# Patient Record
Sex: FEMALE | Race: WHITE | NOT HISPANIC OR LATINO | Employment: OTHER | URBAN - METROPOLITAN AREA
[De-identification: names, ages, dates, MRNs, and addresses within clinical notes are randomized per-mention and may not be internally consistent; named-entity substitution may affect disease eponyms.]

---

## 2017-02-22 ENCOUNTER — CONSULT (OUTPATIENT)
Dept: CARDIOLOGY | Facility: CLINIC | Age: 67
End: 2017-02-22

## 2017-02-22 VITALS
DIASTOLIC BLOOD PRESSURE: 76 MMHG | HEART RATE: 70 BPM | BODY MASS INDEX: 39.49 KG/M2 | HEIGHT: 62 IN | SYSTOLIC BLOOD PRESSURE: 126 MMHG | WEIGHT: 214.6 LBS

## 2017-02-22 DIAGNOSIS — E78.2 MIXED HYPERLIPIDEMIA: ICD-10-CM

## 2017-02-22 DIAGNOSIS — I48.20 CHRONIC ATRIAL FIBRILLATION (HCC): Primary | ICD-10-CM

## 2017-02-22 DIAGNOSIS — I73.9 PERIPHERAL VASCULAR DISEASE (HCC): ICD-10-CM

## 2017-02-22 DIAGNOSIS — I25.10 CORONARY ARTERY DISEASE INVOLVING NATIVE CORONARY ARTERY OF NATIVE HEART WITHOUT ANGINA PECTORIS: ICD-10-CM

## 2017-02-22 DIAGNOSIS — I10 ESSENTIAL HYPERTENSION: ICD-10-CM

## 2017-02-22 PROCEDURE — 99204 OFFICE O/P NEW MOD 45 MIN: CPT | Performed by: INTERNAL MEDICINE

## 2017-02-22 PROCEDURE — 93280 PM DEVICE PROGR EVAL DUAL: CPT | Performed by: INTERNAL MEDICINE

## 2017-02-22 RX ORDER — OMEPRAZOLE 40 MG/1
40 CAPSULE, DELAYED RELEASE ORAL 2 TIMES DAILY
COMMUNITY
End: 2022-12-07

## 2017-02-22 RX ORDER — AMANTADINE HYDROCHLORIDE 100 MG/1
100 CAPSULE, GELATIN COATED ORAL 2 TIMES DAILY
COMMUNITY
End: 2022-12-22

## 2017-02-22 RX ORDER — LEVOTHYROXINE SODIUM 175 UG/1
175 TABLET ORAL DAILY
COMMUNITY
End: 2017-09-28 | Stop reason: DRUGHIGH

## 2017-02-22 RX ORDER — RANOLAZINE 500 MG/1
500 TABLET, EXTENDED RELEASE ORAL 2 TIMES DAILY
COMMUNITY
End: 2018-10-22 | Stop reason: SDUPTHER

## 2017-02-22 RX ORDER — CLOBETASOL PROPIONATE 0.5 MG/G
1 CREAM TOPICAL 2 TIMES DAILY PRN
COMMUNITY

## 2017-02-22 RX ORDER — OXYBUTYNIN CHLORIDE 10 MG/1
10 TABLET, EXTENDED RELEASE ORAL DAILY
COMMUNITY
End: 2018-01-25

## 2017-02-22 RX ORDER — WARFARIN SODIUM 5 MG/1
5 TABLET ORAL
COMMUNITY
End: 2017-09-12 | Stop reason: SDUPTHER

## 2017-02-22 RX ORDER — LORATADINE 10 MG/1
10 CAPSULE, LIQUID FILLED ORAL DAILY
COMMUNITY

## 2017-02-22 RX ORDER — POTASSIUM CHLORIDE 750 MG/1
10 TABLET, EXTENDED RELEASE ORAL 2 TIMES DAILY
COMMUNITY
End: 2017-02-22

## 2017-02-22 RX ORDER — CLONIDINE HYDROCHLORIDE 0.1 MG/1
0.1 TABLET ORAL 2 TIMES DAILY
COMMUNITY
End: 2017-06-06 | Stop reason: SDUPTHER

## 2017-02-22 RX ORDER — ASPIRIN 81 MG/1
81 TABLET ORAL DAILY
COMMUNITY

## 2017-02-22 RX ORDER — LOSARTAN POTASSIUM 50 MG/1
50 TABLET ORAL 2 TIMES DAILY
COMMUNITY
End: 2017-08-07 | Stop reason: SDUPTHER

## 2017-02-22 RX ORDER — FUROSEMIDE 40 MG/1
40 TABLET ORAL 2 TIMES DAILY
COMMUNITY
End: 2017-04-25 | Stop reason: SDUPTHER

## 2017-02-22 RX ORDER — PRAMIPEXOLE DIHYDROCHLORIDE 1 MG/1
1.5 TABLET ORAL NIGHTLY
COMMUNITY
End: 2017-09-05 | Stop reason: DRUGHIGH

## 2017-02-22 RX ORDER — SENNA AND DOCUSATE SODIUM 50; 8.6 MG/1; MG/1
1 TABLET, FILM COATED ORAL DAILY
COMMUNITY
End: 2021-06-15

## 2017-02-22 RX ORDER — CITALOPRAM 20 MG/1
20 TABLET ORAL
COMMUNITY
End: 2022-05-04

## 2017-02-22 RX ORDER — ASCORBIC ACID 500 MG
500 TABLET ORAL DAILY
COMMUNITY

## 2017-02-22 RX ORDER — NITROGLYCERIN 0.4 MG/1
0.4 TABLET SUBLINGUAL AS NEEDED
COMMUNITY
End: 2017-10-17

## 2017-02-22 RX ORDER — BETAMETHASONE DIPROPIONATE 0.5 MG/G
1 CREAM TOPICAL 2 TIMES DAILY PRN
COMMUNITY
End: 2018-03-28

## 2017-02-22 RX ORDER — MELATONIN
2000 DAILY
COMMUNITY
End: 2017-10-17 | Stop reason: DRUGHIGH

## 2017-02-22 RX ORDER — SPIRONOLACTONE 25 MG/1
25 TABLET ORAL DAILY
Qty: 90 TABLET | Refills: 3 | Status: SHIPPED | OUTPATIENT
Start: 2017-02-22 | End: 2018-01-17 | Stop reason: SDUPTHER

## 2017-02-28 ENCOUNTER — ANTICOAGULATION VISIT (OUTPATIENT)
Dept: CARDIOLOGY | Facility: CLINIC | Age: 67
End: 2017-02-28

## 2017-02-28 DIAGNOSIS — I48.91 ATRIAL FIBRILLATION, UNSPECIFIED TYPE (HCC): Primary | ICD-10-CM

## 2017-02-28 LAB — INR PPP: 2.8

## 2017-03-01 PROBLEM — E78.2 MIXED HYPERLIPIDEMIA: Status: ACTIVE | Noted: 2017-03-01

## 2017-03-01 PROBLEM — I25.10 CORONARY ARTERY DISEASE INVOLVING NATIVE CORONARY ARTERY OF NATIVE HEART WITHOUT ANGINA PECTORIS: Status: ACTIVE | Noted: 2017-03-01

## 2017-03-01 PROBLEM — I73.9 PERIPHERAL VASCULAR DISEASE (HCC): Status: ACTIVE | Noted: 2017-03-01

## 2017-03-01 PROBLEM — I48.20 CHRONIC ATRIAL FIBRILLATION: Status: ACTIVE | Noted: 2017-03-01

## 2017-03-01 PROBLEM — I10 ESSENTIAL HYPERTENSION: Status: ACTIVE | Noted: 2017-03-01

## 2017-03-13 ENCOUNTER — ANTICOAGULATION VISIT (OUTPATIENT)
Dept: CARDIOLOGY | Facility: CLINIC | Age: 67
End: 2017-03-13

## 2017-03-13 LAB — INR PPP: 3.7

## 2017-03-17 NOTE — PROGRESS NOTES
I have reviewed the anticoagulation track calender, labs, and dosage adjustments made by Tran Cardenas RN and I agree.    Electronically signed by DEJA King 03/17/17 8:03 AM

## 2017-03-24 ENCOUNTER — ANTICOAGULATION VISIT (OUTPATIENT)
Dept: CARDIOLOGY | Facility: CLINIC | Age: 67
End: 2017-03-24

## 2017-03-24 LAB — INR PPP: 2.7

## 2017-04-03 ENCOUNTER — ANTICOAGULATION VISIT (OUTPATIENT)
Dept: CARDIOLOGY | Facility: CLINIC | Age: 67
End: 2017-04-03

## 2017-04-03 LAB — INR PPP: 2.6

## 2017-04-24 ENCOUNTER — ANTICOAGULATION VISIT (OUTPATIENT)
Dept: CARDIOLOGY | Facility: CLINIC | Age: 67
End: 2017-04-24

## 2017-04-24 LAB — INR PPP: 3.7

## 2017-04-25 ENCOUNTER — APPOINTMENT (OUTPATIENT)
Dept: WOMENS IMAGING | Facility: HOSPITAL | Age: 67
End: 2017-04-25

## 2017-04-25 ENCOUNTER — TELEPHONE (OUTPATIENT)
Dept: CARDIOLOGY | Facility: CLINIC | Age: 67
End: 2017-04-25

## 2017-04-25 PROCEDURE — G0202 SCR MAMMO BI INCL CAD: HCPCS | Performed by: RADIOLOGY

## 2017-04-25 RX ORDER — FUROSEMIDE 40 MG/1
TABLET ORAL
Qty: 135 TABLET | Refills: 1 | Status: SHIPPED | OUTPATIENT
Start: 2017-04-25 | End: 2017-08-07 | Stop reason: SDUPTHER

## 2017-04-25 NOTE — TELEPHONE ENCOUNTER
PT called to report that she is experiencing BLE edema and SOA- she is starting to get a rash on her BLE that she is gets when she gets fluid build up in her BLE- we went over her meds and she has not been taking lasix- in addition to spironolactone as ordered- she dos stop potassium-   Instructed PT to start back Lasix at 40 mg daily for now with BMP in 1 week- she will call me back in 1 week or sooner with an update- she is not seeing any results we increase to BID as she was taking before-   Dr. Corona aware and agrees with this plan-

## 2017-04-26 NOTE — PROGRESS NOTES
I have reviewed the anticoagulation track calender, labs, and dosage adjustments made by Tran Cardenas RN and I agree.    Electronically signed by DEJA King 04/26/17 9:48 AM

## 2017-05-01 ENCOUNTER — ANTICOAGULATION VISIT (OUTPATIENT)
Dept: CARDIOLOGY | Facility: CLINIC | Age: 67
End: 2017-05-01

## 2017-05-01 LAB — INR PPP: 2.6

## 2017-05-04 ENCOUNTER — LAB (OUTPATIENT)
Dept: LAB | Facility: HOSPITAL | Age: 67
End: 2017-05-04

## 2017-05-04 DIAGNOSIS — I48.91 ATRIAL FIBRILLATION, UNSPECIFIED TYPE (HCC): ICD-10-CM

## 2017-05-04 DIAGNOSIS — Z79.899 HIGH RISK MEDICATION USE: Primary | ICD-10-CM

## 2017-05-04 DIAGNOSIS — Z79.899 HIGH RISK MEDICATION USE: ICD-10-CM

## 2017-05-04 LAB
ANION GAP SERPL CALCULATED.3IONS-SCNC: 6 MMOL/L (ref 3–11)
BUN BLD-MCNC: 21 MG/DL (ref 9–23)
BUN/CREAT SERPL: 30 (ref 7–25)
CALCIUM SPEC-SCNC: 9.6 MG/DL (ref 8.7–10.4)
CHLORIDE SERPL-SCNC: 99 MMOL/L (ref 99–109)
CO2 SERPL-SCNC: 32 MMOL/L (ref 20–31)
CREAT BLD-MCNC: 0.7 MG/DL (ref 0.6–1.3)
GFR SERPL CREATININE-BSD FRML MDRD: 83 ML/MIN/1.73
GLUCOSE BLD-MCNC: 78 MG/DL (ref 70–100)
POTASSIUM BLD-SCNC: 4.9 MMOL/L (ref 3.5–5.5)
SODIUM BLD-SCNC: 137 MMOL/L (ref 132–146)

## 2017-05-04 PROCEDURE — 80048 BASIC METABOLIC PNL TOTAL CA: CPT | Performed by: INTERNAL MEDICINE

## 2017-05-15 ENCOUNTER — ANTICOAGULATION VISIT (OUTPATIENT)
Dept: CARDIOLOGY | Facility: CLINIC | Age: 67
End: 2017-05-15

## 2017-05-15 LAB — INR PPP: 1.8

## 2017-05-22 ENCOUNTER — ANTICOAGULATION VISIT (OUTPATIENT)
Dept: CARDIOLOGY | Facility: CLINIC | Age: 67
End: 2017-05-22

## 2017-05-22 LAB — INR PPP: 1.8

## 2017-05-30 ENCOUNTER — TELEPHONE (OUTPATIENT)
Dept: CARDIOLOGY | Facility: CLINIC | Age: 67
End: 2017-05-30

## 2017-05-31 ENCOUNTER — ANTICOAGULATION VISIT (OUTPATIENT)
Dept: CARDIOLOGY | Facility: CLINIC | Age: 67
End: 2017-05-31

## 2017-05-31 LAB — INR PPP: 1.9

## 2017-06-06 ENCOUNTER — ANTICOAGULATION VISIT (OUTPATIENT)
Dept: CARDIOLOGY | Facility: CLINIC | Age: 67
End: 2017-06-06

## 2017-06-06 LAB — INR PPP: 1.8

## 2017-06-06 RX ORDER — CLONIDINE HYDROCHLORIDE 0.1 MG/1
0.1 TABLET ORAL EVERY 12 HOURS
Qty: 180 TABLET | Refills: 3 | Status: SHIPPED | OUTPATIENT
Start: 2017-06-06 | End: 2017-08-07 | Stop reason: SDUPTHER

## 2017-06-06 NOTE — PATIENT INSTRUCTIONS
To recheck 2 weeks with new dosage.    Patient says that she eats a lot of cabbage - mostly raw but some cooked.

## 2017-06-06 NOTE — PROGRESS NOTES
I have reviewed the anticoagulation track calender, labs, and dosage adjustments made by Tran Cardenas RN and I agree.    Electronically signed by DEJA King 06/06/17 4:34 PM

## 2017-06-19 ENCOUNTER — ANTICOAGULATION VISIT (OUTPATIENT)
Dept: CARDIOLOGY | Facility: CLINIC | Age: 67
End: 2017-06-19

## 2017-06-19 LAB — INR PPP: 1.9

## 2017-06-26 ENCOUNTER — ANTICOAGULATION VISIT (OUTPATIENT)
Dept: CARDIOLOGY | Facility: CLINIC | Age: 67
End: 2017-06-26

## 2017-06-26 LAB — INR PPP: 3.1

## 2017-06-28 NOTE — PROGRESS NOTES
I have reviewed the anticoagulation track calender, labs, and dosage adjustments made by Tran Cardenas RN and I agree.    Electronically signed by DEJA King 06/27/17 9:12 PM

## 2017-07-10 ENCOUNTER — ANTICOAGULATION VISIT (OUTPATIENT)
Dept: CARDIOLOGY | Facility: CLINIC | Age: 67
End: 2017-07-10

## 2017-07-10 LAB — INR PPP: 3.1

## 2017-07-11 NOTE — PROGRESS NOTES
I have reviewed the anticoagulation track calender, labs, and dosage adjustments made by Tran Cardenas RN and I agree.    Electronically signed by DEJA King 07/11/17 3:56 PM

## 2017-07-18 ENCOUNTER — ANTICOAGULATION VISIT (OUTPATIENT)
Dept: CARDIOLOGY | Facility: CLINIC | Age: 67
End: 2017-07-18

## 2017-07-18 LAB — INR PPP: 3.4

## 2017-07-25 ENCOUNTER — ANTICOAGULATION VISIT (OUTPATIENT)
Dept: CARDIOLOGY | Facility: CLINIC | Age: 67
End: 2017-07-25

## 2017-07-25 LAB — INR PPP: 2.2

## 2017-08-01 ENCOUNTER — ANTICOAGULATION VISIT (OUTPATIENT)
Dept: CARDIOLOGY | Facility: CLINIC | Age: 67
End: 2017-08-01

## 2017-08-01 LAB — INR PPP: 2.6

## 2017-08-07 RX ORDER — LOSARTAN POTASSIUM 50 MG/1
50 TABLET ORAL EVERY 12 HOURS
Qty: 180 TABLET | Refills: 3 | Status: SHIPPED | OUTPATIENT
Start: 2017-08-07 | End: 2018-10-04 | Stop reason: SDUPTHER

## 2017-08-07 RX ORDER — CLONIDINE HYDROCHLORIDE 0.1 MG/1
0.1 TABLET ORAL EVERY 12 HOURS
Qty: 180 TABLET | Refills: 3 | Status: SHIPPED | OUTPATIENT
Start: 2017-08-07 | End: 2018-10-04 | Stop reason: SDUPTHER

## 2017-08-07 RX ORDER — FUROSEMIDE 40 MG/1
TABLET ORAL
Qty: 135 TABLET | Refills: 1 | Status: SHIPPED | OUTPATIENT
Start: 2017-08-07 | End: 2018-01-25

## 2017-08-15 ENCOUNTER — ANTICOAGULATION VISIT (OUTPATIENT)
Dept: CARDIOLOGY | Facility: CLINIC | Age: 67
End: 2017-08-15

## 2017-08-15 LAB — INR PPP: 2.7

## 2017-09-05 ENCOUNTER — ANTICOAGULATION VISIT (OUTPATIENT)
Dept: PHARMACY | Facility: HOSPITAL | Age: 67
End: 2017-09-05

## 2017-09-05 DIAGNOSIS — I48.20 CHRONIC ATRIAL FIBRILLATION (HCC): ICD-10-CM

## 2017-09-05 LAB — INR PPP: 2.2

## 2017-09-05 RX ORDER — PRAMIPEXOLE DIHYDROCHLORIDE 1.5 MG/1
1.5 TABLET ORAL NIGHTLY
COMMUNITY

## 2017-09-05 NOTE — PROGRESS NOTES
Anticoagulation Clinic - Remote Progress Note  REMOTE LAB    Indication: afib  Referring Provider: Cj  Goal INR: 2-3  Current Drug Interactions: aspirin, citalopram, levothyroxine, omeprazole, spironolactone  CHADS-VASc: 7 (age, gender, HTN, PVD, h/o TIA, HTN)  Bleed Risk: h/o AVM with bleed requiring hospitalization  Other: previously on Xarelto, with subsequent bleed    Diet: [GLV INTAKE]  Alcohol:   Tobacco:       INR History:  Date 9/5           Total Weekly Dose 32.5 mg           INR 2.2           Notes                Phone Interview:  Tablet Strength: pt has 5mg tablets  Current Maintenance Dose: 5mg daily except 2.5mg on Tuesdays  Patient Findings      Positives Change in medications     Negatives Signs/symptoms of thrombosis, Signs/symptoms of bleeding, Laboratory test error suspected, Change in health, Change in alcohol use, Change in activity, Upcoming invasive procedure, Emergency department visit, Upcoming dental procedure, Missed doses, Extra doses, Change in diet/appetite, Hospital admission, Bruising, Other complaints     Comments Pramipexole dose recently increased from 1mg QHS to 1.5mg QHS. Levothyroxine dose increased from 175mcg to 200mcg daily about one month ago -- discussed DDI between warfarin and thyroid hormone, and encouraged pt to let us know of future medication changes.       Patient Contact Info: [phone]  Lab Contact Info: [facility], [phone]    Plan:  1. INR is therapeutic today. Instructed pt to continue current dose, 5mg daily except 2.5mg on Tuesdays.  2. Repeat INR in 4 weeks.  3. Verbal information provided over the phone to Ms. Tay. She RBV dosing instructions, expresses understanding, and has no further questions at this time.      Hamida Dailey [facility]  9/5/2017  4:47 PM

## 2017-09-06 ENCOUNTER — OFFICE VISIT (OUTPATIENT)
Dept: CARDIOLOGY | Facility: CLINIC | Age: 67
End: 2017-09-06

## 2017-09-06 VITALS
HEIGHT: 63 IN | SYSTOLIC BLOOD PRESSURE: 110 MMHG | BODY MASS INDEX: 42.88 KG/M2 | DIASTOLIC BLOOD PRESSURE: 62 MMHG | HEART RATE: 84 BPM | WEIGHT: 242 LBS

## 2017-09-06 DIAGNOSIS — E78.2 MIXED HYPERLIPIDEMIA: ICD-10-CM

## 2017-09-06 DIAGNOSIS — I25.10 CORONARY ARTERY DISEASE INVOLVING NATIVE CORONARY ARTERY OF NATIVE HEART WITHOUT ANGINA PECTORIS: Primary | ICD-10-CM

## 2017-09-06 DIAGNOSIS — Z79.899 HIGH RISK MEDICATION USE: ICD-10-CM

## 2017-09-06 DIAGNOSIS — I10 ESSENTIAL HYPERTENSION: ICD-10-CM

## 2017-09-06 PROCEDURE — 99213 OFFICE O/P EST LOW 20 MIN: CPT | Performed by: INTERNAL MEDICINE

## 2017-09-06 PROCEDURE — 93280 PM DEVICE PROGR EVAL DUAL: CPT | Performed by: INTERNAL MEDICINE

## 2017-09-06 RX ORDER — TRIAMCINOLONE ACETONIDE 55 UG/1
2 SPRAY, METERED NASAL DAILY PRN
COMMUNITY
End: 2018-05-31 | Stop reason: ALTCHOICE

## 2017-09-06 RX ORDER — BUMETANIDE 2 MG/1
TABLET ORAL
Qty: 180 TABLET | Refills: 1 | Status: SHIPPED | OUTPATIENT
Start: 2017-09-06 | End: 2017-10-05 | Stop reason: SDUPTHER

## 2017-09-12 DIAGNOSIS — I48.91 ATRIAL FIBRILLATION, UNSPECIFIED TYPE (HCC): Primary | ICD-10-CM

## 2017-09-12 RX ORDER — WARFARIN SODIUM 5 MG/1
TABLET ORAL
Qty: 90 TABLET | Refills: 1 | OUTPATIENT
Start: 2017-09-12 | End: 2018-03-02 | Stop reason: SDUPTHER

## 2017-09-19 ENCOUNTER — TELEPHONE (OUTPATIENT)
Dept: PHARMACY | Facility: HOSPITAL | Age: 67
End: 2017-09-19

## 2017-09-19 NOTE — TELEPHONE ENCOUNTER
Patient called to report she started keflex 500mg TID last night for an infection in her finger. Patient does not recall taking this medication in the past. Per Rahulmp **, will monitor INR closely to assess increase in INR. Advised patient to continue warfarin and recheck INR on Friday.           **Monitor for elevated INR and bleeding if a vitamin K antagonist is used in combination with cephalosporins. Cephalosporins that have an NMTT (1-MTT) side chain in their chemical structure may pose the greatest risk of interaction.

## 2017-09-22 ENCOUNTER — TELEPHONE (OUTPATIENT)
Dept: PHARMACY | Facility: HOSPITAL | Age: 67
End: 2017-09-22

## 2017-09-22 NOTE — TELEPHONE ENCOUNTER
Warfarin note re: antibiotics      She has been changed from cephalexin to doxycycline for wound infection. INR = 1.8 on 9/22 faxed from [facility] in [place]. Notified her that doxy may increase INR and recheck on Thursday of next week approximately day 6/7 for doxy. Please call her cell phone and not home phone number henceforth.

## 2017-09-28 ENCOUNTER — ANTICOAGULATION VISIT (OUTPATIENT)
Dept: PHARMACY | Facility: HOSPITAL | Age: 67
End: 2017-09-28

## 2017-09-28 DIAGNOSIS — Z79.899 HIGH RISK MEDICATION USE: ICD-10-CM

## 2017-09-28 DIAGNOSIS — I48.20 CHRONIC ATRIAL FIBRILLATION (HCC): ICD-10-CM

## 2017-09-28 LAB
INR PPP: 2.1 (ref 0.91–1.09)
PROTHROMBIN TIME: 25.3 SECONDS (ref 10–13.8)

## 2017-09-28 PROCEDURE — 85610 PROTHROMBIN TIME: CPT

## 2017-09-28 PROCEDURE — G0463 HOSPITAL OUTPT CLINIC VISIT: HCPCS

## 2017-09-28 PROCEDURE — 36416 COLLJ CAPILLARY BLOOD SPEC: CPT

## 2017-09-28 RX ORDER — LEVOTHYROXINE SODIUM 0.2 MG/1
200 TABLET ORAL DAILY
COMMUNITY
End: 2018-05-21

## 2017-09-28 NOTE — PROGRESS NOTES
Anticoagulation Clinic - Remote Progress Note  REMOTE LAB    Indication: afib  Referring Provider: Cj  Goal INR: 2-3  Initial Warfarin Start: December 2016  Current Drug Interactions: aspirin, citalopram, levothyroxine, omeprazole, spironolactone  CHADS-VASc: 7 (age, gender, HTN, PVD, h/o TIA, HTN)  Bleed Risk: h/o AVM with bleed requiring hospitalization  Other: previously on Xarelto, with subsequent bleed (AVM) -- Dr. Campa in [place]; internal hemorrhoids     Diet: raw cabbage (2-3 heads per week), occasional cooked GLV  Alcohol: none  Tobacco: none  OTC Pain Medication: APAP    1st clinic visit: 9/28/17    INR History:  Date 9/5 9/28          Total Weekly Dose 32.5 mg 32.5 mg          INR 2.2 2.1          Notes  clinic            Phone Interview:  Tablet Strength: pt has 5mg tablets  Current Maintenance Dose: 5mg daily except 2.5mg on Tuesdays  Patient Findings      Positives Signs/symptoms of bleeding, Change in medications     Negatives Signs/symptoms of thrombosis, Laboratory test error suspected, Change in health, Change in alcohol use, Change in activity, Upcoming invasive procedure, Emergency department visit, Upcoming dental procedure, Missed doses, Extra doses, Change in diet/appetite, Hospital admission, Bruising, Other complaints     Comments Pt has one dose of doxy remaining for finger infection (cellulitis). She is also changing form levemir to insulin degludec (Tresiba) -- has had an allergic reaction to levemir (hives around injection site). Had a nosebleed last week (happens when she bothers her nose -- advised her to talk with her PCP about this -- consider cauterization).      Patient Contact Info: [phone]  Lab Contact Info: [facility], [phone]    Joanna ZAYNAB Cross presented to the clinic today -- she was by herself for counseling. Discussed warfarin's indication, mechanism, and dosing. Also enforced the importance of taking warfarin as instructed and at the  same time every day, preferably in the evening so that we can make dose adjustments more easily following subsequent clinic visits. She expressed understanding of this fact and is already dosing in this manner. We also discussed what she should do about a missed dose; pts can take missed doses within about 12 hours of their usual scheduled dose, but she was instructed on the importance of not doubling up on doses unless told to do so by the warfarin clinic. Explained possible side effects of warfarin therapy, including increased risk of bleeding, s/sx of bleeding and s/sx of any additional clots/PE/CVA. Reviewed drug/food/tobacco/EtOH interactions and provided written information covering these topics in more detail, explaining that green, leafy vegetables interact most heavily with warfarin. Instructed the pt not to take or discontinue any medications without informing her physician/pharmacist and reminded her to inform us of any dietary changes, as well. She expressed understanding of the information provided and has no additional questions at this time.    Plan:  1. INR is therapeutic today despite ongoing infection / abx therapy. Instructed pt to continue current dose, 5mg daily except 2.5mg on Tuesdays.  2. Repeat INR in 2 weeks to ensure it remains WNL following abx completion.   3. Verbal and written information provided in the clinic Ms. Cross. She RBV dosing instructions, expresses understanding, and has no further questions at this time.    Hamida Dailey [facility]  9/28/2017  4:33 PM

## 2017-10-05 RX ORDER — BUMETANIDE 2 MG/1
TABLET ORAL
Qty: 180 TABLET | Refills: 1 | Status: SHIPPED | OUTPATIENT
Start: 2017-10-05 | End: 2018-01-25

## 2017-10-12 ENCOUNTER — ANTICOAGULATION VISIT (OUTPATIENT)
Dept: PHARMACY | Facility: HOSPITAL | Age: 67
End: 2017-10-12

## 2017-10-12 DIAGNOSIS — I48.20 CHRONIC ATRIAL FIBRILLATION (HCC): ICD-10-CM

## 2017-10-12 DIAGNOSIS — Z79.899 HIGH RISK MEDICATION USE: Primary | ICD-10-CM

## 2017-10-12 LAB
INR PPP: 2.6 (ref 0.91–1.09)
PROTHROMBIN TIME: 31.2 SECONDS (ref 10–13.8)

## 2017-10-12 PROCEDURE — 36416 COLLJ CAPILLARY BLOOD SPEC: CPT

## 2017-10-12 PROCEDURE — 85610 PROTHROMBIN TIME: CPT

## 2017-10-12 RX ORDER — METOLAZONE 2.5 MG/1
2.5 TABLET ORAL DAILY
Qty: 30 TABLET | Refills: 1 | Status: SHIPPED | OUTPATIENT
Start: 2017-10-12 | End: 2018-01-17 | Stop reason: SDUPTHER

## 2017-10-12 NOTE — PROGRESS NOTES
Anticoagulation Clinic - Remote Progress Note  REMOTE LAB    Indication: afib  Referring Provider: Cj  Goal INR: 2-3  Initial Warfarin Start: December 2016  Current Drug Interactions: aspirin, citalopram, levothyroxine, omeprazole, spironolactone  CHADS-VASc: 7 (age, gender, HTN, PVD, h/o TIA, HTN)  Bleed Risk: h/o AVM with bleed requiring hospitalization  Other: previously on Xarelto, with subsequent bleed (AVM) -- Dr. Campa in [place]; internal hemorrhoids     Diet: raw cabbage (2-3 heads per week), occasional cooked GLV  Alcohol: none  Tobacco: none  OTC Pain Medication: APAP    1st clinic visit: 9/28/17    INR History:  Date 9/5 9/28 10/12         Total Weekly Dose 32.5 mg 32.5 mg 32.5 mg         INR 2.2 2.1 2.6         Notes  clinic; [facility] clinic           Phone Interview:  Tablet Strength: pt has 5mg tablets  Current Maintenance Dose: 5mg daily except 2.5mg on Tuesdays  Patient Findings      Positives Change in medications, Other complaints     Negatives Signs/symptoms of thrombosis, Signs/symptoms of bleeding, Laboratory test error suspected, Change in health, Change in alcohol use, Change in activity, Upcoming invasive procedure, Emergency department visit, Upcoming dental procedure, Missed doses, Extra doses, Change in diet/appetite, Hospital admission, Bruising     Comments Finished abx two weeks ago, denies additional changes. She has a petechiae-looking rash on bilateral legs, around her ankles. It has been there fore about 2-3 weeks -- is not getting worse, but it isn't going away. She has experienced this type of rash previously, but it itched before and was weeping (not now).      Patient Contact Info: [phone]  Lab Contact Info: [facility], [phone]    Plan:  1. INR is therapeutic today. Instructed Ms. Cross to continue current dose, warfarin 5mg daily except 2.5mg on Tuesdays.  2. RTC in ~4 weeks, when pt is back in [place] with her daughter.  3. Verbal and written  information provided in the clinic. Ms. Cross RBV dosing instructions, expresses understanding, and has no further questions at this time.    Hamida Dailey RPH  10/12/2017  10:43 AM

## 2017-10-17 RX ORDER — NITROGLYCERIN 400 UG/1
1 SPRAY ORAL
COMMUNITY
End: 2018-04-30 | Stop reason: SDUPTHER

## 2017-10-23 ENCOUNTER — TRANSCRIBE ORDERS (OUTPATIENT)
Dept: PHARMACY | Facility: HOSPITAL | Age: 67
End: 2017-10-23

## 2017-10-23 DIAGNOSIS — I48.91 ATRIAL FIBRILLATION, UNSPECIFIED TYPE (HCC): Primary | ICD-10-CM

## 2017-11-01 DIAGNOSIS — Z79.899 HIGH RISK MEDICATION USE: ICD-10-CM

## 2017-11-06 ENCOUNTER — ANTICOAGULATION VISIT (OUTPATIENT)
Dept: PHARMACY | Facility: HOSPITAL | Age: 67
End: 2017-11-06

## 2017-11-06 DIAGNOSIS — I48.20 CHRONIC ATRIAL FIBRILLATION (HCC): ICD-10-CM

## 2017-11-06 LAB
INR PPP: 2.2 (ref 0.91–1.09)
PROTHROMBIN TIME: 26.9 SECONDS (ref 10–13.8)

## 2017-11-06 PROCEDURE — G0463 HOSPITAL OUTPT CLINIC VISIT: HCPCS

## 2017-11-06 PROCEDURE — 85610 PROTHROMBIN TIME: CPT

## 2017-11-06 PROCEDURE — 36416 COLLJ CAPILLARY BLOOD SPEC: CPT

## 2017-11-06 NOTE — PROGRESS NOTES
Anticoagulation Clinic - Remote Progress Note  REMOTE LAB    Indication: afib  Referring Provider: Cj  Goal INR: 2-3  Initial Warfarin Start: December 2016  Current Drug Interactions: aspirin, citalopram, levothyroxine, omeprazole, spironolactone  CHADS-VASc: 7 (age, gender, HTN, PVD, h/o TIA, HTN)  Bleed Risk: h/o AVM with bleed requiring hospitalization  Other: previously on Xarelto, with subsequent bleed (AVM) -- Dr. Campa in [place]; internal hemorrhoids     Diet: raw cabbage (2-3 heads per week), occasional cooked GLV  Alcohol: none  Tobacco: none  OTC Pain Medication: APAP    1st clinic visit: 9/28/17    INR History:  Date 9/5 9/28 10/12 11/6        Total Weekly Dose 32.5 mg 32.5 mg 32.5 mg 32.5 mg        INR 2.2 2.1 2.6 2.2        Notes  clinic; doxy clinic clinic          Phone Interview:  Tablet Strength: pt has 5mg tablets  Current Maintenance Dose: 5mg daily except 2.5mg on Tuesdays  Patient Findings      Negatives Signs/symptoms of thrombosis, Signs/symptoms of bleeding, Laboratory test error suspected, Change in health, Change in alcohol use, Change in activity, Upcoming invasive procedure, Emergency department visit, Upcoming dental procedure, Missed doses, Extra doses, Change in medications, Change in diet/appetite, Hospital admission, Bruising, Other complaints     Patient Contact Info: [phone]  Lab Contact Info: [facility], [phone]    Plan:  1. INR is therapeutic again today. Instructed Ms. Cross to continue warfarin 5mg daily except 2.5mg on Tuesdays.  2. RTC in 4 weeks.  3. Verbal and written information provided in the clinic. Ms. Cross RBV dosing instructions, expresses understanding, and has no further questions at this time.    Hamida Dailey RPH  11/6/2017  12:28 PM

## 2017-11-30 ENCOUNTER — ANTICOAGULATION VISIT (OUTPATIENT)
Dept: PHARMACY | Facility: HOSPITAL | Age: 67
End: 2017-11-30

## 2017-11-30 DIAGNOSIS — I48.20 CHRONIC ATRIAL FIBRILLATION (HCC): ICD-10-CM

## 2017-11-30 LAB
INR PPP: 3 (ref 0.91–1.09)
PROTHROMBIN TIME: 35.8 SECONDS (ref 10–13.8)

## 2017-11-30 PROCEDURE — 36416 COLLJ CAPILLARY BLOOD SPEC: CPT

## 2017-11-30 PROCEDURE — 85610 PROTHROMBIN TIME: CPT

## 2017-11-30 PROCEDURE — G0463 HOSPITAL OUTPT CLINIC VISIT: HCPCS

## 2017-11-30 NOTE — PROGRESS NOTES
Anticoagulation Clinic - Remote Progress Note  REMOTE LAB    Indication: afib  Referring Provider: Cj  Goal INR: 2-3  Initial Warfarin Start: December 2016  Current Drug Interactions: aspirin, citalopram, levothyroxine, omeprazole, spironolactone  CHADS-VASc: 7 (age, gender, HTN, PVD, h/o TIA, HTN)  Bleed Risk: h/o AVM with bleed requiring hospitalization  Other: previously on Xarelto, with subsequent bleed (AVM) -- Dr. Campa in [place]; internal hemorrhoids     Diet: raw cabbage (2-3 heads per week), occasional cooked GLV  Alcohol: none  Tobacco: none  OTC Pain Medication: APAP    1st clinic visit: 9/28/17    INR History:  Date 9/5 9/28 10/12 11/6 11/30       Total Weekly Dose 32.5 mg 32.5 mg 32.5 mg 32.5 mg 32.5 mg       INR 2.2 2.1 2.6 2.2 3.0       Notes  clinic; doxy clinic clinic clinic         Phone Interview:  Tablet Strength: pt has 5mg tablets  Current Maintenance Dose: 5mg daily except 2.5mg on Tuesdays    Patient Findings      Positives Bruising     Negatives Signs/symptoms of thrombosis, Signs/symptoms of bleeding, Laboratory test error suspected, Change in health, Change in alcohol use, Change in activity, Upcoming invasive procedure, Emergency department visit, Upcoming dental procedure, Missed doses, Extra doses, Change in medications, Change in diet/appetite, Hospital admission, Other complaints     Comments Patient reports some more bruising from her insulin shots, but no bleeding.     Patient Contact Info: [phone]  Lab Contact Info: [facility], [phone]    Plan:  1. INR is therapeutic today at 3.0. Instructed Ms. Cross to continue warfarin 5mg daily except 2.5mg on Tuesdays. Also instructed patient to eat a serving of cooked GLV to prevent INR from becoming supratherapeutic.  2. RTC in 4 weeks.  3. Verbal and written information provided in the clinic. Ms. Cross RBV dosing instructions, expresses understanding, and has no further questions at this  time.    Lana Hernandez, PharmD  11/30/2017  4:36 PM

## 2017-12-28 ENCOUNTER — ANTICOAGULATION VISIT (OUTPATIENT)
Dept: PHARMACY | Facility: HOSPITAL | Age: 67
End: 2017-12-28

## 2017-12-28 DIAGNOSIS — I48.20 CHRONIC ATRIAL FIBRILLATION (HCC): ICD-10-CM

## 2017-12-28 LAB
INR PPP: 2.6 (ref 0.91–1.09)
PROTHROMBIN TIME: 31.7 SECONDS (ref 10–13.8)

## 2017-12-28 PROCEDURE — 36416 COLLJ CAPILLARY BLOOD SPEC: CPT

## 2017-12-28 PROCEDURE — G0463 HOSPITAL OUTPT CLINIC VISIT: HCPCS

## 2017-12-28 PROCEDURE — 85610 PROTHROMBIN TIME: CPT

## 2017-12-28 NOTE — PROGRESS NOTES
Anticoagulation Clinic - Remote Progress Note  REMOTE LAB    Indication: afib  Referring Provider: Cj  Goal INR: 2-3  Initial Warfarin Start: December 2016  Current Drug Interactions: aspirin, citalopram, levothyroxine, omeprazole, spironolactone  CHADS-VASc: 7 (age, gender, HTN, PVD, h/o TIA, HTN)  Bleed Risk: h/o AVM with bleed requiring hospitalization  Other: previously on Xarelto, with subsequent bleed (AVM) -- Dr. Campa in [place]; internal hemorrhoids     Diet: raw cabbage (2-3 heads per week), occasional cooked GLV  Alcohol: none  Tobacco: none  OTC Pain Medication: APAP    INR History:  Date 9/5 9/28 10/12 11/6 11/30 12/28      Total Weekly Dose 32.5 mg 32.5 mg 32.5 mg 32.5 mg 32.5 mg 32.5 mg      INR 2.2 2.1 2.6 2.2 3.0 2.6      Notes  clinic; doxy clinic clinic clinic clinic        Phone Interview:  Tablet Strength: pt has 5mg tablets  Current Maintenance Dose: 5mg daily except 2.5mg on Tuesdays  Patient Findings      Positives Bruising     Negatives Signs/symptoms of thrombosis, Signs/symptoms of bleeding, Laboratory test error suspected, Change in health, Change in alcohol use, Change in activity, Upcoming invasive procedure, Emergency department visit, Upcoming dental procedure, Missed doses, Extra doses, Change in medications, Change in diet/appetite, Hospital admission, Other complaints     Comments Mrs. Cross still has some bruising on her abdomen from her insulin injections, but they are slowly improving.      Patient Contact Info: [phone]  Lab Contact Info: [facility], [phone]    Plan:  1. INR is therapeutic today, so instructed Mrs. Cross to continue warfarin 5mg daily except 2.5mg on Tuesdays.   2. RTC in 4 weeks.  3. Verbal and written information provided in the clinic. Ms. Cross RBV dosing instructions, expresses understanding with teach back, and she has no further questions at this time.    Ann Cowden Mayer, PharmD  12/28/2017  3:13 PM

## 2018-01-17 RX ORDER — SPIRONOLACTONE 25 MG/1
25 TABLET ORAL DAILY
Qty: 90 TABLET | Refills: 1 | Status: SHIPPED | OUTPATIENT
Start: 2018-01-17 | End: 2019-09-18 | Stop reason: SDUPTHER

## 2018-01-17 RX ORDER — METOLAZONE 2.5 MG/1
2.5 TABLET ORAL DAILY
Qty: 90 TABLET | Refills: 1 | Status: SHIPPED | OUTPATIENT
Start: 2018-01-17 | End: 2018-08-04 | Stop reason: SDUPTHER

## 2018-01-18 ENCOUNTER — CLINICAL SUPPORT NO REQUIREMENTS (OUTPATIENT)
Dept: CARDIOLOGY | Facility: CLINIC | Age: 68
End: 2018-01-18

## 2018-01-18 DIAGNOSIS — I48.20 CHRONIC ATRIAL FIBRILLATION (HCC): Primary | ICD-10-CM

## 2018-01-18 PROCEDURE — 93296 REM INTERROG EVL PM/IDS: CPT | Performed by: INTERNAL MEDICINE

## 2018-01-18 PROCEDURE — 93294 REM INTERROG EVL PM/LDLS PM: CPT | Performed by: INTERNAL MEDICINE

## 2018-01-25 ENCOUNTER — ANTICOAGULATION VISIT (OUTPATIENT)
Dept: PHARMACY | Facility: HOSPITAL | Age: 68
End: 2018-01-25

## 2018-01-25 DIAGNOSIS — I48.20 CHRONIC ATRIAL FIBRILLATION (HCC): ICD-10-CM

## 2018-01-25 LAB
INR PPP: 1.3 (ref 0.91–1.09)
PROTHROMBIN TIME: 15.7 SECONDS (ref 10–13.8)

## 2018-01-25 PROCEDURE — 36416 COLLJ CAPILLARY BLOOD SPEC: CPT

## 2018-01-25 PROCEDURE — 85610 PROTHROMBIN TIME: CPT

## 2018-01-25 RX ORDER — BUMETANIDE 2 MG/1
2 TABLET ORAL
COMMUNITY
End: 2018-12-27 | Stop reason: HOSPADM

## 2018-01-25 RX ORDER — POTASSIUM CHLORIDE 750 MG/1
10 CAPSULE, EXTENDED RELEASE ORAL 2 TIMES DAILY
COMMUNITY
End: 2020-10-07

## 2018-01-25 NOTE — PROGRESS NOTES
Anticoagulation Clinic - Remote Progress Note  REMOTE LAB    Indication: afib  Referring Provider: Cj  Goal INR: 2-3  Initial Warfarin Start: December 2016  Current Drug Interactions: aspirin, citalopram, levothyroxine, omeprazole, spironolactone  CHADS-VASc: 7 (age, gender, HTN, PVD, h/o TIA, HTN)  Bleed Risk: h/o AVM with bleed requiring hospitalization  Other: previously on Xarelto, with subsequent bleed (AVM) -- Dr. Campa in [place]; internal hemorrhoids     Diet: raw cabbage (2-3 heads per week), occasional cooked GLV  Alcohol: none  Tobacco: none  OTC Pain Medication: APAP    INR History:  Date 9/5 9/28 10/12 11/6 11/30 12/28 1/25     Total Weekly Dose 32.5 mg 32.5 mg 32.5 mg 32.5 mg 32.5 mg 32.5 mg 32.5mg 37.5    INR 2.2 2.1 2.6 2.2 3.0 2.6 1.3     Notes  clinic; dox clinic clinic clinic clinic clinic       Phone Interview:  Tablet Strength: pt has 5mg tablets  Current Maintenance Dose: 5mg daily except 2.5mg on Tuesdays    Patient Findings      Positives Change in medications     Negatives Signs/symptoms of thrombosis, Signs/symptoms of bleeding, Laboratory test error suspected, Change in health, Change in alcohol use, Change in activity, Upcoming invasive procedure, Emergency department visit, Upcoming dental procedure, Missed doses, Extra doses, Change in diet/appetite, Hospital admission, Bruising, Other complaints     Comments (+) metolazone, myrbetriq, cefdinir (no significant DDI)     Patient Contact Info: [phone]  Lab Contact Info: [facility], [phone]    Plan:  1. INR is therapeutic today, so instructed Mrs. Cross to boost dose tonight to 7.5mg, and take 5mg daily until INR check up next week  2. RTC in 1 week, 2/1  3. Verbal and written information provided in the clinic. Ms. Cross RBV dosing instructions, expresses understanding with teach back, and she has no further questions at this time.      Iris Mcdonnell, PharmD  01/25/18  3:53 PM

## 2018-02-01 ENCOUNTER — APPOINTMENT (OUTPATIENT)
Dept: PHARMACY | Facility: HOSPITAL | Age: 68
End: 2018-02-01

## 2018-02-01 ENCOUNTER — ANTICOAGULATION VISIT (OUTPATIENT)
Dept: PHARMACY | Facility: HOSPITAL | Age: 68
End: 2018-02-01

## 2018-02-01 DIAGNOSIS — I48.20 CHRONIC ATRIAL FIBRILLATION (HCC): ICD-10-CM

## 2018-02-01 LAB
INR PPP: 1.7 (ref 0.91–1.09)
PROTHROMBIN TIME: 19.8 SECONDS (ref 10–13.8)

## 2018-02-01 PROCEDURE — 85610 PROTHROMBIN TIME: CPT

## 2018-02-01 PROCEDURE — 36416 COLLJ CAPILLARY BLOOD SPEC: CPT

## 2018-02-01 PROCEDURE — G0463 HOSPITAL OUTPT CLINIC VISIT: HCPCS

## 2018-02-01 NOTE — PROGRESS NOTES
Anticoagulation Clinic - Remote Progress Note  REMOTE LAB    Indication: afib  Referring Provider: Cj  Goal INR: 2-3  Initial Warfarin Start: December 2016  Current Drug Interactions: aspirin, citalopram, levothyroxine, omeprazole, spironolactone  CHADS-VASc: 7 (age, gender, HTN, PVD, h/o TIA, HTN)  Bleed Risk: h/o AVM with bleed requiring hospitalization  Other: previously on Xarelto, with subsequent bleed (AVM) -- Dr. Campa in [place]; internal hemorrhoids     Diet: raw cabbage (2-3 heads per week), occasional cooked GLV  Alcohol: none  Tobacco: none  OTC Pain Medication: APAP    INR History:  Date 9/5 9/28 10/12 11/6 11/30 12/28 1/25 2/1    Total Weekly Dose 32.5 mg 32.5 mg 32.5 mg 32.5 mg 32.5 mg 32.5 mg 32.5mg 37.5 mg    INR 2.2 2.1 2.6 2.2 3.0 2.6 1.3 1.7    Notes  clinic; doxy clinic clinic clinic clinic clinic; cefdinir clinic; cefdinir      Phone Interview:  Tablet Strength: pt has 5mg tablets  Current Maintenance Dose: 5mg daily except 2.5mg on Tuesdays    Patient Findings      Positives Change in medications     Negatives Signs/symptoms of thrombosis, Signs/symptoms of bleeding, Laboratory test error suspected, Change in health, Change in alcohol use, Change in activity, Upcoming invasive procedure, Emergency department visit, Upcoming dental procedure, Missed doses, Extra doses, Change in diet/appetite, Hospital admission, Bruising, Other complaints     Comments Patient will finish course of antibiotic (Cefdinir), tomorrow 2/2. No other changes in medications.     Patient Contact Info: [phone]  Lab Contact Info: [facility], [phone]    Plan:  1. INR remains subtherapeutic today at 1.7, so instructed Ms. Cross to boost dose tonight to 7.5mg, and then continue current warfarin dose of take 5mg daily except 2.5mg on Tuesday until INR check up next week.  2. RTC in 1 week, 2/9.  3. Verbal and written information provided in the clinic. Ms. Cross RBV dosing instructions,  expresses understanding with teach back, and she has no further questions at this time.    Lana Hernandez, PharmD  2/1/2018  12:00 PM

## 2018-02-09 ENCOUNTER — ANTICOAGULATION VISIT (OUTPATIENT)
Dept: PHARMACY | Facility: HOSPITAL | Age: 68
End: 2018-02-09

## 2018-02-09 DIAGNOSIS — I48.20 CHRONIC ATRIAL FIBRILLATION (HCC): ICD-10-CM

## 2018-02-09 LAB
INR PPP: 1.7 (ref 0.91–1.09)
INR PPP: 1.7 (ref 0.9–1.1)
PROTHROMBIN TIME: 19.9 SECONDS (ref 10–13.8)

## 2018-02-09 PROCEDURE — G0463 HOSPITAL OUTPT CLINIC VISIT: HCPCS

## 2018-02-09 PROCEDURE — 36416 COLLJ CAPILLARY BLOOD SPEC: CPT

## 2018-02-09 PROCEDURE — 85610 PROTHROMBIN TIME: CPT

## 2018-02-16 ENCOUNTER — APPOINTMENT (OUTPATIENT)
Dept: PHARMACY | Facility: HOSPITAL | Age: 68
End: 2018-02-16

## 2018-02-16 ENCOUNTER — ANTICOAGULATION VISIT (OUTPATIENT)
Dept: PHARMACY | Facility: HOSPITAL | Age: 68
End: 2018-02-16

## 2018-02-16 DIAGNOSIS — I48.20 CHRONIC ATRIAL FIBRILLATION (HCC): ICD-10-CM

## 2018-02-16 LAB
INR PPP: 1.8 (ref 0.91–1.09)
PROTHROMBIN TIME: 21.1 SECONDS (ref 10–13.8)

## 2018-02-16 PROCEDURE — G0463 HOSPITAL OUTPT CLINIC VISIT: HCPCS

## 2018-02-16 PROCEDURE — 85610 PROTHROMBIN TIME: CPT

## 2018-02-16 PROCEDURE — 36416 COLLJ CAPILLARY BLOOD SPEC: CPT

## 2018-02-16 NOTE — PROGRESS NOTES
Anticoagulation Clinic - Remote Progress Note  REMOTE LAB    Indication: afib, s/p PPM  Referring Provider: Cj  Goal INR: 2-3  Initial Warfarin Start: December 2016  Current Drug Interactions: aspirin, citalopram, levothyroxine, omeprazole, spironolactone  CHADS-VASc: 7 (age, gender, HTN, PVD, h/o TIA, HTN)  Bleed Risk: h/o AVM with bleed requiring hospitalization  Other: previously on Xarelto, with subsequent bleed (AVM) -- Dr. Campa in [place]; internal hemorrhoids     Diet: raw cabbage (2-3 heads per week), occasional cooked GLV  Alcohol: none  Tobacco: none  OTC Pain Medication: APAP    INR History:  Date 9/5 9/28 10/12 11/6 11/30 12/28 1/25 2/1 2/9   Total Weekly Dose 32.5 mg 32.5 mg 32.5 mg 32.5 mg 32.5 mg 32.5 mg 32.5mg 37.5 mg 35mg   INR 2.2 2.1 2.6 2.2 3.0 2.6 1.3 1.7 1.7   Notes  clinic; doxy clinic clinic clinic clinic clinic; cefdinir clinic; cefdinir cefdinir completed     Date 2/16           Total Weekly Dose 42.5           INR 1.8           Notes                Phone Interview:  Tablet Strength: pt has 5mg tablets  Current Maintenance Dose: 5mg daily     Patient Findings      Positives Upcoming dental procedure     Negatives Signs/symptoms of thrombosis, Signs/symptoms of bleeding, Laboratory test error suspected, Change in health, Change in alcohol use, Change in activity, Upcoming invasive procedure, Emergency department visit, Missed doses, Extra doses, Change in medications, Hospital admission, Bruising, Other complaints     Comments Patient will have 2 crowns placed at the dentist on 2/26 and will take 4 clindamycin capsules prior to the procedure. DMD did not mention needing to hold warfarin prior to procedure; she has had previous crowns placed while on warfarin therapy and was not required to hold the medication.   She has been eating raw cabbage, but not cooked; 2 servings of green beans in the last week; fried okra x 1 serving (eats ~ once weekly). Denies having any kale,  "som greens, spinach or brussel sprouts.      Patient Contact Info: [phone]  Lab Contact Info: [facility], [phone]    Plan:  1. INR remains subtherapeutic today at 1.8 despite boost last week. Since patient has had multiple subtherapeutic INRs recently, instructed her to increase her warfarin to 7.5mg on Mon/Fri, 5mg every other day. Will also \"boost\" dose on 2/17 to 7.5mg.    2. Verified there were no changes in her medications or current drug interactions that may be negatively impacting her INR.   3. Discussed dietary considerations at length. I provided her with a list of Vitamin K-containing foods and reviewed the importance of having a consistent dietary intake of Vitamin K.  4. RTC in 1 week, 2/23.   5. Verbal and written information provided in the clinic. Ms. Cross RBV dosing instructions, expresses understanding with teach back, and she has no further questions at this time.    Maggie Ly, PharmD  Pharmacy Resident  2/16/2018  12:16 PM    "

## 2018-02-23 ENCOUNTER — ANTICOAGULATION VISIT (OUTPATIENT)
Dept: PHARMACY | Facility: HOSPITAL | Age: 68
End: 2018-02-23

## 2018-02-23 DIAGNOSIS — I48.20 CHRONIC ATRIAL FIBRILLATION (HCC): ICD-10-CM

## 2018-02-23 LAB
INR PPP: 2.3 (ref 0.91–1.09)
PROTHROMBIN TIME: 28.1 SECONDS (ref 10–13.8)

## 2018-02-23 PROCEDURE — 85610 PROTHROMBIN TIME: CPT

## 2018-02-23 PROCEDURE — 36416 COLLJ CAPILLARY BLOOD SPEC: CPT

## 2018-02-23 PROCEDURE — G0463 HOSPITAL OUTPT CLINIC VISIT: HCPCS

## 2018-03-02 ENCOUNTER — TELEPHONE (OUTPATIENT)
Dept: CARDIOLOGY | Facility: CLINIC | Age: 68
End: 2018-03-02

## 2018-03-02 ENCOUNTER — ANTICOAGULATION VISIT (OUTPATIENT)
Dept: PHARMACY | Facility: HOSPITAL | Age: 68
End: 2018-03-02

## 2018-03-02 DIAGNOSIS — I48.20 CHRONIC ATRIAL FIBRILLATION (HCC): ICD-10-CM

## 2018-03-02 DIAGNOSIS — I48.91 ATRIAL FIBRILLATION, UNSPECIFIED TYPE (HCC): ICD-10-CM

## 2018-03-02 LAB
INR PPP: 1.7 (ref 0.91–1.09)
PROTHROMBIN TIME: 20.5 SECONDS (ref 10–13.8)

## 2018-03-02 PROCEDURE — G0463 HOSPITAL OUTPT CLINIC VISIT: HCPCS | Performed by: PHARMACIST

## 2018-03-02 PROCEDURE — 36416 COLLJ CAPILLARY BLOOD SPEC: CPT

## 2018-03-02 PROCEDURE — 85610 PROTHROMBIN TIME: CPT

## 2018-03-02 RX ORDER — WARFARIN SODIUM 5 MG/1
TABLET ORAL
Qty: 135 TABLET | Refills: 0 | OUTPATIENT
Start: 2018-03-02 | End: 2018-07-02

## 2018-03-02 NOTE — PROGRESS NOTES
Anticoagulation Clinic - Remote Progress Note  REMOTE LAB    Indication: afib, s/p PPM  Referring Provider: Cj  Goal INR: 2-3  Initial Warfarin Start: December 2016  Current Drug Interactions: aspirin, citalopram, Synthroid, omeprazole daily, spironolactone, Glucosamine- Chondrotion  CHADS-VASc: 7 (age, gender, HTN, PVD, h/o TIA,DM )  Bleed Risk: h/o AVM with bleed requiring hospitalization  Other: previously on Xarelto, with subsequent bleed (AVM) -- Dr. Campa in [place]; internal hemorrhoids     Diet: raw cabbage (2-3 heads per week), occasional cooked GLV  Alcohol: none  Tobacco: none  OTC Pain Medication: APAP    INR History:  Date 9/5 9/28 10/12 11/6 11/30 12/28 1/25 2/1 2/9   Total Weekly Dose 32.5 mg 32.5 mg 32.5 mg 32.5 mg 32.5 mg 32.5 mg 32.5mg 37.5 mg 35mg   INR 2.2 2.1 2.6 2.2 3.0 2.6 1.3 1.7 1.7   Notes  clinic; doxy clinic clinic clinic clinic clinic; cefdinir clinic; cefdinir cefdinir completed     Date 2/16 2/23 3/2         Total Weekly Dose 42.5 42.5 42.5 mg         INR 1.8 2.3 1.7         Notes   (boost )             Phone Interview:  Tablet Strength: pt has 5mg tablets  Current Maintenance Dose: 5mg daily   Patient Findings      Positives Upcoming invasive procedure, Change in diet/appetite     Negatives Signs/symptoms of thrombosis, Signs/symptoms of bleeding, Laboratory test error suspected, Change in health, Change in alcohol use, Change in activity, Emergency department visit, Upcoming dental procedure, Missed doses, Extra doses, Change in medications, Hospital admission, Bruising, Other complaints     Comments Considering a urethral dilatation/cytoscopy procedure. She needs to schedule it, she thinks it may be on Tuesday, March 13th. She took 4 clindamycin capsules on Monday for a dental procedure. She has been keeping a food log, and has had some fried okra, green beans and two nights ago she ate mixed greens than may have included cooked turnip greens. We may need to update  her dosing if she plans on continuing with more vitamin k foods.       Patient Contact Info: [phone]  Lab Contact Info: [facility], [phone]    Plan:  1. INR is subtherapeutic today at 1.7. Since patient has had multiple subtherapeutic INRs recently, instructed her to take a BOOST dose today to 10 mg (usu 7.5 mg) 6% increase and then continue with new higher dose of 5 mg daily with 7.5 mg on MWF.   2. RTC in 1 week, 3/9 to ensure WNL. If she does have the cytoscopy procedure on 3/13 Tuesday then she would hold beginning on 3/8 if 5 days are required.Will ask if lovenox bridge is necessary with A. Antonella (CHADSVASC = 7 including TIA).  2a. Request refills for warfarin to [facility] for [facility] day supply  3. Verbal and written information provided in the clinic. Ms. Cross RBV dosing instructions, expresses understanding with teach back, and she has no further questions at this time.    Ismale Aguilera  3/2/2018  1:51 PM

## 2018-03-02 NOTE — TELEPHONE ENCOUNTER
Wanda called and left VM stating they need to do a cystoscopy with possible urethral dilation and would like ok to hold warfarin 5-7 days. Please advise.

## 2018-03-09 ENCOUNTER — ANTICOAGULATION VISIT (OUTPATIENT)
Dept: PHARMACY | Facility: HOSPITAL | Age: 68
End: 2018-03-09

## 2018-03-09 DIAGNOSIS — I48.20 CHRONIC ATRIAL FIBRILLATION (HCC): ICD-10-CM

## 2018-03-09 LAB
INR PPP: 3 (ref 0.91–1.09)
PROTHROMBIN TIME: 36.2 SECONDS (ref 10–13.8)

## 2018-03-09 PROCEDURE — 85610 PROTHROMBIN TIME: CPT

## 2018-03-09 PROCEDURE — G0463 HOSPITAL OUTPT CLINIC VISIT: HCPCS | Performed by: PHARMACIST

## 2018-03-09 PROCEDURE — 36416 COLLJ CAPILLARY BLOOD SPEC: CPT

## 2018-03-09 NOTE — PROGRESS NOTES
Anticoagulation Clinic - Progress Note    Indication: afib, s/p PPM  Referring Provider: Cj  Goal INR: 2-3  Initial Warfarin Start: December 2016  Current Drug Interactions: aspirin, citalopram, Synthroid, omeprazole daily, spironolactone, Glucosamine- Chondrotion  CHADS-VASc: 7 (age, gender, HTN, PVD, h/o TIA,DM )  Bleed Risk: h/o AVM with bleed requiring hospitalization  Other: previously on Xarelto, with subsequent bleed (AVM) -- Dr. Campa in [place]; internal hemorrhoids     Diet: raw cabbage (2-3 heads per week), occasional cooked GLV  Alcohol: none  Tobacco: none  OTC Pain Medication: APAP    INR History:  Date 9/5 9/28 10/12 11/6 11/30 12/28 1/25 2/1 2/9   Total Weekly Dose 32.5 mg 32.5 mg 32.5 mg 32.5 mg 32.5 mg 32.5 mg 32.5mg 37.5 mg 35mg   INR 2.2 2.1 2.6 2.2 3.0 2.6 1.3 1.7 1.7   Notes  clinic; doxy clinic clinic clinic clinic clinic; cefdinir clinic; cefdinir cefdinir completed     Date 2/16 2/23 3/2 3/9        Total Weekly Dose 42.5 42.5 42.5 mg 45mg 42.5       INR 1.8 2.3 1.7 3.0        Notes   (boost ) boost            Phone Interview:  Tablet Strength: pt has 5mg tablets  Current Maintenance Dose: 5mg daily   Patient Findings      Positives Change in diet/appetite     Negatives Signs/symptoms of thrombosis, Signs/symptoms of bleeding, Laboratory test error suspected, Change in health, Change in alcohol use, Change in activity, Upcoming invasive procedure, Emergency department visit, Upcoming dental procedure, Missed doses, Extra doses, Change in medications, Hospital admission, Bruising, Other complaints     Comments Mrs. Cross reports that she has a cytoscopy scheduled for Tuesday 3/13 in which she does NOT need to hold warfarin, per her urologist. She continues to record a food log. She had green beans and fried okra Monday and turnip greens on Wednesday. She reports that this is the same amount of GLV as last week and wishes to continue this amount of GLV.     Patient Contact  Info: [phone]  Lab Contact Info: [facility], [phone]    Plan:  1. INR is therapeutic today at top of goal range 3.0 after boost. Due to rise in INR over 1 week, Instructed patient to resume increased maintenance dose of 5 mg daily with 7.5 mg on MWF.   2. RTC in 1 week, 3/16 to ensure WNL.  3. Verbal and written information provided in the clinic. Ms. Cross RBV dosing instructions, expresses understanding with teach back, and she has no further questions at this time.    Rosey Queen, Pharmacy Intern  3/9/2018  1:59 PM    I, Ismael Aguilera, [facility], have reviewed the note in full and agree with the assessment and plan.  03/09/18  4:33 PM

## 2018-03-16 ENCOUNTER — ANTICOAGULATION VISIT (OUTPATIENT)
Dept: PHARMACY | Facility: HOSPITAL | Age: 68
End: 2018-03-16

## 2018-03-16 DIAGNOSIS — I48.20 CHRONIC ATRIAL FIBRILLATION (HCC): ICD-10-CM

## 2018-03-16 LAB
INR PPP: 1.7 (ref 0.91–1.09)
PROTHROMBIN TIME: 20.4 SECONDS (ref 10–13.8)

## 2018-03-16 PROCEDURE — 85610 PROTHROMBIN TIME: CPT

## 2018-03-16 PROCEDURE — 36416 COLLJ CAPILLARY BLOOD SPEC: CPT

## 2018-03-16 PROCEDURE — G0463 HOSPITAL OUTPT CLINIC VISIT: HCPCS

## 2018-03-16 NOTE — PROGRESS NOTES
Anticoagulation Clinic - Progress Note    Indication: afib, s/p PPM  Referring Provider: Cj  Goal INR: 2-3  Initial Warfarin Start: December 2016  Current Drug Interactions: aspirin, citalopram, Synthroid, omeprazole daily, spironolactone, Glucosamine- Chondrotion  CHADS-VASc: 7 (age, gender, HTN, PVD, h/o TIA,DM )  Bleed Risk: h/o AVM with bleed requiring hospitalization  Other: previously on Xarelto, with subsequent bleed (AVM) -- Dr. Campa in [place]; internal hemorrhoids     Diet: raw cabbage (2-3 heads per week), occasional cooked GLV  Alcohol: none  Tobacco: none  OTC Pain Medication: APAP    INR History:  Date 9/5 9/28 10/12 11/6 11/30 12/28 1/25 2/1 2/9   Total Weekly Dose 32.5 mg 32.5 mg 32.5 mg 32.5 mg 32.5 mg 32.5 mg 32.5mg 37.5 mg 35mg   INR 2.2 2.1 2.6 2.2 3.0 2.6 1.3 1.7 1.7   Notes  clinic; doxy clinic clinic clinic clinic clinic; cefdinir clinic; cefdinir cefdinir completed     Date 2/16 2/23 3/2 3/9 3/16       Total Weekly Dose 42.5 42.5 42.5 mg 45mg 42.5       INR 1.8 2.3 1.7 3.0 1.7       Notes   (boost ) boost            Phone Interview:  Tablet Strength: pt has 5mg tablets  Current Maintenance Dose:    Patient Contact Info: [phone]  Lab Contact Info: [facility], [phone]  Patient Findings   Positives Change in medications, Bruising   Negatives Signs/symptoms of thrombosis, Signs/symptoms of bleeding, Laboratory test error suspected, Change in health, Change in alcohol use, Change in activity, Upcoming invasive procedure, Emergency department visit, Upcoming dental procedure, Missed doses, Extra doses, Change in diet/appetite, Hospital admission, Other complaints   Comments Ms. Cross reports that during her cytoscopy on 3/13, she received a dose of IV clindamycin; there is a bruise on her hand from the IV site.  She also reports taking Flonase OTC for the past three nights. She reports that she continues to keep a food log and keeps her GLV consistent. She  reports that she fixed herself some mixed greens the other day after the [employer] closed (usually gets greens from the buffet). Discussed Vitamin K content of certain GLV.     Plan:  1. INR is subtherapeutic today at 1.7.  Instructed patient to Boost dose tonight to 10 mg (5.9%) and then resume maintenance dose of 5 mg daily with 7.5 mg on MWF.   2. RTC in ~1.5 week, 3/28 before appt with Cj. At that time consider further increase in maintenance dose to 45mg/weekly.  3. Verbal and written information provided in the clinic. Ms. Cross RBV dosing instructions, expresses understanding with teach back, and she has no further questions at this time.    Rosey Queen, Pharmacy Intern  3/16/2018  1:57 PM    IParul, [facility], have reviewed the note in full and agree with the assessment and plan.  03/16/18  2:42 PM

## 2018-03-28 ENCOUNTER — ANTICOAGULATION VISIT (OUTPATIENT)
Dept: PHARMACY | Facility: HOSPITAL | Age: 68
End: 2018-03-28

## 2018-03-28 ENCOUNTER — OFFICE VISIT (OUTPATIENT)
Dept: CARDIOLOGY | Facility: CLINIC | Age: 68
End: 2018-03-28

## 2018-03-28 VITALS
WEIGHT: 246.6 LBS | HEIGHT: 64 IN | DIASTOLIC BLOOD PRESSURE: 70 MMHG | SYSTOLIC BLOOD PRESSURE: 124 MMHG | HEART RATE: 75 BPM | BODY MASS INDEX: 42.1 KG/M2

## 2018-03-28 DIAGNOSIS — I48.20 CHRONIC ATRIAL FIBRILLATION (HCC): ICD-10-CM

## 2018-03-28 DIAGNOSIS — E78.5 HYPERLIPIDEMIA LDL GOAL <70: ICD-10-CM

## 2018-03-28 DIAGNOSIS — I25.10 CORONARY ARTERY DISEASE INVOLVING NATIVE CORONARY ARTERY OF NATIVE HEART WITHOUT ANGINA PECTORIS: Primary | ICD-10-CM

## 2018-03-28 DIAGNOSIS — I10 ESSENTIAL HYPERTENSION: ICD-10-CM

## 2018-03-28 LAB
INR PPP: 2.3 (ref 0.91–1.09)
PROTHROMBIN TIME: 27.2 SECONDS (ref 10–13.8)

## 2018-03-28 PROCEDURE — 99214 OFFICE O/P EST MOD 30 MIN: CPT | Performed by: PHYSICIAN ASSISTANT

## 2018-03-28 PROCEDURE — 85610 PROTHROMBIN TIME: CPT

## 2018-03-28 PROCEDURE — G0463 HOSPITAL OUTPT CLINIC VISIT: HCPCS

## 2018-03-28 PROCEDURE — 36416 COLLJ CAPILLARY BLOOD SPEC: CPT

## 2018-03-28 PROCEDURE — 93280 PM DEVICE PROGR EVAL DUAL: CPT | Performed by: PHYSICIAN ASSISTANT

## 2018-03-28 RX ORDER — OXYBUTYNIN CHLORIDE 5 MG/1
5 TABLET ORAL 3 TIMES DAILY
COMMUNITY
End: 2018-03-28 | Stop reason: ALTCHOICE

## 2018-03-28 RX ORDER — FERROUS SULFATE 325(65) MG
325 TABLET ORAL
COMMUNITY
End: 2018-03-28 | Stop reason: SDUPTHER

## 2018-03-28 NOTE — PROGRESS NOTES
Joanna Cross  1950  [phone]      03/28/2018    Reynaldo Fritz MD    Chief Complaint:  FU on CAD, AFib, HTN, HLP              Problem List:  1. Coronary artery disease  a. [facility] 2/15/2008, Dr Lutz.  Mild, non-obstructive CAD, normal EF  b. [facility] 1/9/2013, Dr Stevenson Sutton:  No LV gram done; mild, non-obstructive coronary artery disease.  c. [facility] 11/9/2015, [facility]:  EF 60%.  70% RCA lesion with Promus ZAINAB placement. 40-50% mid LAD, no FFR performed. Other small blockages noted.  No MR.  d. [facility] 11/15/2015, Dr Palacio @ [facility]:  Patent RCA stent, 40-50% LAD with FFR @ 0.92; mild inferior wall hypokinesis  e. [facility] 7/29/2016, [facility]:  Abnormal stress test.  Normal CORS with patent stent. LAD improved since last image EF 55-60%  2. Peripheral vascular disease/chronic venous stasis  a. Venous duplex 9/17/2010: Insufficiency to venous hypertension in the great saphenous system bilaterally.  b. Venous duplex 2013: Right leg laser ablation of Dr. Garcia.  c. Venous duplex 5/6/2016: No evidence of DVT, no superficial, no thrmobophlebitis, no valvular insufficiency.  d. AA with runoffs 8/31/2016: Single-vessel [place]: No significant arterial disease.  3. Palpitations  a. Echocardiogram 2/13/2014: Dr. Teran.  Normal biventricular function; trace to mild MR.  Atrial septal aneurysm or  b. Echocardiogram 12/22/15: Dr. Chavez.  Borderline LVH, mild LAE, mild RV enlargement.  Mild to moderate MR and TR with RVSP of 46 mmHg.  c. Conclusion/3/2016: Dr. Palacio.  RV enlargement.  EF 50-55%.  Mild AI S, mild MR and TR with RVSP 37 mmHg.  4. PAF/SSS  a. [employer] PPM 2016  b. CHADS2 Vasc  = 5. On chronic warfarin  5. TIA  a. Carotid duplex 2/13/2014 showed no significant flow-limiting stenosis.  Mild luminal disease present; both vertebrals are present.  6. Diabetes  7. Essential Hypertension  8. Hyperlipidemia  9. Hypothyroid  10. Hyponatremia  a. Secondary to SIADH  11. MILLI with  "CPAP  12. RLS  13. Parkinson's disease  14. GERD  15. Urinary Retention    A.  S/P cystoscopy and ureter dilation, March 2018.  Dr. Terrence Mckenzie  16. Surgeries:  a. Rotator cuff repair  b. Cholecystectomy  c. Bilateral knee replacement  d. Tubal ligation  e. Breast surgery  f. Sinus surgery    Allergies   Allergen Reactions   • Toprol Xl [Metoprolol Tartrate] Shortness Of Breath     Extreme fatigue   • Amlodipine Besylate Swelling     Lower extremity (ankles, feet) swelling   • Entacapone Other (See Comments)     \"extreme weakness in legs - caused several falls, which stopped after discontinuing this medication\"   • Levemir [Insulin Detemir] Hives     Hives / rash around injection site   • Bactrim [Sulfamethoxazole-Trimethoprim] Nausea Only     Headache    • Ciprofloxacin Diarrhea   • Cogentin [Benztropine] Other (See Comments)     \"uncontrollable body movements\"   • Compazine [Prochlorperazine Edisylate] Other (See Comments)     Dystonic reaction   • Duraprep [Antiseptic Products, Misc.] Itching and Rash   • Haldol [Haloperidol Lactate] Other (See Comments)     Dystonic reaction   • Hydralazine Other (See Comments)     Headache    • Hydrocodone-Acetaminophen Nausea And Vomiting and Dizziness     Headache    • Lisinopril Cough   • Penicillins Hives     Jitteriness    • Statins Myalgia     Leg pain   • Tarka [Trandolapril-Verapamil Hcl Er] Other (See Comments)     Constipation        Current Medications:      Current Outpatient Prescriptions:   •  amantadine (SYMMETREL) 100 MG capsule, Take 100 mg by mouth 2 (Two) Times a Day., Disp: , Rfl:   •  aspirin 81 MG EC tablet, Take 81 mg by mouth Daily., Disp: , Rfl:   •  B Complex-C (SUPER B COMPLEX PO), Take 1 tablet by mouth Daily., Disp: , Rfl:   •  bumetanide (BUMEX) 2 MG tablet, Take 2 mg by mouth 2 (Two) Times a Day., Disp: , Rfl:   •  Calcium Carbonate (CALTRATE 600 PO), Take 600 mg by mouth Daily., Disp: , Rfl:   •  carbidopa-levodopa (SINEMET)  MG per " tablet, Take 7 tablets by mouth Daily. 2 tablets at 0600, 1 tablet at 0900, 1200, 1500, 1800, 2100, Disp: , Rfl:   •  carbonyl iron (FEOSOL) 45 MG tablet tablet, Take 1 tablet by mouth 2 (Two) Times a Day., Disp: , Rfl:   •  Cholecalciferol 2000 units tablet, Take 2,000 Units by mouth 2 (Two) Times a Day., Disp: , Rfl:   •  citalopram (CeleXA) 20 MG tablet, Take 20 mg by mouth every night at bedtime., Disp: , Rfl:   •  clobetasol (TEMOVATE) 0.05 % cream, Apply 1 application topically As Needed (Lichens Sclerosis)., Disp: , Rfl:   •  CloNIDine (CATAPRES) 0.1 MG tablet, Take 1 tablet by mouth Every 12 (Twelve) Hours., Disp: 180 tablet, Rfl: 3  •  Glucosamine-Chondroit-Vit C-Mn (GLUCOSAMINE 1500 COMPLEX PO), Take 1 tablet by mouth 2 (Two) Times a Day., Disp: , Rfl:   •  insulin degludec (TRESIBA FLEXTOUCH) 100 UNIT/ML solution pen-injector injection, Inject 24 Units under the skin Every Night., Disp: , Rfl:   •  levothyroxine (SYNTHROID) 200 MCG tablet, Take 200 mcg by mouth Daily. BRAND NAME ONLY, Disp: , Rfl:   •  Loratadine 10 MG capsule, Take 10 mg by mouth Daily., Disp: , Rfl:   •  losartan (COZAAR) 50 MG tablet, Take 1 tablet by mouth Every 12 (Twelve) Hours., Disp: 180 tablet, Rfl: 3  •  metFORMIN (GLUCOPHAGE) 1000 MG tablet, Take 1,000 mg by mouth 2 (Two) Times a Day With Meals., Disp: , Rfl:   •  metOLazone (ZAROXOLYN) 2.5 MG tablet, Take 1 tablet by mouth Daily., Disp: 90 tablet, Rfl: 1  •  Mirabegron ER (MYRBETRIQ) 50 MG tablet sustained-release 24 hour 24 hr tablet, Take 50 mg by mouth Daily., Disp: , Rfl:   •  Multiple Vitamins-Minerals (CENTRUM ULTRA WOMENS PO), Take 1 tablet by mouth Daily., Disp: , Rfl:   •  nitroglycerin (NITROLINGUAL) 0.4 MG/SPRAY spray, Place 1 spray under the tongue Every 5 (Five) Minutes As Needed for Chest Pain., Disp: , Rfl:   •  omeprazole (priLOSEC) 40 MG capsule, Take 40 mg by mouth 2 (Two) Times a Day., Disp: , Rfl:   •  potassium chloride (MICRO-K) 10 MEQ CR capsule, Take 10  mEq by mouth Daily., Disp: , Rfl:   •  pramipexole (MIRAPEX) 1.5 MG tablet, Take 1.5 mg by mouth Every Night., Disp: , Rfl:   •  ranolazine (RANEXA) 500 MG 12 hr tablet, Take 500 mg by mouth 2 (Two) Times a Day., Disp: , Rfl:   •  sennosides-docusate sodium (SENOKOT-S) 8.6-50 MG tablet, Take 1 tablet by mouth Daily., Disp: , Rfl:   •  spironolactone (ALDACTONE) 25 MG tablet, Take 1 tablet by mouth Daily., Disp: 90 tablet, Rfl: 1  •  Triamcinolone Acetonide (NASACORT) 55 MCG/ACT nasal inhaler, 2 sprays into each nostril Daily As Needed., Disp: , Rfl:   •  vitamin C (ASCORBIC ACID) 500 MG tablet, Take 500 mg by mouth Daily., Disp: , Rfl:   •  warfarin (COUMADIN) 5 MG tablet, Take 1 to 1 and 1/2 tablet by mouth daily, or as directed by anticoagulation clinic, Disp: 135 tablet, Rfl: 0    HPI    Joanna Cross presents today for 6 month follow up of coronary artery disease, peripheral vascular disease, and atrial fibrillation/sick sinus syndrome. Since last visit, patient has been doing fairly well.  She has a lot of problems with her arthritis and Parkinson's which makes movement and ambulation difficult.  She does using a rolling walker for ambulatory assistance.  She still has difficulty with swelling.  She keeps several appointments each week and does not take her diuretic prior to travel.  When she does take her diuretic routinely, her swelling is controlled.  She recently underwent cystoscopy with ureter dilation.  She is following with Dr. Terrence Mckenzie for this and has done quite well since the procedure.  She is due for a follow-up with her hematologist in the next few weeks, anticipating blood work to be drawn.  Patient denies chest pain, palpitations, shortness of breath, PND, orthopnea, dizziness, and syncope.   She is asking if she can come off her Ranexa as she takes so many medications and this is one that she thought she could do without.  The following portions of the patient's history were reviewed  "and updated as appropriate: allergies, current medications and problem list.    Pertinent positives as listed in the HPI.  All other systems reviewed are negative.    Vitals:    03/28/18 1603   BP: 124/70   BP Location: Right arm   Patient Position: Sitting   Pulse: 75   Weight: 112 kg (246 lb 9.6 oz)   Height: 161.3 cm (63.5\")       Physical Exam:  General: Alert and oriented to person, place, and time.  Neck: Jugular venous pressure is within normal limits. Carotids have normal upstrokes without bruits.   Cardiovascular: Regular rate and rhythm without murmur gallop or rub.  Lungs: Clear without rales or wheezes. Equal expansion is noted.   Extremities: 1+ no edema.  Support stockings noted.  Skin: warm and dry.  Neurologic: nonfocal.  Essential tremor noted of the upper extremities.    Diagnostic Data:    Lab Results   Component Value Date    INR 2.3 (H) 03/28/2018    INR 1.7 (H) 03/16/2018    INR 3.0 (H) 03/09/2018    PROTIME 27.2 (H) 03/28/2018    PROTIME 20.4 (H) 03/16/2018    PROTIME 36.2 (H) 03/09/2018     Lab Results   Component Value Date    GLUCOSE 78 05/04/2017    CALCIUM 9.6 05/04/2017     05/04/2017    K 4.9 05/04/2017    CO2 32.0 (H) 05/04/2017    CL 99 05/04/2017    BUN 21 05/04/2017    CREATININE 0.70 05/04/2017    EGFRIFNONA 83 05/04/2017    BCR 30.0 (H) 05/04/2017    ANIONGAP 6.0 05/04/2017     Updated labs from October 2017: Glucose 68, , K3.6, chloride 95, CO2 30, BUN 26, creatinine 0.9    Procedures     DEVICE INTERROGATION:  3/28/2018, [employer] dual-chamber pacemaker, model L311.  Programmed DDD base rate 65: RA pacing 14 %, RV pacing less than 1 %. P wave is 0.2 mV with a threshold of 1.1 V at 0.5 msec and an impedance of 511 ohms. R wave is 19.8 mV with a threshold of 1.1 V at 0.5 msec and an impedance of 516 ohms. Battery voltage is 8 years remaining.          Assessment:      ICD-10-CM ICD-9-CM   1. Coronary artery disease involving native coronary artery of native " heart without angina pectoris I25.10 414.01   2. Essential hypertension I10 401.9   3. Chronic atrial fibrillation I48.2 427.31   4. Hyperlipidemia LDL goal <70 E78.5 272.4       Plan:    1. Overall, patient is doing well.  She is asymptomatic with no complaints of angina or exertional dyspnea.  Her swelling is marginal with fair control when she is able to take her diuretics routinely.  I did give her the okay to come off the Ranexa and if she has angina when doing so, she can restart it on her own.  2. Continue current medications.  3. F/up in 6 months with EKG and [address] interrogation or sooner if needed.    Scribed for Valerie Sterling PA-C by Tomas Bro. 3/28/2018  4:08 PM  Functioning independently

## 2018-03-28 NOTE — PROGRESS NOTES
Anticoagulation Clinic - Progress Note    Indication: afib, s/p PPM  Referring Provider: Cj  Goal INR: 2-3  Initial Warfarin Start: December 2016  Current Drug Interactions: aspirin, citalopram, Synthroid, omeprazole daily, spironolactone, Glucosamine- Chondrotion  CHADS-VASc: 7 (age, gender, HTN, PVD, h/o TIA,DM )  Bleed Risk: h/o AVM with bleed requiring hospitalization  Other: previously on Xarelto, with subsequent bleed (AVM) -- Dr. Campa in [place]; internal hemorrhoids     Diet: raw cabbage (2-3 heads per week), occasional cooked GLV  Alcohol: none  Tobacco: none  OTC Pain Medication: APAP    INR History:  Date 9/5 9/28 10/12 11/6 11/30 12/28 1/25 2/1 2/9   Total Weekly Dose 32.5 mg 32.5 mg 32.5 mg 32.5 mg 32.5 mg 32.5 mg 32.5mg 37.5 mg 35mg   INR 2.2 2.1 2.6 2.2 3.0 2.6 1.3 1.7 1.7   Notes  clinic; doxy clinic clinic clinic clinic clinic; cefdinir clinic; cefdinir cefdinir completed     Date 2/16 2/23 3/2 3/9 3/16 3/28      Total Weekly Dose 42.5 mg 42.5 mg 42.5 mg 45mg 42.5 mg 42.5 mg 45mg     INR 1.8 2.3 1.7 3.0 1.7 2.3      Notes   boost  boost            Phone Interview:  Tablet Strength: pt has 5mg tablets  Current Maintenance Dose:  Patient Contact Info: [phone]  Lab Contact Info: [facility], [phone]    Patient Findings:  Negatives Signs/symptoms of thrombosis, Signs/symptoms of bleeding, Laboratory test error suspected, Change in health, Change in alcohol use, Change in activity, Upcoming invasive procedure, Emergency department visit, Upcoming dental procedure, Missed doses, Extra doses, Change in medications, Change in diet/appetite, Hospital admission, Bruising, Other complaints   Comments Ms. Cross continues eating a higher amount of vitamin K per day (turnip greens, mixed greens, fried okra, green beans). Encouraged her to keep this consistent, targeting an average of 80mcg vitamin K per day. She inquires today about the purpose of Ranexa / possibility of  stopping this. Encouraged her to talk with Dr. Corona (usually for angina, but can be used off-label for afib).     Plan:  1. INR is therapeutic today, but given recent dosing / INR history with pt's higher CHADS-VASc, instructed Ms. Cross to increase her dose to warfarin 7.5 mg daily except 5 mg on SunTuesThurs for now.   2. RTC in 1.5 weeks to reassess.  3. Verbal and written information provided in the clinic. Ms. Cross RBV dosing instructions, expresses understanding with teach back, and she has no further questions at this time.    Ann Cowden Mayer, PharmD  3/28/2018  3:17 PM

## 2018-03-29 ENCOUNTER — OFFICE VISIT (OUTPATIENT)
Dept: NEUROSURGERY | Facility: CLINIC | Age: 68
End: 2018-03-29

## 2018-03-29 VITALS
BODY MASS INDEX: 42 KG/M2 | HEIGHT: 64 IN | WEIGHT: 246 LBS | TEMPERATURE: 98.2 F | SYSTOLIC BLOOD PRESSURE: 138 MMHG | DIASTOLIC BLOOD PRESSURE: 70 MMHG

## 2018-03-29 DIAGNOSIS — M51.36 DISC DEGENERATION, LUMBAR: ICD-10-CM

## 2018-03-29 DIAGNOSIS — M43.10 SPONDYLOLISTHESIS, ACQUIRED: ICD-10-CM

## 2018-03-29 DIAGNOSIS — I73.9 PERIPHERAL VASCULAR DISEASE (HCC): ICD-10-CM

## 2018-03-29 DIAGNOSIS — M47.26 OSTEOARTHRITIS OF SPINE WITH RADICULOPATHY, LUMBAR REGION: Primary | ICD-10-CM

## 2018-03-29 PROCEDURE — 99214 OFFICE O/P EST MOD 30 MIN: CPT | Performed by: PHYSICIAN ASSISTANT

## 2018-03-29 NOTE — PROGRESS NOTES
Patient: Joanna Cross  : 1950  Chart #: [id number]    Date of Service: 2018    CHIEF COMPLAINT: Back and right leg pain    History of Present Illness Ms. Cross is a delightful 67-year-old retired  who complains of pain in the low back radiating into the right buttock and down the side of the leg to the calf and shin.  Sometimes she has some numbness in the top of her foot. She fell in August of last year and her symptoms ensued shortly thereafter.  She does have a history of chronic low back pain.  Her history is also significant for peripheral vascular disease, coronary artery disease, diabetes, restless leg syndrome and Parkinson's disease.  She has a pacemaker and is on Coumadin.  Standing and walking exacerbate her pain.  It feels like a knife stabbing her in the right buttock.  Sitting down seems to help the pain in her leg.  She has tried chiropractic therapy and dry needling to no avail.  The pain is progressing and is limiting her from being able to do things outside her home.  Rarely she has some pain in the left buttock but nothing extending down the leg.  She has a history of some urinary retention status post cystoscopy and ureteral dilation.  Otherwise no sim bowel or bladder incontinence or perineal sensory alteration.    I reviewed the following portions of the patient's history and updated as appropriate: allergies, current medications, past family history, past medical history, past social history, past surgical history and problem list.      Review of Systems   Constitutional: Negative for activity change, appetite change, chills, diaphoresis, fatigue, fever and unexpected weight change.   HENT: Positive for drooling. Negative for congestion, dental problem, ear discharge, ear pain, facial swelling, hearing loss, mouth sores, nosebleeds, postnasal drip, rhinorrhea, sinus pressure, sneezing, sore throat, tinnitus, trouble swallowing and voice change.    Eyes:  "Positive for itching. Negative for photophobia, pain, discharge, redness and visual disturbance.   Respiratory: Positive for apnea. Negative for cough, choking, chest tightness, shortness of breath, wheezing and stridor.    Cardiovascular: Positive for leg swelling. Negative for chest pain and palpitations.   Gastrointestinal: Negative for abdominal distention, abdominal pain, anal bleeding, blood in stool, constipation, diarrhea, nausea, rectal pain and vomiting.   Endocrine: Negative for cold intolerance, heat intolerance, polydipsia, polyphagia and polyuria.   Genitourinary: Positive for frequency. Negative for decreased urine volume, difficulty urinating, dysuria, enuresis, flank pain, genital sores, hematuria and urgency.   Musculoskeletal: Positive for arthralgias, back pain and myalgias. Negative for gait problem, joint swelling, neck pain and neck stiffness.   Skin: Negative for color change, pallor, rash and wound.   Allergic/Immunologic: Negative for environmental allergies, food allergies and immunocompromised state.   Neurological: Negative for dizziness, tremors, seizures, syncope, facial asymmetry, speech difficulty, weakness, light-headedness, numbness and headaches.   Hematological: Negative for adenopathy. Does not bruise/bleed easily.   Psychiatric/Behavioral: Negative for agitation, behavioral problems, confusion, decreased concentration, dysphoric mood, hallucinations, self-injury, sleep disturbance and suicidal ideas. The patient is not nervous/anxious and is not hyperactive.        Objective   Vital Signs: Blood pressure 138/70, temperature 98.2 °F (36.8 °C), height 161.3 cm (63.5\"), weight 112 kg (246 lb).  Physical Exam   Constitutional: She appears well-developed and well-nourished. No distress.   HENT:   Head: Normocephalic and atraumatic.   Eyes: EOM are normal. Pupils are equal, round, and reactive to light.   Cardiovascular: Normal rate and regular rhythm.    Pulmonary/Chest: Effort " normal and breath sounds normal.   Skin: Skin is warm and dry.   Psychiatric: She has a normal mood and affect. Her behavior is normal. Thought content normal.   Nursing note and vitals reviewed.  Musculoskeletal:  Strength is intact in upper and lower extremities to direct testing   Station and gait are normal.  Straight leg raising is negative. No pain with internal/extrenal rotation of the hip  Patient wearing compression stockings with lower extremity edema.   Neurologic:  Muscle tone is normal throughout.  Coordination is intact.  Deep tendon reflexes: Difficult to elicit throughout  Sensation is intact to light touch throughout.  Patient is oriented to person, place, and time.  No crews sign or clonus    Radiographic Imaging: Plain films of the lumbar spine from August 2017 demonstrates diminished bone density throughout.  There is a anterolisthesis of L5 on S1.    Assessment/Plan    Medical Decision Making: Ms. Cross has symptoms of an L5 radiculopathy on the right which have failed to respond to time and conservative measures. She has multiple medical co-morbidities which make her a poor surgical candidate; however her symptoms have greatly limited her activities of daily living. This warrants further workup with an MRI of the lumbar spine. She does have have a pacemaker so we will have to check with Dr. Corona to see if she can have this done with pacemaker protocol. Otherwise we will need to do a lumbar CT myelogram in which case she will need to come off of her coumadin. We will get clearance for one of the above studies and set up as soon as possible. Patient will then follow up with Dr Gamino for further recommendation.         Joanna was seen today for back pain.    Diagnoses and all orders for this visit:    Osteoarthritis of spine with radiculopathy, lumbar region    Disc degeneration, lumbar    Spondylolisthesis, acquired    Peripheral vascular disease    BMI 40.0-44.9, adult                Mackenzie Tello PA-C  Patient Care Team:  Reynaldo Fritz MD as PCP - General  Reynaldo Fritz MD as PCP - Family Medicine  Faisal Ramirez MD as Obstetrician (Obstetrics and Gynecology)  Timothy Dan DC as Referring Physician (Chiropractic Medicine)

## 2018-04-02 ENCOUNTER — TELEPHONE (OUTPATIENT)
Dept: CARDIOLOGY | Facility: CLINIC | Age: 68
End: 2018-04-02

## 2018-04-02 DIAGNOSIS — M54.16 LUMBAR RADICULOPATHY: ICD-10-CM

## 2018-04-02 DIAGNOSIS — M43.10 ACQUIRED SPONDYLOLISTHESIS: ICD-10-CM

## 2018-04-02 DIAGNOSIS — M51.37 DEGENERATION OF LUMBAR OR LUMBOSACRAL INTERVERTEBRAL DISC: Primary | ICD-10-CM

## 2018-04-02 NOTE — TELEPHONE ENCOUNTER
PT needs to have MRI with Dr. Gamino- she has a compatible device but leads are not compatible- discussed with Dr. Corona- PT may proceed as she is not dependent-     Info was relayed to Sharla with Dr. Gamino- she knows that they are responsible for getting the consent signed-

## 2018-04-06 ENCOUNTER — ANTICOAGULATION VISIT (OUTPATIENT)
Dept: PHARMACY | Facility: HOSPITAL | Age: 68
End: 2018-04-06

## 2018-04-06 DIAGNOSIS — I48.20 CHRONIC ATRIAL FIBRILLATION (HCC): ICD-10-CM

## 2018-04-06 LAB
INR PPP: 2.9 (ref 0.91–1.09)
PROTHROMBIN TIME: 34.7 SECONDS (ref 10–13.8)

## 2018-04-06 PROCEDURE — 36416 COLLJ CAPILLARY BLOOD SPEC: CPT

## 2018-04-06 PROCEDURE — 85610 PROTHROMBIN TIME: CPT

## 2018-04-06 PROCEDURE — G0463 HOSPITAL OUTPT CLINIC VISIT: HCPCS

## 2018-04-06 NOTE — PROGRESS NOTES
Anticoagulation Clinic - Progress Note    Indication: afib, s/p PPM  Referring Provider: Cj  Goal INR: 2-3  Initial Warfarin Start: December 2016  Current Drug Interactions: aspirin, citalopram, Synthroid, omeprazole daily, spironolactone, Glucosamine- Chondrotion  CHADS-VASc: 7 (age, gender, HTN, PVD, h/o TIA,DM )  Bleed Risk: h/o AVM with bleed requiring hospitalization  Other: previously on Xarelto, with subsequent bleed (AVM) -- Dr. Campa in [place]; internal hemorrhoids     Diet: raw cabbage (2-3 heads per week), occasional cooked GLV  Alcohol: none  Tobacco: none  OTC Pain Medication: APAP    INR History:  Date 9/5 9/28 10/12 11/6 11/30 12/28 1/25 2/1 2/9   Total Weekly Dose 32.5 mg 32.5 mg 32.5 mg 32.5 mg 32.5 mg 32.5 mg 32.5mg 37.5 mg 35mg   INR 2.2 2.1 2.6 2.2 3.0 2.6 1.3 1.7 1.7   Notes  doxy     cefdinir cefdinir cefdinir complete     Date 2/16 2/23 3/2 3/9 3/16 3/28 4/6     Total Weekly Dose 42.5 mg 42.5 mg 42.5 mg 45mg 42.5 mg 42.5 mg 45mg 45mg    INR 1.8 2.3 1.7 3.0 1.7 2.3 2.9     Notes     boost          Phone Interview:  Tablet Strength: pt has 5mg tablets  Current Maintenance Dose:  Patient Contact Info: [phone]  Lab Contact Info: [facility], [phone]    Patient Findings   Positives Change in diet/appetite   Negatives Signs/symptoms of thrombosis, Signs/symptoms of bleeding, Laboratory test error suspected, Change in health, Change in alcohol use, Change in activity, Upcoming invasive procedure, Emergency department visit, Upcoming dental procedure, Missed doses, Extra doses, Change in medications, Hospital admission, Bruising, Other complaints   Comments Pt states having a little less raw cabbage recently due to causing some heartburn. Otherwise all GLV intake remains the same as well as medications.     Plan:  1. INR is therapeutic today, albeit at the high end of goal at 2.9. Instructed Ms. Cross to continue her dose of warfarin 7.5 mg daily except 5 mg on  Aleena for now. Also suggested she have another serving of GLV to substitute for the cabbage she has not been eating lately.   2. RTC in 2 weeks to reassess.  3. Verbal and written information provided in the clinic. Ms. Cross RBV dosing instructions, expresses understanding with teach back, and she has no further questions at this time.    Faisal Peoples, Pharmacy Student  4/6/2018  2:18 PM     [facility], [facility], have reviewed the note in full and agree with the assessment and plan.  04/06/18  5:01 PM

## 2018-04-10 DIAGNOSIS — Z79.899 HIGH RISK MEDICATION USE: Primary | ICD-10-CM

## 2018-04-18 ENCOUNTER — HOSPITAL ENCOUNTER (OUTPATIENT)
Dept: GENERAL RADIOLOGY | Facility: HOSPITAL | Age: 68
Discharge: HOME | End: 2018-04-18

## 2018-04-18 ENCOUNTER — DOCUMENTATION (OUTPATIENT)
Dept: CARDIOLOGY | Facility: CLINIC | Age: 68
End: 2018-04-18

## 2018-04-18 ENCOUNTER — ANTICOAGULATION VISIT (OUTPATIENT)
Dept: PHARMACY | Facility: HOSPITAL | Age: 68
End: 2018-04-18

## 2018-04-18 ENCOUNTER — HOSPITAL ENCOUNTER (OUTPATIENT)
Dept: MRI IMAGING | Facility: HOSPITAL | Age: 68
Discharge: HOME | End: 2018-04-18
Admitting: PHYSICIAN ASSISTANT

## 2018-04-18 DIAGNOSIS — M54.16 LUMBAR RADICULOPATHY: ICD-10-CM

## 2018-04-18 DIAGNOSIS — M43.10 ACQUIRED SPONDYLOLISTHESIS: ICD-10-CM

## 2018-04-18 DIAGNOSIS — M47.26 OSTEOARTHRITIS OF SPINE WITH RADICULOPATHY, LUMBAR REGION: ICD-10-CM

## 2018-04-18 DIAGNOSIS — M51.37 DEGENERATION OF LUMBAR OR LUMBOSACRAL INTERVERTEBRAL DISC: ICD-10-CM

## 2018-04-18 DIAGNOSIS — I48.20 CHRONIC ATRIAL FIBRILLATION (HCC): ICD-10-CM

## 2018-04-18 DIAGNOSIS — M43.10 SPONDYLOLISTHESIS, ACQUIRED: ICD-10-CM

## 2018-04-18 LAB
INR PPP: 2.5 (ref 0.91–1.09)
PROTHROMBIN TIME: 29.6 SECONDS (ref 10–13.8)

## 2018-04-18 PROCEDURE — 36416 COLLJ CAPILLARY BLOOD SPEC: CPT

## 2018-04-18 PROCEDURE — 72148 MRI LUMBAR SPINE W/O DYE: CPT

## 2018-04-18 PROCEDURE — G0463 HOSPITAL OUTPT CLINIC VISIT: HCPCS

## 2018-04-18 PROCEDURE — 85610 PROTHROMBIN TIME: CPT

## 2018-04-18 PROCEDURE — 72120 X-RAY BEND ONLY L-S SPINE: CPT

## 2018-04-18 NOTE — PROGRESS NOTES
Anticoagulation Clinic - Progress Note    Indication: afib, s/p PPM  Referring Provider: Cj  Goal INR: 2-3  Initial Warfarin Start: December 2016  Current Drug Interactions: aspirin, citalopram, Synthroid, omeprazole daily, spironolactone, Glucosamine- Chondrotion  CHADS-VASc: 7 (age, gender, HTN, PVD, h/o TIA,DM )  Bleed Risk: h/o AVM with bleed requiring hospitalization  Other: previously on Xarelto, with subsequent bleed (AVM) -- Dr. Campa in [place]; internal hemorrhoids     Diet: raw cabbage (2-3 heads per week), occasional cooked GLV  Alcohol: none  Tobacco: none  OTC Pain Medication: APAP    INR History:  Date 9/5 9/28 10/12 11/6 11/30 12/28 1/25 2/1 2/9   Total Weekly Dose 32.5 mg 32.5 mg 32.5 mg 32.5 mg 32.5 mg 32.5 mg 32.5mg 37.5 mg 35mg   INR 2.2 2.1 2.6 2.2 3.0 2.6 1.3 1.7 1.7   Notes  doxy     cefdinir cefdinir cefdinir complete     Date 2/16 2/23 3/2 3/9 3/16 3/28 4/6     Total Weekly Dose 42.5 mg 42.5 mg 42.5 mg 45mg 42.5 mg 42.5 mg 45mg 45mg    INR 1.8 2.3 1.7 3.0 1.7 2.3 2.9     Notes     boost          Phone Interview:  Tablet Strength: pt has 5mg tablets  Current Maintenance Dose:  Patient Contact Info: [phone]  Lab Contact Info: [facility], [phone]    Patient Findings     Negatives Signs/symptoms of thrombosis, Signs/symptoms of bleeding, Laboratory test error suspected, Change in health, Change in alcohol use, Change in activity, Upcoming invasive procedure, Emergency department visit, Upcoming dental procedure, Missed doses, Extra doses, Change in medications, Change in diet/appetite, Hospital admission, Bruising, Other complaints   Comments Ms Cross stated that she came here after MRI for neurological associates.  She has an appointment with Dr. Gamino on 5/7.  Discussed with patient to keep us informed of any planned procedures or medication changes.     She stated that she likes GLV and has had a salad and green beans and fried okra since last  appointment.  Encouraged consistency with intake of GLV.   No other changes noted.      Plan:  1. INR is therapeutic today at 2.5. Instructed Ms. Cross to continue her dose of warfarin 7.5 mg daily except 5 mg on SunTuesThurs for now.   2. RTC in approximately 2 weeks to reassess on 5/2.  She prefers Thursday appointments.  3. Verbal and written information provided in the clinic. Ms. Cross RBV dosing instructions, expresses understanding with teach back, and she has no further questions at this time.    Janine Ortiz, PharmD  04/18/18  3:18 PM

## 2018-04-20 ENCOUNTER — APPOINTMENT (OUTPATIENT)
Dept: PHARMACY | Facility: HOSPITAL | Age: 68
End: 2018-04-20

## 2018-04-25 ENCOUNTER — CLINICAL SUPPORT NO REQUIREMENTS (OUTPATIENT)
Dept: CARDIOLOGY | Facility: CLINIC | Age: 68
End: 2018-04-25

## 2018-04-25 DIAGNOSIS — I49.5 SICK SINUS SYNDROME (HCC): Primary | ICD-10-CM

## 2018-04-26 ENCOUNTER — APPOINTMENT (OUTPATIENT)
Dept: WOMENS IMAGING | Facility: HOSPITAL | Age: 68
End: 2018-04-26

## 2018-04-26 PROCEDURE — 77067 SCR MAMMO BI INCL CAD: CPT | Performed by: RADIOLOGY

## 2018-04-30 RX ORDER — NITROGLYCERIN 400 UG/1
1 SPRAY ORAL
Qty: 1 EACH | Refills: 6 | Status: SHIPPED | OUTPATIENT
Start: 2018-04-30 | End: 2020-06-01 | Stop reason: SDUPTHER

## 2018-05-02 ENCOUNTER — ANTICOAGULATION VISIT (OUTPATIENT)
Dept: PHARMACY | Facility: HOSPITAL | Age: 68
End: 2018-05-02

## 2018-05-02 DIAGNOSIS — I48.20 CHRONIC ATRIAL FIBRILLATION (HCC): ICD-10-CM

## 2018-05-02 LAB
INR PPP: 3.2 (ref 0.91–1.09)
PROTHROMBIN TIME: 38.7 SECONDS (ref 10–13.8)

## 2018-05-02 PROCEDURE — G0463 HOSPITAL OUTPT CLINIC VISIT: HCPCS

## 2018-05-02 PROCEDURE — 36416 COLLJ CAPILLARY BLOOD SPEC: CPT

## 2018-05-02 PROCEDURE — 85610 PROTHROMBIN TIME: CPT

## 2018-05-02 NOTE — PROGRESS NOTES
Anticoagulation Clinic - Progress Note    Indication: afib, s/p PPM  Referring Provider: Cj  Goal INR: 2-3  Initial Warfarin Start: December 2016  Current Drug Interactions: aspirin, citalopram, Synthroid, omeprazole daily, spironolactone, Glucosamine- Chondrotion  CHADS-VASc: 7 (age, gender, HTN, PVD, h/o TIA,DM )  Bleed Risk: h/o AVM with bleed requiring hospitalization  Other: previously on Xarelto, with subsequent bleed (AVM) -- Dr. Campa in [place]; internal hemorrhoids     Diet: raw cabbage (2-3 heads per week), occasional cooked GLV  Alcohol: none  Tobacco: none  OTC Pain Medication: APAP    INR History:  Date 9/5 9/28 10/12 11/6 11/30 12/28 1/25 2/1 2/9   Total Weekly Dose 32.5 mg 32.5 mg 32.5 mg 32.5 mg 32.5 mg 32.5 mg 32.5mg 37.5 mg 35mg   INR 2.2 2.1 2.6 2.2 3.0 2.6 1.3 1.7 1.7   Notes  doxy     cefdinir cefdinir cefdinir complete     Date 2/16 2/23 3/2 3/9 3/16 3/28 4/6 4/18 5/2   Total Weekly Dose 42.5 mg 42.5 mg 42.5 mg 45mg 42.5 mg 42.5 mg 45mg 45mg 45mg   INR 1.8 2.3 1.7 3.0 1.7 2.3 2.9 2.5 3.2   Notes     boost          Phone Interview:  Tablet Strength: pt has 5mg tablets  Current Maintenance Dose:  Patient Contact Info: [phone]  Lab Contact Info: [facility], [phone]    Patient Findings:  Positives Change in medications, Hospital admission   Negatives Signs/symptoms of thrombosis, Signs/symptoms of bleeding, Laboratory test error suspected, Change in health, Change in alcohol use, Change in activity, Upcoming invasive procedure, Emergency department visit, Upcoming dental procedure, Missed doses, Extra doses, Change in diet/appetite, Bruising, Other complaints   Comments Ms. Cross was at [facility] this past weekend for chest pain -- admitted Sunday AM, discharged Monday. She had an ECHO and a Cardiolite stress test performed -- did not have a heart cath. She had stopped Ranexa after her most recent appt with Dr. Corona, but after  this event, she has started it back. She otherwise reports that her potassium dose was increased this past week from 10 mEq once daily to BID (she was having leg cramps -- low K).      Plan:  1. INR is slightly supratherapeutic today, so instructed Ms. Cross to take warfarin 5mg tonight, then resume her recent maintenance dose: warfarin 7.5 mg daily except 5 mg on SunTuesThurs for now.   2. RTC in 2 weeks.  3. Verbal and written information provided in the clinic. Ms. Cross RBV dosing instructions, expresses understanding with teach back, and she has no further questions at this time.    Ann Cowden Mayer, PharmD  05/02/18  3:55 PM

## 2018-05-03 ENCOUNTER — APPOINTMENT (OUTPATIENT)
Dept: PHARMACY | Facility: HOSPITAL | Age: 68
End: 2018-05-03

## 2018-05-07 ENCOUNTER — OFFICE VISIT (OUTPATIENT)
Dept: NEUROSURGERY | Facility: CLINIC | Age: 68
End: 2018-05-07

## 2018-05-07 VITALS
TEMPERATURE: 98 F | SYSTOLIC BLOOD PRESSURE: 162 MMHG | DIASTOLIC BLOOD PRESSURE: 88 MMHG | HEART RATE: 81 BPM | WEIGHT: 249 LBS | OXYGEN SATURATION: 96 % | HEIGHT: 63 IN | BODY MASS INDEX: 44.12 KG/M2

## 2018-05-07 DIAGNOSIS — M51.37 DEGENERATION OF LUMBAR OR LUMBOSACRAL INTERVERTEBRAL DISC: Primary | ICD-10-CM

## 2018-05-07 DIAGNOSIS — R53.81 PHYSICAL DECONDITIONING: ICD-10-CM

## 2018-05-07 DIAGNOSIS — M47.26 OSTEOARTHRITIS OF SPINE WITH RADICULOPATHY, LUMBAR REGION: ICD-10-CM

## 2018-05-07 DIAGNOSIS — M43.10 ACQUIRED SPONDYLOLISTHESIS: ICD-10-CM

## 2018-05-07 DIAGNOSIS — I73.9 PERIPHERAL VASCULAR DISEASE (HCC): ICD-10-CM

## 2018-05-07 DIAGNOSIS — M54.16 LUMBAR RADICULOPATHY: ICD-10-CM

## 2018-05-07 PROCEDURE — 99214 OFFICE O/P EST MOD 30 MIN: CPT | Performed by: NEUROLOGICAL SURGERY

## 2018-05-07 NOTE — PROGRESS NOTES
Patient: Joanna Cross  :  1950  Chart #:  [id number]    Date of Service: 18    Chief Complaint:   Chief Complaint   Patient presents with   • Back Pain       Back Pain   This is a new (Mrs. Cross is new with me today.  She is a pleasant 67 year old female who presents today with low back and right lower extremity pain.) problem. Episode onset: She states that she has had low back pain for years now.  The problem occurs daily (She has normal sensation in her feet bilaterally.). The problem has been gradually worsening since onset. The pain is present in the lumbar spine and gluteal (She has pain when her lower extremities are raised, especially the right.). The quality of the pain is described as aching and shooting. The pain radiates to the right thigh (She complains of right buttock pain that radiates into R leg in L5 distribution.). The pain is at a severity of 5/10. The pain is mild (Her pain increases when she is ambulatoy,.). The symptoms are aggravated by bending, position and standing. Stiffness is present in the morning. (She has never had spine surgery;  She is on coumadin for A-fib;  ) Risk factors include lack of exercise, obesity and sedentary lifestyle (Patient has Parkinson's, TIA, Skin cancer, diabetes, CAD,  arthritis and a pace maker.). She has tried bed rest, heat and chiropractic manipulation (she cannot take NSAIDs;  she has used gabapentin in past.) for the symptoms. The treatment provided no relief.       Radiographic Images:   MRI of the lumbar spine dated 18 shows that she has disc desiccation of all the lumbar disc.  She has a grade 1 spondylolisthesis at L5-S1 region.   She has lateral recess stenosis at L3 on L4 on the right, and central and lateral recess stenosis at L4 on L5.  She has lateral recess stenosis on the right at L5-S1.    X-rays of the lumbar spine dated 18 does not show movement on flexion and extension.    Past Medical History:   Diagnosis Date    • Anemia    • Arthritis    • Chronic edema     bilateral   • Coronary artery disease involving native coronary artery of native heart without angina pectoris 3/1/2017   • Diabetes mellitus    • Essential hypertension 3/1/2017   • History of transfusion    • Mixed hyperlipidemia 3/1/2017   • Myocardial infarction    • Parkinson disease    • Peripheral vascular disease 3/1/2017   • Skin cancer    • TIA (transient ischemic attack)    • Varicose vein of leg     not spec of leg    • Venous hypertension    • Venous insufficiency      Current Outpatient Prescriptions   Medication Sig Dispense Refill   • amantadine (SYMMETREL) 100 MG capsule Take 100 mg by mouth 2 (Two) Times a Day.     • aspirin 81 MG EC tablet Take 81 mg by mouth Daily.     • B Complex-C (SUPER B COMPLEX PO) Take 1 tablet by mouth Daily.     • bumetanide (BUMEX) 2 MG tablet Take 2 mg by mouth 2 (Two) Times a Day.     • Calcium Carbonate (CALTRATE 600 PO) Take 600 mg by mouth Daily.     • carbidopa-levodopa (SINEMET)  MG per tablet Take 7 tablets by mouth Daily. 2 tablets at 0600, 1 tablet at 0900, 1200, 1500, 1800, 2100     • carbonyl iron (FEOSOL) 45 MG tablet tablet Take 1 tablet by mouth 2 (Two) Times a Day.     • Cholecalciferol 2000 units tablet Take 2,000 Units by mouth 2 (Two) Times a Day.     • citalopram (CeleXA) 20 MG tablet Take 20 mg by mouth every night at bedtime.     • clobetasol (TEMOVATE) 0.05 % cream Apply 1 application topically As Needed (Lichens Sclerosis).     • CloNIDine (CATAPRES) 0.1 MG tablet Take 1 tablet by mouth Every 12 (Twelve) Hours. 180 tablet 3   • Glucosamine-Chondroit-Vit C-Mn (GLUCOSAMINE 1500 COMPLEX PO) Take 1 tablet by mouth 2 (Two) Times a Day.     • insulin degludec (TRESIBA FLEXTOUCH) 100 UNIT/ML solution pen-injector injection Inject 24 Units under the skin Every Night.     • levothyroxine (SYNTHROID) 200 MCG tablet Take 200 mcg by mouth Daily. BRAND NAME ONLY     • Loratadine 10 MG capsule Take 10 mg by  "mouth Daily.     • losartan (COZAAR) 50 MG tablet Take 1 tablet by mouth Every 12 (Twelve) Hours. 180 tablet 3   • metFORMIN (GLUCOPHAGE) 1000 MG tablet Take 1,000 mg by mouth 2 (Two) Times a Day With Meals.     • metOLazone (ZAROXOLYN) 2.5 MG tablet Take 1 tablet by mouth Daily. 90 tablet 1   • Mirabegron ER (MYRBETRIQ) 50 MG tablet sustained-release 24 hour 24 hr tablet Take 50 mg by mouth Daily.     • Multiple Vitamins-Minerals (CENTRUM ULTRA WOMENS PO) Take 1 tablet by mouth Daily.     • nitroglycerin (NITROLINGUAL) 0.4 MG/SPRAY spray Place 1 spray under the tongue Every 5 (Five) Minutes As Needed for Chest Pain. 1 each 6   • omeprazole (priLOSEC) 40 MG capsule Take 40 mg by mouth 2 (Two) Times a Day.     • potassium chloride (MICRO-K) 10 MEQ CR capsule Take 10 mEq by mouth 2 (Two) Times a Day.     • pramipexole (MIRAPEX) 1.5 MG tablet Take 1.5 mg by mouth Every Night.     • ranolazine (RANEXA) 500 MG 12 hr tablet Take 500 mg by mouth 2 (Two) Times a Day.     • sennosides-docusate sodium (SENOKOT-S) 8.6-50 MG tablet Take 1 tablet by mouth Daily.     • spironolactone (ALDACTONE) 25 MG tablet Take 1 tablet by mouth Daily. 90 tablet 1   • Triamcinolone Acetonide (NASACORT) 55 MCG/ACT nasal inhaler 2 sprays into each nostril Daily As Needed.     • vitamin C (ASCORBIC ACID) 500 MG tablet Take 500 mg by mouth Daily.     • warfarin (COUMADIN) 5 MG tablet Take 1 to 1 and 1/2 tablet by mouth daily, or as directed by anticoagulation clinic 135 tablet 0     No current facility-administered medications for this visit.       Allergies   Allergen Reactions   • Toprol Xl [Metoprolol Tartrate] Shortness Of Breath     Extreme fatigue   • Amlodipine Besylate Swelling     Lower extremity (ankles, feet) swelling   • Entacapone Other (See Comments)     \"extreme weakness in legs - caused several falls, which stopped after discontinuing this medication\"   • Levemir [Insulin Detemir] Hives     Hives / rash around injection site   • " "Bactrim [Sulfamethoxazole-Trimethoprim] Nausea Only     Headache    • Ciprofloxacin Diarrhea   • Cogentin [Benztropine] Other (See Comments)     \"uncontrollable body movements\"   • Compazine [Prochlorperazine Edisylate] Other (See Comments)     Dystonic reaction   • Duraprep [Antiseptic Products, Misc.] Itching and Rash   • Haldol [Haloperidol Lactate] Other (See Comments)     Dystonic reaction   • Hydralazine Other (See Comments)     Headache    • Hydrocodone-Acetaminophen Nausea And Vomiting and Dizziness     Headache    • Lisinopril Cough   • Penicillins Hives     Jitteriness    • Statins Myalgia     Leg pain   • Tarka [Trandolapril-Verapamil Hcl Er] Other (See Comments)     Constipation      Social History     Social History   • Marital status:      Social History Main Topics   • Smoking status: Never Smoker   • Smokeless tobacco: Never Used   • Alcohol use 1.2 - 2.4 oz/week     1 - 2 Glasses of wine, 1 - 2 Shots of liquor per week      Comment: occas   • Drug use: No   • Sexual activity: Defer     Other Topics Concern   • Not on file     Family History   Problem Relation Age of Onset   • Kidney disease Mother      Past Surgical History:   Procedure Laterality Date   • CHOLECYSTECTOMY     • PACEMAKER IMPLANTATION     • REPLACEMENT TOTAL KNEE Bilateral    • SHOULDER SURGERY     • TUBAL ABDOMINAL LIGATION       Review of Systems   HENT: Positive for drooling and tinnitus.    Eyes: Positive for itching.   Respiratory: Positive for apnea and choking.    Genitourinary: Positive for frequency and urgency.   Musculoskeletal: Positive for arthralgias, back pain, myalgias and neck pain.   Neurological: Positive for tremors.   All other systems reviewed and are negative.    Vitals:    05/07/18 0754   BP: 162/88   BP Location: Right arm   Patient Position: Sitting   Pulse: 81   Temp: 98 °F (36.7 °C)   TempSrc: Temporal Artery    SpO2: 96%   Weight: 113 kg (249 lb)   Height: 160 cm (62.99\")     Physical " Exam  Neurologic Exam  Physical Exam   Constitutional: The patient is oriented to person, place, and time.  The patient appears well-developed and well-nourished and in no distress.   She is SOB walking in exam room and climbing onto table  Neat elderly obese  female  HENT:   Head: Normocephalic.   Right Ear: Hearing normal.   Left Ear: Hearing normal.   Mouth/Throat: Uvula is midline, oropharynx is clear and moist and mucous membranes are normal.   Eyes: Conjunctivae, EOM and lids are normal. Pupils are equal, round, and reactive to light.   Pupils 2 mm   Neck: Trachea normal and normal range of motion. No thyroid mass present.   Cardiovascular: Regular rhythm and normal heart sounds.   No murmur heard.  Pulses:  Carotid pulses are 2+ on the right side, and 2+ on the left side.  Radial pulses are 2+ on the right side, and 2+ on the left side.   Dorsalis pedis pulses are 2+ on the right side, and 2+ on the left side.   Pulmonary/Chest: Effort normal   Musculoskeletal:   Lumbar back: The patient exhibits pain with movement. The patient exhibits normal range of motion, no deformity and no spasm.   Mild stiffness; SLR increased low back and R buttock pain; Hip ROM normal        Neurologic Exam      Mental Status   Oriented to person, place, and time.   Attention: normal. Concentration: normal.   Speech: speech is normal   Level of consciousness: alert  Knowledge: good and consistent with education.   Normal comprehension.      Cranial Nerves   Cranial nerves II through XII intact.      Motor Exam   Muscle bulk: normal  Overall muscle tone: normal  No Pronator Drift    Strength   Strength 5/5 throughout.      Sensory Exam   Light touch normal.   Proprioception normal.      Gait, Coordination, and Reflexes      Gait: shuffling and short steps     Tremor   Resting tremor: present worse on right side  Intention tremor: absent  Action tremor: absent     Reflexes   Right biceps: 1+  Left biceps: 1+  Right triceps: 1+  Left  triceps: 1+  Right patellar: 1+ trace  Left patellar: 1+  trace  Right achilles: 0+  Left achilles: 0+  No Babinski signs  Right Kelsey: absent  Left Kelsey: absent  Right ankle clonus: absent  Left ankle clonus: absent  RA normal; R handed      Joanna was seen today for back pain.    Diagnoses and all orders for this visit:    Degeneration of lumbar or lumbosacral intervertebral disc  -     CT Lumbar Spine Without Contrast; Future    Acquired spondylolisthesis  -     CT Lumbar Spine Without Contrast; Future    Lumbar radiculopathy  -     CT Lumbar Spine Without Contrast; Future    Peripheral vascular disease  -     CT Lumbar Spine Without Contrast; Future    Osteoarthritis of spine with radiculopathy, lumbar region  -     CT Lumbar Spine Without Contrast; Future    BMI 40.0-44.9, adult  -     CT Lumbar Spine Without Contrast; Future    Physical deconditioning      Plan:  Order lumbar spine CT to assess bone anatomy;  Monitor leg symptoms for review at f/u after CT;  I will make further treatment recommendations at next visit;  I think she could benefit from surgical intervention for the stenosis but she is not a very good candidate for surgery.  I have discussed this with the patient.     I, Dr. Lencho Gamino, personally performed the services described in the documentation as scribed in my presence, and the documentation is both accurate and complete.    Lencho Gamino MD

## 2018-05-17 ENCOUNTER — TELEPHONE (OUTPATIENT)
Dept: PHARMACY | Facility: HOSPITAL | Age: 68
End: 2018-05-17

## 2018-05-17 ENCOUNTER — APPOINTMENT (OUTPATIENT)
Dept: PHARMACY | Facility: HOSPITAL | Age: 68
End: 2018-05-17

## 2018-05-17 NOTE — TELEPHONE ENCOUNTER
Pt called to report she was currently in [facility] ED. Was awoken last night by chest pains, went to ED, but she says they have told her she doesnt have any heart issues.    Pt says she is tired from being up all night and will go home and sleep. Rescheduled 5/17 appt to Mon 5/21.

## 2018-05-21 ENCOUNTER — ANTICOAGULATION VISIT (OUTPATIENT)
Dept: PHARMACY | Facility: HOSPITAL | Age: 68
End: 2018-05-21

## 2018-05-21 ENCOUNTER — APPOINTMENT (OUTPATIENT)
Dept: PHARMACY | Facility: HOSPITAL | Age: 68
End: 2018-05-21

## 2018-05-21 ENCOUNTER — HOSPITAL ENCOUNTER (OUTPATIENT)
Dept: CT IMAGING | Facility: HOSPITAL | Age: 68
Discharge: HOME | End: 2018-05-21
Attending: NEUROLOGICAL SURGERY | Admitting: NEUROLOGICAL SURGERY

## 2018-05-21 DIAGNOSIS — M54.16 LUMBAR RADICULOPATHY: ICD-10-CM

## 2018-05-21 DIAGNOSIS — I73.9 PERIPHERAL VASCULAR DISEASE (HCC): ICD-10-CM

## 2018-05-21 DIAGNOSIS — I48.20 CHRONIC ATRIAL FIBRILLATION (HCC): ICD-10-CM

## 2018-05-21 DIAGNOSIS — M47.26 OSTEOARTHRITIS OF SPINE WITH RADICULOPATHY, LUMBAR REGION: ICD-10-CM

## 2018-05-21 DIAGNOSIS — M43.10 ACQUIRED SPONDYLOLISTHESIS: ICD-10-CM

## 2018-05-21 DIAGNOSIS — M51.37 DEGENERATION OF LUMBAR OR LUMBOSACRAL INTERVERTEBRAL DISC: ICD-10-CM

## 2018-05-21 LAB
INR PPP: 4.8 (ref 0.91–1.09)
INR PPP: 4.8 (ref 0.91–1.09)
PROTHROMBIN TIME: 57.1 SECONDS (ref 10–13.8)
PROTHROMBIN TIME: 57.4 SECONDS (ref 10–13.8)

## 2018-05-21 PROCEDURE — 36416 COLLJ CAPILLARY BLOOD SPEC: CPT

## 2018-05-21 PROCEDURE — G0463 HOSPITAL OUTPT CLINIC VISIT: HCPCS

## 2018-05-21 PROCEDURE — 72131 CT LUMBAR SPINE W/O DYE: CPT

## 2018-05-21 PROCEDURE — 85610 PROTHROMBIN TIME: CPT

## 2018-05-21 RX ORDER — LEVOTHYROXINE SODIUM 200 MCG
200 TABLET ORAL DAILY
COMMUNITY
End: 2021-03-17 | Stop reason: SDUPTHER

## 2018-05-21 RX ORDER — ALBUTEROL SULFATE 90 UG/1
1 AEROSOL, METERED RESPIRATORY (INHALATION) EVERY 4 HOURS PRN
COMMUNITY
Start: 2018-05-09 | End: 2021-04-14 | Stop reason: SDUPTHER

## 2018-05-21 NOTE — PROGRESS NOTES
Anticoagulation Clinic - Progress Note    Indication: afib, s/p PPM  Referring Provider: Cj  Goal INR: 2-3  Initial Warfarin Start: December 2016  Current Drug Interactions: aspirin, citalopram, Synthroid, omeprazole, spironolactone  CHADS-VASc: 7 (age, gender, HTN, PVD, h/o TIA,DM )  Bleed Risk: h/o AVM with bleed requiring hospitalization  Other: previously on Xarelto, with subsequent bleed (AVM) -- Dr. Campa in [place]; internal hemorrhoids     Diet: raw cabbage (2-3 heads per week), occasional cooked GLV  Alcohol: none  Tobacco: none  OTC Pain Medication: APAP    INR History:  Date 9/5 9/28 10/12 11/6 11/30 12/28 1/25 2/1 2/9   Total Weekly Dose 32.5 mg 32.5 mg 32.5 mg 32.5 mg 32.5 mg 32.5 mg 32.5mg 37.5 mg 35mg   INR 2.2 2.1 2.6 2.2 3.0 2.6 1.3 1.7 1.7   Notes  doxy     cefdinir cefdinir cefdinir complete     Date 2/16 2/23 3/2 3/9 3/16 3/28 4/6 4/18 5/2 5/21   Total Weekly Dose 42.5 mg 42.5 mg 42.5 mg 45mg 42.5 mg 42.5 mg 45mg 45mg 45mg 45mg   INR 1.8 2.3 1.7 3.0 1.7 2.3 2.9 2.5 3.2 4.8, 4.8   Notes     boost           Phone Interview:  Tablet Strength: pt has 5mg tablets  Current Maintenance Dose:  Patient Contact Info: [phone]  Lab Contact Info: [facility], [phone]    Patient Findings:  Positives:   Change in medications, Bruising   Negatives:   Signs/symptoms of thrombosis, Signs/symptoms of bleeding, Laboratory test error suspected, Change in health, Change in alcohol use, Change in activity, Upcoming invasive procedure, Emergency department visit, Upcoming dental procedure, Missed doses, Extra doses, Change in diet/appetite, Hospital admission, Other complaints   Comments:   Mrs. Cross started using ipratropium nasal spray BID + albuterol inh PRN for allergies. She has also restarted CoQ10 and a new form of glucosamine / chondroitin + she is taking omeprazole BID again -- med list updated accordingly. She otherwise complains of more frequent bruising in the last  week.     Plan:  1. INR is supratherapeutic today. Instructed Mrs. Cross to HOLD her warfarin tonight and tomorrow, then resume warfarin 5mg daily thereafter.  2. RTC on Friday.  3. Verbal and written information provided in the clinic. Ms. Cross RBV dosing instructions, expresses understanding with teach back, and she has no further questions at this time.    Ann Cowden Mayer, PharmD  5/21/2018  1:30 PM

## 2018-05-25 ENCOUNTER — ANTICOAGULATION VISIT (OUTPATIENT)
Dept: PHARMACY | Facility: HOSPITAL | Age: 68
End: 2018-05-25

## 2018-05-25 ENCOUNTER — TELEPHONE (OUTPATIENT)
Dept: NEUROSURGERY | Facility: CLINIC | Age: 68
End: 2018-05-25

## 2018-05-25 DIAGNOSIS — I48.20 CHRONIC ATRIAL FIBRILLATION (HCC): ICD-10-CM

## 2018-05-25 LAB
INR PPP: 1.8 (ref 0.91–1.09)
PROTHROMBIN TIME: 22.1 SECONDS (ref 10–13.8)

## 2018-05-25 PROCEDURE — G0463 HOSPITAL OUTPT CLINIC VISIT: HCPCS

## 2018-05-25 PROCEDURE — 36416 COLLJ CAPILLARY BLOOD SPEC: CPT

## 2018-05-25 PROCEDURE — 85610 PROTHROMBIN TIME: CPT

## 2018-05-25 NOTE — PROGRESS NOTES
Anticoagulation Clinic - Progress Note    Indication: afib, s/p PPM  Referring Provider: Cj  Goal INR: 2-3  Initial Warfarin Start: December 2016  Current Drug Interactions: aspirin, citalopram, Synthroid, omeprazole, spironolactone  CHADS-VASc: 7 (age, gender, HTN, PVD, h/o TIA,DM )  Bleed Risk: h/o AVM with bleed requiring hospitalization  Other: previously on Xarelto, with subsequent bleed (AVM) -- Dr. Campa in [place]; internal hemorrhoids     Diet: raw cabbage (2-3 heads per week), occasional cooked GLV  Alcohol: none  Tobacco: none  OTC Pain Medication: APAP    INR History:  Date 9/5 9/28 10/12 11/6 11/30 12/28 1/25 2/1 2/9   Total Weekly Dose 32.5 mg 32.5 mg 32.5 mg 32.5 mg 32.5 mg 32.5 mg 32.5mg 37.5 mg 35mg   INR 2.2 2.1 2.6 2.2 3.0 2.6 1.3 1.7 1.7   Notes  doxy     cefdinir cefdinir cefdinir complete     Date 2/16 2/23 3/2 3/9 3/16 3/28 4/6 4/18 5/2 5/21   Total Weekly Dose 42.5 mg 42.5 mg 42.5 mg 45mg 42.5 mg 42.5 mg 45mg 45mg 45mg 45mg   INR 1.8 2.3 1.7 3.0 1.7 2.3 2.9 2.5 3.2 4.8, 4.8   Notes     boost           Date 5/25            Total Weekly Dose 30mg            INR 1.8            Notes 2 held doses              Phone Interview:  Tablet Strength: pt has 5mg tablets  Current Maintenance Dose:  Patient Contact Info: [phone]  Lab Contact Info: [facility], [phone]    Patient Findings:  Positives:   Change in health, Change in diet/appetite   Negatives:   Signs/symptoms of thrombosis, Signs/symptoms of bleeding, Laboratory test error suspected, Change in alcohol use, Change in activity, Upcoming invasive procedure, Emergency department visit, Upcoming dental procedure, Missed doses, Extra doses, Change in medications, Hospital admission, Bruising, Other complaints   Comments:   Mrs. Cross reports eating two salads + cabbage this past week. She also saw the allergist and was given an rx for a Zpak for a possible sinus infection -- she plans to wait and start this on  Sunday, and she will otherwise be starting allergy shots next week.     Plan:  1. INR is slightly subtherapeutic today following 2-day dose hold. For now, instructed  to take warfarin 7.5mg tonight, then 5mg daily thereafter.  2. Mrs. Cross will be starting a Zpak this weekend, so she will RTC when she is in [place] for another appt on Thursday 5/31, day 5 of abx.  3. Verbal and written information provided in the clinic. Ms. Cross RBV dosing instructions, expresses understanding with teach back, and she has no further questions at this time.    Ann Cowden Mayer, PharmD  5/25/2018  11:41 AM

## 2018-05-31 ENCOUNTER — ANTICOAGULATION VISIT (OUTPATIENT)
Dept: PHARMACY | Facility: HOSPITAL | Age: 68
End: 2018-05-31

## 2018-05-31 DIAGNOSIS — I48.20 CHRONIC ATRIAL FIBRILLATION (HCC): ICD-10-CM

## 2018-05-31 LAB
INR PPP: 2.8 (ref 0.91–1.09)
PROTHROMBIN TIME: 33.3 SECONDS (ref 10–13.8)

## 2018-05-31 PROCEDURE — 36416 COLLJ CAPILLARY BLOOD SPEC: CPT

## 2018-05-31 PROCEDURE — 85610 PROTHROMBIN TIME: CPT

## 2018-05-31 PROCEDURE — G0463 HOSPITAL OUTPT CLINIC VISIT: HCPCS | Performed by: PHARMACIST

## 2018-05-31 RX ORDER — FLUTICASONE PROPIONATE 50 MCG
2 SPRAY, SUSPENSION (ML) NASAL DAILY PRN
COMMUNITY
End: 2019-06-06

## 2018-05-31 NOTE — PROGRESS NOTES
Anticoagulation Clinic - Progress Note    Indication: afib, s/p PPM  Referring Provider: Cj  Goal INR: 2-3  Initial Warfarin Start: December 2016  Current Drug Interactions: aspirin, citalopram, Synthroid, omeprazole, spironolactone  CHADS-VASc: 7 (age, gender, HTN, PVD, h/o TIA,DM )  Bleed Risk: h/o AVM with bleed requiring hospitalization  Other: previously on Xarelto, with subsequent bleed (AVM) -- Dr. Campa in [place]; internal hemorrhoids     Diet: raw cabbage (2-3 heads per week), occasional cooked GLV  Alcohol: none  Tobacco: none  OTC Pain Medication: APAP    INR History:  Date 9/5 9/28 10/12 11/6 11/30 12/28 1/25 2/1 2/9   Total Weekly Dose 32.5 mg 32.5 mg 32.5 mg 32.5 mg 32.5 mg 32.5 mg 32.5mg 37.5 mg 35mg   INR 2.2 2.1 2.6 2.2 3.0 2.6 1.3 1.7 1.7   Notes  doxy     cefdinir cefdinir cefdinir complete     Date 2/16 2/23 3/2 3/9 3/16 3/28 4/6 4/18 5/2 5/21   Total Weekly Dose 42.5 mg 42.5 mg 42.5 mg 45mg 42.5 mg 42.5 mg 45mg 45mg 45mg 45mg   INR 1.8 2.3 1.7 3.0 1.7 2.3 2.9 2.5 3.2 4.8, 4.8   Notes     boost           Date 5/25 5/31           Total Weekly Dose 30mg 37.5 mg           INR 1.8 2.8           Notes 2 held doses              Phone Interview:  Tablet Strength: pt has 5mg tablets  Current Maintenance Dose:  Patient Contact Info: [phone]  Lab Contact Info: [facility], [phone]    Patient Findings:  Positives:   Change in medications   Negatives:   Signs/symptoms of thrombosis, Signs/symptoms of bleeding, Laboratory test error suspected, Change in health, Change in alcohol use, Change in activity, Upcoming invasive procedure, Emergency department visit, Upcoming dental procedure, Missed doses, Extra doses, Change in diet/appetite, Hospital admission, Bruising, Other complaints   Comments:   Has started allergy injections this week.   Her zpak will complete today.   She does want to finish the canned cabbage from last week     Plan:  1. INR is therapeutic today. For  now, instructed  to take warfarin 5 mg daily except 7.5 mg on Friday.  2. Mrs. Cross will be completing the Zpak today. She asks about changing to Eliquis if okay with Dr. Corona. We will check with her. She has been on xarelto before but is willing to try the eliquis bid.  3. Verbal and written information provided in the clinic. Ms. Cross RBV dosing instructions, expresses understanding with teach back, and she has no further questions at this time.    Ismael Aguilrea, [facility]  5/31/2018  4:37 PM

## 2018-06-04 ENCOUNTER — OFFICE VISIT (OUTPATIENT)
Dept: NEUROSURGERY | Facility: CLINIC | Age: 68
End: 2018-06-04

## 2018-06-04 VITALS
WEIGHT: 250.8 LBS | BODY MASS INDEX: 44.44 KG/M2 | TEMPERATURE: 98.1 F | OXYGEN SATURATION: 98 % | SYSTOLIC BLOOD PRESSURE: 140 MMHG | HEART RATE: 74 BPM | HEIGHT: 63 IN | DIASTOLIC BLOOD PRESSURE: 84 MMHG

## 2018-06-04 DIAGNOSIS — M47.26 OSTEOARTHRITIS OF SPINE WITH RADICULOPATHY, LUMBAR REGION: ICD-10-CM

## 2018-06-04 DIAGNOSIS — M51.37 DEGENERATION OF LUMBAR OR LUMBOSACRAL INTERVERTEBRAL DISC: Primary | ICD-10-CM

## 2018-06-04 DIAGNOSIS — M48.062 SPINAL STENOSIS, LUMBAR REGION, WITH NEUROGENIC CLAUDICATION: ICD-10-CM

## 2018-06-04 DIAGNOSIS — M54.16 LUMBAR RADICULOPATHY: ICD-10-CM

## 2018-06-04 DIAGNOSIS — M43.10 SPONDYLOLISTHESIS, ACQUIRED: ICD-10-CM

## 2018-06-04 DIAGNOSIS — I73.9 PERIPHERAL VASCULAR DISEASE (HCC): ICD-10-CM

## 2018-06-04 DIAGNOSIS — R53.81 PHYSICAL DECONDITIONING: ICD-10-CM

## 2018-06-04 PROCEDURE — 99214 OFFICE O/P EST MOD 30 MIN: CPT | Performed by: NEUROLOGICAL SURGERY

## 2018-06-04 NOTE — PROGRESS NOTES
Patient: Joanna Cross  :  1950  Chart #:  [id number]    Date of Service: 18    Chief Complaint:   Chief Complaint   Patient presents with   • Back Pain       HPI    Radiographic Images:   ***    Past Medical History:   Diagnosis Date   • Anemia    • Arthritis    • Chronic edema     bilateral   • Coronary artery disease involving native coronary artery of native heart without angina pectoris 3/1/2017   • Diabetes mellitus    • Essential hypertension 3/1/2017   • History of transfusion    • Mixed hyperlipidemia 3/1/2017   • Myocardial infarction    • Parkinson disease    • Peripheral vascular disease 3/1/2017   • Skin cancer    • TIA (transient ischemic attack)    • Varicose vein of leg     not spec of leg    • Venous hypertension    • Venous insufficiency      Current Outpatient Prescriptions   Medication Sig Dispense Refill   • albuterol (VENTOLIN HFA) 108 (90 Base) MCG/ACT inhaler Inhale 1 puff Every 4 (Four) Hours As Needed.     • amantadine (SYMMETREL) 100 MG capsule Take 100 mg by mouth 2 (Two) Times a Day.     • aspirin 81 MG EC tablet Take 81 mg by mouth Daily.     • B Complex-C (SUPER B COMPLEX PO) Take 1 tablet by mouth Daily.     • bumetanide (BUMEX) 2 MG tablet Take 2 mg by mouth 2 (Two) Times a Day.     • Calcium Carbonate (CALTRATE 600 PO) Take 600 mg by mouth Daily.     • carbidopa-levodopa (SINEMET)  MG per tablet Take 7 tablets by mouth Daily. 2 tablets at 0600, 1 tablet at 0900, 1200, 1500, 1800, 2100     • carbonyl iron (FEOSOL) 45 MG tablet tablet Take 1 tablet by mouth 2 (Two) Times a Day.     • Cholecalciferol 2000 units tablet Take 2,000 Units by mouth 2 (Two) Times a Day.     • citalopram (CeleXA) 20 MG tablet Take 20 mg by mouth every night at bedtime.     • clobetasol (TEMOVATE) 0.05 % cream Apply 1 application topically As Needed (Lichens Sclerosis).     • CloNIDine (CATAPRES) 0.1 MG tablet Take 1 tablet by mouth Every 12 (Twelve) Hours. 180 tablet 3   •  Collagen-Boron-Hyaluronic Acid 10-5-3.3 MG tablet Take 1 tablet by mouth Daily.     • fluticasone (FLONASE) 50 MCG/ACT nasal spray 2 sprays into each nostril Daily As Needed for Rhinitis.     • insulin degludec (TRESIBA FLEXTOUCH) 100 UNIT/ML solution pen-injector injection Inject 24 Units under the skin Every Night.     • IPRATROPIUM BROMIDE NA 1 spray into each nostril 2 (Two) Times a Day.     • Loratadine 10 MG capsule Take 10 mg by mouth Daily.     • losartan (COZAAR) 50 MG tablet Take 1 tablet by mouth Every 12 (Twelve) Hours. 180 tablet 3   • metFORMIN (GLUCOPHAGE) 1000 MG tablet Take 1,000 mg by mouth 2 (Two) Times a Day With Meals.     • metOLazone (ZAROXOLYN) 2.5 MG tablet Take 1 tablet by mouth Daily. 90 tablet 1   • Mirabegron ER (MYRBETRIQ) 50 MG tablet sustained-release 24 hour 24 hr tablet Take 50 mg by mouth Daily.     • Multiple Vitamins-Minerals (CENTRUM ULTRA WOMENS PO) Take 1 tablet by mouth Daily.     • nitroglycerin (NITROLINGUAL) 0.4 MG/SPRAY spray Place 1 spray under the tongue Every 5 (Five) Minutes As Needed for Chest Pain. 1 each 6   • omeprazole (priLOSEC) 40 MG capsule Take 40 mg by mouth 2 (Two) Times a Day.     • potassium chloride (MICRO-K) 10 MEQ CR capsule Take 10 mEq by mouth 2 (Two) Times a Day.     • pramipexole (MIRAPEX) 1.5 MG tablet Take 1.5 mg by mouth Every Night.     • ranolazine (RANEXA) 500 MG 12 hr tablet Take 500 mg by mouth 2 (Two) Times a Day.     • sennosides-docusate sodium (SENOKOT-S) 8.6-50 MG tablet Take 1 tablet by mouth Daily.     • spironolactone (ALDACTONE) 25 MG tablet Take 1 tablet by mouth Daily. 90 tablet 1   • SYNTHROID 200 MCG tablet Take 200 mcg by mouth Daily.     • Ubiquinol (QUNOL COQ10/UBIQUINOL/JOSE) 100 MG capsule Take 1 capsule by mouth Daily.     • vitamin C (ASCORBIC ACID) 500 MG tablet Take 500 mg by mouth Daily. Chewable tablet     • warfarin (COUMADIN) 5 MG tablet Take 1 to 1 and 1/2 tablet by mouth daily, or as directed by anticoagulation  "clinic 135 tablet 0     No current facility-administered medications for this visit.       Allergies   Allergen Reactions   • Toprol Xl [Metoprolol Tartrate] Shortness Of Breath     Extreme fatigue   • Amlodipine Besylate Swelling     Lower extremity (ankles, feet) swelling   • Entacapone Other (See Comments)     \"extreme weakness in legs - caused several falls, which stopped after discontinuing this medication\"   • Levemir [Insulin Detemir] Hives     Hives / rash around injection site   • Bactrim [Sulfamethoxazole-Trimethoprim] Nausea Only     Headache    • Ciprofloxacin Diarrhea   • Cogentin [Benztropine] Other (See Comments)     \"uncontrollable body movements\"   • Compazine [Prochlorperazine Edisylate] Other (See Comments)     Dystonic reaction   • Duraprep [Antiseptic Products, Misc.] Itching and Rash   • Haldol [Haloperidol Lactate] Other (See Comments)     Dystonic reaction   • Hydralazine Other (See Comments)     Headache    • Hydrocodone-Acetaminophen Nausea And Vomiting and Dizziness     Headache    • Lisinopril Cough   • Penicillins Hives     Jitteriness    • Statins Myalgia     Leg pain   • Tarka [Trandolapril-Verapamil Hcl Er] Other (See Comments)     Constipation      Social History     Social History   • Marital status:      Social History Main Topics   • Smoking status: Never Smoker   • Smokeless tobacco: Never Used   • Alcohol use 1.2 - 2.4 oz/week     1 - 2 Glasses of wine, 1 - 2 Shots of liquor per week      Comment: occas   • Drug use: No   • Sexual activity: Defer     Other Topics Concern   • Not on file     Family History   Problem Relation Age of Onset   • Kidney disease Mother      Past Surgical History:   Procedure Laterality Date   • CHOLECYSTECTOMY     • PACEMAKER IMPLANTATION     • REPLACEMENT TOTAL KNEE Bilateral    • SHOULDER SURGERY     • TUBAL ABDOMINAL LIGATION       Review of Systems   HENT: Positive for drooling and sinus pressure.    Eyes: Positive for itching. " "  Respiratory: Positive for apnea and shortness of breath.    Cardiovascular: Positive for leg swelling.   Genitourinary: Positive for urgency.   Musculoskeletal: Positive for arthralgias, back pain, joint swelling and myalgias.   Allergic/Immunologic: Positive for environmental allergies.   All other systems reviewed and are negative.    Vitals:    06/04/18 0815   BP: 140/84   BP Location: Right arm   Patient Position: Sitting   Pulse: 74   Temp: 98.1 °F (36.7 °C)   TempSrc: Temporal Artery    SpO2: 98%   Weight: 114 kg (250 lb 12.8 oz)   Height: 160 cm (62.99\")     Physical Exam  Neurologic Exam    There are no diagnoses linked to this encounter.    Plan: ***    I, Dr. Lencho Gamino, personally performed the services described in the documentation as scribed in my presence, and the documentation is both accurate and complete.    Mackenzie Villa MA  "

## 2018-06-04 NOTE — PROGRESS NOTES
Patient: Joanna Cross  :  1950  Chart #:  [id number]    Date of Service: 18    Chief Complaint:   Chief Complaint   Patient presents with   • Back Pain       Back Pain   This is a chronic (Mrs. Cross returns today for a follow-up on her back and RLE pain.) problem. The current episode started more than 1 month ago. The problem occurs constantly. The problem is unchanged. The pain is present in the lumbar spine and gluteal (She has never had spine surgery.). The quality of the pain is described as aching, shooting and burning (At times she has some pain in her LLE, but not much.). The pain radiates to the right thigh and right foot (She has pain that runs down her RLE laterally to the foot.  She also complains of pain in her buttock, especially on the right side.). The pain is at a severity of 7/10. The pain is moderate (Her pain is moderate to severe.). The pain is worse during the day. The symptoms are aggravated by bending, position and standing (She has issues with her gait.). Stiffness is present all day (Her pain increases when she ambulates.). Risk factors include history of cancer, lack of exercise, obesity, menopause, poor posture and sedentary lifestyle (She has CAD, hypertension, PVD and chronic atrial fibrillation.). She has tried bed rest and heat (She will check with her cardiologist Dr. Corona  to get clearnece for surgery.) for the symptoms. The treatment provided mild relief.       Radiographic Images:   CT of the lumbar spine dated 18 shows she has a grade 1 degenerative spondylolisthesis at L5 on S1.  She has a vacuum disc at L5-S1.   There is advanced degenerative facet disease at L4-L5 and L5-S1.  There is lateral recess stenosis at L5-S1 on the right.  She has cental and lateral recess stenosis at L4-L5.      MRI of the lumbar spine dated 18 shows that she has disc desiccation of all the lumbar disc.  She has a grade 1 spondylolisthesis at L5-S1 region.   She  has lateral recess stenosis at L3 on L4 on the right, and central and lateral recess stenosis at L4 on L5.  She has lateral recess stenosis on the right at L5-S1.     X-rays of the lumbar spine dated 04-18-18 does not show movement on flexion and extension.    Past Medical History:   Diagnosis Date   • Anemia    • Arthritis    • Chronic edema     bilateral   • Coronary artery disease involving native coronary artery of native heart without angina pectoris 3/1/2017   • Diabetes mellitus    • Essential hypertension 3/1/2017   • History of transfusion    • Mixed hyperlipidemia 3/1/2017   • Myocardial infarction    • Parkinson disease    • Peripheral vascular disease 3/1/2017   • Skin cancer    • TIA (transient ischemic attack)    • Varicose vein of leg     not spec of leg    • Venous hypertension    • Venous insufficiency      Current Outpatient Prescriptions   Medication Sig Dispense Refill   • albuterol (VENTOLIN HFA) 108 (90 Base) MCG/ACT inhaler Inhale 1 puff Every 4 (Four) Hours As Needed.     • amantadine (SYMMETREL) 100 MG capsule Take 100 mg by mouth 2 (Two) Times a Day.     • aspirin 81 MG EC tablet Take 81 mg by mouth Daily.     • B Complex-C (SUPER B COMPLEX PO) Take 1 tablet by mouth Daily.     • bumetanide (BUMEX) 2 MG tablet Take 2 mg by mouth 2 (Two) Times a Day.     • Calcium Carbonate (CALTRATE 600 PO) Take 600 mg by mouth Daily.     • carbidopa-levodopa (SINEMET)  MG per tablet Take 7 tablets by mouth Daily. 2 tablets at 0600, 1 tablet at 0900, 1200, 1500, 1800, 2100     • carbonyl iron (FEOSOL) 45 MG tablet tablet Take 1 tablet by mouth 2 (Two) Times a Day.     • Cholecalciferol 2000 units tablet Take 2,000 Units by mouth 2 (Two) Times a Day.     • citalopram (CeleXA) 20 MG tablet Take 20 mg by mouth every night at bedtime.     • clobetasol (TEMOVATE) 0.05 % cream Apply 1 application topically As Needed (Lichens Sclerosis).     • CloNIDine (CATAPRES) 0.1 MG tablet Take 1 tablet by mouth Every  12 (Twelve) Hours. 180 tablet 3   • Collagen-Boron-Hyaluronic Acid 10-5-3.3 MG tablet Take 1 tablet by mouth Daily.     • fluticasone (FLONASE) 50 MCG/ACT nasal spray 2 sprays into each nostril Daily As Needed for Rhinitis.     • insulin degludec (TRESIBA FLEXTOUCH) 100 UNIT/ML solution pen-injector injection Inject 24 Units under the skin Every Night.     • IPRATROPIUM BROMIDE NA 1 spray into each nostril 2 (Two) Times a Day.     • Loratadine 10 MG capsule Take 10 mg by mouth Daily.     • losartan (COZAAR) 50 MG tablet Take 1 tablet by mouth Every 12 (Twelve) Hours. 180 tablet 3   • metFORMIN (GLUCOPHAGE) 1000 MG tablet Take 1,000 mg by mouth 2 (Two) Times a Day With Meals.     • metOLazone (ZAROXOLYN) 2.5 MG tablet Take 1 tablet by mouth Daily. 90 tablet 1   • Mirabegron ER (MYRBETRIQ) 50 MG tablet sustained-release 24 hour 24 hr tablet Take 50 mg by mouth Daily.     • Multiple Vitamins-Minerals (CENTRUM ULTRA WOMENS PO) Take 1 tablet by mouth Daily.     • nitroglycerin (NITROLINGUAL) 0.4 MG/SPRAY spray Place 1 spray under the tongue Every 5 (Five) Minutes As Needed for Chest Pain. 1 each 6   • omeprazole (priLOSEC) 40 MG capsule Take 40 mg by mouth 2 (Two) Times a Day.     • potassium chloride (MICRO-K) 10 MEQ CR capsule Take 10 mEq by mouth 2 (Two) Times a Day.     • pramipexole (MIRAPEX) 1.5 MG tablet Take 1.5 mg by mouth Every Night.     • ranolazine (RANEXA) 500 MG 12 hr tablet Take 500 mg by mouth 2 (Two) Times a Day.     • sennosides-docusate sodium (SENOKOT-S) 8.6-50 MG tablet Take 1 tablet by mouth Daily.     • spironolactone (ALDACTONE) 25 MG tablet Take 1 tablet by mouth Daily. 90 tablet 1   • SYNTHROID 200 MCG tablet Take 200 mcg by mouth Daily.     • Ubiquinol (QUNOL COQ10/UBIQUINOL/JOSE) 100 MG capsule Take 1 capsule by mouth Daily.     • vitamin C (ASCORBIC ACID) 500 MG tablet Take 500 mg by mouth Daily. Chewable tablet     • warfarin (COUMADIN) 5 MG tablet Take 1 to 1 and 1/2 tablet by mouth daily,  "or as directed by anticoagulation clinic 135 tablet 0     No current facility-administered medications for this visit.       Allergies   Allergen Reactions   • Toprol Xl [Metoprolol Tartrate] Shortness Of Breath     Extreme fatigue   • Amlodipine Besylate Swelling     Lower extremity (ankles, feet) swelling   • Entacapone Other (See Comments)     \"extreme weakness in legs - caused several falls, which stopped after discontinuing this medication\"   • Levemir [Insulin Detemir] Hives     Hives / rash around injection site   • Bactrim [Sulfamethoxazole-Trimethoprim] Nausea Only     Headache    • Ciprofloxacin Diarrhea   • Cogentin [Benztropine] Other (See Comments)     \"uncontrollable body movements\"   • Compazine [Prochlorperazine Edisylate] Other (See Comments)     Dystonic reaction   • Duraprep [Antiseptic Products, Misc.] Itching and Rash   • Haldol [Haloperidol Lactate] Other (See Comments)     Dystonic reaction   • Hydralazine Other (See Comments)     Headache    • Hydrocodone-Acetaminophen Nausea And Vomiting and Dizziness     Headache    • Lisinopril Cough   • Penicillins Hives     Jitteriness    • Statins Myalgia     Leg pain   • Tarka [Trandolapril-Verapamil Hcl Er] Other (See Comments)     Constipation      Social History     Social History   • Marital status:      Social History Main Topics   • Smoking status: Never Smoker   • Smokeless tobacco: Never Used   • Alcohol use 1.2 - 2.4 oz/week     1 - 2 Glasses of wine, 1 - 2 Shots of liquor per week      Comment: occas   • Drug use: No   • Sexual activity: Defer     Other Topics Concern   • Not on file     Family History   Problem Relation Age of Onset   • Kidney disease Mother      Past Surgical History:   Procedure Laterality Date   • CHOLECYSTECTOMY     • PACEMAKER IMPLANTATION     • REPLACEMENT TOTAL KNEE Bilateral    • SHOULDER SURGERY     • TUBAL ABDOMINAL LIGATION       Review of Systems   HENT: Positive for drooling and sinus pressure.    Eyes: " "Positive for itching.   Respiratory: Positive for apnea.    Cardiovascular: Positive for leg swelling.   Genitourinary: Positive for urgency.   Musculoskeletal: Positive for arthralgias, back pain, joint swelling and myalgias.   Allergic/Immunologic: Positive for environmental allergies.   All other systems reviewed and are negative.    Vitals:    06/04/18 0815   BP: 140/84   BP Location: Right arm   Patient Position: Sitting   Pulse: 74   Temp: 98.1 °F (36.7 °C)   TempSrc: Temporal Artery    SpO2: 98%   Weight: 114 kg (250 lb 12.8 oz)   Height: 160 cm (62.99\")     Physical Exam  Neurologic Exam  Constitutional: The patient is oriented to person, place, and time.  The patient appears well-developed and well-nourished and in no distress.   She is SOB walking in exam room and climbing onto table  Neat elderly obese  female  Neck: Trachea normal and normal range of motion. No thyroid mass present.   Cardiovascular: Regular rhythm   Pulses:  Carotid pulses are 2+ on the right side, and 2+ on the left side.  Radial pulses are 2+ on the right side, and 2+ on the left side.   Dorsalis pedis pulses are 2+ on the right side, and 2+ on the left side.   Pulmonary/Chest: Effort normal   Musculoskeletal:   Lumbar back: The patient exhibits pain with movement. The patient exhibits normal range of motion, no deformity and no spasm.   Mild stiffness; SLR increased low back and R buttock pain; Hip ROM normal        Neurologic Exam      Mental Status   Oriented to person, place, and time.   Attention: normal. Concentration: normal.   Speech: speech is normal   Level of consciousness: alert  Knowledge: good and consistent with education.   Normal comprehension.      Cranial Nerves   Cranial nerves II through XII intact.      Motor Exam   Muscle bulk: normal  Overall muscle tone: normal  No Pronator Drift     Strength   Strength 5/5 throughout.      Sensory Exam   Light touch normal.   Proprioception normal.      Gait, Coordination, " and Reflexes      Gait: shuffling and short steps     Tremor   Resting tremor: present worse on right side  Intention tremor: absent  Action tremor: absent     Reflexes   Right biceps: 1+  Left biceps: 1+  Right triceps: 1+  Left triceps: 1+  Right patellar: 1+ trace  Left patellar: 1+  trace  Right achilles: 0+  Left achilles: 0+  No Babinski signs  Right Kelsey: absent  Left Kelsey: absent  Right ankle clonus: absent  Left ankle clonus: absent  RA normal; R handed      Joanna was seen today for back pain.    Diagnoses and all orders for this visit:    Degeneration of lumbar or lumbosacral intervertebral disc    Spondylolisthesis, acquired    Osteoarthritis of spine with radiculopathy, lumbar region    BMI 40.0-44.9, adult    Lumbar radiculopathy    Physical deconditioning    Peripheral vascular disease    Spinal stenosis, lumbar region, with neurogenic claudication      Plan:  I recommend L4L5 decompressive laminectomy and R L5S1 foraminotomy to decompress the spinal nerve roots.  I described the operative procedure in detail as well as the indications, alternatives, and expected postoperative course and results. I described the potential risks and complications of surgery in detail. All questions were answered. The patient has indicated understanding of the planned procedure, accepted the risks, and wishes to proceed with surgery. No guarantee of results was given to the patient. The operative permit will be completed prior to surgery.    She will need to see Dr. Corona for cardiac clearance and recommendations for her coumadin prior to surgery.      The patient's imaging CD's are on the chart.    I, Dr. Lencho Gamino, personally performed the services described in the documentation as scribed in my presence, and the documentation is both accurate and complete.    Lencho Gamino MD

## 2018-06-11 ENCOUNTER — TELEPHONE (OUTPATIENT)
Dept: CARDIOLOGY | Facility: CLINIC | Age: 68
End: 2018-06-11

## 2018-06-11 NOTE — TELEPHONE ENCOUNTER
PT called requesting to switch from warfarin to Eliquis- her INR's have kind of been all over the place and she really likes to eat green leafy vegetables- but she isn't consistent with the amount- she has been on xarelto previously- but I am not sure why it was stopped and she didn't answer when I called her back- Creatinine in April was 0.8

## 2018-06-12 NOTE — TELEPHONE ENCOUNTER
"Dr. Corona is \"ok\" with switching to Eliquis 5 mg BID once INR is less than 2- she will hold warfarin today and tomorrow and check [facility]- I will leave samples over there- she will call me if she needs anything further-   "

## 2018-06-14 ENCOUNTER — ANTICOAGULATION VISIT (OUTPATIENT)
Dept: PHARMACY | Facility: HOSPITAL | Age: 68
End: 2018-06-14

## 2018-06-14 ENCOUNTER — APPOINTMENT (OUTPATIENT)
Dept: PHARMACY | Facility: HOSPITAL | Age: 68
End: 2018-06-14

## 2018-06-14 ENCOUNTER — TELEPHONE (OUTPATIENT)
Dept: PHARMACY | Facility: HOSPITAL | Age: 68
End: 2018-06-14

## 2018-06-14 DIAGNOSIS — I48.20 CHRONIC ATRIAL FIBRILLATION (HCC): ICD-10-CM

## 2018-06-14 LAB
INR PPP: 1.8 (ref 0.91–1.09)
PROTHROMBIN TIME: 21.2 SECONDS (ref 10–13.8)

## 2018-06-14 PROCEDURE — 85610 PROTHROMBIN TIME: CPT

## 2018-06-14 PROCEDURE — G0463 HOSPITAL OUTPT CLINIC VISIT: HCPCS

## 2018-06-14 PROCEDURE — 36416 COLLJ CAPILLARY BLOOD SPEC: CPT

## 2018-06-14 NOTE — TELEPHONE ENCOUNTER
Dr. Corona,    Ms. Cross reported to [facility] today with INR of 1.8- will therefore, begin use of Eliquis 5mg BID tonight. Spoke with Nito at Dr. Reynaldo Fritz office who typically draws lab work every 3 months. They have noted that Ms. Cross is on Eliquis and will note if changes to Scr occur.     Discussed Eliquis use with Ms. Cross and she has no further questions. At this time, we will discharge Ms. Cross from our services.     We have enjoyed participating in Ms. Cross's care with you. Please feel free to reach out to [facility] if the patient requires re-enrollment in our services.    Thank you,    Jasmine

## 2018-06-14 NOTE — PROGRESS NOTES
Anticoagulation Clinic - Progress Note    Indication: afib, s/p PPM  Referring Provider: Cj  Goal INR: 2-3  Initial Warfarin Start: December 2016  Current Drug Interactions: aspirin, citalopram, Synthroid, omeprazole, spironolactone  CHADS-VASc: 7 (age, gender, HTN, PVD, h/o TIA,DM )  Bleed Risk: h/o AVM with bleed requiring hospitalization  Other: previously on Xarelto, with subsequent bleed (AVM) -- Dr. Campa in [place]; internal hemorrhoids     Diet: raw cabbage (2-3 heads per week), occasional cooked GLV  Alcohol: none  Tobacco: none  OTC Pain Medication: APAP    INR History:  Date 9/5 9/28 10/12 11/6 11/30 12/28 1/25 2/1 2/9   Total Weekly Dose 32.5 mg 32.5 mg 32.5 mg 32.5 mg 32.5 mg 32.5 mg 32.5mg 37.5 mg 35mg   INR 2.2 2.1 2.6 2.2 3.0 2.6 1.3 1.7 1.7   Notes  doxy     cefdinir cefdinir cefdinir complete     Date 2/16 2/23 3/2 3/9 3/16 3/28 4/6 4/18 5/2 5/21   Total Weekly Dose 42.5 mg 42.5 mg 42.5 mg 45mg 42.5 mg 42.5 mg 45mg 45mg 45mg 45mg   INR 1.8 2.3 1.7 3.0 1.7 2.3 2.9 2.5 3.2 4.8, 4.8   Notes     boost           Date 5/25 5/31           Total Weekly Dose 30mg 37.5 mg           INR 1.8 2.8           Notes 2 held doses              Phone Interview:  Tablet Strength: pt has 5mg tablets  Current Maintenance Dose:  Patient Contact Info: [phone]  Lab Contact Info: [facility], [phone]    Patient Findings   Negatives:   Signs/symptoms of thrombosis, Signs/symptoms of bleeding, Laboratory test error suspected, Change in health, Change in alcohol use, Change in activity, Upcoming invasive procedure, Emergency department visit, Upcoming dental procedure, Missed doses, Extra doses, Change in medications, Change in diet/appetite, Hospital admission, Bruising, Other complaints   Comments:   Patient ok to switch to Eliquis per Dr. Corona. She was given a week sample supply of Eliquis 5mg BID. She reports that they have called in a 90 day supply to the pharmacy and it will  cost Ms. Cross $20. Discussed Eliquis with Mrs. Cross and answered questions. Discussed need for regulate Scr Function test. She reports that Dr. Mckenzie is his her urologist at [facility] in [place], [place] [phone]. Spoke with Katie at Dr. Mckenzie's office and discussed that patient will be beginning Eliquis. She reported that they do not have a recent Scr on her, they will leave a note in patient profile and may monitor Scr. Ms. Cross reports that she had lab work at Dr. Fritz office. Called Dr. Fritz office [phone] and spoke with Nito. Nito discussed that Dr. Fritz will monitor Scr with lab draws for need to decrease dose or resume warfarin. Most recent was SCr: 0.78 06/5/2018.      Plan:  1. Patient is Subtherapeutic today prior to switch to Eliquis. Patient was provided samples in the clinic that was provided by cardiology. Given approval by Dr. Corona will transition to patient to Eliquis 5 mg BID.  2. Discussed need for renal monitoring. Confirmed with Nito at Dr. Fritz office that they will monitor Scr/ renal function.   3. Verbal and written information provided in the clinic. Ms. Cross RBV dosing instructions, expresses understanding with teach back, and she has no further questions at this time.    Jasmine Turner, PharmD  6/14/2018  1:04 PM

## 2018-06-15 ENCOUNTER — PREP FOR SURGERY (OUTPATIENT)
Dept: OTHER | Facility: HOSPITAL | Age: 68
End: 2018-06-15

## 2018-06-15 DIAGNOSIS — M48.062 SPINAL STENOSIS, LUMBAR REGION, WITH NEUROGENIC CLAUDICATION: Primary | ICD-10-CM

## 2018-06-15 RX ORDER — CEFAZOLIN SODIUM 2 G/100ML
2 INJECTION, SOLUTION INTRAVENOUS ONCE
Status: CANCELLED | OUTPATIENT
Start: 2018-06-15 | End: 2018-06-15

## 2018-06-19 ENCOUNTER — TELEPHONE (OUTPATIENT)
Dept: NEUROSURGERY | Facility: CLINIC | Age: 68
End: 2018-06-19

## 2018-06-19 NOTE — TELEPHONE ENCOUNTER
Tell her to bring her parkinson's meds and take her morning dose. She can take her meds prior to surgery with a sip of water but bring them and give to the nurse. Her insulin depends on how much she takes at hs. What is her dose?

## 2018-06-19 NOTE — TELEPHONE ENCOUNTER
Provider: Stevenson   Caller: self  Time of call: 12/1/48    Phone #: [phone]   Surgery: LUMBAR LAMINECTOMY L4-5, HEMILAMIINECTOMY RIGHT L5-S1, FORAMINOTOMY L5-S1    Surgery Date: 7/6/18   Last visit: 6/4/18    Next visit: surgery    JANINE:         Reason for call:       She has several questions about taking medications the day of surgery.  She is a parkinson's patient and has to take medication 6 times a day, she starts at 6 am, 9 am, 12 pm, 3 pm, 6 pm, and 9 pm. She is scheduled for surgery at 1:30 pm and wants to know what to do about taking her medication.  She is also a diabetic and needs to know if she can take her insulin the night before and since she can't eat, can she chew glucose tablets if her sugar drop?

## 2018-06-20 NOTE — TELEPHONE ENCOUNTER
Patient can take long-acting insulin at night.  This is to manage her sugar levels for the Puja effect.  This is the spike in glucose from cortisol being released when waking up.  Patient should not eat glucose tablets prior to surgery.  Patient will get intraoperative antibiotics.

## 2018-06-20 NOTE — TELEPHONE ENCOUNTER
I called patient back and gave her the information regard her medications.   Patient verbalized understanding and will call back if she has any other questions.

## 2018-07-02 ENCOUNTER — APPOINTMENT (OUTPATIENT)
Dept: PREADMISSION TESTING | Facility: HOSPITAL | Age: 68
End: 2018-07-02

## 2018-07-02 VITALS — HEIGHT: 62 IN | WEIGHT: 246.91 LBS | BODY MASS INDEX: 45.44 KG/M2

## 2018-07-02 DIAGNOSIS — M48.062 SPINAL STENOSIS, LUMBAR REGION, WITH NEUROGENIC CLAUDICATION: ICD-10-CM

## 2018-07-02 LAB
ANION GAP SERPL CALCULATED.3IONS-SCNC: 12 MMOL/L (ref 3–11)
BUN BLD-MCNC: 22 MG/DL (ref 9–23)
BUN/CREAT SERPL: 23.7 (ref 7–25)
CALCIUM SPEC-SCNC: 9.6 MG/DL (ref 8.7–10.4)
CHLORIDE SERPL-SCNC: 96 MMOL/L (ref 99–109)
CO2 SERPL-SCNC: 28 MMOL/L (ref 20–31)
CREAT BLD-MCNC: 0.93 MG/DL (ref 0.6–1.3)
DEPRECATED RDW RBC AUTO: 47.5 FL (ref 37–54)
ERYTHROCYTE [DISTWIDTH] IN BLOOD BY AUTOMATED COUNT: 14.6 % (ref 11.3–14.5)
GFR SERPL CREATININE-BSD FRML MDRD: 60 ML/MIN/1.73
GLUCOSE BLD-MCNC: 98 MG/DL (ref 70–100)
HCT VFR BLD AUTO: 36.1 % (ref 34.5–44)
HGB BLD-MCNC: 11.8 G/DL (ref 11.5–15.5)
MCH RBC QN AUTO: 29.1 PG (ref 27–31)
MCHC RBC AUTO-ENTMCNC: 32.7 G/DL (ref 32–36)
MCV RBC AUTO: 88.9 FL (ref 80–99)
PLATELET # BLD AUTO: 233 10*3/MM3 (ref 150–450)
PMV BLD AUTO: 9.3 FL (ref 6–12)
POTASSIUM BLD-SCNC: 3.6 MMOL/L (ref 3.5–5.5)
RBC # BLD AUTO: 4.06 10*6/MM3 (ref 3.89–5.14)
SODIUM BLD-SCNC: 136 MMOL/L (ref 132–146)
WBC NRBC COR # BLD: 6.61 10*3/MM3 (ref 3.5–10.8)

## 2018-07-02 PROCEDURE — 36415 COLL VENOUS BLD VENIPUNCTURE: CPT

## 2018-07-02 PROCEDURE — 80048 BASIC METABOLIC PNL TOTAL CA: CPT | Performed by: NEUROLOGICAL SURGERY

## 2018-07-02 PROCEDURE — 85027 COMPLETE CBC AUTOMATED: CPT | Performed by: NEUROLOGICAL SURGERY

## 2018-07-02 PROCEDURE — 87081 CULTURE SCREEN ONLY: CPT | Performed by: ANESTHESIOLOGY

## 2018-07-02 RX ORDER — TRAMADOL HYDROCHLORIDE 50 MG/1
50 TABLET ORAL EVERY 8 HOURS PRN
COMMUNITY
End: 2021-12-13

## 2018-07-02 NOTE — DISCHARGE INSTRUCTIONS

## 2018-07-02 NOTE — PAT
Bactroban and Chlorhexidine Prescription given during PAT visit, as well as written and verbal instructions given to patient during PAT visit.    Patient to apply Chlorhexadine wipes  to surgical area (as instructed) the night before procedure and the AM of procedure. Wipes provided.    Cardiac clearance on chart per jhonny with ok to stop eliquis

## 2018-07-04 LAB — MRSA SPEC QL CULT: NORMAL

## 2018-07-04 PROCEDURE — 99024 POSTOP FOLLOW-UP VISIT: CPT | Performed by: NEUROLOGICAL SURGERY

## 2018-07-04 NOTE — H&P
Patient: Joanna Cross  : 1950  Chart #: [id number]    Date of Service: 18    Chief Complaint: Low back and right leg pain    HPI: This 67 y o female has low back and RLE pain. The current episode started more than 1 month ago. The problem occurs constantly. The problem is unchanged. The pain is present in the lumbar spine and gluteal (She has never had spine surgery.). The quality of the pain is described as aching, shooting and burning (At times she has some pain in her LLE, but not much.). The pain radiates to the right thigh and right foot (She has pain that runs down her RLE laterally to the foot.  She also complains of pain in her buttock, especially on the right side.). The pain is at a severity of 7/10. The pain is moderate (Her pain is moderate to severe.). The pain is worse during the day. The symptoms are aggravated by bending, position and standing (She has issues with her gait.). Stiffness is present all day (Her pain increases when she ambulates.). Risk factors include history of cancer, lack of exercise, obesity, menopause, poor posture and sedentary lifestyle (She has CAD, hypertension, PVD and chronic atrial fibrillation.). She has tried bed rest and heat for the symptoms. The treatment provided minimal relief.      Radiographic Images:  CT of the lumbar spine dated 18 shows she has a grade 1 degenerative spondylolisthesis at L5 on S1.  She has a vacuum disc at L5-S1.   There is advanced degenerative facet disease at L4-L5 and L5-S1.  There is lateral recess stenosis at L5-S1 on the right.  She has cental and lateral recess stenosis at L4-L5.       MRI of the lumbar spine dated 18 shows that she has disc desiccation of all the lumbar disc.  She has a grade 1 spondylolisthesis at L5-S1 region.   She has lateral recess stenosis at L3 on L4 on the right, and central and lateral recess stenosis at L4 on L5.  She has lateral recess stenosis on the right at L5-S1.     X-rays of  the lumbar spine dated 04-18-18 does not show movement on flexion and extension.     Past Medical History:   Diagnosis Date   • Anemia    • Arthritis    • Atrial fibrillation (CMS/[facility])    • Chronic edema     bilateral   • Coronary artery disease involving native coronary artery of native heart without angina pectoris 3/1/2017   • Depression    • Diabetes mellitus (CMS/[facility])     type 2, checks fsbg 1-2x/day and as needed; last a1c 5-2018 6.5   • Essential hypertension 3/1/2017   • GERD (gastroesophageal reflux disease)    • GIB (gastrointestinal bleeding) 2016    d/t xarelto    • Hiatal hernia    • History of transfusion 2016    d/t GIB, no reaction per pt report    • Mixed hyperlipidemia 3/1/2017   • Myocardial infarction    • MILLI on CPAP    • Parkinson disease (CMS/[facility])    • Peripheral vascular disease (CMS/[facility]) 3/1/2017   • Skin cancer    • SSS (sick sinus syndrome) (CMS/[facility])     ppm    • TIA (transient ischemic attack)     no residual    • Varicose vein of leg     not spec of leg    • Venous hypertension    • Venous insufficiency    • Wears glasses      No current facility-administered medications for this encounter.      Current Outpatient Prescriptions   Medication Sig Dispense Refill   • albuterol (VENTOLIN HFA) 108 (90 Base) MCG/ACT inhaler Inhale 1 puff Every 4 (Four) Hours As Needed.     • amantadine (SYMMETREL) 100 MG capsule Take 100 mg by mouth 2 (Two) Times a Day.     • apixaban (ELIQUIS) 5 MG tablet tablet Take 1 tablet by mouth Every 12 (Twelve) Hours. 180 tablet 3   • aspirin 81 MG EC tablet Take 81 mg by mouth Daily.     • B Complex-C (SUPER B COMPLEX PO) Take 1 tablet by mouth Daily.     • bumetanide (BUMEX) 2 MG tablet Take 2 mg by mouth 2 (Two) Times a Day.     • Calcium Carbonate (CALTRATE 600 PO) Take 600 mg by mouth Daily.     • carbidopa-levodopa (SINEMET)  MG per tablet Take 2 tablets by mouth Every Morning. 2 tablets at 0600, 1 tablet at 0900, 1200, 1500, 1800, 2100     •  carbidopa-levodopa (SINEMET)  MG per tablet Take 1 tablet by mouth Take As Directed. 2 tablets at 0600, 1 tablet at 0900, 1200, 1500, 1800, 2100     • carbonyl iron (FEOSOL) 45 MG tablet tablet Take 1 tablet by mouth 2 (Two) Times a Day.     • Cholecalciferol 2000 units tablet Take 2,000 Units by mouth 2 (Two) Times a Day.     • citalopram (CeleXA) 20 MG tablet Take 20 mg by mouth every night at bedtime.     • clobetasol (TEMOVATE) 0.05 % cream Apply 1 application topically 2 (Two) Times a Day As Needed (Lichens Sclerosis).     • CloNIDine (CATAPRES) 0.1 MG tablet Take 1 tablet by mouth Every 12 (Twelve) Hours. 180 tablet 3   • Collagen-Boron-Hyaluronic Acid 10-5-3.3 MG tablet Take 1 tablet by mouth Daily.     • fluticasone (FLONASE) 50 MCG/ACT nasal spray 2 sprays into each nostril Daily As Needed for Rhinitis.     • insulin degludec (TRESIBA FLEXTOUCH) 100 UNIT/ML solution pen-injector injection Inject 28 Units under the skin Every Night.     • IPRATROPIUM BROMIDE NA 1 spray into each nostril 2 (Two) Times a Day.     • Loratadine 10 MG capsule Take 10 mg by mouth Daily.     • losartan (COZAAR) 50 MG tablet Take 1 tablet by mouth Every 12 (Twelve) Hours. 180 tablet 3   • metFORMIN (GLUCOPHAGE) 1000 MG tablet Take 1,000 mg by mouth 2 (Two) Times a Day With Meals.     • metOLazone (ZAROXOLYN) 2.5 MG tablet Take 1 tablet by mouth Daily. 90 tablet 1   • Mirabegron ER (MYRBETRIQ) 50 MG tablet sustained-release 24 hour 24 hr tablet Take 50 mg by mouth Daily.     • Multiple Vitamins-Minerals (CENTRUM ULTRA WOMENS PO) Take 1 tablet by mouth Daily.     • mupirocin (BACTROBAN) 2 % ointment Apply 1 application topically 2 (Two) Times a Day. D/t nasal scab,prescribed per ent md     • nitroglycerin (NITROLINGUAL) 0.4 MG/SPRAY spray Place 1 spray under the tongue Every 5 (Five) Minutes As Needed for Chest Pain. 1 each 6   • omeprazole (priLOSEC) 40 MG capsule Take 40 mg by mouth 2 (Two) Times a Day.     • potassium  "chloride (MICRO-K) 10 MEQ CR capsule Take 10 mEq by mouth 2 (Two) Times a Day.     • pramipexole (MIRAPEX) 1.5 MG tablet Take 1.5 mg by mouth Every Night.     • ranolazine (RANEXA) 500 MG 12 hr tablet Take 500 mg by mouth 2 (Two) Times a Day.     • sennosides-docusate sodium (SENOKOT-S) 8.6-50 MG tablet Take 1 tablet by mouth Daily.     • spironolactone (ALDACTONE) 25 MG tablet Take 1 tablet by mouth Daily. 90 tablet 1   • SYNTHROID 200 MCG tablet Take 200 mcg by mouth Daily.     • traMADol (ULTRAM) 50 MG tablet Take 50 mg by mouth Every 8 (Eight) Hours As Needed for Moderate Pain .     • Ubiquinol (QUNOL COQ10/UBIQUINOL/JOSE) 100 MG capsule Take 1 capsule by mouth Daily.     • vitamin C (ASCORBIC ACID) 500 MG tablet Take 500 mg by mouth Daily. Chewable tablet       Allergies   Allergen Reactions   • Toprol Xl [Metoprolol Tartrate] Shortness Of Breath     Extreme fatigue   • Amlodipine Besylate Swelling     Lower extremity (ankles, feet) swelling   • Entacapone Other (See Comments)     \"extreme weakness in legs - caused several falls, which stopped after discontinuing this medication\"   • Levemir [Insulin Detemir] Hives     Hives / rash around injection site   • Xarelto [Rivaroxaban] GI Bleeding   • Bactrim [Sulfamethoxazole-Trimethoprim] Nausea Only     Headache    • Ciprofloxacin Diarrhea   • Cogentin [Benztropine] Other (See Comments)     \"uncontrollable body movements\"   • Compazine [Prochlorperazine Edisylate] Other (See Comments)     Dystonic reaction   • Duraprep [Antiseptic Products, Misc.] Itching and Rash     RASH AND ITCHING   • Haldol [Haloperidol Lactate] Other (See Comments)     Dystonic reaction   • Hydralazine Other (See Comments)     Headache    • Hydrocodone-Acetaminophen Nausea And Vomiting and Dizziness     Headache    • Lisinopril Cough   • Penicillins Hives     Jitteriness    • Statins Myalgia     Leg pain   • Tarka [Trandolapril-Verapamil Hcl Er] Other (See Comments)     Constipation  " "    Social History     Social History   • Marital status:      Social History Main Topics   • Smoking status: Never Smoker   • Smokeless tobacco: Never Used   • Alcohol use Yes      Comment: occasional, nothing weekly    • Drug use: No   • Sexual activity: Defer     Other Topics Concern   • Not on file     Family History   Problem Relation Age of Onset   • Kidney disease Mother      Past Surgical History:   Procedure Laterality Date   • BICEPS TENDON REPAIR Right     shoulder   • BREAST BIOPSY Left 2004   • BUNIONECTOMY Right    • CHOLECYSTECTOMY     • COLONOSCOPY  2016   • CYST REMOVAL      left ear, upper left back 2001   • DIAGNOSTIC LAPAROSCOPY  1981   • ENDOSCOPIC FUNCTIONAL SINUS SURGERY (FESS)  2011   • ENDOSCOPY  2016   • HAMMER TOE REPAIR Left    • LUNG BIOPSY Left 2016   • PACEMAKER IMPLANTATION  11/2016    sss   • REPLACEMENT TOTAL KNEE Bilateral     left knee 2011, right 2012 per dr acuna    • SHOULDER ARTHROSCOPY Bilateral     2003-left, 2004- right    • TUBAL ABDOMINAL LIGATION  1980   • WISDOM TOOTH EXTRACTION       Review of Systems  HENT: Positive for drooling and sinus pressure.    Eyes: Positive for itching.   Respiratory: Positive for apnea.    Cardiovascular: Positive for leg swelling.   Genitourinary: Positive for urgency.   Musculoskeletal: Positive for arthralgias, back pain, joint swelling and myalgias.   Allergic/Immunologic: Positive for environmental allergies.   All other systems reviewed and are negative.     Physical Exam  Neurologic Exam    Vital Signs  BP: 140/84   BP Location: Right arm   Patient Position: Sitting   Pulse: 74   Temp: 98.1 °F (36.7 °C)   TempSrc: Temporal Artery    SpO2: 98%   Weight: 114 kg (250 lb 12.8 oz)   Height: 160 cm (62.99\")         Constitutional: The patient is oriented to person, place, and time.  The patient appears well-developed and well-nourished and in no distress.   She is SOB walking in exam room and climbing onto table  Neat elderly obese  " female  HENT:   Head: Normocephalic.   Right Ear: Hearing normal.   Left Ear: Hearing normal.   Mouth/Throat: Uvula is midline, oropharynx is clear and moist and mucous membranes are normal.   Eyes: Conjunctivae, EOM and lids are normal. Pupils are equal, round, and reactive to light.   Pupils 2 mm   Neck: Trachea normal and normal range of motion. No thyroid mass present.   Cardiovascular: Regular rhythm and normal heart sounds.   No murmur heard.  Pulses:  Carotid pulses are 2+ on the right side, and 2+ on the left side.  Radial pulses are 2+ on the right side, and 2+ on the left side.   Dorsalis pedis pulses are 2+ on the right side, and 2+ on the left side.   Pulmonary/Chest: Effort normal;  Lungs clear to auscultation in all fields  Abdomen is obese, non-tender and bowel sounds are present  Musculoskeletal:   Lumbar back: The patient exhibits pain with movement. The patient exhibits normal range of motion, no deformity and no spasm.   Mild stiffness; SLR increased low back and R buttock pain; Hip ROM normal        Neurologic Exam      Mental Status   Oriented to person, place, and time.   Attention: normal. Concentration: normal.   Speech: speech is normal   Level of consciousness: alert  Knowledge: good and consistent with education.   Normal comprehension.      Cranial Nerves   Cranial nerves II through XII intact.      Motor Exam   Muscle bulk: normal  Overall muscle tone: normal  No Pronator Drift     Strength   Strength 5/5 throughout.      Sensory Exam   Light touch normal.   Proprioception normal.      Gait, Coordination, and Reflexes      Gait: shuffling and short steps     Tremor   Resting tremor: present worse on right side  Intention tremor: absent  Action tremor: absent     Reflexes   Right biceps: 1+  Left biceps: 1+  Right triceps: 1+  Left triceps: 1+  Right patellar: 1+ trace  Left patellar: 1+  trace  Right achilles: 0+  Left achilles: 0+  No Babinski signs  Right Kelsey: absent  Left Kelsey:  absent  Right ankle clonus: absent  Left ankle clonus: absent  RA normal; R handed      ASSESSMENT     Spondylolisthesis, acquired     Osteoarthritis of spine with radiculopathy, lumbar region     Obesity with BMI 40.0-44.9, adult     Lumbar radiculopathy     Physical deconditioning     Peripheral vascular disease     Spinal stenosis, lumbar region, with neurogenic claudication       Plan:  The patient is admitted for L4L5 decompressive laminectomy and R L5S1 foraminotomy to decompress the spinal nerve roots.  I described the operative procedure in detail as well as the indications, alternatives, and expected postoperative course and results. I described the potential risks and complications of surgery in detail. All questions were answered. The patient has indicated understanding of the planned procedure, accepted the risks, and wishes to proceed with surgery. No guarantee of results was given to the patient.      Lencho Gamino MD

## 2018-07-06 ENCOUNTER — ANESTHESIA EVENT (OUTPATIENT)
Dept: PERIOP | Facility: HOSPITAL | Age: 68
End: 2018-07-06

## 2018-07-06 ENCOUNTER — APPOINTMENT (OUTPATIENT)
Dept: GENERAL RADIOLOGY | Facility: HOSPITAL | Age: 68
End: 2018-07-06

## 2018-07-06 ENCOUNTER — ANESTHESIA (OUTPATIENT)
Dept: PERIOP | Facility: HOSPITAL | Age: 68
End: 2018-07-06

## 2018-07-06 ENCOUNTER — HOSPITAL ENCOUNTER (OUTPATIENT)
Facility: HOSPITAL | Age: 68
Discharge: NURSING, REHAB OR LONG-TERM CARE | End: 2018-07-12
Attending: NEUROLOGICAL SURGERY | Admitting: NEUROLOGICAL SURGERY

## 2018-07-06 ENCOUNTER — CLINICAL SUPPORT NO REQUIREMENTS (OUTPATIENT)
Dept: CARDIOLOGY | Facility: CLINIC | Age: 68
End: 2018-07-06

## 2018-07-06 DIAGNOSIS — I49.5 SICK SINUS SYNDROME (HCC): ICD-10-CM

## 2018-07-06 DIAGNOSIS — Z74.09 IMPAIRED MOBILITY AND ADLS: ICD-10-CM

## 2018-07-06 DIAGNOSIS — M48.062 SPINAL STENOSIS, LUMBAR REGION, WITH NEUROGENIC CLAUDICATION: ICD-10-CM

## 2018-07-06 DIAGNOSIS — Z74.09 IMPAIRED FUNCTIONAL MOBILITY, BALANCE, GAIT, AND ENDURANCE: Primary | ICD-10-CM

## 2018-07-06 DIAGNOSIS — Z78.9 IMPAIRED MOBILITY AND ADLS: ICD-10-CM

## 2018-07-06 LAB
GLUCOSE BLDC GLUCOMTR-MCNC: 120 MG/DL (ref 70–130)
GLUCOSE BLDC GLUCOMTR-MCNC: 132 MG/DL (ref 70–130)
GLUCOSE BLDC GLUCOMTR-MCNC: 187 MG/DL (ref 70–130)
POTASSIUM BLDA-SCNC: 3.57 MMOL/L (ref 3.5–5.3)

## 2018-07-06 PROCEDURE — A9270 NON-COVERED ITEM OR SERVICE: HCPCS | Performed by: NEUROLOGICAL SURGERY

## 2018-07-06 PROCEDURE — 63710000001 INSULIN LISPRO (HUMAN) PER 5 UNITS: Performed by: NEUROLOGICAL SURGERY

## 2018-07-06 PROCEDURE — 25010000002 NEOSTIGMINE PER 0.5 MG: Performed by: ANESTHESIOLOGY

## 2018-07-06 PROCEDURE — 63710000001 LOSARTAN 50 MG TABLET: Performed by: NEUROLOGICAL SURGERY

## 2018-07-06 PROCEDURE — 25010000002 DEXAMETHASONE PER 1 MG: Performed by: NURSE ANESTHETIST, CERTIFIED REGISTERED

## 2018-07-06 PROCEDURE — 63710000001 CLONIDINE 0.1 MG TABLET: Performed by: NEUROLOGICAL SURGERY

## 2018-07-06 PROCEDURE — 72020 X-RAY EXAM OF SPINE 1 VIEW: CPT

## 2018-07-06 PROCEDURE — 63710000001 POTASSIUM CHLORIDE 10 MEQ CAPSULE CONTROLLED-RELEASE: Performed by: NEUROLOGICAL SURGERY

## 2018-07-06 PROCEDURE — 63710000001 FAMOTIDINE 20 MG TABLET: Performed by: ANESTHESIOLOGY

## 2018-07-06 PROCEDURE — 63710000001 PRAMIPEXOLE 1 MG TABLET: Performed by: NEUROLOGICAL SURGERY

## 2018-07-06 PROCEDURE — 25010000002 FENTANYL CITRATE (PF) 100 MCG/2ML SOLUTION: Performed by: NURSE ANESTHETIST, CERTIFIED REGISTERED

## 2018-07-06 PROCEDURE — 63710000001 PRAMIPEXOLE 0.25 MG TABLET: Performed by: NEUROLOGICAL SURGERY

## 2018-07-06 PROCEDURE — 51701 INSERT BLADDER CATHETER: CPT

## 2018-07-06 PROCEDURE — 25010000003 CEFAZOLIN IN DEXTROSE 2-4 GM/100ML-% SOLUTION: Performed by: NEUROLOGICAL SURGERY

## 2018-07-06 PROCEDURE — 63710000001 GABAPENTIN 300 MG CAPSULE: Performed by: NEUROLOGICAL SURGERY

## 2018-07-06 PROCEDURE — G0378 HOSPITAL OBSERVATION PER HR: HCPCS

## 2018-07-06 PROCEDURE — 63710000001 MUPIROCIN 2 % OINTMENT: Performed by: NEUROLOGICAL SURGERY

## 2018-07-06 PROCEDURE — 25010000002 ONDANSETRON PER 1 MG: Performed by: NURSE ANESTHETIST, CERTIFIED REGISTERED

## 2018-07-06 PROCEDURE — 63048 LAM FACETEC &FORAMOT EA ADDL: CPT | Performed by: NEUROLOGICAL SURGERY

## 2018-07-06 PROCEDURE — 63710000001: Performed by: NEUROLOGICAL SURGERY

## 2018-07-06 PROCEDURE — 94660 CPAP INITIATION&MGMT: CPT

## 2018-07-06 PROCEDURE — 82962 GLUCOSE BLOOD TEST: CPT

## 2018-07-06 PROCEDURE — 63710000001 CETIRIZINE 10 MG TABLET: Performed by: NEUROLOGICAL SURGERY

## 2018-07-06 PROCEDURE — 93294 REM INTERROG EVL PM/LDLS PM: CPT | Performed by: INTERNAL MEDICINE

## 2018-07-06 PROCEDURE — 63047 LAM FACETEC & FORAMOT LUMBAR: CPT | Performed by: NEUROLOGICAL SURGERY

## 2018-07-06 PROCEDURE — 94799 UNLISTED PULMONARY SVC/PX: CPT

## 2018-07-06 PROCEDURE — 63710000001 RANOLAZINE 500 MG TABLET SUSTAINED-RELEASE 12 HOUR: Performed by: NEUROLOGICAL SURGERY

## 2018-07-06 PROCEDURE — 63710000001 CITALOPRAM 20 MG TABLET: Performed by: NEUROLOGICAL SURGERY

## 2018-07-06 PROCEDURE — 63710000001 OXYCODONE-ACETAMINOPHEN 10-325 MG TABLET: Performed by: NEUROLOGICAL SURGERY

## 2018-07-06 PROCEDURE — A9270 NON-COVERED ITEM OR SERVICE: HCPCS | Performed by: ANESTHESIOLOGY

## 2018-07-06 PROCEDURE — 25010000002 PROPOFOL 10 MG/ML EMULSION: Performed by: NURSE ANESTHETIST, CERTIFIED REGISTERED

## 2018-07-06 PROCEDURE — 93296 REM INTERROG EVL PM/IDS: CPT | Performed by: INTERNAL MEDICINE

## 2018-07-06 PROCEDURE — 63710000001 AMANTADINE 100 MG CAPSULE: Performed by: NEUROLOGICAL SURGERY

## 2018-07-06 PROCEDURE — 63710000001 CARBIDOPA-LEVODOPA 25-100 MG TABLET: Performed by: NEUROLOGICAL SURGERY

## 2018-07-06 PROCEDURE — 84132 ASSAY OF SERUM POTASSIUM: CPT | Performed by: ANESTHESIOLOGY

## 2018-07-06 PROCEDURE — 63710000001 OXYBUTYNIN XL 5 MG TABLET SUSTAINED-RELEASE 24 HOUR: Performed by: NEUROLOGICAL SURGERY

## 2018-07-06 PROCEDURE — 63710000001 SENNOSIDES-DOCUSATE SODIUM 8.6-50 MG TABLET: Performed by: NEUROLOGICAL SURGERY

## 2018-07-06 RX ORDER — OXYCODONE AND ACETAMINOPHEN 10; 325 MG/1; MG/1
1 TABLET ORAL EVERY 4 HOURS PRN
Status: DISCONTINUED | OUTPATIENT
Start: 2018-07-06 | End: 2018-07-12 | Stop reason: HOSPADM

## 2018-07-06 RX ORDER — LIDOCAINE HYDROCHLORIDE 10 MG/ML
0.5 INJECTION, SOLUTION EPIDURAL; INFILTRATION; INTRACAUDAL; PERINEURAL ONCE AS NEEDED
Status: COMPLETED | OUTPATIENT
Start: 2018-07-06 | End: 2018-07-06

## 2018-07-06 RX ORDER — ALBUTEROL SULFATE 2.5 MG/3ML
2.5 SOLUTION RESPIRATORY (INHALATION) EVERY 4 HOURS PRN
Status: DISCONTINUED | OUTPATIENT
Start: 2018-07-06 | End: 2018-07-12 | Stop reason: HOSPADM

## 2018-07-06 RX ORDER — SPIRONOLACTONE 25 MG/1
25 TABLET ORAL DAILY
Status: DISCONTINUED | OUTPATIENT
Start: 2018-07-06 | End: 2018-07-12 | Stop reason: HOSPADM

## 2018-07-06 RX ORDER — LEVOTHYROXINE SODIUM 0.2 MG/1
200 TABLET ORAL
Status: DISCONTINUED | OUTPATIENT
Start: 2018-07-07 | End: 2018-07-12 | Stop reason: HOSPADM

## 2018-07-06 RX ORDER — PROPOFOL 10 MG/ML
VIAL (ML) INTRAVENOUS AS NEEDED
Status: DISCONTINUED | OUTPATIENT
Start: 2018-07-06 | End: 2018-07-06 | Stop reason: SURG

## 2018-07-06 RX ORDER — MORPHINE SULFATE 1 MG/ML
2 INJECTION, SOLUTION EPIDURAL; INTRATHECAL; INTRAVENOUS EVERY 4 HOURS PRN
Status: DISCONTINUED | OUTPATIENT
Start: 2018-07-06 | End: 2018-07-12 | Stop reason: HOSPADM

## 2018-07-06 RX ORDER — ONDANSETRON 2 MG/ML
4 INJECTION INTRAMUSCULAR; INTRAVENOUS EVERY 6 HOURS PRN
Status: DISCONTINUED | OUTPATIENT
Start: 2018-07-06 | End: 2018-07-12 | Stop reason: HOSPADM

## 2018-07-06 RX ORDER — MAGNESIUM HYDROXIDE 1200 MG/15ML
LIQUID ORAL AS NEEDED
Status: DISCONTINUED | OUTPATIENT
Start: 2018-07-06 | End: 2018-07-06 | Stop reason: HOSPADM

## 2018-07-06 RX ORDER — POTASSIUM CHLORIDE 750 MG/1
10 CAPSULE, EXTENDED RELEASE ORAL 2 TIMES DAILY
Status: DISCONTINUED | OUTPATIENT
Start: 2018-07-06 | End: 2018-07-12 | Stop reason: HOSPADM

## 2018-07-06 RX ORDER — SODIUM CHLORIDE 0.9 % (FLUSH) 0.9 %
1-10 SYRINGE (ML) INJECTION AS NEEDED
Status: DISCONTINUED | OUTPATIENT
Start: 2018-07-06 | End: 2018-07-12 | Stop reason: HOSPADM

## 2018-07-06 RX ORDER — GLYCOPYRROLATE 0.2 MG/ML
INJECTION INTRAMUSCULAR; INTRAVENOUS AS NEEDED
Status: DISCONTINUED | OUTPATIENT
Start: 2018-07-06 | End: 2018-07-06 | Stop reason: SURG

## 2018-07-06 RX ORDER — NALOXONE HCL 0.4 MG/ML
0.4 VIAL (ML) INJECTION
Status: DISCONTINUED | OUTPATIENT
Start: 2018-07-06 | End: 2018-07-12 | Stop reason: HOSPADM

## 2018-07-06 RX ORDER — FLUTICASONE PROPIONATE 50 MCG
2 SPRAY, SUSPENSION (ML) NASAL DAILY PRN
Status: DISCONTINUED | OUTPATIENT
Start: 2018-07-06 | End: 2018-07-12 | Stop reason: HOSPADM

## 2018-07-06 RX ORDER — CEFAZOLIN SODIUM 2 G/100ML
2 INJECTION, SOLUTION INTRAVENOUS ONCE
Status: COMPLETED | OUTPATIENT
Start: 2018-07-06 | End: 2018-07-06

## 2018-07-06 RX ORDER — CEFAZOLIN SODIUM 2 G/100ML
2 INJECTION, SOLUTION INTRAVENOUS EVERY 8 HOURS
Status: COMPLETED | OUTPATIENT
Start: 2018-07-07 | End: 2018-07-07

## 2018-07-06 RX ORDER — DIPHENHYDRAMINE HCL 25 MG
25 CAPSULE ORAL NIGHTLY PRN
Status: DISCONTINUED | OUTPATIENT
Start: 2018-07-06 | End: 2018-07-12 | Stop reason: HOSPADM

## 2018-07-06 RX ORDER — DEXAMETHASONE SODIUM PHOSPHATE 4 MG/ML
INJECTION, SOLUTION INTRA-ARTICULAR; INTRALESIONAL; INTRAMUSCULAR; INTRAVENOUS; SOFT TISSUE AS NEEDED
Status: DISCONTINUED | OUTPATIENT
Start: 2018-07-06 | End: 2018-07-06 | Stop reason: SURG

## 2018-07-06 RX ORDER — FAMOTIDINE 20 MG/1
20 TABLET, FILM COATED ORAL
Status: DISCONTINUED | OUTPATIENT
Start: 2018-07-06 | End: 2018-07-06 | Stop reason: HOSPADM

## 2018-07-06 RX ORDER — FAMOTIDINE 20 MG/1
20 TABLET, FILM COATED ORAL
Status: CANCELLED | OUTPATIENT
Start: 2018-07-06

## 2018-07-06 RX ORDER — CITALOPRAM 20 MG/1
20 TABLET ORAL NIGHTLY
Status: DISCONTINUED | OUTPATIENT
Start: 2018-07-06 | End: 2018-07-12 | Stop reason: HOSPADM

## 2018-07-06 RX ORDER — ROCURONIUM BROMIDE 10 MG/ML
INJECTION, SOLUTION INTRAVENOUS AS NEEDED
Status: DISCONTINUED | OUTPATIENT
Start: 2018-07-06 | End: 2018-07-06 | Stop reason: SURG

## 2018-07-06 RX ORDER — LIDOCAINE HYDROCHLORIDE 10 MG/ML
0.5 INJECTION, SOLUTION EPIDURAL; INFILTRATION; INTRACAUDAL; PERINEURAL ONCE AS NEEDED
Status: CANCELLED | OUTPATIENT
Start: 2018-07-06

## 2018-07-06 RX ORDER — GABAPENTIN 300 MG/1
300 CAPSULE ORAL EVERY 8 HOURS SCHEDULED
Status: DISCONTINUED | OUTPATIENT
Start: 2018-07-06 | End: 2018-07-09

## 2018-07-06 RX ORDER — AMANTADINE HYDROCHLORIDE 100 MG/1
100 CAPSULE, GELATIN COATED ORAL 2 TIMES DAILY
Status: DISCONTINUED | OUTPATIENT
Start: 2018-07-06 | End: 2018-07-12 | Stop reason: HOSPADM

## 2018-07-06 RX ORDER — SODIUM CHLORIDE, SODIUM LACTATE, POTASSIUM CHLORIDE, CALCIUM CHLORIDE 600; 310; 30; 20 MG/100ML; MG/100ML; MG/100ML; MG/100ML
9 INJECTION, SOLUTION INTRAVENOUS CONTINUOUS PRN
Status: CANCELLED | OUTPATIENT
Start: 2018-07-06

## 2018-07-06 RX ORDER — BISACODYL 5 MG/1
5 TABLET, DELAYED RELEASE ORAL DAILY PRN
Status: DISCONTINUED | OUTPATIENT
Start: 2018-07-06 | End: 2018-07-12 | Stop reason: HOSPADM

## 2018-07-06 RX ORDER — SENNA AND DOCUSATE SODIUM 50; 8.6 MG/1; MG/1
1 TABLET, FILM COATED ORAL NIGHTLY PRN
Status: DISCONTINUED | OUTPATIENT
Start: 2018-07-06 | End: 2018-07-12 | Stop reason: HOSPADM

## 2018-07-06 RX ORDER — ACETAMINOPHEN 325 MG/1
650 TABLET ORAL EVERY 8 HOURS PRN
Status: DISCONTINUED | OUTPATIENT
Start: 2018-07-06 | End: 2018-07-12 | Stop reason: HOSPADM

## 2018-07-06 RX ORDER — LOSARTAN POTASSIUM 50 MG/1
50 TABLET ORAL EVERY 12 HOURS
Status: DISCONTINUED | OUTPATIENT
Start: 2018-07-06 | End: 2018-07-12 | Stop reason: HOSPADM

## 2018-07-06 RX ORDER — SENNA AND DOCUSATE SODIUM 50; 8.6 MG/1; MG/1
1 TABLET, FILM COATED ORAL DAILY
Status: DISCONTINUED | OUTPATIENT
Start: 2018-07-06 | End: 2018-07-09

## 2018-07-06 RX ORDER — FENTANYL CITRATE 50 UG/ML
50 INJECTION, SOLUTION INTRAMUSCULAR; INTRAVENOUS
Status: DISCONTINUED | OUTPATIENT
Start: 2018-07-06 | End: 2018-07-06 | Stop reason: HOSPADM

## 2018-07-06 RX ORDER — NITROGLYCERIN 400 UG/1
1 SPRAY ORAL
Status: DISCONTINUED | OUTPATIENT
Start: 2018-07-06 | End: 2018-07-06 | Stop reason: CLARIF

## 2018-07-06 RX ORDER — SODIUM CHLORIDE, SODIUM LACTATE, POTASSIUM CHLORIDE, CALCIUM CHLORIDE 600; 310; 30; 20 MG/100ML; MG/100ML; MG/100ML; MG/100ML
9 INJECTION, SOLUTION INTRAVENOUS CONTINUOUS PRN
Status: DISCONTINUED | OUTPATIENT
Start: 2018-07-06 | End: 2018-07-06 | Stop reason: HOSPADM

## 2018-07-06 RX ORDER — SODIUM CHLORIDE 0.9 % (FLUSH) 0.9 %
1-10 SYRINGE (ML) INJECTION AS NEEDED
Status: DISCONTINUED | OUTPATIENT
Start: 2018-07-06 | End: 2018-07-06 | Stop reason: HOSPADM

## 2018-07-06 RX ORDER — PANTOPRAZOLE SODIUM 40 MG/1
40 TABLET, DELAYED RELEASE ORAL EVERY MORNING
Status: DISCONTINUED | OUTPATIENT
Start: 2018-07-07 | End: 2018-07-12 | Stop reason: HOSPADM

## 2018-07-06 RX ORDER — METHOCARBAMOL 750 MG/1
750 TABLET, FILM COATED ORAL EVERY 6 HOURS PRN
Status: DISCONTINUED | OUTPATIENT
Start: 2018-07-06 | End: 2018-07-12 | Stop reason: HOSPADM

## 2018-07-06 RX ORDER — SODIUM CHLORIDE 0.9 % (FLUSH) 0.9 %
1-10 SYRINGE (ML) INJECTION AS NEEDED
Status: CANCELLED | OUTPATIENT
Start: 2018-07-06

## 2018-07-06 RX ORDER — MULTIPLE VITAMINS W/ MINERALS TAB 9MG-400MCG
1 TAB ORAL DAILY
Status: DISCONTINUED | OUTPATIENT
Start: 2018-07-07 | End: 2018-07-12 | Stop reason: HOSPADM

## 2018-07-06 RX ORDER — ASCORBIC ACID 500 MG
500 TABLET ORAL DAILY
Status: DISCONTINUED | OUTPATIENT
Start: 2018-07-07 | End: 2018-07-12 | Stop reason: HOSPADM

## 2018-07-06 RX ORDER — ONDANSETRON 2 MG/ML
4 INJECTION INTRAMUSCULAR; INTRAVENOUS ONCE AS NEEDED
Status: DISCONTINUED | OUTPATIENT
Start: 2018-07-06 | End: 2018-07-06 | Stop reason: HOSPADM

## 2018-07-06 RX ORDER — OXYBUTYNIN CHLORIDE 5 MG/1
10 TABLET, EXTENDED RELEASE ORAL DAILY
Status: DISCONTINUED | OUTPATIENT
Start: 2018-07-06 | End: 2018-07-09

## 2018-07-06 RX ORDER — FERROUS SULFATE 325(65) MG
325 TABLET ORAL 2 TIMES DAILY WITH MEALS
Status: DISCONTINUED | OUTPATIENT
Start: 2018-07-07 | End: 2018-07-12 | Stop reason: HOSPADM

## 2018-07-06 RX ORDER — NICOTINE POLACRILEX 4 MG
15 LOZENGE BUCCAL
Status: DISCONTINUED | OUTPATIENT
Start: 2018-07-06 | End: 2018-07-12 | Stop reason: HOSPADM

## 2018-07-06 RX ORDER — PRAMIPEXOLE DIHYDROCHLORIDE 1 MG/1
1.5 TABLET ORAL NIGHTLY
Status: DISCONTINUED | OUTPATIENT
Start: 2018-07-06 | End: 2018-07-12 | Stop reason: HOSPADM

## 2018-07-06 RX ORDER — FENTANYL CITRATE 50 UG/ML
INJECTION, SOLUTION INTRAMUSCULAR; INTRAVENOUS AS NEEDED
Status: DISCONTINUED | OUTPATIENT
Start: 2018-07-06 | End: 2018-07-06 | Stop reason: SURG

## 2018-07-06 RX ORDER — CETIRIZINE HYDROCHLORIDE 10 MG/1
10 TABLET ORAL DAILY
Status: DISCONTINUED | OUTPATIENT
Start: 2018-07-06 | End: 2018-07-12 | Stop reason: HOSPADM

## 2018-07-06 RX ORDER — ISOPROPYL ALCOHOL 70 ML/100ML
LIQUID TOPICAL AS NEEDED
Status: DISCONTINUED | OUTPATIENT
Start: 2018-07-06 | End: 2018-07-06 | Stop reason: HOSPADM

## 2018-07-06 RX ORDER — IPRATROPIUM BROMIDE 21 UG/1
1 SPRAY, METERED NASAL 2 TIMES DAILY
Status: DISCONTINUED | OUTPATIENT
Start: 2018-07-06 | End: 2018-07-12 | Stop reason: HOSPADM

## 2018-07-06 RX ORDER — LIDOCAINE HYDROCHLORIDE 10 MG/ML
INJECTION, SOLUTION INFILTRATION; PERINEURAL AS NEEDED
Status: DISCONTINUED | OUTPATIENT
Start: 2018-07-06 | End: 2018-07-06 | Stop reason: SURG

## 2018-07-06 RX ORDER — BISACODYL 10 MG
10 SUPPOSITORY, RECTAL RECTAL DAILY PRN
Status: DISCONTINUED | OUTPATIENT
Start: 2018-07-06 | End: 2018-07-12 | Stop reason: HOSPADM

## 2018-07-06 RX ORDER — METOLAZONE 2.5 MG/1
2.5 TABLET ORAL DAILY
Status: DISCONTINUED | OUTPATIENT
Start: 2018-07-07 | End: 2018-07-09

## 2018-07-06 RX ORDER — RANOLAZINE 500 MG/1
500 TABLET, EXTENDED RELEASE ORAL EVERY 12 HOURS SCHEDULED
Status: DISCONTINUED | OUTPATIENT
Start: 2018-07-06 | End: 2018-07-12 | Stop reason: HOSPADM

## 2018-07-06 RX ORDER — BUMETANIDE 1 MG/1
2 TABLET ORAL 2 TIMES DAILY
Status: DISCONTINUED | OUTPATIENT
Start: 2018-07-06 | End: 2018-07-12 | Stop reason: HOSPADM

## 2018-07-06 RX ORDER — SODIUM CHLORIDE, SODIUM LACTATE, POTASSIUM CHLORIDE, CALCIUM CHLORIDE 600; 310; 30; 20 MG/100ML; MG/100ML; MG/100ML; MG/100ML
INJECTION, SOLUTION INTRAVENOUS CONTINUOUS PRN
Status: DISCONTINUED | OUTPATIENT
Start: 2018-07-06 | End: 2018-07-06 | Stop reason: SURG

## 2018-07-06 RX ORDER — NITROGLYCERIN 0.4 MG/1
0.4 TABLET SUBLINGUAL
Status: DISCONTINUED | OUTPATIENT
Start: 2018-07-06 | End: 2018-07-07 | Stop reason: SDUPTHER

## 2018-07-06 RX ORDER — MORPHINE SULFATE 1 MG/ML
1 INJECTION, SOLUTION EPIDURAL; INTRATHECAL; INTRAVENOUS EVERY 4 HOURS PRN
Status: DISCONTINUED | OUTPATIENT
Start: 2018-07-06 | End: 2018-07-12 | Stop reason: HOSPADM

## 2018-07-06 RX ORDER — SODIUM CHLORIDE 9 MG/ML
INJECTION, SOLUTION INTRAVENOUS AS NEEDED
Status: DISCONTINUED | OUTPATIENT
Start: 2018-07-06 | End: 2018-07-06 | Stop reason: HOSPADM

## 2018-07-06 RX ORDER — DOCUSATE SODIUM 100 MG/1
100 CAPSULE, LIQUID FILLED ORAL 2 TIMES DAILY PRN
Status: DISCONTINUED | OUTPATIENT
Start: 2018-07-06 | End: 2018-07-12 | Stop reason: HOSPADM

## 2018-07-06 RX ORDER — LIDOCAINE HYDROCHLORIDE 10 MG/ML
0.5 INJECTION, SOLUTION EPIDURAL; INFILTRATION; INTRACAUDAL; PERINEURAL ONCE AS NEEDED
Status: DISCONTINUED | OUTPATIENT
Start: 2018-07-06 | End: 2018-07-06 | Stop reason: HOSPADM

## 2018-07-06 RX ORDER — SODIUM CHLORIDE, SODIUM LACTATE, POTASSIUM CHLORIDE, CALCIUM CHLORIDE 600; 310; 30; 20 MG/100ML; MG/100ML; MG/100ML; MG/100ML
50 INJECTION, SOLUTION INTRAVENOUS CONTINUOUS
Status: DISCONTINUED | OUTPATIENT
Start: 2018-07-06 | End: 2018-07-08

## 2018-07-06 RX ORDER — CLONIDINE HYDROCHLORIDE 0.1 MG/1
0.1 TABLET ORAL EVERY 12 HOURS
Status: DISCONTINUED | OUTPATIENT
Start: 2018-07-06 | End: 2018-07-12 | Stop reason: HOSPADM

## 2018-07-06 RX ORDER — ONDANSETRON 2 MG/ML
INJECTION INTRAMUSCULAR; INTRAVENOUS AS NEEDED
Status: DISCONTINUED | OUTPATIENT
Start: 2018-07-06 | End: 2018-07-06 | Stop reason: SURG

## 2018-07-06 RX ORDER — DEXTROSE MONOHYDRATE 25 G/50ML
25 INJECTION, SOLUTION INTRAVENOUS
Status: DISCONTINUED | OUTPATIENT
Start: 2018-07-06 | End: 2018-07-12 | Stop reason: HOSPADM

## 2018-07-06 RX ORDER — PROPOFOL 10 MG/ML
VIAL (ML) INTRAVENOUS CONTINUOUS PRN
Status: DISCONTINUED | OUTPATIENT
Start: 2018-07-06 | End: 2018-07-06 | Stop reason: SURG

## 2018-07-06 RX ADMIN — DEXAMETHASONE SODIUM PHOSPHATE 8 MG: 4 INJECTION, SOLUTION INTRAMUSCULAR; INTRAVENOUS at 15:43

## 2018-07-06 RX ADMIN — MUPIROCIN: 20 OINTMENT TOPICAL at 14:15

## 2018-07-06 RX ADMIN — IPRATROPIUM BROMIDE 1 SPRAY: 21 SPRAY NASAL at 22:08

## 2018-07-06 RX ADMIN — INSULIN LISPRO 2 UNITS: 100 INJECTION, SOLUTION INTRAVENOUS; SUBCUTANEOUS at 22:08

## 2018-07-06 RX ADMIN — ROCURONIUM BROMIDE 50 MG: 10 SOLUTION INTRAVENOUS at 15:35

## 2018-07-06 RX ADMIN — CEFAZOLIN SODIUM 2 G: 2 INJECTION, SOLUTION INTRAVENOUS at 15:33

## 2018-07-06 RX ADMIN — SODIUM CHLORIDE, POTASSIUM CHLORIDE, SODIUM LACTATE AND CALCIUM CHLORIDE: 600; 310; 30; 20 INJECTION, SOLUTION INTRAVENOUS at 15:31

## 2018-07-06 RX ADMIN — FENTANYL CITRATE 50 MCG: 50 INJECTION, SOLUTION INTRAMUSCULAR; INTRAVENOUS at 18:26

## 2018-07-06 RX ADMIN — CITALOPRAM HYDROBROMIDE 20 MG: 20 TABLET ORAL at 22:07

## 2018-07-06 RX ADMIN — OXYCODONE HYDROCHLORIDE AND ACETAMINOPHEN 1 TABLET: 10; 325 TABLET ORAL at 19:34

## 2018-07-06 RX ADMIN — GLYCOPYRROLATE 0.4 MG: 0.2 INJECTION INTRAMUSCULAR; INTRAVENOUS at 17:30

## 2018-07-06 RX ADMIN — POTASSIUM CHLORIDE 10 MEQ: 750 CAPSULE, EXTENDED RELEASE ORAL at 20:30

## 2018-07-06 RX ADMIN — FENTANYL CITRATE 50 MCG: 50 INJECTION, SOLUTION INTRAMUSCULAR; INTRAVENOUS at 16:03

## 2018-07-06 RX ADMIN — PROPOFOL 150 MG: 10 INJECTION, EMULSION INTRAVENOUS at 15:35

## 2018-07-06 RX ADMIN — CETIRIZINE HYDROCHLORIDE 10 MG: 10 TABLET, FILM COATED ORAL at 22:06

## 2018-07-06 RX ADMIN — CEFAZOLIN SODIUM 2 G: 2 INJECTION, SOLUTION INTRAVENOUS at 23:31

## 2018-07-06 RX ADMIN — ONDANSETRON 4 MG: 2 INJECTION INTRAMUSCULAR; INTRAVENOUS at 17:26

## 2018-07-06 RX ADMIN — FENTANYL CITRATE 50 MCG: 50 INJECTION, SOLUTION INTRAMUSCULAR; INTRAVENOUS at 18:10

## 2018-07-06 RX ADMIN — SODIUM CHLORIDE, POTASSIUM CHLORIDE, SODIUM LACTATE AND CALCIUM CHLORIDE 9 ML/HR: 600; 310; 30; 20 INJECTION, SOLUTION INTRAVENOUS at 14:07

## 2018-07-06 RX ADMIN — Medication 2.5 MG: at 17:30

## 2018-07-06 RX ADMIN — FENTANYL CITRATE 50 MCG: 50 INJECTION, SOLUTION INTRAMUSCULAR; INTRAVENOUS at 15:35

## 2018-07-06 RX ADMIN — PRAMIPEXOLE DIHYDROCHLORIDE 1.5 MG: 0.25 TABLET ORAL at 22:07

## 2018-07-06 RX ADMIN — OXYBUTYNIN CHLORIDE 10 MG: 5 TABLET, EXTENDED RELEASE ORAL at 22:26

## 2018-07-06 RX ADMIN — AMANTADINE HYDROCHLORIDE 100 MG: 100 CAPSULE ORAL at 20:31

## 2018-07-06 RX ADMIN — LOSARTAN POTASSIUM 50 MG: 50 TABLET ORAL at 20:31

## 2018-07-06 RX ADMIN — PROPOFOL 25 MCG/KG/MIN: 10 INJECTION, EMULSION INTRAVENOUS at 15:41

## 2018-07-06 RX ADMIN — RANOLAZINE 500 MG: 500 TABLET, FILM COATED, EXTENDED RELEASE ORAL at 20:31

## 2018-07-06 RX ADMIN — LIDOCAINE HYDROCHLORIDE 0.2 ML: 10 INJECTION, SOLUTION EPIDURAL; INFILTRATION; INTRACAUDAL; PERINEURAL at 14:15

## 2018-07-06 RX ADMIN — FAMOTIDINE 20 MG: 20 TABLET ORAL at 14:15

## 2018-07-06 RX ADMIN — GABAPENTIN 300 MG: 300 CAPSULE ORAL at 22:06

## 2018-07-06 RX ADMIN — SENNOSIDES AND DOCUSATE SODIUM 1 TABLET: 8.6; 5 TABLET ORAL at 22:26

## 2018-07-06 RX ADMIN — LIDOCAINE HYDROCHLORIDE 50 MG: 10 INJECTION, SOLUTION INFILTRATION; PERINEURAL at 15:35

## 2018-07-06 RX ADMIN — SODIUM CHLORIDE, POTASSIUM CHLORIDE, SODIUM LACTATE AND CALCIUM CHLORIDE: 600; 310; 30; 20 INJECTION, SOLUTION INTRAVENOUS at 17:15

## 2018-07-06 RX ADMIN — CLONIDINE HYDROCHLORIDE 0.1 MG: 0.1 TABLET ORAL at 20:31

## 2018-07-06 RX ADMIN — CARBIDOPA AND LEVODOPA 1 TABLET: 25; 100 TABLET ORAL at 22:08

## 2018-07-06 NOTE — ANESTHESIA POSTPROCEDURE EVALUATION
Patient: Joanna Cross    Procedure Summary     Date:  07/06/18 Room / [facility]:   [facility]    Anesthesia Start:  1531 Anesthesia Stop:      Procedure:  LUMBAR LAMINECTOMY L4-5, HEMILAMIINECTOMY RIGHT L5-S1, FORAMINOTOMY L5-S1 (N/A Spine Lumbar) Diagnosis:      Surgeon:  Lencho Gamino MD Provider:  Oziel Hankins MD    Anesthesia Type:  general ASA Status:  3          Anesthesia Type: general  Last vitals  BP   (!) 181/101 (07/06/18 1405)    1754    145/72   Temp   97.8 °F (36.6 °C) (07/06/18 1405)  97.3   1754    Pulse   68 (07/06/18 1405)   1754   75   Resp   18 (07/06/18 1405)  16   1754    SpO2   99 % (07/06/18 1405)    90% 1754      Post Anesthesia Care and Evaluation    Patient location during evaluation: PACU  Patient participation: complete - patient participated  Level of consciousness: awake and alert  Pain score: 0  Pain management: adequate  Airway patency: patent  Anesthetic complications: No anesthetic complications  PONV Status: none  Cardiovascular status: hemodynamically stable and acceptable  Respiratory status: nonlabored ventilation, acceptable and nasal cannula  Hydration status: acceptable

## 2018-07-06 NOTE — OP NOTE
PREOPERATIVE DIAGNOSES:   1.  L4-L5 and L5-S1 spinal stenosis.   2.  Radicular leg pain and neurogenic claudication.   3.  Lumbar spondylosis.  4.  Chronic mechanical back pain.     POSTOPERATIVE DIAGNOSES:  1.  L4-L5 and L5-S1 spinal stenosis.   2.  Radicular leg pain and neurogenic claudication.   3.  Lumbar spondylosis.  4.  Chronic mechanical back pain.     PROCEDURES:  1.  L4-L5 decompressive laminectomy and bilateral medial facetectomies.   2.  Right L5 and S1 partial hemilaminectomies, L5-S1 medial facetectomy and foraminotomy.     SURGEON: Lencho Gamino MD    ASSISTANT: Xiomara Wright PA-C     INDICATION FOR PROCEDURE: This 67-year-old woman presents with intractable back and leg pain and neurogenic claudication. An MRI scan of the lumbar spine shows a fixed spondylolisthesis at L5 on S1 with spinal stenosis at L4-L5 and L5-S1. She presents for operative decompression of the spinal canal and nerve roots in an attempt to relieve leg pain.     DESCRIPTION OF PROCEDURE: The patient was brought into the operating suite and in the supine position, general endotracheal anesthesia was induced. Pneumatic compression devices were applied to the legs. The patient was carefully rolled into the prone position and placed on the [facility] saddle frame. The skin of the back was prepared with Chlorhexidine, alcohol and a ChloraPrep skin preparation kit. The field was draped in a sterile fashion. The midline was marked.  A spinal needle was placed and an x-ray obtained. This showed the spinal needle over the L5-S1 disk space. Then the skin was incised with a #10 blade. Hemostasis was obtained with a Bovie. Dissection was continued through the subcutaneous fat down to the lumbodorsal fascia. The fascia was incised in the midline and the paraspinal muscles were dissected free of the spinous processes and lamina from L3 to the sacrum. Then a sharp towel clamp was applied to the L4 spinous process and a spinal needle placed between  the L4 and L5 spinous processes. A 2nd radiograph was obtained. This lateral x-ray showed the towel clamp on the L4 spinous process and the spinal needle between the L4 and L5 spinous processes.     Next the lower portion of the L4 spinous process and portion of the L5 spinous process were removed with the Leksell rongeur. Then the Midas Mani drill with 3 mm tavia was used to perform partial laminectomies at L4 and L5. The bone was thinned until it could be elevated with sharp curettes. There was marked hypertrophy of the ligamentum flavum as well as the medial facets on both sides. The thickened ligaments were elevated with the sharp curettes and removed with Kerrison rongeurs. The plane between dura and ligaments was dissected with the amiando dental instrument and the laminectomy was widened to the level of the L5 pedicles on both sides using the Kerrison rongeurs. The L5 nerve roots were decompressed on both sides. Epidural venous bleeding was controlled with application of Floseal and Gelfoam.     Next the Midas Mani drill with 3 mm tavia was used to perform partial hemilaminectomies at L5 and S1 on the right. Again, the bone was thinned with the drill until it can be elevated with a sharp curette.  Again there was marked hypertrophy of the ligamentum flavum and medial facet. The ligament was elevated with sharp curettes and then removed with Kerrison rongeurs. The facet joint was undercut to the level of the S1 pedicle using Kerrison rongeur. Epidural venous bleeding was controlled with bipolar electrocautery and then placement of Floseal and Gelfoam. Then the wound was irrigated copiously with antibiotic solution. The retractors were removed. There was no epidural venous bleeding at this point. The paraspinal muscle was approximated with interrupted 0 Vicryl stitches. The lumbodorsal fascia was approximated with interrupted 0 Vicryl stitches. The subcutaneous tissues were approximated with 2 layers of interrupted  2-0 Vicryl stitches and 3-0 Vicryl subcuticular stitches were used to approximate the skin edges. Mastisol and Steri-Strips were applied to the incision. A Telfa, dressing sponge, tape dressing was applied to the back. The patient was then carefully rolled into the supine position again. She was allowed to awaken from anesthesia. She  Was transferred in stable condition to the recovery room. She tolerated the surgery well. Estimated blood loss was 60 mL. No blood products were transfused intraoperatively.

## 2018-07-06 NOTE — ANESTHESIA PROCEDURE NOTES
Airway  Urgency: elective    Airway not difficult    General Information and Staff    Patient location during procedure: OR  CRNA: JEANETTE ZULETA    Indications and Patient Condition  Indications for airway management: airway protection    Preoxygenated: yes  MILS not maintained throughout  Mask difficulty assessment: 1 - vent by mask    Final Airway Details  Final airway type: endotracheal airway      Successful airway: ETT  Cuffed: yes   Successful intubation technique: direct laryngoscopy  Facilitating devices/methods: intubating stylet  Endotracheal tube insertion site: oral  Blade: Maldonado  Blade size: #2  ETT size: 7.0 mm  Cormack-Lehane Classification: grade I - full view of glottis  Placement verified by: chest auscultation and capnometry   Measured from: lips  ETT to lips (cm): 20  Number of attempts at approach: 1    Additional Comments  Negative epigastric sounds, Breath sound equal bilaterally with symmetric chest rise and fall.  Teeth intact, atraumatic

## 2018-07-06 NOTE — INTERVAL H&P NOTE
H&P reviewed. The patient was examined and there are no changes to the H&P.     CV:  Follows with Dr. Corona.  Last appt 3/2018 (see epic).  Last remote int 7/6/18 with <1% mode switch

## 2018-07-06 NOTE — BRIEF OP NOTE
LUMBAR LAMINECTOMY DISCECTOMY DECOMPRESSION POSTERIOR 1-2 LEVELS  Progress Note    Joanna Cross  7/6/2018    Pre-op Diagnosis:   L4L5 AND L5S1 SPINAL STENOSIS       Post-Op Diagnosis Codes:   HALEIGH    Procedure/CPT® Codes:      Procedure(s):  LUMBAR LAMINECTOMY L4-5, HEMILAMIINECTOMY RIGHT L5-S1, FORAMINOTOMY L5-S1    Surgeon(s):  Lencho Gamino MD    Anesthesia: General    Staff:   Circulator: Karina Campoverde RN; Amna Chou RN  Scrub Person: Amanda Mayfield  Assistant: Xiomara Wright PA-C    Estimated Blood Loss: 60 mL    Urine Voided:   Specimens:                      Drains:      Findings: TIGHT STENOSIS L4L5 AND L5S1    Complications: NONE      Lencho Gamino MD     Date: 7/6/2018  Time: 5:39 PM

## 2018-07-06 NOTE — ANESTHESIA PREPROCEDURE EVALUATION
Anesthesia Evaluation     Patient summary reviewed and Nursing notes reviewed   NPO Solid Status: > 8 hours  NPO Liquid Status: > 8 hours           Airway   Mallampati: II  TM distance: >3 FB  Neck ROM: full  Possible difficult intubation  Dental      Pulmonary    (+) asthma (Mild PRN MDI ), sleep apnea on CPAP,   (-) COPD, shortness of breath, recent URI, not a smoker, lung cancer  Cardiovascular     ECG reviewed    (+) hypertension 2 medications or greater, past MI  >12 months, CAD, dysrhythmias Bradycardia, Atrial Fib, PVD, hyperlipidemia,   (-) cardiac stents, CABG    ROS comment: EKG NSR   Cleared by Cj for Sx Hold effient     Neuro/Psych  (+) TIA, psychiatric history, dementia,     (-) seizures, CVA  GI/Hepatic/Renal/Endo    (+)  hiatal hernia, GERD well controlled, GI bleeding, diabetes mellitus type 2, hypothyroidism (RX),     Musculoskeletal     Abdominal    Substance History      OB/GYN          Other   (+) arthritis   history of cancer    ROS/Med Hx Other: eliquis stopped  Pacemaker checkd battery   8 year life                 Anesthesia Plan    ASA 3     general   (Propofol Infusion as part of Anti PONV tech )  intravenous induction   Anesthetic plan and risks discussed with patient.    Plan discussed with CRNA.

## 2018-07-07 ENCOUNTER — APPOINTMENT (OUTPATIENT)
Dept: GENERAL RADIOLOGY | Facility: HOSPITAL | Age: 68
End: 2018-07-07

## 2018-07-07 LAB
ANION GAP SERPL CALCULATED.3IONS-SCNC: 7 MMOL/L (ref 3–11)
BILIRUB UR QL STRIP: NEGATIVE
BUN BLD-MCNC: 15 MG/DL (ref 9–23)
BUN/CREAT SERPL: 21.7 (ref 7–25)
CALCIUM SPEC-SCNC: 9 MG/DL (ref 8.7–10.4)
CHLORIDE SERPL-SCNC: 97 MMOL/L (ref 99–109)
CLARITY UR: CLEAR
CO2 SERPL-SCNC: 31 MMOL/L (ref 20–31)
COLOR UR: YELLOW
CREAT BLD-MCNC: 0.69 MG/DL (ref 0.6–1.3)
DEPRECATED RDW RBC AUTO: 46.9 FL (ref 37–54)
ERYTHROCYTE [DISTWIDTH] IN BLOOD BY AUTOMATED COUNT: 14.5 % (ref 11.3–14.5)
GFR SERPL CREATININE-BSD FRML MDRD: 84 ML/MIN/1.73
GLUCOSE BLD-MCNC: 150 MG/DL (ref 70–100)
GLUCOSE BLDC GLUCOMTR-MCNC: 145 MG/DL (ref 70–130)
GLUCOSE BLDC GLUCOMTR-MCNC: 145 MG/DL (ref 70–130)
GLUCOSE BLDC GLUCOMTR-MCNC: 149 MG/DL (ref 70–130)
GLUCOSE BLDC GLUCOMTR-MCNC: 202 MG/DL (ref 70–130)
GLUCOSE BLDC GLUCOMTR-MCNC: 216 MG/DL (ref 70–130)
GLUCOSE UR STRIP-MCNC: NEGATIVE MG/DL
HCT VFR BLD AUTO: 33 % (ref 34.5–44)
HGB BLD-MCNC: 10.8 G/DL (ref 11.5–15.5)
HGB UR QL STRIP.AUTO: NEGATIVE
KETONES UR QL STRIP: NEGATIVE
LEUKOCYTE ESTERASE UR QL STRIP.AUTO: NEGATIVE
MCH RBC QN AUTO: 28.8 PG (ref 27–31)
MCHC RBC AUTO-ENTMCNC: 32.7 G/DL (ref 32–36)
MCV RBC AUTO: 88 FL (ref 80–99)
NITRITE UR QL STRIP: NEGATIVE
PH UR STRIP.AUTO: 7 [PH] (ref 5–8)
PLATELET # BLD AUTO: 202 10*3/MM3 (ref 150–450)
PMV BLD AUTO: 9.1 FL (ref 6–12)
POTASSIUM BLD-SCNC: 3.6 MMOL/L (ref 3.5–5.5)
PROT UR QL STRIP: NEGATIVE
RBC # BLD AUTO: 3.75 10*6/MM3 (ref 3.89–5.14)
SODIUM BLD-SCNC: 135 MMOL/L (ref 132–146)
SP GR UR STRIP: 1.01 (ref 1–1.03)
TROPONIN I SERPL-MCNC: 0.01 NG/ML
UROBILINOGEN UR QL STRIP: NORMAL
WBC NRBC COR # BLD: 11.85 10*3/MM3 (ref 3.5–10.8)

## 2018-07-07 PROCEDURE — A9270 NON-COVERED ITEM OR SERVICE: HCPCS | Performed by: NEUROLOGICAL SURGERY

## 2018-07-07 PROCEDURE — 63710000001 BUMETANIDE 1 MG TABLET: Performed by: NEUROLOGICAL SURGERY

## 2018-07-07 PROCEDURE — 99024 POSTOP FOLLOW-UP VISIT: CPT | Performed by: NEUROLOGICAL SURGERY

## 2018-07-07 PROCEDURE — 63710000001 METFORMIN 1000 MG TABLET: Performed by: NEUROLOGICAL SURGERY

## 2018-07-07 PROCEDURE — 71045 X-RAY EXAM CHEST 1 VIEW: CPT

## 2018-07-07 PROCEDURE — 63710000001 OXYCODONE-ACETAMINOPHEN 10-325 MG TABLET: Performed by: NEUROLOGICAL SURGERY

## 2018-07-07 PROCEDURE — 93005 ELECTROCARDIOGRAM TRACING: CPT | Performed by: NEUROLOGICAL SURGERY

## 2018-07-07 PROCEDURE — 63710000001 CLONIDINE 0.1 MG TABLET: Performed by: NEUROLOGICAL SURGERY

## 2018-07-07 PROCEDURE — 63710000001 AMANTADINE 100 MG CAPSULE: Performed by: NEUROLOGICAL SURGERY

## 2018-07-07 PROCEDURE — 80048 BASIC METABOLIC PNL TOTAL CA: CPT | Performed by: NEUROLOGICAL SURGERY

## 2018-07-07 PROCEDURE — 63710000001 CARBIDOPA-LEVODOPA 25-100 MG TABLET: Performed by: NEUROLOGICAL SURGERY

## 2018-07-07 PROCEDURE — 63710000001 RANOLAZINE 500 MG TABLET SUSTAINED-RELEASE 12 HOUR: Performed by: NEUROLOGICAL SURGERY

## 2018-07-07 PROCEDURE — 84484 ASSAY OF TROPONIN QUANT: CPT | Performed by: NEUROLOGICAL SURGERY

## 2018-07-07 PROCEDURE — 63710000001 PANTOPRAZOLE 40 MG TABLET DELAYED-RELEASE: Performed by: NEUROLOGICAL SURGERY

## 2018-07-07 PROCEDURE — 84484 ASSAY OF TROPONIN QUANT: CPT | Performed by: INTERNAL MEDICINE

## 2018-07-07 PROCEDURE — 63710000001 METOLAZONE 2.5 MG TABLET: Performed by: NEUROLOGICAL SURGERY

## 2018-07-07 PROCEDURE — 51701 INSERT BLADDER CATHETER: CPT

## 2018-07-07 PROCEDURE — 63710000001 LOSARTAN 50 MG TABLET: Performed by: NEUROLOGICAL SURGERY

## 2018-07-07 PROCEDURE — 99219 PR INITIAL OBSERVATION CARE/DAY 50 MINUTES: CPT | Performed by: INTERNAL MEDICINE

## 2018-07-07 PROCEDURE — 63710000001 CITALOPRAM 20 MG TABLET: Performed by: NEUROLOGICAL SURGERY

## 2018-07-07 PROCEDURE — 63710000001 CETIRIZINE 10 MG TABLET: Performed by: NEUROLOGICAL SURGERY

## 2018-07-07 PROCEDURE — 63710000001 BETHANECHOL 25 MG TABLET: Performed by: NEUROLOGICAL SURGERY

## 2018-07-07 PROCEDURE — 63710000001 VITAMIN C 500 MG TABLET: Performed by: NEUROLOGICAL SURGERY

## 2018-07-07 PROCEDURE — 93010 ELECTROCARDIOGRAM REPORT: CPT | Performed by: INTERNAL MEDICINE

## 2018-07-07 PROCEDURE — 81003 URINALYSIS AUTO W/O SCOPE: CPT | Performed by: INTERNAL MEDICINE

## 2018-07-07 PROCEDURE — 85027 COMPLETE CBC AUTOMATED: CPT | Performed by: NEUROLOGICAL SURGERY

## 2018-07-07 PROCEDURE — 63710000001 FERROUS SULFATE 325 (65 FE) MG TABLET: Performed by: NEUROLOGICAL SURGERY

## 2018-07-07 PROCEDURE — 63710000001 GABAPENTIN 300 MG CAPSULE: Performed by: NEUROLOGICAL SURGERY

## 2018-07-07 PROCEDURE — A9270 NON-COVERED ITEM OR SERVICE: HCPCS | Performed by: INTERNAL MEDICINE

## 2018-07-07 PROCEDURE — 63710000001 CHOLECALCIFEROL 1000 UNITS TABLET: Performed by: INTERNAL MEDICINE

## 2018-07-07 PROCEDURE — 63710000001 POTASSIUM CHLORIDE 10 MEQ CAPSULE CONTROLLED-RELEASE: Performed by: NEUROLOGICAL SURGERY

## 2018-07-07 PROCEDURE — 63710000001 MULTIVITAMIN WITH MINERALS TABLET: Performed by: NEUROLOGICAL SURGERY

## 2018-07-07 PROCEDURE — 25010000002 MORPHINE PER 10 MG: Performed by: NEUROLOGICAL SURGERY

## 2018-07-07 PROCEDURE — 25010000003 CEFAZOLIN IN DEXTROSE 2-4 GM/100ML-% SOLUTION: Performed by: NEUROLOGICAL SURGERY

## 2018-07-07 PROCEDURE — 94660 CPAP INITIATION&MGMT: CPT

## 2018-07-07 PROCEDURE — 63710000001 PRAMIPEXOLE 1 MG TABLET: Performed by: NEUROLOGICAL SURGERY

## 2018-07-07 PROCEDURE — 94799 UNLISTED PULMONARY SVC/PX: CPT

## 2018-07-07 PROCEDURE — G0378 HOSPITAL OBSERVATION PER HR: HCPCS

## 2018-07-07 PROCEDURE — 82962 GLUCOSE BLOOD TEST: CPT

## 2018-07-07 PROCEDURE — 63710000001 LEVOTHYROXINE 100 MCG TABLET: Performed by: NEUROLOGICAL SURGERY

## 2018-07-07 PROCEDURE — 63710000001 NITROGLYCERIN 0.4 MG SUBLINGUAL TABLET 25 EACH BOTTLE: Performed by: NEUROLOGICAL SURGERY

## 2018-07-07 RX ORDER — MELATONIN
2000 2 TIMES DAILY
Status: DISCONTINUED | OUTPATIENT
Start: 2018-07-07 | End: 2018-07-12 | Stop reason: HOSPADM

## 2018-07-07 RX ORDER — NITROGLYCERIN 0.4 MG/1
TABLET SUBLINGUAL
Status: DISPENSED
Start: 2018-07-07 | End: 2018-07-07

## 2018-07-07 RX ORDER — NITROGLYCERIN 0.4 MG/1
0.4 TABLET SUBLINGUAL
Status: DISCONTINUED | OUTPATIENT
Start: 2018-07-07 | End: 2018-07-12 | Stop reason: HOSPADM

## 2018-07-07 RX ORDER — BETHANECHOL CHLORIDE 25 MG/1
25 TABLET ORAL 3 TIMES DAILY
Status: DISCONTINUED | OUTPATIENT
Start: 2018-07-07 | End: 2018-07-12 | Stop reason: HOSPADM

## 2018-07-07 RX ADMIN — OXYCODONE HYDROCHLORIDE AND ACETAMINOPHEN 1 TABLET: 10; 325 TABLET ORAL at 06:17

## 2018-07-07 RX ADMIN — CARBIDOPA AND LEVODOPA 2 TABLET: 25; 100 TABLET ORAL at 06:00

## 2018-07-07 RX ADMIN — Medication 325 MG: at 17:40

## 2018-07-07 RX ADMIN — POTASSIUM CHLORIDE 10 MEQ: 750 CAPSULE, EXTENDED RELEASE ORAL at 08:35

## 2018-07-07 RX ADMIN — Medication 1 MG: at 14:02

## 2018-07-07 RX ADMIN — OXYCODONE HYDROCHLORIDE AND ACETAMINOPHEN 500 MG: 500 TABLET ORAL at 08:34

## 2018-07-07 RX ADMIN — LOSARTAN POTASSIUM 50 MG: 50 TABLET ORAL at 20:25

## 2018-07-07 RX ADMIN — CLONIDINE HYDROCHLORIDE 0.1 MG: 0.1 TABLET ORAL at 20:25

## 2018-07-07 RX ADMIN — METFORMIN HYDROCHLORIDE 1000 MG: 1000 TABLET, FILM COATED ORAL at 08:34

## 2018-07-07 RX ADMIN — INSULIN LISPRO 3 UNITS: 100 INJECTION, SOLUTION INTRAVENOUS; SUBCUTANEOUS at 20:23

## 2018-07-07 RX ADMIN — CEFAZOLIN SODIUM 2 G: 2 INJECTION, SOLUTION INTRAVENOUS at 09:40

## 2018-07-07 RX ADMIN — CARBIDOPA AND LEVODOPA 1 TABLET: 25; 100 TABLET ORAL at 20:26

## 2018-07-07 RX ADMIN — IPRATROPIUM BROMIDE 1 SPRAY: 21 SPRAY NASAL at 20:23

## 2018-07-07 RX ADMIN — OXYCODONE HYDROCHLORIDE AND ACETAMINOPHEN 1 TABLET: 10; 325 TABLET ORAL at 11:03

## 2018-07-07 RX ADMIN — BUMETANIDE 2 MG: 1 TABLET ORAL at 20:25

## 2018-07-07 RX ADMIN — LEVOTHYROXINE SODIUM 200 MCG: 200 TABLET ORAL at 06:00

## 2018-07-07 RX ADMIN — OXYCODONE HYDROCHLORIDE AND ACETAMINOPHEN 1 TABLET: 10; 325 TABLET ORAL at 00:21

## 2018-07-07 RX ADMIN — BUMETANIDE 2 MG: 1 TABLET ORAL at 08:36

## 2018-07-07 RX ADMIN — INSULIN LISPRO 3 UNITS: 100 INJECTION, SOLUTION INTRAVENOUS; SUBCUTANEOUS at 12:17

## 2018-07-07 RX ADMIN — LOSARTAN POTASSIUM 50 MG: 50 TABLET ORAL at 08:35

## 2018-07-07 RX ADMIN — CLONIDINE HYDROCHLORIDE 0.1 MG: 0.1 TABLET ORAL at 08:35

## 2018-07-07 RX ADMIN — RANOLAZINE 500 MG: 500 TABLET, FILM COATED, EXTENDED RELEASE ORAL at 08:35

## 2018-07-07 RX ADMIN — AMANTADINE HYDROCHLORIDE 100 MG: 100 CAPSULE ORAL at 09:40

## 2018-07-07 RX ADMIN — MULTIPLE VITAMINS W/ MINERALS TAB 1 TABLET: TAB at 08:35

## 2018-07-07 RX ADMIN — METOLAZONE 2.5 MG: 2.5 TABLET ORAL at 08:35

## 2018-07-07 RX ADMIN — SODIUM CHLORIDE, POTASSIUM CHLORIDE, SODIUM LACTATE AND CALCIUM CHLORIDE 50 ML/HR: 600; 310; 30; 20 INJECTION, SOLUTION INTRAVENOUS at 14:01

## 2018-07-07 RX ADMIN — CETIRIZINE HYDROCHLORIDE 10 MG: 10 TABLET, FILM COATED ORAL at 08:36

## 2018-07-07 RX ADMIN — BETHANECHOL CHLORIDE 25 MG: 25 TABLET ORAL at 09:39

## 2018-07-07 RX ADMIN — POTASSIUM CHLORIDE 10 MEQ: 750 CAPSULE, EXTENDED RELEASE ORAL at 20:24

## 2018-07-07 RX ADMIN — Medication 1 MG: at 17:44

## 2018-07-07 RX ADMIN — IPRATROPIUM BROMIDE 1 SPRAY: 21 SPRAY NASAL at 09:40

## 2018-07-07 RX ADMIN — RANOLAZINE 500 MG: 500 TABLET, FILM COATED, EXTENDED RELEASE ORAL at 20:25

## 2018-07-07 RX ADMIN — VITAMIN D, TAB 1000IU (100/BT) 1000 UNITS: 25 TAB at 11:03

## 2018-07-07 RX ADMIN — NITROGLYCERIN 0.4 MG: 0.4 TABLET SUBLINGUAL at 06:57

## 2018-07-07 RX ADMIN — CITALOPRAM HYDROBROMIDE 20 MG: 20 TABLET ORAL at 20:26

## 2018-07-07 RX ADMIN — BETHANECHOL CHLORIDE 25 MG: 25 TABLET ORAL at 20:26

## 2018-07-07 RX ADMIN — CARBIDOPA AND LEVODOPA 1 TABLET: 25; 100 TABLET ORAL at 14:02

## 2018-07-07 RX ADMIN — GABAPENTIN 300 MG: 300 CAPSULE ORAL at 14:01

## 2018-07-07 RX ADMIN — VITAMIN D, TAB 1000IU (100/BT) 2000 UNITS: 25 TAB at 20:24

## 2018-07-07 RX ADMIN — CARBIDOPA AND LEVODOPA 1 TABLET: 25; 100 TABLET ORAL at 08:34

## 2018-07-07 RX ADMIN — AMANTADINE HYDROCHLORIDE 100 MG: 100 CAPSULE ORAL at 20:26

## 2018-07-07 RX ADMIN — CARBIDOPA AND LEVODOPA 1 TABLET: 25; 100 TABLET ORAL at 17:40

## 2018-07-07 RX ADMIN — PRAMIPEXOLE DIHYDROCHLORIDE 1.5 MG: 0.25 TABLET ORAL at 20:24

## 2018-07-07 RX ADMIN — Medication 1 MG: at 07:15

## 2018-07-07 RX ADMIN — Medication 325 MG: at 08:35

## 2018-07-07 RX ADMIN — METFORMIN HYDROCHLORIDE 1000 MG: 1000 TABLET, FILM COATED ORAL at 17:40

## 2018-07-07 RX ADMIN — GABAPENTIN 300 MG: 300 CAPSULE ORAL at 06:00

## 2018-07-07 RX ADMIN — PANTOPRAZOLE SODIUM 40 MG: 40 TABLET, DELAYED RELEASE ORAL at 06:00

## 2018-07-07 RX ADMIN — BETHANECHOL CHLORIDE 25 MG: 25 TABLET ORAL at 15:32

## 2018-07-07 RX ADMIN — CARBIDOPA AND LEVODOPA 1 TABLET: 25; 100 TABLET ORAL at 11:03

## 2018-07-07 NOTE — PROGRESS NOTES
LOS: 0 days   Patient Care Team:  Reynaldo Fritz MD as PCP - General  Reynaldo Fritz MD as PCP - Family Medicine  Faisal Ramirez MD as Obstetrician (Obstetrics and Gynecology)  Timothy Dan DC as Referring Physician (Chiropractic Medicine)    Chief Complaint:  POD #1        Interval History: back painful;  C/o chest pain this am;  Moved to telemetry bed;  Given morphine and chest pain gone;  No leg pain;  Has been up walking;  Having trouble voiding;  She has had trouble voiding in past and trouble with urgency - she sees a urologist.      Review of Systems:  done      Vital Signs  Temp:  [97.2 °F (36.2 °C)-97.8 °F (36.6 °C)] 97.2 °F (36.2 °C)  Heart Rate:  [65-83] 83  Resp:  [16-18] 18  BP: (130-181)/() 136/59  FiO2 (%):  [30 %] 30 %    Intake/Output Summary (Last 24 hours) at 07/07/18 0806  Last data filed at 07/07/18 0600   Gross per 24 hour   Intake             1000 ml   Output             1710 ml   Net             -710 ml     Physical Exam: alert and conversant.  Motor Exam   Muscle bulk: normal  Overall muscle tone: normal  No Pronator Drift     Strength   Strength 5/5 throughout.      Sensory Exam   Light touch normal.   Proprioception normal.          Results Review:     Lab Results   Component Value Date    WBC 11.85 (H) 07/07/2018    HGB 10.8 (L) 07/07/2018    HCT 33.0 (L) 07/07/2018    MCV 88.0 07/07/2018     07/07/2018     Lab Results   Component Value Date    GLUCOSE 150 (H) 07/07/2018    CALCIUM 9.0 07/07/2018     07/07/2018    K 3.6 07/07/2018    CO2 31.0 07/07/2018    CL 97 (L) 07/07/2018    BUN 15 07/07/2018    CREATININE 0.69 07/07/2018    EGFRIFNONA 84 07/07/2018    BCR 21.7 07/07/2018    ANIONGAP 7.0 07/07/2018           Assessment/Plan     Principal Problem:    Spinal stenosis, lumbar region, with neurogenic claudication  Active Problems:    Coronary artery disease involving native coronary artery of native heart without angina pectoris    Peripheral vascular  disease (CMS/[facility])    Chronic atrial fibrillation (CMS/[facility])      Plan:  Will resume ASA 7/09/18 and eliquis when she goes home.  Ambulate with assist;  oob to chair;  Start urecholine;  Appreciate hospital medicine consult.        Plan for disposition      Lencho Gamino MD  07/07/18  8:06 AM

## 2018-07-07 NOTE — CONSULTS
[facility] Medicine Services  CONSULT NOTE      Patient Name: Joanna Cross  : 1950  MRN: [id number]    Primary Care Physician: Reynaldo Fritz MD  Referring Provider: Lencho Gamino MD    Subjective   Subjective     Reason for Consultation: Chest pain    HPI:  Joanna Cross is a 67 y.o. female with history of CAD (one stent placed in ), A-fib, sick sinus syndrome s/p pacemaker placement who is currently admitted to neurosurgery for lumbar laminectomy due to spinal stenosis with surgery performed yesterday.  Early this morning she developed dull, substernal/epigastric chest pain radiating across left breast.  Pain occurred at rest and does not feel similar to her previous angina.  Pain hurts worse with palpation of left lower chest wall.  She has some mild dyspnea, no associated nausea or sweating.  She did have a normal stress test in [place] within the last few months per her report.  She had some relief with sublingual nitroglycerin.      Review of Systems  Gen- No fevers, chills  CV- As above  Resp- No cough, mild dyspnea  GI- No N/V/D, abd pain    Otherwise 10-system ROS reviewed and is negative except as mentioned in the HPI.      Past Medical History:   Diagnosis Date   • Anemia    • Arthritis    • Atrial fibrillation (CMS/HCC)    • Chronic edema     bilateral   • Coronary artery disease involving native coronary artery of native heart without angina pectoris 3/1/2017   • Depression    • Diabetes mellitus (CMS/[facility])     type 2, checks fsbg 1-2x/day and as needed; last a1c  6.5   • Essential hypertension 3/1/2017   • GERD (gastroesophageal reflux disease)    • GIB (gastrointestinal bleeding)     d/t xarelto    • Hiatal hernia    • History of transfusion 2016    d/t GIB, no reaction per pt report    • Mixed hyperlipidemia 3/1/2017   • Myocardial infarction    • MILLI on CPAP    • Parkinson disease (CMS/HCC)    • Peripheral vascular disease (CMS/HCC) 3/1/2017    • Skin cancer    • SSS (sick sinus syndrome) (CMS/HCC)     ppm    • TIA (transient ischemic attack)     no residual    • Varicose vein of leg     not spec of leg    • Venous hypertension    • Venous insufficiency    • Wears glasses        Past Surgical History:   Procedure Laterality Date   • BICEPS TENDON REPAIR Right     shoulder   • BREAST BIOPSY Left 2004   • BUNIONECTOMY Right    • CARDIAC CATHETERIZATION     • CHOLECYSTECTOMY     • COLONOSCOPY  2016   • CYST REMOVAL      left ear, upper left back 2001   • DIAGNOSTIC LAPAROSCOPY  1981   • ENDOSCOPIC FUNCTIONAL SINUS SURGERY (FESS)  2011   • ENDOSCOPY  2016   • HAMMER TOE REPAIR Left    • LUNG BIOPSY Left 2016   • PACEMAKER IMPLANTATION  11/2016    sss   • REPLACEMENT TOTAL KNEE Bilateral     left knee 2011, right 2012 per dr acuna    • SHOULDER ARTHROSCOPY Bilateral     2003-left, 2004- right    • SKIN BIOPSY      skin cancer back 2016   • TUBAL ABDOMINAL LIGATION  1980   • WISDOM TOOTH EXTRACTION         Family History: family history includes Kidney disease in her mother.     Social History:  reports that she has never smoked. She has never used smokeless tobacco. She reports that she drinks alcohol. She reports that she does not use drugs.    Medications:  Prescriptions Prior to Admission   Medication Sig Dispense Refill Last Dose   • amantadine (SYMMETREL) 100 MG capsule Take 100 mg by mouth 2 (Two) Times a Day.   7/6/2018 at 0830   • aspirin 81 MG EC tablet Take 81 mg by mouth Daily.   7/5/2018 at 2130   • B Complex-C (SUPER B COMPLEX PO) Take 1 tablet by mouth Daily.   7/5/2018 at 1200   • carbidopa-levodopa (SINEMET)  MG per tablet Take 2 tablets by mouth Every Morning. 2 tablets at 0600, 1 tablet at 0900, 1200, 1500, 1800, 2100   7/6/2018 at 0830   • carbonyl iron (FEOSOL) 45 MG tablet tablet Take 1 tablet by mouth 2 (Two) Times a Day.   7/5/2018 at 1800   • Cholecalciferol 2000 units tablet Take 2,000 Units by mouth 2 (Two) Times a Day.    7/5/2018 at 1830   • citalopram (CeleXA) 20 MG tablet Take 20 mg by mouth every night at bedtime.   7/5/2018 at 2130   • CloNIDine (CATAPRES) 0.1 MG tablet Take 1 tablet by mouth Every 12 (Twelve) Hours. 180 tablet 3 7/6/2018 at 0830   • Collagen-Boron-Hyaluronic Acid 10-5-3.3 MG tablet Take 1 tablet by mouth Daily.   7/5/2018 at 1800   • Loratadine 10 MG capsule Take 10 mg by mouth Daily.   7/5/2018 at 2130   • losartan (COZAAR) 50 MG tablet Take 1 tablet by mouth Every 12 (Twelve) Hours. 180 tablet 3 7/6/2018 at 0830   • metFORMIN (GLUCOPHAGE) 1000 MG tablet Take 1,000 mg by mouth 2 (Two) Times a Day With Meals.   7/5/2018 at 1800   • metOLazone (ZAROXOLYN) 2.5 MG tablet Take 1 tablet by mouth Daily. 90 tablet 1 7/5/2018 at 0830   • Mirabegron ER (MYRBETRIQ) 50 MG tablet sustained-release 24 hour 24 hr tablet Take 50 mg by mouth Daily.   7/5/2018 at 1200   • Multiple Vitamins-Minerals (CENTRUM ULTRA WOMENS PO) Take 1 tablet by mouth Daily.   7/5/2018 at 1200   • mupirocin (BACTROBAN) 2 % ointment Apply 1 application topically 2 (Two) Times a Day. D/t nasal scab,prescribed per ent md   7/6/2018 at 1415   • omeprazole (priLOSEC) 40 MG capsule Take 40 mg by mouth 2 (Two) Times a Day.   7/5/2018 at 1800   • potassium chloride (MICRO-K) 10 MEQ CR capsule Take 10 mEq by mouth 2 (Two) Times a Day.   7/5/2018 at 1800   • pramipexole (MIRAPEX) 1.5 MG tablet Take 1.5 mg by mouth Every Night.   7/5/2018 at 2130   • ranolazine (RANEXA) 500 MG 12 hr tablet Take 500 mg by mouth 2 (Two) Times a Day.   7/5/2018 at 1800   • sennosides-docusate sodium (SENOKOT-S) 8.6-50 MG tablet Take 1 tablet by mouth Daily.   7/5/2018 at 2130   • spironolactone (ALDACTONE) 25 MG tablet Take 1 tablet by mouth Daily. 90 tablet 1 7/5/2018 at 0830   • SYNTHROID 200 MCG tablet Take 200 mcg by mouth Daily.   7/6/2018 at 0830   • Ubiquinol (QUNOL COQ10/UBIQUINOL/JOSE) 100 MG capsule Take 1 capsule by mouth Daily.   7/5/2018 at 1800   • vitamin C  "(ASCORBIC ACID) 500 MG tablet Take 500 mg by mouth Daily. Chewable tablet   7/5/2018 at 1200   • albuterol (VENTOLIN HFA) 108 (90 Base) MCG/ACT inhaler Inhale 1 puff Every 4 (Four) Hours As Needed.   7/3/2018   • apixaban (ELIQUIS) 5 MG tablet tablet Take 1 tablet by mouth Every 12 (Twelve) Hours. 180 tablet 3 7/1/2018   • bumetanide (BUMEX) 2 MG tablet Take 2 mg by mouth 2 (Two) Times a Day.   Taking   • Calcium Carbonate (CALTRATE 600 PO) Take 600 mg by mouth Daily.   Taking   • clobetasol (TEMOVATE) 0.05 % cream Apply 1 application topically 2 (Two) Times a Day As Needed (Lichens Sclerosis).   7/4/2018   • fluticasone (FLONASE) 50 MCG/ACT nasal spray 2 sprays into each nostril Daily As Needed for Rhinitis.   7/3/2018   • insulin degludec (TRESIBA FLEXTOUCH) 100 UNIT/ML solution pen-injector injection Inject 28 Units under the skin Every Night.   7/4/2018   • IPRATROPIUM BROMIDE NA 1 spray into each nostril 2 (Two) Times a Day.   7/3/2018   • nitroglycerin (NITROLINGUAL) 0.4 MG/SPRAY spray Place 1 spray under the tongue Every 5 (Five) Minutes As Needed for Chest Pain. 1 each 6 More than a month at Unknown time   • traMADol (ULTRAM) 50 MG tablet Take 50 mg by mouth Every 8 (Eight) Hours As Needed for Moderate Pain .          Allergies   Allergen Reactions   • Toprol Xl [Metoprolol Tartrate] Shortness Of Breath     Extreme fatigue   • Amlodipine Besylate Swelling     Lower extremity (ankles, feet) swelling   • Entacapone Other (See Comments)     \"extreme weakness in legs - caused several falls, which stopped after discontinuing this medication\"   • Levemir [Insulin Detemir] Hives     Hives / rash around injection site   • Xarelto [Rivaroxaban] GI Bleeding   • Bactrim [Sulfamethoxazole-Trimethoprim] Nausea Only     Headache    • Ciprofloxacin Diarrhea   • Cogentin [Benztropine] Other (See Comments)     \"uncontrollable body movements\"   • Compazine [Prochlorperazine Edisylate] Other (See Comments)     Dystonic " reaction   • Duraprep [Antiseptic Products, Misc.] Itching and Rash     RASH AND ITCHING   • Haldol [Haloperidol Lactate] Other (See Comments)     Dystonic reaction   • Hydralazine Other (See Comments)     Headache    • Hydrocodone-Acetaminophen Nausea And Vomiting and Dizziness     Headache    • Lisinopril Cough   • Penicillins Hives     Jitteriness    • Statins Myalgia     Leg pain   • Tarka [Trandolapril-Verapamil Hcl Er] Other (See Comments)     Constipation        Objective   Objective     Vital Signs:   Temp:  [97.2 °F (36.2 °C)-97.8 °F (36.6 °C)] 97.2 °F (36.2 °C)  Heart Rate:  [65-83] 83  Resp:  [16-18] 18  BP: (130-181)/() 136/59  FiO2 (%):  [30 %] 30 %     Physical Exam  Constitutional: No acute distress, awake, alert, sitting up in bed  Eyes: PERRLA, sclerae anicteric, no conjunctival injection  HENT: NCAT, mucous membranes moist  Neck: Supple, trachea midline  Respiratory: Clear to auscultation bilaterally, nonlabored respirations on nasal cannula  Cardiovascular: RRR, no murmurs, rubs, or gallops  Gastrointestinal: Positive bowel sounds, soft, nontender, nondistended  Musculoskeletal: No bilateral ankle edema, pain elicited with palpation of left lower chest wall  Psychiatric: Appropriate affect, cooperative  Neurologic: Cranial Nerves grossly intact to confrontation, speech clear  Skin: No rashes    Results Reviewed:  I have personally reviewed current lab, radiology, and data and agree.      Results from last 7 days  Lab Units 07/07/18  0703   WBC 10*3/mm3 11.85*   HEMOGLOBIN g/dL 10.8*   HEMATOCRIT % 33.0*   PLATELETS 10*3/mm3 202       Results from last 7 days  Lab Units 07/02/18  1557   SODIUM mmol/L 136   POTASSIUM mmol/L 3.6   CHLORIDE mmol/L 96*   CO2 mmol/L 28.0   BUN mg/dL 22   CREATININE mg/dL 0.93   GLUCOSE mg/dL 98   CALCIUM mg/dL 9.6     Estimated Creatinine Clearance: 67.5 mL/min (by C-G formula based on SCr of 0.93 mg/dL).  Brief Urine Lab Results     None        No results found  for: BNP    Microbiology Results Abnormal     None          Imaging Results (last 24 hours)     Procedure Component Value Units Date/Time    XR Chest 1 View [[id number]] Updated:  07/07/18 0713    XR Spine Lumbar Lateral [[id number]] Collected:  07/06/18 2043     Updated:  07/06/18 2044    Narrative:          EXAMINATION: XR SPINE LUMBAR LATERAL - 7/6/2018     INDICATION: M48.062-Spinal stenosis, lumbar region with neurogenic  claudication.      COMPARISON: None.     FINDINGS: Portable lateral lumbar spine reveals a radiopaque  marker seen posteriorly to the L5/S1 disc space. Anterolisthesis  identified of L5 on S1. Degenerative changes seen within the lower  lumbar spine.           Impression:       Radiopaque marker seen posteriorly to the L5/S1 level.  Anterolisthesis of L5 on S1.     DICTATED:   7/6/2018  EDITED/ls :   7/6/2018        XR Spine Lumbar 1 View [[id number]] Collected:  07/06/18 1627     Updated:  07/06/18 1712    Narrative:          EXAMINATION: XR SPINE LUMBAR 1 VW - 7/6/2018     INDICATION: M48.062-Spinal stenosis, lumbar region with neurogenic  claudication.      COMPARISON: Radiograph earlier same day.     FINDINGS: Crosstable lateral view demonstrates needle tip and surgical  measurements at the posterior L4-L5 level.       Impression:       Surgical measurements and medial tip located at the  posterior L4-L5 level.     DICTATED:   7/6/2018  EDITED/ls :   7/6/2018                 Assessment/Plan   Assessment / Plan     Hospital Problem List     * (Principal)Spinal stenosis, lumbar region, with neurogenic claudication    Coronary artery disease involving native coronary artery of native heart without angina pectoris    Peripheral vascular disease (CMS/HCC)    Chronic atrial fibrillation (CMS/HCC)            Plan:    Atypical chest pain  Coronary artery disease  -EKG reviewed; NSR with no acute ischemic changes  -Will trend troponin x 3  -CXR appears unremarkable  -Hold on any anticoagulation at  this point given atypical nature of chest pain and recent spinal surgery  -Would resume ASA when safe per neurosurgery  -Consider restarting Ranexa for angina if she has recurrent episodes of chest pain with negative troponin as she was previously on this but it was stopped in March due to her request  -Obtain report from recent stress test at [place]    Chronic A-fib  -Eliquis on hold due to surgery  -Resume when safe per Neurosurgery team    GERD  -Continue PPI    Spinal stenosis s/p lumbar laminectomy  -Management per primary service    Thank you for allowing [facility] Medicine Service to provide consultative care for your patient, we will continue to follow while clinically appropriate.    Electronically signed by Dana Lam MD, 07/07/18, 7:26 AM.

## 2018-07-07 NOTE — PLAN OF CARE
Problem: Patient Care Overview  Goal: Plan of Care Review  Outcome: Ongoing (interventions implemented as appropriate)  Patient arrived to floor around 8am this morning. Patient in NSR on the monitor. Patient complains of pain throughout the day, PRN pain medication given.

## 2018-07-08 LAB
GLUCOSE BLDC GLUCOMTR-MCNC: 135 MG/DL (ref 70–130)
GLUCOSE BLDC GLUCOMTR-MCNC: 151 MG/DL (ref 70–130)
GLUCOSE BLDC GLUCOMTR-MCNC: 166 MG/DL (ref 70–130)
GLUCOSE BLDC GLUCOMTR-MCNC: 177 MG/DL (ref 70–130)

## 2018-07-08 PROCEDURE — 63710000001 METOLAZONE 2.5 MG TABLET: Performed by: NEUROLOGICAL SURGERY

## 2018-07-08 PROCEDURE — 63710000001 VITAMIN C 500 MG TABLET: Performed by: NEUROLOGICAL SURGERY

## 2018-07-08 PROCEDURE — 63710000001 SPIRONOLACTONE 25 MG TABLET: Performed by: NEUROLOGICAL SURGERY

## 2018-07-08 PROCEDURE — 63710000001 CLONIDINE 0.1 MG TABLET: Performed by: NEUROLOGICAL SURGERY

## 2018-07-08 PROCEDURE — A9270 NON-COVERED ITEM OR SERVICE: HCPCS | Performed by: NEUROLOGICAL SURGERY

## 2018-07-08 PROCEDURE — 94660 CPAP INITIATION&MGMT: CPT

## 2018-07-08 PROCEDURE — 63710000001 RANOLAZINE 500 MG TABLET SUSTAINED-RELEASE 12 HOUR: Performed by: NEUROLOGICAL SURGERY

## 2018-07-08 PROCEDURE — 99225 PR SBSQ OBSERVATION CARE/DAY 25 MINUTES: CPT | Performed by: INTERNAL MEDICINE

## 2018-07-08 PROCEDURE — 63710000001 ACETAMINOPHEN 325 MG TABLET: Performed by: NEUROLOGICAL SURGERY

## 2018-07-08 PROCEDURE — 63710000001 BUMETANIDE 1 MG TABLET: Performed by: NEUROLOGICAL SURGERY

## 2018-07-08 PROCEDURE — 63710000001 METFORMIN 1000 MG TABLET: Performed by: NEUROLOGICAL SURGERY

## 2018-07-08 PROCEDURE — 63710000001 BETHANECHOL 25 MG TABLET: Performed by: NEUROLOGICAL SURGERY

## 2018-07-08 PROCEDURE — G0378 HOSPITAL OBSERVATION PER HR: HCPCS

## 2018-07-08 PROCEDURE — 63710000001 OXYBUTYNIN XL 5 MG TABLET SUSTAINED-RELEASE 24 HOUR: Performed by: NEUROLOGICAL SURGERY

## 2018-07-08 PROCEDURE — 94799 UNLISTED PULMONARY SVC/PX: CPT

## 2018-07-08 PROCEDURE — 63710000001 PHENAZOPYRIDINE 100 MG TABLET: Performed by: INTERNAL MEDICINE

## 2018-07-08 PROCEDURE — 63710000001 LEVOTHYROXINE 200 MCG TABLET: Performed by: NEUROLOGICAL SURGERY

## 2018-07-08 PROCEDURE — 63710000001 CITALOPRAM 20 MG TABLET: Performed by: NEUROLOGICAL SURGERY

## 2018-07-08 PROCEDURE — A9270 NON-COVERED ITEM OR SERVICE: HCPCS | Performed by: INTERNAL MEDICINE

## 2018-07-08 PROCEDURE — 82962 GLUCOSE BLOOD TEST: CPT

## 2018-07-08 PROCEDURE — 63710000001 CARBIDOPA-LEVODOPA 25-100 MG TABLET: Performed by: NEUROLOGICAL SURGERY

## 2018-07-08 PROCEDURE — 63710000001 CHOLECALCIFEROL 1000 UNITS TABLET: Performed by: INTERNAL MEDICINE

## 2018-07-08 PROCEDURE — 63710000001 POTASSIUM CHLORIDE 10 MEQ CAPSULE CONTROLLED-RELEASE: Performed by: NEUROLOGICAL SURGERY

## 2018-07-08 PROCEDURE — 63710000001 OXYCODONE-ACETAMINOPHEN 10-325 MG TABLET: Performed by: NEUROLOGICAL SURGERY

## 2018-07-08 PROCEDURE — 99024 POSTOP FOLLOW-UP VISIT: CPT | Performed by: NEUROLOGICAL SURGERY

## 2018-07-08 PROCEDURE — 63710000001 PANTOPRAZOLE 40 MG TABLET DELAYED-RELEASE: Performed by: NEUROLOGICAL SURGERY

## 2018-07-08 PROCEDURE — 63710000001 AMANTADINE 100 MG CAPSULE: Performed by: NEUROLOGICAL SURGERY

## 2018-07-08 PROCEDURE — 63710000001 SENNOSIDES-DOCUSATE SODIUM 8.6-50 MG TABLET: Performed by: NEUROLOGICAL SURGERY

## 2018-07-08 PROCEDURE — 63710000001 FERROUS SULFATE 325 (65 FE) MG TABLET: Performed by: NEUROLOGICAL SURGERY

## 2018-07-08 PROCEDURE — 63710000001 MULTIVITAMIN WITH MINERALS TABLET: Performed by: NEUROLOGICAL SURGERY

## 2018-07-08 PROCEDURE — 63710000001 PRAMIPEXOLE 1 MG TABLET: Performed by: NEUROLOGICAL SURGERY

## 2018-07-08 PROCEDURE — 63710000001 LOSARTAN 50 MG TABLET: Performed by: NEUROLOGICAL SURGERY

## 2018-07-08 RX ORDER — PHENAZOPYRIDINE HYDROCHLORIDE 100 MG/1
100 TABLET, FILM COATED ORAL 3 TIMES DAILY PRN
Status: DISCONTINUED | OUTPATIENT
Start: 2018-07-08 | End: 2018-07-12 | Stop reason: HOSPADM

## 2018-07-08 RX ADMIN — LOSARTAN POTASSIUM 50 MG: 50 TABLET ORAL at 21:44

## 2018-07-08 RX ADMIN — CARBIDOPA AND LEVODOPA 2 TABLET: 25; 100 TABLET ORAL at 06:27

## 2018-07-08 RX ADMIN — PANTOPRAZOLE SODIUM 40 MG: 40 TABLET, DELAYED RELEASE ORAL at 06:27

## 2018-07-08 RX ADMIN — BETHANECHOL CHLORIDE 25 MG: 25 TABLET ORAL at 21:58

## 2018-07-08 RX ADMIN — AMANTADINE HYDROCHLORIDE 100 MG: 100 CAPSULE ORAL at 21:58

## 2018-07-08 RX ADMIN — CARBIDOPA AND LEVODOPA 1 TABLET: 25; 100 TABLET ORAL at 17:40

## 2018-07-08 RX ADMIN — OXYCODONE HYDROCHLORIDE AND ACETAMINOPHEN 500 MG: 500 TABLET ORAL at 08:28

## 2018-07-08 RX ADMIN — PRAMIPEXOLE DIHYDROCHLORIDE 1.5 MG: 0.25 TABLET ORAL at 21:42

## 2018-07-08 RX ADMIN — METFORMIN HYDROCHLORIDE 1000 MG: 1000 TABLET, FILM COATED ORAL at 08:29

## 2018-07-08 RX ADMIN — LEVOTHYROXINE SODIUM 200 MCG: 200 TABLET ORAL at 06:27

## 2018-07-08 RX ADMIN — CARBIDOPA AND LEVODOPA 1 TABLET: 25; 100 TABLET ORAL at 11:36

## 2018-07-08 RX ADMIN — INSULIN LISPRO 2 UNITS: 100 INJECTION, SOLUTION INTRAVENOUS; SUBCUTANEOUS at 11:35

## 2018-07-08 RX ADMIN — OXYCODONE HYDROCHLORIDE AND ACETAMINOPHEN 1 TABLET: 10; 325 TABLET ORAL at 19:22

## 2018-07-08 RX ADMIN — PHENAZOPYRIDINE HYDROCHLORIDE 100 MG: 100 TABLET ORAL at 14:41

## 2018-07-08 RX ADMIN — Medication 325 MG: at 17:40

## 2018-07-08 RX ADMIN — METFORMIN HYDROCHLORIDE 1000 MG: 1000 TABLET, FILM COATED ORAL at 17:40

## 2018-07-08 RX ADMIN — BETHANECHOL CHLORIDE 25 MG: 25 TABLET ORAL at 08:31

## 2018-07-08 RX ADMIN — SODIUM CHLORIDE, POTASSIUM CHLORIDE, SODIUM LACTATE AND CALCIUM CHLORIDE 50 ML/HR: 600; 310; 30; 20 INJECTION, SOLUTION INTRAVENOUS at 11:36

## 2018-07-08 RX ADMIN — OXYBUTYNIN CHLORIDE 10 MG: 5 TABLET, EXTENDED RELEASE ORAL at 08:28

## 2018-07-08 RX ADMIN — BUMETANIDE 2 MG: 1 TABLET ORAL at 08:28

## 2018-07-08 RX ADMIN — VITAMIN D, TAB 1000IU (100/BT) 2000 UNITS: 25 TAB at 21:45

## 2018-07-08 RX ADMIN — SPIRONOLACTONE 25 MG: 25 TABLET, FILM COATED ORAL at 08:30

## 2018-07-08 RX ADMIN — CITALOPRAM HYDROBROMIDE 20 MG: 20 TABLET ORAL at 21:45

## 2018-07-08 RX ADMIN — MULTIPLE VITAMINS W/ MINERALS TAB 1 TABLET: TAB at 08:28

## 2018-07-08 RX ADMIN — OXYCODONE HYDROCHLORIDE AND ACETAMINOPHEN 1 TABLET: 10; 325 TABLET ORAL at 08:39

## 2018-07-08 RX ADMIN — BETHANECHOL CHLORIDE 25 MG: 25 TABLET ORAL at 16:01

## 2018-07-08 RX ADMIN — VITAMIN D, TAB 1000IU (100/BT) 1000 UNITS: 25 TAB at 08:29

## 2018-07-08 RX ADMIN — RANOLAZINE 500 MG: 500 TABLET, FILM COATED, EXTENDED RELEASE ORAL at 08:32

## 2018-07-08 RX ADMIN — ACETAMINOPHEN 650 MG: 325 TABLET, FILM COATED ORAL at 08:39

## 2018-07-08 RX ADMIN — POTASSIUM CHLORIDE 10 MEQ: 750 CAPSULE, EXTENDED RELEASE ORAL at 21:43

## 2018-07-08 RX ADMIN — LOSARTAN POTASSIUM 50 MG: 50 TABLET ORAL at 08:30

## 2018-07-08 RX ADMIN — POTASSIUM CHLORIDE 10 MEQ: 750 CAPSULE, EXTENDED RELEASE ORAL at 08:28

## 2018-07-08 RX ADMIN — INSULIN LISPRO 2 UNITS: 100 INJECTION, SOLUTION INTRAVENOUS; SUBCUTANEOUS at 08:32

## 2018-07-08 RX ADMIN — IPRATROPIUM BROMIDE 1 SPRAY: 21 SPRAY NASAL at 21:58

## 2018-07-08 RX ADMIN — CLONIDINE HYDROCHLORIDE 0.1 MG: 0.1 TABLET ORAL at 21:45

## 2018-07-08 RX ADMIN — CLONIDINE HYDROCHLORIDE 0.1 MG: 0.1 TABLET ORAL at 08:29

## 2018-07-08 RX ADMIN — METOLAZONE 2.5 MG: 2.5 TABLET ORAL at 08:29

## 2018-07-08 RX ADMIN — CARBIDOPA AND LEVODOPA 1 TABLET: 25; 100 TABLET ORAL at 08:31

## 2018-07-08 RX ADMIN — CARBIDOPA AND LEVODOPA 1 TABLET: 25; 100 TABLET ORAL at 14:41

## 2018-07-08 RX ADMIN — AMANTADINE HYDROCHLORIDE 100 MG: 100 CAPSULE ORAL at 08:31

## 2018-07-08 RX ADMIN — Medication 325 MG: at 08:29

## 2018-07-08 RX ADMIN — Medication 10 ML: at 21:45

## 2018-07-08 RX ADMIN — CARBIDOPA AND LEVODOPA 1 TABLET: 25; 100 TABLET ORAL at 21:58

## 2018-07-08 RX ADMIN — Medication 1 TABLET: at 21:43

## 2018-07-08 RX ADMIN — BUMETANIDE 2 MG: 1 TABLET ORAL at 21:45

## 2018-07-08 RX ADMIN — RANOLAZINE 500 MG: 500 TABLET, FILM COATED, EXTENDED RELEASE ORAL at 21:58

## 2018-07-08 NOTE — PROGRESS NOTES
[facility] Medicine Services  PROGRESS NOTE    Patient Name: Joanna Cross  : 1950  MRN: [id number]    Date of Admission: 2018  Length of Stay: 0  Primary Care Physician: Reynaldo Fritz MD    Subjective   Subjective     CC: F/U chest pain    HPI: No further chest pain.  Is having some burning with urination.      Review of Systems  Gen- No fevers, chills  CV- No chest pain, palpitations  Resp- No cough, dyspnea  GI- No N/V/D, abd pain    Otherwise ROS is negative except as mentioned in the HPI.    Objective   Objective     Vital Signs:   Temp:  [97.3 °F (36.3 °C)-100.4 °F (38 °C)] 97.3 °F (36.3 °C)  Heart Rate:  [74-85] 74  Resp:  [18] 18  BP: (119-151)/(53-72) 119/53  FiO2 (%):  [30 %] 30 %        Physical Exam:  Constitutional: No acute distress, awake, alert  HENT: NCAT, mucous membranes moist  Respiratory: Clear to auscultation bilaterally, respiratory effort normal   Cardiovascular: RRR, no murmurs, rubs, or gallops  Gastrointestinal: Positive bowel sounds, soft, nontender, nondistended  Musculoskeletal: No bilateral ankle edema  Psychiatric: Appropriate affect, cooperative  Neurologic: Cranial Nerves grossly intact to confrontation, speech clear  Skin: No rashes    Results Reviewed:  I have personally reviewed current lab, radiology, and data and agree.      Results from last 7 days  Lab Units 18  0703 18  1557   WBC 10*3/mm3 11.85* 6.61   HEMOGLOBIN g/dL 10.8* 11.8   HEMATOCRIT % 33.0* 36.1   PLATELETS 10*3/mm3 202 233       Results from last 7 days  Lab Units 18  1345 18  0953 18  0703 18  1557   SODIUM mmol/L  --   --  135 136   POTASSIUM mmol/L  --   --  3.6 3.6   CHLORIDE mmol/L  --   --  97* 96*   CO2 mmol/L  --   --  31.0 28.0   BUN mg/dL  --   --  15 22   CREATININE mg/dL  --   --  0.69 0.93   GLUCOSE mg/dL  --   --  150* 98   CALCIUM mg/dL  --   --  9.0 9.6   TROPONIN I ng/mL 0.006 0.006 0.006  --      Estimated Creatinine  Clearance: 78.5 mL/min (by C-G formula based on SCr of 0.69 mg/dL).  No results found for: BNP    Microbiology Results Abnormal     None          Imaging Results (last 24 hours)     ** No results found for the last 24 hours. **             I have reviewed the medications.    Assessment/Plan   Assessment / Plan     Hospital Problem List     * (Principal)Spinal stenosis, lumbar region, with neurogenic claudication    Coronary artery disease involving native coronary artery of native heart without angina pectoris    Peripheral vascular disease (CMS/HCC)    Chronic atrial fibrillation (CMS/HCC)            Assessment & Plan:    Atypical chest pain  Coronary artery disease  -Chest pain now resolved  -Stress test report from 4/2018 reviewed and was normal  -EKG reviewed; NSR with no acute ischemic changes.  Troponin negative x 3, CXR unremarkable.  -Per neurosurgery, plan to resume ASA 7/9  -Consider restarting Ranexa for angina if she has recurrent episodes of chest pain with negative troponin as she was previously on this but it was stopped in March due to her request     Chronic A-fib  -Eliquis on hold due to surgery  -Resume at discharge per Neurosurgery team     GERD  -Continue PPI     Spinal stenosis s/p lumbar laminectomy  -Management per primary service    Dysuria  -UA not suggestive of infection  -Pyridium PRN    CODE STATUS:   Code Status and Medical Interventions:   Ordered at: 07/06/18 1909     Code Status:    CPR     Medical Interventions (Level of Support Prior to Arrest):    Full         Electronically signed by Dana Lam MD, 07/08/18, 12:19 PM.

## 2018-07-08 NOTE — PROGRESS NOTES
HOD# : 0    No events last night    Principal Problem:    Spinal stenosis, lumbar region, with neurogenic claudication  Active Problems:    Coronary artery disease involving native coronary artery of native heart without angina pectoris    Peripheral vascular disease (CMS/HCC)    Chronic atrial fibrillation (CMS/HCC)      Temp:  [97.5 °F (36.4 °C)-100.4 °F (38 °C)] 100.4 °F (38 °C)  Heart Rate:  [75-85] 75  Resp:  [18] 18  BP: (126-170)/(62-85) 151/70  FiO2 (%):  [30 %] 30 %  I/O last 3 completed shifts:  In: 1318 [P.O.:1318]  Out: 3300 [Urine:3300]  No intake/output data recorded.  Vital signs were reviewed and documented in the chart      EXAM   Patient appeared in good neurologic function with good comprehension   CN grossly intact  Moves all extremities to command        PLAN     Home soon

## 2018-07-09 LAB
GLUCOSE BLDC GLUCOMTR-MCNC: 147 MG/DL (ref 70–130)
GLUCOSE BLDC GLUCOMTR-MCNC: 157 MG/DL (ref 70–130)
GLUCOSE BLDC GLUCOMTR-MCNC: 158 MG/DL (ref 70–130)
GLUCOSE BLDC GLUCOMTR-MCNC: 192 MG/DL (ref 70–130)
GLUCOSE BLDC GLUCOMTR-MCNC: 218 MG/DL (ref 70–130)

## 2018-07-09 PROCEDURE — A9270 NON-COVERED ITEM OR SERVICE: HCPCS | Performed by: NEUROLOGICAL SURGERY

## 2018-07-09 PROCEDURE — 63710000001 SPIRONOLACTONE 25 MG TABLET: Performed by: NEUROLOGICAL SURGERY

## 2018-07-09 PROCEDURE — 63710000001 CHOLECALCIFEROL 1000 UNITS TABLET: Performed by: INTERNAL MEDICINE

## 2018-07-09 PROCEDURE — 63710000001 BUMETANIDE 1 MG TABLET: Performed by: NEUROLOGICAL SURGERY

## 2018-07-09 PROCEDURE — 94660 CPAP INITIATION&MGMT: CPT

## 2018-07-09 PROCEDURE — A9270 NON-COVERED ITEM OR SERVICE: HCPCS | Performed by: PHYSICIAN ASSISTANT

## 2018-07-09 PROCEDURE — 63710000001 RANOLAZINE 500 MG TABLET SUSTAINED-RELEASE 12 HOUR: Performed by: NEUROLOGICAL SURGERY

## 2018-07-09 PROCEDURE — 63710000001 METFORMIN 1000 MG TABLET: Performed by: NEUROLOGICAL SURGERY

## 2018-07-09 PROCEDURE — 63710000001 SENNOSIDES-DOCUSATE SODIUM 8.6-50 MG TABLET: Performed by: PHYSICIAN ASSISTANT

## 2018-07-09 PROCEDURE — G8979 MOBILITY GOAL STATUS: HCPCS

## 2018-07-09 PROCEDURE — 63710000001 CETIRIZINE 10 MG TABLET: Performed by: NEUROLOGICAL SURGERY

## 2018-07-09 PROCEDURE — 63710000001 POLYETHYLENE GLYCOL PACK: Performed by: PHYSICIAN ASSISTANT

## 2018-07-09 PROCEDURE — 99024 POSTOP FOLLOW-UP VISIT: CPT | Performed by: NEUROLOGICAL SURGERY

## 2018-07-09 PROCEDURE — 63710000001 CITALOPRAM 20 MG TABLET: Performed by: NEUROLOGICAL SURGERY

## 2018-07-09 PROCEDURE — G0378 HOSPITAL OBSERVATION PER HR: HCPCS

## 2018-07-09 PROCEDURE — 82962 GLUCOSE BLOOD TEST: CPT

## 2018-07-09 PROCEDURE — 63710000001 BETHANECHOL 25 MG TABLET: Performed by: NEUROLOGICAL SURGERY

## 2018-07-09 PROCEDURE — 63710000001 LOSARTAN 50 MG TABLET: Performed by: NEUROLOGICAL SURGERY

## 2018-07-09 PROCEDURE — 63710000001 CARBIDOPA-LEVODOPA 25-100 MG TABLET: Performed by: NEUROLOGICAL SURGERY

## 2018-07-09 PROCEDURE — 63710000001 MULTIVITAMIN WITH MINERALS TABLET: Performed by: NEUROLOGICAL SURGERY

## 2018-07-09 PROCEDURE — 25010000002 ONDANSETRON PER 1 MG: Performed by: NEUROLOGICAL SURGERY

## 2018-07-09 PROCEDURE — 63710000001 FERROUS SULFATE 325 (65 FE) MG TABLET: Performed by: NEUROLOGICAL SURGERY

## 2018-07-09 PROCEDURE — 99225 PR SBSQ OBSERVATION CARE/DAY 25 MINUTES: CPT | Performed by: PHYSICIAN ASSISTANT

## 2018-07-09 PROCEDURE — 97162 PT EVAL MOD COMPLEX 30 MIN: CPT

## 2018-07-09 PROCEDURE — 63710000001 SENNOSIDES-DOCUSATE SODIUM 8.6-50 MG TABLET: Performed by: NEUROLOGICAL SURGERY

## 2018-07-09 PROCEDURE — 63710000001 ASPIRIN 81 MG CHEWABLE TABLET: Performed by: NEUROLOGICAL SURGERY

## 2018-07-09 PROCEDURE — 63710000001 APIXABAN 5 MG TABLET: Performed by: NEUROLOGICAL SURGERY

## 2018-07-09 PROCEDURE — G8978 MOBILITY CURRENT STATUS: HCPCS

## 2018-07-09 PROCEDURE — 63710000001 CLONIDINE 0.1 MG TABLET: Performed by: NEUROLOGICAL SURGERY

## 2018-07-09 PROCEDURE — 63710000001 POTASSIUM CHLORIDE 10 MEQ CAPSULE CONTROLLED-RELEASE: Performed by: NEUROLOGICAL SURGERY

## 2018-07-09 PROCEDURE — 63710000001 PANTOPRAZOLE 40 MG TABLET DELAYED-RELEASE: Performed by: NEUROLOGICAL SURGERY

## 2018-07-09 PROCEDURE — 63710000001 OXYBUTYNIN XL 5 MG TABLET SUSTAINED-RELEASE 24 HOUR: Performed by: NEUROLOGICAL SURGERY

## 2018-07-09 PROCEDURE — 63710000001 PRAMIPEXOLE 1 MG TABLET: Performed by: NEUROLOGICAL SURGERY

## 2018-07-09 PROCEDURE — 63710000001 METOLAZONE 2.5 MG TABLET: Performed by: NEUROLOGICAL SURGERY

## 2018-07-09 PROCEDURE — 63710000001 LEVOTHYROXINE 200 MCG TABLET: Performed by: NEUROLOGICAL SURGERY

## 2018-07-09 PROCEDURE — 63710000001 AMANTADINE 100 MG CAPSULE: Performed by: NEUROLOGICAL SURGERY

## 2018-07-09 PROCEDURE — 63710000001 VITAMIN C 500 MG TABLET: Performed by: NEUROLOGICAL SURGERY

## 2018-07-09 PROCEDURE — 63710000001 OXYCODONE-ACETAMINOPHEN 10-325 MG TABLET: Performed by: NEUROLOGICAL SURGERY

## 2018-07-09 PROCEDURE — A9270 NON-COVERED ITEM OR SERVICE: HCPCS | Performed by: INTERNAL MEDICINE

## 2018-07-09 PROCEDURE — 94799 UNLISTED PULMONARY SVC/PX: CPT

## 2018-07-09 PROCEDURE — 97116 GAIT TRAINING THERAPY: CPT

## 2018-07-09 RX ORDER — ASPIRIN 81 MG/1
81 TABLET, CHEWABLE ORAL DAILY
Status: DISCONTINUED | OUTPATIENT
Start: 2018-07-09 | End: 2018-07-12 | Stop reason: HOSPADM

## 2018-07-09 RX ORDER — SENNA AND DOCUSATE SODIUM 50; 8.6 MG/1; MG/1
2 TABLET, FILM COATED ORAL NIGHTLY
Status: DISCONTINUED | OUTPATIENT
Start: 2018-07-09 | End: 2018-07-12 | Stop reason: HOSPADM

## 2018-07-09 RX ORDER — METOLAZONE 2.5 MG/1
2.5 TABLET ORAL DAILY PRN
Status: DISCONTINUED | OUTPATIENT
Start: 2018-07-09 | End: 2018-07-09

## 2018-07-09 RX ORDER — METOLAZONE 2.5 MG/1
2.5 TABLET ORAL DAILY
Status: DISCONTINUED | OUTPATIENT
Start: 2018-07-10 | End: 2018-07-12 | Stop reason: HOSPADM

## 2018-07-09 RX ADMIN — BETHANECHOL CHLORIDE 25 MG: 25 TABLET ORAL at 22:08

## 2018-07-09 RX ADMIN — CARBIDOPA AND LEVODOPA 1 TABLET: 25; 100 TABLET ORAL at 11:25

## 2018-07-09 RX ADMIN — BUMETANIDE 2 MG: 1 TABLET ORAL at 09:38

## 2018-07-09 RX ADMIN — LEVOTHYROXINE SODIUM 200 MCG: 200 TABLET ORAL at 06:10

## 2018-07-09 RX ADMIN — APIXABAN 5 MG: 5 TABLET, FILM COATED ORAL at 09:36

## 2018-07-09 RX ADMIN — Medication 325 MG: at 18:01

## 2018-07-09 RX ADMIN — VITAMIN D, TAB 1000IU (100/BT) 2000 UNITS: 25 TAB at 22:09

## 2018-07-09 RX ADMIN — CLONIDINE HYDROCHLORIDE 0.1 MG: 0.1 TABLET ORAL at 09:37

## 2018-07-09 RX ADMIN — BETHANECHOL CHLORIDE 25 MG: 25 TABLET ORAL at 16:06

## 2018-07-09 RX ADMIN — OXYCODONE HYDROCHLORIDE AND ACETAMINOPHEN 1 TABLET: 10; 325 TABLET ORAL at 22:10

## 2018-07-09 RX ADMIN — POLYETHYLENE GLYCOL (3350) 17 G: 17 POWDER, FOR SOLUTION ORAL at 14:34

## 2018-07-09 RX ADMIN — INSULIN LISPRO 2 UNITS: 100 INJECTION, SOLUTION INTRAVENOUS; SUBCUTANEOUS at 18:01

## 2018-07-09 RX ADMIN — CARBIDOPA AND LEVODOPA 1 TABLET: 25; 100 TABLET ORAL at 10:26

## 2018-07-09 RX ADMIN — CARBIDOPA AND LEVODOPA 1 TABLET: 25; 100 TABLET ORAL at 22:07

## 2018-07-09 RX ADMIN — IPRATROPIUM BROMIDE 1 SPRAY: 21 SPRAY NASAL at 09:33

## 2018-07-09 RX ADMIN — BUMETANIDE 2 MG: 1 TABLET ORAL at 22:04

## 2018-07-09 RX ADMIN — INSULIN LISPRO 3 UNITS: 100 INJECTION, SOLUTION INTRAVENOUS; SUBCUTANEOUS at 09:38

## 2018-07-09 RX ADMIN — Medication 325 MG: at 09:36

## 2018-07-09 RX ADMIN — CETIRIZINE HYDROCHLORIDE 10 MG: 10 TABLET, FILM COATED ORAL at 09:36

## 2018-07-09 RX ADMIN — CITALOPRAM HYDROBROMIDE 20 MG: 20 TABLET ORAL at 22:07

## 2018-07-09 RX ADMIN — OXYCODONE HYDROCHLORIDE AND ACETAMINOPHEN 1 TABLET: 10; 325 TABLET ORAL at 03:36

## 2018-07-09 RX ADMIN — OXYCODONE HYDROCHLORIDE AND ACETAMINOPHEN 500 MG: 500 TABLET ORAL at 09:36

## 2018-07-09 RX ADMIN — LOSARTAN POTASSIUM 50 MG: 50 TABLET ORAL at 09:33

## 2018-07-09 RX ADMIN — SPIRONOLACTONE 25 MG: 25 TABLET, FILM COATED ORAL at 09:37

## 2018-07-09 RX ADMIN — RANOLAZINE 500 MG: 500 TABLET, FILM COATED, EXTENDED RELEASE ORAL at 22:03

## 2018-07-09 RX ADMIN — IPRATROPIUM BROMIDE 1 SPRAY: 21 SPRAY NASAL at 22:02

## 2018-07-09 RX ADMIN — CARBIDOPA AND LEVODOPA 1 TABLET: 25; 100 TABLET ORAL at 16:06

## 2018-07-09 RX ADMIN — SENNOSIDES AND DOCUSATE SODIUM 1 TABLET: 8.6; 5 TABLET ORAL at 09:36

## 2018-07-09 RX ADMIN — LOSARTAN POTASSIUM 50 MG: 50 TABLET ORAL at 22:09

## 2018-07-09 RX ADMIN — OXYCODONE HYDROCHLORIDE AND ACETAMINOPHEN 1 TABLET: 10; 325 TABLET ORAL at 11:25

## 2018-07-09 RX ADMIN — MULTIPLE VITAMINS W/ MINERALS TAB 1 TABLET: TAB at 09:37

## 2018-07-09 RX ADMIN — PRAMIPEXOLE DIHYDROCHLORIDE 1.5 MG: 0.25 TABLET ORAL at 22:03

## 2018-07-09 RX ADMIN — METOLAZONE 2.5 MG: 2.5 TABLET ORAL at 09:35

## 2018-07-09 RX ADMIN — RANOLAZINE 500 MG: 500 TABLET, FILM COATED, EXTENDED RELEASE ORAL at 09:33

## 2018-07-09 RX ADMIN — VITAMIN D, TAB 1000IU (100/BT) 2000 UNITS: 25 TAB at 09:37

## 2018-07-09 RX ADMIN — ONDANSETRON 4 MG: 2 INJECTION, SOLUTION INTRAMUSCULAR; INTRAVENOUS at 23:21

## 2018-07-09 RX ADMIN — INSULIN LISPRO 2 UNITS: 100 INJECTION, SOLUTION INTRAVENOUS; SUBCUTANEOUS at 22:10

## 2018-07-09 RX ADMIN — POTASSIUM CHLORIDE 10 MEQ: 750 CAPSULE, EXTENDED RELEASE ORAL at 09:36

## 2018-07-09 RX ADMIN — OXYBUTYNIN CHLORIDE 10 MG: 5 TABLET, EXTENDED RELEASE ORAL at 10:21

## 2018-07-09 RX ADMIN — ASPIRIN 81 MG 81 MG: 81 TABLET ORAL at 09:36

## 2018-07-09 RX ADMIN — INSULIN LISPRO 2 UNITS: 100 INJECTION, SOLUTION INTRAVENOUS; SUBCUTANEOUS at 11:25

## 2018-07-09 RX ADMIN — PANTOPRAZOLE SODIUM 40 MG: 40 TABLET, DELAYED RELEASE ORAL at 06:15

## 2018-07-09 RX ADMIN — AMANTADINE HYDROCHLORIDE 100 MG: 100 CAPSULE ORAL at 09:33

## 2018-07-09 RX ADMIN — CARBIDOPA AND LEVODOPA 1 TABLET: 25; 100 TABLET ORAL at 18:00

## 2018-07-09 RX ADMIN — Medication 2 TABLET: at 22:06

## 2018-07-09 RX ADMIN — METFORMIN HYDROCHLORIDE 1000 MG: 1000 TABLET, FILM COATED ORAL at 18:01

## 2018-07-09 RX ADMIN — POTASSIUM CHLORIDE 10 MEQ: 750 CAPSULE, EXTENDED RELEASE ORAL at 22:05

## 2018-07-09 RX ADMIN — AMANTADINE HYDROCHLORIDE 100 MG: 100 CAPSULE ORAL at 22:06

## 2018-07-09 RX ADMIN — POLYETHYLENE GLYCOL (3350) 17 G: 17 POWDER, FOR SOLUTION ORAL at 22:02

## 2018-07-09 RX ADMIN — BETHANECHOL CHLORIDE 25 MG: 25 TABLET ORAL at 09:37

## 2018-07-09 RX ADMIN — APIXABAN 5 MG: 5 TABLET, FILM COATED ORAL at 22:08

## 2018-07-09 RX ADMIN — CLONIDINE HYDROCHLORIDE 0.1 MG: 0.1 TABLET ORAL at 22:07

## 2018-07-09 RX ADMIN — METFORMIN HYDROCHLORIDE 1000 MG: 1000 TABLET, FILM COATED ORAL at 09:34

## 2018-07-09 RX ADMIN — CARBIDOPA AND LEVODOPA 2 TABLET: 25; 100 TABLET ORAL at 06:10

## 2018-07-09 NOTE — THERAPY EVALUATION
Acute Care - Physical Therapy Initial Evaluation  [facility]     Patient Name: Joanna Cross  : 1950  MRN: [id number]  Today's Date: 2018   Onset of Illness/Injury or Date of Surgery: 18  Date of Referral to PT: 18  Referring Physician: MD Stevenson      Admit Date: 2018    Visit Dx:     ICD-10-CM ICD-9-CM   1. Impaired functional mobility, balance, gait, and endurance Z74.09 V49.89   2. Spinal stenosis, lumbar region, with neurogenic claudication M48.062 724.03     Patient Active Problem List   Diagnosis   • Coronary artery disease involving native coronary artery of native heart without angina pectoris   • Essential hypertension   • Peripheral vascular disease (CMS/[facility])   • Hyperlipidemia LDL goal <70   • Chronic atrial fibrillation (CMS/[facility])   • Spinal stenosis, lumbar region, with neurogenic claudication     Past Medical History:   Diagnosis Date   • Anemia    • Arthritis    • Atrial fibrillation (CMS/[facility])    • Chronic edema     bilateral   • Coronary artery disease involving native coronary artery of native heart without angina pectoris 3/1/2017   • Depression    • Diabetes mellitus (CMS/[facility])     type 2, checks fsbg 1-2x/day and as needed; last a1c  6.5   • Essential hypertension 3/1/2017   • GERD (gastroesophageal reflux disease)    • GIB (gastrointestinal bleeding) 2016    d/t xarelto    • Hiatal hernia    • History of transfusion 2016    d/t GIB, no reaction per pt report    • Mixed hyperlipidemia 3/1/2017   • Myocardial infarction    • MILLI on CPAP    • Parkinson disease (CMS/[facility])    • Peripheral vascular disease (CMS/[facility]) 3/1/2017   • Skin cancer    • SSS (sick sinus syndrome) (CMS/[facility])     ppm    • TIA (transient ischemic attack)     no residual    • Varicose vein of leg     not spec of leg    • Venous hypertension    • Venous insufficiency    • Wears glasses      Past Surgical History:   Procedure Laterality Date   • BICEPS TENDON REPAIR Right     shoulder   • BREAST  BIOPSY Left 2004   • BUNIONECTOMY Right    • CARDIAC CATHETERIZATION     • CHOLECYSTECTOMY     • COLONOSCOPY  2016   • CYST REMOVAL      left ear, upper left back 2001   • DIAGNOSTIC LAPAROSCOPY  1981   • ENDOSCOPIC FUNCTIONAL SINUS SURGERY (FESS)  2011   • ENDOSCOPY  2016   • HAMMER TOE REPAIR Left    • LUMBAR LAMINECTOMY DISCECTOMY DECOMPRESSION N/A 7/6/2018    Procedure: LUMBAR LAMINECTOMY L4-5, HEMILAMIINECTOMY RIGHT L5-S1, FORAMINOTOMY L5-S1;  Surgeon: Lencho Gamino MD;  Location: [facility];  Service: Neurosurgery   • LUNG BIOPSY Left 2016   • PACEMAKER IMPLANTATION  11/2016    sss   • REPLACEMENT TOTAL KNEE Bilateral     left knee 2011, right 2012 per dr acuna    • SHOULDER ARTHROSCOPY Bilateral     2003-left, 2004- right    • SKIN BIOPSY      skin cancer back 2016   • TUBAL ABDOMINAL LIGATION  1980   • WISDOM TOOTH EXTRACTION          PT ASSESSMENT (last 12 hours)      Physical Therapy Evaluation     Row Name 07/09/18 1125          PT Evaluation Time/Intention    Subjective Information complains of;pain  -     Document Type evaluation  -     Mode of Treatment physical therapy  -     Patient Effort good  -     Symptoms Noted During/After Treatment shortness of breath;fatigue  -     Comment RN notified  -     Row Name 07/09/18 1125          General Information    Patient Profile Reviewed? yes  -MJ     Onset of Illness/Injury or Date of Surgery 07/06/18  -     Referring Physician MD Stevenson  -     Patient Observations alert;cooperative;agree to therapy  -     General Observations of Patient Pt supine in bed, 2L O2, tele, purewick catheter in place. Dtr present  -MJ     Prior Level of Function min assist:;all household mobility;community mobility;gait;transfer;bed mobility;cooking;cleaning;driving;shopping   Used rollator or cane for mobility, pain inc. w/ activity  -     Equipment Currently Used at Home rollator;cane, quad;shower chair;grab bar;raised toilet  -     Pertinent History of  Current Functional Problem Pt presents for surgical management of back pain and dysfunction with symptoms into R LE that failed to improve with conservative management.  -MJ     Existing Precautions/Restrictions fall;spinal;oxygen therapy device and L/min;other (see comments)   Parkinson's  -MJ     Risks Reviewed patient and family:;LOB;increased discomfort  -MJ     Benefits Reviewed patient and family:;improve function;increase independence;increase strength;increase balance;decrease pain  -MJ     Barriers to Rehab previous functional deficit  -MJ     Row Name 07/09/18 1125          Relationship/Environment    Lives With alone  -MJ     Family Caregiver if Needed child(lisa), adult  -MJ     Concerns About Impact on Relationships Dtr only available PRN, works full-time  -MJ     Row Name 07/09/18 1125          Resource/Environmental Concerns    Current Living Arrangements home/apartment/condo  -MJ     Row Name 07/09/18 1125          Cognitive Assessment/Interventions    Additional Documentation Cognitive Assessment/Intervention (Group)  -MJ     Row Name 07/09/18 1125          Cognitive Assessment/Intervention- PT/OT    Affect/Mental Status (Cognitive) WFL  -MJ     Orientation Status (Cognition) oriented x 4  -MJ     Follows Commands (Cognition) WFL  -MJ     Row Name 07/09/18 1125          Safety Issues, Functional Mobility    Impairments Affecting Function (Mobility) pain;strength;endurance/activity tolerance  -MJ     Row Name 07/09/18 1125          Bed Mobility Assessment/Treatment    Bed Mobility Assessment/Treatment supine-sit;sit-supine;rolling left;rolling right  -MJ     Rolling Left [place] (Bed Mobility) moderate assist (50% patient effort)  -MJ     Rolling Right [place] (Bed Mobility) minimum assist (75% patient effort)  -MJ     Supine-Sit [place] (Bed Mobility) moderate assist (50% patient effort);2 person assist;verbal cues  -MJ     Sit-Supine [place] (Bed Mobility) not tested   Pt [facility]   -MJ     Bed Mobility, Safety Issues decreased use of legs for bridging/pushing  -     Assistive Device (Bed Mobility) bed rails;head of bed elevated  -MJ     Comment (Bed Mobility) Pt rolled to have brief donned prior to OOB activity. Pt performed log roll out of bed, verbal cues for sequencing. Pt required assist with legs and trunk  -     Row Name 07/09/18 1125          Transfer Assessment/Treatment    Transfer Assessment/Treatment sit-stand transfer;stand-sit transfer  -     Comment (Transfers) Verbal cues to push up from bed with UEs to stand and to reach back for chair to lower into sitting  -     Sit-Stand [place] (Transfers) minimum assist (75% patient effort);2 person assist;verbal cues  -     Stand-Sit [place] (Transfers) contact guard;2 person assist;verbal cues  -     Row Name 07/09/18 1125          Sit-Stand Transfer    Assistive Device (Sit-Stand Transfers) walker, front-wheeled  -MJ     Row Name 07/09/18 1125          Stand-Sit Transfer    Assistive Device (Stand-Sit Transfers) walker, front-wheeled  -MJ     Row Name 07/09/18 1125          Gait/Stairs Assessment/Training    86356 - Gait Training Minutes  10  -MJ     [place] Level (Gait) minimum assist (75% patient effort);1 person to manage equipment;verbal cues  -     Assistive Device (Gait) walker, front-wheeled  -     Distance in Feet (Gait) 40  -MJ     Pattern (Gait) step-through  -MJ     Deviations/Abnormal Patterns (Gait) bilateral deviations;antalgic;manjit decreased;stride length decreased  -     Bilateral Gait Deviations forward flexed posture;heel strike decreased;weight shift ability decreased  -MJ     Comment (Gait/Stairs) Pt demo step through gait pattern at slow pace. Verbal cues for upright posture and to stay in middle of RW, mild improvement. SOA noted with increased distance, cues for PLB. Gait limited by fatigue  -     Row Name 07/09/18 1125          General ROM    GENERAL ROM COMMENTS No ROM  deficits B LE  -MJ     Row Name 07/09/18 1125          General Assessment (Manual Muscle Testing)    General Manual Muscle Testing (MMT) Assessment lower extremity strength deficits identified  -     Row Name 07/09/18 1125          Lower Extremity (Manual Muscle Testing)    Comment, MMT: Lower Extremity B LE grossly 4/5  -MJ     [facility] Name 07/09/18 1125          Motor Assessment/Intervention    Additional Documentation Therapeutic Exercise (Group);Therapeutic Exercise Interventions (Group)  -     Row Name 07/09/18 1125          Therapeutic Exercise    14829 - PT Therapeutic Exercise Minutes 2  -MJ     Row Name 07/09/18 1125          Therapeutic Exercise    Lower Extremity (Therapeutic Exercise) gluteal sets;quad sets, bilateral  -MJ     Lower Extremity Range of Motion (Therapeutic Exercise) hip internal/external rotation, bilateral;ankle dorsiflexion/plantar flexion, bilateral  -MJ     Core Strength (Therapeutic Exercise) abdominal bracing  -MJ     Position (Therapeutic Exercise) seated  -MJ     Sets/Reps (Therapeutic Exercise) 10x each  -MJ     Comment (Therapeutic Exercise) Back protocol: cues for technique  -[facility] Name 07/09/18 1125          Sensory Assessment/Intervention    Sensory General Assessment no sensation deficits identified   denies numbness/tingling; can actively DF both ankles  -[facility] Name 07/09/18 1125          Pain Assessment    Additional Documentation Pain Scale: Numbers Pre/Post-Treatment (Group)  -[facility] Name 07/09/18 1125          Pain Scale: Numbers Pre/Post-Treatment    Pain Scale: Numbers, Pretreatment 5/10  -MJ     Pain Scale: Numbers, Post-Treatment 3/10  -MJ     Pain Location - Orientation lower  -MJ     Pain Location back  -MJ     Pain Intervention(s) Repositioned;Ambulation/increased activity   pre-medicated  -     Row Name             Wound 07/06/18 1627 Other (See comments) back incision    Wound - Properties Group Date first assessed: 07/06/18  -SV Time first  assessed: 1627  -SV Side: Other (See comments)  -SV Location: back  -SV Type: incision  -SV    Row Name 07/09/18 1125          Plan of Care Review    Plan of Care Reviewed With patient;daughter  -DAFNE     Row Name 07/09/18 1125          Physical Therapy Clinical Impression    Date of Referral to PT 07/09/18  -     PT Diagnosis (PT Clinical Impression) s/p L4-5 laminectomy with decompression, L5-S1 partial laminectomy  -     Patient/Family Goals Statement (PT Clinical Impression) decrease pain  -     Criteria for Skilled Interventions Met (PT Clinical Impression) yes;treatment indicated  -     Care Plan Review (PT) evaluation/treatment results reviewed;care plan/treatment goals reviewed;risks/benefits reviewed;patient/other agree to care plan  -     Care Plan Review, Other Participant (PT Clinical Impression) daughter  -DAFNE     Row Name 07/09/18 1125          Vital Signs    Pre SpO2 (%) 95  -MJ     O2 Delivery Pre Treatment supplemental O2   2L  -MJ     Post SpO2 (%) 92  -MJ     O2 Delivery Post Treatment room air  -MJ     Pre Patient Position Supine  -MJ     Post Patient Position Sitting  -MJ     Row Name 07/09/18 1125          Physical Therapy Goals    Bed Mobility Goal Selection (PT) bed mobility, PT goal 1  -MJ     Transfer Goal Selection (PT) transfer, PT goal 1  -MJ     Gait Training Goal Selection (PT) gait training, PT goal 1  -     Row Name 07/09/18 1125          Bed Mobility Goal 1 (PT)    Activity/Assistive Device (Bed Mobility Goal 1, PT) sit to supine/supine to sit   via log roll  -MJ     Holder Level/Cues Needed (Bed Mobility Goal 1, PT) minimum assist (75% or more patient effort)  -     Time Frame (Bed Mobility Goal 1, PT) 5 days;long term goal (LTG)  -MJ     Progress/Outcomes (Bed Mobility Goal 1, PT) goal ongoing  -     Row Name 07/09/18 1125          Transfer Goal 1 (PT)    Activity/Assistive Device (Transfer Goal 1, PT) sit-to-stand/stand-to-sit;walker, rolling  -MJ      [place] Level/Cues Needed (Transfer Goal 1, PT) supervision required  -MJ     Time Frame (Transfer Goal 1, PT) 5 days;long term goal (LTG)  -MJ     Progress/Outcome (Transfer Goal 1, PT) goal ongoing  -MJ     Row Name 07/09/18 1125          Gait Training Goal 1 (PT)    Activity/Assistive Device (Gait Training Goal 1, PT) gait (walking locomotion);walker, rolling  -MJ     [place] Level (Gait Training Goal 1, PT) supervision required  -MJ     Distance (Gait Goal 1, PT) 150 feet  -MJ     Time Frame (Gait Training Goal 1, PT) 5 days;long term goal (LTG)  -MJ     Progress/Outcome (Gait Training Goal 1, PT) goal ongoing  -MJ     Row Name 07/09/18 1125          Positioning and Restraints    Pre-Treatment Position in bed  -MJ     Post Treatment Position chair  -MJ     In Chair notified nsg;reclined;call light within reach;encouraged to call for assist;with family/caregiver;exit alarm on  -MJ     Row Name 07/09/18 1125          Living Environment    Home Accessibility wheelchair accessible   ramp, walk-in shower  -MJ       User Key  (r) = Recorded By, (t) = Taken By, (c) = Cosigned By    Initials Name Provider Type    MJ Nito Justice, PT Physical Therapist    SV Amna Chou, RN Registered Nurse          Physical Therapy Education     Title: PT OT SLP Therapies (Done)     Topic: Physical Therapy (Done)     Point: Mobility training (Done)    Learning Progress Summary     Learner Status Readiness Method Response Comment Documented by    Patient Done Acceptance MARVIN JAUREGUI,NR Reviewed back precautions, HEP, gait mechanics, benefits of mobility. Issued HEP handout  07/09/18 1324    Family Done Acceptance MARVIN JAUREGUI,NR Reviewed back precautions, HEP, gait mechanics, benefits of mobility. Issued HEP handout  07/09/18 1324          Point: Home exercise program (Done)    Learning Progress Summary     Learner Status Readiness Method Response Comment Documented by    Patient Done Acceptance MARVIN JAUREGUI,NR Reviewed back  precautions, HEP, gait mechanics, benefits of mobility. Issued HEP handout  07/09/18 1324    Family Done Acceptance E,D VU,NR Reviewed back precautions, HEP, gait mechanics, benefits of mobility. Issued HEP handout  07/09/18 1324          Point: Body mechanics (Done)    Learning Progress Summary     Learner Status Readiness Method Response Comment Documented by    Patient Done Acceptance E,D VU,NR Reviewed back precautions, HEP, gait mechanics, benefits of mobility. Issued HEP handout  07/09/18 1324    Family Done Acceptance E,D VU,NR Reviewed back precautions, HEP, gait mechanics, benefits of mobility. Issued HEP handout  07/09/18 1324          Point: Precautions (Done)    Learning Progress Summary     Learner Status Readiness Method Response Comment Documented by    Patient Done Acceptance E,D VU,NR Reviewed back precautions, HEP, gait mechanics, benefits of mobility. Issued HEP handout  07/09/18 1324    Family Done Acceptance E,D VU,NR Reviewed back precautions, HEP, gait mechanics, benefits of mobility. Issued HEP handout  07/09/18 1324                      User Key     Initials Effective Dates Name Provider Type Discipline     04/03/18 -  Nito Justice, EDYTA Physical Therapist PT                PT Recommendation and Plan  Anticipated Discharge Disposition (PT): inpatient rehabilitation facility  Planned Therapy Interventions (PT Eval): balance training, bed mobility training, gait training, home exercise program, patient/family education, strengthening, transfer training  Therapy Frequency (PT Clinical Impression): daily  Outcome Summary/Treatment Plan (PT)  Anticipated Discharge Disposition (PT): inpatient rehabilitation facility  Plan of Care Reviewed With: patient  Outcome Summary: Pt ambulated 40 feet with Yulia and RW, limited by fatigue. PT will follow to increase strength and progress functional mobility with back precautions. Recommend rehab at discharge          Outcome Measures     Row Name  07/09/18 1125             How much help from another person do you currently need...    Turning from your back to your side while in flat bed without using bedrails? 2  -MJ      Moving from lying on back to sitting on the side of a flat bed without bedrails? 2  -MJ      Moving to and from a bed to a chair (including a wheelchair)? 3  -MJ      Standing up from a chair using your arms (e.g., wheelchair, bedside chair)? 2  -MJ      Climbing 3-5 steps with a railing? 1  -MJ      To walk in hospital room? 3  -MJ      AM-PAC 6 Clicks Score 13  -MJ         Functional Assessment    Outcome Measure Options AM-PAC 6 Clicks Basic Mobility (PT)  -        User Key  (r) = Recorded By, (t) = Taken By, (c) = Cosigned By    Initials Name Provider Type    DAFNE Justice PT Physical Therapist           Time Calculation:         PT Charges     Row Name 07/09/18 1125             Time Calculation    Start Time 1125  -MJ      PT Received On 07/09/18  -      PT Goal Re-Cert Due Date 07/19/18  -         Time Calculation- PT    Total Timed Code Minutes- PT 12 minute(s)  -         Timed Charges    51546 - PT Therapeutic Exercise Minutes 2  -MJ      96532 - Gait Training Minutes  10  -MJ        User Key  (r) = Recorded By, (t) = Taken By, (c) = Cosigned By    Initials Name Provider Type    DAFNE Justice PT Physical Therapist        Therapy Suggested Charges     Code   Minutes Charges    53261 (CPT®) Hc Pt Neuromusc Re Education Ea 15 Min      92596 (CPT®) Hc Pt Ther Proc Ea 15 Min 2     35134 (CPT®) Hc Gait Training Ea 15 Min 10 1    79026 (CPT®) Hc Pt Therapeutic Act Ea 15 Min      76405 (CPT®) Hc Pt Manual Therapy Ea 15 Min      02160 (CPT®) Hc Pt Iontophoresis Ea 15 Min      12941 (CPT®) Hc Pt Elec Stim Ea-Per 15 Min      86220 (CPT®) Hc Pt Ultrasound Ea 15 Min      36652 (CPT®) Hc Pt Self Care/Mgmt/Train Ea 15 Min      Total  12 1        Therapy Charges for Today     Code Description Service Date Service Provider Modifiers Qty     67874180692 HC PT MOBILITY CURRENT 7/9/2018 Nito Justice, PT GP, CK 1    14464272312 HC PT MOBILITY PROJECTED 7/9/2018 Nito Justice, PT GP, CJ 1    54876583216 HC PT EVAL MOD COMPLEXITY 3 7/9/2018 Nito Justice, PT GP 1    87424858645 HC GAIT TRAINING EA 15 MIN 7/9/2018 Nito Justice, PT GP 1    60701839733 HC PT THER SUPP EA 15 MIN 7/9/2018 Nito Justice, PT GP 2          PT G-Codes  PT Professional Judgement Used?: Yes  Outcome Measure Options: AM-PAC 6 Clicks Basic Mobility (PT)  Score: 13  Functional Limitation: Mobility: Walking and moving around  Mobility: Walking and Moving Around Current Status (): At least 40 percent but less than 60 percent impaired, limited or restricted  Mobility: Walking and Moving Around Goal Status (): At least 20 percent but less than 40 percent impaired, limited or restricted      Nito Justice, PT  7/9/2018

## 2018-07-09 NOTE — PROGRESS NOTES
Discharge Planning Assessment  [facility]     Patient Name: Joanna Cross  MRN: [id number]  Today's Date: 7/9/2018    Admit Date: 7/6/2018          Discharge Needs Assessment     Row Name 07/09/18 1048       Living Environment    Lives With alone    Current Living Arrangements home/apartment/condo    Primary Care Provided by self    Provides Primary Care For no one    Family Caregiver if Needed child(lisa), adult    Family Caregiver Names Silvana Cross, daughter, [phone]    Quality of Family Relationships helpful;involved;supportive    Able to Return to Prior Arrangements yes       Resource/Environmental Concerns    Resource/Environmental Concerns none       Transition Planning    Patient/Family Anticipates Transition to inpatient rehabilitation facility    Patient/Family Anticipated Services at Transition rehabilitation services;skilled nursing    Transportation Anticipated family or friend will provide       Discharge Needs Assessment    Readmission Within the Last 30 Days no previous admission in last 30 days    Concerns to be Addressed discharge planning    Equipment Currently Used at Home bipap/cpap;grab bar;oxygen;shower chair;rollator;cane, quad;cane, straight;glucometer;ramp   Patient wears 2L O2 with BiPAP qhs; supplied by [facility]    Current Discharge Risk lives alone            Discharge Plan     Row Name 07/09/18 1051       Plan    Plan [facility] vs other    Patient/Family in Agreement with Plan yes    Plan Comments Met with patient and family in the room to initiate discharge planning. Patient lives alone in a home in [place]. She is normally independent with ADLs and uses a rollator or cane with mobility. Patient wears 2L home oxygen with a BiPAP qhs. She would like to go to [facility] for some rehab at discharge. Referral called to [facility]. PT and OT evaluations are pending. Family will provide patient's ride at discharge. CM will continue to follow.          Destination     No service coordination in this encounter.      Durable Medical Equipment     No service coordination in this encounter.      Dialysis/Infusion     No service coordination in this encounter.      Home Medical Care     No service coordination in this encounter.      Social Care     No service coordination in this encounter.        Expected Discharge Date and Time     Expected Discharge Date Expected Discharge Time    Jul 11, 2018               Demographic Summary     Row Name 07/09/18 1047       General Information    Admission Type observation    Referral Source admission list    Reason for Consult discharge planning    General Information Comments PCP is Reynaldo Fritz       Contact Information    Permission Granted to Share Info With ;family/designee   daughter, Silvana Cross            Functional Status     Row Name 07/09/18 1048       Functional Status    Usual Activity Tolerance moderate    Current Activity Tolerance moderate       Functional Status, IADL    Medications independent    Meal Preparation independent    Housekeeping independent    Laundry independent    Shopping independent       Employment/    Employment/ Comments Confirmed patient has medical and rx coverage through [employer] with no issues affording medications            Psychosocial    No documentation.           Abuse/Neglect    No documentation.           Legal    No documentation.           Substance Abuse    No documentation.           Patient Forms    No documentation.         [place]

## 2018-07-09 NOTE — PROGRESS NOTES
[facility] Medicine Services  PROGRESS NOTE    Patient Name: Joanna Cross  : 1950  MRN: [id number]    Date of Admission: 2018  Length of Stay: 0  Primary Care Physician: Reynaldo Fritz MD    Subjective     CC: f/u chest pain, constipation and dysuria     HPI:   Upright in chair, daughter at bedside. She denies fevers/chills, chest pain, dyspnea, N/V or diarrhea. Complains of dysuria and constipation.      Review of Systems  Gen- No fevers, chills  CV- No chest pain, palpitations  Resp- No cough, dyspnea  GI- No N/V/D, abd pain    Otherwise ROS is negative except as mentioned in the HPI.    Objective     Vital Signs:   Temp:  [97.4 °F (36.3 °C)-98.6 °F (37 °C)] 97.4 °F (36.3 °C)  Heart Rate:  [71-85] 85  Resp:  [18-20] 18  BP: (101-141)/(57-75) 102/73  FiO2 (%):  [30 %] 30 %        Physical Exam:  Constitutional: No acute distress, awake, alert  HENT: NCAT, mucous membranes moist  Respiratory: Clear to auscultation bilaterally, respiratory effort normal   Cardiovascular: RRR, no murmurs, rubs, or gallops  Gastrointestinal: Positive bowel sounds, soft, nontender, nondistended  Musculoskeletal: No bilateral ankle edema  Psychiatric: Appropriate affect, cooperative  Neurologic: Cranial Nerves grossly intact to confrontation, speech clear  Skin: No rashes    Results Reviewed:  I have personally reviewed current lab, radiology, and data and agree.      Results from last 7 days  Lab Units 18  0703 18  1557   WBC 10*3/mm3 11.85* 6.61   HEMOGLOBIN g/dL 10.8* 11.8   HEMATOCRIT % 33.0* 36.1   PLATELETS 10*3/mm3 202 233       Results from last 7 days  Lab Units 18  1345 18  0953 18  0703 18  1557   SODIUM mmol/L  --   --  135 136   POTASSIUM mmol/L  --   --  3.6 3.6   CHLORIDE mmol/L  --   --  97* 96*   CO2 mmol/L  --   --  31.0 28.0   BUN mg/dL  --   --  15 22   CREATININE mg/dL  --   --  0.69 0.93   GLUCOSE mg/dL  --   --  150* 98   CALCIUM mg/dL   --   --  9.0 9.6   TROPONIN I ng/mL 0.006 0.006 0.006  --      Estimated Creatinine Clearance: 78.5 mL/min (by C-G formula based on SCr of 0.69 mg/dL).  No results found for: BNP    Microbiology Results Abnormal     None        I have reviewed the medications.    Assessment / Plan     Hospital Problem List     * (Principal)Spinal stenosis, lumbar region, with neurogenic claudication    Coronary artery disease involving native coronary artery of native heart without angina pectoris    Peripheral vascular disease (CMS/HCC)    Chronic atrial fibrillation (CMS/HCC)        Assessment & Plan:    Atypical chest pain  Coronary artery disease  - Chest pain has resolved.   - Stress test report from 4/2018 reviewed and was normal  - EKG reviewed; NSR with no acute ischemic changes.  Troponin negative x 3, CXR unremarkable.  - ASA 81mg renewed by NS on 7/9   - Consider restarting Ranexa for angina if she has recurrent episodes of chest pain with negative troponin (she was previously on this but it was stopped in March due to her request)      Chronic A-fib  - Eliquis resumed 7/9 by NS      GERD  -Continue PPI     Spinal stenosis s/p lumbar laminectomy  - Management per primary service  - Encourage incentive spirometry     Dysuria  - UA not consistent with UTI  - History of cystoscopy and ureteral dilation by Dr. Terrence Mckenzie in March 2018 for urinary retention   - Urology consulted by NS today     Constipation  - Increase bowel regimen     Insulin-dependent DMII  - Reports allergy to Levemir  - Continue Metformin 1000mg BID AC and Lispro TIC AC   - Could use patient's home [place] if she desires for long-acting coverage     Parkinson's disease, continue home regimen     CODE STATUS:   Code Status and Medical Interventions:   Ordered at: 07/06/18 1909     Code Status:    CPR     Medical Interventions (Level of Support Prior to Arrest):    Full     Electronically signed by Alejandra Arrieta PA-C, 07/09/18, 12:53 PM.

## 2018-07-09 NOTE — PROGRESS NOTES
LOS: 0 days   Patient Care Team:  Reynaldo Fritz MD as PCP - General  Reynaldo Fritz MD as PCP - Family Medicine  Faisal Ramirez MD as Obstetrician (Obstetrics and Gynecology)  Timothy Dan DC as Referring Physician (Chiropractic Medicine)    Chief Complaint:  POD #3        Interval History: back painful across lumbosacral region;  C/o burning pain with urination;  No recent chest pain;  Using BiPAP;  Has not been up walking but has been up to chair.        Review of Systems:  done      Vital Signs  Temp:  [97.3 °F (36.3 °C)-100.4 °F (38 °C)] 97.7 °F (36.5 °C)  Heart Rate:  [71-84] 71  Resp:  [18-20] 18  BP: (101-151)/(53-75) 127/64  FiO2 (%):  [30 %] 30 %    Intake/Output Summary (Last 24 hours) at 07/09/18 0703  Last data filed at 07/09/18 0500   Gross per 24 hour   Intake              720 ml   Output             1750 ml   Net            -1030 ml     Physical Exam:  alert and conversant.  Motor Exam   Muscle bulk: normal  Overall muscle tone: normal  No Pronator Drift     Strength   Strength 5/5 throughout.      Sensory Exam   Light touch normal.   Proprioception normal.                  Results Review:     Lab Results   Component Value Date    WBC 11.85 (H) 07/07/2018    HGB 10.8 (L) 07/07/2018    HCT 33.0 (L) 07/07/2018    MCV 88.0 07/07/2018     07/07/2018     Lab Results   Component Value Date    GLUCOSE 150 (H) 07/07/2018    CALCIUM 9.0 07/07/2018     07/07/2018    K 3.6 07/07/2018    CO2 31.0 07/07/2018    CL 97 (L) 07/07/2018    BUN 15 07/07/2018    CREATININE 0.69 07/07/2018    EGFRIFNONA 84 07/07/2018    BCR 21.7 07/07/2018    ANIONGAP 7.0 07/07/2018           Assessment/Plan     Principal Problem:    Spinal stenosis, lumbar region, with neurogenic claudication  Active Problems:    Coronary artery disease involving native coronary artery of native heart without angina pectoris    Peripheral vascular disease (CMS/HCC)    Chronic atrial fibrillation (CMS/HCC)      Plan:  Consult  PT, OT, and urology (Dr. Garnica);   to see regarding discharge planning and referral to [facility];  Stop gabapentin;  Resume ASA and eliquis.  Increase activity.      Plan for disposition      Lencho Gamino MD  07/09/18  7:03 AM

## 2018-07-09 NOTE — PLAN OF CARE
Problem: Patient Care Overview  Goal: Plan of Care Review  Outcome: Ongoing (interventions implemented as appropriate)    Goal: Individualization and Mutuality  Outcome: Ongoing (interventions implemented as appropriate)    Goal: Discharge Needs Assessment  Outcome: Ongoing (interventions implemented as appropriate)      Problem: Fall Risk (Adult)  Goal: Identify Related Risk Factors and Signs and Symptoms  Outcome: Ongoing (interventions implemented as appropriate)    Goal: Absence of Fall  Outcome: Ongoing (interventions implemented as appropriate)      Problem: Pain, Acute (Adult)  Goal: Identify Related Risk Factors and Signs and Symptoms  Outcome: Ongoing (interventions implemented as appropriate)    Goal: Acceptable Pain Control/Comfort Level  Outcome: Ongoing (interventions implemented as appropriate)

## 2018-07-09 NOTE — PLAN OF CARE
Problem: Patient Care Overview  Goal: Plan of Care Review  Outcome: Ongoing (interventions implemented as appropriate)   07/09/18 1325   Coping/Psychosocial   Plan of Care Reviewed With patient   OTHER   Outcome Summary Pt ambulated 40 feet with Yulia and RW, limited by fatigue. PT will follow to increase strength and progress functional mobility with back precautions. Recommend rehab at discharge       Problem: Laminectomy/Foraminotomy/Discectomy (Adult)  Goal: Signs and Symptoms of Listed Potential Problems Will be Absent, Minimized or Managed (Laminectomy/Foraminotomy/Discectomy)  Outcome: Ongoing (interventions implemented as appropriate)   07/09/18 1325   Goal/Outcome Evaluation   Problems Assessed (Laminectomy/Laminotomy/Discectomy) functional decline/self-care deficit;pain   Problems Present (Laminectomy/otomy) functional decline/self-care deficit;pain

## 2018-07-10 LAB
GLUCOSE BLDC GLUCOMTR-MCNC: 146 MG/DL (ref 70–130)
GLUCOSE BLDC GLUCOMTR-MCNC: 148 MG/DL (ref 70–130)
GLUCOSE BLDC GLUCOMTR-MCNC: 186 MG/DL (ref 70–130)
GLUCOSE BLDC GLUCOMTR-MCNC: 203 MG/DL (ref 70–130)

## 2018-07-10 PROCEDURE — 97116 GAIT TRAINING THERAPY: CPT

## 2018-07-10 PROCEDURE — A9270 NON-COVERED ITEM OR SERVICE: HCPCS | Performed by: INTERNAL MEDICINE

## 2018-07-10 PROCEDURE — A9270 NON-COVERED ITEM OR SERVICE: HCPCS | Performed by: NEUROLOGICAL SURGERY

## 2018-07-10 PROCEDURE — 63710000001 LEVOTHYROXINE 200 MCG TABLET: Performed by: NEUROLOGICAL SURGERY

## 2018-07-10 PROCEDURE — 63710000001 RANOLAZINE 500 MG TABLET SUSTAINED-RELEASE 12 HOUR: Performed by: NEUROLOGICAL SURGERY

## 2018-07-10 PROCEDURE — G8987 SELF CARE CURRENT STATUS: HCPCS

## 2018-07-10 PROCEDURE — 63710000001 LOSARTAN 50 MG TABLET: Performed by: NEUROLOGICAL SURGERY

## 2018-07-10 PROCEDURE — 63710000001 OXYCODONE-ACETAMINOPHEN 10-325 MG TABLET: Performed by: NEUROLOGICAL SURGERY

## 2018-07-10 PROCEDURE — A9270 NON-COVERED ITEM OR SERVICE: HCPCS | Performed by: PHYSICIAN ASSISTANT

## 2018-07-10 PROCEDURE — G0378 HOSPITAL OBSERVATION PER HR: HCPCS

## 2018-07-10 PROCEDURE — 63710000001 SENNOSIDES-DOCUSATE SODIUM 8.6-50 MG TABLET: Performed by: PHYSICIAN ASSISTANT

## 2018-07-10 PROCEDURE — 63710000001 VITAMIN C 500 MG TABLET: Performed by: NEUROLOGICAL SURGERY

## 2018-07-10 PROCEDURE — 63710000001 CETIRIZINE 10 MG TABLET: Performed by: NEUROLOGICAL SURGERY

## 2018-07-10 PROCEDURE — 63710000001 CARBIDOPA-LEVODOPA 25-100 MG TABLET: Performed by: NEUROLOGICAL SURGERY

## 2018-07-10 PROCEDURE — 63710000001 METFORMIN 1000 MG TABLET: Performed by: NEUROLOGICAL SURGERY

## 2018-07-10 PROCEDURE — 63710000001 APIXABAN 5 MG TABLET: Performed by: NEUROLOGICAL SURGERY

## 2018-07-10 PROCEDURE — 63710000001 PANTOPRAZOLE 40 MG TABLET DELAYED-RELEASE: Performed by: NEUROLOGICAL SURGERY

## 2018-07-10 PROCEDURE — 63710000001 CITALOPRAM 20 MG TABLET: Performed by: NEUROLOGICAL SURGERY

## 2018-07-10 PROCEDURE — 63710000001 ASPIRIN 81 MG CHEWABLE TABLET: Performed by: NEUROLOGICAL SURGERY

## 2018-07-10 PROCEDURE — 99226 PR SBSQ OBSERVATION CARE/DAY 35 MINUTES: CPT | Performed by: FAMILY MEDICINE

## 2018-07-10 PROCEDURE — 63710000001 AMANTADINE 100 MG CAPSULE: Performed by: NEUROLOGICAL SURGERY

## 2018-07-10 PROCEDURE — 63710000001 CLONIDINE 0.1 MG TABLET: Performed by: NEUROLOGICAL SURGERY

## 2018-07-10 PROCEDURE — 94660 CPAP INITIATION&MGMT: CPT

## 2018-07-10 PROCEDURE — 63710000001 POLYETHYLENE GLYCOL PACK: Performed by: PHYSICIAN ASSISTANT

## 2018-07-10 PROCEDURE — G8988 SELF CARE GOAL STATUS: HCPCS

## 2018-07-10 PROCEDURE — 94799 UNLISTED PULMONARY SVC/PX: CPT

## 2018-07-10 PROCEDURE — 63710000001 BETHANECHOL 25 MG TABLET: Performed by: NEUROLOGICAL SURGERY

## 2018-07-10 PROCEDURE — 63710000001 CHOLECALCIFEROL 1000 UNITS TABLET: Performed by: INTERNAL MEDICINE

## 2018-07-10 PROCEDURE — 97110 THERAPEUTIC EXERCISES: CPT

## 2018-07-10 PROCEDURE — 63710000001 PRAMIPEXOLE 1 MG TABLET: Performed by: NEUROLOGICAL SURGERY

## 2018-07-10 PROCEDURE — 63710000001 ACETAMINOPHEN 325 MG TABLET: Performed by: NEUROLOGICAL SURGERY

## 2018-07-10 PROCEDURE — 63710000001 FERROUS SULFATE 325 (65 FE) MG TABLET: Performed by: NEUROLOGICAL SURGERY

## 2018-07-10 PROCEDURE — 82962 GLUCOSE BLOOD TEST: CPT

## 2018-07-10 PROCEDURE — 63710000001 SPIRONOLACTONE 25 MG TABLET: Performed by: NEUROLOGICAL SURGERY

## 2018-07-10 PROCEDURE — 99024 POSTOP FOLLOW-UP VISIT: CPT | Performed by: NEUROLOGICAL SURGERY

## 2018-07-10 PROCEDURE — 63710000001 METOLAZONE 2.5 MG TABLET: Performed by: PHYSICIAN ASSISTANT

## 2018-07-10 PROCEDURE — 63710000001 BUMETANIDE 1 MG TABLET: Performed by: NEUROLOGICAL SURGERY

## 2018-07-10 PROCEDURE — 51701 INSERT BLADDER CATHETER: CPT

## 2018-07-10 PROCEDURE — 97166 OT EVAL MOD COMPLEX 45 MIN: CPT

## 2018-07-10 PROCEDURE — 63710000001 MULTIVITAMIN WITH MINERALS TABLET: Performed by: NEUROLOGICAL SURGERY

## 2018-07-10 PROCEDURE — 63710000001 POTASSIUM CHLORIDE 10 MEQ CAPSULE CONTROLLED-RELEASE: Performed by: NEUROLOGICAL SURGERY

## 2018-07-10 PROCEDURE — 97535 SELF CARE MNGMENT TRAINING: CPT

## 2018-07-10 RX ADMIN — SPIRONOLACTONE 25 MG: 25 TABLET, FILM COATED ORAL at 09:44

## 2018-07-10 RX ADMIN — METFORMIN HYDROCHLORIDE 1000 MG: 1000 TABLET, FILM COATED ORAL at 09:45

## 2018-07-10 RX ADMIN — POLYETHYLENE GLYCOL (3350) 17 G: 17 POWDER, FOR SOLUTION ORAL at 21:33

## 2018-07-10 RX ADMIN — ASPIRIN 81 MG 81 MG: 81 TABLET ORAL at 09:45

## 2018-07-10 RX ADMIN — AMANTADINE HYDROCHLORIDE 100 MG: 100 CAPSULE ORAL at 21:35

## 2018-07-10 RX ADMIN — PRAMIPEXOLE DIHYDROCHLORIDE 1.5 MG: 0.25 TABLET ORAL at 21:34

## 2018-07-10 RX ADMIN — BETHANECHOL CHLORIDE 25 MG: 25 TABLET ORAL at 15:27

## 2018-07-10 RX ADMIN — ACETAMINOPHEN 650 MG: 325 TABLET, FILM COATED ORAL at 09:55

## 2018-07-10 RX ADMIN — INSULIN LISPRO 4 UNITS: 100 INJECTION, SOLUTION INTRAVENOUS; SUBCUTANEOUS at 12:12

## 2018-07-10 RX ADMIN — APIXABAN 5 MG: 5 TABLET, FILM COATED ORAL at 09:45

## 2018-07-10 RX ADMIN — POLYETHYLENE GLYCOL (3350) 17 G: 17 POWDER, FOR SOLUTION ORAL at 09:44

## 2018-07-10 RX ADMIN — IPRATROPIUM BROMIDE 1 SPRAY: 21 SPRAY NASAL at 09:43

## 2018-07-10 RX ADMIN — AMANTADINE HYDROCHLORIDE 100 MG: 100 CAPSULE ORAL at 09:45

## 2018-07-10 RX ADMIN — MULTIPLE VITAMINS W/ MINERALS TAB 1 TABLET: TAB at 09:44

## 2018-07-10 RX ADMIN — OXYCODONE HYDROCHLORIDE AND ACETAMINOPHEN 1 TABLET: 10; 325 TABLET ORAL at 21:53

## 2018-07-10 RX ADMIN — APIXABAN 5 MG: 5 TABLET, FILM COATED ORAL at 21:36

## 2018-07-10 RX ADMIN — CARBIDOPA AND LEVODOPA 1 TABLET: 25; 100 TABLET ORAL at 12:12

## 2018-07-10 RX ADMIN — VITAMIN D, TAB 1000IU (100/BT) 2000 UNITS: 25 TAB at 09:45

## 2018-07-10 RX ADMIN — VITAMIN D, TAB 1000IU (100/BT) 2000 UNITS: 25 TAB at 21:35

## 2018-07-10 RX ADMIN — CARBIDOPA AND LEVODOPA 1 TABLET: 25; 100 TABLET ORAL at 17:42

## 2018-07-10 RX ADMIN — RANOLAZINE 500 MG: 500 TABLET, FILM COATED, EXTENDED RELEASE ORAL at 21:35

## 2018-07-10 RX ADMIN — BETHANECHOL CHLORIDE 25 MG: 25 TABLET ORAL at 09:44

## 2018-07-10 RX ADMIN — POTASSIUM CHLORIDE 10 MEQ: 750 CAPSULE, EXTENDED RELEASE ORAL at 09:44

## 2018-07-10 RX ADMIN — CLONIDINE HYDROCHLORIDE 0.1 MG: 0.1 TABLET ORAL at 21:35

## 2018-07-10 RX ADMIN — LEVOTHYROXINE SODIUM 200 MCG: 200 TABLET ORAL at 05:20

## 2018-07-10 RX ADMIN — METFORMIN HYDROCHLORIDE 1000 MG: 1000 TABLET, FILM COATED ORAL at 17:42

## 2018-07-10 RX ADMIN — Medication 325 MG: at 17:41

## 2018-07-10 RX ADMIN — INSULIN LISPRO 2 UNITS: 100 INJECTION, SOLUTION INTRAVENOUS; SUBCUTANEOUS at 21:33

## 2018-07-10 RX ADMIN — CARBIDOPA AND LEVODOPA 1 TABLET: 25; 100 TABLET ORAL at 15:27

## 2018-07-10 RX ADMIN — IPRATROPIUM BROMIDE 1 SPRAY: 21 SPRAY NASAL at 21:00

## 2018-07-10 RX ADMIN — RANOLAZINE 500 MG: 500 TABLET, FILM COATED, EXTENDED RELEASE ORAL at 09:45

## 2018-07-10 RX ADMIN — CITALOPRAM HYDROBROMIDE 20 MG: 20 TABLET ORAL at 21:34

## 2018-07-10 RX ADMIN — CARBIDOPA AND LEVODOPA 2 TABLET: 25; 100 TABLET ORAL at 05:20

## 2018-07-10 RX ADMIN — CARBIDOPA AND LEVODOPA 1 TABLET: 25; 100 TABLET ORAL at 09:46

## 2018-07-10 RX ADMIN — BUMETANIDE 2 MG: 1 TABLET ORAL at 09:44

## 2018-07-10 RX ADMIN — METOLAZONE 2.5 MG: 2.5 TABLET ORAL at 09:45

## 2018-07-10 RX ADMIN — Medication 325 MG: at 09:45

## 2018-07-10 RX ADMIN — LOSARTAN POTASSIUM 50 MG: 50 TABLET ORAL at 09:45

## 2018-07-10 RX ADMIN — Medication 2 TABLET: at 21:35

## 2018-07-10 RX ADMIN — CLONIDINE HYDROCHLORIDE 0.1 MG: 0.1 TABLET ORAL at 09:45

## 2018-07-10 RX ADMIN — PANTOPRAZOLE SODIUM 40 MG: 40 TABLET, DELAYED RELEASE ORAL at 05:20

## 2018-07-10 RX ADMIN — PANTOPRAZOLE SODIUM 40 MG: 40 TABLET, DELAYED RELEASE ORAL at 09:44

## 2018-07-10 RX ADMIN — BUMETANIDE 2 MG: 1 TABLET ORAL at 21:35

## 2018-07-10 RX ADMIN — POTASSIUM CHLORIDE 10 MEQ: 750 CAPSULE, EXTENDED RELEASE ORAL at 21:35

## 2018-07-10 RX ADMIN — LOSARTAN POTASSIUM 50 MG: 50 TABLET ORAL at 21:35

## 2018-07-10 RX ADMIN — CETIRIZINE HYDROCHLORIDE 10 MG: 10 TABLET, FILM COATED ORAL at 09:44

## 2018-07-10 RX ADMIN — CARBIDOPA AND LEVODOPA 1 TABLET: 25; 100 TABLET ORAL at 21:34

## 2018-07-10 RX ADMIN — OXYCODONE HYDROCHLORIDE AND ACETAMINOPHEN 500 MG: 500 TABLET ORAL at 09:45

## 2018-07-10 RX ADMIN — BETHANECHOL CHLORIDE 25 MG: 25 TABLET ORAL at 21:35

## 2018-07-10 NOTE — THERAPY EVALUATION
Acute Care - Occupational Therapy Initial Evaluation  [facility]     Patient Name: Joanna Cross  : 1950  MRN: [id number]  Today's Date: 7/10/2018  Onset of Illness/Injury or Date of Surgery: 18  Date of Referral to OT: 18  Referring Physician: MD Stevenson    Admit Date: 2018       ICD-10-CM ICD-9-CM   1. Impaired functional mobility, balance, gait, and endurance Z74.09 V49.89   2. Spinal stenosis, lumbar region, with neurogenic claudication M48.062 724.03   3. Impaired mobility and ADLs Z74.09 799.89     Patient Active Problem List   Diagnosis   • Coronary artery disease involving native coronary artery of native heart without angina pectoris   • Essential hypertension   • Peripheral vascular disease (CMS/[facility])   • Hyperlipidemia LDL goal <70   • Chronic atrial fibrillation (CMS/[facility])   • Spinal stenosis, lumbar region, with neurogenic claudication     Past Medical History:   Diagnosis Date   • Anemia    • Arthritis    • Atrial fibrillation (CMS/[facility])    • Chronic edema     bilateral   • Coronary artery disease involving native coronary artery of native heart without angina pectoris 3/1/2017   • Depression    • Diabetes mellitus (CMS/[facility])     type 2, checks fsbg 1-2x/day and as needed; last a1c  6.5   • Essential hypertension 3/1/2017   • GERD (gastroesophageal reflux disease)    • GIB (gastrointestinal bleeding)     d/t xarelto    • Hiatal hernia    • History of transfusion     d/t GIB, no reaction per pt report    • Mixed hyperlipidemia 3/1/2017   • Myocardial infarction    • MILLI on CPAP    • Parkinson disease (CMS/[facility])    • Peripheral vascular disease (CMS/[facility]) 3/1/2017   • Skin cancer    • SSS (sick sinus syndrome) (CMS/[facility])     ppm    • TIA (transient ischemic attack)     no residual    • Varicose vein of leg     not spec of leg    • Venous hypertension    • Venous insufficiency    • Wears glasses      Past Surgical History:   Procedure Laterality Date   • BICEPS TENDON  REPAIR Right     shoulder   • BREAST BIOPSY Left 2004   • BUNIONECTOMY Right    • CARDIAC CATHETERIZATION     • CHOLECYSTECTOMY     • COLONOSCOPY  2016   • CYST REMOVAL      left ear, upper left back 2001   • DIAGNOSTIC LAPAROSCOPY  1981   • ENDOSCOPIC FUNCTIONAL SINUS SURGERY (FESS)  2011   • ENDOSCOPY  2016   • HAMMER TOE REPAIR Left    • LUMBAR LAMINECTOMY DISCECTOMY DECOMPRESSION N/A 7/6/2018    Procedure: LUMBAR LAMINECTOMY L4-5, HEMILAMIINECTOMY RIGHT L5-S1, FORAMINOTOMY L5-S1;  Surgeon: Lencho Gamino MD;  Location: [facility];  Service: Neurosurgery   • LUNG BIOPSY Left 2016   • PACEMAKER IMPLANTATION  11/2016    sss   • REPLACEMENT TOTAL KNEE Bilateral     left knee 2011, right 2012 per dr acuna    • SHOULDER ARTHROSCOPY Bilateral     2003-left, 2004- right    • SKIN BIOPSY      skin cancer back 2016   • TUBAL ABDOMINAL LIGATION  1980   • WISDOM TOOTH EXTRACTION            OT ASSESSMENT FLOWSHEET (last 72 hours)      Occupational Therapy Evaluation     Row Name 07/10/18 1438                   OT Evaluation Time/Intention    Subjective Information complains of;pain  -KF        Document Type evaluation  -KF        Mode of Treatment occupational therapy  -KF        Patient Effort excellent  -KF        Symptoms Noted During/After Treatment shortness of breath  -KF        Comment Rn aware  -KF           General Information    Patient Profile Reviewed? yes  -KF        Onset of Illness/Injury or Date of Surgery 07/06/18  -KF        Referring Physician MD Steevnson  -KF        Patient Observations alert;cooperative;agree to therapy  -KF        Patient/Family Observations daughter present throughout and very helpful   -KF        General Observations of Patient Pt supine in bed, purewick, 2L O2, tele, IV heplocked  -KF        Prior Level of Function min assist:;all household mobility;community mobility;gait;transfer;ADL's;home management   rollator and cane funct mob, pain incr. w/  activity  -KF        Equipment  Currently Used at Home nebulizer;oxygen;grab bar;cane, quad;raised toilet;ramp;rollator;bath bench   reacher, walk in shower  -KF        Pertinent History of Current Functional Problem Pt presents for surgical management of back pain and limited ADL completion due to pain failing with conservative management hx of Parkinson's  -KF        Existing Precautions/Restrictions fall;spinal;oxygen therapy device and L/min  -KF        Equipment Issued to Patient dressing equipment;bathing equipment   sock aid, long handled sponge  -KF        Risks Reviewed patient and family:;LOB;nausea/vomiting;dizziness;increased discomfort;change in vital signs  -KF        Benefits Reviewed patient and family:;improve function;increase independence;increase strength;increase balance;decrease pain;increase knowledge  -KF        Barriers to Rehab previous functional deficit  -KF           Relationship/Environment    Primary Source of Support/Comfort child(lisa)  -KF        Lives With alone  -KF        Family Caregiver if Needed child(lisa), adult  -KF        Concerns About Impact on Relationships Dtr only available as needed works full-time  -KF           Resource/Environmental Concerns    Current Living Arrangements home/apartment/condo  -KF           Cognitive Assessment/Interventions    Additional Documentation Cognitive Assessment/Intervention (Group)  -KF           Cognitive Assessment/Intervention- PT/OT    Affect/Mental Status (Cognitive) WFL  -KF        Orientation Status (Cognition) oriented x 4  -KF        Follows Commands (Cognition) WNL  -KF        Cognitive Function (Cognitive) WFL;safety deficit  -KF        Safety Deficit (Cognitive) mild deficit;insight into deficits/self awareness;safety precautions awareness;safety precautions follow-through/compliance  -KF        Cognitive Interventions (Cognitive) occupation/activity based interventions;process/task specific training  -KF        Personal Safety Interventions fall prevention  program maintained;gait belt;muscle strengthening facilitated;nonskid shoes/slippers when out of bed  -KF           Safety Issues, Functional Mobility    Safety Issues Affecting Function (Mobility) safety precaution awareness;safety precautions follow-through/compliance  -KF        Impairments Affecting Function (Mobility) pain;strength;endurance/activity tolerance  -KF           Bed Mobility Assessment/Treatment    Bed Mobility Assessment/Treatment supine-sit;sit-supine  -KF        Rolling Left [place] (Bed Mobility) contact guard;verbal cues  -KF        Supine-Sit [place] (Bed Mobility) contact guard;verbal cues  -KF        Sit-Supine [place] (Bed Mobility) moderate assist (50% patient effort);verbal cues  -KF        Bed Mobility, Safety Issues decreased use of legs for bridging/pushing  -KF        Assistive Device (Bed Mobility) bed rails;head of bed elevated  -KF        Comment (Bed Mobility) bed at home is able to elevated HOB, uses bed rail at home as well at baseline  -KF           Functional Mobility    Functional Mobility- Ind. Level contact guard assist;verbal cues required  -KF        Functional Mobility-Distance (Feet) 4  -KF        Functional Mobility- Safety Issues step length decreased;weight-shifting ability decreased  -KF        Functional Mobility- Comment bed<>bsc  -KF           Transfer Assessment/Treatment    Transfer Assessment/Treatment sit-stand transfer;stand-sit transfer;toilet transfer  -KF        Comment (Transfers) VC's for seq and HP to avoid twisting and bending   -KF           Sit-Stand Transfer    Sit-Stand Davis (Transfers) contact guard;verbal cues  -KF        Assistive Device (Sit-Stand Transfers) --   BUE support  -KF           Stand-Sit Transfer    Stand-Sit Davis (Transfers) contact guard;verbal cues  -KF        Assistive Device (Stand-Sit Transfers) --   BUE support  -KF           Toilet Transfer    Type (Toilet Transfer) stand-sit;sit-stand  -KF         [place] Level (Toilet Transfer) contact guard;verbal cues  -KF        Assistive Device (Toilet Transfer) commode, bedside without drop arms  -KF           ADL Assessment/Intervention    59481 - OT Self Care/Mgmt Minutes 15  -KF        BADL Assessment/Intervention bathing;lower body dressing;toileting  -KF           Bathing Assessment/Intervention    Bathing [place] Level bathing skills;not tested  -KF        Assistive Devices (Bathing) long-handled sponge  -KF        Bathing Position unsupported sitting;supported standing  -KF        Comment (Bathing) education on bathing simulated tasks with AE  -KF           Lower Body Dressing Assessment/Training    Lower Body Dressing [place] Level don;doff;socks;supervision;verbal cues  -KF        Assistive Devices (Lower Body Dressing) reacher;sock-aid  -KF        Lower Body Dressing Position unsupported sitting  -KF        Comment (Lower Body Dressing) AE demonstration and education provided Pt supervision progressed to conditional I during session  -KF           Toileting Assessment/Training    [place] Level (Toileting) perform perineal hygiene;adjust/manage clothing;minimum assist (75% patient effort);verbal cues  -KF        Assistive Devices (Toileting) commode, bedside without drop arms  -KF        Toileting Position supported standing;unsupported sitting  -KF        Comment (Toileting) Pt min A for toilet hygiene in standing, VC's for body mechanics to avoid twisting   -KF           BADL Safety/Performance    Impairments, BADL Safety/Performance balance;endurance/activity tolerance;strength;trunk/postural control;pain;range of motion  -KF        Skilled BADL Treatment/Intervention adaptive equipment training;BADL process/adaptation training;cognitive/safety deficit modifications;compensatory training  -KF        Progress in BADL Status use of adaptive equipment  -KF           General ROM    GENERAL ROM COMMENTS BUE WFL  -KF           MMT (Manual  Muscle Testing)    Additional Documentation General Assessment (Manual Muscle Testing) (Group)  -KF           General Assessment (Manual Muscle Testing)    Comment, General Manual Muscle Testing (MMT) Assessment BUE WFL for ADLs  -KF           Sensory Assessment/Intervention    Sensory General Assessment no sensation deficits identified  -KF           Positioning and Restraints    Pre-Treatment Position in bed  -KF        Post Treatment Position bed  -KF        In Bed notified nsg;supine;fowlers;call light within reach;encouraged to call for assist;with family/caregiver;side rails up x3;legs elevated   RN waved exit alarm  -KF           Pain Assessment    Additional Documentation Pain Scale: Numbers Pre/Post-Treatment (Group)  -KF           Pain Scale: Numbers Pre/Post-Treatment    Pain Scale: Numbers, Pretreatment 5/10  -KF        Pain Scale: Numbers, Post-Treatment 5/10  -KF        Pain Location - Orientation lower  -KF        Pain Location back  -KF        Pain Intervention(s) Repositioned;Ambulation/increased activity  -KF           Wound 07/06/18 1627 Other (See comments) back incision    Wound - Properties Group Date first assessed: 07/06/18  -SV Time first assessed: 1627  -SV Side: Other (See comments)  -SV Location: back  -SV Type: incision  -SV       Plan of Care Review    Plan of Care Reviewed With patient;daughter  -KF           Clinical Impression (OT)    Date of Referral to OT 07/09/18  -KF        OT Diagnosis ADL decline  -KF        Criteria for Skilled Therapeutic Interventions Met (OT Eval) yes;treatment indicated  -KF        Rehab Potential (OT Eval) good, to achieve stated therapy goals  -KF        Therapy Frequency (OT Eval) daily  -KF        Care Plan Review (OT) evaluation/treatment results reviewed;care plan/treatment goals reviewed;risks/benefits reviewed;current/potential barriers reviewed;patient/other agree to care plan  -KF        Care Plan Review, Other Participant (OT Eval) daughter  -KF         Anticipated Discharge Disposition (OT) inpatient rehabilitation facility  -KF           Vital Signs    Pre Systolic BP Rehab --   RN cleared vitals stable, no dizziness upon standing or sit  -KF        Pre SpO2 (%) 95  -KF        O2 Delivery Pre Treatment supplemental O2  -KF        Post SpO2 (%) 96  -KF        O2 Delivery Post Treatment supplemental O2  -KF        Pre Patient Position Supine  -KF        Intra Patient Position Standing  -KF        Post Patient Position Supine  -KF           Planned OT Interventions    Planned Therapy Interventions (OT Eval) activity tolerance training;adaptive equipment training;BADL retraining;cognitive/visual perception retraining;functional balance retraining;neuromuscular control/coordination retraining;occupation/activity based interventions;strengthening exercise;transfer/mobility retraining;patient/caregiver education/training;IADL retraining  -KF           OT Goals    Bed Mobility Goal Selection (OT) bed mobility, OT goal 1  -KF        Transfer Goal Selection (OT) transfer, OT goal 1  -KF        Dressing Goal Selection (OT) dressing, OT goal 1  -KF        Toileting Goal Selection (OT) toileting, OT goal 1  -KF           Bed Mobility Goal 1 (OT)    Activity/Assistive Device (Bed Mobility Goal 1, OT) sit to supine/supine to sit  -KF        [place] Level/Cues Needed (Bed Mobility Goal 1, OT) conditional independence  -KF        Time Frame (Bed Mobility Goal 1, OT) long term goal (LTG);by discharge  -KF        Progress/Outcomes (Bed Mobility Goal 1, OT) goal ongoing  -KF           Transfer Goal 1 (OT)    Activity/Assistive Device (Transfer Goal 1, OT) commode, bedside without drop arms;walker, rolling;bed-to-chair/chair-to-bed  -KF        [place] Level/Cues Needed (Transfer Goal 1, OT) contact guard assist;verbal cues required  -KF        Time Frame (Transfer Goal 1, OT) long term goal (LTG);by discharge  -KF        Progress/Outcome (Transfer Goal 1, OT) goal  ongoing  -KF           Dressing Goal 1 (OT)    Activity/Assistive Device (Dressing Goal 1, OT) lower body dressing;sock-aid;reacher   AE PRN  -KF        [place]/Cues Needed (Dressing Goal 1, OT) conditional independence  -KF        Time Frame (Dressing Goal 1, OT) long term goal (LTG);by discharge  -KF        Progress/Outcome (Dressing Goal 1, OT) goal ongoing  -KF           Toileting Goal 1 (OT)    Activity/Device (Toileting Goal 1, OT) perform perineal hygiene;adjust/manage clothing;commode, bedside without drop arms  -KF        [place] Level/Cues Needed (Toileting Goal 1, OT) contact guard assist;verbal cues required  -KF        Time Frame (Toileting Goal 1, OT) long term goal (LTG);by discharge  -KF        Progress/Outcome (Toileting Goal 1, OT) goal ongoing  -KF           Living Environment    Home Accessibility wheelchair accessible;other (see comments)   ramp, walk in shower  -KF          User Key  (r) = Recorded By, (t) = Taken By, (c) = Cosigned By    Initials Name Effective Dates    SV Amna Chou RN 04/19/17 -     KF Lindsey Marshall OT 04/03/18 -            Occupational Therapy Education     Title: PT OT SLP Therapies (Done)     Topic: Occupational Therapy (Done)     Point: ADL training (Done)     Description: Instruct learner(s) on proper safety adaptation and remediation techniques during self care or transfers.   Instruct in proper use of assistive devices.   Learning Progress Summary     Learner Status Readiness Method Response Comment Documented by    Patient Done Acceptance E,TB,D,H LUCIE Pt educated on OT, safe transfers, AE training for bathing and LBD KF 07/10/18 1600          Point: Precautions (Done)     Description: Instruct learner(s) on prescribed precautions during self-care and functional transfers.   Learning Progress Summary     Learner Status Readiness Method Response Comment Documented by    Patient Done Acceptance E,TB,D,H VU Pt educated on OT, safe transfers, AE  training for bathing and LBD  07/10/18 1600          Point: Body mechanics (Done)     Description: Instruct learner(s) on proper positioning and spine alignment during self-care, functional mobility activities and/or exercises.   Learning Progress Summary     Learner Status Readiness Method Response Comment Documented by    Patient Done Acceptance E,TB,D,H VU Pt educated on OT, safe transfers, AE training for bathing and LBD  07/10/18 1600                      User Key     Initials Effective Dates Name Provider Type Discipline     04/03/18 -  Lindsey Marshall, OT Occupational Therapist OT                  OT Recommendation and Plan  Outcome Summary/Treatment Plan (OT)  Anticipated Discharge Disposition (OT): inpatient rehabilitation facility  Planned Therapy Interventions (OT Eval): activity tolerance training, adaptive equipment training, BADL retraining, cognitive/visual perception retraining, functional balance retraining, neuromuscular control/coordination retraining, occupation/activity based interventions, strengthening exercise, transfer/mobility retraining, patient/caregiver education/training, IADL retraining  Therapy Frequency (OT Eval): daily  Plan of Care Review  Plan of Care Reviewed With: patient  Plan of Care Reviewed With: patient  Outcome Summary: OT eval completed Pt limited by pain, AROM, coordination, and endurance for ADL completion. Pt CGA for stand step pivot transfer to BSC, min A for toileting tasks, mod A for bed mob returning to supine CGA for supine to sitting with VC's for spinal precautions. Recom IPOT d/c IRF          Outcome Measures     Row Name 07/10/18 1438 07/09/18 1125          How much help from another person do you currently need...    Turning from your back to your side while in flat bed without using bedrails?  -- 2  -MJ     Moving from lying on back to sitting on the side of a flat bed without bedrails?  -- 2  -MJ     Moving to and from a bed to a chair (including a  wheelchair)?  -- 3  -MJ     Standing up from a chair using your arms (e.g., wheelchair, bedside chair)?  -- 2  -MJ     Climbing 3-5 steps with a railing?  -- 1  -MJ     To walk in hospital room?  -- 3  -MJ     AM-PAC 6 Clicks Score  -- 13  -MJ        How much help from another is currently needed...    Putting on and taking off regular lower body clothing? 3  -KF  --     Bathing (including washing, rinsing, and drying) 3  -KF  --     Toileting (which includes using toilet bed pan or urinal) 3  -KF  --     Putting on and taking off regular upper body clothing 3  -KF  --     Taking care of personal grooming (such as brushing teeth) 4  -KF  --     Eating meals 4  -KF  --     Score 20  -KF  --        Functional Assessment    Outcome Measure Options AM-PAC 6 Clicks Daily Activity (OT)  -KF AM-PAC 6 Clicks Basic Mobility (PT)  -       User Key  (r) = Recorded By, (t) = Taken By, (c) = Cosigned By    Initials Name Provider Type    DAFNE Justice, PT Physical Therapist    KF Lindsey Marshall OT Occupational Therapist          Time Calculation:   OT Start Time: 1438  Therapy Suggested Charges     Code   Minutes Charges    01858 (CPT®) Hc Ot Neuromusc Re Education Ea 15 Min      96373 (CPT®) Hc Ot Ther Proc Ea 15 Min      23405 (CPT®) Hc Ot Therapeutic Act Ea 15 Min      61858 (CPT®) Hc Ot Manual Therapy Ea 15 Min      97878 (CPT®) Hc Ot Iontophoresis Ea 15 Min      88162 (CPT®) Hc Ot Elec Stim Ea-Per 15 Min      77116 (CPT®) Hc Ot Ultrasound Ea 15 Min      43943 (CPT®) Hc Ot Self Care/Mgmt/Train Ea 15 Min 15 1    Total  15 1        Therapy Charges for Today     Code Description Service Date Service Provider Modifiers Qty    18094347495 HC OT SELF CARE/MGMT/TRAIN EA 15 MIN 7/10/2018 Lindsey Marshall OT GO 1    [id number]  OT EVAL MOD COMPLEXITY 3 7/10/2018 Lindsey Marshall OT GO 1    [id number]  OT SELFCARE CURRENT 7/10/2018 Lindsey Marshall OT GO, CJ 1    02801921163 HC OT SELFCARE PROJECTED 7/10/2018  Lindsey Marshall, OT GO, CI 1          OT G-codes  OT Professional Judgement Used?: Yes  OT Functional Scales Options: AM-PAC 6 Clicks Daily Activity (OT)  Functional Assessment Tool Used: 6 clicks   Score: 20  Functional Limitation: Self care  Self Care Current Status (): At least 20 percent but less than 40 percent impaired, limited or restricted  Self Care Goal Status (): At least 1 percent but less than 20 percent impaired, limited or restricted    Lindsey Marshall OT  7/10/2018

## 2018-07-10 NOTE — PROGRESS NOTES
Urology Progress Note     LOS: 0 days   Patient Care Team:  Reynaldo Fritz MD as PCP - General  Reynaldo Fritz MD as PCP - Family Medicine  Faisal Ramirez MD as Obstetrician (Obstetrics and Gynecology)  Timothy Dan DC as Referring Physician (Chiropractic Medicine)    Chief Complaint:  No chief complaint on file.      Subjective     Interval History:     She continues to have dysuria, no hematuria, +urinary frequency.       Objective     Vital Signs  Temp:  [97.4 °F (36.3 °C)-98.2 °F (36.8 °C)] 97.8 °F (36.6 °C)  Heart Rate:  [71-85] 71  Resp:  [18] 18  BP: (102-130)/(57-79) 118/79  I/O last 3 completed shifts:  In: 1320 [P.O.:1320]  Out: 3100 [Urine:3100]  No intake/output data recorded.    Physical Exam:   General Appearance: alert, appears stated age and cooperative  Abdomen: soft non-tender     Results Review:     I reviewed the patient's new clinical results.  Lab Results (last 24 hours)     Procedure Component Value Units Date/Time    POC Glucose Once [[id number]]  (Abnormal) Collected:  07/10/18 0735    Specimen:  Blood Updated:  07/10/18 0736     Glucose 146 (H) mg/dL     Narrative:       Meter: [id number] : [id number] Tarun Fernandes    POC Glucose Once [[id number]]  (Abnormal) Collected:  07/09/18 2316    Specimen:  Blood Updated:  07/09/18 2317     Glucose 147 (H) mg/dL     Narrative:       Meter: [id number] : [id number] Zizerones    POC Glucose Once [[id number]]  (Abnormal) Collected:  07/09/18 2014    Specimen:  Blood Updated:  07/09/18 2016     Glucose 192 (H) mg/dL     Narrative:       Meter: [id number] : [id number] Zizerones    POC Glucose Once [[id number]]  (Abnormal) Collected:  07/09/18 1616    Specimen:  Blood Updated:  07/09/18 1617     Glucose 158 (H) mg/dL     Narrative:       Meter: [id number] : [id number] Diana Leei    POC Glucose Once [[id number]]  (Abnormal) Collected:  07/09/18 1125    Specimen:  Blood Updated:  07/09/18 1128     Glucose 157 (H) mg/dL      Narrative:       Meter: [id number] : [id number] Diana Dawkins          Medication Review:   Current Facility-Administered Medications   Medication Dose Route Frequency Provider Last Rate Last Dose   • acetaminophen (TYLENOL) tablet 650 mg  650 mg Oral Q8H PRN Lencho Gamino MD   650 mg at 07/08/18 0839   • albuterol (PROVENTIL) nebulizer solution 0.083% 2.5 mg/3mL  2.5 mg Nebulization Q4H PRN Lencho Gamino MD       • amantadine (SYMMETREL) capsule 100 mg  100 mg Oral BID Lencho Gamino MD   100 mg at 07/09/18 2206   • apixaban (ELIQUIS) tablet 5 mg  5 mg Oral Q12H Lencho Gamino MD   5 mg at 07/09/18 2208   • aspirin chewable tablet 81 mg  81 mg Oral Daily Lencho Gamino MD   81 mg at 07/09/18 0936   • bethanechol (URECHOLINE) tablet 25 mg  25 mg Oral TID Lencho Gamino MD   25 mg at 07/09/18 2208   • bisacodyl (DULCOLAX) EC tablet 5 mg  5 mg Oral Daily PRN Lencho Gamino MD       • bisacodyl (DULCOLAX) suppository 10 mg  10 mg Rectal Daily PRN Lencho Gamino MD       • bumetanide (BUMEX) tablet 2 mg  2 mg Oral BID Lencho Gamino MD   2 mg at 07/09/18 2204   • carbidopa-levodopa (SINEMET)  MG per tablet 1 tablet  1 tablet Oral 5x Daily Guanako Butler MD   1 tablet at 07/09/18 2207   • carbidopa-levodopa (SINEMET)  MG per tablet 2 tablet  2 tablet Oral QAM Guanako Butler MD   2 tablet at 07/10/18 0520   • cetirizine (zyrTEC) tablet 10 mg  10 mg Oral Daily Lencho Gamino MD   10 mg at 07/09/18 0936   • cholecalciferol (VITAMIN D3) tablet 2,000 Units  2,000 Units Oral BID Dana Lam MD   2,000 Units at 07/09/18 2209   • citalopram (CeleXA) tablet 20 mg  20 mg Oral Nightly Lencho Gamino MD   20 mg at 07/09/18 2207   • CloNIDine (CATAPRES) tablet 0.1 mg  0.1 mg Oral Q12H Lencho Gamino MD   0.1 mg at 07/09/18 2207   • dextrose (D50W) solution 25 g  25 g Intravenous Q15 Min PRN Lencho Gamino MD       • dextrose (GLUTOSE) oral gel 15 g  15 g Oral Q15 Min PRN Lencho Gamino MD       •  diphenhydrAMINE (BENADRYL) capsule 25 mg  25 mg Oral Nightly PRN Lencho Gamino MD       • docusate sodium (COLACE) capsule 100 mg  100 mg Oral BID PRN Lencho Gamino MD       • ferrous sulfate tablet 325 mg  325 mg Oral BID With Meals Lencho Gamino MD   325 mg at 07/09/18 1801   • fluticasone (FLONASE) 50 MCG/ACT nasal spray 2 spray  2 spray Nasal Daily PRN Lencho Gamino MD       • glucagon (human recombinant) (GLUCAGEN DIAGNOSTIC) injection 1 mg  1 mg Subcutaneous PRN Lencho Gamino MD       • insulin lispro (humaLOG) injection 0-7 Units  0-7 Units Subcutaneous 4x Daily With Meals & Nightly Lencho Gamino MD   2 Units at 07/09/18 2210   • ipratropium (ATROVENT) nasal spray 0.03%  1 spray Each Nare BID Lencho Gamino MD   1 spray at 07/09/18 2202   • levothyroxine (SYNTHROID, LEVOTHROID) tablet 200 mcg  200 mcg Oral Q AM Lencho Gamino MD   200 mcg at 07/10/18 0520   • losartan (COZAAR) tablet 50 mg  50 mg Oral Q12H Lencho Gamino MD   50 mg at 07/09/18 2209   • magnesium hydroxide (MILK OF MAGNESIA) suspension 2400 mg/10mL 10 mL  10 mL Oral Daily PRN Lencho Gamino MD       • metFORMIN (GLUCOPHAGE) tablet 1,000 mg  1,000 mg Oral BID With Meals Lencho Gamino MD   1,000 mg at 07/09/18 1801   • methocarbamol (ROBAXIN) tablet 750 mg  750 mg Oral Q6H PRN Lencho Gamino MD       • metOLazone (ZAROXOLYN) tablet 2.5 mg  2.5 mg Oral Daily Alejandra Arrieta PA-C       • Morphine PF injection 1 mg  1 mg Intravenous Q4H PRN Lencho Gamino MD   1 mg at 07/07/18 1744    And   • naloxone (NARCAN) injection 0.4 mg  0.4 mg Intravenous Q5 Min PRN Lencho Gamino MD       • Morphine PF injection 2 mg  2 mg Intravenous Q4H PRN Lencho Gamino MD        And   • naloxone (NARCAN) injection 0.4 mg  0.4 mg Intravenous Q5 Min PRN Lencho Gamino MD       • multivitamin with minerals 1 tablet  1 tablet Oral Daily Lencho Gamino MD   1 tablet at 07/09/18 0937   • nitroglycerin (NITROSTAT) SL tablet 0.4 mg  0.4 mg Sublingual Q5 Min PRN  Lencho Gamino MD   0.4 mg at 07/07/18 0657   • ondansetron (ZOFRAN) injection 4 mg  4 mg Intravenous Q6H PRN Lencho Gamino MD   4 mg at 07/09/18 2321   • oxyCODONE-acetaminophen (PERCOCET)  MG per tablet 1 tablet  1 tablet Oral Q4H PRN Lencho Gamino MD   1 tablet at 07/09/18 2210   • pantoprazole (PROTONIX) EC tablet 40 mg  40 mg Oral QAM Lencho Gamino MD   40 mg at 07/10/18 0520   • phenazopyridine (PYRIDIUM) tablet 100 mg  100 mg Oral TID PRN Dana Lam MD   100 mg at 07/08/18 1441   • polyethylene glycol 3350 powder (packet)  17 g Oral BID Alejandra Arrieta PA-C   17 g at 07/09/18 2202   • potassium chloride (MICRO-K) CR capsule 10 mEq  10 mEq Oral BID Lencho Gamino MD   10 mEq at 07/09/18 2205   • pramipexole (MIRAPEX) tablet 1.5 mg  1.5 mg Oral Nightly Lencho Gamino MD   1.5 mg at 07/09/18 2203   • ranolazine (RANEXA) 12 hr tablet 500 mg  500 mg Oral Q12H Lencho Gamino MD   500 mg at 07/09/18 2203   • sennosides-docusate sodium (SENOKOT-S) 8.6-50 MG tablet 1 tablet  1 tablet Oral Nightly PRN Lencho Gamino MD   1 tablet at 07/08/18 2143   • sennosides-docusate sodium (SENOKOT-S) 8.6-50 MG tablet 2 tablet  2 tablet Oral Nightly Alejandra Arrieta PA-C   2 tablet at 07/09/18 2206   • sodium chloride 0.9 % flush 1-10 mL  1-10 mL Intravenous PRN Lencho Gamino MD   10 mL at 07/08/18 2145   • spironolactone (ALDACTONE) tablet 25 mg  25 mg Oral Daily Lencho Gamino MD   25 mg at 07/09/18 0937   • vitamin C (ASCORBIC ACID) tablet 500 mg  500 mg Oral Daily Lencho Gamino MD   500 mg at 07/09/18 0936         Assessment/Plan     Principal Problem:    Spinal stenosis, lumbar region, with neurogenic claudication  Active Problems:    Coronary artery disease involving native coronary artery of native heart without angina pectoris    Peripheral vascular disease (CMS/HCC)    Chronic atrial fibrillation (CMS/[facility])    Monitor postvoid residuals  Straight catheterization as needed  Continue  bethanechol  Oxybutynin held due to elevated postvoid residual      Franc Mercado MD  07/10/18  8:16 AM

## 2018-07-10 NOTE — PROGRESS NOTES
LOS: 0 days   Patient Care Team:  Reynaldo Fritz MD as PCP - General  Reynaldo Fritz MD as PCP - Family Medicine  Faisal Ramirez MD as Obstetrician (Obstetrics and Gynecology)  Timothy Dan DC as Referring Physician (Chiropractic Medicine)    Chief Complaint:  POD #4        Interval History: back painful;  No leg pain;  Walked on [address] once yesterday;  Seen by urology;  Taking po ok;  No chest pain      Review of Systems:  done      Vital Signs  Temp:  [97.4 °F (36.3 °C)-98.2 °F (36.8 °C)] 97.4 °F (36.3 °C)  Heart Rate:  [72-85] 72  Resp:  [18] 18  BP: (102-130)/(57-78) 114/57    Intake/Output Summary (Last 24 hours) at 07/10/18 0713  Last data filed at 07/10/18 0621   Gross per 24 hour   Intake             1320 ml   Output             2500 ml   Net            -1180 ml     Physical Exam:  alert and conversant.  Motor Exam   Muscle bulk: normal  Overall muscle tone: normal  No Pronator Drift     Strength   Strength 5/5 throughout.      Sensory Exam   Light touch normal.   Proprioception normal.                             Results Review:     Lab Results   Component Value Date    WBC 11.85 (H) 07/07/2018    HGB 10.8 (L) 07/07/2018    HCT 33.0 (L) 07/07/2018    MCV 88.0 07/07/2018     07/07/2018     Lab Results   Component Value Date    GLUCOSE 150 (H) 07/07/2018    CALCIUM 9.0 07/07/2018     07/07/2018    K 3.6 07/07/2018    CO2 31.0 07/07/2018    CL 97 (L) 07/07/2018    BUN 15 07/07/2018    CREATININE 0.69 07/07/2018    EGFRIFNONA 84 07/07/2018    BCR 21.7 07/07/2018    ANIONGAP 7.0 07/07/2018           Assessment/Plan     Principal Problem:    Spinal stenosis, lumbar region, with neurogenic claudication  Active Problems:    Coronary artery disease involving native coronary artery of native heart without angina pectoris    Peripheral vascular disease (CMS/HCC)    Chronic atrial fibrillation (CMS/HCC)      Plan:  Increase activities;  oob to chair;  Await rehab.      Plan for disposition       Lencho Gamino MD  07/10/18  7:13 AM

## 2018-07-10 NOTE — CONSULTS
Urology Consult Note    Referring Provider: Stevenson CEE  Reason for Consultation: Dysuria and difficult voiding    Patient Care Team:  Reynaldo Fritz MD as PCP - General  Reynaldo Fritz MD as PCP - Family Medicine  Faisal Ramirez MD as Obstetrician (Obstetrics and Gynecology)  Timothy Dan DC as Referring Physician (Chiropractic Medicine)    Chief complaint No chief complaint on file.        Subjective .     History of present illness:    Joanna Cross is a 67 y.o. female who was admitted for Spinal stenosis, lumbar region, with neurogenic claudication [M48.062]. Patient was referred by Dr. Gamino for evaluation of dysuria and difficulty voiding. Patient has History of irritative voiding symptoms including urinary urgency and frequency with incomplete bladder emptying.  She is a patient of Dr. Mckenzie in [place].  She is status post cystoscopy with urethral dilation from March 2018.  She is currently admitted to the hospital for lumbar laminectomy and spinal stenosis.  She reports dysuria with frequency less than 1 hour.  She denies hematuria.  She has frequency secondary to diuretic.  She reports constipation.  No chest pain or shortness of breath.  No fevers or chills.  Bladder scan postvoid residuals a few days ago were in 600 mL range.    Review of Systems  Pertinent items are noted in HPI, all other systems reviewed and negative    History  Past Medical History:   Diagnosis Date   • Anemia    • Arthritis    • Atrial fibrillation (CMS/HCC)    • Chronic edema     bilateral   • Coronary artery disease involving native coronary artery of native heart without angina pectoris 3/1/2017   • Depression    • Diabetes mellitus (CMS/HCC)     type 2, checks fsbg 1-2x/day and as needed; last a1c 5-2018 6.5   • Essential hypertension 3/1/2017   • GERD (gastroesophageal reflux disease)    • GIB (gastrointestinal bleeding) 2016    d/t xarelto    • Hiatal hernia    • History of transfusion 2016    d/t GIB, no  reaction per pt report    • Mixed hyperlipidemia 3/1/2017   • Myocardial infarction    • MILLI on CPAP    • Parkinson disease (CMS/HCC)    • Peripheral vascular disease (CMS/HCC) 3/1/2017   • Skin cancer    • SSS (sick sinus syndrome) (CMS/HCC)     ppm    • TIA (transient ischemic attack)     no residual    • Varicose vein of leg     not spec of leg    • Venous hypertension    • Venous insufficiency    • Wears glasses    , Past Surgical History:   Procedure Laterality Date   • BICEPS TENDON REPAIR Right     shoulder   • BREAST BIOPSY Left 2004   • BUNIONECTOMY Right    • CARDIAC CATHETERIZATION     • CHOLECYSTECTOMY     • COLONOSCOPY  2016   • CYST REMOVAL      left ear, upper left back 2001   • DIAGNOSTIC LAPAROSCOPY  1981   • ENDOSCOPIC FUNCTIONAL SINUS SURGERY (FESS)  2011   • ENDOSCOPY  2016   • HAMMER TOE REPAIR Left    • LUMBAR LAMINECTOMY DISCECTOMY DECOMPRESSION N/A 7/6/2018    Procedure: LUMBAR LAMINECTOMY L4-5, HEMILAMIINECTOMY RIGHT L5-S1, FORAMINOTOMY L5-S1;  Surgeon: Lencho Gamino MD;  Location: [facility];  Service: Neurosurgery   • LUNG BIOPSY Left 2016   • PACEMAKER IMPLANTATION  11/2016    sss   • REPLACEMENT TOTAL KNEE Bilateral     left knee 2011, right 2012 per dr acuna    • SHOULDER ARTHROSCOPY Bilateral     2003-left, 2004- right    • SKIN BIOPSY      skin cancer back 2016   • TUBAL ABDOMINAL LIGATION  1980   • WISDOM TOOTH EXTRACTION     , Family History   Problem Relation Age of Onset   • Kidney disease Mother    , Social History   Substance Use Topics   • Smoking status: Never Smoker   • Smokeless tobacco: Never Used   • Alcohol use Yes      Comment: occasional, nothing weekly    , Prescriptions Prior to Admission   Medication Sig Dispense Refill Last Dose   • amantadine (SYMMETREL) 100 MG capsule Take 100 mg by mouth 2 (Two) Times a Day.   7/6/2018 at 0830   • aspirin 81 MG EC tablet Take 81 mg by mouth Daily.   7/5/2018 at 2130   • B Complex-C (SUPER B COMPLEX PO) Take 1 tablet by mouth  Daily.   7/5/2018 at 1200   • carbidopa-levodopa (SINEMET)  MG per tablet Take 2 tablets by mouth Every Morning. 2 tablets at 0600, 1 tablet at 0900, 1200, 1500, 1800, 2100   7/6/2018 at 0830   • carbonyl iron (FEOSOL) 45 MG tablet tablet Take 1 tablet by mouth 2 (Two) Times a Day.   7/5/2018 at 1800   • Cholecalciferol 2000 units tablet Take 2,000 Units by mouth 2 (Two) Times a Day.   7/5/2018 at 1830   • citalopram (CeleXA) 20 MG tablet Take 20 mg by mouth every night at bedtime.   7/5/2018 at 2130   • CloNIDine (CATAPRES) 0.1 MG tablet Take 1 tablet by mouth Every 12 (Twelve) Hours. 180 tablet 3 7/6/2018 at 0830   • Collagen-Boron-Hyaluronic Acid 10-5-3.3 MG tablet Take 1 tablet by mouth Daily.   7/5/2018 at 1800   • Loratadine 10 MG capsule Take 10 mg by mouth Daily.   7/5/2018 at 2130   • losartan (COZAAR) 50 MG tablet Take 1 tablet by mouth Every 12 (Twelve) Hours. 180 tablet 3 7/6/2018 at 0830   • metFORMIN (GLUCOPHAGE) 1000 MG tablet Take 1,000 mg by mouth 2 (Two) Times a Day With Meals.   7/5/2018 at 1800   • metOLazone (ZAROXOLYN) 2.5 MG tablet Take 1 tablet by mouth Daily. 90 tablet 1 7/5/2018 at 0830   • Mirabegron ER (MYRBETRIQ) 50 MG tablet sustained-release 24 hour 24 hr tablet Take 50 mg by mouth Daily.   7/5/2018 at 1200   • Multiple Vitamins-Minerals (CENTRUM ULTRA WOMENS PO) Take 1 tablet by mouth Daily.   7/5/2018 at 1200   • mupirocin (BACTROBAN) 2 % ointment Apply 1 application topically 2 (Two) Times a Day. D/t nasal scab,prescribed per ent md   7/6/2018 at 1415   • omeprazole (priLOSEC) 40 MG capsule Take 40 mg by mouth 2 (Two) Times a Day.   7/5/2018 at 1800   • potassium chloride (MICRO-K) 10 MEQ CR capsule Take 10 mEq by mouth 2 (Two) Times a Day.   7/5/2018 at 1800   • pramipexole (MIRAPEX) 1.5 MG tablet Take 1.5 mg by mouth Every Night.   7/5/2018 at 2130   • ranolazine (RANEXA) 500 MG 12 hr tablet Take 500 mg by mouth 2 (Two) Times a Day.   7/5/2018 at 1800   • sennosides-docusate  sodium (SENOKOT-S) 8.6-50 MG tablet Take 1 tablet by mouth Daily.   7/5/2018 at 2130   • spironolactone (ALDACTONE) 25 MG tablet Take 1 tablet by mouth Daily. 90 tablet 1 7/5/2018 at 0830   • SYNTHROID 200 MCG tablet Take 200 mcg by mouth Daily.   7/6/2018 at 0830   • Ubiquinol (QUNOL COQ10/UBIQUINOL/JOSE) 100 MG capsule Take 1 capsule by mouth Daily.   7/5/2018 at 1800   • vitamin C (ASCORBIC ACID) 500 MG tablet Take 500 mg by mouth Daily. Chewable tablet   7/5/2018 at 1200   • albuterol (VENTOLIN HFA) 108 (90 Base) MCG/ACT inhaler Inhale 1 puff Every 4 (Four) Hours As Needed.   7/3/2018   • apixaban (ELIQUIS) 5 MG tablet tablet Take 1 tablet by mouth Every 12 (Twelve) Hours. 180 tablet 3 7/1/2018   • bumetanide (BUMEX) 2 MG tablet Take 2 mg by mouth 2 (Two) Times a Day.   Taking   • Calcium Carbonate (CALTRATE 600 PO) Take 600 mg by mouth Daily.   Taking   • clobetasol (TEMOVATE) 0.05 % cream Apply 1 application topically 2 (Two) Times a Day As Needed (Lichens Sclerosis).   7/4/2018   • fluticasone (FLONASE) 50 MCG/ACT nasal spray 2 sprays into each nostril Daily As Needed for Rhinitis.   7/3/2018   • insulin degludec (TRESIBA FLEXTOUCH) 100 UNIT/ML solution pen-injector injection Inject 28 Units under the skin Every Night.   7/4/2018   • IPRATROPIUM BROMIDE NA 1 spray into each nostril 2 (Two) Times a Day.   7/3/2018   • nitroglycerin (NITROLINGUAL) 0.4 MG/SPRAY spray Place 1 spray under the tongue Every 5 (Five) Minutes As Needed for Chest Pain. 1 each 6 More than a month at Unknown time   • traMADol (ULTRAM) 50 MG tablet Take 50 mg by mouth Every 8 (Eight) Hours As Needed for Moderate Pain .      , Scheduled Meds:    amantadine 100 mg Oral BID   apixaban 5 mg Oral Q12H   aspirin 81 mg Oral Daily   bethanechol 25 mg Oral TID   bumetanide 2 mg Oral BID   carbidopa-levodopa 1 tablet Oral 5x Daily   carbidopa-levodopa 2 tablet Oral QAM   cetirizine 10 mg Oral Daily   cholecalciferol 2,000 Units Oral BID    citalopram 20 mg Oral Nightly   CloNIDine 0.1 mg Oral Q12H   ferrous sulfate 325 mg Oral BID With Meals   insulin lispro 0-7 Units Subcutaneous 4x Daily With Meals & Nightly   ipratropium 1 spray Each Nare BID   levothyroxine 200 mcg Oral Q AM   losartan 50 mg Oral Q12H   metFORMIN 1,000 mg Oral BID With Meals   [START ON 7/10/2018] metOLazone 2.5 mg Oral Daily   multivitamin with minerals 1 tablet Oral Daily   oxybutynin XL 10 mg Oral Daily   pantoprazole 40 mg Oral QAM   polyethylene glycol 17 g Oral BID   potassium chloride 10 mEq Oral BID   pramipexole 1.5 mg Oral Nightly   ranolazine 500 mg Oral Q12H   sennosides-docusate sodium 2 tablet Oral Nightly   spironolactone 25 mg Oral Daily   vitamin C 500 mg Oral Daily   , Continuous Infusions:   , PRN Meds:  •  acetaminophen  •  albuterol  •  bisacodyl  •  bisacodyl  •  dextrose  •  dextrose  •  diphenhydrAMINE  •  docusate sodium  •  fluticasone  •  glucagon (human recombinant)  •  magnesium hydroxide  •  methocarbamol  •  Morphine **AND** naloxone  •  Morphine **AND** naloxone  •  nitroglycerin  •  ondansetron  •  oxyCODONE-acetaminophen  •  phenazopyridine  •  sennosides-docusate sodium  •  sodium chloride and Allergies:  Toprol xl [metoprolol tartrate]; Amlodipine besylate; Entacapone; Levemir [insulin detemir]; Xarelto [rivaroxaban]; Bactrim [sulfamethoxazole-trimethoprim]; Ciprofloxacin; Cogentin [benztropine]; Compazine [prochlorperazine edisylate]; Duraprep [antiseptic products, misc.]; Haldol [haloperidol lactate]; Hydralazine; Hydrocodone-acetaminophen; Lisinopril; Penicillins; Statins; and Tarka [trandolapril-verapamil hcl er]    Objective     Vital Signs   Temp:  [97.4 °F (36.3 °C)-98.4 °F (36.9 °C)] 98.2 °F (36.8 °C)  Heart Rate:  [71-85] 78  Resp:  [18-20] 18  BP: (102-127)/(61-78) 122/64  FiO2 (%):  [30 %] 30 %    Physical Exam:   General Appearance: alert, appears stated age and cooperative  Head: normocephalic, without obvious abnormality and  atraumatic  Neck: suppple and trachea midline  Lungs: respirations regular, respirations even and respirations unlabored  Heart: regular rhythm & normal rate  Abdomen: soft non-tender and no guarding  Extremities: moves extremities well and no edema  Neurologic: Mental Status orientated to person, place, time and situation  Psych: normal        Results Review:   I reviewed the patient's new clinical results.  Lab Results (last 24 hours)     Procedure Component Value Units Date/Time    POC Glucose Once [[id number]]  (Abnormal) Collected:  07/09/18 2014    Specimen:  Blood Updated:  07/09/18 2016     Glucose 192 (H) mg/dL     Narrative:       Meter: [id number] : [id number] Torito Shelley    POC Glucose Once [[id number]]  (Abnormal) Collected:  07/09/18 1616    Specimen:  Blood Updated:  07/09/18 1617     Glucose 158 (H) mg/dL     Narrative:       Meter: [id number] : [id number] [place] Mariza    POC Glucose Once [[id number]]  (Abnormal) Collected:  07/09/18 1125    Specimen:  Blood Updated:  07/09/18 1128     Glucose 157 (H) mg/dL     Narrative:       Meter: [id number] : [id number] [place] Mariza    POC Glucose Once [[id number]]  (Abnormal) Collected:  07/09/18 0725    Specimen:  Blood Updated:  07/09/18 0728     Glucose 218 (H) mg/dL     Narrative:       Meter: [id number] : [id number] [place] Mariza         Imaging Results (last 24 hours)     Procedure Component Value Units Date/Time    XR Spine Lumbar 1 View [[id number]] Collected:  07/06/18 1627     Updated:  07/09/18 0840    Narrative:          EXAMINATION: XR SPINE LUMBAR 1 VW - 7/6/2018     INDICATION: M48.062-Spinal stenosis, lumbar region with neurogenic  claudication.      COMPARISON: Radiograph earlier same day.     FINDINGS: Crosstable lateral view demonstrates needle tip and surgical  measurements at the posterior L4-L5 level.       Impression:       Surgical measurements and medial tip located at the  posterior L4-L5 level.     DICTATED:    7/6/2018  EDITED/ls :   7/6/2018      This report was finalized on 7/9/2018 8:38 AM by Dr. Vijay Osullivan.             Labs  Urine Microscopy:       Invalid input(s): LABCAST, Urinalysis:   Results from last 7 days  Lab Units 07/07/18  1352   PH, URINE  7.0   PROTEIN UA  Negative   GLUCOSE UA  Negative   KETONES UA  Negative   , Urine Culture:   , BMP:   Results from last 7 days  Lab Units 07/07/18  0703   SODIUM mmol/L 135   POTASSIUM mmol/L 3.6   CALCIUM mg/dL 9.0   CHLORIDE mmol/L 97*   CO2 mmol/L 31.0   BUN mg/dL 15   CREATININE mg/dL 0.69    and CBC:   Results from last 7 days  Lab Units 07/07/18  0703   WBC 10*3/mm3 11.85*   RBC 10*6/mm3 3.75*   HEMOGLOBIN g/dL 10.8*   HEMATOCRIT % 33.0*   PLATELETS 10*3/mm3 202       Imaging    Assessment   67 y.o. female with incomplete bladder emptying and dysuria    Urinalysis from 7/7/18 without evidence of infection.     Plan   Monitor postvoid residuals  Straight catheterization as needed  Continue bethanechol  Oxybutynin held due to elevated postvoid residual      I discussed the patients findings and my recommendations with patient    rFanc Mercado MD  07/09/18  10:28 PM

## 2018-07-10 NOTE — PLAN OF CARE
Problem: Patient Care Overview  Goal: Plan of Care Review  Outcome: Ongoing (interventions implemented as appropriate)   07/10/18 0396   Coping/Psychosocial   Plan of Care Reviewed With patient   Plan of Care Review   Progress improving   OTHER   Outcome Summary Strength improving with ambulation. Now walking 80 feet with waker

## 2018-07-10 NOTE — PLAN OF CARE
Problem: Patient Care Overview  Goal: Plan of Care Review  Outcome: Ongoing (interventions implemented as appropriate)   07/10/18 1600   Coping/Psychosocial   Plan of Care Reviewed With patient   Plan of Care Review   Progress improving   OTHER   Outcome Summary OT eval completed Pt limited by pain, AROM, coordination, and endurance for ADL completion. Pt CGA for stand step pivot transfer to BSC, min A for toileting tasks, mod A for bed mob returning to supine CGA for supine to sitting with VC's for spinal precautions. Recom IPOT d/c IRF

## 2018-07-10 NOTE — PROGRESS NOTES
Continued Stay Note   [facility]     Patient Name: Joanna Cross  MRN: [id number]  Today's Date: 7/10/2018    Admit Date: 7/6/2018          Discharge Plan     Row Name 07/10/18 1132       Plan    Plan [facility]    Patient/Family in Agreement with Plan yes    Plan Comments Per Marla with [facility], they can offer patient a bed on the skilled rehab unit on Thursday, 7/12/18, if patient is medically ready. Met with patient in the room and she would like to accept the bed. Patient's daugther will provide her ride. CM will continue to follow.               Discharge Codes    No documentation.       Expected Discharge Date and Time     Expected Discharge Date Expected Discharge Time    Jul 11, 2018             Ladi Espana

## 2018-07-10 NOTE — PROGRESS NOTES
[facility] Medicine Services  PROGRESS NOTE    Patient Name: Joanna Cross  : 1950  MRN: [id number]    Date of Admission: 2018  Length of Stay: 0  Primary Care Physician: Reynaldo Fritz MD    Subjective     CC: f/u chest pain, constipation and dysuria     HPI:   Daughter at bedside, up on bedside commode. C/o lightheaded at times.  Working with pt. Unable to completely empty bladder, she is urinating though.    Review of Systems  Gen- No fevers, chills  CV- No chest pain, palpitations  Resp- No cough, dyspnea  GI- No N/V/D, abd pain    Otherwise ROS is negative except as mentioned in the HPI.    Objective     Vital Signs:   Temp:  [97.4 °F (36.3 °C)-98.2 °F (36.8 °C)] 97.8 °F (36.6 °C)  Heart Rate:  [71-78] 71  Resp:  [18] 18  BP: (114-130)/(57-79) 118/79        Physical Exam:  Constitutional: No acute distress, awake, alert  HENT: NCAT, mucous membranes moist  Respiratory: Clear to auscultation bilaterally, respiratory effort normal   Cardiovascular: RRR, no murmurs, rubs, or gallops  Gastrointestinal: Positive bowel sounds, soft, nontender, nondistended  Musculoskeletal: No bilateral ankle edema  Psychiatric: Appropriate affect, cooperative  Neurologic: Cranial Nerves grossly intact to confrontation, speech clear  Skin: No rashes    Results Reviewed:  I have personally reviewed current lab, radiology, and data and agree.      Results from last 7 days  Lab Units 18  0703   WBC 10*3/mm3 11.85*   HEMOGLOBIN g/dL 10.8*   HEMATOCRIT % 33.0*   PLATELETS 10*3/mm3 202       Results from last 7 days  Lab Units 18  1345 18  0953 18  0703   SODIUM mmol/L  --   --  135   POTASSIUM mmol/L  --   --  3.6   CHLORIDE mmol/L  --   --  97*   CO2 mmol/L  --   --  31.0   BUN mg/dL  --   --  15   CREATININE mg/dL  --   --  0.69   GLUCOSE mg/dL  --   --  150*   CALCIUM mg/dL  --   --  9.0   TROPONIN I ng/mL 0.006 0.006 0.006     Estimated Creatinine Clearance: 78.5 mL/min  (by C-G formula based on SCr of 0.69 mg/dL).  No results found for: BNP    Microbiology Results Abnormal     None        I have reviewed the medications.    Assessment / Plan     Hospital Problem List     * (Principal)Spinal stenosis, lumbar region, with neurogenic claudication    Coronary artery disease involving native coronary artery of native heart without angina pectoris    Peripheral vascular disease (CMS/HCC)    Chronic atrial fibrillation (CMS/[facility])        Assessment & Plan:    Atypical chest pain  Coronary artery disease  - Chest pain has resolved.   - Stress test report from 4/2018 reviewed and was normal  - EKG reviewed; NSR with no acute ischemic changes.  Troponin negative x 3, CXR unremarkable.  - ASA 81mg renewed by NS on 7/9   - Ranexa for angina    Chronic A-fib  - Eliquis resumed 7/9 by NS      GERD  -Continue PPI     Spinal stenosis s/p lumbar laminectomy  - Management per primary service  - Encourage incentive spirometry and activity/ambulation     Dysuria  - UA not consistent with UTI  - History of cystoscopy and ureteral dilation by Dr. Terrence Mckenzie in March 2018 for urinary retention   - Urology following, I/o cath prn , bethanechol, bladder scan     Constipation  - Increase bowel regimen      Insulin-dependent DMII  - Reports allergy to Levemir  - Continue Metformin 1000mg BID AC and Lispro TIC AC   - Could use patient's home Tresebia if she desires for long-acting coverage     Parkinson's disease, continue home regimen     CODE STATUS:   Code Status and Medical Interventions:   Ordered at: 07/06/18 1909     Code Status:    CPR     Medical Interventions (Level of Support Prior to Arrest):    Full     Disposition:  Plan to rehab at [facility] Thursday 7/12    Electronically signed by Liv Thibodeaux DO, 07/10/18, 4:30 PM.

## 2018-07-10 NOTE — PLAN OF CARE
Problem: Patient Care Overview  Goal: Individualization and Mutuality  Outcome: Ongoing (interventions implemented as appropriate)    Goal: Discharge Needs Assessment  Outcome: Ongoing (interventions implemented as appropriate)    Goal: Interprofessional Rounds/Family Conf  Outcome: Ongoing (interventions implemented as appropriate)      Problem: Fall Risk (Adult)  Goal: Identify Related Risk Factors and Signs and Symptoms  Outcome: Ongoing (interventions implemented as appropriate)    Goal: Absence of Fall  Outcome: Ongoing (interventions implemented as appropriate)      Problem: Laminectomy/Foraminotomy/Discectomy (Adult)  Goal: Anesthesia/Sedation Recovery  Outcome: Ongoing (interventions implemented as appropriate)      Problem: Pain, Acute (Adult)  Goal: Identify Related Risk Factors and Signs and Symptoms  Outcome: Ongoing (interventions implemented as appropriate)    Goal: Acceptable Pain Control/Comfort Level  Outcome: Ongoing (interventions implemented as appropriate)

## 2018-07-10 NOTE — THERAPY TREATMENT NOTE
Acute Care - Physical Therapy Treatment Note  [facility]     Patient Name: Joanna Cross  : 1950  MRN: [id number]  Today's Date: 7/10/2018  Onset of Illness/Injury or Date of Surgery: 18  Date of Referral to PT: 18  Referring Physician: MD Stevenson    Admit Date: 2018    Visit Dx:    ICD-10-CM ICD-9-CM   1. Impaired functional mobility, balance, gait, and endurance Z74.09 V49.89   2. Spinal stenosis, lumbar region, with neurogenic claudication M48.062 724.03   3. Impaired mobility and ADLs Z74.09 799.89     Patient Active Problem List   Diagnosis   • Coronary artery disease involving native coronary artery of native heart without angina pectoris   • Essential hypertension   • Peripheral vascular disease (CMS/HCC)   • Hyperlipidemia LDL goal <70   • Chronic atrial fibrillation (CMS/HCC)   • Spinal stenosis, lumbar region, with neurogenic claudication       Therapy Treatment          Rehabilitation Treatment Summary     Row Name 07/10/18 1400             Treatment Time/Intention    Discipline physical therapist  -SC      Document Type therapy note (daily note)  -SC      Subjective Information complains of;weakness  -SC      Mode of Treatment physical therapy  -SC      Care Plan Review risks/benefits reviewed  -SC      Care Plan Review, Other Participant(s) daughter  -SC      Therapy Frequency (OT Eval) daily  -SC      Patient Effort good  -SC      Existing Precautions/Restrictions fall;spinal;oxygen therapy device and L/min;other (see comments)  -SC      Recorded by [SC] Sharon Purcell, PT 07/10/18 1612      Row Name 07/10/18 1400             Cognitive Assessment/Intervention- PT/OT    Orientation Status (Cognition) oriented x 4  -SC      Follows Commands (Cognition) WFL  -SC      Personal Safety Interventions gait belt;fall prevention program maintained  -SC      Recorded by [SC] Sharon Purcell, PT 07/10/18 1612      Row Name 07/10/18 1400             Safety Issues, Functional Mobility     Impairments Affecting Function (Mobility) strength;balance  -SC      Recorded by [SC] Sharon OWEN Binh, PT 07/10/18 1612      Row Name 07/10/18 1400             Bed Mobility Assessment/Treatment    Supine-Sit [place] (Bed Mobility) verbal cues;minimum assist (75% patient effort)  -SC      Sit-Supine [place] (Bed Mobility) moderate assist (50% patient effort);verbal cues  -SC      Assistive Device (Bed Mobility) bed rails  -SC      Comment (Bed Mobility) cues for using railing to assist with log roll  -SC      Recorded by [SC] Sharon OWEN Binh, PT 07/10/18 1612      Row Name 07/10/18 1400             Transfer Assessment/Treatment    Comment (Transfers) cues for safety and hand placement  -SC      Recorded by [SC] Karenon LEXIE Binh, PT 07/10/18 1612      Row Name 07/10/18 1400             Sit-Stand Transfer    Sit-Stand [place] (Transfers) verbal cues;contact guard  -SC      Assistive Device (Sit-Stand Transfers) walker, front-wheeled  -SC      Recorded by [SC] Sharon LEXIE Binh, PT 07/10/18 1612      Row Name 07/10/18 1400             Stand-Sit Transfer    Stand-Sit [place] (Transfers) verbal cues;contact guard  -SC      Assistive Device (Stand-Sit Transfers) walker, front-wheeled  -SC      Recorded by [SC] Sharon OWEN Binh, PT 07/10/18 1612      Row Name 07/10/18 1400             Gait/Stairs Assessment/Training    42586 - Gait Training Minutes  20  -SC      Gait/Stairs Assessment/Training gait/ambulation independence  -SC2      [place] Level (Gait) verbal cues;contact guard  -SC2      Assistive Device (Gait) walker, front-wheeled  -SC2      Distance in Feet (Gait) 80  -SC2      Pattern (Gait) swing-through  -SC2      Deviations/Abnormal Patterns (Gait) manjit decreased  -SC2      Comment (Gait/Stairs) cues to stay close to walker. Demonstrated good upriight posture with gait  -SC2      Recorded by [SC] Sharon LEXIE Binh, PT 07/10/18 1616  [SC2] Karensara Purcell, PT 07/10/18 1612      Row Name 07/10/18 1400              Motor Skills Assessment/Interventions    Additional Documentation Therapeutic Exercise (Group);Therapeutic Exercise Interventions (Group)  -SC      Recorded by [SC] Sharon OWEN Binh, PT 07/10/18 1616      Row Name 07/10/18 1400             Therapeutic Exercise    00477 - PT Therapeutic Exercise Minutes 7  -SC      Recorded by [SC] Shearon A Binh, PT 07/10/18 1616      Row Name 07/10/18 1400             Therapeutic Exercise    Lower Extremity (Therapeutic Exercise) LAQ (long arc quad), bilateral;heel slides, bilateral;quad sets, bilateral   chicken wings and rowing  -SC      Position (Therapeutic Exercise) supine;seated  -SC      Sets/Reps (Therapeutic Exercise) 10  -SC      Recorded by [SC] Sharon A Binh, PT 07/10/18 1612      Row Name 07/10/18 1400             Positioning and Restraints    Pre-Treatment Position in bed  -SC      Post Treatment Position bed  -SC      In Bed supine;encouraged to call for assist;with family/caregiver  -SC      Recorded by [SC] Sharon OWEN Binh, PT 07/10/18 1612      Row Name 07/10/18 1400             Pain Assessment    Additional Documentation Pain Scale: FACES Pre/Post-Treatment (Group)  -SC      Recorded by [SC] Sharon OWEN Binh, PT 07/10/18 1612      Row Name 07/10/18 1400             Pain Scale: Numbers Pre/Post-Treatment    Pain Location - Orientation lower  -SC      Pain Location back  -SC      Recorded by [SC] Sharon OWEN Binh, PT 07/10/18 1612      Row Name 07/10/18 1400             Pain Scale: FACES Pre/Post-Treatment    Pain: FACES Scale, Pretreatment 4-->hurts little more  -SC      Pain: FACES Scale, Post-Treatment 6-->hurts even more  -SC      Recorded by [SC] Sharon OWEN Binh, PT 07/10/18 1612      Row Name                Wound 07/06/18 1627 Other (See comments) back incision    Wound - Properties Group Date first assessed: 07/06/18 [SV] Time first assessed: 1627 [SV] Side: Other (See comments) [SV] Location: back [SV] Type: incision [SV] Recorded by:  [SV] Amna WORTHY  MAURICE Chou 07/06/18 1627    Row Name 07/10/18 1400             Coping    Observed Emotional State accepting;calm;cooperative  -SC      Verbalized Emotional State acceptance  -SC      Recorded by [SC] Sharon Purcell, PT 07/10/18 1612      Row Name 07/10/18 1400             Plan of Care Review    Plan of Care Reviewed With patient;daughter  -SC      Recorded by [SC] Sharon Purcell, PT 07/10/18 1612      Row Name 07/10/18 1400             Outcome Summary/Treatment Plan (PT)    Daily Summary of Progress (PT) progress toward functional goals is good  -SC      Recorded by [SC] Sharon Purcell, PT 07/10/18 1612        User Key  (r) = Recorded By, (t) = Taken By, (c) = Cosigned By    Initials Name Effective Dates Discipline    SC Sharon Purcell, PT 06/19/15 -  PT    SV Amna Chou RN 04/19/17 -  Nurse          Wound 07/06/18 1627 Other (See comments) back incision (Active)   Dressing Appearance dry;intact 7/10/2018  4:00 PM   Closure RODRIGO 7/10/2018  4:00 PM   Base dressing in place, unable to visualize 7/10/2018  4:00 PM   Dressing Care, Wound gauze 7/10/2018 12:00 PM             Physical Therapy Education     Title: PT OT SLP Therapies (Active)     Topic: Physical Therapy (Active)     Point: Mobility training (Active)    Learning Progress Summary     Learner Status Readiness Method Response Comment Documented by    Patient Active Eager E NR reviewed back precautions SC 07/10/18 1612     Done Acceptance E,D LUCIENR Reviewed back precautions, HEP, gait mechanics, benefits of mobility. Issued HEP handout  07/09/18 1324    Family Done Acceptance E,D LUCIENR Reviewed back precautions, HEP, gait mechanics, benefits of mobility. Issued HEP handout  07/09/18 1324          Point: Home exercise program (Active)    Learning Progress Summary     Learner Status Readiness Method Response Comment Documented by    Patient Active Eager E NR reviewed back precautions SC 07/10/18 1612     Done Acceptance ED EMMA FAULKNER Reviewed back  precautions, HEP, gait mechanics, benefits of mobility. Issued HEP handout  07/09/18 1324    Family Done Acceptance E,D VU,NR Reviewed back precautions, HEP, gait mechanics, benefits of mobility. Issued HEP handout  07/09/18 1324          Point: Body mechanics (Active)    Learning Progress Summary     Learner Status Readiness Method Response Comment Documented by    Patient Active Eager E NR reviewed back precautions SC 07/10/18 1612     Done Acceptance E,D VU,NR Reviewed back precautions, HEP, gait mechanics, benefits of mobility. Issued HEP handout  07/09/18 1324    Family Done Acceptance E,D VU,NR Reviewed back precautions, HEP, gait mechanics, benefits of mobility. Issued HEP handout  07/09/18 1324          Point: Precautions (Active)    Learning Progress Summary     Learner Status Readiness Method Response Comment Documented by    Patient Active Eager E NR reviewed back precautions SC 07/10/18 1612     Done Acceptance E,D VU,NR Reviewed back precautions, HEP, gait mechanics, benefits of mobility. Issued HEP handout  07/09/18 1324    Family Done Acceptance E,D VU,NR Reviewed back precautions, HEP, gait mechanics, benefits of mobility. Issued HEP handout  07/09/18 1324                      User Key     Initials Effective Dates Name Provider Type Discipline    SC 06/19/15 -  Sharon Purcell, PT Physical Therapist PT     04/03/18 -  Nito Justice, EDYTA Physical Therapist PT                    PT Recommendation and Plan     Outcome Summary/Treatment Plan (PT)  Daily Summary of Progress (PT): progress toward functional goals is good  Plan of Care Reviewed With: patient  Progress: improving  Outcome Summary: Strength improving with ambulation. Now walking 80 feet with waker          Outcome Measures     Row Name 07/10/18 1438 07/10/18 1400 07/09/18 1125       How much help from another person do you currently need...    Turning from your back to your side while in flat bed without using bedrails?  -- 2  -SC 2   -MJ    Moving from lying on back to sitting on the side of a flat bed without bedrails?  -- 2  -SC 2  -MJ    Moving to and from a bed to a chair (including a wheelchair)?  -- 3  -SC 3  -MJ    Standing up from a chair using your arms (e.g., wheelchair, bedside chair)?  -- 3  -SC 2  -MJ    Climbing 3-5 steps with a railing?  -- 1  -SC 1  -MJ    To walk in hospital room?  -- 3  -SC 3  -MJ    AM-PAC 6 Clicks Score  -- 14  -SC 13  -MJ       How much help from another is currently needed...    Putting on and taking off regular lower body clothing? 3  -KF  --  --    Bathing (including washing, rinsing, and drying) 3  -KF  --  --    Toileting (which includes using toilet bed pan or urinal) 3  -KF  --  --    Putting on and taking off regular upper body clothing 3  -KF  --  --    Taking care of personal grooming (such as brushing teeth) 4  -KF  --  --    Eating meals 4  -KF  --  --    Score 20  -KF  --  --       Functional Assessment    Outcome Measure Options AM-PAC 6 Clicks Daily Activity (OT)  -KF AM-PAC 6 Clicks Basic Mobility (PT)  -SC AM-[facility] 6 Clicks Basic Mobility (PT)  -      User Key  (r) = Recorded By, (t) = Taken By, (c) = Cosigned By    Initials Name Provider Type    SC Sharon Purcell, PT Physical Therapist    DAFNE Justice, PT Physical Therapist    KF Lindsey Marshall, OT Occupational Therapist           Time Calculation:         PT Charges     Row Name 07/10/18 1616 07/10/18 1614 07/10/18 1400       Time Calculation    Start Time 1400  -SC 1400  -SC  --    PT Received On 07/10/18  -SC  --  --    PT Goal Re-Cert Due Date 07/19/18  -SC  --  --       Time Calculation- PT    Total Timed Code Minutes- PT 27 minute(s)  -SC  --  --       Timed Charges    22976 - PT Therapeutic Exercise Minutes  --  -- 7  -SC    71345 - Gait Training Minutes   --  -- 20  -SC      User Key  (r) = Recorded By, (t) = Taken By, (c) = Cosigned By    Initials Name Provider Type    RA Purcell, PT Physical Therapist        Therapy  Suggested Charges     Code   Minutes Charges    35018 (CPT®) Hc Pt Neuromusc Re Education Ea 15 Min      88857 (CPT®) Hc Pt Ther Proc Ea 15 Min 7 1    47849 (CPT®) Hc Gait Training Ea 15 Min 20 1    42162 (CPT®) Hc Pt Therapeutic Act Ea 15 Min      74160 (CPT®) Hc Pt Manual Therapy Ea 15 Min      60673 (CPT®) Hc Pt Iontophoresis Ea 15 Min      04813 (CPT®) Hc Pt Elec Stim Ea-Per 15 Min      27927 (CPT®) Hc Pt Ultrasound Ea 15 Min      27211 (CPT®) Hc Pt Self Care/Mgmt/Train Ea 15 Min      Total  27 2        Therapy Charges for Today     Code Description Service Date Service Provider Modifiers Qty    83527894969 HC PT THER PROC EA 15 MIN 7/10/2018 Shearon A Binh, PT GP 2    85721554178 HC GAIT TRAINING EA 15 MIN 7/10/2018 Shearon A Binh, PT GP 1    18397215664 HC PT THER SUPP EA 15 MIN 7/10/2018 Shearon A Binh, PT GP 1          PT G-Codes  PT Professional Judgement Used?: Yes  Outcome Measure Options: AM-PAC 6 Clicks Daily Activity (OT)  Score: 13  Functional Limitation: Mobility: Walking and moving around  Mobility: Walking and Moving Around Current Status (): At least 40 percent but less than 60 percent impaired, limited or restricted  Mobility: Walking and Moving Around Goal Status (): At least 20 percent but less than 40 percent impaired, limited or restricted    Karenon A. Binh, PT  7/10/2018

## 2018-07-10 NOTE — PLAN OF CARE
Problem: Patient Care Overview  Goal: Plan of Care Review  Outcome: Ongoing (interventions implemented as appropriate)   07/10/18 1431   Coping/Psychosocial   Plan of Care Reviewed With patient;daughter   Plan of Care Review   Progress no change   OTHER   Outcome Summary Patient has had 2 small formed BM's this shift. Pt. reports this is normal for her at times and she feels like she will be able to use the bathroom more as the Miralax takes effect.      Goal: Individualization and Mutuality  Outcome: Ongoing (interventions implemented as appropriate)    Goal: Discharge Needs Assessment  Outcome: Ongoing (interventions implemented as appropriate)    Goal: Interprofessional Rounds/Family Conf  Outcome: Ongoing (interventions implemented as appropriate)      Problem: Fall Risk (Adult)  Goal: Identify Related Risk Factors and Signs and Symptoms  Outcome: Ongoing (interventions implemented as appropriate)    Goal: Absence of Fall  Outcome: Ongoing (interventions implemented as appropriate)      Problem: Pain, Acute (Adult)  Goal: Identify Related Risk Factors and Signs and Symptoms  Outcome: Ongoing (interventions implemented as appropriate)    Goal: Acceptable Pain Control/Comfort Level  Outcome: Ongoing (interventions implemented as appropriate)

## 2018-07-11 ENCOUNTER — DOCUMENTATION (OUTPATIENT)
Dept: CARDIOLOGY | Facility: CLINIC | Age: 68
End: 2018-07-11

## 2018-07-11 LAB
GLUCOSE BLDC GLUCOMTR-MCNC: 157 MG/DL (ref 70–130)
GLUCOSE BLDC GLUCOMTR-MCNC: 168 MG/DL (ref 70–130)
GLUCOSE BLDC GLUCOMTR-MCNC: 172 MG/DL (ref 70–130)
GLUCOSE BLDC GLUCOMTR-MCNC: 198 MG/DL (ref 70–130)

## 2018-07-11 PROCEDURE — A9270 NON-COVERED ITEM OR SERVICE: HCPCS | Performed by: NEUROLOGICAL SURGERY

## 2018-07-11 PROCEDURE — G0378 HOSPITAL OBSERVATION PER HR: HCPCS

## 2018-07-11 PROCEDURE — 99024 POSTOP FOLLOW-UP VISIT: CPT | Performed by: NEUROLOGICAL SURGERY

## 2018-07-11 PROCEDURE — 63710000001 PANTOPRAZOLE 40 MG TABLET DELAYED-RELEASE: Performed by: NEUROLOGICAL SURGERY

## 2018-07-11 PROCEDURE — 63710000001 CHOLECALCIFEROL 1000 UNITS TABLET: Performed by: INTERNAL MEDICINE

## 2018-07-11 PROCEDURE — 63710000001 POLYETHYLENE GLYCOL PACK: Performed by: PHYSICIAN ASSISTANT

## 2018-07-11 PROCEDURE — 99225 PR SBSQ OBSERVATION CARE/DAY 25 MINUTES: CPT | Performed by: NURSE PRACTITIONER

## 2018-07-11 PROCEDURE — 63710000001 SENNOSIDES-DOCUSATE SODIUM 8.6-50 MG TABLET: Performed by: PHYSICIAN ASSISTANT

## 2018-07-11 PROCEDURE — 51701 INSERT BLADDER CATHETER: CPT

## 2018-07-11 PROCEDURE — 63710000001 LEVOTHYROXINE 200 MCG TABLET: Performed by: NEUROLOGICAL SURGERY

## 2018-07-11 PROCEDURE — 63710000001 CLONIDINE 0.1 MG TABLET: Performed by: NEUROLOGICAL SURGERY

## 2018-07-11 PROCEDURE — 97116 GAIT TRAINING THERAPY: CPT

## 2018-07-11 PROCEDURE — A9270 NON-COVERED ITEM OR SERVICE: HCPCS | Performed by: PHYSICIAN ASSISTANT

## 2018-07-11 PROCEDURE — 97110 THERAPEUTIC EXERCISES: CPT

## 2018-07-11 PROCEDURE — 63710000001 PRAMIPEXOLE 1 MG TABLET: Performed by: NEUROLOGICAL SURGERY

## 2018-07-11 PROCEDURE — 63710000001 METOLAZONE 2.5 MG TABLET: Performed by: PHYSICIAN ASSISTANT

## 2018-07-11 PROCEDURE — 63710000001 SPIRONOLACTONE 25 MG TABLET: Performed by: NEUROLOGICAL SURGERY

## 2018-07-11 PROCEDURE — 63710000001 BETHANECHOL 25 MG TABLET: Performed by: NEUROLOGICAL SURGERY

## 2018-07-11 PROCEDURE — P9612 CATHETERIZE FOR URINE SPEC: HCPCS

## 2018-07-11 PROCEDURE — 63710000001 AMANTADINE 100 MG CAPSULE: Performed by: NEUROLOGICAL SURGERY

## 2018-07-11 PROCEDURE — 63710000001 CARBIDOPA-LEVODOPA 25-100 MG TABLET: Performed by: NEUROLOGICAL SURGERY

## 2018-07-11 PROCEDURE — 63710000001 BUMETANIDE 1 MG TABLET: Performed by: NEUROLOGICAL SURGERY

## 2018-07-11 PROCEDURE — 63710000001 POTASSIUM CHLORIDE 10 MEQ CAPSULE CONTROLLED-RELEASE: Performed by: NEUROLOGICAL SURGERY

## 2018-07-11 PROCEDURE — 63710000001 ACETAMINOPHEN 325 MG TABLET: Performed by: NEUROLOGICAL SURGERY

## 2018-07-11 PROCEDURE — 63710000001 CETIRIZINE 10 MG TABLET: Performed by: NEUROLOGICAL SURGERY

## 2018-07-11 PROCEDURE — 94660 CPAP INITIATION&MGMT: CPT

## 2018-07-11 PROCEDURE — 63710000001 ASPIRIN 81 MG CHEWABLE TABLET: Performed by: NEUROLOGICAL SURGERY

## 2018-07-11 PROCEDURE — 63710000001 FERROUS SULFATE 325 (65 FE) MG TABLET: Performed by: NEUROLOGICAL SURGERY

## 2018-07-11 PROCEDURE — 63710000001 APIXABAN 5 MG TABLET: Performed by: NEUROLOGICAL SURGERY

## 2018-07-11 PROCEDURE — A9270 NON-COVERED ITEM OR SERVICE: HCPCS | Performed by: INTERNAL MEDICINE

## 2018-07-11 PROCEDURE — 97530 THERAPEUTIC ACTIVITIES: CPT

## 2018-07-11 PROCEDURE — 63710000001 MULTIVITAMIN WITH MINERALS TABLET: Performed by: NEUROLOGICAL SURGERY

## 2018-07-11 PROCEDURE — 63710000001 CITALOPRAM 20 MG TABLET: Performed by: NEUROLOGICAL SURGERY

## 2018-07-11 PROCEDURE — 63710000001 LOSARTAN 50 MG TABLET: Performed by: NEUROLOGICAL SURGERY

## 2018-07-11 PROCEDURE — 94799 UNLISTED PULMONARY SVC/PX: CPT

## 2018-07-11 PROCEDURE — 97535 SELF CARE MNGMENT TRAINING: CPT

## 2018-07-11 PROCEDURE — 63710000001 RANOLAZINE 500 MG TABLET SUSTAINED-RELEASE 12 HOUR: Performed by: NEUROLOGICAL SURGERY

## 2018-07-11 PROCEDURE — 82962 GLUCOSE BLOOD TEST: CPT

## 2018-07-11 PROCEDURE — 63710000001 VITAMIN C 500 MG TABLET: Performed by: NEUROLOGICAL SURGERY

## 2018-07-11 PROCEDURE — 63710000001 BISACODYL 5 MG TABLET DELAYED-RELEASE: Performed by: NEUROLOGICAL SURGERY

## 2018-07-11 PROCEDURE — 63710000001 METFORMIN 1000 MG TABLET: Performed by: NEUROLOGICAL SURGERY

## 2018-07-11 RX ADMIN — CARBIDOPA AND LEVODOPA 1 TABLET: 25; 100 TABLET ORAL at 22:36

## 2018-07-11 RX ADMIN — INSULIN LISPRO 2 UNITS: 100 INJECTION, SOLUTION INTRAVENOUS; SUBCUTANEOUS at 08:39

## 2018-07-11 RX ADMIN — CARBIDOPA AND LEVODOPA 1 TABLET: 25; 100 TABLET ORAL at 14:48

## 2018-07-11 RX ADMIN — POTASSIUM CHLORIDE 10 MEQ: 750 CAPSULE, EXTENDED RELEASE ORAL at 08:33

## 2018-07-11 RX ADMIN — LOSARTAN POTASSIUM 50 MG: 50 TABLET ORAL at 08:32

## 2018-07-11 RX ADMIN — ACETAMINOPHEN 650 MG: 325 TABLET, FILM COATED ORAL at 12:48

## 2018-07-11 RX ADMIN — OXYCODONE HYDROCHLORIDE AND ACETAMINOPHEN 500 MG: 500 TABLET ORAL at 08:34

## 2018-07-11 RX ADMIN — IPRATROPIUM BROMIDE 1 SPRAY: 21 SPRAY NASAL at 21:00

## 2018-07-11 RX ADMIN — ASPIRIN 81 MG 81 MG: 81 TABLET ORAL at 08:33

## 2018-07-11 RX ADMIN — CLONIDINE HYDROCHLORIDE 0.1 MG: 0.1 TABLET ORAL at 08:33

## 2018-07-11 RX ADMIN — PRAMIPEXOLE DIHYDROCHLORIDE 1.5 MG: 0.25 TABLET ORAL at 22:40

## 2018-07-11 RX ADMIN — IPRATROPIUM BROMIDE 1 SPRAY: 21 SPRAY NASAL at 08:37

## 2018-07-11 RX ADMIN — APIXABAN 5 MG: 5 TABLET, FILM COATED ORAL at 22:36

## 2018-07-11 RX ADMIN — CARBIDOPA AND LEVODOPA 1 TABLET: 25; 100 TABLET ORAL at 08:38

## 2018-07-11 RX ADMIN — INSULIN LISPRO 2 UNITS: 100 INJECTION, SOLUTION INTRAVENOUS; SUBCUTANEOUS at 17:21

## 2018-07-11 RX ADMIN — CARBIDOPA AND LEVODOPA 1 TABLET: 25; 100 TABLET ORAL at 17:21

## 2018-07-11 RX ADMIN — INSULIN LISPRO 2 UNITS: 100 INJECTION, SOLUTION INTRAVENOUS; SUBCUTANEOUS at 12:48

## 2018-07-11 RX ADMIN — METFORMIN HYDROCHLORIDE 1000 MG: 1000 TABLET, FILM COATED ORAL at 08:33

## 2018-07-11 RX ADMIN — LOSARTAN POTASSIUM 50 MG: 50 TABLET ORAL at 22:38

## 2018-07-11 RX ADMIN — VITAMIN D, TAB 1000IU (100/BT) 2000 UNITS: 25 TAB at 22:37

## 2018-07-11 RX ADMIN — BETHANECHOL CHLORIDE 25 MG: 25 TABLET ORAL at 22:37

## 2018-07-11 RX ADMIN — CARBIDOPA AND LEVODOPA 1 TABLET: 25; 100 TABLET ORAL at 12:48

## 2018-07-11 RX ADMIN — METOLAZONE 2.5 MG: 2.5 TABLET ORAL at 08:33

## 2018-07-11 RX ADMIN — BISACODYL 5 MG: 5 TABLET, COATED ORAL at 12:54

## 2018-07-11 RX ADMIN — MULTIPLE VITAMINS W/ MINERALS TAB 1 TABLET: TAB at 08:32

## 2018-07-11 RX ADMIN — RANOLAZINE 500 MG: 500 TABLET, FILM COATED, EXTENDED RELEASE ORAL at 22:38

## 2018-07-11 RX ADMIN — CARBIDOPA AND LEVODOPA 2 TABLET: 25; 100 TABLET ORAL at 06:09

## 2018-07-11 RX ADMIN — AMANTADINE HYDROCHLORIDE 100 MG: 100 CAPSULE ORAL at 08:38

## 2018-07-11 RX ADMIN — RANOLAZINE 500 MG: 500 TABLET, FILM COATED, EXTENDED RELEASE ORAL at 08:37

## 2018-07-11 RX ADMIN — Medication 325 MG: at 17:21

## 2018-07-11 RX ADMIN — BUMETANIDE 2 MG: 1 TABLET ORAL at 08:32

## 2018-07-11 RX ADMIN — PANTOPRAZOLE SODIUM 40 MG: 40 TABLET, DELAYED RELEASE ORAL at 06:09

## 2018-07-11 RX ADMIN — SPIRONOLACTONE 25 MG: 25 TABLET, FILM COATED ORAL at 08:32

## 2018-07-11 RX ADMIN — Medication 2 TABLET: at 22:40

## 2018-07-11 RX ADMIN — Medication 325 MG: at 08:32

## 2018-07-11 RX ADMIN — CLONIDINE HYDROCHLORIDE 0.1 MG: 0.1 TABLET ORAL at 22:39

## 2018-07-11 RX ADMIN — LEVOTHYROXINE SODIUM 200 MCG: 200 TABLET ORAL at 06:09

## 2018-07-11 RX ADMIN — INSULIN LISPRO 2 UNITS: 100 INJECTION, SOLUTION INTRAVENOUS; SUBCUTANEOUS at 22:41

## 2018-07-11 RX ADMIN — CITALOPRAM HYDROBROMIDE 20 MG: 20 TABLET ORAL at 22:39

## 2018-07-11 RX ADMIN — POTASSIUM CHLORIDE 10 MEQ: 750 CAPSULE, EXTENDED RELEASE ORAL at 22:39

## 2018-07-11 RX ADMIN — BETHANECHOL CHLORIDE 25 MG: 25 TABLET ORAL at 16:23

## 2018-07-11 RX ADMIN — APIXABAN 5 MG: 5 TABLET, FILM COATED ORAL at 08:34

## 2018-07-11 RX ADMIN — POLYETHYLENE GLYCOL (3350) 17 G: 17 POWDER, FOR SOLUTION ORAL at 08:33

## 2018-07-11 RX ADMIN — VITAMIN D, TAB 1000IU (100/BT) 2000 UNITS: 25 TAB at 08:33

## 2018-07-11 RX ADMIN — METFORMIN HYDROCHLORIDE 1000 MG: 1000 TABLET, FILM COATED ORAL at 17:21

## 2018-07-11 RX ADMIN — CETIRIZINE HYDROCHLORIDE 10 MG: 10 TABLET, FILM COATED ORAL at 22:37

## 2018-07-11 RX ADMIN — BUMETANIDE 2 MG: 1 TABLET ORAL at 22:39

## 2018-07-11 RX ADMIN — BETHANECHOL CHLORIDE 25 MG: 25 TABLET ORAL at 08:39

## 2018-07-11 RX ADMIN — AMANTADINE HYDROCHLORIDE 100 MG: 100 CAPSULE ORAL at 22:36

## 2018-07-11 NOTE — THERAPY TREATMENT NOTE
Acute Care - Physical Therapy Treatment Note  [facility]     Patient Name: Joanna Cross  : 1950  MRN: [id number]  Today's Date: 2018  Onset of Illness/Injury or Date of Surgery: 18  Date of Referral to PT: 18  Referring Physician: MD Stevenson    Admit Date: 2018    Visit Dx:    ICD-10-CM ICD-9-CM   1. Impaired functional mobility, balance, gait, and endurance Z74.09 V49.89   2. Spinal stenosis, lumbar region, with neurogenic claudication M48.062 724.03   3. Impaired mobility and ADLs Z74.09 799.89     Patient Active Problem List   Diagnosis   • Coronary artery disease involving native coronary artery of native heart without angina pectoris   • Essential hypertension   • Peripheral vascular disease (CMS/HCC)   • Hyperlipidemia LDL goal <70   • Chronic atrial fibrillation (CMS/HCC)   • Spinal stenosis, lumbar region, with neurogenic claudication       Therapy Treatment          Rehabilitation Treatment Summary     Row Name 18 1012             Treatment Time/Intention    Discipline physical therapy assistant  -AS      Document Type therapy note (daily note)  -AS      Subjective Information complains of;pain;weakness  -AS      Mode of Treatment physical therapy  -AS      Patient/Family Observations daughter present at end of session  -AS      Patient Effort good  -AS      Recorded by [AS] Daphney Ang, YURI 18 1105      Row Name 18 1012             Vital Signs    Pre SpO2 (%) 95  -AS      O2 Delivery Pre Treatment supplemental O2  -AS      Post SpO2 (%) 95  -AS      O2 Delivery Post Treatment supplemental O2  -AS      Pre Patient Position Supine  -AS      Post Patient Position Sitting  -AS      Recorded by [AS] Daphney Ang, YURI 18 1105      Row Name 18 1012             Bed Mobility Assessment/Treatment    Supine-Sit [place] (Bed Mobility) verbal cues;contact guard  -AS      Sit-Supine [place] (Bed Mobility) not tested  -AS      Comment  (Bed Mobility) verbal cues for technique  -AS      Recorded by [AS] Daphney Ang, PTA 07/11/18 1105      Row Name 07/11/18 1012             Sit-Stand Transfer    Sit-Stand [place] (Transfers) verbal cues;contact guard  -AS      Assistive Device (Sit-Stand Transfers) walker, 4-wheeled  -AS      Recorded by [AS] Daphney Ang, PTA 07/11/18 1105      Row Name 07/11/18 1012             Stand-Sit Transfer    Stand-Sit [place] (Transfers) verbal cues;contact guard  -AS      Assistive Device (Stand-Sit Transfers) walker, 4-wheeled  -AS      Recorded by [AS] Daphney Ang, PTA 07/11/18 1105      Row Name 07/11/18 1012             Toilet Transfer    Type (Toilet Transfer) stand pivot/stand step  -AS      [place] Level (Toilet Transfer) verbal cues;contact guard  -AS      Assistive Device (Toilet Transfer) commode, bedside without drop arms  -AS      Recorded by [AS] Daphney Ang, PTA 07/11/18 1105      Row Name 07/11/18 1012             Gait/Stairs Assessment/Training    26890 - Gait Training Minutes  15  -AS      Gait/Stairs Assessment/Training gait/ambulation assistive device  -AS      [place] Level (Gait) verbal cues;contact guard;1 person to manage equipment  -AS      Assistive Device (Gait) walker, 4-wheeled  -AS      Distance in Feet (Gait) 100   seated rest break at 50 feet  -AS      Pattern (Gait) step-through  -AS      Deviations/Abnormal Patterns (Gait) base of support, narrow;manjit decreased;gait speed decreased  -AS      Bilateral Gait Deviations forward flexed posture  -AS      Comment (Gait/Stairs) verbal cues to stay inside walker and to improve posture  -AS      Recorded by [AS] Daphney Ang, PTA 07/11/18 1105      Row Name 07/11/18 1012             Motor Skills Assessment/Interventions    Additional Documentation Therapeutic Exercise (Group)  -AS      Recorded by [AS] Daphney Ang, PTA 07/11/18 1105      Row Name 07/11/18 1012             Therapeutic  Exercise    83307 - PT Therapeutic Exercise Minutes 8  -AS      Recorded by [AS] Daphney Ang, PTA 07/11/18 1105      Row Name 07/11/18 1012             Therapeutic Exercise    Lower Extremity Range of Motion (Therapeutic Exercise) hip flexion/extension, bilateral;knee flexion/extension, bilateral;ankle dorsiflexion/plantar flexion, bilateral  -AS      Exercise Type (Therapeutic Exercise) AROM (active range of motion)  -AS      Position (Therapeutic Exercise) seated  -AS      Sets/Reps (Therapeutic Exercise) 1/10  -AS      Recorded by [AS] Daphney Ang, YURI 07/11/18 1105      Row Name 07/11/18 1012             Positioning and Restraints    Pre-Treatment Position in bed  -AS      Post Treatment Position chair  -AS      In Chair reclined;call light within reach;encouraged to call for assist;exit alarm on;with family/caregiver  -AS      Recorded by [AS] Daphney Ang, YURI 07/11/18 1105      Row Name 07/11/18 1012             Pain Scale: Numbers Pre/Post-Treatment    Pain Scale: Numbers, Pretreatment 4/10  -AS      Pain Scale: Numbers, Post-Treatment 4/10  -AS      Pain Location - Orientation lower  -AS      Pain Location back  -AS      Pain Intervention(s) Repositioned;Ambulation/increased activity  -AS      Recorded by [AS] Daphney Ang, YURI 07/11/18 1105      Row Name                Wound 07/06/18 1627 Other (See comments) back incision    Wound - Properties Group Date first assessed: 07/06/18 [SV] Time first assessed: 1627 [SV] Side: Other (See comments) [SV] Location: back [SV] Type: incision [SV] Recorded by:  [SV] Amna Chou RN 07/06/18 1627      User Key  (r) = Recorded By, (t) = Taken By, (c) = Cosigned By    Initials Name Effective Dates Discipline    AS Daphney Ang PTA 06/22/15 -  PT    SV Amna Chou RN 04/19/17 -  Nurse          Wound 07/06/18 1627 Other (See comments) back incision (Active)   Dressing Appearance dry;intact 7/11/2018  6:00 AM   Closure RODRIGO  7/10/2018  8:00 PM   Base dressing in place, unable to visualize 7/10/2018  8:00 PM   Dressing Care, Wound gauze 7/10/2018  8:00 PM             Physical Therapy Education     Title: PT OT SLP Therapies (Active)     Topic: Physical Therapy (Active)     Point: Mobility training (Active)    Learning Progress Summary     Learner Status Readiness Method Response Comment Documented by    Patient Active Acceptance E NR  AS 07/11/18 1106     Active Eager E NR reviewed back precautions SC 07/10/18 1612     Done Acceptance E,D VU,NR Reviewed back precautions, HEP, gait mechanics, benefits of mobility. Issued HEP handout  07/09/18 1324    Family Done Acceptance E,D VU,NR Reviewed back precautions, HEP, gait mechanics, benefits of mobility. Issued HEP handout  07/09/18 1324          Point: Home exercise program (Active)    Learning Progress Summary     Learner Status Readiness Method Response Comment Documented by    Patient Active Acceptance E NR  AS 07/11/18 1106     Active Eager E NR reviewed back precautions SC 07/10/18 1612     Done Acceptance E,D VU,NR Reviewed back precautions, HEP, gait mechanics, benefits of mobility. Issued HEP handout  07/09/18 1324    Family Done Acceptance E,D VU,NR Reviewed back precautions, HEP, gait mechanics, benefits of mobility. Issued HEP handout  07/09/18 1324          Point: Body mechanics (Active)    Learning Progress Summary     Learner Status Readiness Method Response Comment Documented by    Patient Active Acceptance E NR  AS 07/11/18 1106     Active Eager E NR reviewed back precautions SC 07/10/18 1612     Done Acceptance E,D VU,NR Reviewed back precautions, HEP, gait mechanics, benefits of mobility. Issued HEP handout  07/09/18 1324    Family Done Acceptance E,D VU,NR Reviewed back precautions, HEP, gait mechanics, benefits of mobility. Issued HEP handout  07/09/18 1324          Point: Precautions (Active)    Learning Progress Summary     Learner Status Readiness Method  Response Comment Documented by    Patient Active Acceptance E NR  AS 07/11/18 1106     Active Eager E NR reviewed back precautions SC 07/10/18 1612     Done Acceptance EMARVIN NR Reviewed back precautions, HEP, gait mechanics, benefits of mobility. Issued HEP handout  07/09/18 1324    Family Done Acceptance MARVIN JAUREGUI NR Reviewed back precautions, HEP, gait mechanics, benefits of mobility. Issued HEP handout  07/09/18 1324                      User Key     Initials Effective Dates Name Provider Type Discipline    SC 06/19/15 -  Sharon Purcell, PT Physical Therapist PT    AS 06/22/15 -  Daphney Ang, PTA Physical Therapy Assistant PT     04/03/18 -  Nito Justice, PT Physical Therapist PT                    PT Recommendation and Plan     Plan of Care Reviewed With: patient  Progress: improving  Outcome Summary: patient up ambulating in hallway 100 feet with rollator and O2 per NC. Required 1 seated reat break due to weakness.           Outcome Measures     Row Name 07/11/18 1012 07/10/18 1438 07/10/18 1400       How much help from another person do you currently need...    Turning from your back to your side while in flat bed without using bedrails? 3  -AS  -- 2  -SC    Moving from lying on back to sitting on the side of a flat bed without bedrails? 3  -AS  -- 2  -SC    Moving to and from a bed to a chair (including a wheelchair)? 3  -AS  -- 3  -SC    Standing up from a chair using your arms (e.g., wheelchair, bedside chair)? 3  -AS  -- 3  -SC    Climbing 3-5 steps with a railing? 2  -AS  -- 1  -SC    To walk in hospital room? 3  -AS  -- 3  -SC    AM-PAC 6 Clicks Score 17  -AS  -- 14  -SC       How much help from another is currently needed...    Putting on and taking off regular lower body clothing?  -- 3  -KF  --    Bathing (including washing, rinsing, and drying)  -- 3  -KF  --    Toileting (which includes using toilet bed pan or urinal)  -- 3  -KF  --    Putting on and taking off regular upper body clothing   -- 3  -KF  --    Taking care of personal grooming (such as brushing teeth)  -- 4  -KF  --    Eating meals  -- 4  -KF  --    Score  -- 20  -KF  --       Functional Assessment    Outcome Measure Options AM-PAC 6 Clicks Basic Mobility (PT)  -AS AM-PAC 6 Clicks Daily Activity (OT)  -KF AM-PAC 6 Clicks Basic Mobility (PT)  -SC    Row Name 07/09/18 1125             How much help from another person do you currently need...    Turning from your back to your side while in flat bed without using bedrails? 2  -MJ      Moving from lying on back to sitting on the side of a flat bed without bedrails? 2  -MJ      Moving to and from a bed to a chair (including a wheelchair)? 3  -MJ      Standing up from a chair using your arms (e.g., wheelchair, bedside chair)? 2  -MJ      Climbing 3-5 steps with a railing? 1  -MJ      To walk in hospital room? 3  -MJ      AM-PAC 6 Clicks Score 13  -MJ         Functional Assessment    Outcome Measure Options AM-PAC 6 Clicks Basic Mobility (PT)  -MJ        User Key  (r) = Recorded By, (t) = Taken By, (c) = Cosigned By    Initials Name Provider Type    SC Sharon Purcell, PT Physical Therapist    AS Daphney Ang, YURI Physical Therapy Assistant    DAFNE Justice, PT Physical Therapist    KF Lindsey Marshall, OT Occupational Therapist           Time Calculation:         PT Charges     Row Name 07/11/18 1012             Time Calculation    Start Time 1012  -AS      PT Received On 07/11/18  -AS      PT Goal Re-Cert Due Date 07/19/18  -AS         Timed Charges    11556 - PT Therapeutic Exercise Minutes 8  -AS      06986 - Gait Training Minutes  15  -AS        User Key  (r) = Recorded By, (t) = Taken By, (c) = Cosigned By    Initials Name Provider Type    AS Daphney Ang, YURI Physical Therapy Assistant        Therapy Suggested Charges     Code   Minutes Charges    50035 (CPT®) Hc Pt Neuromusc Re Education Ea 15 Min      89676 (CPT®) Hc Pt Ther Proc Ea 15 Min 8 1    03936 (CPT®) Hc Gait  Training Ea 15 Min 15 1    87052 (CPT®) Hc Pt Therapeutic Act Ea 15 Min      96368 (CPT®) Hc Pt Manual Therapy Ea 15 Min      34988 (CPT®) Hc Pt Iontophoresis Ea 15 Min      42783 (CPT®) Hc Pt Elec Stim Ea-Per 15 Min      84623 (CPT®) Hc Pt Ultrasound Ea 15 Min      47808 (CPT®) Hc Pt Self Care/Mgmt/Train Ea 15 Min      Total  23 2        Therapy Charges for Today     Code Description Service Date Service Provider Modifiers Qty    01341908837 HC GAIT TRAINING EA 15 MIN 7/11/2018 Daphney Ang, YURI GP 1    86994105515 HC PT THER PROC EA 15 MIN 7/11/2018 Daphney Ang PTA GP 1    83079794345 HC PT THER SUPP EA 15 MIN 7/11/2018 Daphney Ang PTA GP 2          PT G-Codes  PT Professional Judgement Used?: Yes  Outcome Measure Options: AM-PAC 6 Clicks Basic Mobility (PT)  Score: 13  Functional Limitation: Mobility: Walking and moving around  Mobility: Walking and Moving Around Current Status (): At least 40 percent but less than 60 percent impaired, limited or restricted  Mobility: Walking and Moving Around Goal Status (): At least 20 percent but less than 40 percent impaired, limited or restricted    Daphney Ang PTA  7/11/2018

## 2018-07-11 NOTE — THERAPY TREATMENT NOTE
Acute Care - Occupational Therapy Treatment Note  [facility]     Patient Name: Joanna Cross  : 1950  MRN: [id number]  Today's Date: 2018  Onset of Illness/Injury or Date of Surgery: 18  Date of Referral to OT: 18  Referring Physician: MD Stevenson    Admit Date: 2018       ICD-10-CM ICD-9-CM   1. Impaired functional mobility, balance, gait, and endurance Z74.09 V49.89   2. Spinal stenosis, lumbar region, with neurogenic claudication M48.062 724.03   3. Impaired mobility and ADLs Z74.09 799.89     Patient Active Problem List   Diagnosis   • Coronary artery disease involving native coronary artery of native heart without angina pectoris   • Essential hypertension   • Peripheral vascular disease (CMS/[facility])   • Hyperlipidemia LDL goal <70   • Chronic atrial fibrillation (CMS/[facility])   • Spinal stenosis, lumbar region, with neurogenic claudication     Past Medical History:   Diagnosis Date   • Anemia    • Arthritis    • Atrial fibrillation (CMS/[facility])    • Chronic edema     bilateral   • Coronary artery disease involving native coronary artery of native heart without angina pectoris 3/1/2017   • Depression    • Diabetes mellitus (CMS/[facility])     type 2, checks fsbg 1-2x/day and as needed; last a1c  6.5   • Essential hypertension 3/1/2017   • GERD (gastroesophageal reflux disease)    • GIB (gastrointestinal bleeding)     d/t xarelto    • Hiatal hernia    • History of transfusion     d/t GIB, no reaction per pt report    • Mixed hyperlipidemia 3/1/2017   • Myocardial infarction    • MILLI on CPAP    • Parkinson disease (CMS/[facility])    • Peripheral vascular disease (CMS/[facility]) 3/1/2017   • Skin cancer    • SSS (sick sinus syndrome) (CMS/[facility])     ppm    • TIA (transient ischemic attack)     no residual    • Varicose vein of leg     not spec of leg    • Venous hypertension    • Venous insufficiency    • Wears glasses      Past Surgical History:   Procedure Laterality Date   • BICEPS TENDON REPAIR  Right     shoulder   • BREAST BIOPSY Left 2004   • BUNIONECTOMY Right    • CARDIAC CATHETERIZATION     • CHOLECYSTECTOMY     • COLONOSCOPY  2016   • CYST REMOVAL      left ear, upper left back 2001   • DIAGNOSTIC LAPAROSCOPY  1981   • ENDOSCOPIC FUNCTIONAL SINUS SURGERY (FESS)  2011   • ENDOSCOPY  2016   • HAMMER TOE REPAIR Left    • LUMBAR LAMINECTOMY DISCECTOMY DECOMPRESSION N/A 7/6/2018    Procedure: LUMBAR LAMINECTOMY L4-5, HEMILAMIINECTOMY RIGHT L5-S1, FORAMINOTOMY L5-S1;  Surgeon: Lencho Gamino MD;  Location: [facility];  Service: Neurosurgery   • LUNG BIOPSY Left 2016   • PACEMAKER IMPLANTATION  11/2016    sss   • REPLACEMENT TOTAL KNEE Bilateral     left knee 2011, right 2012 per dr acuna    • SHOULDER ARTHROSCOPY Bilateral     2003-left, 2004- right    • SKIN BIOPSY      skin cancer back 2016   • TUBAL ABDOMINAL LIGATION  1980   • WISDOM TOOTH EXTRACTION         Therapy Treatment          Rehabilitation Treatment Summary     Row Name 07/11/18 1452 07/11/18 1012          Treatment Time/Intention    Discipline occupational therapist  -KF physical therapy assistant  -AS     Document Type therapy note (daily note)  -KF therapy note (daily note)  -AS     Subjective Information complains of;pain;fatigue  -KF complains of;pain;weakness  -AS     Mode of Treatment occupational therapy  -KF physical therapy  -AS     Patient/Family Observations daughter present throughout and involved  -KF daughter present at end of session  -AS     Care Plan Review risks/benefits reviewed;current/potential barriers reviewed;patient/other agree to care plan  -KF  --     Care Plan Review, Other Participant(s) daughter  -KF  --     Patient Effort excellent  -KF good  -AS     Comment RN aware  -KF  --     Existing Precautions/Restrictions fall;spinal;oxygen therapy device and L/min  -KF  --     Treatment Considerations/Comments wears O2 at baseline for PM  -KF  --     Recorded by [KF] Lindsey Marshall, OT 07/11/18 6857 [AS] Daphney Bee  Sav, PTA 07/11/18 1105     Row Name 07/11/18 1452 07/11/18 1012          Vital Signs    Pre Systolic BP Rehab 125  -KF  --     Pre Treatment Diastolic BP 72   Rn cleared vitals stable  -KF  --     Pretreatment Heart Rate (beats/min) 85  -KF  --     Posttreatment Heart Rate (beats/min) 75  -KF  --     Pre SpO2 (%) 99  -KF 95  -AS     O2 Delivery Pre Treatment room air  -KF supplemental O2  -AS     Intra SpO2 (%) 95  -KF  --     O2 Delivery Intra Treatment supplemental O2  -KF  --     Post SpO2 (%) 98  -KF 95  -AS     O2 Delivery Post Treatment room air  -KF supplemental O2  -AS     Pre Patient Position Supine  -KF Supine  -AS     Intra Patient Position Standing  -KF  --     Post Patient Position Sitting  -KF Sitting  -AS     Recorded by [KF] Lindsey Marshall, OT 07/11/18 1540 [AS] Daphney Ang, PTA 07/11/18 1105     Row Name 07/11/18 1452             Cognitive Assessment/Intervention    Additional Documentation Cognitive Assessment/Intervention (Group)  -KF      Recorded by [KF] Lindsey Marshall, OT 07/11/18 1540      Row Name 07/11/18 1452             Cognitive Assessment/Intervention- PT/OT    Affect/Mental Status (Cognitive) WNL  -KF      Orientation Status (Cognition) oriented x 4  -KF      Follows Commands (Cognition) WNL;follows two step commands;over 90% accuracy  -KF      Cognitive Function (Cognitive) WFL;safety deficit  -KF      Safety Deficit (Cognitive) mild deficit;insight into deficits/self awareness;safety precautions awareness  -KF      Cognitive Interventions (Cognitive) occupation/activity based interventions;process/task specific training  -KF      Personal Safety Interventions fall prevention program maintained;gait belt;nonskid shoes/slippers when out of bed;muscle strengthening facilitated  -KF      Recorded by [KF] Lindsey Marshall, OT 07/11/18 1540      Row Name 07/11/18 1450             Safety Issues, Functional Mobility    Safety Issues Affecting Function (Mobility) safety  precaution awareness;insight into deficits/self awareness  -KF      Impairments Affecting Function (Mobility) pain;postural/trunk control;strength;endurance/activity tolerance  -KF      Recorded by [KF] Lindsey Marshall, OT 07/11/18 1540      Row Name 07/11/18 1452 07/11/18 1012          Bed Mobility Assessment/Treatment    Bed Mobility Assessment/Treatment supine-sit;scooting/bridging;rolling right  -KF  --     Rolling Right [place] (Bed Mobility) contact guard;verbal cues  -KF  --     Scooting/Bridging [place] (Bed Mobility) contact guard;verbal cues  -KF  --     Supine-Sit [place] (Bed Mobility) contact guard;verbal cues  -KF verbal cues;contact guard  -AS     Sit-Supine [place] (Bed Mobility)  -- not tested  -AS     Bed Mobility, Safety Issues decreased use of legs for bridging/pushing;decreased use of arms for pushing/pulling  -KF  --     Assistive Device (Bed Mobility) head of bed elevated;bed rails  -KF  --     Comment (Bed Mobility) min VC's for log rolling technique to lower LE's off as torso rises   -KF verbal cues for technique  -AS     Recorded by [KF] Lindsey Marshall, OT 07/11/18 1540 [AS] Daphney Cristal Ang, [facility] 07/11/18 1105     Row Name 07/11/18 1452             Functional Mobility    Functional Mobility- Ind. Level contact guard assist;verbal cues required  -KF      Functional Mobility- Device rollator  -KF      Functional Mobility-Distance (Feet) 100  -KF      Functional Mobility- Safety Issues step length decreased;supplemental O2  -KF      Recorded by [KF] Lindsey Marshall, OT 07/11/18 1540      Row Name 07/11/18 1452             Transfer Assessment/Treatment    Transfer Assessment/Treatment sit-stand transfer;stand-sit transfer;toilet transfer  -KF      Comment (Transfers) no VC's for HP or management of rollator required, good safety awareness, VC's for step length   -KF      Recorded by [KF] Lindsey Marshall, OT 07/11/18 1540      Row Name 07/11/18 1452 07/11/18 1012           Sit-Stand Transfer    Sit-Stand [place] (Transfers) contact guard;verbal cues  -KF verbal cues;contact guard  -AS     Assistive Device (Sit-Stand Transfers) walker, 4-wheeled  -KF walker, 4-wheeled  -AS     Recorded by [KF] Lindsey Marshall, OT 07/11/18 1540 [AS] Daphney Ang, PTA 07/11/18 1105     Row Name 07/11/18 1452 07/11/18 1012          Stand-Sit Transfer    Stand-Sit [place] (Transfers) contact guard;verbal cues  -KF verbal cues;contact guard  -AS     Assistive Device (Stand-Sit Transfers) walker, 4-wheeled  -KF walker, 4-wheeled  -AS     Recorded by [KF] Lindsey Marshall, OT 07/11/18 1540 [AS] Daphney Ang, PTA 07/11/18 1105     Row Name 07/11/18 1452 07/11/18 1012          Toilet Transfer    Type (Toilet Transfer) stand pivot/stand step  -KF stand pivot/stand step  -AS     [place] Level (Toilet Transfer) contact guard;verbal cues  -KF verbal cues;contact guard  -AS     Assistive Device (Toilet Transfer) commode, bedside without drop arms  -KF commode, bedside without drop arms  -AS     Recorded by [KF] Lindsey Marshall, OT 07/11/18 1540 [AS] Daphney Ang, PTA 07/11/18 1105     Row Name 07/11/18 1012             Gait/Stairs Assessment/Training    80403 - Gait Training Minutes  15  -AS      Gait/Stairs Assessment/Training gait/ambulation assistive device  -AS      [place] Level (Gait) verbal cues;contact guard;1 person to manage equipment  -AS      Assistive Device (Gait) walker, 4-wheeled  -AS      Distance in Feet (Gait) 100   seated rest break at 50 feet  -AS      Pattern (Gait) step-through  -AS      Deviations/Abnormal Patterns (Gait) base of support, narrow;manjit decreased;gait speed decreased  -AS      Bilateral Gait Deviations forward flexed posture  -AS      Comment (Gait/Stairs) verbal cues to stay inside walker and to improve posture  -AS      Recorded by [AS] Daphney Ang, PTA 07/11/18 1105      Row Name 07/11/18 1452             ADL  Assessment/Intervention    48093 - OT Self Care/Mgmt Minutes 18  -KF      BADL Assessment/Intervention toileting;upper body dressing;clothing fastener management  -KF      Recorded by [KF] Lindsey Marshall, OT 07/11/18 1540      Row Name 07/11/18 1452             Upper Body Dressing Assessment/Training    Upper Body Dressing [place] Level don;front opening garment;set up  -KF      Upper Body Dressing Position edge of bed sitting  -KF      Comment (Upper Body Dressing) set up for sleeve fastener management  -KF      Recorded by [KF] Lindsey Marshall, OT 07/11/18 1540      Row Name 07/11/18 1452             Clothes Fastener Management    [place] Level (Clothes Fastener Management) dependent (less than 25% patient effort)  -KF      Position (Clothes Fastener Management) edge of bed sitting  -KF      Comment (Clothes Fastener Management) snaps on gown and tie in back  -KF      Recorded by [KF] Lindsey Marshall, OT 07/11/18 1540      Row Name 07/11/18 1452             Toileting Assessment/Training    [place] Level (Toileting) perform perineal hygiene;adjust/manage clothing;maximum assist (25% patient effort)  -KF      Assistive Devices (Toileting) commode, bedside without drop arms  -KF      Toileting Position supported standing;supported sitting  -KF      Comment (Toileting) Pt max A for toilet hygiene in standing today d/t balance today  -KF      Recorded by [KF] Lindsey Marshall, OT 07/11/18 1540      Row Name 07/11/18 1452             BADL Safety/Performance    Impairments, BADL Safety/Performance balance;endurance/activity tolerance;pain;range of motion;strength  -KF      Skilled BADL Treatment/Intervention adaptive equipment training;BADL process/adaptation training;cognitive/safety deficit modifications  -KF      Recorded by [KF] Lindsey Marshall, OT 07/11/18 1540      Row Name 07/11/18 1452 07/11/18 1012          Motor Skills Assessment/Interventions    Additional Documentation Balance (Group)   -KF Therapeutic Exercise (Group)  -AS     Recorded by [KF] Lindsey Marshall, OT 07/11/18 1540 [AS] Daphney Ang, PTA 07/11/18 1105     Row Name 07/11/18 1012             Therapeutic Exercise    03735 - PT Therapeutic Exercise Minutes 8  -AS      Recorded by [AS] Daphney Ang, PTA 07/11/18 1105      Row Name 07/11/18 1012             Therapeutic Exercise    Lower Extremity Range of Motion (Therapeutic Exercise) hip flexion/extension, bilateral;knee flexion/extension, bilateral;ankle dorsiflexion/plantar flexion, bilateral  -AS      Exercise Type (Therapeutic Exercise) AROM (active range of motion)  -AS      Position (Therapeutic Exercise) seated  -AS      Sets/Reps (Therapeutic Exercise) 1/10  -AS      Recorded by [AS] Daphney Ang, PTA 07/11/18 1105      Row Name 07/11/18 1452             Balance    Balance static sitting balance;static standing balance;dynamic sitting balance;dynamic standing balance  -KF      Recorded by [KF] Lindsey Marshall, OT 07/11/18 1540      Row Name 07/11/18 1452             Static Sitting Balance    Level of [place] (Unsupported Sitting, Static Balance) independent  -KF      Sitting Position (Unsupported Sitting, Static Balance) sitting on edge of bed  -KF      Time Able to Maintain Position (Unsupported Sitting, Static Balance) more than 5 minutes  -KF      Recorded by [KF] Lindsey Marshall, OT 07/11/18 1540      Row Name 07/11/18 1452             Dynamic Sitting Balance    Level of [place], Reaches Outside Midline (Sitting, Dynamic Balance) supervision  -KF      Sitting Position, Reaches Outside Midline (Sitting, Dynamic Balance) sitting on edge of bed;sitting in chair  -KF      Comment, Reaches Outside Midline (Sitting, Dynamic Balance) no LOB during transfer and weight shifting to standing  -KF      Recorded by [KF] Lindsey Marshall, OT 07/11/18 1540      Row Name 07/11/18 1452             Static Standing Balance    Level of [place] (Supported  Standing, Static Balance) standby assist  -KF      Time Able to Maintain Position (Supported Standing, Static Balance) more than 5 minutes  -KF      Assistive Device Utilized (Supported Standing, Static Balance) rolling platform walker ( walker)  -KF      Recorded by [KF] Lindsey Marshall, OT 07/11/18 1540      Row Name 07/11/18 1452             Dynamic Standing Balance    Level of [place], Reaches Outside Midline (Standing, Dynamic Balance) contact guard assist  -KF      Time Able to Maintain Position, Reaches Outside Midline (Standing, Dynamic Balance) 45 to 60 seconds  -KF      Comment, Reaches Outside Midline (Standing, Dynamic Balance) at rollator, unsteady in standing, limited dynamic standing balance cont  -KF      Recorded by [KF] Lindsey Marshall, OT 07/11/18 1540      Row Name 07/11/18 1452 07/11/18 1012          Positioning and Restraints    Pre-Treatment Position in bed  -KF in bed  -AS     Post Treatment Position chair  -KF chair  -AS     In Chair notified nsg;reclined;sitting;call light within reach;encouraged to call for assist;exit alarm on;with family/caregiver;legs elevated  -KF reclined;call light within reach;encouraged to call for assist;exit alarm on;with family/caregiver  -AS     Recorded by [KF] Lindsey Marshall, OT 07/11/18 1540 [AS] Daphney Ang, [facility] 07/11/18 1105     Row Name 07/11/18 1452             Pain Assessment    Additional Documentation Pain Scale: FACES Pre/Post-Treatment (Group)  -KF      Recorded by [KF] Lindsey Marshall, OT 07/11/18 1540      Row Name 07/11/18 1452 07/11/18 1012          Pain Scale: Numbers Pre/Post-Treatment    Pain Scale: Numbers, Pretreatment  -- 4/10  -AS     Pain Scale: Numbers, Post-Treatment  -- 4/10  -AS     Pain Location - Orientation incisional  -KF lower  -AS     Pain Location back  -KF back  -AS     Pain Intervention(s) Repositioned;Ambulation/increased activity  -KF Repositioned;Ambulation/increased activity  -AS     Recorded by  [KF] Lindsey Marshall, OT 07/11/18 1540 [AS] Daphney Ang, PTA 07/11/18 1105     Row Name 07/11/18 1452             Pain Scale: FACES Pre/Post-Treatment    Pain: FACES Scale, Pretreatment 2-->hurts little bit  -KF      Pain: FACES Scale, Post-Treatment 2-->hurts little bit  -KF      Recorded by [KF] Lindsey Marshall, OT 07/11/18 1540      Row Name                Wound 07/06/18 1627 Other (See comments) back incision    Wound - Properties Group Date first assessed: 07/06/18 [SV] Time first assessed: 1627 [SV] Side: Other (See comments) [SV] Location: back [SV] Type: incision [SV] Recorded by:  [SV] Amna Chou RN 07/06/18 1627    Row Name 07/11/18 1452             Plan of Care Review    Plan of Care Reviewed With patient;daughter  -KF      Recorded by [KF] Lindsey Marshall, OT 07/11/18 1540        User Key  (r) = Recorded By, (t) = Taken By, (c) = Cosigned By    Initials Name Effective Dates Discipline    AS Daphney Ang, PTA 06/22/15 -  PT    SV Amna Chou RN 04/19/17 -  Nurse    KF Lindsey Marshall, OT 04/03/18 -  OT        Wound 07/06/18 1627 Other (See comments) back incision (Active)   Dressing Appearance intact;dried drainage 7/11/2018  8:00 AM   Closure RODRIGO 7/10/2018  8:00 PM   Base dressing in place, unable to visualize 7/11/2018  8:00 AM   Dressing Care, Wound gauze 7/10/2018  8:00 PM         Occupational Therapy Education     Title: PT OT SLP Therapies (Active)     Topic: Occupational Therapy (Done)     Point: ADL training (Done)     Description: Instruct learner(s) on proper safety adaptation and remediation techniques during self care or transfers.   Instruct in proper use of assistive devices.   Learning Progress Summary     Learner Status Readiness Method Response Comment Documented by    Patient Done Acceptance E VU Pt educated on safety awareness for functional transfers, dynamic standing balance challenge purposes, purpose of OT services, and sequencing for improved  step length during functional mobility  07/11/18 1541     Done Acceptance E,TB,D,H VU Pt educated on OT, safe transfers, AE training for bathing and LBD  07/10/18 1600          Point: Precautions (Done)     Description: Instruct learner(s) on prescribed precautions during self-care and functional transfers.   Learning Progress Summary     Learner Status Readiness Method Response Comment Documented by    Patient Done Acceptance E VU Pt educated on safety awareness for functional transfers, dynamic standing balance challenge purposes, purpose of OT services, and sequencing for improved step length during functional mobility  07/11/18 1541     Done Acceptance E,TB,D,H VU Pt educated on OT, safe transfers, AE training for bathing and LBD  07/10/18 1600          Point: Body mechanics (Done)     Description: Instruct learner(s) on proper positioning and spine alignment during self-care, functional mobility activities and/or exercises.   Learning Progress Summary     Learner Status Readiness Method Response Comment Documented by    Patient Done Acceptance E VU Pt educated on safety awareness for functional transfers, dynamic standing balance challenge purposes, purpose of OT services, and sequencing for improved step length during functional mobility  07/11/18 1541     Done Acceptance E,TB,D,H VU Pt educated on OT, safe transfers, AE training for bathing and LBD  07/10/18 1600                      User Key     Initials Effective Dates Name Provider Type Discipline     04/03/18 -  Lindsey Marshall, OT Occupational Therapist OT                OT Recommendation and Plan  Outcome Summary/Treatment Plan (OT)  Anticipated Discharge Disposition (OT): inpatient rehabilitation facility  Planned Therapy Interventions (OT Eval): activity tolerance training, adaptive equipment training, BADL retraining, cognitive/visual perception retraining, functional balance retraining, neuromuscular control/coordination retraining,  occupation/activity based interventions, strengthening exercise, transfer/mobility retraining, patient/caregiver education/training, IADL retraining  Therapy Frequency (OT Eval): daily  Plan of Care Review  Plan of Care Reviewed With: patient  Plan of Care Reviewed With: patient  Outcome Summary: Pt CGA for bed mobility supine to sitting EOB with HOB elevated and bed rail, Pt CGA for functional mobility at rollator and functional transfers to [facility]. Pt max A for toileting hygiene today due to balance. Cont IPOT per POC        Outcome Measures     Row Name 07/11/18 1452 07/11/18 1012 07/10/18 1438       How much help from another person do you currently need...    Turning from your back to your side while in flat bed without using bedrails?  -- 3  -AS  --    Moving from lying on back to sitting on the side of a flat bed without bedrails?  -- 3  -AS  --    Moving to and from a bed to a chair (including a wheelchair)?  -- 3  -AS  --    Standing up from a chair using your arms (e.g., wheelchair, bedside chair)?  -- 3  -AS  --    Climbing 3-5 steps with a railing?  -- 2  -AS  --    To walk in hospital room?  -- 3  -AS  --    AM-PAC 6 Clicks Score  -- 17  -AS  --       How much help from another is currently needed...    Putting on and taking off regular lower body clothing? 3  -KF  -- 3  -KF    Bathing (including washing, rinsing, and drying) 3  -KF  -- 3  -KF    Toileting (which includes using toilet bed pan or urinal) 2  -KF  -- 3  -KF    Putting on and taking off regular upper body clothing 3  -KF  -- 3  -KF    Taking care of personal grooming (such as brushing teeth) 4  -KF  -- 4  -KF    Eating meals 4  -KF  -- 4  -KF    Score 19  -KF  -- 20  -KF       Functional Assessment    Outcome Measure Options AM-PAC 6 Clicks Daily Activity (OT)  -KF AM-PAC 6 Clicks Basic Mobility (PT)  -AS AM-PAC 6 Clicks Daily Activity (OT)  -KF    Row Name 07/10/18 1400 07/09/18 1125          How much help from another person do you currently  need...    Turning from your back to your side while in flat bed without using bedrails? 2  -SC 2  -MJ     Moving from lying on back to sitting on the side of a flat bed without bedrails? 2  -SC 2  -MJ     Moving to and from a bed to a chair (including a wheelchair)? 3  -SC 3  -MJ     Standing up from a chair using your arms (e.g., wheelchair, bedside chair)? 3  -SC 2  -MJ     Climbing 3-5 steps with a railing? 1  -SC 1  -MJ     To walk in hospital room? 3  -SC 3  -MJ     AM-PAC 6 Clicks Score 14  -SC 13  -MJ        Functional Assessment    Outcome Measure Options AM-PAC 6 Clicks Basic Mobility (PT)  -SC AM-PAC 6 Clicks Basic Mobility (PT)  -MJ       User Key  (r) = Recorded By, (t) = Taken By, (c) = Cosigned By    Initials Name Provider Type    SC Sharon Purcell, PT Physical Therapist    AS Daphney Ang, PTA Physical Therapy Assistant    DAFNE Justice, PT Physical Therapist    ROBERT Marshall, OT Occupational Therapist           Time Calculation:         Time Calculation- OT     Row Name 07/11/18 1452 07/11/18 1012          Time Calculation- OT    OT Start Time 1452 -KF  --     Total Timed Code Minutes- OT 30 minute(s)  -KF  --     OT Received On 07/11/18  -KF  --     OT Goal Re-Cert Due Date 07/20/18  -KF  --        Timed Charges    45510 - Gait Training Minutes   -- 15  -AS     47669 - OT Therapeutic Activity Minutes 17 -KF  --     65882 - OT Self Care/Mgmt Minutes 18 -KF  --       User Key  (r) = Recorded By, (t) = Taken By, (c) = Cosigned By    Initials Name Provider Type    AS Daphney Ang, YURI Physical Therapy Assistant    ROBERT Marshall, OT Occupational Therapist           Therapy Suggested Charges     Code   Minutes Charges    39451 (CPT®) Hc Ot Neuromusc Re Education Ea 15 Min      04206 (CPT®) Hc Ot Ther Proc Ea 15 Min      47097 (CPT®) Hc Ot Therapeutic Act Ea 15 Min 17 1    60168 (CPT®) Hc Ot Manual Therapy Ea 15 Min      96356 (CPT®) Hc Ot Iontophoresis Ea 15 Min      44984  (CPT®) Hc Ot Elec Stim Ea-Per 15 Min      46289 (CPT®) Hc Ot Ultrasound Ea 15 Min      41987 (CPT®) Hc Ot Self Care/Mgmt/Train Ea 15 Min 18 1    Total  35 2        Therapy Charges for Today     Code Description Service Date Service Provider Modifiers Qty    36542785029 HC OT SELF CARE/MGMT/TRAIN EA 15 MIN 7/10/2018 Lindsey Marshall OT GO 1    07535287994 HC OT EVAL MOD COMPLEXITY 3 7/10/2018 Lindsey Marshall OT GO 1    96695257564 HC OT SELFCARE CURRENT 7/10/2018 Lindsey Marshall OT GO, CJ 1    92188102823 HC OT SELFCARE PROJECTED 7/10/2018 Lindsey Marshall OT GO, CI 1    [id number] HC OT THERAPEUTIC ACT EA 15 MIN 7/11/2018 Lindsey Marshall OT GO 1    [id number] HC OT SELF CARE/MGMT/TRAIN EA 15 MIN 7/11/2018 Lindsey Marshall OT GO 1          OT G-codes  OT Professional Judgement Used?: Yes  OT Functional Scales Options: AM-PAC 6 Clicks Daily Activity (OT)  Functional Assessment Tool Used: 6 clicks   Score: 20  Functional Limitation: Self care  Self Care Current Status (): At least 20 percent but less than 40 percent impaired, limited or restricted  Self Care Goal Status (): At least 1 percent but less than 20 percent impaired, limited or restricted    Lindsey Marshall OT  7/11/2018

## 2018-07-11 NOTE — PROGRESS NOTES
"    [facility] Medicine Services  PROGRESS NOTE    Patient Name: Joanna Cross  : 1950  MRN: [id number]    Date of Admission: 2018  Length of Stay: 0  Primary Care Physician: Reynaldo Fritz MD    Subjective     CC: f/u chest pain, constipation and dysuria     HPI:   On side of bed getting ready to walk with PT, NAD. Family in room. Having minimal small \"balls\" of BM's, now with nausea and minimal intake, passing flatus well. Still not able to void on her own.     Review of Systems  Gen- No fevers, chills  CV- No chest pain, palpitations  Resp- No cough, dyspnea  GI- No N/V/D, abd pain    Otherwise ROS is negative except as mentioned in the HPI.    Objective     Vital Signs:   Temp:  [96.6 °F (35.9 °C)-98.8 °F (37.1 °C)] 98.8 °F (37.1 °C)  Heart Rate:  [60-85] 60  Resp:  [14-18] 16  BP: (111-138)/(67-94) 125/72  FiO2 (%):  [30 %] 30 %        Physical Exam:  Constitutional: No acute distress, awake, alert  HENT: NCAT, mucous membranes moist  Respiratory: Clear to auscultation bilaterally, respiratory effort normal   Cardiovascular: RRR, no murmurs, rubs, or gallops  Gastrointestinal: Positive bowel sounds, soft, nontender, nondistended  Musculoskeletal: No bilateral ankle edema  Psychiatric: Appropriate affect, cooperative  Neurologic: Cranial Nerves grossly intact to confrontation, speech clear  Skin: No rashes    Results Reviewed:  I have personally reviewed current lab, radiology, and data and agree.      Results from last 7 days  Lab Units 18  0703   WBC 10*3/mm3 11.85*   HEMOGLOBIN g/dL 10.8*   HEMATOCRIT % 33.0*   PLATELETS 10*3/mm3 202       Results from last 7 days  Lab Units 18  1345 18  0953 18  0703   SODIUM mmol/L  --   --  135   POTASSIUM mmol/L  --   --  3.6   CHLORIDE mmol/L  --   --  97*   CO2 mmol/L  --   --  31.0   BUN mg/dL  --   --  15   CREATININE mg/dL  --   --  0.69   GLUCOSE mg/dL  --   --  150*   CALCIUM mg/dL  --   --  9.0 "   TROPONIN I ng/mL 0.006 0.006 0.006     Estimated Creatinine Clearance: 78.5 mL/min (by C-G formula based on SCr of 0.69 mg/dL).  No results found for: BNP    Microbiology Results Abnormal     None        I have reviewed the medications.    Assessment / Plan     Hospital Problem List     * (Principal)Spinal stenosis, lumbar region, with neurogenic claudication    Coronary artery disease involving native coronary artery of native heart without angina pectoris    Peripheral vascular disease (CMS/HCC)    Chronic atrial fibrillation (CMS/[facility])        Assessment & Plan:    Atypical chest pain  Coronary artery disease  - Chest pain has resolved.   - Stress test report from 4/2018 reviewed and was normal  - EKG reviewed; NSR with no acute ischemic changes.  Troponin negative x 3, CXR unremarkable.  - ASA 81mg renewed by NS on 7/9   - Ranexa for angina    Chronic A-fib  - Eliquis resumed 7/9 by NS      GERD  -Continue PPI     Spinal stenosis s/p lumbar laminectomy  - Management per primary service  - Encourage incentive spirometry and activity/ambulation     Dysuria  - UA not consistent with UTI  - History of cystoscopy and ureteral dilation by Dr. Terrence Mckenzie in March 2018 for urinary retention   - Urology following, I/o cath prn , bethanechol, bladder scan     Constipation  - Increase bowel regimen    --will receive milk and molasses enema today to help with BM in prep for transfer tomorrow to rehab and hopefully will improve urinary retention as well.     Insulin-dependent DMII  - Reports allergy to Levemir  - Continue Metformin 1000mg BID AC and Lispro TIC AC   - Could use patient's home Tre[place] if she desires for long-acting coverage     Parkinson's disease, continue home regimen     CODE STATUS:   Code Status and Medical Interventions:   Ordered at: 07/06/18 7310     Code Status:    CPR     Medical Interventions (Level of Support Prior to Arrest):    Full     Disposition:   Plan to rehab at [facility] Thursday  7/12    Electronically signed by DEJA Carreon, 07/11/18, 3:40 PM.

## 2018-07-11 NOTE — PLAN OF CARE
Problem: Patient Care Overview  Goal: Plan of Care Review  Outcome: Ongoing (interventions implemented as appropriate)   07/11/18 6132   OTHER   Outcome Summary Ambulated out into fernandez and back with increased pain and fatigue, VSS, sleeping with BiPAP on, required I&O cath x1, dressing intact, medicated for pain x1.

## 2018-07-11 NOTE — PLAN OF CARE
Problem: Patient Care Overview  Goal: Plan of Care Review  Outcome: Ongoing (interventions implemented as appropriate)   07/11/18 1106   Coping/Psychosocial   Plan of Care Reviewed With patient   Plan of Care Review   Progress improving   OTHER   Outcome Summary patient up ambulating in hallway 100 feet with rollator and O2 per NC. Required 1 seated reat break due to weakness.

## 2018-07-11 NOTE — PROGRESS NOTES
LOS: 0 days   Patient Care Team:  Reynaldo Fritz MD as PCP - General  Reynaldo Fritz MD as PCP - Family Medicine  Faisal Ramirez MD as Obstetrician (Obstetrics and Gynecology)  Timothy Dan DC as Referring Physician (Chiropractic Medicine)    Chief Complaint:  POD #5        Interval History: back pain better;  No leg pain;  Walked short distance several times yesterday;  Still having to do intermittent cath;  Has bed at [facility] tomorrow.      Review of Systems:  done      Vital Signs  Temp:  [96.6 °F (35.9 °C)-98.5 °F (36.9 °C)] 96.6 °F (35.9 °C)  Heart Rate:  [71-85] 72  Resp:  [14-18] 14  BP: (118-138)/(67-94) 129/94  FiO2 (%):  [30 %] 30 %    Intake/Output Summary (Last 24 hours) at 07/11/18 0708  Last data filed at 07/11/18 0600   Gross per 24 hour   Intake             1080 ml   Output             3325 ml   Net            -2245 ml     Physical Exam:  alert and conversant.  Back dressing dry and intact    Motor Exam   Muscle bulk: normal  Overall muscle tone: normal  No Pronator Drift     Strength   Strength 5/5 throughout.      Sensory Exam   Light touch normal.   Proprioception normal.                                         Results Review:     Lab Results   Component Value Date    WBC 11.85 (H) 07/07/2018    HGB 10.8 (L) 07/07/2018    HCT 33.0 (L) 07/07/2018    MCV 88.0 07/07/2018     07/07/2018     Lab Results   Component Value Date    GLUCOSE 150 (H) 07/07/2018    CALCIUM 9.0 07/07/2018     07/07/2018    K 3.6 07/07/2018    CO2 31.0 07/07/2018    CL 97 (L) 07/07/2018    BUN 15 07/07/2018    CREATININE 0.69 07/07/2018    EGFRIFNONA 84 07/07/2018    BCR 21.7 07/07/2018    ANIONGAP 7.0 07/07/2018           Assessment/Plan     Principal Problem:    Spinal stenosis, lumbar region, with neurogenic claudication  Active Problems:    Coronary artery disease involving native coronary artery of native heart without angina pectoris    Peripheral vascular disease (CMS/HCC)    Chronic atrial  fibrillation (CMS/HCC)      Plan:  Continue PT/OT;  oob to chair;  Ambulate;  Monitor bladder function;  To rehab tomorrow.      Plan for disposition      Lencho Gamino MD  07/11/18  7:08 AM

## 2018-07-11 NOTE — PLAN OF CARE
Problem: Patient Care Overview  Goal: Plan of Care Review  Outcome: Ongoing (interventions implemented as appropriate)   07/11/18 1542   Coping/Psychosocial   Plan of Care Reviewed With patient   Plan of Care Review   Progress improving   OTHER   Outcome Summary Pt CGA for bed mobility supine to sitting EOB with HOB elevated and bed rail, Pt CGA for functional mobility at rollator and functional transfers to [facility]. Pt max A for toileting hygiene today due to balance. Cont IPOT per POC       Problem: Laminectomy/Foraminotomy/Discectomy (Adult)  Goal: Signs and Symptoms of Listed Potential Problems Will be Absent, Minimized or Managed (Laminectomy/Foraminotomy/Discectomy)  Outcome: Ongoing (interventions implemented as appropriate)   07/11/18 1542   Goal/Outcome Evaluation   Problems Assessed (Laminectomy/Laminotomy/Discectomy) functional decline/self-care deficit   Problems Present (Laminectomy/otomy) functional decline/self-care deficit

## 2018-07-11 NOTE — PROGRESS NOTES
Joanna Cross had a remote interrogation showing an elevated impedance of 1900 ohms in the RV lead.  She has just had back surgery and is currently in the hospital.  On July 5 the patient's device safety switched.    Her vice was interrogated today by Nito with [employer].  Her impedance on the RV lead is back to normal at 573 ohms.  The previous impedance was 517 ohms.  In the interim as the patient was functioning and a monopolar fashion noise was detected by the device and felt to be a ventricular origin.    Today on her interrogation in the hospital there was noise on her right atrial lead.  This appears to be intermittent and is likely related to the fact that her P waves are very small and her sensitivity is fairly high.  The most recent event occurred while she was getting out of bed and apparently hanging onto the railing.    No changes will be made in her device today.  She will follow-up when she leaves the hospital following back surgery.  A full interrogation will be made in the office at that time.    Albertina Corona M.D. [facility]

## 2018-07-11 NOTE — PROGRESS NOTES
Continued Stay Note  [facility]     Patient Name: Joanna Cross  MRN: [id number]  Today's Date: 7/11/2018    Admit Date: 7/6/2018          Discharge Plan     Row Name 07/11/18 1658       Plan    Plan [facility]    Patient/Family in Agreement with Plan yes    Plan Comments Per Nan with [facility], patient has a confirmed bed on skilled rehab on Thursday, 7/12/18.  RN, please check a room air sat to see if patient will need a loaner oxygen tank for transport to [facility]. Her daughter will provide her ride. CM will fax the DC summary when available. Please call report to [phone], and please send a copy of the DC summary/AVS in the discharge packet. Thank you.               Discharge Codes    No documentation.       Expected Discharge Date and Time     Expected Discharge Date Expected Discharge Time    Jul 12, 2018             Ladi Espana

## 2018-07-12 VITALS
RESPIRATION RATE: 18 BRPM | TEMPERATURE: 97.6 F | OXYGEN SATURATION: 94 % | DIASTOLIC BLOOD PRESSURE: 71 MMHG | WEIGHT: 246.91 LBS | HEART RATE: 96 BPM | HEIGHT: 62 IN | BODY MASS INDEX: 45.44 KG/M2 | SYSTOLIC BLOOD PRESSURE: 120 MMHG

## 2018-07-12 LAB
GLUCOSE BLDC GLUCOMTR-MCNC: 157 MG/DL (ref 70–130)
GLUCOSE BLDC GLUCOMTR-MCNC: 175 MG/DL (ref 70–130)

## 2018-07-12 PROCEDURE — 63710000001 VITAMIN C 500 MG TABLET: Performed by: NEUROLOGICAL SURGERY

## 2018-07-12 PROCEDURE — A9270 NON-COVERED ITEM OR SERVICE: HCPCS | Performed by: NEUROLOGICAL SURGERY

## 2018-07-12 PROCEDURE — 63710000001 BUMETANIDE 1 MG TABLET: Performed by: NEUROLOGICAL SURGERY

## 2018-07-12 PROCEDURE — 63710000001 LOSARTAN 50 MG TABLET: Performed by: NEUROLOGICAL SURGERY

## 2018-07-12 PROCEDURE — A9270 NON-COVERED ITEM OR SERVICE: HCPCS | Performed by: PHYSICIAN ASSISTANT

## 2018-07-12 PROCEDURE — 63710000001 PANTOPRAZOLE 40 MG TABLET DELAYED-RELEASE: Performed by: NEUROLOGICAL SURGERY

## 2018-07-12 PROCEDURE — G0378 HOSPITAL OBSERVATION PER HR: HCPCS

## 2018-07-12 PROCEDURE — 63710000001 METOLAZONE 2.5 MG TABLET: Performed by: PHYSICIAN ASSISTANT

## 2018-07-12 PROCEDURE — 63710000001 METFORMIN 1000 MG TABLET: Performed by: NEUROLOGICAL SURGERY

## 2018-07-12 PROCEDURE — A9270 NON-COVERED ITEM OR SERVICE: HCPCS | Performed by: NURSE PRACTITIONER

## 2018-07-12 PROCEDURE — 63710000001 LEVOTHYROXINE 200 MCG TABLET: Performed by: NEUROLOGICAL SURGERY

## 2018-07-12 PROCEDURE — 63710000001 CLONIDINE 0.1 MG TABLET: Performed by: NEUROLOGICAL SURGERY

## 2018-07-12 PROCEDURE — A9270 NON-COVERED ITEM OR SERVICE: HCPCS | Performed by: INTERNAL MEDICINE

## 2018-07-12 PROCEDURE — 63710000001 FERROUS SULFATE 325 (65 FE) MG TABLET: Performed by: NEUROLOGICAL SURGERY

## 2018-07-12 PROCEDURE — 94660 CPAP INITIATION&MGMT: CPT

## 2018-07-12 PROCEDURE — 63710000001 POLYETHYLENE GLYCOL PACK: Performed by: NURSE PRACTITIONER

## 2018-07-12 PROCEDURE — 94799 UNLISTED PULMONARY SVC/PX: CPT

## 2018-07-12 PROCEDURE — P9612 CATHETERIZE FOR URINE SPEC: HCPCS

## 2018-07-12 PROCEDURE — 63710000001 RANOLAZINE 500 MG TABLET SUSTAINED-RELEASE 12 HOUR: Performed by: NEUROLOGICAL SURGERY

## 2018-07-12 PROCEDURE — 97530 THERAPEUTIC ACTIVITIES: CPT

## 2018-07-12 PROCEDURE — 63710000001 APIXABAN 5 MG TABLET: Performed by: NEUROLOGICAL SURGERY

## 2018-07-12 PROCEDURE — 97116 GAIT TRAINING THERAPY: CPT

## 2018-07-12 PROCEDURE — 63710000001 CARBIDOPA-LEVODOPA 25-100 MG TABLET: Performed by: NEUROLOGICAL SURGERY

## 2018-07-12 PROCEDURE — 82962 GLUCOSE BLOOD TEST: CPT

## 2018-07-12 PROCEDURE — 63710000001 POTASSIUM CHLORIDE 10 MEQ CAPSULE CONTROLLED-RELEASE: Performed by: NEUROLOGICAL SURGERY

## 2018-07-12 PROCEDURE — 63710000001 ASPIRIN 81 MG CHEWABLE TABLET: Performed by: NEUROLOGICAL SURGERY

## 2018-07-12 PROCEDURE — 63710000001 BETHANECHOL 25 MG TABLET: Performed by: NEUROLOGICAL SURGERY

## 2018-07-12 PROCEDURE — 63710000001 AMANTADINE 100 MG CAPSULE: Performed by: NEUROLOGICAL SURGERY

## 2018-07-12 PROCEDURE — 63710000001 SPIRONOLACTONE 25 MG TABLET: Performed by: NEUROLOGICAL SURGERY

## 2018-07-12 PROCEDURE — 63710000001 MULTIVITAMIN WITH MINERALS TABLET: Performed by: NEUROLOGICAL SURGERY

## 2018-07-12 PROCEDURE — 63710000001 CHOLECALCIFEROL 1000 UNITS TABLET: Performed by: INTERNAL MEDICINE

## 2018-07-12 PROCEDURE — 99024 POSTOP FOLLOW-UP VISIT: CPT | Performed by: PHYSICIAN ASSISTANT

## 2018-07-12 RX ADMIN — IPRATROPIUM BROMIDE 1 SPRAY: 21 SPRAY NASAL at 12:36

## 2018-07-12 RX ADMIN — METOLAZONE 2.5 MG: 2.5 TABLET ORAL at 08:44

## 2018-07-12 RX ADMIN — LEVOTHYROXINE SODIUM 200 MCG: 200 TABLET ORAL at 05:25

## 2018-07-12 RX ADMIN — RANOLAZINE 500 MG: 500 TABLET, FILM COATED, EXTENDED RELEASE ORAL at 08:42

## 2018-07-12 RX ADMIN — OXYCODONE HYDROCHLORIDE AND ACETAMINOPHEN 500 MG: 500 TABLET ORAL at 08:44

## 2018-07-12 RX ADMIN — AMANTADINE HYDROCHLORIDE 100 MG: 100 CAPSULE ORAL at 08:42

## 2018-07-12 RX ADMIN — Medication 325 MG: at 08:43

## 2018-07-12 RX ADMIN — INSULIN LISPRO 2 UNITS: 100 INJECTION, SOLUTION INTRAVENOUS; SUBCUTANEOUS at 08:42

## 2018-07-12 RX ADMIN — POLYETHYLENE GLYCOL 3350 17 G: 17 POWDER, FOR SOLUTION ORAL at 08:44

## 2018-07-12 RX ADMIN — LOSARTAN POTASSIUM 50 MG: 50 TABLET ORAL at 08:43

## 2018-07-12 RX ADMIN — CLONIDINE HYDROCHLORIDE 0.1 MG: 0.1 TABLET ORAL at 08:44

## 2018-07-12 RX ADMIN — BETHANECHOL CHLORIDE 25 MG: 25 TABLET ORAL at 08:43

## 2018-07-12 RX ADMIN — MULTIPLE VITAMINS W/ MINERALS TAB 1 TABLET: TAB at 08:54

## 2018-07-12 RX ADMIN — METFORMIN HYDROCHLORIDE 1000 MG: 1000 TABLET, FILM COATED ORAL at 08:44

## 2018-07-12 RX ADMIN — APIXABAN 5 MG: 5 TABLET, FILM COATED ORAL at 08:44

## 2018-07-12 RX ADMIN — CARBIDOPA AND LEVODOPA 2 TABLET: 25; 100 TABLET ORAL at 05:25

## 2018-07-12 RX ADMIN — BUMETANIDE 2 MG: 1 TABLET ORAL at 08:43

## 2018-07-12 RX ADMIN — CARBIDOPA AND LEVODOPA 1 TABLET: 25; 100 TABLET ORAL at 12:36

## 2018-07-12 RX ADMIN — CARBIDOPA AND LEVODOPA 1 TABLET: 25; 100 TABLET ORAL at 08:53

## 2018-07-12 RX ADMIN — PANTOPRAZOLE SODIUM 40 MG: 40 TABLET, DELAYED RELEASE ORAL at 05:25

## 2018-07-12 RX ADMIN — VITAMIN D, TAB 1000IU (100/BT) 2000 UNITS: 25 TAB at 08:44

## 2018-07-12 RX ADMIN — ASPIRIN 81 MG 81 MG: 81 TABLET ORAL at 08:43

## 2018-07-12 RX ADMIN — INSULIN LISPRO 2 UNITS: 100 INJECTION, SOLUTION INTRAVENOUS; SUBCUTANEOUS at 12:36

## 2018-07-12 RX ADMIN — SPIRONOLACTONE 25 MG: 25 TABLET, FILM COATED ORAL at 08:43

## 2018-07-12 RX ADMIN — POTASSIUM CHLORIDE 10 MEQ: 750 CAPSULE, EXTENDED RELEASE ORAL at 08:43

## 2018-07-12 NOTE — DISCHARGE SUMMARY
[facility]    Date of Admission: 7/6/2018  Date of Discharge:  7/12/2018    Discharge Diagnosis:   1.  Spinal stenosis with neurogenic claudication  2.  Physical deconditioning  3.  Diabetes mellitus  4.  Chronic atrial fibrillation, pacemaker, recent interrogation during this hospitalization, Dr. Corona.  Has follow-up appointment scheduled with cardiology as an outpatient.  5.  Obesity  6.  Chronic edema  7.  Coronary artery disease, without angina  8.  Essential hypertension  9.  Incomplete bladder emptying, dysuria, current treatment with Urecholine 25 mg 3 times a day, improving, has follow-up appointment with urology as an outpatient.  10.  Arthrodesis  11.  Mixed hyperlipidemia  12.  Parkinson's disease  13.  Obstructive sleep apnea on CPAP  14.  Peripheral vascular disease  15.  Chronic anemia    Procedures Performed  Procedure(s):  LUMBAR LAMINECTOMY L4-5, HEMILAMIINECTOMY RIGHT L5-S1, FORAMINOTOMY L5-S1       Presenting Problem/History of Present Illness  Spinal stenosis, lumbar region, with neurogenic claudication [M48.062]     Hospital Course  Patient is a 67 y.o. female presented with symptoms of neurogenic claudication.  The patient underwent an uncomplicated lumbar decompression for spinal stenosis On 7/6/2018.  The patient tolerated the surgery without any difficulties.  Postop labs have been stable.  Patient did have difficulties completely emptying her bladder and she has been started on Urecholine 25 mg 3 times a day with improvement in her symptoms.  She was seen in consultation by urology who recommended continuation of the current medications.  The patient developed some atypical chest pain and was worked up with negative EKG, negative troponins and evaluation by cardiology with pacemaker interrogation.  The patient is participate with physical therapy and occupational therapy but due to significant deconditioning and obesity she  has been evaluated for inpatient rehabilitation and has been accepted to [facility].  The patient has had relief of her radicular leg pain.  Her wound has remained dry and intact throughout her hospitalization.  The patient is voiding independently, has had a bowel movement, glucoses have been stable and treated with a multimodality regimen.  The patient will be started back on her usual home medications at the time of discharge.  The patient has utilized Percocet when necessary for postop pain and uses tramadol at home.  These medicines can be adjusted as needed.  An Aquasol dressing will be applied prior to discharge and this can be left in place for 7 days and then removed along with the Steri-Strips and the incision can be washed on cleaned on a daily basis.  Arrangements were made for a follow-up appointment with Dr. Gamino in the neurosurgery office in 3 weeks.  The patient has an outpatient appointment with cardiology and urology scheduled.    Condition on Discharge:  satisfactory   Discharge Disposition  Rehab Facility or Unit (DC - External)    Discharge Medications     Discharge Medications      Continue These Medications      Instructions Start Date   amantadine 100 MG capsule  Commonly known as:  SYMMETREL   100 mg, Oral, 2 Times Daily      apixaban 5 MG tablet tablet  Commonly known as:  ELIQUIS   5 mg, Oral, Every 12 Hours Scheduled      aspirin 81 MG EC tablet   81 mg, Oral, Daily      bumetanide 2 MG tablet  Commonly known as:  BUMEX   2 mg, Oral, 2 Times Daily - RT      CALTRATE 600 PO   600 mg, Oral, Daily      carbidopa-levodopa  MG per tablet  Commonly known as:  SINEMET   2 tablets, Oral, Every Morning, 2 tablets at 0600, 1 tablet at 0900, 1200, 1500, 1800, 2100      carbonyl iron 45 MG tablet tablet  Commonly known as:  FEOSOL   1 tablet, Oral, 2 Times Daily      CENTRUM ULTRA WOMENS PO   1 tablet, Oral, Daily      Cholecalciferol 2000 units tablet   2,000 Units, Oral, 2 Times  Daily      citalopram 20 MG tablet  Commonly known as:  CeleXA   20 mg, Oral, Every Night at Bedtime      clobetasol 0.05 % cream  Commonly known as:  TEMOVATE   1 application, Topical, 2 Times Daily PRN      CloNIDine 0.1 MG tablet  Commonly known as:  CATAPRES   0.1 mg, Oral, Every 12 Hours      Collagen-Boron-Hyaluronic Acid 10-5-3.3 MG tablet   1 tablet, Oral, Daily      fluticasone 50 MCG/ACT nasal spray  Commonly known as:  FLONASE   2 sprays, Nasal, Daily PRN      IPRATROPIUM BROMIDE NA   1 spray, Nasal, 2 Times Daily      Loratadine 10 MG capsule   10 mg, Oral, Daily      losartan 50 MG tablet  Commonly known as:  COZAAR   50 mg, Oral, Every 12 Hours      metFORMIN 1000 MG tablet  Commonly known as:  GLUCOPHAGE   1,000 mg, Oral, 2 Times Daily With Meals      metOLazone 2.5 MG tablet  Commonly known as:  ZAROXOLYN   2.5 mg, Oral, Daily      mupirocin 2 % ointment  Commonly known as:  BACTROBAN   1 application, Topical, 2 Times Daily, D/t nasal scab,prescribed per ent md       MYRBETRIQ 50 MG tablet sustained-release 24 hour 24 hr tablet  Generic drug:  Mirabegron ER   50 mg, Oral, Daily      nitroglycerin 0.4 MG/SPRAY spray  Commonly known as:  NITROLINGUAL   1 spray, Sublingual, Every 5 Minutes PRN      omeprazole 40 MG capsule  Commonly known as:  priLOSEC   40 mg, Oral, 2 Times Daily      potassium chloride 10 MEQ CR capsule  Commonly known as:  MICRO-K   10 mEq, Oral, 2 Times Daily      pramipexole 1.5 MG tablet  Commonly known as:  MIRAPEX   1.5 mg, Oral, Nightly      QUNOL COQ10/UBIQUINOL/JOSE 100 MG capsule  Generic drug:  Ubiquinol   1 capsule, Oral, Daily      ranolazine 500 MG 12 hr tablet  Commonly known as:  RANEXA   500 mg, Oral, 2 Times Daily      sennosides-docusate sodium 8.6-50 MG tablet  Commonly known as:  SENOKOT-S   1 tablet, Oral, Daily      spironolactone 25 MG tablet  Commonly known as:  ALDACTONE   25 mg, Oral, Daily      SUPER B COMPLEX PO   1 tablet, Oral, Daily      SYNTHROID 200  MCG tablet  Generic drug:  levothyroxine   200 mcg, Oral, Daily      traMADol 50 MG tablet  Commonly known as:  ULTRAM   50 mg, Oral, Every 8 Hours PRN      TRESIBA FLEXTOUCH 100 UNIT/ML solution pen-injector injection  Generic drug:  insulin degludec   28 Units, Subcutaneous, Nightly      VENTOLIN  (90 Base) MCG/ACT inhaler  Generic drug:  albuterol   1 puff, Inhalation, Every 4 Hours PRN      vitamin C 500 MG tablet  Commonly known as:  ASCORBIC ACID   500 mg, Oral, Daily, Chewable tablet             Follow-up Appointments  Future Appointments  Date Time Provider Department Center   10/24/2018 10:00 AM Albertina Corona MD [facility] [place] None     Additional Instructions for the Follow-ups that You Need to Schedule     Discharge Follow-up with Specialty: Dr. santoro; 3 Weeks    As directed      Specialty:  Dr. santoro    Follow Up:  3 Weeks         Discharge Follow-up with Specified Provider: jhonny    As directed      To:  jhonny    Follow Up Details:  already scheduled         Discharge Follow-up with Specified Provider: urology    As directed      To:  urology    Follow Up Details:  already scheduled               Referring Provider  Lencho Santoro MD    PCP   MD Alta Barroso PA-C  07/12/18  9:44 AM

## 2018-07-12 NOTE — THERAPY TREATMENT NOTE
Acute Care - Physical Therapy Treatment Note  [facility]     Patient Name: Joanna Cross  : 1950  MRN: [id number]  Today's Date: 2018  Onset of Illness/Injury or Date of Surgery: 18  Date of Referral to PT: 18  Referring Physician: MD Stevenson    Admit Date: 2018    Visit Dx:    ICD-10-CM ICD-9-CM   1. Impaired functional mobility, balance, gait, and endurance Z74.09 V49.89   2. Spinal stenosis, lumbar region, with neurogenic claudication M48.062 724.03   3. Impaired mobility and ADLs Z74.09 799.89     Patient Active Problem List   Diagnosis   • Coronary artery disease involving native coronary artery of native heart without angina pectoris   • Essential hypertension   • Peripheral vascular disease (CMS/HCC)   • Hyperlipidemia LDL goal <70   • Chronic atrial fibrillation (CMS/HCC)   • Spinal stenosis, lumbar region, with neurogenic claudication       Therapy Treatment          Rehabilitation Treatment Summary     Row Name 18 1000             Treatment Time/Intention    Discipline physical therapist  -AA      Document Type therapy note (daily note)  -AA      Subjective Information complains of;fatigue;dizziness  -AA      Mode of Treatment individual therapy;physical therapy  -AA      Patient/Family Observations daughter present and helpful  -AA      Care Plan Review evaluation/treatment results reviewed;risks/benefits reviewed  -AA      Care Plan Review, Other Participant(s) daughter  -AA      Patient Effort good  -AA      Existing Precautions/Restrictions fall;spinal  -AA      Treatment Considerations/Comments wears O2 at baseline for PM  -AA      Patient Response to Treatment pt responded well with mild dizziness during ambulation that subsided with rest  -AA      Recorded by [AA] Zunilda Hensley, PT 18 1119      Row Name 18 1000             Vital Signs    Pre Systolic BP Rehab 111  -AA      Pre Treatment Diastolic BP 63  -AA      Pre SpO2 (%) 94  -AA      O2 Delivery  Pre Treatment supplemental O2  -AA      Intra SpO2 (%) 93  -AA      O2 Delivery Intra Treatment room air  -AA      Post SpO2 (%) 98  -AA      O2 Delivery Post Treatment room air  -AA      Pre Patient Position Supine  -AA      Intra Patient Position Standing  -AA      Post Patient Position Supine  -AA      Recorded by [AA] Zunilda Hensley, PT 07/12/18 1119      Row Name 07/12/18 1000             Cognitive Assessment/Intervention    Additional Documentation Cognitive Assessment/Intervention (Group)  -AA      Recorded by [AA] April SEBASTIÁN Hensley, PT 07/12/18 1119      Row Name 07/12/18 1000             Cognitive Assessment/Intervention- PT/OT    Affect/Mental Status (Cognitive) WNL  -AA      Orientation Status (Cognition) oriented x 4  -AA      Follows Commands (Cognition) WNL  -AA      Cognitive Function (Cognitive) WNL;safety deficit  -AA      Safety Deficit (Cognitive) mild deficit;insight into deficits/self awareness;safety precautions awareness;safety precautions follow-through/compliance  -AA      Personal Safety Interventions fall prevention program maintained;gait belt;nonskid shoes/slippers when out of bed;muscle strengthening facilitated  -AA      Recorded by [AA] Zunilda Hensley, PT 07/12/18 1119      Row Name 07/12/18 1000             Bed Mobility Assessment/Treatment    Bed Mobility Assessment/Treatment rolling left;supine-sit;sit-supine  -AA      Rolling Left [place] (Bed Mobility) minimum assist (75% patient effort)  -AA      Scooting/Bridging [place] (Bed Mobility) contact guard;nonverbal cues (demo/gesture)  -AA      Supine-Sit [place] (Bed Mobility) contact guard;nonverbal cues (demo/gesture)  -AA      Sit-Supine [place] (Bed Mobility) minimum assist (75% patient effort);verbal cues  -AA      Bed Mobility, Safety Issues decreased use of legs for bridging/pushing;decreased use of arms for pushing/pulling  -AA      Assistive Device (Bed Mobility) bed rails;head of bed elevated  -AA       Comment (Bed Mobility) VC for log rolling and sequencing  -AA      Recorded by [AA] April SEBASTIÁN Shellie, PT 07/12/18 1119      Row Name 07/12/18 1000             Transfer Assessment/Treatment    Transfer Assessment/Treatment sit-stand transfer;stand-sit transfer;toilet transfer  -AA      Recorded by [AA] April SEBASTIÁN Shellie, PT 07/12/18 1119      Row Name 07/12/18 1000             Sit-Stand Transfer    Sit-Stand [place] (Transfers) contact guard;verbal cues  -AA      Assistive Device (Sit-Stand Transfers) walker, 4-wheeled  -AA      Recorded by [AA] April SEBASTIÁN Shellie, PT 07/12/18 1119      Row Name 07/12/18 1000             Stand-Sit Transfer    Stand-Sit [place] (Transfers) contact guard;verbal cues  -AA      Assistive Device (Stand-Sit Transfers) walker, 4-wheeled  -AA      Recorded by [AA] April SEBASTIÁN Shellie, PT 07/12/18 1119      Row Name 07/12/18 1000             Toilet Transfer    Type (Toilet Transfer) sit-stand;stand-sit  -AA      [place] Level (Toilet Transfer) contact guard;verbal cues  -AA      Assistive Device (Toilet Transfer) commode, bedside without drop arms  -AA      Recorded by [AA] April SEBASTIÁN Shellie, PT 07/12/18 1119      Row Name 07/12/18 1000             Gait/Stairs Assessment/Training    52701 - Gait Training Minutes  12  -AA      Gait/Stairs Assessment/Training gait/ambulation independence  -AA      [place] Level (Gait) contact guard;verbal cues  -AA      Assistive Device (Gait) walker, 4-wheeled  -AA      Distance in Feet (Gait) 50x2  -AA      Pattern (Gait) swing-through  -AA      Deviations/Abnormal Patterns (Gait) gait speed decreased;stride length decreased  -AA      Bilateral Gait Deviations forward flexed posture;heel strike decreased;weight shift ability decreased  -AA      Recorded by [AA] Zunilda Hensley, PT 07/12/18 1119      Row Name 07/12/18 1000             Balance    Balance static sitting balance;static standing balance;dynamic sitting balance  -AA      Recorded by [AA] Znuilda Hensley,  PT 07/12/18 1119      Row Name 07/12/18 1000             Static Sitting Balance    Level of [place] (Unsupported Sitting, Static Balance) independent  -AA      Sitting Position (Unsupported Sitting, Static Balance) sitting on edge of bed  -AA      Time Able to Maintain Position (Unsupported Sitting, Static Balance) more than 5 minutes  -AA      Recorded by [AA] April SEBASTIÁN Shellie, PT 07/12/18 1119      Row Name 07/12/18 1000             Dynamic Sitting Balance    Level of [place], Reaches Outside Midline (Sitting, Dynamic Balance) supervision  -AA      Sitting Position, Reaches Outside Midline (Sitting, Dynamic Balance) sitting on edge of bed  -AA      Recorded by [AA] Zunilda Hensley, PT 07/12/18 1119      Row Name 07/12/18 1000             Static Standing Balance    Level of [place] (Supported Standing, Static Balance) standby assist  -AA      Time Able to Maintain Position (Supported Standing, Static Balance) more than 5 minutes  -AA      Assistive Device Utilized (Supported Standing, Static Balance) rolling walker  -AA      Comment (Supported Standing, Static Balance) standing at rollator EOB no LOB  -AA      Recorded by [AA] Zunilda Hensley, PT 07/12/18 1119      Row Name 07/12/18 1000             Positioning and Restraints    Pre-Treatment Position in bed  -AA      Post Treatment Position bed  -AA      In Bed notified nsg;supine;call light within reach;encouraged to call for assist;with family/caregiver;side rails up x2  -AA      Recorded by [AA] Zunilda Hensley, PT 07/12/18 1119      Row Name 07/12/18 1000             Pain Assessment    Additional Documentation Pain Scale: Numbers Pre/Post-Treatment (Group)  -AA      Recorded by [AA] Zunilda Hensley, PT 07/12/18 1119      Row Name 07/12/18 1000             Pain Scale: Numbers Pre/Post-Treatment    Pain Scale: Numbers, Pretreatment 2/10  -AA      Pain Scale: Numbers, Post-Treatment 2/10  -AA      Pain Location - Side Bilateral  -AA      Pain Location -  Orientation generalized  -AA      Pain Location back  -AA      Recorded by [AA] April SEBASTIÁN Shellie, PT 07/12/18 1119      Row Name                Wound 07/06/18 1627 Other (See comments) back incision    Wound - Properties Group Date first assessed: 07/06/18 [SV] Time first assessed: 1627 [SV] Side: Other (See comments) [SV] Location: back [SV] Type: incision [SV] Recorded by:  [SV] Amna Chou RN 07/06/18 1627    Row Name 07/12/18 1000             Coping    Observed Emotional State accepting;calm;cooperative  -AA      Verbalized Emotional State acceptance  -AA      Recorded by [AA] April SEBASTIÁN Shellie, PT 07/12/18 1119      Row Name 07/12/18 1000             Plan of Care Review    Plan of Care Reviewed With patient;daughter  -AA      Recorded by [AA] April SEBASTIÁN Shellie, PT 07/12/18 1119      Row Name 07/12/18 1000             Outcome Summary/Treatment Plan (PT)    Daily Summary of Progress (PT) progress toward functional goals is good  -AA      Plan for Continued Treatment (PT) Continue per POC  -AA      Anticipated Discharge Disposition (PT) inpatient rehabilitation facility  -AA      Recorded by [AA] April SEBASTIÁN Shellie, PT 07/12/18 1119        User Key  (r) = Recorded By, (t) = Taken By, (c) = Cosigned By    Initials Name Effective Dates Discipline    SV Amna Chou RN 04/19/17 -  Nurse    AA April SEBASTIÁN Shellie, PT 04/02/18 -  PT          Wound 07/06/18 1627 Other (See comments) back incision (Active)   Dressing Appearance intact;dried drainage 7/11/2018  8:00 PM   Closure RODRIGO 7/12/2018  6:00 AM   Base dressing in place, unable to visualize 7/12/2018  6:00 AM   Dressing Care, Wound dressing applied 7/12/2018  8:15 AM             Physical Therapy Education     Title: PT OT SLP Therapies (Active)     Topic: Physical Therapy (Active)     Point: Mobility training (Active)    Learning Progress Summary     Learner Status Readiness Method Response Comment Documented by    Patient Active Tessy CARTER  AA 07/12/18 1121     Active  Acceptance E NR  AS 07/11/18 1106     Active Eager E NR reviewed back precautions SC 07/10/18 1612     Done Acceptance E,D VU,NR Reviewed back precautions, HEP, gait mechanics, benefits of mobility. Issued HEP handout  07/09/18 1324    Family Active Eager E NR   07/12/18 1121     Done Acceptance E,D VU,NR Reviewed back precautions, HEP, gait mechanics, benefits of mobility. Issued HEP handout  07/09/18 1324          Point: Home exercise program (Active)    Learning Progress Summary     Learner Status Readiness Method Response Comment Documented by    Patient Active Eager E NR   07/12/18 1121     Active Acceptance E NR  AS 07/11/18 1106     Active Eager E NR reviewed back precautions SC 07/10/18 1612     Done Acceptance E,D VU,NR Reviewed back precautions, HEP, gait mechanics, benefits of mobility. Issued HEP handout  07/09/18 1324    Family Active Eager E NR   07/12/18 1121     Done Acceptance E,D VU,NR Reviewed back precautions, HEP, gait mechanics, benefits of mobility. Issued HEP handout  07/09/18 1324          Point: Body mechanics (Active)    Learning Progress Summary     Learner Status Readiness Method Response Comment Documented by    Patient Active Eager E NR   07/12/18 1121     Active Acceptance E NR  AS 07/11/18 1106     Active Eager E NR reviewed back precautions SC 07/10/18 1612     Done Acceptance E,D VU,NR Reviewed back precautions, HEP, gait mechanics, benefits of mobility. Issued HEP handout  07/09/18 1324    Family Active Eager E NR   07/12/18 1121     Done Acceptance E,D VU,NR Reviewed back precautions, HEP, gait mechanics, benefits of mobility. Issued HEP handout  07/09/18 1324          Point: Precautions (Active)    Learning Progress Summary     Learner Status Readiness Method Response Comment Documented by    Patient Active Eager E NR   07/12/18 1121     Active Acceptance E NR  AS 07/11/18 1106     Active Eager E NR reviewed back precautions SC 07/10/18 1612     Done  Acceptance E,D LUCIE,EMMA Reviewed back precautions, HEP, gait mechanics, benefits of mobility. Issued HEP handout  07/09/18 1324    Family Active Eager E NR   07/12/18 1121     Done Acceptance E,D LUCIE,EMMA Reviewed back precautions, HEP, gait mechanics, benefits of mobility. Issued HEP handout  07/09/18 1324                      User Key     Initials Effective Dates Name Provider Type Discipline    SC 06/19/15 -  Sharon Purcell, PT Physical Therapist PT    AS 06/22/15 -  Daphney Ang, PTA Physical Therapy Assistant PT     04/03/18 -  Nito Justice, PT Physical Therapist PT     04/02/18 -  Zunilda Hensley, PT Physical Therapist PT                    PT Recommendation and Plan  Anticipated Discharge Disposition (PT): inpatient rehabilitation facility  Outcome Summary/Treatment Plan (PT)  Daily Summary of Progress (PT): progress toward functional goals is good  Plan for Continued Treatment (PT): Continue per POC  Anticipated Discharge Disposition (PT): inpatient rehabilitation facility  Plan of Care Reviewed With: patient  Progress: improving  Outcome Summary: Pt anticipated DC to [facility] today.  Pt and daughter educated on proper car transfer with spinal precautions.  Pt educated on spinal precuations with pt able to recall with min cues.  Pt require CGA to min A for bed mobility and SBA to CGA for transfers and ambulation with rollator.  Nursing informed of pt oxygen on RA and instructed to leave oxygen off due to pt at 98% on RA.            Outcome Measures     Row Name 07/12/18 1000 07/11/18 1452 07/11/18 1012       How much help from another person do you currently need...    Turning from your back to your side while in flat bed without using bedrails? 3  -AA  -- 3  -AS    Moving from lying on back to sitting on the side of a flat bed without bedrails? 3  -AA  -- 3  -AS    Moving to and from a bed to a chair (including a wheelchair)? 3  -AA  -- 3  -AS    Standing up from a chair using your arms (e.g.,  wheelchair, bedside chair)? 3  -AA  -- 3  -AS    Climbing 3-5 steps with a railing? 2  -AA  -- 2  -AS    To walk in hospital room? 3  -AA  -- 3  -AS    -PAC 6 Clicks Score 17  -AA  -- 17  -AS       How much help from another is currently needed...    Putting on and taking off regular lower body clothing?  -- 3  -KF  --    Bathing (including washing, rinsing, and drying)  -- 3  -KF  --    Toileting (which includes using toilet bed pan or urinal)  -- 2  -KF  --    Putting on and taking off regular upper body clothing  -- 3  -KF  --    Taking care of personal grooming (such as brushing teeth)  -- 4  -KF  --    Eating meals  -- 4  -KF  --    Score  -- 19  -KF  --       Functional Assessment    Outcome Measure Options -[facility] 6 Clicks Basic Mobility (PT)  -AA -[facility] 6 Clicks Daily Activity (OT)  -KF -[facility] 6 Clicks Basic Mobility (PT)  -AS    Row Name 07/10/18 1438 07/10/18 1400          How much help from another person do you currently need...    Turning from your back to your side while in flat bed without using bedrails?  -- 2  -SC     Moving from lying on back to sitting on the side of a flat bed without bedrails?  -- 2  -SC     Moving to and from a bed to a chair (including a wheelchair)?  -- 3  -SC     Standing up from a chair using your arms (e.g., wheelchair, bedside chair)?  -- 3  -SC     Climbing 3-5 steps with a railing?  -- 1  -SC     To walk in hospital room?  -- 3  -SC     [facility] 6 Clicks Score  -- 14  -SC        How much help from another is currently needed...    Putting on and taking off regular lower body clothing? 3  -KF  --     Bathing (including washing, rinsing, and drying) 3  -KF  --     Toileting (which includes using toilet bed pan or urinal) 3  -KF  --     Putting on and taking off regular upper body clothing 3  -KF  --     Taking care of personal grooming (such as brushing teeth) 4  -KF  --     Eating meals 4  -KF  --     Score 20  -KF  --        Functional Assessment    Outcome Measure  Options AM-PAC 6 Clicks Daily Activity (OT)  -KF AM-PAC 6 Clicks Basic Mobility (PT)  -SC       User Key  (r) = Recorded By, (t) = Taken By, (c) = Cosigned By    Initials Name Provider Type    SC Sharon Purcell, PT Physical Therapist    AS Daphney Ang, PTA Physical Therapy Assistant    KF Lindsey Marshall, OT Occupational Therapist     April SEBASTIÁN Hensley PT Physical Therapist           Time Calculation:         PT Charges     Row Name 07/12/18 1000             Time Calculation    Start Time 1000  -AA      PT Received On 07/12/18  -AA      PT Goal Re-Cert Due Date 07/19/18  -AA         Time Calculation- PT    Total Timed Code Minutes- PT 38 minute(s)  -AA         Timed Charges    28751 - Gait Training Minutes  12  -AA      75759 - PT Therapeutic Activity Minutes 26  -AA        User Key  (r) = Recorded By, (t) = Taken By, (c) = Cosigned By    Initials Name Provider Type     April SEBASTIÁN Hensley PT Physical Therapist        Therapy Suggested Charges     Code   Minutes Charges    35863 (CPT®) Hc Pt Neuromusc Re Education Ea 15 Min      83529 (CPT®) Hc Pt Ther Proc Ea 15 Min      36308 (CPT®) Hc Gait Training Ea 15 Min 12 1    86800 (CPT®) Hc Pt Therapeutic Act Ea 15 Min 26 2    95035 (CPT®) Hc Pt Manual Therapy Ea 15 Min      83490 (CPT®) Hc Pt Iontophoresis Ea 15 Min      42123 (CPT®) Hc Pt Elec Stim Ea-Per 15 Min      91727 (CPT®) Hc Pt Ultrasound Ea 15 Min      52849 (CPT®) Hc Pt Self Care/Mgmt/Train Ea 15 Min      Total  38 3        Therapy Charges for Today     Code Description Service Date Service Provider Modifiers Qty    91760664377 HC GAIT TRAINING EA 15 MIN 7/12/2018 April SEBASTIÁN Hensley, PT GP 1    73205558922 HC PT THERAPEUTIC ACT EA 15 MIN 7/12/2018 April SEBASTIÁN Hensley, PT GP 2          PT G-Codes  PT Professional Judgement Used?: Yes  Outcome Measure Options: AM-PAC 6 Clicks Basic Mobility (PT)  Score: 13  Functional Limitation: Mobility: Walking and moving around  Mobility: Walking and Moving Around Current Status  (): At least 40 percent but less than 60 percent impaired, limited or restricted  Mobility: Walking and Moving Around Goal Status (): At least 20 percent but less than 40 percent impaired, limited or restricted    April SEBASTIÁN Hensley, PT  7/12/2018

## 2018-07-12 NOTE — PROGRESS NOTES
Urology Progress Note     LOS: 0 days   Patient Care Team:  Reynaldo Fritz MD as PCP - General  Reynaldo Fritz MD as PCP - Family Medicine  Faisal Ramirez MD as Obstetrician (Obstetrics and Gynecology)  Timothy Dan DC as Referring Physician (Chiropractic Medicine)    Chief Complaint:  No chief complaint on file.      Subjective     Interval History:     Voiding some but still requiring intermittent catheterization.  Residuals 300-500 mL.  Dysuria improving.  No hematuria.      Objective     Vital Signs  Temp:  [96.6 °F (35.9 °C)-98.8 °F (37.1 °C)] 97.8 °F (36.6 °C)  Heart Rate:  [60-78] 78  Resp:  [14-16] 16  BP: (104-129)/(62-94) 119/63  FiO2 (%):  [30 %] 30 %  I/O last 3 completed shifts:  In: 1080 [P.O.:1080]  Out: 3825 [Urine:3625; Stool:200]  No intake/output data recorded.    Physical Exam:   General Appearance: alert, appears stated age and cooperative     Results Review:     I reviewed the patient's new clinical results.  Lab Results (last 24 hours)     Procedure Component Value Units Date/Time    POC Glucose Once [[id number]]  (Abnormal) Collected:  07/11/18 2122    Specimen:  Blood Updated:  07/11/18 2123     Glucose 172 (H) mg/dL     Narrative:       Meter: [id number] : [id number] Sujit Liv    POC Glucose Once [[id number]]  (Abnormal) Collected:  07/11/18 1550    Specimen:  Blood Updated:  07/11/18 1602     Glucose 168 (H) mg/dL     Narrative:       Meter: [id number] : [id number] Nilo Rhodesi    POC Glucose Once [[id number]]  (Abnormal) Collected:  07/11/18 1138    Specimen:  Blood Updated:  07/11/18 1143     Glucose 198 (H) mg/dL     Narrative:       Meter: [id number] : [id number] Nilo Menon    POC Glucose Once [[id number]]  (Abnormal) Collected:  07/11/18 0737    Specimen:  Blood Updated:  07/11/18 0745     Glucose 157 (H) mg/dL     Narrative:       Meter: [id number] : [id number] Nilo Menon          Medication Review:   Current Facility-Administered Medications    Medication Dose Route Frequency Provider Last Rate Last Dose   • acetaminophen (TYLENOL) tablet 650 mg  650 mg Oral Q8H PRN Lencho Gamino MD   650 mg at 07/11/18 1248   • albuterol (PROVENTIL) nebulizer solution 0.083% 2.5 mg/3mL  2.5 mg Nebulization Q4H PRN Lencho Gamino MD       • amantadine (SYMMETREL) capsule 100 mg  100 mg Oral BID Lencho Gamino MD   100 mg at 07/11/18 2236   • apixaban (ELIQUIS) tablet 5 mg  5 mg Oral Q12H Lencho Gamino MD   5 mg at 07/11/18 2236   • aspirin chewable tablet 81 mg  81 mg Oral Daily Lencho Gamino MD   81 mg at 07/11/18 0833   • bethanechol (URECHOLINE) tablet 25 mg  25 mg Oral TID Lencho Gamino MD   25 mg at 07/11/18 2237   • bisacodyl (DULCOLAX) EC tablet 5 mg  5 mg Oral Daily PRN Lencho Gamino MD   5 mg at 07/11/18 1254   • bisacodyl (DULCOLAX) suppository 10 mg  10 mg Rectal Daily PRN Lencho Gamino MD       • bumetanide (BUMEX) tablet 2 mg  2 mg Oral BID Lencho Gamino MD   2 mg at 07/11/18 2239   • carbidopa-levodopa (SINEMET)  MG per tablet 1 tablet  1 tablet Oral 5x Daily Guanako Butler MD   1 tablet at 07/11/18 2236   • carbidopa-levodopa (SINEMET)  MG per tablet 2 tablet  2 tablet Oral QAM Guanako Butler MD   2 tablet at 07/11/18 0609   • cetirizine (zyrTEC) tablet 10 mg  10 mg Oral Daily Lencho Gamino MD   10 mg at 07/11/18 2237   • cholecalciferol (VITAMIN D3) tablet 2,000 Units  2,000 Units Oral BID Dana Lam MD   2,000 Units at 07/11/18 2237   • citalopram (CeleXA) tablet 20 mg  20 mg Oral Nightly Lencho Gamino MD   20 mg at 07/11/18 2239   • CloNIDine (CATAPRES) tablet 0.1 mg  0.1 mg Oral Q12H Lencho Gamino MD   0.1 mg at 07/11/18 7040   • dextrose (D50W) solution 25 g  25 g Intravenous Q15 Min PRN Lencho Gamino MD       • dextrose (GLUTOSE) oral gel 15 g  15 g Oral Q15 Min PRN Lencho Gamino MD       • diphenhydrAMINE (BENADRYL) capsule 25 mg  25 mg Oral Nightly PRN Lencho Gamino MD       • docusate sodium (COLACE) capsule  100 mg  100 mg Oral BID PRN Lencho Gamino MD       • ferrous sulfate tablet 325 mg  325 mg Oral BID With Meals Lencho Gamino MD   325 mg at 07/11/18 1721   • fluticasone (FLONASE) 50 MCG/ACT nasal spray 2 spray  2 spray Nasal Daily PRN Lencho Gamino MD       • glucagon (human recombinant) (GLUCAGEN DIAGNOSTIC) injection 1 mg  1 mg Subcutaneous PRN Lencho Gamino MD       • insulin lispro (humaLOG) injection 0-7 Units  0-7 Units Subcutaneous 4x Daily With Meals & Nightly Lencho Gamino MD   2 Units at 07/11/18 2241   • ipratropium (ATROVENT) nasal spray 0.03%  1 spray Each Nare BID Lencho Gamino MD   1 spray at 07/11/18 0837   • levothyroxine (SYNTHROID, LEVOTHROID) tablet 200 mcg  200 mcg Oral Q AM Lencho Gamino MD   200 mcg at 07/11/18 0609   • losartan (COZAAR) tablet 50 mg  50 mg Oral Q12H Lencho Gamino MD   50 mg at 07/11/18 2238   • magnesium hydroxide (MILK OF MAGNESIA) suspension 2400 mg/10mL 10 mL  10 mL Oral Daily PRN Lencho Gamino MD       • metFORMIN (GLUCOPHAGE) tablet 1,000 mg  1,000 mg Oral BID With Meals Lencho Gamino MD   1,000 mg at 07/11/18 1721   • methocarbamol (ROBAXIN) tablet 750 mg  750 mg Oral Q6H PRN Lencho Gamino MD       • metOLazone (ZAROXOLYN) tablet 2.5 mg  2.5 mg Oral Daily Alejandra Arrieta PA-C   2.5 mg at 07/11/18 0833   • milk and molasses enema  1 enema Rectal Once DEJA Carreon       • Morphine PF injection 1 mg  1 mg Intravenous Q4H PRN Lencho Gamino MD   1 mg at 07/07/18 1744    And   • naloxone (NARCAN) injection 0.4 mg  0.4 mg Intravenous Q5 Min PRN Lencho Gamino MD       • Morphine PF injection 2 mg  2 mg Intravenous Q4H PRN Lencho Gamino MD        And   • naloxone (NARCAN) injection 0.4 mg  0.4 mg Intravenous Q5 Min PRN Lencho Gamino MD       • multivitamin with minerals 1 tablet  1 tablet Oral Daily Lencho Gamino MD   1 tablet at 07/11/18 0832   • nitroglycerin (NITROSTAT) SL tablet 0.4 mg  0.4 mg Sublingual Q5 Min PRN Lencho Gamino MD   0.4 mg  at 07/07/18 0657   • ondansetron (ZOFRAN) injection 4 mg  4 mg Intravenous Q6H PRN Lencho Gamino MD   4 mg at 07/09/18 2321   • oxyCODONE-acetaminophen (PERCOCET)  MG per tablet 1 tablet  1 tablet Oral Q4H PRN Lencho Gamino MD   1 tablet at 07/10/18 2153   • pantoprazole (PROTONIX) EC tablet 40 mg  40 mg Oral QAM Lencho Gamino MD   40 mg at 07/11/18 0609   • phenazopyridine (PYRIDIUM) tablet 100 mg  100 mg Oral TID PRN Dana Lam MD   100 mg at 07/08/18 1441   • [START ON 7/12/2018] polyethylene glycol 3350 powder (packet)  17 g Oral Daily DEJA Carreon       • potassium chloride (MICRO-K) CR capsule 10 mEq  10 mEq Oral BID Lencho Gamino MD   10 mEq at 07/11/18 2239   • pramipexole (MIRAPEX) tablet 1.5 mg  1.5 mg Oral Nightly Lencho Gamino MD   1.5 mg at 07/11/18 2240   • ranolazine (RANEXA) 12 hr tablet 500 mg  500 mg Oral Q12H Lencho Gamino MD   500 mg at 07/11/18 2238   • sennosides-docusate sodium (SENOKOT-S) 8.6-50 MG tablet 1 tablet  1 tablet Oral Nightly PRN Lencho Gamino MD   1 tablet at 07/08/18 2143   • sennosides-docusate sodium (SENOKOT-S) 8.6-50 MG tablet 2 tablet  2 tablet Oral Nightly Alejandra Arrieta PA-C   2 tablet at 07/11/18 2240   • sodium chloride 0.9 % flush 1-10 mL  1-10 mL Intravenous PRN Lencho Gamino MD   10 mL at 07/08/18 2145   • spironolactone (ALDACTONE) tablet 25 mg  25 mg Oral Daily Lencho Gamino MD   25 mg at 07/11/18 0832   • vitamin C (ASCORBIC ACID) tablet 500 mg  500 mg Oral Daily Lencho Gamino MD   500 mg at 07/11/18 0834         Assessment/Plan     Principal Problem:    Spinal stenosis, lumbar region, with neurogenic claudication  Active Problems:    Coronary artery disease involving native coronary artery of native heart without angina pectoris    Peripheral vascular disease (CMS/HCC)    Chronic atrial fibrillation (CMS/HCC)    Continue Urecholine  Hold anticholinergics  Continue bladder scan residuals and intermittent catheterizations as  needed  Follow-up with Dr. Mckenzie as outpatient      Franc Mercado MD  07/11/18  10:57 PM

## 2018-07-12 NOTE — PLAN OF CARE
Problem: Patient Care Overview  Goal: Individualization and Mutuality  Outcome: Outcome(s) achieved Date Met: 07/12/18    Goal: Discharge Needs Assessment  Outcome: Ongoing (interventions implemented as appropriate)    Goal: Interprofessional Rounds/Family Conf  Outcome: Ongoing (interventions implemented as appropriate)      Problem: Fall Risk (Adult)  Goal: Identify Related Risk Factors and Signs and Symptoms  Outcome: Ongoing (interventions implemented as appropriate)    Goal: Absence of Fall  Outcome: Ongoing (interventions implemented as appropriate)      Problem: Laminectomy/Foraminotomy/Discectomy (Adult)  Goal: Anesthesia/Sedation Recovery  Outcome: Ongoing (interventions implemented as appropriate)      Problem: Pain, Acute (Adult)  Goal: Identify Related Risk Factors and Signs and Symptoms  Outcome: Ongoing (interventions implemented as appropriate)    Goal: Acceptable Pain Control/Comfort Level  Outcome: Ongoing (interventions implemented as appropriate)      Problem: Skin Injury Risk (Adult)  Goal: Identify Related Risk Factors and Signs and Symptoms  Outcome: Ongoing (interventions implemented as appropriate)    Goal: Skin Health and Integrity  Outcome: Ongoing (interventions implemented as appropriate)

## 2018-07-12 NOTE — DISCHARGE INSTR - ACTIVITY
Discharge Activity Restrictions    1) No driving for 3 weeks or until discussed with the clinician in the office  2) May shower with wound covered. remove dressing in 7 days and may wash wound  3) Do not lift / push / pull more then 10 lbs.for 3 weeks  4) Call the office with fever or chills, wound swelling or drainage, increased unexpected pain

## 2018-07-12 NOTE — PLAN OF CARE
Problem: Patient Care Overview  Goal: Plan of Care Review  Outcome: Unable to achieve outcome(s) by discharge Date Met: 07/12/18 07/12/18 1001   Coping/Psychosocial   Plan of Care Reviewed With patient   Plan of Care Review   Progress improving   OTHER   Outcome Summary Pt anticipated DC to [facility] today. Pt and daughter educated on proper car transfer with spinal precautions. Pt educated on spinal precuations with pt able to recall with min cues. Pt require CGA to min A for bed mobility and SBA to CGA for transfers and ambulation with rollator. Nursing informed of pt oxygen on RA and instructed to leave oxygen off due to pt at 98% on RA.

## 2018-07-12 NOTE — PROGRESS NOTES
Case Management Discharge Note    Final Note: Patient ready for discharge to [facility]. Per RN, patient satted 98% on RA with PT and will not require a loaner tank for transport to [facility]. Daughter will provide her ride. JEANIE has faxed the DC summary to [phone]. RN, please call report to [phone], and please send a copy of the DC summary/AVS in the discharge packet. Thank you. No other discharge needs identified/voiced.    Destination - Selection Complete     Service Request Status Selected Specialties Address Phone Number Fax Number    [facility] Selected Skilled Nursing Facility [address] [address] [phone]      Durable Medical Equipment     No service has been selected for the patient.      Dialysis/Infusion     No service has been selected for the patient.      Home Medical Care     No service has been selected for the patient.      Social Care     No service has been selected for the patient.             Final Discharge Disposition Code: 03 - skilled nursing facility (SNF)

## 2018-07-12 NOTE — DISCHARGE PLACEMENT REQUEST
"Joanna Trejo (67 y.o. Female)     From [place],   [phone]      Date of Birth Social Security Number Address Home Phone MRN    1950  [facility] [place] [address] [phone] [id number]    [employer] Marital Status          Adventism        Admission Date Admission Type Admitting Provider Attending Provider Department, Room/Bed    7/6/18 Elective Lencho Gamino MD Scott, Brett A, MD [facility] 3E, S335/1    Discharge Date Discharge Disposition Discharge Destination         Rehab Facility or Unit (DC - External)              Attending Provider:  Lencho Gamino MD    Allergies:  Toprol Xl [Metoprolol Tartrate], Amlodipine Besylate, Entacapone, Levemir [Insulin Detemir], Xarelto [Rivaroxaban], Bactrim [Sulfamethoxazole-trimethoprim], Ciprofloxacin, Cogentin [Benztropine], Compazine [Prochlorperazine Edisylate], Duraprep [Antiseptic Products, Misc.], Haldol [Haloperidol Lactate], Hydralazine, Hydrocodone-acetaminophen, Lisinopril, Penicillins, Statins, Tarka [Trandolapril-verapamil Hcl Er]    Isolation:  None   Infection:  None   Code Status:  CPR    Ht:  157.5 cm (62.01\")   Wt:  112 kg (246 lb 14.6 oz)    Admission Cmt:  None   Principal Problem:  Spinal stenosis, lumbar region, with neurogenic claudication [M48.062]                 Active Insurance as of 7/6/2018     Primary Coverage     Payor Plan Insurance Group Employer/Plan Group    [facility] MEDICARE REPLACEMENT [facility] [id number]     Payor Plan Address Payor Plan Phone Number Effective From Effective To    [address] [address]  1/1/2017     [facility] [id number]       Subscriber Name Subscriber Birth Date Member ID       JOANNA TREJO 1950 [id number]                 Emergency Contacts      (Rel.) Home Phone Work Phone Mobile Phone    Silvana Trejo (Daughter) -- -- [phone]                 Discharge Summary      Alta Marc PA-C at 7/12/2018  9:44 AM      "         [facility]    Date of Admission: 7/6/2018  Date of Discharge:  7/12/2018    Discharge Diagnosis:   1.  Spinal stenosis with neurogenic claudication  2.  Physical deconditioning  3.  Diabetes mellitus  4.  Chronic atrial fibrillation, pacemaker, recent interrogation during this hospitalization, Dr. Corona.  Has follow-up appointment scheduled with cardiology as an outpatient.  5.  Obesity  6.  Chronic edema  7.  Coronary artery disease, without angina  8.  Essential hypertension  9.  Incomplete bladder emptying, dysuria, current treatment with Urecholine 25 mg 3 times a day, improving, has follow-up appointment with urology as an outpatient.  10.  Arthrodesis  11.  Mixed hyperlipidemia  12.  Parkinson's disease  13.  Obstructive sleep apnea on CPAP  14.  Peripheral vascular disease  15.  Chronic anemia    Procedures Performed  Procedure(s):  LUMBAR LAMINECTOMY L4-5, HEMILAMIINECTOMY RIGHT L5-S1, FORAMINOTOMY L5-S1       Presenting Problem/History of Present Illness  Spinal stenosis, lumbar region, with neurogenic claudication [M48.062]     Hospital Course  Patient is a 67 y.o. female presented with symptoms of neurogenic claudication.  The patient underwent an uncomplicated lumbar decompression for spinal stenosis On 7/6/2018.  The patient tolerated the surgery without any difficulties.  Postop labs have been stable.  Patient did have difficulties completely emptying her bladder and she has been started on Urecholine 25 mg 3 times a day with improvement in her symptoms.  She was seen in consultation by urology who recommended continuation of the current medications.  The patient developed some atypical chest pain and was worked up with negative EKG, negative troponins and evaluation by cardiology with pacemaker interrogation.  The patient is participate with physical therapy and occupational therapy but due to significant deconditioning and obesity  she has been evaluated for inpatient rehabilitation and has been accepted to [facility].  The patient has had relief of her radicular leg pain.  Her wound has remained dry and intact throughout her hospitalization.  The patient is voiding independently, has had a bowel movement, glucoses have been stable and treated with a multimodality regimen.  The patient will be started back on her usual home medications at the time of discharge.  The patient has utilized Percocet when necessary for postop pain and uses tramadol at home.  These medicines can be adjusted as needed.  An Aquasol dressing will be applied prior to discharge and this can be left in place for 7 days and then removed along with the Steri-Strips and the incision can be washed on cleaned on a daily basis.  Arrangements were made for a follow-up appointment with Dr. Gamino in the neurosurgery office in 3 weeks.  The patient has an outpatient appointment with cardiology and urology scheduled.    Condition on Discharge:  satisfactory   Discharge Disposition  Rehab Facility or Unit (DC - External)    Discharge Medications     Discharge Medications      Continue These Medications      Instructions Start Date   amantadine 100 MG capsule  Commonly known as:  SYMMETREL   100 mg, Oral, 2 Times Daily      apixaban 5 MG tablet tablet  Commonly known as:  ELIQUIS   5 mg, Oral, Every 12 Hours Scheduled      aspirin 81 MG EC tablet   81 mg, Oral, Daily      bumetanide 2 MG tablet  Commonly known as:  BUMEX   2 mg, Oral, 2 Times Daily - RT      CALTRATE 600 PO   600 mg, Oral, Daily      carbidopa-levodopa  MG per tablet  Commonly known as:  SINEMET   2 tablets, Oral, Every Morning, 2 tablets at 0600, 1 tablet at 0900, 1200, 1500, 1800, 2100      carbonyl iron 45 MG tablet tablet  Commonly known as:  FEOSOL   1 tablet, Oral, 2 Times Daily      CENTRUM ULTRA WOMENS PO   1 tablet, Oral, Daily      Cholecalciferol 2000 units tablet   2,000 Units, Oral, 2  Times Daily      citalopram 20 MG tablet  Commonly known as:  CeleXA   20 mg, Oral, Every Night at Bedtime      clobetasol 0.05 % cream  Commonly known as:  TEMOVATE   1 application, Topical, 2 Times Daily PRN      CloNIDine 0.1 MG tablet  Commonly known as:  CATAPRES   0.1 mg, Oral, Every 12 Hours      Collagen-Boron-Hyaluronic Acid 10-5-3.3 MG tablet   1 tablet, Oral, Daily      fluticasone 50 MCG/ACT nasal spray  Commonly known as:  FLONASE   2 sprays, Nasal, Daily PRN      IPRATROPIUM BROMIDE NA   1 spray, Nasal, 2 Times Daily      Loratadine 10 MG capsule   10 mg, Oral, Daily      losartan 50 MG tablet  Commonly known as:  COZAAR   50 mg, Oral, Every 12 Hours      metFORMIN 1000 MG tablet  Commonly known as:  GLUCOPHAGE   1,000 mg, Oral, 2 Times Daily With Meals      metOLazone 2.5 MG tablet  Commonly known as:  ZAROXOLYN   2.5 mg, Oral, Daily      mupirocin 2 % ointment  Commonly known as:  BACTROBAN   1 application, Topical, 2 Times Daily, D/t nasal scab,prescribed per ent md       MYRBETRIQ 50 MG tablet sustained-release 24 hour 24 hr tablet  Generic drug:  Mirabegron ER   50 mg, Oral, Daily      nitroglycerin 0.4 MG/SPRAY spray  Commonly known as:  NITROLINGUAL   1 spray, Sublingual, Every 5 Minutes PRN      omeprazole 40 MG capsule  Commonly known as:  priLOSEC   40 mg, Oral, 2 Times Daily      potassium chloride 10 MEQ CR capsule  Commonly known as:  MICRO-K   10 mEq, Oral, 2 Times Daily      pramipexole 1.5 MG tablet  Commonly known as:  MIRAPEX   1.5 mg, Oral, Nightly      QUNOL COQ10/UBIQUINOL/JOSE 100 MG capsule  Generic drug:  Ubiquinol   1 capsule, Oral, Daily      ranolazine 500 MG 12 hr tablet  Commonly known as:  RANEXA   500 mg, Oral, 2 Times Daily      sennosides-docusate sodium 8.6-50 MG tablet  Commonly known as:  SENOKOT-S   1 tablet, Oral, Daily      spironolactone 25 MG tablet  Commonly known as:  ALDACTONE   25 mg, Oral, Daily      SUPER B COMPLEX PO   1 tablet, Oral, Daily      SYNTHROID  200 MCG tablet  Generic drug:  levothyroxine   200 mcg, Oral, Daily      traMADol 50 MG tablet  Commonly known as:  ULTRAM   50 mg, Oral, Every 8 Hours PRN      TRESIBA FLEXTOUCH 100 UNIT/ML solution pen-injector injection  Generic drug:  insulin degludec   28 Units, Subcutaneous, Nightly      VENTOLIN  (90 Base) MCG/ACT inhaler  Generic drug:  albuterol   1 puff, Inhalation, Every 4 Hours PRN      vitamin C 500 MG tablet  Commonly known as:  ASCORBIC ACID   500 mg, Oral, Daily, Chewable tablet             Follow-up Appointments  Future Appointments  Date Time Provider Department Center   10/24/2018 10:00 AM Albertina Corona MD [facility] None     Additional Instructions for the Follow-ups that You Need to Schedule     Discharge Follow-up with Specialty: Dr. solitario; 3 Weeks    As directed      Specialty:  Dr. solitario    Follow Up:  3 Weeks         Discharge Follow-up with Specified Provider: jhonny    As directed      To:  jhonny    Follow Up Details:  already scheduled         Discharge Follow-up with Specified Provider: urology    As directed      To:  urology    Follow Up Details:  already scheduled               Referring Provider  So Solitario MD    PCP   MD Alta Barroso PA-C  07/12/18  9:44 AM              Electronically signed by Alta Marc PA-C at 7/12/2018  9:59 AM       Discharge Order     Start     Ordered    07/12/18 0931  Discharge patient  Once     Expected Discharge Date:  07/12/18    Expected Discharge Time:  Midday    Discharge Disposition:  Rehab Facility or Unit (DC - External)    Physician of Record for Attribution - Please select from Treatment Team:  SO SOLITARIO [[id number]]    Review needed by CMO to determine Physician of Record:  No    Please choose which facility the patient is currently admitted if they are being discharged to another facility or unit.:   [facility]    Mode:  Family       Question Answer Comment   Physician of  Record for Attribution - Please select from Treatment Team SO SOLITARIO A    Review needed by CMO to determine Physician of Record No    Please choose which facility the patient is currently admitted if they are being discharged to another facility or unit. MASTER [facility]    Mode: Family        07/12/18 0957

## 2018-07-15 NOTE — THERAPY DISCHARGE NOTE
Acute Care - Physical Therapy Discharge Summary  [facility]       Patient Name: Joanna Cross  : 1950  MRN: [id number]    Today's Date: 7/15/2018  Onset of Illness/Injury or Date of Surgery: 18    Date of Referral to PT: 18  Referring Physician: MD Stevenson      Admit Date: 2018      PT Recommendation and Plan    Visit Dx:    ICD-10-CM ICD-9-CM   1. Impaired functional mobility, balance, gait, and endurance Z74.09 V49.89   2. Spinal stenosis, lumbar region, with neurogenic claudication M48.062 724.03   3. Impaired mobility and ADLs Z74.09 799.89             Therapy Suggested Charges     Code   Minutes Charges    75256 (CPT®) Hc Pt Neuromusc Re Education Ea 15 Min      04942 (CPT®) Hc Pt Ther Proc Ea 15 Min      96103 (CPT®) Hc Gait Training Ea 15 Min 12 1    39060 (CPT®) Hc Pt Therapeutic Act Ea 15 Min 26 2    67474 (CPT®) Hc Pt Manual Therapy Ea 15 Min      85513 (CPT®) Hc Pt Iontophoresis Ea 15 Min      36309 (CPT®) Hc Pt Elec Stim Ea-Per 15 Min      78882 (CPT®) Hc Pt Ultrasound Ea 15 Min      67554 (CPT®) Hc Pt Self Care/Mgmt/Train Ea 15 Min      Total  38 3                PT Rehab Goals     Row Name 07/15/18 1048             Bed Mobility Goal 1 (PT)    Activity/Assistive Device (Bed Mobility Goal 1, PT) sit to supine/supine to sit   via log roll  -      [place] Level/Cues Needed (Bed Mobility Goal 1, PT) minimum assist (75% or more patient effort)  -MC      Time Frame (Bed Mobility Goal 1, PT) 5 days;long term goal (LTG)  -MC      Progress/Outcomes (Bed Mobility Goal 1, PT) goal ongoing  -         Transfer Goal 1 (PT)    Activity/Assistive Device (Transfer Goal 1, PT) sit-to-stand/stand-to-sit;walker, rolling  -      [place] Level/Cues Needed (Transfer Goal 1, PT) supervision required  -MC      Time Frame (Transfer Goal 1, PT) 5 days;long term goal (LTG)  -MC      Progress/Outcome (Transfer Goal 1, PT) goal ongoing  -         Gait Training Goal 1 (PT)     Activity/Assistive Device (Gait Training Goal 1, PT) gait (walking locomotion);walker, rolling  -MC      [place] Level (Gait Training Goal 1, PT) supervision required  -MC      Distance (Gait Goal 1, PT) 150 feet  -MC      Time Frame (Gait Training Goal 1, PT) 5 days;long term goal (LTG)  -MC      Progress/Outcome (Gait Training Goal 1, PT) goal ongoing  -MC        User Key  (r) = Recorded By, (t) = Taken By, (c) = Cosigned By    Initials Name Provider Type Discipline    ARMANDO Hutton, PT Physical Therapist PT              PT Discharge Summary  Anticipated Discharge Disposition (PT): inpatient rehabilitation facility  Reason for Discharge: Discharge from facility  Outcomes Achieved: Refer to plan of care for updates on goals achieved  Discharge Destination: Inpatient rehabilitation facility      Ladi Hutton, EDYTA   7/15/2018

## 2018-07-26 ENCOUNTER — CLINICAL SUPPORT NO REQUIREMENTS (OUTPATIENT)
Dept: CARDIOLOGY | Facility: CLINIC | Age: 68
End: 2018-07-26

## 2018-07-26 DIAGNOSIS — I49.5 SICK SINUS SYNDROME (HCC): Primary | ICD-10-CM

## 2018-07-30 ENCOUNTER — OFFICE VISIT (OUTPATIENT)
Dept: NEUROSURGERY | Facility: CLINIC | Age: 68
End: 2018-07-30

## 2018-07-30 VITALS
SYSTOLIC BLOOD PRESSURE: 142 MMHG | DIASTOLIC BLOOD PRESSURE: 80 MMHG | TEMPERATURE: 97.9 F | WEIGHT: 240 LBS | HEIGHT: 62 IN | BODY MASS INDEX: 44.16 KG/M2

## 2018-07-30 DIAGNOSIS — M48.062 SPINAL STENOSIS, LUMBAR REGION, WITH NEUROGENIC CLAUDICATION: ICD-10-CM

## 2018-07-30 DIAGNOSIS — R53.81 PHYSICAL DECONDITIONING: ICD-10-CM

## 2018-07-30 DIAGNOSIS — M47.26 OSTEOARTHRITIS OF SPINE WITH RADICULOPATHY, LUMBAR REGION: ICD-10-CM

## 2018-07-30 DIAGNOSIS — I73.9 PERIPHERAL VASCULAR DISEASE (HCC): ICD-10-CM

## 2018-07-30 DIAGNOSIS — M54.16 LUMBAR RADICULOPATHY: ICD-10-CM

## 2018-07-30 DIAGNOSIS — M51.37 DEGENERATION OF LUMBAR OR LUMBOSACRAL INTERVERTEBRAL DISC: Primary | ICD-10-CM

## 2018-07-30 DIAGNOSIS — M43.10 SPONDYLOLISTHESIS, ACQUIRED: ICD-10-CM

## 2018-07-30 PROCEDURE — 99024 POSTOP FOLLOW-UP VISIT: CPT | Performed by: PHYSICIAN ASSISTANT

## 2018-07-30 NOTE — PROGRESS NOTES
Patient: Joanna Cross  : 1950  Chart #: [id number]    Date of Service: 2018    CHIEF COMPLAINT: Postoperative follow-up    History of Present Illness Ms. Cross is a 67-year-old woman who presented to our clinic with intractable back and leg pain with symptoms of neurogenic location.  An MRI scan of lumbar spine demonstrated a fixed spondylolisthesis at L5 on S1 with spinal stenosis at L4 5 and L5-S1.  As such on 2018 she underwent an uncomplicated L4-L5 decompressive laminectomy and bilateral medial facetectomies, and right L5 and S1 partial hemilaminectomies with medial facetectomy and foraminotomy.    Today patient is 3 weeks postop.  Her leg pain is completely absent.  She has pain in the low back that occasionally radiates into the right buttock.  Otherwise she has no complaints and is very pleased with outcome of the surgery.  She says when she wakes up in the morning sometimes she notices a small spot of drainage on her clothing.  No drainage throughout the day.      Review of Systems   Constitutional: Negative for activity change, appetite change, chills, diaphoresis, fatigue, fever and unexpected weight change.   HENT: Negative for congestion, dental problem, drooling, ear discharge, ear pain, facial swelling, hearing loss, mouth sores, nosebleeds, postnasal drip, rhinorrhea, sinus pressure, sneezing, sore throat, tinnitus, trouble swallowing and voice change.    Eyes: Positive for itching. Negative for photophobia, pain, discharge, redness and visual disturbance.   Respiratory: Negative for apnea, cough, choking, chest tightness, shortness of breath, wheezing and stridor.    Cardiovascular: Negative for chest pain, palpitations and leg swelling.   Gastrointestinal: Negative for abdominal distention, abdominal pain, anal bleeding, blood in stool, constipation, diarrhea, nausea, rectal pain and vomiting.   Endocrine: Negative for cold intolerance, heat intolerance, polydipsia, polyphagia  "and polyuria.   Genitourinary: Negative for decreased urine volume, difficulty urinating, dysuria, enuresis, flank pain, frequency, genital sores, hematuria and urgency.   Musculoskeletal: Positive for back pain. Negative for arthralgias, gait problem, joint swelling, myalgias, neck pain and neck stiffness.   Skin: Negative for color change, pallor, rash and wound.   Allergic/Immunologic: Negative for environmental allergies, food allergies and immunocompromised state.   Neurological: Negative for dizziness, tremors, seizures, syncope, facial asymmetry, speech difficulty, weakness, light-headedness, numbness and headaches.   Hematological: Negative for adenopathy. Does not bruise/bleed easily.   Psychiatric/Behavioral: Negative for agitation, behavioral problems, confusion, decreased concentration, dysphoric mood, hallucinations, self-injury, sleep disturbance and suicidal ideas. The patient is not nervous/anxious and is not hyperactive.        Objective   Vital Signs: Blood pressure 142/80, temperature 97.9 °F (36.6 °C), height 157.5 cm (62.01\"), weight 109 kg (240 lb), not currently breastfeeding.  Physical Exam   Constitutional: She appears well-developed and well-nourished. No distress.   HENT:   Head: Normocephalic and atraumatic.   Skin:   Patient has some superficial dehiscence at the superior pole of the incision that has granulated in and is now covered with scabbing.  The area is mildly erythematous.  No evidence of drainage.  No drainage could be expressed today.  Area is nontender. Scabbing was debrided and incision is intact without concern for infection.     Patient ambulates with assistance of a rolling walker.   Psychiatric: She has a normal mood and affect. Her behavior is normal. Thought content normal.   Nursing note and vitals reviewed.    Assessment/Plan   Medical Decision Making: Ms. Cross is 3 weeks status post an uncomplicated lumbar decompression L4-5 and L5-S1 and is doing excellent.  She " has complete resolution of her leg pain, but does continue with some mild low back pain.  She has some superficial dehiscence of her incision that has granulated in and is scabbed over. No drainage. I gave her wound care instructions and would like to check her incision again in a week or two.  She plans on starting formal physical therapy later this week. She was advised to call our office prior to her follow up if she has any incisional issues.             Joanna was seen today for back pain and follow-up.    Diagnoses and all orders for this visit:    Degeneration of lumbar or lumbosacral intervertebral disc    Spondylolisthesis, acquired    Osteoarthritis of spine with radiculopathy, lumbar region    BMI 40.0-44.9, adult (CMS/HCC)    Lumbar radiculopathy    Physical deconditioning    Peripheral vascular disease (CMS/HCC)    Spinal stenosis, lumbar region, with neurogenic claudication               Mackenzie Tello PA-C  Patient Care Team:  Reynaldo Fritz MD as PCP - General  Reynaldo Fritz MD as PCP - Family Medicine  Faisal Ramirez MD as Obstetrician (Obstetrics and Gynecology)  Timothy Dan DC as Referring Physician (Chiropractic Medicine)

## 2018-08-03 ENCOUNTER — TELEPHONE (OUTPATIENT)
Dept: NEUROSURGERY | Facility: CLINIC | Age: 68
End: 2018-08-03

## 2018-08-03 NOTE — TELEPHONE ENCOUNTER
Home health nurse stated that there was no signs of infection or drainage, just wanted to verify wound care.

## 2018-08-03 NOTE — TELEPHONE ENCOUNTER
I tried to call and left message with  nurse number     If they can send us a picture that would be great. If not, When Ivory saw them patient had some superficial dehiscence, but no drainage or signs of infection.     If this is still the case (no drainage, discharge, purulence, superficial scabbing only, etc) we should be keeping incision dry and clean. She can take showers but just allow the water to run down her back and not put direct water on the incision. No need for anything else at this time.     Again, if the HH nurse is worried she can send us a picture. I know that Ivory wanted to see her back within the next two weeks.     Thanks,   JAILYN gauthier

## 2018-08-03 NOTE — TELEPHONE ENCOUNTER
Provider:  Stevenson  Caller: Janice  Phone #:  [phone]  Surgery:  LUMBAR LAMINECTOMY L4-5, HEMILAMIINECTOMY RIGHT L5-S1, FORAMINOTOMY L5-S1  Surgery Date:  7/6/18  Last visit:   7/30/18  Next visit: 8/13/18    JANINE:         Reason for call:           Janice from  left message stating that pt still has some separation and scabbing at top of incision site, requesting on wound care instruction. Please adv

## 2018-08-06 RX ORDER — METOLAZONE 2.5 MG/1
TABLET ORAL
Qty: 90 TABLET | Refills: 1 | Status: SHIPPED | OUTPATIENT
Start: 2018-08-06 | End: 2018-12-27 | Stop reason: HOSPADM

## 2018-08-08 NOTE — TELEPHONE ENCOUNTER
"Spoke w/ Janice again today.     - she stated the incision is the same as when seen in office - slight drainage, they are doing \" dry dressings\" and keeping an eye on the incision. - no fever, not tender to touch, no increased redness.   No other SS of infection     Pt will be following up with PEYMAN Tello on Monday for a wound check.   "

## 2018-08-09 ENCOUNTER — APPOINTMENT (OUTPATIENT)
Dept: LAB | Facility: HOSPITAL | Age: 68
End: 2018-08-09

## 2018-08-09 ENCOUNTER — OFFICE VISIT (OUTPATIENT)
Dept: NEUROSURGERY | Facility: CLINIC | Age: 68
End: 2018-08-09

## 2018-08-09 VITALS
WEIGHT: 239 LBS | DIASTOLIC BLOOD PRESSURE: 90 MMHG | SYSTOLIC BLOOD PRESSURE: 150 MMHG | HEIGHT: 62 IN | TEMPERATURE: 98 F | BODY MASS INDEX: 43.98 KG/M2

## 2018-08-09 DIAGNOSIS — M54.16 LUMBAR RADICULOPATHY: ICD-10-CM

## 2018-08-09 DIAGNOSIS — M47.26 OSTEOARTHRITIS OF SPINE WITH RADICULOPATHY, LUMBAR REGION: ICD-10-CM

## 2018-08-09 DIAGNOSIS — M51.37 DEGENERATION OF LUMBAR OR LUMBOSACRAL INTERVERTEBRAL DISC: Primary | ICD-10-CM

## 2018-08-09 DIAGNOSIS — M43.10 SPONDYLOLISTHESIS, ACQUIRED: ICD-10-CM

## 2018-08-09 DIAGNOSIS — R53.81 PHYSICAL DECONDITIONING: ICD-10-CM

## 2018-08-09 PROCEDURE — 87186 SC STD MICRODIL/AGAR DIL: CPT | Performed by: PHYSICIAN ASSISTANT

## 2018-08-09 PROCEDURE — 87147 CULTURE TYPE IMMUNOLOGIC: CPT | Performed by: PHYSICIAN ASSISTANT

## 2018-08-09 PROCEDURE — 87070 CULTURE OTHR SPECIMN AEROBIC: CPT | Performed by: PHYSICIAN ASSISTANT

## 2018-08-09 PROCEDURE — 99024 POSTOP FOLLOW-UP VISIT: CPT | Performed by: PHYSICIAN ASSISTANT

## 2018-08-09 PROCEDURE — 87205 SMEAR GRAM STAIN: CPT | Performed by: PHYSICIAN ASSISTANT

## 2018-08-09 PROCEDURE — 87077 CULTURE AEROBIC IDENTIFY: CPT | Performed by: PHYSICIAN ASSISTANT

## 2018-08-09 RX ORDER — CEPHALEXIN 500 MG/1
500 CAPSULE ORAL 4 TIMES DAILY
Qty: 40 CAPSULE | Refills: 1 | Status: SHIPPED | OUTPATIENT
Start: 2018-08-09 | End: 2018-11-14

## 2018-08-09 NOTE — PROGRESS NOTES
Patient: Joanna Cross  : 1950  Chart #: [id number]    Date of Service: 2018    CHIEF COMPLAINT: Wound check    History of Present Illness Ms. Cross is one month status post an uncomplicated L4-L5 decompressive laminectomy and bilateral medial facetectomies, and right L5 and S1 partial hemilaminectomies with medial facetectomy and foraminotomies.  Her postoperative course has been complicated by a superficial wound dehiscence.  She now complains of increased drainage.  No fevers/chills or other signs of infection.  Otherwise she has been doing very well with regard to her back and leg.  She denies pain, numbness, or weakness.      Review of Systems   Constitutional: Negative for activity change, appetite change, chills, diaphoresis, fatigue, fever and unexpected weight change.   HENT: Negative for congestion, dental problem, drooling, ear discharge, ear pain, facial swelling, hearing loss, mouth sores, nosebleeds, postnasal drip, rhinorrhea, sinus pressure, sneezing, sore throat, tinnitus, trouble swallowing and voice change.    Eyes: Negative for photophobia, pain, discharge, redness, itching and visual disturbance.   Respiratory: Negative for apnea, cough, choking, chest tightness, shortness of breath, wheezing and stridor.    Cardiovascular: Negative for chest pain, palpitations and leg swelling.   Gastrointestinal: Negative for abdominal distention, abdominal pain, anal bleeding, blood in stool, constipation, diarrhea, nausea, rectal pain and vomiting.   Musculoskeletal: Negative for arthralgias, back pain, gait problem, joint swelling, myalgias, neck pain and neck stiffness.   Skin: Negative for color change, pallor, rash and wound.   Allergic/Immunologic: Negative for environmental allergies, food allergies and immunocompromised state.   Neurological: Negative for dizziness, tremors, seizures, syncope, facial asymmetry, speech difficulty, weakness, light-headedness, numbness and headaches.  "  Hematological: Negative for adenopathy. Does not bruise/bleed easily.   Psychiatric/Behavioral: Negative for agitation, behavioral problems, confusion, decreased concentration, dysphoric mood, self-injury, sleep disturbance and suicidal ideas. The patient is not nervous/anxious and is not hyperactive.        Objective   Vital Signs: Blood pressure 150/90, temperature 98 °F (36.7 °C), height 157.5 cm (62\"), weight 108 kg (239 lb), not currently breastfeeding.  Physical Exam   Skin:   Superior margin of incision is dehisced and appears to be tunneling down to the fascia.  Wound edges are indurated.   Nursing note and vitals reviewed.    Assessment/Plan    Diagnosis: wound drainage and possible infection 1 month status post her decompression  Medical Decision Making: Dr. Gamino has also examined patient's incision.  Our plan is to treat her with Keflex 500 mg 1 tab 4 times a day ×10 days.  She will continue to clean the incision daily and keep covered.  Follow up next Wednesday for reevaluation.  If she develops fevers chills or worsening symptoms she was directed to call our office for an earlier follow up or go to the emergency room.            Joanna was seen today for wound check and post-op.    Diagnoses and all orders for this visit:    Degeneration of lumbar or lumbosacral intervertebral disc  -     Wound Culture - Wound, Back    Spondylolisthesis, acquired  -     Wound Culture - Wound, Back    Osteoarthritis of spine with radiculopathy, lumbar region  -     Wound Culture - Wound, Back    BMI 40.0-44.9, adult (CMS/HCC)  -     Wound Culture - Wound, Back    Lumbar radiculopathy  -     Wound Culture - Wound, Back    Physical deconditioning  -     Wound Culture - Wound, Back    Other orders  -     cephalexin (KEFLEX) 500 MG capsule; Take 1 capsule by mouth 4 (Four) Times a Day.               Mackenzie Tello PA-C  Patient Care Team:  Reynaldo Fritz MD as PCP - General  Reynaldo Fritz MD as PCP - Family " Medicine  Faiasl Ramirez MD as Obstetrician (Obstetrics and Gynecology)  Timothy Dan DC as Referring Physician (Chiropractic Medicine)

## 2018-08-09 NOTE — TELEPHONE ENCOUNTER
Physical therapy called today and states that he is at patient's home and she has a yellow d/c and an opening at the top of her wound. (He said home health had been trying to reach us, however we were not responding.) I read through the phone notes, and there is quite a bit of discrepancy there.  Yesterday when our MA spoke with home health she stated that the incision looked the same as the day she was in the office.  PT also said she was told to take the bandage off, but was never told to replace it.  Her notes clearly state that they have been keeping a dressing on the wound, so I sincerely could not get an answer as to how much d/c there has been.   He feels that she needs to be seen today.

## 2018-08-13 LAB
BACTERIA SPEC AEROBE CULT: ABNORMAL
BACTERIA SPEC AEROBE CULT: ABNORMAL
GRAM STN SPEC: ABNORMAL

## 2018-08-14 ENCOUNTER — TELEPHONE (OUTPATIENT)
Dept: CARDIOLOGY | Facility: CLINIC | Age: 68
End: 2018-08-14

## 2018-08-14 NOTE — TELEPHONE ENCOUNTER
PT went to [facility] yesterday- she states that last night she started experiencing chest pressure and head ache- she states that her BP at home was 180/114 with her machine- she called the EMS and they got 200/100- she went on to [facility] but they did not do anything-     Today her BP is 148/84 HR 74   For the paste few weeks, BP has been running 116-130/60-70's- HR 70's-     I did not have her to increase anything, since this was a one time episode- but maybe give her a PRN clonidine for SBP>160-170??  She does have a staph infection in her back incision after her recent surgery and is on Abx [place]- she has a f/u appt with them tomorrow

## 2018-08-15 ENCOUNTER — OFFICE VISIT (OUTPATIENT)
Dept: NEUROSURGERY | Facility: CLINIC | Age: 68
End: 2018-08-15

## 2018-08-15 ENCOUNTER — DOCUMENTATION (OUTPATIENT)
Dept: NEUROSURGERY | Facility: CLINIC | Age: 68
End: 2018-08-15

## 2018-08-15 VITALS
WEIGHT: 242 LBS | SYSTOLIC BLOOD PRESSURE: 140 MMHG | TEMPERATURE: 97.5 F | BODY MASS INDEX: 44.53 KG/M2 | DIASTOLIC BLOOD PRESSURE: 90 MMHG | HEIGHT: 62 IN | RESPIRATION RATE: 20 BRPM

## 2018-08-15 DIAGNOSIS — M51.37 DEGENERATION OF LUMBAR OR LUMBOSACRAL INTERVERTEBRAL DISC: ICD-10-CM

## 2018-08-15 DIAGNOSIS — M47.26 OSTEOARTHRITIS OF SPINE WITH RADICULOPATHY, LUMBAR REGION: ICD-10-CM

## 2018-08-15 DIAGNOSIS — M43.10 SPONDYLOLISTHESIS, ACQUIRED: ICD-10-CM

## 2018-08-15 DIAGNOSIS — Z51.89 VISIT FOR WOUND CHECK: Primary | ICD-10-CM

## 2018-08-15 PROBLEM — E11.9 DIABETES MELLITUS: Status: ACTIVE | Noted: 2018-08-15

## 2018-08-15 PROCEDURE — 99024 POSTOP FOLLOW-UP VISIT: CPT | Performed by: PHYSICIAN ASSISTANT

## 2018-08-15 RX ORDER — FLURBIPROFEN SODIUM 0.3 MG/ML
SOLUTION/ DROPS OPHTHALMIC
Status: ON HOLD | COMMUNITY
Start: 2018-08-10 | End: 2019-02-01

## 2018-08-15 NOTE — PROGRESS NOTES
Patient: Joanna Cross  : 1950  Chart #: [id number]    Date of Service: 08/15/2018    CHIEF COMPLAINT: Wound check     History of Present Illness Ms. Cross is 6 weeks status post an uncomplicated lumbar decompression L4-S1.  Her postoperative course has been complicated by poor wound healing and dehiscence.  We initiated Keflex 500 mg 1 tab by mouth 4 times a day 1 week ago.  She lives alone and has not been able to clean or change bandages on her own. Home health has visited her twice since I saw her a week ago.  She does not report excessive drainage.  No fevers/chills or other signs of infection.  Otherwise she has been doing very well with regard to her back and leg.        Review of Systems   Constitutional: Negative for activity change, appetite change, chills, diaphoresis, fatigue, fever and unexpected weight change.   HENT: Negative for congestion, dental problem, drooling, ear discharge, ear pain, facial swelling, hearing loss, mouth sores, nosebleeds, postnasal drip, rhinorrhea, sinus pressure, sneezing, sore throat, tinnitus, trouble swallowing and voice change.    Eyes: Negative for photophobia, pain, discharge, redness, itching and visual disturbance.   Respiratory: Negative for apnea, cough, choking, chest tightness, shortness of breath, wheezing and stridor.    Cardiovascular: Negative for chest pain, palpitations and leg swelling.   Gastrointestinal: Negative for abdominal distention, abdominal pain, anal bleeding, blood in stool, constipation, diarrhea, nausea, rectal pain and vomiting.   Endocrine: Negative for cold intolerance, heat intolerance, polydipsia, polyphagia and polyuria.   Genitourinary: Negative for decreased urine volume, difficulty urinating, dysuria, enuresis, flank pain, frequency, genital sores, hematuria and urgency.   Musculoskeletal: Negative for arthralgias, back pain, gait problem, joint swelling, myalgias, neck pain and neck stiffness.   Skin: Negative for color  "change, pallor, rash and wound.   Allergic/Immunologic: Negative for environmental allergies, food allergies and immunocompromised state.   Neurological: Negative for dizziness, tremors, seizures, syncope, facial asymmetry, speech difficulty, weakness, light-headedness, numbness and headaches.   Hematological: Negative for adenopathy. Does not bruise/bleed easily.   Psychiatric/Behavioral: Negative for agitation, behavioral problems, confusion, decreased concentration, dysphoric mood, hallucinations, self-injury, sleep disturbance and suicidal ideas. The patient is not nervous/anxious and is not hyperactive.    All other systems reviewed and are negative.      Objective   Vital Signs: Blood pressure 140/90, temperature 97.5 °F (36.4 °C), temperature source Temporal Artery , resp. rate 20, height 157.5 cm (62\"), weight 110 kg (242 lb), not currently breastfeeding.  Physical Exam   Constitutional: She appears well-developed and well-nourished. No distress.   HENT:   Head: Normocephalic and atraumatic.   Psychiatric: She has a normal mood and affect. Her behavior is normal. Thought content normal.   Nursing note and vitals reviewed.  Skin:   Superior margin of incision is dehisced and appears to be tunneling down to the fascia.  Wound edges are indurated. There is little to no change from last visit.     Assessment/Plan    Diagnosis:  Poor wound healing and incisional dehiscence, 6 weeks status post lumbar decompression  Medical Decision Making: I sent an order to patient's home health agency to visit her daily for a wound cleaning and dressing changes.  She will continue Keflex 500 mg 1 tab 4 times a day.  Follow-up on Monday with Dr. Gamino.  If incision continues to show signs of poor wound healing we may take her back to surgery to clean out and reclose.        Diagnoses and all orders for this visit:    Visit for wound check    Degeneration of lumbar or lumbosacral intervertebral disc    Spondylolisthesis, " acquired    Osteoarthritis of spine with radiculopathy, lumbar region    BMI 40.0-44.9, adult (CMS/[facility])               Mackenzie Tello PA-C  Patient Care Team:  Reynaldo Fritz MD as PCP - General  Reynaldo Fritz MD as PCP - Family Medicine  GileFaisal MD as Obstetrician (Obstetrics and Gynecology)  Timothy Dan DC as Referring Physician (Chiropractic Medicine)

## 2018-08-15 NOTE — PROGRESS NOTES
Pt is using [facility]     I faxed [facility] wound care order to fax # [phone]    -If Home health call's. HH is to continue wound care daily up until pt's next appt at our office.

## 2018-08-17 NOTE — TELEPHONE ENCOUNTER
[facility] reviewed- agrees with PT having prn clonidine 0.1 mg for SBP> 160- I left a msg on pt's VM with this info and encouraged to call me back if she needs anything further or if her SBP is consistently running greater than 160-

## 2018-08-20 ENCOUNTER — OFFICE VISIT (OUTPATIENT)
Dept: NEUROSURGERY | Facility: CLINIC | Age: 68
End: 2018-08-20

## 2018-08-20 VITALS
DIASTOLIC BLOOD PRESSURE: 80 MMHG | SYSTOLIC BLOOD PRESSURE: 162 MMHG | TEMPERATURE: 98.1 F | WEIGHT: 244 LBS | OXYGEN SATURATION: 99 % | HEART RATE: 77 BPM | HEIGHT: 62 IN | BODY MASS INDEX: 44.9 KG/M2

## 2018-08-20 DIAGNOSIS — M47.817 LUMBOSACRAL SPONDYLOSIS WITHOUT MYELOPATHY: ICD-10-CM

## 2018-08-20 DIAGNOSIS — I73.9 PERIPHERAL VASCULAR DISEASE (HCC): ICD-10-CM

## 2018-08-20 DIAGNOSIS — M54.50 CHRONIC RIGHT-SIDED LOW BACK PAIN WITHOUT SCIATICA: ICD-10-CM

## 2018-08-20 DIAGNOSIS — R53.81 PHYSICAL DECONDITIONING: ICD-10-CM

## 2018-08-20 DIAGNOSIS — Z51.89 VISIT FOR WOUND CHECK: Primary | ICD-10-CM

## 2018-08-20 DIAGNOSIS — G89.29 CHRONIC RIGHT-SIDED LOW BACK PAIN WITHOUT SCIATICA: ICD-10-CM

## 2018-08-20 PROCEDURE — 99024 POSTOP FOLLOW-UP VISIT: CPT | Performed by: NEUROLOGICAL SURGERY

## 2018-08-20 NOTE — PROGRESS NOTES
Patient: Joanna Cross  :  1950  Chart #:  [id number]    Date of Service: 18    Chief Complaint:   Chief Complaint   Patient presents with   • Post-op       Back Pain   Chronicity: Mrs. Cross returns today for a wound check. Episode onset: On 18 she had a LUMBAR LAMINECTOMY L4-5, HEMILAMIINECTOMY RIGHT L5-S1, FORAMINOTOMY L5-S1. The problem occurs rarely. The problem has been gradually improving since onset. The pain is present in the lumbar spine. The quality of the pain is described as aching. Radiates to: Her incision is almost closed at this point. The pain is at a severity of 2/10. The pain is mild. The pain is worse during the day. The symptoms are aggravated by position. Stiffness is present in the morning. Risk factors include lack of exercise, menopause, obesity and sedentary lifestyle. She has tried bed rest, analgesics and walking for the symptoms. The treatment provided significant relief.       Radiographic Images:   none    Past Medical History:   Diagnosis Date   • Anemia    • Arthritis    • Atrial fibrillation (CMS/HCC)    • Chronic edema     bilateral   • Coronary artery disease involving native coronary artery of native heart without angina pectoris 3/1/2017   • Depression    • Diabetes mellitus (CMS/HCC)     type 2, checks fsbg 1-2x/day and as needed; last a1c  6.5   • Essential hypertension 3/1/2017   • GERD (gastroesophageal reflux disease)    • GIB (gastrointestinal bleeding) 2016    d/t xarelto    • Hiatal hernia    • History of transfusion 2016    d/t GIB, no reaction per pt report    • Mixed hyperlipidemia 3/1/2017   • Myocardial infarction    • MILLI on CPAP    • Parkinson disease (CMS/HCC)    • Peripheral vascular disease (CMS/HCC) 3/1/2017   • Skin cancer    • SSS (sick sinus syndrome) (CMS/HCC)     ppm    • TIA (transient ischemic attack)     no residual    • Varicose vein of leg     not spec of leg    • Venous hypertension    • Venous insufficiency    • Wears  glasses      Current Outpatient Prescriptions   Medication Sig Dispense Refill   • albuterol (VENTOLIN HFA) 108 (90 Base) MCG/ACT inhaler Inhale 1 puff Every 4 (Four) Hours As Needed.     • amantadine (SYMMETREL) 100 MG capsule Take 100 mg by mouth 2 (Two) Times a Day.     • apixaban (ELIQUIS) 5 MG tablet tablet Take 1 tablet by mouth Every 12 (Twelve) Hours. 180 tablet 3   • aspirin 81 MG EC tablet Take 81 mg by mouth Daily.     • B Complex-C (SUPER B COMPLEX PO) Take 1 tablet by mouth Daily.     • B-D UF III MINI PEN NEEDLES 31G X 5 MM misc      • bumetanide (BUMEX) 2 MG tablet Take 2 mg by mouth 2 (Two) Times a Day.     • Calcium Carbonate (CALTRATE 600 PO) Take 600 mg by mouth Daily.     • carbidopa-levodopa (SINEMET)  MG per tablet Take 2 tablets by mouth Every Morning. 2 tablets at 0600, 1 tablet at 0900, 1200, 1500, 1800, 2100     • carbonyl iron (FEOSOL) 45 MG tablet tablet Take 1 tablet by mouth 2 (Two) Times a Day.     • cephalexin (KEFLEX) 500 MG capsule Take 1 capsule by mouth 4 (Four) Times a Day. 40 capsule 1   • Cholecalciferol 2000 units tablet Take 2,000 Units by mouth 2 (Two) Times a Day.     • citalopram (CeleXA) 20 MG tablet Take 20 mg by mouth every night at bedtime.     • clobetasol (TEMOVATE) 0.05 % cream Apply 1 application topically 2 (Two) Times a Day As Needed (Lichens Sclerosis).     • CloNIDine (CATAPRES) 0.1 MG tablet Take 1 tablet by mouth Every 12 (Twelve) Hours. 180 tablet 3   • Collagen-Boron-Hyaluronic Acid 10-5-3.3 MG tablet Take 1 tablet by mouth Daily.     • fluticasone (FLONASE) 50 MCG/ACT nasal spray 2 sprays into each nostril Daily As Needed for Rhinitis.     • insulin degludec (TRESIBA FLEXTOUCH) 100 UNIT/ML solution pen-injector injection Inject 28 Units under the skin Every Night.     • IPRATROPIUM BROMIDE NA 1 spray into each nostril 2 (Two) Times a Day.     • Loratadine 10 MG capsule Take 10 mg by mouth Daily.     • losartan (COZAAR) 50 MG tablet Take 1 tablet by  "mouth Every 12 (Twelve) Hours. 180 tablet 3   • metFORMIN (GLUCOPHAGE) 1000 MG tablet Take 1,000 mg by mouth 2 (Two) Times a Day With Meals.     • metOLazone (ZAROXOLYN) 2.5 MG tablet TAKE 1 TABLET DAILY 90 tablet 1   • Mirabegron ER (MYRBETRIQ) 50 MG tablet sustained-release 24 hour 24 hr tablet Take 50 mg by mouth Daily.     • Multiple Vitamins-Minerals (CENTRUM ULTRA WOMENS PO) Take 1 tablet by mouth Daily.     • mupirocin (BACTROBAN) 2 % ointment Apply 1 application topically 2 (Two) Times a Day. D/t nasal scab,prescribed per ent md     • nitroglycerin (NITROLINGUAL) 0.4 MG/SPRAY spray Place 1 spray under the tongue Every 5 (Five) Minutes As Needed for Chest Pain. 1 each 6   • omeprazole (priLOSEC) 40 MG capsule Take 40 mg by mouth 2 (Two) Times a Day.     • potassium chloride (MICRO-K) 10 MEQ CR capsule Take 10 mEq by mouth 2 (Two) Times a Day.     • pramipexole (MIRAPEX) 1.5 MG tablet Take 1.5 mg by mouth Every Night.     • ranolazine (RANEXA) 500 MG 12 hr tablet Take 500 mg by mouth 2 (Two) Times a Day.     • sennosides-docusate sodium (SENOKOT-S) 8.6-50 MG tablet Take 1 tablet by mouth Daily.     • spironolactone (ALDACTONE) 25 MG tablet Take 1 tablet by mouth Daily. 90 tablet 1   • SYNTHROID 200 MCG tablet Take 200 mcg by mouth Daily.     • traMADol (ULTRAM) 50 MG tablet Take 50 mg by mouth Every 8 (Eight) Hours As Needed for Moderate Pain .     • Ubiquinol (QUNOL COQ10/UBIQUINOL/JOSE) 100 MG capsule Take 1 capsule by mouth Daily.     • vitamin C (ASCORBIC ACID) 500 MG tablet Take 500 mg by mouth Daily. Chewable tablet       No current facility-administered medications for this visit.       Allergies   Allergen Reactions   • Toprol Xl [Metoprolol Tartrate] Shortness Of Breath     Extreme fatigue   • Amlodipine Besylate Swelling     Lower extremity (ankles, feet) swelling   • Entacapone Other (See Comments)     \"extreme weakness in legs - caused several falls, which stopped after discontinuing this " "medication\"   • Levemir [Insulin Detemir] Hives     Hives / rash around injection site   • Xarelto [Rivaroxaban] GI Bleeding   • Bactrim [Sulfamethoxazole-Trimethoprim] Nausea Only     Headache    • Ciprofloxacin Diarrhea   • Cogentin [Benztropine] Other (See Comments)     \"uncontrollable body movements\"   • Compazine [Prochlorperazine Edisylate] Other (See Comments)     Dystonic reaction   • Duraprep [Antiseptic Products, Misc.] Itching and Rash     RASH AND ITCHING   • Haldol [Haloperidol Lactate] Other (See Comments)     Dystonic reaction   • Hydralazine Other (See Comments)     Headache    • Hydrocodone-Acetaminophen Nausea And Vomiting and Dizziness     Headache    • Lisinopril Cough   • Penicillins Hives     Jitteriness    • Statins Myalgia     Leg pain   • Tarka [Trandolapril-Verapamil Hcl Er] Other (See Comments)     Constipation      Social History     Social History   • Marital status:      Social History Main Topics   • Smoking status: Never Smoker   • Smokeless tobacco: Never Used   • Alcohol use Yes      Comment: occasional, nothing weekly    • Drug use: No   • Sexual activity: Defer     Other Topics Concern   • Not on file     Family History   Problem Relation Age of Onset   • Kidney disease Mother      Past Surgical History:   Procedure Laterality Date   • BICEPS TENDON REPAIR Right     shoulder   • BREAST BIOPSY Left 2004   • BUNIONECTOMY Right    • CARDIAC CATHETERIZATION     • CHOLECYSTECTOMY     • COLONOSCOPY  2016   • CYST REMOVAL      left ear, upper left back 2001   • DIAGNOSTIC LAPAROSCOPY  1981   • ENDOSCOPIC FUNCTIONAL SINUS SURGERY (FESS)  2011   • ENDOSCOPY  2016   • HAMMER TOE REPAIR Left    • LUMBAR LAMINECTOMY DISCECTOMY DECOMPRESSION N/A 7/6/2018    Procedure: LUMBAR LAMINECTOMY L4-5, HEMILAMIINECTOMY RIGHT L5-S1, FORAMINOTOMY L5-S1;  Surgeon: Lencho Gamino MD;  Location: [facility];  Service: Neurosurgery   • LUNG BIOPSY Left 2016   • PACEMAKER IMPLANTATION  11/2016    sss   • " "REPLACEMENT TOTAL KNEE Bilateral     left knee 2011, right 2012 per dr acuna    • SHOULDER ARTHROSCOPY Bilateral     2003-left, 2004- right    • SKIN BIOPSY      skin cancer back 2016   • TUBAL ABDOMINAL LIGATION  1980   • WISDOM TOOTH EXTRACTION       Review of Systems   Musculoskeletal: Positive for back pain.   All other systems reviewed and are negative.    Vitals:    08/20/18 1201   BP: 162/80   BP Location: Right arm   Patient Position: Sitting   Pulse: 77   Temp: 98.1 °F (36.7 °C)   TempSrc: Temporal Artery    SpO2: 99%   Weight: 111 kg (244 lb)   Height: 157.5 cm (62.01\")     Physical Exam  Neurologic Exam  There is a 1 cm opening at the top of the lumbar incision that is about 1 cm deep.  Scant drainage on dressing that was place yesterday.  No erythema or induration.  No sign of infection.      Joanna was seen today for post-op.    Diagnoses and all orders for this visit:    Visit for wound check    BMI 40.0-44.9, adult (CMS/HCC)    Physical deconditioning    Peripheral vascular disease (CMS/HCC)    Lumbosacral spondylosis without myelopathy    Chronic right-sided low back pain without sciatica      Plan:  Complete course of Keflex;  Continue daily dressing changes;  Return 8/24/18 for wound inspection;  I believe this will heal without need for wound debridement and primary closure.  I will make further recommendations at next visit.      I, Dr. Lencho Gamino, personally performed the services described in the documentation as scribed in my presence, and the documentation is both accurate and complete.    Lencho Gamino MD  "

## 2018-08-24 ENCOUNTER — OFFICE VISIT (OUTPATIENT)
Dept: NEUROSURGERY | Facility: CLINIC | Age: 68
End: 2018-08-24

## 2018-08-24 VITALS
HEART RATE: 84 BPM | HEIGHT: 62 IN | WEIGHT: 244 LBS | OXYGEN SATURATION: 96 % | TEMPERATURE: 97 F | SYSTOLIC BLOOD PRESSURE: 136 MMHG | DIASTOLIC BLOOD PRESSURE: 70 MMHG | BODY MASS INDEX: 44.9 KG/M2

## 2018-08-24 DIAGNOSIS — I73.9 PERIPHERAL VASCULAR DISEASE (HCC): ICD-10-CM

## 2018-08-24 DIAGNOSIS — M47.817 LUMBOSACRAL SPONDYLOSIS WITHOUT MYELOPATHY: ICD-10-CM

## 2018-08-24 DIAGNOSIS — M51.37 DEGENERATION OF LUMBAR OR LUMBOSACRAL INTERVERTEBRAL DISC: ICD-10-CM

## 2018-08-24 DIAGNOSIS — M43.10 SPONDYLOLISTHESIS, ACQUIRED: ICD-10-CM

## 2018-08-24 DIAGNOSIS — Z51.89 VISIT FOR WOUND CHECK: Primary | ICD-10-CM

## 2018-08-24 DIAGNOSIS — M47.26 OSTEOARTHRITIS OF SPINE WITH RADICULOPATHY, LUMBAR REGION: ICD-10-CM

## 2018-08-24 DIAGNOSIS — R53.81 PHYSICAL DECONDITIONING: ICD-10-CM

## 2018-08-24 PROCEDURE — 99024 POSTOP FOLLOW-UP VISIT: CPT | Performed by: NEUROLOGICAL SURGERY

## 2018-08-24 NOTE — PROGRESS NOTES
Patient: Joanna Cross  :  1950  Chart #:  [id number]    Date of Service: 18    Chief Complaint:   Chief Complaint   Patient presents with   • Wound Check       Wound Check   Treated in ED: Mrs. Cross returns today for a wound check. Episode onset: On 18 she had a LUMBAR LAMINECTOMY L4-5, HEMILAMIINECTOMY RIGHT L5-S1, FORAMINOTOMY L5-S1.  Prior ED Treatment: We have applied a new dressing today.  The wound looks good today. Maximum temperature: Her wound is only a 1/2 inch thick. There has been clear (She complains of some buttock pain, otherwise she is doing well.) discharge from the wound. The redness has improved. The swelling has improved. There is no pain present.       Radiographic Images:   none    Past Medical History:   Diagnosis Date   • Anemia    • Arthritis    • Atrial fibrillation (CMS/HCC)    • Chronic edema     bilateral   • Coronary artery disease involving native coronary artery of native heart without angina pectoris 3/1/2017   • Depression    • Diabetes mellitus (CMS/[facility])     type 2, checks fsbg 1-2x/day and as needed; last a1c  6.5   • Essential hypertension 3/1/2017   • GERD (gastroesophageal reflux disease)    • GIB (gastrointestinal bleeding) 2016    d/t xarelto    • Hiatal hernia    • History of transfusion 2016    d/t GIB, no reaction per pt report    • Mixed hyperlipidemia 3/1/2017   • Myocardial infarction    • MILLI on CPAP    • Parkinson disease (CMS/HCC)    • Peripheral vascular disease (CMS/HCC) 3/1/2017   • Skin cancer    • SSS (sick sinus syndrome) (CMS/[facility])     ppm    • TIA (transient ischemic attack)     no residual    • Varicose vein of leg     not spec of leg    • Venous hypertension    • Venous insufficiency    • Wears glasses      Current Outpatient Prescriptions   Medication Sig Dispense Refill   • albuterol (VENTOLIN HFA) 108 (90 Base) MCG/ACT inhaler Inhale 1 puff Every 4 (Four) Hours As Needed.     • amantadine (SYMMETREL) 100 MG capsule Take 100  mg by mouth 2 (Two) Times a Day.     • apixaban (ELIQUIS) 5 MG tablet tablet Take 1 tablet by mouth Every 12 (Twelve) Hours. 180 tablet 3   • aspirin 81 MG EC tablet Take 81 mg by mouth Daily.     • B Complex-C (SUPER B COMPLEX PO) Take 1 tablet by mouth Daily.     • B-D UF III MINI PEN NEEDLES 31G X 5 MM misc      • bumetanide (BUMEX) 2 MG tablet Take 2 mg by mouth 2 (Two) Times a Day.     • Calcium Carbonate (CALTRATE 600 PO) Take 600 mg by mouth Daily.     • carbidopa-levodopa (SINEMET)  MG per tablet Take 2 tablets by mouth Every Morning. 2 tablets at 0600, 1 tablet at 0900, 1200, 1500, 1800, 2100     • carbonyl iron (FEOSOL) 45 MG tablet tablet Take 1 tablet by mouth 2 (Two) Times a Day.     • cephalexin (KEFLEX) 500 MG capsule Take 1 capsule by mouth 4 (Four) Times a Day. 40 capsule 1   • Cholecalciferol 2000 units tablet Take 2,000 Units by mouth 2 (Two) Times a Day.     • citalopram (CeleXA) 20 MG tablet Take 20 mg by mouth every night at bedtime.     • clobetasol (TEMOVATE) 0.05 % cream Apply 1 application topically 2 (Two) Times a Day As Needed (Lichens Sclerosis).     • CloNIDine (CATAPRES) 0.1 MG tablet Take 1 tablet by mouth Every 12 (Twelve) Hours. 180 tablet 3   • Collagen-Boron-Hyaluronic Acid 10-5-3.3 MG tablet Take 1 tablet by mouth Daily.     • fluticasone (FLONASE) 50 MCG/ACT nasal spray 2 sprays into each nostril Daily As Needed for Rhinitis.     • insulin degludec (TRESIBA FLEXTOUCH) 100 UNIT/ML solution pen-injector injection Inject 28 Units under the skin Every Night.     • IPRATROPIUM BROMIDE NA 1 spray into each nostril 2 (Two) Times a Day.     • Loratadine 10 MG capsule Take 10 mg by mouth Daily.     • losartan (COZAAR) 50 MG tablet Take 1 tablet by mouth Every 12 (Twelve) Hours. 180 tablet 3   • metFORMIN (GLUCOPHAGE) 1000 MG tablet Take 1,000 mg by mouth 2 (Two) Times a Day With Meals.     • metOLazone (ZAROXOLYN) 2.5 MG tablet TAKE 1 TABLET DAILY 90 tablet 1   • Mirabegron ER  "(MYRBETRIQ) 50 MG tablet sustained-release 24 hour 24 hr tablet Take 50 mg by mouth Daily.     • Multiple Vitamins-Minerals (CENTRUM ULTRA WOMENS PO) Take 1 tablet by mouth Daily.     • mupirocin (BACTROBAN) 2 % ointment Apply 1 application topically 2 (Two) Times a Day. D/t nasal scab,prescribed per ent md     • nitroglycerin (NITROLINGUAL) 0.4 MG/SPRAY spray Place 1 spray under the tongue Every 5 (Five) Minutes As Needed for Chest Pain. 1 each 6   • omeprazole (priLOSEC) 40 MG capsule Take 40 mg by mouth 2 (Two) Times a Day.     • potassium chloride (MICRO-K) 10 MEQ CR capsule Take 10 mEq by mouth 2 (Two) Times a Day.     • pramipexole (MIRAPEX) 1.5 MG tablet Take 1.5 mg by mouth Every Night.     • ranolazine (RANEXA) 500 MG 12 hr tablet Take 500 mg by mouth 2 (Two) Times a Day.     • sennosides-docusate sodium (SENOKOT-S) 8.6-50 MG tablet Take 1 tablet by mouth Daily.     • spironolactone (ALDACTONE) 25 MG tablet Take 1 tablet by mouth Daily. 90 tablet 1   • SYNTHROID 200 MCG tablet Take 200 mcg by mouth Daily.     • traMADol (ULTRAM) 50 MG tablet Take 50 mg by mouth Every 8 (Eight) Hours As Needed for Moderate Pain .     • Ubiquinol (QUNOL COQ10/UBIQUINOL/JOSE) 100 MG capsule Take 1 capsule by mouth Daily.     • vitamin C (ASCORBIC ACID) 500 MG tablet Take 500 mg by mouth Daily. Chewable tablet       No current facility-administered medications for this visit.       Allergies   Allergen Reactions   • Toprol Xl [Metoprolol Tartrate] Shortness Of Breath     Extreme fatigue   • Amlodipine Besylate Swelling     Lower extremity (ankles, feet) swelling   • Entacapone Other (See Comments)     \"extreme weakness in legs - caused several falls, which stopped after discontinuing this medication\"   • Levemir [Insulin Detemir] Hives     Hives / rash around injection site   • Xarelto [Rivaroxaban] GI Bleeding   • Bactrim [Sulfamethoxazole-Trimethoprim] Nausea Only     Headache    • Ciprofloxacin Diarrhea   • Cogentin " "[Benztropine] Other (See Comments)     \"uncontrollable body movements\"   • Compazine [Prochlorperazine Edisylate] Other (See Comments)     Dystonic reaction   • Duraprep [Antiseptic Products, Misc.] Itching and Rash     RASH AND ITCHING   • Haldol [Haloperidol Lactate] Other (See Comments)     Dystonic reaction   • Hydralazine Other (See Comments)     Headache    • Hydrocodone-Acetaminophen Nausea And Vomiting and Dizziness     Headache    • Lisinopril Cough   • Penicillins Hives     Jitteriness    • Statins Myalgia     Leg pain   • Tarka [Trandolapril-Verapamil Hcl Er] Other (See Comments)     Constipation      Social History     Social History   • Marital status:      Social History Main Topics   • Smoking status: Never Smoker   • Smokeless tobacco: Never Used   • Alcohol use Yes      Comment: occasional, nothing weekly    • Drug use: No   • Sexual activity: Defer     Other Topics Concern   • Not on file     Family History   Problem Relation Age of Onset   • Kidney disease Mother      Past Surgical History:   Procedure Laterality Date   • BICEPS TENDON REPAIR Right     shoulder   • BREAST BIOPSY Left 2004   • BUNIONECTOMY Right    • CARDIAC CATHETERIZATION     • CHOLECYSTECTOMY     • COLONOSCOPY  2016   • CYST REMOVAL      left ear, upper left back 2001   • DIAGNOSTIC LAPAROSCOPY  1981   • ENDOSCOPIC FUNCTIONAL SINUS SURGERY (FESS)  2011   • ENDOSCOPY  2016   • HAMMER TOE REPAIR Left    • LUMBAR LAMINECTOMY DISCECTOMY DECOMPRESSION N/A 7/6/2018    Procedure: LUMBAR LAMINECTOMY L4-5, HEMILAMIINECTOMY RIGHT L5-S1, FORAMINOTOMY L5-S1;  Surgeon: Lencho Gamino MD;  Location: [facility];  Service: Neurosurgery   • LUNG BIOPSY Left 2016   • PACEMAKER IMPLANTATION  11/2016    sss   • REPLACEMENT TOTAL KNEE Bilateral     left knee 2011, right 2012 per dr acuna    • SHOULDER ARTHROSCOPY Bilateral     2003-left, 2004- right    • SKIN BIOPSY      skin cancer back 2016   • TUBAL ABDOMINAL LIGATION  1980   • WISDOM TOOTH " "EXTRACTION       Review of Systems   Musculoskeletal: Positive for back pain.   All other systems reviewed and are negative.    Vitals:    08/24/18 1202   BP: 136/70   BP Location: Right arm   Patient Position: Sitting   Pulse: 84   Temp: 97 °F (36.1 °C)   TempSrc: Temporal Artery    SpO2: 96%   Weight: 111 kg (244 lb)   Height: 157.5 cm (62.01\")     Physical Exam  Neurologic Exam  Lumbar wound eamined;  Small opening about 1.5 cm deep with not tunneling;      Joanna was seen today for wound check.    Diagnoses and all orders for this visit:    Visit for wound check    Lumbosacral spondylosis without myelopathy    Osteoarthritis of spine with radiculopathy, lumbar region    BMI 40.0-44.9, adult (CMS/HCC)    Degeneration of lumbar or lumbosacral intervertebral disc    Spondylolisthesis, acquired    Peripheral vascular disease (CMS/HCC)    Physical deconditioning      Plan:  Continue daily dressing changes;  Daily exercise;  Return for re-evaluation 8/29/18.      I, Dr. Lencho Gamino, personally performed the services described in the documentation as scribed in my presence, and the documentation is both accurate and complete.    Lencho Gamino MD  "

## 2018-08-29 ENCOUNTER — OFFICE VISIT (OUTPATIENT)
Dept: NEUROSURGERY | Facility: CLINIC | Age: 68
End: 2018-08-29

## 2018-08-29 ENCOUNTER — TELEPHONE (OUTPATIENT)
Dept: NEUROSURGERY | Facility: CLINIC | Age: 68
End: 2018-08-29

## 2018-08-29 VITALS
DIASTOLIC BLOOD PRESSURE: 78 MMHG | SYSTOLIC BLOOD PRESSURE: 130 MMHG | WEIGHT: 243 LBS | BODY MASS INDEX: 44.72 KG/M2 | HEIGHT: 62 IN

## 2018-08-29 DIAGNOSIS — Z51.89 VISIT FOR WOUND CHECK: Primary | ICD-10-CM

## 2018-08-29 DIAGNOSIS — R53.81 PHYSICAL DECONDITIONING: ICD-10-CM

## 2018-08-29 DIAGNOSIS — G89.29 CHRONIC RIGHT-SIDED LOW BACK PAIN WITHOUT SCIATICA: ICD-10-CM

## 2018-08-29 DIAGNOSIS — I73.9 PERIPHERAL VASCULAR DISEASE (HCC): ICD-10-CM

## 2018-08-29 DIAGNOSIS — M47.817 LUMBOSACRAL SPONDYLOSIS WITHOUT MYELOPATHY: ICD-10-CM

## 2018-08-29 DIAGNOSIS — M54.50 CHRONIC RIGHT-SIDED LOW BACK PAIN WITHOUT SCIATICA: ICD-10-CM

## 2018-08-29 DIAGNOSIS — M43.10 SPONDYLOLISTHESIS, ACQUIRED: ICD-10-CM

## 2018-08-29 PROCEDURE — 99024 POSTOP FOLLOW-UP VISIT: CPT | Performed by: NEUROLOGICAL SURGERY

## 2018-08-29 NOTE — PROGRESS NOTES
Patient: Joanna Cross  :  1950  Chart #:  [id number]    Date of Service: 18    Chief Complaint:   Chief Complaint   Patient presents with   • Post-op       Back Pain   Chronicity:  Mrs. Cross returns today for a wound check. Episode onset: On 18 she had a LUMBAR LAMINECTOMY L4-5, HEMILAMIINECTOMY RIGHT L5-S1, FORAMINOTOMY L5-S1.  Episode onset: Prior ED Treatment: We have applied a new dressing today.  The wound looks good today.  Episode frequency: Patient is moving well today. The problem has been gradually improving since onset. The pain is present in the lumbar spine. The pain does not radiate. The pain is at a severity of 0/10. The patient is experiencing no pain. Exacerbated by: The drainage on her bandage today is clear. Risk factors include lack of exercise, menopause, obesity and sedentary lifestyle (Patient has had trouble healing due to her diabetes.  ). She has tried bed rest for the symptoms. The treatment provided moderate relief.       Radiographic Images:   none    Past Medical History:   Diagnosis Date   • Anemia    • Arthritis    • Atrial fibrillation (CMS/HCC)    • Chronic edema     bilateral   • Coronary artery disease involving native coronary artery of native heart without angina pectoris 3/1/2017   • Depression    • Diabetes mellitus (CMS/HCC)     type 2, checks fsbg 1-2x/day and as needed; last a1c  6.5   • Essential hypertension 3/1/2017   • GERD (gastroesophageal reflux disease)    • GIB (gastrointestinal bleeding) 2016    d/t xarelto    • Hiatal hernia    • History of transfusion 2016    d/t GIB, no reaction per pt report    • Mixed hyperlipidemia 3/1/2017   • Myocardial infarction    • MILLI on CPAP    • Parkinson disease (CMS/HCC)    • Peripheral vascular disease (CMS/HCC) 3/1/2017   • Skin cancer    • SSS (sick sinus syndrome) (CMS/HCC)     ppm    • TIA (transient ischemic attack)     no residual    • Varicose vein of leg     not spec of leg    • Venous  hypertension    • Venous insufficiency    • Wears glasses      Current Outpatient Prescriptions   Medication Sig Dispense Refill   • albuterol (VENTOLIN HFA) 108 (90 Base) MCG/ACT inhaler Inhale 1 puff Every 4 (Four) Hours As Needed.     • amantadine (SYMMETREL) 100 MG capsule Take 100 mg by mouth 2 (Two) Times a Day.     • apixaban (ELIQUIS) 5 MG tablet tablet Take 1 tablet by mouth Every 12 (Twelve) Hours. 180 tablet 3   • aspirin 81 MG EC tablet Take 81 mg by mouth Daily.     • B Complex-C (SUPER B COMPLEX PO) Take 1 tablet by mouth Daily.     • B-D UF III MINI PEN NEEDLES 31G X 5 MM misc      • bumetanide (BUMEX) 2 MG tablet Take 2 mg by mouth 2 (Two) Times a Day.     • Calcium Carbonate (CALTRATE 600 PO) Take 600 mg by mouth Daily.     • carbidopa-levodopa (SINEMET)  MG per tablet Take 2 tablets by mouth Every Morning. 2 tablets at 0600, 1 tablet at 0900, 1200, 1500, 1800, 2100     • carbonyl iron (FEOSOL) 45 MG tablet tablet Take 1 tablet by mouth 2 (Two) Times a Day.     • cephalexin (KEFLEX) 500 MG capsule Take 1 capsule by mouth 4 (Four) Times a Day. 40 capsule 1   • Cholecalciferol 2000 units tablet Take 2,000 Units by mouth 2 (Two) Times a Day.     • citalopram (CeleXA) 20 MG tablet Take 20 mg by mouth every night at bedtime.     • clobetasol (TEMOVATE) 0.05 % cream Apply 1 application topically 2 (Two) Times a Day As Needed (Lichens Sclerosis).     • CloNIDine (CATAPRES) 0.1 MG tablet Take 1 tablet by mouth Every 12 (Twelve) Hours. 180 tablet 3   • Collagen-Boron-Hyaluronic Acid 10-5-3.3 MG tablet Take 1 tablet by mouth Daily.     • fluticasone (FLONASE) 50 MCG/ACT nasal spray 2 sprays into each nostril Daily As Needed for Rhinitis.     • insulin degludec (TRESIBA FLEXTOUCH) 100 UNIT/ML solution pen-injector injection Inject 28 Units under the skin Every Night.     • IPRATROPIUM BROMIDE NA 1 spray into each nostril 2 (Two) Times a Day.     • Loratadine 10 MG capsule Take 10 mg by mouth Daily.     •  "losartan (COZAAR) 50 MG tablet Take 1 tablet by mouth Every 12 (Twelve) Hours. 180 tablet 3   • metFORMIN (GLUCOPHAGE) 1000 MG tablet Take 1,000 mg by mouth 2 (Two) Times a Day With Meals.     • metOLazone (ZAROXOLYN) 2.5 MG tablet TAKE 1 TABLET DAILY 90 tablet 1   • Mirabegron ER (MYRBETRIQ) 50 MG tablet sustained-release 24 hour 24 hr tablet Take 50 mg by mouth Daily.     • Multiple Vitamins-Minerals (CENTRUM ULTRA WOMENS PO) Take 1 tablet by mouth Daily.     • mupirocin (BACTROBAN) 2 % ointment Apply 1 application topically 2 (Two) Times a Day. D/t nasal scab,prescribed per ent md     • nitroglycerin (NITROLINGUAL) 0.4 MG/SPRAY spray Place 1 spray under the tongue Every 5 (Five) Minutes As Needed for Chest Pain. 1 each 6   • omeprazole (priLOSEC) 40 MG capsule Take 40 mg by mouth 2 (Two) Times a Day.     • potassium chloride (MICRO-K) 10 MEQ CR capsule Take 10 mEq by mouth 2 (Two) Times a Day.     • pramipexole (MIRAPEX) 1.5 MG tablet Take 1.5 mg by mouth Every Night.     • ranolazine (RANEXA) 500 MG 12 hr tablet Take 500 mg by mouth 2 (Two) Times a Day.     • sennosides-docusate sodium (SENOKOT-S) 8.6-50 MG tablet Take 1 tablet by mouth Daily.     • spironolactone (ALDACTONE) 25 MG tablet Take 1 tablet by mouth Daily. 90 tablet 1   • SYNTHROID 200 MCG tablet Take 200 mcg by mouth Daily.     • traMADol (ULTRAM) 50 MG tablet Take 50 mg by mouth Every 8 (Eight) Hours As Needed for Moderate Pain .     • Ubiquinol (QUNOL COQ10/UBIQUINOL/JOSE) 100 MG capsule Take 1 capsule by mouth Daily.     • vitamin C (ASCORBIC ACID) 500 MG tablet Take 500 mg by mouth Daily. Chewable tablet       No current facility-administered medications for this visit.       Allergies   Allergen Reactions   • Toprol Xl [Metoprolol Tartrate] Shortness Of Breath     Extreme fatigue   • Amlodipine Besylate Swelling     Lower extremity (ankles, feet) swelling   • Entacapone Other (See Comments)     \"extreme weakness in legs - caused several falls, " "which stopped after discontinuing this medication\"   • Levemir [Insulin Detemir] Hives     Hives / rash around injection site   • Xarelto [Rivaroxaban] GI Bleeding   • Bactrim [Sulfamethoxazole-Trimethoprim] Nausea Only     Headache    • Ciprofloxacin Diarrhea   • Cogentin [Benztropine] Other (See Comments)     \"uncontrollable body movements\"   • Compazine [Prochlorperazine Edisylate] Other (See Comments)     Dystonic reaction   • Duraprep [Antiseptic Products, Misc.] Itching and Rash     RASH AND ITCHING   • Haldol [Haloperidol Lactate] Other (See Comments)     Dystonic reaction   • Hydralazine Other (See Comments)     Headache    • Hydrocodone-Acetaminophen Nausea And Vomiting and Dizziness     Headache    • Lisinopril Cough   • Penicillins Hives     Jitteriness    • Statins Myalgia     Leg pain   • Tarka [Trandolapril-Verapamil Hcl Er] Other (See Comments)     Constipation      Social History     Social History   • Marital status:      Social History Main Topics   • Smoking status: Never Smoker   • Smokeless tobacco: Never Used   • Alcohol use Yes      Comment: occasional, nothing weekly    • Drug use: No   • Sexual activity: Defer     Other Topics Concern   • Not on file     Family History   Problem Relation Age of Onset   • Kidney disease Mother      Past Surgical History:   Procedure Laterality Date   • BICEPS TENDON REPAIR Right     shoulder   • BREAST BIOPSY Left 2004   • BUNIONECTOMY Right    • CARDIAC CATHETERIZATION     • CHOLECYSTECTOMY     • COLONOSCOPY  2016   • CYST REMOVAL      left ear, upper left back 2001   • DIAGNOSTIC LAPAROSCOPY  1981   • ENDOSCOPIC FUNCTIONAL SINUS SURGERY (FESS)  2011   • ENDOSCOPY  2016   • HAMMER TOE REPAIR Left    • LUMBAR LAMINECTOMY DISCECTOMY DECOMPRESSION N/A 7/6/2018    Procedure: LUMBAR LAMINECTOMY L4-5, HEMILAMIINECTOMY RIGHT L5-S1, FORAMINOTOMY L5-S1;  Surgeon: Lencho Gamino MD;  Location: [facility];  Service: Neurosurgery   • LUNG BIOPSY Left 2016   • " "PACEMAKER IMPLANTATION  11/2016    sss   • REPLACEMENT TOTAL KNEE Bilateral     left knee 2011, right 2012 per dr acuna    • SHOULDER ARTHROSCOPY Bilateral     2003-left, 2004- right    • SKIN BIOPSY      skin cancer back 2016   • TUBAL ABDOMINAL LIGATION  1980   • WISDOM TOOTH EXTRACTION       Review of Systems   Musculoskeletal: Positive for back pain.   All other systems reviewed and are negative.    Vitals:    08/29/18 1157   BP: 130/78   Weight: 110 kg (243 lb)   Height: 157.5 cm (62\")     Physical Exam  Neurologic Exam  Lumbar wound eamined;  lumbar incision is healed except for small opening at the top that is about 0.5 cm diameter and about 1.5 cm deep with no tunneling;  no surrounding erythema or induration;  No purulent drainage.     Joanna was seen today for post-op.    Diagnoses and all orders for this visit:    Visit for wound check    Chronic right-sided low back pain without sciatica    Lumbosacral spondylosis without myelopathy    Spondylolisthesis, acquired    Peripheral vascular disease (CMS/HCC)    BMI 40.0-44.9, adult (CMS/HCC)    Physical deconditioning      Plan:  Continue local wound care;  Return for wound evaluaion 9/05/18.  Continue daily exercise;  Use walker to prevent falls;      I, Dr. Lencho Gamino, personally performed the services described in the documentation as scribed in my presence, and the documentation is both accurate and complete.    Lencho Gamino MD  "

## 2018-09-05 ENCOUNTER — OFFICE VISIT (OUTPATIENT)
Dept: NEUROSURGERY | Facility: CLINIC | Age: 68
End: 2018-09-05

## 2018-09-05 VITALS
DIASTOLIC BLOOD PRESSURE: 72 MMHG | WEIGHT: 246 LBS | TEMPERATURE: 98 F | SYSTOLIC BLOOD PRESSURE: 140 MMHG | HEIGHT: 62 IN | BODY MASS INDEX: 45.27 KG/M2

## 2018-09-05 DIAGNOSIS — Z51.89 VISIT FOR WOUND CHECK: Primary | ICD-10-CM

## 2018-09-05 DIAGNOSIS — I73.9 PERIPHERAL VASCULAR DISEASE (HCC): ICD-10-CM

## 2018-09-05 DIAGNOSIS — M54.50 CHRONIC RIGHT-SIDED LOW BACK PAIN WITHOUT SCIATICA: ICD-10-CM

## 2018-09-05 DIAGNOSIS — G89.29 CHRONIC RIGHT-SIDED LOW BACK PAIN WITHOUT SCIATICA: ICD-10-CM

## 2018-09-05 DIAGNOSIS — M47.817 LUMBOSACRAL SPONDYLOSIS WITHOUT MYELOPATHY: ICD-10-CM

## 2018-09-05 DIAGNOSIS — M47.26 OSTEOARTHRITIS OF SPINE WITH RADICULOPATHY, LUMBAR REGION: ICD-10-CM

## 2018-09-05 DIAGNOSIS — R53.81 PHYSICAL DECONDITIONING: ICD-10-CM

## 2018-09-05 DIAGNOSIS — M43.10 SPONDYLOLISTHESIS, ACQUIRED: ICD-10-CM

## 2018-09-05 PROCEDURE — 99024 POSTOP FOLLOW-UP VISIT: CPT | Performed by: PHYSICIAN ASSISTANT

## 2018-09-05 NOTE — PROGRESS NOTES
Patient: Joanna Cross  : 1950  Chart #: [id number]    Date of Service: 2018    CHIEF COMPLAINT: Wound check     History of Present Illness Patient underwent an uncomplicated lumbar decompression L4-S1 on 2018. Her postoperative course has been complicated by poor wound healing.  We have been following her weekly for incision checks and encouraging strict wound care at home.  Dr. Gamino saw her last week and reported continued improvement in the incision.  Patient is at risk for poor wound healing due to her sedentary lifestyle, obesity, and diabetes.  She reports no symptoms of infection.       Review of Systems   Constitutional: Negative for activity change, appetite change, chills, diaphoresis, fatigue, fever and unexpected weight change.   HENT: Negative for congestion, dental problem, drooling, ear discharge, ear pain, facial swelling, hearing loss, mouth sores, nosebleeds, postnasal drip, rhinorrhea, sinus pressure, sneezing, sore throat, tinnitus, trouble swallowing and voice change.    Eyes: Negative for photophobia, pain, discharge, redness, itching and visual disturbance.   Respiratory: Negative for apnea, cough, choking, chest tightness, shortness of breath, wheezing and stridor.    Cardiovascular: Negative for chest pain, palpitations and leg swelling.   Gastrointestinal: Negative for abdominal distention, abdominal pain, anal bleeding, blood in stool, constipation, diarrhea, nausea, rectal pain and vomiting.   Endocrine: Negative for cold intolerance, heat intolerance, polydipsia, polyphagia and polyuria.   Genitourinary: Negative for decreased urine volume, difficulty urinating, dysuria, enuresis, flank pain, frequency, genital sores, hematuria and urgency.   Musculoskeletal: Positive for back pain. Negative for arthralgias, gait problem, joint swelling, myalgias, neck pain and neck stiffness.   Skin: Positive for wound. Negative for color change, pallor and rash.  "  Allergic/Immunologic: Negative for environmental allergies, food allergies and immunocompromised state.   Neurological: Negative for dizziness, tremors, seizures, syncope, facial asymmetry, speech difficulty, weakness, light-headedness, numbness and headaches.   Hematological: Negative for adenopathy. Does not bruise/bleed easily.   Psychiatric/Behavioral: Negative for agitation, behavioral problems, confusion, decreased concentration, dysphoric mood, hallucinations, self-injury, sleep disturbance and suicidal ideas. The patient is not nervous/anxious and is not hyperactive.    All other systems reviewed and are negative.      Objective   Vital Signs: Blood pressure 140/72, temperature 98 °F (36.7 °C), temperature source Temporal Artery , height 157.5 cm (62.01\"), weight 112 kg (246 lb), not currently breastfeeding.  Physical Exam   Musculoskeletal:   Ambulates with rolling walker. Strength is intact.    Skin:   Pinpoint opening at the top of incision.  Measures approximately 1 cm in depth. No tunneling. The surrounding tissue is mildly erythematous and indurated.  There is evidence of serosanguinous drainage on bandage.    Nursing note and vitals reviewed.        Assessment/Plan   Medical Decision Making: Continue wound care at home and encourage daily exercise. She is starting a kick boxing class tomorrow.  She will follow up in one week for a wound check.         Joanna was seen today for post-op.    Diagnoses and all orders for this visit:    Visit for wound check    Chronic right-sided low back pain without sciatica    Lumbosacral spondylosis without myelopathy    Spondylolisthesis, acquired    Peripheral vascular disease (CMS/HCC)    BMI 40.0-44.9, adult (CMS/HCC)    Physical deconditioning    Osteoarthritis of spine with radiculopathy, lumbar region               Mackenzie eTllo PA-C  Patient Care Team:  Reynaldo Fritz MD as PCP - General  Reynaldo Fritz MD as PCP - Family [facility]  Faisal Ramirez MD " as Obstetrician (Obstetrics and Gynecology)  Timothy Dan DC as Referring Physician (Chiropractic Medicine)

## 2018-09-13 ENCOUNTER — OFFICE VISIT (OUTPATIENT)
Dept: NEUROSURGERY | Facility: CLINIC | Age: 68
End: 2018-09-13

## 2018-09-13 VITALS
BODY MASS INDEX: 45.27 KG/M2 | TEMPERATURE: 97.6 F | SYSTOLIC BLOOD PRESSURE: 126 MMHG | DIASTOLIC BLOOD PRESSURE: 78 MMHG | WEIGHT: 246 LBS | HEIGHT: 62 IN

## 2018-09-13 DIAGNOSIS — M48.062 SPINAL STENOSIS, LUMBAR REGION, WITH NEUROGENIC CLAUDICATION: Primary | ICD-10-CM

## 2018-09-13 DIAGNOSIS — T14.8XXD DELAYED WOUND HEALING: Primary | ICD-10-CM

## 2018-09-13 DIAGNOSIS — G89.29 CHRONIC RIGHT-SIDED LOW BACK PAIN WITHOUT SCIATICA: ICD-10-CM

## 2018-09-13 DIAGNOSIS — T81.89XD DELAYED SURGICAL WOUND HEALING, SUBSEQUENT ENCOUNTER: ICD-10-CM

## 2018-09-13 DIAGNOSIS — M54.50 CHRONIC RIGHT-SIDED LOW BACK PAIN WITHOUT SCIATICA: ICD-10-CM

## 2018-09-13 DIAGNOSIS — M43.10 SPONDYLOLISTHESIS, ACQUIRED: ICD-10-CM

## 2018-09-13 DIAGNOSIS — M47.817 LUMBOSACRAL SPONDYLOSIS WITHOUT MYELOPATHY: ICD-10-CM

## 2018-09-13 DIAGNOSIS — I73.9 PERIPHERAL VASCULAR DISEASE (HCC): ICD-10-CM

## 2018-09-13 PROBLEM — T81.89XA DELAYED SURGICAL WOUND HEALING: Status: ACTIVE | Noted: 2018-09-13

## 2018-09-13 PROCEDURE — 99024 POSTOP FOLLOW-UP VISIT: CPT | Performed by: PHYSICIAN ASSISTANT

## 2018-09-13 RX ORDER — SODIUM CHLORIDE, SODIUM LACTATE, POTASSIUM CHLORIDE, CALCIUM CHLORIDE 600; 310; 30; 20 MG/100ML; MG/100ML; MG/100ML; MG/100ML
100 INJECTION, SOLUTION INTRAVENOUS CONTINUOUS
Status: CANCELLED | OUTPATIENT
Start: 2018-09-13

## 2018-09-13 NOTE — PROGRESS NOTES
Patient: Joanna Cross  : 1950  Chart #: [id number]    Date of Service: 2018    CHIEF COMPLAINT: Wound check    History of Present Illness On 2018 patient underwent decompressive laminectomy and bilateral medial facetectomy at L4-5 and as well as right L5 and S1 partial hemilaminectomies.  Postoperatively she has done very well with regard to her preoperative symptoms; however she has had issues with delayed wound healing.  She presented a few weeks after surgery with wound dehiscence and drainage and was treated with Keflex for 10 days.  Her incision continues to demonstrate poor wound healing.     Past Medical History:   Diagnosis Date   • Anemia    • Arthritis    • Atrial fibrillation (CMS/HCC)    • Chronic edema     bilateral   • Coronary artery disease involving native coronary artery of native heart without angina pectoris 3/1/2017   • Depression    • Diabetes mellitus (CMS/HCC)     type 2, checks fsbg 1-2x/day and as needed; last a1c  6.5   • Essential hypertension 3/1/2017   • GERD (gastroesophageal reflux disease)    • GIB (gastrointestinal bleeding) 2016    d/t xarelto    • Hiatal hernia    • History of transfusion 2016    d/t GIB, no reaction per pt report    • Mixed hyperlipidemia 3/1/2017   • Myocardial infarction    • MILLI on CPAP    • Parkinson disease (CMS/HCC)    • Peripheral vascular disease (CMS/HCC) 3/1/2017   • Skin cancer    • SSS (sick sinus syndrome) (CMS/HCC)     ppm    • TIA (transient ischemic attack)     no residual    • Varicose vein of leg     not spec of leg    • Venous hypertension    • Venous insufficiency    • Wears glasses        Past Surgical History:   Procedure Laterality Date   • BICEPS TENDON REPAIR Right     shoulder   • BREAST BIOPSY Left    • BUNIONECTOMY Right    • CARDIAC CATHETERIZATION     • CHOLECYSTECTOMY     • COLONOSCOPY  2016   • CYST REMOVAL      left ear, upper left back    • DIAGNOSTIC LAPAROSCOPY     • ENDOSCOPIC FUNCTIONAL  SINUS SURGERY (FESS)  2011   • ENDOSCOPY  2016   • HAMMER TOE REPAIR Left    • LUMBAR LAMINECTOMY DISCECTOMY DECOMPRESSION N/A 7/6/2018    Procedure: LUMBAR LAMINECTOMY L4-5, HEMILAMIINECTOMY RIGHT L5-S1, FORAMINOTOMY L5-S1;  Surgeon: Lencho Gamino MD;  Location: [facility];  Service: Neurosurgery   • LUNG BIOPSY Left 2016   • PACEMAKER IMPLANTATION  11/2016    sss   • REPLACEMENT TOTAL KNEE Bilateral     left knee 2011, right 2012 per dr acuna    • SHOULDER ARTHROSCOPY Bilateral     2003-left, 2004- right    • SKIN BIOPSY      skin cancer back 2016   • TUBAL ABDOMINAL LIGATION  1980   • WISDOM TOOTH EXTRACTION         Social History     Social History   • Marital status:      Spouse name: N/A   • Number of children: N/A   • Years of education: N/A     Occupational History   • Not on file.     Social History Main Topics   • Smoking status: Never Smoker   • Smokeless tobacco: Never Used   • Alcohol use Yes      Comment: occasional, nothing weekly    • Drug use: No   • Sexual activity: Defer     Other Topics Concern   • Not on file     Social History Narrative   • No narrative on file         Current Outpatient Prescriptions:   •  albuterol (VENTOLIN HFA) 108 (90 Base) MCG/ACT inhaler, Inhale 1 puff Every 4 (Four) Hours As Needed., Disp: , Rfl:   •  amantadine (SYMMETREL) 100 MG capsule, Take 100 mg by mouth 2 (Two) Times a Day., Disp: , Rfl:   •  apixaban (ELIQUIS) 5 MG tablet tablet, Take 1 tablet by mouth Every 12 (Twelve) Hours., Disp: 180 tablet, Rfl: 3  •  aspirin 81 MG EC tablet, Take 81 mg by mouth Daily., Disp: , Rfl:   •  B Complex-C (SUPER B COMPLEX PO), Take 1 tablet by mouth Daily., Disp: , Rfl:   •  B-D UF III MINI PEN NEEDLES 31G X 5 MM misc, , Disp: , Rfl:   •  bumetanide (BUMEX) 2 MG tablet, Take 2 mg by mouth 2 (Two) Times a Day., Disp: , Rfl:   •  Calcium Carbonate (CALTRATE 600 PO), Take 600 mg by mouth Daily., Disp: , Rfl:   •  carbidopa-levodopa (SINEMET)  MG per tablet, Take 2  tablets by mouth Every Morning. 2 tablets at 0600, 1 tablet at 0900, 1200, 1500, 1800, 2100, Disp: , Rfl:   •  carbonyl iron (FEOSOL) 45 MG tablet tablet, Take 1 tablet by mouth 2 (Two) Times a Day., Disp: , Rfl:   •  cephalexin (KEFLEX) 500 MG capsule, Take 1 capsule by mouth 4 (Four) Times a Day., Disp: 40 capsule, Rfl: 1  •  Cholecalciferol 2000 units tablet, Take 2,000 Units by mouth 2 (Two) Times a Day., Disp: , Rfl:   •  citalopram (CeleXA) 20 MG tablet, Take 20 mg by mouth every night at bedtime., Disp: , Rfl:   •  clobetasol (TEMOVATE) 0.05 % cream, Apply 1 application topically 2 (Two) Times a Day As Needed (Lichens Sclerosis)., Disp: , Rfl:   •  CloNIDine (CATAPRES) 0.1 MG tablet, Take 1 tablet by mouth Every 12 (Twelve) Hours., Disp: 180 tablet, Rfl: 3  •  Collagen-Boron-Hyaluronic Acid 10-5-3.3 MG tablet, Take 1 tablet by mouth Daily., Disp: , Rfl:   •  fluticasone (FLONASE) 50 MCG/ACT nasal spray, 2 sprays into each nostril Daily As Needed for Rhinitis., Disp: , Rfl:   •  insulin degludec (TRESIBA FLEXTOUCH) 100 UNIT/ML solution pen-injector injection, Inject 28 Units under the skin Every Night., Disp: , Rfl:   •  IPRATROPIUM BROMIDE NA, 1 spray into each nostril 2 (Two) Times a Day., Disp: , Rfl:   •  Loratadine 10 MG capsule, Take 10 mg by mouth Daily., Disp: , Rfl:   •  losartan (COZAAR) 50 MG tablet, Take 1 tablet by mouth Every 12 (Twelve) Hours., Disp: 180 tablet, Rfl: 3  •  metFORMIN (GLUCOPHAGE) 1000 MG tablet, Take 1,000 mg by mouth 2 (Two) Times a Day With Meals., Disp: , Rfl:   •  metOLazone (ZAROXOLYN) 2.5 MG tablet, TAKE 1 TABLET DAILY, Disp: 90 tablet, Rfl: 1  •  Mirabegron ER (MYRBETRIQ) 50 MG tablet sustained-release 24 hour 24 hr tablet, Take 50 mg by mouth Daily., Disp: , Rfl:   •  Multiple Vitamins-Minerals (CENTRUM ULTRA WOMENS PO), Take 1 tablet by mouth Daily., Disp: , Rfl:   •  mupirocin (BACTROBAN) 2 % ointment, Apply 1 application topically 2 (Two) Times a Day. D/t nasal  "scab,prescribed per ent md, Disp: , Rfl:   •  nitroglycerin (NITROLINGUAL) 0.4 MG/SPRAY spray, Place 1 spray under the tongue Every 5 (Five) Minutes As Needed for Chest Pain., Disp: 1 each, Rfl: 6  •  omeprazole (priLOSEC) 40 MG capsule, Take 40 mg by mouth 2 (Two) Times a Day., Disp: , Rfl:   •  potassium chloride (MICRO-K) 10 MEQ CR capsule, Take 10 mEq by mouth 2 (Two) Times a Day., Disp: , Rfl:   •  pramipexole (MIRAPEX) 1.5 MG tablet, Take 1.5 mg by mouth Every Night., Disp: , Rfl:   •  ranolazine (RANEXA) 500 MG 12 hr tablet, Take 500 mg by mouth 2 (Two) Times a Day., Disp: , Rfl:   •  sennosides-docusate sodium (SENOKOT-S) 8.6-50 MG tablet, Take 1 tablet by mouth Daily., Disp: , Rfl:   •  spironolactone (ALDACTONE) 25 MG tablet, Take 1 tablet by mouth Daily., Disp: 90 tablet, Rfl: 1  •  SYNTHROID 200 MCG tablet, Take 200 mcg by mouth Daily., Disp: , Rfl:   •  traMADol (ULTRAM) 50 MG tablet, Take 50 mg by mouth Every 8 (Eight) Hours As Needed for Moderate Pain ., Disp: , Rfl:   •  Ubiquinol (QUNOL COQ10/UBIQUINOL/JOSE) 100 MG capsule, Take 1 capsule by mouth Daily., Disp: , Rfl:   •  vitamin C (ASCORBIC ACID) 500 MG tablet, Take 500 mg by mouth Daily. Chewable tablet, Disp: , Rfl:       Review of Systems   All other systems reviewed and are negative.      Objective   Vital Signs: Blood pressure 126/78, temperature 97.6 °F (36.4 °C), temperature source Temporal Artery , height 157.5 cm (62.01\"), weight 112 kg (246 lb), not currently breastfeeding.  Physical Exam  Musculoskeletal:   Ambulates with rolling walker. Strength is intact.    Skin:   Pinpoint opening at the top of incision.  There is a cavity approximately 1.5 cm in depth in all directions. The surrounding tissue is mildly erythematous and indurated. There is evidence of serosanguinous drainage on bandage.    Nursing note and vitals reviewed.      Assessment/Plan   Medical Decision Making: Patient is 10 weeks status post lumbar decompression L4-S1.  " Her post-operative period has been complicated by wound dehiscence and infection. She has been treated with antibiotics and we have monitored her closely over the past several weeks; however she has persistent delayed healing.  Dr. García has recommended surgery for debridement and primary reclosure.  Additionally she may require extended hospitalization for antibiotic therapy.         Joanna was seen today for wound check.    Diagnoses and all orders for this visit:    Delayed wound healing    Chronic right-sided low back pain without sciatica    Lumbosacral spondylosis without myelopathy    Spondylolisthesis, acquired    Peripheral vascular disease (CMS/[facility])    BMI 40.0-44.9, adult (CMS/[facility])               Mackenzie Tello PA-C  Patient Care Team:  Reynaldo Fritz MD as PCP - General  Reynaldo Fritz MD as PCP - Family Medicine  Faisal Ramirez MD as Obstetrician (Obstetrics and Gynecology)  Timothy Dan DC as Referring Physician (Chiropractic Medicine)

## 2018-09-14 ENCOUNTER — TELEPHONE (OUTPATIENT)
Dept: NEUROSURGERY | Facility: CLINIC | Age: 68
End: 2018-09-14

## 2018-09-14 NOTE — TELEPHONE ENCOUNTER
Provider:  Ricky  Caller: Patient  Time of call:   11:54  Phone #:  [phone]  Surgery:  Lumbar lami L4-L5 Hemilami right L5-S1  Surgery Date:  07/06/18  Last visit:Yesterday     Next visit: 09-19-18 pre-admit incision and drainage    JANINE:         Reason for call:     Patient states that both of her children have auto-immune issues.  One has Chron's disease, and the other has issues with her liver.  She want's to know if she's contagious?  Is it ok to go to [facility]?  Her daughter has cleaned and changed her bandages before and she want's to make sure that she is safe.  She also wants to know if another culture was done yesterday?

## 2018-09-14 NOTE — TELEPHONE ENCOUNTER
She is not contagious. No culture was obtained yesterday.  She will be seen by infectious disease once in the hospital for further recommendation with regard to treatment of infection.

## 2018-09-18 ENCOUNTER — ANESTHESIA EVENT (OUTPATIENT)
Dept: PERIOP | Facility: HOSPITAL | Age: 68
End: 2018-09-18

## 2018-09-18 RX ORDER — FAMOTIDINE 10 MG/ML
20 INJECTION, SOLUTION INTRAVENOUS ONCE
Status: CANCELLED | OUTPATIENT
Start: 2018-09-18 | End: 2018-09-18

## 2018-09-19 ENCOUNTER — ANESTHESIA (OUTPATIENT)
Dept: PERIOP | Facility: HOSPITAL | Age: 68
End: 2018-09-19

## 2018-09-19 ENCOUNTER — HOSPITAL ENCOUNTER (OUTPATIENT)
Facility: HOSPITAL | Age: 68
Discharge: HOME | End: 2018-09-21
Attending: NEUROLOGICAL SURGERY | Admitting: NEUROLOGICAL SURGERY

## 2018-09-19 DIAGNOSIS — T81.89XD DELAYED SURGICAL WOUND HEALING, SUBSEQUENT ENCOUNTER: ICD-10-CM

## 2018-09-19 DIAGNOSIS — M48.062 SPINAL STENOSIS, LUMBAR REGION, WITH NEUROGENIC CLAUDICATION: ICD-10-CM

## 2018-09-19 LAB
ANION GAP SERPL CALCULATED.3IONS-SCNC: 8 MMOL/L (ref 3–11)
BUN BLD-MCNC: 28 MG/DL (ref 9–23)
BUN/CREAT SERPL: 38.4 (ref 7–25)
CALCIUM SPEC-SCNC: 9.3 MG/DL (ref 8.7–10.4)
CHLORIDE SERPL-SCNC: 100 MMOL/L (ref 99–109)
CO2 SERPL-SCNC: 26 MMOL/L (ref 20–31)
CREAT BLD-MCNC: 0.73 MG/DL (ref 0.6–1.3)
DEPRECATED RDW RBC AUTO: 48.6 FL (ref 37–54)
ERYTHROCYTE [DISTWIDTH] IN BLOOD BY AUTOMATED COUNT: 15 % (ref 11.3–14.5)
ERYTHROCYTE [SEDIMENTATION RATE] IN BLOOD: 25 MM/HR (ref 0–30)
GFR SERPL CREATININE-BSD FRML MDRD: 79 ML/MIN/1.73
GLUCOSE BLD-MCNC: 106 MG/DL (ref 70–100)
GLUCOSE BLDC GLUCOMTR-MCNC: 112 MG/DL (ref 70–130)
GLUCOSE BLDC GLUCOMTR-MCNC: 123 MG/DL (ref 70–130)
GLUCOSE BLDC GLUCOMTR-MCNC: 131 MG/DL (ref 70–130)
GLUCOSE BLDC GLUCOMTR-MCNC: 216 MG/DL (ref 70–130)
HCT VFR BLD AUTO: 34.4 % (ref 34.5–44)
HGB BLD-MCNC: 11.1 G/DL (ref 11.5–15.5)
MCH RBC QN AUTO: 28.8 PG (ref 27–31)
MCHC RBC AUTO-ENTMCNC: 32.3 G/DL (ref 32–36)
MCV RBC AUTO: 89.1 FL (ref 80–99)
PLATELET # BLD AUTO: 211 10*3/MM3 (ref 150–450)
PMV BLD AUTO: 9.6 FL (ref 6–12)
POTASSIUM BLD-SCNC: 4.1 MMOL/L (ref 3.5–5.5)
RBC # BLD AUTO: 3.86 10*6/MM3 (ref 3.89–5.14)
SODIUM BLD-SCNC: 134 MMOL/L (ref 132–146)
WBC NRBC COR # BLD: 7.03 10*3/MM3 (ref 3.5–10.8)

## 2018-09-19 PROCEDURE — 11043 DBRDMT MUSC&/FSCA 1ST 20/<: CPT | Performed by: NEUROLOGICAL SURGERY

## 2018-09-19 PROCEDURE — A9270 NON-COVERED ITEM OR SERVICE: HCPCS | Performed by: NEUROLOGICAL SURGERY

## 2018-09-19 PROCEDURE — 13160 SEC CLSR SURG WND/DEHSN XTN: CPT | Performed by: NEUROLOGICAL SURGERY

## 2018-09-19 PROCEDURE — 63710000001 LOSARTAN 25 MG TABLET: Performed by: NEUROLOGICAL SURGERY

## 2018-09-19 PROCEDURE — 87070 CULTURE OTHR SPECIMN AEROBIC: CPT | Performed by: NEUROLOGICAL SURGERY

## 2018-09-19 PROCEDURE — 63710000001 RANOLAZINE 500 MG TABLET SUSTAINED-RELEASE 12 HOUR: Performed by: NEUROLOGICAL SURGERY

## 2018-09-19 PROCEDURE — 63710000001 POTASSIUM CHLORIDE 10 MEQ CAPSULE CONTROLLED-RELEASE: Performed by: NEUROLOGICAL SURGERY

## 2018-09-19 PROCEDURE — 80048 BASIC METABOLIC PNL TOTAL CA: CPT | Performed by: PHYSICIAN ASSISTANT

## 2018-09-19 PROCEDURE — 85652 RBC SED RATE AUTOMATED: CPT | Performed by: PHYSICIAN ASSISTANT

## 2018-09-19 PROCEDURE — A9270 NON-COVERED ITEM OR SERVICE: HCPCS | Performed by: ANESTHESIOLOGY

## 2018-09-19 PROCEDURE — 25010000002 FENTANYL CITRATE (PF) 100 MCG/2ML SOLUTION: Performed by: NURSE ANESTHETIST, CERTIFIED REGISTERED

## 2018-09-19 PROCEDURE — 25010000002 CEFTRIAXONE PER 250 MG: Performed by: NEUROLOGICAL SURGERY

## 2018-09-19 PROCEDURE — 87147 CULTURE TYPE IMMUNOLOGIC: CPT | Performed by: NEUROLOGICAL SURGERY

## 2018-09-19 PROCEDURE — 63710000001 FAMOTIDINE 20 MG TABLET: Performed by: ANESTHESIOLOGY

## 2018-09-19 PROCEDURE — 25010000002 ONDANSETRON PER 1 MG: Performed by: NURSE ANESTHETIST, CERTIFIED REGISTERED

## 2018-09-19 PROCEDURE — 25010000002 DEXAMETHASONE PER 1 MG: Performed by: NURSE ANESTHETIST, CERTIFIED REGISTERED

## 2018-09-19 PROCEDURE — 25010000002 PROPOFOL 10 MG/ML EMULSION: Performed by: NURSE ANESTHETIST, CERTIFIED REGISTERED

## 2018-09-19 PROCEDURE — 87075 CULTR BACTERIA EXCEPT BLOOD: CPT | Performed by: NEUROLOGICAL SURGERY

## 2018-09-19 PROCEDURE — G0378 HOSPITAL OBSERVATION PER HR: HCPCS

## 2018-09-19 PROCEDURE — 63710000001 TRAMADOL 50 MG TABLET: Performed by: NEUROLOGICAL SURGERY

## 2018-09-19 PROCEDURE — 85027 COMPLETE CBC AUTOMATED: CPT | Performed by: PHYSICIAN ASSISTANT

## 2018-09-19 PROCEDURE — 63710000001 CLONIDINE 0.1 MG TABLET: Performed by: NEUROLOGICAL SURGERY

## 2018-09-19 PROCEDURE — 25010000002 NEOSTIGMINE PER 0.5 MG: Performed by: NURSE ANESTHETIST, CERTIFIED REGISTERED

## 2018-09-19 PROCEDURE — 82962 GLUCOSE BLOOD TEST: CPT

## 2018-09-19 PROCEDURE — 63710000001 MULTIVITAMIN WITH MINERALS TABLET: Performed by: NEUROLOGICAL SURGERY

## 2018-09-19 PROCEDURE — 87186 SC STD MICRODIL/AGAR DIL: CPT | Performed by: NEUROLOGICAL SURGERY

## 2018-09-19 PROCEDURE — 25010000002 VANCOMYCIN: Performed by: PHYSICIAN ASSISTANT

## 2018-09-19 PROCEDURE — 63710000001 CALCIUM CARB-CHOLECALCIFEROL 600-800 MG-UNIT TABLET: Performed by: NEUROLOGICAL SURGERY

## 2018-09-19 PROCEDURE — 63710000001 PRAMIPEXOLE 0.25 MG TABLET: Performed by: NEUROLOGICAL SURGERY

## 2018-09-19 PROCEDURE — 63710000001: Performed by: NEUROLOGICAL SURGERY

## 2018-09-19 PROCEDURE — 63710000001 METOLAZONE 2.5 MG TABLET: Performed by: NEUROLOGICAL SURGERY

## 2018-09-19 PROCEDURE — 63710000001 AMANTADINE 100 MG CAPSULE: Performed by: NEUROLOGICAL SURGERY

## 2018-09-19 PROCEDURE — 63710000001 ASPIRIN 81 MG TABLET DELAYED-RELEASE: Performed by: NEUROLOGICAL SURGERY

## 2018-09-19 PROCEDURE — 63710000001 CETIRIZINE 10 MG TABLET: Performed by: NEUROLOGICAL SURGERY

## 2018-09-19 PROCEDURE — 63710000001 CITALOPRAM 20 MG TABLET: Performed by: NEUROLOGICAL SURGERY

## 2018-09-19 PROCEDURE — 87077 CULTURE AEROBIC IDENTIFY: CPT | Performed by: NEUROLOGICAL SURGERY

## 2018-09-19 PROCEDURE — 63710000001 SENNOSIDES-DOCUSATE SODIUM 8.6-50 MG TABLET: Performed by: NEUROLOGICAL SURGERY

## 2018-09-19 PROCEDURE — 63710000001 METFORMIN 1000 MG TABLET: Performed by: NEUROLOGICAL SURGERY

## 2018-09-19 PROCEDURE — 87205 SMEAR GRAM STAIN: CPT | Performed by: NEUROLOGICAL SURGERY

## 2018-09-19 DEVICE — WAX BONE HEMO AESCULAP 2.5GM: Type: IMPLANTABLE DEVICE | Status: FUNCTIONAL

## 2018-09-19 RX ORDER — POTASSIUM CHLORIDE 750 MG/1
10 CAPSULE, EXTENDED RELEASE ORAL 2 TIMES DAILY
Status: DISCONTINUED | OUTPATIENT
Start: 2018-09-19 | End: 2018-09-21 | Stop reason: HOSPADM

## 2018-09-19 RX ORDER — SODIUM CHLORIDE, SODIUM LACTATE, POTASSIUM CHLORIDE, CALCIUM CHLORIDE 600; 310; 30; 20 MG/100ML; MG/100ML; MG/100ML; MG/100ML
9 INJECTION, SOLUTION INTRAVENOUS CONTINUOUS
Status: DISCONTINUED | OUTPATIENT
Start: 2018-09-19 | End: 2018-09-21 | Stop reason: HOSPADM

## 2018-09-19 RX ORDER — SODIUM CHLORIDE, SODIUM LACTATE, POTASSIUM CHLORIDE, CALCIUM CHLORIDE 600; 310; 30; 20 MG/100ML; MG/100ML; MG/100ML; MG/100ML
100 INJECTION, SOLUTION INTRAVENOUS CONTINUOUS
Status: DISCONTINUED | OUTPATIENT
Start: 2018-09-19 | End: 2018-09-21 | Stop reason: HOSPADM

## 2018-09-19 RX ORDER — ACETAMINOPHEN 325 MG/1
650 TABLET ORAL EVERY 4 HOURS PRN
Status: DISCONTINUED | OUTPATIENT
Start: 2018-09-19 | End: 2018-09-21 | Stop reason: HOSPADM

## 2018-09-19 RX ORDER — GLYCOPYRROLATE 0.2 MG/ML
INJECTION INTRAMUSCULAR; INTRAVENOUS AS NEEDED
Status: DISCONTINUED | OUTPATIENT
Start: 2018-09-19 | End: 2018-09-19 | Stop reason: SURG

## 2018-09-19 RX ORDER — CLONIDINE HYDROCHLORIDE 0.1 MG/1
0.1 TABLET ORAL EVERY 12 HOURS
Status: DISCONTINUED | OUTPATIENT
Start: 2018-09-19 | End: 2018-09-21 | Stop reason: HOSPADM

## 2018-09-19 RX ORDER — ATRACURIUM BESYLATE 10 MG/ML
INJECTION, SOLUTION INTRAVENOUS AS NEEDED
Status: DISCONTINUED | OUTPATIENT
Start: 2018-09-19 | End: 2018-09-19 | Stop reason: SURG

## 2018-09-19 RX ORDER — ONDANSETRON 2 MG/ML
4 INJECTION INTRAMUSCULAR; INTRAVENOUS ONCE AS NEEDED
Status: DISCONTINUED | OUTPATIENT
Start: 2018-09-19 | End: 2018-09-19 | Stop reason: HOSPADM

## 2018-09-19 RX ORDER — FAMOTIDINE 20 MG/1
20 TABLET, FILM COATED ORAL ONCE
Status: COMPLETED | OUTPATIENT
Start: 2018-09-19 | End: 2018-09-19

## 2018-09-19 RX ORDER — RANOLAZINE 500 MG/1
500 TABLET, EXTENDED RELEASE ORAL EVERY 12 HOURS SCHEDULED
Status: DISCONTINUED | OUTPATIENT
Start: 2018-09-19 | End: 2018-09-21 | Stop reason: HOSPADM

## 2018-09-19 RX ORDER — HYDROCODONE BITARTRATE AND ACETAMINOPHEN 7.5; 325 MG/1; MG/1
1 TABLET ORAL EVERY 4 HOURS PRN
Status: DISCONTINUED | OUTPATIENT
Start: 2018-09-19 | End: 2018-09-21 | Stop reason: HOSPADM

## 2018-09-19 RX ORDER — ACETAMINOPHEN 160 MG
TABLET,DISINTEGRATING ORAL AS NEEDED
Status: DISCONTINUED | OUTPATIENT
Start: 2018-09-19 | End: 2018-09-19 | Stop reason: HOSPADM

## 2018-09-19 RX ORDER — SODIUM CHLORIDE 0.9 % (FLUSH) 0.9 %
1-10 SYRINGE (ML) INJECTION AS NEEDED
Status: DISCONTINUED | OUTPATIENT
Start: 2018-09-19 | End: 2018-09-21 | Stop reason: HOSPADM

## 2018-09-19 RX ORDER — OXYBUTYNIN CHLORIDE 5 MG/1
10 TABLET, EXTENDED RELEASE ORAL DAILY
Status: DISCONTINUED | OUTPATIENT
Start: 2018-09-19 | End: 2018-09-21 | Stop reason: HOSPADM

## 2018-09-19 RX ORDER — CITALOPRAM 20 MG/1
20 TABLET ORAL DAILY
Status: DISCONTINUED | OUTPATIENT
Start: 2018-09-19 | End: 2018-09-21 | Stop reason: HOSPADM

## 2018-09-19 RX ORDER — SODIUM CHLORIDE 0.9 % (FLUSH) 0.9 %
1-10 SYRINGE (ML) INJECTION AS NEEDED
Status: DISCONTINUED | OUTPATIENT
Start: 2018-09-19 | End: 2018-09-19 | Stop reason: HOSPADM

## 2018-09-19 RX ORDER — LOSARTAN POTASSIUM 25 MG/1
50 TABLET ORAL EVERY 12 HOURS
Status: DISCONTINUED | OUTPATIENT
Start: 2018-09-19 | End: 2018-09-21 | Stop reason: HOSPADM

## 2018-09-19 RX ORDER — FERROUS SULFATE 325(65) MG
325 TABLET ORAL
Status: DISCONTINUED | OUTPATIENT
Start: 2018-09-20 | End: 2018-09-21 | Stop reason: HOSPADM

## 2018-09-19 RX ORDER — CLOBETASOL PROPIONATE 0.5 MG/G
1 CREAM TOPICAL 2 TIMES DAILY PRN
Status: DISCONTINUED | OUTPATIENT
Start: 2018-09-19 | End: 2018-09-21 | Stop reason: HOSPADM

## 2018-09-19 RX ORDER — PROPOFOL 10 MG/ML
VIAL (ML) INTRAVENOUS AS NEEDED
Status: DISCONTINUED | OUTPATIENT
Start: 2018-09-19 | End: 2018-09-19 | Stop reason: SURG

## 2018-09-19 RX ORDER — CEFTRIAXONE SODIUM 1 G/50ML
1 INJECTION, SOLUTION INTRAVENOUS EVERY 24 HOURS
Status: DISCONTINUED | OUTPATIENT
Start: 2018-09-19 | End: 2018-09-20

## 2018-09-19 RX ORDER — BUMETANIDE 1 MG/1
2 TABLET ORAL
Status: DISCONTINUED | OUTPATIENT
Start: 2018-09-19 | End: 2018-09-21 | Stop reason: HOSPADM

## 2018-09-19 RX ORDER — ASCORBIC ACID 500 MG
500 TABLET ORAL DAILY
Status: DISCONTINUED | OUTPATIENT
Start: 2018-09-19 | End: 2018-09-21 | Stop reason: HOSPADM

## 2018-09-19 RX ORDER — IPRATROPIUM BROMIDE 21 UG/1
1 SPRAY, METERED NASAL 2 TIMES DAILY
Status: DISCONTINUED | OUTPATIENT
Start: 2018-09-19 | End: 2018-09-21 | Stop reason: HOSPADM

## 2018-09-19 RX ORDER — FENTANYL CITRATE 50 UG/ML
INJECTION, SOLUTION INTRAMUSCULAR; INTRAVENOUS AS NEEDED
Status: DISCONTINUED | OUTPATIENT
Start: 2018-09-19 | End: 2018-09-19 | Stop reason: SURG

## 2018-09-19 RX ORDER — ONDANSETRON 2 MG/ML
INJECTION INTRAMUSCULAR; INTRAVENOUS AS NEEDED
Status: DISCONTINUED | OUTPATIENT
Start: 2018-09-19 | End: 2018-09-19 | Stop reason: SURG

## 2018-09-19 RX ORDER — LIDOCAINE HYDROCHLORIDE 10 MG/ML
0.5 INJECTION, SOLUTION EPIDURAL; INFILTRATION; INTRACAUDAL; PERINEURAL ONCE AS NEEDED
Status: COMPLETED | OUTPATIENT
Start: 2018-09-19 | End: 2018-09-19

## 2018-09-19 RX ORDER — ALBUTEROL SULFATE 2.5 MG/3ML
2.5 SOLUTION RESPIRATORY (INHALATION) EVERY 4 HOURS PRN
Status: DISCONTINUED | OUTPATIENT
Start: 2018-09-19 | End: 2018-09-21 | Stop reason: HOSPADM

## 2018-09-19 RX ORDER — ASPIRIN 81 MG/1
81 TABLET ORAL DAILY
Status: DISCONTINUED | OUTPATIENT
Start: 2018-09-19 | End: 2018-09-21 | Stop reason: HOSPADM

## 2018-09-19 RX ORDER — LEVOTHYROXINE SODIUM 0.1 MG/1
200 TABLET ORAL DAILY
Status: DISCONTINUED | OUTPATIENT
Start: 2018-09-19 | End: 2018-09-21 | Stop reason: HOSPADM

## 2018-09-19 RX ORDER — AMANTADINE HYDROCHLORIDE 100 MG/1
100 CAPSULE, GELATIN COATED ORAL 2 TIMES DAILY
Status: DISCONTINUED | OUTPATIENT
Start: 2018-09-19 | End: 2018-09-21 | Stop reason: HOSPADM

## 2018-09-19 RX ORDER — MULTIPLE VITAMINS W/ MINERALS TAB 9MG-400MCG
1 TAB ORAL DAILY
Status: DISCONTINUED | OUTPATIENT
Start: 2018-09-19 | End: 2018-09-21 | Stop reason: HOSPADM

## 2018-09-19 RX ORDER — CETIRIZINE HYDROCHLORIDE 10 MG/1
10 TABLET ORAL DAILY
Status: DISCONTINUED | OUTPATIENT
Start: 2018-09-19 | End: 2018-09-21 | Stop reason: HOSPADM

## 2018-09-19 RX ORDER — FLUTICASONE PROPIONATE 50 MCG
2 SPRAY, SUSPENSION (ML) NASAL DAILY PRN
Status: DISCONTINUED | OUTPATIENT
Start: 2018-09-19 | End: 2018-09-21 | Stop reason: HOSPADM

## 2018-09-19 RX ORDER — IPRATROPIUM BROMIDE AND ALBUTEROL SULFATE 2.5; .5 MG/3ML; MG/3ML
3 SOLUTION RESPIRATORY (INHALATION) EVERY 4 HOURS PRN
Status: DISCONTINUED | OUTPATIENT
Start: 2018-09-19 | End: 2018-09-19

## 2018-09-19 RX ORDER — SENNA AND DOCUSATE SODIUM 50; 8.6 MG/1; MG/1
1 TABLET, FILM COATED ORAL DAILY
Status: DISCONTINUED | OUTPATIENT
Start: 2018-09-19 | End: 2018-09-21 | Stop reason: HOSPADM

## 2018-09-19 RX ORDER — DEXAMETHASONE SODIUM PHOSPHATE 4 MG/ML
INJECTION, SOLUTION INTRA-ARTICULAR; INTRALESIONAL; INTRAMUSCULAR; INTRAVENOUS; SOFT TISSUE AS NEEDED
Status: DISCONTINUED | OUTPATIENT
Start: 2018-09-19 | End: 2018-09-19 | Stop reason: SURG

## 2018-09-19 RX ORDER — TRAMADOL HYDROCHLORIDE 50 MG/1
50 TABLET ORAL EVERY 8 HOURS PRN
Status: DISCONTINUED | OUTPATIENT
Start: 2018-09-19 | End: 2018-09-21 | Stop reason: HOSPADM

## 2018-09-19 RX ORDER — EPHEDRINE SULFATE 50 MG/ML
5 INJECTION, SOLUTION INTRAVENOUS ONCE AS NEEDED
Status: DISCONTINUED | OUTPATIENT
Start: 2018-09-19 | End: 2018-09-19 | Stop reason: HOSPADM

## 2018-09-19 RX ORDER — PANTOPRAZOLE SODIUM 40 MG/1
40 TABLET, DELAYED RELEASE ORAL EVERY MORNING
Status: DISCONTINUED | OUTPATIENT
Start: 2018-09-20 | End: 2018-09-21 | Stop reason: HOSPADM

## 2018-09-19 RX ORDER — NITROGLYCERIN 400 UG/1
1 SPRAY ORAL
Status: DISCONTINUED | OUTPATIENT
Start: 2018-09-19 | End: 2018-09-21 | Stop reason: HOSPADM

## 2018-09-19 RX ORDER — FENTANYL CITRATE 50 UG/ML
50 INJECTION, SOLUTION INTRAMUSCULAR; INTRAVENOUS
Status: DISCONTINUED | OUTPATIENT
Start: 2018-09-19 | End: 2018-09-19 | Stop reason: HOSPADM

## 2018-09-19 RX ORDER — METOLAZONE 2.5 MG/1
2.5 TABLET ORAL DAILY
Status: DISCONTINUED | OUTPATIENT
Start: 2018-09-19 | End: 2018-09-21 | Stop reason: HOSPADM

## 2018-09-19 RX ORDER — ESMOLOL HYDROCHLORIDE 10 MG/ML
INJECTION INTRAVENOUS AS NEEDED
Status: DISCONTINUED | OUTPATIENT
Start: 2018-09-19 | End: 2018-09-19 | Stop reason: SURG

## 2018-09-19 RX ORDER — MAGNESIUM HYDROXIDE 1200 MG/15ML
LIQUID ORAL AS NEEDED
Status: DISCONTINUED | OUTPATIENT
Start: 2018-09-19 | End: 2018-09-19 | Stop reason: HOSPADM

## 2018-09-19 RX ORDER — SPIRONOLACTONE 25 MG/1
25 TABLET ORAL DAILY
Status: DISCONTINUED | OUTPATIENT
Start: 2018-09-19 | End: 2018-09-21 | Stop reason: HOSPADM

## 2018-09-19 RX ORDER — ALBUTEROL SULFATE 90 UG/1
1 AEROSOL, METERED RESPIRATORY (INHALATION) EVERY 4 HOURS PRN
Status: DISCONTINUED | OUTPATIENT
Start: 2018-09-19 | End: 2018-09-19

## 2018-09-19 RX ORDER — LIDOCAINE HYDROCHLORIDE 10 MG/ML
INJECTION, SOLUTION INFILTRATION; PERINEURAL AS NEEDED
Status: DISCONTINUED | OUTPATIENT
Start: 2018-09-19 | End: 2018-09-19 | Stop reason: SURG

## 2018-09-19 RX ADMIN — VANCOMYCIN HYDROCHLORIDE 1750 MG: 1 INJECTION, POWDER, LYOPHILIZED, FOR SOLUTION INTRAVENOUS at 10:10

## 2018-09-19 RX ADMIN — AMANTADINE HYDROCHLORIDE 100 MG: 100 CAPSULE ORAL at 21:00

## 2018-09-19 RX ADMIN — CEFTRIAXONE SODIUM 1 G: 1 INJECTION, SOLUTION INTRAVENOUS at 16:25

## 2018-09-19 RX ADMIN — Medication 4 MG: at 10:52

## 2018-09-19 RX ADMIN — ATRACURIUM BESYLATE 30 MG: 10 INJECTION, SOLUTION INTRAVENOUS at 10:22

## 2018-09-19 RX ADMIN — MULTIPLE VITAMINS W/ MINERALS TAB 1 TABLET: TAB ORAL at 16:16

## 2018-09-19 RX ADMIN — TRAMADOL HYDROCHLORIDE 50 MG: 50 TABLET, FILM COATED ORAL at 23:56

## 2018-09-19 RX ADMIN — ONDANSETRON 4 MG: 2 INJECTION INTRAMUSCULAR; INTRAVENOUS at 10:52

## 2018-09-19 RX ADMIN — SODIUM CHLORIDE, POTASSIUM CHLORIDE, SODIUM LACTATE AND CALCIUM CHLORIDE 100 ML/HR: 600; 310; 30; 20 INJECTION, SOLUTION INTRAVENOUS at 16:31

## 2018-09-19 RX ADMIN — LOSARTAN POTASSIUM 50 MG: 25 TABLET, FILM COATED ORAL at 16:16

## 2018-09-19 RX ADMIN — ASPIRIN 81 MG: 81 TABLET, COATED ORAL at 16:16

## 2018-09-19 RX ADMIN — SODIUM CHLORIDE, POTASSIUM CHLORIDE, SODIUM LACTATE AND CALCIUM CHLORIDE 100 ML/HR: 600; 310; 30; 20 INJECTION, SOLUTION INTRAVENOUS at 12:35

## 2018-09-19 RX ADMIN — CITALOPRAM HYDROBROMIDE 20 MG: 20 TABLET ORAL at 16:16

## 2018-09-19 RX ADMIN — CETIRIZINE HYDROCHLORIDE 10 MG: 10 TABLET, FILM COATED ORAL at 16:16

## 2018-09-19 RX ADMIN — ESMOLOL HYDROCHLORIDE 40 MG: 10 INJECTION INTRAVENOUS at 10:22

## 2018-09-19 RX ADMIN — POTASSIUM CHLORIDE 10 MEQ: 750 CAPSULE, EXTENDED RELEASE ORAL at 20:59

## 2018-09-19 RX ADMIN — FENTANYL CITRATE 50 MCG: 50 INJECTION INTRAMUSCULAR; INTRAVENOUS at 11:30

## 2018-09-19 RX ADMIN — FAMOTIDINE 20 MG: 20 TABLET ORAL at 08:35

## 2018-09-19 RX ADMIN — LIDOCAINE HYDROCHLORIDE 0.5 ML: 10 INJECTION, SOLUTION EPIDURAL; INFILTRATION; INTRACAUDAL; PERINEURAL at 08:35

## 2018-09-19 RX ADMIN — METOLAZONE 2.5 MG: 2.5 TABLET ORAL at 16:16

## 2018-09-19 RX ADMIN — CLONIDINE HYDROCHLORIDE 0.1 MG: 0.1 TABLET ORAL at 16:16

## 2018-09-19 RX ADMIN — PROPOFOL 150 MG: 10 INJECTION, EMULSION INTRAVENOUS at 10:22

## 2018-09-19 RX ADMIN — RANOLAZINE 500 MG: 500 TABLET, FILM COATED, EXTENDED RELEASE ORAL at 21:00

## 2018-09-19 RX ADMIN — FENTANYL CITRATE 50 MCG: 50 INJECTION INTRAMUSCULAR; INTRAVENOUS at 11:40

## 2018-09-19 RX ADMIN — Medication 1 TABLET: at 16:16

## 2018-09-19 RX ADMIN — DEXAMETHASONE SODIUM PHOSPHATE 8 MG: 4 INJECTION, SOLUTION INTRAMUSCULAR; INTRAVENOUS at 10:22

## 2018-09-19 RX ADMIN — LIDOCAINE HYDROCHLORIDE 50 MG: 10 INJECTION, SOLUTION INFILTRATION; PERINEURAL at 10:22

## 2018-09-19 RX ADMIN — GLYCOPYRROLATE 0.4 MG: 0.2 INJECTION, SOLUTION INTRAMUSCULAR; INTRAVENOUS at 10:52

## 2018-09-19 RX ADMIN — FENTANYL CITRATE 100 MCG: 50 INJECTION, SOLUTION INTRAMUSCULAR; INTRAVENOUS at 10:22

## 2018-09-19 RX ADMIN — SODIUM CHLORIDE, POTASSIUM CHLORIDE, SODIUM LACTATE AND CALCIUM CHLORIDE 100 ML/HR: 600; 310; 30; 20 INJECTION, SOLUTION INTRAVENOUS at 08:36

## 2018-09-19 RX ADMIN — PRAMIPEXOLE DIHYDROCHLORIDE 1.5 MG: 0.25 TABLET ORAL at 20:59

## 2018-09-19 RX ADMIN — METFORMIN HYDROCHLORIDE 1000 MG: 1000 TABLET, FILM COATED ORAL at 16:16

## 2018-09-19 NOTE — ANESTHESIA POSTPROCEDURE EVALUATION
Patient: Joanna Cross    Procedure Summary     Date:  09/19/18 Room / Location:   [facility]  [facility]    Anesthesia Start:  1018 Anesthesia Stop:  1120    Procedure:  INCISION AND DRAINAGE BACK, with wound exploration (N/A Spine Lumbar) Diagnosis:       Spinal stenosis, lumbar region, with neurogenic claudication      Delayed surgical wound healing, subsequent encounter      (Spinal stenosis, lumbar region, with neurogenic claudication [M48.062])      (Delayed surgical wound healing, subsequent encounter [T81.89XD])    Surgeon:  Ritchie García MD Provider:  Tyrel Chase MD    Anesthesia Type:  general ASA Status:  3          Anesthesia Type: general  Last vitals  BP   148/78 (09/19/18 0820)   Temp   97 °F (36.1 °C) (09/19/18 0820)   Pulse   77 (09/19/18 0820)   Resp   18 (09/19/18 0820)     SpO2   97 % (09/19/18 0820)     Post Anesthesia Care and Evaluation    Patient location during evaluation: PACU  Patient participation: complete - patient participated  Level of consciousness: awake and alert  Pain score: 0  Pain management: adequate  Airway patency: patent  Anesthetic complications: No anesthetic complications  PONV Status: none  Cardiovascular status: hemodynamically stable and acceptable  Respiratory status: nonlabored ventilation, acceptable and nasal cannula  Hydration status: acceptable

## 2018-09-19 NOTE — ANESTHESIA PREPROCEDURE EVALUATION
Anesthesia Evaluation     Patient summary reviewed and Nursing notes reviewed                Airway   Mallampati: II  Dental      Pulmonary    (+) sleep apnea,   Cardiovascular     (+) hypertension, past MI ,       Neuro/Psych- negative ROS  GI/Hepatic/Renal/Endo - negative ROS     Musculoskeletal (-) negative ROS    Abdominal    Substance History - negative use     OB/GYN negative ob/gyn ROS         Other                        Anesthesia Plan    ASA 3     general     intravenous induction   Anesthetic plan, all risks, benefits, and alternatives have been provided, discussed and informed consent has been obtained with: patient.    Plan discussed with CRNA.

## 2018-09-20 LAB
GLUCOSE BLDC GLUCOMTR-MCNC: 138 MG/DL (ref 70–130)
GLUCOSE BLDC GLUCOMTR-MCNC: 139 MG/DL (ref 70–130)
GLUCOSE BLDC GLUCOMTR-MCNC: 144 MG/DL (ref 70–130)
GLUCOSE BLDC GLUCOMTR-MCNC: 162 MG/DL (ref 70–130)

## 2018-09-20 PROCEDURE — 63710000001 MULTIVITAMIN WITH MINERALS TABLET: Performed by: NEUROLOGICAL SURGERY

## 2018-09-20 PROCEDURE — A9270 NON-COVERED ITEM OR SERVICE: HCPCS | Performed by: NEUROLOGICAL SURGERY

## 2018-09-20 PROCEDURE — 63710000001 FERROUS SULFATE 325 (65 FE) MG TABLET: Performed by: NEUROLOGICAL SURGERY

## 2018-09-20 PROCEDURE — 63710000001 RANOLAZINE 500 MG TABLET SUSTAINED-RELEASE 12 HOUR: Performed by: NEUROLOGICAL SURGERY

## 2018-09-20 PROCEDURE — 82962 GLUCOSE BLOOD TEST: CPT

## 2018-09-20 PROCEDURE — 63710000001 AMANTADINE 100 MG CAPSULE: Performed by: NEUROLOGICAL SURGERY

## 2018-09-20 PROCEDURE — 63710000001 METFORMIN 1000 MG TABLET: Performed by: NEUROLOGICAL SURGERY

## 2018-09-20 PROCEDURE — 63710000001 CALCIUM CARB-CHOLECALCIFEROL 600-800 MG-UNIT TABLET: Performed by: NEUROLOGICAL SURGERY

## 2018-09-20 PROCEDURE — 63710000001 TRAMADOL 50 MG TABLET: Performed by: NEUROLOGICAL SURGERY

## 2018-09-20 PROCEDURE — 99024 POSTOP FOLLOW-UP VISIT: CPT | Performed by: PHYSICIAN ASSISTANT

## 2018-09-20 PROCEDURE — 63710000001 PRAMIPEXOLE 0.25 MG TABLET: Performed by: NEUROLOGICAL SURGERY

## 2018-09-20 PROCEDURE — 63710000001 CITALOPRAM 20 MG TABLET: Performed by: NEUROLOGICAL SURGERY

## 2018-09-20 PROCEDURE — 63710000001 VITAMIN C 500 MG TABLET: Performed by: NEUROLOGICAL SURGERY

## 2018-09-20 PROCEDURE — G0378 HOSPITAL OBSERVATION PER HR: HCPCS

## 2018-09-20 PROCEDURE — 63710000001 CARBIDOPA-LEVODOPA 25-100 MG TABLET: Performed by: NEUROLOGICAL SURGERY

## 2018-09-20 PROCEDURE — 63710000001 PANTOPRAZOLE 40 MG TABLET DELAYED-RELEASE: Performed by: NEUROLOGICAL SURGERY

## 2018-09-20 PROCEDURE — 63710000001 HYDROCODONE-ACETAMINOPHEN 7.5-325 MG TABLET: Performed by: NEUROLOGICAL SURGERY

## 2018-09-20 PROCEDURE — 63710000001 LOSARTAN 25 MG TABLET: Performed by: NEUROLOGICAL SURGERY

## 2018-09-20 PROCEDURE — 63710000001 POTASSIUM CHLORIDE 10 MEQ CAPSULE CONTROLLED-RELEASE: Performed by: NEUROLOGICAL SURGERY

## 2018-09-20 PROCEDURE — 63710000001 LEVOTHYROXINE 100 MCG TABLET: Performed by: NEUROLOGICAL SURGERY

## 2018-09-20 PROCEDURE — 63710000001 CETIRIZINE 10 MG TABLET: Performed by: NEUROLOGICAL SURGERY

## 2018-09-20 PROCEDURE — 63710000001 METOLAZONE 2.5 MG TABLET: Performed by: NEUROLOGICAL SURGERY

## 2018-09-20 PROCEDURE — 63710000001 PRAMIPEXOLE 1 MG TABLET: Performed by: NEUROLOGICAL SURGERY

## 2018-09-20 PROCEDURE — 63710000001 SENNOSIDES-DOCUSATE SODIUM 8.6-50 MG TABLET: Performed by: NEUROLOGICAL SURGERY

## 2018-09-20 PROCEDURE — 63710000001 ASPIRIN 81 MG TABLET DELAYED-RELEASE: Performed by: NEUROLOGICAL SURGERY

## 2018-09-20 PROCEDURE — 63710000001 CLONIDINE 0.1 MG TABLET: Performed by: NEUROLOGICAL SURGERY

## 2018-09-20 RX ADMIN — RANOLAZINE 500 MG: 500 TABLET, FILM COATED, EXTENDED RELEASE ORAL at 13:37

## 2018-09-20 RX ADMIN — PANTOPRAZOLE SODIUM 40 MG: 40 TABLET, DELAYED RELEASE ORAL at 05:48

## 2018-09-20 RX ADMIN — LEVOTHYROXINE SODIUM 200 MCG: 100 TABLET ORAL at 09:06

## 2018-09-20 RX ADMIN — AMANTADINE HYDROCHLORIDE 100 MG: 100 CAPSULE ORAL at 09:04

## 2018-09-20 RX ADMIN — Medication 1 TABLET: at 09:04

## 2018-09-20 RX ADMIN — CARBIDOPA AND LEVODOPA 2 TABLET: 25; 100 TABLET ORAL at 05:48

## 2018-09-20 RX ADMIN — CEFTAROLINE FOSAMIL 600 MG: 600 POWDER, FOR SOLUTION INTRAVENOUS at 13:37

## 2018-09-20 RX ADMIN — HYDROCODONE BITARTRATE AND ACETAMINOPHEN 1 TABLET: 7.5; 325 TABLET ORAL at 03:34

## 2018-09-20 RX ADMIN — RANOLAZINE 500 MG: 500 TABLET, FILM COATED, EXTENDED RELEASE ORAL at 21:58

## 2018-09-20 RX ADMIN — POTASSIUM CHLORIDE 10 MEQ: 750 CAPSULE, EXTENDED RELEASE ORAL at 21:59

## 2018-09-20 RX ADMIN — AMANTADINE HYDROCHLORIDE 100 MG: 100 CAPSULE ORAL at 21:59

## 2018-09-20 RX ADMIN — MULTIPLE VITAMINS W/ MINERALS TAB 1 TABLET: TAB ORAL at 09:04

## 2018-09-20 RX ADMIN — CEFTAROLINE FOSAMIL 600 MG: 600 POWDER, FOR SOLUTION INTRAVENOUS at 22:28

## 2018-09-20 RX ADMIN — FERROUS SULFATE TAB 325 MG (65 MG ELEMENTAL FE) 325 MG: 325 (65 FE) TAB at 09:03

## 2018-09-20 RX ADMIN — IPRATROPIUM BROMIDE 1 SPRAY: 21 SPRAY NASAL at 09:02

## 2018-09-20 RX ADMIN — CARBIDOPA AND LEVODOPA 1 TABLET: 25; 100 TABLET ORAL at 12:28

## 2018-09-20 RX ADMIN — ASPIRIN 81 MG: 81 TABLET, COATED ORAL at 09:04

## 2018-09-20 RX ADMIN — CARBIDOPA AND LEVODOPA 1 TABLET: 25; 100 TABLET ORAL at 09:05

## 2018-09-20 RX ADMIN — PRAMIPEXOLE DIHYDROCHLORIDE 1.5 MG: 0.25 TABLET ORAL at 21:58

## 2018-09-20 RX ADMIN — CITALOPRAM HYDROBROMIDE 20 MG: 20 TABLET ORAL at 09:05

## 2018-09-20 RX ADMIN — CLONIDINE HYDROCHLORIDE 0.1 MG: 0.1 TABLET ORAL at 15:57

## 2018-09-20 RX ADMIN — CLONIDINE HYDROCHLORIDE 0.1 MG: 0.1 TABLET ORAL at 03:33

## 2018-09-20 RX ADMIN — METOLAZONE 2.5 MG: 2.5 TABLET ORAL at 09:04

## 2018-09-20 RX ADMIN — POTASSIUM CHLORIDE 10 MEQ: 750 CAPSULE, EXTENDED RELEASE ORAL at 09:04

## 2018-09-20 RX ADMIN — Medication 1 TABLET: at 09:07

## 2018-09-20 RX ADMIN — OXYCODONE HYDROCHLORIDE AND ACETAMINOPHEN 500 MG: 500 TABLET ORAL at 09:04

## 2018-09-20 RX ADMIN — METFORMIN HYDROCHLORIDE 1000 MG: 1000 TABLET, FILM COATED ORAL at 09:04

## 2018-09-20 RX ADMIN — IPRATROPIUM BROMIDE 1 SPRAY: 21 SPRAY NASAL at 21:59

## 2018-09-20 RX ADMIN — LOSARTAN POTASSIUM 50 MG: 25 TABLET, FILM COATED ORAL at 15:58

## 2018-09-20 RX ADMIN — CARBIDOPA AND LEVODOPA 1 TABLET: 25; 100 TABLET ORAL at 15:58

## 2018-09-20 RX ADMIN — LOSARTAN POTASSIUM 50 MG: 25 TABLET, FILM COATED ORAL at 03:34

## 2018-09-20 RX ADMIN — CARBIDOPA AND LEVODOPA 1 TABLET: 25; 100 TABLET ORAL at 21:59

## 2018-09-20 RX ADMIN — TRAMADOL HYDROCHLORIDE 50 MG: 50 TABLET, FILM COATED ORAL at 23:45

## 2018-09-20 RX ADMIN — CETIRIZINE HYDROCHLORIDE 10 MG: 10 TABLET, FILM COATED ORAL at 09:04

## 2018-09-21 VITALS
BODY MASS INDEX: 45.27 KG/M2 | RESPIRATION RATE: 18 BRPM | WEIGHT: 246 LBS | DIASTOLIC BLOOD PRESSURE: 59 MMHG | HEART RATE: 66 BPM | HEIGHT: 62 IN | OXYGEN SATURATION: 94 % | TEMPERATURE: 97 F | SYSTOLIC BLOOD PRESSURE: 124 MMHG

## 2018-09-21 LAB
ALBUMIN SERPL-MCNC: 4.04 G/DL (ref 3.2–4.8)
ALBUMIN/GLOB SERPL: 1.7 G/DL (ref 1.5–2.5)
ALP SERPL-CCNC: 87 U/L (ref 25–100)
ALT SERPL W P-5'-P-CCNC: 2 U/L (ref 7–40)
ANION GAP SERPL CALCULATED.3IONS-SCNC: 7 MMOL/L (ref 3–11)
AST SERPL-CCNC: 16 U/L (ref 0–33)
BACTERIA SPEC AEROBE CULT: ABNORMAL
BASOPHILS # BLD AUTO: 0.04 10*3/MM3 (ref 0–0.2)
BASOPHILS NFR BLD AUTO: 0.6 % (ref 0–1)
BILIRUB SERPL-MCNC: 0.4 MG/DL (ref 0.3–1.2)
BUN BLD-MCNC: 19 MG/DL (ref 9–23)
BUN/CREAT SERPL: 22.4 (ref 7–25)
CALCIUM SPEC-SCNC: 9.1 MG/DL (ref 8.7–10.4)
CHLORIDE SERPL-SCNC: 100 MMOL/L (ref 99–109)
CK SERPL-CCNC: 50 U/L (ref 26–174)
CO2 SERPL-SCNC: 27 MMOL/L (ref 20–31)
CREAT BLD-MCNC: 0.85 MG/DL (ref 0.6–1.3)
DEPRECATED RDW RBC AUTO: 49 FL (ref 37–54)
EOSINOPHIL # BLD AUTO: 0.21 10*3/MM3 (ref 0–0.3)
EOSINOPHIL NFR BLD AUTO: 3.2 % (ref 0–3)
ERYTHROCYTE [DISTWIDTH] IN BLOOD BY AUTOMATED COUNT: 14.7 % (ref 11.3–14.5)
GFR SERPL CREATININE-BSD FRML MDRD: 66 ML/MIN/1.73
GLOBULIN UR ELPH-MCNC: 2.4 GM/DL
GLUCOSE BLD-MCNC: 101 MG/DL (ref 70–100)
GRAM STN SPEC: ABNORMAL
HCT VFR BLD AUTO: 36.2 % (ref 34.5–44)
HGB BLD-MCNC: 11.6 G/DL (ref 11.5–15.5)
IMM GRANULOCYTES # BLD: 0.03 10*3/MM3 (ref 0–0.03)
IMM GRANULOCYTES NFR BLD: 0.5 % (ref 0–0.6)
LYMPHOCYTES # BLD AUTO: 1.42 10*3/MM3 (ref 0.6–4.8)
LYMPHOCYTES NFR BLD AUTO: 21.7 % (ref 24–44)
MCH RBC QN AUTO: 29.1 PG (ref 27–31)
MCHC RBC AUTO-ENTMCNC: 32 G/DL (ref 32–36)
MCV RBC AUTO: 91 FL (ref 80–99)
MONOCYTES # BLD AUTO: 0.61 10*3/MM3 (ref 0–1)
MONOCYTES NFR BLD AUTO: 9.3 % (ref 0–12)
NEUTROPHILS # BLD AUTO: 4.22 10*3/MM3 (ref 1.5–8.3)
NEUTROPHILS NFR BLD AUTO: 64.7 % (ref 41–71)
PLATELET # BLD AUTO: 196 10*3/MM3 (ref 150–450)
PMV BLD AUTO: 9.3 FL (ref 6–12)
POTASSIUM BLD-SCNC: 3.8 MMOL/L (ref 3.5–5.5)
PROT SERPL-MCNC: 6.4 G/DL (ref 5.7–8.2)
RBC # BLD AUTO: 3.98 10*6/MM3 (ref 3.89–5.14)
SODIUM BLD-SCNC: 134 MMOL/L (ref 132–146)
WBC NRBC COR # BLD: 6.53 10*3/MM3 (ref 3.5–10.8)

## 2018-09-21 PROCEDURE — A9270 NON-COVERED ITEM OR SERVICE: HCPCS | Performed by: NEUROLOGICAL SURGERY

## 2018-09-21 PROCEDURE — 63710000001 VITAMIN C 500 MG TABLET: Performed by: NEUROLOGICAL SURGERY

## 2018-09-21 PROCEDURE — 63710000001 LOSARTAN 25 MG TABLET: Performed by: NEUROLOGICAL SURGERY

## 2018-09-21 PROCEDURE — G0378 HOSPITAL OBSERVATION PER HR: HCPCS

## 2018-09-21 PROCEDURE — 63710000001 RANOLAZINE 500 MG TABLET SUSTAINED-RELEASE 12 HOUR: Performed by: NEUROLOGICAL SURGERY

## 2018-09-21 PROCEDURE — 63710000001 LEVOTHYROXINE 100 MCG TABLET: Performed by: NEUROLOGICAL SURGERY

## 2018-09-21 PROCEDURE — 63710000001 METFORMIN 1000 MG TABLET: Performed by: NEUROLOGICAL SURGERY

## 2018-09-21 PROCEDURE — 80053 COMPREHEN METABOLIC PANEL: CPT | Performed by: INTERNAL MEDICINE

## 2018-09-21 PROCEDURE — 63710000001 CLONIDINE 0.1 MG TABLET: Performed by: NEUROLOGICAL SURGERY

## 2018-09-21 PROCEDURE — 82550 ASSAY OF CK (CPK): CPT | Performed by: INTERNAL MEDICINE

## 2018-09-21 PROCEDURE — 63710000001 FERROUS SULFATE 325 (65 FE) MG TABLET: Performed by: NEUROLOGICAL SURGERY

## 2018-09-21 PROCEDURE — 63710000001 AMANTADINE 100 MG CAPSULE: Performed by: NEUROLOGICAL SURGERY

## 2018-09-21 PROCEDURE — 63710000001 POTASSIUM CHLORIDE 10 MEQ CAPSULE CONTROLLED-RELEASE: Performed by: NEUROLOGICAL SURGERY

## 2018-09-21 PROCEDURE — 99024 POSTOP FOLLOW-UP VISIT: CPT | Performed by: NEUROLOGICAL SURGERY

## 2018-09-21 PROCEDURE — 63710000001 MULTIVITAMIN WITH MINERALS TABLET: Performed by: NEUROLOGICAL SURGERY

## 2018-09-21 PROCEDURE — 63710000001 CARBIDOPA-LEVODOPA 25-100 MG TABLET: Performed by: NEUROLOGICAL SURGERY

## 2018-09-21 PROCEDURE — 63710000001 CALCIUM CARB-CHOLECALCIFEROL 600-800 MG-UNIT TABLET: Performed by: NEUROLOGICAL SURGERY

## 2018-09-21 PROCEDURE — 85025 COMPLETE CBC W/AUTO DIFF WBC: CPT | Performed by: INTERNAL MEDICINE

## 2018-09-21 PROCEDURE — 25010000002 DAPTOMYCIN PER 1 MG: Performed by: INTERNAL MEDICINE

## 2018-09-21 PROCEDURE — 63710000001 PANTOPRAZOLE 40 MG TABLET DELAYED-RELEASE: Performed by: NEUROLOGICAL SURGERY

## 2018-09-21 RX ADMIN — DAPTOMYCIN 650 MG: 500 INJECTION, POWDER, LYOPHILIZED, FOR SOLUTION INTRAVENOUS at 11:35

## 2018-09-21 RX ADMIN — AMANTADINE HYDROCHLORIDE 100 MG: 100 CAPSULE ORAL at 08:57

## 2018-09-21 RX ADMIN — MULTIPLE VITAMINS W/ MINERALS TAB 1 TABLET: TAB ORAL at 08:50

## 2018-09-21 RX ADMIN — CARBIDOPA AND LEVODOPA 2 TABLET: 25; 100 TABLET ORAL at 05:26

## 2018-09-21 RX ADMIN — CEFTAROLINE FOSAMIL 600 MG: 600 POWDER, FOR SOLUTION INTRAVENOUS at 09:11

## 2018-09-21 RX ADMIN — CARBIDOPA AND LEVODOPA 1 TABLET: 25; 100 TABLET ORAL at 11:45

## 2018-09-21 RX ADMIN — CARBIDOPA AND LEVODOPA 1 TABLET: 25; 100 TABLET ORAL at 08:59

## 2018-09-21 RX ADMIN — METFORMIN HYDROCHLORIDE 1000 MG: 1000 TABLET, FILM COATED ORAL at 08:59

## 2018-09-21 RX ADMIN — Medication 1 TABLET: at 08:56

## 2018-09-21 RX ADMIN — LEVOTHYROXINE SODIUM 200 MCG: 100 TABLET ORAL at 08:54

## 2018-09-21 RX ADMIN — PANTOPRAZOLE SODIUM 40 MG: 40 TABLET, DELAYED RELEASE ORAL at 09:00

## 2018-09-21 RX ADMIN — LOSARTAN POTASSIUM 50 MG: 25 TABLET, FILM COATED ORAL at 03:54

## 2018-09-21 RX ADMIN — IPRATROPIUM BROMIDE 1 SPRAY: 21 SPRAY NASAL at 09:11

## 2018-09-21 RX ADMIN — CLONIDINE HYDROCHLORIDE 0.1 MG: 0.1 TABLET ORAL at 03:54

## 2018-09-21 RX ADMIN — OXYCODONE HYDROCHLORIDE AND ACETAMINOPHEN 500 MG: 500 TABLET ORAL at 09:00

## 2018-09-21 RX ADMIN — FERROUS SULFATE TAB 325 MG (65 MG ELEMENTAL FE) 325 MG: 325 (65 FE) TAB at 09:00

## 2018-09-21 RX ADMIN — POTASSIUM CHLORIDE 10 MEQ: 750 CAPSULE, EXTENDED RELEASE ORAL at 08:56

## 2018-09-21 RX ADMIN — PANTOPRAZOLE SODIUM 40 MG: 40 TABLET, DELAYED RELEASE ORAL at 05:26

## 2018-09-21 RX ADMIN — RANOLAZINE 500 MG: 500 TABLET, FILM COATED, EXTENDED RELEASE ORAL at 08:55

## 2018-09-24 ENCOUNTER — OFFICE VISIT (OUTPATIENT)
Dept: NEUROSURGERY | Facility: CLINIC | Age: 68
End: 2018-09-24

## 2018-09-24 ENCOUNTER — TRANSCRIBE ORDERS (OUTPATIENT)
Dept: LAB | Facility: HOSPITAL | Age: 68
End: 2018-09-24

## 2018-09-24 ENCOUNTER — LAB (OUTPATIENT)
Dept: LAB | Facility: HOSPITAL | Age: 68
End: 2018-09-24

## 2018-09-24 ENCOUNTER — TELEPHONE (OUTPATIENT)
Dept: NEUROSURGERY | Facility: CLINIC | Age: 68
End: 2018-09-24

## 2018-09-24 VITALS
SYSTOLIC BLOOD PRESSURE: 144 MMHG | BODY MASS INDEX: 45.27 KG/M2 | HEIGHT: 62 IN | TEMPERATURE: 97.7 F | DIASTOLIC BLOOD PRESSURE: 72 MMHG | WEIGHT: 246 LBS

## 2018-09-24 DIAGNOSIS — L73.9 STAPHYLOCOCCUS AUREUS SUPERFICIAL FOLLICULITIS: ICD-10-CM

## 2018-09-24 DIAGNOSIS — L03.319 CELLULITIS OF FLANK: Primary | ICD-10-CM

## 2018-09-24 DIAGNOSIS — L03.319 CELLULITIS OF FLANK: ICD-10-CM

## 2018-09-24 DIAGNOSIS — T81.89XS DELAYED SURGICAL WOUND HEALING, SEQUELA: Primary | ICD-10-CM

## 2018-09-24 DIAGNOSIS — M48.062 SPINAL STENOSIS, LUMBAR REGION, WITH NEUROGENIC CLAUDICATION: ICD-10-CM

## 2018-09-24 DIAGNOSIS — B95.61 STAPHYLOCOCCUS AUREUS SUPERFICIAL FOLLICULITIS: ICD-10-CM

## 2018-09-24 LAB
ALBUMIN SERPL-MCNC: 4.39 G/DL (ref 3.2–4.8)
ALBUMIN/GLOB SERPL: 2.1 G/DL (ref 1.5–2.5)
ALP SERPL-CCNC: 94 U/L (ref 25–100)
ALT SERPL W P-5'-P-CCNC: 7 U/L (ref 7–40)
ANION GAP SERPL CALCULATED.3IONS-SCNC: 7 MMOL/L (ref 3–11)
AST SERPL-CCNC: 18 U/L (ref 0–33)
BASOPHILS # BLD AUTO: 0.04 10*3/MM3 (ref 0–0.2)
BASOPHILS NFR BLD AUTO: 0.5 % (ref 0–1)
BILIRUB SERPL-MCNC: 0.3 MG/DL (ref 0.3–1.2)
BUN BLD-MCNC: 24 MG/DL (ref 9–23)
BUN/CREAT SERPL: 32 (ref 7–25)
CALCIUM SPEC-SCNC: 9.5 MG/DL (ref 8.7–10.4)
CHLORIDE SERPL-SCNC: 99 MMOL/L (ref 99–109)
CK SERPL-CCNC: 40 U/L (ref 26–174)
CO2 SERPL-SCNC: 28 MMOL/L (ref 20–31)
CREAT BLD-MCNC: 0.75 MG/DL (ref 0.6–1.3)
CRP SERPL-MCNC: 1.46 MG/DL (ref 0–1)
DEPRECATED RDW RBC AUTO: 47.8 FL (ref 37–54)
EOSINOPHIL # BLD AUTO: 0.26 10*3/MM3 (ref 0–0.3)
EOSINOPHIL NFR BLD AUTO: 3.2 % (ref 0–3)
ERYTHROCYTE [DISTWIDTH] IN BLOOD BY AUTOMATED COUNT: 14.3 % (ref 11.3–14.5)
ERYTHROCYTE [SEDIMENTATION RATE] IN BLOOD: 26 MM/HR (ref 0–30)
GFR SERPL CREATININE-BSD FRML MDRD: 77 ML/MIN/1.73
GLOBULIN UR ELPH-MCNC: 2.1 GM/DL
GLUCOSE BLD-MCNC: 102 MG/DL (ref 70–100)
HCT VFR BLD AUTO: 36.8 % (ref 34.5–44)
HGB BLD-MCNC: 11.8 G/DL (ref 11.5–15.5)
IMM GRANULOCYTES # BLD: 0.04 10*3/MM3 (ref 0–0.03)
IMM GRANULOCYTES NFR BLD: 0.5 % (ref 0–0.6)
LYMPHOCYTES # BLD AUTO: 1.11 10*3/MM3 (ref 0.6–4.8)
LYMPHOCYTES NFR BLD AUTO: 13.7 % (ref 24–44)
MCH RBC QN AUTO: 29.1 PG (ref 27–31)
MCHC RBC AUTO-ENTMCNC: 32.1 G/DL (ref 32–36)
MCV RBC AUTO: 90.6 FL (ref 80–99)
MONOCYTES # BLD AUTO: 0.65 10*3/MM3 (ref 0–1)
MONOCYTES NFR BLD AUTO: 8 % (ref 0–12)
NEUTROPHILS # BLD AUTO: 6 10*3/MM3 (ref 1.5–8.3)
NEUTROPHILS NFR BLD AUTO: 74.1 % (ref 41–71)
PLATELET # BLD AUTO: 230 10*3/MM3 (ref 150–450)
PMV BLD AUTO: 9.4 FL (ref 6–12)
POTASSIUM BLD-SCNC: 4 MMOL/L (ref 3.5–5.5)
PROT SERPL-MCNC: 6.5 G/DL (ref 5.7–8.2)
RBC # BLD AUTO: 4.06 10*6/MM3 (ref 3.89–5.14)
SODIUM BLD-SCNC: 134 MMOL/L (ref 132–146)
WBC NRBC COR # BLD: 8.1 10*3/MM3 (ref 3.5–10.8)

## 2018-09-24 PROCEDURE — 86140 C-REACTIVE PROTEIN: CPT

## 2018-09-24 PROCEDURE — 80053 COMPREHEN METABOLIC PANEL: CPT

## 2018-09-24 PROCEDURE — 82550 ASSAY OF CK (CPK): CPT

## 2018-09-24 PROCEDURE — 85652 RBC SED RATE AUTOMATED: CPT

## 2018-09-24 PROCEDURE — 36415 COLL VENOUS BLD VENIPUNCTURE: CPT

## 2018-09-24 PROCEDURE — 99024 POSTOP FOLLOW-UP VISIT: CPT | Performed by: PHYSICIAN ASSISTANT

## 2018-09-24 PROCEDURE — 85025 COMPLETE CBC W/AUTO DIFF WBC: CPT

## 2018-09-24 NOTE — PROGRESS NOTES
"Joanna Cross  1950 09/24/2018  [id number]    CC: incisional pain    HPI:  Patient is s/p wound debridement and closure by Dr. García on 9/19/2018. She is here for wound check.    Past Medical History:   Diagnosis Date   • Anemia    • Arthritis    • Atrial fibrillation (CMS/[facility])    • Chronic edema     bilateral   • Coronary artery disease involving native coronary artery of native heart without angina pectoris 3/1/2017   • Depression    • Diabetes mellitus (CMS/[facility])     type 2, checks fsbg 1-2x/day and as needed; last a1c 5-2018 6.5   • Essential hypertension 3/1/2017   • GERD (gastroesophageal reflux disease)    • GIB (gastrointestinal bleeding) 2016    d/t xarelto    • Hiatal hernia    • History of transfusion 2016    d/t GIB, no reaction per pt report    • Mixed hyperlipidemia 3/1/2017   • Myocardial infarction    • MILLI on CPAP    • Parkinson disease (CMS/[facility])    • Peripheral vascular disease (CMS/[facility]) 3/1/2017   • Skin cancer    • SSS (sick sinus syndrome) (CMS/[facility])     ppm    • TIA (transient ischemic attack)     no residual    • Varicose vein of leg     not spec of leg    • Venous hypertension    • Venous insufficiency    • Wears glasses        Allergies   Allergen Reactions   • Toprol Xl [Metoprolol Tartrate] Shortness Of Breath     Extreme fatigue   • Amlodipine Besylate Swelling     Lower extremity (ankles, feet) swelling   • Entacapone Other (See Comments)     \"extreme weakness in legs - caused several falls, which stopped after discontinuing this medication\"   • Levemir [Insulin Detemir] Hives     Hives / rash around injection site   • Xarelto [Rivaroxaban] GI Bleeding   • Bactrim [Sulfamethoxazole-Trimethoprim] Nausea Only     Headache    • Ciprofloxacin Diarrhea   • Cogentin [Benztropine] Other (See Comments)     \"uncontrollable body movements\"   • Compazine [Prochlorperazine Edisylate] Other (See Comments)     Dystonic reaction   • Duraprep [Antiseptic Products, Misc.] Itching and Rash     RASH " AND ITCHING   • Haldol [Haloperidol Lactate] Other (See Comments)     Dystonic reaction   • Hydralazine Other (See Comments)     Headache    • Hydrocodone-Acetaminophen Nausea And Vomiting and Dizziness     Headache    • Lisinopril Cough   • Penicillins Hives     Jitteriness    • Statins Myalgia     Leg pain   • Tarka [Trandolapril-Verapamil Hcl Er] Other (See Comments)     Constipation          Current Outpatient Prescriptions:   •  albuterol (VENTOLIN HFA) 108 (90 Base) MCG/ACT inhaler, Inhale 1 puff Every 4 (Four) Hours As Needed., Disp: , Rfl:   •  amantadine (SYMMETREL) 100 MG capsule, Take 100 mg by mouth 2 (Two) Times a Day., Disp: , Rfl:   •  apixaban (ELIQUIS) 5 MG tablet tablet, Take 1 tablet by mouth Every 12 (Twelve) Hours., Disp: 180 tablet, Rfl: 3  •  aspirin 81 MG EC tablet, Take 81 mg by mouth Daily., Disp: , Rfl:   •  B Complex-C (SUPER B COMPLEX PO), Take 1 tablet by mouth Daily., Disp: , Rfl:   •  B-D UF III MINI PEN NEEDLES 31G X 5 MM misc, , Disp: , Rfl:   •  bumetanide (BUMEX) 2 MG tablet, Take 2 mg by mouth 2 (Two) Times a Day., Disp: , Rfl:   •  Calcium Carbonate (CALTRATE 600 PO), Take 600 mg by mouth Daily., Disp: , Rfl:   •  carbidopa-levodopa (SINEMET)  MG per tablet, Take 2 tablets by mouth Every Morning. 2 tablets at 0600, 1 tablet at 0900, 1200, 1500, 1800, 2100, Disp: , Rfl:   •  carbonyl iron (FEOSOL) 45 MG tablet tablet, Take 1 tablet by mouth 2 (Two) Times a Day., Disp: , Rfl:   •  cephalexin (KEFLEX) 500 MG capsule, Take 1 capsule by mouth 4 (Four) Times a Day., Disp: 40 capsule, Rfl: 1  •  Cholecalciferol 2000 units tablet, Take 2,000 Units by mouth 2 (Two) Times a Day., Disp: , Rfl:   •  citalopram (CeleXA) 20 MG tablet, Take 20 mg by mouth every night at bedtime., Disp: , Rfl:   •  clobetasol (TEMOVATE) 0.05 % cream, Apply 1 application topically 2 (Two) Times a Day As Needed (Lichens Sclerosis)., Disp: , Rfl:   •  CloNIDine (CATAPRES) 0.1 MG tablet, Take 1 tablet by  mouth Every 12 (Twelve) Hours., Disp: 180 tablet, Rfl: 3  •  Collagen-Boron-Hyaluronic Acid 10-5-3.3 MG tablet, Take 1 tablet by mouth Daily., Disp: , Rfl:   •  fluticasone (FLONASE) 50 MCG/ACT nasal spray, 2 sprays into each nostril Daily As Needed for Rhinitis., Disp: , Rfl:   •  insulin degludec (TRESIBA FLEXTOUCH) 100 UNIT/ML solution pen-injector injection, Inject 28 Units under the skin Every Night., Disp: , Rfl:   •  IPRATROPIUM BROMIDE NA, 1 spray into each nostril 2 (Two) Times a Day., Disp: , Rfl:   •  Loratadine 10 MG capsule, Take 10 mg by mouth Daily., Disp: , Rfl:   •  losartan (COZAAR) 50 MG tablet, Take 1 tablet by mouth Every 12 (Twelve) Hours., Disp: 180 tablet, Rfl: 3  •  metFORMIN (GLUCOPHAGE) 1000 MG tablet, Take 1,000 mg by mouth 2 (Two) Times a Day With Meals., Disp: , Rfl:   •  metOLazone (ZAROXOLYN) 2.5 MG tablet, TAKE 1 TABLET DAILY, Disp: 90 tablet, Rfl: 1  •  Mirabegron ER (MYRBETRIQ) 50 MG tablet sustained-release 24 hour 24 hr tablet, Take 50 mg by mouth Daily., Disp: , Rfl:   •  Multiple Vitamins-Minerals (CENTRUM ULTRA WOMENS PO), Take 1 tablet by mouth Daily., Disp: , Rfl:   •  nitroglycerin (NITROLINGUAL) 0.4 MG/SPRAY spray, Place 1 spray under the tongue Every 5 (Five) Minutes As Needed for Chest Pain., Disp: 1 each, Rfl: 6  •  omeprazole (priLOSEC) 40 MG capsule, Take 40 mg by mouth 2 (Two) Times a Day., Disp: , Rfl:   •  potassium chloride (MICRO-K) 10 MEQ CR capsule, Take 10 mEq by mouth 2 (Two) Times a Day., Disp: , Rfl:   •  pramipexole (MIRAPEX) 1.5 MG tablet, Take 1.5 mg by mouth Every Night., Disp: , Rfl:   •  ranolazine (RANEXA) 500 MG 12 hr tablet, Take 500 mg by mouth 2 (Two) Times a Day., Disp: , Rfl:   •  sennosides-docusate sodium (SENOKOT-S) 8.6-50 MG tablet, Take 1 tablet by mouth Daily., Disp: , Rfl:   •  spironolactone (ALDACTONE) 25 MG tablet, Take 1 tablet by mouth Daily., Disp: 90 tablet, Rfl: 1  •  SYNTHROID 200 MCG tablet, Take 200 mcg by mouth Daily., Disp:  , Rfl:   •  traMADol (ULTRAM) 50 MG tablet, Take 50 mg by mouth Every 8 (Eight) Hours As Needed for Moderate Pain ., Disp: , Rfl:   •  Ubiquinol (QUNOL COQ10/UBIQUINOL/JOSE) 100 MG capsule, Take 1 capsule by mouth Daily., Disp: , Rfl:   •  vitamin C (ASCORBIC ACID) 500 MG tablet, Take 500 mg by mouth Daily. Chewable tablet, Disp: , Rfl:     Social History     Social History   • Marital status:      Social History Main Topics   • Smoking status: Never Smoker   • Smokeless tobacco: Never Used   • Alcohol use Yes      Comment: occasional, nothing weekly    • Drug use: No   • Sexual activity: Defer     Other Topics Concern   • Not on file       Family History   Problem Relation Age of Onset   • Kidney disease Mother        Review of Systems   Constitutional: Negative for activity change, appetite change, chills, diaphoresis, fatigue, fever and unexpected weight change.   HENT: Negative for congestion, dental problem, drooling, ear discharge, ear pain, facial swelling, hearing loss, mouth sores, nosebleeds, postnasal drip, rhinorrhea, sinus pressure, sneezing, sore throat, tinnitus, trouble swallowing and voice change.    Eyes: Negative for photophobia, pain, discharge, redness, itching and visual disturbance.   Respiratory: Negative for apnea, cough, choking, chest tightness, shortness of breath, wheezing and stridor.    Cardiovascular: Negative for chest pain, palpitations and leg swelling.   Gastrointestinal: Negative for abdominal distention, abdominal pain, anal bleeding, blood in stool, constipation, diarrhea, nausea, rectal pain and vomiting.   Endocrine: Negative for cold intolerance, heat intolerance, polydipsia, polyphagia and polyuria.   Genitourinary: Negative for decreased urine volume, difficulty urinating, dysuria, enuresis, flank pain, frequency, genital sores, hematuria and urgency.   Musculoskeletal: Negative for arthralgias, back pain, gait problem, joint swelling, myalgias, neck pain and neck  "stiffness.   Skin: Positive for wound. Negative for color change, pallor and rash.   Allergic/Immunologic: Negative for environmental allergies, food allergies and immunocompromised state.   Neurological: Negative for dizziness, tremors, seizures, syncope, facial asymmetry, speech difficulty, weakness, light-headedness, numbness and headaches.   Hematological: Negative for adenopathy. Does not bruise/bleed easily.   Psychiatric/Behavioral: Negative for agitation, behavioral problems, confusion, decreased concentration, dysphoric mood, hallucinations, self-injury, sleep disturbance and suicidal ideas. The patient is not nervous/anxious and is not hyperactive.    All other systems reviewed and are negative.      PE:  Physical Exam  /72   Temp 97.7 °F (36.5 °C)   Ht 157.5 cm (62\")   Wt 112 kg (246 lb)   LMP  (LMP Unknown)   BMI 44.99 kg/m²     Neurologic Exam  Gait normal, using a rolling walker. No leg weakness.      MDM  The patient presented with a Aqua-Seal dressing in place, the incision was moist, there was some eschar noted and this was debrided and the wound was cleaned, triple antibiotic ointment was placed over the incision and a clean covered arm dressing.  I have asked the patient to come back to the office on Wednesday for us to check the wound again cleaned it and change the dressing.  She states that she will be going to infectious disease daily this week.  I've asked her to give us a call in office tomorrow when she knows what time she is going to be coming back and we will work her into our schedules.    Alta Marc PA-C        "

## 2018-09-24 NOTE — TELEPHONE ENCOUNTER
I spoke with Zully BRIGGS and she would like to see Joanna today.  Patient was told to come in around 2:00.

## 2018-09-24 NOTE — TELEPHONE ENCOUNTER
Provider:  Ricky  Caller: patient  Time of call:  8:58   Phone #:  [phone]  Surgery: I & D  Surgery Date:  09-19-18  Last visit:   same  Next visit: 10/04/18  JANINE:         Reason for call:     Patient called and asked that I meet her at another [facility] physician's office today to change her bandage for her.  I don't mind to change it, however she would need to come here.  Is that ok?

## 2018-09-25 ENCOUNTER — OFFICE VISIT (OUTPATIENT)
Dept: NEUROSURGERY | Facility: CLINIC | Age: 68
End: 2018-09-25

## 2018-09-25 ENCOUNTER — TRANSCRIBE ORDERS (OUTPATIENT)
Dept: ADMINISTRATIVE | Facility: HOSPITAL | Age: 68
End: 2018-09-25

## 2018-09-25 VITALS
HEIGHT: 62 IN | SYSTOLIC BLOOD PRESSURE: 140 MMHG | WEIGHT: 246 LBS | BODY MASS INDEX: 45.27 KG/M2 | TEMPERATURE: 97.2 F | DIASTOLIC BLOOD PRESSURE: 90 MMHG

## 2018-09-25 DIAGNOSIS — M48.062 SPINAL STENOSIS, LUMBAR REGION, WITH NEUROGENIC CLAUDICATION: ICD-10-CM

## 2018-09-25 DIAGNOSIS — L03.312 CELLULITIS OF BACK: Primary | ICD-10-CM

## 2018-09-25 DIAGNOSIS — T81.89XS DELAYED SURGICAL WOUND HEALING, SEQUELA: Primary | ICD-10-CM

## 2018-09-25 PROCEDURE — 99024 POSTOP FOLLOW-UP VISIT: CPT | Performed by: PHYSICIAN ASSISTANT

## 2018-09-25 NOTE — PROGRESS NOTES
"Joanna Cross  1950 09/25/2018  [id number]    Chief Complaint   Patient presents with   • Wound Check   • Post-op       HPI:  Wound check.    Past Medical History:   Diagnosis Date   • Anemia    • Arthritis    • Atrial fibrillation (CMS/[facility])    • Chronic edema     bilateral   • Coronary artery disease involving native coronary artery of native heart without angina pectoris 3/1/2017   • Depression    • Diabetes mellitus (CMS/[facility])     type 2, checks fsbg 1-2x/day and as needed; last a1c 5-2018 6.5   • Essential hypertension 3/1/2017   • GERD (gastroesophageal reflux disease)    • GIB (gastrointestinal bleeding) 2016    d/t xarelto    • Hiatal hernia    • History of transfusion 2016    d/t GIB, no reaction per pt report    • Mixed hyperlipidemia 3/1/2017   • Myocardial infarction    • MILLI on CPAP    • Parkinson disease (CMS/[facility])    • Peripheral vascular disease (CMS/[facility]) 3/1/2017   • Skin cancer    • SSS (sick sinus syndrome) (CMS/[facility])     ppm    • TIA (transient ischemic attack)     no residual    • Varicose vein of leg     not spec of leg    • Venous hypertension    • Venous insufficiency    • Wears glasses        Allergies   Allergen Reactions   • Toprol Xl [Metoprolol Tartrate] Shortness Of Breath     Extreme fatigue   • Amlodipine Besylate Swelling     Lower extremity (ankles, feet) swelling   • Entacapone Other (See Comments)     \"extreme weakness in legs - caused several falls, which stopped after discontinuing this medication\"   • Levemir [Insulin Detemir] Hives     Hives / rash around injection site   • Xarelto [Rivaroxaban] GI Bleeding   • Bactrim [Sulfamethoxazole-Trimethoprim] Nausea Only     Headache    • Ciprofloxacin Diarrhea   • Cogentin [Benztropine] Other (See Comments)     \"uncontrollable body movements\"   • Compazine [Prochlorperazine Edisylate] Other (See Comments)     Dystonic reaction   • Duraprep [Antiseptic Products, Misc.] Itching and Rash     RASH AND ITCHING   • Haldol [Haloperidol " Lactate] Other (See Comments)     Dystonic reaction   • Hydralazine Other (See Comments)     Headache    • Hydrocodone-Acetaminophen Nausea And Vomiting and Dizziness     Headache    • Lisinopril Cough   • Penicillins Hives     Jitteriness    • Statins Myalgia     Leg pain   • Tarka [Trandolapril-Verapamil Hcl Er] Other (See Comments)     Constipation          Current Outpatient Prescriptions:   •  albuterol (VENTOLIN HFA) 108 (90 Base) MCG/ACT inhaler, Inhale 1 puff Every 4 (Four) Hours As Needed., Disp: , Rfl:   •  amantadine (SYMMETREL) 100 MG capsule, Take 100 mg by mouth 2 (Two) Times a Day., Disp: , Rfl:   •  apixaban (ELIQUIS) 5 MG tablet tablet, Take 1 tablet by mouth Every 12 (Twelve) Hours., Disp: 180 tablet, Rfl: 3  •  aspirin 81 MG EC tablet, Take 81 mg by mouth Daily., Disp: , Rfl:   •  B Complex-C (SUPER B COMPLEX PO), Take 1 tablet by mouth Daily., Disp: , Rfl:   •  B-D UF III MINI PEN NEEDLES 31G X 5 MM misc, , Disp: , Rfl:   •  bumetanide (BUMEX) 2 MG tablet, Take 2 mg by mouth 2 (Two) Times a Day., Disp: , Rfl:   •  Calcium Carbonate (CALTRATE 600 PO), Take 600 mg by mouth Daily., Disp: , Rfl:   •  carbidopa-levodopa (SINEMET)  MG per tablet, Take 2 tablets by mouth Every Morning. 2 tablets at 0600, 1 tablet at 0900, 1200, 1500, 1800, 2100, Disp: , Rfl:   •  carbonyl iron (FEOSOL) 45 MG tablet tablet, Take 1 tablet by mouth 2 (Two) Times a Day., Disp: , Rfl:   •  cephalexin (KEFLEX) 500 MG capsule, Take 1 capsule by mouth 4 (Four) Times a Day., Disp: 40 capsule, Rfl: 1  •  Cholecalciferol 2000 units tablet, Take 2,000 Units by mouth 2 (Two) Times a Day., Disp: , Rfl:   •  citalopram (CeleXA) 20 MG tablet, Take 20 mg by mouth every night at bedtime., Disp: , Rfl:   •  clobetasol (TEMOVATE) 0.05 % cream, Apply 1 application topically 2 (Two) Times a Day As Needed (Lichens Sclerosis)., Disp: , Rfl:   •  CloNIDine (CATAPRES) 0.1 MG tablet, Take 1 tablet by mouth Every 12 (Twelve) Hours., Disp:  180 tablet, Rfl: 3  •  Collagen-Boron-Hyaluronic Acid 10-5-3.3 MG tablet, Take 1 tablet by mouth Daily., Disp: , Rfl:   •  fluticasone (FLONASE) 50 MCG/ACT nasal spray, 2 sprays into each nostril Daily As Needed for Rhinitis., Disp: , Rfl:   •  insulin degludec (TRESIBA FLEXTOUCH) 100 UNIT/ML solution pen-injector injection, Inject 28 Units under the skin Every Night., Disp: , Rfl:   •  IPRATROPIUM BROMIDE NA, 1 spray into each nostril 2 (Two) Times a Day., Disp: , Rfl:   •  Loratadine 10 MG capsule, Take 10 mg by mouth Daily., Disp: , Rfl:   •  losartan (COZAAR) 50 MG tablet, Take 1 tablet by mouth Every 12 (Twelve) Hours., Disp: 180 tablet, Rfl: 3  •  metFORMIN (GLUCOPHAGE) 1000 MG tablet, Take 1,000 mg by mouth 2 (Two) Times a Day With Meals., Disp: , Rfl:   •  metOLazone (ZAROXOLYN) 2.5 MG tablet, TAKE 1 TABLET DAILY, Disp: 90 tablet, Rfl: 1  •  Mirabegron ER (MYRBETRIQ) 50 MG tablet sustained-release 24 hour 24 hr tablet, Take 50 mg by mouth Daily., Disp: , Rfl:   •  Multiple Vitamins-Minerals (CENTRUM ULTRA WOMENS PO), Take 1 tablet by mouth Daily., Disp: , Rfl:   •  nitroglycerin (NITROLINGUAL) 0.4 MG/SPRAY spray, Place 1 spray under the tongue Every 5 (Five) Minutes As Needed for Chest Pain., Disp: 1 each, Rfl: 6  •  omeprazole (priLOSEC) 40 MG capsule, Take 40 mg by mouth 2 (Two) Times a Day., Disp: , Rfl:   •  potassium chloride (MICRO-K) 10 MEQ CR capsule, Take 10 mEq by mouth 2 (Two) Times a Day., Disp: , Rfl:   •  pramipexole (MIRAPEX) 1.5 MG tablet, Take 1.5 mg by mouth Every Night., Disp: , Rfl:   •  ranolazine (RANEXA) 500 MG 12 hr tablet, Take 500 mg by mouth 2 (Two) Times a Day., Disp: , Rfl:   •  sennosides-docusate sodium (SENOKOT-S) 8.6-50 MG tablet, Take 1 tablet by mouth Daily., Disp: , Rfl:   •  spironolactone (ALDACTONE) 25 MG tablet, Take 1 tablet by mouth Daily., Disp: 90 tablet, Rfl: 1  •  SYNTHROID 200 MCG tablet, Take 200 mcg by mouth Daily., Disp: , Rfl:   •  traMADol (ULTRAM) 50 MG  tablet, Take 50 mg by mouth Every 8 (Eight) Hours As Needed for Moderate Pain ., Disp: , Rfl:   •  Ubiquinol (QUNOL COQ10/UBIQUINOL/JOSE) 100 MG capsule, Take 1 capsule by mouth Daily., Disp: , Rfl:   •  vitamin C (ASCORBIC ACID) 500 MG tablet, Take 500 mg by mouth Daily. Chewable tablet, Disp: , Rfl:     Social History     Social History   • Marital status:      Social History Main Topics   • Smoking status: Never Smoker   • Smokeless tobacco: Never Used   • Alcohol use Yes      Comment: occasional, nothing weekly    • Drug use: No   • Sexual activity: Defer     Other Topics Concern   • Not on file       Family History   Problem Relation Age of Onset   • Kidney disease Mother        Review of Systems   Constitutional: Negative for activity change, appetite change, chills, diaphoresis, fatigue, fever and unexpected weight change.   HENT: Negative for congestion, dental problem, drooling, ear discharge, ear pain, facial swelling, hearing loss, mouth sores, nosebleeds, postnasal drip, rhinorrhea, sinus pressure, sneezing, sore throat, tinnitus, trouble swallowing and voice change.    Eyes: Negative for photophobia, pain, discharge, redness, itching and visual disturbance.   Respiratory: Negative for apnea, cough, choking, chest tightness, shortness of breath, wheezing and stridor.    Cardiovascular: Negative for chest pain, palpitations and leg swelling.   Gastrointestinal: Negative for abdominal distention, abdominal pain, anal bleeding, blood in stool, constipation, diarrhea, nausea, rectal pain and vomiting.   Endocrine: Negative for cold intolerance, heat intolerance, polydipsia, polyphagia and polyuria.   Genitourinary: Negative for decreased urine volume, difficulty urinating, dysuria, enuresis, flank pain, frequency, genital sores, hematuria and urgency.   Musculoskeletal: Negative for arthralgias, back pain, gait problem, joint swelling, myalgias, neck pain and neck stiffness.   Skin: Positive for  "wound. Negative for color change, pallor and rash.   Allergic/Immunologic: Negative for environmental allergies, food allergies and immunocompromised state.   Neurological: Negative for dizziness, tremors, seizures, syncope, facial asymmetry, speech difficulty, weakness, light-headedness, numbness and headaches.   Hematological: Negative for adenopathy. Does not bruise/bleed easily.   Psychiatric/Behavioral: Negative for agitation, behavioral problems, confusion, decreased concentration, dysphoric mood, hallucinations, self-injury, sleep disturbance and suicidal ideas. The patient is not nervous/anxious and is not hyperactive.    All other systems reviewed and are negative.      PE:  Physical Exam  /90 (BP Location: Right arm, Patient Position: Sitting, Cuff Size: Adult)   Temp 97.2 °F (36.2 °C) (Temporal Artery )   Ht 157.5 cm (62.01\")   Wt 112 kg (246 lb)   LMP  (LMP Unknown)   BMI 44.98 kg/m²     Neurologic Exam  Ambulating with walker, no weakness in the legs.    MDM  Patient is doing well with her wound checks.  No significant debridement done today.  Her wound was cleaned, triple antibiotic ointment applied and a clean sterile dressing.  The patient will come by the office tomorrow after she sees infectious disease for wound check, cleaning and dressing.  I did provide a home health nursing referral to see the patient for wound cleaning, debridement if needed and dressings.    Alta Marc PA-C        "

## 2018-09-26 ENCOUNTER — OFFICE VISIT (OUTPATIENT)
Dept: NEUROSURGERY | Facility: CLINIC | Age: 68
End: 2018-09-26

## 2018-09-26 VITALS — WEIGHT: 246 LBS | BODY MASS INDEX: 45.27 KG/M2 | HEIGHT: 62 IN | TEMPERATURE: 98 F

## 2018-09-26 DIAGNOSIS — Z51.89 VISIT FOR WOUND CHECK: Primary | ICD-10-CM

## 2018-09-26 LAB — BACTERIA SPEC ANAEROBE CULT: NORMAL

## 2018-09-26 PROCEDURE — 99024 POSTOP FOLLOW-UP VISIT: CPT | Performed by: PHYSICIAN ASSISTANT

## 2018-09-26 NOTE — PROGRESS NOTES
Patient: Joanna Cross  : 1950  Chart #: [id number]    Date of Service: 2018    CHIEF COMPLAINT: Wound check     History of Present Illness Patient is s/p wound debridement and closure by Dr. García on 2018. She is here for wound check and dressing change. No changes since yesterdays follow up.       Review of Systems   Constitutional: Negative for activity change, appetite change, chills, diaphoresis, fatigue, fever and unexpected weight change.   HENT: Negative for congestion, dental problem, drooling, ear discharge, ear pain, facial swelling, hearing loss, mouth sores, nosebleeds, postnasal drip, rhinorrhea, sinus pressure, sneezing, sore throat, tinnitus, trouble swallowing and voice change.    Eyes: Negative for photophobia, pain, discharge, redness, itching and visual disturbance.   Respiratory: Negative for apnea, cough, choking, chest tightness, shortness of breath, wheezing and stridor.    Cardiovascular: Negative for chest pain, palpitations and leg swelling.   Gastrointestinal: Negative for abdominal distention, abdominal pain, anal bleeding, blood in stool, constipation, diarrhea, nausea, rectal pain and vomiting.   Endocrine: Negative for cold intolerance, heat intolerance, polydipsia, polyphagia and polyuria.   Genitourinary: Negative for decreased urine volume, difficulty urinating, dysuria, enuresis, flank pain, frequency, genital sores, hematuria and urgency.   Musculoskeletal: Negative for arthralgias, back pain, gait problem, joint swelling, myalgias, neck pain and neck stiffness.   Skin: Positive for wound (wound check ). Negative for color change, pallor and rash.   Allergic/Immunologic: Negative for environmental allergies, food allergies and immunocompromised state.   Neurological: Negative for dizziness, tremors, seizures, syncope, facial asymmetry, speech difficulty, weakness, light-headedness, numbness and headaches.   Hematological: Negative for adenopathy. Does not  "bruise/bleed easily.   Psychiatric/Behavioral: Negative for agitation, behavioral problems, confusion, decreased concentration, dysphoric mood, hallucinations, self-injury, sleep disturbance and suicidal ideas. The patient is not nervous/anxious and is not hyperactive.    All other systems reviewed and are negative.      Objective   Vital Signs: Temperature 98 °F (36.7 °C), temperature source Temporal Artery , height 157.5 cm (62.01\"), weight 112 kg (246 lb), not currently breastfeeding.  Physical Exam  Skin: nylon sutures are in place. No active dehiscence. Incision was cleaned and redressed.     Assessment/Plan   Medical Decision Making: Ms. Cross is here for a wound check and dressing change. There is little to no change from yesterdays exam.  Her incision was cleaned and redressed. She has been set up with home health but does not know when they are coming to her house yet. She is on campus daily right now seeing infectious disease. I can see her again for wound check and dressing change tomorrow if home health has not come out yet. Otherwise she will follow up next week.          Joanna was seen today for post-op.    Diagnoses and all orders for this visit:    Visit for wound check               Mackenzie Tello PA-C  Patient Care Team:  Reynaldo Fritz MD as PCP - General  Reynaldo Fritz MD as PCP - Family Medicine  Faisal Ramirez MD as Obstetrician (Obstetrics and Gynecology)  Timothy Dan DC as Referring Physician (Chiropractic Medicine)              "

## 2018-09-27 ENCOUNTER — HOSPITAL ENCOUNTER (OUTPATIENT)
Dept: GENERAL RADIOLOGY | Facility: HOSPITAL | Age: 68
Discharge: HOME | End: 2018-09-27
Attending: INTERNAL MEDICINE

## 2018-09-27 ENCOUNTER — HOSPITAL ENCOUNTER (OUTPATIENT)
Dept: INFUSION THERAPY | Facility: HOSPITAL | Age: 68
Discharge: HOME | End: 2018-09-27
Attending: INTERNAL MEDICINE | Admitting: INTERNAL MEDICINE

## 2018-09-27 VITALS
RESPIRATION RATE: 20 BRPM | SYSTOLIC BLOOD PRESSURE: 152 MMHG | BODY MASS INDEX: 45.27 KG/M2 | HEART RATE: 73 BPM | WEIGHT: 246 LBS | DIASTOLIC BLOOD PRESSURE: 67 MMHG | TEMPERATURE: 97.8 F | HEIGHT: 62 IN | OXYGEN SATURATION: 100 %

## 2018-09-27 DIAGNOSIS — L03.312 CELLULITIS OF BACK: ICD-10-CM

## 2018-09-27 PROCEDURE — C1894 INTRO/SHEATH, NON-LASER: HCPCS

## 2018-09-27 PROCEDURE — C1751 CATH, INF, PER/CENT/MIDLINE: HCPCS

## 2018-09-27 PROCEDURE — 71045 X-RAY EXAM CHEST 1 VIEW: CPT

## 2018-09-27 RX ORDER — SODIUM CHLORIDE 0.9 % (FLUSH) 0.9 %
1-10 SYRINGE (ML) INJECTION AS NEEDED
Status: DISCONTINUED | OUTPATIENT
Start: 2018-09-27 | End: 2018-09-29 | Stop reason: HOSPADM

## 2018-09-27 RX ORDER — SODIUM CHLORIDE 0.9 % (FLUSH) 0.9 %
1-10 SYRINGE (ML) INJECTION EVERY 12 HOURS SCHEDULED
Status: DISCONTINUED | OUTPATIENT
Start: 2018-09-27 | End: 2018-09-29 | Stop reason: HOSPADM

## 2018-10-01 ENCOUNTER — LAB (OUTPATIENT)
Dept: LAB | Facility: HOSPITAL | Age: 68
End: 2018-10-01

## 2018-10-01 ENCOUNTER — TRANSCRIBE ORDERS (OUTPATIENT)
Dept: LAB | Facility: HOSPITAL | Age: 68
End: 2018-10-01

## 2018-10-01 DIAGNOSIS — L03.319 CELLULITIS OF FLANK: ICD-10-CM

## 2018-10-01 DIAGNOSIS — L03.319 CELLULITIS OF FLANK: Primary | ICD-10-CM

## 2018-10-01 LAB
ALBUMIN SERPL-MCNC: 4.2 G/DL (ref 3.2–4.8)
ALBUMIN/GLOB SERPL: 2 G/DL (ref 1.5–2.5)
ALP SERPL-CCNC: 93 U/L (ref 25–100)
ALT SERPL W P-5'-P-CCNC: 7 U/L (ref 7–40)
ANION GAP SERPL CALCULATED.3IONS-SCNC: 7 MMOL/L (ref 3–11)
AST SERPL-CCNC: 18 U/L (ref 0–33)
BASOPHILS # BLD AUTO: 0.03 10*3/MM3 (ref 0–0.2)
BASOPHILS NFR BLD AUTO: 0.4 % (ref 0–1)
BILIRUB SERPL-MCNC: 0.4 MG/DL (ref 0.3–1.2)
BUN BLD-MCNC: 26 MG/DL (ref 9–23)
BUN/CREAT SERPL: 30.6 (ref 7–25)
CALCIUM SPEC-SCNC: 9.1 MG/DL (ref 8.7–10.4)
CHLORIDE SERPL-SCNC: 97 MMOL/L (ref 99–109)
CK SERPL-CCNC: 61 U/L (ref 26–174)
CO2 SERPL-SCNC: 29 MMOL/L (ref 20–31)
CREAT BLD-MCNC: 0.85 MG/DL (ref 0.6–1.3)
CRP SERPL-MCNC: 0.89 MG/DL (ref 0–1)
DEPRECATED RDW RBC AUTO: 46.3 FL (ref 37–54)
EOSINOPHIL # BLD AUTO: 0.23 10*3/MM3 (ref 0–0.3)
EOSINOPHIL NFR BLD AUTO: 3 % (ref 0–3)
ERYTHROCYTE [DISTWIDTH] IN BLOOD BY AUTOMATED COUNT: 14.4 % (ref 11.3–14.5)
ERYTHROCYTE [SEDIMENTATION RATE] IN BLOOD: 22 MM/HR (ref 0–30)
GFR SERPL CREATININE-BSD FRML MDRD: 66 ML/MIN/1.73
GLOBULIN UR ELPH-MCNC: 2.1 GM/DL
GLUCOSE BLD-MCNC: 142 MG/DL (ref 70–100)
HCT VFR BLD AUTO: 35.2 % (ref 34.5–44)
HGB BLD-MCNC: 11.4 G/DL (ref 11.5–15.5)
IMM GRANULOCYTES # BLD: 0.03 10*3/MM3 (ref 0–0.03)
IMM GRANULOCYTES NFR BLD: 0.4 % (ref 0–0.6)
LYMPHOCYTES # BLD AUTO: 1.28 10*3/MM3 (ref 0.6–4.8)
LYMPHOCYTES NFR BLD AUTO: 16.8 % (ref 24–44)
MCH RBC QN AUTO: 28.6 PG (ref 27–31)
MCHC RBC AUTO-ENTMCNC: 32.4 G/DL (ref 32–36)
MCV RBC AUTO: 88.4 FL (ref 80–99)
MONOCYTES # BLD AUTO: 0.49 10*3/MM3 (ref 0–1)
MONOCYTES NFR BLD AUTO: 6.4 % (ref 0–12)
NEUTROPHILS # BLD AUTO: 5.59 10*3/MM3 (ref 1.5–8.3)
NEUTROPHILS NFR BLD AUTO: 73.4 % (ref 41–71)
PLATELET # BLD AUTO: 226 10*3/MM3 (ref 150–450)
PMV BLD AUTO: 9.6 FL (ref 6–12)
POTASSIUM BLD-SCNC: 3.9 MMOL/L (ref 3.5–5.5)
PROT SERPL-MCNC: 6.3 G/DL (ref 5.7–8.2)
RBC # BLD AUTO: 3.98 10*6/MM3 (ref 3.89–5.14)
SODIUM BLD-SCNC: 133 MMOL/L (ref 132–146)
WBC NRBC COR # BLD: 7.62 10*3/MM3 (ref 3.5–10.8)

## 2018-10-01 PROCEDURE — 80053 COMPREHEN METABOLIC PANEL: CPT

## 2018-10-01 PROCEDURE — 85025 COMPLETE CBC W/AUTO DIFF WBC: CPT

## 2018-10-01 PROCEDURE — 86140 C-REACTIVE PROTEIN: CPT | Performed by: INTERNAL MEDICINE

## 2018-10-01 PROCEDURE — 82550 ASSAY OF CK (CPK): CPT

## 2018-10-01 PROCEDURE — 36415 COLL VENOUS BLD VENIPUNCTURE: CPT | Performed by: INTERNAL MEDICINE

## 2018-10-04 ENCOUNTER — OFFICE VISIT (OUTPATIENT)
Dept: NEUROSURGERY | Facility: CLINIC | Age: 68
End: 2018-10-04

## 2018-10-04 VITALS — WEIGHT: 240 LBS | TEMPERATURE: 98 F | HEIGHT: 62 IN | BODY MASS INDEX: 44.16 KG/M2

## 2018-10-04 DIAGNOSIS — Z98.890 S/P LUMBAR LAMINECTOMY: ICD-10-CM

## 2018-10-04 DIAGNOSIS — T81.89XS DELAYED SURGICAL WOUND HEALING, SEQUELA: ICD-10-CM

## 2018-10-04 DIAGNOSIS — Z51.89 VISIT FOR WOUND CHECK: Primary | ICD-10-CM

## 2018-10-04 PROCEDURE — 99024 POSTOP FOLLOW-UP VISIT: CPT | Performed by: NEUROLOGICAL SURGERY

## 2018-10-04 NOTE — PROGRESS NOTES
Subjective   Joanna Cross is a 67 y.o. female who presents for follow up of  lumbar wound debridement and closure on 9/9/18.     The patient had a lumbar laminectomy with postoperative subcutaneous wound dehiscence that was closed surgically after debridement 3 weeks ago on 9/9/18.  Wound culture grew MRSA.  She has been treated with IV daptomycin since discharge.  Her CRP and ESR are both normal.    The following portions of the patient's history were reviewed, updated as appropriate and approved: allergies, current medications, past family history, past medical history, past social history, past surgical history, review of systems and problem list.     Review of Systems   Constitutional: Negative for activity change, appetite change, chills, diaphoresis, fatigue, fever and unexpected weight change.   HENT: Negative for congestion, dental problem, drooling, ear discharge, ear pain, facial swelling, hearing loss, mouth sores, nosebleeds, postnasal drip, rhinorrhea, sinus pressure, sneezing, sore throat, tinnitus, trouble swallowing and voice change.    Eyes: Negative for photophobia, pain, discharge, redness, itching and visual disturbance.   Respiratory: Negative for apnea, cough, choking, chest tightness, shortness of breath, wheezing and stridor.    Cardiovascular: Negative for chest pain, palpitations and leg swelling.   Gastrointestinal: Negative for abdominal distention, abdominal pain, anal bleeding, blood in stool, constipation, diarrhea, nausea, rectal pain and vomiting.   Endocrine: Negative for cold intolerance, heat intolerance, polydipsia, polyphagia and polyuria.   Genitourinary: Negative for decreased urine volume, difficulty urinating, dysuria, enuresis, flank pain, frequency, genital sores, hematuria and urgency.   Musculoskeletal: Positive for back pain. Negative for arthralgias, gait problem, joint swelling, myalgias, neck pain and neck stiffness.   Skin: Positive for wound. Negative for color  change, pallor and rash.   Allergic/Immunologic: Negative for environmental allergies, food allergies and immunocompromised state.   Neurological: Negative for dizziness, tremors, seizures, syncope, facial asymmetry, speech difficulty, weakness, light-headedness, numbness and headaches.   Hematological: Negative for adenopathy. Does not bruise/bleed easily.   Psychiatric/Behavioral: Negative for agitation, behavioral problems, confusion, decreased concentration, dysphoric mood, hallucinations, self-injury, sleep disturbance and suicidal ideas. The patient is not nervous/anxious and is not hyperactive.    All other systems reviewed and are negative.      Objective    NEUROLOGICAL EXAMINATION:    The lumbar incision is closed with the exception of one very small point in the mid to upper incision where there is a whitish area with some minimal discharge.  Nylon sutures are in place and are beginning to get buried by skin overgrowth.    Assessment   3 weeks postop lumbar wound debridement and reclosure.  MRSA culture from the wound.  Currently on IV antibiotic therapy.       Plan   Plan to leave the sutures in 1 more week and see her in the office for removal.  She will be seeing infectious disease today for the decision whether to continue her antibiotic therapy.       Ritchie García MD

## 2018-10-05 RX ORDER — CLONIDINE HYDROCHLORIDE 0.1 MG/1
TABLET ORAL
Qty: 180 TABLET | Refills: 3 | Status: SHIPPED | OUTPATIENT
Start: 2018-10-05 | End: 2019-10-25 | Stop reason: SDUPTHER

## 2018-10-05 RX ORDER — LOSARTAN POTASSIUM 50 MG/1
TABLET ORAL
Qty: 180 TABLET | Refills: 3 | Status: SHIPPED | OUTPATIENT
Start: 2018-10-05 | End: 2019-11-22 | Stop reason: SDUPTHER

## 2018-10-08 ENCOUNTER — TRANSCRIBE ORDERS (OUTPATIENT)
Dept: LAB | Facility: HOSPITAL | Age: 68
End: 2018-10-08

## 2018-10-08 ENCOUNTER — APPOINTMENT (OUTPATIENT)
Dept: LAB | Facility: HOSPITAL | Age: 68
End: 2018-10-08

## 2018-10-08 DIAGNOSIS — B95.61 STAPHYLOCOCCUS AUREUS SUPERFICIAL FOLLICULITIS: Primary | ICD-10-CM

## 2018-10-08 DIAGNOSIS — L03.319 CELLULITIS OF FLANK: ICD-10-CM

## 2018-10-08 DIAGNOSIS — L73.9 STAPHYLOCOCCUS AUREUS SUPERFICIAL FOLLICULITIS: Primary | ICD-10-CM

## 2018-10-08 LAB
ALBUMIN SERPL-MCNC: 4.18 G/DL (ref 3.2–4.8)
ALBUMIN/GLOB SERPL: 2.1 G/DL (ref 1.5–2.5)
ALP SERPL-CCNC: 92 U/L (ref 25–100)
ALT SERPL W P-5'-P-CCNC: 9 U/L (ref 7–40)
ANION GAP SERPL CALCULATED.3IONS-SCNC: 5 MMOL/L (ref 3–11)
AST SERPL-CCNC: 21 U/L (ref 0–33)
BASOPHILS # BLD AUTO: 0.04 10*3/MM3 (ref 0–0.2)
BASOPHILS NFR BLD AUTO: 0.6 % (ref 0–1)
BILIRUB SERPL-MCNC: 0.3 MG/DL (ref 0.3–1.2)
BUN BLD-MCNC: 21 MG/DL (ref 9–23)
BUN/CREAT SERPL: 27.3 (ref 7–25)
CALCIUM SPEC-SCNC: 8.9 MG/DL (ref 8.7–10.4)
CHLORIDE SERPL-SCNC: 93 MMOL/L (ref 99–109)
CK SERPL-CCNC: 103 U/L (ref 26–174)
CO2 SERPL-SCNC: 32 MMOL/L (ref 20–31)
CREAT BLD-MCNC: 0.77 MG/DL (ref 0.6–1.3)
CRP SERPL-MCNC: 0.86 MG/DL (ref 0–1)
DEPRECATED RDW RBC AUTO: 46.1 FL (ref 37–54)
EOSINOPHIL # BLD AUTO: 0.27 10*3/MM3 (ref 0–0.3)
EOSINOPHIL NFR BLD AUTO: 3.8 % (ref 0–3)
ERYTHROCYTE [DISTWIDTH] IN BLOOD BY AUTOMATED COUNT: 14.1 % (ref 11.3–14.5)
ERYTHROCYTE [SEDIMENTATION RATE] IN BLOOD: 24 MM/HR (ref 0–30)
GFR SERPL CREATININE-BSD FRML MDRD: 74 ML/MIN/1.73
GLOBULIN UR ELPH-MCNC: 2 GM/DL
GLUCOSE BLD-MCNC: 178 MG/DL (ref 70–100)
HCT VFR BLD AUTO: 35.1 % (ref 34.5–44)
HGB BLD-MCNC: 11.5 G/DL (ref 11.5–15.5)
IMM GRANULOCYTES # BLD: 0.02 10*3/MM3 (ref 0–0.03)
IMM GRANULOCYTES NFR BLD: 0.3 % (ref 0–0.6)
LYMPHOCYTES # BLD AUTO: 1.12 10*3/MM3 (ref 0.6–4.8)
LYMPHOCYTES NFR BLD AUTO: 15.6 % (ref 24–44)
MCH RBC QN AUTO: 29.1 PG (ref 27–31)
MCHC RBC AUTO-ENTMCNC: 32.8 G/DL (ref 32–36)
MCV RBC AUTO: 88.9 FL (ref 80–99)
MONOCYTES # BLD AUTO: 0.53 10*3/MM3 (ref 0–1)
MONOCYTES NFR BLD AUTO: 7.4 % (ref 0–12)
NEUTROPHILS # BLD AUTO: 5.19 10*3/MM3 (ref 1.5–8.3)
NEUTROPHILS NFR BLD AUTO: 72.3 % (ref 41–71)
PLATELET # BLD AUTO: 199 10*3/MM3 (ref 150–450)
PMV BLD AUTO: 9.8 FL (ref 6–12)
POTASSIUM BLD-SCNC: 3.7 MMOL/L (ref 3.5–5.5)
PROT SERPL-MCNC: 6.2 G/DL (ref 5.7–8.2)
RBC # BLD AUTO: 3.95 10*6/MM3 (ref 3.89–5.14)
SODIUM BLD-SCNC: 130 MMOL/L (ref 132–146)
WBC NRBC COR # BLD: 7.17 10*3/MM3 (ref 3.5–10.8)

## 2018-10-08 PROCEDURE — 36415 COLL VENOUS BLD VENIPUNCTURE: CPT | Performed by: INTERNAL MEDICINE

## 2018-10-08 PROCEDURE — 82550 ASSAY OF CK (CPK): CPT | Performed by: INTERNAL MEDICINE

## 2018-10-08 PROCEDURE — 85652 RBC SED RATE AUTOMATED: CPT | Performed by: INTERNAL MEDICINE

## 2018-10-08 PROCEDURE — 86140 C-REACTIVE PROTEIN: CPT | Performed by: INTERNAL MEDICINE

## 2018-10-08 PROCEDURE — 80053 COMPREHEN METABOLIC PANEL: CPT | Performed by: INTERNAL MEDICINE

## 2018-10-08 PROCEDURE — 85025 COMPLETE CBC W/AUTO DIFF WBC: CPT | Performed by: INTERNAL MEDICINE

## 2018-10-11 ENCOUNTER — OFFICE VISIT (OUTPATIENT)
Dept: NEUROSURGERY | Facility: CLINIC | Age: 68
End: 2018-10-11

## 2018-10-11 ENCOUNTER — OFFICE VISIT (OUTPATIENT)
Dept: PULMONOLOGY | Facility: CLINIC | Age: 68
End: 2018-10-11

## 2018-10-11 VITALS
DIASTOLIC BLOOD PRESSURE: 84 MMHG | WEIGHT: 247 LBS | SYSTOLIC BLOOD PRESSURE: 146 MMHG | HEIGHT: 62 IN | HEART RATE: 78 BPM | OXYGEN SATURATION: 95 % | BODY MASS INDEX: 45.45 KG/M2 | TEMPERATURE: 98.6 F

## 2018-10-11 VITALS — HEIGHT: 62 IN | TEMPERATURE: 98 F | WEIGHT: 247 LBS | BODY MASS INDEX: 45.45 KG/M2

## 2018-10-11 DIAGNOSIS — Z87.09 HISTORY OF RESPIRATORY FAILURE: ICD-10-CM

## 2018-10-11 DIAGNOSIS — I48.20 CHRONIC ATRIAL FIBRILLATION (HCC): ICD-10-CM

## 2018-10-11 DIAGNOSIS — G47.33 OSA TREATED WITH BIPAP: ICD-10-CM

## 2018-10-11 DIAGNOSIS — R06.02 SOB (SHORTNESS OF BREATH): Primary | ICD-10-CM

## 2018-10-11 DIAGNOSIS — K21.9 GASTROESOPHAGEAL REFLUX DISEASE, ESOPHAGITIS PRESENCE NOT SPECIFIED: ICD-10-CM

## 2018-10-11 DIAGNOSIS — M48.062 SPINAL STENOSIS, LUMBAR REGION, WITH NEUROGENIC CLAUDICATION: Primary | ICD-10-CM

## 2018-10-11 PROCEDURE — 94375 RESPIRATORY FLOW VOLUME LOOP: CPT | Performed by: INTERNAL MEDICINE

## 2018-10-11 PROCEDURE — 94726 PLETHYSMOGRAPHY LUNG VOLUMES: CPT | Performed by: INTERNAL MEDICINE

## 2018-10-11 PROCEDURE — 99205 OFFICE O/P NEW HI 60 MIN: CPT | Performed by: INTERNAL MEDICINE

## 2018-10-11 PROCEDURE — 94729 DIFFUSING CAPACITY: CPT | Performed by: INTERNAL MEDICINE

## 2018-10-11 PROCEDURE — 99024 POSTOP FOLLOW-UP VISIT: CPT | Performed by: NEUROLOGICAL SURGERY

## 2018-10-11 RX ORDER — DOXYCYCLINE 100 MG/1
CAPSULE ORAL
COMMUNITY
Start: 2018-10-08 | End: 2018-11-14

## 2018-10-11 NOTE — PROGRESS NOTES
"Initial Pulmonary Consult Note    Subjective   Reason for consultation: Dyspnea    Joanna Cross is a 67 y.o. female is being seen for consultation today at the request of Reynaldo Fritz MD    History of Present Illness   67 y.o. female lifetime non-smoker who previously saw Dr. Alegria in 2006 for dyspnea.      Patient reports she is here because she needs to have a pulmonogist.  She reports she was admitted to [facility] in October 2017 for pneumonia.  She apparently had \"bleeding in her lungs\" and interstitial lung disease.  She apparently had a surgical lung biopsy which was sent to the [facility].  I reviewed the pathologist report and the findings were basically non-specific with some early organizing pneumonia, some hemmorrhage but the pathologist could not specifically identify and etiology.   She was sent to [facility] where she was on a ventilator for a few day.  She also reports some issues with bradycardia.  A pacemaker was placed.  She eventually reports she was told her heart was actually the issue with pulmonary congestion.  She reports she was not discharged on steroids or any other immunosuppressant medication.      Currently, the patient report she had exertional dyspnea.  She has a history of Parkinson's and reports her legs get weak but she also has exertional dyspnea and reports significant sweating with exertion.  She denies wheezing but reports her children have told her she wheezes.  She has sleep apnea and is on BiPAP at night.  Her last sleep study was in June 2018.  She reports her current settings are 18/14 with 2L oxygen.      She reports a recent MRSA infection in her spine after spinal surgery.  She is currently on doxycycline and is followed by Dr. Jauregui.      Patient has had Prevnar 13, recent pneumonia vaccine and a flu shot.      The following portions of the patient's history were reviewed and updated as appropriate: allergies, current medications, past family " history, past medical history, past social history, past surgical history and problem list.    Active Ambulatory Problems     Diagnosis Date Noted   • Coronary artery disease involving native coronary artery of native heart without angina pectoris 03/01/2017   • Essential hypertension 03/01/2017   • Peripheral vascular disease (CMS/Formerly [facility]) 03/01/2017   • Hyperlipidemia LDL goal <70 03/01/2017   • Chronic atrial fibrillation (CMS/Formerly [facility]) 03/01/2017   • Spinal stenosis, lumbar region, with neurogenic claudication 07/06/2018   • Diabetes mellitus (CMS/Formerly [facility]) 08/15/2018   • Delayed surgical wound healing 09/13/2018   • Biceps tendinitis 02/29/2016   • Overactive bladder 12/02/2015   • GERD (gastroesophageal reflux disease) 10/26/1994   • Hypothyroidism 10/05/1987   • Lichen sclerosus 04/23/1982   • Parkinson's disease (CMS/Formerly [facility]) 12/02/2015   • MILLI treated with BiPAP - Patient reports compliance.  10/11/2018   • History of respiratory failure - prior respiratory failure requiring mechanical ventilation, open lung biopsy non-specific.  10/11/2018   • SOB (shortness of breath) 10/11/2018     Resolved Ambulatory Problems     Diagnosis Date Noted   • No Resolved Ambulatory Problems     Past Medical History:   Diagnosis Date   • Anemia    • Arthritis    • Atrial fibrillation (CMS/[facility])    • Chronic edema    • Coronary artery disease involving native coronary artery of native heart without angina pectoris 3/1/2017   • Depression    • Diabetes mellitus (CMS/HCC)    • Essential hypertension 3/1/2017   • GERD (gastroesophageal reflux disease)    • GIB (gastrointestinal bleeding) 2016   • Hiatal hernia    • History of transfusion 2016   • Mixed hyperlipidemia 3/1/2017   • Myocardial infarction (CMS/HCC)    • MILLI on CPAP    • Parkinson disease (CMS/[facility])    • Peripheral vascular disease (CMS/[facility]) 3/1/2017   • Skin cancer    • SSS (sick sinus syndrome) (CMS/Formerly [facility])    • TIA (transient ischemic attack)    • Varicose vein of leg    • Venous  hypertension    • Venous insufficiency    • Wears glasses        Past Surgical History:   Procedure Laterality Date   • BICEPS TENDON REPAIR Right     shoulder   • BREAST BIOPSY Left 2004   • BUNIONECTOMY Right    • CARDIAC CATHETERIZATION     • CHOLECYSTECTOMY     • COLONOSCOPY  2016   • CORONARY STENT PLACEMENT     • CYST REMOVAL      left ear, upper left back 2001   • DIAGNOSTIC LAPAROSCOPY  1981   • ENDOSCOPIC FUNCTIONAL SINUS SURGERY (FESS)  2011   • ENDOSCOPY  2016   • HAMMER TOE REPAIR Left    • LUMBAR LAMINECTOMY DISCECTOMY DECOMPRESSION N/A 7/6/2018    Procedure: LUMBAR LAMINECTOMY L4-5, HEMILAMIINECTOMY RIGHT L5-S1, FORAMINOTOMY L5-S1;  Surgeon: Lencho Gamino MD;  Location:  [facility];  Service: Neurosurgery   • LUMBAR LAMINECTOMY DISCECTOMY DECOMPRESSION N/A 9/19/2018    Procedure: INCISION AND DRAINAGE BACK WITH WOUND EXPLORATION;  Surgeon: Ritchie García MD;  Location:  [facility];  Service: Neurosurgery   • LUNG BIOPSY Left 2016   • PACEMAKER IMPLANTATION  11/2016    sss   • REPLACEMENT TOTAL KNEE Bilateral     left knee 2011, right 2012 per dr acuna    • SHOULDER ARTHROSCOPY Bilateral     2003-left, 2004- right    • SKIN BIOPSY      skin cancer back 2016   • TUBAL ABDOMINAL LIGATION  1980   • WISDOM TOOTH EXTRACTION         Family History   Problem Relation Age of Onset   • Kidney disease Mother        Social History     Social History   • Marital status:      Spouse name: N/A   • Number of children: N/A   • Years of education: N/A     Occupational History   • Not on file.     Social History Main Topics   • Smoking status: Never Smoker   • Smokeless tobacco: Never Used   • Alcohol use Yes      Comment: occasional, nothing weekly    • Drug use: No   • Sexual activity: Defer     Other Topics Concern   • Not on file     Social History Narrative   • No narrative on file       Allergies   Allergen Reactions   • Toprol Xl [Metoprolol Tartrate] Shortness Of Breath     Extreme fatigue   • Amlodipine  "Besylate Swelling     Lower extremity (ankles, feet) swelling   • Entacapone Other (See Comments)     \"extreme weakness in legs - caused several falls, which stopped after discontinuing this medication\"   • Levemir [Insulin Detemir] Hives     Hives / rash around injection site   • Xarelto [Rivaroxaban] GI Bleeding   • Bactrim [Sulfamethoxazole-Trimethoprim] Nausea Only     Headache    • Ciprofloxacin Diarrhea   • Cogentin [Benztropine] Other (See Comments)     \"uncontrollable body movements\"   • Compazine [Prochlorperazine Edisylate] Other (See Comments)     Dystonic reaction   • Duraprep [Antiseptic Products, Misc.] Itching and Rash     RASH AND ITCHING   • Haldol [Haloperidol Lactate] Other (See Comments)     Dystonic reaction   • Hydralazine Other (See Comments)     Headache    • Hydrocodone-Acetaminophen Nausea And Vomiting and Dizziness     Headache    • Lisinopril Cough   • Penicillins Hives     Jitteriness    • Statins Myalgia     Leg pain   • Tarka [Trandolapril-Verapamil Hcl Er] Other (See Comments)     Constipation        Current Outpatient Prescriptions   Medication Sig Dispense Refill   • albuterol (VENTOLIN HFA) 108 (90 Base) MCG/ACT inhaler Inhale 1 puff Every 4 (Four) Hours As Needed.     • amantadine (SYMMETREL) 100 MG capsule Take 100 mg by mouth 2 (Two) Times a Day.     • apixaban (ELIQUIS) 5 MG tablet tablet Take 1 tablet by mouth Every 12 (Twelve) Hours. 180 tablet 3   • aspirin 81 MG EC tablet Take 81 mg by mouth Daily.     • B Complex-C (SUPER B COMPLEX PO) Take 1 tablet by mouth Daily.     • B-D UF III MINI PEN NEEDLES 31G X 5 MM misc      • bumetanide (BUMEX) 2 MG tablet Take 2 mg by mouth 2 (Two) Times a Day.     • Calcium Carbonate (CALTRATE 600 PO) Take 600 mg by mouth Daily.     • carbidopa-levodopa (SINEMET)  MG per tablet Take 2 tablets by mouth Every Morning. 2 tablets at 0600, 1 tablet at 0900, 1200, 1500, 1800, 2100     • carbonyl iron (FEOSOL) 45 MG tablet tablet Take 1 tablet " by mouth 2 (Two) Times a Day.     • cephalexin (KEFLEX) 500 MG capsule Take 1 capsule by mouth 4 (Four) Times a Day. 40 capsule 1   • Cholecalciferol 2000 units tablet Take 2,000 Units by mouth 2 (Two) Times a Day.     • citalopram (CeleXA) 20 MG tablet Take 20 mg by mouth every night at bedtime.     • clobetasol (TEMOVATE) 0.05 % cream Apply 1 application topically 2 (Two) Times a Day As Needed (Lichens Sclerosis).     • CloNIDine (CATAPRES) 0.1 MG tablet TAKE 1 TABLET EVERY 12 HOURS 180 tablet 3   • Collagen-Boron-Hyaluronic Acid 10-5-3.3 MG tablet Take 1 tablet by mouth Daily.     • doxycycline (MONODOX) 100 MG capsule      • fluticasone (FLONASE) 50 MCG/ACT nasal spray 2 sprays into each nostril Daily As Needed for Rhinitis.     • insulin degludec (TRESIBA FLEXTOUCH) 100 UNIT/ML solution pen-injector injection Inject 28 Units under the skin Every Night.     • IPRATROPIUM BROMIDE NA 1 spray into each nostril 2 (Two) Times a Day.     • Loratadine 10 MG capsule Take 10 mg by mouth Daily.     • losartan (COZAAR) 50 MG tablet TAKE 1 TABLET EVERY 12 HOURS 180 tablet 3   • metFORMIN (GLUCOPHAGE) 1000 MG tablet Take 1,000 mg by mouth 2 (Two) Times a Day With Meals.     • metOLazone (ZAROXOLYN) 2.5 MG tablet TAKE 1 TABLET DAILY 90 tablet 1   • Mirabegron ER (MYRBETRIQ) 50 MG tablet sustained-release 24 hour 24 hr tablet Take 50 mg by mouth Daily.     • Multiple Vitamins-Minerals (CENTRUM ULTRA WOMENS PO) Take 1 tablet by mouth Daily.     • nitroglycerin (NITROLINGUAL) 0.4 MG/SPRAY spray Place 1 spray under the tongue Every 5 (Five) Minutes As Needed for Chest Pain. 1 each 6   • omeprazole (priLOSEC) 40 MG capsule Take 40 mg by mouth 2 (Two) Times a Day.     • potassium chloride (MICRO-K) 10 MEQ CR capsule Take 10 mEq by mouth 2 (Two) Times a Day.     • pramipexole (MIRAPEX) 1.5 MG tablet Take 1.5 mg by mouth Every Night.     • ranolazine (RANEXA) 500 MG 12 hr tablet Take 500 mg by mouth 2 (Two) Times a Day.     •  sennosides-docusate sodium (SENOKOT-S) 8.6-50 MG tablet Take 1 tablet by mouth Daily.     • spironolactone (ALDACTONE) 25 MG tablet Take 1 tablet by mouth Daily. 90 tablet 1   • SYNTHROID 200 MCG tablet Take 200 mcg by mouth Daily.     • traMADol (ULTRAM) 50 MG tablet Take 50 mg by mouth Every 8 (Eight) Hours As Needed for Moderate Pain .     • Ubiquinol (QUNOL COQ10/UBIQUINOL/JOSE) 100 MG capsule Take 1 capsule by mouth Daily.     • vitamin C (ASCORBIC ACID) 500 MG tablet Take 500 mg by mouth Daily. Chewable tablet       No current facility-administered medications for this visit.        Review of Systems   Constitutional: Positive for diaphoresis. Negative for activity change, appetite change, chills, fatigue, fever and unexpected weight change.   HENT: Positive for postnasal drip. Negative for congestion, dental problem, ear pain, hearing loss, nosebleeds, rhinorrhea and sinus pressure.    Eyes: Positive for itching. Negative for pain, discharge, redness and visual disturbance.   Respiratory: Negative for apnea, cough, choking, chest tightness, shortness of breath, wheezing and stridor.    Cardiovascular: Negative for chest pain, palpitations and leg swelling.   Gastrointestinal: Negative for abdominal distention, abdominal pain, blood in stool, constipation, diarrhea, nausea and vomiting.   Endocrine: Negative for cold intolerance, heat intolerance, polydipsia and polyuria.   Genitourinary: Positive for frequency. Negative for dysuria, hematuria and urgency.   Musculoskeletal: Positive for arthralgias and myalgias. Negative for joint swelling.   Skin: Negative for color change, rash and wound.   Allergic/Immunologic: Negative for environmental allergies and immunocompromised state.   Neurological: Negative for dizziness, syncope, weakness, light-headedness, numbness and headaches.   Psychiatric/Behavioral: Positive for dysphoric mood. Negative for sleep disturbance and suicidal ideas. The patient is  "nervous/anxious.    All other systems reviewed and are negative.    All other systems were reviewed and are negative.  Exceptions are included in the HPI or as otherwise noted above.     Objective   Blood pressure 146/84, pulse 78, temperature 98.6 °F (37 °C), height 157.5 cm (62\"), weight 112 kg (247 lb), SpO2 95 %, not currently breastfeeding. Body mass index is 45.18 kg/m².    Physical Exam   Constitutional: She is oriented to person, place, and time. She appears well-developed and well-nourished.   HENT:   Head: Normocephalic and atraumatic.   Right Ear: External ear normal.   Left Ear: External ear normal.   Nose: Nose normal.   Mouth/Throat: Oropharynx is clear and moist. No oropharyngeal exudate.   Eyes: Pupils are equal, round, and reactive to light. Conjunctivae and EOM are normal. Right eye exhibits no discharge. Left eye exhibits no discharge. No scleral icterus.   Neck: Normal range of motion. Neck supple. No JVD present. No tracheal deviation present. No thyromegaly present.   Cardiovascular: Normal rate, regular rhythm, normal heart sounds and intact distal pulses.  Exam reveals no gallop and no friction rub.    No murmur heard.  Pulmonary/Chest: Effort normal. No accessory muscle usage or stridor. No apnea, no tachypnea and no bradypnea. No respiratory distress. She has no decreased breath sounds. She has no wheezes. She has no rhonchi. She has rales in the right lower field and the left lower field. Chest wall is not dull to percussion. She exhibits no tenderness, no bony tenderness, no crepitus and no deformity.   Abdominal: Soft. Bowel sounds are normal. She exhibits no distension. There is no tenderness.   Musculoskeletal: Normal range of motion. She exhibits edema ( trace bilateral lower extremity). She exhibits no tenderness or deformity.   Lymphadenopathy:     She has no cervical adenopathy.   Neurological: She is alert and oriented to person, place, and time. No cranial nerve deficit. She " exhibits normal muscle tone. Coordination normal.   Skin: Skin is warm and dry. No rash noted. No erythema. No pallor.   Psychiatric: She has a normal mood and affect. Her behavior is normal. Judgment and thought content normal.   Vitals reviewed.      PFTs:  Full pulmonary function testing was done today.  Please see scanned PFT report for details.    Interpretation: No obstruction.  Low maximal voluntary ventilation which is possibly effort related.  No restriction.  No air trapping.  Low DLCO which corrects when adjusted for alveolar ventilated volumes.  No prior study is available for comparison.    Imaging:  Chest x-ray PA and lateral    Study date: October 11, 2018    Indication: Dyspnea    Comparison: Prior chest x-ray from September 27, 2018    Interpretation: Trachea is midline.  Main grayson is sharp.  There is borderline cardiomegaly.  There is a dual lead pacemaker in place.  Right upper extremity PICC line has been removed.  There is no pleural effusion or pneumothorax.  No infiltrate or mass.  Other than removal of the right upper extremity PICC line, there has been no significant interval change from prior study.    Impression: No acute radiographic chest abnormality.  Interval removal of the right upper extremity PICC line.    Assessment/Plan   Problems Addressed this Visit        Cardiovascular and Mediastinum    Chronic atrial fibrillation (CMS/HCC)       Respiratory    MILLI treated with BiPAP - Patient reports compliance.     SOB (shortness of breath) - Primary    Relevant Orders    XR Chest PA & Lateral    Pulmonary Function Test (Completed)       Digestive    GERD (gastroesophageal reflux disease)       Other    History of respiratory failure - prior respiratory failure requiring mechanical ventilation, open lung biopsy non-specific.           Discussion:  67-year-old female who is here to establish care.  She has a history of respiratory failure in the past and an open lung biopsy.  This was  apparently done and [facility] although she was transferred to [facility] post procedure.  Biopsies showed nonspecific changes with some early organization and also some evidence of pulmonary hemorrhage, although in the end it was felt that her findings were likely secondary to heart congestion in the setting of arrhythmias and heart failure.    The patient is here to establish care.  She has complained of some mild exertional dyspnea.  Evaluation today shows near normal pulmonary function testing and no significant abnormality on her chest x-ray other than some mild cardiomegaly.    The patient is up-to-date on her immunizations.  She is compliant with BiPAP for obstructive sleep apnea.  She does want us to follow her for her obstructive sleep apnea.    I reviewed a very large stack of paperwork which included, of pertinence, the patient's prior surgical biopsy note, pathology results including [facility] referral, sleep study titration report, and prior pulmonary notes.  Pertinent information from this review is in the above note.    Plan:  1.  Full pulmonary function testing (done).  2.  PA and lateral chest x-ray (done).  3.  Keep up-to-date on immunizations.  4.  Management of cardiac issues per Dr. Corona.  5.  I have asked the patient to bring the chip from her BiPAP machine with her to her follow-up appointment.  6.  Follow-up in one year.         I personally spent over half of a total 60 minutes face to face with the patient in counseling and discussion and/or coordination of care as described above.  This included reviewing the patient's prior records with her as well as reviewing her current and prior imaging and studies and discussing the plan for follow-up.     Electronically signed by:  Raciel Valadez MD  10/11/18  5:44 PM

## 2018-10-11 NOTE — PROGRESS NOTES
Subjective   Joanna Cross is a 67 y.o. female who presents for follow up of  lumbar wound debridement and closure.     The patient had a lumbar laminectomy by Dr. Gamino before [facility].  She developed postoperative subcutaneous wound dehiscence and drainage about 2 months after surgery, requiring lumbar wound debridement and closure a month ago on 9/9/18.  Wound cultures grew MRSA area and she was treated with IV daptomycin.    The lumbar incision is healing satisfactory now.  There are 2 points in the incision where there is incomplete epithelialization, but no drainage.  Lumbar sutures are still in but ready to be removed.    The following portions of the patient's history were reviewed, updated as appropriate and approved: allergies, current medications, past family history, past medical history, past social history, past surgical history, review of systems and problem list.     Review of Systems   Constitutional: Negative for activity change, appetite change, chills, diaphoresis, fatigue, fever and unexpected weight change.   HENT: Negative for congestion, dental problem, drooling, ear discharge, ear pain, facial swelling, hearing loss, mouth sores, nosebleeds, postnasal drip, rhinorrhea, sinus pressure, sneezing, sore throat, tinnitus, trouble swallowing and voice change.    Eyes: Negative for photophobia, pain, discharge, redness, itching and visual disturbance.   Respiratory: Negative for apnea, cough, choking, chest tightness, shortness of breath, wheezing and stridor.    Cardiovascular: Negative for chest pain, palpitations and leg swelling.   Gastrointestinal: Negative for abdominal distention, abdominal pain, anal bleeding, blood in stool, constipation, diarrhea, nausea, rectal pain and vomiting.   Endocrine: Negative for cold intolerance, heat intolerance, polydipsia, polyphagia and polyuria.   Genitourinary: Negative for decreased urine volume, difficulty urinating, dysuria, enuresis, flank  pain, frequency, genital sores, hematuria and urgency.   Musculoskeletal: Negative for arthralgias, back pain, gait problem, joint swelling, myalgias, neck pain and neck stiffness.   Skin: Negative for color change, pallor, rash and wound.   Allergic/Immunologic: Negative for environmental allergies, food allergies and immunocompromised state.   Neurological: Negative for dizziness, tremors, seizures, syncope, facial asymmetry, speech difficulty, weakness, light-headedness, numbness and headaches.   Hematological: Negative for adenopathy. Does not bruise/bleed easily.   Psychiatric/Behavioral: Negative for agitation, behavioral problems, confusion, decreased concentration, dysphoric mood, hallucinations, self-injury, sleep disturbance and suicidal ideas. The patient is not nervous/anxious and is not hyperactive.    All other systems reviewed and are negative.      Objective    NEUROLOGICAL EXAMINATION:    Minor central wound separation, no drainage, no evidence of recurrent infection.    Assessment   1 month postop lumbar wound debridement and closure.  Satisfactory wound healing, one point of central wound minor separation of only several millimeters.       Plan   Remove lumbar sutures.  Follow-up in one month.  Continue daily cleansing with home health.       Ritchie García MD

## 2018-10-15 DIAGNOSIS — T81.49XA POSTOPERATIVE WOUND INFECTION: ICD-10-CM

## 2018-10-15 DIAGNOSIS — Z98.890 S/P LUMBAR LAMINECTOMY: Primary | ICD-10-CM

## 2018-10-22 RX ORDER — RANOLAZINE 500 MG/1
500 TABLET, EXTENDED RELEASE ORAL EVERY 12 HOURS SCHEDULED
Qty: 180 TABLET | Refills: 3 | Status: SHIPPED | OUTPATIENT
Start: 2018-10-22 | End: 2019-11-22 | Stop reason: SDUPTHER

## 2018-11-08 ENCOUNTER — OFFICE VISIT (OUTPATIENT)
Dept: NEUROSURGERY | Facility: CLINIC | Age: 68
End: 2018-11-08

## 2018-11-08 VITALS — HEIGHT: 62 IN

## 2018-11-08 DIAGNOSIS — Z98.890 STATUS POST LUMBAR LAMINECTOMY: Primary | ICD-10-CM

## 2018-11-08 PROCEDURE — 99024 POSTOP FOLLOW-UP VISIT: CPT | Performed by: NEUROLOGICAL SURGERY

## 2018-11-08 NOTE — PROGRESS NOTES
Subjective   Joanna Cross is a 67 y.o. female who presents for follow up of  lumbar wound debridement and closure.     The patient had an L4 laminectomy and right L5 hemilaminectomy by Dr. Gamino last July on 7/7/18.  After his [facility] she presented with wound dehiscence and wound infection and I did a wound debridement and closure 2 months ago on September.  When she was last here a month ago there was incomplete wound healing and she was receiving IV daptomycin.  She now has finished her antibiotics and has had complete wound healing.  She is having some hip problem on the left side and saw Dr. Wright and was referred for physical therapy.  She has some discomfort when sleeping at night and has to sleep in a flexed position such as a recliner chair.    The following portions of the patient's history were reviewed, updated as appropriate and approved: allergies, current medications, past family history, past medical history, past social history, past surgical history, review of systems and problem list.     Review of Systems   Constitutional: Negative for activity change, appetite change, chills, diaphoresis, fatigue, fever and unexpected weight change.   HENT: Negative for congestion, dental problem, drooling, ear discharge, ear pain, facial swelling, hearing loss, mouth sores, nosebleeds, postnasal drip, rhinorrhea, sinus pressure, sneezing, sore throat, tinnitus, trouble swallowing and voice change.    Eyes: Negative for photophobia, pain, discharge, redness, itching and visual disturbance.   Respiratory: Negative for apnea, cough, choking, chest tightness, shortness of breath, wheezing and stridor.    Cardiovascular: Negative for chest pain, palpitations and leg swelling.   Gastrointestinal: Negative for abdominal distention, abdominal pain, anal bleeding, blood in stool, constipation, diarrhea, nausea, rectal pain and vomiting.   Endocrine: Negative for cold intolerance, heat intolerance, polydipsia,  polyphagia and polyuria.   Genitourinary: Negative for decreased urine volume, difficulty urinating, dysuria, enuresis, flank pain, frequency, genital sores, hematuria and urgency.   Musculoskeletal: Negative for arthralgias, back pain, gait problem, joint swelling, myalgias, neck pain and neck stiffness.   Skin: Negative for color change, pallor, rash and wound.   Allergic/Immunologic: Negative for environmental allergies, food allergies and immunocompromised state.   Neurological: Negative for dizziness, tremors, seizures, syncope, facial asymmetry, speech difficulty, weakness, light-headedness, numbness and headaches.   Hematological: Negative for adenopathy. Does not bruise/bleed easily.   Psychiatric/Behavioral: Negative for agitation, behavioral problems, confusion, decreased concentration, dysphoric mood, hallucinations, self-injury, sleep disturbance and suicidal ideas. The patient is not nervous/anxious and is not hyperactive.    All other systems reviewed and are negative.      Objective    NEUROLOGICAL EXAMINATION:    Lumbar incision well-healed.    Assessment   Good recovery 2 months postop lumbar wound debridement with postoperative antibiotics.  4 months postop lumbar laminectomy L4 and L5 by Dr. Gamino.       Plan   No additional follow-up planned at this time.       Ritchie García MD

## 2018-11-14 ENCOUNTER — OFFICE VISIT (OUTPATIENT)
Dept: CARDIOLOGY | Facility: CLINIC | Age: 68
End: 2018-11-14

## 2018-11-14 VITALS
WEIGHT: 250 LBS | SYSTOLIC BLOOD PRESSURE: 146 MMHG | BODY MASS INDEX: 46.01 KG/M2 | DIASTOLIC BLOOD PRESSURE: 74 MMHG | HEART RATE: 93 BPM | HEIGHT: 62 IN

## 2018-11-14 DIAGNOSIS — I10 ESSENTIAL HYPERTENSION: ICD-10-CM

## 2018-11-14 DIAGNOSIS — E78.5 HYPERLIPIDEMIA LDL GOAL <70: ICD-10-CM

## 2018-11-14 DIAGNOSIS — I48.20 CHRONIC ATRIAL FIBRILLATION (HCC): ICD-10-CM

## 2018-11-14 DIAGNOSIS — I25.10 CORONARY ARTERY DISEASE INVOLVING NATIVE CORONARY ARTERY OF NATIVE HEART WITHOUT ANGINA PECTORIS: Primary | ICD-10-CM

## 2018-11-14 PROCEDURE — 99213 OFFICE O/P EST LOW 20 MIN: CPT | Performed by: INTERNAL MEDICINE

## 2018-11-14 PROCEDURE — 93280 PM DEVICE PROGR EVAL DUAL: CPT | Performed by: INTERNAL MEDICINE

## 2018-11-14 NOTE — PROGRESS NOTES
Joanna Cross  1950  67 y.o.  [phone]  [phone]      11/14/2018    Reynaldo Fritz MD    Chief Complaint   Patient presents with   • Coronary Artery Disease       Problem List:  1. Coronary artery disease  a. [facility] 2/15/2008, Dr Lutz.  Mild, non-obstructive CAD, normal EF  b. [facility] 1/9/2013, Dr Stevenson Sutton:  No LV gram done; mild, non-obstructive coronary artery disease.  c. [facility] 11/9/2015, [facility]:  EF 60%.  70% RCA lesion with Promus ZAINAB placement. 40-50% mid LAD, no FFR performed. Other small blockages noted.  No MR.  d. [facility] 11/15/2015, Dr Palacio @ [facility]:  Patent RCA stent, 40-50% LAD with FFR @ 0.92; mild inferior wall hypokinesis  e. [facility] 7/29/2016, [facility]:  Abnormal stress test.  Normal CORS with patent stent. LAD improved since last image EF 55-60%  2. Peripheral vascular disease/chronic venous stasis  a. Venous duplex 9/17/2010: Insufficiency to venous hypertension in the great saphenous system bilaterally.  b. Venous duplex 2013: Right leg laser ablation of Dr. Garcia.  c. Venous duplex 5/6/2016: No evidence of DVT, no superficial, no thrmobophlebitis, no valvular insufficiency.  d. AA with runoffs 8/31/2016: Single-vessel [place]: No significant arterial disease.  3. Palpitations  a. Echocardiogram 2/13/2014: Dr. Teran.  Normal biventricular function; trace to mild MR.  Atrial septal aneurysm or  b. Echocardiogram 12/22/15: Dr. Chavez.  Borderline LVH, mild LAE, mild RV enlargement.  Mild to moderate MR and TR with RVSP of 46 mmHg.  c. Conclusion/3/2016: Dr. Palacio.  RV enlargement.  EF 50-55%.  Mild AI S, mild MR and TR with RVSP 37 mmHg.  4. PAF/SSS  a. [employer] PPM 2016  b. CHADS2 Vasc  = 5. On chronic warfarin  5. TIA  a. Carotid duplex 2/13/2014 showed no significant flow-limiting stenosis.  Mild luminal disease present; both vertebrals are present.  6. Diabetes  7. Essential  "Hypertension  8. Hyperlipidemia  9. Hypothyroid  10. Hyponatremia  a. Secondary to SIADH  11. MILLI with CPAP  12. RLS  13. Parkinson's disease  14. GERD  15. Urinary Retention  a. S/P cystoscopy and ureter dilation, March 2018.  Dr. Terrence Mckenzie  16. Surgeries:  a. Rotator cuff repair  b. Cholecystectomy  c. Bilateral knee replacement  d. Tubal ligation  e. Breast surgery  f. Sinus surgery    Allergies   Allergen Reactions   • Toprol Xl [Metoprolol Tartrate] Shortness Of Breath     Extreme fatigue   • Amlodipine Besylate Swelling     Lower extremity (ankles, feet) swelling   • Entacapone Other (See Comments)     \"extreme weakness in legs - caused several falls, which stopped after discontinuing this medication\"   • Levemir [Insulin Detemir] Hives     Hives / rash around injection site   • Xarelto [Rivaroxaban] GI Bleeding   • Bactrim [Sulfamethoxazole-Trimethoprim] Nausea Only     Headache    • Ciprofloxacin Diarrhea   • Cogentin [Benztropine] Other (See Comments)     \"uncontrollable body movements\"   • Compazine [Prochlorperazine Edisylate] Other (See Comments)     Dystonic reaction   • Duraprep [Antiseptic Products, Misc.] Itching and Rash     RASH AND ITCHING   • Haldol [Haloperidol Lactate] Other (See Comments)     Dystonic reaction   • Hydralazine Other (See Comments)     Headache    • Hydrocodone-Acetaminophen Nausea And Vomiting and Dizziness     Headache    • Lisinopril Cough   • Penicillins Hives     Jitteriness    • Statins Myalgia     Leg pain   • Tarka [Trandolapril-Verapamil Hcl Er] Other (See Comments)     Constipation        Current Medications:      Current Outpatient Medications:   •  albuterol (VENTOLIN HFA) 108 (90 Base) MCG/ACT inhaler, Inhale 1 puff Every 4 (Four) Hours As Needed., Disp: , Rfl:   •  amantadine (SYMMETREL) 100 MG capsule, Take 100 mg by mouth 2 (Two) Times a Day., Disp: , Rfl:   •  apixaban (ELIQUIS) 5 MG tablet tablet, Take 1 tablet by mouth Every 12 (Twelve) Hours., Disp: " 180 tablet, Rfl: 3  •  aspirin 81 MG EC tablet, Take 81 mg by mouth Daily., Disp: , Rfl:   •  B Complex-C (SUPER B COMPLEX PO), Take 1 tablet by mouth Daily., Disp: , Rfl:   •  B-D UF III MINI PEN NEEDLES 31G X 5 MM misc, , Disp: , Rfl:   •  bumetanide (BUMEX) 2 MG tablet, Take 2 mg by mouth 2 (Two) Times a Day., Disp: , Rfl:   •  Calcium Carbonate (CALTRATE 600 PO), Take 600 mg by mouth Daily., Disp: , Rfl:   •  carbidopa-levodopa (SINEMET)  MG per tablet, Take 2 tablets by mouth Every Morning. 2 tablets at 0600, 1 tablet at 0900, 1200, 1500, 1800, 2100, Disp: , Rfl:   •  carbonyl iron (FEOSOL) 45 MG tablet tablet, Take 1 tablet by mouth 2 (Two) Times a Day., Disp: , Rfl:   •  Cholecalciferol 2000 units tablet, Take 2,000 Units by mouth 2 (Two) Times a Day., Disp: , Rfl:   •  citalopram (CeleXA) 20 MG tablet, Take 20 mg by mouth every night at bedtime., Disp: , Rfl:   •  clobetasol (TEMOVATE) 0.05 % cream, Apply 1 application topically 2 (Two) Times a Day As Needed (Lichens Sclerosis)., Disp: , Rfl:   •  CloNIDine (CATAPRES) 0.1 MG tablet, TAKE 1 TABLET EVERY 12 HOURS, Disp: 180 tablet, Rfl: 3  •  Collagen-Boron-Hyaluronic Acid 10-5-3.3 MG tablet, Take 1 tablet by mouth Daily., Disp: , Rfl:   •  fluticasone (FLONASE) 50 MCG/ACT nasal spray, 2 sprays into each nostril Daily As Needed for Rhinitis., Disp: , Rfl:   •  insulin degludec (TRESIBA FLEXTOUCH) 100 UNIT/ML solution pen-injector injection, Inject 28 Units under the skin Every Night., Disp: , Rfl:   •  IPRATROPIUM BROMIDE NA, 1 spray into each nostril 2 (Two) Times a Day., Disp: , Rfl:   •  Loratadine 10 MG capsule, Take 10 mg by mouth Daily., Disp: , Rfl:   •  losartan (COZAAR) 50 MG tablet, TAKE 1 TABLET EVERY 12 HOURS, Disp: 180 tablet, Rfl: 3  •  metFORMIN (GLUCOPHAGE) 1000 MG tablet, Take 1,000 mg by mouth 2 (Two) Times a Day With Meals., Disp: , Rfl:   •  metOLazone (ZAROXOLYN) 2.5 MG tablet, TAKE 1 TABLET DAILY, Disp: 90 tablet, Rfl: 1  •   Mirabegron ER (MYRBETRIQ) 50 MG tablet sustained-release 24 hour 24 hr tablet, Take 50 mg by mouth Daily., Disp: , Rfl:   •  Multiple Vitamins-Minerals (CENTRUM ULTRA WOMENS PO), Take 1 tablet by mouth Daily., Disp: , Rfl:   •  nitroglycerin (NITROLINGUAL) 0.4 MG/SPRAY spray, Place 1 spray under the tongue Every 5 (Five) Minutes As Needed for Chest Pain., Disp: 1 each, Rfl: 6  •  omeprazole (priLOSEC) 40 MG capsule, Take 40 mg by mouth 2 (Two) Times a Day., Disp: , Rfl:   •  potassium chloride (MICRO-K) 10 MEQ CR capsule, Take 10 mEq by mouth 2 (Two) Times a Day., Disp: , Rfl:   •  pramipexole (MIRAPEX) 1.5 MG tablet, Take 1.5 mg by mouth Every Night., Disp: , Rfl:   •  ranolazine (RANEXA) 500 MG 12 hr tablet, Take 1 tablet by mouth Every 12 (Twelve) Hours., Disp: 180 tablet, Rfl: 3  •  sennosides-docusate sodium (SENOKOT-S) 8.6-50 MG tablet, Take 1 tablet by mouth Daily., Disp: , Rfl:   •  spironolactone (ALDACTONE) 25 MG tablet, Take 1 tablet by mouth Daily., Disp: 90 tablet, Rfl: 1  •  SYNTHROID 200 MCG tablet, Take 200 mcg by mouth Daily., Disp: , Rfl:   •  traMADol (ULTRAM) 50 MG tablet, Take 50 mg by mouth Every 8 (Eight) Hours As Needed for Moderate Pain ., Disp: , Rfl:   •  Ubiquinol (QUNOL COQ10/UBIQUINOL/JOSE) 100 MG capsule, Take 1 capsule by mouth Daily., Disp: , Rfl:   •  vitamin C (ASCORBIC ACID) 500 MG tablet, Take 500 mg by mouth Daily. Chewable tablet, Disp: , Rfl:     HPI    Joanna Cross is a 67 y.o. female who presents today for follow up of coronary artery disease, peripheral vascular disease, atrial fibrillation/SSS, hypertension, and hyperlipidemia. Since last visit, she had back surgery in July and developed MRSA.  She was also seen at [facility] emergency department for an hypertensive episode back in August of this year.  Her blood pressure at that time was 200/100.  She was monitored in the emergency department for a short period of time and discharged home.  For a few  "weeks after that her blood pressure remained within normal limits.  At that time she was given clonidine to take when necessary for systolic greater than 160.  She states that she is really had to take the clonidine and that her blood pressure tends to stay in the 130s to 140 range.  She does a boxing program for Parkinson's patients on a regular basis and thoroughly enjoys this activity.  She is currently undergoing evaluation at  for deep brain stimulation for her Parkinson's disease.  Her device interrogation today reveals no wheeze on her RV lead.  This seems to be a common occurrence since April of this year.  She is only V paced 5% of the time.  She recently had an MRSA infection from her back surgery; therefore going in and doing anything regarding this lead would not be a good idea.  She denies having any chest pain, worsening shortness of breath, dyspnea on exertion, edema, fatigue, palpitations, dizziness and syncope.    The following portions of the patient's history were reviewed and updated as appropriate: allergies, current medications and problem list.    Pertinent positives as listed in the HPI.  All other systems reviewed are negative.    Vitals:    11/14/18 1416   BP: 146/74   BP Location: Right arm   Patient Position: Sitting   Pulse: 93   Weight: 113 kg (250 lb)   Height: 157.5 cm (62\")       Physical Exam:    GENERAL: well-developed, well-nourished; in no acute distress.   NECK:  Carotid upstrokes are 2+ and  symmetrical without bruits.   LUNGS: Clear to auscultation bilaterally without wheezing, rhonchi, or rales noted.   CARDIOVASCULAR: The heart has a regular rate with a normal S1 and S2. There is no murmur, gallop, rub, or click appreciated. The PMI is nondisplaced.   ABDOMEN: Soft and nontender  NEUROLOGICAL: Nonfocal; Alert and oriented  PERIPHERAL VASCULAR:  Posterior tibial pulses are 2+ and symmetrical. There is trace peripheral edema.   SKIN:  Warm and dry  PSYCHIATRIC: normal mood " and affect; behavior appropriate    Diagnostic Data:  Lab Results   Component Value Date    GLUCOSE 178 (H) 10/08/2018    BUN 21 10/08/2018    CREATININE 0.77 10/08/2018    EGFRIFNONA 74 10/08/2018    BCR 27.3 (H) 10/08/2018    K 3.7 10/08/2018    CO2 32.0 (H) 10/08/2018    CALCIUM 8.9 10/08/2018    ALBUMIN 4.18 10/08/2018    AST 21 10/08/2018    ALT 9 10/08/2018     Lab Results   Component Value Date    GLUCOSE 178 (H) 10/08/2018    CALCIUM 8.9 10/08/2018     (L) 10/08/2018    K 3.7 10/08/2018    CO2 32.0 (H) 10/08/2018    CL 93 (L) 10/08/2018    BUN 21 10/08/2018    CREATININE 0.77 10/08/2018    EGFRIFNONA 74 10/08/2018    BCR 27.3 (H) 10/08/2018    ANIONGAP 5.0 10/08/2018     Lab Results   Component Value Date    WBC 7.17 10/08/2018    HGB 11.5 10/08/2018    HCT 35.1 10/08/2018    MCV 88.9 10/08/2018     10/08/2018     Last FLP 09/27/2017:  TRIG 200 (H)  CHOL 191   (H)  HDL 40    Procedures  DEVICE INTERROGATION: 11/14/2018, [facility] PPM [employer] MRI: RA pacing 19%, RV pacing 5%. P wave is 0.2 mV and went into a-fib @ 1427.  Has an impedance of 530 ohms. R wave is 22.0 mV with a threshold of 0.9 V at 0.4 msec and an impedance of 409 ohms. Battery voltage is 6.5 years longevity.  Noise on V lead; have noticed since April 2018.  She only uses this lead 5% of the time; no reason to intervene at this time.      Assessment:      ICD-10-CM ICD-9-CM   1. Coronary artery disease involving native coronary artery of native heart without angina pectoris I25.10 414.01   2. Chronic atrial fibrillation (CMS/HCC) I48.2 427.31   3. Essential hypertension I10 401.9   4. Hyperlipidemia LDL goal <70 E78.5 272.4       Plan:    1. Encouraged routine exercise and dietary modifications  2. Continue current medications.  3. F/up in 6 months with [facility] or sooner if needed.    Scribed for Albertina Corona MD by DEJA Junior. 11/14/2018  4:45 PM     I Albertina Corona MD personally performed the services  described in this documentation as scribed by the above individual in my presence, and it is both accurate and complete.    Albertina Corona MD, FACC

## 2018-12-24 ENCOUNTER — HOSPITAL ENCOUNTER (INPATIENT)
Facility: HOSPITAL | Age: 68
LOS: 1 days | Discharge: HOME | End: 2018-12-27
Attending: EMERGENCY MEDICINE | Admitting: INTERNAL MEDICINE

## 2018-12-24 ENCOUNTER — APPOINTMENT (OUTPATIENT)
Dept: GENERAL RADIOLOGY | Facility: HOSPITAL | Age: 68
End: 2018-12-24

## 2018-12-24 DIAGNOSIS — Z74.09 IMPAIRED MOBILITY AND ADLS: ICD-10-CM

## 2018-12-24 DIAGNOSIS — I95.2 HYPOTENSION DUE TO DRUGS: ICD-10-CM

## 2018-12-24 DIAGNOSIS — I20.0 UNSTABLE ANGINA PECTORIS (HCC): ICD-10-CM

## 2018-12-24 DIAGNOSIS — R00.1 BRADYCARDIA: ICD-10-CM

## 2018-12-24 DIAGNOSIS — R03.0 ELEVATED BLOOD PRESSURE READING: ICD-10-CM

## 2018-12-24 DIAGNOSIS — I48.91 ATRIAL FIBRILLATION WITH RVR (HCC): Primary | ICD-10-CM

## 2018-12-24 DIAGNOSIS — R00.2 PALPITATIONS: ICD-10-CM

## 2018-12-24 DIAGNOSIS — R06.00 DYSPNEA, UNSPECIFIED TYPE: ICD-10-CM

## 2018-12-24 DIAGNOSIS — R07.9 CHEST PAIN, UNSPECIFIED TYPE: ICD-10-CM

## 2018-12-24 DIAGNOSIS — Z78.9 IMPAIRED MOBILITY AND ADLS: ICD-10-CM

## 2018-12-24 PROBLEM — I25.110 CORONARY ARTERY DISEASE INVOLVING NATIVE CORONARY ARTERY WITH UNSTABLE ANGINA PECTORIS (HCC): Status: ACTIVE | Noted: 2017-03-01

## 2018-12-24 LAB
ALBUMIN SERPL-MCNC: 4.99 G/DL (ref 3.2–4.8)
ALBUMIN/GLOB SERPL: 1.9 G/DL (ref 1.5–2.5)
ALP SERPL-CCNC: 125 U/L (ref 25–100)
ALT SERPL W P-5'-P-CCNC: 18 U/L (ref 7–40)
ANION GAP SERPL CALCULATED.3IONS-SCNC: 11 MMOL/L (ref 3–11)
AST SERPL-CCNC: 24 U/L (ref 0–33)
BACTERIA UR QL AUTO: ABNORMAL /HPF
BASOPHILS # BLD AUTO: 0.04 10*3/MM3 (ref 0–0.2)
BASOPHILS NFR BLD AUTO: 0.4 % (ref 0–1)
BILIRUB SERPL-MCNC: 0.6 MG/DL (ref 0.3–1.2)
BILIRUB UR QL STRIP: NEGATIVE
BNP SERPL-MCNC: 63 PG/ML (ref 0–100)
BUN BLD-MCNC: 25 MG/DL (ref 9–23)
BUN/CREAT SERPL: 26.3 (ref 7–25)
CALCIUM SPEC-SCNC: 9.6 MG/DL (ref 8.7–10.4)
CHLORIDE SERPL-SCNC: 93 MMOL/L (ref 99–109)
CLARITY UR: ABNORMAL
CO2 SERPL-SCNC: 29 MMOL/L (ref 20–31)
COLOR UR: YELLOW
CREAT BLD-MCNC: 0.95 MG/DL (ref 0.6–1.3)
DEPRECATED RDW RBC AUTO: 47 FL (ref 37–54)
EOSINOPHIL # BLD AUTO: 0.17 10*3/MM3 (ref 0–0.3)
EOSINOPHIL NFR BLD AUTO: 1.7 % (ref 0–3)
ERYTHROCYTE [DISTWIDTH] IN BLOOD BY AUTOMATED COUNT: 14.5 % (ref 11.3–14.5)
GFR SERPL CREATININE-BSD FRML MDRD: 58 ML/MIN/1.73
GLOBULIN UR ELPH-MCNC: 2.6 GM/DL
GLUCOSE BLD-MCNC: 116 MG/DL (ref 70–100)
GLUCOSE BLDC GLUCOMTR-MCNC: 95 MG/DL (ref 70–130)
GLUCOSE UR STRIP-MCNC: NEGATIVE MG/DL
HCT VFR BLD AUTO: 41 % (ref 34.5–44)
HGB BLD-MCNC: 13.2 G/DL (ref 11.5–15.5)
HGB UR QL STRIP.AUTO: NEGATIVE
HOLD SPECIMEN: NORMAL
HOLD SPECIMEN: NORMAL
HYALINE CASTS UR QL AUTO: ABNORMAL /LPF
IMM GRANULOCYTES # BLD AUTO: 0.06 10*3/MM3 (ref 0–0.03)
IMM GRANULOCYTES NFR BLD AUTO: 0.6 % (ref 0–0.6)
KETONES UR QL STRIP: NEGATIVE
LEUKOCYTE ESTERASE UR QL STRIP.AUTO: NEGATIVE
LIPASE SERPL-CCNC: 28 U/L (ref 6–51)
LYMPHOCYTES # BLD AUTO: 1.35 10*3/MM3 (ref 0.6–4.8)
LYMPHOCYTES NFR BLD AUTO: 13.6 % (ref 24–44)
MAGNESIUM SERPL-MCNC: 1.6 MG/DL (ref 1.3–2.7)
MCH RBC QN AUTO: 28.4 PG (ref 27–31)
MCHC RBC AUTO-ENTMCNC: 32.2 G/DL (ref 32–36)
MCV RBC AUTO: 88.4 FL (ref 80–99)
MONOCYTES # BLD AUTO: 0.74 10*3/MM3 (ref 0–1)
MONOCYTES NFR BLD AUTO: 7.4 % (ref 0–12)
NEUTROPHILS # BLD AUTO: 7.59 10*3/MM3 (ref 1.5–8.3)
NEUTROPHILS NFR BLD AUTO: 76.3 % (ref 41–71)
NITRITE UR QL STRIP: NEGATIVE
PH UR STRIP.AUTO: 6.5 [PH] (ref 5–8)
PLATELET # BLD AUTO: 292 10*3/MM3 (ref 150–450)
PMV BLD AUTO: 8.7 FL (ref 6–12)
POTASSIUM BLD-SCNC: 3.7 MMOL/L (ref 3.5–5.5)
PROT SERPL-MCNC: 7.6 G/DL (ref 5.7–8.2)
PROT UR QL STRIP: NEGATIVE
RBC # BLD AUTO: 4.64 10*6/MM3 (ref 3.89–5.14)
RBC # UR: ABNORMAL /HPF
REF LAB TEST METHOD: ABNORMAL
SODIUM BLD-SCNC: 133 MMOL/L (ref 132–146)
SP GR UR STRIP: 1.01 (ref 1–1.03)
SQUAMOUS #/AREA URNS HPF: ABNORMAL /HPF
T4 FREE SERPL-MCNC: 1.83 NG/DL (ref 0.89–1.76)
TROPONIN I SERPL-MCNC: 0.01 NG/ML
TROPONIN I SERPL-MCNC: 0.01 NG/ML (ref 0–0.07)
TROPONIN I SERPL-MCNC: 0.03 NG/ML (ref 0–0.07)
TSH SERPL DL<=0.05 MIU/L-ACNC: 3.03 MIU/ML (ref 0.35–5.35)
UROBILINOGEN UR QL STRIP: ABNORMAL
WBC NRBC COR # BLD: 9.95 10*3/MM3 (ref 3.5–10.8)
WBC UR QL AUTO: ABNORMAL /HPF
WHOLE BLOOD HOLD SPECIMEN: NORMAL
WHOLE BLOOD HOLD SPECIMEN: NORMAL

## 2018-12-24 PROCEDURE — 84484 ASSAY OF TROPONIN QUANT: CPT | Performed by: NURSE PRACTITIONER

## 2018-12-24 PROCEDURE — 83690 ASSAY OF LIPASE: CPT | Performed by: EMERGENCY MEDICINE

## 2018-12-24 PROCEDURE — 84439 ASSAY OF FREE THYROXINE: CPT | Performed by: EMERGENCY MEDICINE

## 2018-12-24 PROCEDURE — 82962 GLUCOSE BLOOD TEST: CPT

## 2018-12-24 PROCEDURE — 84484 ASSAY OF TROPONIN QUANT: CPT

## 2018-12-24 PROCEDURE — 71045 X-RAY EXAM CHEST 1 VIEW: CPT

## 2018-12-24 PROCEDURE — 63710000001 RANOLAZINE 500 MG TABLET SUSTAINED-RELEASE 12 HOUR: Performed by: NURSE PRACTITIONER

## 2018-12-24 PROCEDURE — 63710000001 CLONIDINE 0.1 MG TABLET: Performed by: NURSE PRACTITIONER

## 2018-12-24 PROCEDURE — 63710000001 POTASSIUM CHLORIDE 10 MEQ CAPSULE CONTROLLED-RELEASE: Performed by: NURSE PRACTITIONER

## 2018-12-24 PROCEDURE — A9270 NON-COVERED ITEM OR SERVICE: HCPCS | Performed by: NURSE PRACTITIONER

## 2018-12-24 PROCEDURE — 84443 ASSAY THYROID STIM HORMONE: CPT | Performed by: EMERGENCY MEDICINE

## 2018-12-24 PROCEDURE — 63710000001 AMANTADINE 100 MG CAPSULE: Performed by: NURSE PRACTITIONER

## 2018-12-24 PROCEDURE — 93010 ELECTROCARDIOGRAM REPORT: CPT | Performed by: INTERNAL MEDICINE

## 2018-12-24 PROCEDURE — 85025 COMPLETE CBC W/AUTO DIFF WBC: CPT | Performed by: EMERGENCY MEDICINE

## 2018-12-24 PROCEDURE — 93005 ELECTROCARDIOGRAM TRACING: CPT | Performed by: EMERGENCY MEDICINE

## 2018-12-24 PROCEDURE — 83735 ASSAY OF MAGNESIUM: CPT | Performed by: EMERGENCY MEDICINE

## 2018-12-24 PROCEDURE — 63710000001 LOSARTAN 50 MG TABLET: Performed by: NURSE PRACTITIONER

## 2018-12-24 PROCEDURE — 81001 URINALYSIS AUTO W/SCOPE: CPT | Performed by: EMERGENCY MEDICINE

## 2018-12-24 PROCEDURE — 83880 ASSAY OF NATRIURETIC PEPTIDE: CPT | Performed by: EMERGENCY MEDICINE

## 2018-12-24 PROCEDURE — 63710000001 PRAMIPEXOLE 0.25 MG TABLET: Performed by: NURSE PRACTITIONER

## 2018-12-24 PROCEDURE — 99220 PR INITIAL OBSERVATION CARE/DAY 70 MINUTES: CPT | Performed by: FAMILY MEDICINE

## 2018-12-24 PROCEDURE — 99284 EMERGENCY DEPT VISIT MOD MDM: CPT

## 2018-12-24 PROCEDURE — 63710000001 APIXABAN 5 MG TABLET: Performed by: NURSE PRACTITIONER

## 2018-12-24 PROCEDURE — 93005 ELECTROCARDIOGRAM TRACING: CPT | Performed by: NURSE PRACTITIONER

## 2018-12-24 PROCEDURE — 80053 COMPREHEN METABOLIC PANEL: CPT | Performed by: EMERGENCY MEDICINE

## 2018-12-24 RX ORDER — MULTIPLE VITAMINS W/ MINERALS TAB 9MG-400MCG
1 TAB ORAL DAILY
Status: DISCONTINUED | OUTPATIENT
Start: 2018-12-25 | End: 2018-12-27 | Stop reason: HOSPADM

## 2018-12-24 RX ORDER — RANOLAZINE 500 MG/1
500 TABLET, EXTENDED RELEASE ORAL EVERY 12 HOURS SCHEDULED
Status: DISCONTINUED | OUTPATIENT
Start: 2018-12-24 | End: 2018-12-27 | Stop reason: HOSPADM

## 2018-12-24 RX ORDER — DEXTROSE MONOHYDRATE 25 G/50ML
25 INJECTION, SOLUTION INTRAVENOUS
Status: DISCONTINUED | OUTPATIENT
Start: 2018-12-24 | End: 2018-12-27 | Stop reason: HOSPADM

## 2018-12-24 RX ORDER — SENNA AND DOCUSATE SODIUM 50; 8.6 MG/1; MG/1
1 TABLET, FILM COATED ORAL DAILY
Status: DISCONTINUED | OUTPATIENT
Start: 2018-12-25 | End: 2018-12-27 | Stop reason: HOSPADM

## 2018-12-24 RX ORDER — CETIRIZINE HYDROCHLORIDE 10 MG/1
10 TABLET ORAL DAILY
Status: DISCONTINUED | OUTPATIENT
Start: 2018-12-25 | End: 2018-12-27 | Stop reason: HOSPADM

## 2018-12-24 RX ORDER — BUMETANIDE 1 MG/1
2 TABLET ORAL
Status: DISCONTINUED | OUTPATIENT
Start: 2018-12-24 | End: 2018-12-26

## 2018-12-24 RX ORDER — AMANTADINE HYDROCHLORIDE 100 MG/1
100 CAPSULE, GELATIN COATED ORAL 2 TIMES DAILY
Status: DISCONTINUED | OUTPATIENT
Start: 2018-12-24 | End: 2018-12-27 | Stop reason: HOSPADM

## 2018-12-24 RX ORDER — SODIUM CHLORIDE 0.9 % (FLUSH) 0.9 %
10 SYRINGE (ML) INJECTION AS NEEDED
Status: DISCONTINUED | OUTPATIENT
Start: 2018-12-24 | End: 2018-12-27 | Stop reason: HOSPADM

## 2018-12-24 RX ORDER — POTASSIUM CHLORIDE 750 MG/1
10 CAPSULE, EXTENDED RELEASE ORAL 2 TIMES DAILY
Status: DISCONTINUED | OUTPATIENT
Start: 2018-12-24 | End: 2018-12-27 | Stop reason: HOSPADM

## 2018-12-24 RX ORDER — SPIRONOLACTONE 25 MG/1
25 TABLET ORAL DAILY
Status: DISCONTINUED | OUTPATIENT
Start: 2018-12-25 | End: 2018-12-27 | Stop reason: HOSPADM

## 2018-12-24 RX ORDER — NICOTINE POLACRILEX 4 MG
15 LOZENGE BUCCAL
Status: DISCONTINUED | OUTPATIENT
Start: 2018-12-24 | End: 2018-12-27 | Stop reason: HOSPADM

## 2018-12-24 RX ORDER — ASCORBIC ACID 500 MG
500 TABLET ORAL DAILY
Status: DISCONTINUED | OUTPATIENT
Start: 2018-12-25 | End: 2018-12-27 | Stop reason: HOSPADM

## 2018-12-24 RX ORDER — ASPIRIN 81 MG/1
81 TABLET ORAL DAILY
Status: DISCONTINUED | OUTPATIENT
Start: 2018-12-25 | End: 2018-12-27 | Stop reason: HOSPADM

## 2018-12-24 RX ORDER — METOLAZONE 2.5 MG/1
2.5 TABLET ORAL DAILY
Status: DISCONTINUED | OUTPATIENT
Start: 2018-12-25 | End: 2018-12-26

## 2018-12-24 RX ORDER — FERROUS SULFATE 325(65) MG
325 TABLET ORAL 2 TIMES DAILY WITH MEALS
Status: DISCONTINUED | OUTPATIENT
Start: 2018-12-25 | End: 2018-12-27 | Stop reason: HOSPADM

## 2018-12-24 RX ORDER — DILTIAZEM HYDROCHLORIDE 5 MG/ML
10 INJECTION INTRAVENOUS ONCE
Status: DISCONTINUED | OUTPATIENT
Start: 2018-12-24 | End: 2018-12-24

## 2018-12-24 RX ORDER — TRAMADOL HYDROCHLORIDE 50 MG/1
50 TABLET ORAL EVERY 8 HOURS PRN
Status: DISCONTINUED | OUTPATIENT
Start: 2018-12-24 | End: 2018-12-27 | Stop reason: HOSPADM

## 2018-12-24 RX ORDER — LOSARTAN POTASSIUM 50 MG/1
50 TABLET ORAL EVERY 12 HOURS
Status: DISCONTINUED | OUTPATIENT
Start: 2018-12-24 | End: 2018-12-27 | Stop reason: HOSPADM

## 2018-12-24 RX ORDER — OXYBUTYNIN CHLORIDE 10 MG/1
10 TABLET, EXTENDED RELEASE ORAL DAILY
Status: DISCONTINUED | OUTPATIENT
Start: 2018-12-25 | End: 2018-12-27 | Stop reason: HOSPADM

## 2018-12-24 RX ORDER — PANTOPRAZOLE SODIUM 40 MG/1
40 TABLET, DELAYED RELEASE ORAL EVERY MORNING
Status: DISCONTINUED | OUTPATIENT
Start: 2018-12-25 | End: 2018-12-27 | Stop reason: HOSPADM

## 2018-12-24 RX ORDER — SODIUM CHLORIDE 0.9 % (FLUSH) 0.9 %
3-10 SYRINGE (ML) INJECTION AS NEEDED
Status: DISCONTINUED | OUTPATIENT
Start: 2018-12-24 | End: 2018-12-27 | Stop reason: HOSPADM

## 2018-12-24 RX ORDER — SODIUM CHLORIDE 0.9 % (FLUSH) 0.9 %
3 SYRINGE (ML) INJECTION EVERY 12 HOURS SCHEDULED
Status: DISCONTINUED | OUTPATIENT
Start: 2018-12-24 | End: 2018-12-27 | Stop reason: HOSPADM

## 2018-12-24 RX ORDER — CLONIDINE HYDROCHLORIDE 0.1 MG/1
0.1 TABLET ORAL EVERY 12 HOURS
Status: DISCONTINUED | OUTPATIENT
Start: 2018-12-24 | End: 2018-12-25

## 2018-12-24 RX ORDER — ALBUTEROL SULFATE 2.5 MG/3ML
2.5 SOLUTION RESPIRATORY (INHALATION) EVERY 4 HOURS PRN
Status: DISCONTINUED | OUTPATIENT
Start: 2018-12-24 | End: 2018-12-27 | Stop reason: HOSPADM

## 2018-12-24 RX ORDER — CITALOPRAM 20 MG/1
20 TABLET ORAL DAILY
Status: DISCONTINUED | OUTPATIENT
Start: 2018-12-25 | End: 2018-12-27 | Stop reason: HOSPADM

## 2018-12-24 RX ORDER — FLUTICASONE PROPIONATE 50 MCG
2 SPRAY, SUSPENSION (ML) NASAL DAILY PRN
Status: DISCONTINUED | OUTPATIENT
Start: 2018-12-24 | End: 2018-12-27 | Stop reason: HOSPADM

## 2018-12-24 RX ORDER — LEVOTHYROXINE SODIUM 0.1 MG/1
200 TABLET ORAL DAILY
Status: DISCONTINUED | OUTPATIENT
Start: 2018-12-25 | End: 2018-12-27 | Stop reason: HOSPADM

## 2018-12-24 RX ADMIN — AMANTADINE HYDROCHLORIDE 100 MG: 100 CAPSULE ORAL at 21:57

## 2018-12-24 RX ADMIN — RANOLAZINE 500 MG: 500 TABLET, FILM COATED, EXTENDED RELEASE ORAL at 21:56

## 2018-12-24 RX ADMIN — DILTIAZEM HYDROCHLORIDE 5 MG/HR: 5 INJECTION INTRAVENOUS at 18:22

## 2018-12-24 RX ADMIN — APIXABAN 5 MG: 5 TABLET, FILM COATED ORAL at 21:56

## 2018-12-24 RX ADMIN — POTASSIUM CHLORIDE 10 MEQ: 750 CAPSULE, EXTENDED RELEASE ORAL at 21:56

## 2018-12-24 RX ADMIN — CLONIDINE HYDROCHLORIDE 0.1 MG: 0.1 TABLET ORAL at 21:57

## 2018-12-24 RX ADMIN — PRAMIPEXOLE DIHYDROCHLORIDE 1.5 MG: 0.25 TABLET ORAL at 21:56

## 2018-12-24 RX ADMIN — LOSARTAN POTASSIUM 50 MG: 50 TABLET ORAL at 21:57

## 2018-12-25 ENCOUNTER — APPOINTMENT (OUTPATIENT)
Dept: CARDIOLOGY | Facility: HOSPITAL | Age: 68
End: 2018-12-25

## 2018-12-25 LAB
ANION GAP SERPL CALCULATED.3IONS-SCNC: 11 MMOL/L (ref 3–11)
ARTICHOKE IGE QN: 132 MG/DL (ref 0–130)
BASOPHILS # BLD AUTO: 0.05 10*3/MM3 (ref 0–0.2)
BASOPHILS NFR BLD AUTO: 0.5 % (ref 0–1)
BH CV ECHO MEAS - AO MAX PG (FULL): 2.7 MMHG
BH CV ECHO MEAS - AO MAX PG: 7 MMHG
BH CV ECHO MEAS - AO ROOT AREA (BSA CORRECTED): 1.2
BH CV ECHO MEAS - AO ROOT AREA: 5.1 CM^2
BH CV ECHO MEAS - AO ROOT DIAM: 2.5 CM
BH CV ECHO MEAS - AO V2 MAX: 132 CM/SEC
BH CV ECHO MEAS - AVA(V,A): 2.1 CM^2
BH CV ECHO MEAS - AVA(V,D): 2.1 CM^2
BH CV ECHO MEAS - BSA(HAYCOCK): 2.3 M^2
BH CV ECHO MEAS - BSA: 2.1 M^2
BH CV ECHO MEAS - BZI_BMI: 45.5 KILOGRAMS/M^2
BH CV ECHO MEAS - BZI_METRIC_HEIGHT: 157.5 CM
BH CV ECHO MEAS - BZI_METRIC_WEIGHT: 112.9 KG
BH CV ECHO MEAS - EDV(CUBED): 83.7 ML
BH CV ECHO MEAS - EDV(MOD-SP2): 49 ML
BH CV ECHO MEAS - EDV(MOD-SP4): 61 ML
BH CV ECHO MEAS - EDV(TEICH): 86.5 ML
BH CV ECHO MEAS - EF(CUBED): 65.7 %
BH CV ECHO MEAS - EF(MOD-BP): 54 %
BH CV ECHO MEAS - EF(MOD-SP2): 51 %
BH CV ECHO MEAS - EF(MOD-SP4): 54.1 %
BH CV ECHO MEAS - EF(TEICH): 57.5 %
BH CV ECHO MEAS - ESV(CUBED): 28.7 ML
BH CV ECHO MEAS - ESV(MOD-SP2): 24 ML
BH CV ECHO MEAS - ESV(MOD-SP4): 28 ML
BH CV ECHO MEAS - ESV(TEICH): 36.8 ML
BH CV ECHO MEAS - FS: 30 %
BH CV ECHO MEAS - IVS/LVPW: 0.94
BH CV ECHO MEAS - IVSD: 1 CM
BH CV ECHO MEAS - LA DIMENSION: 3.7 CM
BH CV ECHO MEAS - LA/AO: 1.5
BH CV ECHO MEAS - LAD MAJOR: 6.3 CM
BH CV ECHO MEAS - LAT PEAK E' VEL: 6.6 CM/SEC
BH CV ECHO MEAS - LATERAL E/E' RATIO: 14.9
BH CV ECHO MEAS - LV DIASTOLIC VOL/BSA (35-75): 29.1 ML/M^2
BH CV ECHO MEAS - LV MASS(C)D: 159.4 GRAMS
BH CV ECHO MEAS - LV MASS(C)DI: 76 GRAMS/M^2
BH CV ECHO MEAS - LV MAX PG: 4.3 MMHG
BH CV ECHO MEAS - LV MEAN PG: 2.6 MMHG
BH CV ECHO MEAS - LV SYSTOLIC VOL/BSA (12-30): 13.3 ML/M^2
BH CV ECHO MEAS - LV V1 MAX: 103.5 CM/SEC
BH CV ECHO MEAS - LV V1 MEAN: 76 CM/SEC
BH CV ECHO MEAS - LV V1 VTI: 20.6 CM
BH CV ECHO MEAS - LVIDD: 4.4 CM
BH CV ECHO MEAS - LVIDS: 3.1 CM
BH CV ECHO MEAS - LVLD AP2: 5.9 CM
BH CV ECHO MEAS - LVLD AP4: 6.8 CM
BH CV ECHO MEAS - LVLS AP2: 6.1 CM
BH CV ECHO MEAS - LVLS AP4: 6.6 CM
BH CV ECHO MEAS - LVOT AREA (M): 2.8 CM^2
BH CV ECHO MEAS - LVOT AREA: 2.7 CM^2
BH CV ECHO MEAS - LVOT DIAM: 1.9 CM
BH CV ECHO MEAS - LVPWD: 1.1 CM
BH CV ECHO MEAS - MED PEAK E' VEL: 5.6 CM/SEC
BH CV ECHO MEAS - MEDIAL E/E' RATIO: 17.5
BH CV ECHO MEAS - MV DEC TIME: 0.23 SEC
BH CV ECHO MEAS - MV E MAX VEL: 100 CM/SEC
BH CV ECHO MEAS - PA ACC SLOPE: 358.6 CM/SEC^2
BH CV ECHO MEAS - PA ACC TIME: 0.15 SEC
BH CV ECHO MEAS - PA PR(ACCEL): 10.9 MMHG
BH CV ECHO MEAS - RAP SYSTOLE: 3 MMHG
BH CV ECHO MEAS - RVSP: 29 MMHG
BH CV ECHO MEAS - SI(CUBED): 26.2 ML/M^2
BH CV ECHO MEAS - SI(LVOT): 26.8 ML/M^2
BH CV ECHO MEAS - SI(MOD-SP2): 11.9 ML/M^2
BH CV ECHO MEAS - SI(MOD-SP4): 15.7 ML/M^2
BH CV ECHO MEAS - SI(TEICH): 23.7 ML/M^2
BH CV ECHO MEAS - SV(CUBED): 55 ML
BH CV ECHO MEAS - SV(LVOT): 56.1 ML
BH CV ECHO MEAS - SV(MOD-SP2): 25 ML
BH CV ECHO MEAS - SV(MOD-SP4): 33 ML
BH CV ECHO MEAS - SV(TEICH): 49.7 ML
BH CV ECHO MEAS - TAPSE (>1.6): 2.5 CM2
BH CV ECHO MEAS - TR MAX PG: 26 MMHG
BH CV ECHO MEAS - TR MAX VEL: 252.3 CM/SEC
BH CV ECHO MEASUREMENTS AVERAGE E/E' RATIO: 16.39
BH CV VAS BP LEFT ARM: NORMAL MMHG
BH CV XLRA - RV BASE: 3.7 CM
BH CV XLRA - RV LENGTH: 7.2 CM
BH CV XLRA - RV MID: 2.6 CM
BH CV XLRA - TDI S': 14.6 CM/SEC
BUN BLD-MCNC: 23 MG/DL (ref 9–23)
BUN/CREAT SERPL: 25.3 (ref 7–25)
CALCIUM SPEC-SCNC: 9.2 MG/DL (ref 8.7–10.4)
CHLORIDE SERPL-SCNC: 92 MMOL/L (ref 99–109)
CHOLEST SERPL-MCNC: 177 MG/DL (ref 0–200)
CO2 SERPL-SCNC: 27 MMOL/L (ref 20–31)
CREAT BLD-MCNC: 0.91 MG/DL (ref 0.6–1.3)
DEPRECATED RDW RBC AUTO: 47.1 FL (ref 37–54)
EOSINOPHIL # BLD AUTO: 0.22 10*3/MM3 (ref 0–0.3)
EOSINOPHIL NFR BLD AUTO: 2.1 % (ref 0–3)
ERYTHROCYTE [DISTWIDTH] IN BLOOD BY AUTOMATED COUNT: 14.6 % (ref 11.3–14.5)
GFR SERPL CREATININE-BSD FRML MDRD: 61 ML/MIN/1.73
GLUCOSE BLD-MCNC: 120 MG/DL (ref 70–100)
GLUCOSE BLDC GLUCOMTR-MCNC: 134 MG/DL (ref 70–130)
GLUCOSE BLDC GLUCOMTR-MCNC: 140 MG/DL (ref 70–130)
GLUCOSE BLDC GLUCOMTR-MCNC: 155 MG/DL (ref 70–130)
GLUCOSE BLDC GLUCOMTR-MCNC: 157 MG/DL (ref 70–130)
GLUCOSE BLDC GLUCOMTR-MCNC: 161 MG/DL (ref 70–130)
GLUCOSE BLDC GLUCOMTR-MCNC: 239 MG/DL (ref 70–130)
HBA1C MFR BLD: 6.4 % (ref 4.8–5.6)
HCT VFR BLD AUTO: 39.2 % (ref 34.5–44)
HDLC SERPL-MCNC: 44 MG/DL (ref 40–60)
HGB BLD-MCNC: 12.8 G/DL (ref 11.5–15.5)
IMM GRANULOCYTES # BLD AUTO: 0.06 10*3/MM3 (ref 0–0.03)
IMM GRANULOCYTES NFR BLD AUTO: 0.6 % (ref 0–0.6)
LEFT ATRIUM VOLUME INDEX: 29.6 ML/M^2
LEFT ATRIUM VOLUME: 62 ML
LV EF 2D ECHO EST: 60 %
LYMPHOCYTES # BLD AUTO: 1.44 10*3/MM3 (ref 0.6–4.8)
LYMPHOCYTES NFR BLD AUTO: 13.5 % (ref 24–44)
MAGNESIUM SERPL-MCNC: 1.6 MG/DL (ref 1.3–2.7)
MAXIMAL PREDICTED HEART RATE: 150 BPM
MCH RBC QN AUTO: 29 PG (ref 27–31)
MCHC RBC AUTO-ENTMCNC: 32.7 G/DL (ref 32–36)
MCV RBC AUTO: 88.7 FL (ref 80–99)
MONOCYTES # BLD AUTO: 1.06 10*3/MM3 (ref 0–1)
MONOCYTES NFR BLD AUTO: 10 % (ref 0–12)
NEUTROPHILS # BLD AUTO: 7.86 10*3/MM3 (ref 1.5–8.3)
NEUTROPHILS NFR BLD AUTO: 73.9 % (ref 41–71)
PHOSPHATE SERPL-MCNC: 3.7 MG/DL (ref 2.4–5.1)
PLATELET # BLD AUTO: 268 10*3/MM3 (ref 150–450)
PMV BLD AUTO: 8.7 FL (ref 6–12)
POTASSIUM BLD-SCNC: 3.4 MMOL/L (ref 3.5–5.5)
POTASSIUM BLD-SCNC: 3.9 MMOL/L (ref 3.5–5.5)
RBC # BLD AUTO: 4.42 10*6/MM3 (ref 3.89–5.14)
SODIUM BLD-SCNC: 130 MMOL/L (ref 132–146)
STRESS TARGET HR: 128 BPM
TRIGL SERPL-MCNC: 113 MG/DL (ref 0–150)
TROPONIN I SERPL-MCNC: 0.02 NG/ML
TROPONIN I SERPL-MCNC: 0.02 NG/ML
WBC NRBC COR # BLD: 10.63 10*3/MM3 (ref 3.5–10.8)

## 2018-12-25 PROCEDURE — G8987 SELF CARE CURRENT STATUS: HCPCS | Performed by: OCCUPATIONAL THERAPIST

## 2018-12-25 PROCEDURE — 63710000001 POTASSIUM CHLORIDE 10 MEQ CAPSULE CONTROLLED-RELEASE: Performed by: NURSE PRACTITIONER

## 2018-12-25 PROCEDURE — G8988 SELF CARE GOAL STATUS: HCPCS | Performed by: OCCUPATIONAL THERAPIST

## 2018-12-25 PROCEDURE — 80048 BASIC METABOLIC PNL TOTAL CA: CPT | Performed by: FAMILY MEDICINE

## 2018-12-25 PROCEDURE — 84484 ASSAY OF TROPONIN QUANT: CPT | Performed by: NURSE PRACTITIONER

## 2018-12-25 PROCEDURE — A9270 NON-COVERED ITEM OR SERVICE: HCPCS | Performed by: INTERNAL MEDICINE

## 2018-12-25 PROCEDURE — 82962 GLUCOSE BLOOD TEST: CPT

## 2018-12-25 PROCEDURE — A9270 NON-COVERED ITEM OR SERVICE: HCPCS | Performed by: NURSE PRACTITIONER

## 2018-12-25 PROCEDURE — 63710000001 LEVOTHYROXINE 112 MCG TABLET: Performed by: NURSE PRACTITIONER

## 2018-12-25 PROCEDURE — 63710000001 CARBIDOPA-LEVODOPA 25-100 MG TABLET: Performed by: NURSE PRACTITIONER

## 2018-12-25 PROCEDURE — 63710000001 ACETAMINOPHEN 325 MG TABLET: Performed by: INTERNAL MEDICINE

## 2018-12-25 PROCEDURE — 83735 ASSAY OF MAGNESIUM: CPT | Performed by: FAMILY MEDICINE

## 2018-12-25 PROCEDURE — 93005 ELECTROCARDIOGRAM TRACING: CPT | Performed by: NURSE PRACTITIONER

## 2018-12-25 PROCEDURE — 63710000001 CITALOPRAM 20 MG TABLET: Performed by: NURSE PRACTITIONER

## 2018-12-25 PROCEDURE — 63710000001 PRAMIPEXOLE 0.25 MG TABLET: Performed by: NURSE PRACTITIONER

## 2018-12-25 PROCEDURE — 63710000001 APIXABAN 5 MG TABLET: Performed by: NURSE PRACTITIONER

## 2018-12-25 PROCEDURE — 63710000001 BUMETANIDE 1 MG TABLET: Performed by: NURSE PRACTITIONER

## 2018-12-25 PROCEDURE — 80061 LIPID PANEL: CPT | Performed by: NURSE PRACTITIONER

## 2018-12-25 PROCEDURE — A9270 NON-COVERED ITEM OR SERVICE: HCPCS

## 2018-12-25 PROCEDURE — 63710000001 NITROGLYCERIN 0.4 MG SUBLINGUAL TABLET 25 EACH BOTTLE: Performed by: INTERNAL MEDICINE

## 2018-12-25 PROCEDURE — 97162 PT EVAL MOD COMPLEX 30 MIN: CPT

## 2018-12-25 PROCEDURE — 63710000001 FERROUS SULFATE 325 (65 FE) MG TABLET: Performed by: NURSE PRACTITIONER

## 2018-12-25 PROCEDURE — 63710000001 RANOLAZINE 500 MG TABLET SUSTAINED-RELEASE 12 HOUR: Performed by: NURSE PRACTITIONER

## 2018-12-25 PROCEDURE — 93306 TTE W/DOPPLER COMPLETE: CPT

## 2018-12-25 PROCEDURE — G8978 MOBILITY CURRENT STATUS: HCPCS

## 2018-12-25 PROCEDURE — 63710000001 VITAMIN C 500 MG TABLET: Performed by: NURSE PRACTITIONER

## 2018-12-25 PROCEDURE — 63710000001 ONDANSETRON PER 8 MG: Performed by: INTERNAL MEDICINE

## 2018-12-25 PROCEDURE — 99222 1ST HOSP IP/OBS MODERATE 55: CPT | Performed by: INTERNAL MEDICINE

## 2018-12-25 PROCEDURE — 63710000001 DILTIAZEM 60 MG TABLET: Performed by: INTERNAL MEDICINE

## 2018-12-25 PROCEDURE — 63710000001 TRAMADOL 50 MG TABLET: Performed by: NURSE PRACTITIONER

## 2018-12-25 PROCEDURE — 85025 COMPLETE CBC W/AUTO DIFF WBC: CPT | Performed by: FAMILY MEDICINE

## 2018-12-25 PROCEDURE — 84132 ASSAY OF SERUM POTASSIUM: CPT | Performed by: INTERNAL MEDICINE

## 2018-12-25 PROCEDURE — 97166 OT EVAL MOD COMPLEX 45 MIN: CPT | Performed by: OCCUPATIONAL THERAPIST

## 2018-12-25 PROCEDURE — 84100 ASSAY OF PHOSPHORUS: CPT | Performed by: FAMILY MEDICINE

## 2018-12-25 PROCEDURE — 63710000001 CLONIDINE 0.1 MG TABLET: Performed by: NURSE PRACTITIONER

## 2018-12-25 PROCEDURE — 63710000001 CETIRIZINE 10 MG TABLET: Performed by: NURSE PRACTITIONER

## 2018-12-25 PROCEDURE — 63710000001 LOSARTAN 50 MG TABLET: Performed by: NURSE PRACTITIONER

## 2018-12-25 PROCEDURE — 99226 PR SBSQ OBSERVATION CARE/DAY 35 MINUTES: CPT | Performed by: INTERNAL MEDICINE

## 2018-12-25 PROCEDURE — 93010 ELECTROCARDIOGRAM REPORT: CPT | Performed by: INTERNAL MEDICINE

## 2018-12-25 PROCEDURE — 63710000001 CERTAVITE/ANTIOXIDANTS TABLET: Performed by: NURSE PRACTITIONER

## 2018-12-25 PROCEDURE — 93005 ELECTROCARDIOGRAM TRACING: CPT | Performed by: FAMILY MEDICINE

## 2018-12-25 PROCEDURE — 63710000001 POTASSIUM CHLORIDE 10 MEQ CAPSULE CONTROLLED-RELEASE: Performed by: INTERNAL MEDICINE

## 2018-12-25 PROCEDURE — 83036 HEMOGLOBIN GLYCOSYLATED A1C: CPT | Performed by: NURSE PRACTITIONER

## 2018-12-25 PROCEDURE — 63710000001 ASPIRIN 81 MG TABLET DELAYED-RELEASE: Performed by: NURSE PRACTITIONER

## 2018-12-25 PROCEDURE — G8979 MOBILITY GOAL STATUS: HCPCS

## 2018-12-25 PROCEDURE — 84484 ASSAY OF TROPONIN QUANT: CPT | Performed by: FAMILY MEDICINE

## 2018-12-25 PROCEDURE — 63710000001 AMANTADINE 100 MG CAPSULE: Performed by: NURSE PRACTITIONER

## 2018-12-25 PROCEDURE — 97535 SELF CARE MNGMENT TRAINING: CPT | Performed by: OCCUPATIONAL THERAPIST

## 2018-12-25 PROCEDURE — 93306 TTE W/DOPPLER COMPLETE: CPT | Performed by: INTERNAL MEDICINE

## 2018-12-25 PROCEDURE — 63710000001 PANTOPRAZOLE 40 MG TABLET DELAYED-RELEASE: Performed by: NURSE PRACTITIONER

## 2018-12-25 PROCEDURE — 63710000001 CARBIDOPA-LEVODOPA 25-100 MG TABLET

## 2018-12-25 RX ORDER — POTASSIUM CHLORIDE 750 MG/1
40 CAPSULE, EXTENDED RELEASE ORAL AS NEEDED
Status: DISCONTINUED | OUTPATIENT
Start: 2018-12-25 | End: 2018-12-27 | Stop reason: HOSPADM

## 2018-12-25 RX ORDER — POTASSIUM CHLORIDE 7.45 MG/ML
10 INJECTION INTRAVENOUS
Status: DISCONTINUED | OUTPATIENT
Start: 2018-12-25 | End: 2018-12-27 | Stop reason: HOSPADM

## 2018-12-25 RX ORDER — ROSUVASTATIN CALCIUM 20 MG/1
20 TABLET, COATED ORAL NIGHTLY
Status: DISCONTINUED | OUTPATIENT
Start: 2018-12-25 | End: 2018-12-27 | Stop reason: HOSPADM

## 2018-12-25 RX ORDER — NITROGLYCERIN 0.4 MG/1
TABLET SUBLINGUAL
Status: COMPLETED | OUTPATIENT
Start: 2018-12-25 | End: 2018-12-25

## 2018-12-25 RX ORDER — ONDANSETRON 4 MG/1
4 TABLET, FILM COATED ORAL EVERY 6 HOURS PRN
Status: DISCONTINUED | OUTPATIENT
Start: 2018-12-25 | End: 2018-12-27 | Stop reason: HOSPADM

## 2018-12-25 RX ORDER — DILTIAZEM HYDROCHLORIDE 60 MG/1
60 TABLET, FILM COATED ORAL EVERY 6 HOURS SCHEDULED
Status: DISCONTINUED | OUTPATIENT
Start: 2018-12-25 | End: 2018-12-27

## 2018-12-25 RX ORDER — POTASSIUM CHLORIDE 1.5 G/1.77G
40 POWDER, FOR SOLUTION ORAL AS NEEDED
Status: DISCONTINUED | OUTPATIENT
Start: 2018-12-25 | End: 2018-12-27 | Stop reason: HOSPADM

## 2018-12-25 RX ORDER — MAGNESIUM SULFATE HEPTAHYDRATE 40 MG/ML
2 INJECTION, SOLUTION INTRAVENOUS AS NEEDED
Status: DISCONTINUED | OUTPATIENT
Start: 2018-12-25 | End: 2018-12-27 | Stop reason: HOSPADM

## 2018-12-25 RX ORDER — MAGNESIUM SULFATE HEPTAHYDRATE 40 MG/ML
4 INJECTION, SOLUTION INTRAVENOUS AS NEEDED
Status: DISCONTINUED | OUTPATIENT
Start: 2018-12-25 | End: 2018-12-27 | Stop reason: HOSPADM

## 2018-12-25 RX ORDER — ACETAMINOPHEN 325 MG/1
650 TABLET ORAL EVERY 6 HOURS PRN
Status: DISCONTINUED | OUTPATIENT
Start: 2018-12-25 | End: 2018-12-27 | Stop reason: HOSPADM

## 2018-12-25 RX ADMIN — LEVOTHYROXINE SODIUM 200 MCG: 112 TABLET ORAL at 05:27

## 2018-12-25 RX ADMIN — RANOLAZINE 500 MG: 500 TABLET, FILM COATED, EXTENDED RELEASE ORAL at 08:50

## 2018-12-25 RX ADMIN — CARBIDOPA AND LEVODOPA 1 TABLET: 25; 100 TABLET ORAL at 21:02

## 2018-12-25 RX ADMIN — CETIRIZINE HYDROCHLORIDE 10 MG: 10 TABLET, FILM COATED ORAL at 08:54

## 2018-12-25 RX ADMIN — CARBIDOPA AND LEVODOPA 1 TABLET: 25; 100 TABLET ORAL at 08:49

## 2018-12-25 RX ADMIN — DILTIAZEM HYDROCHLORIDE 60 MG: 60 TABLET, FILM COATED ORAL at 11:21

## 2018-12-25 RX ADMIN — Medication 325 MG: at 08:53

## 2018-12-25 RX ADMIN — ASPIRIN 81 MG: 81 TABLET, COATED ORAL at 08:54

## 2018-12-25 RX ADMIN — NITROGLYCERIN 0.4 MG: 0.4 TABLET SUBLINGUAL at 01:48

## 2018-12-25 RX ADMIN — LOSARTAN POTASSIUM 50 MG: 50 TABLET ORAL at 08:53

## 2018-12-25 RX ADMIN — TRAMADOL HYDROCHLORIDE 50 MG: 50 TABLET, FILM COATED ORAL at 21:01

## 2018-12-25 RX ADMIN — POTASSIUM CHLORIDE 40 MEQ: 750 CAPSULE, EXTENDED RELEASE ORAL at 08:52

## 2018-12-25 RX ADMIN — AMANTADINE HYDROCHLORIDE 100 MG: 100 CAPSULE ORAL at 21:01

## 2018-12-25 RX ADMIN — CARBIDOPA AND LEVODOPA 1 TABLET: 25; 100 TABLET ORAL at 17:34

## 2018-12-25 RX ADMIN — RANOLAZINE 500 MG: 500 TABLET, FILM COATED, EXTENDED RELEASE ORAL at 21:01

## 2018-12-25 RX ADMIN — TRAMADOL HYDROCHLORIDE 50 MG: 50 TABLET, FILM COATED ORAL at 01:34

## 2018-12-25 RX ADMIN — PRAMIPEXOLE DIHYDROCHLORIDE 1.5 MG: 0.25 TABLET ORAL at 21:01

## 2018-12-25 RX ADMIN — CITALOPRAM HYDROBROMIDE 20 MG: 20 TABLET ORAL at 08:49

## 2018-12-25 RX ADMIN — SODIUM CHLORIDE, PRESERVATIVE FREE 3 ML: 5 INJECTION INTRAVENOUS at 08:57

## 2018-12-25 RX ADMIN — PANTOPRAZOLE SODIUM 40 MG: 40 TABLET, DELAYED RELEASE ORAL at 05:27

## 2018-12-25 RX ADMIN — AMANTADINE HYDROCHLORIDE 100 MG: 100 CAPSULE ORAL at 08:48

## 2018-12-25 RX ADMIN — BUMETANIDE 2 MG: 1 TABLET ORAL at 08:50

## 2018-12-25 RX ADMIN — CLONIDINE HYDROCHLORIDE 0.1 MG: 0.1 TABLET ORAL at 08:53

## 2018-12-25 RX ADMIN — OXYCODONE HYDROCHLORIDE AND ACETAMINOPHEN 500 MG: 500 TABLET ORAL at 08:54

## 2018-12-25 RX ADMIN — ACETAMINOPHEN 650 MG: 325 TABLET ORAL at 11:48

## 2018-12-25 RX ADMIN — ONDANSETRON 4 MG: 4 TABLET, FILM COATED ORAL at 11:48

## 2018-12-25 RX ADMIN — CARBIDOPA AND LEVODOPA 1 TABLET: 25; 100 TABLET ORAL at 14:26

## 2018-12-25 RX ADMIN — BUMETANIDE 2 MG: 1 TABLET ORAL at 17:34

## 2018-12-25 RX ADMIN — POTASSIUM CHLORIDE 10 MEQ: 750 CAPSULE, EXTENDED RELEASE ORAL at 21:02

## 2018-12-25 RX ADMIN — MAGNESIUM SULFATE HEPTAHYDRATE 4 G: 40 INJECTION, SOLUTION INTRAVENOUS at 08:54

## 2018-12-25 RX ADMIN — APIXABAN 5 MG: 5 TABLET, FILM COATED ORAL at 08:54

## 2018-12-25 RX ADMIN — SODIUM CHLORIDE, PRESERVATIVE FREE 3 ML: 5 INJECTION INTRAVENOUS at 21:08

## 2018-12-25 RX ADMIN — CARBIDOPA AND LEVODOPA 1 TABLET: 25; 100 TABLET ORAL at 11:48

## 2018-12-25 RX ADMIN — DILTIAZEM HYDROCHLORIDE 60 MG: 60 TABLET, FILM COATED ORAL at 23:46

## 2018-12-25 RX ADMIN — LOSARTAN POTASSIUM 50 MG: 50 TABLET ORAL at 21:01

## 2018-12-25 RX ADMIN — DILTIAZEM HYDROCHLORIDE 60 MG: 60 TABLET, FILM COATED ORAL at 17:34

## 2018-12-25 RX ADMIN — POTASSIUM CHLORIDE 40 MEQ: 750 CAPSULE, EXTENDED RELEASE ORAL at 14:27

## 2018-12-25 RX ADMIN — CARBIDOPA AND LEVODOPA 2 TABLET: 25; 100 TABLET ORAL at 05:27

## 2018-12-25 RX ADMIN — Medication 325 MG: at 17:34

## 2018-12-25 RX ADMIN — APIXABAN 5 MG: 5 TABLET, FILM COATED ORAL at 21:01

## 2018-12-25 RX ADMIN — MULTIPLE VITAMINS W/ MINERALS TAB 1 TABLET: TAB ORAL at 08:54

## 2018-12-26 ENCOUNTER — APPOINTMENT (OUTPATIENT)
Dept: CARDIOLOGY | Facility: HOSPITAL | Age: 68
End: 2018-12-26
Attending: INTERNAL MEDICINE

## 2018-12-26 PROBLEM — I48.91 ATRIAL FIBRILLATION WITH RVR: Status: ACTIVE | Noted: 2018-12-26

## 2018-12-26 PROBLEM — N28.9 RENAL INSUFFICIENCY: Status: ACTIVE | Noted: 2018-12-26

## 2018-12-26 LAB
ANION GAP SERPL CALCULATED.3IONS-SCNC: 8 MMOL/L (ref 3–11)
BUN BLD-MCNC: 41 MG/DL (ref 9–23)
BUN/CREAT SERPL: 30.1 (ref 7–25)
CALCIUM SPEC-SCNC: 9.3 MG/DL (ref 8.7–10.4)
CHLORIDE SERPL-SCNC: 94 MMOL/L (ref 99–109)
CO2 SERPL-SCNC: 30 MMOL/L (ref 20–31)
CREAT BLD-MCNC: 1.36 MG/DL (ref 0.6–1.3)
GFR SERPL CREATININE-BSD FRML MDRD: 38 ML/MIN/1.73
GLUCOSE BLD-MCNC: 143 MG/DL (ref 70–100)
GLUCOSE BLDC GLUCOMTR-MCNC: 146 MG/DL (ref 70–130)
GLUCOSE BLDC GLUCOMTR-MCNC: 150 MG/DL (ref 70–130)
GLUCOSE BLDC GLUCOMTR-MCNC: 164 MG/DL (ref 70–130)
GLUCOSE BLDC GLUCOMTR-MCNC: 233 MG/DL (ref 70–130)
MAGNESIUM SERPL-MCNC: 2.3 MG/DL (ref 1.3–2.7)
POTASSIUM BLD-SCNC: 4 MMOL/L (ref 3.5–5.5)
SODIUM BLD-SCNC: 132 MMOL/L (ref 132–146)

## 2018-12-26 PROCEDURE — 80048 BASIC METABOLIC PNL TOTAL CA: CPT | Performed by: INTERNAL MEDICINE

## 2018-12-26 PROCEDURE — 5A2204Z RESTORATION OF CARDIAC RHYTHM, SINGLE: ICD-10-PCS | Performed by: INTERNAL MEDICINE

## 2018-12-26 PROCEDURE — 99232 SBSQ HOSP IP/OBS MODERATE 35: CPT | Performed by: INTERNAL MEDICINE

## 2018-12-26 PROCEDURE — 63710000001 CARBIDOPA-LEVODOPA 25-100 MG TABLET: Performed by: NURSE PRACTITIONER

## 2018-12-26 PROCEDURE — 99152 MOD SED SAME PHYS/QHP 5/>YRS: CPT | Performed by: INTERNAL MEDICINE

## 2018-12-26 PROCEDURE — 25010000002 MIDAZOLAM PER 1 MG: Performed by: INTERNAL MEDICINE

## 2018-12-26 PROCEDURE — 82962 GLUCOSE BLOOD TEST: CPT

## 2018-12-26 PROCEDURE — A9270 NON-COVERED ITEM OR SERVICE: HCPCS | Performed by: INTERNAL MEDICINE

## 2018-12-26 PROCEDURE — 92960 CARDIOVERSION ELECTRIC EXT: CPT

## 2018-12-26 PROCEDURE — 93005 ELECTROCARDIOGRAM TRACING: CPT | Performed by: INTERNAL MEDICINE

## 2018-12-26 PROCEDURE — 63710000001 DILTIAZEM 60 MG TABLET: Performed by: INTERNAL MEDICINE

## 2018-12-26 PROCEDURE — 92960 CARDIOVERSION ELECTRIC EXT: CPT | Performed by: INTERNAL MEDICINE

## 2018-12-26 PROCEDURE — 63710000001 LEVOTHYROXINE 100 MCG TABLET: Performed by: NURSE PRACTITIONER

## 2018-12-26 PROCEDURE — A9270 NON-COVERED ITEM OR SERVICE: HCPCS | Performed by: NURSE PRACTITIONER

## 2018-12-26 PROCEDURE — 93010 ELECTROCARDIOGRAM REPORT: CPT | Performed by: INTERNAL MEDICINE

## 2018-12-26 PROCEDURE — 83735 ASSAY OF MAGNESIUM: CPT | Performed by: INTERNAL MEDICINE

## 2018-12-26 PROCEDURE — 63710000001 PANTOPRAZOLE 40 MG TABLET DELAYED-RELEASE: Performed by: NURSE PRACTITIONER

## 2018-12-26 RX ORDER — NALOXONE HYDROCHLORIDE 0.4 MG/ML
INJECTION, SOLUTION INTRAMUSCULAR; INTRAVENOUS; SUBCUTANEOUS
Status: DISCONTINUED
Start: 2018-12-26 | End: 2018-12-26 | Stop reason: WASHOUT

## 2018-12-26 RX ORDER — MIDAZOLAM HYDROCHLORIDE 1 MG/ML
INJECTION INTRAMUSCULAR; INTRAVENOUS
Status: COMPLETED | OUTPATIENT
Start: 2018-12-26 | End: 2018-12-26

## 2018-12-26 RX ORDER — MIDAZOLAM HYDROCHLORIDE 1 MG/ML
INJECTION INTRAMUSCULAR; INTRAVENOUS
Status: DISCONTINUED
Start: 2018-12-26 | End: 2018-12-27 | Stop reason: HOSPADM

## 2018-12-26 RX ORDER — FENTANYL CITRATE 50 UG/ML
INJECTION, SOLUTION INTRAMUSCULAR; INTRAVENOUS
Status: DISCONTINUED
Start: 2018-12-26 | End: 2018-12-26 | Stop reason: WASHOUT

## 2018-12-26 RX ORDER — PROPAFENONE HYDROCHLORIDE 150 MG/1
150 TABLET, COATED ORAL EVERY 12 HOURS SCHEDULED
Status: DISCONTINUED | OUTPATIENT
Start: 2018-12-26 | End: 2018-12-27 | Stop reason: HOSPADM

## 2018-12-26 RX ORDER — FLUMAZENIL 0.1 MG/ML
INJECTION INTRAVENOUS
Status: DISCONTINUED
Start: 2018-12-26 | End: 2018-12-26 | Stop reason: WASHOUT

## 2018-12-26 RX ADMIN — SPIRONOLACTONE 25 MG: 25 TABLET ORAL at 11:08

## 2018-12-26 RX ADMIN — CARBIDOPA AND LEVODOPA 2 TABLET: 25; 100 TABLET ORAL at 05:49

## 2018-12-26 RX ADMIN — ASPIRIN 81 MG: 81 TABLET, COATED ORAL at 11:08

## 2018-12-26 RX ADMIN — RANOLAZINE 500 MG: 500 TABLET, FILM COATED, EXTENDED RELEASE ORAL at 11:09

## 2018-12-26 RX ADMIN — DILTIAZEM HYDROCHLORIDE 60 MG: 60 TABLET, FILM COATED ORAL at 18:08

## 2018-12-26 RX ADMIN — AMANTADINE HYDROCHLORIDE 100 MG: 100 CAPSULE ORAL at 11:09

## 2018-12-26 RX ADMIN — PROPAFENONE HYDROCHLORIDE 150 MG: 150 TABLET, COATED ORAL at 13:06

## 2018-12-26 RX ADMIN — POTASSIUM CHLORIDE 10 MEQ: 750 CAPSULE, EXTENDED RELEASE ORAL at 11:10

## 2018-12-26 RX ADMIN — LEVOTHYROXINE SODIUM 200 MCG: 112 TABLET ORAL at 05:49

## 2018-12-26 RX ADMIN — AMANTADINE HYDROCHLORIDE 100 MG: 100 CAPSULE ORAL at 21:34

## 2018-12-26 RX ADMIN — PANTOPRAZOLE SODIUM 40 MG: 40 TABLET, DELAYED RELEASE ORAL at 05:49

## 2018-12-26 RX ADMIN — SODIUM CHLORIDE, PRESERVATIVE FREE 3 ML: 5 INJECTION INTRAVENOUS at 21:37

## 2018-12-26 RX ADMIN — CETIRIZINE HYDROCHLORIDE 10 MG: 10 TABLET, FILM COATED ORAL at 11:07

## 2018-12-26 RX ADMIN — PROPAFENONE HYDROCHLORIDE 150 MG: 150 TABLET, COATED ORAL at 21:33

## 2018-12-26 RX ADMIN — CARBIDOPA AND LEVODOPA 1 TABLET: 25; 100 TABLET ORAL at 12:07

## 2018-12-26 RX ADMIN — DILTIAZEM HYDROCHLORIDE 60 MG: 60 TABLET, FILM COATED ORAL at 23:57

## 2018-12-26 RX ADMIN — Medication 325 MG: at 18:08

## 2018-12-26 RX ADMIN — DILTIAZEM HYDROCHLORIDE 60 MG: 60 TABLET, FILM COATED ORAL at 05:49

## 2018-12-26 RX ADMIN — CITALOPRAM HYDROBROMIDE 20 MG: 20 TABLET ORAL at 11:05

## 2018-12-26 RX ADMIN — METHOHEXITAL SODIUM 10 MG: 500 INJECTION, POWDER, LYOPHILIZED, FOR SOLUTION INTRAMUSCULAR; INTRAVENOUS; RECTAL at 08:46

## 2018-12-26 RX ADMIN — APIXABAN 5 MG: 5 TABLET, FILM COATED ORAL at 11:09

## 2018-12-26 RX ADMIN — CARBIDOPA AND LEVODOPA 1 TABLET: 25; 100 TABLET ORAL at 21:33

## 2018-12-26 RX ADMIN — LOSARTAN POTASSIUM 50 MG: 50 TABLET ORAL at 11:09

## 2018-12-26 RX ADMIN — PRAMIPEXOLE DIHYDROCHLORIDE 1.5 MG: 0.25 TABLET ORAL at 21:34

## 2018-12-26 RX ADMIN — CARBIDOPA AND LEVODOPA 1 TABLET: 25; 100 TABLET ORAL at 18:08

## 2018-12-26 RX ADMIN — MIDAZOLAM HYDROCHLORIDE 2 MG: 1 INJECTION, SOLUTION INTRAMUSCULAR; INTRAVENOUS at 08:44

## 2018-12-26 RX ADMIN — OXYCODONE HYDROCHLORIDE AND ACETAMINOPHEN 500 MG: 500 TABLET ORAL at 11:05

## 2018-12-26 RX ADMIN — RANOLAZINE 500 MG: 500 TABLET, FILM COATED, EXTENDED RELEASE ORAL at 21:33

## 2018-12-26 RX ADMIN — DOCUSATE SODIUM AND SENNOSIDES 1 TABLET: 8.6; 5 TABLET, FILM COATED ORAL at 11:05

## 2018-12-26 RX ADMIN — TRAMADOL HYDROCHLORIDE 50 MG: 50 TABLET, FILM COATED ORAL at 21:33

## 2018-12-26 RX ADMIN — APIXABAN 5 MG: 5 TABLET, FILM COATED ORAL at 21:33

## 2018-12-26 RX ADMIN — DILTIAZEM HYDROCHLORIDE 60 MG: 60 TABLET, FILM COATED ORAL at 12:07

## 2018-12-26 RX ADMIN — Medication 325 MG: at 11:08

## 2018-12-26 RX ADMIN — POTASSIUM CHLORIDE 10 MEQ: 750 CAPSULE, EXTENDED RELEASE ORAL at 21:33

## 2018-12-26 RX ADMIN — CARBIDOPA AND LEVODOPA 1 TABLET: 25; 100 TABLET ORAL at 16:13

## 2018-12-26 RX ADMIN — OXYBUTYNIN CHLORIDE 10 MG: 10 TABLET, EXTENDED RELEASE ORAL at 11:05

## 2018-12-26 RX ADMIN — MULTIPLE VITAMINS W/ MINERALS TAB 1 TABLET: TAB ORAL at 11:09

## 2018-12-26 RX ADMIN — METHOHEXITAL SODIUM 20 MG: 500 INJECTION, POWDER, LYOPHILIZED, FOR SOLUTION INTRAMUSCULAR; INTRAVENOUS; RECTAL at 08:45

## 2018-12-26 RX ADMIN — LOSARTAN POTASSIUM 50 MG: 50 TABLET ORAL at 21:33

## 2018-12-26 RX ADMIN — SODIUM CHLORIDE, PRESERVATIVE FREE 3 ML: 5 INJECTION INTRAVENOUS at 12:10

## 2018-12-27 VITALS
TEMPERATURE: 97.5 F | RESPIRATION RATE: 18 BRPM | SYSTOLIC BLOOD PRESSURE: 107 MMHG | DIASTOLIC BLOOD PRESSURE: 55 MMHG | BODY MASS INDEX: 42.69 KG/M2 | HEART RATE: 74 BPM | OXYGEN SATURATION: 95 % | HEIGHT: 62 IN | WEIGHT: 232 LBS

## 2018-12-27 LAB
ANION GAP SERPL CALCULATED.3IONS-SCNC: 7 MMOL/L (ref 3–11)
BUN BLD-MCNC: 46 MG/DL (ref 9–23)
BUN/CREAT SERPL: 37.4 (ref 7–25)
CALCIUM SPEC-SCNC: 9.3 MG/DL (ref 8.7–10.4)
CHLORIDE SERPL-SCNC: 96 MMOL/L (ref 99–109)
CO2 SERPL-SCNC: 28 MMOL/L (ref 20–31)
CREAT BLD-MCNC: 1.23 MG/DL (ref 0.6–1.3)
GFR SERPL CREATININE-BSD FRML MDRD: 43 ML/MIN/1.73
GLUCOSE BLD-MCNC: 131 MG/DL (ref 70–100)
GLUCOSE BLDC GLUCOMTR-MCNC: 130 MG/DL (ref 70–130)
GLUCOSE BLDC GLUCOMTR-MCNC: 160 MG/DL (ref 70–130)
POTASSIUM BLD-SCNC: 3.8 MMOL/L (ref 3.5–5.5)
SODIUM BLD-SCNC: 131 MMOL/L (ref 132–146)

## 2018-12-27 PROCEDURE — 80048 BASIC METABOLIC PNL TOTAL CA: CPT | Performed by: INTERNAL MEDICINE

## 2018-12-27 PROCEDURE — 82962 GLUCOSE BLOOD TEST: CPT

## 2018-12-27 PROCEDURE — 93005 ELECTROCARDIOGRAM TRACING: CPT | Performed by: INTERNAL MEDICINE

## 2018-12-27 PROCEDURE — 99232 SBSQ HOSP IP/OBS MODERATE 35: CPT | Performed by: INTERNAL MEDICINE

## 2018-12-27 PROCEDURE — 93010 ELECTROCARDIOGRAM REPORT: CPT | Performed by: INTERNAL MEDICINE

## 2018-12-27 RX ORDER — CLONIDINE HYDROCHLORIDE 0.1 MG/1
0.1 TABLET ORAL EVERY 12 HOURS SCHEDULED
Status: DISCONTINUED | OUTPATIENT
Start: 2018-12-27 | End: 2018-12-27 | Stop reason: HOSPADM

## 2018-12-27 RX ORDER — ROSUVASTATIN CALCIUM 20 MG/1
20 TABLET, COATED ORAL NIGHTLY
Qty: 30 TABLET | Refills: 0
Start: 2018-12-27 | End: 2019-01-31

## 2018-12-27 RX ORDER — PROPAFENONE HYDROCHLORIDE 150 MG/1
150 TABLET, COATED ORAL EVERY 12 HOURS SCHEDULED
Qty: 60 TABLET | Refills: 1 | Status: SHIPPED | OUTPATIENT
Start: 2018-12-27 | End: 2019-02-11 | Stop reason: SDUPTHER

## 2018-12-27 RX ADMIN — DILTIAZEM HYDROCHLORIDE 60 MG: 60 TABLET, FILM COATED ORAL at 05:43

## 2018-12-27 RX ADMIN — RANOLAZINE 500 MG: 500 TABLET, FILM COATED, EXTENDED RELEASE ORAL at 09:40

## 2018-12-27 RX ADMIN — CARBIDOPA AND LEVODOPA 1 TABLET: 25; 100 TABLET ORAL at 12:27

## 2018-12-27 RX ADMIN — CLONIDINE HYDROCHLORIDE 0.1 MG: 0.1 TABLET ORAL at 09:39

## 2018-12-27 RX ADMIN — SODIUM CHLORIDE, PRESERVATIVE FREE 3 ML: 5 INJECTION INTRAVENOUS at 09:42

## 2018-12-27 RX ADMIN — DOCUSATE SODIUM AND SENNOSIDES 1 TABLET: 8.6; 5 TABLET, FILM COATED ORAL at 09:39

## 2018-12-27 RX ADMIN — CARBIDOPA AND LEVODOPA 1 TABLET: 25; 100 TABLET ORAL at 09:41

## 2018-12-27 RX ADMIN — AMANTADINE HYDROCHLORIDE 100 MG: 100 CAPSULE ORAL at 09:40

## 2018-12-27 RX ADMIN — OXYCODONE HYDROCHLORIDE AND ACETAMINOPHEN 500 MG: 500 TABLET ORAL at 09:39

## 2018-12-27 RX ADMIN — APIXABAN 5 MG: 5 TABLET, FILM COATED ORAL at 09:40

## 2018-12-27 RX ADMIN — PANTOPRAZOLE SODIUM 40 MG: 40 TABLET, DELAYED RELEASE ORAL at 05:43

## 2018-12-27 RX ADMIN — CETIRIZINE HYDROCHLORIDE 10 MG: 10 TABLET, FILM COATED ORAL at 09:39

## 2018-12-27 RX ADMIN — MULTIPLE VITAMINS W/ MINERALS TAB 1 TABLET: TAB ORAL at 09:39

## 2018-12-27 RX ADMIN — LEVOTHYROXINE SODIUM 200 MCG: 112 TABLET ORAL at 05:43

## 2018-12-27 RX ADMIN — ASPIRIN 81 MG: 81 TABLET, COATED ORAL at 09:39

## 2018-12-27 RX ADMIN — PROPAFENONE HYDROCHLORIDE 150 MG: 150 TABLET, COATED ORAL at 09:40

## 2018-12-27 RX ADMIN — LOSARTAN POTASSIUM 50 MG: 50 TABLET ORAL at 09:46

## 2018-12-27 RX ADMIN — CARBIDOPA AND LEVODOPA 2 TABLET: 25; 100 TABLET ORAL at 05:43

## 2018-12-27 RX ADMIN — POTASSIUM CHLORIDE 10 MEQ: 750 CAPSULE, EXTENDED RELEASE ORAL at 09:39

## 2018-12-27 RX ADMIN — OXYBUTYNIN CHLORIDE 10 MG: 10 TABLET, EXTENDED RELEASE ORAL at 09:39

## 2018-12-27 RX ADMIN — Medication 325 MG: at 09:39

## 2018-12-27 RX ADMIN — SPIRONOLACTONE 25 MG: 25 TABLET ORAL at 09:39

## 2018-12-27 RX ADMIN — CITALOPRAM HYDROBROMIDE 20 MG: 20 TABLET ORAL at 09:39

## 2018-12-28 ENCOUNTER — READMISSION MANAGEMENT (OUTPATIENT)
Dept: CALL CENTER | Facility: HOSPITAL | Age: 68
End: 2018-12-28

## 2018-12-28 DIAGNOSIS — I48.0 PAROXYSMAL ATRIAL FIBRILLATION (HCC): Primary | ICD-10-CM

## 2018-12-28 NOTE — OUTREACH NOTE
Prep Survey      Responses   Facility patient discharged from?  [place]   Is patient eligible?  Yes   Discharge diagnosis  **A-fib    Does the patient have one of the following disease processes/diagnoses(primary or secondary)?  Other   Does the patient have Home health ordered?  No   Is there a DME ordered?  No   Prep survey completed?  Yes          Puja Caban RN

## 2018-12-28 NOTE — OUTREACH NOTE
Prep Survey      Responses   Facility patient discharged from?  [place]   Is patient eligible?  Yes   Discharge diagnosis  AFIB with RVR, s/p MADHAV/ECV on 12/26, renal insuff., chest pain, MILLI treated with BiPAP, DM, HLD, CAD involving native coronary artery with unstable angina pectoris, essential HTN, Parkinson's disease, GERD, Hypothyroidism   Does the patient have one of the following disease processes/diagnoses(primary or secondary)?  Other   Does the patient have Home health ordered?  No   Is there a DME ordered?  No   What DME was ordered?  Pt. has mult DME and home O2 thru [facility]   Comments regarding appointments  See AVS   Prep survey completed?  Yes          Saskia Gauthier RN

## 2019-01-04 ENCOUNTER — READMISSION MANAGEMENT (OUTPATIENT)
Dept: CALL CENTER | Facility: HOSPITAL | Age: 69
End: 2019-01-04

## 2019-01-04 NOTE — OUTREACH NOTE
Medical Week 2 Survey      Responses   Facility patient discharged from?  [place]   Does the patient have one of the following disease processes/diagnoses(primary or secondary)?  Other   Week 2 attempt successful?  No   Unsuccessful attempts  Attempt 1          Evelyne Zhu RN

## 2019-01-07 ENCOUNTER — READMISSION MANAGEMENT (OUTPATIENT)
Dept: CALL CENTER | Facility: HOSPITAL | Age: 69
End: 2019-01-07

## 2019-01-07 NOTE — OUTREACH NOTE
Medical Week 2 Survey      Responses   Facility patient discharged from?  [place]   Does the patient have one of the following disease processes/diagnoses(primary or secondary)?  Other   Week 2 attempt successful?  Yes   Call start time  1440   Discharge diagnosis  **A-fib    Call end time  1448   Meds reviewed with patient/caregiver?  Yes   Is the patient having any side effects they believe may be caused by any medication additions or changes?  Yes   Does the patient have all medications ordered at discharge?  No   What is keeping the patient from filling the prescriptions?  Patient desires to consult PCP   Prescription comments  Can't take Crestor   Is the patient taking all medications as directed (includes completed medication regime)?  Yes   Medication comments  She thinks Rythmol is causing her head to feel funny.   Does the patient have a primary care provider?   Yes   Does the patient have an appointment with their PCP within 7 days of discharge?  Yes   Has the patient kept scheduled appointments due by today?  Yes   Has home health visited the patient within 72 hours of discharge?  N/A   Psychosocial issues?  No   Did the patient receive a copy of their discharge instructions?  Yes   Nursing interventions  Reviewed instructions with patient, Educated on MyChart   What is the patient's perception of their health status since discharge?  Improving   Is the patient/caregiver able to teach back signs and symptoms related to disease process for when to call PCP?  Yes   Is the patient/caregiver able to teach back signs and symptoms related to disease process for when to call 911?  Yes   Is the patient/caregiver able to teach back the hierarchy of who to call/visit for symptoms/problems? PCP, Specialist, Home health nurse, Urgent Care, ED, 911  Yes   Additional teach back comments  She will email about her concerns with her shoulder and she will f/u w/ MD's this week.   Week 2 Call Completed?  Yes           Sarai Pressley, RN

## 2019-01-10 ENCOUNTER — CLINICAL SUPPORT NO REQUIREMENTS (OUTPATIENT)
Dept: CARDIOLOGY | Facility: CLINIC | Age: 69
End: 2019-01-10

## 2019-01-10 DIAGNOSIS — I49.5 SICK SINUS SYNDROME (HCC): ICD-10-CM

## 2019-01-10 PROCEDURE — 93296 REM INTERROG EVL PM/IDS: CPT | Performed by: INTERNAL MEDICINE

## 2019-01-10 PROCEDURE — 93294 REM INTERROG EVL PM/LDLS PM: CPT | Performed by: INTERNAL MEDICINE

## 2019-01-21 DIAGNOSIS — I25.10 CAD IN NATIVE ARTERY: ICD-10-CM

## 2019-01-21 DIAGNOSIS — R07.9 CHEST PAIN, UNSPECIFIED TYPE: Primary | ICD-10-CM

## 2019-01-22 ENCOUNTER — PREP FOR SURGERY (OUTPATIENT)
Dept: OTHER | Facility: HOSPITAL | Age: 69
End: 2019-01-22

## 2019-01-22 DIAGNOSIS — E11.9 DIABETES MELLITUS (HCC): ICD-10-CM

## 2019-01-22 DIAGNOSIS — I25.10 CORONARY ARTERY DISEASE: Primary | ICD-10-CM

## 2019-01-22 RX ORDER — ONDANSETRON 2 MG/ML
4 INJECTION INTRAMUSCULAR; INTRAVENOUS EVERY 6 HOURS PRN
Status: CANCELLED | OUTPATIENT
Start: 2019-01-22

## 2019-01-22 RX ORDER — NITROGLYCERIN 0.4 MG/1
0.4 TABLET SUBLINGUAL
Status: CANCELLED | OUTPATIENT
Start: 2019-01-22

## 2019-01-22 RX ORDER — ASPIRIN 325 MG
325 TABLET, DELAYED RELEASE (ENTERIC COATED) ORAL DAILY
Status: CANCELLED | OUTPATIENT
Start: 2019-01-23

## 2019-01-22 RX ORDER — ACETAMINOPHEN 325 MG/1
650 TABLET ORAL EVERY 4 HOURS PRN
Status: CANCELLED | OUTPATIENT
Start: 2019-01-22

## 2019-01-22 RX ORDER — ASPIRIN 325 MG
325 TABLET ORAL ONCE
Status: CANCELLED | OUTPATIENT
Start: 2019-01-22 | End: 2019-01-22

## 2019-01-22 RX ORDER — SODIUM CHLORIDE 0.9 % (FLUSH) 0.9 %
1-10 SYRINGE (ML) INJECTION AS NEEDED
Status: CANCELLED | OUTPATIENT
Start: 2019-01-22

## 2019-01-22 RX ORDER — SODIUM CHLORIDE 0.9 % (FLUSH) 0.9 %
3 SYRINGE (ML) INJECTION EVERY 12 HOURS SCHEDULED
Status: CANCELLED | OUTPATIENT
Start: 2019-01-22

## 2019-01-31 ENCOUNTER — APPOINTMENT (OUTPATIENT)
Dept: PREADMISSION TESTING | Facility: HOSPITAL | Age: 69
End: 2019-01-31

## 2019-01-31 DIAGNOSIS — E11.9 DIABETES MELLITUS (HCC): ICD-10-CM

## 2019-01-31 DIAGNOSIS — I25.10 CORONARY ARTERY DISEASE: ICD-10-CM

## 2019-01-31 LAB
ALBUMIN SERPL-MCNC: 4.45 G/DL (ref 3.2–4.8)
ALBUMIN/GLOB SERPL: 2.1 G/DL (ref 1.5–2.5)
ALP SERPL-CCNC: 94 U/L (ref 25–100)
ALT SERPL W P-5'-P-CCNC: 12 U/L (ref 7–40)
ANION GAP SERPL CALCULATED.3IONS-SCNC: 8 MMOL/L (ref 3–11)
AST SERPL-CCNC: 21 U/L (ref 0–33)
BILIRUB SERPL-MCNC: 0.3 MG/DL (ref 0.3–1.2)
BUN BLD-MCNC: 23 MG/DL (ref 9–23)
BUN/CREAT SERPL: 30.7 (ref 7–25)
CALCIUM SPEC-SCNC: 9.3 MG/DL (ref 8.7–10.4)
CHLORIDE SERPL-SCNC: 93 MMOL/L (ref 99–109)
CO2 SERPL-SCNC: 29 MMOL/L (ref 20–31)
CREAT BLD-MCNC: 0.75 MG/DL (ref 0.6–1.3)
DEPRECATED RDW RBC AUTO: 45.9 FL (ref 37–54)
ERYTHROCYTE [DISTWIDTH] IN BLOOD BY AUTOMATED COUNT: 14.2 % (ref 11.3–14.5)
GFR SERPL CREATININE-BSD FRML MDRD: 76 ML/MIN/1.73
GLOBULIN UR ELPH-MCNC: 2.2 GM/DL
GLUCOSE BLD-MCNC: 91 MG/DL (ref 70–100)
HBA1C MFR BLD: 6.1 % (ref 4.8–5.6)
HCT VFR BLD AUTO: 36.2 % (ref 34.5–44)
HGB BLD-MCNC: 11.9 G/DL (ref 11.5–15.5)
MCH RBC QN AUTO: 29 PG (ref 27–31)
MCHC RBC AUTO-ENTMCNC: 32.9 G/DL (ref 32–36)
MCV RBC AUTO: 88.3 FL (ref 80–99)
PLATELET # BLD AUTO: 234 10*3/MM3 (ref 150–450)
PMV BLD AUTO: 9.5 FL (ref 6–12)
POTASSIUM BLD-SCNC: 3.5 MMOL/L (ref 3.5–5.5)
PROT SERPL-MCNC: 6.6 G/DL (ref 5.7–8.2)
RBC # BLD AUTO: 4.1 10*6/MM3 (ref 3.89–5.14)
SODIUM BLD-SCNC: 130 MMOL/L (ref 132–146)
WBC NRBC COR # BLD: 6.95 10*3/MM3 (ref 3.5–10.8)

## 2019-01-31 PROCEDURE — 36415 COLL VENOUS BLD VENIPUNCTURE: CPT

## 2019-01-31 PROCEDURE — 83036 HEMOGLOBIN GLYCOSYLATED A1C: CPT | Performed by: NURSE PRACTITIONER

## 2019-01-31 PROCEDURE — 85027 COMPLETE CBC AUTOMATED: CPT | Performed by: NURSE PRACTITIONER

## 2019-01-31 PROCEDURE — 80053 COMPREHEN METABOLIC PANEL: CPT | Performed by: NURSE PRACTITIONER

## 2019-02-01 ENCOUNTER — HOSPITAL ENCOUNTER (OUTPATIENT)
Facility: HOSPITAL | Age: 69
Discharge: HOME | End: 2019-02-01
Attending: INTERNAL MEDICINE | Admitting: INTERNAL MEDICINE

## 2019-02-01 VITALS
BODY MASS INDEX: 43.75 KG/M2 | OXYGEN SATURATION: 97 % | TEMPERATURE: 96.7 F | SYSTOLIC BLOOD PRESSURE: 124 MMHG | RESPIRATION RATE: 18 BRPM | WEIGHT: 246.91 LBS | HEIGHT: 63 IN | HEART RATE: 73 BPM | DIASTOLIC BLOOD PRESSURE: 56 MMHG

## 2019-02-01 DIAGNOSIS — I25.10 CAD IN NATIVE ARTERY: ICD-10-CM

## 2019-02-01 DIAGNOSIS — R07.9 CHEST PAIN, UNSPECIFIED TYPE: ICD-10-CM

## 2019-02-01 DIAGNOSIS — I25.10 CORONARY ARTERY DISEASE: ICD-10-CM

## 2019-02-01 LAB
ACT BLD: 235 SECONDS (ref 82–152)
ARTICHOKE IGE QN: 133 MG/DL (ref 0–130)
CHOLEST SERPL-MCNC: 185 MG/DL (ref 0–200)
GLUCOSE BLDC GLUCOMTR-MCNC: 141 MG/DL (ref 70–130)
HDLC SERPL-MCNC: 43 MG/DL (ref 40–60)
TRIGL SERPL-MCNC: 78 MG/DL (ref 0–150)

## 2019-02-01 PROCEDURE — 25010000002 MIDAZOLAM PER 1 MG: Performed by: INTERNAL MEDICINE

## 2019-02-01 PROCEDURE — C1887 CATHETER, GUIDING: HCPCS | Performed by: INTERNAL MEDICINE

## 2019-02-01 PROCEDURE — C1769 GUIDE WIRE: HCPCS | Performed by: INTERNAL MEDICINE

## 2019-02-01 PROCEDURE — 82962 GLUCOSE BLOOD TEST: CPT

## 2019-02-01 PROCEDURE — 36415 COLL VENOUS BLD VENIPUNCTURE: CPT

## 2019-02-01 PROCEDURE — 93571 IV DOP VEL&/PRESS C FLO 1ST: CPT | Performed by: INTERNAL MEDICINE

## 2019-02-01 PROCEDURE — 80061 LIPID PANEL: CPT | Performed by: INTERNAL MEDICINE

## 2019-02-01 PROCEDURE — 85347 COAGULATION TIME ACTIVATED: CPT

## 2019-02-01 PROCEDURE — 0 IOPAMIDOL PER 1 ML: Performed by: INTERNAL MEDICINE

## 2019-02-01 PROCEDURE — 25010000002 HEPARIN (PORCINE) PER 1000 UNITS: Performed by: INTERNAL MEDICINE

## 2019-02-01 PROCEDURE — 93458 L HRT ARTERY/VENTRICLE ANGIO: CPT | Performed by: INTERNAL MEDICINE

## 2019-02-01 PROCEDURE — 25010000002 FENTANYL CITRATE (PF) 100 MCG/2ML SOLUTION: Performed by: INTERNAL MEDICINE

## 2019-02-01 PROCEDURE — C1894 INTRO/SHEATH, NON-LASER: HCPCS | Performed by: INTERNAL MEDICINE

## 2019-02-01 RX ORDER — ONDANSETRON 2 MG/ML
4 INJECTION INTRAMUSCULAR; INTRAVENOUS EVERY 6 HOURS PRN
Status: DISCONTINUED | OUTPATIENT
Start: 2019-02-01 | End: 2019-02-01 | Stop reason: HOSPADM

## 2019-02-01 RX ORDER — FENTANYL CITRATE 50 UG/ML
INJECTION, SOLUTION INTRAMUSCULAR; INTRAVENOUS AS NEEDED
Status: DISCONTINUED | OUTPATIENT
Start: 2019-02-01 | End: 2019-02-01 | Stop reason: HOSPADM

## 2019-02-01 RX ORDER — ASPIRIN 325 MG
325 TABLET, DELAYED RELEASE (ENTERIC COATED) ORAL DAILY
Status: DISCONTINUED | OUTPATIENT
Start: 2019-02-02 | End: 2019-02-01 | Stop reason: HOSPADM

## 2019-02-01 RX ORDER — SODIUM CHLORIDE 9 MG/ML
100 INJECTION, SOLUTION INTRAVENOUS CONTINUOUS
Status: ACTIVE | OUTPATIENT
Start: 2019-02-01 | End: 2019-02-01

## 2019-02-01 RX ORDER — NALOXONE HCL 0.4 MG/ML
0.4 VIAL (ML) INJECTION
Status: DISCONTINUED | OUTPATIENT
Start: 2019-02-01 | End: 2019-02-01 | Stop reason: HOSPADM

## 2019-02-01 RX ORDER — SODIUM CHLORIDE 0.9 % (FLUSH) 0.9 %
3 SYRINGE (ML) INJECTION EVERY 12 HOURS SCHEDULED
Status: DISCONTINUED | OUTPATIENT
Start: 2019-02-01 | End: 2019-02-01 | Stop reason: HOSPADM

## 2019-02-01 RX ORDER — HEPARIN SODIUM 1000 [USP'U]/ML
INJECTION, SOLUTION INTRAVENOUS; SUBCUTANEOUS AS NEEDED
Status: DISCONTINUED | OUTPATIENT
Start: 2019-02-01 | End: 2019-02-01 | Stop reason: HOSPADM

## 2019-02-01 RX ORDER — LIDOCAINE HYDROCHLORIDE 10 MG/ML
INJECTION, SOLUTION EPIDURAL; INFILTRATION; INTRACAUDAL; PERINEURAL AS NEEDED
Status: DISCONTINUED | OUTPATIENT
Start: 2019-02-01 | End: 2019-02-01 | Stop reason: HOSPADM

## 2019-02-01 RX ORDER — SODIUM CHLORIDE 0.9 % (FLUSH) 0.9 %
1-10 SYRINGE (ML) INJECTION AS NEEDED
Status: DISCONTINUED | OUTPATIENT
Start: 2019-02-01 | End: 2019-02-01 | Stop reason: HOSPADM

## 2019-02-01 RX ORDER — SODIUM CHLORIDE 9 MG/ML
3 INJECTION, SOLUTION INTRAVENOUS CONTINUOUS
Status: ACTIVE | OUTPATIENT
Start: 2019-02-01 | End: 2019-02-01

## 2019-02-01 RX ORDER — MIDAZOLAM HYDROCHLORIDE 1 MG/ML
INJECTION INTRAMUSCULAR; INTRAVENOUS AS NEEDED
Status: DISCONTINUED | OUTPATIENT
Start: 2019-02-01 | End: 2019-02-01 | Stop reason: HOSPADM

## 2019-02-01 RX ORDER — NITROGLYCERIN 0.4 MG/1
0.4 TABLET SUBLINGUAL
Status: DISCONTINUED | OUTPATIENT
Start: 2019-02-01 | End: 2019-02-01 | Stop reason: HOSPADM

## 2019-02-01 RX ORDER — MORPHINE SULFATE 2 MG/ML
1 INJECTION, SOLUTION INTRAMUSCULAR; INTRAVENOUS EVERY 4 HOURS PRN
Status: DISCONTINUED | OUTPATIENT
Start: 2019-02-01 | End: 2019-02-01 | Stop reason: HOSPADM

## 2019-02-01 RX ORDER — ASPIRIN 325 MG
325 TABLET ORAL ONCE
Status: COMPLETED | OUTPATIENT
Start: 2019-02-01 | End: 2019-02-01

## 2019-02-01 RX ORDER — ACETAMINOPHEN 325 MG/1
650 TABLET ORAL EVERY 4 HOURS PRN
Status: DISCONTINUED | OUTPATIENT
Start: 2019-02-01 | End: 2019-02-01 | Stop reason: HOSPADM

## 2019-02-01 RX ADMIN — SODIUM CHLORIDE 2.95 ML/KG/HR: 9 INJECTION, SOLUTION INTRAVENOUS at 07:42

## 2019-02-01 RX ADMIN — ASPIRIN 325 MG ORAL TABLET 325 MG: 325 PILL ORAL at 07:41

## 2019-02-01 NOTE — H&P
[facility] Consult Note      Referring Provider: Albertina Corona,*  Primary Provider:  Reynaldo Fritz MD  Reason for Consultation: chest pain    Patient Care Team:  Reynaldo Fritz MD as PCP - General  Reynaldo Fritz MD as PCP - Family Medicine  RamirezFaisal MD as Obstetrician (Obstetrics and Gynecology)  Timothy Dan DC as Referring Physician (Chiropractic Medicine)  Albertina Corona MD as Consulting Physician (Cardiology)  Armando Shen MD as Consulting Physician (Allergy)  Dilshad Wood MD as Consulting Physician (Endocrinology)  Dory Gamboa PA (Physician Assistant)  Rene Pringle MD (Otolaryngology)  Baylee Elliott APRN (Nurse Practitioner)  Raciel Valadez MD as Consulting Physician (Pulmonary Disease)  Terrence Mckenzie MD (Urology)    Chief complaint:  Chest pain    Identification:  68 year old female    Problem list:    1. Coronary artery disease  a. [facility] 2/15/2008, Dr Lutz.  Mild, non-obstructive CAD, normal EF  b. [facility] 1/9/2013, Dr Stevenson Sutton:  No LV gram done; mild, non-obstructive coronary artery disease.  c. [facility] 11/9/2015, [facility]:  EF 60%.  70% RCA lesion with Promus ZAINAB placement. 40-50% mid LAD, no FFR performed. Other small blockages noted.  No MR.  d. [facility] 11/15/2015, Dr Palacio @ [facility]:  Patent RCA stent, 40-50% LAD with FFR @ 0.92; mild inferior wall hypokinesis  e. [facility] 7/29/2016, [facility]:  Abnormal stress test.  Normal CORS with patent stent. LAD improved since last image EF 55-60%  2. Peripheral vascular disease/chronic venous stasis  a. Venous duplex 9/17/2010: Insufficiency to venous hypertension in the great saphenous system bilaterally.  b. Venous duplex 2013: Right leg laser ablation of Dr. Garcia.  c. Venous duplex 5/6/2016: No evidence of DVT, no superficial, no thrmobophlebitis, no valvular insufficiency.  d. AA with runoffs 8/31/2016: Single-vessel [place]: No significant  arterial disease.  3. Palpitations  a. Echocardiogram 2/13/2014: Dr. Teran.  Normal biventricular function; trace to mild MR.  Atrial septal aneurysm or  b. Echocardiogram 12/22/15: Dr. Chavez.  Borderline LVH, mild LAE, mild RV enlargement.  Mild to moderate MR and TR with RVSP of 46 mmHg.  c. Conclusion/3/2016: Dr. Palacio.  RV enlargement.  EF 50-55%.  Mild AI S, mild MR and TR with RVSP 37 mmHg.  4. AF/SSS  a. [employer] PPM 2016  b. CHADS2 Vasc  = 5. On Eliquis   c. TIA: Carotid duplex 2/13/2014 showed no significant flow-limiting stenosis.  Mild luminal disease present; both vertebrals are present.  d. ECV 12/26/2018:  Successful cardioversion: AV paced  e. Echo 12/25/18:  EF 60%, mild MR/ TR with RVSP 29 mmHg.  Sclerotic AoV  5. Diabetes  6. Essential Hypertension  7. Hyperlipidemia  8. Hypothyroid  9. Hyponatremia  a. Secondary to SIADH  10. MILLI with CPAP  11. RLS  12. Parkinson's disease  13. GERD  14. Urinary Retention  a. S/P cystoscopy and ureter dilation, March 2018.  Dr. Terrence Mckenzie  15. Surgeries:  a. Rotator cuff repair  b. Cholecystectomy  c. Bilateral knee replacement  d. Tubal ligation  e. Breast surgery  f. Sinus surgery        Allergies:  Toprol xl [metoprolol tartrate]; Amlodipine besylate; Entacapone; Levemir [insulin detemir]; Levaquin [levofloxacin]; Xarelto [rivaroxaban]; Bactrim [sulfamethoxazole-trimethoprim]; Ciprofloxacin; Cogentin [benztropine]; Compazine [prochlorperazine edisylate]; Duraprep [antiseptic products, misc.]; Haldol [haloperidol lactate]; Hydralazine; Hydrocodone-acetaminophen; Lisinopril; Penicillins; Statins; and Tarka [trandolapril-verapamil hcl er]    Home/Current Medications:    No current facility-administered medications on file prior to encounter.      Current Outpatient Medications on File Prior to Encounter   Medication Sig Dispense Refill   • albuterol (VENTOLIN HFA) 108 (90 Base) MCG/ACT inhaler Inhale 1 puff Every 4 (Four) Hours As Needed.     •  amantadine (SYMMETREL) 100 MG capsule Take 100 mg by mouth 2 (Two) Times a Day.     • apixaban (ELIQUIS) 5 MG tablet tablet Take 1 tablet by mouth Every 12 (Twelve) Hours. 180 tablet 3   • aspirin 81 MG EC tablet Take 81 mg by mouth Daily.     • B Complex-C (SUPER B COMPLEX PO) Take 1 tablet by mouth Daily.     • Calcium Carbonate (CALTRATE 600 PO) Take 600 mg by mouth Daily.     • carbidopa-levodopa (SINEMET)  MG per tablet Take  by mouth. 2 tablets at 0600, 1 tablet at 0900, 1200, 1500, 1800, 2100     • carbonyl iron (FEOSOL) 45 MG tablet tablet Take 1 tablet by mouth 2 (Two) Times a Day.     • Cholecalciferol 2000 units tablet Take 2,000 Units by mouth 2 (Two) Times a Day.     • citalopram (CeleXA) 20 MG tablet Take 20 mg by mouth every night at bedtime.     • clobetasol (TEMOVATE) 0.05 % cream Apply 1 application topically 2 (Two) Times a Day As Needed (Lichens Sclerosis).     • CloNIDine (CATAPRES) 0.1 MG tablet TAKE 1 TABLET EVERY 12 HOURS 180 tablet 3   • Collagen-Boron-Hyaluronic Acid 10-5-3.3 MG tablet Take 1 tablet by mouth Daily.     • fluticasone (FLONASE) 50 MCG/ACT nasal spray 2 sprays into each nostril Daily As Needed for Rhinitis.     • insulin degludec (TRESIBA FLEXTOUCH) 100 UNIT/ML solution pen-injector injection Inject 28 Units under the skin Every Night.     • IPRATROPIUM BROMIDE NA 1 spray into the nostril(s) as directed by provider 2 (Two) Times a Day As Needed.     • Loratadine 10 MG capsule Take 10 mg by mouth Daily.     • losartan (COZAAR) 50 MG tablet TAKE 1 TABLET EVERY 12 HOURS 180 tablet 3   • metFORMIN (GLUCOPHAGE) 1000 MG tablet Take 1,000 mg by mouth 2 (Two) Times a Day With Meals.     • Mirabegron ER (MYRBETRIQ) 50 MG tablet sustained-release 24 hour 24 hr tablet Take 50 mg by mouth Daily.     • Multiple Vitamins-Minerals (CENTRUM ULTRA WOMENS PO) Take 1 tablet by mouth Daily.     • nitroglycerin (NITROLINGUAL) 0.4 MG/SPRAY spray Place 1 spray under the tongue Every 5 (Five)  Minutes As Needed for Chest Pain. 1 each 6   • omeprazole (priLOSEC) 40 MG capsule Take 40 mg by mouth 2 (Two) Times a Day.     • potassium chloride (MICRO-K) 10 MEQ CR capsule Take 10 mEq by mouth 2 (Two) Times a Day.     • pramipexole (MIRAPEX) 1.5 MG tablet Take 1.5 mg by mouth Every Night.     • propafenone (RYTHMOL) 150 MG tablet Take 1 tablet by mouth Every 12 (Twelve) Hours. 60 tablet 1   • ranolazine (RANEXA) 500 MG 12 hr tablet Take 1 tablet by mouth Every 12 (Twelve) Hours. 180 tablet 3   • sennosides-docusate sodium (SENOKOT-S) 8.6-50 MG tablet Take 1 tablet by mouth Daily.     • spironolactone (ALDACTONE) 25 MG tablet Take 1 tablet by mouth Daily. (Patient taking differently: Take 25 mg by mouth Daily As Needed (swelling).) 90 tablet 1   • SYNTHROID 200 MCG tablet Take 200 mcg by mouth Daily.     • traMADol (ULTRAM) 50 MG tablet Take 50 mg by mouth Every 8 (Eight) Hours As Needed for Moderate Pain .     • Ubiquinol (QUNOL COQ10/UBIQUINOL/JOSE) 100 MG capsule Take 1 capsule by mouth Daily.     • vitamin C (ASCORBIC ACID) 500 MG tablet Take 500 mg by mouth Daily. Chewable tablet     • [DISCONTINUED] B-D UF III MINI PEN NEEDLES 31G X 5 MM misc          History of present illness:  Patient is a pleasant 68-year-old female with the above-noted medical history presents today for cardiac catheterization for ongoing chest pain and shortness of breath.  She recently was admitted and was found to be in A. Fib with associated chest tightness and shortness of breath.  She was cardioverted on December 25 resulting in an AV paced rhythm.  Since that time she admits that her chest discomfort is a little improved but not completely resolved.  She does have one single cardiac stent that was placed numerous years ago.  She says that there were little to no symptoms at that time of stent placement.  She was told that she had other mild blockages noted that were not significant enough for intervention.  She is concerned  "that her symptoms could possibly be related to worsening of these blockages since she is not seeming to get any relief.  She is intolerant to statins therefore her LDL is higher than recommended.  She lives a very sedentary lifestyle uses a walker for ambulation.    Cardiac Risk Factors:  Hypertension, hyperlipidemia, diabetes, sedentary lifestyle, known coronary disease, family history coronary disease      Social History:  Social History     Socioeconomic History   • Marital status:      Spouse name: N/A   • Number of children: 4   • Years of education: College   • Highest education level: Not on file   Social Needs   • Financial resource strain: Not on file   • Food insecurity - worry: Not on file   • Food insecurity - inability: Not on file   • Transportation needs - medical: Not on file   • Transportation needs - non-medical: Not on file   Occupational History   • Occupation:      Employer: RETIRED   Tobacco Use   • Smoking status: Never Smoker   • Smokeless tobacco: Never Used   Substance and Sexual Activity   • Alcohol use: No     Frequency: Never   • Drug use: No   • Sexual activity: Not Currently     Partners: Male   Other Topics Concern   • Not on file   Social History Narrative   • Not on file     Family History:  Family History   Problem Relation Age of Onset   • Kidney disease Mother    • Coronary artery disease Mother    • Coronary artery disease Father    • Coronary artery disease Brother      Review of Systems  Pertinent positives are listed in the HPI.  All other systems reviewed are negative.         Objective     Vital Sign Min/Max for last 24 hours  Temp  Min: 96.7 °F (35.9 °C)  Max: 96.7 °F (35.9 °C)   BP  Min: 143/73  Max: 150/81   Pulse  Min: 75  Max: 75   No Data Recorded   SpO2  Min: 98 %  Max: 98 %   No Data Recorded   Weight  Min: 112 kg (246 lb 14.6 oz)  Max: 112 kg (246 lb 14.6 oz)     Flowsheet Rows      First Filed Value   Admission Height  160 cm (63\") Documented " at 02/01/2019 0713   Admission Weight  112 kg (246 lb 14.6 oz) Documented at 02/01/2019 0713          Physical Exam:    GENERAL: well-developed, well-nourished; in no acute distress.   NECK:  Carotid upstrokes are 2+ and  symmetrical without bruits.   LUNGS: Clear to auscultation bilaterally without wheezing, rhonchi, or rales noted.   CARDIOVASCULAR: The heart has a regular rate with a normal S1 and S2. There is no murmur, gallop, rub, or click appreciated. The PMI is nondisplaced.   ABDOMEN: Soft and nontender  NEUROLOGICAL: Nonfocal; Alert and oriented  PERIPHERAL VASCULAR:  Posterior tibial pulses are 2+ and symmetrical. There is trace peripheral edema.   SKIN:  Warm and dry  PSYCHIATRIC: normal mood and affect; behavior appropriate       EKG:  paced    Echocardiogram 12/25/19:  · Mild mitral valve regurgitation is present.  · Calculated right ventricular systolic pressure from tricuspid regurgitation is 29 mmHg.  · Estimated EF = 60%.  · Left ventricular systolic function is normal.  · Mild tricuspid valve regurgitation is present.  · There is no evidence of pericardial effusion.  · No evidence of pulmonary hypertension is present.  · Mild MAC is present.  · The aortic valve exhibits sclerosis.  · Normal right ventricular cavity size, wall thickness, systolic function and septal motion noted.  Labs:      Results from last 7 days   Lab Units 01/31/19  1607   SODIUM mmol/L 130*   POTASSIUM mmol/L 3.5   CHLORIDE mmol/L 93*   CO2 mmol/L 29.0   BUN mg/dL 23   CREATININE mg/dL 0.75   CALCIUM mg/dL 9.3   BILIRUBIN mg/dL 0.3   ALK PHOS U/L 94   ALT (SGPT) U/L 12   AST (SGOT) U/L 21   GLUCOSE mg/dL 91       Lab Results (last 24 hours)     Procedure Component Value Units Date/Time    Lipid Panel [[id number]]  (Abnormal) Collected:  02/01/19 0717    Specimen:  Blood Updated:  02/01/19 0747     Total Cholesterol 185 mg/dL      Triglycerides 78 mg/dL      HDL Cholesterol 43 mg/dL      LDL Cholesterol  133 mg/dL         Lab  Results   Component Value Date    TROPONINI 0.016 12/25/2018    TROPONINI 0.016 12/25/2018    TROPONINI 0.014 12/24/2018        Lab Results   Component Value Date    CHOL 185 02/01/2019    CHOL 177 12/25/2018     Lab Results   Component Value Date    HDL 43 02/01/2019    HDL 44 12/25/2018     No results found for: LDLDIRECT  Lab Results   Component Value Date    TRIG 78 02/01/2019    TRIG 113 12/25/2018     No components found for: CHOLHDL  Lab Results   Component Value Date    INR 1.8 (H) 06/14/2018    INR 2.8 (H) 05/31/2018    INR 1.8 (H) 05/25/2018    PROTIME 21.2 (H) 06/14/2018    PROTIME 33.3 (H) 05/31/2018    PROTIME 22.1 (H) 05/25/2018       Ejection Fraction:  60% per echo 12/2018      Results Review:  I reviewed the patients new clinical results.      Assessment:  1. Chest pain and shortness of breath  1. Known CAD with pci to RCA 2015  2. History of A. Fib/SSS   1. status post cardioversion 12/2018  2. Echocardiogram December 2018 with normal ejection fractionof significant valvular heart disease  3. Hypertension  4. Hyperlipidemia  5. Diabetes      Plan:  Left heart catheterization today with Dr. Albertina Corona.  The risks, benefits, potential complications of all been discussed with the patient and she is agreeable to proceed.  Right radial access will be attempted with groin access to be prepped as well.  She does not have any upcoming surgeries planned in the near future.    I discussed the patients findings and my recommendations with patient.    Scribed for Albertina Corona MD by DEJA Junior on 01/22/2019 at 8:08 AM    DEJA King  02/01/19  8:08 AM    I Albertina Corona MD personally performed the services described in this documentation as scribed by the above individual in my presence, and it is both accurate and complete.    Albertina Corona MD, [facility]

## 2019-02-11 RX ORDER — PROPAFENONE HYDROCHLORIDE 150 MG/1
150 TABLET, COATED ORAL EVERY 12 HOURS SCHEDULED
Qty: 180 TABLET | Refills: 2 | Status: SHIPPED | OUTPATIENT
Start: 2019-02-11 | End: 2019-05-28 | Stop reason: ALTCHOICE

## 2019-02-15 ENCOUNTER — TELEPHONE (OUTPATIENT)
Dept: CARDIOLOGY | Facility: CLINIC | Age: 69
End: 2019-02-15

## 2019-02-18 ENCOUNTER — TELEPHONE (OUTPATIENT)
Dept: CARDIOLOGY | Facility: CLINIC | Age: 69
End: 2019-02-18

## 2019-02-18 NOTE — TELEPHONE ENCOUNTER
"Pt called to ask for results of interrogation from Friday 2/15/19. She has felt \"awful\" all weekend. She continues to have palpitations and left sided chest pain. [facility] 2/1/19 patent stents, normal LV and no obstructive disease. Interrogation on Friday revealed AT/AF for >8 hours. Per Tanner -?atrial lead issue/noise/VVI essentially, remote transmission received 2/17/19.   Verified medications, Eliquis and Propafenone, SBP varies from 100-150.  Any recommendations? - sent to [facility] desk.      "

## 2019-02-21 ENCOUNTER — TELEPHONE (OUTPATIENT)
Dept: CARDIOLOGY | Facility: CLINIC | Age: 69
End: 2019-02-21

## 2019-02-21 DIAGNOSIS — I49.9 IRREGULAR CARDIAC RHYTHM: ICD-10-CM

## 2019-02-21 DIAGNOSIS — R07.9 CHEST PAIN AT REST: Primary | ICD-10-CM

## 2019-02-21 NOTE — TELEPHONE ENCOUNTER
Mrs. Cross says her HR is frequently in the 90s. She said she is weak much of the time and her head is foggy.  Her BP is bouncing around but staying primarily 100/56, 98/70 but at other times goes up to 140s systolic.  I told her she could decrease the dose of her losartn but she is afraid her HR will go too high. She has had an episode of chest pain/sternal pain today but the nitro calmed it down.  Pt was at the hospital when I called her so we ordered a CXR and EKG. She is to have them done today.

## 2019-02-27 ENCOUNTER — DOCUMENTATION (OUTPATIENT)
Dept: CARDIOLOGY | Facility: CLINIC | Age: 69
End: 2019-02-27

## 2019-02-27 DIAGNOSIS — I48.0 PAROXYSMAL ATRIAL FIBRILLATION (HCC): Primary | ICD-10-CM

## 2019-02-27 RX ORDER — BISOPROLOL FUMARATE 5 MG/1
TABLET, FILM COATED ORAL
Qty: 15 TABLET | Refills: 2 | Status: SHIPPED | OUTPATIENT
Start: 2019-02-27 | End: 2019-03-05 | Stop reason: HOSPADM

## 2019-02-27 NOTE — PROGRESS NOTES
PT in for device check- she is in Afib rates 100-110- will place her on Bisoprolol 2.5 mg daily- ( /60 today but generally runs higher)- set up for ECV next week-

## 2019-02-28 ENCOUNTER — APPOINTMENT (OUTPATIENT)
Dept: CARDIOLOGY | Facility: HOSPITAL | Age: 69
End: 2019-02-28

## 2019-02-28 ENCOUNTER — PREP FOR SURGERY (OUTPATIENT)
Dept: OTHER | Facility: HOSPITAL | Age: 69
End: 2019-02-28

## 2019-03-05 ENCOUNTER — HOSPITAL ENCOUNTER (OUTPATIENT)
Dept: CARDIOLOGY | Facility: HOSPITAL | Age: 69
Discharge: HOME | End: 2019-03-05
Attending: INTERNAL MEDICINE | Admitting: INTERNAL MEDICINE

## 2019-03-05 VITALS
DIASTOLIC BLOOD PRESSURE: 77 MMHG | TEMPERATURE: 97.6 F | WEIGHT: 244.2 LBS | HEART RATE: 75 BPM | HEIGHT: 63 IN | OXYGEN SATURATION: 95 % | BODY MASS INDEX: 43.27 KG/M2 | RESPIRATION RATE: 18 BRPM | SYSTOLIC BLOOD PRESSURE: 133 MMHG

## 2019-03-05 DIAGNOSIS — I48.0 PAROXYSMAL ATRIAL FIBRILLATION (HCC): ICD-10-CM

## 2019-03-05 LAB
ANION GAP SERPL CALCULATED.3IONS-SCNC: 8 MMOL/L (ref 3–11)
BUN BLD-MCNC: 27 MG/DL (ref 9–23)
BUN/CREAT SERPL: 33.3 (ref 7–25)
CALCIUM SPEC-SCNC: 9 MG/DL (ref 8.7–10.4)
CHLORIDE SERPL-SCNC: 99 MMOL/L (ref 99–109)
CO2 SERPL-SCNC: 28 MMOL/L (ref 20–31)
CREAT BLD-MCNC: 0.81 MG/DL (ref 0.6–1.3)
GFR SERPL CREATININE-BSD FRML MDRD: 70 ML/MIN/1.73
GLUCOSE BLD-MCNC: 107 MG/DL (ref 70–100)
GLUCOSE BLDC GLUCOMTR-MCNC: 124 MG/DL (ref 70–130)
INR PPP: 1.14 (ref 0.85–1.16)
POTASSIUM BLD-SCNC: 3.4 MMOL/L (ref 3.5–5.5)
PROTHROMBIN TIME: 14.1 SECONDS (ref 11.2–14.3)
SODIUM BLD-SCNC: 135 MMOL/L (ref 132–146)

## 2019-03-05 PROCEDURE — 92960 CARDIOVERSION ELECTRIC EXT: CPT

## 2019-03-05 PROCEDURE — 82962 GLUCOSE BLOOD TEST: CPT

## 2019-03-05 PROCEDURE — 93005 ELECTROCARDIOGRAM TRACING: CPT | Performed by: INTERNAL MEDICINE

## 2019-03-05 PROCEDURE — 36415 COLL VENOUS BLD VENIPUNCTURE: CPT

## 2019-03-05 PROCEDURE — 80048 BASIC METABOLIC PNL TOTAL CA: CPT | Performed by: NURSE PRACTITIONER

## 2019-03-05 PROCEDURE — 99152 MOD SED SAME PHYS/QHP 5/>YRS: CPT | Performed by: INTERNAL MEDICINE

## 2019-03-05 PROCEDURE — 25010000002 MIDAZOLAM PER 1 MG: Performed by: INTERNAL MEDICINE

## 2019-03-05 PROCEDURE — 25010000003 POTASSIUM CHLORIDE 10 MEQ/100ML SOLUTION: Performed by: INTERNAL MEDICINE

## 2019-03-05 PROCEDURE — 63710000001 POTASSIUM CHLORIDE 10 MEQ CAPSULE CONTROLLED-RELEASE: Performed by: INTERNAL MEDICINE

## 2019-03-05 PROCEDURE — A9270 NON-COVERED ITEM OR SERVICE: HCPCS | Performed by: INTERNAL MEDICINE

## 2019-03-05 PROCEDURE — 85610 PROTHROMBIN TIME: CPT | Performed by: NURSE PRACTITIONER

## 2019-03-05 PROCEDURE — 92960 CARDIOVERSION ELECTRIC EXT: CPT | Performed by: INTERNAL MEDICINE

## 2019-03-05 RX ORDER — POTASSIUM CHLORIDE 750 MG/1
40 CAPSULE, EXTENDED RELEASE ORAL AS NEEDED
Status: DISCONTINUED | OUTPATIENT
Start: 2019-03-05 | End: 2019-03-05 | Stop reason: HOSPADM

## 2019-03-05 RX ORDER — METOLAZONE 2.5 MG/1
2.5 TABLET ORAL DAILY PRN
COMMUNITY
End: 2019-06-12 | Stop reason: HOSPADM

## 2019-03-05 RX ORDER — NALOXONE HYDROCHLORIDE 0.4 MG/ML
INJECTION, SOLUTION INTRAMUSCULAR; INTRAVENOUS; SUBCUTANEOUS
Status: DISCONTINUED
Start: 2019-03-05 | End: 2019-03-05 | Stop reason: WASHOUT

## 2019-03-05 RX ORDER — FLUMAZENIL 0.1 MG/ML
INJECTION INTRAVENOUS
Status: DISCONTINUED
Start: 2019-03-05 | End: 2019-03-05 | Stop reason: WASHOUT

## 2019-03-05 RX ORDER — POTASSIUM CHLORIDE 7.45 MG/ML
10 INJECTION INTRAVENOUS
Status: DISCONTINUED | OUTPATIENT
Start: 2019-03-05 | End: 2019-03-05 | Stop reason: HOSPADM

## 2019-03-05 RX ORDER — MIDAZOLAM HYDROCHLORIDE 1 MG/ML
INJECTION INTRAMUSCULAR; INTRAVENOUS
Status: DISCONTINUED
Start: 2019-03-05 | End: 2019-03-05 | Stop reason: HOSPADM

## 2019-03-05 RX ORDER — FENTANYL CITRATE 50 UG/ML
INJECTION, SOLUTION INTRAMUSCULAR; INTRAVENOUS
Status: DISCONTINUED
Start: 2019-03-05 | End: 2019-03-05 | Stop reason: HOSPADM

## 2019-03-05 RX ORDER — MIDAZOLAM HYDROCHLORIDE 1 MG/ML
INJECTION INTRAMUSCULAR; INTRAVENOUS
Status: COMPLETED | OUTPATIENT
Start: 2019-03-05 | End: 2019-03-05

## 2019-03-05 RX ORDER — POTASSIUM CHLORIDE 1.5 G/1.77G
40 POWDER, FOR SOLUTION ORAL AS NEEDED
Status: DISCONTINUED | OUTPATIENT
Start: 2019-03-05 | End: 2019-03-05 | Stop reason: HOSPADM

## 2019-03-05 RX ADMIN — POTASSIUM CHLORIDE 10 MEQ: 10 INJECTION, SOLUTION INTRAVENOUS at 09:04

## 2019-03-05 RX ADMIN — POTASSIUM CHLORIDE 40 MEQ: 750 CAPSULE, EXTENDED RELEASE ORAL at 10:18

## 2019-03-05 RX ADMIN — METHOHEXITAL SODIUM 10 MG: 500 INJECTION, POWDER, LYOPHILIZED, FOR SOLUTION INTRAMUSCULAR; INTRAVENOUS; RECTAL at 11:16

## 2019-03-05 RX ADMIN — MIDAZOLAM HYDROCHLORIDE 2 MG: 1 INJECTION, SOLUTION INTRAMUSCULAR; INTRAVENOUS at 11:13

## 2019-03-05 RX ADMIN — METHOHEXITAL SODIUM 20 MG: 500 INJECTION, POWDER, LYOPHILIZED, FOR SOLUTION INTRAMUSCULAR; INTRAVENOUS; RECTAL at 11:13

## 2019-03-05 NOTE — H&P
[facility] Consult Note      Referring Provider: Albertina Corona,*  Primary Provider:  Reynaldo Fritz MD  Reason for Consultation: a-fib    Patient Care Team:  Reynaldo Fritz MD as PCP - General  Reynaldo Fritz MD as PCP - Family Medicine  Faisal Ramirez MD as Obstetrician (Obstetrics and Gynecology)  Timothy Dan DC as Referring Physician (Chiropractic Medicine)  Albertina Corona MD as Consulting Physician (Cardiology)  Armando Shen MD as Consulting Physician (Allergy)  Dilshad Wood MD as Consulting Physician (Endocrinology)  Dory Gamboa PA (Physician Assistant)  Rene Pringle MD (Otolaryngology)  Baylee Elliott APRN (Nurse Practitioner)  Raciel Valadez MD as Consulting Physician (Pulmonary Disease)  Terrence Mckenzie MD (Urology)    Chief complaint a-fib    Identification:  68 year old female    Problem list:    1. Coronary artery disease  a. [facility] 2/15/2008, Dr Lutz.  Mild, non-obstructive CAD, normal EF  b. [facility] 1/9/2013, Dr Stevenson Sutton:  No LV gram done; mild, non-obstructive coronary artery disease.  c. [facility] 11/9/2015, [facility]:  EF 60%.  70% RCA lesion with Promus ZAINAB placement. 40-50% mid LAD, no FFR performed. Other small blockages noted.  No MR.  d. [facility] 11/15/2015, Dr Palacio @ [facility]:  Patent RCA stent, 40-50% LAD with FFR @ 0.92; mild inferior wall hypokinesis  e. [facility] 7/29/2016, [facility]:  Abnormal stress test.  Normal CORS with patent stent. LAD improved since last image EF 55-60%  f. [facility] 2/1/19:  EF normal.  Patent RCA, noncritical mid LAD with IFR 0.93, noncritical circ.    2. Peripheral vascular disease/chronic venous stasis  a. Venous duplex 9/17/2010: Insufficiency to venous hypertension in the great saphenous system bilaterally.  b. Venous duplex 2013: Right leg laser ablation of Dr. Garcia.  c. Venous duplex 5/6/2016: No evidence of DVT, no superficial, no thrmobophlebitis, no valvular  insufficiency.  d. AA with runoffs 8/31/2016: Single-vessel [place]: No significant arterial disease.  3. Palpitations  a. Echocardiogram 2/13/2014: Dr. Teran.  Normal biventricular function; trace to mild MR.  Atrial septal aneurysm or  b. Echocardiogram 12/22/15: Dr. Chavez.  Borderline LVH, mild LAE, mild RV enlargement.  Mild to moderate MR and TR with RVSP of 46 mmHg.  c. Conclusion/3/2016: Dr. Palacio.  RV enlargement.  EF 50-55%.  Mild AI S, mild MR and TR with RVSP 37 mmHg.  4. AF/SSS  a. Llesiant PPM 2016  b. CHADS2 Vasc  = 5. On Eliquis   c. TIA: Carotid duplex 2/13/2014 showed no significant flow-limiting stenosis.  Mild luminal disease present; both vertebrals are present.  d. ECV 12/26/2018:  Successful cardioversion: AV paced  e. Echo 12/25/18:  EF 60%, mild MR/ TR with RVSP 29 mmHg.  Sclerotic AoV  5. Diabetes  6. Essential Hypertension  7. Hyperlipidemia  8. Hypothyroid  9. Hyponatremia  a. Secondary to SIADH  10. MILLI with CPAP  11. RLS  12. Parkinson's disease  13. GERD  14. Urinary Retention  a. S/P cystoscopy and ureter dilation, March 2018.  Dr. Terrence Mckenzie  15. Surgeries:  a. Rotator cuff repair  b. Cholecystectomy  c. Bilateral knee replacement  d. Tubal ligation  e. Breast surgery  f. Sinus surgery          Allergies:  Toprol xl [metoprolol tartrate]; Amlodipine besylate; Entacapone; Levemir [insulin detemir]; Penicillins; Codeine; Levaquin [levofloxacin]; Xarelto [rivaroxaban]; Bactrim [sulfamethoxazole-trimethoprim]; Ciprofloxacin; Cogentin [benztropine]; Compazine [prochlorperazine edisylate]; Duraprep [antiseptic products, misc.]; Haldol [haloperidol lactate]; Hydralazine; Hydrocodone-acetaminophen; Lisinopril; Statins; and Tarka [trandolapril-verapamil hcl er]    Home/Current Medications:    Albuterol inhaler 1 puff every 4 hours as needed  Symmetrel 100 mg twice daily  Eliquis 5 mg twice daily  Aspirin 81 mg daily  Super B complex p.o. daily  Zebeta 5 mg half tablet  daily  Caltrate 600 mg daily  Sinemet  mg 4 times daily  Feosol 45 mg twice daily  Cholecalciferol 2000 units twice daily  Celexa 20 mg nightly  Temovate 0.  05% topically twice daily as needed  Clonidine 0.1 mg twice daily  Collagen-boron-hyaluronic acid daily  Flonase 50 mics 2 sprays each nostril daily as needed  Tresiba FlexTouch 28 units nightly  Loratadine 10 mg daily  Cozaar 50 mg half tab daily  Metformin 1000 mg twice daily  Zaroxolyn 2.5 mg as needed  Myrbetriq 50 mg daily  Multivitamin daily  Nitroglycerin sublingual as needed  Oxygen 2 L while sleeping  Prilosec 40 mg twice daily  Potassium chloride 10 mEq twice daily  Mirapex 1.5 mg nightly  Rythmol 150 mg twice daily  Ranexa 500 mg twice daily  Senokot daily  Aldactone 25 mg daily as needed  Synthroid 200 mcg daily  Tramadol 50 mg 3 times daily as needed  Ubiquinol daily  Vitamin C 500 mg daily        History of present illness:    Patient is a pleasant 68-year-old female with the above-noted medical history presents today for external cardioversion for atrial fibrillation.  She had done an automatic download with her device was found to be in A. fib with rates running in the low 100s consistently.  She came into the office on February 27 for an in office device check and rates were noted at 100 210 bpm.  Patient was placed on bisoprolol 2.5 mg daily at that time.  She underwent cardioversion on December 25 resulting in an AV paced rhythm.  She also underwent cardiac catheterization on February 1 indicating a patent stent and noncritical disease otherwise.  She is intolerant to statins therefore her LDL is higher than recommended.  She lives a very sedentary lifestyle uses a walker for ambulation.        Cardiac Risk Factors:   Hypertension, hyperlipidemia, diabetes, sedentary lifestyle, known coronary disease, family history coronary disease      Social History:  Social History     Socioeconomic History   • Marital status:      Spouse  "name: N/A   • Number of children: 4   • Years of education: College   • Highest education level: Not on file   Social Needs   • Financial resource strain: Not on file   • Food insecurity - worry: Not on file   • Food insecurity - inability: Not on file   • Transportation needs - medical: Not on file   • Transportation needs - non-medical: Not on file   Occupational History   • Occupation:      Employer: RETIRED   Tobacco Use   • Smoking status: Never Smoker   • Smokeless tobacco: Never Used   Substance and Sexual Activity   • Alcohol use: No     Frequency: Never   • Drug use: No   • Sexual activity: Not Currently     Partners: Male   Other Topics Concern   • Not on file   Social History Narrative   • Not on file     Family History:  Family History   Problem Relation Age of Onset   • Kidney disease Mother    • Coronary artery disease Mother    • Coronary artery disease Father    • Coronary artery disease Brother      Review of Systems  Pertinent positives are listed in the HPI.  All other systems reviewed are negative.         Objective     Vital Sign Min/Max for last 24 hours  Temp  Min: 97.6 °F (36.4 °C)  Max: 97.6 °F (36.4 °C)   BP  Min: 108/69  Max: 109/54   Pulse  Min: 86  Max: 109   Resp  Min: 18  Max: 18   SpO2  Min: 96 %  Max: 96 %   No Data Recorded   Weight  Min: 111 kg (244 lb 3.2 oz)  Max: 111 kg (244 lb 3.2 oz)     Flowsheet Rows      First Filed Value   Admission Height  160 cm (63\") Documented at 03/05/2019 0736   Admission Weight  111 kg (244 lb 3.2 oz) Documented at 03/05/2019 0736          Physical Exam:    GENERAL: well-developed, well-nourished; in no acute distress.   NECK:  Carotid upstrokes are 2+ and  symmetrical without bruits.   LUNGS: Clear to auscultation bilaterally without wheezing, rhonchi, or rales noted.   CARDIOVASCULAR: The heart has a irregular rate with intermittent pacing and a normal S1 and S2. There is no murmur, gallop, rub, or click appreciated. The PMI is " nondisplaced.   ABDOMEN: Soft and nontender  NEUROLOGICAL: Nonfocal; Alert and oriented  PERIPHERAL VASCULAR:  Posterior tibial pulses are 2+ and symmetrical. There is trace peripheral edema.   SKIN:  Warm and dry  PSYCHIATRIC: normal mood and affect; behavior appropriate     EKG:      Echocardiogram 12/25/18:  · Mild mitral valve regurgitation is present.  · Calculated right ventricular systolic pressure from tricuspid regurgitation is 29 mmHg.  · Estimated EF = 60%.  · Left ventricular systolic function is normal.  · Mild tricuspid valve regurgitation is present.  · There is no evidence of pericardial effusion.  · No evidence of pulmonary hypertension is present.  · Mild MAC is present.  · The aortic valve exhibits sclerosis.  · Normal right ventricular cavity size, wall thickness, systolic function and septal motion noted.    Labs:      Results from last 7 days   Lab Units 03/05/19  0740   SODIUM mmol/L 135   POTASSIUM mmol/L 3.4*   CHLORIDE mmol/L 99   CO2 mmol/L 28.0   BUN mg/dL 27*   CREATININE mg/dL 0.81   CALCIUM mg/dL 9.0   GLUCOSE mg/dL 107*       Lab Results (last 24 hours)     Procedure Component Value Units Date/Time    Protime-INR [[id number]]  (Normal) Collected:  03/05/19 0740    Specimen:  Blood Updated:  03/05/19 0826     Protime 14.1 Seconds      INR 1.14    Basic Metabolic Panel [[id number]]  (Abnormal) Collected:  03/05/19 0740    Specimen:  Blood Updated:  03/05/19 0811     Glucose 107 mg/dL      BUN 27 mg/dL      Creatinine 0.81 mg/dL      Sodium 135 mmol/L      Potassium 3.4 mmol/L      Chloride 99 mmol/L      CO2 28.0 mmol/L      Calcium 9.0 mg/dL      eGFR Non African Amer 70 mL/min/1.73      BUN/Creatinine Ratio 33.3     Anion Gap 8.0 mmol/L         Lab Results   Component Value Date    TROPONINI 0.016 12/25/2018    TROPONINI 0.016 12/25/2018    TROPONINI 0.014 12/24/2018        Lab Results   Component Value Date    INR 1.14 03/05/2019    INR 1.8 (H) 06/14/2018    INR 2.8 (H) 05/31/2018     PROTIME 14.1 03/05/2019    PROTIME 21.2 (H) 06/14/2018    PROTIME 33.3 (H) 05/31/2018       Ejection Fraction:  60% per echo 12/25/2018      Results Review:  I reviewed the patients new clinical results.      Assessment:  1. A-fib  1. Recent device interrogation 2/27/19  2. Initiation of bisoprolol 2.5mg daily  3. S/p ECV 12/2018  4. On eliquis  2. Chest pain and shortness of breath  1. Known CAD with pci to RCA 2015  2. LHC 2/1/19:  Patent RCA stent; non critical disease  3. Hypertension  4. Hyperlipidemia  5. Diabetes      Plan:  External cardioversion today with Dr. Valentin.  The risks, benefits, potential complications were all discussed with the patient and she was agreeable to proceed.  Potassium replaced per protocol    I discussed the patients findings and my recommendations with patient.    Scribed for Albertina Corona MD by DEJA Junior on 03/05/2019 at 10:15 AM      DEJA King  03/05/19  10:15 AM    I Albertina Corona MD personally performed the services described in this documentation as scribed by the above individual in my presence, and it is both accurate and complete.    Albertina Corona MD, [facility]

## 2019-03-07 ENCOUNTER — CLINICAL SUPPORT NO REQUIREMENTS (OUTPATIENT)
Dept: CARDIOLOGY | Facility: CLINIC | Age: 69
End: 2019-03-07

## 2019-03-07 DIAGNOSIS — I48.91 ATRIAL FIBRILLATION, UNSPECIFIED TYPE (HCC): Primary | ICD-10-CM

## 2019-04-01 DIAGNOSIS — I49.3 PVC (PREMATURE VENTRICULAR CONTRACTION): Primary | ICD-10-CM

## 2019-04-09 ENCOUNTER — CLINICAL SUPPORT (OUTPATIENT)
Dept: CARDIOLOGY | Facility: CLINIC | Age: 69
End: 2019-04-09

## 2019-04-09 DIAGNOSIS — I48.91 ATRIAL FIBRILLATION WITH RVR (HCC): Primary | ICD-10-CM

## 2019-04-09 PROCEDURE — 93000 ELECTROCARDIOGRAM COMPLETE: CPT | Performed by: INTERNAL MEDICINE

## 2019-04-12 ENCOUNTER — TELEPHONE (OUTPATIENT)
Dept: CARDIOLOGY | Facility: CLINIC | Age: 69
End: 2019-04-12

## 2019-04-12 DIAGNOSIS — I48.19 PERSISTENT ATRIAL FIBRILLATION (HCC): Primary | ICD-10-CM

## 2019-04-12 NOTE — TELEPHONE ENCOUNTER
I spoke with pt in regards to the EKG Dr. Corona reviewed from  4/9.  She recommends either a Tikosyn admit or referral to EP.  I discussed both options with the patient and she would like to see EP at this time for ablation rather than Tikosyn.  Referral placed.

## 2019-04-29 ENCOUNTER — APPOINTMENT (OUTPATIENT)
Dept: WOMENS IMAGING | Facility: HOSPITAL | Age: 69
End: 2019-04-29

## 2019-04-29 PROCEDURE — 77067 SCR MAMMO BI INCL CAD: CPT | Performed by: RADIOLOGY

## 2019-05-23 ENCOUNTER — CONSULT (OUTPATIENT)
Dept: CARDIOLOGY | Facility: CLINIC | Age: 69
End: 2019-05-23

## 2019-05-23 VITALS
OXYGEN SATURATION: 98 % | BODY MASS INDEX: 43.77 KG/M2 | HEIGHT: 63 IN | HEART RATE: 84 BPM | SYSTOLIC BLOOD PRESSURE: 161 MMHG | DIASTOLIC BLOOD PRESSURE: 86 MMHG | WEIGHT: 247 LBS

## 2019-05-23 DIAGNOSIS — I48.91 ATRIAL FIBRILLATION WITH RVR (HCC): Primary | ICD-10-CM

## 2019-05-23 PROCEDURE — 93000 ELECTROCARDIOGRAM COMPLETE: CPT | Performed by: INTERNAL MEDICINE

## 2019-05-23 PROCEDURE — 99204 OFFICE O/P NEW MOD 45 MIN: CPT | Performed by: INTERNAL MEDICINE

## 2019-05-23 RX ORDER — INSULIN GLARGINE 100 [IU]/ML
INJECTION, SOLUTION SUBCUTANEOUS DAILY
COMMUNITY
End: 2020-10-30

## 2019-05-23 NOTE — PROGRESS NOTES
Joanna Cross  1950  PCP: Reynaldo Fritz MD    SUBJECTIVE:   Joanna Cross is a 68 y.o. female seen for a consultation visit regarding the following:     Chief Complaint: No chief complaint on file.         Consultation is requested by Albertina Corona, * for evaluation of No chief complaint on file.        History:  This is a 68-year-old patient referred by Dr. Corona for further evaluation and management of atrial fibrillation.  Patient has been diagnosed with atrial fibrillation since around 2016.  She has been plagued by tachybradycardia syndrome episodes in the past.  She has undergone cardioversions in the past but unable to maintain sinus rhythm more than a few days at a time.  She is been treated with Profafenone in the past as well with no improvement in her atrial fibrillation burden.  Her biggest complaint is ongoing fatigue with occasional palpitations.      Cardiac PMH: (Old records have been reviewed and summarized below)  1. Coronary artery disease  a. [facility] 2/15/2008, Dr Lutz.  Mild, non-obstructive CAD, normal EF  b. [facility] 1/9/2013, Dr Stevenson Sutton:  No LV gram done; mild, non-obstructive coronary artery disease.  c. [facility] 11/9/2015[facility]:  EF 60%.  70% RCA lesion with Promus ZAINAB placement. 40-50% mid LAD, no FFR performed. Other small blockages noted.  No MR.  d. [facility] 11/15/2015, Dr Palacio @ [facility]:  Patent RCA stent, 40-50% LAD with FFR @ 0.92; mild inferior wall hypokinesis  e. [facility] 7/29/2016, [facility]:  Abnormal stress test.  Normal CORS with patent stent. LAD improved since last image EF 55-60%  f. [facility] 2/1/19:  EF normal.  Patent RCA, noncritical mid LAD with IFR 0.93, noncritical circ.    2. Peripheral vascular disease/chronic venous stasis  a. Venous duplex 9/17/2010: Insufficiency to venous hypertension in the great saphenous system bilaterally.  b. Venous duplex 2013: Right leg laser ablation of Dr. Garcia.  c. Venous duplex  5/6/2016: No evidence of DVT, no superficial, no thrmobophlebitis, no valvular insufficiency.  d. AA with runoffs 8/31/2016: Single-vessel [place]: No significant arterial disease.  3. Palpitations  a. Echocardiogram 2/13/2014: Dr. Teran.  Normal biventricular function; trace to mild MR.  Atrial septal aneurysm or  b. Echocardiogram 12/22/15: Dr. Chavze.  Borderline LVH, mild LAE, mild RV enlargement.  Mild to moderate MR and TR with RVSP of 46 mmHg.  c. Conclusion/3/2016: Dr. Palacio.  RV enlargement.  EF 50-55%.  Mild AI S, mild MR and TR with RVSP 37 mmHg.  4. AF/SSS  a. Vicampo PPM 2016  b. CHADS2 Vasc  = 5. On Eliquis   c. TIA: Carotid duplex 2/13/2014 showed no significant flow-limiting stenosis.  Mild luminal disease present; both vertebrals are present.  d. ECV 12/26/2018:  Successful cardioversion: AV paced  e. Echo 12/25/18:  EF 60%, mild MR/ TR with RVSP 29 mmHg.  Sclerotic AoV  5. Diabetes  6. Essential Hypertension  7. Hyperlipidemia  8. Hypothyroid  9. Hyponatremia  a. Secondary to SIADH  10. MILLI with CPAP  11. RLS  12. Parkinson's disease  13. GERD  14. Urinary Retention  a. S/P cystoscopy and ureter dilation, March 2018.  Dr. Terrence Mckenzie  15. Surgeries:  a. Rotator cuff repair  b. Cholecystectomy  c. Bilateral knee replacement  d. Tubal ligation  e. Breast surgery  f. Sinus surgery          Past Medical History, Past Surgical History, Family history, Social History, and Medications were all reviewed with the patient today and updated as necessary.       Current Outpatient Medications:   •  albuterol (VENTOLIN HFA) 108 (90 Base) MCG/ACT inhaler, Inhale 1 puff Every 4 (Four) Hours As Needed., Disp: , Rfl:   •  amantadine (SYMMETREL) 100 MG capsule, Take 100 mg by mouth 2 (Two) Times a Day., Disp: , Rfl:   •  apixaban (ELIQUIS) 5 MG tablet tablet, Take 1 tablet by mouth Every 12 (Twelve) Hours., Disp: 180 tablet, Rfl: 3  •  aspirin 81 MG EC tablet, Take 81 mg by mouth Daily., Disp: ,  Rfl:   •  B Complex-C (SUPER B COMPLEX PO), Take 1 tablet by mouth Daily., Disp: , Rfl:   •  Calcium Carbonate (CALTRATE 600 PO), Take 600 mg by mouth Daily., Disp: , Rfl:   •  carbidopa-levodopa (SINEMET)  MG per tablet, Take  by mouth. 2 tablets at 0600, 1 tablet at 0900, 1200, 1500, 1800, 2100, Disp: , Rfl:   •  carbonyl iron (FEOSOL) 45 MG tablet tablet, Take 1 tablet by mouth 2 (Two) Times a Day., Disp: , Rfl:   •  Cholecalciferol 2000 units tablet, Take 2,000 Units by mouth 2 (Two) Times a Day., Disp: , Rfl:   •  citalopram (CeleXA) 20 MG tablet, Take 20 mg by mouth every night at bedtime., Disp: , Rfl:   •  clobetasol (TEMOVATE) 0.05 % cream, Apply 1 application topically 2 (Two) Times a Day As Needed (Lichens Sclerosis)., Disp: , Rfl:   •  CloNIDine (CATAPRES) 0.1 MG tablet, TAKE 1 TABLET EVERY 12 HOURS, Disp: 180 tablet, Rfl: 3  •  Collagen-Boron-Hyaluronic Acid 10-5-3.3 MG tablet, Take 1 tablet by mouth Daily., Disp: , Rfl:   •  fluticasone (FLONASE) 50 MCG/ACT nasal spray, 2 sprays into each nostril Daily As Needed for Rhinitis., Disp: , Rfl:   •  insulin glargine (LANTUS) 100 UNIT/ML injection, Inject 20 Units under the skin into the appropriate area as directed Daily. ACCORDING TO SUGAR LEVELS, Disp: , Rfl:   •  IPRATROPIUM BROMIDE NA, 1 spray into the nostril(s) as directed by provider 2 (Two) Times a Day As Needed., Disp: , Rfl:   •  Loratadine 10 MG capsule, Take 10 mg by mouth Daily., Disp: , Rfl:   •  losartan (COZAAR) 50 MG tablet, TAKE 1 TABLET EVERY 12 HOURS (Patient taking differently: PT TAKING 1/2 TAB TWICE A DAY), Disp: 180 tablet, Rfl: 3  •  metFORMIN (GLUCOPHAGE) 1000 MG tablet, Take 1,000 mg by mouth 2 (Two) Times a Day With Meals., Disp: , Rfl:   •  metOLazone (ZAROXOLYN) 2.5 MG tablet, Take 2.5 mg by mouth Daily As Needed., Disp: , Rfl:   •  Mirabegron ER (MYRBETRIQ) 50 MG tablet sustained-release 24 hour 24 hr tablet, Take 50 mg by mouth Daily., Disp: , Rfl:   •  Multiple  "Vitamins-Minerals (CENTRUM ULTRA WOMENS PO), Take 1 tablet by mouth Daily., Disp: , Rfl:   •  nitroglycerin (NITROLINGUAL) 0.4 MG/SPRAY spray, Place 1 spray under the tongue Every 5 (Five) Minutes As Needed for Chest Pain., Disp: 1 each, Rfl: 6  •  O2 (OXYGEN), Inhale 2 L/min 1 (One) Time. 2L while sleeping, Disp: , Rfl:   •  omeprazole (priLOSEC) 40 MG capsule, Take 40 mg by mouth 2 (Two) Times a Day., Disp: , Rfl:   •  potassium chloride (MICRO-K) 10 MEQ CR capsule, Take 10 mEq by mouth 2 (Two) Times a Day., Disp: , Rfl:   •  pramipexole (MIRAPEX) 1.5 MG tablet, Take 1.5 mg by mouth Every Night., Disp: , Rfl:   •  propafenone (RYTHMOL) 150 MG tablet, Take 1 tablet by mouth Every 12 (Twelve) Hours. (Patient taking differently: Take 150 mg by mouth Every 8 (Eight) Hours.), Disp: 180 tablet, Rfl: 2  •  ranolazine (RANEXA) 500 MG 12 hr tablet, Take 1 tablet by mouth Every 12 (Twelve) Hours., Disp: 180 tablet, Rfl: 3  •  sennosides-docusate sodium (SENOKOT-S) 8.6-50 MG tablet, Take 1 tablet by mouth Daily., Disp: , Rfl:   •  spironolactone (ALDACTONE) 25 MG tablet, Take 1 tablet by mouth Daily. (Patient taking differently: Take 25 mg by mouth Daily As Needed (swelling).), Disp: 90 tablet, Rfl: 1  •  SYNTHROID 200 MCG tablet, Take 200 mcg by mouth Daily., Disp: , Rfl:   •  traMADol (ULTRAM) 50 MG tablet, Take 50 mg by mouth Every 8 (Eight) Hours As Needed for Moderate Pain ., Disp: , Rfl:   •  vitamin C (ASCORBIC ACID) 500 MG tablet, Take 500 mg by mouth Daily. Chewable tablet, Disp: , Rfl:     Allergies   Allergen Reactions   • Toprol Xl [Metoprolol Tartrate] Shortness Of Breath     Extreme fatigue   • Amlodipine Besylate Swelling     Lower extremity (ankles, feet) swelling   • Entacapone Other (See Comments)     \"extreme weakness in legs - caused several falls, which stopped after discontinuing this medication\"   • Levemir [Insulin Detemir] Hives     Hives / rash around injection site   • Penicillins Hives     " "Jitteriness    • Codeine Unknown (See Comments)     Pt is unaware of what reaction she had   • Levaquin [Levofloxacin] Other (See Comments)     Cannot take due to taking propafenone HCL- severe reaction with mixed.    • Xarelto [Rivaroxaban] GI Bleeding   • Bactrim [Sulfamethoxazole-Trimethoprim] Nausea Only     Headache    • Ciprofloxacin Diarrhea   • Cogentin [Benztropine] Other (See Comments)     \"uncontrollable body movements\"   • Compazine [Prochlorperazine Edisylate] Other (See Comments)     Dystonic reaction   • Duraprep [Antiseptic Products, Misc.] Itching and Rash     RASH AND ITCHING   • Haldol [Haloperidol Lactate] Other (See Comments)     Dystonic reaction   • Hydralazine Other (See Comments)     Headache    • Hydrocodone-Acetaminophen Nausea And Vomiting and Dizziness     Headache, nausea    • Lisinopril Cough   • Statins Myalgia     Leg pain   • Tarka [Trandolapril-Verapamil Hcl Er] Other (See Comments)     Constipation          Past Medical History:   Diagnosis Date   • Atrial fibrillation (CMS/[facility])    • CAD (coronary artery disease)    • Depression    • Disease of thyroid gland    • DM2 (diabetes mellitus, type 2) (CMS/HCC)    • Essential hypertension    • Generalized osteoarthritis    • GERD (gastroesophageal reflux disease)    • GIB (gastrointestinal bleeding) 2016    d/t xarelto    • Hiatal hernia    • History of transfusion 2016   • Mixed hyperlipidemia    • Morbid obesity (CMS/HCC)    • MRSA infection 2018   • Myocardial infarction (CMS/HCC)    • On home oxygen therapy     2L of oxygen via CPAP while sleeping    • MILLI on CPAP     18/14   • Osteoporosis    • Pacemaker    • Parkinson disease (CMS/HCC)    • Peripheral vascular disease (CMS/HCC)    • Seborrheic dermatitis    • Skin cancer    • SSS (sick sinus syndrome) (CMS/HCC)    • TIA (transient ischemic attack)    • Varicose vein of leg    • Venous insufficiency of both lower extremities      Past Surgical History:   Procedure Laterality Date "   • BACK SURGERY      l4-l5 laminectomy    • BICEPS TENDON REPAIR Right     shoulder   • BREAST BIOPSY Left 2004   • BUNIONECTOMY Right    • CARDIAC CATHETERIZATION     • CARDIAC CATHETERIZATION N/A 2/1/2019    Procedure: Left Heart Cath;  Surgeon: Albertina Corona MD;  Location:  [facility];  Service: Cardiology   • CHOLECYSTECTOMY     • COLONOSCOPY  2016   • CORONARY STENT PLACEMENT     • CYST REMOVAL      left ear, upper left back 2001   • DIAGNOSTIC LAPAROSCOPY  1981   • ENDOSCOPIC FUNCTIONAL SINUS SURGERY (FESS)  2011   • ENDOSCOPY  2016   • HAMMER TOE REPAIR Left    • INCISION AND DRAINAGE OF WOUND  2018   • JOINT REPLACEMENT     • LIPOMA EXCISION  1999   • LUMBAR LAMINECTOMY DISCECTOMY DECOMPRESSION N/A 7/6/2018    Procedure: LUMBAR LAMINECTOMY L4-5, HEMILAMIINECTOMY RIGHT L5-S1, FORAMINOTOMY L5-S1;  Surgeon: Lencho Gamino MD;  Location:  [facility] OR;  Service: Neurosurgery   • LUMBAR LAMINECTOMY DISCECTOMY DECOMPRESSION N/A 9/19/2018    Procedure: INCISION AND DRAINAGE BACK WITH WOUND EXPLORATION;  Surgeon: Ritchie García MD;  Location:  [facility];  Service: Neurosurgery   • LUNG BIOPSY Left 2016   • PACEMAKER IMPLANTATION  11/2016    sss   • REPLACEMENT TOTAL KNEE Bilateral     left knee 2011, right 2012 per dr acuna    • REPLACEMENT TOTAL KNEE Bilateral    • SHOULDER ARTHROSCOPY Bilateral     2003-left, 2004- right    • SKIN BIOPSY      skin cancer back 2016   • TUBAL ABDOMINAL LIGATION  1980   • WISDOM TOOTH EXTRACTION       Family History   Problem Relation Age of Onset   • Kidney disease Mother    • Coronary artery disease Mother    • Coronary artery disease Father    • Coronary artery disease Brother      Social History     Tobacco Use   • Smoking status: Never Smoker   • Smokeless tobacco: Never Used   Substance Use Topics   • Alcohol use: No     Frequency: Never       ROS:  Review of Systems:  General: + weakness or fatigue  Skin: no rashes, lumps, or other skin changes  HEENT:  "no dizziness, lightheadedness, or vision changes  Respiratory: no cough or hemoptysis  Cardiovascular: + palpitations, and tachycardia  Gastrointestinal: no black/tarry stools or diarrhea  Urinary: no change in frequency or urgency  Peripheral Vascular: no claudication or leg cramps  Musculoskeletal: no muscle or joint pain/stiffness  Psychiatric: no depression or excessive stress  Neurological: no sensory or motor loss, no syncope  Hematologic: no anemia, easy bruising or bleeding  Endocrine: no thyroid problems, nor heat or cold intolerance       PHYSICAL EXAM:   /86 (BP Location: Left arm, Patient Position: Sitting)   Pulse 84   Ht 160 cm (63\")   Wt 112 kg (247 lb)   LMP  (LMP Unknown) Comment: Mammogram- april 2018   SpO2 98%   BMI 43.75 kg/m²      Wt Readings from Last 5 Encounters:   05/23/19 112 kg (247 lb)   03/05/19 111 kg (244 lb 3.2 oz)   02/01/19 112 kg (246 lb 14.6 oz)   12/27/18 105 kg (232 lb)   11/14/18 113 kg (250 lb)     BP Readings from Last 5 Encounters:   05/23/19 161/86   03/05/19 133/77   02/01/19 124/56   12/27/18 107/55   11/14/18 146/74       General-Well Nourished, Well developed  Eyes - PERRLA  Neck- supple, No mass  CV- irregular rate and rhythm, no MRG  Lung- clear bilaterally  Abd- soft, +BS  Musc/skel - Norm strength and range of motion  Skin- warm and dry  Neuro - Alert & Oriented x 3, appropriate mood.    Medical problems and test results were reviewed with the patient today.     Results for orders placed or performed during the hospital encounter of 03/05/19   Basic Metabolic Panel   Result Value Ref Range    Glucose 107 (H) 70 - 100 mg/dL    BUN 27 (H) 9 - 23 mg/dL    Creatinine 0.81 0.60 - 1.30 mg/dL    Sodium 135 132 - 146 mmol/L    Potassium 3.4 (L) 3.5 - 5.5 mmol/L    Chloride 99 99 - 109 mmol/L    CO2 28.0 20.0 - 31.0 mmol/L    Calcium 9.0 8.7 - 10.4 mg/dL    eGFR Non African Amer 70 >60 mL/min/1.73    BUN/Creatinine Ratio 33.3 (H) 7.0 - 25.0    Anion Gap 8.0 3.0 " - 11.0 mmol/L   Protime-INR   Result Value Ref Range    Protime 14.1 11.2 - 14.3 Seconds    INR 1.14 0.85 - 1.16   POC Glucose Once   Result Value Ref Range    Glucose 124 70 - 130 mg/dL         Lab Results   Component Value Date    CHOL 185 02/01/2019    HDL 43 02/01/2019     (H) 02/01/2019       EKG:  (EKG/Tracing has been independently visualized by me and summarized below)      ECG 12 Lead  Date/Time: 5/23/2019 1:48 PM  Performed by: Edward Marie MD  Authorized by: Edward Marie MD   Comparison: compared with previous ECG from 4/9/2019  Similar to previous ECG  Rhythm: atrial fibrillation  Rate: normal  BPM: 76    Clinical impression: abnormal EKG            ASSESSMENT and PLAN  1.  Atrial fibrillation-persistent to possible permanent in nature.  Overall with her other comorbidities including obesity, severe sleep apnea, coronary artery disease, and diabetes-the overall success rate of maintaining normal sinus rhythm long-term will be poor.  We will restage her atrial fibrillation by starting the patient on Tikosyn therapy to see if sinus rhythm can be restored and maintained.  Based on her response to Tikosyn will make a final decision about long-term rate versus rhythm control options.  2.  Dual-chamber pacemaker-she has noise on the right ventricle lead and her atrial lead is unable to sense atrial fibrillation due to low atrial voltage.  3.  Starting Tikosyn -patient is on multiple other medications for her medical conditions.  She also is on Ranexa.  We will have to closely monitor her QTC possible drug interactions.    Return for after procedure.    1. Admit for Tikosyn loading    Edward Marie M.D., F.A.C.C, F.H.R.S.  Cardiology/Electrophysiology  05/23/19  1:50 PM

## 2019-05-28 ENCOUNTER — OFFICE VISIT (OUTPATIENT)
Dept: PULMONOLOGY | Facility: CLINIC | Age: 69
End: 2019-05-28

## 2019-05-28 VITALS
TEMPERATURE: 97.8 F | SYSTOLIC BLOOD PRESSURE: 132 MMHG | HEART RATE: 54 BPM | OXYGEN SATURATION: 96 % | BODY MASS INDEX: 44.5 KG/M2 | RESPIRATION RATE: 16 BRPM | DIASTOLIC BLOOD PRESSURE: 84 MMHG | WEIGHT: 251.13 LBS | HEIGHT: 63 IN

## 2019-05-28 DIAGNOSIS — R06.02 SOB (SHORTNESS OF BREATH): ICD-10-CM

## 2019-05-28 DIAGNOSIS — G47.33 OSA TREATED WITH BIPAP: ICD-10-CM

## 2019-05-28 DIAGNOSIS — I48.20 CHRONIC ATRIAL FIBRILLATION (HCC): Primary | ICD-10-CM

## 2019-05-28 PROCEDURE — 99213 OFFICE O/P EST LOW 20 MIN: CPT | Performed by: NURSE PRACTITIONER

## 2019-05-28 RX ORDER — ALBUTEROL SULFATE 2.5 MG/3ML
2.5 SOLUTION RESPIRATORY (INHALATION) EVERY 4 HOURS PRN
COMMUNITY
Start: 2019-04-22

## 2019-05-29 NOTE — PROGRESS NOTES
[facility] Follow up    CHIEF COMPLAINT    MILLI    HISTORY OF PRESENT ILLNESS    Joanna Cross is a 68 y.o.female here today for follow-up of her obstructive sleep apnea.  She was last seen in our office in October by Dr. Valadez.  She was referred to our office to establish care in our office.  She is here today for her obstructive sleep apnea and renewal of her supplies for her BiPAP.    She continues to wear her BiPAP every night for 4 to 7 hours.  She wears 2 L bled into the machine at night.  She states she feels well rested in the mornings.  She denies daytime somnolence or fatigue.    She continues to follow with cardiology for her atrial fibrillation.  She states that she is possibly going to be hospitalized in June for a Tikosyn administration.  She states that she has been difficulty with her rate control.    She continues to have a cough that is due to her allergies.  She sees her allergist tomorrow.    She denies fever, chills, sputum production, hemoptysis, night sweats, weight loss, chest pain or palpitations.  She does have chronic allergies and takes over-the-counter medication for this.  She denies reflux symptoms currently on omeprazole daily.    She is up-to-date on her current vaccinations.  Patient Active Problem List   Diagnosis   • Coronary artery disease involving native coronary artery with unstable angina pectoris (CMS/HCC)   • Essential hypertension   • Peripheral vascular disease (CMS/HCC)   • Hyperlipidemia LDL goal <70   • Chronic atrial fibrillation (CMS/HCC)   • Spinal stenosis, lumbar region, with neurogenic claudication   • Diabetes mellitus (CMS/HCC)   • Delayed surgical wound healing   • Biceps tendinitis   • Overactive bladder   • GERD (gastroesophageal reflux disease)   • Hypothyroidism   • Lichen sclerosus   • Parkinson's disease (CMS/HCC)   • MILLI treated with BiPAP - Patient reports compliance.    • History of respiratory failure - prior respiratory failure requiring  "mechanical ventilation, open lung biopsy non-specific.    • SOB (shortness of breath)   • A-fib (CMS/HCC)   • Chest pain   • Atrial fibrillation with RVR (CMS/HCC)   • Renal insufficiency   • CAD in native artery       Allergies   Allergen Reactions   • Toprol Xl [Metoprolol Tartrate] Shortness Of Breath     Extreme fatigue   • Amlodipine Besylate Swelling     Lower extremity (ankles, feet) swelling   • Entacapone Other (See Comments)     \"extreme weakness in legs - caused several falls, which stopped after discontinuing this medication\"   • Levemir [Insulin Detemir] Hives     Hives / rash around injection site   • Penicillins Hives     Jitteriness    • Codeine Unknown (See Comments)     Pt is unaware of what reaction she had   • Levaquin [Levofloxacin] Other (See Comments)     Cannot take due to taking propafenone HCL- severe reaction with mixed.    • Xarelto [Rivaroxaban] GI Bleeding   • Bactrim [Sulfamethoxazole-Trimethoprim] Nausea Only     Headache    • Ciprofloxacin Diarrhea   • Cogentin [Benztropine] Other (See Comments)     \"uncontrollable body movements\"   • Compazine [Prochlorperazine Edisylate] Other (See Comments)     Dystonic reaction   • Duraprep [Antiseptic Products, Misc.] Itching and Rash     RASH AND ITCHING   • Haldol [Haloperidol Lactate] Other (See Comments)     Dystonic reaction   • Hydralazine Other (See Comments)     Headache    • Hydrocodone-Acetaminophen Nausea And Vomiting and Dizziness     Headache, nausea    • Lisinopril Cough   • Statins Myalgia     Leg pain   • Tarka [Trandolapril-Verapamil Hcl Er] Other (See Comments)     Constipation        Current Outpatient Medications:   •  albuterol (PROVENTIL) (2.5 MG/3ML) 0.083% nebulizer solution, Take 2.5 mg by nebulization Every 4 (Four) Hours As Needed., Disp: , Rfl:   •  albuterol (VENTOLIN HFA) 108 (90 Base) MCG/ACT inhaler, Inhale 1 puff Every 4 (Four) Hours As Needed., Disp: , Rfl:   •  amantadine (SYMMETREL) 100 MG capsule, Take 100 mg " by mouth 2 (Two) Times a Day., Disp: , Rfl:   •  apixaban (ELIQUIS) 5 MG tablet tablet, Take 1 tablet by mouth Every 12 (Twelve) Hours., Disp: 180 tablet, Rfl: 3  •  aspirin 81 MG EC tablet, Take 81 mg by mouth Daily., Disp: , Rfl:   •  B Complex-C (SUPER B COMPLEX PO), Take 1 tablet by mouth Daily., Disp: , Rfl:   •  Calcium Carbonate (CALTRATE 600 PO), Take 600 mg by mouth Daily., Disp: , Rfl:   •  carbidopa-levodopa (SINEMET)  MG per tablet, Take  by mouth. 2 tablets at 0600, 1 tablet at 0900, 1200, 1500, 1800, 2100, Disp: , Rfl:   •  carbonyl iron (FEOSOL) 45 MG tablet tablet, Take 1 tablet by mouth 2 (Two) Times a Day., Disp: , Rfl:   •  Cholecalciferol 2000 units tablet, Take 2,000 Units by mouth 2 (Two) Times a Day., Disp: , Rfl:   •  citalopram (CeleXA) 20 MG tablet, Take 20 mg by mouth every night at bedtime., Disp: , Rfl:   •  clobetasol (TEMOVATE) 0.05 % cream, Apply 1 application topically 2 (Two) Times a Day As Needed (Lichens Sclerosis)., Disp: , Rfl:   •  CloNIDine (CATAPRES) 0.1 MG tablet, TAKE 1 TABLET EVERY 12 HOURS, Disp: 180 tablet, Rfl: 3  •  Collagen-Boron-Hyaluronic Acid 10-5-3.3 MG tablet, Take 1 tablet by mouth Daily., Disp: , Rfl:   •  fluticasone (FLONASE) 50 MCG/ACT nasal spray, 2 sprays into each nostril Daily As Needed for Rhinitis., Disp: , Rfl:   •  insulin glargine (LANTUS) 100 UNIT/ML injection, Inject 20 Units under the skin into the appropriate area as directed Daily. ACCORDING TO SUGAR LEVELS, Disp: , Rfl:   •  IPRATROPIUM BROMIDE NA, 1 spray into the nostril(s) as directed by provider 2 (Two) Times a Day As Needed., Disp: , Rfl:   •  Loratadine 10 MG capsule, Take 10 mg by mouth Daily., Disp: , Rfl:   •  losartan (COZAAR) 50 MG tablet, TAKE 1 TABLET EVERY 12 HOURS (Patient taking differently: PT TAKING 1/2 TAB TWICE A DAY), Disp: 180 tablet, Rfl: 3  •  metFORMIN (GLUCOPHAGE) 1000 MG tablet, Take 1,000 mg by mouth 2 (Two) Times a Day With Meals., Disp: , Rfl:   •   metOLazone (ZAROXOLYN) 2.5 MG tablet, Take 2.5 mg by mouth Daily As Needed., Disp: , Rfl:   •  Mirabegron ER (MYRBETRIQ) 50 MG tablet sustained-release 24 hour 24 hr tablet, Take 50 mg by mouth Daily., Disp: , Rfl:   •  Multiple Vitamins-Minerals (CENTRUM ULTRA WOMENS PO), Take 1 tablet by mouth Daily., Disp: , Rfl:   •  nitroglycerin (NITROLINGUAL) 0.4 MG/SPRAY spray, Place 1 spray under the tongue Every 5 (Five) Minutes As Needed for Chest Pain., Disp: 1 each, Rfl: 6  •  O2 (OXYGEN), Inhale 2 L/min 1 (One) Time. 2L while sleeping, Disp: , Rfl:   •  omeprazole (priLOSEC) 40 MG capsule, Take 40 mg by mouth 2 (Two) Times a Day., Disp: , Rfl:   •  potassium chloride (MICRO-K) 10 MEQ CR capsule, Take 10 mEq by mouth 2 (Two) Times a Day., Disp: , Rfl:   •  pramipexole (MIRAPEX) 1.5 MG tablet, Take 1.5 mg by mouth Every Night., Disp: , Rfl:   •  ranolazine (RANEXA) 500 MG 12 hr tablet, Take 1 tablet by mouth Every 12 (Twelve) Hours., Disp: 180 tablet, Rfl: 3  •  sennosides-docusate sodium (SENOKOT-S) 8.6-50 MG tablet, Take 1 tablet by mouth Daily., Disp: , Rfl:   •  spironolactone (ALDACTONE) 25 MG tablet, Take 1 tablet by mouth Daily. (Patient taking differently: Take 25 mg by mouth Daily As Needed (swelling).), Disp: 90 tablet, Rfl: 1  •  SYNTHROID 200 MCG tablet, Take 200 mcg by mouth Daily., Disp: , Rfl:   •  traMADol (ULTRAM) 50 MG tablet, Take 50 mg by mouth Every 8 (Eight) Hours As Needed for Moderate Pain ., Disp: , Rfl:   •  vitamin C (ASCORBIC ACID) 500 MG tablet, Take 500 mg by mouth Daily. Chewable tablet, Disp: , Rfl:   MEDICATION LIST AND ALLERGIES REVIEWED.    Social History     Tobacco Use   • Smoking status: Never Smoker   • Smokeless tobacco: Never Used   Substance Use Topics   • Alcohol use: No     Frequency: Never   • Drug use: No       FAMILY AND SOCIAL HISTORY REVIEWED.    Review of Systems   Constitutional: Positive for fatigue. Negative for activity change, appetite change, fever and unexpected  "weight change.   HENT: Positive for postnasal drip and rhinorrhea. Negative for congestion, sinus pressure, sore throat and voice change.    Eyes: Negative for visual disturbance.   Respiratory: Positive for cough, shortness of breath and wheezing. Negative for chest tightness.    Cardiovascular: Positive for palpitations and leg swelling. Negative for chest pain.   Gastrointestinal: Negative for abdominal distention, abdominal pain, nausea and vomiting.   Endocrine: Negative for cold intolerance and heat intolerance.   Genitourinary: Negative for difficulty urinating and urgency.   Musculoskeletal: Negative for arthralgias, back pain and neck pain.   Skin: Negative for color change and pallor.   Allergic/Immunologic: Negative for environmental allergies and food allergies.   Neurological: Negative for dizziness, syncope, weakness and light-headedness.   Hematological: Negative for adenopathy. Does not bruise/bleed easily.   Psychiatric/Behavioral: Negative for agitation and behavioral problems.   .    /84 (BP Location: Right arm, Patient Position: Sitting, Cuff Size: Adult)   Pulse 54   Temp 97.8 °F (36.6 °C)   Resp 16   Ht 160 cm (63\")   Wt 114 kg (251 lb 2 oz)   LMP  (LMP Unknown) Comment: Mammogram- april 2018   SpO2 96% Comment: room air at rest  BMI 44.48 kg/m²     Immunization History   Administered Date(s) Administered   • Flu Vaccine High Dose PF 65YR+ 09/13/2017   • Hepatitis A 05/03/1999, 10/05/1999   • PEDS-Pneumococcal Conjugate (PCV7) 12/20/2013   • Pneumococcal Conjugate 13-Valent (PCV13) 12/04/2016   • Tdap 05/04/2017   • Zostavax 02/05/2013       Physical Exam   Constitutional: She is oriented to person, place, and time. She appears well-developed and well-nourished.   HENT:   Head: Normocephalic and atraumatic.   Eyes: Pupils are equal, round, and reactive to light.   Neck: Normal range of motion. Neck supple. No thyromegaly present.   Cardiovascular: Normal rate, regular rhythm, " normal heart sounds and intact distal pulses. Exam reveals no gallop and no friction rub.   No murmur heard.  Pulmonary/Chest: Effort normal and breath sounds normal. No respiratory distress. She has no wheezes. She has no rales. She exhibits no tenderness.   Abdominal: Soft. Bowel sounds are normal. There is no tenderness.   Musculoskeletal: Normal range of motion.   Lymphadenopathy:     She has no cervical adenopathy.   Neurological: She is alert and oriented to person, place, and time.   Skin: Skin is warm and dry. Capillary refill takes less than 2 seconds. She is not diaphoretic.   Psychiatric: She has a normal mood and affect. Her behavior is normal.   Nursing note and vitals reviewed.        RESULTS    CPAP download:  Patient has 100% compliance with an average AHI of 2.1.     PROBLEM LIST    Problem List Items Addressed This Visit        Cardiovascular and Mediastinum    Chronic atrial fibrillation (CMS/HCC) - Primary       Respiratory    MILLI treated with BiPAP - Patient reports compliance.     Overview     - Patient reports compliance.          SOB (shortness of breath)            DISCUSSION    Mrs. Cross was here for follow-up of her obstructive sleep apnea.  She is currently compliant with her BiPAP per her current download.  She will continue to wear her BiPAP with 2 L bled into the machine every night for obstructive sleep apnea.  I will [facility] new [facility].    She will continue to follow with cardiology for her chronic atrial fibrillation.      She will follow-up yearly or sooner if her symptoms worsen.  She will call with any additional concerns or questions.  I spent 15 minutes with the patient. I spent > 50% percent of this time counseling and discussing diagnosis, prognosis, diagnostic testing, evaluation, current status, treatment options and management.    Myrna Mckeon, DEJA  05/28/201910:10 PM  Electronically signed     Please note that portions of this note  were completed with a voice recognition program. Efforts were made to edit the dictations, but occasionally words are mistranscribed.      CC: Reynaldo Fritz MD

## 2019-06-04 ENCOUNTER — PREP FOR SURGERY (OUTPATIENT)
Dept: OTHER | Facility: HOSPITAL | Age: 69
End: 2019-06-04

## 2019-06-04 RX ORDER — POTASSIUM CHLORIDE 750 MG/1
40 CAPSULE, EXTENDED RELEASE ORAL AS NEEDED
Status: CANCELLED | OUTPATIENT
Start: 2019-06-04

## 2019-06-04 RX ORDER — POTASSIUM CHLORIDE 1.5 G/1.77G
40 POWDER, FOR SOLUTION ORAL AS NEEDED
Status: CANCELLED | OUTPATIENT
Start: 2019-06-04

## 2019-06-05 ENCOUNTER — TELEPHONE (OUTPATIENT)
Dept: CARDIOLOGY | Facility: CLINIC | Age: 69
End: 2019-06-05

## 2019-06-05 NOTE — TELEPHONE ENCOUNTER
PT had a steroid injection and she has had a reaction to the injection- BP running 160-170/- she will increase losartan to 50 mg BID and will notify the physician that the injection has caused her BP to elevate-   She will call us if she needs anything further- PT verbalized understanding-

## 2019-06-06 ENCOUNTER — APPOINTMENT (OUTPATIENT)
Dept: PREADMISSION TESTING | Facility: HOSPITAL | Age: 69
End: 2019-06-06

## 2019-06-06 ENCOUNTER — APPOINTMENT (OUTPATIENT)
Dept: GENERAL RADIOLOGY | Facility: HOSPITAL | Age: 69
End: 2019-06-06

## 2019-06-06 ENCOUNTER — HOSPITAL ENCOUNTER (EMERGENCY)
Facility: HOSPITAL | Age: 69
Discharge: HOME | End: 2019-06-07
Attending: EMERGENCY MEDICINE | Admitting: EMERGENCY MEDICINE

## 2019-06-06 VITALS — DIASTOLIC BLOOD PRESSURE: 100 MMHG | HEART RATE: 94 BPM | SYSTOLIC BLOOD PRESSURE: 179 MMHG | OXYGEN SATURATION: 97 %

## 2019-06-06 DIAGNOSIS — I10 ELEVATED BLOOD PRESSURE READING WITH DIAGNOSIS OF HYPERTENSION: Primary | ICD-10-CM

## 2019-06-06 DIAGNOSIS — E87.6 HYPOKALEMIA: ICD-10-CM

## 2019-06-06 LAB
ANION GAP SERPL CALCULATED.3IONS-SCNC: 14 MMOL/L
ANION GAP SERPL CALCULATED.3IONS-SCNC: 16 MMOL/L
BACTERIA UR QL AUTO: NORMAL /HPF
BASOPHILS # BLD AUTO: 0.04 10*3/MM3 (ref 0–0.2)
BASOPHILS NFR BLD AUTO: 0.4 % (ref 0–1.5)
BILIRUB UR QL STRIP: NEGATIVE
BUN BLD-MCNC: 19 MG/DL (ref 8–23)
BUN BLD-MCNC: 23 MG/DL (ref 8–23)
BUN/CREAT SERPL: 25.3 (ref 7–25)
BUN/CREAT SERPL: 27.7 (ref 7–25)
CA-I SERPL ISE-MCNC: 1.28 MMOL/L (ref 1.12–1.32)
CALCIUM SPEC-SCNC: 10 MG/DL (ref 8.6–10.5)
CALCIUM SPEC-SCNC: 9.7 MG/DL (ref 8.6–10.5)
CHLORIDE SERPL-SCNC: 94 MMOL/L (ref 98–107)
CHLORIDE SERPL-SCNC: 95 MMOL/L (ref 98–107)
CLARITY UR: ABNORMAL
CO2 SERPL-SCNC: 29 MMOL/L (ref 22–29)
CO2 SERPL-SCNC: 30 MMOL/L (ref 22–29)
COLOR UR: YELLOW
CREAT BLD-MCNC: 0.75 MG/DL (ref 0.57–1)
CREAT BLD-MCNC: 0.83 MG/DL (ref 0.57–1)
DEPRECATED RDW RBC AUTO: 46.3 FL (ref 37–54)
EOSINOPHIL # BLD AUTO: 0.14 10*3/MM3 (ref 0–0.4)
EOSINOPHIL NFR BLD AUTO: 1.3 % (ref 0.3–6.2)
ERYTHROCYTE [DISTWIDTH] IN BLOOD BY AUTOMATED COUNT: 13.7 % (ref 12.3–15.4)
GFR SERPL CREATININE-BSD FRML MDRD: 68 ML/MIN/1.73
GFR SERPL CREATININE-BSD FRML MDRD: 76 ML/MIN/1.73
GLUCOSE BLD-MCNC: 107 MG/DL (ref 65–99)
GLUCOSE BLD-MCNC: 129 MG/DL (ref 65–99)
GLUCOSE BLDC GLUCOMTR-MCNC: 119 MG/DL (ref 70–130)
GLUCOSE UR STRIP-MCNC: NEGATIVE MG/DL
HCT VFR BLD AUTO: 40.9 % (ref 34–46.6)
HGB BLD-MCNC: 13.1 G/DL (ref 12–15.9)
HGB UR QL STRIP.AUTO: NEGATIVE
HYALINE CASTS UR QL AUTO: NORMAL /LPF
IMM GRANULOCYTES # BLD AUTO: 0.05 10*3/MM3 (ref 0–0.05)
IMM GRANULOCYTES NFR BLD AUTO: 0.5 % (ref 0–0.5)
INR PPP: 1.24 (ref 0.85–1.16)
KETONES UR QL STRIP: NEGATIVE
LEUKOCYTE ESTERASE UR QL STRIP.AUTO: NEGATIVE
LYMPHOCYTES # BLD AUTO: 1.1 10*3/MM3 (ref 0.7–3.1)
LYMPHOCYTES NFR BLD AUTO: 10.4 % (ref 19.6–45.3)
MAGNESIUM SERPL-MCNC: 1.6 MG/DL (ref 1.6–2.4)
MCH RBC QN AUTO: 29.3 PG (ref 26.6–33)
MCHC RBC AUTO-ENTMCNC: 32 G/DL (ref 31.5–35.7)
MCV RBC AUTO: 91.5 FL (ref 79–97)
MONOCYTES # BLD AUTO: 0.88 10*3/MM3 (ref 0.1–0.9)
MONOCYTES NFR BLD AUTO: 8.3 % (ref 5–12)
NEUTROPHILS # BLD AUTO: 8.35 10*3/MM3 (ref 1.7–7)
NEUTROPHILS NFR BLD AUTO: 79.1 % (ref 42.7–76)
NITRITE UR QL STRIP: NEGATIVE
NRBC BLD AUTO-RTO: 0 /100 WBC (ref 0–0.2)
PH UR STRIP.AUTO: 7.5 [PH] (ref 5–8)
PLATELET # BLD AUTO: 242 10*3/MM3 (ref 140–450)
PMV BLD AUTO: 9.3 FL (ref 6–12)
POTASSIUM BLD-SCNC: 3.1 MMOL/L (ref 3.5–5.2)
POTASSIUM BLD-SCNC: 3.6 MMOL/L (ref 3.5–5.2)
PROT UR QL STRIP: ABNORMAL
PROTHROMBIN TIME: 15 SECONDS (ref 11.2–14.3)
RBC # BLD AUTO: 4.47 10*6/MM3 (ref 3.77–5.28)
RBC # UR: NORMAL /HPF
REF LAB TEST METHOD: NORMAL
SODIUM BLD-SCNC: 138 MMOL/L (ref 136–145)
SODIUM BLD-SCNC: 140 MMOL/L (ref 136–145)
SP GR UR STRIP: 1.01 (ref 1–1.03)
SQUAMOUS #/AREA URNS HPF: NORMAL /HPF
TROPONIN T SERPL-MCNC: <0.01 NG/ML (ref 0–0.03)
UROBILINOGEN UR QL STRIP: ABNORMAL
WBC NRBC COR # BLD: 10.56 10*3/MM3 (ref 3.4–10.8)
WBC UR QL AUTO: NORMAL /HPF

## 2019-06-06 PROCEDURE — 99284 EMERGENCY DEPT VISIT MOD MDM: CPT

## 2019-06-06 PROCEDURE — 25010000002 HYDRALAZINE PER 20 MG: Performed by: EMERGENCY MEDICINE

## 2019-06-06 PROCEDURE — 82962 GLUCOSE BLOOD TEST: CPT

## 2019-06-06 PROCEDURE — 93005 ELECTROCARDIOGRAM TRACING: CPT | Performed by: EMERGENCY MEDICINE

## 2019-06-06 PROCEDURE — 85025 COMPLETE CBC W/AUTO DIFF WBC: CPT | Performed by: EMERGENCY MEDICINE

## 2019-06-06 PROCEDURE — 96374 THER/PROPH/DIAG INJ IV PUSH: CPT

## 2019-06-06 PROCEDURE — 80048 BASIC METABOLIC PNL TOTAL CA: CPT | Performed by: EMERGENCY MEDICINE

## 2019-06-06 PROCEDURE — 84484 ASSAY OF TROPONIN QUANT: CPT | Performed by: EMERGENCY MEDICINE

## 2019-06-06 PROCEDURE — 81001 URINALYSIS AUTO W/SCOPE: CPT | Performed by: EMERGENCY MEDICINE

## 2019-06-06 PROCEDURE — 71045 X-RAY EXAM CHEST 1 VIEW: CPT

## 2019-06-06 PROCEDURE — 83735 ASSAY OF MAGNESIUM: CPT | Performed by: PHYSICIAN ASSISTANT

## 2019-06-06 PROCEDURE — 85610 PROTHROMBIN TIME: CPT | Performed by: EMERGENCY MEDICINE

## 2019-06-06 PROCEDURE — 82330 ASSAY OF CALCIUM: CPT | Performed by: PHYSICIAN ASSISTANT

## 2019-06-06 PROCEDURE — 80048 BASIC METABOLIC PNL TOTAL CA: CPT | Performed by: PHYSICIAN ASSISTANT

## 2019-06-06 RX ORDER — ACETAMINOPHEN 500 MG
1000 TABLET ORAL ONCE
Status: COMPLETED | OUTPATIENT
Start: 2019-06-06 | End: 2019-06-06

## 2019-06-06 RX ORDER — POTASSIUM CHLORIDE 750 MG/1
20 CAPSULE, EXTENDED RELEASE ORAL ONCE
Status: COMPLETED | OUTPATIENT
Start: 2019-06-06 | End: 2019-06-06

## 2019-06-06 RX ORDER — HYDRALAZINE HYDROCHLORIDE 20 MG/ML
10 INJECTION INTRAMUSCULAR; INTRAVENOUS ONCE
Status: COMPLETED | OUTPATIENT
Start: 2019-06-06 | End: 2019-06-06

## 2019-06-06 RX ADMIN — POTASSIUM CHLORIDE 20 MEQ: 750 CAPSULE, EXTENDED RELEASE ORAL at 23:21

## 2019-06-06 RX ADMIN — HYDRALAZINE HYDROCHLORIDE 10 MG: 20 INJECTION INTRAMUSCULAR; INTRAVENOUS at 22:30

## 2019-06-06 RX ADMIN — ACETAMINOPHEN 1000 MG: 500 TABLET, FILM COATED ORAL at 22:30

## 2019-06-06 NOTE — PAT
Patient has not been feeling best. Went to [facility] ER last night for uncontrolled htn and still is having issues.  Redid labs and checked bp in pat. Paged onofre hayden as bp still high and onofre states for pt to up her losartan dose to whole tablet bid instead of just the half tablet that patient was taking before. Onofre also informed patient to speak with ellyn (dr barry contact) if any other issues arise as that is the patient's primary cardiologist. Patient is currently still worried to go home as took whole tablet of losartan last night and bp still did not go down and that is why she went to er in the first place. Patient is considering going back to the er at this time since no new orders from dr brunson and pt is scared to drive [facility] Patient does not want to go back to [facility] since they did not provide patient with solution for elevated bp. Patient is currently frustrated and unsure exactly what to do next.

## 2019-06-07 VITALS
OXYGEN SATURATION: 98 % | WEIGHT: 243 LBS | TEMPERATURE: 98.1 F | HEART RATE: 79 BPM | HEIGHT: 63 IN | RESPIRATION RATE: 18 BRPM | DIASTOLIC BLOOD PRESSURE: 72 MMHG | SYSTOLIC BLOOD PRESSURE: 143 MMHG | BODY MASS INDEX: 43.05 KG/M2

## 2019-06-07 LAB — TROPONIN T SERPL-MCNC: <0.01 NG/ML (ref 0–0.03)

## 2019-06-07 PROCEDURE — 84484 ASSAY OF TROPONIN QUANT: CPT | Performed by: EMERGENCY MEDICINE

## 2019-06-07 NOTE — ED PROVIDER NOTES
"Subjective   68-year-old female presents for evaluation of elevated blood pressure.  Of note, the patient states that she had a cortisone shot in her shoulder on Monday and feels that this triggered her elevated blood pressure readings over the past 3 days.  Over that span, she states that she has had elevated blood pressure readings with systolic blood pressures as high as 170-190.  She called her cardiologist who would advised her to increase her losartan dose, but she told me that she was scared to do so she did not want to \"bottom out her blood pressure.\"  Over the past 3 days she has had intermittent chest pressure as well as mild intermittent headache.  She went to the emergency department in [place] regarding her symptoms last night and tells me that all they did was \"give her Lasix.\"  She subsequently came here for a second opinion this evening she has remained persistently hypertensive.  Currently, she feels well and is without any active pain at this time.        History provided by:  Patient  Hypertension   Severity:  Moderate  Onset quality:  Sudden  Duration:  2 days  Timing:  Constant  Progression:  Unchanged  Chronicity:  Recurrent  Notable PTA blood pressures:  190/112  Context: medication change (Cortisone)    Relieved by:  Nothing  Exacerbated by: Cortisone injection.  Ineffective treatments: Increasing Losartan and Clonidine.  Associated symptoms: chest pain and headaches    Chest pain:     Quality: tightness    Risk factors: cardiac disease, diabetes and obesity    Risk factors: no tobacco use        Review of Systems   Cardiovascular: Positive for chest pain.   Neurological: Positive for headaches.   All other systems reviewed and are negative.      Past Medical History:   Diagnosis Date   • Anemia    • Atrial fibrillation (CMS/HCC)    • AVM (arteriovenous malformation)    • CAD (coronary artery disease)    • Depression    • Disease of thyroid gland    • DM2 (diabetes mellitus, type 2) " "(CMS/[facility])     checks sugar twice per day    • Essential hypertension    • Generalized osteoarthritis    • GERD (gastroesophageal reflux disease)    • GIB (gastrointestinal bleeding) 2016    d/t xarelto    • Headache    • Hiatal hernia    • History of shingles    • History of transfusion 2016   • IBS (irritable bowel syndrome)    • Lichen sclerosus    • Mixed hyperlipidemia    • Morbid obesity (CMS/[facility])    • MRSA infection 2018   • Myocardial infarction (CMS/[facility])    • On home oxygen therapy     2L of oxygen via CPAP while sleeping    • MILLI on CPAP     18/14   • Osteoporosis    • Pacemaker    • Parkinson disease (CMS/[facility])    • Peripheral vascular disease (CMS/[facility])    • Pleurisy    • Pneumonia    • Seborrheic dermatitis    • Skin cancer     on back    • SOBOE (shortness of breath on exertion)    • SSS (sick sinus syndrome) (CMS/[facility])    • TIA (transient ischemic attack)    • Varicose vein of leg    • Venous insufficiency of both lower extremities        Allergies   Allergen Reactions   • Toprol Xl [Metoprolol Tartrate] Shortness Of Breath     Extreme fatigue   • Amlodipine Besylate Swelling     Lower extremity (ankles, feet) swelling   • Entacapone Other (See Comments)     \"extreme weakness in legs - caused several falls, which stopped after discontinuing this medication\"   • Levemir [Insulin Detemir] Hives     Hives / rash around injection site   • Penicillins Hives     Jitteriness    • Codeine Unknown (See Comments)     Pt is unaware of what reaction she had   • Levaquin [Levofloxacin] Other (See Comments)     Cannot take due to taking propafenone HCL- severe reaction with mixed.    • Xarelto [Rivaroxaban] GI Bleeding   • Bactrim [Sulfamethoxazole-Trimethoprim] Nausea Only     Headache    • Ciprofloxacin Diarrhea   • Cogentin [Benztropine] Other (See Comments)     \"uncontrollable body movements\"   • Compazine [Prochlorperazine Edisylate] Other (See Comments)     Dystonic reaction   • Duraprep [Antiseptic Products, " Misc.] Itching and Rash     RASH AND ITCHING   • Haldol [Haloperidol Lactate] Other (See Comments)     Dystonic reaction   • Hydralazine Other (See Comments)     Headache    • Hydrocodone-Acetaminophen Nausea And Vomiting and Dizziness     Headache, nausea    • Lisinopril Cough   • Statins Myalgia     Leg pain   • Tarka [Trandolapril-Verapamil Hcl Er] Other (See Comments)     Constipation        Past Surgical History:   Procedure Laterality Date   • BACK SURGERY      l4-l5 laminectomy    • BICEPS TENDON REPAIR Right     shoulder   • BREAST BIOPSY Left 2004   • BUNIONECTOMY Right    • CARDIAC CATHETERIZATION     • CARDIAC CATHETERIZATION N/A 2/1/2019    Procedure: Left Heart Cath;  Surgeon: Albertina Corona MD;  Location:  [facility];  Service: Cardiology   • CHOLECYSTECTOMY     • COLONOSCOPY  2016   • CORONARY STENT PLACEMENT      x1 stent   • CYST REMOVAL      left ear, upper left back 2001   • DIAGNOSTIC LAPAROSCOPY  1981   • ENDOSCOPIC FUNCTIONAL SINUS SURGERY (FESS)  2011   • ENDOSCOPY  2016   • HAMMER TOE REPAIR Left    • INCISION AND DRAINAGE OF WOUND  2018   • JOINT REPLACEMENT     • LIPOMA EXCISION  1999    left leg    • LUMBAR LAMINECTOMY DISCECTOMY DECOMPRESSION N/A 7/6/2018    Procedure: LUMBAR LAMINECTOMY L4-5, HEMILAMIINECTOMY RIGHT L5-S1, FORAMINOTOMY L5-S1;  Surgeon: Lencho Gamino MD;  Location:  [facility];  Service: Neurosurgery   • LUMBAR LAMINECTOMY DISCECTOMY DECOMPRESSION N/A 9/19/2018    Procedure: INCISION AND DRAINAGE BACK WITH WOUND EXPLORATION;  Surgeon: Ritchie García MD;  Location:  [facility];  Service: Neurosurgery   • LUNG BIOPSY Left 2016   • PACEMAKER IMPLANTATION  11/2016    sss   • REPLACEMENT TOTAL KNEE Bilateral     left knee 2011, right 2012 per dr acuna    • REPLACEMENT TOTAL KNEE Bilateral    • SHOULDER ARTHROSCOPY Bilateral     2003-left, 2004- right    • SKIN BIOPSY      skin cancer back 2016   • TUBAL ABDOMINAL LIGATION  1980   • WISDOM TOOTH EXTRACTION          Family History   Problem Relation Age of Onset   • Kidney disease Mother    • Coronary artery disease Mother    • Coronary artery disease Father    • Coronary artery disease Brother        Social History     Socioeconomic History   • Marital status:      Spouse name: N/A   • Number of children: 4   • Years of education: College   • Highest education level: Not on file   Occupational History   • Occupation:      Employer: RETIRED   Tobacco Use   • Smoking status: Never Smoker   • Smokeless tobacco: Never Used   Substance and Sexual Activity   • Alcohol use: No     Frequency: Never   • Drug use: No   • Sexual activity: Defer     Partners: Male         Objective   Physical Exam   Constitutional: She is oriented to person, place, and time. She appears well-developed and well-nourished. No distress.   Well-appearing female in no acute distress   HENT:   Head: Normocephalic and atraumatic.   Mouth/Throat: Oropharynx is clear and moist.   Eyes: EOM are normal. Pupils are equal, round, and reactive to light.   Cardiovascular: Normal rate, regular rhythm and normal heart sounds. Exam reveals no gallop and no friction rub.   No murmur heard.  Pulmonary/Chest: Effort normal and breath sounds normal. No respiratory distress. She has no wheezes. She has no rales.   Abdominal: Soft. Bowel sounds are normal. She exhibits no distension and no mass. There is no tenderness. There is no rebound and no guarding.   Musculoskeletal: Normal range of motion.   Neurological: She is alert and oriented to person, place, and time. No cranial nerve deficit or sensory deficit. Coordination normal.   Skin: Skin is warm and dry. No rash noted. She is not diaphoretic. No erythema.   Psychiatric: She has a normal mood and affect. Judgment and thought content normal.   Nursing note and vitals reviewed.      Procedures         ED Course  ED Course as of Jun 07 0427   Thu Jun 06, 2019   2127 68-year-old female presents for  "evaluation of elevated blood pressure readings over the past 2 to 3 days.  She attributes her elevated blood pressure readings to a cortisone shot that she received in her shoulder on Monday.  She was advised by her cardiologist to increase her dose of losartan but was scared to do so she did not want to \"bottom out her blood pressure.\"  On arrival to the ED, patient well-appearing and asymptomatic but quite hypertensive.  We will obtain labs to assess for potential endorgan damage and will give a gentle dose of intravenous hydralazine.  We will continue to closely monitor the patient's blood pressures during her emergency department stay.  [DD]   2235 Blood pressure improving following administration of antihypertensive medications.  [DD]   2251 Dr. Srinivasan is at the bedside reevaluating the patient.    [JW]   2303 Labs remarkable only for mild hypokalemia.  Potassium replaced orally in the ED.  [DD]   Fri Jun 07, 2019   0016 Chest x-ray negative.  [DD]   0159 Serial re-evaluations performed.  The patient's blood pressure came down nicely following medications.  Her current blood pressure is 143/72.  She feels well at this time.  Repeat troponin/EKG negative/unchanged.  She was observed in the emergency department for nearly 10 hours and is in good condition at this time.  I feel that she is stable for discharge.  She will follow-up with her primary care physician within the next week.  In the interim, she will double up on her losartan as previously directed by her cardiologist.  She will keep close tabs on her blood pressure over the next several days and will keep a log of readings to discuss potential tweaks to her blood pressure medication regimen when she follows up with her primary care physician next week.  Agreeable with plan and given appropriate strict return precautions.  [DD]   0201 Additionally, she will double up on her home oral potassium replacement over the next 2 days given her mild hypokalemia.  " "[DD]      ED Course User Index  [DD] Bong Srinivasan MD  [JW] Roge Villarreal     Recent Results (from the past 24 hour(s))   Calcium, Ionized    Collection Time: 06/06/19  2:39 PM   Result Value Ref Range    Ionized Calcium 1.28 1.12 - 1.32 mmol/L   Magnesium    Collection Time: 06/06/19  2:39 PM   Result Value Ref Range    Magnesium 1.6 1.6 - 2.4 mg/dL   Basic Metabolic Panel    Collection Time: 06/06/19  2:39 PM   Result Value Ref Range    Glucose 129 (H) 65 - 99 mg/dL    BUN 23 8 - 23 mg/dL    Creatinine 0.83 0.57 - 1.00 mg/dL    Sodium 140 136 - 145 mmol/L    Potassium 3.6 3.5 - 5.2 mmol/L    Chloride 95 (L) 98 - 107 mmol/L    CO2 29.0 22.0 - 29.0 mmol/L    Calcium 10.0 8.6 - 10.5 mg/dL    eGFR Non African Amer 68 >60 mL/min/1.73    BUN/Creatinine Ratio 27.7 (H) 7.0 - 25.0    Anion Gap 16.0 mmol/L     Note: In addition to lab results from this visit, the labs listed above may include labs taken at another facility or during a different encounter within the last 24 hours. Please correlate lab times with ED admission and discharge times for further clarification of the services performed during this visit.    No orders to display     Vitals:    06/06/19 1607   BP: (!) 186/91   Pulse: 99   Resp: 18   Temp: 98.1 °F (36.7 °C)   TempSrc: Oral   SpO2: 98%   Weight: 110 kg (243 lb)   Height: 160 cm (63\")     Medications   hydrALAZINE (APRESOLINE) injection 10 mg (not administered)   acetaminophen (TYLENOL) tablet 1,000 mg (not administered)     ECG/EMG Results (last 24 hours)     ** No results found for the last 24 hours. **        ECG 12 Lead    (Results Pending)                     Recent Results (from the past 24 hour(s))   Calcium, Ionized    Collection Time: 06/06/19  2:39 PM   Result Value Ref Range    Ionized Calcium 1.28 1.12 - 1.32 mmol/L   Magnesium    Collection Time: 06/06/19  2:39 PM   Result Value Ref Range    Magnesium 1.6 1.6 - 2.4 mg/dL   Basic Metabolic Panel    Collection Time: 06/06/19  2:39 " PM   Result Value Ref Range    Glucose 129 (H) 65 - 99 mg/dL    BUN 23 8 - 23 mg/dL    Creatinine 0.83 0.57 - 1.00 mg/dL    Sodium 140 136 - 145 mmol/L    Potassium 3.6 3.5 - 5.2 mmol/L    Chloride 95 (L) 98 - 107 mmol/L    CO2 29.0 22.0 - 29.0 mmol/L    Calcium 10.0 8.6 - 10.5 mg/dL    eGFR Non African Amer 68 >60 mL/min/1.73    BUN/Creatinine Ratio 27.7 (H) 7.0 - 25.0    Anion Gap 16.0 mmol/L   POC Glucose Once    Collection Time: 06/06/19  9:33 PM   Result Value Ref Range    Glucose 119 70 - 130 mg/dL   Basic Metabolic Panel    Collection Time: 06/06/19 10:26 PM   Result Value Ref Range    Glucose 107 (H) 65 - 99 mg/dL    BUN 19 8 - 23 mg/dL    Creatinine 0.75 0.57 - 1.00 mg/dL    Sodium 138 136 - 145 mmol/L    Potassium 3.1 (L) 3.5 - 5.2 mmol/L    Chloride 94 (L) 98 - 107 mmol/L    CO2 30.0 (H) 22.0 - 29.0 mmol/L    Calcium 9.7 8.6 - 10.5 mg/dL    eGFR Non African Amer 76 >60 mL/min/1.73    BUN/Creatinine Ratio 25.3 (H) 7.0 - 25.0    Anion Gap 14.0 mmol/L   Protime-INR    Collection Time: 06/06/19 10:26 PM   Result Value Ref Range    Protime 15.0 (H) 11.2 - 14.3 Seconds    INR 1.24 (H) 0.85 - 1.16   Troponin    Collection Time: 06/06/19 10:26 PM   Result Value Ref Range    Troponin T <0.010 0.000 - 0.030 ng/mL   CBC Auto Differential    Collection Time: 06/06/19 10:26 PM   Result Value Ref Range    WBC 10.56 3.40 - 10.80 10*3/mm3    RBC 4.47 3.77 - 5.28 10*6/mm3    Hemoglobin 13.1 12.0 - 15.9 g/dL    Hematocrit 40.9 34.0 - 46.6 %    MCV 91.5 79.0 - 97.0 fL    MCH 29.3 26.6 - 33.0 pg    MCHC 32.0 31.5 - 35.7 g/dL    RDW 13.7 12.3 - 15.4 %    RDW-SD 46.3 37.0 - 54.0 fl    MPV 9.3 6.0 - 12.0 fL    Platelets 242 140 - 450 10*3/mm3    Neutrophil % 79.1 (H) 42.7 - 76.0 %    Lymphocyte % 10.4 (L) 19.6 - 45.3 %    Monocyte % 8.3 5.0 - 12.0 %    Eosinophil % 1.3 0.3 - 6.2 %    Basophil % 0.4 0.0 - 1.5 %    Immature Grans % 0.5 0.0 - 0.5 %    Neutrophils, Absolute 8.35 (H) 1.70 - 7.00 10*3/mm3    Lymphocytes, Absolute  1.10 0.70 - 3.10 10*3/mm3    Monocytes, Absolute 0.88 0.10 - 0.90 10*3/mm3    Eosinophils, Absolute 0.14 0.00 - 0.40 10*3/mm3    Basophils, Absolute 0.04 0.00 - 0.20 10*3/mm3    Immature Grans, Absolute 0.05 0.00 - 0.05 10*3/mm3    nRBC 0.0 0.0 - 0.2 /100 WBC   Urinalysis With Microscopic If Indicated (No Culture) - Urine, Clean Catch    Collection Time: 06/06/19 10:27 PM   Result Value Ref Range    Color, UA Yellow Yellow, Straw    Appearance, UA Turbid (A) Clear    pH, UA 7.5 5.0 - 8.0    Specific Gravity, UA 1.011 1.001 - 1.030    Glucose, UA Negative Negative    Ketones, UA Negative Negative    Bilirubin, UA Negative Negative    Blood, UA Negative Negative    Protein, UA 30 mg/dL (1+) (A) Negative    Leuk Esterase, UA Negative Negative    Nitrite, UA Negative Negative    Urobilinogen, UA 0.2 E.U./dL 0.2 - 1.0 E.U./dL   Urinalysis, Microscopic Only - Urine, Clean Catch    Collection Time: 06/06/19 10:27 PM   Result Value Ref Range    RBC, UA 0-2 None Seen, 0-2 /HPF    WBC, UA None Seen None Seen, 0-2 /HPF    Bacteria, UA None Seen None Seen, Trace /HPF    Squamous Epithelial Cells, UA 0-2 None Seen, 0-2 /HPF    Hyaline Casts, UA None Seen 0 - 6 /LPF    Methodology Automated Microscopy    Troponin    Collection Time: 06/07/19  1:03 AM   Result Value Ref Range    Troponin T <0.010 0.000 - 0.030 ng/mL     Note: In addition to lab results from this visit, the labs listed above may include labs taken at another facility or during a different encounter within the last 24 hours. Please correlate lab times with ED admission and discharge times for further clarification of the services performed during this visit.    XR Chest 1 View   Final Result   No acute cardiopulmonary findings.      Signer Name: CELI Wagner MD    Signed: 6/7/2019 12:10 AM    Workstation Name: [id number]            Vitals:    06/07/19 0000 06/07/19 0030 06/07/19 0130 06/07/19 0158   BP: 171/97 160/98 143/72    Pulse: 89 87 85 79   Resp:        Temp:       TempSrc:       SpO2: 98% 97% 97% 98%   Weight:       Height:         Medications   hydrALAZINE (APRESOLINE) injection 10 mg (10 mg Intravenous Given 6/6/19 2230)   acetaminophen (TYLENOL) tablet 1,000 mg (1,000 mg Oral Given 6/6/19 2230)   potassium chloride (MICRO-K) CR capsule 20 mEq (20 mEq Oral Given 6/6/19 2321)     ECG/EMG Results (last 24 hours)     Procedure Component Value Units Date/Time    ECG 12 Lead [[id number]] Collected:  06/06/19 2137     Updated:  06/06/19 2136    ECG 12 Lead [[id number]] Collected:  06/06/19 2302     Updated:  06/06/19 2301        ECG 12 Lead         ECG 12 Lead               MDM    Final diagnoses:   Elevated blood pressure reading with diagnosis of hypertension   Hypokalemia       Documentation assistance provided by sergo Villarreal.  Information recorded by the scribe was done at my direction and has been verified and validated by me.     Roge Villarreal  06/06/19 2126       Bong Srinivasan MD  06/07/19 7577

## 2019-06-07 NOTE — DISCHARGE INSTRUCTIONS
Follow up with primary care provider in 1 week.     Please check your blood pressure 3 times a day. Keep a chart of these readings and any correlation to symptoms you might be having and bring this chart to the follow up with your primary care physician. If you don't already have a good blood pressure cuff, I recommend the following:    [employer] - gamesGRABR Blood Pressure Monitor (for upper arm)    or similar upper arm blood pressure cuffs which can be purchased for around $25.    Return to the ER if you experience any new or worsening symptoms.

## 2019-06-10 ENCOUNTER — HOSPITAL ENCOUNTER (INPATIENT)
Facility: HOSPITAL | Age: 69
LOS: 2 days | Discharge: HOME | End: 2019-06-12
Attending: INTERNAL MEDICINE | Admitting: INTERNAL MEDICINE

## 2019-06-10 PROBLEM — I48.19 PERSISTENT ATRIAL FIBRILLATION (HCC): Status: ACTIVE | Noted: 2019-06-10

## 2019-06-10 PROBLEM — I49.5 TACHY-BRADY SYNDROME (HCC): Status: ACTIVE | Noted: 2019-06-10

## 2019-06-10 LAB
ANION GAP SERPL CALCULATED.3IONS-SCNC: 13 MMOL/L
BUN BLD-MCNC: 24 MG/DL (ref 8–23)
BUN/CREAT SERPL: 30.4 (ref 7–25)
CALCIUM SPEC-SCNC: 9.6 MG/DL (ref 8.6–10.5)
CHLORIDE SERPL-SCNC: 97 MMOL/L (ref 98–107)
CO2 SERPL-SCNC: 26 MMOL/L (ref 22–29)
CREAT BLD-MCNC: 0.79 MG/DL (ref 0.57–1)
GFR SERPL CREATININE-BSD FRML MDRD: 72 ML/MIN/1.73
GLUCOSE BLD-MCNC: 119 MG/DL (ref 65–99)
GLUCOSE BLDC GLUCOMTR-MCNC: 112 MG/DL (ref 70–130)
GLUCOSE BLDC GLUCOMTR-MCNC: 122 MG/DL (ref 70–130)
GLUCOSE BLDC GLUCOMTR-MCNC: 135 MG/DL (ref 70–130)
MAGNESIUM SERPL-MCNC: 1.9 MG/DL (ref 1.6–2.4)
POTASSIUM BLD-SCNC: 4.3 MMOL/L (ref 3.5–5.2)
SODIUM BLD-SCNC: 136 MMOL/L (ref 136–145)

## 2019-06-10 PROCEDURE — 93005 ELECTROCARDIOGRAM TRACING: CPT | Performed by: INTERNAL MEDICINE

## 2019-06-10 PROCEDURE — 63710000001 INSULIN DETEMIR PER 5 UNITS: Performed by: NURSE PRACTITIONER

## 2019-06-10 PROCEDURE — 80048 BASIC METABOLIC PNL TOTAL CA: CPT | Performed by: NURSE PRACTITIONER

## 2019-06-10 PROCEDURE — 83735 ASSAY OF MAGNESIUM: CPT | Performed by: NURSE PRACTITIONER

## 2019-06-10 PROCEDURE — 99223 1ST HOSP IP/OBS HIGH 75: CPT | Performed by: INTERNAL MEDICINE

## 2019-06-10 PROCEDURE — 93010 ELECTROCARDIOGRAM REPORT: CPT | Performed by: INTERNAL MEDICINE

## 2019-06-10 PROCEDURE — 82962 GLUCOSE BLOOD TEST: CPT

## 2019-06-10 RX ORDER — POTASSIUM CHLORIDE 750 MG/1
10 CAPSULE, EXTENDED RELEASE ORAL 2 TIMES DAILY
Status: DISCONTINUED | OUTPATIENT
Start: 2019-06-10 | End: 2019-06-10 | Stop reason: ALTCHOICE

## 2019-06-10 RX ORDER — OXYBUTYNIN CHLORIDE 5 MG/1
10 TABLET, EXTENDED RELEASE ORAL DAILY
Status: DISCONTINUED | OUTPATIENT
Start: 2019-06-10 | End: 2019-06-12

## 2019-06-10 RX ORDER — CETIRIZINE HYDROCHLORIDE 10 MG/1
10 TABLET ORAL DAILY
Status: DISCONTINUED | OUTPATIENT
Start: 2019-06-10 | End: 2019-06-12

## 2019-06-10 RX ORDER — DOFETILIDE 0.5 MG/1
500 CAPSULE ORAL EVERY 12 HOURS SCHEDULED
Status: DISCONTINUED | OUTPATIENT
Start: 2019-06-11 | End: 2019-06-12 | Stop reason: HOSPADM

## 2019-06-10 RX ORDER — LOSARTAN POTASSIUM 50 MG/1
50 TABLET ORAL EVERY 12 HOURS SCHEDULED
Status: DISCONTINUED | OUTPATIENT
Start: 2019-06-10 | End: 2019-06-12 | Stop reason: HOSPADM

## 2019-06-10 RX ORDER — PANTOPRAZOLE SODIUM 40 MG/1
40 TABLET, DELAYED RELEASE ORAL EVERY MORNING
Status: DISCONTINUED | OUTPATIENT
Start: 2019-06-10 | End: 2019-06-12 | Stop reason: HOSPADM

## 2019-06-10 RX ORDER — DOFETILIDE 0.5 MG/1
500 CAPSULE ORAL ONCE
Status: COMPLETED | OUTPATIENT
Start: 2019-06-10 | End: 2019-06-10

## 2019-06-10 RX ORDER — MAGNESIUM SULFATE HEPTAHYDRATE 40 MG/ML
2 INJECTION, SOLUTION INTRAVENOUS AS NEEDED
Status: DISCONTINUED | OUTPATIENT
Start: 2019-06-10 | End: 2019-06-12 | Stop reason: HOSPADM

## 2019-06-10 RX ORDER — CITALOPRAM 20 MG/1
20 TABLET ORAL NIGHTLY
Status: DISCONTINUED | OUTPATIENT
Start: 2019-06-10 | End: 2019-06-12 | Stop reason: HOSPADM

## 2019-06-10 RX ORDER — PRAMIPEXOLE DIHYDROCHLORIDE 1 MG/1
1.5 TABLET ORAL NIGHTLY
Status: DISCONTINUED | OUTPATIENT
Start: 2019-06-10 | End: 2019-06-12 | Stop reason: HOSPADM

## 2019-06-10 RX ORDER — AMANTADINE HYDROCHLORIDE 100 MG/1
100 CAPSULE, GELATIN COATED ORAL 2 TIMES DAILY
Status: DISCONTINUED | OUTPATIENT
Start: 2019-06-10 | End: 2019-06-12 | Stop reason: HOSPADM

## 2019-06-10 RX ORDER — LEVOTHYROXINE SODIUM 0.2 MG/1
200 TABLET ORAL DAILY
Status: DISCONTINUED | OUTPATIENT
Start: 2019-06-10 | End: 2019-06-12 | Stop reason: HOSPADM

## 2019-06-10 RX ORDER — ASCORBIC ACID 500 MG
500 TABLET ORAL DAILY
Status: DISCONTINUED | OUTPATIENT
Start: 2019-06-10 | End: 2019-06-12 | Stop reason: HOSPADM

## 2019-06-10 RX ORDER — POTASSIUM CHLORIDE 750 MG/1
40 CAPSULE, EXTENDED RELEASE ORAL AS NEEDED
Status: DISCONTINUED | OUTPATIENT
Start: 2019-06-10 | End: 2019-06-12 | Stop reason: HOSPADM

## 2019-06-10 RX ORDER — MAGNESIUM SULFATE HEPTAHYDRATE 40 MG/ML
4 INJECTION, SOLUTION INTRAVENOUS AS NEEDED
Status: DISCONTINUED | OUTPATIENT
Start: 2019-06-10 | End: 2019-06-12 | Stop reason: HOSPADM

## 2019-06-10 RX ORDER — POTASSIUM CHLORIDE 1.5 G/1.77G
40 POWDER, FOR SOLUTION ORAL AS NEEDED
Status: DISCONTINUED | OUTPATIENT
Start: 2019-06-10 | End: 2019-06-12 | Stop reason: HOSPADM

## 2019-06-10 RX ORDER — DOFETILIDE 0.5 MG/1
500 CAPSULE ORAL ONCE
Status: COMPLETED | OUTPATIENT
Start: 2019-06-11 | End: 2019-06-10

## 2019-06-10 RX ORDER — ASPIRIN 81 MG/1
81 TABLET ORAL DAILY
Status: DISCONTINUED | OUTPATIENT
Start: 2019-06-10 | End: 2019-06-12

## 2019-06-10 RX ORDER — CLONIDINE HYDROCHLORIDE 0.1 MG/1
0.1 TABLET ORAL EVERY 12 HOURS SCHEDULED
Status: DISCONTINUED | OUTPATIENT
Start: 2019-06-10 | End: 2019-06-12 | Stop reason: HOSPADM

## 2019-06-10 RX ORDER — SENNA AND DOCUSATE SODIUM 50; 8.6 MG/1; MG/1
1 TABLET, FILM COATED ORAL DAILY
Status: DISCONTINUED | OUTPATIENT
Start: 2019-06-10 | End: 2019-06-12

## 2019-06-10 RX ORDER — ALBUTEROL SULFATE 2.5 MG/3ML
2.5 SOLUTION RESPIRATORY (INHALATION) EVERY 6 HOURS PRN
Status: DISCONTINUED | OUTPATIENT
Start: 2019-06-10 | End: 2019-06-12 | Stop reason: HOSPADM

## 2019-06-10 RX ADMIN — CARBIDOPA AND LEVODOPA 1 TABLET: 25; 100 TABLET ORAL at 18:05

## 2019-06-10 RX ADMIN — CARBIDOPA AND LEVODOPA 1 TABLET: 25; 100 TABLET ORAL at 15:23

## 2019-06-10 RX ADMIN — CLONIDINE HYDROCHLORIDE 0.1 MG: 0.1 TABLET ORAL at 20:54

## 2019-06-10 RX ADMIN — CARBIDOPA AND LEVODOPA 1 TABLET: 25; 100 TABLET ORAL at 12:54

## 2019-06-10 RX ADMIN — ASPIRIN 81 MG: 81 TABLET, COATED ORAL at 20:55

## 2019-06-10 RX ADMIN — CETIRIZINE HYDROCHLORIDE 10 MG: 10 TABLET, FILM COATED ORAL at 20:53

## 2019-06-10 RX ADMIN — AMANTADINE HYDROCHLORIDE 100 MG: 100 CAPSULE ORAL at 20:53

## 2019-06-10 RX ADMIN — APIXABAN 5 MG: 5 TABLET, FILM COATED ORAL at 20:55

## 2019-06-10 RX ADMIN — PRAMIPEXOLE DIHYDROCHLORIDE 1.5 MG: 1 TABLET ORAL at 20:52

## 2019-06-10 RX ADMIN — CITALOPRAM HYDROBROMIDE 20 MG: 20 TABLET ORAL at 20:55

## 2019-06-10 RX ADMIN — DOFETILIDE 500 MCG: 0.5 CAPSULE ORAL at 14:16

## 2019-06-10 RX ADMIN — OXYCODONE HYDROCHLORIDE AND ACETAMINOPHEN 500 MG: 500 TABLET ORAL at 12:54

## 2019-06-10 RX ADMIN — SENNOSIDES,DOCUSATE SODIUM 1 TABLET: 8.6; 5 TABLET, FILM COATED ORAL at 20:54

## 2019-06-10 RX ADMIN — CARBIDOPA AND LEVODOPA 1 TABLET: 25; 100 TABLET ORAL at 20:54

## 2019-06-10 RX ADMIN — DOFETILIDE 500 MCG: 0.5 CAPSULE ORAL at 23:58

## 2019-06-10 RX ADMIN — LOSARTAN POTASSIUM 50 MG: 50 TABLET ORAL at 20:55

## 2019-06-10 RX ADMIN — METFORMIN HYDROCHLORIDE 1000 MG: 1000 TABLET, FILM COATED ORAL at 18:05

## 2019-06-10 RX ADMIN — MAGNESIUM SULFATE HEPTAHYDRATE 4 G: 40 INJECTION, SOLUTION INTRAVENOUS at 15:23

## 2019-06-10 NOTE — PLAN OF CARE
Problem: Arrhythmia/Dysrhythmia (Symptomatic) (Adult)  Goal: Signs and Symptoms of Listed Potential Problems Will be Absent, Minimized or Managed (Arrhythmia/Dysrhythmia)   06/10/19 1442   Goal/Outcome Evaluation   Problems Assessed (Arrhythmia/Dysrhythmia) all;electrophysiologic conduction defect   Problems Present (Dysrhythmia) electrophysiologic conduction defect

## 2019-06-10 NOTE — PROGRESS NOTES
"[facility] Initiation - Pharmacy Evaluation    Name- Joanna Cross  Age- 68 y.o.  Sex- female  HT - 160 cm (63\")  Wt - 107 kg (236 lb)      Evaluation of Drug-Drug Interactions     Previous antiarrythmic medications D/C 'ed prior to admission      Amiodarone D/C 'ed >3 weeks   Sotalol D/C 'ed > 48hr   Class I antiarrythmic's (Disopyramide,Flecainide, Mexiletine, Propafenone) D/C 'ed >48hr      Proceed - Yes      Drug-Drug Interactions with admission regimen    The following medications may increase Dofetilide serum concentrations and can be co-administered:  -Metformin  -SSRI's (Citalopram)    Laboratory    Results from last 7 days   Lab Units 06/10/19  1118 06/06/19  2226 06/06/19  1439   SODIUM mmol/L 136 138 140   POTASSIUM mmol/L 4.3 3.1* 3.6   CHLORIDE mmol/L 97* 94* 95*   CO2 mmol/L 26.0 30.0* 29.0   BUN mg/dL 24* 19 23   CREATININE mg/dL 0.79 0.75 0.83   GLUCOSE mg/dL 119* 107* 129*   CALCIUM mg/dL 9.6 9.7 10.0     Results from last 7 days   Lab Units 06/10/19  1118   MAGNESIUM mg/dL 1.9       Electrolyte replacement ordered:   Mg <2.0 - Yes     K <4.0  - No  (K and Mg replacements both available)    Est CrCl =  112 ml/min  (calculated with Cockroft-Gault equation using actual body weight and serum creatinine for calculation)  (laboratory values from previous 24 hours can be used)      Initial Dose    Patient has functioning atrial pacemaker -  Yes   Proceed - Yes      QTc = 390 msec per chart documentation     QTc </= 440 msec -  Yes   Proceed - Yes      Initial Dose:    Yes - CrCl >60      Dofetilide 500mcg po q12h  (dosed at 10 hours intervals until administration times between 7-9)    Thank you,  Dinh Zayas, PharmD, BCPS  6/10/2019  1:36 PM  "

## 2019-06-10 NOTE — H&P
[facility] [place]  1950  PCP: Reynaldo Fritz MD      Primary Cardiologist: Dr. Corona    Chief Complaint: Persistent atrial fibrillation      Subjective:     Patient is a 68 year old  female who presents today to initiate Tikosyn.  Patient has been diagnosed with atrial fibrillation since around 2016.  She has been plagued by tachybradycardia syndrome episodes in the past and is s/p PPM implant.  She has undergone cardioversions in the past but unable to maintain sinus rhythm more than a few days at a time.  She is been treated with Profafenone in the past as well with no improvement in her atrial fibrillation burden.  Her biggest complaint is ongoing fatigue with occasional palpitations.  She is anticoagulated with Eliquis and denies any bleeding issues or TIA/CVA symptoms.  She believes she may have missed at most one dose when she was seen in the ED last week for hypertension.    Cardiac PMH: (Old records have been reviewed and summarized below)  1. Coronary artery disease  a. [facility] 2/15/2008, Dr Lutz.  Mild, non-obstructive CAD, normal EF  b. [facility] 1/9/2013, Dr Stevenson Sutton:  No LV gram done; mild, non-obstructive coronary artery disease.  c. [facility] 11/9/2015, [facility]:  EF 60%.  70% RCA lesion with Promus ZAINAB placement. 40-50% mid LAD, no FFR performed. Other small blockages noted.  No MR.  d. [facility] 11/15/2015, Dr Palacio @ [facility]:  Patent RCA stent, 40-50% LAD with FFR @ 0.92; mild inferior wall hypokinesis  e. [facility] 7/29/2016, [facility]:  Abnormal stress test.  Normal CORS with patent stent. LAD improved since last image EF 55-60%  f. [facility] 2/1/19:  EF normal.  Patent RCA, noncritical mid LAD with IFR 0.93, noncritical circ.    2. Peripheral vascular disease/chronic venous stasis  a. Venous duplex 9/17/2010: Insufficiency to venous hypertension in the great saphenous system bilaterally.  b. Venous duplex 2013: Right leg laser ablation of Dr. Garcia.  c. Venous  duplex 5/6/2016: No evidence of DVT, no superficial, no thrmobophlebitis, no valvular insufficiency.  d. AA with runoffs 8/31/2016: Single-vessel [place]: No significant arterial disease.  3. Palpitations  a. Echocardiogram 2/13/2014: Dr. Teran.  Normal biventricular function; trace to mild MR.  Atrial septal aneurysm or  b. Echocardiogram 12/22/15: Dr. Chavez.  Borderline LVH, mild LAE, mild RV enlargement.  Mild to moderate MR and TR with RVSP of 46 mmHg.  c. Conclusion/3/2016: Dr. Palacio.  RV enlargement.  EF 50-55%.  Mild AI S, mild MR and TR with RVSP 37 mmHg.  4. AF/SSS  a. Bioscale PPM 2016  b. CHADS2 Vasc  = 5. On Eliquis   c. TIA: Carotid duplex 2/13/2014 showed no significant flow-limiting stenosis.  Mild luminal disease present; both vertebrals are present.  d. ECV 12/26/2018:  Successful cardioversion: AV paced  e. Echo 12/25/18:  EF 60%, mild MR/ TR with RVSP 29 mmHg.  Sclerotic AoV  5. Diabetes  6. Essential Hypertension  7. Hyperlipidemia  8. Hypothyroid  9. Hyponatremia  a. Secondary to SIADH  10. MILLI with CPAP  11. RLS  12. Parkinson's disease  13. GERD  14. Urinary Retention  a. S/P cystoscopy and ureter dilation, March 2018.  Dr. Terrence Mckenzie  15. Surgeries:  a. Rotator cuff repair  b. Cholecystectomy  c. Bilateral knee replacement  d. Tubal ligation  e. Breast surgery  f. Sinus surgery      Past Medical History:   Diagnosis Date   • Anemia    • Atrial fibrillation (CMS/HCC)    • AVM (arteriovenous malformation)    • CAD (coronary artery disease)    • Depression    • Disease of thyroid gland    • DM2 (diabetes mellitus, type 2) (CMS/HCC)     checks sugar twice per day    • Essential hypertension    • Generalized osteoarthritis    • GERD (gastroesophageal reflux disease)    • GIB (gastrointestinal bleeding) 2016    d/t xarelto    • Headache    • Hiatal hernia    • History of shingles    • History of transfusion 2016   • IBS (irritable bowel syndrome)    • Lichen sclerosus    • Mixed  hyperlipidemia    • Morbid obesity (CMS/HCC)    • MRSA infection 2018   • Myocardial infarction (CMS/HCC)    • On home oxygen therapy     2L of oxygen via CPAP while sleeping    • MILLI on CPAP     18/14   • Osteoporosis    • Pacemaker    • Parkinson disease (CMS/HCC)    • Peripheral vascular disease (CMS/HCC)    • Pleurisy    • Pneumonia    • Seborrheic dermatitis    • Skin cancer     on back    • SOBOE (shortness of breath on exertion)    • SSS (sick sinus syndrome) (CMS/HCC)    • TIA (transient ischemic attack)    • Varicose vein of leg    • Venous insufficiency of both lower extremities       Past Surgical History:   Procedure Laterality Date   • BACK SURGERY      l4-l5 laminectomy    • BICEPS TENDON REPAIR Right     shoulder   • BREAST BIOPSY Left 2004   • BUNIONECTOMY Right    • CARDIAC CATHETERIZATION     • CARDIAC CATHETERIZATION N/A 2/1/2019    Procedure: Left Heart Cath;  Surgeon: Albertina Corona MD;  Location:  [facility];  Service: Cardiology   • CHOLECYSTECTOMY     • COLONOSCOPY  2016   • CORONARY STENT PLACEMENT      x1 stent   • CYST REMOVAL      left ear, upper left back 2001   • DIAGNOSTIC LAPAROSCOPY  1981   • ENDOSCOPIC FUNCTIONAL SINUS SURGERY (FESS)  2011   • ENDOSCOPY  2016   • HAMMER TOE REPAIR Left    • INCISION AND DRAINAGE OF WOUND  2018   • JOINT REPLACEMENT     • LIPOMA EXCISION  1999    left leg    • LUMBAR LAMINECTOMY DISCECTOMY DECOMPRESSION N/A 7/6/2018    Procedure: LUMBAR LAMINECTOMY L4-5, HEMILAMIINECTOMY RIGHT L5-S1, FORAMINOTOMY L5-S1;  Surgeon: Lencho Gamino MD;  Location:  [facility];  Service: Neurosurgery   • LUMBAR LAMINECTOMY DISCECTOMY DECOMPRESSION N/A 9/19/2018    Procedure: INCISION AND DRAINAGE BACK WITH WOUND EXPLORATION;  Surgeon: Ritchie García MD;  Location:  [facility];  Service: Neurosurgery   • LUNG BIOPSY Left 2016   • PACEMAKER IMPLANTATION  11/2016    sss   • REPLACEMENT TOTAL KNEE Bilateral     left knee 2011, right 2012 per dr acuna    •  "REPLACEMENT TOTAL KNEE Bilateral    • SHOULDER ARTHROSCOPY Bilateral     2003-left, 2004- right    • SKIN BIOPSY      skin cancer back 2016   • TUBAL ABDOMINAL LIGATION  1980   • WISDOM TOOTH EXTRACTION        Allergies   Allergen Reactions   • Toprol Xl [Metoprolol Tartrate] Shortness Of Breath     Extreme fatigue   • Amlodipine Besylate Swelling     Lower extremity (ankles, feet) swelling   • Entacapone Other (See Comments)     \"extreme weakness in legs - caused several falls, which stopped after discontinuing this medication\"   • Levemir [Insulin Detemir] Hives     Hives / rash around injection site   • Penicillins Hives     Jitteriness    • Codeine Unknown (See Comments)     Pt is unaware of what reaction she had   • Levaquin [Levofloxacin] Other (See Comments)     Cannot take due to taking propafenone HCL- severe reaction with mixed.    • Xarelto [Rivaroxaban] GI Bleeding   • Bactrim [Sulfamethoxazole-Trimethoprim] Nausea Only     Headache    • Ciprofloxacin Diarrhea   • Cogentin [Benztropine] Other (See Comments)     \"uncontrollable body movements\"   • Compazine [Prochlorperazine Edisylate] Other (See Comments)     Dystonic reaction   • Duraprep [Antiseptic Products, Misc.] Itching and Rash     RASH AND ITCHING   • Haldol [Haloperidol Lactate] Other (See Comments)     Dystonic reaction   • Hydralazine Other (See Comments)     Headache    • Hydrocodone-Acetaminophen Nausea And Vomiting and Dizziness     Headache, nausea    • Lisinopril Cough   • Statins Myalgia     Leg pain   • Tarka [Trandolapril-Verapamil Hcl Er] Other (See Comments)     Constipation      Social History     Tobacco Use   • Smoking status: Never Smoker   • Smokeless tobacco: Never Used   Substance Use Topics   • Alcohol use: No     Frequency: Never      FH:   Family History   Problem Relation Age of Onset   • Kidney disease Mother    • Coronary artery disease Mother    • Coronary artery disease Father    • Coronary artery disease Brother     "       Current Facility-Administered Medications:   •  albuterol (PROVENTIL) nebulizer solution 0.083% 2.5 mg/3mL, 2.5 mg, Nebulization, Q6H PRN, Carolyn Gibbs APRN  •  amantadine (SYMMETREL) capsule 100 mg, 100 mg, Oral, BID, Carolyn Gibbs APRN  •  apixaban (ELIQUIS) tablet 5 mg, 5 mg, Oral, Q12H, Carolyn Gibbs APRN  •  aspirin EC tablet 81 mg, 81 mg, Oral, Daily, Carolyn Gibbs APRN  •  carbidopa-levodopa (SINEMET)  MG per tablet 2 tablet, 2 tablet, Oral, Q AM, Carolyn Gibbs APRN  •  cetirizine (zyrTEC) tablet 10 mg, 10 mg, Oral, Daily, Carolyn Gibbs APRN  •  citalopram (CeleXA) tablet 20 mg, 20 mg, Oral, Nightly, Carolyn Gibbs APRN  •  CloNIDine (CATAPRES) tablet 0.1 mg, 0.1 mg, Oral, Q12H, Carolyn Gibbs APRN  •  insulin detemir (LEVEMIR) injection 25 Units, 25 Units, Subcutaneous, Nightly, Carolyn Gibbs APRN  •  levothyroxine (SYNTHROID, LEVOTHROID) tablet 200 mcg, 200 mcg, Oral, Daily, Carolyn Gibbs APRN  •  losartan (COZAAR) tablet 50 mg, 50 mg, Oral, Q12H, Carolyn Gibbs APRN  •  metFORMIN (GLUCOPHAGE) tablet 1,000 mg, 1,000 mg, Oral, BID With Meals, Carolyn Gibbs APRN  •  oxybutynin XL (DITROPAN-XL) 24 hr tablet 10 mg, 10 mg, Oral, Daily, Carolyn Gibbs APRN  •  pantoprazole (PROTONIX) EC tablet 40 mg, 40 mg, Oral, QAM, Carolyn Gibbs APRN  •  potassium chloride (MICRO-K) CR capsule 40 mEq, 40 mEq, Oral, PRN **OR** potassium chloride (KLOR-CON) packet 40 mEq, 40 mEq, Oral, PRN, Ai Prieto PA  •  pramipexole (MIRAPEX) tablet 1.5 mg, 1.5 mg, Oral, Nightly, Carolyn Gibbs, APRN  •  sennosides-docusate sodium (SENOKOT-S) 8.6-50 MG tablet 1 tablet, 1 tablet, Oral, Daily, Carolyn Gibbs, APRN  •  vitamin C (ASCORBIC ACID) tablet 500 mg, 500 mg, Oral, Daily, Carolyn Gibbs, DEJA    Review of  "Systems  CONST:  No weight loss, fever, chills, + weakness + fatigue.   HEENT:  No visual loss, blurred vision, double vision, yellow sclerae.                   No hearing loss, congestion, sore throat.   SKIN:      No rashes, urticaria, ulcers, sores.     RESP:     + shortness of breath, no hemoptysis, cough, sputum.   CARD:     + palpitations and tachycardia  GI:           No anorexia, nausea, vomiting, diarrhea. No abdominal pain, melena.   :         No burning on urination, hematuria or increased frequency.  ENDO:    No diaphoresis, cold or heat intolerance. No polyuria or polydipsia.   NEURO:  No headache, dizziness, syncope, paralysis, ataxia, or parasthesias.                  No change in bowel or bladder control. No history of CVA/TIA  MUSC:    No muscle, back pain, joint pain or stiffness.   HEME:    No anemia, bleeding, bruising. No history of DVT/PE.  PSYCH:  No history of depression, anxiety      Objective:       /81 (BP Location: Left arm, Patient Position: Lying)   Temp 98.2 °F (36.8 °C) (Oral)   Resp 22   Ht 160 cm (63\")   Wt 107 kg (236 lb)   LMP  (LMP Unknown) Comment: Mammogram- april 2018   BMI 41.81 kg/m²     No intake/output data recorded.  No intake/output data recorded.    Physical Exam:  General-Well Nourished, Well developed  Eyes - PERRLA  Neck- supple, No mass  CV- irreg irreg, no MRG  Lung- clear bilaterally  Abd- soft, +BS  Musc/skel - Norm strength and range of motion  Skin- warm and dry  Neuro - Alert & Oriented x 3, appropriate mood.      ECG: Atrial fibrillation, HR 89 bpm, QTc: 390 ms    Tele: Atrial fibrillation    Data Review:     No results found for this or any previous visit (from the past 24 hour(s)).        Assessment:     Persistent atrial fibrillation (CMS/HCC)    Essential hypertension    Tachy-thai syndrome (CMS/HCC)         Plan:   1. Persistent atrial fibrillation:  - Recurrent, symptomatic atrial fibrillation.  Presents today to initiate Tikosyn.  Will " await repeat BMP to reassess electrolytes and kidney function.  QTc 390 ms per EKG.  If labs stable, will start Tikosyn 500 mcg bid with close monitoring of QTc and electrolytes.  Will plan for ECV prior to discharge if she remains in atrial fibrillation.  - Will hold metolazone and Ranexa as we initiate Tikosyn  - CHADSVASc = 5, on Eliquis    2. Tachy-thai syndrome:  - S/p PPM implant, noise noted on RV lead per last interrogation, monitor.    3. Use of Tikosyn:  - Closely monitor QTc.      Electronically signed by DEJA Burk, 06/10/19, 10:55 AM.    I have personally performed a face to face diagnostic evaluation on this patient.  I have reviewed and agree with the care plan.  History and Exam by me shows:  69 yo with A. Fib that has failed medical therapy    Physical exam:  Vitals:    06/10/19 2052   BP: (!) 150/101   Pulse: 97   Resp:    Temp:    SpO2:      General-Well Nourished, Well developed  Eyes - PERRLA  Neck- supple, No mass  CV- regular rate and rhythm, no MRG, No edema  Lung- clear bilaterally  Abd- soft, +BS  Musc/skel - Norm strength and range of motion  Skin- warm and dry  Neuro - Alert & Oriented x 3, appropriate mood.    A/P:  1.  Atrial fibrillation-persistent to possible permanent in nature.  Overall with her other comorbidities including obesity, severe sleep apnea, coronary artery disease, and diabetes-the overall success rate of maintaining normal sinus rhythm long-term will be poor.  We will restage her atrial fibrillation by starting the patient on Tikosyn therapy to see if sinus rhythm can be restored and maintained.  Based on her response to Tikosyn will make a final decision about long-term rate versus rhythm control options.  2.  Dual-chamber pacemaker-she has noise on the right ventricle lead and her atrial lead is unable to sense atrial fibrillation due to low atrial voltage.  3.  Starting Tikosyn -patient is on multiple other medications for her medical  conditions.  She also is on Ranexa.  We will have to closely monitor her QTC possible drug interactions.    Edward Marie M.D., FYAO.C, F.H.R.S.  Cardiology/Electrophysiology  6/10/2019  10:10 PM

## 2019-06-11 LAB
ANION GAP SERPL CALCULATED.3IONS-SCNC: 10 MMOL/L
BUN BLD-MCNC: 21 MG/DL (ref 8–23)
BUN/CREAT SERPL: 24.4 (ref 7–25)
CALCIUM SPEC-SCNC: 9.2 MG/DL (ref 8.6–10.5)
CHLORIDE SERPL-SCNC: 96 MMOL/L (ref 98–107)
CO2 SERPL-SCNC: 27 MMOL/L (ref 22–29)
CREAT BLD-MCNC: 0.86 MG/DL (ref 0.57–1)
GFR SERPL CREATININE-BSD FRML MDRD: 65 ML/MIN/1.73
GLUCOSE BLD-MCNC: 135 MG/DL (ref 65–99)
GLUCOSE BLDC GLUCOMTR-MCNC: 107 MG/DL (ref 70–130)
GLUCOSE BLDC GLUCOMTR-MCNC: 128 MG/DL (ref 70–130)
GLUCOSE BLDC GLUCOMTR-MCNC: 131 MG/DL (ref 70–130)
GLUCOSE BLDC GLUCOMTR-MCNC: 148 MG/DL (ref 70–130)
MAGNESIUM SERPL-MCNC: 2.3 MG/DL (ref 1.6–2.4)
POTASSIUM BLD-SCNC: 4.6 MMOL/L (ref 3.5–5.2)
SODIUM BLD-SCNC: 133 MMOL/L (ref 136–145)

## 2019-06-11 PROCEDURE — 99232 SBSQ HOSP IP/OBS MODERATE 35: CPT | Performed by: PHYSICIAN ASSISTANT

## 2019-06-11 PROCEDURE — 83735 ASSAY OF MAGNESIUM: CPT | Performed by: NURSE PRACTITIONER

## 2019-06-11 PROCEDURE — 93010 ELECTROCARDIOGRAM REPORT: CPT | Performed by: INTERNAL MEDICINE

## 2019-06-11 PROCEDURE — 93005 ELECTROCARDIOGRAM TRACING: CPT | Performed by: PHYSICIAN ASSISTANT

## 2019-06-11 PROCEDURE — 82962 GLUCOSE BLOOD TEST: CPT

## 2019-06-11 PROCEDURE — 80048 BASIC METABOLIC PNL TOTAL CA: CPT | Performed by: PHYSICIAN ASSISTANT

## 2019-06-11 RX ORDER — NITROGLYCERIN 0.4 MG/1
0.4 TABLET SUBLINGUAL
Status: DISCONTINUED | OUTPATIENT
Start: 2019-06-11 | End: 2019-06-12 | Stop reason: HOSPADM

## 2019-06-11 RX ADMIN — OXYCODONE HYDROCHLORIDE AND ACETAMINOPHEN 500 MG: 500 TABLET ORAL at 09:35

## 2019-06-11 RX ADMIN — CARBIDOPA AND LEVODOPA 1 TABLET: 25; 100 TABLET ORAL at 15:39

## 2019-06-11 RX ADMIN — DOFETILIDE 500 MCG: 0.5 CAPSULE ORAL at 20:56

## 2019-06-11 RX ADMIN — AMANTADINE HYDROCHLORIDE 100 MG: 100 CAPSULE ORAL at 20:55

## 2019-06-11 RX ADMIN — CARBIDOPA AND LEVODOPA 2 TABLET: 25; 100 TABLET ORAL at 06:17

## 2019-06-11 RX ADMIN — CARBIDOPA AND LEVODOPA 1 TABLET: 25; 100 TABLET ORAL at 20:56

## 2019-06-11 RX ADMIN — ASPIRIN 81 MG: 81 TABLET, COATED ORAL at 20:57

## 2019-06-11 RX ADMIN — CLONIDINE HYDROCHLORIDE 0.1 MG: 0.1 TABLET ORAL at 09:35

## 2019-06-11 RX ADMIN — CARBIDOPA AND LEVODOPA 1 TABLET: 25; 100 TABLET ORAL at 12:39

## 2019-06-11 RX ADMIN — OXYBUTYNIN CHLORIDE 10 MG: 5 TABLET, EXTENDED RELEASE ORAL at 09:35

## 2019-06-11 RX ADMIN — CARBIDOPA AND LEVODOPA 1 TABLET: 25; 100 TABLET ORAL at 18:29

## 2019-06-11 RX ADMIN — APIXABAN 5 MG: 5 TABLET, FILM COATED ORAL at 20:57

## 2019-06-11 RX ADMIN — PRAMIPEXOLE DIHYDROCHLORIDE 1.5 MG: 1 TABLET ORAL at 20:54

## 2019-06-11 RX ADMIN — APIXABAN 5 MG: 5 TABLET, FILM COATED ORAL at 09:35

## 2019-06-11 RX ADMIN — LOSARTAN POTASSIUM 50 MG: 50 TABLET ORAL at 20:56

## 2019-06-11 RX ADMIN — LEVOTHYROXINE SODIUM 200 MCG: 0.2 TABLET ORAL at 09:35

## 2019-06-11 RX ADMIN — DOFETILIDE 500 MCG: 0.5 CAPSULE ORAL at 10:25

## 2019-06-11 RX ADMIN — METFORMIN HYDROCHLORIDE 1000 MG: 1000 TABLET, FILM COATED ORAL at 18:29

## 2019-06-11 RX ADMIN — CETIRIZINE HYDROCHLORIDE 10 MG: 10 TABLET, FILM COATED ORAL at 20:57

## 2019-06-11 RX ADMIN — PANTOPRAZOLE SODIUM 40 MG: 40 TABLET, DELAYED RELEASE ORAL at 06:18

## 2019-06-11 RX ADMIN — CARBIDOPA AND LEVODOPA 1 TABLET: 25; 100 TABLET ORAL at 09:34

## 2019-06-11 RX ADMIN — LOSARTAN POTASSIUM 50 MG: 50 TABLET ORAL at 09:34

## 2019-06-11 RX ADMIN — METFORMIN HYDROCHLORIDE 1000 MG: 1000 TABLET, FILM COATED ORAL at 09:34

## 2019-06-11 RX ADMIN — CITALOPRAM HYDROBROMIDE 20 MG: 20 TABLET ORAL at 20:57

## 2019-06-11 RX ADMIN — AMANTADINE HYDROCHLORIDE 100 MG: 100 CAPSULE ORAL at 09:34

## 2019-06-11 RX ADMIN — CLONIDINE HYDROCHLORIDE 0.1 MG: 0.1 TABLET ORAL at 20:55

## 2019-06-11 RX ADMIN — SENNOSIDES,DOCUSATE SODIUM 1 TABLET: 8.6; 5 TABLET, FILM COATED ORAL at 20:57

## 2019-06-11 NOTE — PLAN OF CARE
Problem: Patient Care Overview  Goal: Plan of Care Review  Outcome: Ongoing (interventions implemented as appropriate)   06/11/19 7410   Coping/Psychosocial   Plan of Care Reviewed With patient   Plan of Care Review   Progress no change   OTHER   Outcome Summary Pt A/O x 4. VSS. Pt is calm and cooperative. No additional needs or concerns at this time. Will continue to monitor.

## 2019-06-11 NOTE — PROGRESS NOTES
Discharge Planning Assessment  [facility]     Patient Name: Joanna Cross  MRN: [id number]  Today's Date: 6/11/2019    Admit Date: 6/10/2019    Discharge Needs Assessment     Row Name 06/11/19 1427       Living Environment    Lives With  alone    Current Living Arrangements  home/apartment/condo    Primary Care Provided by  self    Provides Primary Care For  no one, unable/limited ability to care for self    Family Caregiver if Needed  child(lisa), adult    Quality of Family Relationships  helpful;involved;supportive       Resource/Environmental Concerns    Resource/Environmental Concerns  none       Transition Planning    Patient/Family Anticipates Transition to  home    Patient/Family Anticipated Services at Transition  none    Transportation Anticipated  car, drives self;family or friend will provide       Discharge Needs Assessment    Readmission Within the Last 30 Days  no previous admission in last 30 days    Concerns to be Addressed  discharge planning;medication    Equipment Currently Used at Home  bath bench;bipap/cpap;cane, quad;oxygen;wheelchair;rollator;walker, rolling;grab bar;ramp;glucometer Patient wears 2L home O2 qhs, supplied by [facility]; BiPAP qhs    Current Discharge Risk  chronically ill;lives alone        Discharge Plan     Row Name 06/11/19 1429       Plan    Plan  Home    Patient/Family in Agreement with Plan  yes    Plan Comments Met with patient in the room to initiate discharge planning. Patient lives alone in a home in [place] [facility]. She is independent with ADLs and uses a rollator primarily with mobility. She has a RW and WC available. Patient wears 2L home oxygen qhs, as well as BiPAP nightly. Her goal is home at discharge, and she will drive herself home unless it is recommended that family take her.     CM consulted regarding initiation of Tikosyn. Spoke with Reina with [facility]. She states dofetilide does not require a PA. Patient's cost for a one-time retail  fill is $51.40. Patient would like the first month's prescription be delivered via [facility]. Patient requests that subsequent refills be sent to [facility]. Her cost for a 90-day supply will be $10.00. Patient denies any other discharge needs at this time. CM will continue to follow.     Final Discharge Disposition Code  01 - home or self-care        Destination      No service coordination in this encounter.      Durable Medical Equipment      No service coordination in this encounter.      Dialysis/Infusion      No service coordination in this encounter.      Home Medical Care      No service coordination in this encounter.      Therapy      No service coordination in this encounter.      Community Resources      No service coordination in this encounter.        Expected Discharge Date and Time     Expected Discharge Date Expected Discharge Time    Jun 13, 2019         Demographic Summary     Row Name 06/11/19 1426       General Information    Admission Type  inpatient    Referral Source  admission list    Reason for Consult  discharge planning;medication concerns    General Information Comments  PCP is Reynaldo Fritz       Contact Information    Permission Granted to Share Info With  ;family/designee daughter, Silvana Cross        Functional Status     Row Name 06/11/19 1426       Functional Status    Usual Activity Tolerance  moderate       Functional Status, IADL    Medications  independent    Meal Preparation  independent    Housekeeping  independent    Laundry  independent    Shopping  independent       Employment/    Employment/ Comments  Confirmed with patient that she has medical coverage through [employer] and rx coverage through [facility].         Psychosocial    No documentation.       Abuse/Neglect    No documentation.       Legal    No documentation.       Substance Abuse    No documentation.       Patient Forms    No documentation.            Ladi Espnaa

## 2019-06-11 NOTE — PROGRESS NOTES
CARDIOLOGY PROGRESS NOTE           6/11/2019 8:23 AM    Admit Date: 6/10/2019    Admit Diagnosis: Atrial fibrillation with RVR (CMS/HCC) [I48.91]  Persistent atrial fibrillation (CMS/HCC) [I48.1]    Chief Compliant: Follow up for afib     Subjective:   Patient's status has been stable overnight. She is complaining of some chest pain this AM, but it feels like her stable anginal symptoms that she often has at home and treats with nitroglycerin. No SOB, edema, palps.       Objective:     Vitals:    06/10/19 2333 06/10/19 2358 06/11/19 0329 06/11/19 0703   BP: 150/79 136/76 141/76 131/68   BP Location: Left arm  Right arm Left arm   Patient Position: Lying  Lying Lying   Pulse: 95 96 97    Resp: 14  20 20   Temp: 97.6 °F (36.4 °C)  97.6 °F (36.4 °C) 97.7 °F (36.5 °C)   TempSrc: Oral  Axillary Oral   SpO2: 98%   95%   Weight:       Height:           Physical Exam:  General-Well Nourished, Well developed  Eyes - PERRLA  Neck- supple, No mass  CV- regular rate and rhythm, no MRG  Lung- clear bilaterally  Abd- soft, +BS  Musc/skel - Norm strength and range of motion  Skin- warm and dry  Neuro - Alert & Oriented x 3, appropriate mood.      Current Facility-Administered Medications:   •  albuterol (PROVENTIL) nebulizer solution 0.083% 2.5 mg/3mL, 2.5 mg, Nebulization, Q6H PRN, Carolyn Gibbs APRN  •  amantadine (SYMMETREL) capsule 100 mg, 100 mg, Oral, BID, Carolyn Gibbs APRN, 100 mg at 06/10/19 2053  •  apixaban (ELIQUIS) tablet 5 mg, 5 mg, Oral, Q12H, Carolyn Gibbs APRN, 5 mg at 06/10/19 2055  •  aspirin EC tablet 81 mg, 81 mg, Oral, Daily, Carolyn Gibbs APRN, 81 mg at 06/10/19 2055  •  carbidopa-levodopa (SINEMET)  MG per tablet 1 tablet, 1 tablet, Oral, 5x Daily, Carolyn Gibbs APRN, 1 tablet at 06/10/19 2054  •  carbidopa-levodopa (SINEMET)  MG per tablet 2 tablet, 2 tablet, Oral, Q AM, Carolyn Gibbs APRN, 2 tablet at 06/11/19  0617  •  cetirizine (zyrTEC) tablet 10 mg, 10 mg, Oral, Daily, Carolyn Gibbs APRN, 10 mg at 06/10/19 2053  •  citalopram (CeleXA) tablet 20 mg, 20 mg, Oral, Nightly, Carolyn Gibbs APRN, 20 mg at 06/10/19 2055  •  CloNIDine (CATAPRES) tablet 0.1 mg, 0.1 mg, Oral, Q12H, Carolyn Gibbs APRN, 0.1 mg at 06/10/19 2054  •  dofetilide (TIKOSYN) capsule 500 mcg, 500 mcg, Oral, Q12H, Carolyn Gibbs APRN  •  insulin detemir (LEVEMIR) injection 25 Units, 25 Units, Subcutaneous, Nightly, Carolyn Gibbs APRN, Stopped at 06/10/19 2044  •  levothyroxine (SYNTHROID, LEVOTHROID) tablet 200 mcg, 200 mcg, Oral, Daily, Carolyn Gibbs APRN  •  losartan (COZAAR) tablet 50 mg, 50 mg, Oral, Q12H, Carolyn Gibbs APRN, 50 mg at 06/10/19 2055  •  Magnesium Sulfate 2 gram Bolus, followed by 8 gram infusion (total Mg dose 10 grams)- Mg less than or equal to 1mg/dL, 2 g, Intravenous, PRN **OR** Magnesium Sulfate 2 gram / 50mL Infusion (GIVE X 3 BAGS TO EQUAL 6GM TOTAL DOSE) - Mg 1.1 - 1.5 mg/dl, 2 g, Intravenous, PRN **OR** Magnesium Sulfate 4 gram infusion- Mg 1.6-1.9 mg/dL, 4 g, Intravenous, PRN, Dinh Zayas, [facility], Last Rate: 25 mL/hr at 06/10/19 1523, 4 g at 06/10/19 1523  •  metFORMIN (GLUCOPHAGE) tablet 1,000 mg, 1,000 mg, Oral, BID With Meals, Carolyn Gibbs APRN, 1,000 mg at 06/10/19 1805  •  nitroglycerin (NITROSTAT) SL tablet 0.4 mg, 0.4 mg, Sublingual, Q5 Min PRN, Ai Prieto PA  •  oxybutynin XL (DITROPAN-XL) 24 hr tablet 10 mg, 10 mg, Oral, Daily, Carolyn Gibbs APRN  •  pantoprazole (PROTONIX) EC tablet 40 mg, 40 mg, Oral, QAM, Carolyn Gibbs APRN, 40 mg at 06/11/19 0618  •  potassium chloride (MICRO-K) CR capsule 40 mEq, 40 mEq, Oral, PRN **OR** potassium chloride (KLOR-CON) packet 40 mEq, 40 mEq, Oral, PRN, Ai Prieto PA  •  pramipexole (MIRAPEX) tablet 1.5 mg, 1.5 mg, Oral, Nightly, Bernie  DEJA Desir, 1.5 mg at 06/10/19 2052  •  sennosides-docusate sodium (SENOKOT-S) 8.6-50 MG tablet 1 tablet, 1 tablet, Oral, Daily, Carolyn Gibbs APRN, 1 tablet at 06/10/19 2054  •  vitamin C (ASCORBIC ACID) tablet 500 mg, 500 mg, Oral, Daily, Carolyn Gibbs APRN, 500 mg at 06/10/19 1254    Data Review:   Recent Results (from the past 24 hour(s))   Basic Metabolic Panel    Collection Time: 06/10/19 11:18 AM   Result Value Ref Range    Glucose 119 (H) 65 - 99 mg/dL    BUN 24 (H) 8 - 23 mg/dL    Creatinine 0.79 0.57 - 1.00 mg/dL    Sodium 136 136 - 145 mmol/L    Potassium 4.3 3.5 - 5.2 mmol/L    Chloride 97 (L) 98 - 107 mmol/L    CO2 26.0 22.0 - 29.0 mmol/L    Calcium 9.6 8.6 - 10.5 mg/dL    eGFR Non African Amer 72 >60 mL/min/1.73    BUN/Creatinine Ratio 30.4 (H) 7.0 - 25.0    Anion Gap 13.0 mmol/L   Magnesium    Collection Time: 06/10/19 11:18 AM   Result Value Ref Range    Magnesium 1.9 1.6 - 2.4 mg/dL   POC Glucose Once    Collection Time: 06/10/19 11:56 AM   Result Value Ref Range    Glucose 122 70 - 130 mg/dL   POC Glucose Once    Collection Time: 06/10/19  4:24 PM   Result Value Ref Range    Glucose 135 (H) 70 - 130 mg/dL   POC Glucose Once    Collection Time: 06/10/19  8:10 PM   Result Value Ref Range    Glucose 112 70 - 130 mg/dL   Basic Metabolic Panel    Collection Time: 06/11/19  6:18 AM   Result Value Ref Range    Glucose 135 (H) 65 - 99 mg/dL    BUN 21 8 - 23 mg/dL    Creatinine 0.86 0.57 - 1.00 mg/dL    Sodium 133 (L) 136 - 145 mmol/L    Potassium 4.6 3.5 - 5.2 mmol/L    Chloride 96 (L) 98 - 107 mmol/L    CO2 27.0 22.0 - 29.0 mmol/L    Calcium 9.2 8.6 - 10.5 mg/dL    eGFR Non African Amer 65 >60 mL/min/1.73    BUN/Creatinine Ratio 24.4 7.0 - 25.0    Anion Gap 10.0 mmol/L   Magnesium    Collection Time: 06/11/19  6:18 AM   Result Value Ref Range    Magnesium 2.3 1.6 - 2.4 mg/dL   POC Glucose Once    Collection Time: 06/11/19  7:01 AM   Result Value Ref Range    Glucose 131  (H) 70 - 130 mg/dL     Assessment:       Persistent atrial fibrillation (CMS/HCC)    Essential hypertension    Tachy-thai syndrome (CMS/HCC)    Plan:     1. Atrial Fibrillation-persistent to possible permanent in nature. Started on Tikosyn 500mcg BID. Remains in afib/aflutter this AM so will plan for ECV in AM. Keep NPO after midnight.   2.  Dual-chamber pacemaker-she has noise on the right ventricle lead and her atrial lead is unable to sense atrial fibrillation due to low atrial voltage.  3.  Starting Tikosyn -patient is on multiple other medications for her medical conditions.  She also is on Ranexa.  We will have to closely monitor her QTC possible drug interactions.  4. Chest pain- patient has stable anginal symptoms. Will order nitro PRN and monitor closely.         Ai Prieto PA-C   Cardiology/Electrophysiology

## 2019-06-12 VITALS
BODY MASS INDEX: 41.82 KG/M2 | WEIGHT: 236 LBS | SYSTOLIC BLOOD PRESSURE: 130 MMHG | HEIGHT: 63 IN | TEMPERATURE: 97.5 F | DIASTOLIC BLOOD PRESSURE: 60 MMHG | HEART RATE: 70 BPM | OXYGEN SATURATION: 97 % | RESPIRATION RATE: 16 BRPM

## 2019-06-12 LAB
ANION GAP SERPL CALCULATED.3IONS-SCNC: 11 MMOL/L
BUN BLD-MCNC: 24 MG/DL (ref 8–23)
BUN/CREAT SERPL: 28.2 (ref 7–25)
CALCIUM SPEC-SCNC: 9.1 MG/DL (ref 8.6–10.5)
CHLORIDE SERPL-SCNC: 98 MMOL/L (ref 98–107)
CO2 SERPL-SCNC: 26 MMOL/L (ref 22–29)
CREAT BLD-MCNC: 0.85 MG/DL (ref 0.57–1)
GFR SERPL CREATININE-BSD FRML MDRD: 66 ML/MIN/1.73
GLUCOSE BLD-MCNC: 107 MG/DL (ref 65–99)
GLUCOSE BLDC GLUCOMTR-MCNC: 104 MG/DL (ref 70–130)
GLUCOSE BLDC GLUCOMTR-MCNC: 120 MG/DL (ref 70–130)
MAGNESIUM SERPL-MCNC: 2 MG/DL (ref 1.6–2.4)
POTASSIUM BLD-SCNC: 4.7 MMOL/L (ref 3.5–5.2)
SODIUM BLD-SCNC: 135 MMOL/L (ref 136–145)

## 2019-06-12 PROCEDURE — 82962 GLUCOSE BLOOD TEST: CPT

## 2019-06-12 PROCEDURE — 83735 ASSAY OF MAGNESIUM: CPT | Performed by: NURSE PRACTITIONER

## 2019-06-12 PROCEDURE — 80048 BASIC METABOLIC PNL TOTAL CA: CPT | Performed by: PHYSICIAN ASSISTANT

## 2019-06-12 PROCEDURE — 99238 HOSP IP/OBS DSCHRG MGMT 30/<: CPT | Performed by: PHYSICIAN ASSISTANT

## 2019-06-12 PROCEDURE — 93005 ELECTROCARDIOGRAM TRACING: CPT | Performed by: PHYSICIAN ASSISTANT

## 2019-06-12 PROCEDURE — 93010 ELECTROCARDIOGRAM REPORT: CPT | Performed by: INTERNAL MEDICINE

## 2019-06-12 RX ORDER — ASPIRIN 81 MG/1
81 TABLET ORAL NIGHTLY
Status: DISCONTINUED | OUTPATIENT
Start: 2019-06-12 | End: 2019-06-12 | Stop reason: HOSPADM

## 2019-06-12 RX ORDER — CETIRIZINE HYDROCHLORIDE 10 MG/1
10 TABLET ORAL NIGHTLY
Status: DISCONTINUED | OUTPATIENT
Start: 2019-06-12 | End: 2019-06-12 | Stop reason: HOSPADM

## 2019-06-12 RX ORDER — DOFETILIDE 0.5 MG/1
500 CAPSULE ORAL EVERY 12 HOURS SCHEDULED
Qty: 60 CAPSULE | Refills: 5 | Status: SHIPPED | OUTPATIENT
Start: 2019-06-12 | End: 2019-06-13

## 2019-06-12 RX ORDER — OXYBUTYNIN CHLORIDE 5 MG/1
10 TABLET, EXTENDED RELEASE ORAL
Status: DISCONTINUED | OUTPATIENT
Start: 2019-06-12 | End: 2019-06-12 | Stop reason: HOSPADM

## 2019-06-12 RX ORDER — SENNA AND DOCUSATE SODIUM 50; 8.6 MG/1; MG/1
1 TABLET, FILM COATED ORAL NIGHTLY
Status: DISCONTINUED | OUTPATIENT
Start: 2019-06-12 | End: 2019-06-12 | Stop reason: HOSPADM

## 2019-06-12 RX ADMIN — METFORMIN HYDROCHLORIDE 1000 MG: 1000 TABLET, FILM COATED ORAL at 08:30

## 2019-06-12 RX ADMIN — OXYCODONE HYDROCHLORIDE AND ACETAMINOPHEN 500 MG: 500 TABLET ORAL at 08:30

## 2019-06-12 RX ADMIN — LEVOTHYROXINE SODIUM 200 MCG: 0.2 TABLET ORAL at 08:31

## 2019-06-12 RX ADMIN — LOSARTAN POTASSIUM 50 MG: 50 TABLET ORAL at 08:30

## 2019-06-12 RX ADMIN — APIXABAN 5 MG: 5 TABLET, FILM COATED ORAL at 08:31

## 2019-06-12 RX ADMIN — AMANTADINE HYDROCHLORIDE 100 MG: 100 CAPSULE ORAL at 08:31

## 2019-06-12 RX ADMIN — CARBIDOPA AND LEVODOPA 1 TABLET: 25; 100 TABLET ORAL at 11:47

## 2019-06-12 RX ADMIN — DOFETILIDE 500 MCG: 0.5 CAPSULE ORAL at 08:31

## 2019-06-12 RX ADMIN — PANTOPRAZOLE SODIUM 40 MG: 40 TABLET, DELAYED RELEASE ORAL at 06:20

## 2019-06-12 RX ADMIN — CLONIDINE HYDROCHLORIDE 0.1 MG: 0.1 TABLET ORAL at 08:30

## 2019-06-12 RX ADMIN — OXYBUTYNIN CHLORIDE 10 MG: 5 TABLET, EXTENDED RELEASE ORAL at 11:47

## 2019-06-12 RX ADMIN — CARBIDOPA AND LEVODOPA 1 TABLET: 25; 100 TABLET ORAL at 14:40

## 2019-06-12 RX ADMIN — CARBIDOPA AND LEVODOPA 1 TABLET: 25; 100 TABLET ORAL at 08:31

## 2019-06-12 RX ADMIN — CARBIDOPA AND LEVODOPA 2 TABLET: 25; 100 TABLET ORAL at 06:20

## 2019-06-12 NOTE — PROGRESS NOTES
Case Management Discharge Note    Final Note: CM met w pt. She has talked w retail pharmacy and they will be bringing her meds up prior to d/c.  Pt has transportation home w family. No other needs.     Destination      No service has been selected for the patient.      Durable Medical Equipment      No service has been selected for the patient.      Dialysis/Infusion      No service has been selected for the patient.      Home Medical Care      No service has been selected for the patient.      Therapy      No service has been selected for the patient.      Community Resources      No service has been selected for the patient.             Final Discharge Disposition Code: 01 - home or self-care

## 2019-06-12 NOTE — DISCHARGE SUMMARY
Physician Discharge Summary     Patient ID:  Joanna Cross  [id number]  68 y.o.  1950    Admit date: 6/10/2019    Discharge date and time: 6/12/19     Admitting Physician: Edward Marie MD     Primary Physician: Reynaldo Fritz MD    Discharge Physician: BRIGITTE Ricks    Admission Diagnoses: Atrial fibrillation with RVR (CMS/HCC) [I48.91]  Persistent atrial fibrillation (CMS/HCC) [I48.1]    Discharge Diagnoses:   Patient Active Problem List    Diagnosis   • *Persistent atrial fibrillation (CMS/HCC) [I48.1]   • Tachy-thai syndrome (CMS/HCC) [I49.5]   • CAD in native artery [I25.10]   • Atrial fibrillation with RVR (CMS/HCC) [I48.91]   • Renal insufficiency [N28.9]   • A-fib (CMS/HCC) [I48.91]   • Chest pain [R07.9]   • MILLI treated with BiPAP - Patient reports compliance.  [G47.33]   • History of respiratory failure - prior respiratory failure requiring mechanical ventilation, open lung biopsy non-specific.  [Z87.09]   • SOB (shortness of breath) [R06.02]   • Delayed surgical wound healing [T81.89XA]   • Diabetes mellitus (CMS/HCC) [E11.9]   • Spinal stenosis, lumbar region, with neurogenic claudication [M48.062]   • Coronary artery disease involving native coronary artery with unstable angina pectoris (CMS/[facility]) [I25.110]   • Essential hypertension [I10]   • Peripheral vascular disease (CMS/[facility]) [I73.9]   • Hyperlipidemia LDL goal <70 [E78.5]   • Chronic atrial fibrillation (CMS/HCC) [I48.2]   • Biceps tendinitis [M75.20]   • Overactive bladder [N32.81]   • Parkinson's disease (CMS/HCC) [G20]   • GERD (gastroesophageal reflux disease) [K21.9]   • Hypothyroidism [E03.9]   • Lichen sclerosus [L90.0]       Cardiology Procedures this admission:    1. Tikosyn Loading     Hospital Course: Patient was admitted for Tikosyn loading for her persistent atrial fibrillation. She was started on 500mcg BID. She converted to atrial flutter on the morning of 6/11 and subsequently converted to NSR overnight on  6/11/19. Her QTc remained stable on EKG 3 hours following her 5th dose and she was felt stable for discharge home. She was instructed to obtain an EKG in 48 hours.     Discharge Exam:    Vitals:    06/12/19 1137   BP: 130/60   Pulse: 70   Resp: 16   Temp: 97.5 °F (36.4 °C)   SpO2: 97%      General-Well Nourished, Well developed  Eyes - PERRLA  Neck- supple, No mass  CV- regular rate and rhythm, no MRG, No edema  Lung- clear bilaterally  Abd- soft, +BS  Musc/skel - Norm strength and range of motion  Skin- warm and dry  Neuro - Alert & Oriented x 3, appropriate mood.    Disposition: Patient will be discharged home    Patient discharge medications:      Your medication list      START taking these medications      Instructions Last Dose Given Next Dose Due   dofetilide 500 MCG capsule  Commonly known as:  TIKOSYN      Take 1 capsule by mouth Every 12 (Twelve) Hours.          CHANGE how you take these medications      Instructions Last Dose Given Next Dose Due   losartan 50 MG tablet  Commonly known as:  COZAAR  What changed:    · how much to take  · how to take this  · when to take this      TAKE 1 TABLET EVERY 12 HOURS       spironolactone 25 MG tablet  Commonly known as:  ALDACTONE  What changed:    · when to take this  · reasons to take this      Take 1 tablet by mouth Daily.          CONTINUE taking these medications      Instructions Last Dose Given Next Dose Due   amantadine 100 MG capsule  Commonly known as:  SYMMETREL      Take 100 mg by mouth 2 (Two) Times a Day.       apixaban 5 MG tablet tablet  Commonly known as:  ELIQUIS      Take 1 tablet by mouth Every 12 (Twelve) Hours.       aspirin 81 MG EC tablet      Take 81 mg by mouth Daily.       CALTRATE 600 PO      Take 600 mg by mouth Daily.       carbidopa-levodopa  MG per tablet  Commonly known as:  SINEMET      Take  by mouth. 2 tablets at 0600, 1 tablet at 0900, 1200, 1500, 1800, 2100       carbonyl iron 45 MG tablet tablet  Commonly known as:   FEOSOL      Take 1 tablet by mouth 2 (Two) Times a Day.       CENTRUM ULTRA WOMENS PO      Take 1 tablet by mouth Daily.       Cholecalciferol 2000 units tablet      Take 2,000 Units by mouth 2 (Two) Times a Day.       citalopram 20 MG tablet  Commonly known as:  CeleXA      Take 20 mg by mouth every night at bedtime.       clobetasol 0.05 % cream  Commonly known as:  TEMOVATE      Apply 1 application topically 2 (Two) Times a Day As Needed (Lichens Sclerosis).       CloNIDine 0.1 MG tablet  Commonly known as:  CATAPRES      TAKE 1 TABLET EVERY 12 HOURS       Collagen-Boron-Hyaluronic Acid 10-5-3.3 MG tablet      Take 1 tablet by mouth Daily.       insulin glargine 100 UNIT/ML injection  Commonly known as:  LANTUS      Inject 24-25 Units under the skin into the appropriate area as directed Daily. ACCORDING TO SUGAR LEVELS       IPRATROPIUM BROMIDE NA      1 spray into the nostril(s) as directed by provider 2 (Two) Times a Day As Needed.       Loratadine 10 MG capsule      Take 10 mg by mouth Daily.       metFORMIN 1000 MG tablet  Commonly known as:  GLUCOPHAGE      Take 1,000 mg by mouth 2 (Two) Times a Day With Meals.       MYRBETRIQ 50 MG tablet sustained-release 24 hour 24 hr tablet  Generic drug:  Mirabegron ER      Take 50 mg by mouth Daily.       nitroglycerin 0.4 MG/SPRAY spray  Commonly known as:  NITROLINGUAL      Place 1 spray under the tongue Every 5 (Five) Minutes As Needed for Chest Pain.       O2  Commonly known as:  OXYGEN      Inhale 2 L/min 1 (One) Time. 2L while sleeping       omeprazole 40 MG capsule  Commonly known as:  priLOSEC      Take 40 mg by mouth 2 (Two) Times a Day.       potassium chloride 10 MEQ CR capsule  Commonly known as:  MICRO-K      Take 10 mEq by mouth 2 (Two) Times a Day.       pramipexole 1.5 MG tablet  Commonly known as:  MIRAPEX      Take 1.5 mg by mouth Every Night.       ranolazine 500 MG 12 hr tablet  Commonly known as:  RANEXA      Take 1 tablet by mouth Every 12  (Twelve) Hours.       sennosides-docusate sodium 8.6-50 MG tablet  Commonly known as:  SENOKOT-S      Take 1 tablet by mouth Daily.       SUPER B COMPLEX PO      Take 1 tablet by mouth Daily.       SYNTHROID 200 MCG tablet  Generic drug:  levothyroxine      Take 200 mcg by mouth Daily.       traMADol 50 MG tablet  Commonly known as:  ULTRAM      Take 50 mg by mouth Every 8 (Eight) Hours As Needed for Moderate Pain .       VENTOLIN  (90 Base) MCG/ACT inhaler  Generic drug:  albuterol sulfate HFA      Inhale 1 puff Every 4 (Four) Hours As Needed.       albuterol (2.5 MG/3ML) 0.083% nebulizer solution  Commonly known as:  PROVENTIL      Take 2.5 mg by nebulization Every 4 (Four) Hours As Needed.       vitamin C 500 MG tablet  Commonly known as:  ASCORBIC ACID      Take 500 mg by mouth Daily. Chewable tablet          STOP taking these medications    metOLazone 2.5 MG tablet  Commonly known as:  ZAROXOLYN              Where to Get Your Medications      These medications were sent to [facility] - [place] [id number]    Hours:  7:00 AM-5:30 PM M-F, 8:00 AM-4:30 PM Sat-Sun Phone:  [phone]   · dofetilide 500 MCG capsule         Referenced discharge instructions provided by nursing for diet and activity.    Follow-up with Dr. Marie in 6 weeks.     Signed:  BRIGITTE Ricks  6/12/2019  1:47 PM

## 2019-06-12 NOTE — PLAN OF CARE
Problem: Patient Care Overview  Goal: Plan of Care Review  Outcome: Ongoing (interventions implemented as appropriate)   06/12/19 0336   Coping/Psychosocial   Plan of Care Reviewed With patient   Plan of Care Review   Progress no change   OTHER   Outcome Summary Pt has had no change. NPO since midnight for cardioversion. No additional needs or concerns at this time. Will continue to monitor.

## 2019-06-13 ENCOUNTER — READMISSION MANAGEMENT (OUTPATIENT)
Dept: CALL CENTER | Facility: HOSPITAL | Age: 69
End: 2019-06-13

## 2019-06-13 RX ORDER — DOFETILIDE 0.5 MG/1
500 CAPSULE ORAL EVERY 12 HOURS SCHEDULED
Qty: 60 CAPSULE | Refills: 5 | Status: SHIPPED | OUTPATIENT
Start: 2019-06-13 | End: 2019-07-09 | Stop reason: SDUPTHER

## 2019-06-13 NOTE — OUTREACH NOTE
Prep Survey      Responses   Facility patient discharged from?  [facility]   Is patient eligible?  Yes   Discharge diagnosis  Atrial fibrillation with RVR Tikosyn Loading    Does the patient have one of the following disease processes/diagnoses(primary or secondary)?  Other   Does the patient have Home health ordered?  No   Is there a DME ordered?  No   Prep survey completed?  Yes          Saskia Teixeira RN

## 2019-06-14 ENCOUNTER — READMISSION MANAGEMENT (OUTPATIENT)
Dept: CALL CENTER | Facility: HOSPITAL | Age: 69
End: 2019-06-14

## 2019-06-14 NOTE — OUTREACH NOTE
Medical Week 1 Survey      Responses   Facility patient discharged from?  [facility]   Does the patient have one of the following disease processes/diagnoses(primary or secondary)?  Other   Is there a successful TCM telephone encounter documented?  No   Week 1 attempt successful?  No   Unsuccessful attempts  Attempt 1          Shayna Godfrey RN

## 2019-06-17 ENCOUNTER — READMISSION MANAGEMENT (OUTPATIENT)
Dept: CALL CENTER | Facility: HOSPITAL | Age: 69
End: 2019-06-17

## 2019-06-17 NOTE — OUTREACH NOTE
Medical Week 1 Survey      Responses   Facility patient discharged from?  [facility]   Does the patient have one of the following disease processes/diagnoses(primary or secondary)?  Other   Is there a successful TCM telephone encounter documented?  No   Week 1 attempt successful?  Yes   Call start time  1101   Rescheduled  Rescheduled-pt requested [States she is sitting in her PCPs office. Call rescheduled.]   Call end time  1102          Dorene Colbert RN

## 2019-06-18 ENCOUNTER — READMISSION MANAGEMENT (OUTPATIENT)
Dept: CALL CENTER | Facility: HOSPITAL | Age: 69
End: 2019-06-18

## 2019-06-18 NOTE — OUTREACH NOTE
Medical Week 1 Survey      Responses   Facility patient discharged from?  [facility]   Does the patient have one of the following disease processes/diagnoses(primary or secondary)?  Other   Is there a successful TCM telephone encounter documented?  No   Week 1 attempt successful?  No   Unsuccessful attempts  Attempt 2   Rescheduled  Revoked [no answer after reschedulingh]          Puja Caban RN

## 2019-07-09 ENCOUNTER — TELEPHONE (OUTPATIENT)
Dept: CARDIOLOGY | Facility: CLINIC | Age: 69
End: 2019-07-09

## 2019-07-09 RX ORDER — DOFETILIDE 0.5 MG/1
500 CAPSULE ORAL EVERY 12 HOURS SCHEDULED
Qty: 60 CAPSULE | Refills: 0 | Status: SHIPPED | OUTPATIENT
Start: 2019-07-09 | End: 2019-07-30 | Stop reason: SDUPTHER

## 2019-07-09 NOTE — TELEPHONE ENCOUNTER
Patient called to request a 30 day supply be sent to [facility] and after her f/u visit she [facility] and she will let us know when she comes in for that.

## 2019-07-22 ENCOUNTER — OFFICE VISIT (OUTPATIENT)
Dept: CARDIOLOGY | Facility: CLINIC | Age: 69
End: 2019-07-22

## 2019-07-22 VITALS
WEIGHT: 241 LBS | DIASTOLIC BLOOD PRESSURE: 74 MMHG | BODY MASS INDEX: 42.7 KG/M2 | SYSTOLIC BLOOD PRESSURE: 130 MMHG | HEART RATE: 75 BPM | OXYGEN SATURATION: 94 % | HEIGHT: 63 IN

## 2019-07-22 DIAGNOSIS — I49.5 TACHY-BRADY SYNDROME (HCC): ICD-10-CM

## 2019-07-22 DIAGNOSIS — I48.19 PERSISTENT ATRIAL FIBRILLATION (HCC): Primary | ICD-10-CM

## 2019-07-22 PROCEDURE — 99214 OFFICE O/P EST MOD 30 MIN: CPT | Performed by: PHYSICIAN ASSISTANT

## 2019-07-22 PROCEDURE — 93280 PM DEVICE PROGR EVAL DUAL: CPT | Performed by: PHYSICIAN ASSISTANT

## 2019-07-22 RX ORDER — APIXABAN 5 MG/1
TABLET, FILM COATED ORAL
Qty: 180 TABLET | Refills: 3 | Status: SHIPPED | OUTPATIENT
Start: 2019-07-22 | End: 2020-08-28

## 2019-07-22 NOTE — PROGRESS NOTES
Joanna Cross  1950  PCP: Reynaldo Firtz MD    SUBJECTIVE:   Joanna Cross is a 68 y.o. female seen for a follow up visit regarding the following:     Chief Complaint: Follow up for afib     HPI:    Since last visit the patient's status has improved. She feels that she has more energy since starting Tikosyn. She is now starting a weight loss program and is exercising daily. No CP, sob, edema. No bleeding issues.     History:  This is a 68-year-old patient referred by Dr. Corona for further evaluation and management of atrial fibrillation.  Patient has been diagnosed with atrial fibrillation since around 2016.  She has been plagued by tachybradycardia syndrome episodes in the past.  She has undergone cardioversions in the past but unable to maintain sinus rhythm more than a few days at a time.  She is been treated with Profafenone in the past as well with no improvement in her atrial fibrillation burden.  Her biggest complaint is ongoing fatigue with occasional palpitations.        Cardiac PMH: (Old records have been reviewed and summarized below)  1. Coronary artery disease  a. [facility] 2/15/2008, Dr uLtz.  Mild, non-obstructive CAD, normal EF  b. [facility] 1/9/2013, Dr Stevenson Sutton:  No LV gram done; mild, non-obstructive coronary artery disease.  c. [facility] 11/9/2015, [facility]:  EF 60%.  70% RCA lesion with Promus ZAINAB placement. 40-50% mid LAD, no FFR performed. Other small blockages noted.  No MR.  d. [facility] 11/15/2015, Dr Palacio @ [facility]:  Patent RCA stent, 40-50% LAD with FFR @ 0.92; mild inferior wall hypokinesis  e. [facility] 7/29/2016, [facility]:  Abnormal stress test.  Normal CORS with patent stent. LAD improved since last image EF 55-60%  f. [facility] 2/1/19:  EF normal.  Patent RCA, noncritical mid LAD with IFR 0.93, noncritical circ.    2. Peripheral vascular disease/chronic venous stasis  a. Venous duplex 9/17/2010: Insufficiency to venous hypertension in the great saphenous  system bilaterally.  b. Venous duplex 2013: Right leg laser ablation of Dr. Garcia.  c. Venous duplex 5/6/2016: No evidence of DVT, no superficial, no thrmobophlebitis, no valvular insufficiency.  d. AA with runoffs 8/31/2016: Single-vessel Mabel: No significant arterial disease.  3. Palpitations  a. Echocardiogram 2/13/2014: Dr. Teran.  Normal biventricular function; trace to mild MR.  Atrial septal aneurysm or  b. Echocardiogram 12/22/15: Dr. Chavez.  Borderline LVH, mild LAE, mild RV enlargement.  Mild to moderate MR and TR with RVSP of 46 mmHg.  c. Conclusion/3/2016: Dr. Palacio.  RV enlargement.  EF 50-55%.  Mild AI S, mild MR and TR with RVSP 37 mmHg.  4. AF/SSS  a. [place] [facility] PPM 2016  b. CHADS2 Vasc  = 5. On Eliquis   c. TIA: Carotid duplex 2/13/2014 showed no significant flow-limiting stenosis.  Mild luminal disease present; both vertebrals are present.  d. ECV 12/26/2018:  Successful cardioversion: AV paced  e. Echo 12/25/18:  EF 60%, mild MR/ TR with RVSP 29 mmHg.  Sclerotic AoV  5. Diabetes  6. Essential Hypertension  7. Hyperlipidemia  8. Hypothyroid  9. Hyponatremia  a. Secondary to SIADH  10. MILLI with CPAP  11. RLS  12. Parkinson's disease  13. GERD  14. Urinary Retention  a. S/P cystoscopy and ureter dilation, March 2018.  Dr. Terrence Mckenzie  15. Surgeries:  a. Rotator cuff repair  b. Cholecystectomy  c. Bilateral knee replacement  d. Tubal ligation  e. Breast surgery  f. Sinus surgery          Current Outpatient Medications:   •  albuterol (PROVENTIL) (2.5 MG/3ML) 0.083% nebulizer solution, Take 2.5 mg by nebulization Every 4 (Four) Hours As Needed., Disp: , Rfl:   •  albuterol (VENTOLIN HFA) 108 (90 Base) MCG/ACT inhaler, Inhale 1 puff Every 4 (Four) Hours As Needed., Disp: , Rfl:   •  amantadine (SYMMETREL) 100 MG capsule, Take 100 mg by mouth 2 (Two) Times a Day., Disp: , Rfl:   •  aspirin 81 MG EC tablet, Take 81 mg by mouth Daily., Disp: , Rfl:   •  B Complex-C (SUPER B COMPLEX  PO), Take 1 tablet by mouth Daily., Disp: , Rfl:   •  Calcium Carbonate (CALTRATE 600 PO), Take 600 mg by mouth Daily., Disp: , Rfl:   •  carbidopa-levodopa (SINEMET)  MG per tablet, Take  by mouth. 2 tablets at 0600, 1 tablet at 0900, 1200, 1500, 1800, 2100, Disp: , Rfl:   •  carbonyl iron (FEOSOL) 45 MG tablet tablet, Take 1 tablet by mouth 2 (Two) Times a Day., Disp: , Rfl:   •  Cholecalciferol 2000 units tablet, Take 2,000 Units by mouth 2 (Two) Times a Day., Disp: , Rfl:   •  citalopram (CeleXA) 20 MG tablet, Take 20 mg by mouth every night at bedtime., Disp: , Rfl:   •  clobetasol (TEMOVATE) 0.05 % cream, Apply 1 application topically 2 (Two) Times a Day As Needed (Lichens Sclerosis)., Disp: , Rfl:   •  CloNIDine (CATAPRES) 0.1 MG tablet, TAKE 1 TABLET EVERY 12 HOURS, Disp: 180 tablet, Rfl: 3  •  Collagen-Boron-Hyaluronic Acid 10-5-3.3 MG tablet, Take 1 tablet by mouth Daily., Disp: , Rfl:   •  dofetilide (TIKOSYN) 500 MCG capsule, Take 1 capsule by mouth Every 12 (Twelve) Hours., Disp: 60 capsule, Rfl: 0  •  ELIQUIS 5 MG tablet tablet, TAKE 1 TABLET EVERY 12 HOURS, Disp: 180 tablet, Rfl: 3  •  insulin glargine (LANTUS) 100 UNIT/ML injection, Inject 24-25 Units under the skin into the appropriate area as directed Daily. ACCORDING TO SUGAR LEVELS , Disp: , Rfl:   •  IPRATROPIUM BROMIDE NA, 1 spray into the nostril(s) as directed by provider 2 (Two) Times a Day As Needed., Disp: , Rfl:   •  Loratadine 10 MG capsule, Take 10 mg by mouth Daily., Disp: , Rfl:   •  losartan (COZAAR) 50 MG tablet, TAKE 1 TABLET EVERY 12 HOURS (Patient taking differently: PT TAKING 1/2 TAB TWICE A DAY), Disp: 180 tablet, Rfl: 3  •  metFORMIN (GLUCOPHAGE) 1000 MG tablet, Take 1,000 mg by mouth 2 (Two) Times a Day With Meals., Disp: , Rfl:   •  Mirabegron ER (MYRBETRIQ) 50 MG tablet sustained-release 24 hour 24 hr tablet, Take 50 mg by mouth Daily., Disp: , Rfl:   •  Multiple Vitamins-Minerals (CENTRUM ULTRA WOMENS PO), Take 1  tablet by mouth Daily., Disp: , Rfl:   •  nitroglycerin (NITROLINGUAL) 0.4 MG/SPRAY spray, Place 1 spray under the tongue Every 5 (Five) Minutes As Needed for Chest Pain., Disp: 1 each, Rfl: 6  •  O2 (OXYGEN), Inhale 2 L/min 1 (One) Time. 2L while sleeping, Disp: , Rfl:   •  omeprazole (priLOSEC) 40 MG capsule, Take 40 mg by mouth 2 (Two) Times a Day., Disp: , Rfl:   •  potassium chloride (MICRO-K) 10 MEQ CR capsule, Take 10 mEq by mouth 2 (Two) Times a Day., Disp: , Rfl:   •  pramipexole (MIRAPEX) 1.5 MG tablet, Take 1.5 mg by mouth Every Night., Disp: , Rfl:   •  ranolazine (RANEXA) 500 MG 12 hr tablet, Take 1 tablet by mouth Every 12 (Twelve) Hours., Disp: 180 tablet, Rfl: 3  •  sennosides-docusate sodium (SENOKOT-S) 8.6-50 MG tablet, Take 1 tablet by mouth Daily., Disp: , Rfl:   •  spironolactone (ALDACTONE) 25 MG tablet, Take 1 tablet by mouth Daily. (Patient taking differently: Take 25 mg by mouth Daily As Needed (swelling).), Disp: 90 tablet, Rfl: 1  •  SYNTHROID 200 MCG tablet, Take 200 mcg by mouth Daily., Disp: , Rfl:   •  traMADol (ULTRAM) 50 MG tablet, Take 50 mg by mouth Every 8 (Eight) Hours As Needed for Moderate Pain ., Disp: , Rfl:   •  vitamin C (ASCORBIC ACID) 500 MG tablet, Take 500 mg by mouth Daily. Chewable tablet, Disp: , Rfl:     Past Medical History, Past Surgical History, Family history, Social History, and Medications were all reviewed with the patient today and updated as necessary.       Patient Active Problem List   Diagnosis   • Coronary artery disease involving native coronary artery with unstable angina pectoris (CMS/HCC)   • Essential hypertension   • Peripheral vascular disease (CMS/HCC)   • Hyperlipidemia LDL goal <70   • Chronic atrial fibrillation (CMS/HCC)   • Spinal stenosis, lumbar region, with neurogenic claudication   • Diabetes mellitus (CMS/HCC)   • Delayed surgical wound healing   • Biceps tendinitis   • Overactive bladder   • GERD (gastroesophageal reflux disease)   •  "Hypothyroidism   • Lichen sclerosus   • Parkinson's disease (CMS/HCC)   • MILLI treated with BiPAP - Patient reports compliance.    • History of respiratory failure - prior respiratory failure requiring mechanical ventilation, open lung biopsy non-specific.    • SOB (shortness of breath)   • A-fib (CMS/HCC)   • Chest pain   • Atrial fibrillation with RVR (CMS/HCC)   • Renal insufficiency   • CAD in native artery   • Persistent atrial fibrillation (CMS/HCC)   • Tachy-thai syndrome (CMS/HCC)     Allergies   Allergen Reactions   • Toprol Xl [Metoprolol Tartrate] Shortness Of Breath     Extreme fatigue   • Amlodipine Besylate Swelling     Lower extremity (ankles, feet) swelling   • Entacapone Other (See Comments)     \"extreme weakness in legs - caused several falls, which stopped after discontinuing this medication\"   • Levemir [Insulin Detemir] Hives     Hives / rash around injection site   • Penicillins Hives     Jitteriness    • Codeine Unknown (See Comments)     Pt is unaware of what reaction she had   • Cortisone Other (See Comments)     MAKES BLOOD PRESSURE HIGH    • Levaquin [Levofloxacin] Other (See Comments)     Cannot take due to taking propafenone HCL- severe reaction with mixed.    • Xarelto [Rivaroxaban] GI Bleeding   • Bactrim [Sulfamethoxazole-Trimethoprim] Nausea Only     Headache    • Ciprofloxacin Diarrhea   • Cogentin [Benztropine] Other (See Comments)     \"uncontrollable body movements\"   • Compazine [Prochlorperazine Edisylate] Other (See Comments)     Dystonic reaction   • Duraprep [Antiseptic Products, Misc.] Itching and Rash     RASH AND ITCHING   • Haldol [Haloperidol Lactate] Other (See Comments)     Dystonic reaction   • Hydralazine Other (See Comments)     Headache    • Hydrocodone-Acetaminophen Nausea And Vomiting and Dizziness     Headache, nausea    • Lisinopril Cough   • Statins Myalgia     Leg pain   • Tarka [Trandolapril-Verapamil Hcl Er] Other (See Comments)     Constipation      Past " Medical History:   Diagnosis Date   • Anemia    • Atrial fibrillation (CMS/HCC)    • AVM (arteriovenous malformation)    • CAD (coronary artery disease)    • Depression    • Disease of thyroid gland    • DM2 (diabetes mellitus, type 2) (CMS/HCC)     checks sugar twice per day    • Essential hypertension    • Generalized osteoarthritis    • GERD (gastroesophageal reflux disease)    • GIB (gastrointestinal bleeding) 2016    d/t xarelto    • Headache    • Hiatal hernia    • History of shingles    • History of transfusion 2016   • IBS (irritable bowel syndrome)    • Lichen sclerosus    • Mixed hyperlipidemia    • Morbid obesity (CMS/HCC)    • MRSA infection 2018   • Myocardial infarction (CMS/HCC)    • On home oxygen therapy     2L of oxygen via CPAP while sleeping    • MILLI on CPAP     18/14   • Osteoporosis    • Pacemaker    • Parkinson disease (CMS/HCC)    • Peripheral vascular disease (CMS/HCC)    • Pleurisy    • Pneumonia    • Seborrheic dermatitis    • Skin cancer     on back    • SOBOE (shortness of breath on exertion)    • SSS (sick sinus syndrome) (CMS/HCC)    • TIA (transient ischemic attack)    • Varicose vein of leg    • Venous insufficiency of both lower extremities      Past Surgical History:   Procedure Laterality Date   • BACK SURGERY      l4-l5 laminectomy    • BICEPS TENDON REPAIR Right     shoulder   • BREAST BIOPSY Left 2004   • BUNIONECTOMY Right    • CARDIAC CATHETERIZATION     • CARDIAC CATHETERIZATION N/A 2/1/2019    Procedure: Left Heart Cath;  Surgeon: Albertina Corona MD;  Location: [facility] INVASIVE LOCATION;  Service: Cardiology   • CHOLECYSTECTOMY     • COLONOSCOPY  2016   • CORONARY STENT PLACEMENT      x1 stent   • CYST REMOVAL      left ear, upper left back 2001   • DIAGNOSTIC LAPAROSCOPY  1981   • ENDOSCOPIC FUNCTIONAL SINUS SURGERY (FESS)  2011   • ENDOSCOPY  2016   • HAMMER TOE REPAIR Left    • INCISION AND DRAINAGE OF WOUND  2018   • JOINT REPLACEMENT     • LIPOMA EXCISION   "1999    left leg    • LUMBAR LAMINECTOMY DISCECTOMY DECOMPRESSION N/A 7/6/2018    Procedure: LUMBAR LAMINECTOMY L4-5, HEMILAMIINECTOMY RIGHT L5-S1, FORAMINOTOMY L5-S1;  Surgeon: Lencho Gamino MD;  Location:  [facility];  Service: Neurosurgery   • LUMBAR LAMINECTOMY DISCECTOMY DECOMPRESSION N/A 9/19/2018    Procedure: INCISION AND DRAINAGE BACK WITH WOUND EXPLORATION;  Surgeon: Ritchie García MD;  Location:  [facility];  Service: Neurosurgery   • LUNG BIOPSY Left 2016   • PACEMAKER IMPLANTATION  11/2016    sss   • REPLACEMENT TOTAL KNEE Bilateral     left knee 2011, right 2012 per dr acuna    • REPLACEMENT TOTAL KNEE Bilateral    • SHOULDER ARTHROSCOPY Bilateral     2003-left, 2004- right    • SKIN BIOPSY      skin cancer back 2016   • TUBAL ABDOMINAL LIGATION  1980   • WISDOM TOOTH EXTRACTION       Family History   Problem Relation Age of Onset   • Kidney disease Mother    • Coronary artery disease Mother    • Coronary artery disease Father    • Coronary artery disease Brother      Social History     Tobacco Use   • Smoking status: Never Smoker   • Smokeless tobacco: Never Used   Substance Use Topics   • Alcohol use: No     Frequency: Never         PHYSICAL EXAM:    /74 (BP Location: Left arm, Patient Position: Sitting)   Pulse 75   Ht 160 cm (63\")   Wt 109 kg (241 lb)   LMP  (LMP Unknown) Comment: Mammogram- april 2018   SpO2 94%   BMI 42.69 kg/m²        Wt Readings from Last 5 Encounters:   07/22/19 109 kg (241 lb)   06/10/19 107 kg (236 lb)   06/06/19 110 kg (243 lb)   05/28/19 114 kg (251 lb 2 oz)   05/23/19 112 kg (247 lb)       BP Readings from Last 5 Encounters:   07/22/19 130/74   06/12/19 130/60   06/07/19 143/72   06/06/19 179/100   05/28/19 132/84       General-Well Nourished, Well developed  Eyes - PERRLA  Neck- supple, No mass  CV- regular rate and rhythm, no MRG, No edema  Lung- clear bilaterally  Abd- soft, +BS  Musc/skel - Norm strength and range of motion  Skin- warm and dry  Neuro - Alert & " Oriented x 3, appropriate mood.        Medical problems and test results were reviewed with the patient today.     No results found for this or any previous visit (from the past 672 hour(s)).      EKG: (EKG has been independently visualized by me and summarized below)      ECG 12 Lead  Date/Time: 7/22/2019 2:04 PM  Performed by: Ai Prieto PA  Authorized by: Ai Prieto PA   Comparison: compared with previous ECG from 6/17/2019  Similar to previous ECG  Rhythm: sinus rhythm  Rate: normal  BPM: 63  Conduction: 1st degree AV block  QRS axis: normal    Clinical impression: normal ECG             ASSESSMENT and PLAN    1.  Atrial fibrillation-persistent in nature. Currently on Tikosyn 500mcg BID w/ stable QTc. She does have 24% mode switching, but due to noise on leads and low atrial voltage. Patient does note improvement in symptoms. Will continue Tikosyn and Eliquis. Based on her response to Tikosyn will make a final decision about long-term rate versus rhythm control options.    2.  Dual-chamber pacemaker-she has noise on the right ventricle lead and her atrial lead is unable to sense atrial fibrillation due to low atrial voltage. Will monitor for now, but will likely need to have new leads placed. Will arrange follow-up with Dr. Marie     3.  Tikosyn use- EKG reviewed. QTc is stable.       Return in about 8 weeks (around 9/16/2019) for in [place] .        BIJU WebberC   Cardiology/Electrophysiology  7/22/2019  2:05 PM

## 2019-07-30 RX ORDER — DOFETILIDE 0.5 MG/1
500 CAPSULE ORAL EVERY 12 HOURS SCHEDULED
Qty: 180 CAPSULE | Refills: 3 | Status: SHIPPED | OUTPATIENT
Start: 2019-07-30 | End: 2019-07-30 | Stop reason: SDUPTHER

## 2019-07-30 RX ORDER — DOFETILIDE 0.5 MG/1
500 CAPSULE ORAL EVERY 12 HOURS SCHEDULED
Qty: 180 CAPSULE | Refills: 3 | Status: SHIPPED | OUTPATIENT
Start: 2019-07-30 | End: 2020-08-12

## 2019-08-10 NOTE — TELEPHONE ENCOUNTER
I spoke with patient and she said she is on her way.   Alisha will put her on the schedule for me.   Patient followed for acute on chronic respiratory failure.  Patient was seen on CRRT which was running for uremic toxin clearance/ UF for volume management.  Patient hemodynamically not stable for intermittent HD yet.  Will continue CRRT for volume and toxin clearance. Adjust UF as tolerated. Follow labs serially while on CRRT to monitor electrolytes and follow replacement protocol as needed.   SLED prescription, dialysate bath was reviewed and changes made as necessary.     Emmie Thompson MD  Nephrology PGY-4

## 2019-08-19 ENCOUNTER — OFFICE VISIT (OUTPATIENT)
Dept: NEUROSURGERY | Facility: CLINIC | Age: 69
End: 2019-08-19

## 2019-08-19 VITALS
WEIGHT: 234 LBS | SYSTOLIC BLOOD PRESSURE: 150 MMHG | DIASTOLIC BLOOD PRESSURE: 90 MMHG | TEMPERATURE: 97.3 F | HEIGHT: 63 IN | BODY MASS INDEX: 41.46 KG/M2

## 2019-08-19 DIAGNOSIS — M48.062 SPINAL STENOSIS, LUMBAR REGION, WITH NEUROGENIC CLAUDICATION: Primary | ICD-10-CM

## 2019-08-19 PROCEDURE — 99213 OFFICE O/P EST LOW 20 MIN: CPT | Performed by: PHYSICIAN ASSISTANT

## 2019-08-19 NOTE — PROGRESS NOTES
Patient: Joanna Cross  : 1950  Gender: female    Primary Care Provider: Reynaldo Fritz MD      Chief Complaint:   Chief Complaint   Patient presents with   • Back Pain   • Leg Pain     right       History of Present Illness:  Ms. Cross is a 68-year-old female who is known to our clinic.  She underwent an L4 laminectomy and right L5 hemilaminectomy by Dr. Gamino in 2019 followed by a wound debridement by Dr. García shortly after.  She did well following this procedure until a couple of months ago when she noticed worsening right hip pain and right foot numbness. The numbness extends to the lateral right foot.  She also admits to walking intolerance and is now using a rolling walker to assist with ambulation. She describes the intolerance as pain in both distal legs and weakness. She denies bowel or bladder dysfunction. She reports improvement with an at-home massage machine and tramadol at night. Of note, she has peripheral vascular disease, insulin-dependent DMII, as well as parkinson's disease which she states affects the right side of her body.       Past Medical and Surgical History:  Past Medical History:   Diagnosis Date   • Anemia    • Atrial fibrillation (CMS/HCC)    • AVM (arteriovenous malformation)    • CAD (coronary artery disease)    • Depression    • Disease of thyroid gland    • DM2 (diabetes mellitus, type 2) (CMS/HCC)     checks sugar twice per day    • Essential hypertension    • Generalized osteoarthritis    • GERD (gastroesophageal reflux disease)    • GIB (gastrointestinal bleeding)     d/t xarelto    • Headache    • Hiatal hernia    • History of shingles    • History of transfusion    • IBS (irritable bowel syndrome)    • Lichen sclerosus    • Mixed hyperlipidemia    • Morbid obesity (CMS/HCC)    • MRSA infection    • Myocardial infarction (CMS/HCC)    • On home oxygen therapy     2L of oxygen via CPAP while sleeping    • MILLI on CPAP        • Osteoporosis    •  Pacemaker    • Parkinson disease (CMS/HCC)    • Peripheral vascular disease (CMS/HCC)    • Pleurisy    • Pneumonia    • Seborrheic dermatitis    • Skin cancer     on back    • SOBOE (shortness of breath on exertion)    • SSS (sick sinus syndrome) (CMS/HCC)    • TIA (transient ischemic attack)    • Varicose vein of leg    • Venous insufficiency of both lower extremities      Past Surgical History:   Procedure Laterality Date   • BACK SURGERY      l4-l5 laminectomy    • BICEPS TENDON REPAIR Right     shoulder   • BREAST BIOPSY Left 2004   • BUNIONECTOMY Right    • CARDIAC CATHETERIZATION     • CARDIAC CATHETERIZATION N/A 2/1/2019    Procedure: Left Heart Cath;  Surgeon: Albertina Corona MD;  Location:  [facility];  Service: Cardiology   • CHOLECYSTECTOMY     • COLONOSCOPY  2016   • CORONARY STENT PLACEMENT      x1 stent   • CYST REMOVAL      left ear, upper left back 2001   • DIAGNOSTIC LAPAROSCOPY  1981   • ENDOSCOPIC FUNCTIONAL SINUS SURGERY (FESS)  2011   • ENDOSCOPY  2016   • HAMMER TOE REPAIR Left    • INCISION AND DRAINAGE OF WOUND  2018   • JOINT REPLACEMENT     • LIPOMA EXCISION  1999    left leg    • LUMBAR LAMINECTOMY DISCECTOMY DECOMPRESSION N/A 7/6/2018    Procedure: LUMBAR LAMINECTOMY L4-5, HEMILAMIINECTOMY RIGHT L5-S1, FORAMINOTOMY L5-S1;  Surgeon: Lencho Gamino MD;  Location:  [facility];  Service: Neurosurgery   • LUMBAR LAMINECTOMY DISCECTOMY DECOMPRESSION N/A 9/19/2018    Procedure: INCISION AND DRAINAGE BACK WITH WOUND EXPLORATION;  Surgeon: Ritchie García MD;  Location:  [facility];  Service: Neurosurgery   • LUNG BIOPSY Left 2016   • PACEMAKER IMPLANTATION  11/2016    sss   • REPLACEMENT TOTAL KNEE Bilateral     left knee 2011, right 2012 per dr acuna    • REPLACEMENT TOTAL KNEE Bilateral    • SHOULDER ARTHROSCOPY Bilateral     2003-left, 2004- right    • SKIN BIOPSY      skin cancer back 2016   • TUBAL ABDOMINAL LIGATION  1980   • WISDOM TOOTH EXTRACTION         Current  Medications:    Current Outpatient Medications:   •  albuterol (PROVENTIL) (2.5 MG/3ML) 0.083% nebulizer solution, Take 2.5 mg by nebulization Every 4 (Four) Hours As Needed., Disp: , Rfl:   •  albuterol (VENTOLIN HFA) 108 (90 Base) MCG/ACT inhaler, Inhale 1 puff Every 4 (Four) Hours As Needed., Disp: , Rfl:   •  amantadine (SYMMETREL) 100 MG capsule, Take 100 mg by mouth 2 (Two) Times a Day., Disp: , Rfl:   •  aspirin 81 MG EC tablet, Take 81 mg by mouth Daily., Disp: , Rfl:   •  B Complex-C (SUPER B COMPLEX PO), Take 1 tablet by mouth Daily., Disp: , Rfl:   •  Calcium Carbonate (CALTRATE 600 PO), Take 600 mg by mouth Daily., Disp: , Rfl:   •  carbidopa-levodopa (SINEMET)  MG per tablet, Take  by mouth. 2 tablets at 0600, 1 tablet at 0900, 1200, 1500, 1800, 2100, Disp: , Rfl:   •  carbonyl iron (FEOSOL) 45 MG tablet tablet, Take 1 tablet by mouth 2 (Two) Times a Day., Disp: , Rfl:   •  Cholecalciferol 2000 units tablet, Take 2,000 Units by mouth 2 (Two) Times a Day., Disp: , Rfl:   •  citalopram (CeleXA) 20 MG tablet, Take 20 mg by mouth every night at bedtime., Disp: , Rfl:   •  clobetasol (TEMOVATE) 0.05 % cream, Apply 1 application topically 2 (Two) Times a Day As Needed (Lichens Sclerosis)., Disp: , Rfl:   •  CloNIDine (CATAPRES) 0.1 MG tablet, TAKE 1 TABLET EVERY 12 HOURS, Disp: 180 tablet, Rfl: 3  •  Collagen-Boron-Hyaluronic Acid 10-5-3.3 MG tablet, Take 1 tablet by mouth Daily., Disp: , Rfl:   •  dofetilide (TIKOSYN) 500 MCG capsule, Take 1 capsule by mouth Every 12 (Twelve) Hours., Disp: 180 capsule, Rfl: 3  •  ELIQUIS 5 MG tablet tablet, TAKE 1 TABLET EVERY 12 HOURS, Disp: 180 tablet, Rfl: 3  •  insulin glargine (LANTUS) 100 UNIT/ML injection, Inject 24-25 Units under the skin into the appropriate area as directed Daily. ACCORDING TO SUGAR LEVELS , Disp: , Rfl:   •  IPRATROPIUM BROMIDE NA, 1 spray into the nostril(s) as directed by provider 2 (Two) Times a Day As Needed., Disp: , Rfl:   •   Loratadine 10 MG capsule, Take 10 mg by mouth Daily., Disp: , Rfl:   •  losartan (COZAAR) 50 MG tablet, TAKE 1 TABLET EVERY 12 HOURS (Patient taking differently: PT TAKING 1/2 TAB TWICE A DAY), Disp: 180 tablet, Rfl: 3  •  metFORMIN (GLUCOPHAGE) 1000 MG tablet, Take 1,000 mg by mouth 2 (Two) Times a Day With Meals., Disp: , Rfl:   •  Mirabegron ER (MYRBETRIQ) 50 MG tablet sustained-release 24 hour 24 hr tablet, Take 50 mg by mouth Daily., Disp: , Rfl:   •  Multiple Vitamins-Minerals (CENTRUM ULTRA WOMENS PO), Take 1 tablet by mouth Daily., Disp: , Rfl:   •  nitroglycerin (NITROLINGUAL) 0.4 MG/SPRAY spray, Place 1 spray under the tongue Every 5 (Five) Minutes As Needed for Chest Pain., Disp: 1 each, Rfl: 6  •  O2 (OXYGEN), Inhale 2 L/min 1 (One) Time. 2L while sleeping, Disp: , Rfl:   •  omeprazole (priLOSEC) 40 MG capsule, Take 40 mg by mouth 2 (Two) Times a Day., Disp: , Rfl:   •  potassium chloride (MICRO-K) 10 MEQ CR capsule, Take 10 mEq by mouth 2 (Two) Times a Day., Disp: , Rfl:   •  pramipexole (MIRAPEX) 1.5 MG tablet, Take 1.5 mg by mouth Every Night., Disp: , Rfl:   •  ranolazine (RANEXA) 500 MG 12 hr tablet, Take 1 tablet by mouth Every 12 (Twelve) Hours., Disp: 180 tablet, Rfl: 3  •  sennosides-docusate sodium (SENOKOT-S) 8.6-50 MG tablet, Take 1 tablet by mouth Daily., Disp: , Rfl:   •  spironolactone (ALDACTONE) 25 MG tablet, Take 1 tablet by mouth Daily. (Patient taking differently: Take 25 mg by mouth Daily As Needed (swelling).), Disp: 90 tablet, Rfl: 1  •  SYNTHROID 200 MCG tablet, Take 200 mcg by mouth Daily., Disp: , Rfl:   •  traMADol (ULTRAM) 50 MG tablet, Take 50 mg by mouth Every 8 (Eight) Hours As Needed for Moderate Pain ., Disp: , Rfl:   •  vitamin C (ASCORBIC ACID) 500 MG tablet, Take 500 mg by mouth Daily. Chewable tablet, Disp: , Rfl:     Allergies:  Allergies   Allergen Reactions   • Toprol Xl [Metoprolol Tartrate] Shortness Of Breath     Extreme fatigue   • Amlodipine Besylate Swelling  "    Lower extremity (ankles, feet) swelling   • Entacapone Other (See Comments)     \"extreme weakness in legs - caused several falls, which stopped after discontinuing this medication\"   • Levemir [Insulin Detemir] Hives     Hives / rash around injection site   • Penicillins Hives     Jitteriness    • Codeine Unknown (See Comments)     Pt is unaware of what reaction she had   • Cortisone Other (See Comments)     MAKES BLOOD PRESSURE HIGH    • Levaquin [Levofloxacin] Other (See Comments)     Cannot take due to taking propafenone HCL- severe reaction with mixed.    • Xarelto [Rivaroxaban] GI Bleeding   • Bactrim [Sulfamethoxazole-Trimethoprim] Nausea Only     Headache    • Ciprofloxacin Diarrhea   • Cogentin [Benztropine] Other (See Comments)     \"uncontrollable body movements\"   • Compazine [Prochlorperazine Edisylate] Other (See Comments)     Dystonic reaction   • Duraprep [Antiseptic Products, Misc.] Itching and Rash     RASH AND ITCHING   • Haldol [Haloperidol Lactate] Other (See Comments)     Dystonic reaction   • Hydralazine Other (See Comments)     Headache    • Hydrocodone-Acetaminophen Nausea And Vomiting and Dizziness     Headache, nausea    • Lisinopril Cough   • Statins Myalgia     Leg pain   • Tarka [Trandolapril-Verapamil Hcl Er] Other (See Comments)     Constipation          Review of Systems   HENT: Positive for drooling.    Eyes: Positive for itching.   Respiratory: Positive for apnea.    Musculoskeletal: Positive for back pain.   Neurological: Positive for weakness and numbness.         Physical Exam   Constitutional: She is oriented to person, place, and time. She appears well-developed and well-nourished.   HENT:   Head: Normocephalic and atraumatic.   Neck: Normal range of motion. Neck supple.   Cardiovascular: Intact distal pulses.   Musculoskeletal: Normal range of motion. She exhibits edema.   1+ edema noted in bilateral lower extremities. Erythema noted over bilateral distal legs/ankles " "  Neurological: She is alert and oriented to person, place, and time. No sensory deficit.   Gait is slightly antalgic. Able to stand and walk normally, stand on toes and heels   Skin: Skin is warm and dry.   Psychiatric: She has a normal mood and affect.         Vitals:    08/19/19 1451   BP: 150/90   BP Location: Left arm   Patient Position: Sitting   Cuff Size: Adult   Temp: 97.3 °F (36.3 °C)   TempSrc: Temporal   Weight: 106 kg (234 lb)   Height: 160 cm (62.99\")           Assessment:  Lumbar stenosis with neurogenic claudication    Plan:  Ms. Cross is seen today in evaluation of back and RLE pain. Her symptoms are consistent with classic neurogenic claudication, although I believe vascular claudication could play a role in the pain she is experiencing. She has an extensive list of comorbidities which would complicated additional surgery or even MRI imaging if needed. I have order flexion/extension xrays and referred her to PT. I will see her back in 2 weeks with these studies. I instructed her to contact our office with new or worsening symptoms in the interim.    Follow Up:  2 weeks    Tere Guevara PA-C  "

## 2019-08-22 ENCOUNTER — CLINICAL SUPPORT NO REQUIREMENTS (OUTPATIENT)
Dept: CARDIOLOGY | Facility: CLINIC | Age: 69
End: 2019-08-22

## 2019-08-22 DIAGNOSIS — I48.19 PERSISTENT ATRIAL FIBRILLATION (HCC): ICD-10-CM

## 2019-08-22 PROCEDURE — 93296 REM INTERROG EVL PM/IDS: CPT | Performed by: INTERNAL MEDICINE

## 2019-08-22 PROCEDURE — 93294 REM INTERROG EVL PM/LDLS PM: CPT | Performed by: INTERNAL MEDICINE

## 2019-08-29 ENCOUNTER — OFFICE VISIT (OUTPATIENT)
Dept: CARDIOLOGY | Facility: CLINIC | Age: 69
End: 2019-08-29

## 2019-08-29 ENCOUNTER — HOSPITAL ENCOUNTER (OUTPATIENT)
Dept: CARDIOLOGY | Facility: HOSPITAL | Age: 69
Discharge: HOME | End: 2019-08-29
Admitting: INTERNAL MEDICINE

## 2019-08-29 VITALS — HEIGHT: 63 IN | WEIGHT: 225 LBS | BODY MASS INDEX: 39.87 KG/M2

## 2019-08-29 VITALS
HEART RATE: 72 BPM | BODY MASS INDEX: 39.87 KG/M2 | DIASTOLIC BLOOD PRESSURE: 76 MMHG | OXYGEN SATURATION: 95 % | HEIGHT: 63 IN | WEIGHT: 225 LBS | SYSTOLIC BLOOD PRESSURE: 112 MMHG

## 2019-08-29 DIAGNOSIS — I73.9 CLAUDICATION (HCC): ICD-10-CM

## 2019-08-29 DIAGNOSIS — I49.5 TACHY-BRADY SYNDROME (HCC): ICD-10-CM

## 2019-08-29 DIAGNOSIS — I48.0 PAROXYSMAL ATRIAL FIBRILLATION (HCC): Primary | ICD-10-CM

## 2019-08-29 DIAGNOSIS — E78.5 HYPERLIPIDEMIA LDL GOAL <70: ICD-10-CM

## 2019-08-29 DIAGNOSIS — I10 ESSENTIAL HYPERTENSION: ICD-10-CM

## 2019-08-29 DIAGNOSIS — I25.10 CORONARY ARTERY DISEASE INVOLVING NATIVE CORONARY ARTERY OF NATIVE HEART WITHOUT ANGINA PECTORIS: ICD-10-CM

## 2019-08-29 DIAGNOSIS — I73.9 PERIPHERAL VASCULAR DISEASE (HCC): ICD-10-CM

## 2019-08-29 LAB
BH CV LOWER ARTERIAL LEFT ABI RATIO: 1.1
BH CV LOWER ARTERIAL LEFT DORSALIS PEDIS SYS MAX: 142 MMHG
BH CV LOWER ARTERIAL LEFT GREAT TOE SYS MAX: 86 MMHG
BH CV LOWER ARTERIAL LEFT POST TIBIAL SYS MAX: 172 MMHG
BH CV LOWER ARTERIAL LEFT TBI RATIO: 0.55
BH CV LOWER ARTERIAL RIGHT ABI RATIO: 1.03
BH CV LOWER ARTERIAL RIGHT DORSALIS PEDIS SYS MAX: 149 MMHG
BH CV LOWER ARTERIAL RIGHT GREAT TOE SYS MAX: 75 MMHG
BH CV LOWER ARTERIAL RIGHT POPLITEAL SYS MAX: 157 MMHG
BH CV LOWER ARTERIAL RIGHT POST TIBIAL SYS MAX: 162 MMHG
BH CV LOWER ARTERIAL RIGHT TBI RATIO: 0.48
UPPER ARTERIAL LEFT ARM BRACHIAL SYS MAX: 157 MMHG
UPPER ARTERIAL RIGHT ARM BRACHIAL SYS MAX: 149 MMHG

## 2019-08-29 PROCEDURE — 93000 ELECTROCARDIOGRAM COMPLETE: CPT | Performed by: INTERNAL MEDICINE

## 2019-08-29 PROCEDURE — 99214 OFFICE O/P EST MOD 30 MIN: CPT | Performed by: INTERNAL MEDICINE

## 2019-08-29 PROCEDURE — 93923 UPR/LXTR ART STDY 3+ LVLS: CPT

## 2019-08-29 PROCEDURE — 93923 UPR/LXTR ART STDY 3+ LVLS: CPT | Performed by: INTERNAL MEDICINE

## 2019-08-29 RX ORDER — BUMETANIDE 1 MG/1
2 TABLET ORAL DAILY PRN
COMMUNITY
End: 2020-11-16 | Stop reason: SDUPTHER

## 2019-08-29 RX ORDER — CLINDAMYCIN HYDROCHLORIDE 150 MG/1
150 CAPSULE ORAL AS NEEDED
COMMUNITY
End: 2019-11-11

## 2019-08-29 NOTE — PROGRESS NOTES
Joanna Cross  1950  68 y.o.  [phone]  [phone]      Date: 08/29/2019    PCP: Reynaldo Fritz MD    Chief Complaint   Patient presents with   • Atrial Fibrillation       Problem List:  1. Coronary artery disease  a. [facility] 2/15/2008, Dr Lutz.  Mild, non-obstructive CAD, normal EF  b. [facility] 1/9/2013, Dr Stevenson Sutton:  No LV gram done; mild, non-obstructive coronary artery disease.  c. [facility] 11/9/2015, [facility]:  EF 60%.  70% RCA lesion with Promus ZAINAB placement. 40-50% mid LAD, no FFR performed. Other small blockages noted.  No MR.  d. [facility] 11/15/2015, Dr Palacio @ [facility]:  Patent RCA stent, 40-50% LAD with FFR @ 0.92; mild inferior wall hypokinesis  e. [facility] 7/29/2016, [facility]:  Abnormal stress test.  Normal CORS with patent stent. LAD improved since last image EF 55-60%.  f. [facility], 02/01/2019: EF normal. Patent RCA stent, noncritical mid LAD with IFR 0.93, noncritical circ.    2. Peripheral vascular disease/chronic venous stasis  a. Venous duplex 9/17/2010: Insufficiency to venous hypertension in the great saphenous system bilaterally.  b. Venous duplex 2013: Right leg laser ablation of Dr. Garcia.  c. Venous duplex 5/6/2016: No evidence of DVT, no superficial, no thrmobophlebitis, no valvular insufficiency.  d. AA with runoffs 8/31/2016: Single-vessel [place]: No significant arterial disease.  3. Palpitations  a. Echocardiogram 2/13/2014: Dr. Teran.  Normal biventricular function; trace to mild MR.  Atrial septal aneurysm or  b. Echocardiogram 12/22/15: Dr. Chavez.  Borderline LVH, mild LAE, mild RV enlargement.  Mild to moderate MR and TR with RVSP of 46 mmHg.  c. Conclusion/3/2016: Dr. Palacio.  RV enlargement.  EF 50-55%.  Mild AI S, mild MR and TR with RVSP 37 mmHg.  4. PAF/SSS  a. [employer] PPM 2016  b. CHADS2 Vasc  = 5. On chronic warfarin  c. [facility], 12/26/2018: Successful cardioversion: AV paced  d. Echocardiogram, 12/25/2018:  EF 60%, mild MR/TR with RVSP  "29 mmHg. Sclerotic AoV, no AS/AI. Mild MAC.  e. [facility], 03/05/2019: Successful cardioversion.  5. TIA  a. Carotid duplex 2/13/2014 showed no significant flow-limiting stenosis.  Mild luminal disease present; both vertebrals are present.  6. Diabetes  7. Essential Hypertension  8. Hyperlipidemia  9. Hypothyroid  10. Hyponatremia  a. Secondary to SIADH  11. MILLI with CPAP  12. RLS  13. Parkinson's disease  14. GERD  15. Urinary Retention  a. S/P cystoscopy and ureter dilation, March 2018.  Dr. Terrence Mckenzie  16. Surgeries:  a. Rotator cuff repair  b. Cholecystectomy  c. Bilateral knee replacement  d. Tubal ligation  e. Breast surgery  f. Sinus surgery    Allergies   Allergen Reactions   • Toprol Xl [Metoprolol Tartrate] Shortness Of Breath     Extreme fatigue   • Amlodipine Besylate Swelling     Lower extremity (ankles, feet) swelling   • Entacapone Other (See Comments)     \"extreme weakness in legs - caused several falls, which stopped after discontinuing this medication\"   • Levemir [Insulin Detemir] Hives     Hives / rash around injection site   • Penicillins Hives     Jitteriness    • Codeine Unknown (See Comments)     Pt is unaware of what reaction she had   • Cortisone Other (See Comments)     MAKES BLOOD PRESSURE HIGH    • Levaquin [Levofloxacin] Other (See Comments)     Cannot take due to taking propafenone HCL- severe reaction with mixed.    • Xarelto [Rivaroxaban] GI Bleeding   • Bactrim [Sulfamethoxazole-Trimethoprim] Nausea Only     Headache    • Ciprofloxacin Diarrhea   • Cogentin [Benztropine] Other (See Comments)     \"uncontrollable body movements\"   • Compazine [Prochlorperazine Edisylate] Other (See Comments)     Dystonic reaction   • Duraprep [Antiseptic Products, Misc.] Itching and Rash     RASH AND ITCHING   • Haldol [Haloperidol Lactate] Other (See Comments)     Dystonic reaction   • Hydralazine Other (See Comments)     Headache    • Hydrocodone-Acetaminophen Nausea And Vomiting and Dizziness     " Headache, nausea    • Lisinopril Cough   • Statins Myalgia     Leg pain   • Tarka [Trandolapril-Verapamil Hcl Er] Other (See Comments)     Constipation        Current Medications:      Current Outpatient Medications:   •  albuterol (PROVENTIL) (2.5 MG/3ML) 0.083% nebulizer solution, Take 2.5 mg by nebulization Every 4 (Four) Hours As Needed., Disp: , Rfl:   •  albuterol (VENTOLIN HFA) 108 (90 Base) MCG/ACT inhaler, Inhale 1 puff Every 4 (Four) Hours As Needed., Disp: , Rfl:   •  amantadine (SYMMETREL) 100 MG capsule, Take 100 mg by mouth 2 (Two) Times a Day., Disp: , Rfl:   •  aspirin 81 MG EC tablet, Take 81 mg by mouth Daily., Disp: , Rfl:   •  B Complex-C (SUPER B COMPLEX PO), Take 1 tablet by mouth Daily., Disp: , Rfl:   •  bumetanide (BUMEX) 1 MG tablet, Take 1 mg by mouth Daily As Needed (for swelling)., Disp: , Rfl:   •  Calcium Carbonate (CALTRATE 600 PO), Take 600 mg by mouth Daily., Disp: , Rfl:   •  carbidopa-levodopa (SINEMET)  MG per tablet, Take  by mouth. 2 tablets at 0600, 1 tablet at 0900, 1200, 1500, 1800, 2100, Disp: , Rfl:   •  Cholecalciferol 2000 units tablet, Take 2,000 Units by mouth 2 (Two) Times a Day., Disp: , Rfl:   •  citalopram (CeleXA) 20 MG tablet, Take 20 mg by mouth every night at bedtime., Disp: , Rfl:   •  clindamycin (CLEOCIN) 150 MG capsule, Take 150 mg by mouth As Needed (4 tablets before dental procedure)., Disp: , Rfl:   •  clobetasol (TEMOVATE) 0.05 % cream, Apply 1 application topically 2 (Two) Times a Day As Needed (Lichens Sclerosis)., Disp: , Rfl:   •  CloNIDine (CATAPRES) 0.1 MG tablet, TAKE 1 TABLET EVERY 12 HOURS, Disp: 180 tablet, Rfl: 3  •  dofetilide (TIKOSYN) 500 MCG capsule, Take 1 capsule by mouth Every 12 (Twelve) Hours., Disp: 180 capsule, Rfl: 3  •  ELIQUIS 5 MG tablet tablet, TAKE 1 TABLET EVERY 12 HOURS, Disp: 180 tablet, Rfl: 3  •  insulin glargine (LANTUS) 100 UNIT/ML injection, Inject 28 Units under the skin into the appropriate area as directed  Daily. ACCORDING TO SUGAR LEVELS , Disp: , Rfl:   •  IPRATROPIUM BROMIDE NA, 1 spray into the nostril(s) as directed by provider 2 (Two) Times a Day As Needed., Disp: , Rfl:   •  Loratadine 10 MG capsule, Take 10 mg by mouth Daily., Disp: , Rfl:   •  losartan (COZAAR) 50 MG tablet, TAKE 1 TABLET EVERY 12 HOURS (Patient taking differently: PT TAKING 1/2 TAB TWICE A DAY), Disp: 180 tablet, Rfl: 3  •  metFORMIN (GLUCOPHAGE) 1000 MG tablet, Take 1,000 mg by mouth 2 (Two) Times a Day With Meals., Disp: , Rfl:   •  Mirabegron ER (MYRBETRIQ) 50 MG tablet sustained-release 24 hour 24 hr tablet, Take 50 mg by mouth Daily., Disp: , Rfl:   •  Multiple Vitamins-Minerals (CENTRUM ULTRA WOMENS PO), Take 1 tablet by mouth Daily., Disp: , Rfl:   •  nitroglycerin (NITROLINGUAL) 0.4 MG/SPRAY spray, Place 1 spray under the tongue Every 5 (Five) Minutes As Needed for Chest Pain., Disp: 1 each, Rfl: 6  •  O2 (OXYGEN), Inhale 2 L/min 1 (One) Time. 2L while sleeping, Disp: , Rfl:   •  omeprazole (priLOSEC) 40 MG capsule, Take 40 mg by mouth 2 (Two) Times a Day., Disp: , Rfl:   •  potassium chloride (MICRO-K) 10 MEQ CR capsule, Take 10 mEq by mouth 2 (Two) Times a Day., Disp: , Rfl:   •  pramipexole (MIRAPEX) 1.5 MG tablet, Take 1.5 mg by mouth Every Night., Disp: , Rfl:   •  ranolazine (RANEXA) 500 MG 12 hr tablet, Take 1 tablet by mouth Every 12 (Twelve) Hours., Disp: 180 tablet, Rfl: 3  •  sennosides-docusate sodium (SENOKOT-S) 8.6-50 MG tablet, Take 1 tablet by mouth Daily., Disp: , Rfl:   •  spironolactone (ALDACTONE) 25 MG tablet, Take 1 tablet by mouth Daily. (Patient taking differently: Take 25 mg by mouth Daily As Needed (swelling).), Disp: 90 tablet, Rfl: 1  •  SYNTHROID 200 MCG tablet, Take 200 mcg by mouth Daily., Disp: , Rfl:   •  traMADol (ULTRAM) 50 MG tablet, Take 50 mg by mouth Every 8 (Eight) Hours As Needed for Moderate Pain ., Disp: , Rfl:   •  vitamin C (ASCORBIC ACID) 500 MG tablet, Take 500 mg by mouth Daily.  "Chewable tablet, Disp: , Rfl:   •  carbonyl iron (FEOSOL) 45 MG tablet tablet, Take 1 tablet by mouth 2 (Two) Times a Day., Disp: , Rfl:     HPI    Joanna Cross is a 68 y.o. female who presents today for hospital follow up of Tikosyn initiation for persistent atrial fibrillation. Since her discharge, she has been experiencing heavy diaphoresis, which she feels began after she was started on Tikosyn. She monitors her blood pressure at least twice per day, with typically between 115-140 mmHg systolic, and occasional low readings. Patient is currently following a weight loss program and has lost 25 lbs overall. Patient reports myalgias with all statins. Her PCP prescribed her Livalo, but she has not tried it yet. She also complains of pain and weakness in her legs on walking. This does not occur when she walks short distances in her house, and resolves with rest. Patient also reports bilateral lower extremity edema. She is on spironolactone and Bumex prn. She takes spironolactone every other day on average, but does not feel that it is very effective in managing her fluid retention. She takes her Bumex 2-3 times per week as needed, and feels that it is more effective for her edema. Patient denies chest pain, palpitations, dizziness, and syncope.     The following portions of the patient's history were reviewed and updated as appropriate: allergies, current medications and problem list.    Pertinent positives as listed in the HPI.  All other systems reviewed are negative.    Vitals:    08/29/19 1434   BP: 112/76   BP Location: Right arm   Patient Position: Sitting   Pulse: 72   SpO2: 95%   Weight: 102 kg (225 lb)   Height: 160 cm (63\")       Physical Exam:    General: Alert and oriented.  Neck: Jugular venous pressure is within normal limits. Carotids have normal upstrokes without bruits.   Cardiovascular: Heart has a nondisplaced focal PMI. Regular rate and rhythm without murmur, gallop or rub.  Lungs: Clear " without rales or wheezes. Equal expansion is noted.   Extremities: Show no edema.  Skin: Warm and dry.  Neurologic: Nonfocal.    Diagnostic Data:  Lab Results   Component Value Date    GLUCOSE 107 (H) 06/12/2019    BUN 24 (H) 06/12/2019    CREATININE 0.85 06/12/2019    EGFRIFNONA 66 06/12/2019    BCR 28.2 (H) 06/12/2019     (L) 06/12/2019    K 4.7 06/12/2019    CL 98 06/12/2019    CO2 26.0 06/12/2019    CALCIUM 9.1 06/12/2019    ALBUMIN 4.45 01/31/2019    AST 21 01/31/2019    ALT 12 01/31/2019     Lab Results   Component Value Date    WBC 10.56 06/06/2019    HGB 13.1 06/06/2019    HCT 40.9 06/06/2019    MCV 91.5 06/06/2019     06/06/2019      FLP, 07/26/2019:     TG 97  HDL 40      Compared to previous FLP, February 2019:  Lab Results   Component Value Date    CHOL 185 02/01/2019    TRIG 78 02/01/2019    HDL 43 02/01/2019     (H) 02/01/2019          ECG 12 Lead  Date/Time: 8/29/2019 3:03 PM  Performed by: Albertina Corona MD  Authorized by: Albertina Corona MD   Comparison: compared with previous ECG from 7/22/2019  Comparison to previous ECG: No evidence of 1st degree AV block on today's ECG, otherwise similar  Rhythm: sinus rhythm  BPM: 70    Clinical impression: normal ECG            Assessment:      ICD-10-CM ICD-9-CM   1. Paroxysmal atrial fibrillation (CMS/[facility]) I48.0 427.31   2. Coronary artery disease involving native coronary artery of native heart without angina pectoris I25.10 414.01   3. Peripheral vascular disease (CMS/HCC) I73.9 443.9   4. Tachy-thai syndrome (CMS/HCC) I49.5 427.81   5. Essential hypertension I10 401.9   6. Hyperlipidemia LDL goal <70 E78.5 272.4   7. Claudication (CMS/HCC) I73.9 443.9     Lab results and ECG found above were reviewed with the patient.    Plan:    1. Schedule GLYNN for claudication.  2. Continue Eliquis 5 mg for stroke prophylaxis with PAF.  3. Continue aspirin for CAD and PVD.  4. Continue clonidine, losartan for  hypertension.  5. May take diuretics as needed but needs to take the Bumex and spironolactone together or not at all.  6. Continue all other current medications.  7. F/up in 6 months or sooner if needed.    Scribed for Albertina Corona MD by Promise Velázquez. 8/29/2019  3:21 PM    Albertina Corona MD, [facility]

## 2019-09-11 ENCOUNTER — HOSPITAL ENCOUNTER (OUTPATIENT)
Dept: GENERAL RADIOLOGY | Facility: HOSPITAL | Age: 69
Discharge: HOME | End: 2019-09-11
Admitting: PHYSICIAN ASSISTANT

## 2019-09-11 PROCEDURE — 72110 X-RAY EXAM L-2 SPINE 4/>VWS: CPT

## 2019-09-12 ENCOUNTER — OFFICE VISIT (OUTPATIENT)
Dept: NEUROSURGERY | Facility: CLINIC | Age: 69
End: 2019-09-12

## 2019-09-12 VITALS
HEIGHT: 63 IN | TEMPERATURE: 97 F | SYSTOLIC BLOOD PRESSURE: 140 MMHG | BODY MASS INDEX: 40.04 KG/M2 | DIASTOLIC BLOOD PRESSURE: 60 MMHG | WEIGHT: 226 LBS

## 2019-09-12 DIAGNOSIS — M48.062 SPINAL STENOSIS, LUMBAR REGION, WITH NEUROGENIC CLAUDICATION: Primary | ICD-10-CM

## 2019-09-12 PROCEDURE — 99213 OFFICE O/P EST LOW 20 MIN: CPT | Performed by: PHYSICIAN ASSISTANT

## 2019-09-12 NOTE — PROGRESS NOTES
Patient: Joanna Cross  : 1950  Gender: female    Primary Care Provider: Reynaldo Fritz MD      Chief Complaint:   Chief Complaint   Patient presents with   • Follow-up       History of Present Illness:  Ms. Cross is a 68-year-old female who presents today for a follow-up visit.  She is well-known to our clinic after undergoing an L4 laminectomy and L5 hemilaminectomy by Dr. Gamino in 2018 followed by wound debridement by Dr. Garíca shortly after.  Over the last couple of months she developed worsening back and right hip pain with associated right foot numbness. She has difficulty walking distances longer than a few feet and if she is walking briskly. She does have an extensive past medical history including peripheral vascular disease, diabetes and Parkinson's.  She continues to take tramadol at night.  She presents today with flexion-extension lumbar x-rays.      Past Medical and Surgical History:  Past Medical History:   Diagnosis Date   • Anemia    • Atrial fibrillation (CMS/HCC)    • AVM (arteriovenous malformation)    • CAD (coronary artery disease)    • Depression    • Disease of thyroid gland    • DM2 (diabetes mellitus, type 2) (CMS/HCC)     checks sugar twice per day    • Essential hypertension    • Generalized osteoarthritis    • GERD (gastroesophageal reflux disease)    • GIB (gastrointestinal bleeding) 2016    d/t xarelto    • Headache    • Hiatal hernia    • History of shingles    • History of transfusion    • IBS (irritable bowel syndrome)    • Lichen sclerosus    • Mixed hyperlipidemia    • Morbid obesity (CMS/HCC)    • MRSA infection    • Myocardial infarction (CMS/HCC)    • On home oxygen therapy     2L of oxygen via CPAP while sleeping    • MILLI on CPAP        • Osteoporosis    • Pacemaker    • Parkinson disease (CMS/HCC)    • Peripheral vascular disease (CMS/HCC)    • Pleurisy    • Pneumonia    • Seborrheic dermatitis    • Skin cancer     on back    • SOBOE (shortness  of breath on exertion)    • SSS (sick sinus syndrome) (CMS/HCC)    • TIA (transient ischemic attack)    • Varicose vein of leg    • Venous insufficiency of both lower extremities      Past Surgical History:   Procedure Laterality Date   • BACK SURGERY      l4-l5 laminectomy    • BICEPS TENDON REPAIR Right     shoulder   • BREAST BIOPSY Left 2004   • BUNIONECTOMY Right    • CARDIAC CATHETERIZATION     • CARDIAC CATHETERIZATION N/A 2/1/2019    Procedure: Left Heart Cath;  Surgeon: Albertina Corona MD;  Location:  [facility];  Service: Cardiology   • CHOLECYSTECTOMY     • COLONOSCOPY  2016   • CORONARY STENT PLACEMENT      x1 stent   • CYST REMOVAL      left ear, upper left back 2001   • DIAGNOSTIC LAPAROSCOPY  1981   • ENDOSCOPIC FUNCTIONAL SINUS SURGERY (FESS)  2011   • ENDOSCOPY  2016   • HAMMER TOE REPAIR Left    • INCISION AND DRAINAGE OF WOUND  2018   • JOINT REPLACEMENT     • LIPOMA EXCISION  1999    left leg    • LUMBAR LAMINECTOMY DISCECTOMY DECOMPRESSION N/A 7/6/2018    Procedure: LUMBAR LAMINECTOMY L4-5, HEMILAMIINECTOMY RIGHT L5-S1, FORAMINOTOMY L5-S1;  Surgeon: Lencho Gamino MD;  Location:  [facility];  Service: Neurosurgery   • LUMBAR LAMINECTOMY DISCECTOMY DECOMPRESSION N/A 9/19/2018    Procedure: INCISION AND DRAINAGE BACK WITH WOUND EXPLORATION;  Surgeon: Ritchie García MD;  Location:  [facility];  Service: Neurosurgery   • LUNG BIOPSY Left 2016   • PACEMAKER IMPLANTATION  11/2016    sss   • REPLACEMENT TOTAL KNEE Bilateral     left knee 2011, right 2012 per dr acuna    • REPLACEMENT TOTAL KNEE Bilateral    • SHOULDER ARTHROSCOPY Bilateral     2003-left, 2004- right    • SKIN BIOPSY      skin cancer back 2016   • TUBAL ABDOMINAL LIGATION  1980   • WISDOM TOOTH EXTRACTION         Current Medications:    Current Outpatient Medications:   •  albuterol (PROVENTIL) (2.5 MG/3ML) 0.083% nebulizer solution, Take 2.5 mg by nebulization Every 4 (Four) Hours As Needed., Disp: , Rfl:   •   albuterol (VENTOLIN HFA) 108 (90 Base) MCG/ACT inhaler, Inhale 1 puff Every 4 (Four) Hours As Needed., Disp: , Rfl:   •  amantadine (SYMMETREL) 100 MG capsule, Take 100 mg by mouth 2 (Two) Times a Day., Disp: , Rfl:   •  aspirin 81 MG EC tablet, Take 81 mg by mouth Daily., Disp: , Rfl:   •  B Complex-C (SUPER B COMPLEX PO), Take 1 tablet by mouth Daily., Disp: , Rfl:   •  bumetanide (BUMEX) 1 MG tablet, Take 1 mg by mouth Daily As Needed (for swelling)., Disp: , Rfl:   •  Calcium Carbonate (CALTRATE 600 PO), Take 600 mg by mouth Daily., Disp: , Rfl:   •  carbidopa-levodopa (SINEMET)  MG per tablet, Take  by mouth. 2 tablets at 0600, 1 tablet at 0900, 1200, 1500, 1800, 2100, Disp: , Rfl:   •  carbonyl iron (FEOSOL) 45 MG tablet tablet, Take 1 tablet by mouth 2 (Two) Times a Day., Disp: , Rfl:   •  Cholecalciferol 2000 units tablet, Take 2,000 Units by mouth 2 (Two) Times a Day., Disp: , Rfl:   •  citalopram (CeleXA) 20 MG tablet, Take 20 mg by mouth every night at bedtime., Disp: , Rfl:   •  clindamycin (CLEOCIN) 150 MG capsule, Take 150 mg by mouth As Needed (4 tablets before dental procedure)., Disp: , Rfl:   •  clobetasol (TEMOVATE) 0.05 % cream, Apply 1 application topically 2 (Two) Times a Day As Needed (Lichens Sclerosis)., Disp: , Rfl:   •  CloNIDine (CATAPRES) 0.1 MG tablet, TAKE 1 TABLET EVERY 12 HOURS, Disp: 180 tablet, Rfl: 3  •  dofetilide (TIKOSYN) 500 MCG capsule, Take 1 capsule by mouth Every 12 (Twelve) Hours., Disp: 180 capsule, Rfl: 3  •  ELIQUIS 5 MG tablet tablet, TAKE 1 TABLET EVERY 12 HOURS, Disp: 180 tablet, Rfl: 3  •  insulin glargine (LANTUS) 100 UNIT/ML injection, Inject 28 Units under the skin into the appropriate area as directed Daily. ACCORDING TO SUGAR LEVELS , Disp: , Rfl:   •  IPRATROPIUM BROMIDE NA, 1 spray into the nostril(s) as directed by provider 2 (Two) Times a Day As Needed., Disp: , Rfl:   •  Loratadine 10 MG capsule, Take 10 mg by mouth Daily., Disp: , Rfl:   •   losartan (COZAAR) 50 MG tablet, TAKE 1 TABLET EVERY 12 HOURS (Patient taking differently: PT TAKING 1/2 TAB TWICE A DAY), Disp: 180 tablet, Rfl: 3  •  metFORMIN (GLUCOPHAGE) 1000 MG tablet, Take 1,000 mg by mouth 2 (Two) Times a Day With Meals., Disp: , Rfl:   •  Mirabegron ER (MYRBETRIQ) 50 MG tablet sustained-release 24 hour 24 hr tablet, Take 50 mg by mouth Daily., Disp: , Rfl:   •  Multiple Vitamins-Minerals (CENTRUM ULTRA WOMENS PO), Take 1 tablet by mouth Daily., Disp: , Rfl:   •  nitroglycerin (NITROLINGUAL) 0.4 MG/SPRAY spray, Place 1 spray under the tongue Every 5 (Five) Minutes As Needed for Chest Pain., Disp: 1 each, Rfl: 6  •  O2 (OXYGEN), Inhale 2 L/min 1 (One) Time. 2L while sleeping, Disp: , Rfl:   •  omeprazole (priLOSEC) 40 MG capsule, Take 40 mg by mouth 2 (Two) Times a Day., Disp: , Rfl:   •  potassium chloride (MICRO-K) 10 MEQ CR capsule, Take 10 mEq by mouth 2 (Two) Times a Day., Disp: , Rfl:   •  pramipexole (MIRAPEX) 1.5 MG tablet, Take 1.5 mg by mouth Every Night., Disp: , Rfl:   •  ranolazine (RANEXA) 500 MG 12 hr tablet, Take 1 tablet by mouth Every 12 (Twelve) Hours., Disp: 180 tablet, Rfl: 3  •  sennosides-docusate sodium (SENOKOT-S) 8.6-50 MG tablet, Take 1 tablet by mouth Daily., Disp: , Rfl:   •  spironolactone (ALDACTONE) 25 MG tablet, Take 1 tablet by mouth Daily. (Patient taking differently: Take 25 mg by mouth Daily As Needed (swelling).), Disp: 90 tablet, Rfl: 1  •  SYNTHROID 200 MCG tablet, Take 200 mcg by mouth Daily., Disp: , Rfl:   •  traMADol (ULTRAM) 50 MG tablet, Take 50 mg by mouth Every 8 (Eight) Hours As Needed for Moderate Pain ., Disp: , Rfl:   •  vitamin C (ASCORBIC ACID) 500 MG tablet, Take 500 mg by mouth Daily. Chewable tablet, Disp: , Rfl:     Allergies:  Allergies   Allergen Reactions   • Toprol Xl [Metoprolol Tartrate] Shortness Of Breath     Extreme fatigue   • Amlodipine Besylate Swelling     Lower extremity (ankles, feet) swelling   • Entacapone Other (See  "Comments)     \"extreme weakness in legs - caused several falls, which stopped after discontinuing this medication\"   • Levemir [Insulin Detemir] Hives     Hives / rash around injection site   • Penicillins Hives     Jitteriness    • Codeine Unknown (See Comments)     Pt is unaware of what reaction she had   • Cortisone Other (See Comments)     MAKES BLOOD PRESSURE HIGH    • Levaquin [Levofloxacin] Other (See Comments)     Cannot take due to taking propafenone HCL- severe reaction with mixed.    • Xarelto [Rivaroxaban] GI Bleeding   • Bactrim [Sulfamethoxazole-Trimethoprim] Nausea Only     Headache    • Ciprofloxacin Diarrhea   • Cogentin [Benztropine] Other (See Comments)     \"uncontrollable body movements\"   • Compazine [Prochlorperazine Edisylate] Other (See Comments)     Dystonic reaction   • Duraprep [Antiseptic Products, Misc.] Itching and Rash     RASH AND ITCHING   • Haldol [Haloperidol Lactate] Other (See Comments)     Dystonic reaction   • Hydralazine Other (See Comments)     Headache    • Hydrocodone-Acetaminophen Nausea And Vomiting and Dizziness     Headache, nausea    • Lisinopril Cough   • Statins Myalgia     Leg pain   • Tarka [Trandolapril-Verapamil Hcl Er] Other (See Comments)     Constipation          Review of Systems   Musculoskeletal: Positive for back pain.   All other systems reviewed and are negative.        Physical Exam   Constitutional: She is oriented to person, place, and time. She appears well-developed and well-nourished.   HENT:   Head: Normocephalic and atraumatic.   Neck: Normal range of motion. Neck supple.   Musculoskeletal: Normal range of motion.   Neurological: She is alert and oriented to person, place, and time.   Skin: Skin is warm and dry.   Psychiatric: She has a normal mood and affect.          Vitals:    09/12/19 1507   BP: 140/60   Temp: 97 °F (36.1 °C)   Weight: 103 kg (226 lb)   Height: 160 cm (63\")         Imaging Review:  XR lumbar spine AP and lateral " flexion-extension (9/11/2019): Scoliotic curvature identified of the spine with convexity to the right. There is stable alignment in both flexion and extension views. No fracture or dislocation with multilevel degenerative changes  present.    Assessment:  Lumbar stenosis with neurogenic claudication    Plan:  Ms. Cross is seen today in follow-up for back and right lower extremity pain.  Her pain appears to be unchanged since last visit and is now associated with worsening numbness of the lateral right foot. Flexion/extension lumbar xrays were reviewed and appear stable. She does have significant scoliosis, with convexity to the right centered at the L2 level.  This may correlate with her current right-sided symptoms.  I have recommended a pre-fabricated rigid LSO brace for her for support the scoliotic curvature in her lumbar spine and to encourage better posture.    She recently underwent a lower extremity doppler study which was considered normal. She is currently pursuing a second opinion with a vascular surgeon at  for peripheral vascular disease.  I asked that she contact our office following this visit and if vascular claudication is completely ruled out, we will pursue additional imaging if symptoms do not resolve.        Tere Guevara PA-C

## 2019-09-18 RX ORDER — SPIRONOLACTONE 25 MG/1
25 TABLET ORAL DAILY PRN
Qty: 90 TABLET | Refills: 1 | Status: SHIPPED | OUTPATIENT
Start: 2019-09-18 | End: 2020-10-07 | Stop reason: SDUPTHER

## 2019-09-26 ENCOUNTER — OFFICE VISIT (OUTPATIENT)
Dept: CARDIOLOGY | Facility: CLINIC | Age: 69
End: 2019-09-26

## 2019-09-26 VITALS
OXYGEN SATURATION: 96 % | SYSTOLIC BLOOD PRESSURE: 148 MMHG | HEIGHT: 63 IN | DIASTOLIC BLOOD PRESSURE: 80 MMHG | WEIGHT: 226 LBS | HEART RATE: 81 BPM | BODY MASS INDEX: 40.04 KG/M2

## 2019-09-26 DIAGNOSIS — I48.0 PAROXYSMAL ATRIAL FIBRILLATION (HCC): Primary | ICD-10-CM

## 2019-09-26 PROCEDURE — 99214 OFFICE O/P EST MOD 30 MIN: CPT | Performed by: PHYSICIAN ASSISTANT

## 2019-09-26 PROCEDURE — 93280 PM DEVICE PROGR EVAL DUAL: CPT | Performed by: PHYSICIAN ASSISTANT

## 2019-09-26 NOTE — PROGRESS NOTES
Joanna Cross  1950  PCP: Reynaldo Fritz MD    SUBJECTIVE:   Joanna Cross is a 68 y.o. female seen for a follow up visit regarding the following:     Chief Complaint: Follow up for afib     HPI:    Since last visit the patient's status has been unchanged. She continues to note improvement since starting Tikosyn. No cp, sob, edema, palps.     History:  This is a 68-year-old patient referred by Dr. Corona for further evaluation and management of atrial fibrillation.  Patient has been diagnosed with atrial fibrillation since around 2016.  She has been plagued by tachybradycardia syndrome episodes in the past.  She has undergone cardioversions in the past but unable to maintain sinus rhythm more than a few days at a time.  She is been treated with Profafenone in the past as well with no improvement in her atrial fibrillation burden.  Her biggest complaint is ongoing fatigue with occasional palpitations.    Cardiac PMH: (Old records have been reviewed and summarized below)  1. Coronary artery disease  a. [facility] 2/15/2008, Dr Lutz.  Mild, non-obstructive CAD, normal EF  b. [facility] 1/9/2013, Dr Stevenson Sutton:  No LV gram done; mild, non-obstructive coronary artery disease.  c. [facility] 11/9/2015, [facility]:  EF 60%.  70% RCA lesion with Promus ZAINAB placement. 40-50% mid LAD, no FFR performed. Other small blockages noted.  No MR.  d. [facility] 11/15/2015, Dr Palacio @ [facility]:  Patent RCA stent, 40-50% LAD with FFR @ 0.92; mild inferior wall hypokinesis  e. [facility] 7/29/2016, [facility]:  Abnormal stress test.  Normal CORS with patent stent. LAD improved since last image EF 55-60%  f. [facility] 2/1/19:  EF normal.  Patent RCA, noncritical mid LAD with IFR 0.93, noncritical circ.    2. Peripheral vascular disease/chronic venous stasis  a. Venous duplex 9/17/2010: Insufficiency to venous hypertension in the great saphenous system bilaterally.  b. Venous duplex 2013: Right leg laser ablation of   Jose.  c. Venous duplex 5/6/2016: No evidence of DVT, no superficial, no thrmobophlebitis, no valvular insufficiency.  d. AA with runoffs 8/31/2016: Single-vessel Moody: No significant arterial disease.  3. Palpitations  a. Echocardiogram 2/13/2014: Dr. Teran.  Normal biventricular function; trace to mild MR.  Atrial septal aneurysm or  b. Echocardiogram 12/22/15: Dr. Chavez.  Borderline LVH, mild LAE, mild RV enlargement.  Mild to moderate MR and TR with RVSP of 46 mmHg.  c. Conclusion/3/2016: Dr. Palaico.  RV enlargement.  EF 50-55%.  Mild AI S, mild MR and TR with RVSP 37 mmHg.  4. AF/SSS  a. [employer] PPM 2016  b. CHADS2 Vasc  = 5. On Eliquis   c. TIA: Carotid duplex 2/13/2014 showed no significant flow-limiting stenosis.  Mild luminal disease present; both vertebrals are present.  d. ECV 12/26/2018:  Successful cardioversion: AV paced  e. Echo 12/25/18:  EF 60%, mild MR/ TR with RVSP 29 mmHg.  Sclerotic AoV  5. Diabetes  6. Essential Hypertension  7. Hyperlipidemia  8. Hypothyroid  9. Hyponatremia  a. Secondary to SIADH  10. MILLI with CPAP  11. RLS  12. Parkinson's disease  13. GERD  14. Urinary Retention  a. S/P cystoscopy and ureter dilation, March 2018.  Dr. Terrence Mckenzie  15. Surgeries:  a. Rotator cuff repair  b. Cholecystectomy  c. Bilateral knee replacement  d. Tubal ligation  e. Breast surgery  f. Sinus surgery      Current Outpatient Medications:   •  albuterol (PROVENTIL) (2.5 MG/3ML) 0.083% nebulizer solution, Take 2.5 mg by nebulization Every 4 (Four) Hours As Needed., Disp: , Rfl:   •  albuterol (VENTOLIN HFA) 108 (90 Base) MCG/ACT inhaler, Inhale 1 puff Every 4 (Four) Hours As Needed., Disp: , Rfl:   •  amantadine (SYMMETREL) 100 MG capsule, Take 100 mg by mouth 2 (Two) Times a Day., Disp: , Rfl:   •  aspirin 81 MG EC tablet, Take 81 mg by mouth Daily., Disp: , Rfl:   •  B Complex-C (SUPER B COMPLEX PO), Take 1 tablet by mouth Daily., Disp: , Rfl:   •  bumetanide (BUMEX) 1 MG  tablet, Take 1 mg by mouth Daily As Needed (for swelling)., Disp: , Rfl:   •  Calcium Carbonate (CALTRATE 600 PO), Take 600 mg by mouth Daily., Disp: , Rfl:   •  carbidopa-levodopa (SINEMET)  MG per tablet, Take  by mouth. 2 tablets at 0600, 1 tablet at 0900, 1200, 1500, 1800, 2100, Disp: , Rfl:   •  carbonyl iron (FEOSOL) 45 MG tablet tablet, Take 1 tablet by mouth 2 (Two) Times a Day., Disp: , Rfl:   •  Cholecalciferol 2000 units tablet, Take 2,000 Units by mouth 2 (Two) Times a Day., Disp: , Rfl:   •  citalopram (CeleXA) 20 MG tablet, Take 20 mg by mouth every night at bedtime., Disp: , Rfl:   •  clindamycin (CLEOCIN) 150 MG capsule, Take 150 mg by mouth As Needed (4 tablets before dental procedure)., Disp: , Rfl:   •  clobetasol (TEMOVATE) 0.05 % cream, Apply 1 application topically 2 (Two) Times a Day As Needed (Lichens Sclerosis)., Disp: , Rfl:   •  CloNIDine (CATAPRES) 0.1 MG tablet, TAKE 1 TABLET EVERY 12 HOURS, Disp: 180 tablet, Rfl: 3  •  dofetilide (TIKOSYN) 500 MCG capsule, Take 1 capsule by mouth Every 12 (Twelve) Hours., Disp: 180 capsule, Rfl: 3  •  ELIQUIS 5 MG tablet tablet, TAKE 1 TABLET EVERY 12 HOURS, Disp: 180 tablet, Rfl: 3  •  insulin glargine (LANTUS) 100 UNIT/ML injection, Inject 28 Units under the skin into the appropriate area as directed Daily. ACCORDING TO SUGAR LEVELS , Disp: , Rfl:   •  IPRATROPIUM BROMIDE NA, 1 spray into the nostril(s) as directed by provider 2 (Two) Times a Day As Needed., Disp: , Rfl:   •  Loratadine 10 MG capsule, Take 10 mg by mouth Daily., Disp: , Rfl:   •  losartan (COZAAR) 50 MG tablet, TAKE 1 TABLET EVERY 12 HOURS (Patient taking differently: PT TAKING 1/2 TAB TWICE A DAY), Disp: 180 tablet, Rfl: 3  •  metFORMIN (GLUCOPHAGE) 1000 MG tablet, Take 1,000 mg by mouth 2 (Two) Times a Day With Meals., Disp: , Rfl:   •  Mirabegron ER (MYRBETRIQ) 50 MG tablet sustained-release 24 hour 24 hr tablet, Take 50 mg by mouth Daily., Disp: , Rfl:   •  Multiple  Vitamins-Minerals (CENTRUM ULTRA WOMENS PO), Take 1 tablet by mouth Daily., Disp: , Rfl:   •  nitroglycerin (NITROLINGUAL) 0.4 MG/SPRAY spray, Place 1 spray under the tongue Every 5 (Five) Minutes As Needed for Chest Pain., Disp: 1 each, Rfl: 6  •  O2 (OXYGEN), Inhale 2 L/min 1 (One) Time. 2L while sleeping, Disp: , Rfl:   •  omeprazole (priLOSEC) 40 MG capsule, Take 40 mg by mouth 2 (Two) Times a Day., Disp: , Rfl:   •  potassium chloride (MICRO-K) 10 MEQ CR capsule, Take 10 mEq by mouth 2 (Two) Times a Day., Disp: , Rfl:   •  pramipexole (MIRAPEX) 1.5 MG tablet, Take 1.5 mg by mouth Every Night., Disp: , Rfl:   •  ranolazine (RANEXA) 500 MG 12 hr tablet, Take 1 tablet by mouth Every 12 (Twelve) Hours., Disp: 180 tablet, Rfl: 3  •  sennosides-docusate sodium (SENOKOT-S) 8.6-50 MG tablet, Take 1 tablet by mouth Daily., Disp: , Rfl:   •  spironolactone (ALDACTONE) 25 MG tablet, Take 1 tablet by mouth Daily As Needed (swelling)., Disp: 90 tablet, Rfl: 1  •  SYNTHROID 200 MCG tablet, Take 200 mcg by mouth Daily., Disp: , Rfl:   •  traMADol (ULTRAM) 50 MG tablet, Take 50 mg by mouth Every 8 (Eight) Hours As Needed for Moderate Pain ., Disp: , Rfl:   •  vitamin C (ASCORBIC ACID) 500 MG tablet, Take 500 mg by mouth Daily. Chewable tablet, Disp: , Rfl:     Past Medical History, Past Surgical History, Family history, Social History, and Medications were all reviewed with the patient today and updated as necessary.       Patient Active Problem List   Diagnosis   • Coronary artery disease involving native coronary artery without angina pectoris   • Essential hypertension   • Peripheral vascular disease (CMS/HCC)   • Hyperlipidemia LDL goal <70   • Chronic atrial fibrillation (CMS/HCC)   • Spinal stenosis, lumbar region, with neurogenic claudication   • Diabetes mellitus (CMS/HCC)   • Delayed surgical wound healing   • Biceps tendinitis   • Overactive bladder   • GERD (gastroesophageal reflux disease)   • Hypothyroidism   •  "Lichen sclerosus   • Parkinson's disease (CMS/HCC)   • MILLI treated with BiPAP - Patient reports compliance.    • History of respiratory failure - prior respiratory failure requiring mechanical ventilation, open lung biopsy non-specific.    • SOB (shortness of breath)   • A-fib (CMS/HCC)   • Chest pain   • Atrial fibrillation with RVR (CMS/HCC)   • Renal insufficiency   • CAD in native artery   • Persistent atrial fibrillation (CMS/HCC)   • Tachy-thai syndrome (CMS/HCC)     Allergies   Allergen Reactions   • Toprol Xl [Metoprolol Tartrate] Shortness Of Breath     Extreme fatigue   • Amlodipine Besylate Swelling     Lower extremity (ankles, feet) swelling   • Entacapone Other (See Comments)     \"extreme weakness in legs - caused several falls, which stopped after discontinuing this medication\"   • Levemir [Insulin Detemir] Hives     Hives / rash around injection site   • Penicillins Hives     Jitteriness    • Codeine Unknown (See Comments)     Pt is unaware of what reaction she had   • Cortisone Other (See Comments)     MAKES BLOOD PRESSURE HIGH    • Levaquin [Levofloxacin] Other (See Comments)     Cannot take due to taking propafenone HCL- severe reaction with mixed.    • Xarelto [Rivaroxaban] GI Bleeding   • Bactrim [Sulfamethoxazole-Trimethoprim] Nausea Only     Headache    • Ciprofloxacin Diarrhea   • Cogentin [Benztropine] Other (See Comments)     \"uncontrollable body movements\"   • Compazine [Prochlorperazine Edisylate] Other (See Comments)     Dystonic reaction   • Duraprep [Antiseptic Products, Misc.] Itching and Rash     RASH AND ITCHING   • Haldol [Haloperidol Lactate] Other (See Comments)     Dystonic reaction   • Hydralazine Other (See Comments)     Headache    • Hydrocodone-Acetaminophen Nausea And Vomiting and Dizziness     Headache, nausea    • Lisinopril Cough   • Statins Myalgia     Leg pain   • Tarka [Trandolapril-Verapamil Hcl Er] Other (See Comments)     Constipation      Past Medical History: "   Diagnosis Date   • Anemia    • Atrial fibrillation (CMS/HCC)    • AVM (arteriovenous malformation)    • CAD (coronary artery disease)    • Depression    • Disease of thyroid gland    • DM2 (diabetes mellitus, type 2) (CMS/HCC)     checks sugar twice per day    • Essential hypertension    • Generalized osteoarthritis    • GERD (gastroesophageal reflux disease)    • GIB (gastrointestinal bleeding) 2016    d/t xarelto    • Headache    • Hiatal hernia    • History of shingles    • History of transfusion 2016   • IBS (irritable bowel syndrome)    • Lichen sclerosus    • Mixed hyperlipidemia    • Morbid obesity (CMS/HCC)    • MRSA infection 2018   • Myocardial infarction (CMS/HCC)    • On home oxygen therapy     2L of oxygen via CPAP while sleeping    • MILLI on CPAP     18/14   • Osteoporosis    • Pacemaker    • Parkinson disease (CMS/HCC)    • Peripheral vascular disease (CMS/HCC)    • Pleurisy    • Pneumonia    • Seborrheic dermatitis    • Skin cancer     on back    • SOBOE (shortness of breath on exertion)    • SSS (sick sinus syndrome) (CMS/HCC)    • TIA (transient ischemic attack)    • Varicose vein of leg    • Venous insufficiency of both lower extremities      Past Surgical History:   Procedure Laterality Date   • BACK SURGERY      l4-l5 laminectomy    • BICEPS TENDON REPAIR Right     shoulder   • BREAST BIOPSY Left 2004   • BUNIONECTOMY Right    • CARDIAC CATHETERIZATION     • CARDIAC CATHETERIZATION N/A 2/1/2019    Procedure: Left Heart Cath;  Surgeon: Albertina Corona MD;  Location: [facility] INVASIVE LOCATION;  Service: Cardiology   • CHOLECYSTECTOMY     • COLONOSCOPY  2016   • CORONARY STENT PLACEMENT      x1 stent   • CYST REMOVAL      left ear, upper left back 2001   • DIAGNOSTIC LAPAROSCOPY  1981   • ENDOSCOPIC FUNCTIONAL SINUS SURGERY (FESS)  2011   • ENDOSCOPY  2016   • HAMMER TOE REPAIR Left    • INCISION AND DRAINAGE OF WOUND  2018   • JOINT REPLACEMENT     • LIPOMA EXCISION  1999    left leg   "  • LUMBAR LAMINECTOMY DISCECTOMY DECOMPRESSION N/A 7/6/2018    Procedure: LUMBAR LAMINECTOMY L4-5, HEMILAMIINECTOMY RIGHT L5-S1, FORAMINOTOMY L5-S1;  Surgeon: Lencho Gamino MD;  Location:  [facility];  Service: Neurosurgery   • LUMBAR LAMINECTOMY DISCECTOMY DECOMPRESSION N/A 9/19/2018    Procedure: INCISION AND DRAINAGE BACK WITH WOUND EXPLORATION;  Surgeon: Ritchie García MD;  Location:  [facility];  Service: Neurosurgery   • LUNG BIOPSY Left 2016   • PACEMAKER IMPLANTATION  11/2016    sss   • REPLACEMENT TOTAL KNEE Bilateral     left knee 2011, right 2012 per dr acuna    • REPLACEMENT TOTAL KNEE Bilateral    • SHOULDER ARTHROSCOPY Bilateral     2003-left, 2004- right    • SKIN BIOPSY      skin cancer back 2016   • TUBAL ABDOMINAL LIGATION  1980   • WISDOM TOOTH EXTRACTION       Family History   Problem Relation Age of Onset   • Kidney disease Mother    • Coronary artery disease Mother    • Coronary artery disease Father    • Coronary artery disease Brother      Social History     Tobacco Use   • Smoking status: Never Smoker   • Smokeless tobacco: Never Used   Substance Use Topics   • Alcohol use: No     Frequency: Never         PHYSICAL EXAM:    /80 (BP Location: Left arm, Patient Position: Sitting)   Pulse 81   Ht 160 cm (63\")   Wt 103 kg (226 lb)   LMP  (LMP Unknown) Comment: Mammogram- april 2018   SpO2 96%   BMI 40.03 kg/m²        Wt Readings from Last 5 Encounters:   09/26/19 103 kg (226 lb)   09/12/19 103 kg (226 lb)   08/29/19 102 kg (225 lb)   08/29/19 102 kg (225 lb)   08/19/19 106 kg (234 lb)       BP Readings from Last 5 Encounters:   09/26/19 148/80   09/12/19 140/60   08/29/19 112/76   08/19/19 150/90   07/22/19 130/74       General-Well Nourished, Well developed  Eyes - PERRLA  Neck- supple, No mass  CV- regular rate and rhythm, no MRG, No edema  Lung- clear bilaterally  Abd- soft, +BS  Musc/skel - Norm strength and range of motion  Skin- warm and dry  Neuro - Alert & Oriented x 3, " appropriate mood.        Medical problems and test results were reviewed with the patient today.     Recent Results (from the past 672 hour(s))   Doppler Arterial Multi Level Lower Extremity - Bilateral CAR    Collection Time: 08/29/19  5:34 PM   Result Value Ref Range    RIGHT POPLITEAL SYS  mmHg    RIGHT DORSALIS PEDIS SYS  mmHg    RIGHT POST TIBIAL SYS  mmHg    RIGHT GREAT TOE SYS MAX 75 mmHg    RIGHT GLYNN RATIO 1.03     RIGHT TBI RATIO 0.48     LEFT DORSALIS PEDIS SYS  mmHg    LEFT POST TIBIAL SYS  mmHg    LEFT GREAT TOE SYS MAX 86 mmHg    LEFT GLYNN RATIO 1.1     LEFT TBI RATIO 0.55     Upper arterial right arm brachial sys max 149 mmHg    Upper arterial left arm brachial sys max 157 mmHg         EKG: (EKG has been independently visualized by me and summarized below)      ECG 12 Lead  Date/Time: 9/26/2019 2:15 PM  Performed by: Ai Prieto PA  Authorized by: Ai Prieto PA   Comparison: compared with previous ECG from 7/22/2019  Similar to previous ECG  Rhythm: sinus rhythm  Rate: normal  BPM: 69  Conduction: conduction normal  QRS axis: normal  Other findings: low voltage    Clinical impression: normal ECG             ASSESSMENT and PLAN    1.  Atrial fibrillation-persistent in nature. Currently on Tikosyn 500mcg BID w/ stable QTc. Patient does note improvement in symptoms. Will continue Tikosyn and Eliquis. Due to atrial lead noise it is difficult to decipher noise vs mode switching.     2.  Dual-chamber pacemaker-patient has had significant increase in RV and RA lead noise concerning for fracture. She does not appear to be pacing very much, but could be skewed due to noise. Discussed with Dr. Marie and will plan to place monitor to determine if need for lead revision or could we just set to back up mode VVIR 40. Will place zio patch.      3.  Tikosyn use- EKG reviewed. QTc is stable.       Return in about 8 weeks (around 11/21/2019).        Ai Prieto PA-C    Cardiology/Electrophysiology  9/26/2019  2:16 PM

## 2019-10-25 RX ORDER — CLONIDINE HYDROCHLORIDE 0.1 MG/1
TABLET ORAL
Qty: 180 TABLET | Refills: 4 | Status: SHIPPED | OUTPATIENT
Start: 2019-10-25 | End: 2019-10-28 | Stop reason: SDUPTHER

## 2019-10-28 RX ORDER — CLONIDINE HYDROCHLORIDE 0.1 MG/1
0.1 TABLET ORAL EVERY 12 HOURS
Qty: 60 TABLET | Refills: 0 | Status: SHIPPED | OUTPATIENT
Start: 2019-10-28 | End: 2020-10-07

## 2019-11-11 ENCOUNTER — OFFICE VISIT (OUTPATIENT)
Dept: PULMONOLOGY | Facility: CLINIC | Age: 69
End: 2019-11-11

## 2019-11-11 VITALS
DIASTOLIC BLOOD PRESSURE: 82 MMHG | SYSTOLIC BLOOD PRESSURE: 138 MMHG | OXYGEN SATURATION: 99 % | BODY MASS INDEX: 41.99 KG/M2 | TEMPERATURE: 97.3 F | HEIGHT: 63 IN | HEART RATE: 66 BPM | WEIGHT: 237 LBS

## 2019-11-11 DIAGNOSIS — R06.02 SOB (SHORTNESS OF BREATH): ICD-10-CM

## 2019-11-11 DIAGNOSIS — G47.33 OSA TREATED WITH BIPAP: Primary | ICD-10-CM

## 2019-11-11 DIAGNOSIS — K21.9 GASTROESOPHAGEAL REFLUX DISEASE, ESOPHAGITIS PRESENCE NOT SPECIFIED: ICD-10-CM

## 2019-11-11 PROCEDURE — 99213 OFFICE O/P EST LOW 20 MIN: CPT | Performed by: NURSE PRACTITIONER

## 2019-11-11 NOTE — PROGRESS NOTES
[facility] Pulmonary Follow up    CHIEF COMPLAINT    Daytime somnolence and fatigue    HISTORY OF PRESENT ILLNESS    Joanna Cross is a 68 y.o.female here today for follow-up of her daytime somnolence and fatigue.  She was last seen in the office by me in May.  She states she has noticed more daytime somnolence and fatigue since her last appointment.      She continues to use her BiPAP every night for an average of 4 to 7 hours.  Her current BiPAP settings are IPAP pressure 18 and EPAP pressure 14 with a BIflex setting of 3.  She uses 2 L bled into the machine as well.  She is curious if she needs to have more oxygen with her BiPAP at night.  She does not feel well rested in the mornings.  She has a frequent daytime naps as well.    She continues to follow cardiology for her atrial fibrillation.  She states that she is currently on Tikosyn and is tolerating that well.  She has had difficulty in the past with her rate control however she states she is more controlled now.    She is also seeing an allergist and is receiving allergy injections currently.    She denies fever, chills, sputum production, hemoptysis, night sweats, weight loss, chest pain or palpitations.  She denies any lower extremity edema or calf tenderness.  She denies any reflux symptoms.  She remains on omeprazole daily.  She does have chronic sinus symptoms and is using her [place] pot more frequently.  She is unable to take Flonase because of the interaction with Tikosyn.    She is up-to-date on her current vaccinations.    Patient Active Problem List   Diagnosis   • Coronary artery disease involving native coronary artery without angina pectoris   • Essential hypertension   • Peripheral vascular disease (CMS/HCC)   • Hyperlipidemia LDL goal <70   • Chronic atrial fibrillation   • Spinal stenosis, lumbar region, with neurogenic claudication   • Diabetes mellitus (CMS/HCC)   • Delayed surgical wound healing   • Biceps tendinitis   • Overactive bladder  "  • GERD (gastroesophageal reflux disease)   • Hypothyroidism   • Lichen sclerosus   • Parkinson's disease (CMS/HCC)   • MILLI treated with BiPAP - Patient reports compliance.    • History of respiratory failure - prior respiratory failure requiring mechanical ventilation, open lung biopsy non-specific.    • SOB (shortness of breath)   • A-fib (CMS/HCC)   • Chest pain   • Atrial fibrillation with RVR (CMS/HCC)   • Renal insufficiency   • CAD in native artery   • Persistent atrial fibrillation   • Tachy-thai syndrome (CMS/HCC)       Allergies   Allergen Reactions   • Toprol Xl [Metoprolol Tartrate] Shortness Of Breath     Extreme fatigue   • Amlodipine Besylate Swelling     Lower extremity (ankles, feet) swelling   • Entacapone Other (See Comments)     \"extreme weakness in legs - caused several falls, which stopped after discontinuing this medication\"   • Levemir [Insulin Detemir] Hives     Hives / rash around injection site   • Penicillins Hives     Jitteriness    • Codeine Unknown (See Comments)     Pt is unaware of what reaction she had   • Cortisone Other (See Comments)     MAKES BLOOD PRESSURE HIGH    • Levaquin [Levofloxacin] Other (See Comments)     Cannot take due to taking propafenone HCL- severe reaction with mixed.    • Xarelto [Rivaroxaban] GI Bleeding   • Bactrim [Sulfamethoxazole-Trimethoprim] Nausea Only     Headache    • Ciprofloxacin Diarrhea   • Cogentin [Benztropine] Other (See Comments)     \"uncontrollable body movements\"   • Compazine [Prochlorperazine Edisylate] Other (See Comments)     Dystonic reaction   • Duraprep [Antiseptic Products, Misc.] Itching and Rash     RASH AND ITCHING   • Haldol [Haloperidol Lactate] Other (See Comments)     Dystonic reaction   • Hydralazine Other (See Comments)     Headache    • Hydrocodone-Acetaminophen Nausea And Vomiting and Dizziness     Headache, nausea    • Lisinopril Cough   • Statins Myalgia     Leg pain   • Tarka [Trandolapril-Verapamil Hcl Er] Other (See " Comments)     Constipation        Current Outpatient Medications:   •  albuterol (PROVENTIL) (2.5 MG/3ML) 0.083% nebulizer solution, Take 2.5 mg by nebulization Every 4 (Four) Hours As Needed., Disp: , Rfl:   •  albuterol (VENTOLIN HFA) 108 (90 Base) MCG/ACT inhaler, Inhale 1 puff Every 4 (Four) Hours As Needed., Disp: , Rfl:   •  amantadine (SYMMETREL) 100 MG capsule, Take 100 mg by mouth 2 (Two) Times a Day., Disp: , Rfl:   •  aspirin 81 MG EC tablet, Take 81 mg by mouth Daily., Disp: , Rfl:   •  B Complex-C (SUPER B COMPLEX PO), Take 1 tablet by mouth Daily., Disp: , Rfl:   •  bumetanide (BUMEX) 1 MG tablet, Take 1 mg by mouth Daily As Needed (for swelling)., Disp: , Rfl:   •  Calcium Carbonate (CALTRATE 600 PO), Take 600 mg by mouth Daily., Disp: , Rfl:   •  carbidopa-levodopa (SINEMET)  MG per tablet, Take  by mouth. 2 tablets at 0600, 1 tablet at 0900, 1200, 1500, 1800, 2100, Disp: , Rfl:   •  carbonyl iron (FEOSOL) 45 MG tablet tablet, Take 1 tablet by mouth 2 (Two) Times a Day., Disp: , Rfl:   •  Cholecalciferol 2000 units tablet, Take 2,000 Units by mouth 2 (Two) Times a Day., Disp: , Rfl:   •  citalopram (CeleXA) 20 MG tablet, Take 20 mg by mouth every night at bedtime., Disp: , Rfl:   •  clobetasol (TEMOVATE) 0.05 % cream, Apply 1 application topically 2 (Two) Times a Day As Needed (Lichens Sclerosis)., Disp: , Rfl:   •  cloNIDine (CATAPRES) 0.1 MG tablet, Take 1 tablet by mouth Every 12 (Twelve) Hours., Disp: 60 tablet, Rfl: 0  •  dofetilide (TIKOSYN) 500 MCG capsule, Take 1 capsule by mouth Every 12 (Twelve) Hours., Disp: 180 capsule, Rfl: 3  •  ELIQUIS 5 MG tablet tablet, TAKE 1 TABLET EVERY 12 HOURS, Disp: 180 tablet, Rfl: 3  •  insulin glargine (LANTUS) 100 UNIT/ML injection, Inject 28 Units under the skin into the appropriate area as directed Daily. ACCORDING TO SUGAR LEVELS , Disp: , Rfl:   •  IPRATROPIUM BROMIDE NA, 1 spray into the nostril(s) as directed by provider 2 (Two) Times a Day As  Needed., Disp: , Rfl:   •  Loratadine 10 MG capsule, Take 10 mg by mouth Daily., Disp: , Rfl:   •  losartan (COZAAR) 50 MG tablet, TAKE 1 TABLET EVERY 12 HOURS (Patient taking differently: PT TAKING 1/2 TAB TWICE A DAY), Disp: 180 tablet, Rfl: 3  •  metFORMIN (GLUCOPHAGE) 1000 MG tablet, Take 1,000 mg by mouth 2 (Two) Times a Day With Meals., Disp: , Rfl:   •  Mirabegron ER (MYRBETRIQ) 50 MG tablet sustained-release 24 hour 24 hr tablet, Take 50 mg by mouth Daily., Disp: , Rfl:   •  Multiple Vitamins-Minerals (CENTRUM ULTRA WOMENS PO), Take 1 tablet by mouth Daily., Disp: , Rfl:   •  nitroglycerin (NITROLINGUAL) 0.4 MG/SPRAY spray, Place 1 spray under the tongue Every 5 (Five) Minutes As Needed for Chest Pain., Disp: 1 each, Rfl: 6  •  O2 (OXYGEN), Inhale 2 L/min 1 (One) Time. 2L while sleeping, Disp: , Rfl:   •  omeprazole (priLOSEC) 40 MG capsule, Take 40 mg by mouth 2 (Two) Times a Day., Disp: , Rfl:   •  potassium chloride (MICRO-K) 10 MEQ CR capsule, Take 10 mEq by mouth 2 (Two) Times a Day., Disp: , Rfl:   •  pramipexole (MIRAPEX) 1.5 MG tablet, Take 1.5 mg by mouth Every Night., Disp: , Rfl:   •  ranolazine (RANEXA) 500 MG 12 hr tablet, Take 1 tablet by mouth Every 12 (Twelve) Hours., Disp: 180 tablet, Rfl: 3  •  sennosides-docusate sodium (SENOKOT-S) 8.6-50 MG tablet, Take 1 tablet by mouth Daily., Disp: , Rfl:   •  spironolactone (ALDACTONE) 25 MG tablet, Take 1 tablet by mouth Daily As Needed (swelling)., Disp: 90 tablet, Rfl: 1  •  SYNTHROID 200 MCG tablet, Take 200 mcg by mouth Daily., Disp: , Rfl:   •  traMADol (ULTRAM) 50 MG tablet, Take 50 mg by mouth Every 8 (Eight) Hours As Needed for Moderate Pain ., Disp: , Rfl:   •  vitamin C (ASCORBIC ACID) 500 MG tablet, Take 500 mg by mouth Daily. Chewable tablet, Disp: , Rfl:   MEDICATION LIST AND ALLERGIES REVIEWED.    Social History     Tobacco Use   • Smoking status: Never Smoker   • Smokeless tobacco: Never Used   Substance Use Topics   • Alcohol use: No  "    Frequency: Never   • Drug use: No       FAMILY AND SOCIAL HISTORY REVIEWED.    Review of Systems   Constitutional: Positive for activity change and fatigue. Negative for appetite change, fever and unexpected weight change.   HENT: Positive for congestion, postnasal drip and rhinorrhea. Negative for sinus pressure, sore throat and voice change.    Eyes: Negative for visual disturbance.   Respiratory: Positive for shortness of breath. Negative for cough, chest tightness and wheezing.    Cardiovascular: Negative for chest pain, palpitations and leg swelling.   Gastrointestinal: Negative for abdominal distention, abdominal pain, nausea and vomiting.   Endocrine: Negative for cold intolerance and heat intolerance.   Genitourinary: Negative for difficulty urinating and urgency.   Musculoskeletal: Negative for arthralgias, back pain and neck pain.   Skin: Negative for color change and pallor.   Allergic/Immunologic: Negative for environmental allergies and food allergies.   Neurological: Negative for dizziness, syncope, weakness and light-headedness.   Hematological: Negative for adenopathy. Does not bruise/bleed easily.   Psychiatric/Behavioral: Negative for agitation and behavioral problems.   .    /82   Pulse 66   Temp 97.3 °F (36.3 °C)   Ht 160 cm (63\")   Wt 108 kg (237 lb)   LMP  (LMP Unknown) Comment: Mammogram- april 2018   SpO2 99%   Breastfeeding? No   BMI 41.98 kg/m²      Immunization History   Administered Date(s) Administered   • Flu Vaccine Quad PF >36MO 10/02/2017, 09/11/2018, 09/21/2019   • Fluzone High Dose =>65 Years ([facility] ONLY) 09/13/2017   • Hepatitis A 05/03/1999, 10/05/1999   • INFLUENZA SPLIT TRI 09/15/2010, 10/02/2012, 10/02/2013   • Influenza, Unspecified 09/11/2018   • PEDS-Pneumococcal Conjugate (PCV7) 12/20/2013   • Pneumococcal Conjugate 13-Valent (PCV13) 09/04/2015, 12/04/2016   • Pneumococcal Polysaccharide (PPSV23) 11/20/2008, 12/20/2013   • TD Preservative Free " 02/01/2012, 05/04/2017   • Tdap 05/04/2017   • Zostavax 02/05/2013       Physical Exam   Constitutional: She is oriented to person, place, and time. She appears well-developed and well-nourished.   HENT:   Head: Normocephalic and atraumatic.   Eyes: Pupils are equal, round, and reactive to light.   Neck: Normal range of motion. Neck supple. No thyromegaly present.   Cardiovascular: Normal rate, regular rhythm, normal heart sounds and intact distal pulses. Exam reveals no gallop and no friction rub.   No murmur heard.  Pulmonary/Chest: Effort normal and breath sounds normal. No respiratory distress. She has no wheezes. She has no rales. She exhibits no tenderness.   Abdominal: Soft. Bowel sounds are normal. There is no tenderness.   Musculoskeletal: Normal range of motion.   Lymphadenopathy:     She has no cervical adenopathy.   Neurological: She is alert and oriented to person, place, and time.   Skin: Skin is warm and dry. Capillary refill takes less than 2 seconds. She is not diaphoretic.   Psychiatric: She has a normal mood and affect. Her behavior is normal.   Nursing note and vitals reviewed.        RESULTS    PROBLEM LIST    Problem List Items Addressed This Visit        Respiratory    MILLI treated with BiPAP - Patient reports compliance.  - Primary    Overview     - Patient reports compliance.          Relevant Orders    Polysomnography 4 or More Parameters With CPAP    SOB (shortness of breath)       Digestive    GERD (gastroesophageal reflux disease)            DISCUSSION  Mrs. Cross was here for follow-up of her daytime somnolence and fatigue.  She states that she does not seem like her BiPAP is working properly.  I did review her download today and she is using the machine an average of 4 hours and 48 minutes.  She has an average AHI of 1.9.  It seems that the BiPAP is working correctly.  I would like to order a sleep study with titration to see if she needs increased pressures to help her sleep better.   I did encourage weight loss as well.  She is agreeable to have this study done.  I will call her after I receive the results of this testing.    If her sleep study appears to be normal we may need to do further lab work to assess for other causes of fatigue.    She will continue follow-up with cardiology for her atrial fibrillation.    She will follow-up in 6 months or sooner if her symptoms worsen.  She will call with any additional concerns or questions.  I spent 15 minutes with the patient. I spent > 50% percent of this time counseling and discussing diagnosis, prognosis, diagnostic testing, evaluation, current status, treatment options and management.    Myrnaerica Mckeon, APRN  11/11/20193:53 PM  Electronically signed     Please note that portions of this note were completed with a voice recognition program. Efforts were made to edit the dictations, but occasionally words are mistranscribed.      CC: Reynaldo Fritz MD

## 2019-11-22 ENCOUNTER — CLINICAL SUPPORT NO REQUIREMENTS (OUTPATIENT)
Dept: CARDIOLOGY | Facility: CLINIC | Age: 69
End: 2019-11-22

## 2019-11-22 ENCOUNTER — TELEPHONE (OUTPATIENT)
Dept: CARDIOLOGY | Facility: CLINIC | Age: 69
End: 2019-11-22

## 2019-11-22 DIAGNOSIS — I49.5 TACHY-BRADY SYNDROME (HCC): ICD-10-CM

## 2019-11-22 PROCEDURE — 93294 REM INTERROG EVL PM/LDLS PM: CPT | Performed by: INTERNAL MEDICINE

## 2019-11-22 PROCEDURE — 93296 REM INTERROG EVL PM/IDS: CPT | Performed by: INTERNAL MEDICINE

## 2019-11-22 NOTE — TELEPHONE ENCOUNTER
Called pt due to [employer] home monitor didn't send scheduled reading.  Mrs Cross is in the [facility] for HTN.

## 2019-11-25 RX ORDER — LOSARTAN POTASSIUM 50 MG/1
TABLET ORAL
Qty: 180 TABLET | Refills: 4 | Status: SHIPPED | OUTPATIENT
Start: 2019-11-25 | End: 2020-10-07

## 2019-11-25 RX ORDER — RANOLAZINE 500 MG/1
TABLET, EXTENDED RELEASE ORAL
Qty: 180 TABLET | Refills: 4 | Status: SHIPPED | OUTPATIENT
Start: 2019-11-25 | End: 2020-11-24 | Stop reason: SDUPTHER

## 2019-12-10 ENCOUNTER — HOSPITAL ENCOUNTER (OUTPATIENT)
Dept: SLEEP MEDICINE | Facility: HOSPITAL | Age: 69
Discharge: HOME | End: 2019-12-10
Admitting: NURSE PRACTITIONER

## 2019-12-10 VITALS
DIASTOLIC BLOOD PRESSURE: 77 MMHG | HEART RATE: 76 BPM | SYSTOLIC BLOOD PRESSURE: 176 MMHG | WEIGHT: 236.55 LBS | HEIGHT: 64 IN | OXYGEN SATURATION: 92 % | BODY MASS INDEX: 40.39 KG/M2

## 2019-12-10 DIAGNOSIS — G47.33 OSA TREATED WITH BIPAP: ICD-10-CM

## 2019-12-10 PROCEDURE — 95811 POLYSOM 6/>YRS CPAP 4/> PARM: CPT

## 2019-12-10 PROCEDURE — 95811 POLYSOM 6/>YRS CPAP 4/> PARM: CPT | Performed by: INTERNAL MEDICINE

## 2019-12-16 ENCOUNTER — OFFICE VISIT (OUTPATIENT)
Dept: CARDIOLOGY | Facility: CLINIC | Age: 69
End: 2019-12-16

## 2019-12-16 VITALS
DIASTOLIC BLOOD PRESSURE: 70 MMHG | BODY MASS INDEX: 40.97 KG/M2 | SYSTOLIC BLOOD PRESSURE: 122 MMHG | WEIGHT: 240 LBS | HEART RATE: 58 BPM | HEIGHT: 64 IN

## 2019-12-16 DIAGNOSIS — I48.91 ATRIAL FIBRILLATION WITH RVR (HCC): Primary | ICD-10-CM

## 2019-12-16 DIAGNOSIS — I49.5 TACHY-BRADY SYNDROME (HCC): ICD-10-CM

## 2019-12-16 DIAGNOSIS — I48.19 PERSISTENT ATRIAL FIBRILLATION (HCC): ICD-10-CM

## 2019-12-16 PROCEDURE — 93280 PM DEVICE PROGR EVAL DUAL: CPT | Performed by: PHYSICIAN ASSISTANT

## 2019-12-16 PROCEDURE — 99214 OFFICE O/P EST MOD 30 MIN: CPT | Performed by: PHYSICIAN ASSISTANT

## 2019-12-16 NOTE — PROGRESS NOTES
Joanna Cross  1950  PCP: Reynaldo Fritz MD    SUBJECTIVE:   Joanna Cross is a 69 y.o. female seen for a follow up visit regarding the following:     Chief Complaint: Follow up for afib     HPI:    Since last visit the patient's status has been unchanged. She was hospitalized at [facility] for htn. She tells me she was told she was having afib, but none on her device check today. No cp, sob, edema.     History:  This is a 68-year-old patient referred by Dr. Corona for further evaluation and management of atrial fibrillation.  Patient has been diagnosed with atrial fibrillation since around 2016.  She has been plagued by tachybradycardia syndrome episodes in the past.  She has undergone cardioversions in the past but unable to maintain sinus rhythm more than a few days at a time.  She is been treated with Profafenone in the past as well with no improvement in her atrial fibrillation burden.  Her biggest complaint is ongoing fatigue with occasional palpitations.     Cardiac PMH: (Old records have been reviewed and summarized below)  1. Coronary artery disease  a. [facility] 2/15/2008, Dr Lutz.  Mild, non-obstructive CAD, normal EF  b. [facility] 1/9/2013, Dr Stevenson Sutton:  No LV gram done; mild, non-obstructive coronary artery disease.  c. [facility] 11/9/2015[facility]:  EF 60%.  70% RCA lesion with Promus ZAINAB placement. 40-50% mid LAD, no FFR performed. Other small blockages noted.  No MR.  d. [facility] 11/15/2015, Dr Palacio @ [facility]:  Patent RCA stent, 40-50% LAD with FFR @ 0.92; mild inferior wall hypokinesis  e. [facility] 7/29/2016, [facility]:  Abnormal stress test.  Normal CORS with patent stent. LAD improved since last image EF 55-60%  f. [facility] 2/1/19:  EF normal.  Patent RCA, noncritical mid LAD with IFR 0.93, noncritical circ.    2. Peripheral vascular disease/chronic venous stasis  a. Venous duplex 9/17/2010: Insufficiency to venous hypertension in the great saphenous system  bilaterally.  b. Venous duplex 2013: Right leg laser ablation of Dr. Garcia.  c. Venous duplex 5/6/2016: No evidence of DVT, no superficial, no thrmobophlebitis, no valvular insufficiency.  d. AA with runoffs 8/31/2016: Single-vessel [place]: No significant arterial disease.  3. Palpitations  a. Echocardiogram 2/13/2014: Dr. Teran.  Normal biventricular function; trace to mild MR.  Atrial septal aneurysm or  b. Echocardiogram 12/22/15: Dr. Chavez.  Borderline LVH, mild LAE, mild RV enlargement.  Mild to moderate MR and TR with RVSP of 46 mmHg.  c. Conclusion/3/2016: Dr. Palacio.  RV enlargement.  EF 50-55%.  Mild AI S, mild MR and TR with RVSP 37 mmHg.  4. AF/SSS  a. [place] [facility] PPM 2016  b. CHADS2 Vasc  = 5. On Eliquis   c. TIA: Carotid duplex 2/13/2014 showed no significant flow-limiting stenosis.  Mild luminal disease present; both vertebrals are present.  d. ECV 12/26/2018:  Successful cardioversion: AV paced  e. Echo 12/25/18:  EF 60%, mild MR/ TR with RVSP 29 mmHg.  Sclerotic AoV  5. Diabetes  6. Essential Hypertension  7. Hyperlipidemia  8. Hypothyroid  9. Hyponatremia  a. Secondary to SIADH  10. MILLI with CPAP  11. RLS  12. Parkinson's disease  13. GERD  14. Urinary Retention  a. S/P cystoscopy and ureter dilation, March 2018.  Dr. Terrence Mckenzie  15. Surgeries:  a. Rotator cuff repair  b. Cholecystectomy  c. Bilateral knee replacement  d. Tubal ligation  e. Breast surgery  f. Sinus surgery      Current Outpatient Medications:   •  albuterol (PROVENTIL) (2.5 MG/3ML) 0.083% nebulizer solution, Take 2.5 mg by nebulization Every 4 (Four) Hours As Needed., Disp: , Rfl:   •  albuterol (VENTOLIN HFA) 108 (90 Base) MCG/ACT inhaler, Inhale 1 puff Every 4 (Four) Hours As Needed., Disp: , Rfl:   •  amantadine (SYMMETREL) 100 MG capsule, Take 100 mg by mouth 2 (Two) Times a Day., Disp: , Rfl:   •  aspirin 81 MG EC tablet, Take 81 mg by mouth Daily., Disp: , Rfl:   •  B Complex-C (SUPER B COMPLEX PO), Take 1  tablet by mouth Daily., Disp: , Rfl:   •  bumetanide (BUMEX) 1 MG tablet, Take 1 mg by mouth Daily As Needed (for swelling)., Disp: , Rfl:   •  Calcium Carbonate (CALTRATE 600 PO), Take 600 mg by mouth Daily., Disp: , Rfl:   •  carbidopa-levodopa (SINEMET)  MG per tablet, Take  by mouth. 2 tablets at 0600, 1 tablet at 0900, 1200, 1500, 1800, 2100, Disp: , Rfl:   •  carbonyl iron (FEOSOL) 45 MG tablet tablet, Take 1 tablet by mouth 2 (Two) Times a Day., Disp: , Rfl:   •  Cholecalciferol 2000 units tablet, Take 2,000 Units by mouth 2 (Two) Times a Day., Disp: , Rfl:   •  citalopram (CeleXA) 20 MG tablet, Take 20 mg by mouth every night at bedtime., Disp: , Rfl:   •  clobetasol (TEMOVATE) 0.05 % cream, Apply 1 application topically 2 (Two) Times a Day As Needed (Lichens Sclerosis)., Disp: , Rfl:   •  cloNIDine (CATAPRES) 0.1 MG tablet, Take 1 tablet by mouth Every 12 (Twelve) Hours., Disp: 60 tablet, Rfl: 0  •  dofetilide (TIKOSYN) 500 MCG capsule, Take 1 capsule by mouth Every 12 (Twelve) Hours., Disp: 180 capsule, Rfl: 3  •  ELIQUIS 5 MG tablet tablet, TAKE 1 TABLET EVERY 12 HOURS, Disp: 180 tablet, Rfl: 3  •  insulin glargine (LANTUS) 100 UNIT/ML injection, Inject 28 Units under the skin into the appropriate area as directed Daily. ACCORDING TO SUGAR LEVELS , Disp: , Rfl:   •  IPRATROPIUM BROMIDE NA, 1 spray into the nostril(s) as directed by provider 2 (Two) Times a Day As Needed., Disp: , Rfl:   •  Loratadine 10 MG capsule, Take 10 mg by mouth Daily., Disp: , Rfl:   •  losartan (COZAAR) 50 MG tablet, TAKE 1 TABLET EVERY 12 HOURS, Disp: 180 tablet, Rfl: 4  •  metFORMIN (GLUCOPHAGE) 1000 MG tablet, Take 1,000 mg by mouth 2 (Two) Times a Day With Meals., Disp: , Rfl:   •  Mirabegron ER (MYRBETRIQ) 50 MG tablet sustained-release 24 hour 24 hr tablet, Take 50 mg by mouth Daily., Disp: , Rfl:   •  Multiple Vitamins-Minerals (CENTRUM ULTRA WOMENS PO), Take 1 tablet by mouth Daily., Disp: , Rfl:   •  nitroglycerin  (NITROLINGUAL) 0.4 MG/SPRAY spray, Place 1 spray under the tongue Every 5 (Five) Minutes As Needed for Chest Pain., Disp: 1 each, Rfl: 6  •  O2 (OXYGEN), Inhale 2 L/min 1 (One) Time. 2L while sleeping, Disp: , Rfl:   •  omeprazole (priLOSEC) 40 MG capsule, Take 40 mg by mouth 2 (Two) Times a Day., Disp: , Rfl:   •  potassium chloride (MICRO-K) 10 MEQ CR capsule, Take 10 mEq by mouth 2 (Two) Times a Day., Disp: , Rfl:   •  pramipexole (MIRAPEX) 1.5 MG tablet, Take 1.5 mg by mouth Every Night., Disp: , Rfl:   •  ranolazine (RANEXA) 500 MG 12 hr tablet, TAKE 1 TABLET EVERY 12 HOURS, Disp: 180 tablet, Rfl: 4  •  sennosides-docusate sodium (SENOKOT-S) 8.6-50 MG tablet, Take 1 tablet by mouth Daily., Disp: , Rfl:   •  spironolactone (ALDACTONE) 25 MG tablet, Take 1 tablet by mouth Daily As Needed (swelling)., Disp: 90 tablet, Rfl: 1  •  SYNTHROID 200 MCG tablet, Take 200 mcg by mouth Daily., Disp: , Rfl:   •  traMADol (ULTRAM) 50 MG tablet, Take 50 mg by mouth Every 8 (Eight) Hours As Needed for Moderate Pain ., Disp: , Rfl:   •  vitamin C (ASCORBIC ACID) 500 MG tablet, Take 500 mg by mouth Daily. Chewable tablet, Disp: , Rfl:     Past Medical History, Past Surgical History, Family history, Social History, and Medications were all reviewed with the patient today and updated as necessary.       Patient Active Problem List   Diagnosis   • Coronary artery disease involving native coronary artery without angina pectoris   • Essential hypertension   • Peripheral vascular disease (CMS/HCC)   • Hyperlipidemia LDL goal <70   • Chronic atrial fibrillation   • Spinal stenosis, lumbar region, with neurogenic claudication   • Diabetes mellitus (CMS/HCC)   • Delayed surgical wound healing   • Biceps tendinitis   • Overactive bladder   • GERD (gastroesophageal reflux disease)   • Hypothyroidism   • Lichen sclerosus   • Parkinson's disease (CMS/HCC)   • MILLI treated with BiPAP - Patient reports compliance.    • History of respiratory  "failure - prior respiratory failure requiring mechanical ventilation, open lung biopsy non-specific.    • SOB (shortness of breath)   • A-fib (CMS/HCC)   • Chest pain   • Atrial fibrillation with RVR (CMS/HCC)   • Renal insufficiency   • CAD in native artery   • Persistent atrial fibrillation   • Tachy-thai syndrome (CMS/HCC)     Allergies   Allergen Reactions   • Toprol Xl [Metoprolol Tartrate] Shortness Of Breath     Extreme fatigue   • Amlodipine Besylate Swelling     Lower extremity (ankles, feet) swelling   • Codeine Unknown (See Comments)     Pt is unaware of what reaction she had   • Entacapone Other (See Comments)     \"extreme weakness in legs - caused several falls, which stopped after discontinuing this medication\"   • Levemir [Insulin Detemir] Hives     Hives / rash around injection site   • Penicillins Hives     Jitteriness    • Xarelto [Rivaroxaban] GI Bleeding   • Bactrim [Sulfamethoxazole-Trimethoprim] Nausea Only     Headache    • Ciprofloxacin Diarrhea   • Cogentin [Benztropine] Other (See Comments)     \"uncontrollable body movements\"   • Compazine [Prochlorperazine Edisylate] Other (See Comments)     Dystonic reaction   • Cortisone Other (See Comments)     MAKES BLOOD PRESSURE HIGH    • Duraprep [Antiseptic Products, Misc.] Itching and Rash     RASH AND ITCHING   • Haldol [Haloperidol Lactate] Other (See Comments)     Dystonic reaction   • Hydralazine Other (See Comments)     Headache    • Hydrocodone-Acetaminophen Nausea And Vomiting and Dizziness     Headache, nausea    • Levaquin [Levofloxacin] Other (See Comments)     Cannot take due to taking propafenone HCL- severe reaction with mixed.    • Lisinopril Cough   • Statins Myalgia     Leg pain   • Tarka [Trandolapril-Verapamil Hcl Er] Other (See Comments)     Constipation      Past Medical History:   Diagnosis Date   • Anemia    • Atrial fibrillation (CMS/HCC)    • AVM (arteriovenous malformation)    • CAD (coronary artery disease)    • " Depression    • Disease of thyroid gland    • DM2 (diabetes mellitus, type 2) (CMS/[facility])     checks sugar twice per day    • Essential hypertension    • Generalized osteoarthritis    • GERD (gastroesophageal reflux disease)    • GIB (gastrointestinal bleeding) 2016    d/t xarelto    • Headache    • Hiatal hernia    • History of shingles    • History of transfusion 2016   • IBS (irritable bowel syndrome)    • Lichen sclerosus    • Mixed hyperlipidemia    • Morbid obesity (CMS/[facility])    • MRSA infection 2018   • Myocardial infarction (CMS/HCC)    • On home oxygen therapy     2L of oxygen via CPAP while sleeping    • MILLI on CPAP     18/14   • Osteoporosis    • Pacemaker    • Parkinson disease (CMS/HCC)    • Peripheral vascular disease (CMS/HCC)    • Pleurisy    • Pneumonia    • Seborrheic dermatitis    • Skin cancer     on back    • SOBOE (shortness of breath on exertion)    • SSS (sick sinus syndrome) (CMS/[facility])    • TIA (transient ischemic attack)    • Varicose vein of leg    • Venous insufficiency of both lower extremities      Past Surgical History:   Procedure Laterality Date   • BACK SURGERY      l4-l5 laminectomy    • BICEPS TENDON REPAIR Right     shoulder   • BREAST BIOPSY Left 2004   • BUNIONECTOMY Right    • CARDIAC CATHETERIZATION     • CARDIAC CATHETERIZATION N/A 2/1/2019    Procedure: Left Heart Cath;  Surgeon: Albertina Corona MD;  Location: [facility] INVASIVE LOCATION;  Service: Cardiology   • CHOLECYSTECTOMY     • COLONOSCOPY  2016   • CORONARY STENT PLACEMENT      x1 stent   • CYST REMOVAL      left ear, upper left back 2001   • DIAGNOSTIC LAPAROSCOPY  1981   • ENDOSCOPIC FUNCTIONAL SINUS SURGERY (FESS)  2011   • ENDOSCOPY  2016   • HAMMER TOE REPAIR Left    • INCISION AND DRAINAGE OF WOUND  2018   • JOINT REPLACEMENT     • LIPOMA EXCISION  1999    left leg    • LUMBAR LAMINECTOMY DISCECTOMY DECOMPRESSION N/A 7/6/2018    Procedure: LUMBAR LAMINECTOMY L4-5, HEMILAMIINECTOMY RIGHT L5-S1,  "FORAMINOTOMY L5-S1;  Surgeon: Lencho Gamino MD;  Location:  [facility];  Service: Neurosurgery   • LUMBAR LAMINECTOMY DISCECTOMY DECOMPRESSION N/A 9/19/2018    Procedure: INCISION AND DRAINAGE BACK WITH WOUND EXPLORATION;  Surgeon: Ritchie García MD;  Location:  [facility];  Service: Neurosurgery   • LUNG BIOPSY Left 2016   • PACEMAKER IMPLANTATION  11/2016    sss   • REPLACEMENT TOTAL KNEE Bilateral     left knee 2011, right 2012 per dr acuna    • REPLACEMENT TOTAL KNEE Bilateral    • SHOULDER ARTHROSCOPY Bilateral     2003-left, 2004- right    • SKIN BIOPSY      skin cancer back 2016   • TUBAL ABDOMINAL LIGATION  1980   • WISDOM TOOTH EXTRACTION       Family History   Problem Relation Age of Onset   • Kidney disease Mother    • Coronary artery disease Mother    • Coronary artery disease Father    • Coronary artery disease Brother      Social History     Tobacco Use   • Smoking status: Never Smoker   • Smokeless tobacco: Never Used   Substance Use Topics   • Alcohol use: No     Frequency: Never         PHYSICAL EXAM:    /70 (BP Location: Right arm, Patient Position: Sitting)   Pulse 58   Ht 161.3 cm (63.5\")   Wt 109 kg (240 lb)   LMP  (LMP Unknown) Comment: Mammogram- april 2018   BMI 41.85 kg/m²        Wt Readings from Last 5 Encounters:   12/16/19 109 kg (240 lb)   12/10/19 107 kg (236 lb 8.9 oz)   11/11/19 108 kg (237 lb)   09/26/19 103 kg (226 lb)   09/12/19 103 kg (226 lb)       BP Readings from Last 5 Encounters:   12/16/19 122/70   12/10/19 176/77   11/11/19 138/82   09/26/19 148/80   09/12/19 140/60       General-Well Nourished, Well developed  Eyes - PERRLA  Neck- supple, No mass  CV- regular rate and rhythm, no MRG, No edema  Lung- clear bilaterally  Abd- soft, +BS  Musc/skel - Norm strength and range of motion  Skin- warm and dry  Neuro - Alert & Oriented x 3, appropriate mood.        Medical problems and test results were reviewed with the patient today.     No results found for this or any " previous visit (from the past 672 hour(s)).      EKG: (EKG has been independently visualized by me and summarized below)      ECG 12 Lead  Date/Time: 12/16/2019 4:04 PM  Performed by: iA Prieto PA  Authorized by: Ai Prieto PA   Comparison: compared with previous ECG from 9/26/2019  Similar to previous ECG  Rhythm: sinus rhythm  Ectopy: atrial premature contractions  Rate: normal  BPM: 62  Conduction: conduction normal  QRS axis: normal    Clinical impression: normal ECG             ASSESSMENT and PLAN    1.  Atrial fibrillation-persistent in nature. Currently on Tikosyn 500mcg BID w/ stable QTc. Patient does note improvement in symptoms. Will continue Tikosyn and Eliquis.      2.  Dual-chamber pacemaker-patient has had significant increase in RV and RA lead noise concerning for fracture. She does not appear to be pacing very much, but could be skewed due to noise. Placed monitor to assess pacing burden and patient does have RV and RA pacing at times. Patient did tell me today she has been using a bipap mask with magnets- I am suspect this could be causing noise on her leads. She is no longer using this mask as of 2 days ago. I have asked her to send home monitor report next week to see if noise resolves.     3.  Tikosyn use- EKG reviewed. QTc is stable.       Return in about 3 months (around 3/16/2020) for BSC.        BRIGITTE Webber-C   Cardiology/Electrophysiology  12/16/2019  4:06 PM

## 2019-12-26 ENCOUNTER — DOCUMENTATION (OUTPATIENT)
Dept: CARDIOLOGY | Facility: CLINIC | Age: 69
End: 2019-12-26

## 2019-12-26 ENCOUNTER — TELEPHONE (OUTPATIENT)
Dept: CARDIOLOGY | Facility: CLINIC | Age: 69
End: 2019-12-26

## 2019-12-26 NOTE — PROGRESS NOTES
Reviewed recent remote device interrogations.  Reveals stable a atrial lead and LV lead noise.  Patient has unipolar leads.  We will continue to monitor closely.  No changes at this time.

## 2019-12-26 NOTE — TELEPHONE ENCOUNTER
Mrs. Cross called asking if her device transmission came through. She was asked by iA Prieto to send a transmission to verify continued noise on both leads.I did received her transmission and will forward the information on to Ai.

## 2020-01-07 ENCOUNTER — TELEPHONE (OUTPATIENT)
Dept: PULMONOLOGY | Facility: CLINIC | Age: 70
End: 2020-01-07

## 2020-01-07 NOTE — TELEPHONE ENCOUNTER
Pt called today stating that her sleep study report has been reviewed and discussed with you in detail and was informed to contact the office if requesting to change her settings. Pt requesting a call back @ [phone].

## 2020-02-06 ENCOUNTER — TELEPHONE (OUTPATIENT)
Dept: CARDIOLOGY | Facility: CLINIC | Age: 70
End: 2020-02-06

## 2020-02-06 NOTE — TELEPHONE ENCOUNTER
PT instructed that she may hold Eliquis tonight and in the morning for tooth extractions- she must remain on ASA - she verbalized understanding- she did not need anything faxed, per PT

## 2020-02-27 ENCOUNTER — CLINICAL SUPPORT NO REQUIREMENTS (OUTPATIENT)
Dept: CARDIOLOGY | Facility: CLINIC | Age: 70
End: 2020-02-27

## 2020-02-27 DIAGNOSIS — I49.5 TACHY-BRADY SYNDROME (HCC): ICD-10-CM

## 2020-02-27 DIAGNOSIS — I48.91 ATRIAL FIBRILLATION, UNSPECIFIED TYPE (HCC): ICD-10-CM

## 2020-02-27 PROCEDURE — 93294 REM INTERROG EVL PM/LDLS PM: CPT | Performed by: INTERNAL MEDICINE

## 2020-02-27 PROCEDURE — 93296 REM INTERROG EVL PM/IDS: CPT | Performed by: INTERNAL MEDICINE

## 2020-03-11 ENCOUNTER — OFFICE VISIT (OUTPATIENT)
Dept: CARDIOLOGY | Facility: CLINIC | Age: 70
End: 2020-03-11

## 2020-03-11 VITALS
DIASTOLIC BLOOD PRESSURE: 64 MMHG | OXYGEN SATURATION: 95 % | HEIGHT: 64 IN | WEIGHT: 245.2 LBS | BODY MASS INDEX: 41.86 KG/M2 | SYSTOLIC BLOOD PRESSURE: 130 MMHG | HEART RATE: 76 BPM

## 2020-03-11 DIAGNOSIS — I25.10 CORONARY ARTERY DISEASE INVOLVING NATIVE CORONARY ARTERY OF NATIVE HEART WITHOUT ANGINA PECTORIS: Primary | ICD-10-CM

## 2020-03-11 DIAGNOSIS — Z01.810 ENCOUNTER FOR PRE-OPERATIVE CARDIOVASCULAR CLEARANCE: ICD-10-CM

## 2020-03-11 DIAGNOSIS — E78.5 HYPERLIPIDEMIA LDL GOAL <70: ICD-10-CM

## 2020-03-11 DIAGNOSIS — I48.19 PERSISTENT ATRIAL FIBRILLATION (HCC): ICD-10-CM

## 2020-03-11 DIAGNOSIS — I10 ESSENTIAL HYPERTENSION: ICD-10-CM

## 2020-03-11 DIAGNOSIS — I49.5 TACHY-BRADY SYNDROME (HCC): ICD-10-CM

## 2020-03-11 PROCEDURE — 93000 ELECTROCARDIOGRAM COMPLETE: CPT | Performed by: INTERNAL MEDICINE

## 2020-03-11 PROCEDURE — 99214 OFFICE O/P EST MOD 30 MIN: CPT | Performed by: INTERNAL MEDICINE

## 2020-03-11 NOTE — PROGRESS NOTES
[facility] Cardiology    Patient ID: Joanna Cross is a 69 y.o.  female. She is a retired .  : 1950   Contact: [phone]    Encounter date: 2020    PCP: Reynaldo Fritz MD      Chief complaint:   Chief Complaint   Patient presents with   • Atrial Fibrillation   • Coronary Artery Disease       Problem List:  1. Coronary artery disease  a. [facility], 02/15/2008, Dr Lutz: Mild, non-obstructive CAD, normal EF  b. [facility], 2013, Dr Stevenson Sutton: No LV gram done. Mild, non-obstructive CAD.  c. [facility], 2015, [facility]:  EF 60%. 70% RCA lesion with Promus ZAINAB placement. 40-50% mid LAD, no FFR performed. Other small blockages noted. No MR.  d. [facility], 11/15/2015, Dr Palacio @ [facility]: Patent RCA stent, 40-50% LAD with FFR @ 0.92; mild inferior wall hypokinesis  e. [facility], 2016, [facility]: Normal CORS with patent stent. LAD improved since last [facility]. EF 55-60%.  f. [facility], 2019: EF 55-60%. Patent RCA stent, noncritical mid LAD with IFR 0.93, noncritical LCx.   2. Chronic venous stasis:  a. Venous duplex, 2010: Insufficiency to venous hypertension in the great saphenous system bilaterally.  b. Venous duplex : Right leg laser ablation of Dr. Garcia.  c. Venous duplex, 2016: No evidence of DVT, no superficial, no thrmobophlebitis, no valvular insufficiency.  d. AA with runoffs, 2016: Single-vessel [place]: No significant arterial disease.  e. Arterial doppler/GLYNN, 2019: No evidence of significant lower extremity arterial occlusive disease.  3. Palpitations:  a. Echocardiogram, 2014: Dr. Teran. Normal biventricular function; trace to mild MR. Atrial septal aneurysm.  b. Echocardiogram, 2015, Dr. Chavez: Borderline LVH, mild LAE, mild RV enlargement. Mild to moderate MR and TR with RVSP of 46 mmHg.  c. Echocardiogram, 2016: Dr. Palacio.  RV enlargement.  EF 50-55%.  Mild AI S, mild MR  "and TR with RVSP 37 mmHg.  4. PAF/SSS:  a. [employer] PPM 2016  b. CHADS2 Vasc = 6 (HTN, DM, Age 65-74, Female, H/o TIA). On chronic anticoagulation.  c. ECV, 12/26/2018: Successful cardioversion. AV paced.  d. Echocardiogram, 12/25/2018:  EF 60%, mild MR/TR with RVSP 29 mmHg. Sclerotic AoV, no AS/AI. Mild MAC.  e. ECV, 03/05/2019: Successful cardioversion.  f. [facility], 10/01/2019, 14 days: NSR baseline. Normal average rates. Periods of RV pacing.   5. TIA:  a. Carotid duplex, 02/13/2014: No significant flow-limiting stenosis. Mild luminal disease present; both vertebrals are present.  6. Diabetes  7. Essential Hypertension  8. Hyperlipidemia  9. Hypothyroid  10. Hyponatremia  a. Secondary to SIADH  11. MILLI with CPAP  12. RLS  13. Parkinson's disease  14. GERD  15. Urinary Retention  a. S/P cystoscopy and ureter dilation, March 2018.  Dr. Terrence Mckenzie  16. Surgeries:  a. Rotator cuff repair  b. Cholecystectomy  c. Bilateral knee replacement  d. Tubal ligation  e. Breast surgery  f. Sinus surgery      Allergies   Allergen Reactions   • Toprol Xl [Metoprolol Tartrate] Shortness Of Breath     Extreme fatigue   • Amlodipine Besylate Swelling     Lower extremity (ankles, feet) swelling   • Codeine Unknown (See Comments)     Pt is unaware of what reaction she had   • Entacapone Other (See Comments)     \"extreme weakness in legs - caused several falls, which stopped after discontinuing this medication\"   • Levemir [Insulin Detemir] Hives     Hives / rash around injection site   • Penicillins Hives     Jitteriness    • Xarelto [Rivaroxaban] GI Bleeding   • Bactrim [Sulfamethoxazole-Trimethoprim] Nausea Only     Headache    • Ciprofloxacin Diarrhea   • Cogentin [Benztropine] Other (See Comments)     \"uncontrollable body movements\"   • Compazine [Prochlorperazine Edisylate] Other (See Comments)     Dystonic reaction   • Cortisone Other (See Comments)     MAKES BLOOD PRESSURE HIGH    • Duraprep [Antiseptic Products, " Misc.] Itching and Rash     RASH AND ITCHING   • Haldol [Haloperidol Lactate] Other (See Comments)     Dystonic reaction   • Hydralazine Other (See Comments)     Headache    • Hydrocodone-Acetaminophen Nausea And Vomiting and Dizziness     Headache, nausea    • Levaquin [Levofloxacin] Other (See Comments)     Cannot take due to taking propafenone HCL- severe reaction with mixed.    • Lisinopril Cough   • Statins Myalgia     Leg pain   • Tarka [Trandolapril-Verapamil Hcl Er] Other (See Comments)     Constipation        Current Medications:    Current Outpatient Medications:   •  albuterol (PROVENTIL) (2.5 MG/3ML) 0.083% nebulizer solution, Take 2.5 mg by nebulization Every 4 (Four) Hours As Needed., Disp: , Rfl:   •  albuterol (VENTOLIN HFA) 108 (90 Base) MCG/ACT inhaler, Inhale 1 puff Every 4 (Four) Hours As Needed., Disp: , Rfl:   •  amantadine (SYMMETREL) 100 MG capsule, Take 100 mg by mouth 2 (Two) Times a Day., Disp: , Rfl:   •  amLODIPine Besylate (NORVASC PO), 5 mg As Needed., Disp: , Rfl:   •  aspirin 81 MG EC tablet, Take 81 mg by mouth Daily., Disp: , Rfl:   •  B Complex-C (SUPER B COMPLEX PO), Take 1 tablet by mouth Daily., Disp: , Rfl:   •  bumetanide (BUMEX) 1 MG tablet, Take 1 mg by mouth Daily As Needed (for swelling)., Disp: , Rfl:   •  Calcium Carbonate (CALTRATE 600 PO), Take 600 mg by mouth Daily., Disp: , Rfl:   •  carbidopa-levodopa (SINEMET)  MG per tablet, Take  by mouth. 2 tablets at 0600, 1 tablet at 0900, 1200, 1500, 1800, 2100, Disp: , Rfl:   •  carbonyl iron (FEOSOL) 45 MG tablet tablet, Take 1 tablet by mouth 2 (Two) Times a Day., Disp: , Rfl:   •  Cholecalciferol 2000 units tablet, Take 2,000 Units by mouth 2 (Two) Times a Day., Disp: , Rfl:   •  citalopram (CeleXA) 20 MG tablet, Take 20 mg by mouth every night at bedtime., Disp: , Rfl:   •  clobetasol (TEMOVATE) 0.05 % cream, Apply 1 application topically 2 (Two) Times a Day As Needed (Lichens Sclerosis)., Disp: , Rfl:   •   cloNIDine (CATAPRES) 0.1 MG tablet, Take 1 tablet by mouth Every 12 (Twelve) Hours., Disp: 60 tablet, Rfl: 0  •  dofetilide (TIKOSYN) 500 MCG capsule, Take 1 capsule by mouth Every 12 (Twelve) Hours., Disp: 180 capsule, Rfl: 3  •  ELIQUIS 5 MG tablet tablet, TAKE 1 TABLET EVERY 12 HOURS, Disp: 180 tablet, Rfl: 3  •  insulin glargine (LANTUS) 100 UNIT/ML injection, Inject 28 Units under the skin into the appropriate area as directed Daily. ACCORDING TO SUGAR LEVELS , Disp: , Rfl:   •  IPRATROPIUM BROMIDE NA, 1 spray into the nostril(s) as directed by provider 2 (Two) Times a Day As Needed., Disp: , Rfl:   •  Loratadine 10 MG capsule, Take 10 mg by mouth Daily., Disp: , Rfl:   •  losartan (COZAAR) 50 MG tablet, TAKE 1 TABLET EVERY 12 HOURS, Disp: 180 tablet, Rfl: 4  •  metFORMIN (GLUCOPHAGE) 1000 MG tablet, Take 1,000 mg by mouth 2 (Two) Times a Day With Meals., Disp: , Rfl:   •  Mirabegron ER (MYRBETRIQ) 50 MG tablet sustained-release 24 hour 24 hr tablet, Take 50 mg by mouth Daily., Disp: , Rfl:   •  Multiple Vitamins-Minerals (CENTRUM ULTRA WOMENS PO), Take 1 tablet by mouth Daily., Disp: , Rfl:   •  nitroglycerin (NITROLINGUAL) 0.4 MG/SPRAY spray, Place 1 spray under the tongue Every 5 (Five) Minutes As Needed for Chest Pain., Disp: 1 each, Rfl: 6  •  O2 (OXYGEN), Inhale 2 L/min 1 (One) Time. 2L while sleeping, Disp: , Rfl:   •  omeprazole (priLOSEC) 40 MG capsule, Take 40 mg by mouth 2 (Two) Times a Day., Disp: , Rfl:   •  potassium chloride (MICRO-K) 10 MEQ CR capsule, Take 10 mEq by mouth 2 (Two) Times a Day., Disp: , Rfl:   •  pramipexole (MIRAPEX) 1.5 MG tablet, Take 1.5 mg by mouth Every Night., Disp: , Rfl:   •  ranolazine (RANEXA) 500 MG 12 hr tablet, TAKE 1 TABLET EVERY 12 HOURS, Disp: 180 tablet, Rfl: 4  •  sennosides-docusate sodium (SENOKOT-S) 8.6-50 MG tablet, Take 1 tablet by mouth Daily., Disp: , Rfl:   •  spironolactone (ALDACTONE) 25 MG tablet, Take 1 tablet by mouth Daily As Needed (swelling).,  "Disp: 90 tablet, Rfl: 1  •  SYNTHROID 200 MCG tablet, Take 200 mcg by mouth Daily., Disp: , Rfl:   •  traMADol (ULTRAM) 50 MG tablet, Take 50 mg by mouth Every 8 (Eight) Hours As Needed for Moderate Pain ., Disp: , Rfl:   •  vitamin C (ASCORBIC ACID) 500 MG tablet, Take 500 mg by mouth Daily. Chewable tablet, Disp: , Rfl:     HPI    Joanna Cross is a 69 y.o. female who presents today for 6 month follow up of coronary artery disease, paroxysmal atrial fibrillation, pacemaker, and cardiac risk factors. Since last visit, she has been feeling well overall from a cardiovascular standpoint. She monitors her blood pressure at home but reports her cuff was found to be inaccurate at two different doctor's visits, and will be purchasing a new one. Patient is scheduled for cystoscopy and possible urethral dilation. She requests cardiac clearance. Patient denies chest pain, shortness of breath, tachyarrhythmias, and syncope. Pt reports she went to the ED on 02/14/2020 for waxing and waning heart rates and accelerated hypertension.      The following portions of the patient's history were reviewed and updated as appropriate: allergies, current medications and problem list.    Pertinent positives as listed in the HPI.  All other systems reviewed are negative.         Vitals:    03/11/20 1501   BP: 130/64   BP Location: Left arm   Patient Position: Sitting   Pulse: 76   SpO2: 95%   Weight: 111 kg (245 lb 3.2 oz)   Height: 161.3 cm (63.5\")       Physical Exam:  General: Alert and oriented.  Neck: Jugular venous pressure is within normal limits. Carotids have normal upstrokes without bruits.   Cardiovascular: Heart has a nondisplaced focal PMI. Regular rate and rhythm. No murmur, gallop or rub.  Lungs: Clear, no rales or wheezes. Equal expansion is noted.   Extremities: Show no edema.  Skin: Warm and dry.  Neurologic: Nonfocal.     Diagnostic Data (reviewed with patient):  Lab Results   Component Value Date    GLUCOSE 107 (H) " 06/12/2019    BUN 24 (H) 06/12/2019    CREATININE 0.85 06/12/2019    EGFRIFNONA 66 06/12/2019    BCR 28.2 (H) 06/12/2019     (L) 06/12/2019    K 4.7 06/12/2019    CL 98 06/12/2019    CO2 26.0 06/12/2019    CALCIUM 9.1 06/12/2019    ALBUMIN 4.45 01/31/2019    ALKPHOS 94 01/31/2019    AST 21 01/31/2019    ALT 12 01/31/2019     Lab Results   Component Value Date    CHOL 185 02/01/2019    TRIG 78 02/01/2019    HDL 43 02/01/2019     (H) 02/01/2019      Lab Results   Component Value Date    WBC 10.56 06/06/2019    RBC 4.47 06/06/2019    HGB 13.1 06/06/2019    HCT 40.9 06/06/2019    MCV 91.5 06/06/2019     06/06/2019          ECG 12 Lead  Date/Time: 3/11/2020 3:23 PM  Performed by: Albertina Corona MD  Authorized by: Albertina Corona MD   Comparison: compared with previous ECG from 12/16/2019  Similar to previous ECG  Comparison to previous ECG: No PACs on today's ECG  Rhythm: sinus rhythm  BPM: 66  Conduction: bifascicular block  ST Segments: ST segments normal  T Waves: T waves normal  Other: no other findings    Clinical impression: normal ECG  Comments: Normal QT/QTc              Assessment:    ICD-10-CM ICD-9-CM   1. Coronary artery disease involving native coronary artery of native heart without angina pectoris I25.10 414.01   2. Persistent atrial fibrillation I48.19 427.31   3. Tachy-thai syndrome (CMS/[facility]) I49.5 427.81   4. Essential hypertension I10 401.9   5. Hyperlipidemia LDL goal <70 E78.5 272.4   6. Encounter for pre-operative cardiovascular clearance Z01.810 V72.81         Plan:  1. Patient is low risk for cystoscopy from a cardiac standpoint. She may hold Eliquis for 2-3 days prior to surgery. May not hold ASA  2. Continue aspirin for CAD.  3. Continue Tikosyn for rhythm control with AF. Eliquis 5 mg BID for stroke prophylaxis.  4. Continue clonidine, losartan, spironolactone, and Bumex for hypertension and fluid retention.  5. Continue Ranexa 500 mg BID for chest  pain.  6. Continue all other current medications.  7. F/up in 6 months, sooner if needed.      Scribed for Albertina Corona MD by Promise Velázquez. 3/11/2020  15:28     I Albertina Corona MD personally performed the services described in this documentation as scribed by the above individual in my presence, and it is both accurate and complete.    Albertina Corona MD, FACC

## 2020-03-16 ENCOUNTER — OFFICE VISIT (OUTPATIENT)
Dept: CARDIOLOGY | Facility: CLINIC | Age: 70
End: 2020-03-16

## 2020-03-16 VITALS
SYSTOLIC BLOOD PRESSURE: 124 MMHG | HEIGHT: 64 IN | OXYGEN SATURATION: 97 % | WEIGHT: 246.8 LBS | BODY MASS INDEX: 42.14 KG/M2 | HEART RATE: 71 BPM | DIASTOLIC BLOOD PRESSURE: 60 MMHG

## 2020-03-16 DIAGNOSIS — I48.19 PERSISTENT ATRIAL FIBRILLATION (HCC): Primary | ICD-10-CM

## 2020-03-16 DIAGNOSIS — I49.5 TACHY-BRADY SYNDROME (HCC): ICD-10-CM

## 2020-03-16 PROCEDURE — 99214 OFFICE O/P EST MOD 30 MIN: CPT | Performed by: PHYSICIAN ASSISTANT

## 2020-03-16 RX ORDER — FERROUS SULFATE 325(65) MG
325 TABLET ORAL
COMMUNITY
End: 2022-11-17

## 2020-03-16 RX ORDER — CLINDAMYCIN HYDROCHLORIDE 150 MG/1
150 CAPSULE ORAL DAILY
COMMUNITY
End: 2022-12-22

## 2020-03-16 NOTE — PROGRESS NOTES
Joanna Cross  1950  PCP: Reynaldo Fritz MD    SUBJECTIVE:   Joanna Cross is a 69 y.o. female seen for a follow up visit regarding the following:     Chief Complaint: Follow up for afib, tachybrady     HPI:    Since last visit the patient's status has been stable. She has not had any significant episodes of afib. She was recently taken to [facility] for hypertensive episode. Her BP has been well controlled since that time. She denies any chest pressure. She has SOB, but is at her baseline.     History:  This is a 68-year-old patient referred by Dr. Corona for further evaluation and management of atrial fibrillation.  Patient has been diagnosed with atrial fibrillation since around 2016.  She has been plagued by tachybradycardia syndrome episodes in the past.  She has undergone cardioversions in the past but unable to maintain sinus rhythm more than a few days at a time.  She is been treated with Profafenone in the past as well with no improvement in her atrial fibrillation burden.  Her biggest complaint is ongoing fatigue with occasional palpitations.     Cardiac PMH: (Old records have been reviewed and summarized below)  1. Coronary artery disease  a. [facility] 2/15/2008, Dr Lutz.  Mild, non-obstructive CAD, normal EF  b. [facility] 1/9/2013, Dr Stevenson Sutton:  No LV gram done; mild, non-obstructive coronary artery disease.  c. [facility] 11/9/2015[facility]:  EF 60%.  70% RCA lesion with Promus ZAINAB placement. 40-50% mid LAD, no FFR performed. Other small blockages noted.  No MR.  d. [facility] 11/15/2015, Dr Palacio @ [facility]:  Patent RCA stent, 40-50% LAD with FFR @ 0.92; mild inferior wall hypokinesis  e. [facility] 7/29/2016, [facility]:  Abnormal stress test.  Normal CORS with patent stent. LAD improved since last image EF 55-60%  f. [facility] 2/1/19:  EF normal.  Patent RCA, noncritical mid LAD with IFR 0.93, noncritical circ.    2. Peripheral vascular disease/chronic venous stasis  a. Venous  duplex 9/17/2010: Insufficiency to venous hypertension in the great saphenous system bilaterally.  b. Venous duplex 2013: Right leg laser ablation of Dr. Garcia.  c. Venous duplex 5/6/2016: No evidence of DVT, no superficial, no thrmobophlebitis, no valvular insufficiency.  d. AA with runoffs 8/31/2016: Single-vessel [place]: No significant arterial disease.  3. Palpitations  a. Echocardiogram 2/13/2014: Dr. Teran.  Normal biventricular function; trace to mild MR.  Atrial septal aneurysm or  b. Echocardiogram 12/22/15: Dr. Chavez.  Borderline LVH, mild LAE, mild RV enlargement.  Mild to moderate MR and TR with RVSP of 46 mmHg.  c. Conclusion/3/2016: Dr. Palacio.  RV enlargement.  EF 50-55%.  Mild AI S, mild MR and TR with RVSP 37 mmHg.  4. AF/SSS  a. Transactiv PPM 2016  b. CHADS2 Vasc  = 5. On Eliquis   c. TIA: Carotid duplex 2/13/2014 showed no significant flow-limiting stenosis.  Mild luminal disease present; both vertebrals are present.  d. ECV 12/26/2018:  Successful cardioversion: AV paced  e. Echo 12/25/18:  EF 60%, mild MR/ TR with RVSP 29 mmHg.  Sclerotic AoV  5. Diabetes  6. Essential Hypertension  7. Hyperlipidemia  8. Hypothyroid  9. Hyponatremia  a. Secondary to SIADH  10. MILLI with CPAP  11. RLS  12. Parkinson's disease  13. GERD  14. Urinary Retention  a. S/P cystoscopy and ureter dilation, March 2018.  Dr. Terrence Mckenzie  15. Surgeries:  a. Rotator cuff repair  b. Cholecystectomy  c. Bilateral knee replacement  d. Tubal ligation  e. Breast surgery  f. Sinus surgery      Current Outpatient Medications:   •  albuterol (PROVENTIL) (2.5 MG/3ML) 0.083% nebulizer solution, Take 2.5 mg by nebulization Every 4 (Four) Hours As Needed., Disp: , Rfl:   •  albuterol (VENTOLIN HFA) 108 (90 Base) MCG/ACT inhaler, Inhale 1 puff Every 4 (Four) Hours As Needed., Disp: , Rfl:   •  ALLERGY SERUM INJECTION, Inject  under the skin into the appropriate area as directed 1 (One) Time Per Week., Disp: , Rfl:   •   amantadine (SYMMETREL) 100 MG capsule, Take 100 mg by mouth 2 (Two) Times a Day., Disp: , Rfl:   •  amLODIPine Besylate (NORVASC PO), Take 5 mg by mouth As Needed., Disp: , Rfl:   •  aspirin 81 MG EC tablet, Take 81 mg by mouth Daily., Disp: , Rfl:   •  B Complex-C (SUPER B COMPLEX PO), Take 1 tablet by mouth Daily., Disp: , Rfl:   •  bumetanide (BUMEX) 1 MG tablet, Take 1 mg by mouth Daily As Needed (for swelling)., Disp: , Rfl:   •  Calcium Carbonate (CALTRATE 600 PO), Take 600 mg by mouth Daily., Disp: , Rfl:   •  carbidopa-levodopa (SINEMET)  MG per tablet, Take  by mouth. 2 tablets at 0600, 1 tablet at 0900, 1200, 1500, 1800, 2100, Disp: , Rfl:   •  Cholecalciferol 2000 units tablet, Take 2,000 Units by mouth 2 (Two) Times a Day., Disp: , Rfl:   •  citalopram (CeleXA) 20 MG tablet, Take 20 mg by mouth every night at bedtime., Disp: , Rfl:   •  clindamycin (CLEOCIN) 150 MG capsule, Take 150 mg by mouth Daily. 4 capsules one hour before dental appointments., Disp: , Rfl:   •  clobetasol (TEMOVATE) 0.05 % cream, Apply 1 application topically 2 (Two) Times a Day As Needed (Lichens Sclerosis)., Disp: , Rfl:   •  cloNIDine (CATAPRES) 0.1 MG tablet, Take 1 tablet by mouth Every 12 (Twelve) Hours., Disp: 60 tablet, Rfl: 0  •  dofetilide (TIKOSYN) 500 MCG capsule, Take 1 capsule by mouth Every 12 (Twelve) Hours., Disp: 180 capsule, Rfl: 3  •  ELIQUIS 5 MG tablet tablet, TAKE 1 TABLET EVERY 12 HOURS, Disp: 180 tablet, Rfl: 3  •  ferrous sulfate 325 (65 FE) MG tablet, Take 325 mg by mouth 3 (Three) Times a Day With Meals., Disp: , Rfl:   •  Glucosamine-Chondroit-Calcium (TRIPLE FLEX BONE & JOINT PO), Take 1 tablet by mouth Daily., Disp: , Rfl:   •  insulin glargine (LANTUS) 100 UNIT/ML injection, Inject 28 Units under the skin into the appropriate area as directed Daily. ACCORDING TO SUGAR LEVELS , Disp: , Rfl:   •  IPRATROPIUM BROMIDE NA, 1 spray into the nostril(s) as directed by provider 2 (Two) Times a Day As  Needed., Disp: , Rfl:   •  Loratadine 10 MG capsule, Take 10 mg by mouth Daily., Disp: , Rfl:   •  losartan (COZAAR) 50 MG tablet, TAKE 1 TABLET EVERY 12 HOURS, Disp: 180 tablet, Rfl: 4  •  metFORMIN (GLUCOPHAGE) 1000 MG tablet, Take 1,000 mg by mouth 2 (Two) Times a Day With Meals., Disp: , Rfl:   •  Mirabegron ER (MYRBETRIQ) 50 MG tablet sustained-release 24 hour 24 hr tablet, Take 50 mg by mouth Daily., Disp: , Rfl:   •  Multiple Vitamins-Minerals (CENTRUM ULTRA WOMENS PO), Take 1 tablet by mouth Daily., Disp: , Rfl:   •  nitroglycerin (NITROLINGUAL) 0.4 MG/SPRAY spray, Place 1 spray under the tongue Every 5 (Five) Minutes As Needed for Chest Pain., Disp: 1 each, Rfl: 6  •  O2 (OXYGEN), Inhale 2 L/min 1 (One) Time. 2L while sleeping, Disp: , Rfl:   •  omeprazole (priLOSEC) 40 MG capsule, Take 40 mg by mouth 2 (Two) Times a Day., Disp: , Rfl:   •  potassium chloride (MICRO-K) 10 MEQ CR capsule, Take 10 mEq by mouth 2 (Two) Times a Day., Disp: , Rfl:   •  pramipexole (MIRAPEX) 1.5 MG tablet, Take 1.5 mg by mouth Every Night., Disp: , Rfl:   •  ranolazine (RANEXA) 500 MG 12 hr tablet, TAKE 1 TABLET EVERY 12 HOURS, Disp: 180 tablet, Rfl: 4  •  sennosides-docusate sodium (SENOKOT-S) 8.6-50 MG tablet, Take 1 tablet by mouth Daily., Disp: , Rfl:   •  spironolactone (ALDACTONE) 25 MG tablet, Take 1 tablet by mouth Daily As Needed (swelling)., Disp: 90 tablet, Rfl: 1  •  SYNTHROID 200 MCG tablet, Take 200 mcg by mouth Daily., Disp: , Rfl:   •  traMADol (ULTRAM) 50 MG tablet, Take 50 mg by mouth Every 8 (Eight) Hours As Needed for Moderate Pain ., Disp: , Rfl:   •  vitamin C (ASCORBIC ACID) 500 MG tablet, Take 500 mg by mouth Daily. Chewable tablet, Disp: , Rfl:     Past Medical History, Past Surgical History, Family history, Social History, and Medications were all reviewed with the patient today and updated as necessary.       Patient Active Problem List   Diagnosis   • Coronary artery disease involving native coronary  "artery without angina pectoris   • Essential hypertension   • Peripheral vascular disease (CMS/HCC)   • Hyperlipidemia LDL goal <70   • Spinal stenosis, lumbar region, with neurogenic claudication   • Diabetes mellitus (CMS/HCC)   • Delayed surgical wound healing   • Biceps tendinitis   • Overactive bladder   • GERD (gastroesophageal reflux disease)   • Hypothyroidism   • Lichen sclerosus   • Parkinson's disease (CMS/HCC)   • MILLI treated with BiPAP - Patient reports compliance.    • History of respiratory failure - prior respiratory failure requiring mechanical ventilation, open lung biopsy non-specific.    • SOB (shortness of breath)   • A-fib (CMS/HCC)   • Chest pain   • Atrial fibrillation with RVR (CMS/HCC)   • Renal insufficiency   • CAD in native artery   • Persistent atrial fibrillation   • Tachy-thai syndrome (CMS/HCC)     Allergies   Allergen Reactions   • Toprol Xl [Metoprolol Tartrate] Shortness Of Breath     Extreme fatigue   • Amlodipine Besylate Swelling     Lower extremity (ankles, feet) swelling   • Codeine Unknown (See Comments)     Pt is unaware of what reaction she had   • Entacapone Other (See Comments)     \"extreme weakness in legs - caused several falls, which stopped after discontinuing this medication\"   • Levemir [Insulin Detemir] Hives     Hives / rash around injection site   • Penicillins Hives     Jitteriness    • Xarelto [Rivaroxaban] GI Bleeding   • Bactrim [Sulfamethoxazole-Trimethoprim] Nausea Only     Headache    • Ciprofloxacin Diarrhea   • Cogentin [Benztropine] Other (See Comments)     \"uncontrollable body movements\"   • Compazine [Prochlorperazine Edisylate] Other (See Comments)     Dystonic reaction   • Cortisone Other (See Comments)     MAKES BLOOD PRESSURE HIGH    • Duraprep [Antiseptic Products, Misc.] Itching and Rash     RASH AND ITCHING   • Haldol [Haloperidol Lactate] Other (See Comments)     Dystonic reaction   • Hydralazine Other (See Comments)     Headache    • " Hydrocodone-Acetaminophen Nausea And Vomiting and Dizziness     Headache, nausea    • Levaquin [Levofloxacin] Other (See Comments)     Cannot take due to taking propafenone HCL- severe reaction with mixed.    • Lisinopril Cough   • Statins Myalgia     Leg pain   • Tarka [Trandolapril-Verapamil Hcl Er] Other (See Comments)     Constipation      Past Medical History:   Diagnosis Date   • Anemia    • Atrial fibrillation (CMS/HCC)    • AVM (arteriovenous malformation)    • CAD (coronary artery disease)    • Depression    • Disease of thyroid gland    • DM2 (diabetes mellitus, type 2) (CMS/[facility])     checks sugar twice per day    • Essential hypertension    • Generalized osteoarthritis    • GERD (gastroesophageal reflux disease)    • GIB (gastrointestinal bleeding) 2016    d/t xarelto    • Headache    • Hiatal hernia    • History of shingles    • History of transfusion 2016   • IBS (irritable bowel syndrome)    • Lichen sclerosus    • Mixed hyperlipidemia    • Morbid obesity (CMS/HCC)    • MRSA infection 2018   • Myocardial infarction (CMS/HCC)    • On home oxygen therapy     2L of oxygen via CPAP while sleeping    • MILLI on CPAP     18/14   • Osteoporosis    • Pacemaker    • Parkinson disease (CMS/HCC)    • Peripheral vascular disease (CMS/HCC)    • Pleurisy    • Pneumonia    • Seborrheic dermatitis    • Skin cancer     on back    • SOBOE (shortness of breath on exertion)    • SSS (sick sinus syndrome) (CMS/HCC)    • TIA (transient ischemic attack)    • Varicose vein of leg    • Venous insufficiency of both lower extremities      Past Surgical History:   Procedure Laterality Date   • BACK SURGERY      l4-l5 laminectomy    • BICEPS TENDON REPAIR Right     shoulder   • BREAST BIOPSY Left 2004   • BUNIONECTOMY Right    • CARDIAC CATHETERIZATION     • CARDIAC CATHETERIZATION N/A 2/1/2019    Procedure: Left Heart Cath;  Surgeon: Albertina Corona MD;  Location: [facility] CATH INVASIVE LOCATION;  Service: Cardiology   •  "CHOLECYSTECTOMY     • COLONOSCOPY  2016   • CORONARY STENT PLACEMENT      x1 stent   • CYST REMOVAL      left ear, upper left back 2001   • DIAGNOSTIC LAPAROSCOPY  1981   • ENDOSCOPIC FUNCTIONAL SINUS SURGERY (FESS)  2011   • ENDOSCOPY  2016   • HAMMER TOE REPAIR Left    • INCISION AND DRAINAGE OF WOUND  2018   • JOINT REPLACEMENT     • LIPOMA EXCISION  1999    left leg    • LUMBAR LAMINECTOMY DISCECTOMY DECOMPRESSION N/A 7/6/2018    Procedure: LUMBAR LAMINECTOMY L4-5, HEMILAMIINECTOMY RIGHT L5-S1, FORAMINOTOMY L5-S1;  Surgeon: Lencho Gamino MD;  Location:  [facility];  Service: Neurosurgery   • LUMBAR LAMINECTOMY DISCECTOMY DECOMPRESSION N/A 9/19/2018    Procedure: INCISION AND DRAINAGE BACK WITH WOUND EXPLORATION;  Surgeon: Ritchie García MD;  Location:  [facility];  Service: Neurosurgery   • LUNG BIOPSY Left 2016   • PACEMAKER IMPLANTATION  11/2016    sss   • REPLACEMENT TOTAL KNEE Bilateral     left knee 2011, right 2012 per dr acuna    • REPLACEMENT TOTAL KNEE Bilateral    • SHOULDER ARTHROSCOPY Bilateral     2003-left, 2004- right    • SKIN BIOPSY      skin cancer back 2016   • TUBAL ABDOMINAL LIGATION  1980   • WISDOM TOOTH EXTRACTION       Family History   Problem Relation Age of Onset   • Kidney disease Mother    • Coronary artery disease Mother    • Coronary artery disease Father    • Coronary artery disease Brother      Social History     Tobacco Use   • Smoking status: Never Smoker   • Smokeless tobacco: Never Used   Substance Use Topics   • Alcohol use: No     Frequency: Never         PHYSICAL EXAM:    /60 (BP Location: Right arm, Patient Position: Sitting)   Pulse 71   Ht 161.3 cm (63.5\")   Wt 112 kg (246 lb 12.8 oz)   LMP  (LMP Unknown) Comment: Mammogram- april 2018   SpO2 97%   BMI 43.03 kg/m²        Wt Readings from Last 5 Encounters:   03/16/20 112 kg (246 lb 12.8 oz)   03/11/20 111 kg (245 lb 3.2 oz)   12/16/19 109 kg (240 lb)   12/10/19 107 kg (236 lb 8.9 oz)   11/11/19 108 kg (237 lb) "       BP Readings from Last 5 Encounters:   03/16/20 124/60   03/11/20 130/64   12/16/19 122/70   12/10/19 176/77   11/11/19 138/82       General-Well Nourished, Well developed  Eyes - PERRLA  Neck- supple, No mass  CV- regular rate and rhythm, no MRG, No edema  Lung- clear bilaterally  Abd- soft, +BS  Musc/skel - Norm strength and range of motion  Skin- warm and dry  Neuro - Alert & Oriented x 3, appropriate mood.        Medical problems and test results were reviewed with the patient today.     No results found for this or any previous visit (from the past 672 hour(s)).      EKG: (EKG has been independently visualized by me and summarized below)    Procedures     ASSESSMENT and PLAN    1.  Atrial fibrillation-persistent in nature. Currently on Tikosyn 500mcg BID w/ stable QTc. Patient does note improvement in symptoms. Will continue Tikosyn and Eliquis. 9% mode switch on interrogation, but this is all noise.      2.  Dual-chamber pacemaker-patient has had noise on RA and RV leads and this is not a new issue. She is not dependent on the pacemaker and only A pacing 6% and RV pacing 8%. Changed to DDDR-60, increased AV delay to 400/260, and changed V sense to bipolar.       3.  Tikosyn use- EKG reviewed from 3/11/20. QTc is stable.     4. HTN- controlled. Continue Losartan, Clonidine, Aldactone          Return in about 4 months (around 7/16/2020).        Ai Prieto PA-C   Cardiology/Electrophysiology  3/16/2020  13:52

## 2020-05-12 ENCOUNTER — TELEMEDICINE (OUTPATIENT)
Dept: PULMONOLOGY | Facility: CLINIC | Age: 70
End: 2020-05-12

## 2020-05-12 DIAGNOSIS — R06.02 SOB (SHORTNESS OF BREATH): ICD-10-CM

## 2020-05-12 DIAGNOSIS — K21.9 GASTROESOPHAGEAL REFLUX DISEASE, ESOPHAGITIS PRESENCE NOT SPECIFIED: ICD-10-CM

## 2020-05-12 DIAGNOSIS — G47.33 OSA TREATED WITH BIPAP: Primary | ICD-10-CM

## 2020-05-12 PROCEDURE — 99213 OFFICE O/P EST LOW 20 MIN: CPT | Performed by: NURSE PRACTITIONER

## 2020-05-12 NOTE — PROGRESS NOTES
[facility] Pulmonary Follow up    CHIEF COMPLAINT    MILLI    HISTORY OF PRESENT ILLNESS    Joanna Cross is a 69 y.o.female here today for a telemedicine visit.  She was last seen in the office by me in November.  She denies any respiratory illnesses or exacerbations since her last appointment.  Since her last appointment she had a titration study for her BiPAP performed in December.  It was felt that her BiPAP was working appropriately but she was not using it long enough at nighttime.    She continues to wear her BiPAP every night for an average of 4 to 7 hours.Her current BiPAP settings are IPAP pressure 18 and EPAP pressure 14 with a BIflex setting of 3.  She uses 2 L bled into the machine as well.  She states that she is sleeping better than she was.  She denies any daytime somnolence or fatigue.    She continues to follow cardiology for her atrial fibrillation.  She states that she is currently on Tikosyn and is tolerating that well.  She has had difficulty in the past with her rate control however she states she is more controlled now.     She is also seeing an allergist and is receiving allergy injections currently.  She states she has skipped several of her injections due to the COVID epidemic but is hopeful that she will get her injection started tomorrow.     She denies fever, chills, sputum production, hemoptysis, night sweats, weight loss, chest pain or palpitations.  She denies any lower extremity edema or calf tenderness.  She denies any reflux symptoms.  She remains on omeprazole daily.  She does have chronic sinus symptoms and is using her [place] pot more frequently.  She is unable to take Flonase because of the interaction with Tikosyn.     She is up-to-date on her current vaccinations.       Patient Active Problem List   Diagnosis   • Coronary artery disease involving native coronary artery without angina pectoris   • Essential hypertension   • Peripheral vascular disease (CMS/[facility])   •  "Hyperlipidemia LDL goal <70   • Spinal stenosis, lumbar region, with neurogenic claudication   • Diabetes mellitus (CMS/HCC)   • Delayed surgical wound healing   • Biceps tendinitis   • Overactive bladder   • GERD (gastroesophageal reflux disease)   • Hypothyroidism   • Lichen sclerosus   • Parkinson's disease (CMS/HCC)   • MILLI treated with BiPAP - Patient reports compliance.    • History of respiratory failure - prior respiratory failure requiring mechanical ventilation, open lung biopsy non-specific.    • SOB (shortness of breath)   • A-fib (CMS/HCC)   • Chest pain   • Atrial fibrillation with RVR (CMS/HCC)   • Renal insufficiency   • CAD in native artery   • Persistent atrial fibrillation   • Tachy-thai syndrome (CMS/HCC)       Allergies   Allergen Reactions   • Toprol Xl [Metoprolol Tartrate] Shortness Of Breath     Extreme fatigue   • Amlodipine Besylate Swelling     Lower extremity (ankles, feet) swelling   • Codeine Unknown (See Comments)     Pt is unaware of what reaction she had   • Entacapone Other (See Comments)     \"extreme weakness in legs - caused several falls, which stopped after discontinuing this medication\"   • Levemir [Insulin Detemir] Hives     Hives / rash around injection site   • Penicillins Hives     Jitteriness    • Xarelto [Rivaroxaban] GI Bleeding   • Bactrim [Sulfamethoxazole-Trimethoprim] Nausea Only     Headache    • Ciprofloxacin Diarrhea   • Cogentin [Benztropine] Other (See Comments)     \"uncontrollable body movements\"   • Compazine [Prochlorperazine Edisylate] Other (See Comments)     Dystonic reaction   • Cortisone Other (See Comments)     MAKES BLOOD PRESSURE HIGH    • Duraprep [Antiseptic Products, Misc.] Itching and Rash     RASH AND ITCHING   • Haldol [Haloperidol Lactate] Other (See Comments)     Dystonic reaction   • Hydralazine Other (See Comments)     Headache    • Hydrocodone-Acetaminophen Nausea And Vomiting and Dizziness     Headache, nausea    • Levaquin " [Levofloxacin] Other (See Comments)     Cannot take due to taking propafenone HCL- severe reaction with mixed.    • Lisinopril Cough   • Statins Myalgia     Leg pain   • Tarka [Trandolapril-Verapamil Hcl Er] Other (See Comments)     Constipation        Current Outpatient Medications:   •  albuterol (PROVENTIL) (2.5 MG/3ML) 0.083% nebulizer solution, Take 2.5 mg by nebulization Every 4 (Four) Hours As Needed., Disp: , Rfl:   •  albuterol (VENTOLIN HFA) 108 (90 Base) MCG/ACT inhaler, Inhale 1 puff Every 4 (Four) Hours As Needed., Disp: , Rfl:   •  ALLERGY SERUM INJECTION, Inject  under the skin into the appropriate area as directed 1 (One) Time Per Week., Disp: , Rfl:   •  amantadine (SYMMETREL) 100 MG capsule, Take 100 mg by mouth 2 (Two) Times a Day., Disp: , Rfl:   •  amLODIPine Besylate (NORVASC PO), Take 5 mg by mouth As Needed., Disp: , Rfl:   •  aspirin 81 MG EC tablet, Take 81 mg by mouth Daily., Disp: , Rfl:   •  B Complex-C (SUPER B COMPLEX PO), Take 1 tablet by mouth Daily., Disp: , Rfl:   •  bumetanide (BUMEX) 1 MG tablet, Take 1 mg by mouth Daily As Needed (for swelling)., Disp: , Rfl:   •  Calcium Carbonate (CALTRATE 600 PO), Take 600 mg by mouth Daily., Disp: , Rfl:   •  carbidopa-levodopa (SINEMET)  MG per tablet, Take  by mouth. 2 tablets at 0600, 1 tablet at 0900, 1200, 1500, 1800, 2100, Disp: , Rfl:   •  Cholecalciferol 2000 units tablet, Take 2,000 Units by mouth 2 (Two) Times a Day., Disp: , Rfl:   •  citalopram (CeleXA) 20 MG tablet, Take 20 mg by mouth every night at bedtime., Disp: , Rfl:   •  clindamycin (CLEOCIN) 150 MG capsule, Take 150 mg by mouth Daily. 4 capsules one hour before dental appointments., Disp: , Rfl:   •  clobetasol (TEMOVATE) 0.05 % cream, Apply 1 application topically 2 (Two) Times a Day As Needed (Lichens Sclerosis)., Disp: , Rfl:   •  cloNIDine (CATAPRES) 0.1 MG tablet, Take 1 tablet by mouth Every 12 (Twelve) Hours., Disp: 60 tablet, Rfl: 0  •  dofetilide (TIKOSYN)  500 MCG capsule, Take 1 capsule by mouth Every 12 (Twelve) Hours., Disp: 180 capsule, Rfl: 3  •  ELIQUIS 5 MG tablet tablet, TAKE 1 TABLET EVERY 12 HOURS, Disp: 180 tablet, Rfl: 3  •  ferrous sulfate 325 (65 FE) MG tablet, Take 325 mg by mouth 3 (Three) Times a Day With Meals., Disp: , Rfl:   •  Glucosamine-Chondroit-Calcium (TRIPLE FLEX BONE & JOINT PO), Take 1 tablet by mouth Daily., Disp: , Rfl:   •  insulin glargine (LANTUS) 100 UNIT/ML injection, Inject 28 Units under the skin into the appropriate area as directed Daily. ACCORDING TO SUGAR LEVELS , Disp: , Rfl:   •  IPRATROPIUM BROMIDE NA, 1 spray into the nostril(s) as directed by provider 2 (Two) Times a Day As Needed., Disp: , Rfl:   •  Loratadine 10 MG capsule, Take 10 mg by mouth Daily., Disp: , Rfl:   •  losartan (COZAAR) 50 MG tablet, TAKE 1 TABLET EVERY 12 HOURS, Disp: 180 tablet, Rfl: 4  •  metFORMIN (GLUCOPHAGE) 1000 MG tablet, Take 1,000 mg by mouth 2 (Two) Times a Day With Meals., Disp: , Rfl:   •  Mirabegron ER (MYRBETRIQ) 50 MG tablet sustained-release 24 hour 24 hr tablet, Take 50 mg by mouth Daily., Disp: , Rfl:   •  Multiple Vitamins-Minerals (CENTRUM ULTRA WOMENS PO), Take 1 tablet by mouth Daily., Disp: , Rfl:   •  nitroglycerin (NITROLINGUAL) 0.4 MG/SPRAY spray, Place 1 spray under the tongue Every 5 (Five) Minutes As Needed for Chest Pain., Disp: 1 each, Rfl: 6  •  O2 (OXYGEN), Inhale 2 L/min 1 (One) Time. 2L while sleeping, Disp: , Rfl:   •  omeprazole (priLOSEC) 40 MG capsule, Take 40 mg by mouth 2 (Two) Times a Day., Disp: , Rfl:   •  potassium chloride (MICRO-K) 10 MEQ CR capsule, Take 10 mEq by mouth 2 (Two) Times a Day., Disp: , Rfl:   •  pramipexole (MIRAPEX) 1.5 MG tablet, Take 1.5 mg by mouth Every Night., Disp: , Rfl:   •  ranolazine (RANEXA) 500 MG 12 hr tablet, TAKE 1 TABLET EVERY 12 HOURS, Disp: 180 tablet, Rfl: 4  •  sennosides-docusate sodium (SENOKOT-S) 8.6-50 MG tablet, Take 1 tablet by mouth Daily., Disp: , Rfl:   •   spironolactone (ALDACTONE) 25 MG tablet, Take 1 tablet by mouth Daily As Needed (swelling)., Disp: 90 tablet, Rfl: 1  •  SYNTHROID 200 MCG tablet, Take 200 mcg by mouth Daily., Disp: , Rfl:   •  traMADol (ULTRAM) 50 MG tablet, Take 50 mg by mouth Every 8 (Eight) Hours As Needed for Moderate Pain ., Disp: , Rfl:   •  vitamin C (ASCORBIC ACID) 500 MG tablet, Take 500 mg by mouth Daily. Chewable tablet, Disp: , Rfl:   MEDICATION LIST AND ALLERGIES REVIEWED.    Social History     Tobacco Use   • Smoking status: Never Smoker   • Smokeless tobacco: Never Used   Substance Use Topics   • Alcohol use: No     Frequency: Never   • Drug use: No       FAMILY AND SOCIAL HISTORY REVIEWED.    Review of Systems   Constitutional: Negative for activity change, appetite change, fatigue, fever and unexpected weight change.   HENT: Positive for postnasal drip, rhinorrhea and sinus pressure. Negative for congestion, sore throat and voice change.    Eyes: Negative for visual disturbance.   Respiratory: Positive for shortness of breath. Negative for cough, chest tightness and wheezing.    Cardiovascular: Negative for chest pain, palpitations and leg swelling.   Gastrointestinal: Negative for abdominal distention, abdominal pain, nausea and vomiting.   Endocrine: Negative for cold intolerance and heat intolerance.   Genitourinary: Negative for difficulty urinating and urgency.   Musculoskeletal: Negative for arthralgias, back pain and neck pain.   Skin: Negative for color change and pallor.   Allergic/Immunologic: Negative for environmental allergies and food allergies.   Neurological: Negative for dizziness, syncope, weakness and light-headedness.   Hematological: Negative for adenopathy. Does not bruise/bleed easily.   Psychiatric/Behavioral: Negative for agitation and behavioral problems.   .    LMP  (LMP Unknown) Comment: Mammogram- april 2018     Immunization History   Administered Date(s) Administered   • Flu Vaccine Quad PF >36MO  10/02/2017, 09/11/2018, 09/21/2019   • Fluzone High Dose =>65 Years ([facility] ONLY) 09/13/2017   • Hepatitis A 05/03/1999, 10/05/1999   • INFLUENZA SPLIT TRI 09/15/2010, 10/02/2012, 10/02/2013   • Influenza, Unspecified 09/11/2018   • PEDS-Pneumococcal Conjugate (PCV7) 12/20/2013   • Pneumococcal Conjugate 13-Valent (PCV13) 09/04/2015, 12/04/2016   • Pneumococcal Polysaccharide (PPSV23) 11/20/2008, 12/20/2013   • TD Preservative Free 02/01/2012, 05/04/2017   • Tdap 05/04/2017   • Zostavax 02/05/2013       Physical Exam    Unable to complete physical exam due to telemedicine visit, patient was able to communicate without difficulty throughout entire visit.    RESULTS    PROBLEM LIST    Problem List Items Addressed This Visit        Respiratory    MILLI treated with BiPAP - Patient reports compliance.  - Primary    Overview     - Patient reports compliance.          SOB (shortness of breath)       Digestive    GERD (gastroesophageal reflux disease)            DISCUSSION  You have chosen to receive care through a telehealth visit.  Do you consent to use a video/audio connection for your medical care today? Yes    Ms. Cross seems to be doing well from a pulmonary standpoint.  She will continue to wear her BiPAP every night for her MILLI.  She will continue to wear 2 L bled into the machine.  She seems to be tolerating the machine well and benefiting from its therapy.    She will continue omeprazole daily for her GERD.  We also discussed reflux precaution such as elevating the head of the bed at night, not eating 2 to 3 hours before bed and avoiding foods that trigger reflux symptoms.    She will follow-up in 3 to 6 months with Dr. Valadez or sooner if her symptoms worsen.  She will call with any additional concerns or questions.  I spent 15 minutes with the patient. I spent > 50% percent of this time counseling and discussing diagnosis, prognosis, diagnostic testing, evaluation, current status, treatment options and  management.    Myrnaerica Mckeon, APRN  05/12/20203:09 PM  Electronically signed     Please note that portions of this note were completed with a voice recognition program. Efforts were made to edit the dictations, but occasionally words are mistranscribed.      CC: Reynaldo Fritz MD

## 2020-06-01 RX ORDER — NITROGLYCERIN 400 UG/1
1 SPRAY ORAL
Qty: 1 EACH | Refills: 6 | Status: SHIPPED | OUTPATIENT
Start: 2020-06-01 | End: 2022-08-29 | Stop reason: SDUPTHER

## 2020-07-08 ENCOUNTER — TELEPHONE (OUTPATIENT)
Dept: CARDIOLOGY | Facility: CLINIC | Age: 70
End: 2020-07-08

## 2020-07-08 DIAGNOSIS — Z79.899 LONG-TERM USE OF HIGH-RISK MEDICATION: Primary | ICD-10-CM

## 2020-07-08 NOTE — TELEPHONE ENCOUNTER
PT calls to report that she has been experiencing a dull ache in the left side of her chest- it is not related to exercise or rest- neither makes it better or worse- she takes Bumex and Spironolactone as needed, usually about every 2-3 days- she has not had either since Monday and does note that it seems a little worse today than it had been- reports that this is a little different than previous chest discomfort episodes that she has had  150's/80's today- no swelling     Will have her to take Bumex and Spironolactone daily for the next 3 days and then update me next week- will mail an order for BMP to have drawn at her earliest convenience

## 2020-07-20 ENCOUNTER — OFFICE VISIT (OUTPATIENT)
Dept: CARDIOLOGY | Facility: CLINIC | Age: 70
End: 2020-07-20

## 2020-07-20 VITALS
DIASTOLIC BLOOD PRESSURE: 62 MMHG | HEIGHT: 63 IN | HEART RATE: 85 BPM | SYSTOLIC BLOOD PRESSURE: 122 MMHG | OXYGEN SATURATION: 99 % | BODY MASS INDEX: 41.82 KG/M2 | WEIGHT: 236 LBS

## 2020-07-20 DIAGNOSIS — I48.19 PERSISTENT ATRIAL FIBRILLATION (HCC): Primary | ICD-10-CM

## 2020-07-20 DIAGNOSIS — I49.5 TACHY-BRADY SYNDROME (HCC): ICD-10-CM

## 2020-07-20 PROCEDURE — 99214 OFFICE O/P EST MOD 30 MIN: CPT | Performed by: PHYSICIAN ASSISTANT

## 2020-07-20 PROCEDURE — 93279 PRGRMG DEV EVAL PM/LDLS PM: CPT | Performed by: PHYSICIAN ASSISTANT

## 2020-07-20 NOTE — PROGRESS NOTES
Joanna Cross  1950  PCP: Reynaldo Fritz MD    SUBJECTIVE:   Joanna Cross is a 69 y.o. female seen for a follow up visit regarding the following:     Chief Complaint: Follow up for afib, htn     HPI:    Since last visit the patient has not had any recurrence of afib to her knowledge. She has had another admission for accelerated HTN in [place] on July 11th. She had an echocardiogram that was stable from previous study. She notes an ache occassionally in her chest that is nonexertional. She had a [facility] in 2019 with patent RCA stent and mild disease in LAD. She has no exertional chest pain. Overall she reports she feels well. BP has been better controlled. No dizziness, syncope.     History:  This is a 68-year-old patient referred by Dr. Corona for further evaluation and management of atrial fibrillation.  Patient has been diagnosed with atrial fibrillation since around 2016.  She has been plagued by tachybradycardia syndrome episodes in the past.  She has undergone cardioversions in the past but unable to maintain sinus rhythm more than a few days at a time.  She is been treated with Profafenone in the past as well with no improvement in her atrial fibrillation burden.  Her biggest complaint is ongoing fatigue with occasional palpitations.     Cardiac PMH: (Old records have been reviewed and summarized below)  1. Coronary artery disease  a. [facility] 2/15/2008, Dr Lutz.  Mild, non-obstructive CAD, normal EF  b. [facility] 1/9/2013, Dr Stevenson Sutton:  No LV gram done; mild, non-obstructive coronary artery disease.  c. [facility] 11/9/2015[facility]:  EF 60%.  70% RCA lesion with Promus ZAINAB placement. 40-50% mid LAD, no FFR performed. Other small blockages noted.  No MR.  d. [facility] 11/15/2015, Dr Palacio @ [facility]:  Patent RCA stent, 40-50% LAD with FFR @ 0.92; mild inferior wall hypokinesis  e. [facility] 7/29/2016[facility]:  Abnormal stress test.  Normal CORS with patent stent. LAD improved  since last image EF 55-60%  f. [facility] 2/1/19:  EF normal.  Patent RCA, noncritical mid LAD with IFR 0.93, noncritical circ.    2. Peripheral vascular disease/chronic venous stasis  a. Venous duplex 9/17/2010: Insufficiency to venous hypertension in the great saphenous system bilaterally.  b. Venous duplex 2013: Right leg laser ablation of Dr. Garcia.  c. Venous duplex 5/6/2016: No evidence of DVT, no superficial, no thrmobophlebitis, no valvular insufficiency.  d. AA with runoffs 8/31/2016: Single-vessel [place]: No significant arterial disease.  3. Palpitations  a. Echocardiogram 2/13/2014: Dr. Teran.  Normal biventricular function; trace to mild MR.  Atrial septal aneurysm or  b. Echocardiogram 12/22/15: Dr. Chavez.  Borderline LVH, mild LAE, mild RV enlargement.  Mild to moderate MR and TR with RVSP of 46 mmHg.  c. Conclusion/3/2016: Dr. Palacio.  RV enlargement.  EF 50-55%.  Mild AI S, mild MR and TR with RVSP 37 mmHg.  4. AF/SSS  a. Melfa Fisgo PPM 2016  b. CHADS2 Vasc  = 5. On Eliquis   c. TIA: Carotid duplex 2/13/2014 showed no significant flow-limiting stenosis.  Mild luminal disease present; both vertebrals are present.  d. ECV 12/26/2018:  Successful cardioversion: AV paced  e. Echo 12/25/18:  EF 60%, mild MR/ TR with RVSP 29 mmHg.  Sclerotic AoV  5. Diabetes  6. Essential Hypertension  7. Hyperlipidemia  8. Hypothyroid  9. Hyponatremia  a. Secondary to SIADH  10. MILLI with CPAP  11. RLS  12. Parkinson's disease  13. GERD  14. Urinary Retention  a. S/P cystoscopy and ureter dilation, March 2018.  Dr. Terrence Mckenzie  15. Surgeries:  a. Rotator cuff repair  b. Cholecystectomy  c. Bilateral knee replacement  d. Tubal ligation  e. Breast surgery  f. Sinus surgery      Current Outpatient Medications:   •  albuterol (PROVENTIL) (2.5 MG/3ML) 0.083% nebulizer solution, Take 2.5 mg by nebulization Every 4 (Four) Hours As Needed., Disp: , Rfl:   •  albuterol (VENTOLIN HFA) 108 (90 Base) MCG/ACT inhaler,  Inhale 1 puff Every 4 (Four) Hours As Needed., Disp: , Rfl:   •  ALLERGY SERUM INJECTION, Inject  under the skin into the appropriate area as directed 1 (One) Time Per Week., Disp: , Rfl:   •  amantadine (SYMMETREL) 100 MG capsule, Take 100 mg by mouth 2 (Two) Times a Day., Disp: , Rfl:   •  amLODIPine Besylate (NORVASC PO), Take 7.5 mg by mouth Daily., Disp: , Rfl:   •  aspirin 81 MG EC tablet, Take 81 mg by mouth Daily., Disp: , Rfl:   •  B Complex-C (SUPER B COMPLEX PO), Take 1 tablet by mouth Daily., Disp: , Rfl:   •  bumetanide (BUMEX) 1 MG tablet, Take 2 mg by mouth Daily As Needed (for swelling)., Disp: , Rfl:   •  Calcium Carbonate (CALTRATE 600 PO), Take 600 mg by mouth Daily., Disp: , Rfl:   •  carbidopa-levodopa (SINEMET)  MG per tablet, Take  by mouth. 2 tablets at 0600, 1 tablet at 0900, 1200, 1500, 1800, 2100, Disp: , Rfl:   •  Cholecalciferol 2000 units tablet, Take 2,000 Units by mouth 2 (Two) Times a Day., Disp: , Rfl:   •  citalopram (CeleXA) 20 MG tablet, Take 20 mg by mouth every night at bedtime., Disp: , Rfl:   •  clindamycin (CLEOCIN) 150 MG capsule, Take 150 mg by mouth Daily. 4 capsules one hour before dental appointments., Disp: , Rfl:   •  clobetasol (TEMOVATE) 0.05 % cream, Apply 1 application topically 2 (Two) Times a Day As Needed (Lichens Sclerosis)., Disp: , Rfl:   •  cloNIDine (CATAPRES) 0.1 MG tablet, Take 1 tablet by mouth Every 12 (Twelve) Hours., Disp: 60 tablet, Rfl: 0  •  dofetilide (TIKOSYN) 500 MCG capsule, Take 1 capsule by mouth Every 12 (Twelve) Hours., Disp: 180 capsule, Rfl: 3  •  ELIQUIS 5 MG tablet tablet, TAKE 1 TABLET EVERY 12 HOURS, Disp: 180 tablet, Rfl: 3  •  ferrous sulfate 325 (65 FE) MG tablet, Take 325 mg by mouth 3 (Three) Times a Day With Meals., Disp: , Rfl:   •  Glucosamine-Chondroit-Calcium (TRIPLE FLEX BONE & JOINT PO), Take 1 tablet by mouth Daily., Disp: , Rfl:   •  insulin glargine (LANTUS) 100 UNIT/ML injection, Inject  under the skin into the  appropriate area as directed Daily. ACCORDING TO SUGAR LEVELS , Disp: , Rfl:   •  IPRATROPIUM BROMIDE NA, 1 spray into the nostril(s) as directed by provider 2 (Two) Times a Day As Needed., Disp: , Rfl:   •  Loratadine 10 MG capsule, Take 10 mg by mouth Daily., Disp: , Rfl:   •  losartan (COZAAR) 50 MG tablet, TAKE 1 TABLET EVERY 12 HOURS, Disp: 180 tablet, Rfl: 4  •  metFORMIN (GLUCOPHAGE) 1000 MG tablet, Take 1,000 mg by mouth 2 (Two) Times a Day With Meals., Disp: , Rfl:   •  Mirabegron ER (MYRBETRIQ) 50 MG tablet sustained-release 24 hour 24 hr tablet, Take 50 mg by mouth Daily., Disp: , Rfl:   •  Multiple Vitamins-Minerals (CENTRUM ULTRA WOMENS PO), Take 1 tablet by mouth Daily., Disp: , Rfl:   •  nitroglycerin (NITROLINGUAL) 0.4 MG/SPRAY spray, Place 1 spray under the tongue Every 5 (Five) Minutes As Needed for Chest Pain., Disp: 1 each, Rfl: 6  •  O2 (OXYGEN), Inhale 2 L/min 1 (One) Time. 2L while sleeping, Disp: , Rfl:   •  omeprazole (priLOSEC) 40 MG capsule, Take 40 mg by mouth 2 (Two) Times a Day., Disp: , Rfl:   •  potassium chloride (MICRO-K) 10 MEQ CR capsule, Take 10 mEq by mouth 2 (Two) Times a Day., Disp: , Rfl:   •  pramipexole (MIRAPEX) 1.5 MG tablet, Take 1.5 mg by mouth Every Night., Disp: , Rfl:   •  ranolazine (RANEXA) 500 MG 12 hr tablet, TAKE 1 TABLET EVERY 12 HOURS, Disp: 180 tablet, Rfl: 4  •  sennosides-docusate sodium (SENOKOT-S) 8.6-50 MG tablet, Take 1 tablet by mouth Daily., Disp: , Rfl:   •  spironolactone (ALDACTONE) 25 MG tablet, Take 1 tablet by mouth Daily As Needed (swelling)., Disp: 90 tablet, Rfl: 1  •  SYNTHROID 200 MCG tablet, Take 200 mcg by mouth Daily., Disp: , Rfl:   •  traMADol (ULTRAM) 50 MG tablet, Take 50 mg by mouth Every 8 (Eight) Hours As Needed for Moderate Pain ., Disp: , Rfl:   •  vitamin C (ASCORBIC ACID) 500 MG tablet, Take 500 mg by mouth Daily. Chewable tablet, Disp: , Rfl:     Past Medical History, Past Surgical History, Family history, Social History, and  "Medications were all reviewed with the patient today and updated as necessary.       Patient Active Problem List   Diagnosis   • Coronary artery disease involving native coronary artery without angina pectoris   • Essential hypertension   • Peripheral vascular disease (CMS/HCC)   • Hyperlipidemia LDL goal <70   • Spinal stenosis, lumbar region, with neurogenic claudication   • Diabetes mellitus (CMS/HCC)   • Delayed surgical wound healing   • Biceps tendinitis   • Overactive bladder   • GERD (gastroesophageal reflux disease)   • Hypothyroidism   • Lichen sclerosus   • Parkinson's disease (CMS/HCC)   • MILLI treated with BiPAP - Patient reports compliance.    • History of respiratory failure - prior respiratory failure requiring mechanical ventilation, open lung biopsy non-specific.    • SOB (shortness of breath)   • A-fib (CMS/HCC)   • Chest pain   • Atrial fibrillation with RVR (CMS/HCC)   • Renal insufficiency   • CAD in native artery   • Persistent atrial fibrillation (CMS/HCC)   • Tachy-thai syndrome (CMS/HCC)     Allergies   Allergen Reactions   • Toprol Xl [Metoprolol Tartrate] Shortness Of Breath     Extreme fatigue   • Amlodipine Besylate Swelling     Lower extremity (ankles, feet) swelling   • Codeine Unknown (See Comments)     Pt is unaware of what reaction she had   • Entacapone Other (See Comments)     \"extreme weakness in legs - caused several falls, which stopped after discontinuing this medication\"   • Levemir [Insulin Detemir] Hives     Hives / rash around injection site   • Penicillins Hives     Jitteriness    • Xarelto [Rivaroxaban] GI Bleeding   • Bactrim [Sulfamethoxazole-Trimethoprim] Nausea Only     Headache    • Ciprofloxacin Diarrhea   • Cogentin [Benztropine] Other (See Comments)     \"uncontrollable body movements\"   • Compazine [Prochlorperazine Edisylate] Other (See Comments)     Dystonic reaction   • Cortisone Other (See Comments)     MAKES BLOOD PRESSURE HIGH    • Duraprep [Antiseptic " Products, Misc.] Itching and Rash     RASH AND ITCHING   • Haldol [Haloperidol Lactate] Other (See Comments)     Dystonic reaction   • Hydralazine Other (See Comments)     Headache    • Hydrocodone-Acetaminophen Nausea And Vomiting and Dizziness     Headache, nausea    • Levaquin [Levofloxacin] Other (See Comments)     Cannot take due to taking propafenone HCL- severe reaction with mixed.    • Lisinopril Cough   • Statins Myalgia     Leg pain   • Tarka [Trandolapril-Verapamil Hcl Er] Other (See Comments)     Constipation      Past Medical History:   Diagnosis Date   • Anemia    • Atrial fibrillation (CMS/HCC)    • AVM (arteriovenous malformation)    • CAD (coronary artery disease)    • Depression    • Disease of thyroid gland    • DM2 (diabetes mellitus, type 2) (CMS/HCC)     checks sugar twice per day    • Essential hypertension    • Generalized osteoarthritis    • GERD (gastroesophageal reflux disease)    • GIB (gastrointestinal bleeding) 2016    d/t xarelto    • Headache    • Hiatal hernia    • History of shingles    • History of transfusion 2016   • IBS (irritable bowel syndrome)    • Lichen sclerosus    • Mixed hyperlipidemia    • Morbid obesity (CMS/HCC)    • MRSA infection 2018   • Myocardial infarction (CMS/HCC)    • On home oxygen therapy     2L of oxygen via CPAP while sleeping    • MILLI on CPAP     18/14   • Osteoporosis    • Pacemaker    • Parkinson disease (CMS/HCC)    • Peripheral vascular disease (CMS/HCC)    • Pleurisy    • Pneumonia    • Seborrheic dermatitis    • Skin cancer     on back    • SOBOE (shortness of breath on exertion)    • SSS (sick sinus syndrome) (CMS/HCC)    • TIA (transient ischemic attack)    • Varicose vein of leg    • Venous insufficiency of both lower extremities      Past Surgical History:   Procedure Laterality Date   • BACK SURGERY      l4-l5 laminectomy    • BICEPS TENDON REPAIR Right     shoulder   • BREAST BIOPSY Left 2004   • BUNIONECTOMY Right    • CARDIAC  "CATHETERIZATION     • CARDIAC CATHETERIZATION N/A 2/1/2019    Procedure: Left Heart Cath;  Surgeon: Albertina Corona MD;  Location:  [facility];  Service: Cardiology   • CHOLECYSTECTOMY     • COLONOSCOPY  2016   • CORONARY STENT PLACEMENT      x1 stent   • CYST REMOVAL      left ear, upper left back 2001   • DIAGNOSTIC LAPAROSCOPY  1981   • ENDOSCOPIC FUNCTIONAL SINUS SURGERY (FESS)  2011   • ENDOSCOPY  2016   • HAMMER TOE REPAIR Left    • INCISION AND DRAINAGE OF WOUND  2018   • JOINT REPLACEMENT     • LIPOMA EXCISION  1999    left leg    • LUMBAR LAMINECTOMY DISCECTOMY DECOMPRESSION N/A 7/6/2018    Procedure: LUMBAR LAMINECTOMY L4-5, HEMILAMIINECTOMY RIGHT L5-S1, FORAMINOTOMY L5-S1;  Surgeon: Lencho Gamino MD;  Location:  [facility];  Service: Neurosurgery   • LUMBAR LAMINECTOMY DISCECTOMY DECOMPRESSION N/A 9/19/2018    Procedure: INCISION AND DRAINAGE BACK WITH WOUND EXPLORATION;  Surgeon: Ritchie García MD;  Location:  [facility];  Service: Neurosurgery   • LUNG BIOPSY Left 2016   • PACEMAKER IMPLANTATION  11/2016    sss   • REPLACEMENT TOTAL KNEE Bilateral     left knee 2011, right 2012 per dr acuna    • REPLACEMENT TOTAL KNEE Bilateral    • SHOULDER ARTHROSCOPY Bilateral     2003-left, 2004- right    • SKIN BIOPSY      skin cancer back 2016   • TUBAL ABDOMINAL LIGATION  1980   • WISDOM TOOTH EXTRACTION       Family History   Problem Relation Age of Onset   • Kidney disease Mother    • Coronary artery disease Mother    • Coronary artery disease Father    • Coronary artery disease Brother      Social History     Tobacco Use   • Smoking status: Never Smoker   • Smokeless tobacco: Never Used   Substance Use Topics   • Alcohol use: No     Frequency: Never         PHYSICAL EXAM:    /62 (BP Location: Left arm, Patient Position: Sitting)   Pulse 85   Ht 160 cm (63\")   Wt 107 kg (236 lb)   LMP  (LMP Unknown) Comment: Mammogram- april 2018   SpO2 99%   BMI 41.81 kg/m²        Wt Readings " from Last 5 Encounters:   07/20/20 107 kg (236 lb)   03/16/20 112 kg (246 lb 12.8 oz)   03/11/20 111 kg (245 lb 3.2 oz)   12/16/19 109 kg (240 lb)   12/10/19 107 kg (236 lb 8.9 oz)       BP Readings from Last 5 Encounters:   07/20/20 122/62   03/16/20 124/60   03/11/20 130/64   12/16/19 122/70   12/10/19 176/77       General-Well Nourished, Well developed  Eyes - PERRLA  Neck- supple, No mass  CV- regular rate and rhythm, no MRG, No edema  Lung- clear bilaterally  Abd- soft, +BS  Musc/skel - Norm strength and range of motion  Skin- warm and dry  Neuro - Alert & Oriented x 3, appropriate mood.        Medical problems and test results were reviewed with the patient today.     No results found for this or any previous visit (from the past 672 hour(s)).      EKG: (EKG has been independently visualized by me and summarized below)      ECG 12 Lead  Date/Time: 7/20/2020 2:06 PM  Performed by: Ai Prieto PA  Authorized by: Ai Prieto PA   Comparison: compared with previous ECG from 3/11/2020  Similar to previous ECG  Rhythm: sinus rhythm  Ectopy: atrial premature contractions and infrequent PVCs  Rate: normal  BPM: 85    Clinical impression: abnormal EKG  Comments: Stable QTc              ASSESSMENT and PLAN    1.  Atrial fibrillation-persistent in nature. Currently on Tikosyn 500mcg BID w/ stable QTc. Patient does note improvement in symptoms. Will continue Tikosyn and Eliquis. Patient has some mode switching but it is all noise.      2.  Dual-chamber pacemaker-patient has had noise on RA and RV leads and this is not a new issue. She was set to unipolar at some point. She walks with a walker and this noise very likely could be environmental. Have changed to bipolar. Will monitor closely. Battery 4 years. No afib.      3.  Tikosyn use- EKG reviewed today. Stable QTc      4. HTN- controlled. Continue Losartan, Amlodipine, Aldactone       Return in about 3 months (around 10/20/2020) for BSC w. Dr. Marie  .        Ai Prieto PA-C   Cardiology/Electrophysiology  7/20/2020  14:07

## 2020-08-12 RX ORDER — DOFETILIDE 0.5 MG/1
CAPSULE ORAL
Qty: 180 CAPSULE | Refills: 3 | Status: SHIPPED | OUTPATIENT
Start: 2020-08-12 | End: 2021-08-16 | Stop reason: SDUPTHER

## 2020-08-28 RX ORDER — APIXABAN 5 MG/1
TABLET, FILM COATED ORAL
Qty: 180 TABLET | Refills: 3 | Status: SHIPPED | OUTPATIENT
Start: 2020-08-28 | End: 2021-08-16

## 2020-09-11 ENCOUNTER — TELEPHONE (OUTPATIENT)
Dept: CARDIOLOGY | Facility: CLINIC | Age: 70
End: 2020-09-11

## 2020-09-11 NOTE — TELEPHONE ENCOUNTER
PT reports that [facility] home care nurse was out to visit her yesterday- she did a U/A and told her she was dehydrated- did not do any labs work- [place] [facility] [place]

## 2020-10-07 ENCOUNTER — OFFICE VISIT (OUTPATIENT)
Dept: CARDIOLOGY | Facility: CLINIC | Age: 70
End: 2020-10-07

## 2020-10-07 VITALS
WEIGHT: 245.8 LBS | BODY MASS INDEX: 43.55 KG/M2 | DIASTOLIC BLOOD PRESSURE: 68 MMHG | SYSTOLIC BLOOD PRESSURE: 114 MMHG | OXYGEN SATURATION: 98 % | HEART RATE: 70 BPM | HEIGHT: 63 IN

## 2020-10-07 DIAGNOSIS — I10 ESSENTIAL HYPERTENSION: ICD-10-CM

## 2020-10-07 DIAGNOSIS — E78.5 HYPERLIPIDEMIA LDL GOAL <70: ICD-10-CM

## 2020-10-07 DIAGNOSIS — I25.10 CORONARY ARTERY DISEASE INVOLVING NATIVE CORONARY ARTERY OF NATIVE HEART WITHOUT ANGINA PECTORIS: Primary | ICD-10-CM

## 2020-10-07 DIAGNOSIS — I48.0 PAROXYSMAL ATRIAL FIBRILLATION (HCC): ICD-10-CM

## 2020-10-07 DIAGNOSIS — I49.5 TACHY-BRADY SYNDROME (HCC): ICD-10-CM

## 2020-10-07 PROCEDURE — 99214 OFFICE O/P EST MOD 30 MIN: CPT | Performed by: INTERNAL MEDICINE

## 2020-10-07 RX ORDER — VALSARTAN 160 MG/1
160 TABLET ORAL DAILY
COMMUNITY
End: 2020-10-07 | Stop reason: SDUPTHER

## 2020-10-07 RX ORDER — VALSARTAN 160 MG/1
160 TABLET ORAL DAILY
Qty: 90 TABLET | Refills: 3 | Status: SHIPPED | OUTPATIENT
Start: 2020-10-07 | End: 2020-10-08 | Stop reason: SDUPTHER

## 2020-10-07 RX ORDER — SPIRONOLACTONE 25 MG/1
50 TABLET ORAL DAILY PRN
Qty: 180 TABLET | Refills: 1 | Status: SHIPPED | OUTPATIENT
Start: 2020-10-07 | End: 2021-05-24

## 2020-10-07 NOTE — PROGRESS NOTES
[facility] Cardiology    Patient ID: Joanna Cross is a 69 y.o. female.  : 1950   Contact: [phone]    Encounter date: 10/07/2020    PCP: Reynaldo Fritz MD      Chief complaint:   Chief Complaint   Patient presents with   • Coronary artery disease involving native coronary artery of        Problem List:  1. Coronary artery disease  a. [facility], 02/15/2008, Dr uLtz: Mild, non-obstructive CAD, normal EF  b. [facility], 2013, Dr Stevenson Sutton: No LV gram done. Mild, non-obstructive CAD.  c. [facility], 2015, [facility]:  EF 60%. 70% RCA lesion with Promus ZAINAB placement. 40-50% mid LAD, no FFR performed. Other small blockages noted. No MR.  d. [facility], 11/15/2015, Dr Palacio @ [facility]: Patent RCA stent, 40-50% LAD with FFR @ 0.92; mild inferior wall hypokinesis  e. [facility], 2016, [facility]: Normal CORS with patent stent. LAD improved since last [facility]. EF 55-60%.  f. [facility], 2019: EF 55-60%. Patent RCA stent, noncritical mid LAD with IFR 0.93, noncritical LCx.   2. Chronic venous stasis:  a. Venous duplex, 2010: Insufficiency to venous hypertension in the great saphenous system bilaterally.  b. Venous duplex : Right leg laser ablation of Dr. Garcia.  c. Venous duplex, 2016: No evidence of DVT, no superficial, no thrmobophlebitis, no valvular insufficiency.  d. AA with runoffs, 2016: Single-vessel [place]: No significant arterial disease.  e. Arterial doppler/GLYNN, 2019: No evidence of significant lower extremity arterial occlusive disease.  3. Palpitations:  a. Echocardiogram, 2014: Dr. Teran. Normal biventricular function; trace to mild MR. Atrial septal aneurysm.  b. Echocardiogram, 2015, Dr. Chavez: Borderline LVH, mild LAE, mild RV enlargement. Mild to moderate MR and TR with RVSP of 46 mmHg.  c. Echocardiogram, 2016: Dr. Palacio.  RV enlargement.  EF 50-55%.  Mild AI S, mild MR and TR with RVSP 37  "mmHg.  d. Echocardiogram, 07/11/20: Dr. Jany Wynn: EF 60-65%. Mild to moderate MR.  4. PAF/SSS:  a. [employer] PPM 2016  b. CHADS2 Vasc = 6 (HTN, DM, Age 65-74, Female, H/o TIA). On chronic anticoagulation.  c. ECV, 12/26/2018: Successful cardioversion. AV paced.  d. Echocardiogram, 12/25/2018:  EF 60%, mild MR/TR with RVSP 29 mmHg. Sclerotic AoV, no AS/AI. Mild MAC.  e. ECV, 03/05/2019: Successful cardioversion.  f. Zio, 10/01/2019, 14 days: NSR baseline. Normal average rates. Periods of RV pacing.   5. TIA:  a. Carotid duplex, 02/13/2014: No significant flow-limiting stenosis. Mild luminal disease present; both vertebrals are present.  6. Diabetes  7. Essential Hypertension  8. Hyperlipidemia  9. Hypothyroid  10. Hyponatremia  a. Secondary to SIADH  11. MILLI with CPAP  12. RLS  13. Parkinson's disease  14. GERD  15. Urinary Retention  a. S/P cystoscopy and ureter dilation, March 2018.  Dr. Terrence Mckenzie  16. Surgeries:  a. Rotator cuff repair  b. Cholecystectomy  c. Bilateral knee replacement  d. Tubal ligation  e. Breast surgery  f. Sinus surgery    Allergies   Allergen Reactions   • Toprol Xl [Metoprolol Tartrate] Shortness Of Breath     Extreme fatigue   • Amlodipine Besylate Swelling     Lower extremity (ankles, feet) swelling   • Codeine Unknown (See Comments)     Pt is unaware of what reaction she had   • Entacapone Other (See Comments)     \"extreme weakness in legs - caused several falls, which stopped after discontinuing this medication\"   • Levemir [Insulin Detemir] Hives     Hives / rash around injection site   • Penicillins Hives     Jitteriness    • Xarelto [Rivaroxaban] GI Bleeding   • Bactrim [Sulfamethoxazole-Trimethoprim] Nausea Only     Headache    • Ciprofloxacin Diarrhea   • Cogentin [Benztropine] Other (See Comments)     \"uncontrollable body movements\"   • Compazine [Prochlorperazine Edisylate] Other (See Comments)     Dystonic reaction   • Cortisone Other (See Comments)     MAKES " BLOOD PRESSURE HIGH    • Duraprep [Antiseptic Products, Misc.] Itching and Rash     RASH AND ITCHING   • Haldol [Haloperidol Lactate] Other (See Comments)     Dystonic reaction   • Hydralazine Other (See Comments)     Headache    • Hydrocodone-Acetaminophen Nausea And Vomiting and Dizziness     Headache, nausea    • Levaquin [Levofloxacin] Other (See Comments)     Cannot take due to taking propafenone HCL- severe reaction with mixed.    • Lisinopril Cough   • Statins Myalgia     Leg pain   • Tarka [Trandolapril-Verapamil Hcl Er] Other (See Comments)     Constipation        Current Medications:    Current Outpatient Medications:   •  albuterol (PROVENTIL) (2.5 MG/3ML) 0.083% nebulizer solution, Take 2.5 mg by nebulization Every 4 (Four) Hours As Needed., Disp: , Rfl:   •  albuterol (VENTOLIN HFA) 108 (90 Base) MCG/ACT inhaler, Inhale 1 puff Every 4 (Four) Hours As Needed., Disp: , Rfl:   •  ALLERGY SERUM INJECTION, Inject  under the skin into the appropriate area as directed 1 (One) Time Per Week., Disp: , Rfl:   •  amantadine (SYMMETREL) 100 MG capsule, Take 100 mg by mouth 2 (Two) Times a Day., Disp: , Rfl:   •  amLODIPine Besylate (NORVASC PO), Take 5 mg by mouth Daily., Disp: , Rfl:   •  aspirin 81 MG EC tablet, Take 81 mg by mouth Daily., Disp: , Rfl:   •  B Complex-C (SUPER B COMPLEX PO), Take 1 tablet by mouth Daily., Disp: , Rfl:   •  bumetanide (BUMEX) 1 MG tablet, Take 2 mg by mouth Daily As Needed (for swelling)., Disp: , Rfl:   •  Calcium Carbonate (CALTRATE 600 PO), Take 600 mg by mouth Daily., Disp: , Rfl:   •  carbidopa-levodopa (SINEMET)  MG per tablet, Take  by mouth. 2 tablets at 0600, 1 tablet at 0900, 1200, 1500, 1800, 2100, Disp: , Rfl:   •  Cholecalciferol 2000 units tablet, Take 2,000 Units by mouth 2 (Two) Times a Day., Disp: , Rfl:   •  citalopram (CeleXA) 20 MG tablet, Take 20 mg by mouth every night at bedtime., Disp: , Rfl:   •  clindamycin (CLEOCIN) 150 MG capsule, Take 150 mg by  mouth Daily. 4 capsules one hour before dental appointments., Disp: , Rfl:   •  clobetasol (TEMOVATE) 0.05 % cream, Apply 1 application topically 2 (Two) Times a Day As Needed (Lichens Sclerosis)., Disp: , Rfl:   •  dofetilide (TIKOSYN) 500 MCG capsule, TAKE 1 CAPSULE EVERY 12 HOURS, Disp: 180 capsule, Rfl: 3  •  ELIQUIS 5 MG tablet tablet, TAKE 1 TABLET EVERY 12 HOURS, Disp: 180 tablet, Rfl: 3  •  ferrous sulfate 325 (65 FE) MG tablet, Take 325 mg by mouth 3 (Three) Times a Day With Meals., Disp: , Rfl:   •  Glucosamine-Chondroit-Calcium (TRIPLE FLEX BONE & JOINT PO), Take 1 tablet by mouth Daily., Disp: , Rfl:   •  insulin glargine (LANTUS) 100 UNIT/ML injection, Inject  under the skin into the appropriate area as directed Daily. ACCORDING TO SUGAR LEVELS , Disp: , Rfl:   •  IPRATROPIUM BROMIDE NA, 1 spray into the nostril(s) as directed by provider 2 (Two) Times a Day As Needed., Disp: , Rfl:   •  Loratadine 10 MG capsule, Take 10 mg by mouth Daily., Disp: , Rfl:   •  metFORMIN (GLUCOPHAGE) 1000 MG tablet, Take 1,000 mg by mouth 2 (Two) Times a Day With Meals., Disp: , Rfl:   •  Mirabegron ER (MYRBETRIQ) 50 MG tablet sustained-release 24 hour 24 hr tablet, Take 50 mg by mouth Daily., Disp: , Rfl:   •  Multiple Vitamins-Minerals (CENTRUM ULTRA WOMENS PO), Take 1 tablet by mouth Daily., Disp: , Rfl:   •  nitroglycerin (NITROLINGUAL) 0.4 MG/SPRAY spray, Place 1 spray under the tongue Every 5 (Five) Minutes As Needed for Chest Pain., Disp: 1 each, Rfl: 6  •  O2 (OXYGEN), Inhale 2 L/min 1 (One) Time. 2L while sleeping, Disp: , Rfl:   •  omeprazole (priLOSEC) 40 MG capsule, Take 40 mg by mouth 2 (Two) Times a Day., Disp: , Rfl:   •  pramipexole (MIRAPEX) 1.5 MG tablet, Take 1.5 mg by mouth Every Night., Disp: , Rfl:   •  ranolazine (RANEXA) 500 MG 12 hr tablet, TAKE 1 TABLET EVERY 12 HOURS, Disp: 180 tablet, Rfl: 4  •  sennosides-docusate sodium (SENOKOT-S) 8.6-50 MG tablet, Take 1 tablet by mouth Daily., Disp: , Rfl:  "  •  spironolactone (ALDACTONE) 25 MG tablet, Take 1 tablet by mouth Daily As Needed (swelling). (Patient taking differently: Take 50 mg by mouth Daily As Needed (swelling).), Disp: 90 tablet, Rfl: 1  •  SYNTHROID 200 MCG tablet, Take 200 mcg by mouth Daily., Disp: , Rfl:   •  traMADol (ULTRAM) 50 MG tablet, Take 50 mg by mouth Every 8 (Eight) Hours As Needed for Moderate Pain ., Disp: , Rfl:   •  valsartan (DIOVAN) 160 MG tablet, Take 160 mg by mouth Daily., Disp: , Rfl:   •  vitamin C (ASCORBIC ACID) 500 MG tablet, Take 500 mg by mouth Daily. Chewable tablet, Disp: , Rfl:     HPI    Joanna Cross is a 69 y.o. female who presents today for a 6 month follow up of coronary artery disease, paroxysmal atrial fibrillation, pacemaker, and cardiac risk factors. Since last visit, patient has been feeling well overall from a cardiovascular standpoint. She reports she was seen in the ED in [place] on the 20th and 21st with elevated blood pressure. She says that her medications were altered: stopped clonidine, stopped losartan, stopped her potassium, doubled spironolactone from 25 mg to 50 mg, started valsartan, and altered her dose of amlodipine.  She also reports of having blood work in September in [place]. She was seen on 09/10/20 by a home nurse and told that she was dehydrated prompting the lab work to be done.       The following portions of the patient's history were reviewed and updated as appropriate: allergies, current medications and problem list.    Pertinent positives as listed in the HPI.  All other systems reviewed are negative.         Vitals:    10/07/20 1307   BP: 114/68   BP Location: Left arm   Patient Position: Sitting   Pulse: 70   SpO2: 98%   Weight: 111 kg (245 lb 12.8 oz)   Height: 160 cm (63\")       Physical Exam:  General: Alert and oriented.  Neck: Jugular venous pressure is within normal limits. Carotids have normal upstrokes without bruits.   Cardiovascular: Heart has a nondisplaced focal " PMI. Regular rate and rhythm. No murmur, gallop or rub.  Lungs: Clear, no rales or wheezes. Equal expansion is noted.   Extremities: 1+ edema BLE.  Skin: Warm and dry.  Neurologic: Nonfocal.     Diagnostic Data (reviewed with patient):  Lab date: 09/20/20  CMP: Glu 112, BUN 19, Creat 0.7, eGFR >59, Na 137, K 4.4, Cl 104, CO2 27, Ca 9.1, Alk Phos 84, AST 21, ALT 10  CBC: WBC 6.5, RBC 3.53, HGB 10.7, HCT 32.8, MCV 92.9, MCH 32.3,       Procedures      Assessment:    ICD-10-CM ICD-9-CM   1. Coronary artery disease involving native coronary artery of native heart without angina pectoris  I25.10 414.01   2. Paroxysmal atrial fibrillation (CMS/[facility])  I48.0 427.31   3. Tachy-thai syndrome (CMS/[facility])  I49.5 427.81   4. Essential hypertension  I10 401.9   5. Hyperlipidemia LDL goal <70  E78.5 272.4         Plan:  1. Stable cardiac status. Patient advised to continue taking medications after the alteration since condition has improved.  2. Continue all other current medications.  3. F/up in 6 months, sooner if needed.  We will try to see yearly and alternate with Dr. Marie.    Scribed for Albertina Corona MD by Cleve Grullon 10/7/2020  13:34 EDT    I Albertina Corona MD personally performed the services described in this documentation as scribed by the above individual in my presence, and it is both accurate and complete.    Albertina Corona MD, [facility]

## 2020-10-08 RX ORDER — VALSARTAN 160 MG/1
160 TABLET ORAL 2 TIMES DAILY
Qty: 180 TABLET | Refills: 3 | Status: SHIPPED | OUTPATIENT
Start: 2020-10-08 | End: 2021-09-23

## 2020-10-27 ENCOUNTER — OFFICE VISIT (OUTPATIENT)
Dept: CARDIOLOGY | Facility: CLINIC | Age: 70
End: 2020-10-27

## 2020-10-27 VITALS
SYSTOLIC BLOOD PRESSURE: 128 MMHG | BODY MASS INDEX: 44.61 KG/M2 | HEART RATE: 67 BPM | HEIGHT: 63 IN | OXYGEN SATURATION: 97 % | TEMPERATURE: 96.8 F | WEIGHT: 251.8 LBS | DIASTOLIC BLOOD PRESSURE: 62 MMHG

## 2020-10-27 DIAGNOSIS — I48.19 PERSISTENT ATRIAL FIBRILLATION (HCC): Primary | ICD-10-CM

## 2020-10-27 DIAGNOSIS — I49.5 TACHY-BRADY SYNDROME (HCC): ICD-10-CM

## 2020-10-27 DIAGNOSIS — Z79.899 LONG TERM CURRENT USE OF ANTIARRHYTHMIC DRUG: ICD-10-CM

## 2020-10-27 PROCEDURE — 93280 PM DEVICE PROGR EVAL DUAL: CPT | Performed by: PHYSICIAN ASSISTANT

## 2020-10-27 PROCEDURE — 99214 OFFICE O/P EST MOD 30 MIN: CPT | Performed by: PHYSICIAN ASSISTANT

## 2020-10-27 NOTE — PROGRESS NOTES
Joanna Cross  1950  PCP: Reynaldo Fritz MD    SUBJECTIVE:   Joanna Cross is a 69 y.o. female seen for a follow up visit regarding the following:     Chief Complaint: Follow up for afib, tachybrady     HPI:    Since last visit the patient's status has been stable. She has not had any recurrence of afib. No palpitations, chest pain, edema, worsening SOB. She has recently had BP meds adjusted and it is much improved.     Problem List:  1. Coronary artery disease  a. [facility], 02/15/2008, Dr Lutz: Mild, non-obstructive CAD, normal EF  b. [facility], 01/09/2013, Dr Stevenson Sutton: No LV gram done. Mild, non-obstructive CAD.  c. [facility], 11/9/2015, [facility]:  EF 60%. 70% RCA lesion with Promus ZAINAB placement. 40-50% mid LAD, no FFR performed. Other small blockages noted. No MR.  d. [facility], 11/15/2015, Dr Palacio @ [facility]: Patent RCA stent, 40-50% LAD with FFR @ 0.92; mild inferior wall hypokinesis  e. [facility], 07/29/2016, [facility]: Normal CORS with patent stent. LAD improved since last [facility]. EF 55-60%.  f. [facility], 02/01/2019: EF 55-60%. Patent RCA stent, noncritical mid LAD with IFR 0.93, noncritical LCx.   2. Chronic venous stasis:  a. Venous duplex, 09/17/2010: Insufficiency to venous hypertension in the great saphenous system bilaterally.  b. Venous duplex 2013: Right leg laser ablation of Dr. Garcia.  c. Venous duplex, 05/06/2016: No evidence of DVT, no superficial, no thrmobophlebitis, no valvular insufficiency.  d. AA with runoffs, 08/31/2016: Single-vessel [place]: No significant arterial disease.  e. Arterial doppler/GLYNN, 08/29/2019: No evidence of significant lower extremity arterial occlusive disease.  3. Palpitations:  a. Echocardiogram, 02/13/2014: Dr. Teran. Normal biventricular function; trace to mild MR. Atrial septal aneurysm.  b. Echocardiogram, 12/22/2015, Dr. Chavez: Borderline LVH, mild LAE, mild RV enlargement. Mild to moderate MR and TR with RVSP of 46  mmHg.  c. Echocardiogram, 03/2016: Dr. Palacio.  RV enlargement.  EF 50-55%.  Mild AI S, mild MR and TR with RVSP 37 mmHg.  d. Echocardiogram, 07/11/20: Dr. Jany Wynn: EF 60-65%. Mild to moderate MR.  4. PAF/SSS:  a. [employer] PPM 2016  b. CHADS2 Vasc = 6 (HTN, DM, Age 65-74, Female, H/o TIA). On chronic anticoagulation.  c. ECV, 12/26/2018: Successful cardioversion. AV paced.  d. Echocardiogram, 12/25/2018:  EF 60%, mild MR/TR with RVSP 29 mmHg. Sclerotic AoV, no AS/AI. Mild MAC.  e. ECV, 03/05/2019: Successful cardioversion.  f. Zio, 10/01/2019, 14 days: NSR baseline. Normal average rates. Periods of RV pacing.   5. TIA:  a. Carotid duplex, 02/13/2014: No significant flow-limiting stenosis. Mild luminal disease present; both vertebrals are present.  6. Diabetes  7. Essential Hypertension  8. Hyperlipidemia  9. Hypothyroid  10. Hyponatremia  a. Secondary to SIADH  11. MILLI with CPAP  12. RLS  13. Parkinson's disease  14. GERD  15. Urinary Retention  a. S/P cystoscopy and ureter dilation, March 2018.  Dr. Terrence Mckenzie  16. Surgeries:  a. Rotator cuff repair  b. Cholecystectomy  c. Bilateral knee replacement  d. Tubal ligation  e. Breast surgery  f. Sinus surgery      Current Outpatient Medications:   •  albuterol (PROVENTIL) (2.5 MG/3ML) 0.083% nebulizer solution, Take 2.5 mg by nebulization Every 4 (Four) Hours As Needed., Disp: , Rfl:   •  albuterol (VENTOLIN HFA) 108 (90 Base) MCG/ACT inhaler, Inhale 1 puff Every 4 (Four) Hours As Needed., Disp: , Rfl:   •  ALLERGY SERUM INJECTION, Inject  under the skin into the appropriate area as directed 1 (One) Time Per Week., Disp: , Rfl:   •  amantadine (SYMMETREL) 100 MG capsule, Take 100 mg by mouth 2 (Two) Times a Day., Disp: , Rfl:   •  amLODIPine Besylate (NORVASC PO), Take 5 mg by mouth Daily., Disp: , Rfl:   •  aspirin 81 MG EC tablet, Take 81 mg by mouth Daily., Disp: , Rfl:   •  B Complex-C (SUPER B COMPLEX PO), Take 1 tablet by mouth Daily., Disp: ,  Rfl:   •  bumetanide (BUMEX) 1 MG tablet, Take 2 mg by mouth Daily As Needed (for swelling)., Disp: , Rfl:   •  Calcium Carbonate (CALTRATE 600 PO), Take 600 mg by mouth Daily., Disp: , Rfl:   •  carbidopa-levodopa (SINEMET)  MG per tablet, Take  by mouth. 2 tablets at 0600, 1 tablet at 0900, 1200, 1500, 1800, 2100, Disp: , Rfl:   •  Cholecalciferol 2000 units tablet, Take 2,000 Units by mouth 2 (Two) Times a Day., Disp: , Rfl:   •  citalopram (CeleXA) 20 MG tablet, Take 20 mg by mouth every night at bedtime., Disp: , Rfl:   •  clindamycin (CLEOCIN) 150 MG capsule, Take 150 mg by mouth Daily. 4 capsules one hour before dental appointments., Disp: , Rfl:   •  clobetasol (TEMOVATE) 0.05 % cream, Apply 1 application topically 2 (Two) Times a Day As Needed (Lichens Sclerosis)., Disp: , Rfl:   •  dofetilide (TIKOSYN) 500 MCG capsule, TAKE 1 CAPSULE EVERY 12 HOURS, Disp: 180 capsule, Rfl: 3  •  ELIQUIS 5 MG tablet tablet, TAKE 1 TABLET EVERY 12 HOURS, Disp: 180 tablet, Rfl: 3  •  ferrous sulfate 325 (65 FE) MG tablet, Take 325 mg by mouth 3 (Three) Times a Day With Meals., Disp: , Rfl:   •  Glucosamine-Chondroit-Calcium (TRIPLE FLEX BONE & JOINT PO), Take 1 tablet by mouth Daily., Disp: , Rfl:   •  insulin glargine (LANTUS) 100 UNIT/ML injection, Inject  under the skin into the appropriate area as directed Daily. ACCORDING TO SUGAR LEVELS , Disp: , Rfl:   •  IPRATROPIUM BROMIDE NA, 1 spray into the nostril(s) as directed by provider 2 (Two) Times a Day As Needed., Disp: , Rfl:   •  Loratadine 10 MG capsule, Take 10 mg by mouth Daily., Disp: , Rfl:   •  metFORMIN (GLUCOPHAGE) 1000 MG tablet, Take 1,000 mg by mouth 2 (Two) Times a Day With Meals., Disp: , Rfl:   •  Mirabegron ER (MYRBETRIQ) 50 MG tablet sustained-release 24 hour 24 hr tablet, Take 50 mg by mouth Daily., Disp: , Rfl:   •  Multiple Vitamins-Minerals (CENTRUM ULTRA WOMENS PO), Take 1 tablet by mouth Daily., Disp: , Rfl:   •  nitroglycerin (NITROLINGUAL)  0.4 MG/SPRAY spray, Place 1 spray under the tongue Every 5 (Five) Minutes As Needed for Chest Pain., Disp: 1 each, Rfl: 6  •  O2 (OXYGEN), Inhale 2 L/min 1 (One) Time. 2L while sleeping, Disp: , Rfl:   •  omeprazole (priLOSEC) 40 MG capsule, Take 40 mg by mouth 2 (Two) Times a Day., Disp: , Rfl:   •  pramipexole (MIRAPEX) 1.5 MG tablet, Take 1.5 mg by mouth Every Night., Disp: , Rfl:   •  ranolazine (RANEXA) 500 MG 12 hr tablet, TAKE 1 TABLET EVERY 12 HOURS, Disp: 180 tablet, Rfl: 4  •  sennosides-docusate sodium (SENOKOT-S) 8.6-50 MG tablet, Take 1 tablet by mouth Daily., Disp: , Rfl:   •  spironolactone (ALDACTONE) 25 MG tablet, Take 2 tablets by mouth Daily As Needed (swelling)., Disp: 180 tablet, Rfl: 1  •  SYNTHROID 200 MCG tablet, Take 200 mcg by mouth Daily., Disp: , Rfl:   •  traMADol (ULTRAM) 50 MG tablet, Take 50 mg by mouth Every 8 (Eight) Hours As Needed for Moderate Pain ., Disp: , Rfl:   •  valsartan (DIOVAN) 160 MG tablet, Take 1 tablet by mouth 2 (Two) Times a Day., Disp: 180 tablet, Rfl: 3  •  vitamin C (ASCORBIC ACID) 500 MG tablet, Take 500 mg by mouth Daily. Chewable tablet, Disp: , Rfl:     Past Medical History, Past Surgical History, Family history, Social History, and Medications were all reviewed with the patient today and updated as necessary.       Patient Active Problem List   Diagnosis   • Coronary artery disease involving native coronary artery without angina pectoris   • Essential hypertension   • Peripheral vascular disease (CMS/HCC)   • Hyperlipidemia LDL goal <70   • Spinal stenosis, lumbar region, with neurogenic claudication   • Diabetes mellitus (CMS/HCC)   • Delayed surgical wound healing   • Biceps tendinitis   • Overactive bladder   • GERD (gastroesophageal reflux disease)   • Hypothyroidism   • Lichen sclerosus   • Parkinson's disease (CMS/HCC)   • MILLI treated with BiPAP - Patient reports compliance.    • History of respiratory failure - prior respiratory failure requiring  "mechanical ventilation, open lung biopsy non-specific.    • SOB (shortness of breath)   • A-fib (CMS/HCC)   • Chest pain   • Atrial fibrillation with RVR (CMS/HCC)   • Renal insufficiency   • CAD in native artery   • Persistent atrial fibrillation (CMS/HCC)   • Tachy-thai syndrome (CMS/HCC)   • Long term current use of antiarrhythmic drug     Allergies   Allergen Reactions   • Toprol Xl [Metoprolol Tartrate] Shortness Of Breath     Extreme fatigue   • Amlodipine Besylate Swelling     Lower extremity (ankles, feet) swelling   • Codeine Unknown (See Comments)     Pt is unaware of what reaction she had   • Entacapone Other (See Comments)     \"extreme weakness in legs - caused several falls, which stopped after discontinuing this medication\"   • Levemir [Insulin Detemir] Hives     Hives / rash around injection site   • Penicillins Hives     Jitteriness    • Xarelto [Rivaroxaban] GI Bleeding   • Bactrim [Sulfamethoxazole-Trimethoprim] Nausea Only     Headache    • Ciprofloxacin Diarrhea   • Cogentin [Benztropine] Other (See Comments)     \"uncontrollable body movements\"   • Compazine [Prochlorperazine Edisylate] Other (See Comments)     Dystonic reaction   • Cortisone Other (See Comments)     MAKES BLOOD PRESSURE HIGH    • Duraprep [Antiseptic Products, Misc.] Itching and Rash     RASH AND ITCHING   • Haldol [Haloperidol Lactate] Other (See Comments)     Dystonic reaction   • Hydralazine Other (See Comments)     Headache    • Hydrocodone-Acetaminophen Nausea And Vomiting and Dizziness     Headache, nausea    • Levaquin [Levofloxacin] Other (See Comments)     Cannot take due to taking propafenone HCL- severe reaction with mixed.    • Lisinopril Cough   • Statins Myalgia     Leg pain   • Tarka [Trandolapril-Verapamil Hcl Er] Other (See Comments)     Constipation      Past Medical History:   Diagnosis Date   • Anemia    • Atrial fibrillation (CMS/HCC)    • AVM (arteriovenous malformation)    • CAD (coronary artery disease)  "   • Depression    • Disease of thyroid gland    • DM2 (diabetes mellitus, type 2) (CMS/[facility])     checks sugar twice per day    • Essential hypertension    • Generalized osteoarthritis    • GERD (gastroesophageal reflux disease)    • GIB (gastrointestinal bleeding) 2016    d/t xarelto    • Headache    • Hiatal hernia    • History of shingles    • History of transfusion 2016   • IBS (irritable bowel syndrome)    • Lichen sclerosus    • Mixed hyperlipidemia    • Morbid obesity (CMS/[facility])    • MRSA infection 2018   • Myocardial infarction (CMS/HCC)    • On home oxygen therapy     2L of oxygen via CPAP while sleeping    • MILLI on CPAP     18/14   • Osteoporosis    • Pacemaker    • Parkinson disease (CMS/HCC)    • Peripheral vascular disease (CMS/HCC)    • Pleurisy    • Pneumonia    • Seborrheic dermatitis    • Skin cancer     on back    • SOBOE (shortness of breath on exertion)    • SSS (sick sinus syndrome) (CMS/[facility])    • TIA (transient ischemic attack)    • Varicose vein of leg    • Venous insufficiency of both lower extremities      Past Surgical History:   Procedure Laterality Date   • BACK SURGERY      l4-l5 laminectomy    • BICEPS TENDON REPAIR Right     shoulder   • BREAST BIOPSY Left 2004   • BUNIONECTOMY Right    • CARDIAC CATHETERIZATION     • CARDIAC CATHETERIZATION N/A 2/1/2019    Procedure: Left Heart Cath;  Surgeon: Albertina Corona MD;  Location: [facility] INVASIVE LOCATION;  Service: Cardiology   • CHOLECYSTECTOMY     • COLONOSCOPY  2016   • CORONARY STENT PLACEMENT      x1 stent   • CYST REMOVAL      left ear, upper left back 2001   • DIAGNOSTIC LAPAROSCOPY  1981   • ENDOSCOPIC FUNCTIONAL SINUS SURGERY (FESS)  2011   • ENDOSCOPY  2016   • HAMMER TOE REPAIR Left    • INCISION AND DRAINAGE OF WOUND  2018   • JOINT REPLACEMENT     • LIPOMA EXCISION  1999    left leg    • LUMBAR LAMINECTOMY DISCECTOMY DECOMPRESSION N/A 7/6/2018    Procedure: LUMBAR LAMINECTOMY L4-5, HEMILAMIINECTOMY RIGHT L5-S1,  "FORAMINOTOMY L5-S1;  Surgeon: Lencho Gamino MD;  Location:  [place] [facility];  Service: Neurosurgery   • LUMBAR LAMINECTOMY DISCECTOMY DECOMPRESSION N/A 9/19/2018    Procedure: INCISION AND DRAINAGE BACK WITH WOUND EXPLORATION;  Surgeon: Ritchie García MD;  Location:  [facility] [facility];  Service: Neurosurgery   • LUNG BIOPSY Left 2016   • PACEMAKER IMPLANTATION  11/2016    sss   • REPLACEMENT TOTAL KNEE Bilateral     left knee 2011, right 2012 per dr acuna    • REPLACEMENT TOTAL KNEE Bilateral    • SHOULDER ARTHROSCOPY Bilateral     2003-left, 2004- right    • SKIN BIOPSY      skin cancer back 2016   • TUBAL ABDOMINAL LIGATION  1980   • WISDOM TOOTH EXTRACTION       Family History   Problem Relation Age of Onset   • Kidney disease Mother    • Coronary artery disease Mother    • Coronary artery disease Father    • Coronary artery disease Brother      Social History     Tobacco Use   • Smoking status: Never Smoker   • Smokeless tobacco: Never Used   Substance Use Topics   • Alcohol use: No     Frequency: Never         PHYSICAL EXAM:    /62 (BP Location: Left arm, Patient Position: Sitting)   Pulse 67   Temp 96.8 °F (36 °C)   Ht 160 cm (63\")   Wt 114 kg (251 lb 12.8 oz)   LMP  (LMP Unknown) Comment: Mammogram- april 2018   SpO2 97%   BMI 44.60 kg/m²        Wt Readings from Last 5 Encounters:   10/27/20 114 kg (251 lb 12.8 oz)   10/07/20 111 kg (245 lb 12.8 oz)   07/20/20 107 kg (236 lb)   03/16/20 112 kg (246 lb 12.8 oz)   03/11/20 111 kg (245 lb 3.2 oz)       BP Readings from Last 5 Encounters:   10/27/20 128/62   10/07/20 114/68   07/20/20 122/62   03/16/20 124/60   03/11/20 130/64       General-Well Nourished, Well developed  Eyes - PERRLA  Neck- supple, No mass  CV- regular rate and rhythm, no MRG, No edema  Lung- clear bilaterally  Abd- soft, +BS  Musc/skel - Norm strength and range of motion  Skin- warm and dry  Neuro - Alert & Oriented x 3, appropriate mood.        Medical problems and test results were reviewed " with the patient today.     No results found for this or any previous visit (from the past 672 hour(s)).      EKG: (EKG has been independently visualized by me and summarized below)      ECG 12 Lead    Date/Time: 10/27/2020 10:57 AM  Performed by: Ai Prieto PA  Authorized by: Ai Prieto PA   Comparison: compared with previous ECG from 7/20/2020  Similar to previous ECG  Comparison to previous ECG: A paced   Rhythm: paced  Rate: normal  BPM: 60  Conduction: conduction normal  QRS axis: normal    Clinical impression: abnormal EKG  Comments: Stable QTc              ASSESSMENT and PLAN    1.  Atrial fibrillation-persistent in nature. Currently on Tikosyn 500mcg BID w/ stable QTc. Patient does note improvement in symptoms. Will continue Tikosyn and Eliquis. No recurrence of afib on device interrogation.      2.  Dual-chamber pacemaker-patient has had noise on RA and RV leads and this is not a new issue. She was set to unipolar at some point. She walks with a walker and this noise very likely could be environmental. Atrial lead changed to bipolar, V lead unipolar pace, bipolar sense. No recurrence since last visit. Will monitor closely. Battery 4 years. No afib.      3.  Tikosyn use- EKG reviewed. QTc is stable.      4. HTN- controlled. Continue Valsartan, Norvasc       Return in about 6 months (around 4/27/2021) for BSC.        Ai Prieto PA-C   Cardiology/Electrophysiology  10/27/2020  11:08 EDT

## 2020-10-30 ENCOUNTER — OFFICE VISIT (OUTPATIENT)
Dept: ENDOCRINOLOGY | Facility: CLINIC | Age: 70
End: 2020-10-30

## 2020-10-30 VITALS
SYSTOLIC BLOOD PRESSURE: 124 MMHG | OXYGEN SATURATION: 99 % | HEART RATE: 62 BPM | TEMPERATURE: 97.5 F | BODY MASS INDEX: 43.87 KG/M2 | DIASTOLIC BLOOD PRESSURE: 70 MMHG | HEIGHT: 63 IN | WEIGHT: 247.6 LBS

## 2020-10-30 DIAGNOSIS — E03.9 ACQUIRED HYPOTHYROIDISM: ICD-10-CM

## 2020-10-30 DIAGNOSIS — Z79.4 TYPE 2 DIABETES MELLITUS WITHOUT COMPLICATION, WITH LONG-TERM CURRENT USE OF INSULIN (HCC): Primary | ICD-10-CM

## 2020-10-30 DIAGNOSIS — E11.9 TYPE 2 DIABETES MELLITUS WITHOUT COMPLICATION, WITH LONG-TERM CURRENT USE OF INSULIN (HCC): Primary | ICD-10-CM

## 2020-10-30 DIAGNOSIS — I10 ESSENTIAL HYPERTENSION: ICD-10-CM

## 2020-10-30 DIAGNOSIS — Z79.899 LONG TERM CURRENT USE OF ANTIARRHYTHMIC DRUG: ICD-10-CM

## 2020-10-30 PROCEDURE — 99214 OFFICE O/P EST MOD 30 MIN: CPT | Performed by: INTERNAL MEDICINE

## 2020-10-30 RX ORDER — LANCETS
EACH MISCELLANEOUS
Qty: 300 EACH | Refills: 1 | Status: SHIPPED | OUTPATIENT
Start: 2020-10-30 | End: 2021-02-08 | Stop reason: CLARIF

## 2020-10-30 NOTE — PROGRESS NOTES
"     Office Note      Date: 10/30/2020  Patient Name: Joanna Cross  MRN: [id number]  : 1950    Chief Complaint   Patient presents with   • Follow-up   • Diabetes       History of Present Illness:   Joanna Cross is a 69 y.o. female who presents for Diabetes type 2. Diagnosed in: . Treated in past with oral agents. Current treatments: basal insulin and metformin. Number of insulin shots per day: 1. Checks blood sugar 3 times a day. Has low blood sugar: rare. Aspirin use: Yes. Statin use: No - intolerant.. ACE-I/ARB use: Yes. Changes in health since last visit: none. Last eye exam .     She is on synthroid 200mcg qd. She is taking this correctly. She isn't taking any interfering meds concurrently. She denies any sxs of hypo- or hyperthyroidism at this time.    Subjective      Diabetic Complications:  Eyes: No  Kidneys: No  Feet: No  Heart: Yes - sees cardiologist    Diet and Exercise:  Meals per day: 3  Minutes of exercise per week: 0 mins.    Review of Systems:   Review of Systems   Constitutional: Negative.    Cardiovascular: Negative.    Gastrointestinal: Negative.    Endocrine: Negative.        The following portions of the patient's history were reviewed and updated as appropriate: allergies, current medications, past family history, past medical history, past social history, past surgical history and problem list.    Objective       Visit Vitals  /70   Pulse 62   Temp 97.5 °F (36.4 °C) (Infrared)   Ht 160 cm (63\")   Wt 112 kg (247 lb 9.6 oz)   LMP  (LMP Unknown) Comment: Mammogram- 2018    SpO2 99%   BMI 43.86 kg/m²       Physical Exam:  Physical Exam  Constitutional:       Appearance: Normal appearance.   Neurological:      Mental Status: She is alert.         Labs:    HbA1c  Lab Results   Component Value Date    HGBA1C 6.10 (H) 2019       CMP  Lab Results   Component Value Date    GLUCOSE 107 (H) 2019    BUN 24 (H) 2019    CREATININE 0.85 2019    " EGFRIFNONA 66 06/12/2019    BCR 28.2 (H) 06/12/2019    K 4.7 06/12/2019    CO2 26.0 06/12/2019    CALCIUM 9.1 06/12/2019    AST 21 01/31/2019    ALT 12 01/31/2019        Lipid Panel  Lab Results   Component Value Date    HDL 43 02/01/2019     (H) 02/01/2019    TRIG 78 02/01/2019        TSH  Lab Results   Component Value Date    TSH 3.031 12/24/2018    FREET4 1.83 (H) 12/24/2018        Hemoglobin A1C  Lab Results   Component Value Date    HGBA1C 6.10 (H) 01/31/2019        Microalbumin/Creatinine  No results found for: MALBCRERATIO, CREATINIURIN, MICROALBUR        Assessment / Plan      Assessment & Plan:  Problem List Items Addressed This Visit        Cardiovascular and Mediastinum    Essential hypertension    Current Assessment & Plan     Hypertension is unchanged.  Continue current treatment regimen.  Blood pressure will be reassessed in 3 months.         Relevant Medications    bumetanide (BUMEX) 1 MG tablet    amLODIPine Besylate (NORVASC PO)    spironolactone (ALDACTONE) 25 MG tablet    valsartan (DIOVAN) 160 MG tablet       Endocrine    Diabetes mellitus (CMS/[facility]) - Primary    Overview     type 2, checks fsbg 1-2x/day and as needed; last a1c 5-2018 6.5         Current Assessment & Plan     Diabetes is unchanged.   Continue current treatment regimen.  Diabetes will be reassessed in 3 months.    Recent A1c okay at 6.1%.         Relevant Medications    metFORMIN (GLUCOPHAGE) 1000 MG tablet    insulin glargine (LANTUS) 100 UNIT/ML injection    Hypothyroidism    Current Assessment & Plan     Recent TSH okay at 0.5.  Continue current T4 dose.         Relevant Medications    SYNTHROID 200 MCG tablet       Other    Long term current use of antiarrhythmic drug           Return in about 3 months (around 1/30/2021) for Recheck with A1c and TSH..    Dilshad Wood MD   10/30/2020

## 2020-10-30 NOTE — ASSESSMENT & PLAN NOTE
Diabetes is unchanged.   Continue current treatment regimen.  Diabetes will be reassessed in 3 months.    Recent A1c okay at 6.1%.

## 2020-11-16 RX ORDER — AMLODIPINE BESYLATE 5 MG/1
5 TABLET ORAL DAILY
Qty: 90 TABLET | Refills: 1 | Status: SHIPPED | OUTPATIENT
Start: 2020-11-16 | End: 2020-11-17 | Stop reason: SDUPTHER

## 2020-11-16 RX ORDER — BUMETANIDE 1 MG/1
2 TABLET ORAL DAILY PRN
Qty: 90 TABLET | Refills: 1 | Status: SHIPPED | OUTPATIENT
Start: 2020-11-16 | End: 2020-11-17 | Stop reason: SDUPTHER

## 2020-11-18 RX ORDER — AMLODIPINE BESYLATE 5 MG/1
5 TABLET ORAL DAILY
Qty: 90 TABLET | Refills: 1 | Status: SHIPPED | OUTPATIENT
Start: 2020-11-18 | End: 2021-04-07

## 2020-11-18 RX ORDER — BUMETANIDE 1 MG/1
2 TABLET ORAL DAILY PRN
Qty: 90 TABLET | Refills: 1 | Status: SHIPPED | OUTPATIENT
Start: 2020-11-18 | End: 2021-03-17

## 2020-11-24 RX ORDER — RANOLAZINE 500 MG/1
500 TABLET, EXTENDED RELEASE ORAL EVERY 12 HOURS
Qty: 180 TABLET | Refills: 3 | Status: SHIPPED | OUTPATIENT
Start: 2020-11-24 | End: 2021-12-20

## 2021-02-08 ENCOUNTER — OFFICE VISIT (OUTPATIENT)
Dept: ENDOCRINOLOGY | Facility: CLINIC | Age: 71
End: 2021-02-08

## 2021-02-08 ENCOUNTER — LAB (OUTPATIENT)
Dept: LAB | Facility: HOSPITAL | Age: 71
End: 2021-02-08

## 2021-02-08 VITALS
HEIGHT: 63 IN | OXYGEN SATURATION: 94 % | HEART RATE: 80 BPM | SYSTOLIC BLOOD PRESSURE: 126 MMHG | BODY MASS INDEX: 44.83 KG/M2 | DIASTOLIC BLOOD PRESSURE: 74 MMHG | WEIGHT: 253 LBS | TEMPERATURE: 96.8 F

## 2021-02-08 DIAGNOSIS — E78.5 HYPERLIPIDEMIA LDL GOAL <70: ICD-10-CM

## 2021-02-08 DIAGNOSIS — E11.9 TYPE 2 DIABETES MELLITUS WITHOUT COMPLICATION, WITH LONG-TERM CURRENT USE OF INSULIN (HCC): Primary | ICD-10-CM

## 2021-02-08 DIAGNOSIS — Z79.4 TYPE 2 DIABETES MELLITUS WITHOUT COMPLICATION, WITH LONG-TERM CURRENT USE OF INSULIN (HCC): Primary | ICD-10-CM

## 2021-02-08 DIAGNOSIS — E03.9 ACQUIRED HYPOTHYROIDISM: ICD-10-CM

## 2021-02-08 DIAGNOSIS — I25.10 CAD IN NATIVE ARTERY: ICD-10-CM

## 2021-02-08 DIAGNOSIS — Z79.899 LONG TERM CURRENT USE OF ANTIARRHYTHMIC DRUG: ICD-10-CM

## 2021-02-08 DIAGNOSIS — I10 ESSENTIAL HYPERTENSION: ICD-10-CM

## 2021-02-08 LAB
EXPIRATION DATE: NORMAL
HBA1C MFR BLD: 6.1 %
Lab: NORMAL

## 2021-02-08 PROCEDURE — 83036 HEMOGLOBIN GLYCOSYLATED A1C: CPT | Performed by: INTERNAL MEDICINE

## 2021-02-08 PROCEDURE — 84443 ASSAY THYROID STIM HORMONE: CPT

## 2021-02-08 PROCEDURE — 99214 OFFICE O/P EST MOD 30 MIN: CPT | Performed by: INTERNAL MEDICINE

## 2021-02-08 RX ORDER — LANCETS
EACH MISCELLANEOUS
Qty: 300 EACH | Refills: 3 | Status: SHIPPED | OUTPATIENT
Start: 2021-02-08 | End: 2022-02-15

## 2021-02-08 RX ORDER — BLOOD SUGAR DIAGNOSTIC
STRIP MISCELLANEOUS
Qty: 300 EACH | Refills: 3 | Status: SHIPPED | OUTPATIENT
Start: 2021-02-08 | End: 2021-05-19 | Stop reason: SDUPTHER

## 2021-02-08 NOTE — PROGRESS NOTES
"     Office Note      Date: 2021  Patient Name: Joanna Cross  MRN: [id number]  : 1950    Chief Complaint   Patient presents with   • Diabetes       History of Present Illness:   Joanna Cross is a 70 y.o. female who presents for Diabetes type 2. Diagnosed in: . Treated in past with oral agents. Current treatments: basal insulin and metformin. Number of insulin shots per day: 1. Checks blood sugar 3 times a day. Has low blood sugar: rare. Aspirin use: Yes. Statin use: No - intolerant.. ACE-I/ARB use: Yes. Changes in health since last visit: arthritis in right foot. Last eye exam .      She is on synthroid 200mcg qd. She is taking this correctly. She isn't taking any interfering meds concurrently. She denies any sxs of hypo- or hyperthyroidism at this time.    Subjective      Diabetic Complications:  Eyes: No  Kidneys: No  Feet: No  Heart: Yes -      Diet and Exercise:  Meals per day: 3  Minutes of exercise per week: 0 mins.    Review of Systems:   Review of Systems   Constitutional: Negative.    Cardiovascular: Negative.    Gastrointestinal: Negative.    Endocrine: Negative.        The following portions of the patient's history were reviewed and updated as appropriate: allergies, current medications, past family history, past medical history, past social history, past surgical history and problem list.    Objective       Visit Vitals  /74 (BP Location: Right arm, Patient Position: Sitting, Cuff Size: Adult)   Pulse 80   Temp 96.8 °F (36 °C) (Infrared)   Ht 160 cm (63\")   Wt 115 kg (253 lb)   LMP  (LMP Unknown) Comment: Mammogram- 2018    SpO2 94%   BMI 44.82 kg/m²       Physical Exam:  Physical Exam  Constitutional:       Appearance: Normal appearance.   Neurological:      Mental Status: She is alert.         Labs:    HbA1c  Lab Results   Component Value Date    HGBA1C 6.1 2021       CMP  Lab Results   Component Value Date    GLUCOSE 107 (H) 2019    BUN 24 (H) " 06/12/2019    CREATININE 0.85 06/12/2019    EGFRIFNONA 66 06/12/2019    BCR 28.2 (H) 06/12/2019    K 4.7 06/12/2019    CO2 26.0 06/12/2019    CALCIUM 9.1 06/12/2019    AST 21 01/31/2019    ALT 12 01/31/2019        Lipid Panel  Lab Results   Component Value Date    HDL 43 02/01/2019     (H) 02/01/2019    TRIG 78 02/01/2019        TSH  Lab Results   Component Value Date    TSH 3.031 12/24/2018    FREET4 1.83 (H) 12/24/2018        Hemoglobin A1C  Lab Results   Component Value Date    HGBA1C 6.1 02/08/2021        Microalbumin/Creatinine  No results found for: MALBCRERATIO, CREATINIURIN, MICROALBUR        Assessment / Plan      Assessment & Plan:  Diagnoses and all orders for this visit:    1. Type 2 diabetes mellitus without complication, with long-term current use of insulin (CMS/[facility]) (Primary)  Assessment & Plan:  Diabetes is unchanged.   Continue current treatment regimen.  Diabetes will be reassessed in 3 months.    Orders:  -     POC Glycosylated Hemoglobin (Hb A1C)    2. Essential hypertension  Assessment & Plan:  Hypertension is unchanged.  Continue current treatment regimen.  Blood pressure will be reassessed in 3 months.      3. Hyperlipidemia LDL goal <70    4. Acquired hypothyroidism  Assessment & Plan:  Continue synthroid.  Check TSH today.    Orders:  -     TSH; Future    5. CAD in native artery  Assessment & Plan:  Continue cardiology follow up.      6. Long term current use of antiarrhythmic drug    Other orders  -     Discontinue: Insulin Glargine (LANTUS SOLOSTAR) 100 UNIT/ML injection pen; Inject 30 Units under the skin into the appropriate area as directed Daily.  Dispense: 30 mL; Refill: 3  -     Accu-Chek Softclix Lancets lancets; Use as instructed TID; ICD-10 E11.65; Z79.4  Dispense: 300 each; Refill: 3  -     glucose blood (Accu-Chek Guide) test strip; Use as instructed TID; ICD-10 E11.65; Z79.4  Dispense: 300 each; Refill: 3  -     Insulin Glargine (LANTUS SOLOSTAR) 100 UNIT/ML injection  pen; Inject 30 Units under the skin into the appropriate area as directed Daily.  Dispense: 30 mL; Refill: 3      Return in about 3 months (around 5/8/2021) for Recheck with A1c.    Dilshad Wood MD   02/08/2021

## 2021-02-09 LAB — TSH SERPL DL<=0.05 MIU/L-ACNC: 0.85 UIU/ML (ref 0.27–4.2)

## 2021-02-24 ENCOUNTER — OFFICE VISIT (OUTPATIENT)
Dept: CARDIOLOGY | Facility: CLINIC | Age: 71
End: 2021-02-24

## 2021-02-24 VITALS
HEART RATE: 87 BPM | OXYGEN SATURATION: 95 % | WEIGHT: 249.4 LBS | SYSTOLIC BLOOD PRESSURE: 110 MMHG | BODY MASS INDEX: 45.9 KG/M2 | DIASTOLIC BLOOD PRESSURE: 60 MMHG | TEMPERATURE: 96.4 F | HEIGHT: 62 IN

## 2021-02-24 DIAGNOSIS — R60.0 LOWER EXTREMITY EDEMA: ICD-10-CM

## 2021-02-24 DIAGNOSIS — I10 ESSENTIAL HYPERTENSION: ICD-10-CM

## 2021-02-24 DIAGNOSIS — I48.91 ATRIAL FIBRILLATION WITH RVR (HCC): ICD-10-CM

## 2021-02-24 DIAGNOSIS — E78.5 HYPERLIPIDEMIA LDL GOAL <70: ICD-10-CM

## 2021-02-24 DIAGNOSIS — I73.9 PERIPHERAL VASCULAR DISEASE (HCC): ICD-10-CM

## 2021-02-24 DIAGNOSIS — I25.10 CORONARY ARTERY DISEASE INVOLVING NATIVE CORONARY ARTERY OF NATIVE HEART WITHOUT ANGINA PECTORIS: Primary | ICD-10-CM

## 2021-02-24 PROCEDURE — 93000 ELECTROCARDIOGRAM COMPLETE: CPT | Performed by: INTERNAL MEDICINE

## 2021-02-24 PROCEDURE — 99214 OFFICE O/P EST MOD 30 MIN: CPT | Performed by: INTERNAL MEDICINE

## 2021-02-24 RX ORDER — METOLAZONE 5 MG/1
5 TABLET ORAL DAILY
Qty: 30 TABLET | Refills: 6 | Status: SHIPPED | OUTPATIENT
Start: 2021-02-24 | End: 2021-03-17 | Stop reason: DRUGHIGH

## 2021-02-24 NOTE — PROGRESS NOTES
[facility]    Patient ID: Joanna Cross is a 70 y.o. female.  : 1950   Contact: [phone]    Encounter date: 2021    PCP: Reynaldo Fritz MD      Chief complaint:   Chief Complaint   Patient presents with   • Atrial Fibrillation   • Shortness of Breath   • Dizziness   • Edema       Problem List:  1. Coronary artery disease  a. [facility], 02/15/2008, Dr Lutz: Mild, non-obstructive CAD, normal EF  b. [facility], 2013, Dr Stevenson Sutton: No LV gram done. Mild, non-obstructive CAD.  c. [facility], 2015, [facility]:  EF 60%. 70% RCA lesion with Promus ZAINAB placement. 40-50% mid LAD, no FFR performed. Other small blockages noted. No MR.  d. [facility], 11/15/2015, Dr Palacio @ [facility]: Patent RCA stent, 40-50% LAD with FFR @ 0.92; mild inferior wall hypokinesis  e. [facility], 2016, [facility]: Normal CORS with patent stent. LAD improved since last [facility]. EF 55-60%.  f. [facility], 2019: EF 55-60%. Patent RCA stent, noncritical mid LAD with IFR 0.93, noncritical LCx.   2. Chronic venous stasis:  a. Venous duplex, 2010: Insufficiency to venous hypertension in the great saphenous system bilaterally.  b. Venous duplex : Right leg laser ablation of Dr. Garcia.  c. Venous duplex, 2016: No evidence of DVT, no superficial, no thrmobophlebitis, no valvular insufficiency.  d. AA with runoffs, 2016: Single-vessel [place]: No significant arterial disease.  e. Arterial doppler/GLYNN, 2019: No evidence of significant lower extremity arterial occlusive disease.  3. Palpitations:  a. Echocardiogram, 2014: Dr. Teran. Normal biventricular function; trace to mild MR. Atrial septal aneurysm.  b. Echocardiogram, 2015, Dr. Chavez: Borderline LVH, mild LAE, mild RV enlargement. Mild to moderate MR and TR with RVSP of 46 mmHg.  c. Echocardiogram, 2016: Dr. Palacio.  RV enlargement.  EF 50-55%.  Mild AI S, mild MR and TR with RVSP 37  "mmHg.  d. Echocardiogram, 07/11/20: Dr. Jany Wynn: EF 60-65%. Mild to moderate MR.  4. PAF/SSS:  a. [employer] PPM 2016  b. CHADS2 Vasc = 6 (HTN, DM, Age 65-74, Female, H/o TIA). On chronic anticoagulation.  c. ECV, 12/26/2018: Successful cardioversion. AV paced.  d. Echocardiogram, 12/25/2018:  EF 60%, mild MR/TR with RVSP 29 mmHg. Sclerotic AoV, no AS/AI. Mild MAC.  e. ECV, 03/05/2019: Successful cardioversion.  f. Zio, 10/01/2019, 14 days: NSR baseline. Normal average rates. Periods of RV pacing.   5. TIA:  a. Carotid duplex, 02/13/2014: No significant flow-limiting stenosis. Mild luminal disease present; both vertebrals are present.  6. Diabetes  7. Essential Hypertension  8. Hyperlipidemia  9. Hypothyroid  10. Hyponatremia  a. Secondary to SIADH  11. MILLI with CPAP  12. RLS  13. Parkinson's disease  14. GERD  15. Urinary Retention  a. S/P cystoscopy and ureter dilation, March 2018.  Dr. Terrence Mckenzie  16. Surgeries:  a. Rotator cuff repair  b. Cholecystectomy  c. Bilateral knee replacement  d. Tubal ligation  e. Breast surgery  f. Sinus surgery    Allergies   Allergen Reactions   • Toprol Xl [Metoprolol Tartrate] Shortness Of Breath     Extreme fatigue   • Amlodipine Besylate Swelling     Lower extremity (ankles, feet) swelling   • Codeine Unknown (See Comments)     Pt is unaware of what reaction she had   • Entacapone Other (See Comments)     \"extreme weakness in legs - caused several falls, which stopped after discontinuing this medication\"   • Levemir [Insulin Detemir] Hives     Hives / rash around injection site   • Penicillins Hives     Jitteriness    • Xarelto [Rivaroxaban] GI Bleeding   • Bactrim [Sulfamethoxazole-Trimethoprim] Nausea Only     Headache    • Ciprofloxacin Diarrhea   • Cogentin [Benztropine] Other (See Comments)     \"uncontrollable body movements\"   • Compazine [Prochlorperazine Edisylate] Other (See Comments)     Dystonic reaction   • Cortisone Other (See Comments)     MAKES " BLOOD PRESSURE HIGH    • Duraprep [Antiseptic Products, Misc.] Itching and Rash     RASH AND ITCHING   • Haldol [Haloperidol Lactate] Other (See Comments)     Dystonic reaction   • Hydralazine Other (See Comments)     Headache    • Hydrocodone-Acetaminophen Nausea And Vomiting and Dizziness     Headache, nausea    • Levaquin [Levofloxacin] Other (See Comments)     Cannot take due to taking propafenone HCL- severe reaction with mixed.    • Lisinopril Cough   • Statins Myalgia     Leg pain   • Tarka [Trandolapril-Verapamil Hcl Er] Other (See Comments)     Constipation        Current Medications:    Current Outpatient Medications:   •  Accu-Chek Softclix Lancets lancets, Use as instructed TID; ICD-10 E11.65; Z79.4, Disp: 300 each, Rfl: 3  •  albuterol (PROVENTIL) (2.5 MG/3ML) 0.083% nebulizer solution, Take 2.5 mg by nebulization Every 4 (Four) Hours As Needed., Disp: , Rfl:   •  albuterol (VENTOLIN HFA) 108 (90 Base) MCG/ACT inhaler, Inhale 1 puff Every 4 (Four) Hours As Needed., Disp: , Rfl:   •  ALLERGY SERUM INJECTION, Inject  under the skin into the appropriate area as directed 1 (One) Time Per Week., Disp: , Rfl:   •  amantadine (SYMMETREL) 100 MG capsule, Take 100 mg by mouth 2 (Two) Times a Day., Disp: , Rfl:   •  amLODIPine (Norvasc) 5 MG tablet, Take 1 tablet by mouth Daily., Disp: 90 tablet, Rfl: 1  •  aspirin 81 MG EC tablet, Take 81 mg by mouth Daily., Disp: , Rfl:   •  B Complex-C (SUPER B COMPLEX PO), Take 1 tablet by mouth Daily., Disp: , Rfl:   •  bumetanide (BUMEX) 1 MG tablet, Take 2 tablets by mouth Daily As Needed (for swelling)., Disp: 90 tablet, Rfl: 1  •  Calcium Carbonate (CALTRATE 600 PO), Take 600 mg by mouth Daily., Disp: , Rfl:   •  carbidopa-levodopa (SINEMET)  MG per tablet, Take  by mouth. 2 tablets at 0600, 1 tablet at 0900, 1200, 1500, 1800, 2100, Disp: , Rfl:   •  Cholecalciferol 2000 units tablet, Take 2,000 Units by mouth 2 (Two) Times a Day., Disp: , Rfl:   •  citalopram  (CeleXA) 20 MG tablet, Take 20 mg by mouth every night at bedtime., Disp: , Rfl:   •  clindamycin (CLEOCIN) 150 MG capsule, Take 150 mg by mouth Daily. 4 capsules one hour before dental appointments., Disp: , Rfl:   •  clobetasol (TEMOVATE) 0.05 % cream, Apply 1 application topically 2 (Two) Times a Day As Needed (Lichens Sclerosis)., Disp: , Rfl:   •  dofetilide (TIKOSYN) 500 MCG capsule, TAKE 1 CAPSULE EVERY 12 HOURS, Disp: 180 capsule, Rfl: 3  •  ELIQUIS 5 MG tablet tablet, TAKE 1 TABLET EVERY 12 HOURS, Disp: 180 tablet, Rfl: 3  •  ferrous sulfate 325 (65 FE) MG tablet, Take 325 mg by mouth 3 (Three) Times a Day With Meals., Disp: , Rfl:   •  Glucosamine-Chondroit-Calcium (TRIPLE FLEX BONE & JOINT PO), Take 1 tablet by mouth Daily., Disp: , Rfl:   •  glucose blood (Accu-Chek Guide) test strip, Use as instructed TID; ICD-10 E11.65; Z79.4, Disp: 300 each, Rfl: 3  •  Insulin Glargine (LANTUS SOLOSTAR) 100 UNIT/ML injection pen, Inject 30 Units under the skin into the appropriate area as directed Daily., Disp: 30 mL, Rfl: 3  •  IPRATROPIUM BROMIDE NA, 1 spray into the nostril(s) as directed by provider 2 (Two) Times a Day As Needed., Disp: , Rfl:   •  Loratadine 10 MG capsule, Take 10 mg by mouth Daily., Disp: , Rfl:   •  metFORMIN (GLUCOPHAGE) 1000 MG tablet, Take 1,000 mg by mouth 2 (Two) Times a Day With Meals., Disp: , Rfl:   •  Multiple Vitamins-Minerals (CENTRUM ULTRA WOMENS PO), Take 1 tablet by mouth Daily., Disp: , Rfl:   •  nitroglycerin (NITROLINGUAL) 0.4 MG/SPRAY spray, Place 1 spray under the tongue Every 5 (Five) Minutes As Needed for Chest Pain., Disp: 1 each, Rfl: 6  •  O2 (OXYGEN), Inhale 2 L/min 1 (One) Time. 2L while sleeping, Disp: , Rfl:   •  omeprazole (priLOSEC) 40 MG capsule, Take 40 mg by mouth 2 (Two) Times a Day., Disp: , Rfl:   •  pramipexole (MIRAPEX) 1.5 MG tablet, Take 1.5 mg by mouth Every Night., Disp: , Rfl:   •  ranolazine (RANEXA) 500 MG 12 hr tablet, Take 1 tablet by mouth Every  12 (Twelve) Hours., Disp: 180 tablet, Rfl: 3  •  sennosides-docusate sodium (SENOKOT-S) 8.6-50 MG tablet, Take 1 tablet by mouth Daily., Disp: , Rfl:   •  spironolactone (ALDACTONE) 25 MG tablet, Take 2 tablets by mouth Daily As Needed (swelling)., Disp: 180 tablet, Rfl: 1  •  SYNTHROID 200 MCG tablet, Take 200 mcg by mouth Daily., Disp: , Rfl:   •  traMADol (ULTRAM) 50 MG tablet, Take 50 mg by mouth Every 8 (Eight) Hours As Needed for Moderate Pain ., Disp: , Rfl:   •  Unable to find, 1 each 2 (two) times a day. Med Name: tonic water 4 oz BID, Disp: , Rfl:   •  valsartan (DIOVAN) 160 MG tablet, Take 1 tablet by mouth 2 (Two) Times a Day., Disp: 180 tablet, Rfl: 3  •  vitamin C (ASCORBIC ACID) 500 MG tablet, Take 500 mg by mouth Daily. Chewable tablet, Disp: , Rfl:     HPI    Joanna Cross is a 70 y.o. female who presents today for a follow up of coronary artery disease, paroxysmal atrial fibrillation, tachy-thai syndrome, and cardiac risk factors. She has been experiencing lower extremity swelling. She notes that she called the office a few weeks ago and was advised to increased her Bumex to 2 mg daily so she is taking two Bumex and two spironolactone tablets daily. She also started experiencing blisters in her lower extremities. She is reportedly in the process of finding compression stockings that fit her well. She monitors her blood pressure at home routinely and it has been better than it is in office today. She notes that she experienced dizziness walking to the office today. She has a history of sleep apnea and sleeps with 2 liters of oxygen at night. Patient otherwise denies chest pain, palpitations, or syncope at this time. She had the first dose of her COVID-19 vaccine on 02/12/2020. She plans on seeing Dr. Fritz again on 03/01/2021 and she had laboratory studies yesterday.    The following portions of the patient's history were reviewed and updated as appropriate: allergies, current medications and  "problem list.    Pertinent positives as listed in the HPI.  All other systems reviewed are negative.         Vitals:    02/24/21 1536 02/24/21 1556   BP: 104/56 110/60   BP Location: Left arm Left arm   Patient Position: Sitting Sitting   Pulse: 87    Temp: 96.4 °F (35.8 °C)    SpO2: 95%    Weight: 113 kg (249 lb 6.4 oz)    Height: 157.5 cm (62\")        Physical Exam:  General: Alert and oriented.  Neck: Jugular venous pressure is within normal limits. Carotids have normal upstrokes without bruits.   Cardiovascular: Heart has a nondisplaced focal PMI. Regular rate and rhythm. No murmur, gallop or rub.  Lungs: Clear, no rales or wheezes. Equal expansion is noted.   Extremities: Show no edema.  Skin: Warm and dry.  Neurologic: Nonfocal.     Diagnostic Data (reviewed with patient):  Lab date: 02/24/2021  • CBC: WBC 8.5, RBC 3.97, HGB 11.7, HCT 35.5, MCV 89.4, MCH 29.6,     Lab date: 01/21/2021  • BMP: Glu 108, BUN 26, Creat 0.87, eGFR 66, Na 136, K 4.8, Cl 101, CO2 25, Ca 9.5    Lab date: 08/13/2020  • FLP: , TG 96, HDL 40,       Lab Results   Component Value Date    TSH 0.853 02/08/2021          ECG 12 Lead    Date/Time: 2/24/2021 4:03 PM  Performed by: Albertina Corona MD  Authorized by: Albertina Corona MD   Comparison: compared with previous ECG   Rhythm: sinus rhythm  Ectopy comments: occasional PVC's  Rate: normal  BPM: 78  Comments: Low voltage QRS.                Assessment:    ICD-10-CM ICD-9-CM   1. Coronary artery disease involving native coronary artery of native heart without angina pectoris  I25.10 414.01   2. Peripheral vascular disease (CMS/HCC)  I73.9 443.9   3. Atrial fibrillation with RVR (CMS/HCC)  I48.91 427.31   4. Essential hypertension  I10 401.9   5. Hyperlipidemia LDL goal <70  E78.5 272.4   6. Lower extremity edema  R60.0 782.3         Plan:  1. BMP ordered for one week from today due to initiation of metolazone.   2. Begin 5 mg of metolazone daily for fluid " retention.   3. Discontinue amlodipine due to initiation of metolazone.   4. Patient advised to start wrapping her legs until she finds compression stockings.   5. Continue aspirin for CAD.  6. Continue Eliquis for stroke prophylaxis with PAF.  7. Continue Bumex and spironolactone for fluid retention.   8. Continue valsartan for hypertension.  9. Continue all other current medications.  10. If the patient is going to stay on metolazone she will need a repeat EKG next week as well as she is also on Tikosyn and her QTC today was 460 ms..  11. F/up in 4-6 weeks, sooner if needed.    Scribed for Albertina Corona MD by Denise Ugarte. 2/24/2021  15:57 EST      I Albertina Corona MD personally performed the services described in this documentation as scribed by the above individual in my presence, and it is both accurate and complete.    Albertina Corona MD, FACC

## 2021-03-17 DIAGNOSIS — Z79.899 LONG-TERM USE OF HIGH-RISK MEDICATION: Primary | ICD-10-CM

## 2021-03-17 RX ORDER — METOLAZONE 2.5 MG/1
2.5 TABLET ORAL DAILY
Qty: 90 TABLET | Refills: 1 | Status: SHIPPED | OUTPATIENT
Start: 2021-03-17 | End: 2021-08-16

## 2021-03-17 RX ORDER — BUMETANIDE 1 MG/1
TABLET ORAL
Qty: 90 TABLET | Refills: 1 | Status: SHIPPED | OUTPATIENT
Start: 2021-03-17 | End: 2021-04-07 | Stop reason: SDUPTHER

## 2021-03-17 RX ORDER — LEVOTHYROXINE SODIUM 200 MCG
200 TABLET ORAL DAILY
Qty: 90 TABLET | Refills: 1 | Status: SHIPPED | OUTPATIENT
Start: 2021-03-17 | End: 2021-09-06

## 2021-03-17 NOTE — TELEPHONE ENCOUNTER
Per Dr. Corona decrease metolazone to 2.5 mg Daily- repeat BMP 2 weeks. Needs EKG this week- tikosyn and metolazone- see office note dated 2/24/21

## 2021-03-19 ENCOUNTER — CLINICAL SUPPORT (OUTPATIENT)
Dept: CARDIOLOGY | Facility: CLINIC | Age: 71
End: 2021-03-19

## 2021-03-22 PROCEDURE — 93296 REM INTERROG EVL PM/IDS: CPT | Performed by: INTERNAL MEDICINE

## 2021-03-22 PROCEDURE — 93294 REM INTERROG EVL PM/LDLS PM: CPT | Performed by: INTERNAL MEDICINE

## 2021-03-30 ENCOUNTER — TELEPHONE (OUTPATIENT)
Dept: PULMONOLOGY | Facility: CLINIC | Age: 71
End: 2021-03-30

## 2021-03-30 NOTE — TELEPHONE ENCOUNTER
Patient called complaining of increased shortness of breath with exertion, increased recovery times, and lack of energy. Denies fever or cough. Also thinks might need to adjust BiPap. Hasn't been seen in the office since 11/2019, mainly d/t COVID concerns. Per your last note, needs followup with Allyson but he is not in the office until 4/13/21 and pt thinks she might need a sooner appt. Okay to follow up here with you? Any testing?

## 2021-04-01 DIAGNOSIS — Z79.899 LONG-TERM USE OF HIGH-RISK MEDICATION: ICD-10-CM

## 2021-04-07 ENCOUNTER — OFFICE VISIT (OUTPATIENT)
Dept: CARDIOLOGY | Facility: CLINIC | Age: 71
End: 2021-04-07

## 2021-04-07 VITALS
OXYGEN SATURATION: 97 % | HEIGHT: 62 IN | HEART RATE: 79 BPM | SYSTOLIC BLOOD PRESSURE: 148 MMHG | BODY MASS INDEX: 47.29 KG/M2 | DIASTOLIC BLOOD PRESSURE: 68 MMHG | WEIGHT: 257 LBS

## 2021-04-07 DIAGNOSIS — E78.5 HYPERLIPIDEMIA LDL GOAL <70: ICD-10-CM

## 2021-04-07 DIAGNOSIS — I48.91 ATRIAL FIBRILLATION WITH RVR (HCC): ICD-10-CM

## 2021-04-07 DIAGNOSIS — I25.10 CORONARY ARTERY DISEASE INVOLVING NATIVE CORONARY ARTERY OF NATIVE HEART WITHOUT ANGINA PECTORIS: Primary | ICD-10-CM

## 2021-04-07 DIAGNOSIS — I10 ESSENTIAL HYPERTENSION: ICD-10-CM

## 2021-04-07 DIAGNOSIS — Z79.899 LONG-TERM USE OF HIGH-RISK MEDICATION: ICD-10-CM

## 2021-04-07 DIAGNOSIS — I73.9 PERIPHERAL VASCULAR DISEASE (HCC): ICD-10-CM

## 2021-04-07 PROCEDURE — 99214 OFFICE O/P EST MOD 30 MIN: CPT | Performed by: INTERNAL MEDICINE

## 2021-04-07 RX ORDER — TRIAMCINOLONE ACETONIDE 1 MG/G
1 CREAM TOPICAL AS NEEDED
COMMUNITY

## 2021-04-07 RX ORDER — BUMETANIDE 1 MG/1
TABLET ORAL
Qty: 180 TABLET | Refills: 1 | Status: SHIPPED | OUTPATIENT
Start: 2021-04-07 | End: 2022-07-05

## 2021-04-07 NOTE — PROGRESS NOTES
[facility] Cardiology    Patient ID: Joanna Cross is a 70 y.o. female.  : 1950   Contact: [phone]    Encounter date: 2021    PCP: Reynaldo Fritz MD      Chief complaint:   Chief Complaint   Patient presents with   • Coronary artery disease involving native coronary artery of        Problem List:  1. Coronary artery disease  a. [facility], 02/15/2008, Dr Lutz: Mild, non-obstructive CAD, normal EF  b. [facility], 2013, Dr Stevenson Sutton: No LV gram done. Mild, non-obstructive CAD.  c. [facility], 2015, [facility]:  EF 60%. 70% RCA lesion with Promus ZAINAB placement. 40-50% mid LAD, no FFR performed. Other small blockages noted. No MR.  d. [facility], 11/15/2015, Dr Palacio @ [facility]: Patent RCA stent, 40-50% LAD with FFR @ 0.92; mild inferior wall hypokinesis  e. [facility], 2016, [facility]: Normal CORS with patent stent. LAD improved since last [facility]. EF 55-60%.  f. [facility], 2019: EF 55-60%. Patent RCA stent, noncritical mid LAD with IFR 0.93, noncritical LCx.   2. Chronic venous stasis:  a. Venous duplex, 2010: Insufficiency to venous hypertension in the great saphenous system bilaterally.  b. Venous duplex : Right leg laser ablation of Dr. Garcia.  c. Venous duplex, 2016: No evidence of DVT, no superficial, no thrmobophlebitis, no valvular insufficiency.  d. AA with runoffs, 2016: Single-vessel [place]: No significant arterial disease.  e. Arterial doppler/GLYNN, 2019: No evidence of significant lower extremity arterial occlusive disease.  3. Palpitations:  a. Echocardiogram, 2014: Dr. Teran. Normal biventricular function; trace to mild MR. Atrial septal aneurysm.  b. Echocardiogram, 2015, Dr. Chavez: Borderline LVH, mild LAE, mild RV enlargement. Mild to moderate MR and TR with RVSP of 46 mmHg.  c. Echocardiogram, 2016: Dr. Palacio.  RV enlargement.  EF 50-55%.  Mild AI S, mild MR and TR with RVSP 37  "mmHg.  d. Echocardiogram, 07/11/20: Dr. Jany Wynn: EF 60-65%. Mild to moderate MR.  4. PAF/SSS:  a. [employer] PPM 2016  b. CHADS2 Vasc = 6 (HTN, DM, Age 65-74, Female, H/o TIA). On chronic anticoagulation.  c. ECV, 12/26/2018: Successful cardioversion. AV paced.  d. Echocardiogram, 12/25/2018:  EF 60%, mild MR/TR with RVSP 29 mmHg. Sclerotic AoV, no AS/AI. Mild MAC.  e. ECV, 03/05/2019: Successful cardioversion.  f. Zio, 10/01/2019, 14 days: NSR baseline. Normal average rates. Periods of RV pacing.   5. TIA:  a. Carotid duplex, 02/13/2014: No significant flow-limiting stenosis. Mild luminal disease present; both vertebrals are present.  6. Diabetes  7. Essential Hypertension  8. Hyperlipidemia  9. Hypothyroid  10. Hyponatremia  a. Secondary to SIADH  11. MILLI with CPAP  12. RLS  13. Parkinson's disease  14. GERD  15. Urinary Retention  a. S/P cystoscopy and ureter dilation, March 2018.  Dr. Terrence Mckenzie  16. Surgeries:  a. Rotator cuff repair  b. Cholecystectomy  c. Bilateral knee replacement  d. Tubal ligation  e. Breast surgery  f. Sinus surgery    Allergies   Allergen Reactions   • Toprol Xl [Metoprolol Tartrate] Shortness Of Breath     Extreme fatigue   • Amlodipine Besylate Swelling     Lower extremity (ankles, feet) swelling   • Codeine Unknown (See Comments)     Pt is unaware of what reaction she had   • Entacapone Other (See Comments)     \"extreme weakness in legs - caused several falls, which stopped after discontinuing this medication\"   • Levemir [Insulin Detemir] Hives     Hives / rash around injection site   • Penicillins Hives     Jitteriness    • Xarelto [Rivaroxaban] GI Bleeding   • Bactrim [Sulfamethoxazole-Trimethoprim] Nausea Only     Headache    • Ciprofloxacin Diarrhea   • Cogentin [Benztropine] Other (See Comments)     \"uncontrollable body movements\"   • Compazine [Prochlorperazine Edisylate] Other (See Comments)     Dystonic reaction   • Cortisone Other (See Comments)     MAKES " BLOOD PRESSURE HIGH    • Duraprep [Antiseptic Products, Misc.] Itching and Rash     RASH AND ITCHING   • Haldol [Haloperidol Lactate] Other (See Comments)     Dystonic reaction   • Hydralazine Other (See Comments)     Headache    • Hydrocodone-Acetaminophen Nausea And Vomiting and Dizziness     Headache, nausea    • Levaquin [Levofloxacin] Other (See Comments)     Cannot take due to taking propafenone HCL- severe reaction with mixed.    • Lisinopril Cough   • Statins Myalgia     Leg pain   • Tarka [Trandolapril-Verapamil Hcl Er] Other (See Comments)     Constipation        Current Medications:    Current Outpatient Medications:   •  Accu-Chek Softclix Lancets lancets, Use as instructed TID; ICD-10 E11.65; Z79.4, Disp: 300 each, Rfl: 3  •  albuterol (PROVENTIL) (2.5 MG/3ML) 0.083% nebulizer solution, Take 2.5 mg by nebulization Every 4 (Four) Hours As Needed., Disp: , Rfl:   •  albuterol (VENTOLIN HFA) 108 (90 Base) MCG/ACT inhaler, Inhale 1 puff Every 4 (Four) Hours As Needed., Disp: , Rfl:   •  ALLERGY SERUM INJECTION, Inject  under the skin into the appropriate area as directed 1 (One) Time Per Week., Disp: , Rfl:   •  amantadine (SYMMETREL) 100 MG capsule, Take 100 mg by mouth 2 (Two) Times a Day., Disp: , Rfl:   •  aspirin 81 MG EC tablet, Take 81 mg by mouth Daily., Disp: , Rfl:   •  B Complex-C (SUPER B COMPLEX PO), Take 1 tablet by mouth Daily., Disp: , Rfl:   •  bumetanide (BUMEX) 1 MG tablet, TAKE 2 TABLETS DAILY AS NEEDED FOR SWELLING, Disp: 90 tablet, Rfl: 1  •  Calcium Carbonate (CALTRATE 600 PO), Take 600 mg by mouth Daily., Disp: , Rfl:   •  carbidopa-levodopa (SINEMET)  MG per tablet, Take  by mouth. 2 tablets at 0600, 1 tablet at 0900, 1200, 1500, 1800, 2100, Disp: , Rfl:   •  Cholecalciferol 2000 units tablet, Take 2,000 Units by mouth 2 (Two) Times a Day., Disp: , Rfl:   •  citalopram (CeleXA) 20 MG tablet, Take 20 mg by mouth every night at bedtime., Disp: , Rfl:   •  clindamycin (CLEOCIN)  150 MG capsule, Take 150 mg by mouth Daily. 4 capsules one hour before dental appointments., Disp: , Rfl:   •  clobetasol (TEMOVATE) 0.05 % cream, Apply 1 application topically 2 (Two) Times a Day As Needed (Lichens Sclerosis)., Disp: , Rfl:   •  dofetilide (TIKOSYN) 500 MCG capsule, TAKE 1 CAPSULE EVERY 12 HOURS, Disp: 180 capsule, Rfl: 3  •  ELIQUIS 5 MG tablet tablet, TAKE 1 TABLET EVERY 12 HOURS, Disp: 180 tablet, Rfl: 3  •  ferrous sulfate 325 (65 FE) MG tablet, Take 325 mg by mouth Daily With Breakfast., Disp: , Rfl:   •  Glucosamine-Chondroit-Calcium (TRIPLE FLEX BONE & JOINT PO), Take 1 tablet by mouth Daily., Disp: , Rfl:   •  glucose blood (Accu-Chek Guide) test strip, Use as instructed TID; ICD-10 E11.65; Z79.4, Disp: 300 each, Rfl: 3  •  Insulin Glargine (LANTUS SOLOSTAR) 100 UNIT/ML injection pen, Inject 30 Units under the skin into the appropriate area as directed Daily., Disp: 30 mL, Rfl: 3  •  IPRATROPIUM BROMIDE NA, 1 spray into the nostril(s) as directed by provider 2 (Two) Times a Day As Needed., Disp: , Rfl:   •  Loratadine 10 MG capsule, Take 10 mg by mouth Daily., Disp: , Rfl:   •  metFORMIN (GLUCOPHAGE) 1000 MG tablet, Take 1,000 mg by mouth 2 (Two) Times a Day With Meals., Disp: , Rfl:   •  metOLazone (ZAROXOLYN) 2.5 MG tablet, Take 1 tablet by mouth Daily., Disp: 90 tablet, Rfl: 1  •  Multiple Vitamins-Minerals (CENTRUM ULTRA WOMENS PO), Take 1 tablet by mouth Daily., Disp: , Rfl:   •  nitroglycerin (NITROLINGUAL) 0.4 MG/SPRAY spray, Place 1 spray under the tongue Every 5 (Five) Minutes As Needed for Chest Pain., Disp: 1 each, Rfl: 6  •  O2 (OXYGEN), Inhale 2 L/min 1 (One) Time. 2L while sleeping, Disp: , Rfl:   •  omeprazole (priLOSEC) 40 MG capsule, Take 40 mg by mouth 2 (Two) Times a Day., Disp: , Rfl:   •  pramipexole (MIRAPEX) 1.5 MG tablet, Take 1.5 mg by mouth Every Night., Disp: , Rfl:   •  ranolazine (RANEXA) 500 MG 12 hr tablet, Take 1 tablet by mouth Every 12 (Twelve) Hours.,  Disp: 180 tablet, Rfl: 3  •  sennosides-docusate sodium (SENOKOT-S) 8.6-50 MG tablet, Take 1 tablet by mouth Daily., Disp: , Rfl:   •  spironolactone (ALDACTONE) 25 MG tablet, Take 2 tablets by mouth Daily As Needed (swelling). (Patient taking differently: Take 50 mg by mouth Daily.), Disp: 180 tablet, Rfl: 1  •  Synthroid 200 MCG tablet, Take 1 tablet by mouth Daily., Disp: 90 tablet, Rfl: 1  •  traMADol (ULTRAM) 50 MG tablet, Take 50 mg by mouth Every 8 (Eight) Hours As Needed for Moderate Pain ., Disp: , Rfl:   •  triamcinolone (KENALOG) 0.1 % cream, triamcinolone acetonide 0.1 % topical cream  APPLY A THIN LAYER TO THE AFFECTED AREA(S) (chest wall, back and arms) BY TOPICAL ROUTE 2 TIMES PER DAY, Disp: , Rfl:   •  Unable to find, 1 each 2 (two) times a day. Med Name: tonic water 4 oz BID, Disp: , Rfl:   •  valsartan (DIOVAN) 160 MG tablet, Take 1 tablet by mouth 2 (Two) Times a Day., Disp: 180 tablet, Rfl: 3  •  vitamin C (ASCORBIC ACID) 500 MG tablet, Take 500 mg by mouth Daily. Chewable tablet, Disp: , Rfl:     HPI    Joanna Cross is a 70 y.o. female who presents today for a 4-6 week follow up of coronary artery disease, peripheral vascular disease, atrial fibrillation with RVR, and cardiac risk factors. Since last visit, she has been feeling well overall from a cardiovascular standpoint. Her swelling has reportedly improved. She has been taking two tablets of Bumex 1 mg every day. She monitors her blood pressure at home and her systolic pressure has mostly been in the 120's and 130's. She experienced one episode of sharp chest pain while sitting. The pain only lasted a few seconds and she took a nitroglycerin. Patient otherwise denies shortness of breath, PND, palpitations, syncope, or presyncope at this time.    The following portions of the patient's history were reviewed and updated as appropriate: allergies, current medications and problem list.    Pertinent positives as listed in the HPI.  All other  "systems reviewed are negative.         Vitals:    04/07/21 1313   BP: 148/68   BP Location: Left arm   Patient Position: Sitting   Pulse: 79   SpO2: 97%   Weight: 117 kg (257 lb)   Height: 157.5 cm (62\")       Physical Exam:  General: Alert and oriented.  Neck: Jugular venous pressure is within normal limits. Carotids have normal upstrokes without bruits.   Cardiovascular: Heart has a nondisplaced focal PMI. Regular rate and rhythm. No murmur, gallop or rub.  Lungs: Clear, no rales or wheezes. Equal expansion is noted.   Extremities: Show trace to 1+ edema.  Skin: Warm and dry.  Neurologic: Nonfocal.     Diagnostic Data (reviewed with patient):  Lab date: 02/24/2021  · CBC: WBC 8.5, RBC 3.97, HGB 11.7, HCT 35.5, MCV 89.4, MCH 29.6,      Lab date: 03/31/2021  · BMP: Glu 61, BUN 44, Creat 1.2, eGFR 44, Na 136, K 3.7, Cl 99, CO2 26, Ca 9.8     Lab date: 08/13/2020  · FLP: , TG 96, HDL 40,       Lab Results   Component Value Date    TSH 0.853 02/08/2021        Procedures    Advance Care Planning   ACP discussion was held with the patient during this visit. Patient has an advance directive in EMR which is still valid.       Assessment:    ICD-10-CM ICD-9-CM   1. Coronary artery disease involving native coronary artery of native heart without angina pectoris  I25.10 414.01   2. Peripheral vascular disease (CMS/[facility])  I73.9 443.9   3. Atrial fibrillation with RVR (CMS/[facility])  I48.91 427.31   4. Essential hypertension  I10 401.9   5. Hyperlipidemia LDL goal <70  E78.5 272.4         Plan:  1. Decrease Bumex from 1 mg two tablets daily to 1 mg one tablet daily as BUN is 44 and creatinine 1.2.  2. Repeat BMP in 2 weeks due to medication change.  3. Continue aspirin 81 mg daily for CAD.  4. Continue Eliquis 5 mg BID for stroke prophylaxis and Tykosin 500 mcg BID for atrial fibrillation.  5. Continue spironolactone 50 mg daily and metolazone 2.5 mg daily for fluid retention and hypertension.  6. Continue " valsartan 160 mg BIDfor hypertension.  7. Continue all other current medications.  8. F/up in 6 months, sooner if needed.    Scribed for Albertina Corona MD by Denise Ugarte. 4/7/2021  13:21 EDT    I Albertina Corona MD personally performed the services described in this documentation as scribed by the above individual in my presence, and it is both accurate and complete.    Albertina Corona MD, FACC

## 2021-04-09 DIAGNOSIS — Z01.812 BLOOD TESTS PRIOR TO TREATMENT OR PROCEDURE: Primary | ICD-10-CM

## 2021-04-12 ENCOUNTER — CLINICAL SUPPORT NO REQUIREMENTS (OUTPATIENT)
Dept: PULMONOLOGY | Facility: CLINIC | Age: 71
End: 2021-04-12

## 2021-04-12 DIAGNOSIS — Z01.812 BLOOD TESTS PRIOR TO TREATMENT OR PROCEDURE: ICD-10-CM

## 2021-04-12 PROCEDURE — U0004 COV-19 TEST NON-CDC HGH THRU: HCPCS | Performed by: NURSE PRACTITIONER

## 2021-04-12 PROCEDURE — 99211 OFF/OP EST MAY X REQ PHY/QHP: CPT | Performed by: NURSE PRACTITIONER

## 2021-04-13 LAB — SARS-COV-2 RNA NOSE QL NAA+PROBE: NOT DETECTED

## 2021-04-14 ENCOUNTER — OFFICE VISIT (OUTPATIENT)
Dept: PULMONOLOGY | Facility: CLINIC | Age: 71
End: 2021-04-14

## 2021-04-14 VITALS
OXYGEN SATURATION: 95 % | BODY MASS INDEX: 45.27 KG/M2 | HEART RATE: 70 BPM | SYSTOLIC BLOOD PRESSURE: 138 MMHG | WEIGHT: 246 LBS | TEMPERATURE: 96.6 F | DIASTOLIC BLOOD PRESSURE: 68 MMHG | HEIGHT: 62 IN

## 2021-04-14 DIAGNOSIS — G47.33 OSA TREATED WITH BIPAP: ICD-10-CM

## 2021-04-14 DIAGNOSIS — K21.9 GASTROESOPHAGEAL REFLUX DISEASE, UNSPECIFIED WHETHER ESOPHAGITIS PRESENT: ICD-10-CM

## 2021-04-14 DIAGNOSIS — R06.02 SOB (SHORTNESS OF BREATH): Primary | ICD-10-CM

## 2021-04-14 PROCEDURE — 94060 EVALUATION OF WHEEZING: CPT | Performed by: NURSE PRACTITIONER

## 2021-04-14 PROCEDURE — 94726 PLETHYSMOGRAPHY LUNG VOLUMES: CPT | Performed by: NURSE PRACTITIONER

## 2021-04-14 PROCEDURE — 94729 DIFFUSING CAPACITY: CPT | Performed by: NURSE PRACTITIONER

## 2021-04-14 PROCEDURE — 99214 OFFICE O/P EST MOD 30 MIN: CPT | Performed by: NURSE PRACTITIONER

## 2021-04-14 PROCEDURE — 94618 PULMONARY STRESS TESTING: CPT | Performed by: NURSE PRACTITIONER

## 2021-04-14 RX ORDER — ALBUTEROL SULFATE 90 UG/1
4 AEROSOL, METERED RESPIRATORY (INHALATION) ONCE
Status: COMPLETED | OUTPATIENT
Start: 2021-04-14 | End: 2021-04-14

## 2021-04-14 RX ORDER — ALBUTEROL SULFATE 90 UG/1
1 AEROSOL, METERED RESPIRATORY (INHALATION) EVERY 4 HOURS PRN
Qty: 8 G | Refills: 5 | Status: SHIPPED | OUTPATIENT
Start: 2021-04-14 | End: 2022-06-20 | Stop reason: SDUPTHER

## 2021-04-14 RX ADMIN — ALBUTEROL SULFATE 4 PUFF: 90 AEROSOL, METERED RESPIRATORY (INHALATION) at 10:44

## 2021-04-22 ENCOUNTER — TELEPHONE (OUTPATIENT)
Dept: PULMONOLOGY | Facility: CLINIC | Age: 71
End: 2021-04-22

## 2021-04-22 NOTE — TELEPHONE ENCOUNTER
Pt called today requesting a call back @ [phone] regarding her overnight pulse results. Pt has been informed by [facility] that she will need to repeat this test for another 2 days but will need additional information sent in so her insurance will approve this. Pt is also wanting to speak with you regarding some recent labs she had done.

## 2021-05-03 ENCOUNTER — OFFICE VISIT (OUTPATIENT)
Dept: CARDIOLOGY | Facility: CLINIC | Age: 71
End: 2021-05-03

## 2021-05-03 VITALS
HEART RATE: 66 BPM | SYSTOLIC BLOOD PRESSURE: 122 MMHG | HEIGHT: 62 IN | BODY MASS INDEX: 44.72 KG/M2 | DIASTOLIC BLOOD PRESSURE: 52 MMHG | WEIGHT: 243 LBS | OXYGEN SATURATION: 97 %

## 2021-05-03 DIAGNOSIS — I48.19 PERSISTENT ATRIAL FIBRILLATION (HCC): ICD-10-CM

## 2021-05-03 DIAGNOSIS — Z79.899 LONG TERM CURRENT USE OF ANTIARRHYTHMIC DRUG: Primary | ICD-10-CM

## 2021-05-03 DIAGNOSIS — I10 ESSENTIAL HYPERTENSION: ICD-10-CM

## 2021-05-03 DIAGNOSIS — I49.5 TACHY-BRADY SYNDROME (HCC): ICD-10-CM

## 2021-05-03 PROCEDURE — 93000 ELECTROCARDIOGRAM COMPLETE: CPT | Performed by: PHYSICIAN ASSISTANT

## 2021-05-03 PROCEDURE — 99214 OFFICE O/P EST MOD 30 MIN: CPT | Performed by: PHYSICIAN ASSISTANT

## 2021-05-03 PROCEDURE — 93288 INTERROG EVL PM/LDLS PM IP: CPT | Performed by: PHYSICIAN ASSISTANT

## 2021-05-03 NOTE — PROGRESS NOTES
Joanna Cross  1950  PCP: Reynaldo Fritz MD    SUBJECTIVE:   Joanna Cross is a 70 y.o. female seen for a follow up visit regarding the following:     Chief Complaint: Follow up for afib, tachybrady syndrome, pacemaker check, HTN     HPI:    Patient is a 70 year old female with a history of persistent atrial fibrillation on Tikosyn, tachybrady syndrome s/p DDD PPM, CAD, and HTN that presents for routine follow-up. She has been doing well from an EP standpoint. No significant recurrence of afib since last follow-up. She is now following the pulmonary associates and has been placed on continuous O2. No chest pain, sob. Edema has improved with addition of Metolazone by Dr. Corona.     Problem List:  1. Coronary artery disease  a. [facility], 02/15/2008, Dr Lutz: Mild, non-obstructive CAD, normal EF  b. [facility], 01/09/2013, Dr Stevenson Sutton: No LV gram done. Mild, non-obstructive CAD.  c. [facility], 11/9/2015, [facility]:  EF 60%. 70% RCA lesion with Promus ZAINAB placement. 40-50% mid LAD, no FFR performed. Other small blockages noted. No MR.  d. [facility], 11/15/2015, Dr Palacio @ [facility]: Patent RCA stent, 40-50% LAD with FFR @ 0.92; mild inferior wall hypokinesis  e. [facility], 07/29/2016, [facility]: Normal CORS with patent stent. LAD improved since last [facility]. EF 55-60%.  f. [facility], 02/01/2019: EF 55-60%. Patent RCA stent, noncritical mid LAD with IFR 0.93, noncritical LCx.   2. Chronic venous stasis:  a. Venous duplex, 09/17/2010: Insufficiency to venous hypertension in the great saphenous system bilaterally.  b. Venous duplex 2013: Right leg laser ablation of Dr. Garcia.  c. Venous duplex, 05/06/2016: No evidence of DVT, no superficial, no thrmobophlebitis, no valvular insufficiency.  d. AA with runoffs, 08/31/2016: Single-vessel [place]: No significant arterial disease.  e. Arterial doppler/[facility]YNN, 08/29/2019: No evidence of significant lower extremity arterial occlusive  disease.  3. Palpitations:  a. Echocardiogram, 02/13/2014: Dr. Teran. Normal biventricular function; trace to mild MR. Atrial septal aneurysm.  b. Echocardiogram, 12/22/2015, Dr. Chavez: Borderline LVH, mild LAE, mild RV enlargement. Mild to moderate MR and TR with RVSP of 46 mmHg.  c. Echocardiogram, 03/2016: Dr. Palacio.  RV enlargement.  EF 50-55%.  Mild AI S, mild MR and TR with RVSP 37 mmHg.  d. Echocardiogram, 07/11/20: Dr. Jany Wynn: EF 60-65%. Mild to moderate MR.  4. PAF/SSS:  a. [facility] PPM 2016  b. CHADS2 Vasc = 6 (HTN, DM, Age 65-74, Female, H/o TIA). On chronic anticoagulation.  c. ECV, 12/26/2018: Successful cardioversion. AV paced.  d. Echocardiogram, 12/25/2018:  EF 60%, mild MR/TR with RVSP 29 mmHg. Sclerotic AoV, no AS/AI. Mild MAC.  e. ECV, 03/05/2019: Successful cardioversion.  f. Zio, 10/01/2019, 14 days: NSR baseline. Normal average rates. Periods of RV pacing.   5. TIA:  a. Carotid duplex, 02/13/2014: No significant flow-limiting stenosis. Mild luminal disease present; both vertebrals are present.  6. Diabetes  7. Essential Hypertension  8. Hyperlipidemia  9. Hypothyroid  10. Hyponatremia  a. Secondary to SIADH  11. MILLI with CPAP  12. RLS  13. Parkinson's disease  14. GERD  15. Urinary Retention  a. S/P cystoscopy and ureter dilation, March 2018.  Dr. Terrence Mckenzie  16. Surgeries:  a. Rotator cuff repair  b. Cholecystectomy  c. Bilateral knee replacement  d. Tubal ligation  e. Breast surgery  f. Sinus surgery      Current Outpatient Medications:   •  Accu-Chek Softclix Lancets lancets, Use as instructed TID; ICD-10 E11.65; Z79.4, Disp: 300 each, Rfl: 3  •  albuterol (PROVENTIL) (2.5 MG/3ML) 0.083% nebulizer solution, Take 2.5 mg by nebulization Every 4 (Four) Hours As Needed., Disp: , Rfl:   •  albuterol sulfate HFA (Ventolin HFA) 108 (90 Base) MCG/ACT inhaler, Inhale 1 puff Every 4 (Four) Hours As Needed for Wheezing or Shortness of Air., Disp: 8 g, Rfl: 5  •  ALLERGY SERUM  INJECTION, Inject  under the skin into the appropriate area as directed 1 (One) Time Per Week., Disp: , Rfl:   •  amantadine (SYMMETREL) 100 MG capsule, Take 100 mg by mouth 2 (Two) Times a Day., Disp: , Rfl:   •  aspirin 81 MG EC tablet, Take 81 mg by mouth Daily., Disp: , Rfl:   •  B Complex-C (SUPER B COMPLEX PO), Take 1 tablet by mouth Daily., Disp: , Rfl:   •  bumetanide (BUMEX) 1 MG tablet, 1-2 tabs po daily as directed, Disp: 180 tablet, Rfl: 1  •  Calcium Carbonate (CALTRATE 600 PO), Take 600 mg by mouth Daily., Disp: , Rfl:   •  carbidopa-levodopa (SINEMET)  MG per tablet, Take  by mouth. 2 tablets at 0600, 1 tablet at 0900, 1200, 1500, 1800, 2100, Disp: , Rfl:   •  Cholecalciferol 2000 units tablet, Take 2,000 Units by mouth 2 (Two) Times a Day., Disp: , Rfl:   •  citalopram (CeleXA) 20 MG tablet, Take 20 mg by mouth every night at bedtime., Disp: , Rfl:   •  clindamycin (CLEOCIN) 150 MG capsule, Take 150 mg by mouth Daily. 4 capsules one hour before dental appointments., Disp: , Rfl:   •  clobetasol (TEMOVATE) 0.05 % cream, Apply 1 application topically 2 (Two) Times a Day As Needed (Lichens Sclerosis)., Disp: , Rfl:   •  dofetilide (TIKOSYN) 500 MCG capsule, TAKE 1 CAPSULE EVERY 12 HOURS, Disp: 180 capsule, Rfl: 3  •  ELIQUIS 5 MG tablet tablet, TAKE 1 TABLET EVERY 12 HOURS, Disp: 180 tablet, Rfl: 3  •  ferrous sulfate 325 (65 FE) MG tablet, Take 325 mg by mouth Daily With Breakfast., Disp: , Rfl:   •  Glucosamine-Chondroit-Calcium (TRIPLE FLEX BONE & JOINT PO), Take 1 tablet by mouth Daily., Disp: , Rfl:   •  glucose blood (Accu-Chek Guide) test strip, Use as instructed TID; ICD-10 E11.65; Z79.4, Disp: 300 each, Rfl: 3  •  Insulin Glargine (LANTUS SOLOSTAR) 100 UNIT/ML injection pen, Inject 30 Units under the skin into the appropriate area as directed Daily., Disp: 30 mL, Rfl: 3  •  IPRATROPIUM BROMIDE NA, 1 spray into the nostril(s) as directed by provider 2 (Two) Times a Day As Needed., Disp: ,  Rfl:   •  Loratadine 10 MG capsule, Take 10 mg by mouth Daily., Disp: , Rfl:   •  metFORMIN (GLUCOPHAGE) 1000 MG tablet, Take 1,000 mg by mouth 2 (Two) Times a Day With Meals., Disp: , Rfl:   •  metOLazone (ZAROXOLYN) 2.5 MG tablet, Take 1 tablet by mouth Daily., Disp: 90 tablet, Rfl: 1  •  Multiple Vitamins-Minerals (CENTRUM ULTRA WOMENS PO), Take 1 tablet by mouth Daily., Disp: , Rfl:   •  nitroglycerin (NITROLINGUAL) 0.4 MG/SPRAY spray, Place 1 spray under the tongue Every 5 (Five) Minutes As Needed for Chest Pain., Disp: 1 each, Rfl: 6  •  O2 (OXYGEN), Inhale 2 L/min 1 (One) Time. 2L while sleeping, Disp: , Rfl:   •  omeprazole (priLOSEC) 40 MG capsule, Take 40 mg by mouth 2 (Two) Times a Day., Disp: , Rfl:   •  pramipexole (MIRAPEX) 1.5 MG tablet, Take 1.5 mg by mouth Every Night., Disp: , Rfl:   •  ranolazine (RANEXA) 500 MG 12 hr tablet, Take 1 tablet by mouth Every 12 (Twelve) Hours., Disp: 180 tablet, Rfl: 3  •  sennosides-docusate sodium (SENOKOT-S) 8.6-50 MG tablet, Take 1 tablet by mouth Daily., Disp: , Rfl:   •  spironolactone (ALDACTONE) 25 MG tablet, Take 2 tablets by mouth Daily As Needed (swelling). (Patient taking differently: Take 50 mg by mouth Daily.), Disp: 180 tablet, Rfl: 1  •  Synthroid 200 MCG tablet, Take 1 tablet by mouth Daily., Disp: 90 tablet, Rfl: 1  •  traMADol (ULTRAM) 50 MG tablet, Take 50 mg by mouth Every 8 (Eight) Hours As Needed for Moderate Pain ., Disp: , Rfl:   •  triamcinolone (KENALOG) 0.1 % cream, triamcinolone acetonide 0.1 % topical cream  APPLY A THIN LAYER TO THE AFFECTED AREA(S) (chest wall, back and arms) BY TOPICAL ROUTE 2 TIMES PER DAY, Disp: , Rfl:   •  Unable to find, 1 each 2 (two) times a day. Med Name: tonic water 4 oz BID, Disp: , Rfl:   •  valsartan (DIOVAN) 160 MG tablet, Take 1 tablet by mouth 2 (Two) Times a Day., Disp: 180 tablet, Rfl: 3  •  vitamin C (ASCORBIC ACID) 500 MG tablet, Take 500 mg by mouth Daily. Chewable tablet, Disp: , Rfl:     Past  "Medical History, Past Surgical History, Family history, Social History, and Medications were all reviewed with the patient today and updated as necessary.       Patient Active Problem List   Diagnosis   • Coronary artery disease involving native coronary artery without angina pectoris   • Essential hypertension   • Peripheral vascular disease (CMS/HCC)   • Hyperlipidemia LDL goal <70   • Spinal stenosis, lumbar region, with neurogenic claudication   • Diabetes mellitus (CMS/HCC)   • Delayed surgical wound healing   • Biceps tendinitis   • Overactive bladder   • GERD (gastroesophageal reflux disease)   • Hypothyroidism   • Lichen sclerosus   • Parkinson's disease (CMS/HCC)   • MILLI treated with BiPAP - Patient reports compliance.    • History of respiratory failure - prior respiratory failure requiring mechanical ventilation, open lung biopsy non-specific.    • SOB (shortness of breath)   • A-fib (CMS/HCC)   • Chest pain   • Atrial fibrillation with RVR (CMS/HCC)   • Renal insufficiency   • CAD in native artery   • Persistent atrial fibrillation (CMS/HCC)   • Tachy-thai syndrome (CMS/HCC)   • Long term current use of antiarrhythmic drug     Allergies   Allergen Reactions   • Toprol Xl [Metoprolol Tartrate] Shortness Of Breath     Extreme fatigue   • Amlodipine Besylate Swelling     Lower extremity (ankles, feet) swelling   • Codeine Unknown (See Comments)     Pt is unaware of what reaction she had   • Entacapone Other (See Comments)     \"extreme weakness in legs - caused several falls, which stopped after discontinuing this medication\"   • Levemir [Insulin Detemir] Hives     Hives / rash around injection site   • Penicillins Hives     Jitteriness    • Xarelto [Rivaroxaban] GI Bleeding   • Bactrim [Sulfamethoxazole-Trimethoprim] Nausea Only     Headache    • Ciprofloxacin Diarrhea   • Cogentin [Benztropine] Other (See Comments)     \"uncontrollable body movements\"   • Compazine [Prochlorperazine Edisylate] Other (See " Comments)     Dystonic reaction   • Cortisone Other (See Comments)     MAKES BLOOD PRESSURE HIGH    • Duraprep [Antiseptic Products, Misc.] Itching and Rash     RASH AND ITCHING   • Haldol [Haloperidol Lactate] Other (See Comments)     Dystonic reaction   • Hydralazine Other (See Comments)     Headache    • Hydrocodone-Acetaminophen Nausea And Vomiting and Dizziness     Headache, nausea    • Levaquin [Levofloxacin] Other (See Comments)     Cannot take due to taking propafenone HCL- severe reaction with mixed.    • Lisinopril Cough   • Statins Myalgia     Leg pain   • Tarka [Trandolapril-Verapamil Hcl Er] Other (See Comments)     Constipation      Past Medical History:   Diagnosis Date   • Anemia    • Atrial fibrillation (CMS/[facility])    • AVM (arteriovenous malformation)    • Benign hypertension    • CAD (coronary artery disease)    • Depression    • Disease of thyroid gland    • DM2 (diabetes mellitus, type 2) (CMS/[facility])     checks sugar twice per day    • Essential hypertension    • Generalized osteoarthritis    • GERD (gastroesophageal reflux disease)    • GIB (gastrointestinal bleeding) 2016    d/t xarelto    • Headache    • Hiatal hernia    • History of heart disease    • History of shingles    • History of transfusion 2016   • Hypothyroidism    • IBS (irritable bowel syndrome)    • Lichen sclerosus    • Mixed hyperlipidemia    • Morbid obesity (CMS/[facility])    • MRSA infection 2018   • Myocardial infarction (CMS/[facility])    • Obesity    • On home oxygen therapy     2L of oxygen via CPAP while sleeping    • MILLI on CPAP     18/14   • Osteoporosis    • Pacemaker    • Parkinson disease (CMS/[facility])    • Peripheral vascular disease (CMS/[facility])    • Pleurisy    • Pneumonia    • Seborrheic dermatitis    • Skin cancer     on back    • SOBOE (shortness of breath on exertion)    • SSS (sick sinus syndrome) (CMS/[facility])    • TIA (transient ischemic attack)    • Type 2 diabetes mellitus (CMS/[facility])    • Varicose vein of leg    • Venous  insufficiency of both lower extremities      Past Surgical History:   Procedure Laterality Date   • BACK SURGERY      l4-l5 laminectomy    • BICEPS TENDON REPAIR Right     shoulder   • BREAST BIOPSY Left 2004   • BUNIONECTOMY Right    • CARDIAC CATHETERIZATION     • CARDIAC CATHETERIZATION N/A 2/1/2019    Procedure: Left Heart Cath;  Surgeon: Albertina Corona MD;  Location:  [facility];  Service: Cardiology   • CHOLECYSTECTOMY     • COLONOSCOPY  2016   • CORONARY STENT PLACEMENT      x1 stent   • CYST REMOVAL      left ear, upper left back 2001   • DIAGNOSTIC LAPAROSCOPY  1981   • ENDOSCOPIC FUNCTIONAL SINUS SURGERY (FESS)  2011   • ENDOSCOPY  2016   • HAMMER TOE REPAIR Left    • INCISION AND DRAINAGE OF WOUND  2018   • JOINT REPLACEMENT     • KNEE ARTHROSCOPY     • LIPOMA EXCISION  1999    left leg    • LUMBAR LAMINECTOMY DISCECTOMY DECOMPRESSION N/A 7/6/2018    Procedure: LUMBAR LAMINECTOMY L4-5, HEMILAMIINECTOMY RIGHT L5-S1, FORAMINOTOMY L5-S1;  Surgeon: Lencho Gamino MD;  Location:  [facility];  Service: Neurosurgery   • LUMBAR LAMINECTOMY DISCECTOMY DECOMPRESSION N/A 9/19/2018    Procedure: INCISION AND DRAINAGE BACK WITH WOUND EXPLORATION;  Surgeon: Ritchie García MD;  Location:  [facility];  Service: Neurosurgery   • LUNG BIOPSY Left 2016   • PACEMAKER IMPLANTATION  11/2016    sss   • REPLACEMENT TOTAL KNEE Bilateral     left knee 2011, right 2012 per dr acuna    • REPLACEMENT TOTAL KNEE Bilateral    • SHOULDER ARTHROSCOPY Bilateral     2003-left, 2004- right    • SKIN BIOPSY      skin cancer back 2016   • TUBAL ABDOMINAL LIGATION Bilateral 1980   • WISDOM TOOTH EXTRACTION       Family History   Problem Relation Age of Onset   • Kidney disease Mother    • Coronary artery disease Mother    • Hypertension Mother    • Heart disease Mother    • Hyperlipidemia Mother    • Coronary artery disease Father    • Hypertension Father    • Hypothyroidism Father    • Coronary artery disease Brother   "    Social History     Tobacco Use   • Smoking status: Never Smoker   • Smokeless tobacco: Never Used   Substance Use Topics   • Alcohol use: No         PHYSICAL EXAM:    /52   Pulse 66   Ht 157.5 cm (62.01\")   Wt 110 kg (243 lb)   LMP  (LMP Unknown) Comment: Mammogram- april 2018   SpO2 97%   BMI 44.43 kg/m²        Wt Readings from Last 5 Encounters:   05/03/21 110 kg (243 lb)   04/14/21 112 kg (246 lb)   04/07/21 117 kg (257 lb)   02/24/21 113 kg (249 lb 6.4 oz)   02/08/21 115 kg (253 lb)       BP Readings from Last 5 Encounters:   05/03/21 122/52   04/14/21 138/68   04/07/21 148/68   02/24/21 110/60   02/08/21 126/74       General-Well Nourished, Well developed  Eyes - PERRLA  Neck- supple, No mass  CV- regular rate and rhythm, no MRG, No edema  Lung- clear bilaterally  Abd- soft, +BS  Musc/skel - Norm strength and range of motion  Skin- warm and dry  Neuro - Alert & Oriented x 3, appropriate mood.        Medical problems and test results were reviewed with the patient today.     Recent Results (from the past 672 hour(s))   COVID-19 PCR, [facility], NP SWAB IN [facility] TRANSPORT MEDIA 24-30 HR TAT - Swab, Nasopharynx    Collection Time: 04/12/21  2:39 PM    Specimen: Nasopharynx; Swab   Result Value Ref Range    SARS-CoV-2 IRINEO Not Detected Not Detected         EKG: (EKG has been independently visualized by me and summarized below)      ECG 12 Lead    Date/Time: 5/3/2021 1:16 PM  Performed by: Ai Prieto PA  Authorized by: Ai Prieto PA   Comparison: compared with previous ECG from 3/19/2021  Similar to previous ECG  Rhythm: paced  Rate: normal  BPM: 63    Clinical impression: abnormal EKG  Comments: QTc stable.              ASSESSMENT and PLAN    1.  Atrial fibrillation-persistent in nature. Currently on Tikosyn 500mcg BID w/ stable QTc. Patient does note improvement in symptoms. Will continue Tikosyn and Eliquis. No significant recurrence of afib on device interrogation.      2. "  Dual-chamber pacemaker-patient has had noise on RA and RV leads and this is not a new issue. She was set to unipolar at some point. She walks with a walker and this noise very likely could be environmental. Atrial lead changed to bipolar, V lead unipolar pace, bipolar sense. No recurrence of noise. RA output changed to 4.0v@0.4ms. Battery 4 years. 1 mode switch- afib <5 seconds.      3.  Tikosyn use- EKG reviewed today. QTc is stable.      4. HTN- controlled. Continue Valsartan      Return in about 6 months (around 11/3/2021) for BSC.        Ai Prieto PA-C   Cardiology/Electrophysiology  5/3/2021  13:15 EDT

## 2021-05-11 ENCOUNTER — PREP FOR SURGERY (OUTPATIENT)
Dept: OTHER | Facility: HOSPITAL | Age: 71
End: 2021-05-11

## 2021-05-11 ENCOUNTER — OFFICE VISIT (OUTPATIENT)
Dept: GASTROENTEROLOGY | Facility: CLINIC | Age: 71
End: 2021-05-11

## 2021-05-11 VITALS
TEMPERATURE: 97.3 F | HEART RATE: 62 BPM | WEIGHT: 243.8 LBS | SYSTOLIC BLOOD PRESSURE: 169 MMHG | DIASTOLIC BLOOD PRESSURE: 64 MMHG | BODY MASS INDEX: 44.58 KG/M2

## 2021-05-11 DIAGNOSIS — K55.20 ANGIODYSPLASIA: Primary | ICD-10-CM

## 2021-05-11 DIAGNOSIS — Z86.010 HISTORY OF ADENOMATOUS POLYP OF COLON: ICD-10-CM

## 2021-05-11 DIAGNOSIS — Z86.010 HX OF COLONIC POLYPS: Primary | ICD-10-CM

## 2021-05-11 DIAGNOSIS — D64.9 ANEMIA, UNSPECIFIED TYPE: ICD-10-CM

## 2021-05-11 PROCEDURE — 99204 OFFICE O/P NEW MOD 45 MIN: CPT | Performed by: INTERNAL MEDICINE

## 2021-05-11 NOTE — PROGRESS NOTES
GASTROENTEROLOGY OFFICE NOTE  Joanna Cross  [id number]  1950      Chief Complaint   Patient presents with   • Anemia   • Heartburn   • Abdominal Pain        HISTORY OF PRESENT ILLNESS:  70-year-old white female here with history of anemia.  She states she is due for colonoscopy.    She has occasional problem with right upper quadrant abdominal pain that she has had episodes of bright red blood per rectum.  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  She is not had any transfusion requirement since then.  She does not had an upper endoscopy by Dr. Vargas in 2016 when she had stools that were positive for occult blood.  Outside of a small sliding hiatal hernia and that exam was unremarkable.    Colonoscopy of November 15, 2016 also at [facility] by Dr. Vargas revealed 2 small polyps in the cecum these proved to be adenomatous polyps.    She is here today without dysphagia to solids, odynophagia, early satiety, unexplained weight loss, melena or bright red blood per rectum although I should note she had history of dysphagia on her 2015 EGD and when she was dilated to 54 Upper sorbian at that time.        PAST MEDICAL HISTORY  Past Medical History:   Diagnosis Date   • Anemia    • Atrial fibrillation (CMS/HCC)    • AVM (arteriovenous malformation)    • Benign hypertension    • CAD (coronary artery disease)    • Colon polyp    • Depression    • Disease of thyroid gland    • DM2 (diabetes mellitus, type 2) (CMS/HCC)     checks sugar twice per day    • Essential hypertension    • Generalized osteoarthritis    • GERD (gastroesophageal reflux disease)    • GIB (gastrointestinal bleeding) 2016    d/t xarelto    • Headache    • Hiatal hernia    • History of heart disease     • History of shingles    • History of transfusion 2016   • Hypothyroidism    • IBS (irritable bowel syndrome)    • Lichen sclerosus    • Mixed hyperlipidemia    • Morbid obesity (CMS/HCC)    • MRSA infection 2018   • Myocardial infarction (CMS/HCC)    • Obesity    • On home oxygen therapy     2L of oxygen via CPAP while sleeping    • MILLI on CPAP     18/14   • Osteoporosis    • Pacemaker    • Parkinson disease (CMS/HCC)    • Peripheral vascular disease (CMS/HCC)    • Pleurisy    • Pneumonia    • Seborrheic dermatitis    • Skin cancer     on back    • SOBOE (shortness of breath on exertion)    • SSS (sick sinus syndrome) (CMS/HCC)    • TIA (transient ischemic attack)    • Type 2 diabetes mellitus (CMS/HCC)    • Varicose vein of leg    • Venous insufficiency of both lower extremities         PAST SURGICAL HISTORY  Past Surgical History:   Procedure Laterality Date   • BACK SURGERY      l4-l5 laminectomy    • BICEPS TENDON REPAIR Right     shoulder   • BREAST BIOPSY Left 2004   • BUNIONECTOMY Right    • CARDIAC CATHETERIZATION     • CARDIAC CATHETERIZATION N/A 2/1/2019    Procedure: Left Heart Cath;  Surgeon: Albertina Corona MD;  Location:  [facility];  Service: Cardiology   • CHOLECYSTECTOMY     • COLONOSCOPY  2016   • CORONARY STENT PLACEMENT      x1 stent   • CYST REMOVAL      left ear, upper left back 2001   • DIAGNOSTIC LAPAROSCOPY  1981   • ENDOSCOPIC FUNCTIONAL SINUS SURGERY (FESS)  2011   • ENDOSCOPY  2016   • HAMMER TOE REPAIR Left    • INCISION AND DRAINAGE OF WOUND  2018   • JOINT REPLACEMENT     • KNEE ARTHROSCOPY     • LIPOMA EXCISION  1999    left leg    • LUMBAR LAMINECTOMY DISCECTOMY DECOMPRESSION N/A 7/6/2018    Procedure: LUMBAR LAMINECTOMY L4-5, HEMILAMIINECTOMY RIGHT L5-S1, FORAMINOTOMY L5-S1;  Surgeon: Lencho Gamino MD;  Location: [facility] OR;  Service: Neurosurgery   • LUMBAR LAMINECTOMY DISCECTOMY DECOMPRESSION N/A 9/19/2018    Procedure: INCISION AND DRAINAGE BACK WITH  WOUND EXPLORATION;  Surgeon: Ritchie García MD;  Location: [facility];  Service: Neurosurgery   • LUNG BIOPSY Left 2016   • PACEMAKER IMPLANTATION  11/2016    sss   • REPLACEMENT TOTAL KNEE Bilateral     left knee 2011, right 2012 per dr acuna    • REPLACEMENT TOTAL KNEE Bilateral    • SHOULDER ARTHROSCOPY Bilateral     2003-left, 2004- right    • SKIN BIOPSY      skin cancer back 2016   • TUBAL ABDOMINAL LIGATION Bilateral 1980   • WISDOM TOOTH EXTRACTION          MEDICATIONS:    Current Outpatient Medications:   •  Accu-Chek Softclix Lancets lancets, Use as instructed TID; ICD-10 E11.65; Z79.4, Disp: 300 each, Rfl: 3  •  albuterol (PROVENTIL) (2.5 MG/3ML) 0.083% nebulizer solution, Take 2.5 mg by nebulization Every 4 (Four) Hours As Needed., Disp: , Rfl:   •  albuterol sulfate HFA (Ventolin HFA) 108 (90 Base) MCG/ACT inhaler, Inhale 1 puff Every 4 (Four) Hours As Needed for Wheezing or Shortness of Air., Disp: 8 g, Rfl: 5  •  ALLERGY SERUM INJECTION, Inject  under the skin into the appropriate area as directed 1 (One) Time Per Week., Disp: , Rfl:   •  amantadine (SYMMETREL) 100 MG capsule, Take 100 mg by mouth 2 (Two) Times a Day., Disp: , Rfl:   •  aspirin 81 MG EC tablet, Take 81 mg by mouth Daily., Disp: , Rfl:   •  B Complex-C (SUPER B COMPLEX PO), Take 1 tablet by mouth Daily., Disp: , Rfl:   •  bumetanide (BUMEX) 1 MG tablet, 1-2 tabs po daily as directed, Disp: 180 tablet, Rfl: 1  •  Calcium Carbonate (CALTRATE 600 PO), Take 600 mg by mouth Daily., Disp: , Rfl:   •  carbidopa-levodopa (SINEMET)  MG per tablet, Take  by mouth. 2 tablets at 0600, 1 tablet at 0900, 1200, 1500, 1800, 2100, Disp: , Rfl:   •  Cholecalciferol 2000 units tablet, Take 2,000 Units by mouth 2 (Two) Times a Day., Disp: , Rfl:   •  citalopram (CeleXA) 20 MG tablet, Take 20 mg by mouth every night at bedtime., Disp: , Rfl:   •  clindamycin (CLEOCIN) 150 MG capsule, Take 150 mg by mouth Daily. 4 capsules one hour before dental  appointments., Disp: , Rfl:   •  clobetasol (TEMOVATE) 0.05 % cream, Apply 1 application topically 2 (Two) Times a Day As Needed (Lichens Sclerosis)., Disp: , Rfl:   •  dofetilide (TIKOSYN) 500 MCG capsule, TAKE 1 CAPSULE EVERY 12 HOURS, Disp: 180 capsule, Rfl: 3  •  ELIQUIS 5 MG tablet tablet, TAKE 1 TABLET EVERY 12 HOURS, Disp: 180 tablet, Rfl: 3  •  ferrous sulfate 325 (65 FE) MG tablet, Take 325 mg by mouth Daily With Breakfast., Disp: , Rfl:   •  Glucosamine-Chondroit-Calcium (TRIPLE FLEX BONE & JOINT PO), Take 1 tablet by mouth Daily., Disp: , Rfl:   •  glucose blood (Accu-Chek Guide) test strip, Use as instructed TID; ICD-10 E11.65; Z79.4, Disp: 300 each, Rfl: 3  •  Insulin Glargine (LANTUS SOLOSTAR) 100 UNIT/ML injection pen, Inject 30 Units under the skin into the appropriate area as directed Daily., Disp: 30 mL, Rfl: 3  •  IPRATROPIUM BROMIDE NA, 1 spray into the nostril(s) as directed by provider 2 (Two) Times a Day As Needed., Disp: , Rfl:   •  Loratadine 10 MG capsule, Take 10 mg by mouth Daily., Disp: , Rfl:   •  metFORMIN (GLUCOPHAGE) 1000 MG tablet, Take 1,000 mg by mouth 2 (Two) Times a Day With Meals., Disp: , Rfl:   •  metOLazone (ZAROXOLYN) 2.5 MG tablet, Take 1 tablet by mouth Daily., Disp: 90 tablet, Rfl: 1  •  Multiple Vitamins-Minerals (CENTRUM ULTRA WOMENS PO), Take 1 tablet by mouth Daily., Disp: , Rfl:   •  nitroglycerin (NITROLINGUAL) 0.4 MG/SPRAY spray, Place 1 spray under the tongue Every 5 (Five) Minutes As Needed for Chest Pain., Disp: 1 each, Rfl: 6  •  O2 (OXYGEN), Inhale 2 L/min 1 (One) Time. 2L while sleeping, Disp: , Rfl:   •  omeprazole (priLOSEC) 40 MG capsule, Take 40 mg by mouth 2 (Two) Times a Day., Disp: , Rfl:   •  pramipexole (MIRAPEX) 1.5 MG tablet, Take 1.5 mg by mouth Every Night., Disp: , Rfl:   •  ranolazine (RANEXA) 500 MG 12 hr tablet, Take 1 tablet by mouth Every 12 (Twelve) Hours., Disp: 180 tablet, Rfl: 3  •  sennosides-docusate sodium (SENOKOT-S) 8.6-50 MG  "tablet, Take 1 tablet by mouth Daily., Disp: , Rfl:   •  spironolactone (ALDACTONE) 25 MG tablet, Take 2 tablets by mouth Daily As Needed (swelling). (Patient taking differently: Take 50 mg by mouth Daily.), Disp: 180 tablet, Rfl: 1  •  Synthroid 200 MCG tablet, Take 1 tablet by mouth Daily., Disp: 90 tablet, Rfl: 1  •  traMADol (ULTRAM) 50 MG tablet, Take 50 mg by mouth Every 8 (Eight) Hours As Needed for Moderate Pain ., Disp: , Rfl:   •  triamcinolone (KENALOG) 0.1 % cream, triamcinolone acetonide 0.1 % topical cream  APPLY A THIN LAYER TO THE AFFECTED AREA(S) (chest wall, back and arms) BY TOPICAL ROUTE 2 TIMES PER DAY, Disp: , Rfl:   •  Unable to find, 1 each 2 (two) times a day. Med Name: tonic water 4 oz BID, Disp: , Rfl:   •  valsartan (DIOVAN) 160 MG tablet, Take 1 tablet by mouth 2 (Two) Times a Day., Disp: 180 tablet, Rfl: 3  •  vitamin C (ASCORBIC ACID) 500 MG tablet, Take 500 mg by mouth Daily. Chewable tablet, Disp: , Rfl:     ALLERGIES  Allergies   Allergen Reactions   • Toprol Xl [Metoprolol Tartrate] Shortness Of Breath     Extreme fatigue   • Amlodipine Besylate Swelling     Lower extremity (ankles, feet) swelling   • Codeine Unknown (See Comments)     Pt is unaware of what reaction she had   • Entacapone Other (See Comments)     \"extreme weakness in legs - caused several falls, which stopped after discontinuing this medication\"   • Levemir [Insulin Detemir] Hives     Hives / rash around injection site   • Penicillins Hives     Jitteriness    • Xarelto [Rivaroxaban] GI Bleeding   • Bactrim [Sulfamethoxazole-Trimethoprim] Nausea Only     Headache    • Ciprofloxacin Diarrhea   • Cogentin [Benztropine] Other (See Comments)     \"uncontrollable body movements\"   • Compazine [Prochlorperazine Edisylate] Other (See Comments)     Dystonic reaction   • Cortisone Other (See Comments)     MAKES BLOOD PRESSURE HIGH    • Duraprep [Antiseptic Products, Misc.] Itching and Rash     RASH AND ITCHING   • Haldol " [Haloperidol Lactate] Other (See Comments)     Dystonic reaction   • Hydralazine Other (See Comments)     Headache    • Hydrocodone-Acetaminophen Nausea And Vomiting and Dizziness     Headache, nausea    • Levaquin [Levofloxacin] Other (See Comments)     Cannot take due to taking propafenone HCL- severe reaction with mixed.    • Lisinopril Cough   • Statins Myalgia     Leg pain   • Tarka [Trandolapril-Verapamil Hcl Er] Other (See Comments)     Constipation        FAMILY HISTORY:  Family History   Problem Relation Age of Onset   • Kidney disease Mother    • Coronary artery disease Mother    • Hypertension Mother    • Heart disease Mother    • Hyperlipidemia Mother    • Coronary artery disease Father    • Hypertension Father    • Hypothyroidism Father    • Coronary artery disease Brother    • Colon polyps Neg Hx    • Colon cancer Neg Hx        SOCIAL HISTORY  Social History     Socioeconomic History   • Marital status:      Spouse name: N/A   • Number of children: 4   • Years of education: College   • Highest education level: Not on file   Tobacco Use   • Smoking status: Never Smoker   • Smokeless tobacco: Never Used   Vaping Use   • Vaping Use: Never used   Substance and Sexual Activity   • Alcohol use: No   • Drug use: No   • Sexual activity: Defer     Partners: Male     Socioeconomic History:  She lives alone she is a  with 4 children and 5 grandchildren.       REVIEW OF SYSTEMS  Review of Systems   Constitutional: Negative for activity change, appetite change, chills, diaphoresis, fatigue, fever, unexpected weight gain and unexpected weight loss.   HENT: Positive for drooling. Negative for congestion, dental problem, ear discharge, ear pain, facial swelling, hearing loss, mouth sores, nosebleeds, postnasal drip, rhinorrhea, sinus pressure, sneezing, sore throat, swollen glands, tinnitus, trouble swallowing and voice change.    Respiratory: Positive for apnea and shortness of breath. Negative for cough,  choking, chest tightness, wheezing and stridor.    Cardiovascular: Positive for leg swelling. Negative for chest pain and palpitations.   Gastrointestinal: Positive for abdominal pain and GERD. Negative for abdominal distention, anal bleeding, blood in stool, constipation, diarrhea, nausea, rectal pain, vomiting and indigestion.   Endocrine: Negative for cold intolerance, heat intolerance, polydipsia, polyphagia and polyuria.   Musculoskeletal: Positive for arthralgias and gait problem. Negative for back pain, joint swelling, myalgias, neck pain, neck stiffness and bursitis.   Allergic/Immunologic: Negative for environmental allergies, food allergies and immunocompromised state.   Neurological: Negative for dizziness, tremors, seizures, facial asymmetry, speech difficulty, weakness, light-headedness, numbness and confusion.   Hematological: Negative for adenopathy. Bruises/bleeds easily.   Psychiatric/Behavioral: Negative for agitation, behavioral problems, decreased concentration, dysphoric mood, hallucinations, self-injury, sleep disturbance, suicidal ideas, negative for hyperactivity, depressed mood and stress. The patient is not nervous/anxious.      I reviewed the above-noted review of systems    PHYSICAL EXAM   /64 (BP Location: Left arm, Patient Position: Sitting, Cuff Size: Large Adult)   Pulse 62   Temp 97.3 °F (36.3 °C) (Temporal)   Wt 111 kg (243 lb 12.8 oz)   LMP  (LMP Unknown) Comment: Mammogram- april 2018   BMI 44.58 kg/m²   General: Alert and oriented x 3. In no apparent or acute distress.  and No stigmata of chronic liver disease  HEENT: Anicteric sclerae. Normal oropharynx  Neck: Supple. Without lymphadenopathy  CV: Regular rate and rhythm, S1, S2  Lungs: Clear to ausculation. Without rales, rhonchi and wheezing  Abdomen:  Soft,non-distended without palpable masses or hepatosplenomeagaly, areas of rebound tenderness or guarding.   Extremeties: without clubbing, cyanosis or  edema  Neurologic:  Alert and oriented x 3 without focal motor or sensory deficits  Rectal exam: deferred     ASSESSMENT  1.-History of adenomatous colonic polyps due for surveillance  2.-History of anemia with history of angiodysplastic lesion of the jejunum without transfusion requirements.  Hemoglobin noted to be stable  3.-History of dysphagia to solids requiring esophageal dilation.  4.-Hematochezia rule out colonic neoplasia  5.-Iatrogenic coagulopathy/anticoagulated.  Discontinue prior to procedure    PLAN  1.-Schedule colonoscopy for surveillance purposes and exclusion of GI source of blood loss.  Risk, benefits, options reviewed.    Trenton Sosa MD  5/11/2021   14:40 EDT

## 2021-05-14 PROBLEM — Z86.010 HISTORY OF ADENOMATOUS POLYP OF COLON: Status: ACTIVE | Noted: 2021-05-14

## 2021-05-14 PROBLEM — K55.20 ANGIODYSPLASIA: Status: ACTIVE | Noted: 2021-05-14

## 2021-05-14 PROBLEM — D64.9 ANEMIA: Status: ACTIVE | Noted: 2021-05-14

## 2021-05-14 PROBLEM — Z86.0101 HISTORY OF ADENOMATOUS POLYP OF COLON: Status: ACTIVE | Noted: 2021-05-14

## 2021-05-19 ENCOUNTER — OFFICE VISIT (OUTPATIENT)
Dept: ENDOCRINOLOGY | Facility: CLINIC | Age: 71
End: 2021-05-19

## 2021-05-19 VITALS
HEIGHT: 62 IN | HEART RATE: 72 BPM | BODY MASS INDEX: 44.72 KG/M2 | SYSTOLIC BLOOD PRESSURE: 130 MMHG | DIASTOLIC BLOOD PRESSURE: 68 MMHG | WEIGHT: 243 LBS

## 2021-05-19 DIAGNOSIS — Z79.4 TYPE 2 DIABETES MELLITUS WITHOUT COMPLICATION, WITH LONG-TERM CURRENT USE OF INSULIN (HCC): Primary | ICD-10-CM

## 2021-05-19 DIAGNOSIS — I10 ESSENTIAL HYPERTENSION: ICD-10-CM

## 2021-05-19 DIAGNOSIS — I25.10 CORONARY ARTERY DISEASE INVOLVING NATIVE CORONARY ARTERY OF NATIVE HEART WITHOUT ANGINA PECTORIS: ICD-10-CM

## 2021-05-19 DIAGNOSIS — E11.9 TYPE 2 DIABETES MELLITUS WITHOUT COMPLICATION, WITH LONG-TERM CURRENT USE OF INSULIN (HCC): Primary | ICD-10-CM

## 2021-05-19 DIAGNOSIS — E03.9 ACQUIRED HYPOTHYROIDISM: ICD-10-CM

## 2021-05-19 LAB
EXPIRATION DATE: NORMAL
EXPIRATION DATE: NORMAL
GLUCOSE BLDC GLUCOMTR-MCNC: 101 MG/DL (ref 70–130)
HBA1C MFR BLD: 6.2 %
Lab: NORMAL
Lab: NORMAL

## 2021-05-19 PROCEDURE — 83036 HEMOGLOBIN GLYCOSYLATED A1C: CPT | Performed by: INTERNAL MEDICINE

## 2021-05-19 PROCEDURE — 82947 ASSAY GLUCOSE BLOOD QUANT: CPT | Performed by: INTERNAL MEDICINE

## 2021-05-19 PROCEDURE — 99214 OFFICE O/P EST MOD 30 MIN: CPT | Performed by: INTERNAL MEDICINE

## 2021-05-19 RX ORDER — BLOOD SUGAR DIAGNOSTIC
1 STRIP MISCELLANEOUS 3 TIMES DAILY
COMMUNITY
End: 2022-02-15

## 2021-05-19 NOTE — PROGRESS NOTES
"     Office Note      Date: 2021  Patient Name: Joanna Cross  MRN: [id number]  : 1950    Chief Complaint   Patient presents with   • Diabetes       History of Present Illness:   Joanna Cross is a 70 y.o. female who presents for Diabetes type 2. Diagnosed in: . Treated in past with oral agents. Current treatments: basal insulin and metformin. Number of insulin shots per day: 1. Checks blood sugar 3 times a day. Has low blood sugar: rare. Aspirin use: Yes. Statin use: No - intolerant.. ACE-I/ARB use: Yes. Changes in health since last visit: iron deficiency - worsening anemia. Last eye exam .      She is on synthroid 200mcg qd. She is taking this correctly. She isn't taking any interfering meds concurrently. She denies any sxs of hypo- or hyperthyroidism at this time.    Subjective      Diabetic Complications:  Eyes: No  Kidneys: No  Feet: No  Heart: Yes -      Diet and Exercise:  Meals per day: 3  Minutes of exercise per week: 0 mins.    Review of Systems:   Review of Systems   Constitutional: Negative.    Cardiovascular: Negative.    Gastrointestinal: Negative.    Endocrine: Negative.        The following portions of the patient's history were reviewed and updated as appropriate: allergies, current medications, past family history, past medical history, past social history, past surgical history and problem list.    Objective       Visit Vitals  /68 (BP Location: Left arm, Patient Position: Sitting, Cuff Size: Adult)   Pulse 72   Ht 157.5 cm (62\")   Wt 110 kg (243 lb)   LMP  (LMP Unknown) Comment: Mammogram- 2018    BMI 44.45 kg/m²       Physical Exam:  Physical Exam  Constitutional:       Appearance: Normal appearance.   Neurological:      Mental Status: She is alert.         Labs:    HbA1c  Lab Results   Component Value Date    HGBA1C 6.2 2021       CMP  Lab Results   Component Value Date    GLUCOSE 107 (H) 2019    BUN 24 (H) 2019    CREATININE 0.85 " 06/12/2019    EGFRIFNONA 66 06/12/2019    BCR 28.2 (H) 06/12/2019    K 4.7 06/12/2019    CO2 26.0 06/12/2019    CALCIUM 9.1 06/12/2019    AST 21 01/31/2019    ALT 12 01/31/2019        Lipid Panel  Lab Results   Component Value Date    HDL 43 02/01/2019     (H) 02/01/2019    TRIG 78 02/01/2019        TSH  Lab Results   Component Value Date    TSH 0.853 02/08/2021    FREET4 1.83 (H) 12/24/2018        Hemoglobin A1C  Lab Results   Component Value Date    HGBA1C 6.2 05/19/2021        Microalbumin/Creatinine  No results found for: MALBCRERATIO, CREATINIURIN, MICROALBUR        Assessment / Plan      Assessment & Plan:  Diagnoses and all orders for this visit:    1. Type 2 diabetes mellitus without complication, with long-term current use of insulin (CMS/[facility]) (Primary)  Assessment & Plan:  Diabetes is unchanged.   Continue current treatment regimen.  Diabetes will be reassessed in 3 months.    Orders:  -     POC Glycosylated Hemoglobin (Hb A1C)  -     POC Glucose, Blood    2. Essential hypertension  Assessment & Plan:  Hypertension is unchanged.  Continue current treatment regimen.  Blood pressure will be reassessed at the next regular appointment.      3. Coronary artery disease involving native coronary artery of native heart without angina pectoris  Assessment & Plan:  Intolerant of statin.  Continue ASA.        4. Acquired hypothyroidism  Assessment & Plan:  Continue synthroid.  Check TSH next visit.        Return in about 3 months (around 8/19/2021) for Recheck with A1c, CMP, lipids, TSH, microalbumin.    Dilshad Wood MD   05/19/2021

## 2021-05-21 PROBLEM — Z86.0100 HX OF COLONIC POLYPS: Status: ACTIVE | Noted: 2021-05-21

## 2021-05-21 PROBLEM — Z86.010 HX OF COLONIC POLYPS: Status: ACTIVE | Noted: 2021-05-21

## 2021-05-24 ENCOUNTER — APPOINTMENT (OUTPATIENT)
Dept: WOMENS IMAGING | Facility: HOSPITAL | Age: 71
End: 2021-05-24

## 2021-05-24 PROCEDURE — 77067 SCR MAMMO BI INCL CAD: CPT | Performed by: RADIOLOGY

## 2021-05-24 RX ORDER — SPIRONOLACTONE 25 MG/1
TABLET ORAL
Qty: 180 TABLET | Refills: 3 | Status: SHIPPED | OUTPATIENT
Start: 2021-05-24 | End: 2022-07-19 | Stop reason: SDUPTHER

## 2021-06-04 ENCOUNTER — OFFICE VISIT (OUTPATIENT)
Dept: PULMONOLOGY | Facility: CLINIC | Age: 71
End: 2021-06-04

## 2021-06-04 VITALS
TEMPERATURE: 97.8 F | OXYGEN SATURATION: 97 % | HEART RATE: 84 BPM | SYSTOLIC BLOOD PRESSURE: 122 MMHG | BODY MASS INDEX: 44.72 KG/M2 | WEIGHT: 243 LBS | HEIGHT: 62 IN | DIASTOLIC BLOOD PRESSURE: 62 MMHG

## 2021-06-04 DIAGNOSIS — G47.33 OSA TREATED WITH BIPAP: ICD-10-CM

## 2021-06-04 DIAGNOSIS — I48.19 PERSISTENT ATRIAL FIBRILLATION (HCC): Primary | ICD-10-CM

## 2021-06-04 DIAGNOSIS — K21.9 GASTROESOPHAGEAL REFLUX DISEASE, UNSPECIFIED WHETHER ESOPHAGITIS PRESENT: ICD-10-CM

## 2021-06-04 DIAGNOSIS — R06.02 SOB (SHORTNESS OF BREATH): ICD-10-CM

## 2021-06-04 PROCEDURE — 99214 OFFICE O/P EST MOD 30 MIN: CPT | Performed by: NURSE PRACTITIONER

## 2021-06-07 NOTE — PROGRESS NOTES
[facility] Pulmonary Follow up    CHIEF COMPLAINT    MILLI/dyspnea    HISTORY OF PRESENT ILLNESS    Jaonna Cross is a 70 y.o.female here today for follow-up of her MILLI and dyspnea.  She was last seen in the office by me 1 month ago.  She continues to have dyspnea with exertion but states that it is stable.    She continues to follow with a hematologist for her anemia.  She does feel that this could be related to her dyspnea.  She states that she is in the process of getting iron replacements but does not know if her insurance is going to cover this or not.    She continues to wear her BiPAP 12/16 every night.  She states that she has noticed that her sleeping has not been well.  She does have some daytime somnolence and fatigue.  She does wear 2 L bled into the machine as well.  She does feel that her BiPAP may need to be adjusted a little bit.    She denies fever, chills, sputum production, hemoptysis, night sweats, weight loss, chest pain or palpitations.  She denies any lower extremity edema or calf tenderness.  She denies any sinus or allergy symptoms.  She denies reflux symptoms and does take omeprazole twice a day.     She is up-to-date on her current vaccinations.    Patient Active Problem List   Diagnosis   • Coronary artery disease involving native coronary artery without angina pectoris   • Essential hypertension   • Peripheral vascular disease (CMS/[facility])   • Hyperlipidemia LDL goal <70   • Spinal stenosis, lumbar region, with neurogenic claudication   • Diabetes mellitus (CMS/HCC)   • Delayed surgical wound healing   • Biceps tendinitis   • Overactive bladder   • GERD (gastroesophageal reflux disease)   • Hypothyroidism   • Lichen sclerosus   • Parkinson's disease (CMS/[facility])   • MILLI treated with BiPAP - Patient reports compliance.    • History of respiratory failure - prior respiratory failure requiring mechanical ventilation, open lung biopsy non-specific.    • SOB (shortness of breath)   • A-fib (CMS/HCC)  "  • Chest pain   • Atrial fibrillation with RVR (CMS/HCC)   • Renal insufficiency   • CAD in native artery   • Persistent atrial fibrillation (CMS/HCC)   • Tachy-thai syndrome (CMS/HCC)   • Long term current use of antiarrhythmic drug   • History of adenomatous polyp of colon   • Anemia   • Angiodysplasia   • Hx of colonic polyps       Allergies   Allergen Reactions   • Toprol Xl [Metoprolol Tartrate] Shortness Of Breath     Extreme fatigue   • Amlodipine Besylate Swelling     Lower extremity (ankles, feet) swelling   • Codeine Unknown (See Comments)     Pt is unaware of what reaction she had   • Entacapone Other (See Comments)     \"extreme weakness in legs - caused several falls, which stopped after discontinuing this medication\"   • Levemir [Insulin Detemir] Hives     Hives / rash around injection site   • Penicillins Hives     Jitteriness    • Xarelto [Rivaroxaban] GI Bleeding   • Bactrim [Sulfamethoxazole-Trimethoprim] Nausea Only     Headache    • Ciprofloxacin Diarrhea   • Cogentin [Benztropine] Other (See Comments)     \"uncontrollable body movements\"   • Compazine [Prochlorperazine Edisylate] Other (See Comments)     Dystonic reaction   • Cortisone Other (See Comments)     MAKES BLOOD PRESSURE HIGH    • Duraprep [Antiseptic Products, Misc.] Itching and Rash     RASH AND ITCHING   • Haldol [Haloperidol Lactate] Other (See Comments)     Dystonic reaction   • Hydralazine Other (See Comments)     Headache    • Hydrocodone-Acetaminophen Nausea And Vomiting and Dizziness     Headache, nausea    • Levaquin [Levofloxacin] Other (See Comments)     Cannot take due to taking propafenone HCL- severe reaction with mixed.    • Lisinopril Cough   • Statins Myalgia     Leg pain   • Tarka [Trandolapril-Verapamil Hcl Er] Other (See Comments)     Constipation        Current Outpatient Medications:   •  Accu-Chek Softclix Lancets lancets, Use as instructed TID; ICD-10 E11.65; Z79.4, Disp: 300 each, Rfl: 3  •  albuterol " (PROVENTIL) (2.5 MG/3ML) 0.083% nebulizer solution, Take 2.5 mg by nebulization Every 4 (Four) Hours As Needed., Disp: , Rfl:   •  albuterol sulfate HFA (Ventolin HFA) 108 (90 Base) MCG/ACT inhaler, Inhale 1 puff Every 4 (Four) Hours As Needed for Wheezing or Shortness of Air., Disp: 8 g, Rfl: 5  •  ALLERGY SERUM INJECTION, Inject  under the skin into the appropriate area as directed 1 (One) Time Per Week., Disp: , Rfl:   •  amantadine (SYMMETREL) 100 MG capsule, Take 100 mg by mouth 2 (Two) Times a Day., Disp: , Rfl:   •  aspirin 81 MG EC tablet, Take 81 mg by mouth Daily., Disp: , Rfl:   •  B Complex-C (SUPER B COMPLEX PO), Take 1 tablet by mouth Daily., Disp: , Rfl:   •  bumetanide (BUMEX) 1 MG tablet, 1-2 tabs po daily as directed, Disp: 180 tablet, Rfl: 1  •  Calcium Carbonate (CALTRATE 600 PO), Take 600 mg by mouth Daily., Disp: , Rfl:   •  carbidopa-levodopa (SINEMET)  MG per tablet, Take  by mouth. 2 tablets at 0600, 1 tablet at 0900, 1200, 1500, 1800, 2100, Disp: , Rfl:   •  Cholecalciferol 2000 units tablet, Take 2,000 Units by mouth 2 (Two) Times a Day., Disp: , Rfl:   •  citalopram (CeleXA) 20 MG tablet, Take 20 mg by mouth every night at bedtime., Disp: , Rfl:   •  clindamycin (CLEOCIN) 150 MG capsule, Take 150 mg by mouth Daily. 4 capsules one hour before dental appointments., Disp: , Rfl:   •  clobetasol (TEMOVATE) 0.05 % cream, Apply 1 application topically 2 (Two) Times a Day As Needed (Lichens Sclerosis)., Disp: , Rfl:   •  dofetilide (TIKOSYN) 500 MCG capsule, TAKE 1 CAPSULE EVERY 12 HOURS, Disp: 180 capsule, Rfl: 3  •  ELIQUIS 5 MG tablet tablet, TAKE 1 TABLET EVERY 12 HOURS, Disp: 180 tablet, Rfl: 3  •  ferrous sulfate 325 (65 FE) MG tablet, Take 325 mg by mouth Daily With Breakfast., Disp: , Rfl:   •  Glucosamine-Chondroit-Calcium (TRIPLE FLEX BONE & JOINT PO), Take 1 tablet by mouth Daily., Disp: , Rfl:   •  glucose blood (Accu-Chek Guide) test strip, 1 each by Other route 3 (Three)  Times a Day., Disp: , Rfl:   •  Insulin Glargine (LANTUS SOLOSTAR) 100 UNIT/ML injection pen, Inject 30 Units under the skin into the appropriate area as directed Daily., Disp: 30 mL, Rfl: 3  •  IPRATROPIUM BROMIDE NA, 1 spray into the nostril(s) as directed by provider 2 (Two) Times a Day As Needed., Disp: , Rfl:   •  Loratadine 10 MG capsule, Take 10 mg by mouth Daily., Disp: , Rfl:   •  metFORMIN (GLUCOPHAGE) 1000 MG tablet, Take 1 tablet by mouth 2 (Two) Times a Day With Meals., Disp: 180 tablet, Rfl: 1  •  metOLazone (ZAROXOLYN) 2.5 MG tablet, Take 1 tablet by mouth Daily., Disp: 90 tablet, Rfl: 1  •  Multiple Vitamins-Minerals (CENTRUM ULTRA WOMENS PO), Take 1 tablet by mouth Daily., Disp: , Rfl:   •  nitroglycerin (NITROLINGUAL) 0.4 MG/SPRAY spray, Place 1 spray under the tongue Every 5 (Five) Minutes As Needed for Chest Pain., Disp: 1 each, Rfl: 6  •  O2 (OXYGEN), Inhale 2 L/min 1 (One) Time. 2L while sleeping, Disp: , Rfl:   •  omeprazole (priLOSEC) 40 MG capsule, Take 40 mg by mouth 2 (Two) Times a Day., Disp: , Rfl:   •  pramipexole (MIRAPEX) 1.5 MG tablet, Take 1.5 mg by mouth Every Night., Disp: , Rfl:   •  ranolazine (RANEXA) 500 MG 12 hr tablet, Take 1 tablet by mouth Every 12 (Twelve) Hours., Disp: 180 tablet, Rfl: 3  •  sennosides-docusate sodium (SENOKOT-S) 8.6-50 MG tablet, Take 1 tablet by mouth Daily., Disp: , Rfl:   •  spironolactone (ALDACTONE) 25 MG tablet, TAKE 2 TABLETS DAILY AS NEEDED FOR SWELLING, Disp: 180 tablet, Rfl: 3  •  Synthroid 200 MCG tablet, Take 1 tablet by mouth Daily., Disp: 90 tablet, Rfl: 1  •  traMADol (ULTRAM) 50 MG tablet, Take 50 mg by mouth Every 8 (Eight) Hours As Needed for Moderate Pain ., Disp: , Rfl:   •  triamcinolone (KENALOG) 0.1 % cream, triamcinolone acetonide 0.1 % topical cream  APPLY A THIN LAYER TO THE AFFECTED AREA(S) (chest wall, back and arms) BY TOPICAL ROUTE 2 TIMES PER DAY, Disp: , Rfl:   •  Unable to find, 1 each 2 (two) times a day. Med Name:  "tonic water 4 oz BID, Disp: , Rfl:   •  valsartan (DIOVAN) 160 MG tablet, Take 1 tablet by mouth 2 (Two) Times a Day., Disp: 180 tablet, Rfl: 3  •  vitamin C (ASCORBIC ACID) 500 MG tablet, Take 500 mg by mouth Daily. Chewable tablet, Disp: , Rfl:   MEDICATION LIST AND ALLERGIES REVIEWED.    Social History     Tobacco Use   • Smoking status: Never Smoker   • Smokeless tobacco: Never Used   Vaping Use   • Vaping Use: Never used   Substance Use Topics   • Alcohol use: No   • Drug use: No       FAMILY AND SOCIAL HISTORY REVIEWED.    Review of Systems   Constitutional: Positive for fatigue. Negative for activity change, appetite change, fever and unexpected weight change.   HENT: Negative for congestion, postnasal drip, rhinorrhea, sinus pressure, sore throat and voice change.    Eyes: Negative for visual disturbance.   Respiratory: Positive for shortness of breath. Negative for cough, chest tightness and wheezing.    Cardiovascular: Negative for chest pain, palpitations and leg swelling.   Gastrointestinal: Negative for abdominal distention, abdominal pain, nausea and vomiting.   Endocrine: Negative for cold intolerance and heat intolerance.   Genitourinary: Negative for difficulty urinating and urgency.   Musculoskeletal: Negative for arthralgias, back pain and neck pain.   Skin: Negative for color change and pallor.   Allergic/Immunologic: Negative for environmental allergies and food allergies.   Neurological: Negative for dizziness, syncope, weakness and light-headedness.   Hematological: Negative for adenopathy. Does not bruise/bleed easily.   Psychiatric/Behavioral: Negative for agitation and behavioral problems.   .    /62   Pulse 84   Temp 97.8 °F (36.6 °C)   Ht 157.5 cm (62\")   Wt 110 kg (243 lb) Comment: per pt  LMP  (LMP Unknown) Comment: Mammogram- april 2018   SpO2 97% Comment: resting 2 liters continuous  Breastfeeding No   BMI 44.45 kg/m²     Immunization History   Administered Date(s) " Administered   • COVID-19 (MODERNA) 02/12/2021, 03/12/2021   • Flu Vaccine Quad PF >36MO 10/02/2017, 09/11/2018, 09/21/2019   • Fluzone High Dose =>65 Years ([facility] ONLY) 09/13/2017   • Hepatitis A 05/03/1999, 10/05/1999   • INFLUENZA SPLIT TRI 09/15/2010, 10/02/2012, 10/02/2013   • Influenza, Unspecified 01/13/2004, 10/28/2004, 12/14/2005, 01/11/2007, 11/30/2007, 11/20/2008, 02/17/2010, 01/31/2011, 09/06/2011, 09/09/2013, 09/13/2017, 09/11/2018   • PEDS-Pneumococcal Conjugate (PCV7) 12/20/2013, 12/04/2016   • Pneumococcal Conjugate 13-Valent (PCV13) 09/04/2015, 12/04/2016   • Pneumococcal Polysaccharide (PPSV23) 01/13/2004, 11/20/2008, 12/20/2013   • Shingrix 09/27/2019   • TD Preservative Free 02/01/2012, 05/04/2017   • Tdap 05/04/2017   • Zostavax 02/05/2013, 09/27/2019       Physical Exam  Vitals and nursing note reviewed.   Constitutional:       Appearance: She is well-developed. She is not diaphoretic.   HENT:      Head: Normocephalic and atraumatic.   Eyes:      Pupils: Pupils are equal, round, and reactive to light.   Neck:      Thyroid: No thyromegaly.   Cardiovascular:      Rate and Rhythm: Normal rate and regular rhythm.      Heart sounds: Normal heart sounds. No murmur heard.   No friction rub. No gallop.    Pulmonary:      Effort: Pulmonary effort is normal. No respiratory distress.      Breath sounds: Normal breath sounds. No wheezing or rales.   Chest:      Chest wall: No tenderness.   Abdominal:      General: Bowel sounds are normal.      Palpations: Abdomen is soft.      Tenderness: There is no abdominal tenderness.   Musculoskeletal:         General: No swelling. Normal range of motion.      Cervical back: Normal range of motion and neck supple.   Lymphadenopathy:      Cervical: No cervical adenopathy.   Skin:     General: Skin is warm and dry.      Capillary Refill: Capillary refill takes less than 2 seconds.   Neurological:      Mental Status: She is alert and oriented to person, place, and  time.   Psychiatric:         Mood and Affect: Mood normal.         Behavior: Behavior normal.           RESULTS      PROBLEM LIST    Problem List Items Addressed This Visit        Cardiac and Vasculature    A-fib (CMS/HCC) - Primary       Gastrointestinal Abdominal     GERD (gastroesophageal reflux disease)       Pulmonary and Pneumonias    SOB (shortness of breath)       Sleep    MILLI treated with BiPAP - Patient reports compliance.     Overview     - Patient reports compliance.                  DISCUSSION    Ms. Cross was here for follow-up of her dyspnea.  I do believe that her dyspnea is multifactorial.  Unfortunately she does have chronic atrial fibrillation this could be contributing to some of her dyspnea as well.    She is currently being worked up for anemia and I do is feel that this is playing a role in her dyspnea as well.  Hopefully she will get approved for her iron replacement soon.    Do suspect that deconditioning could be playing a role as well did encourage her to do some daily physical activity as tolerated.    We will increase her BiPAP from 12 to 14/16 and hopefully she will notice an improvement in her dyspnea and fatigue.  I will send this over to the local DME company today.    She will follow-up in 2 to 3 months or sooner if her symptoms worsen.    Level of service justified based on 32 minutes spent in patient care on this date of service including, but not limited to: preparing to see the patient, obtaining and/or reviewing history, performing medically appropriate examination, ordering tests/medicine/procedures, independently interpreting results, documenting clinical information in EHR, and counseling/education of patient/family/caregiver. (Level 4 30-39 minutes; Level 5 40-54 minutes)      DEJA Allen  06/04/202108:36 EDT  Electronically signed     Please note that portions of this note were completed with a voice recognition program. Efforts were made to edit the  dictations, but occasionally words are mistranscribed.      CC: Reynaldo Fritz MD

## 2021-06-09 RX ORDER — SODIUM, POTASSIUM,MAG SULFATES 17.5-3.13G
2 SOLUTION, RECONSTITUTED, ORAL ORAL TAKE AS DIRECTED
Qty: 354 ML | Refills: 0 | Status: SHIPPED | OUTPATIENT
Start: 2021-06-09 | End: 2021-11-03

## 2021-06-15 ENCOUNTER — PRE-ADMISSION TESTING (OUTPATIENT)
Dept: PREADMISSION TESTING | Facility: HOSPITAL | Age: 71
End: 2021-06-15

## 2021-06-15 VITALS — HEIGHT: 62 IN | WEIGHT: 240.08 LBS | BODY MASS INDEX: 44.18 KG/M2

## 2021-06-15 LAB
DEPRECATED RDW RBC AUTO: 46.7 FL (ref 37–54)
ERYTHROCYTE [DISTWIDTH] IN BLOOD BY AUTOMATED COUNT: 13.8 % (ref 12.3–15.4)
HCT VFR BLD AUTO: 32.9 % (ref 34–46.6)
HGB BLD-MCNC: 10.8 G/DL (ref 12–15.9)
MCH RBC QN AUTO: 30.7 PG (ref 26.6–33)
MCHC RBC AUTO-ENTMCNC: 32.8 G/DL (ref 31.5–35.7)
MCV RBC AUTO: 93.5 FL (ref 79–97)
PLATELET # BLD AUTO: 220 10*3/MM3 (ref 140–450)
PMV BLD AUTO: 9.7 FL (ref 6–12)
POTASSIUM SERPL-SCNC: 4.6 MMOL/L (ref 3.5–5.2)
RBC # BLD AUTO: 3.52 10*6/MM3 (ref 3.77–5.28)
WBC # BLD AUTO: 7.84 10*3/MM3 (ref 3.4–10.8)

## 2021-06-15 PROCEDURE — 84132 ASSAY OF SERUM POTASSIUM: CPT

## 2021-06-15 PROCEDURE — 36415 COLL VENOUS BLD VENIPUNCTURE: CPT

## 2021-06-15 PROCEDURE — 85027 COMPLETE CBC AUTOMATED: CPT

## 2021-06-15 RX ORDER — SENNA PLUS 8.6 MG/1
1 TABLET ORAL DAILY
COMMUNITY

## 2021-06-17 ENCOUNTER — HOSPITAL ENCOUNTER (OUTPATIENT)
Facility: HOSPITAL | Age: 71
Setting detail: HOSPITAL OUTPATIENT SURGERY
Discharge: HOME | End: 2021-06-17
Attending: INTERNAL MEDICINE | Admitting: INTERNAL MEDICINE

## 2021-06-17 ENCOUNTER — ANESTHESIA EVENT (OUTPATIENT)
Dept: GASTROENTEROLOGY | Facility: HOSPITAL | Age: 71
End: 2021-06-17

## 2021-06-17 ENCOUNTER — ANESTHESIA (OUTPATIENT)
Dept: GASTROENTEROLOGY | Facility: HOSPITAL | Age: 71
End: 2021-06-17

## 2021-06-17 VITALS
OXYGEN SATURATION: 94 % | SYSTOLIC BLOOD PRESSURE: 157 MMHG | DIASTOLIC BLOOD PRESSURE: 62 MMHG | TEMPERATURE: 97.3 F | HEART RATE: 65 BPM | RESPIRATION RATE: 20 BRPM

## 2021-06-17 DIAGNOSIS — Z86.010 HX OF COLONIC POLYPS: ICD-10-CM

## 2021-06-17 LAB
GLUCOSE BLDC GLUCOMTR-MCNC: 129 MG/DL (ref 70–130)
GLUCOSE BLDC GLUCOMTR-MCNC: 92 MG/DL (ref 70–130)

## 2021-06-17 PROCEDURE — 45385 COLONOSCOPY W/LESION REMOVAL: CPT | Performed by: INTERNAL MEDICINE

## 2021-06-17 PROCEDURE — 25010000002 PROPOFOL 10 MG/ML EMULSION: Performed by: NURSE ANESTHETIST, CERTIFIED REGISTERED

## 2021-06-17 PROCEDURE — 82962 GLUCOSE BLOOD TEST: CPT | Performed by: FAMILY MEDICINE

## 2021-06-17 PROCEDURE — 88305 TISSUE EXAM BY PATHOLOGIST: CPT | Performed by: INTERNAL MEDICINE

## 2021-06-17 PROCEDURE — 82962 GLUCOSE BLOOD TEST: CPT

## 2021-06-17 RX ORDER — LIDOCAINE HYDROCHLORIDE 10 MG/ML
INJECTION, SOLUTION EPIDURAL; INFILTRATION; INTRACAUDAL; PERINEURAL AS NEEDED
Status: DISCONTINUED | OUTPATIENT
Start: 2021-06-17 | End: 2021-06-17 | Stop reason: SURG

## 2021-06-17 RX ORDER — ONDANSETRON 2 MG/ML
4 INJECTION INTRAMUSCULAR; INTRAVENOUS ONCE AS NEEDED
Status: DISCONTINUED | OUTPATIENT
Start: 2021-06-17 | End: 2021-06-17 | Stop reason: HOSPADM

## 2021-06-17 RX ORDER — 0.9 % SODIUM CHLORIDE 0.9 %
10 VIAL (ML) INJECTION ONCE
Status: COMPLETED | OUTPATIENT
Start: 2021-06-17 | End: 2021-06-17

## 2021-06-17 RX ORDER — SODIUM CHLORIDE, SODIUM LACTATE, POTASSIUM CHLORIDE, CALCIUM CHLORIDE 600; 310; 30; 20 MG/100ML; MG/100ML; MG/100ML; MG/100ML
9 INJECTION, SOLUTION INTRAVENOUS CONTINUOUS
Status: DISCONTINUED | OUTPATIENT
Start: 2021-06-17 | End: 2021-06-17 | Stop reason: HOSPADM

## 2021-06-17 RX ORDER — EPHEDRINE SULFATE 50 MG/ML
INJECTION, SOLUTION INTRAVENOUS AS NEEDED
Status: DISCONTINUED | OUTPATIENT
Start: 2021-06-17 | End: 2021-06-17 | Stop reason: SURG

## 2021-06-17 RX ORDER — IPRATROPIUM BROMIDE AND ALBUTEROL SULFATE 2.5; .5 MG/3ML; MG/3ML
3 SOLUTION RESPIRATORY (INHALATION) ONCE AS NEEDED
Status: DISCONTINUED | OUTPATIENT
Start: 2021-06-17 | End: 2021-06-17 | Stop reason: HOSPADM

## 2021-06-17 RX ORDER — PROPOFOL 10 MG/ML
VIAL (ML) INTRAVENOUS AS NEEDED
Status: DISCONTINUED | OUTPATIENT
Start: 2021-06-17 | End: 2021-06-17 | Stop reason: SURG

## 2021-06-17 RX ADMIN — SODIUM CHLORIDE, PRESERVATIVE FREE 10 ML: 5 INJECTION INTRAVENOUS at 12:41

## 2021-06-17 RX ADMIN — EPHEDRINE SULFATE 10 MG: 50 INJECTION, SOLUTION INTRAVENOUS at 13:03

## 2021-06-17 RX ADMIN — PROPOFOL 70 MG: 10 INJECTION, EMULSION INTRAVENOUS at 12:53

## 2021-06-17 RX ADMIN — PROPOFOL 50 MCG/KG/MIN: 10 INJECTION, EMULSION INTRAVENOUS at 12:53

## 2021-06-17 RX ADMIN — LIDOCAINE HYDROCHLORIDE 50 MG: 10 INJECTION, SOLUTION EPIDURAL; INFILTRATION; INTRACAUDAL; PERINEURAL at 12:53

## 2021-06-17 RX ADMIN — SODIUM CHLORIDE, POTASSIUM CHLORIDE, SODIUM LACTATE AND CALCIUM CHLORIDE 9 ML/HR: 600; 310; 30; 20 INJECTION, SOLUTION INTRAVENOUS at 12:41

## 2021-06-17 NOTE — H&P
GASTROENTEROLOGY OFFICE NOTE  Joanna Cross  [id number]  1950    Chief complaint  Colon cancer screening       HISTORY OF PRESENT ILLNESS:  Patient presents for colonoscopy.  She was just seen in the office by me on 5/11/2021 and this is just an update to that history.  There have been no changes since that visit.    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  No transfusion requirement since then.  She had a upper endoscopy in 2016 by Dr. Vargas which was remarkable only for small sliding hernia.    Last colonoscopy in 2016 revealed cecal polyps which were adenomatous    PAST MEDICAL HISTORY  Past Medical History:   Diagnosis Date   • Anemia    • Ankle problem     thinks back related causing pain    • Atrial fibrillation (CMS/HCC)    • AVM (arteriovenous malformation)    • Back pain    • Benign hypertension    • Howard-tachy syndrome (CMS/HCC)    • CAD (coronary artery disease)    • Colon polyp    • COVID-19 vaccine series completed    • Depression    • Disease of thyroid gland    • DM2 (diabetes mellitus, type 2) (CMS/HCC)     checks sugar twice per day    • Encounter for allergy testing     also autoimmune testing 2018   • Essential hypertension    • Gastrocnemius muscle tear     left medial 91   • Generalized osteoarthritis    • GERD (gastroesophageal reflux disease)    • GI bleed     in hospital for a week    • GIB (gastrointestinal bleeding) 2016    d/t xarelto    • Headache    • Hiatal hernia    • History of cardiac monitoring     14 days    • History of heart disease    • History of shingles    • History of transfusion 2016    no reaction recalled    • Hypothyroidism    • Hypothyroidism    • IBS (irritable  bowel syndrome)    • Klebsiella pneumonia (CMS/HCC)    • Lichen sclerosus    • Mixed hyperlipidemia    • Morbid obesity (CMS/HCC)    • Mouth problem     mouth guard used since pt bites tongue and lips excessively at night if not- with bipap    • MRSA infection 2018   • Myocardial infarction (CMS/HCC)     11/09/15   • Obesity    • On home oxygen therapy     2L of oxygen all the time due to current congestion    • MILLI on CPAP     16/12 1/8/20 bipap compliant with o2 hook up 2l - also uses mouth guard too     • Osteoporosis    • Pacemaker    • Parkinson disease (CMS/HCC)    • Parkinson's disease (CMS/HCC)    • Peripheral vascular disease (CMS/HCC)    • Pleurisy    • Pneumonia    • Puerperal sepsis with acute hypoxic respiratory failure (CMS/[facility])     emergent intubation- 2016   • Right leg pain     from back issues    • Salivary gland stone    • Sciatic nerve pain    • Seborrheic dermatitis    • Skin cancer     on back    • SOBOE (shortness of breath on exertion)    • SSS (sick sinus syndrome) (CMS/[facility])    • TIA (transient ischemic attack)    • Type 2 diabetes mellitus (CMS/[facility])    • UTI (urinary tract infection)    • Varicose vein of leg    • Venous insufficiency of both lower extremities         PAST SURGICAL HISTORY  Past Surgical History:   Procedure Laterality Date   • BACK SURGERY      l4-l5 laminectomy    • BICEPS TENDON REPAIR Right     shoulder   • BREAST BIOPSY Left 2004   • BRONCHOSCOPY RIGID / FLEXIBLE      2016   • BUNIONECTOMY Right    • CARDIAC CATHETERIZATION     • CARDIAC CATHETERIZATION N/A 2/1/2019    Procedure: Left Heart Cath;  Surgeon: Albertina Corona MD;  Location: [facility] INVASIVE LOCATION;  Service: Cardiology   • CHOLECYSTECTOMY     • COLONOSCOPY  2016   • CORONARY STENT PLACEMENT      x1 stent   • CYST REMOVAL      left ear, upper left back 2001   • CYSTOSCOPY      x2  18 and 20 -   in 20 urethra dilation    • DIAGNOSTIC LAPAROSCOPY  1981   • ENDOSCOPIC FUNCTIONAL SINUS SURGERY  (FESS)  2011   • ENDOSCOPY  2016   • ENTEROSCOPY SMALL BOWEL      with single ballon with fluoro    • HAMMER TOE REPAIR Left    • INCISION AND DRAINAGE OF WOUND  2018    back with wound infection    • JOINT REPLACEMENT     • KNEE ARTHROSCOPY     • LASER ABLATION      right leg 13   • LIPOMA EXCISION  1999    left leg    • LUMBAR LAMINECTOMY DISCECTOMY DECOMPRESSION N/A 7/6/2018    Procedure: LUMBAR LAMINECTOMY L4-5, HEMILAMIINECTOMY RIGHT L5-S1, FORAMINOTOMY L5-S1;  Surgeon: Lencho Gamino MD;  Location:  [facility];  Service: Neurosurgery   • LUMBAR LAMINECTOMY DISCECTOMY DECOMPRESSION N/A 9/19/2018    Procedure: INCISION AND DRAINAGE BACK WITH WOUND EXPLORATION;  Surgeon: Ritchie García MD;  Location:  [facility];  Service: Neurosurgery   • LUNG BIOPSY Left 2016   • OTHER SURGICAL HISTORY      ct scan of chest and sinuses and lower back    • OTHER SURGICAL HISTORY      various echos    • OTHER SURGICAL HISTORY      electroencephalogram 16,   emg  ncv tests 2007   • OTHER SURGICAL HISTORY      mra 2007  and various mri with xrays, nuclear medicine lung ventilation with perfusion test 2016 with pft    • OTHER SURGICAL HISTORY      barium swallow testing 15, 12, 12   • OTHER SURGICAL HISTORY      vaginal ultrasound- 2012,   vas clementina lower extrem 2016, vas venous duplex lower extrem bilat 2019   • OTHER SURGICAL HISTORY      wdge biopsy spring of lung    • OTHER SURGICAL HISTORY      emergent intubation- hypoxic resp failure    • PACEMAKER IMPLANTATION  11/2016    sss   • REPLACEMENT TOTAL KNEE Bilateral     left knee 2011, right 2012 per dr acuna    • REPLACEMENT TOTAL KNEE Bilateral    • SHOULDER ARTHROSCOPY Bilateral     2003-left, 2004- right    • SKIN BIOPSY      skin cancer back 2016   • SKIN CANCER EXCISION      upper righ tback    • MADHAV     • TEETH EXTRACTION      x2   • TUBAL ABDOMINAL LIGATION Bilateral 1980   • WISDOM TOOTH EXTRACTION          MEDICATIONS:    Current Facility-Administered Medications:   •  lactated  "ringers infusion, 9 mL/hr, Intravenous, Continuous, Oziel Hankins MD  •  sodium chloride 0.9 % injection 10 mL, 10 mL, Intravenous, Once, Oziel Hankins MD    ALLERGIES  Allergies   Allergen Reactions   • Toprol Xl [Metoprolol Tartrate] Shortness Of Breath     Extreme fatigue   • Amlodipine Besylate Swelling     Lower extremity (ankles, feet) swelling   • Codeine Unknown (See Comments)     Pt is unaware of what reaction she had   • Entacapone Other (See Comments)     \"extreme weakness in legs - caused several falls, which stopped after discontinuing this medication\"   • Levemir [Insulin Detemir] Hives     Hives / rash around injection site   • Penicillins Hives     Jitteriness    • Xarelto [Rivaroxaban] GI Bleeding     hgb dropped to 5.2   • Cimetidine Other (See Comments)     Cant be taken with tikosyn    • Epinephrine Other (See Comments)     6/4/16- had 3 shots to numb mouth to prepare teeth for crowns, the shots contained epi-  Caused pt to have chest discomfort- went to hospital in ambulance, discovered had a fib while there    • Erythromycin Base Other (See Comments)     Cant be taken with tikosyn - z pack and ketek    • Flurandrenolone Other (See Comments)     Cant be taken with tikosyn     Include - levaquin, cipro, norloxacin    • Hydrochlorothiazide Other (See Comments)     Cant be taken with tikosyn -  Also hydrodiuril, microzide, hydro-par, oretic, esidrix, ezide or any medicine with hct or hctz in name    • Macrolides And Ketolides Other (See Comments)     antibx -   Cant be taken with tikosyn    • Verapamil Other (See Comments)     Cant be taken with tikosyn - calan, covera-hs, isoptin, verelan or tarka    • Bactrim [Sulfamethoxazole-Trimethoprim] Nausea Only and Other (See Comments)     Headache -  Cant be taken with tikosyn - septra ds   • Ciprofloxacin Diarrhea   • Cogentin [Benztropine] Other (See Comments)     \"uncontrollable body movements\"   • Compazine [Prochlorperazine Edisylate] Other (See " Comments)     Dystonic reaction   • Cortisone Other (See Comments)     MAKES BLOOD PRESSURE HIGH    • Duraprep [Antiseptic Products, Misc.] Itching and Rash     RASH AND ITCHING   • Haldol [Haloperidol Lactate] Other (See Comments)     Dystonic reaction   • Hydralazine Other (See Comments)     Headache    • Hydrocodone-Acetaminophen Nausea And Vomiting and Dizziness     Headache, nausea    • Levaquin [Levofloxacin] Other (See Comments)     Cannot take due to taking propafenone HCL- severe reaction with mixed.    • Lisinopril Cough   • Statins Myalgia     Leg pain- all statins    • Tarka [Trandolapril-Verapamil Hcl Er] Other (See Comments)     Constipation        FAMILY HISTORY:  Family History   Problem Relation Age of Onset   • Kidney disease Mother    • Coronary artery disease Mother    • Hypertension Mother    • Heart disease Mother    • Hyperlipidemia Mother    • Coronary artery disease Father    • Hypertension Father    • Hypothyroidism Father    • Coronary artery disease Brother    • Colon polyps Neg Hx    • Colon cancer Neg Hx        SOCIAL HISTORY  Social History     Socioeconomic History   • Marital status:      Spouse name: N/A   • Number of children: 4   • Years of education: College   • Highest education level: Not on file   Tobacco Use   • Smoking status: Never Smoker   • Smokeless tobacco: Never Used   Vaping Use   • Vaping Use: Never used   Substance and Sexual Activity   • Alcohol use: No   • Drug use: No   • Sexual activity: Defer     Partners: Male     Socioeconomic History:  Lives alone.  .  4 children.  5 grandchildren       REVIEW OF SYSTEMS  Review of Systems  Unchanged from prior    PHYSICAL EXAM   /63 (BP Location: Left arm, Patient Position: Lying)   Pulse 70   Temp 97.3 °F (36.3 °C) (Tympanic)   Resp 18   LMP  (LMP Unknown) Comment: Mammogram- april 2018   SpO2 93%   General: Well-developed pleasant  white female no apparent acute distress  HEENT: Anicteric sclerae.  Normal oropharynx  Neck: Supple. Without lymphadenopathy  CV: Regular rate and rhythm, S1, S2  Lungs: Clear to ausculation. Without rales, rhonchi and wheezing  Abdomen:  Soft,non-distended without palpable masses or hepatosplenomeagaly, areas of rebound tenderness or guarding.   Extremeties: without clubbing, cyanosis or edema  Neurologic:  Alert and oriented x 3 without focal motor or sensory deficits  Rectal exam: deferred     Results for orders placed or performed in visit on 06/15/21   CBC (No Diff)    Specimen: Blood   Result Value Ref Range    WBC 7.84 3.40 - 10.80 10*3/mm3    RBC 3.52 (L) 3.77 - 5.28 10*6/mm3    Hemoglobin 10.8 (L) 12.0 - 15.9 g/dL    Hematocrit 32.9 (L) 34.0 - 46.6 %    MCV 93.5 79.0 - 97.0 fL    MCH 30.7 26.6 - 33.0 pg    MCHC 32.8 31.5 - 35.7 g/dL    RDW 13.8 12.3 - 15.4 %    RDW-SD 46.7 37.0 - 54.0 fl    MPV 9.7 6.0 - 12.0 fL    Platelets 220 140 - 450 10*3/mm3   Potassium    Specimen: Blood   Result Value Ref Range    Potassium 4.6 3.5 - 5.2 mmol/L        Results Review:  I reviewed the patient's new clinical results.      ASSESSMENT  1.-Patient with history of adenomatous colonic polyps due for surveillance colonoscopy  2.-History of anemia  3.-History of jejunal angiodysplasia  4.-History of dysphagia to solids requiring esophageal dilation  5.-Iatrogenic coagulopathy      PLAN  1.-Colonoscopy.  Risk, benefits, options once again reviewed        Trenton Sosa MD  6/17/2021   12:34 EDT

## 2021-06-17 NOTE — ANESTHESIA POSTPROCEDURE EVALUATION
Patient: Joanna Cross    Procedure Summary     Date: 06/17/21 Room / Location:  [facility]    Anesthesia Start: 1249 Anesthesia Stop:     Procedure: COLONOSCOPY WITH POSSIBLE POLYPECTOMY, BIOPSY, AND CONTROL OF GI BLEEDING (N/A ) Diagnosis:       Hx of colonic polyps      (Hx of colonic polyps [Z86.010])    Surgeons: Trenton Sosa MD Provider: Oziel Hankins MD    Anesthesia Type: general, MAC ASA Status: 3          Anesthesia Type: general, MAC    Vitals  Vitals Value Taken Time   BP 90/46 06/17/21 1326   Temp 98 F    Pulse 60 06/17/21 1327   Resp 14    SpO2 92 % 06/17/21 1328   Vitals shown include unvalidated device data.        Post Anesthesia Care and Evaluation    Patient location during evaluation: PACU  Patient participation: complete - patient participated  Level of consciousness: awake and alert  Pain management: adequate  Airway patency: patent  Anesthetic complications: No anesthetic complications  PONV Status: none  Cardiovascular status: hemodynamically stable and acceptable  Respiratory status: nonlabored ventilation, acceptable and nasal cannula  Hydration status: acceptable

## 2021-06-17 NOTE — ANESTHESIA PREPROCEDURE EVALUATION
Anesthesia Evaluation     Patient summary reviewed and Nursing notes reviewed   NPO Solid Status: > 8 hours  NPO Liquid Status: > 8 hours           Airway   Mallampati: III  TM distance: >3 FB  Neck ROM: limited  Possible difficult intubation  Dental      Pulmonary    (+) shortness of breath, sleep apnea on CPAP,   (-) pneumonia  Cardiovascular     ECG reviewed    (+) hypertension, past MI , CAD, cardiac stents dysrhythmias Atrial Fib, Paroxysmal Atrial Fib, PVD, hyperlipidemia,   (-) angina    ROS comment: ECG Atrial paced rythym     [facility], 02/01/2019: EF 55-60%. Patent RCA stent, noncritical mid LAD with IFR 0.93, noncritical LCx    ECHO 2018 EF 60%  RVSP (TR) 29   Mild MAC is present.   AV sclerosis.      Neuro/Psych  (+) TIA (data deficit remote ), headaches,     (-) seizures, CVA  GI/Hepatic/Renal/Endo    (+) morbid obesity, hiatal hernia, GERD, GI bleeding lower , renal disease CRI, diabetes mellitus (A1C <7) type 2 well controlled,   (-)  obesity    Musculoskeletal     (+) back pain,   Abdominal    Substance History      OB/GYN          Other   arthritis,      (-) history of cancer (skin)                Anesthesia Plan    ASA 3     general and MAC   (Hi FiO2  PFL   Keep MAP >65 c pressors  )  intravenous induction     Anesthetic plan, all risks, benefits, and alternatives have been provided, discussed and informed consent has been obtained with: patient.    Plan discussed with CRNA.

## 2021-06-17 NOTE — OP NOTE
Colonoscopy summary  See colonoscopy report for details    Colonoscopy was remarkable for normal terminal ileum, 2 cecal polyps measuring 3 to 4 mm removed with cold snare for which pathology is currently pending and left-sided diverticulosis    Recommendation  Await pathology report.  Repeat colonoscopy in 5 years assuming polyps prove adenomatous.

## 2021-06-18 LAB
CYTO UR: NORMAL
LAB AP CASE REPORT: NORMAL
LAB AP CLINICAL INFORMATION: NORMAL
PATH REPORT.FINAL DX SPEC: NORMAL
PATH REPORT.GROSS SPEC: NORMAL

## 2021-06-24 ENCOUNTER — TELEPHONE (OUTPATIENT)
Dept: CARDIOLOGY | Facility: CLINIC | Age: 71
End: 2021-06-24

## 2021-06-24 NOTE — TELEPHONE ENCOUNTER
I called Ms Cross back and ask her if she felt like the pain she was having was chest pain or heart pain.She stated no, it is not a pain it is more of a muscle ache. She states she has not tried nitro tablets and does not feel like it is worthy of going to the ER. I told her I would send this message on to Dr Corona and ask for her advise.

## 2021-06-24 NOTE — TELEPHONE ENCOUNTER
"Patient had a pacemaker placed in 2016. She had a mammogram done on 5/24/21. Five days ago she has started having aching and soreness in her breast and shoulder. She denies any redness, swelling, red streaking, weight loss or fever. She states\"that it feels different than it did prior to her mammogram.\" I transferred her to the device clinic so she can send through a remote transmission.  "

## 2021-06-24 NOTE — TELEPHONE ENCOUNTER
Pt was transferred to me my Yulia Torres due to pt c/o discomfort at her pacemaker site. Ms Cross states the discomfort goes up into her shoulder and sometimes to her back. She states the device feels like it has moved. I ask her if she had lost weight and she stated no but she had lost muscle due to a shoulder injury. She states it is most uncomfortable at night. I ask her if she had tried taking anything for the discomfort and she stated she had been taking Tylenol at night and it did help but she had not try to take it during the day. I advised her to try taking Tylenol during the day and see if it helps and let us know tomorrow. She states she is not having chest pain or SOB with the discomfort. She agreed to plan.

## 2021-06-25 NOTE — TELEPHONE ENCOUNTER
Called Ms Cross to inform her that Dr Corona does not think her problems sounds cardiac. There was no answer so I left her a message.

## 2021-08-16 RX ORDER — METOLAZONE 2.5 MG/1
TABLET ORAL
Qty: 90 TABLET | Refills: 1 | Status: SHIPPED | OUTPATIENT
Start: 2021-08-16 | End: 2022-05-03

## 2021-08-16 RX ORDER — DOFETILIDE 0.5 MG/1
500 CAPSULE ORAL EVERY 12 HOURS
Qty: 180 CAPSULE | Refills: 3 | Status: SHIPPED | OUTPATIENT
Start: 2021-08-16 | End: 2022-08-15

## 2021-08-23 ENCOUNTER — OFFICE VISIT (OUTPATIENT)
Dept: ENDOCRINOLOGY | Facility: CLINIC | Age: 71
End: 2021-08-23

## 2021-08-23 ENCOUNTER — LAB (OUTPATIENT)
Dept: LAB | Facility: HOSPITAL | Age: 71
End: 2021-08-23

## 2021-08-23 VITALS
HEART RATE: 62 BPM | SYSTOLIC BLOOD PRESSURE: 146 MMHG | RESPIRATION RATE: 20 BRPM | WEIGHT: 244 LBS | DIASTOLIC BLOOD PRESSURE: 70 MMHG | HEIGHT: 62 IN | OXYGEN SATURATION: 95 % | BODY MASS INDEX: 44.9 KG/M2

## 2021-08-23 DIAGNOSIS — E03.9 ACQUIRED HYPOTHYROIDISM: ICD-10-CM

## 2021-08-23 DIAGNOSIS — Z79.4 TYPE 2 DIABETES MELLITUS WITH HYPERGLYCEMIA, WITH LONG-TERM CURRENT USE OF INSULIN (HCC): Primary | Chronic | ICD-10-CM

## 2021-08-23 DIAGNOSIS — E11.65 TYPE 2 DIABETES MELLITUS WITH HYPERGLYCEMIA, WITH LONG-TERM CURRENT USE OF INSULIN (HCC): Primary | Chronic | ICD-10-CM

## 2021-08-23 PROBLEM — N18.9 CKD (CHRONIC KIDNEY DISEASE): Chronic | Status: ACTIVE | Noted: 2018-12-26

## 2021-08-23 LAB
EXPIRATION DATE: NORMAL
GLUCOSE BLDC GLUCOMTR-MCNC: 85 MG/DL (ref 70–130)
HBA1C MFR BLD: 6.3 %
Lab: NORMAL

## 2021-08-23 PROCEDURE — 82947 ASSAY GLUCOSE BLOOD QUANT: CPT | Performed by: PHYSICIAN ASSISTANT

## 2021-08-23 PROCEDURE — 99214 OFFICE O/P EST MOD 30 MIN: CPT | Performed by: PHYSICIAN ASSISTANT

## 2021-08-23 PROCEDURE — 84443 ASSAY THYROID STIM HORMONE: CPT

## 2021-08-24 LAB — TSH SERPL DL<=0.05 MIU/L-ACNC: 0.57 UIU/ML (ref 0.27–4.2)

## 2021-09-03 ENCOUNTER — OFFICE VISIT (OUTPATIENT)
Dept: PULMONOLOGY | Facility: CLINIC | Age: 71
End: 2021-09-03

## 2021-09-03 VITALS
BODY MASS INDEX: 43.98 KG/M2 | DIASTOLIC BLOOD PRESSURE: 80 MMHG | WEIGHT: 239 LBS | SYSTOLIC BLOOD PRESSURE: 120 MMHG | HEART RATE: 76 BPM | HEIGHT: 62 IN | TEMPERATURE: 97.7 F | OXYGEN SATURATION: 97 %

## 2021-09-03 DIAGNOSIS — R06.02 SOB (SHORTNESS OF BREATH): ICD-10-CM

## 2021-09-03 DIAGNOSIS — K21.9 GASTROESOPHAGEAL REFLUX DISEASE, UNSPECIFIED WHETHER ESOPHAGITIS PRESENT: Primary | ICD-10-CM

## 2021-09-03 DIAGNOSIS — G47.33 OSA TREATED WITH BIPAP: ICD-10-CM

## 2021-09-03 PROCEDURE — 99214 OFFICE O/P EST MOD 30 MIN: CPT | Performed by: NURSE PRACTITIONER

## 2021-09-03 NOTE — PROGRESS NOTES
[facility] Pulmonary Follow up    CHIEF COMPLAINT    Dyspnea    HISTORY OF PRESENT ILLNESS    Joanna Cross is a 70 y.o.female here today for follow-up of her dyspnea.  She was last seen in the office by me in June.  She denies any rest or illnesses exacerbation since her last appointment.  She states that her hemoglobin has improved and she has noticed less shortness of breath.  She states that she is able to walk without shortness of breath currently.  She continues to follow with hematology.    She continues to have shortness of breath with exertion but does recover fairly quickly at rest.    She continues to wear her BiPAP 14/16 every night.  We did increase her pressures at her last appointment and she has noticed that she is sleeping better.  She has less daytime somnolence with the increase.  She does wear 2 L bled into the machine.    She denies fever, chills, sputum production, hemoptysis, night sweats, weight loss, chest pain or palpitations.  She denies any lower extremity edema or calf tenderness.  She denies any sinus or allergy symptoms.  She denies reflux symptoms and does take omeprazole twice a day.     She is up-to-date on her current vaccinations.    Patient Active Problem List   Diagnosis   • Coronary artery disease involving native coronary artery without angina pectoris   • Essential hypertension   • Peripheral vascular disease (CMS/HCC)   • Hyperlipidemia LDL goal <70   • Spinal stenosis, lumbar region, with neurogenic claudication   • Diabetes mellitus (CMS/HCC)   • Delayed surgical wound healing   • Biceps tendinitis   • Overactive bladder   • GERD (gastroesophageal reflux disease)   • Hypothyroidism   • Lichen sclerosus   • Parkinson's disease (CMS/HCC)   • MILLI treated with BiPAP - Patient reports compliance.    • History of respiratory failure - prior respiratory failure requiring mechanical ventilation, open lung biopsy non-specific.    • SOB (shortness of breath)   • A-fib (CMS/HCC)   •  "Chest pain   • Atrial fibrillation with RVR (CMS/HCC)   • CKD (chronic kidney disease)   • CAD in native artery   • Persistent atrial fibrillation (CMS/HCC)   • Tachy-thai syndrome (CMS/HCC)   • Long term current use of antiarrhythmic drug   • History of adenomatous polyp of colon   • Anemia   • Angiodysplasia   • Hx of colonic polyps       Allergies   Allergen Reactions   • Toprol Xl [Metoprolol Tartrate] Shortness Of Breath     Extreme fatigue   • Amlodipine Besylate Swelling     Lower extremity (ankles, feet) swelling   • Codeine Unknown (See Comments)     Pt is unaware of what reaction she had   • Entacapone Other (See Comments)     \"extreme weakness in legs - caused several falls, which stopped after discontinuing this medication\"   • Levemir [Insulin Detemir] Hives     Hives / rash around injection site   • Penicillins Hives     Jitteriness    • Xarelto [Rivaroxaban] GI Bleeding     hgb dropped to 5.2   • Cimetidine Other (See Comments)     Cant be taken with tikosyn    • Epinephrine Other (See Comments)     6/4/16- had 3 shots to numb mouth to prepare teeth for crowns, the shots contained epi-  Caused pt to have chest discomfort- went to hospital in ambulance, discovered had a fib while there    • Erythromycin Base Other (See Comments)     Cant be taken with tikosyn - z pack and ketek    • Flurandrenolone Other (See Comments)     Cant be taken with tikosyn     Include - levaquin, cipro, norloxacin    • Hydrochlorothiazide Other (See Comments)     Cant be taken with tikosyn -  Also hydrodiuril, microzide, hydro-par, oretic, esidrix, ezide or any medicine with hct or hctz in name    • Macrolides And Ketolides Other (See Comments)     antibx -   Cant be taken with tikosyn    • Verapamil Other (See Comments)     Cant be taken with tikosyn - calan, covera-hs, isoptin, verelan or tarka    • Bactrim [Sulfamethoxazole-Trimethoprim] Nausea Only and Other (See Comments)     Headache -  Cant be taken with tikosyn - " "septra ds   • Ciprofloxacin Diarrhea   • Cogentin [Benztropine] Other (See Comments)     \"uncontrollable body movements\"   • Compazine [Prochlorperazine Edisylate] Other (See Comments)     Dystonic reaction   • Cortisone Other (See Comments)     MAKES BLOOD PRESSURE HIGH    • Duraprep [Antiseptic Products, Misc.] Itching and Rash     RASH AND ITCHING   • Haldol [Haloperidol Lactate] Other (See Comments)     Dystonic reaction   • Hydralazine Other (See Comments)     Headache    • Hydrocodone-Acetaminophen Nausea And Vomiting and Dizziness     Headache, nausea    • Levaquin [Levofloxacin] Other (See Comments)     Cannot take due to taking propafenone HCL- severe reaction with mixed.    • Lisinopril Cough   • Statins Myalgia     Leg pain- all statins    • Tarka [Trandolapril-Verapamil Hcl Er] Other (See Comments)     Constipation        Current Outpatient Medications:   •  Accu-Chek Softclix Lancets lancets, Use as instructed TID; ICD-10 E11.65; Z79.4, Disp: 300 each, Rfl: 3  •  albuterol (PROVENTIL) (2.5 MG/3ML) 0.083% nebulizer solution, Take 2.5 mg by nebulization Every 4 (Four) Hours As Needed., Disp: , Rfl:   •  albuterol sulfate HFA (Ventolin HFA) 108 (90 Base) MCG/ACT inhaler, Inhale 1 puff Every 4 (Four) Hours As Needed for Wheezing or Shortness of Air., Disp: 8 g, Rfl: 5  •  ALLERGY SERUM INJECTION, Inject  under the skin into the appropriate area as directed 1 (One) Time Per Week., Disp: , Rfl:   •  amantadine (SYMMETREL) 100 MG capsule, Take 100 mg by mouth 2 (Two) Times a Day., Disp: , Rfl:   •  apixaban (Eliquis) 5 MG tablet tablet, Take 1 tablet by mouth Every 12 (Twelve) Hours., Disp: 180 tablet, Rfl: 2  •  aspirin 81 MG EC tablet, Take 81 mg by mouth Daily., Disp: , Rfl:   •  B Complex-C (SUPER B COMPLEX PO), Take 1 tablet by mouth Daily., Disp: , Rfl:   •  bumetanide (BUMEX) 1 MG tablet, 1-2 tabs po daily as directed, Disp: 180 tablet, Rfl: 1  •  Calcium Carbonate (CALTRATE 600 PO), Take 600 mg by " mouth Daily., Disp: , Rfl:   •  carbidopa-levodopa (SINEMET)  MG per tablet, Take  by mouth. 2 tablets at 0600, 1 tablet at 0900, 1200, 1500, 1800, 2100, Disp: , Rfl:   •  Cholecalciferol 2000 units tablet, Take 2,000 Units by mouth 2 (Two) Times a Day., Disp: , Rfl:   •  citalopram (CeleXA) 20 MG tablet, Take 20 mg by mouth every night at bedtime., Disp: , Rfl:   •  clindamycin (CLEOCIN) 150 MG capsule, Take 150 mg by mouth Daily. 4 capsules one hour before dental appointments., Disp: , Rfl:   •  clobetasol (TEMOVATE) 0.05 % cream, Apply 1 application topically 2 (Two) Times a Day As Needed (Lichens Sclerosis)., Disp: , Rfl:   •  dofetilide (TIKOSYN) 500 MCG capsule, Take 1 capsule by mouth Every 12 (Twelve) Hours., Disp: 180 capsule, Rfl: 3  •  Emollient (AQUAPHOR ADVANCED THERAPY EX), Apply  topically., Disp: , Rfl:   •  epoetin lizzie (EPOGEN,PROCRIT) 49935 UNIT/ML injection, Inject  under the skin into the appropriate area as directed., Disp: , Rfl:   •  ferrous sulfate 325 (65 FE) MG tablet, Take 325 mg by mouth Daily With Breakfast., Disp: , Rfl:   •  Glucosamine-Chondroit-Calcium (TRIPLE FLEX BONE & JOINT PO), Take 1 tablet by mouth Daily. Move free, Disp: , Rfl:   •  glucose blood (Accu-Chek Guide) test strip, 1 each by Other route 3 (Three) Times a Day., Disp: , Rfl:   •  Insulin Glargine (LANTUS SOLOSTAR) 100 UNIT/ML injection pen, Inject 30 Units under the skin into the appropriate area as directed Daily. (Patient taking differently: Inject  under the skin into the appropriate area as directed Daily. 10-20 varies depending on diet), Disp: 30 mL, Rfl: 3  •  Insulin Pen Needle 31G X 5 MM misc, Use to inject insulin daily., Disp: 100 each, Rfl: 3  •  IPRATROPIUM BROMIDE NA, 1 spray into the nostril(s) as directed by provider 2 (Two) Times a Day As Needed., Disp: , Rfl:   •  Loratadine 10 MG capsule, Take 10 mg by mouth Daily., Disp: , Rfl:   •  metFORMIN (GLUCOPHAGE) 1000 MG tablet, Take 1 tablet by  mouth 2 (Two) Times a Day With Meals., Disp: 180 tablet, Rfl: 1  •  metOLazone (ZAROXOLYN) 2.5 MG tablet, TAKE 1 TABLET DAILY, Disp: 90 tablet, Rfl: 1  •  Multiple Vitamins-Minerals (CENTRUM ULTRA WOMENS PO), Take 1 tablet by mouth Daily., Disp: , Rfl:   •  nitroglycerin (NITROLINGUAL) 0.4 MG/SPRAY spray, Place 1 spray under the tongue Every 5 (Five) Minutes As Needed for Chest Pain., Disp: 1 each, Rfl: 6  •  O2 (OXYGEN), Inhale 2 L/min 1 (One) Time. 2L all the time now, Disp: , Rfl:   •  omeprazole (priLOSEC) 40 MG capsule, Take 40 mg by mouth 2 (Two) Times a Day., Disp: , Rfl:   •  pramipexole (MIRAPEX) 1.5 MG tablet, Take 1.5 mg by mouth Every Night., Disp: , Rfl:   •  ranolazine (RANEXA) 500 MG 12 hr tablet, Take 1 tablet by mouth Every 12 (Twelve) Hours., Disp: 180 tablet, Rfl: 3  •  senna (senna) 8.6 MG tablet, Take 1 tablet by mouth Daily., Disp: , Rfl:   •  sodium-potassium-magnesium sulfates (Suprep Bowel Prep Kit) 17.5-3.13-1.6 GM/177ML solution oral solution, Take 2 bottles by mouth Take As Directed., Disp: 354 mL, Rfl: 0  •  spironolactone (ALDACTONE) 25 MG tablet, TAKE 2 TABLETS DAILY AS NEEDED FOR SWELLING (Patient taking differently: Take 50 mg by mouth Daily.), Disp: 180 tablet, Rfl: 3  •  Synthroid 200 MCG tablet, Take 1 tablet by mouth Daily., Disp: 90 tablet, Rfl: 1  •  traMADol (ULTRAM) 50 MG tablet, Take 50 mg by mouth Every 8 (Eight) Hours As Needed for Moderate Pain ., Disp: , Rfl:   •  triamcinolone (KENALOG) 0.1 % cream, As Needed., Disp: , Rfl:   •  Unable to find, 1 each 2 (two) times a day. Med Name: tonic water 4 oz BID, Disp: , Rfl:   •  valsartan (DIOVAN) 160 MG tablet, Take 1 tablet by mouth 2 (Two) Times a Day., Disp: 180 tablet, Rfl: 3  •  vitamin C (ASCORBIC ACID) 500 MG tablet, Take 500 mg by mouth Daily. Chewable tablet, Disp: , Rfl:   MEDICATION LIST AND ALLERGIES REVIEWED.    Social History     Tobacco Use   • Smoking status: Never Smoker   • Smokeless tobacco: Never Used  "  Vaping Use   • Vaping Use: Never used   Substance Use Topics   • Alcohol use: No   • Drug use: No       FAMILY AND SOCIAL HISTORY REVIEWED.    Review of Systems   Constitutional: Negative for activity change, appetite change, fatigue, fever and unexpected weight change.   HENT: Negative for congestion, postnasal drip, rhinorrhea, sinus pressure, sore throat and voice change.    Eyes: Negative for visual disturbance.   Respiratory: Positive for shortness of breath. Negative for cough, chest tightness and wheezing.    Cardiovascular: Negative for chest pain, palpitations and leg swelling.   Gastrointestinal: Negative for abdominal distention, abdominal pain, nausea and vomiting.   Endocrine: Negative for cold intolerance and heat intolerance.   Genitourinary: Negative for difficulty urinating and urgency.   Musculoskeletal: Negative for arthralgias, back pain and neck pain.   Skin: Negative for color change and pallor.   Allergic/Immunologic: Negative for environmental allergies and food allergies.   Neurological: Negative for dizziness, syncope, weakness and light-headedness.   Hematological: Negative for adenopathy. Does not bruise/bleed easily.   Psychiatric/Behavioral: Negative for agitation and behavioral problems.   .    /80   Pulse 76   Temp 97.7 °F (36.5 °C)   Ht 157.5 cm (62\")   Wt 108 kg (239 lb)   LMP  (LMP Unknown) Comment: Mammogram- 1/28/20  SpO2 97% Comment: resting, 2 liters pulse dose  Breastfeeding No   BMI 43.71 kg/m²     Immunization History   Administered Date(s) Administered   • COVID-19 (MODERNA) 02/12/2021, 03/12/2021   • Flu Vaccine Quad PF >36MO 10/02/2017, 09/11/2018, 09/21/2019   • Fluzone High Dose =>65 Years ([facility] ONLY) 09/13/2017   • Hepatitis A 05/03/1999, 10/05/1999   • INFLUENZA SPLIT TRI 09/15/2010, 10/02/2012, 10/02/2013   • Influenza, Unspecified 01/13/2004, 10/28/2004, 12/14/2005, 01/11/2007, 11/30/2007, 11/20/2008, 02/17/2010, 01/31/2011, 09/06/2011, " 09/09/2013, 09/13/2017, 09/11/2018   • PEDS-Pneumococcal Conjugate (PCV7) 12/20/2013, 12/04/2016   • Pneumococcal Conjugate 13-Valent (PCV13) 09/04/2015, 12/04/2016   • Pneumococcal Polysaccharide (PPSV23) 01/13/2004, 11/20/2008, 12/20/2013   • Shingrix 09/27/2019   • TD Preservative Free 02/01/2012, 05/04/2017   • Tdap 05/04/2017   • Zostavax 02/05/2013, 09/27/2019       Physical Exam  Vitals and nursing note reviewed.   Constitutional:       Appearance: She is well-developed. She is not diaphoretic.   HENT:      Head: Normocephalic and atraumatic.   Eyes:      Pupils: Pupils are equal, round, and reactive to light.   Neck:      Thyroid: No thyromegaly.   Cardiovascular:      Rate and Rhythm: Normal rate and regular rhythm.      Heart sounds: Normal heart sounds. No murmur heard.   No friction rub. No gallop.    Pulmonary:      Effort: Pulmonary effort is normal. No respiratory distress.      Breath sounds: Normal breath sounds. No wheezing or rales.   Chest:      Chest wall: No tenderness.   Abdominal:      General: Bowel sounds are normal.      Palpations: Abdomen is soft.      Tenderness: There is no abdominal tenderness.   Musculoskeletal:         General: No swelling. Normal range of motion.      Cervical back: Normal range of motion and neck supple.   Lymphadenopathy:      Cervical: No cervical adenopathy.   Skin:     General: Skin is warm and dry.      Capillary Refill: Capillary refill takes less than 2 seconds.   Neurological:      Mental Status: She is alert and oriented to person, place, and time.   Psychiatric:         Mood and Affect: Mood normal.         Behavior: Behavior normal.           RESULTS      PROBLEM LIST    Problem List Items Addressed This Visit        Gastrointestinal Abdominal     GERD (gastroesophageal reflux disease) - Primary       Pulmonary and Pneumonias    SOB (shortness of breath)       Sleep    MILLI treated with BiPAP - Patient reports compliance.     Overview     - Patient  reports compliance.                  DISCUSSION  Ms. Cross was here for follow-up of her dyspnea.  She seems to be doing better now that her hemoglobin is above 10 and continues to follow with hematology.    I did encourage her to continue wearing her BiPAP 14/16 every night.  The increase in her pressures is also helped her dyspnea as well.  She states that she is feeling better when she wakes up in the mornings.  She will continue to wear 2 L bled into the machine as well.    She will continue on 2 L continuously during the day as well.    She will continue omeprazole for GERD.  We also discussed reflux precautions in the office today.    She will follow-up in 3 to 4 months or sooner if her symptoms worsen.  She will call with any additional concerns or questions.    Level of service justified based on 36 minutes spent in patient care on this date of service including, but not limited to: preparing to see the patient, obtaining and/or reviewing history, performing medically appropriate examination, ordering tests/medicine/procedures, independently interpreting results, documenting clinical information in EHR, and counseling/education of patient/family/caregiver. (Level 4 30-39 minutes; Level 5 40-54 minutes)      Myrna Mckeon, DEJA  09/03/202113:53 EDT  Electronically signed     Please note that portions of this note were completed with a voice recognition program. Efforts were made to edit the dictations, but occasionally words are mistranscribed.      CC: Reynaldo Fritz MD

## 2021-09-06 RX ORDER — LEVOTHYROXINE SODIUM 200 MCG
TABLET ORAL
Qty: 90 TABLET | Refills: 3 | Status: SHIPPED | OUTPATIENT
Start: 2021-09-06 | End: 2022-01-14

## 2021-09-23 RX ORDER — VALSARTAN 160 MG/1
TABLET ORAL
Qty: 180 TABLET | Refills: 3 | Status: SHIPPED | OUTPATIENT
Start: 2021-09-23 | End: 2022-09-15

## 2021-11-03 ENCOUNTER — OFFICE VISIT (OUTPATIENT)
Dept: CARDIOLOGY | Facility: CLINIC | Age: 71
End: 2021-11-03

## 2021-11-03 VITALS
OXYGEN SATURATION: 94 % | HEART RATE: 72 BPM | DIASTOLIC BLOOD PRESSURE: 66 MMHG | HEIGHT: 62 IN | WEIGHT: 239.8 LBS | SYSTOLIC BLOOD PRESSURE: 142 MMHG | BODY MASS INDEX: 44.13 KG/M2

## 2021-11-03 DIAGNOSIS — I25.10 CORONARY ARTERY DISEASE INVOLVING NATIVE CORONARY ARTERY OF NATIVE HEART WITHOUT ANGINA PECTORIS: Primary | ICD-10-CM

## 2021-11-03 DIAGNOSIS — I10 ESSENTIAL HYPERTENSION: ICD-10-CM

## 2021-11-03 DIAGNOSIS — E78.5 HYPERLIPIDEMIA LDL GOAL <70: ICD-10-CM

## 2021-11-03 DIAGNOSIS — I73.9 PERIPHERAL VASCULAR DISEASE (HCC): ICD-10-CM

## 2021-11-03 DIAGNOSIS — I48.91 ATRIAL FIBRILLATION WITH RVR (HCC): ICD-10-CM

## 2021-11-03 PROCEDURE — 99213 OFFICE O/P EST LOW 20 MIN: CPT | Performed by: NURSE PRACTITIONER

## 2021-11-03 PROCEDURE — 93000 ELECTROCARDIOGRAM COMPLETE: CPT | Performed by: NURSE PRACTITIONER

## 2021-11-03 RX ORDER — ZINC GLUCONATE 50 MG
50 TABLET ORAL DAILY
COMMUNITY
End: 2022-06-08 | Stop reason: SDUPTHER

## 2021-11-03 NOTE — PROGRESS NOTES
[facility]    Patient ID: Joanna Cross is a 70 y.o. female.  : 1950   Contact: [phone]    Encounter date: 2021    PCP: Reynaldo Fritz MD      Chief complaint:   Chief Complaint   Patient presents with   • Coronary artery disease involving native coronary artery of      Problem List:  1. Coronary artery disease  a. [facility], 02/15/2008, Dr Lutz: Mild, non-obstructive CAD, normal EF  b. [facility], 2013, Dr Stevenson Sutton: No LV gram done. Mild, non-obstructive CAD.  c. [facility], 2015, [facility]:  EF 60%. 70% RCA lesion with Promus ZAINAB placement. 40-50% mid LAD, no FFR performed. Other small blockages noted. No MR.  d. [facility], 11/15/2015, Dr Palacio @ [facility]: Patent RCA stent, 40-50% LAD with FFR @ 0.92; mild inferior wall hypokinesis  e. [facility], 2016, [facility]: Normal CORS with patent stent. LAD improved since last [facility]. EF 55-60%.  f. [facility], 2019: EF 55-60%. Patent RCA stent, noncritical mid LAD with IFR 0.93, noncritical LCx.   2. Chronic venous stasis:  a. Venous duplex, 2010: Insufficiency to venous hypertension in the great saphenous system bilaterally.  b. Venous duplex : Right leg laser ablation of Dr. Garcia.  c. Venous duplex, 2016: No evidence of DVT, no superficial, no thrmobophlebitis, no valvular insufficiency.  d. AA with runoffs, 2016: Single-vessel Thompson: No significant arterial disease.  e. Arterial doppler/GLYNN, 2019: No evidence of significant lower extremity arterial occlusive disease.  3. Palpitations:  a. Echocardiogram, 2014: Dr. Teran. Normal biventricular function; trace to mild MR. Atrial septal aneurysm.  b. Echocardiogram, 2015, Dr. Chavez: Borderline LVH, mild LAE, mild RV enlargement. Mild to moderate MR and TR with RVSP of 46 mmHg.  c. Echocardiogram, 2016: Dr. Palacio.  RV enlargement.  EF 50-55%.  Mild AI S, mild MR and TR with RVSP 37  "mmHg.  d. Echocardiogram, 07/11/20: Dr. Jany Wynn: EF 60-65%. Mild to moderate MR.  4. PAF/SSS:  a. [employer] PPM 2016  b. CHADS2 Vasc = 6 (HTN, DM, Age 65-74, Female, H/o TIA). On chronic anticoagulation.  c. ECV, 12/26/2018: Successful cardioversion. AV paced.  d. Echocardiogram, 12/25/2018:  EF 60%, mild MR/TR with RVSP 29 mmHg. Sclerotic AoV, no AS/AI. Mild MAC.  e. ECV, 03/05/2019: Successful cardioversion.  f. [facility]io, 10/01/2019, 14 days: NSR baseline. Normal average rates. Periods of RV pacing.   5. TIA:  a. Carotid duplex, 02/13/2014: No significant flow-limiting stenosis. Mild luminal disease present; both vertebrals are present.  6. Diabetes  7. Essential Hypertension  8. Hyperlipidemia  9. Hypothyroid  10. Hyponatremia  a. Secondary to SIADH  11. MILLI with CPAP  12. RLS  13. Parkinson's disease  14. GERD  15. Urinary Retention  a. S/P cystoscopy and ureter dilation, March 2018.  Dr. Terrence Mckenzie  16. Surgeries:  a. Rotator cuff repair  b. Cholecystectomy  c. Bilateral knee replacement  d. Tubal ligation  e. Breast surgery  f. Sinus surgery    Allergies   Allergen Reactions   • Toprol Xl [Metoprolol Tartrate] Shortness Of Breath     Extreme fatigue   • Amlodipine Besylate Swelling     Lower extremity (ankles, feet) swelling   • Codeine Unknown (See Comments)     Pt is unaware of what reaction she had   • Entacapone Other (See Comments)     \"extreme weakness in legs - caused several falls, which stopped after discontinuing this medication\"   • Epinephrine Other (See Comments)     6/4/16- had 3 shots to numb mouth to prepare teeth for crowns, the shots contained epi-  Caused pt to have chest discomfort- went to hospital in ambulance, discovered had a fib while there    • Levemir [Insulin Detemir] Hives     Hives / rash around injection site   • Penicillins Hives     Jitteriness    • Xarelto [Rivaroxaban] GI Bleeding     hgb dropped to 5.2   • Bactrim [Sulfamethoxazole-Trimethoprim] Nausea Only " "and Other (See Comments)     Headache -  Cant be taken with tikosyn - septra ds   • Cimetidine Other (See Comments)     Cant be taken with tikosyn    • Ciprofloxacin Diarrhea   • Cogentin [Benztropine] Other (See Comments)     \"uncontrollable body movements\"   • Compazine [Prochlorperazine Edisylate] Other (See Comments)     Dystonic reaction   • Cortisone Other (See Comments)     MAKES BLOOD PRESSURE HIGH    • Duraprep [Antiseptic Products, Misc.] Itching and Rash     RASH AND ITCHING   • Erythromycin Base Other (See Comments)     Cant be taken with tikosyn - z pack and ketek    • Flurandrenolone Other (See Comments)     Cant be taken with tikosyn     Include - levaquin, cipro, norloxacin    • Haldol [Haloperidol Lactate] Other (See Comments)     Dystonic reaction   • Hydralazine Other (See Comments)     Headache    • Hydrochlorothiazide Other (See Comments)     Cant be taken with tikosyn -  Also hydrodiuril, microzide, hydro-par, oretic, esidrix, ezide or any medicine with hct or hctz in name    • Hydrocodone-Acetaminophen Nausea And Vomiting and Dizziness     Headache, nausea    • Levaquin [Levofloxacin] Other (See Comments)     Cannot take due to taking propafenone HCL- severe reaction with mixed.    • Lisinopril Cough   • Macrolides And Ketolides Other (See Comments)     antibx -   Cant be taken with tikosyn    • Statins Myalgia     Leg pain- all statins    • Tarka [Trandolapril-Verapamil Hcl Er] Other (See Comments)     Constipation    • Verapamil Other (See Comments)     Cant be taken with tikosyn - calan, covera-hs, isoptin, verelan or tarka        Current Medications:    Current Outpatient Medications:   •  Accu-Chek Softclix Lancets lancets, Use as instructed TID; ICD-10 E11.65; Z79.4, Disp: 300 each, Rfl: 3  •  albuterol (PROVENTIL) (2.5 MG/3ML) 0.083% nebulizer solution, Take 2.5 mg by nebulization Every 4 (Four) Hours As Needed., Disp: , Rfl:   •  albuterol sulfate HFA (Ventolin HFA) 108 (90 Base) " MCG/ACT inhaler, Inhale 1 puff Every 4 (Four) Hours As Needed for Wheezing or Shortness of Air., Disp: 8 g, Rfl: 5  •  ALLERGY SERUM INJECTION, Inject  under the skin into the appropriate area as directed 1 (One) Time Per Week., Disp: , Rfl:   •  amantadine (SYMMETREL) 100 MG capsule, Take 100 mg by mouth 2 (Two) Times a Day., Disp: , Rfl:   •  apixaban (Eliquis) 5 MG tablet tablet, Take 1 tablet by mouth Every 12 (Twelve) Hours., Disp: 180 tablet, Rfl: 2  •  aspirin 81 MG EC tablet, Take 81 mg by mouth Daily., Disp: , Rfl:   •  B Complex-C (SUPER B COMPLEX PO), Take 1 tablet by mouth Daily., Disp: , Rfl:   •  bumetanide (BUMEX) 1 MG tablet, 1-2 tabs po daily as directed, Disp: 180 tablet, Rfl: 1  •  Calcium Carbonate (CALTRATE 600 PO), Take 600 mg by mouth Daily., Disp: , Rfl:   •  carbidopa-levodopa (SINEMET)  MG per tablet, Take  by mouth. 2 tablets at 0600, 1 tablet at 0900, 1200, 1500, 1800, 2100, Disp: , Rfl:   •  Cholecalciferol 2000 units tablet, Take 2,000 Units by mouth 2 (Two) Times a Day., Disp: , Rfl:   •  citalopram (CeleXA) 20 MG tablet, Take 20 mg by mouth every night at bedtime., Disp: , Rfl:   •  clindamycin (CLEOCIN) 150 MG capsule, Take 150 mg by mouth Daily. 4 capsules one hour before dental appointments., Disp: , Rfl:   •  clobetasol (TEMOVATE) 0.05 % cream, Apply 1 application topically 2 (Two) Times a Day As Needed (Lichens Sclerosis)., Disp: , Rfl:   •  dofetilide (TIKOSYN) 500 MCG capsule, Take 1 capsule by mouth Every 12 (Twelve) Hours., Disp: 180 capsule, Rfl: 3  •  Emollient (AQUAPHOR ADVANCED THERAPY EX), Apply  topically., Disp: , Rfl:   •  epoetin lizzie (EPOGEN,PROCRIT) 23574 UNIT/ML injection, Inject  under the skin into the appropriate area as directed., Disp: , Rfl:   •  ferrous sulfate 325 (65 FE) MG tablet, Take 325 mg by mouth Daily With Breakfast., Disp: , Rfl:   •  Glucosamine-Chondroit-Calcium (TRIPLE FLEX BONE & JOINT PO), Take 1 tablet by mouth Daily. Move free, Disp:  , Rfl:   •  glucose blood (Accu-Chek Guide) test strip, 1 each by Other route 3 (Three) Times a Day., Disp: , Rfl:   •  Insulin Glargine (LANTUS SOLOSTAR) 100 UNIT/ML injection pen, Inject 30 Units under the skin into the appropriate area as directed Daily. (Patient taking differently: Inject  under the skin into the appropriate area as directed Daily. 10-20 varies depending on diet), Disp: 30 mL, Rfl: 3  •  Insulin Pen Needle 31G X 5 MM misc, Use to inject insulin daily., Disp: 100 each, Rfl: 3  •  IPRATROPIUM BROMIDE NA, 1 spray into the nostril(s) as directed by provider 2 (Two) Times a Day As Needed., Disp: , Rfl:   •  Loratadine 10 MG capsule, Take 10 mg by mouth Daily., Disp: , Rfl:   •  metFORMIN (GLUCOPHAGE) 1000 MG tablet, Take 1 tablet by mouth 2 (Two) Times a Day With Meals., Disp: 180 tablet, Rfl: 1  •  metOLazone (ZAROXOLYN) 2.5 MG tablet, TAKE 1 TABLET DAILY, Disp: 90 tablet, Rfl: 1  •  Multiple Vitamins-Minerals (CENTRUM ULTRA WOMENS PO), Take 1 tablet by mouth Daily., Disp: , Rfl:   •  nitroglycerin (NITROLINGUAL) 0.4 MG/SPRAY spray, Place 1 spray under the tongue Every 5 (Five) Minutes As Needed for Chest Pain., Disp: 1 each, Rfl: 6  •  O2 (OXYGEN), Inhale 2 L/min 1 (One) Time. 2L all the time now, Disp: , Rfl:   •  omeprazole (priLOSEC) 40 MG capsule, Take 40 mg by mouth 2 (Two) Times a Day., Disp: , Rfl:   •  pramipexole (MIRAPEX) 1.5 MG tablet, Take 1.5 mg by mouth Every Night., Disp: , Rfl:   •  ranolazine (RANEXA) 500 MG 12 hr tablet, Take 1 tablet by mouth Every 12 (Twelve) Hours., Disp: 180 tablet, Rfl: 3  •  senna (senna) 8.6 MG tablet, Take 1 tablet by mouth Daily., Disp: , Rfl:   •  spironolactone (ALDACTONE) 25 MG tablet, TAKE 2 TABLETS DAILY AS NEEDED FOR SWELLING (Patient taking differently: Take 50 mg by mouth Daily.), Disp: 180 tablet, Rfl: 3  •  Synthroid 200 MCG tablet, TAKE 1 TABLET DAILY, Disp: 90 tablet, Rfl: 3  •  traMADol (ULTRAM) 50 MG tablet, Take 50 mg by mouth Every 8  "(Eight) Hours As Needed for Moderate Pain ., Disp: , Rfl:   •  triamcinolone (KENALOG) 0.1 % cream, As Needed., Disp: , Rfl:   •  Unable to find, 1 each 2 (two) times a day. Med Name: tonic water 4 oz BID, Disp: , Rfl:   •  valsartan (DIOVAN) 160 MG tablet, TAKE 1 TABLET TWICE A DAY, Disp: 180 tablet, Rfl: 3  •  vitamin C (ASCORBIC ACID) 500 MG tablet, Take 500 mg by mouth Daily. Chewable tablet, Disp: , Rfl:   •  Zinc 50 MG tablet, Take 50 mg by mouth Daily., Disp: , Rfl:     HPI    Joanna Cross is a 70 y.o. female who presents today for a 6 month follow up of coronary artery disease, peripheral vascular disease, atrial fibrillation with RVR, and cardiac risk factors. Since last visit, patient has done well overall. She reports having one episode of chest pain which she believes was related to her shoulder. She took one SL NTG and this resolved. She has not had recurrent chest pain since then. She reports chronic SOA/DOUGLAS which is unchanged. No PND, orthopnea, edema. She has MILLI and uses CPAP. She has fatigue but this is also unchanged. She denies any palpitations.        The following portions of the patient's history were reviewed and updated as appropriate: allergies, current medications and problem list.    Pertinent positives as listed in the HPI.  All other systems reviewed are negative.         Vitals:    11/03/21 1444   BP: 142/66   BP Location: Left arm   Patient Position: Sitting   Cuff Size: Adult   Pulse: 72   SpO2: 94%   Weight: 109 kg (239 lb 12.8 oz)   Height: 157.5 cm (62\")       Physical Exam:  General: Alert and oriented.  Neck: Jugular venous pressure is within normal limits. Carotids have normal upstrokes without bruits.   Cardiovascular: Heart has a nondisplaced focal PMI. Regular rate and rhythm. No murmur, gallop or rub.  Lungs: Clear, no rales or wheezes. Equal expansion is noted.   Extremities: Show no edema.  Skin: Warm and dry.  Neurologic: Nonfocal.     Diagnostic Data (reviewed with " patient):     Lab date: 4/22/2021  • BMP: Glu 131, BUN 33, Creat 1.2, eGFR 44, Na 136, K 4.5, Cl 98, CO2 28, Ca 9.9, Alk Phos     Lab date: 03/31/2021  · BMP: Glu 61, BUN 44, Creat 1.2, eGFR 44, Na 136, K 3.7, Cl 99, CO2 26, Ca 9.8     Lab date: 08/13/2020  · FLP: TC 189, TG 96, HDL 40, LDL 130     Lab Results   Component Value Date    WBC 7.84 06/15/2021    RBC 3.52 (L) 06/15/2021    HGB 10.8 (L) 06/15/2021    HCT 32.9 (L) 06/15/2021    MCV 93.5 06/15/2021     06/15/2021      Lab Results   Component Value Date    TSH 0.570 08/23/2021          ECG 12 Lead    Date/Time: 11/3/2021 4:34 PM  Performed by: Neris Dominguez APRN  Authorized by: Neris Dominguez APRN   Comparison: compared with previous ECG from 6/15/2021  BPM: 75  Comments: Wandering atrial pacemaker              Assessment:    ICD-10-CM ICD-9-CM   1. Coronary artery disease involving native coronary artery of native heart without angina pectoris  I25.10 414.01   2. Peripheral vascular disease (HCC)  I73.9 443.9   3. Atrial fibrillation with RVR ([facility])  I48.91 427.31   4. Essential hypertension  I10 401.9   5. Hyperlipidemia LDL goal <70  E78.5 272.4         Plan:  1. Patient was counseled to begin aerobic exercise 30 min per day for at least 4 days per week.   2. Continue on Tikosyn 500 mcg q12h for rhythm control.   3. Continue on Eliquis 5 mg BID for stroke prophylaxis.   4. Continue on aspirin 81 mg for antiplatelet therapy, Ranexa 500 mg q12h for chest pain, and nitroglycerin 0.4 mg as needed.   5. Continue on valsartan 160 mg BID for hypertension.   6. Continue on spironolactone 25 mg PRN and metolazone 2.5 mg daily  for fluid retention.   7. Continue all other current medications.  8. F/up in 12 months, sooner if needed.      Electronically signed by DEJA Lin, 11/03/21, 4:41 PM EDT.

## 2021-11-18 ENCOUNTER — OFFICE VISIT (OUTPATIENT)
Dept: CARDIOLOGY | Facility: CLINIC | Age: 71
End: 2021-11-18

## 2021-11-18 VITALS
HEART RATE: 68 BPM | OXYGEN SATURATION: 99 % | BODY MASS INDEX: 43.43 KG/M2 | WEIGHT: 236 LBS | DIASTOLIC BLOOD PRESSURE: 84 MMHG | SYSTOLIC BLOOD PRESSURE: 134 MMHG | HEIGHT: 62 IN

## 2021-11-18 DIAGNOSIS — I48.19 PERSISTENT ATRIAL FIBRILLATION (HCC): ICD-10-CM

## 2021-11-18 DIAGNOSIS — I49.5 TACHY-BRADY SYNDROME (HCC): ICD-10-CM

## 2021-11-18 DIAGNOSIS — I48.0 PAROXYSMAL ATRIAL FIBRILLATION (HCC): Primary | ICD-10-CM

## 2021-11-18 PROCEDURE — 99214 OFFICE O/P EST MOD 30 MIN: CPT | Performed by: PHYSICIAN ASSISTANT

## 2021-11-18 PROCEDURE — 93000 ELECTROCARDIOGRAM COMPLETE: CPT | Performed by: PHYSICIAN ASSISTANT

## 2021-11-18 PROCEDURE — 93280 PM DEVICE PROGR EVAL DUAL: CPT | Performed by: PHYSICIAN ASSISTANT

## 2021-11-18 NOTE — PROGRESS NOTES
"    Joanna Cross  1950  PCP: Reynaldo Fritz MD    SUBJECTIVE:   Joanna Cross is a 70 y.o. female seen for a follow up visit regarding the following:     Chief Complaint: Follow up for afib, tachybrady syndrome, pacemaker check, HTN     HPI:    Patient is a 70 year old female with a history of persistent atrial fibrillation on Tikosyn, tachybrady syndrome s/p DDD PPM, CAD, and HTN that presents for routine follow-up. She has been doing well from an EP standpoint. No significant recurrence of afib since last follow-up. She is now following the pulmonary associates and has been placed on continuous O2. No chest pain, She has had a difficult time with LE edema, she is taking \"extra\" fluid pills.     Problem List:  1. Coronary artery disease  a. [facility], 02/15/2008, Dr Lutz: Mild, non-obstructive CAD, normal EF  b. [facility], 01/09/2013, Dr Stevenson Sutton: No LV gram done. Mild, non-obstructive CAD.  c. [facility], 11/9/2015, [facility]:  EF 60%. 70% RCA lesion with Promus ZAINAB placement. 40-50% mid LAD, no FFR performed. Other small blockages noted. No MR.  d. [facility], 11/15/2015, Dr Palacio @ [facility]: Patent RCA stent, 40-50% LAD with FFR @ 0.92; mild inferior wall hypokinesis  e. [facility], 07/29/2016, [facility]: Normal CORS with patent stent. LAD improved since last [facility]. EF 55-60%.  f. [facility], 02/01/2019: EF 55-60%. Patent RCA stent, noncritical mid LAD with IFR 0.93, noncritical LCx.   2. Chronic venous stasis:  a. Venous duplex, 09/17/2010: Insufficiency to venous hypertension in the great saphenous system bilaterally.  b. Venous duplex 2013: Right leg laser ablation of Dr. Garcia.  c. Venous duplex, 05/06/2016: No evidence of DVT, no superficial, no thrmobophlebitis, no valvular insufficiency.  d. AA with runoffs, 08/31/2016: Single-vessel [place]: No significant arterial disease.  e. Arterial doppler/GLYNN, 08/29/2019: No evidence of significant lower extremity arterial occlusive " disease.  3. Palpitations:  a. Echocardiogram, 02/13/2014: Dr. Teran. Normal biventricular function; trace to mild MR. Atrial septal aneurysm.  b. Echocardiogram, 12/22/2015, Dr. Chavez: Borderline LVH, mild LAE, mild RV enlargement. Mild to moderate MR and TR with RVSP of 46 mmHg.  c. Echocardiogram, 03/2016: Dr. Palacio.  RV enlargement.  EF 50-55%.  Mild AI S, mild MR and TR with RVSP 37 mmHg.  d. Echocardiogram, 07/11/20: Dr. Jany Wynn: EF 60-65%. Mild to moderate MR.  4. PAF/SSS:  a. Room 8 Studio PPM 2016  b. CHADS2 Vasc = 6 (HTN, DM, Age 65-74, Female, H/o TIA). On chronic anticoagulation.  c. ECV, 12/26/2018: Successful cardioversion. AV paced.  d. Echocardiogram, 12/25/2018:  EF 60%, mild MR/TR with RVSP 29 mmHg. Sclerotic AoV, no AS/AI. Mild MAC.  e. ECV, 03/05/2019: Successful cardioversion.  f. Zio, 10/01/2019, 14 days: NSR baseline. Normal average rates. Periods of RV pacing.   5. TIA:  a. Carotid duplex, 02/13/2014: No significant flow-limiting stenosis. Mild luminal disease present; both vertebrals are present.  6. Diabetes  7. Essential Hypertension  8. Hyperlipidemia  9. Hypothyroid  10. Hyponatremia  a. Secondary to SIADH  11. MILLI with CPAP  12. RLS  13. Parkinson's disease  14. GERD  15. Urinary Retention  a. S/P cystoscopy and ureter dilation, March 2018.  Dr. Terrence Mckenzie  16. Surgeries:  a. Rotator cuff repair  b. Cholecystectomy  c. Bilateral knee replacement  d. Tubal ligation  e. Breast surgery  f. Sinus surgery      Current Outpatient Medications:   •  Accu-Chek Softclix Lancets lancets, Use as instructed TID; ICD-10 E11.65; Z79.4, Disp: 300 each, Rfl: 3  •  albuterol (PROVENTIL) (2.5 MG/3ML) 0.083% nebulizer solution, Take 2.5 mg by nebulization Every 4 (Four) Hours As Needed., Disp: , Rfl:   •  albuterol sulfate HFA (Ventolin HFA) 108 (90 Base) MCG/ACT inhaler, Inhale 1 puff Every 4 (Four) Hours As Needed for Wheezing or Shortness of Air., Disp: 8 g, Rfl: 5  •  ALLERGY SERUM  INJECTION, Inject  under the skin into the appropriate area as directed 1 (One) Time Per Week., Disp: , Rfl:   •  amantadine (SYMMETREL) 100 MG capsule, Take 100 mg by mouth 2 (Two) Times a Day., Disp: , Rfl:   •  apixaban (Eliquis) 5 MG tablet tablet, Take 1 tablet by mouth Every 12 (Twelve) Hours., Disp: 180 tablet, Rfl: 2  •  aspirin 81 MG EC tablet, Take 81 mg by mouth Daily., Disp: , Rfl:   •  B Complex-C (SUPER B COMPLEX PO), Take 1 tablet by mouth Daily., Disp: , Rfl:   •  bumetanide (BUMEX) 1 MG tablet, 1-2 tabs po daily as directed, Disp: 180 tablet, Rfl: 1  •  Calcium Carbonate (CALTRATE 600 PO), Take 600 mg by mouth Daily., Disp: , Rfl:   •  carbidopa-levodopa (SINEMET)  MG per tablet, Take  by mouth. 2 tablets at 0600, 1 tablet at 0900, 1200, 1500, 1800, 2100, Disp: , Rfl:   •  Cholecalciferol 2000 units tablet, Take 2,000 Units by mouth 2 (Two) Times a Day., Disp: , Rfl:   •  citalopram (CeleXA) 20 MG tablet, Take 20 mg by mouth every night at bedtime., Disp: , Rfl:   •  clindamycin (CLEOCIN) 150 MG capsule, Take 150 mg by mouth Daily. 4 capsules one hour before dental appointments., Disp: , Rfl:   •  clobetasol (TEMOVATE) 0.05 % cream, Apply 1 application topically 2 (Two) Times a Day As Needed (Lichens Sclerosis)., Disp: , Rfl:   •  dofetilide (TIKOSYN) 500 MCG capsule, Take 1 capsule by mouth Every 12 (Twelve) Hours., Disp: 180 capsule, Rfl: 3  •  Emollient (AQUAPHOR ADVANCED THERAPY EX), Apply  topically., Disp: , Rfl:   •  epoetin lizzie (EPOGEN,PROCRIT) 00409 UNIT/ML injection, Inject  under the skin into the appropriate area as directed., Disp: , Rfl:   •  ferrous sulfate 325 (65 FE) MG tablet, Take 325 mg by mouth Daily With Breakfast., Disp: , Rfl:   •  Glucosamine-Chondroit-Calcium (TRIPLE FLEX BONE & JOINT PO), Take 1 tablet by mouth Daily. Move free, Disp: , Rfl:   •  glucose blood (Accu-Chek Guide) test strip, 1 each by Other route 3 (Three) Times a Day., Disp: , Rfl:   •  Insulin  Glargine (LANTUS SOLOSTAR) 100 UNIT/ML injection pen, Inject 30 Units under the skin into the appropriate area as directed Daily. (Patient taking differently: Inject  under the skin into the appropriate area as directed Daily. 10-20 varies depending on diet), Disp: 30 mL, Rfl: 3  •  Insulin Pen Needle 31G X 5 MM misc, Use to inject insulin daily., Disp: 100 each, Rfl: 3  •  IPRATROPIUM BROMIDE NA, 1 spray into the nostril(s) as directed by provider 2 (Two) Times a Day As Needed., Disp: , Rfl:   •  Loratadine 10 MG capsule, Take 10 mg by mouth Daily., Disp: , Rfl:   •  metFORMIN (GLUCOPHAGE) 1000 MG tablet, Take 1 tablet by mouth 2 (Two) Times a Day With Meals., Disp: 180 tablet, Rfl: 1  •  metOLazone (ZAROXOLYN) 2.5 MG tablet, TAKE 1 TABLET DAILY, Disp: 90 tablet, Rfl: 1  •  Multiple Vitamins-Minerals (CENTRUM ULTRA WOMENS PO), Take 1 tablet by mouth Daily., Disp: , Rfl:   •  nitroglycerin (NITROLINGUAL) 0.4 MG/SPRAY spray, Place 1 spray under the tongue Every 5 (Five) Minutes As Needed for Chest Pain., Disp: 1 each, Rfl: 6  •  O2 (OXYGEN), Inhale 2 L/min 1 (One) Time. 2L all the time now, Disp: , Rfl:   •  omeprazole (priLOSEC) 40 MG capsule, Take 40 mg by mouth 2 (Two) Times a Day., Disp: , Rfl:   •  pramipexole (MIRAPEX) 1.5 MG tablet, Take 1.5 mg by mouth Every Night., Disp: , Rfl:   •  ranolazine (RANEXA) 500 MG 12 hr tablet, Take 1 tablet by mouth Every 12 (Twelve) Hours., Disp: 180 tablet, Rfl: 3  •  senna (senna) 8.6 MG tablet, Take 1 tablet by mouth Daily., Disp: , Rfl:   •  spironolactone (ALDACTONE) 25 MG tablet, TAKE 2 TABLETS DAILY AS NEEDED FOR SWELLING (Patient taking differently: Take 50 mg by mouth Daily.), Disp: 180 tablet, Rfl: 3  •  Synthroid 200 MCG tablet, TAKE 1 TABLET DAILY, Disp: 90 tablet, Rfl: 3  •  traMADol (ULTRAM) 50 MG tablet, Take 50 mg by mouth Every 8 (Eight) Hours As Needed for Moderate Pain ., Disp: , Rfl:   •  triamcinolone (KENALOG) 0.1 % cream, As Needed., Disp: , Rfl:   •   "Unable to find, 1 each 2 (two) times a day. Med Name: tonic water 4 oz BID, Disp: , Rfl:   •  valsartan (DIOVAN) 160 MG tablet, TAKE 1 TABLET TWICE A DAY, Disp: 180 tablet, Rfl: 3  •  vitamin C (ASCORBIC ACID) 500 MG tablet, Take 500 mg by mouth Daily. Chewable tablet, Disp: , Rfl:   •  Zinc 50 MG tablet, Take 50 mg by mouth Daily., Disp: , Rfl:     Past Medical History, Past Surgical History, Family history, Social History, and Medications were all reviewed with the patient today and updated as necessary.       Patient Active Problem List   Diagnosis   • Coronary artery disease involving native coronary artery without angina pectoris   • Essential hypertension   • Peripheral vascular disease (HCC)   • Hyperlipidemia LDL goal <70   • Spinal stenosis, lumbar region, with neurogenic claudication   • Diabetes mellitus (HCC)   • Delayed surgical wound healing   • Biceps tendinitis   • Overactive bladder   • GERD (gastroesophageal reflux disease)   • Hypothyroidism   • Lichen sclerosus   • Parkinson's disease (HCC)   • MILLI treated with BiPAP - Patient reports compliance.    • History of respiratory failure - prior respiratory failure requiring mechanical ventilation, open lung biopsy non-specific.    • SOB (shortness of breath)   • A-fib (HCC)   • Chest pain   • Atrial fibrillation with RVR (HCC)   • CKD (chronic kidney disease)   • CAD in native artery   • Persistent atrial fibrillation (HCC)   • Tachy-thai syndrome (HCC)   • Long term current use of antiarrhythmic drug   • History of adenomatous polyp of colon   • Anemia   • Angiodysplasia   • Hx of colonic polyps     Allergies   Allergen Reactions   • Toprol Xl [Metoprolol Tartrate] Shortness Of Breath     Extreme fatigue   • Amlodipine Besylate Swelling     Lower extremity (ankles, feet) swelling   • Codeine Unknown (See Comments)     Pt is unaware of what reaction she had   • Entacapone Other (See Comments)     \"extreme weakness in legs - caused several falls, " "which stopped after discontinuing this medication\"   • Epinephrine Other (See Comments)     6/4/16- had 3 shots to numb mouth to prepare teeth for crowns, the shots contained epi-  Caused pt to have chest discomfort- went to hospital in ambulance, discovered had a fib while there    • Levemir [Insulin Detemir] Hives     Hives / rash around injection site   • Penicillins Hives     Jitteriness    • Xarelto [Rivaroxaban] GI Bleeding     hgb dropped to 5.2   • Bactrim [Sulfamethoxazole-Trimethoprim] Nausea Only and Other (See Comments)     Headache -  Cant be taken with tikosyn - septra ds   • Cimetidine Other (See Comments)     Cant be taken with tikosyn    • Ciprofloxacin Diarrhea   • Cogentin [Benztropine] Other (See Comments)     \"uncontrollable body movements\"   • Compazine [Prochlorperazine Edisylate] Other (See Comments)     Dystonic reaction   • Cortisone Other (See Comments)     MAKES BLOOD PRESSURE HIGH    • Duraprep [Antiseptic Products, Misc.] Itching and Rash     RASH AND ITCHING   • Erythromycin Base Other (See Comments)     Cant be taken with tikosyn - z pack and ketek    • Flurandrenolone Other (See Comments)     Cant be taken with tikosyn     Include - levaquin, cipro, norloxacin    • Haldol [Haloperidol Lactate] Other (See Comments)     Dystonic reaction   • Hydralazine Other (See Comments)     Headache    • Hydrochlorothiazide Other (See Comments)     Cant be taken with tikosyn -  Also hydrodiuril, microzide, hydro-par, oretic, esidrix, ezide or any medicine with hct or hctz in name    • Hydrocodone-Acetaminophen Nausea And Vomiting and Dizziness     Headache, nausea    • Levaquin [Levofloxacin] Other (See Comments)     Cannot take due to taking propafenone HCL- severe reaction with mixed.    • Lisinopril Cough   • Macrolides And Ketolides Other (See Comments)     antibx -   Cant be taken with tikosyn    • Statins Myalgia     Leg pain- all statins    • Tarka [Trandolapril-Verapamil Hcl Er] Other (See " Comments)     Constipation    • Verapamil Other (See Comments)     Cant be taken with tikosyn - calan, covera-hs, isoptin, verelan or tarka      Past Medical History:   Diagnosis Date   • Anemia    • Ankle problem     thinks back related causing pain    • Atrial fibrillation ([facility])    • AVM (arteriovenous malformation)    • Back pain    • Benign hypertension    • Howard-tachy syndrome ([facility])    • CAD (coronary artery disease)     1 stent   • Colon polyp    • COVID-19 vaccine series completed    • Depression    • Disease of thyroid gland    • DM2 (diabetes mellitus, type 2) ([facility])     checks sugar twice per day    • Encounter for allergy testing     also autoimmune testing 2018   • Essential hypertension    • Gastrocnemius muscle tear     left medial 91   • Generalized osteoarthritis    • GERD (gastroesophageal reflux disease)    • GI bleed     in hospital for a week    • GIB (gastrointestinal bleeding) 2016    d/t xarelto    • Headache    • Hiatal hernia    • History of cardiac monitoring     14 days    • History of heart disease    • History of shingles    • History of transfusion 2016    no reaction recalled    • Hypothyroidism    • Hypothyroidism    • IBS (irritable bowel syndrome)    • Klebsiella pneumonia ([facility])    • Lichen sclerosus    • Mixed hyperlipidemia    • Morbid obesity ([facility])    • Mouth problem     mouth guard used since pt bites tongue and lips excessively at night if not- with bipap    • MRSA infection 2018   • Myocardial infarction ([facility])     11/09/15   • Obesity    • On home oxygen therapy     2L of oxygen all the time due to current congestion    • MILLI on CPAP     16/12 1/8/20 bipap compliant with o2 hook up 2l - also uses mouth guard too     • Osteoporosis    • Pacemaker    • Parkinson disease ([facility])    • Parkinson's disease ([facility])    • Peripheral vascular disease ([facility])    • Pleurisy    • Pneumonia    • Puerperal sepsis with acute hypoxic respiratory failure ([facility])     emergent intubation- 2016   • Right  leg pain     from back issues    • Salivary gland stone    • Sciatic nerve pain    • Seborrheic dermatitis    • Skin cancer     on back    • SOBOE (shortness of breath on exertion)    • SSS (sick sinus syndrome) (HCC)    • TIA (transient ischemic attack)    • Type 2 diabetes mellitus (HCC)    • UTI (urinary tract infection)    • Varicose vein of leg    • Venous insufficiency of both lower extremities      Past Surgical History:   Procedure Laterality Date   • BACK SURGERY      l4-l5 laminectomy    • BICEPS TENDON REPAIR Right     shoulder   • BREAST BIOPSY Left 2004   • BRONCHOSCOPY RIGID / FLEXIBLE      2016   • BUNIONECTOMY Right    • CARDIAC CATHETERIZATION     • CARDIAC CATHETERIZATION N/A 2/1/2019    Procedure: Left Heart Cath;  Surgeon: Albertina Corona MD;  Location:  [facility];  Service: Cardiology   • CHOLECYSTECTOMY     • COLONOSCOPY  2016   • COLONOSCOPY N/A 6/17/2021    Procedure: COLONOSCOPY WITH POLYPECTOMY;  Surgeon: Trenton Sosa MD;  Location:  [facility];  Service: Gastroenterology;  Laterality: N/A;   • CORONARY STENT PLACEMENT      x1 stent   • CYST REMOVAL      left ear, upper left back 2001   • CYSTOSCOPY      x2  18 and 20 -   in 20 urethra dilation    • DIAGNOSTIC LAPAROSCOPY  1981   • ENDOSCOPIC FUNCTIONAL SINUS SURGERY (FESS)  2011   • ENDOSCOPY  2016   • ENTEROSCOPY SMALL BOWEL      with single ballon with fluoro    • HAMMER TOE REPAIR Left    • INCISION AND DRAINAGE OF WOUND  2018    back with wound infection    • JOINT REPLACEMENT     • KNEE ARTHROSCOPY     • LASER ABLATION      right leg 13   • LIPOMA EXCISION  1999    left leg    • LUMBAR LAMINECTOMY DISCECTOMY DECOMPRESSION N/A 7/6/2018    Procedure: LUMBAR LAMINECTOMY L4-5, HEMILAMIINECTOMY RIGHT L5-S1, FORAMINOTOMY L5-S1;  Surgeon: Lencho Gamino MD;  Location:  [facility];  Service: Neurosurgery   • LUMBAR LAMINECTOMY DISCECTOMY DECOMPRESSION N/A 9/19/2018    Procedure: INCISION AND  "DRAINAGE BACK WITH WOUND EXPLORATION;  Surgeon: Ritchie García MD;  Location: [facility];  Service: Neurosurgery   • LUNG BIOPSY Left 2016   • OTHER SURGICAL HISTORY      ct scan of chest and sinuses and lower back    • OTHER SURGICAL HISTORY      various echos    • OTHER SURGICAL HISTORY      electroencephalogram 16,   emg  ncv tests 2007   • OTHER SURGICAL HISTORY      mra 2007  and various mri with xrays, nuclear medicine lung ventilation with perfusion test 2016 with pft    • OTHER SURGICAL HISTORY      barium swallow testing 15, 12, 12   • OTHER SURGICAL HISTORY      vaginal ultrasound- 2012,   vas clementina lower extrem 2016, vas venous duplex lower extrem bilat 2019   • OTHER SURGICAL HISTORY      wdge biopsy spring of lung    • OTHER SURGICAL HISTORY      emergent intubation- hypoxic resp failure    • PACEMAKER IMPLANTATION  11/2016    sss   • REPLACEMENT TOTAL KNEE Bilateral     left knee 2011, right 2012 per dr acuna    • REPLACEMENT TOTAL KNEE Bilateral    • SHOULDER ARTHROSCOPY Bilateral     2003-left, 2004- right    • SKIN BIOPSY      skin cancer back 2016   • SKIN CANCER EXCISION      upper righ tback    • MADHAV     • TEETH EXTRACTION      x2   • TUBAL ABDOMINAL LIGATION Bilateral 1980   • WISDOM TOOTH EXTRACTION       Family History   Problem Relation Age of Onset   • Kidney disease Mother    • Coronary artery disease Mother    • Hypertension Mother    • Heart disease Mother    • Hyperlipidemia Mother    • Coronary artery disease Father    • Hypertension Father    • Hypothyroidism Father    • Coronary artery disease Brother    • Colon polyps Neg Hx    • Colon cancer Neg Hx      Social History     Tobacco Use   • Smoking status: Never Smoker   • Smokeless tobacco: Never Used   Substance Use Topics   • Alcohol use: No         PHYSICAL EXAM:    /84   Pulse 68   Ht 157.5 cm (62.01\")   Wt 107 kg (236 lb)   LMP  (LMP Unknown) Comment: Mammogram- 1/28/20  SpO2 99%   BMI 43.15 kg/m²        Wt Readings from " Last 5 Encounters:   11/18/21 107 kg (236 lb)   11/03/21 109 kg (239 lb 12.8 oz)   09/03/21 108 kg (239 lb)   08/23/21 111 kg (244 lb)   06/15/21 109 kg (240 lb 1.3 oz)       BP Readings from Last 5 Encounters:   11/18/21 134/84   11/03/21 142/66   09/03/21 120/80   08/23/21 146/70   06/17/21 157/62       General-Well Nourished, Well developed  Eyes - PERRLA  Neck- supple, No mass  CV- regular rate and rhythm, no MRG, + 2 pulse edema   Lung- clear bilaterally  Abd- soft, +BS  Musc/skel - Norm strength and range of motion  Skin- warm and dry  Neuro - Alert & Oriented x 3, appropriate mood.        Medical problems and test results were reviewed with the patient today.     No results found for this or any previous visit (from the past 672 hour(s)).      EKG: (EKG has been independently visualized by me and summarized below)      ECG 12 Lead    Date/Time: 11/18/2021 3:30 PM  Performed by: Joe Ceron PA  Authorized by: Joe Ceron PA   Rhythm: sinus rhythm  Rate: normal  Conduction: conduction normal  ST Segments: ST segments normal  T Waves: T waves normal  QRS axis: normal  Other: no other findings    Clinical impression: normal ECG           Device Interrogation: [facility] DDDR 60bpm. A paced 59%. RV paced 95. Normal thresholds and impedances. No arrhthymias. 3 years on battery.     ASSESSMENT and PLAN    1.  Atrial fibrillation-persistent in nature. Currently on Tikosyn 500mcg BID w/ stable QTc. Patient does note improvement in symptoms. Will continue Tikosyn and Eliquis. No significant recurrence of afib on device interrogation.      2.  Dual-chamber pacemaker-Stable Interrogation today       3.  Tikosyn use- EKG reviewed today. QTc is stable.      4. HTN- controlled. Continue Valsartan    5. Edema, DANN hose, diuretics     6. Chadsvasc=6 continue Elquis 5mg BID      Electronically signed by BRIGITTE Chris, 11/18/21, 3:31 PM EST.  11/18/2021  15:24 EST

## 2021-11-30 NOTE — TELEPHONE ENCOUNTER
Joanna Cross has scheduled the cystoscopy--possible urethral dilatation- for Tuesday, March 13. [facility] recommends 5-7 days hold of warfarin.     May we suggest 5 days hold prior to procedure with restarting the warfarin the evening of the procedure? She has atrial fibrillation s/p PPM with CHADSVASC = 7 including TIA, would it be necessary to bridge with enoxaparin?    Thanks.    Ismael Aguilera, [facility]

## 2021-12-13 ENCOUNTER — OFFICE VISIT (OUTPATIENT)
Dept: PULMONOLOGY | Facility: CLINIC | Age: 71
End: 2021-12-13

## 2021-12-13 VITALS
HEART RATE: 82 BPM | SYSTOLIC BLOOD PRESSURE: 122 MMHG | DIASTOLIC BLOOD PRESSURE: 84 MMHG | WEIGHT: 239.8 LBS | HEIGHT: 62 IN | OXYGEN SATURATION: 100 % | TEMPERATURE: 98.2 F | BODY MASS INDEX: 44.13 KG/M2 | RESPIRATION RATE: 18 BRPM

## 2021-12-13 DIAGNOSIS — I48.19 PERSISTENT ATRIAL FIBRILLATION (HCC): Primary | ICD-10-CM

## 2021-12-13 DIAGNOSIS — D64.9 ANEMIA, UNSPECIFIED TYPE: ICD-10-CM

## 2021-12-13 DIAGNOSIS — R06.02 SOB (SHORTNESS OF BREATH): ICD-10-CM

## 2021-12-13 DIAGNOSIS — G47.33 OSA TREATED WITH BIPAP: ICD-10-CM

## 2021-12-13 DIAGNOSIS — K21.9 GASTROESOPHAGEAL REFLUX DISEASE, UNSPECIFIED WHETHER ESOPHAGITIS PRESENT: ICD-10-CM

## 2021-12-13 PROCEDURE — 99214 OFFICE O/P EST MOD 30 MIN: CPT | Performed by: NURSE PRACTITIONER

## 2021-12-13 RX ORDER — BACLOFEN 10 MG/1
10 TABLET ORAL 3 TIMES DAILY
COMMUNITY
End: 2022-01-14

## 2021-12-13 NOTE — PROGRESS NOTES
[facility] Pulmonary Follow up    CHIEF COMPLAINT    Dyspnea    HISTORY OF PRESENT ILLNESS    Joanna Cross is a 71 y.o.female here today for follow-up of her dyspnea.  She was last seen in the office by me in September.  She denies any respiratory illnesses or exacerbations since her last appointment.    She states that she is still struggling with her anemia but her hemoglobin remains above 10 currently.  She states that her breathing is improved when her hemoglobin remains elevated.    She does have dyspnea with exertion.  She does use 2 L continuously at home and will use 3 L when working with physical therapy.  She tries to stay fairly active.    She continues to use her BiPAP 14/16 every night.  She has been sleeping very well.  She also uses 2 L bled into the machine at night.  She feels well rested in the morning.    She denies fever, chills, sputum production, hemoptysis, night sweats, weight loss, chest pain or palpitations.  She denies any lower extremity edema or calf tenderness.  She denies any sinus or allergy symptoms.  She denies reflux symptoms and does take omeprazole twice a day.     She is up-to-date on her current vaccinations.    Patient Active Problem List   Diagnosis   • Coronary artery disease involving native coronary artery without angina pectoris   • Essential hypertension   • Peripheral vascular disease (HCC)   • Hyperlipidemia LDL goal <70   • Spinal stenosis, lumbar region, with neurogenic claudication   • Diabetes mellitus (HCC)   • Delayed surgical wound healing   • Biceps tendinitis   • Overactive bladder   • GERD (gastroesophageal reflux disease)   • Hypothyroidism   • Lichen sclerosus   • Parkinson's disease (HCC)   • MILLI treated with BiPAP - Patient reports compliance.    • History of respiratory failure - prior respiratory failure requiring mechanical ventilation, open lung biopsy non-specific.    • SOB (shortness of breath)   • A-fib (HCC)   • Chest pain   • Atrial  "fibrillation with RVR (HCC)   • CKD (chronic kidney disease)   • CAD in native artery   • Persistent atrial fibrillation (HCC)   • Tachy-thai syndrome (HCC)   • Long term current use of antiarrhythmic drug   • History of adenomatous polyp of colon   • Anemia   • Angiodysplasia   • Hx of colonic polyps       Allergies   Allergen Reactions   • Toprol Xl [Metoprolol Tartrate] Shortness Of Breath     Extreme fatigue   • Amlodipine Besylate Swelling     Lower extremity (ankles, feet) swelling   • Codeine Unknown (See Comments)     Pt is unaware of what reaction she had   • Entacapone Other (See Comments)     \"extreme weakness in legs - caused several falls, which stopped after discontinuing this medication\"   • Epinephrine Other (See Comments)     6/4/16- had 3 shots to numb mouth to prepare teeth for crowns, the shots contained epi-  Caused pt to have chest discomfort- went to hospital in ambulance, discovered had a fib while there    • Levemir [Insulin Detemir] Hives     Hives / rash around injection site   • Penicillins Hives     Jitteriness    • Xarelto [Rivaroxaban] GI Bleeding     hgb dropped to 5.2   • Bactrim [Sulfamethoxazole-Trimethoprim] Nausea Only and Other (See Comments)     Headache -  Cant be taken with tikosyn - septra ds   • Cimetidine Other (See Comments)     Cant be taken with tikosyn    • Ciprofloxacin Diarrhea   • Cogentin [Benztropine] Other (See Comments)     \"uncontrollable body movements\"   • Compazine [Prochlorperazine Edisylate] Other (See Comments)     Dystonic reaction   • Cortisone Other (See Comments)     MAKES BLOOD PRESSURE HIGH    • Duraprep [Antiseptic Products, Misc.] Itching and Rash     RASH AND ITCHING   • Erythromycin Base Other (See Comments)     Cant be taken with tikosyn - z pack and ketek    • Flurandrenolone Other (See Comments)     Cant be taken with tikosyn     Include - levaquin, cipro, norloxacin    • Haldol [Haloperidol Lactate] Other (See Comments)     Dystonic " reaction   • Hydralazine Other (See Comments)     Headache    • Hydrochlorothiazide Other (See Comments)     Cant be taken with tikosyn -  Also hydrodiuril, microzide, hydro-par, oretic, esidrix, ezide or any medicine with hct or hctz in name    • Hydrocodone-Acetaminophen Nausea And Vomiting and Dizziness     Headache, nausea    • Levaquin [Levofloxacin] Other (See Comments)     Cannot take due to taking propafenone HCL- severe reaction with mixed.    • Lisinopril Cough   • Macrolides And Ketolides Other (See Comments)     antibx -   Cant be taken with tikosyn    • Statins Myalgia     Leg pain- all statins    • Tarka [Trandolapril-Verapamil Hcl Er] Other (See Comments)     Constipation    • Verapamil Other (See Comments)     Cant be taken with tikosyn - calan, covera-hs, isoptin, verelan or tarka        Current Outpatient Medications:   •  baclofen (LIORESAL) 10 MG tablet, Take 10 mg by mouth 3 (Three) Times a Day., Disp: , Rfl:   •  Accu-Chek Softclix Lancets lancets, Use as instructed TID; ICD-10 E11.65; Z79.4, Disp: 300 each, Rfl: 3  •  albuterol (PROVENTIL) (2.5 MG/3ML) 0.083% nebulizer solution, Take 2.5 mg by nebulization Every 4 (Four) Hours As Needed., Disp: , Rfl:   •  albuterol sulfate HFA (Ventolin HFA) 108 (90 Base) MCG/ACT inhaler, Inhale 1 puff Every 4 (Four) Hours As Needed for Wheezing or Shortness of Air., Disp: 8 g, Rfl: 5  •  ALLERGY SERUM INJECTION, Inject  under the skin into the appropriate area as directed 1 (One) Time Per Week., Disp: , Rfl:   •  amantadine (SYMMETREL) 100 MG capsule, Take 100 mg by mouth 2 (Two) Times a Day., Disp: , Rfl:   •  apixaban (Eliquis) 5 MG tablet tablet, Take 1 tablet by mouth Every 12 (Twelve) Hours., Disp: 180 tablet, Rfl: 2  •  aspirin 81 MG EC tablet, Take 81 mg by mouth Daily., Disp: , Rfl:   •  B Complex-C (SUPER B COMPLEX PO), Take 1 tablet by mouth Daily., Disp: , Rfl:   •  bumetanide (BUMEX) 1 MG tablet, 1-2 tabs po daily as directed, Disp: 180 tablet,  Rfl: 1  •  Calcium Carbonate (CALTRATE 600 PO), Take 600 mg by mouth Daily., Disp: , Rfl:   •  carbidopa-levodopa (SINEMET)  MG per tablet, Take  by mouth. 2 tablets at 0600, 1 tablet at 0900, 1200, 1500, 1800, 2100, Disp: , Rfl:   •  Cholecalciferol 2000 units tablet, Take 2,000 Units by mouth 2 (Two) Times a Day., Disp: , Rfl:   •  citalopram (CeleXA) 20 MG tablet, Take 20 mg by mouth every night at bedtime., Disp: , Rfl:   •  clindamycin (CLEOCIN) 150 MG capsule, Take 150 mg by mouth Daily. 4 capsules one hour before dental appointments., Disp: , Rfl:   •  clobetasol (TEMOVATE) 0.05 % cream, Apply 1 application topically 2 (Two) Times a Day As Needed (Lichens Sclerosis)., Disp: , Rfl:   •  dofetilide (TIKOSYN) 500 MCG capsule, Take 1 capsule by mouth Every 12 (Twelve) Hours., Disp: 180 capsule, Rfl: 3  •  Emollient (AQUAPHOR ADVANCED THERAPY EX), Apply  topically., Disp: , Rfl:   •  epoetin lizzie (EPOGEN,PROCRIT) 63034 UNIT/ML injection, Inject  under the skin into the appropriate area as directed., Disp: , Rfl:   •  ferrous sulfate 325 (65 FE) MG tablet, Take 325 mg by mouth Daily With Breakfast., Disp: , Rfl:   •  Glucosamine-Chondroit-Calcium (TRIPLE FLEX BONE & JOINT PO), Take 1 tablet by mouth Daily. Move free, Disp: , Rfl:   •  glucose blood (Accu-Chek Guide) test strip, 1 each by Other route 3 (Three) Times a Day., Disp: , Rfl:   •  Insulin Glargine (LANTUS SOLOSTAR) 100 UNIT/ML injection pen, Inject 30 Units under the skin into the appropriate area as directed Daily. (Patient taking differently: Inject  under the skin into the appropriate area as directed Daily. 10-20 varies depending on diet), Disp: 30 mL, Rfl: 3  •  Insulin Pen Needle 31G X 5 MM misc, Use to inject insulin daily., Disp: 100 each, Rfl: 3  •  IPRATROPIUM BROMIDE NA, 1 spray into the nostril(s) as directed by provider 2 (Two) Times a Day As Needed., Disp: , Rfl:   •  Loratadine 10 MG capsule, Take 10 mg by mouth Daily., Disp: , Rfl:    •  metFORMIN (GLUCOPHAGE) 1000 MG tablet, Take 1 tablet by mouth 2 (Two) Times a Day With Meals., Disp: 180 tablet, Rfl: 1  •  metOLazone (ZAROXOLYN) 2.5 MG tablet, TAKE 1 TABLET DAILY, Disp: 90 tablet, Rfl: 1  •  Multiple Vitamins-Minerals (CENTRUM ULTRA WOMENS PO), Take 1 tablet by mouth Daily., Disp: , Rfl:   •  nitroglycerin (NITROLINGUAL) 0.4 MG/SPRAY spray, Place 1 spray under the tongue Every 5 (Five) Minutes As Needed for Chest Pain., Disp: 1 each, Rfl: 6  •  O2 (OXYGEN), Inhale 2 L/min 1 (One) Time. 2L all the time now, Disp: , Rfl:   •  omeprazole (priLOSEC) 40 MG capsule, Take 40 mg by mouth 2 (Two) Times a Day., Disp: , Rfl:   •  pramipexole (MIRAPEX) 1.5 MG tablet, Take 1.5 mg by mouth Every Night., Disp: , Rfl:   •  ranolazine (RANEXA) 500 MG 12 hr tablet, Take 1 tablet by mouth Every 12 (Twelve) Hours., Disp: 180 tablet, Rfl: 3  •  senna (senna) 8.6 MG tablet, Take 1 tablet by mouth Daily., Disp: , Rfl:   •  spironolactone (ALDACTONE) 25 MG tablet, TAKE 2 TABLETS DAILY AS NEEDED FOR SWELLING (Patient taking differently: Take 50 mg by mouth Daily.), Disp: 180 tablet, Rfl: 3  •  Synthroid 200 MCG tablet, TAKE 1 TABLET DAILY, Disp: 90 tablet, Rfl: 3  •  triamcinolone (KENALOG) 0.1 % cream, As Needed., Disp: , Rfl:   •  Unable to find, 1 each 2 (two) times a day. Med Name: tonic water 4 oz BID, Disp: , Rfl:   •  valsartan (DIOVAN) 160 MG tablet, TAKE 1 TABLET TWICE A DAY, Disp: 180 tablet, Rfl: 3  •  vitamin C (ASCORBIC ACID) 500 MG tablet, Take 500 mg by mouth Daily. Chewable tablet, Disp: , Rfl:   •  Zinc 50 MG tablet, Take 50 mg by mouth Daily., Disp: , Rfl:   MEDICATION LIST AND ALLERGIES REVIEWED.    Social History     Tobacco Use   • Smoking status: Never Smoker   • Smokeless tobacco: Never Used   Vaping Use   • Vaping Use: Never used   Substance Use Topics   • Alcohol use: No   • Drug use: No       FAMILY AND SOCIAL HISTORY REVIEWED.    Review of Systems   Constitutional: Negative for activity  "change, appetite change, fatigue, fever and unexpected weight change.   HENT: Negative for congestion, postnasal drip, rhinorrhea, sinus pressure, sore throat and voice change.    Eyes: Negative for visual disturbance.   Respiratory: Positive for shortness of breath. Negative for cough, chest tightness and wheezing.    Cardiovascular: Negative for chest pain, palpitations and leg swelling.   Gastrointestinal: Negative for abdominal distention, abdominal pain, nausea and vomiting.   Endocrine: Negative for cold intolerance and heat intolerance.   Genitourinary: Negative for difficulty urinating and urgency.   Musculoskeletal: Negative for arthralgias, back pain and neck pain.   Skin: Negative for color change and pallor.   Allergic/Immunologic: Negative for environmental allergies and food allergies.   Neurological: Negative for dizziness, syncope, weakness and light-headedness.   Hematological: Negative for adenopathy. Does not bruise/bleed easily.   Psychiatric/Behavioral: Negative for agitation and behavioral problems.   .    /84   Pulse 82   Temp 98.2 °F (36.8 °C)   Resp 18   Ht 157.5 cm (62.01\")   Wt 109 kg (239 lb 12.8 oz)   LMP  (LMP Unknown) Comment: Mammogram- 1/28/20  SpO2 100% Comment: 2l cont  BMI 43.85 kg/m²     Immunization History   Administered Date(s) Administered   • COVID-19 (MODERNA) 1st, 2nd, 3rd Dose Only 02/12/2021, 03/12/2021   • Flu Vaccine Quad PF >36MO 10/02/2017, 09/11/2018, 09/21/2019   • Fluzone High Dose =>65 Years ([facility] ONLY) 09/13/2017   • Hepatitis A 05/03/1999, 10/05/1999   • INFLUENZA SPLIT TRI 09/15/2010, 10/02/2012, 10/02/2013   • Influenza, Unspecified 01/13/2004, 10/28/2004, 12/14/2005, 01/11/2007, 11/30/2007, 11/20/2008, 02/17/2010, 01/31/2011, 09/06/2011, 09/09/2013, 09/13/2017, 09/11/2018   • PEDS-Pneumococcal Conjugate (PCV7) 12/20/2013, 12/04/2016   • Pneumococcal Conjugate 13-Valent (PCV13) 09/04/2015, 12/04/2016   • Pneumococcal Polysaccharide (PPSV23) " 01/13/2004, 11/20/2008, 12/20/2013   • Shingrix 09/27/2019   • TD Preservative Free 02/01/2012, 05/04/2017   • Tdap 05/04/2017   • Zostavax 02/05/2013, 09/27/2019       Physical Exam  Vitals reviewed.   Constitutional:       Appearance: She is well-developed.   HENT:      Head: Normocephalic and atraumatic.   Eyes:      Pupils: Pupils are equal, round, and reactive to light.   Cardiovascular:      Rate and Rhythm: Normal rate and regular rhythm.      Heart sounds: Normal heart sounds.   Pulmonary:      Effort: Pulmonary effort is normal.      Breath sounds: Normal breath sounds. No wheezing or rales.   Chest:      Chest wall: No tenderness.   Abdominal:      General: Bowel sounds are normal.      Palpations: Abdomen is soft.   Musculoskeletal:         General: No swelling. Normal range of motion.      Cervical back: Normal range of motion and neck supple.   Skin:     General: Skin is warm and dry.      Capillary Refill: Capillary refill takes less than 2 seconds.   Neurological:      Mental Status: She is alert and oriented to person, place, and time.   Psychiatric:         Mood and Affect: Mood normal.         Behavior: Behavior normal.           RESULTS      PROBLEM LIST    Problem List Items Addressed This Visit        Cardiac and Vasculature    Persistent atrial fibrillation (HCC) - Primary       Gastrointestinal Abdominal     GERD (gastroesophageal reflux disease)       Hematology and Neoplasia    Anemia       Pulmonary and Pneumonias    SOB (shortness of breath)       Sleep    MILLI treated with BiPAP - Patient reports compliance.     Overview     - Patient reports compliance.                  DISCUSSION    Ms. Cross was here for follow-up of her dyspnea.  She seems to be doing fairly well from a pulmonary standpoint.    She will continue to use her albuterol as needed for shortness of breath.  She does not use this on a regular basis.    She will continue to wear her BiPAP every night for MILLI.  She denies any  sleeping difficulties.  We will get her BiPAP download at her next appointment.    She will continue to follow closely with her PCP for her anemia.  I do suspect that some of this is contributing to her dyspnea.    I did encourage her to do some daily physical activity if possible.    She will follow-up in 6 months or sooner if her symptoms worsen.  She will call with any additional concerns or questions.    Level of service justified based on 36 minutes spent in patient care on this date of service including, but not limited to: preparing to see the patient, obtaining and/or reviewing history, performing medically appropriate examination, ordering tests/medicine/procedures, independently interpreting results, documenting clinical information in EHR, and counseling/education of patient/family/caregiver. (Level 4 30-39 minutes; Level 5 40-54 minutes)      Myrna Mckeon, APRN  12/13/202114:43 EST  Electronically signed     Please note that portions of this note were completed with a voice recognition program.        CC: Reynaldo Fritz MD

## 2021-12-20 RX ORDER — RANOLAZINE 500 MG/1
TABLET, EXTENDED RELEASE ORAL
Qty: 180 TABLET | Refills: 3 | Status: SHIPPED | OUTPATIENT
Start: 2021-12-20 | End: 2022-12-30 | Stop reason: SDUPTHER

## 2022-01-14 ENCOUNTER — OFFICE VISIT (OUTPATIENT)
Dept: ENDOCRINOLOGY | Facility: CLINIC | Age: 72
End: 2022-01-14

## 2022-01-14 VITALS
HEART RATE: 72 BPM | WEIGHT: 243 LBS | HEIGHT: 62 IN | SYSTOLIC BLOOD PRESSURE: 120 MMHG | DIASTOLIC BLOOD PRESSURE: 86 MMHG | BODY MASS INDEX: 44.72 KG/M2 | OXYGEN SATURATION: 97 %

## 2022-01-14 DIAGNOSIS — L98.8 MACERATION OF SKIN: ICD-10-CM

## 2022-01-14 DIAGNOSIS — Z79.4 TYPE 2 DIABETES MELLITUS WITH HYPERGLYCEMIA, WITH LONG-TERM CURRENT USE OF INSULIN: Primary | Chronic | ICD-10-CM

## 2022-01-14 DIAGNOSIS — E03.9 ACQUIRED HYPOTHYROIDISM: Chronic | ICD-10-CM

## 2022-01-14 DIAGNOSIS — E66.01 CLASS 3 SEVERE OBESITY DUE TO EXCESS CALORIES WITH SERIOUS COMORBIDITY AND BODY MASS INDEX (BMI) OF 40.0 TO 44.9 IN ADULT: Chronic | ICD-10-CM

## 2022-01-14 DIAGNOSIS — E11.65 TYPE 2 DIABETES MELLITUS WITH HYPERGLYCEMIA, WITH LONG-TERM CURRENT USE OF INSULIN: Primary | Chronic | ICD-10-CM

## 2022-01-14 LAB
EXPIRATION DATE: ABNORMAL
GLUCOSE BLDC GLUCOMTR-MCNC: 227 MG/DL (ref 70–130)
HBA1C MFR BLD: 6.1 %
Lab: ABNORMAL

## 2022-01-14 PROCEDURE — 99214 OFFICE O/P EST MOD 30 MIN: CPT | Performed by: PHYSICIAN ASSISTANT

## 2022-01-14 PROCEDURE — 82947 ASSAY GLUCOSE BLOOD QUANT: CPT | Performed by: PHYSICIAN ASSISTANT

## 2022-01-14 RX ORDER — BACLOFEN 10 MG/1
1 TABLET ORAL EVERY 8 HOURS PRN
COMMUNITY
Start: 2021-12-01 | End: 2022-11-17

## 2022-01-14 RX ORDER — LEVOTHYROXINE SODIUM 200 UG/1
200 TABLET ORAL DAILY
Qty: 90 TABLET | Refills: 3 | Status: SHIPPED | OUTPATIENT
Start: 2022-01-14 | End: 2022-04-25

## 2022-01-14 NOTE — PROGRESS NOTES
Office Note      Date: 2022  Patient Name: Joanna Cross  MRN: [id number]  : 1950    Chief Complaint   Patient presents with   • Diabetes       History of Present Illness:   Joanna Cross is a 71 y.o. female who presents today for follow up on type 2 diabetes (diagnosed in ) and hypothyroidism.  Known diabetic complications: cardiovascular disease.  Current diabetic medications include Lantus and metformin.  She reports that she is adjusting her Lantus dose from 10-20 units based on blood sugar at night.  Current monitoring regimen: testing 2 times per day.  Home blood sugar records: fasting 100-120 range, bedtime often 150-160.  Any episodes of hypoglycemia? no  Feet:  No sores/ulcers.  She sees podiatrist every 9 weeks, last visit earlier this week.  Eye exam current (within one year): yes, 6/3/2021, Dr. Vasu Garland. No retinopathy.  Current diet: She reports diet has not been as good.  Eating desserts.  Current exercise: limited due to Parkinson's.  She is doing physical therapy.  ACE inhibitor/ARB: Yes.  Statin: No, intolerant.  She remains on Synthroid 200 mcg daily.  She is taking this correctly and regularly.  She notes fatigue.  She denies other symptoms of hypo or hyperthyroidism at this time.  Insurance not covering Synthroid, preferred alternatives Levoxyl or Unithroid.  She had labs last month.  A1c was 6.1%.  Creatinine 0.96, BUN 35, eGFR 57.  The TSH was 0.86.    Subjective      Review of Systems:   Review of Systems   Constitutional: Positive for fatigue.   Cardiovascular: Negative.    Gastrointestinal: Negative.    Endocrine: Negative.    Musculoskeletal: Positive for arthralgias.       The following portions of the patient's history were reviewed and updated as appropriate: allergies, current medications, past family history, past medical history, past social history, past surgical history and problem list.    Objective     Vitals:    22 0955   BP: 120/86  "  Pulse: 72   SpO2: 97%   Weight: 110 kg (243 lb)   Height: 157.5 cm (62\")     Body mass index is 44.45 kg/m².    Physical Exam  Vitals reviewed.   Constitutional:       General: She is not in acute distress.     Comments: O2 per NC   Cardiovascular:      Pulses:           Dorsalis pedis pulses are 2+ on the right side and 2+ on the left side.        Posterior tibial pulses are 2+ on the right side and 2+ on the left side.   Musculoskeletal:      Right foot: Deformity (overlapping toes, pes planus) present.      Left foot: Deformity (overlapping toes, pes planus) present.   Feet:      Right foot:      Protective Sensation: 8 sites tested. 8 sites sensed.      Skin integrity: Skin breakdown (maceration between toes) present.      Left foot:      Protective Sensation: 8 sites tested. 8 sites sensed.      Skin integrity: Skin integrity normal.   Neurological:      Mental Status: She is alert and oriented to person, place, and time.   Psychiatric:         Mood and Affect: Affect normal.         HEMOGLOBIN A1C  Lab Results   Component Value Date    HGBA1C 6.1 12/08/2021    HGBA1C 6.3 08/23/2021    HGBA1C 6.2 05/19/2021     GLUCOSE  Lab Results   Component Value Date    POCGLU 227 (A) 01/14/2022     THYROID  Lab Results   Component Value Date    TSH 0.570 08/23/2021    FREET4 1.83 (H) 12/24/2018       Current Outpatient Medications   Medication Instructions   • Accu-Chek Softclix Lancets lancets Use as instructed TID; ICD-10 E11.65; Z79.4   • albuterol (PROVENTIL) 2.5 mg, Nebulization, Every 4 Hours PRN   • albuterol sulfate HFA (Ventolin HFA) 108 (90 Base) MCG/ACT inhaler 1 puff, Inhalation, Every 4 Hours PRN   • ALLERGY SERUM INJECTION Subcutaneous, Weekly   • amantadine (SYMMETREL) 100 mg, Oral, 2 Times Daily   • apixaban (ELIQUIS) 5 mg, Oral, Every 12 Hours Scheduled   • aspirin 81 mg, Oral, Daily   • B Complex-C (SUPER B COMPLEX PO) 1 tablet, Oral, Daily   • baclofen (LIORESAL) 10 MG tablet 1 tablet, Oral, Every 8 " Hours   • bumetanide (BUMEX) 1 MG tablet 1-2 tabs po daily as directed   • Calcium Carbonate (CALTRATE 600 PO) 600 mg, Oral, Daily   • carbidopa-levodopa (SINEMET)  MG per tablet Oral, 2 tablets at 0600, 1 tablet at 0900, 1200, 1500, 1800, 2100   • Cholecalciferol 2,000 Units, Oral, 2 Times Daily   • citalopram (CELEXA) 20 mg, Oral, Every Night at Bedtime   • clindamycin (CLEOCIN) 150 mg, Oral, Daily, 4 capsules one hour before dental appointments.   • clobetasol (TEMOVATE) 0.05 % cream 1 application, Topical, 2 Times Daily PRN   • dofetilide (TIKOSYN) 500 mcg, Oral, Every 12 Hours   • Emollient (AQUAPHOR ADVANCED THERAPY EX) Apply externally   • epoetin lizzie (EPOGEN,PROCRIT) 49952 UNIT/ML injection Subcutaneous   • ferrous sulfate 325 mg, Oral, Daily With Breakfast   • Glucosamine-Chondroit-Calcium (TRIPLE FLEX BONE & JOINT PO) 1 tablet, Oral, Daily, Move free   • glucose blood (Accu-Chek Guide) test strip 1 each, Other, 3 Times Daily   • Insulin Glargine (LANTUS SOLOSTAR) 30 Units, Subcutaneous, Daily   • Insulin Pen Needle 31G X 5 MM misc Use to inject insulin daily.   • IPRATROPIUM BROMIDE NA 1 spray, Nasal, 2 Times Daily PRN   • Loratadine 10 mg, Oral, Daily   • metFORMIN (GLUCOPHAGE) 1,000 mg, Oral, 2 Times Daily With Meals   • metOLazone (ZAROXOLYN) 2.5 MG tablet TAKE 1 TABLET DAILY   • Multiple Vitamins-Minerals (CENTRUM ULTRA WOMENS PO) 1 tablet, Oral, Daily   • nitroglycerin (NITROLINGUAL) 0.4 MG/SPRAY spray 1 spray, Sublingual, Every 5 Minutes PRN   • O2 (OXYGEN) 2 L/min, Inhalation, Once, 2L all the time now   • omeprazole (PRILOSEC) 40 mg, Oral, 2 Times Daily   • pramipexole (MIRAPEX) 1.5 mg, Oral, Nightly   • ranolazine (RANEXA) 500 MG 12 hr tablet TAKE 1 TABLET EVERY 12 HOURS   • senna (senna) 8.6 MG tablet 1 tablet, Oral, Daily   • spironolactone (ALDACTONE) 25 MG tablet TAKE 2 TABLETS DAILY AS NEEDED FOR SWELLING   • triamcinolone (KENALOG) 0.1 % cream As Needed   • Unable to find 1 each, 2  times daily, Med Name: tonic water 4 oz BID   • Unithroid 200 mcg, Oral, Daily   • valsartan (DIOVAN) 160 MG tablet TAKE 1 TABLET TWICE A DAY   • vitamin C (ASCORBIC ACID) 500 mg, Oral, Daily, Chewable tablet   • Zinc 50 mg, Oral, Daily       Assessment / Plan      Assessment & Plan:  1. Type 2 diabetes mellitus with hyperglycemia, with long-term current use of insulin ([facility])  A1c okay.  Advised that increasing Lantus dose based on nighttime blood sugars will increase her risk of hypoglycemia.  We discussed adjusting Lantus based on fasting blood sugar.  Recommend Lantus 12 units every night.  Titrate by 3 units every 3 days for fasting goal .  Call if needed for help with insulin adjustments.  - POC Glucose, Blood    2. Acquired hypothyroidism  Recent TSH normal.  Due to insurance, change from Synthroid to Unithroid - continue 200 mcg daily.    3. Class 3 severe obesity due to excess calories with serious comorbidity and body mass index (BMI) of 40.0 to 44.9 in adult ([facility])  She is considering Noom.  Encouraged her to give this a try.    4. Maceration of skin  She does have some maceration of skin between toes.  She has difficulty reaching her toes.  I recommended that she reach out to her podiatrist for recommendations.      Return in about 3 months (around 4/14/2022) for next scheduled follow up. She was advised to contact the office with any interval questions or concerns.    BRIGITTE Aj  Endocrinology  01/14/2022

## 2022-01-14 NOTE — PATIENT INSTRUCTIONS
Lantus 12 units every night.  Morning (fasting) goal .  Increase by 3 units if morning blood sugar is consistently over 130 for 3 days.    Decrease by 3 units if morning blood sugar less than 80.

## 2022-02-15 RX ORDER — LANCETS
EACH MISCELLANEOUS
Qty: 300 EACH | Refills: 1 | Status: SHIPPED | OUTPATIENT
Start: 2022-02-15 | End: 2022-12-06

## 2022-02-15 RX ORDER — BLOOD SUGAR DIAGNOSTIC
STRIP MISCELLANEOUS
Qty: 300 EACH | Refills: 1 | Status: SHIPPED | OUTPATIENT
Start: 2022-02-15 | End: 2022-12-06

## 2022-03-12 DIAGNOSIS — E03.9 PRIMARY HYPOTHYROIDISM: ICD-10-CM

## 2022-03-12 DIAGNOSIS — E53.8 VITAMIN B12 DEFICIENCY (NON ANEMIC): ICD-10-CM

## 2022-03-12 DIAGNOSIS — D50.9 IRON DEFICIENCY ANEMIA, UNSPECIFIED IRON DEFICIENCY ANEMIA TYPE: Primary | ICD-10-CM

## 2022-03-12 DIAGNOSIS — E11.22 TYPE 2 DIABETES MELLITUS WITH ESRD (END-STAGE RENAL DISEASE): ICD-10-CM

## 2022-03-12 DIAGNOSIS — N18.6 TYPE 2 DIABETES MELLITUS WITH ESRD (END-STAGE RENAL DISEASE): ICD-10-CM

## 2022-03-12 DIAGNOSIS — E55.9 VITAMIN D DEFICIENCY: ICD-10-CM

## 2022-03-21 PROCEDURE — 93296 REM INTERROG EVL PM/IDS: CPT | Performed by: STUDENT IN AN ORGANIZED HEALTH CARE EDUCATION/TRAINING PROGRAM

## 2022-03-21 PROCEDURE — 93294 REM INTERROG EVL PM/LDLS PM: CPT | Performed by: STUDENT IN AN ORGANIZED HEALTH CARE EDUCATION/TRAINING PROGRAM

## 2022-04-12 ENCOUNTER — TRANSCRIBE ORDERS (OUTPATIENT)
Dept: MAMMOGRAPHY | Facility: CLINIC | Age: 72
End: 2022-04-12

## 2022-04-12 DIAGNOSIS — Z13.820 SCREENING FOR OSTEOPOROSIS: ICD-10-CM

## 2022-04-12 DIAGNOSIS — Z91.89 AT RISK FOR OSTEOPOROSIS IN PREMENOPAUSAL PATIENT: Primary | ICD-10-CM

## 2022-04-12 DIAGNOSIS — N95.9 AT RISK FOR OSTEOPOROSIS IN PREMENOPAUSAL PATIENT: Primary | ICD-10-CM

## 2022-04-13 PROBLEM — G43.909 MIGRAINE: Status: ACTIVE | Noted: 2022-04-13

## 2022-04-13 PROBLEM — F32.5 MAJOR DEPRESSION IN REMISSION: Status: ACTIVE | Noted: 2022-04-13

## 2022-04-13 PROBLEM — M51.36 DEGENERATION OF LUMBAR INTERVERTEBRAL DISC: Status: ACTIVE | Noted: 2022-04-13

## 2022-04-13 PROBLEM — Z95.0 CARDIAC PACEMAKER IN SITU: Status: ACTIVE | Noted: 2022-04-13

## 2022-04-13 PROBLEM — M54.50 CHRONIC LOW BACK PAIN: Status: ACTIVE | Noted: 2022-04-13

## 2022-04-13 PROBLEM — J98.4 CHRONIC LUNG DISEASE: Status: ACTIVE | Noted: 2022-04-13

## 2022-04-13 PROBLEM — J40 BRONCHITIS: Status: ACTIVE | Noted: 2022-04-13

## 2022-04-13 PROBLEM — M51.369 DEGENERATION OF LUMBAR INTERVERTEBRAL DISC: Status: ACTIVE | Noted: 2022-04-13

## 2022-04-13 PROBLEM — R79.89 ELEVATED TROPONIN LEVEL: Status: ACTIVE | Noted: 2022-04-13

## 2022-04-13 PROBLEM — R77.8 ELEVATED TROPONIN LEVEL: Status: ACTIVE | Noted: 2022-04-13

## 2022-04-13 PROBLEM — N31.2 HYPOTONIC BLADDER: Status: ACTIVE | Noted: 2020-07-15

## 2022-04-13 PROBLEM — Z86.73 HISTORY OF TIA (TRANSIENT ISCHEMIC ATTACK): Status: ACTIVE | Noted: 2022-04-13

## 2022-04-13 PROBLEM — G89.29 CHRONIC LOW BACK PAIN: Status: ACTIVE | Noted: 2022-04-13

## 2022-04-13 PROBLEM — J18.9 BILATERAL PNEUMONIA: Status: ACTIVE | Noted: 2022-04-13

## 2022-04-13 PROBLEM — I50.9 CONGESTIVE HEART FAILURE: Status: ACTIVE | Noted: 2022-04-13

## 2022-04-13 PROBLEM — Z79.899 HIGH RISK MEDICATION USE: Status: ACTIVE | Noted: 2022-04-13

## 2022-04-13 PROBLEM — R05.9 COUGH: Status: ACTIVE | Noted: 2022-04-13

## 2022-04-13 PROBLEM — E87.1 HYPONATREMIA: Status: ACTIVE | Noted: 2022-04-13

## 2022-04-13 PROBLEM — Z29.9 ENCOUNTER FOR DEEP VEIN THROMBOSIS (DVT) PROPHYLAXIS: Status: ACTIVE | Noted: 2022-04-13

## 2022-04-13 PROBLEM — R51.9 HEADACHE: Status: ACTIVE | Noted: 2022-04-13

## 2022-04-13 PROBLEM — R91.8 GROUND GLASS OPACITY PRESENT ON IMAGING OF LUNG: Status: ACTIVE | Noted: 2022-04-13

## 2022-04-13 PROBLEM — R09.89 BIBASILAR CRACKLES: Status: ACTIVE | Noted: 2022-04-13

## 2022-04-13 PROBLEM — R00.1 BRADYCARDIA: Status: ACTIVE | Noted: 2022-04-13

## 2022-04-13 PROBLEM — Z85.828 HISTORY OF MALIGNANT NEOPLASM OF SKIN: Status: ACTIVE | Noted: 2022-04-13

## 2022-04-13 PROBLEM — R60.0 EDEMA OF LOWER EXTREMITY: Status: ACTIVE | Noted: 2022-04-13

## 2022-04-14 ENCOUNTER — OFFICE VISIT (OUTPATIENT)
Dept: FAMILY MEDICINE CLINIC | Facility: CLINIC | Age: 72
End: 2022-04-14

## 2022-04-14 VITALS
WEIGHT: 233.1 LBS | BODY MASS INDEX: 42.89 KG/M2 | HEART RATE: 60 BPM | HEIGHT: 62 IN | SYSTOLIC BLOOD PRESSURE: 130 MMHG | DIASTOLIC BLOOD PRESSURE: 62 MMHG | TEMPERATURE: 97.4 F | RESPIRATION RATE: 12 BRPM | OXYGEN SATURATION: 94 %

## 2022-04-14 DIAGNOSIS — N18.6 TYPE 2 DIABETES MELLITUS WITH ESRD (END-STAGE RENAL DISEASE): ICD-10-CM

## 2022-04-14 DIAGNOSIS — E55.9 VITAMIN D DEFICIENCY: ICD-10-CM

## 2022-04-14 DIAGNOSIS — E11.22 TYPE 2 DIABETES MELLITUS WITH ESRD (END-STAGE RENAL DISEASE): ICD-10-CM

## 2022-04-14 DIAGNOSIS — E53.8 VITAMIN B12 DEFICIENCY (NON ANEMIC): ICD-10-CM

## 2022-04-14 DIAGNOSIS — R53.1 WEAKNESS: Primary | ICD-10-CM

## 2022-04-14 DIAGNOSIS — D64.9 ANEMIA, UNSPECIFIED TYPE: ICD-10-CM

## 2022-04-14 DIAGNOSIS — D50.9 IRON DEFICIENCY ANEMIA, UNSPECIFIED IRON DEFICIENCY ANEMIA TYPE: ICD-10-CM

## 2022-04-14 DIAGNOSIS — E03.9 PRIMARY HYPOTHYROIDISM: ICD-10-CM

## 2022-04-14 DIAGNOSIS — H93.13 TINNITUS OF BOTH EARS: ICD-10-CM

## 2022-04-14 PROCEDURE — 99214 OFFICE O/P EST MOD 30 MIN: CPT | Performed by: FAMILY MEDICINE

## 2022-04-14 PROCEDURE — 36415 COLL VENOUS BLD VENIPUNCTURE: CPT | Performed by: FAMILY MEDICINE

## 2022-04-14 PROCEDURE — 93000 ELECTROCARDIOGRAM COMPLETE: CPT | Performed by: FAMILY MEDICINE

## 2022-04-14 NOTE — ASSESSMENT & PLAN NOTE
Patient is anemic and this could be causing her weakness.  She has very vague symptoms.  Her EKG showed a paced rhythm but no acute sign of ischemia.  She is getting Procrit shots through [facility].  I will check a vitamin B12 TSH and CMP today.  I told patient if she has any new symptoms or any worsening to come back to the office.  She will come back in June for recheck.

## 2022-04-14 NOTE — ASSESSMENT & PLAN NOTE
Procrit shots per Dr. Campa her hemoglobin hematocrit have responded to some degree will recheck in [place]

## 2022-04-14 NOTE — PROGRESS NOTES
Patient Name: Joanna Cross  : 1950   MRN: [id number]     Chief Complaint:    Chief Complaint   Patient presents with   • lab results     Pt here for lab results   • Diabetes     Lab results       History of Present Illness: Joanna Cross is a 71 y.o. female who is here today for wekaness  HPI        Review of Systems:   Review of Systems   Constitutional: Positive for fatigue.   HENT: Positive for tinnitus.    Eyes: Negative.    Respiratory: Negative.    Cardiovascular: Negative.    Gastrointestinal: Negative.    Neurological: Negative.         Past Medical History:   Past Medical History:   Diagnosis Date   • Anemia    • Ankle problem     thinks back related causing pain    • Atrial fibrillation ([facility])    • AVM (arteriovenous malformation)    • Back pain    • Benign hypertension    • Howard-tachy syndrome ([facility])    • CAD (coronary artery disease)     1 stent   • Colon polyp    • COVID-19 vaccine series completed    • Depression    • DM2 (diabetes mellitus, type 2) ([facility])     checks sugar twice per day    • Encounter for allergy testing     also autoimmune testing 2018   • Essential hypertension    • Gastrocnemius muscle tear     left medial 91   • Generalized osteoarthritis    • GERD (gastroesophageal reflux disease)    • GI bleed     in hospital for a week    • GIB (gastrointestinal bleeding) 2016    d/t xarelto    • Headache    • Hiatal hernia    • History of cardiac monitoring     14 days    • History of heart disease    • History of shingles    • History of transfusion 2016    no reaction recalled    • Hypothyroidism    • Hypothyroidism    • IBS (irritable bowel syndrome)    • Klebsiella pneumonia ([facility])    • Lichen sclerosus    • Mixed hyperlipidemia    • Mouth problem     mouth guard used since pt bites tongue and lips excessively at night if not- with bipap    • MRSA infection 2018   • Myocardial infarction ([facility])     11/09/15   • Obesity    • On home oxygen therapy     2L of oxygen all the  time due to current congestion    • MILLI on CPAP     16/12 1/8/20 bipap compliant with o2 hook up 2l - also uses mouth guard too     • Osteoporosis    • Pacemaker    • Parkinson's disease (HCC)    • Peripheral vascular disease (HCC)    • Pleurisy    • Pneumonia    • Puerperal sepsis with acute hypoxic respiratory failure (HCC)     emergent intubation- 2016   • Right leg pain     from back issues    • Salivary gland stone    • Sciatic nerve pain    • Seborrheic dermatitis    • Skin cancer     on back    • SOBOE (shortness of breath on exertion)    • SSS (sick sinus syndrome) ([facility])    • TIA (transient ischemic attack)    • Type 2 diabetes mellitus (HCC)    • UTI (urinary tract infection)    • Varicose vein of leg    • Venous insufficiency of both lower extremities        Past Surgical History:   Past Surgical History:   Procedure Laterality Date   • BACK SURGERY      l4-l5 laminectomy    • BICEPS TENDON REPAIR Right     shoulder   • BREAST BIOPSY Left 2004   • BRONCHOSCOPY RIGID / FLEXIBLE      2016   • BUNIONECTOMY Right    • CARDIAC CATHETERIZATION     • CARDIAC CATHETERIZATION N/A 2/1/2019    Procedure: Left Heart Cath;  Surgeon: Albertina Corona MD;  Location:  [facility];  Service: Cardiology   • CHOLECYSTECTOMY     • COLONOSCOPY  2016   • COLONOSCOPY N/A 6/17/2021    Procedure: COLONOSCOPY WITH POLYPECTOMY;  Surgeon: Trenton Sosa MD;  Location:  [facility];  Service: Gastroenterology;  Laterality: N/A;   • CORONARY STENT PLACEMENT      x1 stent   • CYST REMOVAL      left ear, upper left back 2001   • CYSTOSCOPY      x2  18 and 20 -   in 20 urethra dilation    • DIAGNOSTIC LAPAROSCOPY  1981   • ENDOSCOPIC FUNCTIONAL SINUS SURGERY (FESS)  2011   • ENDOSCOPY  2016   • ENTEROSCOPY SMALL BOWEL      with single ballon with fluoro    • HAMMER TOE REPAIR Left    • INCISION AND DRAINAGE OF WOUND  2018    back with wound infection    • JOINT REPLACEMENT     • KNEE ARTHROSCOPY      • LASER ABLATION      right leg 13   • LIPOMA EXCISION  1999    left leg    • LUMBAR LAMINECTOMY DISCECTOMY DECOMPRESSION N/A 7/6/2018    Procedure: LUMBAR LAMINECTOMY L4-5, HEMILAMIINECTOMY RIGHT L5-S1, FORAMINOTOMY L5-S1;  Surgeon: Lencho Gamino MD;  Location:  [facility];  Service: Neurosurgery   • LUMBAR LAMINECTOMY DISCECTOMY DECOMPRESSION N/A 9/19/2018    Procedure: INCISION AND DRAINAGE BACK WITH WOUND EXPLORATION;  Surgeon: Ritchie García MD;  Location:  [facility];  Service: Neurosurgery   • LUNG BIOPSY Left 2016   • OTHER SURGICAL HISTORY      ct scan of chest and sinuses and lower back    • OTHER SURGICAL HISTORY      various echos    • OTHER SURGICAL HISTORY      electroencephalogram 16,   emg  ncv tests 2007   • OTHER SURGICAL HISTORY      mra 2007  and various mri with xrays, nuclear medicine lung ventilation with perfusion test 2016 with pft    • OTHER SURGICAL HISTORY      barium swallow testing 15, 12, 12   • OTHER SURGICAL HISTORY      vaginal ultrasound- 2012,   vas clementina lower extrem 2016, vas venous duplex lower extrem bilat 2019   • OTHER SURGICAL HISTORY      wdge biopsy spring of lung    • OTHER SURGICAL HISTORY      emergent intubation- hypoxic resp failure    • PACEMAKER IMPLANTATION  11/2016    sss   • REPLACEMENT TOTAL KNEE Bilateral     left knee 2011, right 2012 per dr acuna    • REPLACEMENT TOTAL KNEE Bilateral    • SHOULDER ARTHROSCOPY Bilateral     2003-left, 2004- right    • SKIN BIOPSY      skin cancer back 2016   • SKIN CANCER EXCISION      upper righ tback    • MADHAV     • TEETH EXTRACTION      x2   • TUBAL ABDOMINAL LIGATION Bilateral 1980   • WISDOM TOOTH EXTRACTION         Family History:   Family History   Problem Relation Age of Onset   • Kidney disease Mother    • Coronary artery disease Mother    • Hypertension Mother    • Heart disease Mother    • Hyperlipidemia Mother    • Coronary artery disease Father    • Hypertension Father    • Hypothyroidism Father    • Coronary artery  disease Brother    • Colon polyps Neg Hx    • Colon cancer Neg Hx        Social History:   Social History     Socioeconomic History   • Marital status:      Spouse name: N/A   • Number of children: 4   • Years of education: College   Tobacco Use   • Smoking status: Never Smoker   • Smokeless tobacco: Never Used   Vaping Use   • Vaping Use: Never used   Substance and Sexual Activity   • Alcohol use: No   • Drug use: No   • Sexual activity: Defer     Partners: Male       Medications:     Current Outpatient Medications:   •  Accu-Chek Guide test strip, AS DIRECTED THREE TIMES A DAY, Disp: 300 each, Rfl: 1  •  Accu-Chek Softclix Lancets lancets, AS DIRECTED THREE TIMES A DAY, Disp: 300 each, Rfl: 1  •  albuterol (PROVENTIL) (2.5 MG/3ML) 0.083% nebulizer solution, Take 2.5 mg by nebulization Every 4 (Four) Hours As Needed., Disp: , Rfl:   •  albuterol sulfate HFA (Ventolin HFA) 108 (90 Base) MCG/ACT inhaler, Inhale 1 puff Every 4 (Four) Hours As Needed for Wheezing or Shortness of Air., Disp: 8 g, Rfl: 5  •  ALLERGY SERUM INJECTION, Inject  under the skin into the appropriate area as directed 1 (One) Time Per Week., Disp: , Rfl:   •  amantadine (SYMMETREL) 100 MG capsule, Take 100 mg by mouth 2 (Two) Times a Day., Disp: , Rfl:   •  apixaban (Eliquis) 5 MG tablet tablet, Take 1 tablet by mouth Every 12 (Twelve) Hours., Disp: 180 tablet, Rfl: 2  •  aspirin 81 MG EC tablet, Take 81 mg by mouth Daily., Disp: , Rfl:   •  B Complex-C (SUPER B COMPLEX PO), Take 1 tablet by mouth Daily., Disp: , Rfl:   •  baclofen (LIORESAL) 10 MG tablet, Take 1 tablet by mouth Every 8 (Eight) Hours., Disp: , Rfl:   •  bumetanide (BUMEX) 1 MG tablet, 1-2 tabs po daily as directed, Disp: 180 tablet, Rfl: 1  •  Calcium Carbonate (CALTRATE 600 PO), Take 600 mg by mouth Daily., Disp: , Rfl:   •  carbidopa-levodopa (SINEMET)  MG per tablet, Take  by mouth. 2 tablets at 0600, 1 tablet at 0900, 1200, 1500, 1800, 2100, Disp: , Rfl:   •   Cholecalciferol 2000 units tablet, Take 2,000 Units by mouth 2 (Two) Times a Day., Disp: , Rfl:   •  citalopram (CeleXA) 20 MG tablet, Take 20 mg by mouth every night at bedtime., Disp: , Rfl:   •  clindamycin (CLEOCIN) 150 MG capsule, Take 150 mg by mouth Daily. 4 capsules one hour before dental appointments., Disp: , Rfl:   •  clobetasol (TEMOVATE) 0.05 % cream, Apply 1 application topically 2 (Two) Times a Day As Needed (Lichens Sclerosis)., Disp: , Rfl:   •  Emollient (AQUAPHOR ADVANCED THERAPY EX), Apply  topically., Disp: , Rfl:   •  epoetin lizzie (EPOGEN,PROCRIT) 26743 UNIT/ML injection, Inject  under the skin into the appropriate area as directed., Disp: , Rfl:   •  ferrous sulfate 325 (65 FE) MG tablet, Take 325 mg by mouth Daily With Breakfast., Disp: , Rfl:   •  Glucosamine-Chondroit-Calcium (TRIPLE FLEX BONE & JOINT PO), Take 1 tablet by mouth Daily. Move free, Disp: , Rfl:   •  Insulin Glargine (LANTUS SOLOSTAR) 100 UNIT/ML injection pen, Inject 30 Units under the skin into the appropriate area as directed Daily. (Patient taking differently: Inject  under the skin into the appropriate area as directed Daily. 10-20 varies depending on diet), Disp: 30 mL, Rfl: 3  •  Insulin Pen Needle 31G X 5 MM misc, Use to inject insulin daily., Disp: 100 each, Rfl: 3  •  IPRATROPIUM BROMIDE NA, 1 spray into the nostril(s) as directed by provider 2 (Two) Times a Day As Needed., Disp: , Rfl:   •  Loratadine 10 MG capsule, Take 10 mg by mouth Daily., Disp: , Rfl:   •  metFORMIN (GLUCOPHAGE) 1000 MG tablet, Take 1 tablet by mouth 2 (Two) Times a Day With Meals., Disp: 180 tablet, Rfl: 1  •  metOLazone (ZAROXOLYN) 2.5 MG tablet, TAKE 1 TABLET DAILY, Disp: 90 tablet, Rfl: 1  •  Multiple Vitamins-Minerals (CENTRUM ULTRA WOMENS PO), Take 1 tablet by mouth Daily., Disp: , Rfl:   •  nitroglycerin (NITROLINGUAL) 0.4 MG/SPRAY spray, Place 1 spray under the tongue Every 5 (Five) Minutes As Needed for Chest Pain., Disp: 1 each, Rfl:  "6  •  O2 (OXYGEN), Inhale 2 L/min 1 (One) Time. 2L all the time now, Disp: , Rfl:   •  omeprazole (priLOSEC) 40 MG capsule, Take 40 mg by mouth 2 (Two) Times a Day., Disp: , Rfl:   •  pramipexole (MIRAPEX) 1.5 MG tablet, Take 1.5 mg by mouth Every Night., Disp: , Rfl:   •  ranolazine (RANEXA) 500 MG 12 hr tablet, TAKE 1 TABLET EVERY 12 HOURS, Disp: 180 tablet, Rfl: 3  •  senna (SENOKOT) 8.6 MG tablet, Take 1 tablet by mouth Daily., Disp: , Rfl:   •  spironolactone (ALDACTONE) 25 MG tablet, TAKE 2 TABLETS DAILY AS NEEDED FOR SWELLING (Patient taking differently: Take 50 mg by mouth Daily.), Disp: 180 tablet, Rfl: 3  •  triamcinolone (KENALOG) 0.1 % cream, As Needed., Disp: , Rfl:   •  Unithroid 200 MCG tablet, Take 1 tablet by mouth Daily., Disp: 90 tablet, Rfl: 3  •  valsartan (DIOVAN) 160 MG tablet, TAKE 1 TABLET TWICE A DAY, Disp: 180 tablet, Rfl: 3  •  vitamin C (ASCORBIC ACID) 500 MG tablet, Take 500 mg by mouth Daily. Chewable tablet, Disp: , Rfl:   •  Zinc 50 MG tablet, Take 50 mg by mouth Daily., Disp: , Rfl:   •  dofetilide (TIKOSYN) 500 MCG capsule, Take 1 capsule by mouth Every 12 (Twelve) Hours., Disp: 180 capsule, Rfl: 3  •  Unable to find, 1 each 2 (two) times a day. Med Name: tonic water 4 oz BID, Disp: , Rfl:     Allergies:   Allergies   Allergen Reactions   • Toprol Xl [Metoprolol Tartrate] Shortness Of Breath     Extreme fatigue   • Amlodipine Besylate Swelling     Lower extremity (ankles, feet) swelling   • Codeine Unknown (See Comments)     Pt is unaware of what reaction she had   • Entacapone Other (See Comments)     \"extreme weakness in legs - caused several falls, which stopped after discontinuing this medication\"   • Epinephrine Other (See Comments)     6/4/16- had 3 shots to numb mouth to prepare teeth for crowns, the shots contained epi-  Caused pt to have chest discomfort- went to hospital in ambulance, discovered had a fib while there    • Levemir [Insulin Detemir] Hives     Hives / rash " "around injection site   • Penicillins Hives     Jitteriness    • Xarelto [Rivaroxaban] GI Bleeding     hgb dropped to 5.2   • Bactrim [Sulfamethoxazole-Trimethoprim] Nausea Only and Other (See Comments)     Headache -  Cant be taken with tikosyn - septra ds   • Cimetidine Other (See Comments)     Cant be taken with tikosyn    • Ciprofloxacin Diarrhea   • Cogentin [Benztropine] Other (See Comments)     \"uncontrollable body movements\"   • Compazine [Prochlorperazine Edisylate] Other (See Comments)     Dystonic reaction   • Cortisone Other (See Comments)     MAKES BLOOD PRESSURE HIGH    • Duraprep [Antiseptic Products, Misc.] Itching and Rash     RASH AND ITCHING   • Erythromycin Base Other (See Comments)     Cant be taken with tikosyn - z pack and ketek    • Flurandrenolone Other (See Comments)     Cant be taken with tikosyn     Include - levaquin, cipro, norloxacin    • Haldol [Haloperidol Lactate] Other (See Comments)     Dystonic reaction   • Hydralazine Other (See Comments)     Headache    • Hydrochlorothiazide Other (See Comments)     Cant be taken with tikosyn -  Also hydrodiuril, microzide, hydro-par, oretic, esidrix, ezide or any medicine with hct or hctz in name    • Hydrocodone-Acetaminophen Nausea And Vomiting and Dizziness     Headache, nausea    • Levaquin [Levofloxacin] Other (See Comments)     Cannot take due to taking propafenone HCL- severe reaction with mixed.    • Lisinopril Cough   • Macrolides And Ketolides Other (See Comments)     antibx -   Cant be taken with tikosyn    • Statins Myalgia     Leg pain- all statins    • Tarka [Trandolapril-Verapamil Hcl Er] Other (See Comments)     Constipation    • Verapamil Other (See Comments)     Cant be taken with tikosyn - calan, covera-hs, isoptin, verelan or tarka          Physical Exam:  Vital Signs:   Vitals:    04/14/22 1148   BP: 130/62   BP Location: Left arm   Patient Position: Sitting   Cuff Size: Adult   Pulse: 60   Resp: 12   Temp: 97.4 °F (36.3 " "°C)   SpO2: 94%  Comment: on 2 Liters   Weight: 106 kg (233 lb 1.6 oz)   Height: 157.5 cm (62\")   PF: Comment: on 2 Liters   PainSc:   4   PainLoc: Back     Body mass index is 42.63 kg/m².     Physical Exam  Vitals and nursing note reviewed.   Constitutional:       Appearance: Normal appearance. She is obese.   HENT:      Head: Normocephalic and atraumatic.      Right Ear: Tympanic membrane, ear canal and external ear normal.      Left Ear: Tympanic membrane, ear canal and external ear normal.      Nose: Nose normal.      Mouth/Throat:      Mouth: Mucous membranes are dry.      Pharynx: Oropharynx is clear.   Eyes:      Extraocular Movements: Extraocular movements intact.      Conjunctiva/sclera: Conjunctivae normal.      Pupils: Pupils are equal, round, and reactive to light.   Cardiovascular:      Rate and Rhythm: Normal rate and regular rhythm.      Pulses: Normal pulses.      Heart sounds: Normal heart sounds.   Pulmonary:      Effort: Pulmonary effort is normal.      Breath sounds: Normal breath sounds.   Musculoskeletal:      Cervical back: Normal range of motion and neck supple.   Feet:      Comments:      Neurological:      Mental Status: She is alert.           ECG 12 Lead    Date/Time: 4/14/2022 12:16 PM  Performed by: Reynaldo Fritz MD  Authorized by: Reynaldo Fritz MD   Comparison: compared with previous ECG from 9/20/2020  Rhythm: paced  Rate: normal  Conduction: 1st degree AV block  ST Segments: ST segments normal  QRS axis: normal  Other findings: T wave abnormality    Clinical impression: normal ECG  Comments: Paced, no sig change from previous EKG              Assessment/Plan:   Diagnoses and all orders for this visit:    1. Weakness (Primary)  -     Comprehensive Metabolic Panel; Future  -     Vitamin B12; Future  -     TSH Rfx On Abnormal To Free T4; Future    2. Anemia, unspecified type  -     Comprehensive Metabolic Panel; Future  -     Vitamin B12; Future  -     TSH Rfx On Abnormal To " Free T4; Future    3. Tinnitus of both ears  -     Comprehensive Metabolic Panel; Future  -     Vitamin B12; Future  -     TSH Rfx On Abnormal To Free T4; Future             Follow Up:   No follow-ups on file.    Reynaldo Fritz MD  [facility]

## 2022-04-14 NOTE — ASSESSMENT & PLAN NOTE
Patient is going to talk to her neurologist about this I told her that this is difficult to treat.

## 2022-04-15 LAB
25(OH)D3+25(OH)D2 SERPL-MCNC: 84.1 NG/ML (ref 30–100)
ALBUMIN SERPL-MCNC: 4.7 G/DL (ref 3.7–4.7)
ALBUMIN/GLOB SERPL: 2.1 {RATIO} (ref 1.2–2.2)
ALP SERPL-CCNC: 70 IU/L (ref 44–121)
ALT SERPL-CCNC: 9 IU/L (ref 0–32)
AST SERPL-CCNC: 13 IU/L (ref 0–40)
BASOPHILS # BLD AUTO: 0 X10E3/UL (ref 0–0.2)
BASOPHILS NFR BLD AUTO: 1 %
BILIRUB SERPL-MCNC: 0.3 MG/DL (ref 0–1.2)
BUN SERPL-MCNC: 39 MG/DL (ref 8–27)
BUN/CREAT SERPL: 30 (ref 12–28)
CALCIUM SERPL-MCNC: 9.6 MG/DL (ref 8.7–10.3)
CHLORIDE SERPL-SCNC: 97 MMOL/L (ref 96–106)
CHOLEST SERPL-MCNC: 157 MG/DL (ref 100–199)
CK SERPL-CCNC: 53 U/L (ref 32–182)
CO2 SERPL-SCNC: 21 MMOL/L (ref 20–29)
CREAT SERPL-MCNC: 1.3 MG/DL (ref 0.57–1)
EGFRCR SERPLBLD CKD-EPI 2021: 43 ML/MIN/1.73
EOSINOPHIL # BLD AUTO: 0.2 X10E3/UL (ref 0–0.4)
EOSINOPHIL NFR BLD AUTO: 3 %
ERYTHROCYTE [DISTWIDTH] IN BLOOD BY AUTOMATED COUNT: 13.8 % (ref 11.7–15.4)
GLOBULIN SER CALC-MCNC: 2.2 G/DL (ref 1.5–4.5)
GLUCOSE SERPL-MCNC: 133 MG/DL (ref 65–99)
HBA1C MFR BLD: 6.2 % (ref 4.8–5.6)
HCT VFR BLD AUTO: 31 % (ref 34–46.6)
HDLC SERPL-MCNC: 42 MG/DL
HGB BLD-MCNC: 10 G/DL (ref 11.1–15.9)
IMM GRANULOCYTES # BLD AUTO: 0.1 X10E3/UL (ref 0–0.1)
IMM GRANULOCYTES NFR BLD AUTO: 1 %
LDLC SERPL CALC-MCNC: 100 MG/DL (ref 0–99)
LYMPHOCYTES # BLD AUTO: 0.6 X10E3/UL (ref 0.7–3.1)
LYMPHOCYTES NFR BLD AUTO: 9 %
MCH RBC QN AUTO: 30 PG (ref 26.6–33)
MCHC RBC AUTO-ENTMCNC: 32.3 G/DL (ref 31.5–35.7)
MCV RBC AUTO: 93 FL (ref 79–97)
MONOCYTES # BLD AUTO: 0.6 X10E3/UL (ref 0.1–0.9)
MONOCYTES NFR BLD AUTO: 10 %
NEUTROPHILS # BLD AUTO: 4.8 X10E3/UL (ref 1.4–7)
NEUTROPHILS NFR BLD AUTO: 76 %
PLATELET # BLD AUTO: 208 X10E3/UL (ref 150–450)
POTASSIUM SERPL-SCNC: 4.3 MMOL/L (ref 3.5–5.2)
PROT SERPL-MCNC: 6.9 G/DL (ref 6–8.5)
RBC # BLD AUTO: 3.33 X10E6/UL (ref 3.77–5.28)
SODIUM SERPL-SCNC: 135 MMOL/L (ref 134–144)
T4 FREE SERPL-MCNC: 2.18 NG/DL (ref 0.82–1.77)
TRIGL SERPL-MCNC: 77 MG/DL (ref 0–149)
TSH SERPL DL<=0.005 MIU/L-ACNC: 0.18 UIU/ML (ref 0.45–4.5)
VIT B12 SERPL-MCNC: 589 PG/ML (ref 232–1245)
VLDLC SERPL CALC-MCNC: 15 MG/DL (ref 5–40)
WBC # BLD AUTO: 6.2 X10E3/UL (ref 3.4–10.8)

## 2022-04-25 ENCOUNTER — OFFICE VISIT (OUTPATIENT)
Dept: ENDOCRINOLOGY | Facility: CLINIC | Age: 72
End: 2022-04-25

## 2022-04-25 VITALS
HEIGHT: 62 IN | OXYGEN SATURATION: 96 % | SYSTOLIC BLOOD PRESSURE: 122 MMHG | BODY MASS INDEX: 42.88 KG/M2 | HEART RATE: 69 BPM | WEIGHT: 233 LBS | DIASTOLIC BLOOD PRESSURE: 77 MMHG

## 2022-04-25 DIAGNOSIS — E11.65 TYPE 2 DIABETES MELLITUS WITH HYPERGLYCEMIA, WITH LONG-TERM CURRENT USE OF INSULIN: Primary | Chronic | ICD-10-CM

## 2022-04-25 DIAGNOSIS — Z79.4 TYPE 2 DIABETES MELLITUS WITH HYPERGLYCEMIA, WITH LONG-TERM CURRENT USE OF INSULIN: Primary | Chronic | ICD-10-CM

## 2022-04-25 DIAGNOSIS — E03.9 ACQUIRED HYPOTHYROIDISM: Chronic | ICD-10-CM

## 2022-04-25 LAB
EXPIRATION DATE: ABNORMAL
EXPIRATION DATE: NORMAL
GLUCOSE BLDC GLUCOMTR-MCNC: 131 MG/DL (ref 70–130)
HBA1C MFR BLD: 5.8 %
Lab: ABNORMAL
Lab: NORMAL

## 2022-04-25 PROCEDURE — 83036 HEMOGLOBIN GLYCOSYLATED A1C: CPT | Performed by: PHYSICIAN ASSISTANT

## 2022-04-25 PROCEDURE — 99214 OFFICE O/P EST MOD 30 MIN: CPT | Performed by: PHYSICIAN ASSISTANT

## 2022-04-25 PROCEDURE — 82947 ASSAY GLUCOSE BLOOD QUANT: CPT | Performed by: PHYSICIAN ASSISTANT

## 2022-04-25 PROCEDURE — 3044F HG A1C LEVEL LT 7.0%: CPT | Performed by: PHYSICIAN ASSISTANT

## 2022-04-25 RX ORDER — LEVOTHYROXINE SODIUM 175 MCG
175 TABLET ORAL DAILY
Qty: 30 TABLET | Refills: 3 | Status: SHIPPED | OUTPATIENT
Start: 2022-04-25 | End: 2022-06-02

## 2022-05-03 DIAGNOSIS — F32.A DEPRESSION, UNSPECIFIED DEPRESSION TYPE: Primary | ICD-10-CM

## 2022-05-03 RX ORDER — METOLAZONE 2.5 MG/1
TABLET ORAL
Qty: 90 TABLET | Refills: 1 | Status: SHIPPED | OUTPATIENT
Start: 2022-05-03 | End: 2022-10-04 | Stop reason: SDUPTHER

## 2022-05-03 NOTE — TELEPHONE ENCOUNTER
Lab Results   Component Value Date    GLUCOSE 133 (H) 04/14/2022    BUN 39 (H) 04/14/2022    CREATININE 1.30 (H) 04/14/2022    EGFRIFNONA 66 06/12/2019    BCR 30 (H) 04/14/2022    K 4.3 04/14/2022    CO2 21 04/14/2022    CALCIUM 9.6 04/14/2022    PROTENTOTREF 6.9 04/14/2022    ALBUMIN 4.7 04/14/2022    LABIL2 2.1 04/14/2022    AST 13 04/14/2022    ALT 9 04/14/2022

## 2022-05-04 RX ORDER — CITALOPRAM 20 MG/1
TABLET ORAL
Qty: 90 TABLET | Refills: 3 | Status: SHIPPED | OUTPATIENT
Start: 2022-05-04

## 2022-05-09 DIAGNOSIS — G89.29 OTHER CHRONIC PAIN: Primary | ICD-10-CM

## 2022-05-09 RX ORDER — TRAMADOL HYDROCHLORIDE 50 MG/1
50 TABLET ORAL EVERY 6 HOURS PRN
Qty: 30 TABLET | Refills: 2 | Status: SHIPPED | OUTPATIENT
Start: 2022-05-09

## 2022-05-09 RX ORDER — TRAMADOL HYDROCHLORIDE 50 MG/1
50 TABLET ORAL EVERY 6 HOURS PRN
COMMUNITY
End: 2022-05-09 | Stop reason: SDUPTHER

## 2022-05-09 NOTE — TELEPHONE ENCOUNTER
Rx Refill Note    Requested Prescriptions     Pending Prescriptions Disp Refills   • traMADol (ULTRAM) 50 MG tablet 30 tablet 2     Sig: Take 1 tablet by mouth Every 6 (Six) Hours As Needed for Moderate Pain .        Last office visit with prescribing clinician: 4/14/2022      Next office visit with prescribing clinician: 6/8/2022   Last labs: 04/25/2022  Last refill: 10/2021  [facility]

## 2022-05-09 NOTE — TELEPHONE ENCOUNTER
Lab Results   Component Value Date    GLUCOSE 133 (H) 04/14/2022    BUN 39 (H) 04/14/2022    CREATININE 1.30 (H) 04/14/2022    EGFRIFNONA 66 06/12/2019    BCR 30 (H) 04/14/2022    K 4.3 04/14/2022    CO2 21 04/14/2022    CALCIUM 9.6 04/14/2022    PROTENTOTREF 6.9 04/14/2022    ALBUMIN 4.7 04/14/2022    LABIL2 2.1 04/14/2022    AST 13 04/14/2022    ALT 9 04/14/2022       71 years old, 106 kg

## 2022-05-24 ENCOUNTER — TELEPHONE (OUTPATIENT)
Dept: CARDIOLOGY | Facility: CLINIC | Age: 72
End: 2022-05-24

## 2022-05-24 NOTE — TELEPHONE ENCOUNTER
Mrs Cross called with concerns of wanting an MRI done at .  Her whole pacemaker system is not MRI compatible due to she has [employer] pacemaker and [employer] leads.  She said  called her and said to call us to work things out.  I instructed her that  needs to call us to get the information they need if they want to proceed with the MRI.

## 2022-06-01 ENCOUNTER — LAB (OUTPATIENT)
Dept: FAMILY MEDICINE CLINIC | Facility: CLINIC | Age: 72
End: 2022-06-01

## 2022-06-01 DIAGNOSIS — Z11.59 ENCOUNTER FOR HCV SCREENING TEST FOR LOW RISK PATIENT: Primary | ICD-10-CM

## 2022-06-01 DIAGNOSIS — E03.9 ACQUIRED HYPOTHYROIDISM: Chronic | ICD-10-CM

## 2022-06-01 PROCEDURE — 36415 COLL VENOUS BLD VENIPUNCTURE: CPT | Performed by: FAMILY MEDICINE

## 2022-06-02 DIAGNOSIS — E03.9 ACQUIRED HYPOTHYROIDISM: Primary | ICD-10-CM

## 2022-06-02 LAB
HCV AB S/CO SERPL IA: <0.1 S/CO RATIO (ref 0–0.9)
T4 FREE SERPL-MCNC: 1.67 NG/DL (ref 0.82–1.77)
TSH SERPL DL<=0.005 MIU/L-ACNC: 0.94 UIU/ML (ref 0.45–4.5)

## 2022-06-02 RX ORDER — LEVOTHYROXINE SODIUM 175 UG/1
175 TABLET ORAL DAILY
Qty: 90 TABLET | Refills: 3 | Status: SHIPPED | OUTPATIENT
Start: 2022-06-02 | End: 2022-09-06

## 2022-06-08 ENCOUNTER — OFFICE VISIT (OUTPATIENT)
Dept: FAMILY MEDICINE CLINIC | Facility: CLINIC | Age: 72
End: 2022-06-08

## 2022-06-08 VITALS
RESPIRATION RATE: 16 BRPM | BODY MASS INDEX: 43.37 KG/M2 | SYSTOLIC BLOOD PRESSURE: 142 MMHG | TEMPERATURE: 98.8 F | OXYGEN SATURATION: 98 % | HEART RATE: 62 BPM | DIASTOLIC BLOOD PRESSURE: 68 MMHG | WEIGHT: 235.7 LBS | HEIGHT: 62 IN

## 2022-06-08 DIAGNOSIS — E11.65 TYPE 2 DIABETES MELLITUS WITH HYPERGLYCEMIA, WITH LONG-TERM CURRENT USE OF INSULIN: ICD-10-CM

## 2022-06-08 DIAGNOSIS — T14.8XXA MUSCLE STRAIN: ICD-10-CM

## 2022-06-08 DIAGNOSIS — Z79.4 TYPE 2 DIABETES MELLITUS WITH HYPERGLYCEMIA, WITH LONG-TERM CURRENT USE OF INSULIN: ICD-10-CM

## 2022-06-08 DIAGNOSIS — D64.9 ANEMIA, UNSPECIFIED TYPE: ICD-10-CM

## 2022-06-08 DIAGNOSIS — I10 HYPERTENSION, ESSENTIAL: ICD-10-CM

## 2022-06-08 DIAGNOSIS — E03.9 ACQUIRED HYPOTHYROIDISM: Primary | ICD-10-CM

## 2022-06-08 PROBLEM — J32.3 SPHENOID SINUSITIS: Status: ACTIVE | Noted: 2022-06-08

## 2022-06-08 PROBLEM — L21.9 SEBORRHEIC DERMATITIS: Status: ACTIVE | Noted: 2022-06-08

## 2022-06-08 PROBLEM — G25.81 RESTLESS LEGS: Status: ACTIVE | Noted: 2022-06-08

## 2022-06-08 PROBLEM — N39.0 URINARY TRACT INFECTION: Status: ACTIVE | Noted: 2019-02-07

## 2022-06-08 PROBLEM — G45.9 TRANSIENT CEREBRAL ISCHEMIA: Status: ACTIVE | Noted: 2022-06-08

## 2022-06-08 PROBLEM — J06.9 UPPER RESPIRATORY TRACT INFECTION: Status: ACTIVE | Noted: 2022-06-08

## 2022-06-08 PROBLEM — I21.4 NON-ST ELEVATION (NSTEMI) MYOCARDIAL INFARCTION: Status: ACTIVE | Noted: 2022-06-08

## 2022-06-08 PROBLEM — M15.0 PRIMARY OSTEOARTHRITIS INVOLVING MULTIPLE JOINTS: Status: ACTIVE | Noted: 2022-06-08

## 2022-06-08 PROBLEM — M79.10 MUSCLE PAIN: Status: ACTIVE | Noted: 2022-06-08

## 2022-06-08 PROBLEM — I20.0 UNSTABLE ANGINA PECTORIS (HCC): Status: ACTIVE | Noted: 2022-06-08

## 2022-06-08 PROBLEM — R39.15 URINARY URGENCY: Status: ACTIVE | Noted: 2022-01-20

## 2022-06-08 PROBLEM — M15.9 PRIMARY OSTEOARTHRITIS INVOLVING MULTIPLE JOINTS: Status: ACTIVE | Noted: 2022-06-08

## 2022-06-08 LAB — POC MICROALBUMIN URINE: NORMAL

## 2022-06-08 PROCEDURE — 99214 OFFICE O/P EST MOD 30 MIN: CPT | Performed by: FAMILY MEDICINE

## 2022-06-08 PROCEDURE — 90677 PCV20 VACCINE IM: CPT | Performed by: FAMILY MEDICINE

## 2022-06-08 PROCEDURE — G0009 ADMIN PNEUMOCOCCAL VACCINE: HCPCS | Performed by: FAMILY MEDICINE

## 2022-06-08 PROCEDURE — 82044 UR ALBUMIN SEMIQUANTITATIVE: CPT | Performed by: FAMILY MEDICINE

## 2022-06-08 RX ORDER — AMANTADINE HYDROCHLORIDE 100 MG/1
100 CAPSULE, GELATIN COATED ORAL
COMMUNITY
Start: 2022-05-09 | End: 2022-06-08 | Stop reason: SDUPTHER

## 2022-06-08 NOTE — PROGRESS NOTES
Patient Name: Joanna Cross  : 1950   MRN: [id number]     Chief Complaint:    Chief Complaint   Patient presents with   • lab results   • Hypothyroidism     Pt here for lab results       History of Present Illness: Joanna Cross is a 71 y.o. female who is here today for follow up on low thyroid  HPI        Review of Systems:   Review of Systems   Constitutional: Negative.    HENT: Negative.    Eyes: Negative.    Respiratory: Negative.    Cardiovascular: Negative.    Gastrointestinal: Negative.    Musculoskeletal: Positive for myalgias.   Neurological: Negative.         Past Medical History:   Past Medical History:   Diagnosis Date   • Anemia    • Ankle problem     thinks back related causing pain    • Atrial fibrillation ([facility])    • AVM (arteriovenous malformation)    • Back pain    • Benign hypertension    • Howard-tachy syndrome ([facility])    • CAD (coronary artery disease)     1 stent   • Colon polyp    • COVID-19 vaccine series completed    • Depression    • DM2 (diabetes mellitus, type 2) ([facility])     checks sugar twice per day    • Encounter for allergy testing     also autoimmune testing 2018   • Essential hypertension    • Gastrocnemius muscle tear     left medial 91   • Generalized osteoarthritis    • GERD (gastroesophageal reflux disease)    • GI bleed     in hospital for a week    • GIB (gastrointestinal bleeding) 2016    d/t xarelto    • Headache    • Hiatal hernia    • History of cardiac monitoring     14 days    • History of heart disease    • History of shingles    • History of transfusion 2016    no reaction recalled    • Hypothyroidism    • Hypothyroidism    • IBS (irritable bowel syndrome)    • Klebsiella pneumonia ([facility])    • Lichen sclerosus    • Mixed hyperlipidemia    • Mouth problem     mouth guard used since pt bites tongue and lips excessively at night if not- with bipap    • MRSA infection 2018   • Myocardial infarction ([facility])     11/09/15   • Obesity    • On home oxygen therapy      2L of oxygen all the time due to current congestion    • MILLI on CPAP     16/12 1/8/20 bipap compliant with o2 hook up 2l - also uses mouth guard too     • Osteoporosis    • Pacemaker    • Parkinson's disease (HCC)    • Peripheral vascular disease (HCC)    • Pleurisy    • Pneumonia    • Puerperal sepsis with acute hypoxic respiratory failure ([facility])     emergent intubation- 2016   • Right leg pain     from back issues    • Salivary gland stone    • Sciatic nerve pain    • Seborrheic dermatitis    • Skin cancer     on back    • SOBOE (shortness of breath on exertion)    • SSS (sick sinus syndrome) ([facility])    • TIA (transient ischemic attack)    • Type 2 diabetes mellitus ([facility])    • UTI (urinary tract infection)    • Varicose vein of leg    • Venous insufficiency of both lower extremities        Past Surgical History:   Past Surgical History:   Procedure Laterality Date   • BACK SURGERY      l4-l5 laminectomy    • BICEPS TENDON REPAIR Right     shoulder   • BREAST BIOPSY Left 2004   • BRONCHOSCOPY RIGID / FLEXIBLE      2016   • BUNIONECTOMY Right    • CARDIAC CATHETERIZATION     • CARDIAC CATHETERIZATION N/A 02/01/2019    Procedure: Left Heart Cath;  Surgeon: Albertina Corona MD;  Location:  [facility];  Service: Cardiology   • CHOLECYSTECTOMY     • COLONOSCOPY  2016   • COLONOSCOPY N/A 06/17/2021    Procedure: COLONOSCOPY WITH POLYPECTOMY;  Surgeon: Trenton Sosa MD;  Location:  [facility];  Service: Gastroenterology;  Laterality: N/A;   • CORONARY STENT PLACEMENT      x1 stent   • CYST REMOVAL      left ear, upper left back 2001   • CYSTOSCOPY      x2  18 and 20 -   in 20 urethra dilation    • DIAGNOSTIC LAPAROSCOPY  1981   • ENDOSCOPIC FUNCTIONAL SINUS SURGERY (FESS)  2011   • ENDOSCOPY  2016   • ENTEROSCOPY SMALL BOWEL      with single ballon with fluoro    • HAMMER TOE REPAIR Left    • INCISION AND DRAINAGE OF WOUND  2018    back with wound infection    • JOINT  REPLACEMENT     • KNEE ARTHROSCOPY     • LASER ABLATION      right leg 13   • LIPOMA EXCISION      left leg    • LUMBAR LAMINECTOMY DISCECTOMY DECOMPRESSION N/A 2018    Procedure: LUMBAR LAMINECTOMY L4-5, HEMILAMIINECTOMY RIGHT L5-S1, FORAMINOTOMY L5-S1;  Surgeon: Lencho Gamino MD;  Location:  [facility];  Service: Neurosurgery   • LUMBAR LAMINECTOMY DISCECTOMY DECOMPRESSION N/A 2018    Procedure: INCISION AND DRAINAGE BACK WITH WOUND EXPLORATION;  Surgeon: Ritchie García MD;  Location:  [facility];  Service: Neurosurgery   • LUNG BIOPSY Left 2016   • OTHER SURGICAL HISTORY      ct scan of chest and sinuses and lower back    • OTHER SURGICAL HISTORY      various echos    • OTHER SURGICAL HISTORY      electroencephalogram 16,   emg  ncv tests    • OTHER SURGICAL HISTORY      mra   and various mri with xrays, nuclear medicine lung ventilation with perfusion test  with pft    • OTHER SURGICAL HISTORY      barium swallow testing 15, 12, 12   • OTHER SURGICAL HISTORY      vaginal ultrasound- ,   vas clementina lower extrem , vas venous duplex lower extrem bilat    • OTHER SURGICAL HISTORY      wdge biopsy spring of lung    • OTHER SURGICAL HISTORY      emergent intubation- hypoxic resp failure    • PACEMAKER IMPLANTATION  2016    sss   • REPLACEMENT TOTAL KNEE Bilateral     left knee , right  per dr acuna    • REPLACEMENT TOTAL KNEE Bilateral    • SHOULDER ARTHROSCOPY Bilateral     -left, - right    • SKIN BIOPSY      skin cancer back    • SKIN CANCER EXCISION      upper righ tback    • MADHAV     • TEETH EXTRACTION      x2   • TUBAL ABDOMINAL LIGATION Bilateral    • WISDOM TOOTH EXTRACTION         Family History:   Family History   Problem Relation Age of Onset   • Kidney disease Mother    • Coronary artery disease Mother    • Hypertension Mother            • Heart disease Mother    • Hyperlipidemia Mother    • Coronary artery disease Father    • Hypertension  Father            • Hypothyroidism Father    • Cancer Father         Oral cancer   • Coronary artery disease Brother    • Hypertension Brother    • Colon polyps Neg Hx    • Colon cancer Neg Hx        Social History:   Social History     Socioeconomic History   • Marital status:      Spouse name: N/A   • Number of children: 4   • Years of education: College   Tobacco Use   • Smoking status: Never Smoker   • Smokeless tobacco: Never Used   Vaping Use   • Vaping Use: Never used   Substance and Sexual Activity   • Alcohol use: No   • Drug use: No   • Sexual activity: Not Currently     Partners: Male     Birth control/protection: Post-menopausal       Medications:     Current Outpatient Medications:   •  Accu-Chek Guide test strip, AS DIRECTED THREE TIMES A DAY, Disp: 300 each, Rfl: 1  •  Accu-Chek Softclix Lancets lancets, AS DIRECTED THREE TIMES A DAY, Disp: 300 each, Rfl: 1  •  albuterol (PROVENTIL) (2.5 MG/3ML) 0.083% nebulizer solution, Take 2.5 mg by nebulization Every 4 (Four) Hours As Needed., Disp: , Rfl:   •  albuterol sulfate HFA (Ventolin HFA) 108 (90 Base) MCG/ACT inhaler, Inhale 1 puff Every 4 (Four) Hours As Needed for Wheezing or Shortness of Air., Disp: 8 g, Rfl: 5  •  ALLERGY SERUM INJECTION, Inject  under the skin into the appropriate area as directed 1 (One) Time Per Week., Disp: , Rfl:   •  amantadine (SYMMETREL) 100 MG capsule, Take 100 mg by mouth 2 (Two) Times a Day., Disp: , Rfl:   •  apixaban (Eliquis) 5 MG tablet tablet, Take 1 tablet by mouth Every 12 (Twelve) Hours., Disp: 180 tablet, Rfl: 2  •  aspirin 81 MG EC tablet, Take 81 mg by mouth Daily., Disp: , Rfl:   •  B Complex-C (SUPER B COMPLEX PO), Take 1 tablet by mouth Daily., Disp: , Rfl:   •  baclofen (LIORESAL) 10 MG tablet, Take 1 tablet by mouth Every 8 (Eight) Hours. As needed, Disp: , Rfl:   •  bumetanide (BUMEX) 1 MG tablet, 1-2 tabs po daily as directed, Disp: 180 tablet, Rfl: 1  •  Calcium Carbonate (CALTRATE 600  PO), Take 600 mg by mouth Daily., Disp: , Rfl:   •  carbidopa-levodopa (SINEMET)  MG per tablet, Take  by mouth. 2 tablets at 0600, 1 tablet at 0900, 1200, 1500, 1800, 2100, Disp: , Rfl:   •  Cholecalciferol 2000 units tablet, Take 2,000 Units by mouth 2 (Two) Times a Day., Disp: , Rfl:   •  citalopram (CeleXA) 20 MG tablet, TAKE 1 TABLET DAILY, Disp: 90 tablet, Rfl: 3  •  clindamycin (CLEOCIN) 150 MG capsule, Take 150 mg by mouth Daily. 4 capsules one hour before dental appointments., Disp: , Rfl:   •  clobetasol (TEMOVATE) 0.05 % cream, Apply 1 application topically 2 (Two) Times a Day As Needed (Lichens Sclerosis)., Disp: , Rfl:   •  dofetilide (TIKOSYN) 500 MCG capsule, Take 1 capsule by mouth Every 12 (Twelve) Hours., Disp: 180 capsule, Rfl: 3  •  Emollient (AQUAPHOR ADVANCED THERAPY EX), Apply  topically., Disp: , Rfl:   •  epoetin lizzie (EPOGEN,PROCRIT) 97704 UNIT/ML injection, Inject  under the skin into the appropriate area as directed., Disp: , Rfl:   •  ferrous sulfate 325 (65 FE) MG tablet, Take 325 mg by mouth Daily With Breakfast., Disp: , Rfl:   •  Glucosamine-Chondroit-Calcium (TRIPLE FLEX BONE & JOINT PO), Take 1 tablet by mouth Daily. Move free, Disp: , Rfl:   •  Insulin Glargine (LANTUS SOLOSTAR) 100 UNIT/ML injection pen, Inject 30 Units under the skin into the appropriate area as directed Daily. (Patient taking differently: Inject  under the skin into the appropriate area as directed Daily. 10-20 varies depending on diet), Disp: 30 mL, Rfl: 3  •  Insulin Pen Needle 31G X 5 MM misc, Use to inject insulin daily., Disp: 100 each, Rfl: 3  •  IPRATROPIUM BROMIDE NA, 1 spray into the nostril(s) as directed by provider 2 (Two) Times a Day As Needed., Disp: , Rfl:   •  Loratadine 10 MG capsule, Take 10 mg by mouth Daily., Disp: , Rfl:   •  metFORMIN (GLUCOPHAGE) 1000 MG tablet, TAKE 1 TABLET TWICE A DAY WITH MEALS, Disp: 180 tablet, Rfl: 3  •  metOLazone (ZAROXOLYN) 2.5 MG tablet, TAKE 1 TABLET  "DAILY, Disp: 90 tablet, Rfl: 1  •  Multiple Vitamins-Minerals (CENTRUM ULTRA WOMENS PO), Take 1 tablet by mouth Daily., Disp: , Rfl:   •  nitroglycerin (NITROLINGUAL) 0.4 MG/SPRAY spray, Place 1 spray under the tongue Every 5 (Five) Minutes As Needed for Chest Pain., Disp: 1 each, Rfl: 6  •  O2 (OXYGEN), Inhale 2 L/min 1 (One) Time. 2L all the time now, Disp: , Rfl:   •  omeprazole (priLOSEC) 40 MG capsule, Take 40 mg by mouth 2 (Two) Times a Day., Disp: , Rfl:   •  pramipexole (MIRAPEX) 1.5 MG tablet, Take 1.5 mg by mouth Every Night., Disp: , Rfl:   •  ranolazine (RANEXA) 500 MG 12 hr tablet, TAKE 1 TABLET EVERY 12 HOURS, Disp: 180 tablet, Rfl: 3  •  senna (SENOKOT) 8.6 MG tablet, Take 1 tablet by mouth Daily., Disp: , Rfl:   •  spironolactone (ALDACTONE) 25 MG tablet, TAKE 2 TABLETS DAILY AS NEEDED FOR SWELLING (Patient taking differently: Take 50 mg by mouth Daily.), Disp: 180 tablet, Rfl: 3  •  traMADol (ULTRAM) 50 MG tablet, Take 1 tablet by mouth Every 6 (Six) Hours As Needed for Moderate Pain ., Disp: 30 tablet, Rfl: 2  •  triamcinolone (KENALOG) 0.1 % cream, As Needed., Disp: , Rfl:   •  Unable to find, 1 each 2 (two) times a day. Med Name: tonic water 4 oz BID, Disp: , Rfl:   •  Unithroid 175 MCG tablet, Take 1 tablet by mouth Daily., Disp: 90 tablet, Rfl: 3  •  valsartan (DIOVAN) 160 MG tablet, TAKE 1 TABLET TWICE A DAY, Disp: 180 tablet, Rfl: 3  •  vitamin C (ASCORBIC ACID) 500 MG tablet, Take 500 mg by mouth Daily. Chewable tablet, Disp: , Rfl:     Allergies:   Allergies   Allergen Reactions   • Toprol Xl [Metoprolol Tartrate] Shortness Of Breath     Extreme fatigue   • Amlodipine Besylate Swelling     Lower extremity (ankles, feet) swelling   • Codeine Unknown (See Comments)     Pt is unaware of what reaction she had   • Entacapone Other (See Comments)     \"extreme weakness in legs - caused several falls, which stopped after discontinuing this medication\"   • Epinephrine Other (See Comments)     " "6/4/16- had 3 shots to numb mouth to prepare teeth for crowns, the shots contained epi-  Caused pt to have chest discomfort- went to hospital in ambulance, discovered had a fib while there    • Levemir [Insulin Detemir] Hives     Hives / rash around injection site   • Penicillins Hives     Jitteriness    • Xarelto [Rivaroxaban] GI Bleeding     hgb dropped to 5.2   • Benztropine Mesylate Unknown - High Severity   • Bactrim [Sulfamethoxazole-Trimethoprim] Nausea Only and Other (See Comments)     Headache -  Cant be taken with tikosyn - septra ds   • Cimetidine Other (See Comments)     Cant be taken with tikosyn    • Ciprofloxacin Diarrhea   • Cogentin [Benztropine] Other (See Comments)     \"uncontrollable body movements\"   • Compazine [Prochlorperazine Edisylate] Other (See Comments)     Dystonic reaction   • Cortisone Other (See Comments)     MAKES BLOOD PRESSURE HIGH    • Duraprep [Antiseptic Products, Misc.] Itching and Rash     RASH AND ITCHING   • Erythromycin Base Other (See Comments)     Cant be taken with tikosyn - z pack and ketek    • Flurandrenolone Other (See Comments)     Cant be taken with tikosyn     Include - levaquin, cipro, norloxacin    • Haldol [Haloperidol Lactate] Other (See Comments)     Dystonic reaction   • Hydralazine Other (See Comments)     Headache    • Hydrochlorothiazide Other (See Comments)     Cant be taken with tikosyn -  Also hydrodiuril, microzide, hydro-par, oretic, esidrix, ezide or any medicine with hct or hctz in name    • Hydrocodone-Acetaminophen Nausea And Vomiting and Dizziness     Headache, nausea    • Levaquin [Levofloxacin] Other (See Comments)     Cannot take due to taking propafenone HCL- severe reaction with mixed.    • Lisinopril Cough   • Macrolides And Ketolides Other (See Comments)     antibx -   Cant be taken with tikosyn    • Statins Myalgia     Leg pain- all statins    • Tarka [Trandolapril-Verapamil Hcl Er] Other (See Comments)     Constipation    • Verapamil " "Other (See Comments)     Cant be taken with tikosyn - calan, covera-hs, isoptin, verelan or tarka          Physical Exam:  Vital Signs:   Vitals:    06/08/22 1411   BP: 142/68   BP Location: Left arm   Patient Position: Sitting   Cuff Size: Adult   Pulse: 62   Resp: 16   Temp: 98.8 °F (37.1 °C)   TempSrc: Temporal   SpO2: 98%  Comment: 2 liters   Weight: 107 kg (235 lb 11.2 oz)   Height: 157.5 cm (62\")   PainSc: 0-No pain     Body mass index is 43.11 kg/m².     Physical Exam  Vitals and nursing note reviewed.   Constitutional:       Appearance: Normal appearance. She is normal weight.   HENT:      Head: Normocephalic and atraumatic.      Right Ear: Tympanic membrane, ear canal and external ear normal.      Left Ear: Tympanic membrane, ear canal and external ear normal.      Nose: Nose normal.      Mouth/Throat:      Mouth: Mucous membranes are dry.      Pharynx: Oropharynx is clear.   Eyes:      Extraocular Movements: Extraocular movements intact.      Conjunctiva/sclera: Conjunctivae normal.      Pupils: Pupils are equal, round, and reactive to light.   Cardiovascular:      Rate and Rhythm: Normal rate and regular rhythm.      Pulses: Normal pulses.      Heart sounds: Normal heart sounds.   Pulmonary:      Effort: Pulmonary effort is normal.      Breath sounds: Normal breath sounds.   Musculoskeletal:         General: Tenderness present.      Cervical back: Normal range of motion and neck supple.   Feet:      Comments: She had had good sharp and dull discrimination.  Patient had a callus on her right foot.  Patient is some toe deformities.  No sign of ulcer formation.  Patient good pulses in dorsalis pedis and posterior tibial bilaterally.  Neurological:      Mental Status: She is alert.         Procedures      Assessment/Plan:   Diagnoses and all orders for this visit:    1. Acquired hypothyroidism (Primary)  Assessment & Plan:  Sh and free T4 were normal.  We will follow.      2. Type 2 diabetes mellitus with " hyperglycemia, with long-term current use of insulin (HCC)  -     POC Microalbumin    3. Hypertension, essential  Assessment & Plan:  Discussed with patient to monitor their blood pressure and if systolic blood pressure goes above 140 or diastolic is above 90 to return to clinic.  Take medicines as directed, call for any problems, patient not having or any worrisome symptoms.          4. Muscle strain  Assessment & Plan:  Discussed with her that I want to send her to physical therapy.  She refuses at this time.      5. Anemia, unspecified type  Assessment & Plan:  Seeing Dr. Campa.  This may be contributing to her fatigue.      Other orders  -     Pneumococcal Conjugate Vaccine 20-Valent (PCV20)           Follow Up:   No follow-ups on file.    Reynaldo Fritz MD  [facility]     Answers for HPI/ROS submitted by the patient on 6/7/2022  Please describe your symptoms.: Follow up on bloodwork.  Not having any specific problems right now.  Have you had these symptoms before?: No  How long have you been having these symptoms?: 1-4 days  Please list any medications you are currently taking for this condition.: See list from patient.  Please describe any probable cause for these symptoms. : No problems.  What is the primary reason for your visit?: Other

## 2022-06-13 ENCOUNTER — OFFICE VISIT (OUTPATIENT)
Dept: PULMONOLOGY | Facility: CLINIC | Age: 72
End: 2022-06-13

## 2022-06-13 VITALS
DIASTOLIC BLOOD PRESSURE: 74 MMHG | HEIGHT: 62 IN | OXYGEN SATURATION: 97 % | WEIGHT: 234 LBS | SYSTOLIC BLOOD PRESSURE: 122 MMHG | HEART RATE: 86 BPM | TEMPERATURE: 97.6 F | BODY MASS INDEX: 43.06 KG/M2

## 2022-06-13 DIAGNOSIS — R06.02 SOB (SHORTNESS OF BREATH): Primary | ICD-10-CM

## 2022-06-13 DIAGNOSIS — K21.9 GASTROESOPHAGEAL REFLUX DISEASE, UNSPECIFIED WHETHER ESOPHAGITIS PRESENT: ICD-10-CM

## 2022-06-13 DIAGNOSIS — G47.33 OSA TREATED WITH BIPAP: ICD-10-CM

## 2022-06-13 DIAGNOSIS — D64.9 ANEMIA, UNSPECIFIED TYPE: ICD-10-CM

## 2022-06-13 PROCEDURE — 94729 DIFFUSING CAPACITY: CPT | Performed by: NURSE PRACTITIONER

## 2022-06-13 PROCEDURE — 94375 RESPIRATORY FLOW VOLUME LOOP: CPT | Performed by: NURSE PRACTITIONER

## 2022-06-13 PROCEDURE — 99214 OFFICE O/P EST MOD 30 MIN: CPT | Performed by: NURSE PRACTITIONER

## 2022-06-13 PROCEDURE — 94726 PLETHYSMOGRAPHY LUNG VOLUMES: CPT | Performed by: NURSE PRACTITIONER

## 2022-06-14 NOTE — PROGRESS NOTES
[facility] Pulmonary Follow up    CHIEF COMPLAINT    Dyspnea    HISTORY OF PRESENT ILLNESS    Joanna Cross is a 71 y.o.female here today for follow-up.  She was last seen in the office by me in December.  She states she has been doing fairly well from a pulmonary standpoint.  She has had no exacerbations since her last appointment.    She continues to use her BiPAP every night 14/16 she also uses 2 L bled into the machine.  She denies any daytime somnolence or fatigue.    She continues to have difficulty with her anemia.  She states that when her hemoglobin is less than 10 she does feel more short of breath.  She is currently hovering around 10.5.    She continues to use 2 L continuously at home.  She will turn up to 3 L when she is doing any heavy exertion.    She denies fever, chills, sputum production, hemoptysis, night sweats, weight loss, chest pain or palpitations.  She denies any lower extremity edema or calf tenderness.  She denies any sinus or allergy symptoms.  She denies reflux symptoms and does take omeprazole twice a day.     She is up-to-date on her current vaccinations.    She is a lifelong non-smoker.    Patient Active Problem List   Diagnosis   • Coronary artery disease involving native coronary artery without angina pectoris   • Hypertension, essential   • Peripheral vascular disease (HCC)   • Hyperlipidemia LDL goal <70   • Spinal stenosis, lumbar region, with neurogenic claudication   • Diabetes mellitus ([facility])   • Delayed surgical wound healing   • Biceps tendinitis   • Overactive bladder   • GERD (gastroesophageal reflux disease)   • Hypothyroidism   • Lichen sclerosus   • Parkinson's disease ([facility])   • MILLI treated with BiPAP - Patient reports compliance.    • History of respiratory failure - prior respiratory failure requiring mechanical ventilation, open lung biopsy non-specific.    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[facility])   • Long term current use of antiarrhythmic drug   • History of adenomatous polyp of colon   • Anemia   • Angiodysplasia   • Hx of colonic polyps   • Body mass index 40.0-44.9, adult ([facility])   • Bibasilar crackles   • Bilateral pneumonia   • Bradycardia   • Bronchitis   • Cardiac pacemaker in situ   • Chronic low back pain   • Chronic lung disease   • Congestive heart failure ([facility])   • Cough   • Degeneration of lumbar intervertebral disc   • Edema of lower extremity   • Elevated troponin level   • Encounter for deep vein thrombosis (DVT) prophylaxis   • Ground glass opacity present on imaging of lung   • Headache   • High risk medication use   • History of malignant neoplasm of skin   • History of TIA (transient ischemic attack)   • Hyponatremia   • Hypotonic bladder   • Incomplete tear of rotator cuff   • Major depression in remission ([facility])   • Migraine   • Weakness   • Tinnitus of both ears   • Muscle pain   • Non-ST elevation (NSTEMI) myocardial infarction ([facility])   • Primary osteoarthritis involving multiple joints   • Restless legs   • Seborrheic dermatitis   • Sphenoid sinusitis   • Transient cerebral ischemia   • Unstable angina pectoris ([facility])   • Upper respiratory tract infection   • Urinary tract infection   • Urinary urgency   • Muscle strain       Allergies   Allergen Reactions   • Toprol Xl [Metoprolol Tartrate] Shortness Of Breath     Extreme fatigue   • Amlodipine Besylate Swelling     Lower extremity (ankles, feet) swelling   • Codeine Unknown (See Comments)     Pt is unaware of what reaction she had   • Entacapone Other (See Comments)     \"extreme weakness in legs - caused several falls, which stopped after discontinuing this medication\"   • Epinephrine Other (See Comments)     6/4/16- had 3 shots to numb mouth to prepare teeth for crowns, the shots contained epi-  Caused pt to have " "chest discomfort- went to hospital in ambulance, discovered had a fib while there    • Levemir [Insulin Detemir] Hives     Hives / rash around injection site   • Penicillins Hives     Jitteriness    • Xarelto [Rivaroxaban] GI Bleeding     hgb dropped to 5.2   • Benztropine Mesylate Unknown - High Severity   • Bactrim [Sulfamethoxazole-Trimethoprim] Nausea Only and Other (See Comments)     Headache -  Cant be taken with tikosyn - septra ds   • Cimetidine Other (See Comments)     Cant be taken with tikosyn    • Ciprofloxacin Diarrhea   • Cogentin [Benztropine] Other (See Comments)     \"uncontrollable body movements\"   • Compazine [Prochlorperazine Edisylate] Other (See Comments)     Dystonic reaction   • Cortisone Other (See Comments)     MAKES BLOOD PRESSURE HIGH    • Duraprep [Antiseptic Products, Misc.] Itching and Rash     RASH AND ITCHING   • Erythromycin Base Other (See Comments)     Cant be taken with tikosyn - z pack and ketek    • Flurandrenolone Other (See Comments)     Cant be taken with tikosyn     Include - levaquin, cipro, norloxacin    • Haldol [Haloperidol Lactate] Other (See Comments)     Dystonic reaction   • Hydralazine Other (See Comments)     Headache    • Hydrochlorothiazide Other (See Comments)     Cant be taken with tikosyn -  Also hydrodiuril, microzide, hydro-par, oretic, esidrix, ezide or any medicine with hct or hctz in name    • Hydrocodone-Acetaminophen Nausea And Vomiting and Dizziness     Headache, nausea    • Levaquin [Levofloxacin] Other (See Comments)     Cannot take due to taking propafenone HCL- severe reaction with mixed.    • Lisinopril Cough   • Macrolides And Ketolides Other (See Comments)     antibx -   Cant be taken with tikosyn    • Statins Myalgia     Leg pain- all statins    • Tarka [Trandolapril-Verapamil Hcl Er] Other (See Comments)     Constipation    • Verapamil Other (See Comments)     Cant be taken with tikosyn - calan, covera-hs, isoptin, verelan or tarka  "       Current Outpatient Medications:   •  Accu-Chek Guide test strip, AS DIRECTED THREE TIMES A DAY, Disp: 300 each, Rfl: 1  •  Accu-Chek Softclix Lancets lancets, AS DIRECTED THREE TIMES A DAY, Disp: 300 each, Rfl: 1  •  albuterol (PROVENTIL) (2.5 MG/3ML) 0.083% nebulizer solution, Take 2.5 mg by nebulization Every 4 (Four) Hours As Needed., Disp: , Rfl:   •  albuterol sulfate HFA (Ventolin HFA) 108 (90 Base) MCG/ACT inhaler, Inhale 1 puff Every 4 (Four) Hours As Needed for Wheezing or Shortness of Air., Disp: 8 g, Rfl: 5  •  ALLERGY SERUM INJECTION, Inject  under the skin into the appropriate area as directed 1 (One) Time Per Week., Disp: , Rfl:   •  amantadine (SYMMETREL) 100 MG capsule, Take 100 mg by mouth 2 (Two) Times a Day., Disp: , Rfl:   •  apixaban (Eliquis) 5 MG tablet tablet, Take 1 tablet by mouth Every 12 (Twelve) Hours., Disp: 180 tablet, Rfl: 2  •  aspirin 81 MG EC tablet, Take 81 mg by mouth Daily., Disp: , Rfl:   •  B Complex-C (SUPER B COMPLEX PO), Take 1 tablet by mouth Daily., Disp: , Rfl:   •  baclofen (LIORESAL) 10 MG tablet, Take 1 tablet by mouth Every 8 (Eight) Hours. As needed, Disp: , Rfl:   •  bumetanide (BUMEX) 1 MG tablet, 1-2 tabs po daily as directed, Disp: 180 tablet, Rfl: 1  •  Calcium Carbonate (CALTRATE 600 PO), Take 600 mg by mouth Daily., Disp: , Rfl:   •  carbidopa-levodopa (SINEMET)  MG per tablet, Take  by mouth. 2 tablets at 0600, 1 tablet at 0900, 1200, 1500, 1800, 2100, Disp: , Rfl:   •  Cholecalciferol 2000 units tablet, Take 2,000 Units by mouth 2 (Two) Times a Day., Disp: , Rfl:   •  citalopram (CeleXA) 20 MG tablet, TAKE 1 TABLET DAILY, Disp: 90 tablet, Rfl: 3  •  clindamycin (CLEOCIN) 150 MG capsule, Take 150 mg by mouth Daily. 4 capsules one hour before dental appointments., Disp: , Rfl:   •  clobetasol (TEMOVATE) 0.05 % cream, Apply 1 application topically 2 (Two) Times a Day As Needed (Lichens Sclerosis)., Disp: , Rfl:   •  dofetilide (TIKOSYN) 500 MCG  capsule, Take 1 capsule by mouth Every 12 (Twelve) Hours., Disp: 180 capsule, Rfl: 3  •  Emollient (AQUAPHOR ADVANCED THERAPY EX), Apply  topically., Disp: , Rfl:   •  epoetin lizzie (EPOGEN,PROCRIT) 30134 UNIT/ML injection, Inject  under the skin into the appropriate area as directed., Disp: , Rfl:   •  ferrous sulfate 325 (65 FE) MG tablet, Take 325 mg by mouth Daily With Breakfast., Disp: , Rfl:   •  Glucosamine-Chondroit-Calcium (TRIPLE FLEX BONE & JOINT PO), Take 1 tablet by mouth Daily. Move free, Disp: , Rfl:   •  Insulin Glargine (LANTUS SOLOSTAR) 100 UNIT/ML injection pen, Inject 30 Units under the skin into the appropriate area as directed Daily. (Patient taking differently: Inject  under the skin into the appropriate area as directed Daily. 10-20 varies depending on diet), Disp: 30 mL, Rfl: 3  •  Insulin Pen Needle 31G X 5 MM misc, Use to inject insulin daily., Disp: 100 each, Rfl: 3  •  IPRATROPIUM BROMIDE NA, 1 spray into the nostril(s) as directed by provider 2 (Two) Times a Day As Needed., Disp: , Rfl:   •  Loratadine 10 MG capsule, Take 10 mg by mouth Daily., Disp: , Rfl:   •  metFORMIN (GLUCOPHAGE) 1000 MG tablet, TAKE 1 TABLET TWICE A DAY WITH MEALS, Disp: 180 tablet, Rfl: 3  •  metOLazone (ZAROXOLYN) 2.5 MG tablet, TAKE 1 TABLET DAILY, Disp: 90 tablet, Rfl: 1  •  Multiple Vitamins-Minerals (CENTRUM ULTRA WOMENS PO), Take 1 tablet by mouth Daily., Disp: , Rfl:   •  nitroglycerin (NITROLINGUAL) 0.4 MG/SPRAY spray, Place 1 spray under the tongue Every 5 (Five) Minutes As Needed for Chest Pain., Disp: 1 each, Rfl: 6  •  O2 (OXYGEN), Inhale 2 L/min 1 (One) Time. 2L all the time now, Disp: , Rfl:   •  omeprazole (priLOSEC) 40 MG capsule, Take 40 mg by mouth 2 (Two) Times a Day., Disp: , Rfl:   •  pramipexole (MIRAPEX) 1.5 MG tablet, Take 1.5 mg by mouth Every Night., Disp: , Rfl:   •  ranolazine (RANEXA) 500 MG 12 hr tablet, TAKE 1 TABLET EVERY 12 HOURS, Disp: 180 tablet, Rfl: 3  •  senna (SENOKOT) 8.6 MG  tablet, Take 1 tablet by mouth Daily., Disp: , Rfl:   •  spironolactone (ALDACTONE) 25 MG tablet, TAKE 2 TABLETS DAILY AS NEEDED FOR SWELLING (Patient taking differently: Take 50 mg by mouth Daily.), Disp: 180 tablet, Rfl: 3  •  traMADol (ULTRAM) 50 MG tablet, Take 1 tablet by mouth Every 6 (Six) Hours As Needed for Moderate Pain ., Disp: 30 tablet, Rfl: 2  •  triamcinolone (KENALOG) 0.1 % cream, As Needed., Disp: , Rfl:   •  Unable to find, 1 each 2 (two) times a day. Med Name: tonic water 4 oz BID, Disp: , Rfl:   •  Unithroid 175 MCG tablet, Take 1 tablet by mouth Daily., Disp: 90 tablet, Rfl: 3  •  valsartan (DIOVAN) 160 MG tablet, TAKE 1 TABLET TWICE A DAY, Disp: 180 tablet, Rfl: 3  •  vitamin C (ASCORBIC ACID) 500 MG tablet, Take 500 mg by mouth Daily. Chewable tablet, Disp: , Rfl:   •  Zinc 50 MG capsule, Take 1 capsule by mouth Daily., Disp: , Rfl:   MEDICATION LIST AND ALLERGIES REVIEWED.    Social History     Tobacco Use   • Smoking status: Never Smoker   • Smokeless tobacco: Never Used   Vaping Use   • Vaping Use: Never used   Substance Use Topics   • Alcohol use: No   • Drug use: No       FAMILY AND SOCIAL HISTORY REVIEWED.    Review of Systems   Constitutional: Negative for activity change, appetite change, fatigue, fever and unexpected weight change.   HENT: Negative for congestion, postnasal drip, rhinorrhea, sinus pressure, sore throat and voice change.    Eyes: Negative for visual disturbance.   Respiratory: Positive for shortness of breath. Negative for cough, chest tightness and wheezing.    Cardiovascular: Negative for chest pain, palpitations and leg swelling.   Gastrointestinal: Negative for abdominal distention, abdominal pain, nausea and vomiting.   Endocrine: Negative for cold intolerance and heat intolerance.   Genitourinary: Negative for difficulty urinating and urgency.   Musculoskeletal: Negative for arthralgias, back pain and neck pain.   Skin: Negative for color change and pallor.  "  Allergic/Immunologic: Negative for environmental allergies and food allergies.   Neurological: Negative for dizziness, syncope, weakness and light-headedness.   Hematological: Negative for adenopathy. Does not bruise/bleed easily.   Psychiatric/Behavioral: Negative for agitation and behavioral problems.   .    /74 (BP Location: Left arm, Patient Position: Sitting, Cuff Size: Adult)   Pulse 86   Temp 97.6 °F (36.4 °C) (Infrared)   Ht 157.5 cm (62.01\")   Wt 106 kg (234 lb)   LMP  (LMP Unknown) Comment: Mammogram- 1/28/20  SpO2 97% Comment: 2 liters,pulse,at rest  BMI 42.79 kg/m²     Immunization History   Administered Date(s) Administered   • COVID-19 (MODERNA) 1st, 2nd, 3rd Dose Only 02/12/2021, 03/12/2021, 11/09/2021   • Flu Vaccine Quad PF >36MO 10/02/2017, 09/11/2018, 09/21/2019   • Fluzone High Dose =>65 Years ([facility] ONLY) 09/13/2017   • Fluzone High-Dose 65+yrs 09/16/2021   • Hepatitis A 05/03/1999, 10/05/1999   • INFLUENZA SPLIT TRI 09/15/2010, 10/02/2012, 10/02/2013   • Influenza, Unspecified 01/13/2004, 10/28/2004, 12/14/2005, 01/11/2007, 11/30/2007, 11/20/2008, 02/17/2010, 01/31/2011, 09/06/2011, 09/09/2013, 09/13/2017, 09/11/2018   • PEDS-Pneumococcal Conjugate (PCV7) 12/20/2013, 12/04/2016   • Pneumococcal Conjugate 13-Valent (PCV13) 09/04/2015, 12/04/2016   • Pneumococcal Conjugate 20-Valent (PCV20) 06/08/2022   • Pneumococcal Polysaccharide (PPSV23) 01/13/2004, 11/20/2008, 12/20/2013   • Pneumococcal, Unspecified 12/20/2013, 12/04/2016   • Shingrix 09/27/2019   • TD Preservative Free 02/01/2012, 05/04/2017   • Tdap 05/04/2017   • Zostavax 02/05/2013, 09/27/2019       Physical Exam  Vitals and nursing note reviewed.   Constitutional:       Appearance: She is well-developed. She is not diaphoretic.   HENT:      Head: Normocephalic and atraumatic.   Eyes:      Pupils: Pupils are equal, round, and reactive to light.   Neck:      Thyroid: No thyromegaly.   Cardiovascular:      Rate and " Rhythm: Normal rate and regular rhythm.      Heart sounds: Normal heart sounds. No murmur heard.    No friction rub. No gallop.   Pulmonary:      Effort: Pulmonary effort is normal. No respiratory distress.      Breath sounds: Normal breath sounds. No wheezing or rales.   Chest:      Chest wall: No tenderness.   Abdominal:      General: Bowel sounds are normal.      Palpations: Abdomen is soft.      Tenderness: There is no abdominal tenderness.   Musculoskeletal:         General: No swelling. Normal range of motion.      Cervical back: Normal range of motion and neck supple.   Lymphadenopathy:      Cervical: No cervical adenopathy.   Skin:     General: Skin is warm and dry.      Capillary Refill: Capillary refill takes less than 2 seconds.   Neurological:      Mental Status: She is alert and oriented to person, place, and time.   Psychiatric:         Behavior: Behavior normal.           RESULTS    PFTS in the office today, read by me, FVC 2.56 97% predicted, FEV1 1.84 93% predicted, FEV1/FVC 72% predicted, TLC 4.64 106% predicted, DLCO 55% predicted, no obstruction, no restriction and reduced DLCO.    PROBLEM LIST    Problem List Items Addressed This Visit        Gastrointestinal Abdominal     GERD (gastroesophageal reflux disease)       Hematology and Neoplasia    Anemia       Pulmonary and Pneumonias    SOB (shortness of breath) - Primary    Relevant Orders    Pulmonary Function Test (Completed)       Sleep    MILLI treated with BiPAP - Patient reports compliance.     Overview     - Patient reports compliance.                    DISCUSSION    Ms. Cross was here for follow-up of her dyspnea.  She seems to be doing fairly well from a pulmonary standpoint.  When her anemia is stable her dyspnea is less.    She will continue to use her oxygen at 2 L continuously and may turn up to 3 L with heavy exertion.  I did encourage her to do some daily physical activity and encouraged weight loss.    She will continue to wear  her BiPAP every night with 2 L bled into the machine.  I did encourage her to wear this every night a minimum of 4 hours.  She does benefit from her BiPAP.    She will continue to follow closely for her anemia.  This does contribute to some of her dyspnea as well.    She will follow-up in 6 months or sooner if her symptoms worsen.  I did advise her to call with any additional concerns or questions.    Level of service justified based on 35 minutes spent in patient care on this date of service including, but not limited to: preparing to see the patient, obtaining and/or reviewing history, performing medically appropriate examination, ordering tests/medicine/procedures, independently interpreting results, documenting clinical information in EHR, and counseling/education of patient/family/caregiver. (Level 4 30-39 minutes; Level 5 40-54 minutes)      Myrna Mckeon, DEJA  06/13/202209:10 EDT  Electronically signed     Please note that portions of this note were completed with a voice recognition program.        CC: eRynaldo Fritz MD

## 2022-06-15 ENCOUNTER — APPOINTMENT (OUTPATIENT)
Dept: WOMENS IMAGING | Facility: HOSPITAL | Age: 72
End: 2022-06-15

## 2022-06-15 PROCEDURE — 77067 SCR MAMMO BI INCL CAD: CPT | Performed by: RADIOLOGY

## 2022-06-20 DIAGNOSIS — R06.02 SOB (SHORTNESS OF BREATH): ICD-10-CM

## 2022-06-20 RX ORDER — ALBUTEROL SULFATE 90 UG/1
1 AEROSOL, METERED RESPIRATORY (INHALATION) EVERY 4 HOURS PRN
Qty: 8 G | Refills: 5 | Status: SHIPPED | OUTPATIENT
Start: 2022-06-20

## 2022-07-05 ENCOUNTER — TELEPHONE (OUTPATIENT)
Dept: ENDOCRINOLOGY | Facility: CLINIC | Age: 72
End: 2022-07-05

## 2022-07-05 RX ORDER — BUMETANIDE 1 MG/1
TABLET ORAL
Qty: 90 TABLET | Refills: 0 | Status: SHIPPED | OUTPATIENT
Start: 2022-07-05 | End: 2022-08-19 | Stop reason: SDUPTHER

## 2022-07-05 RX ORDER — FLURBIPROFEN SODIUM 0.3 MG/ML
SOLUTION/ DROPS OPHTHALMIC
Qty: 90 EACH | Refills: 3 | Status: SHIPPED | OUTPATIENT
Start: 2022-07-05 | End: 2022-07-05 | Stop reason: SDUPTHER

## 2022-07-05 RX ORDER — FLURBIPROFEN SODIUM 0.3 MG/ML
SOLUTION/ DROPS OPHTHALMIC
Qty: 100 EACH | Refills: 3 | Status: SHIPPED | OUTPATIENT
Start: 2022-07-05

## 2022-07-05 NOTE — TELEPHONE ENCOUNTER
Lab Results   Component Value Date    GLUCOSE 133 (H) 04/14/2022    BUN 39 (H) 04/14/2022    CREATININE 1.30 (H) 04/14/2022    EGFRIFNONA 66 06/12/2019    BCR 30 (H) 04/14/2022    K 4.3 04/14/2022    CO2 21 04/14/2022    CALCIUM 9.6 04/14/2022    PROTENTOTREF 6.9 04/14/2022    ALBUMIN 4.7 04/14/2022    LABIL2 2.1 04/14/2022    AST 13 04/14/2022    ALT 9 04/14/2022       Appt 7/13/2022

## 2022-07-05 NOTE — TELEPHONE ENCOUNTER
----- Message from Joanna Cross sent at 7/5/2022  1:03 AM EDT -----  Regarding: RX Needed  This is Joanna Cross, birthdate 1950, phone number [phone].    I need a prescription sent to [facility] for my   BD Pen [place] [employer] Mini 5mm 90s 31g3/16    Thanks,

## 2022-07-13 ENCOUNTER — OFFICE VISIT (OUTPATIENT)
Dept: CARDIOLOGY | Facility: CLINIC | Age: 72
End: 2022-07-13

## 2022-07-13 VITALS
DIASTOLIC BLOOD PRESSURE: 62 MMHG | WEIGHT: 227 LBS | OXYGEN SATURATION: 98 % | HEIGHT: 62 IN | BODY MASS INDEX: 41.77 KG/M2 | HEART RATE: 77 BPM | SYSTOLIC BLOOD PRESSURE: 122 MMHG

## 2022-07-13 DIAGNOSIS — I73.9 PERIPHERAL VASCULAR DISEASE: ICD-10-CM

## 2022-07-13 DIAGNOSIS — I25.10 CORONARY ARTERY DISEASE INVOLVING NATIVE CORONARY ARTERY OF NATIVE HEART WITHOUT ANGINA PECTORIS: Primary | ICD-10-CM

## 2022-07-13 DIAGNOSIS — I10 ESSENTIAL HYPERTENSION: ICD-10-CM

## 2022-07-13 DIAGNOSIS — Z01.818 PRE-OPERATIVE CLEARANCE: ICD-10-CM

## 2022-07-13 DIAGNOSIS — E78.5 HYPERLIPIDEMIA LDL GOAL <70: ICD-10-CM

## 2022-07-13 DIAGNOSIS — I48.91 ATRIAL FIBRILLATION WITH RVR: ICD-10-CM

## 2022-07-13 PROCEDURE — 99214 OFFICE O/P EST MOD 30 MIN: CPT | Performed by: INTERNAL MEDICINE

## 2022-07-13 PROCEDURE — 93000 ELECTROCARDIOGRAM COMPLETE: CPT | Performed by: INTERNAL MEDICINE

## 2022-07-13 RX ORDER — DONEPEZIL HYDROCHLORIDE 5 MG/1
5 TABLET, FILM COATED ORAL NIGHTLY
COMMUNITY
End: 2022-12-22

## 2022-07-13 NOTE — PROGRESS NOTES
[facility]    Patient ID: Joanna Cross is a 71 y.o. female.  : 1950   Contact: [phone]    Encounter date: 2022    PCP: Reynaldo Fritz MD      Chief complaint:   Chief Complaint   Patient presents with   • Coronary artery disease involving native coronary artery of       Problem List:  1. Coronary artery disease  a. [facility], 02/15/2008, Dr Lutz: Mild, non-obstructive CAD, normal EF  b. [facility], 2013, Dr Stevenson Sutton: No LV gram done. Mild, non-obstructive CAD.  c. [facility], 2015, [facility]:  EF 60%. 70% RCA lesion with Promus ZAINAB placement. 40-50% mid LAD, no FFR performed. Other small blockages noted. No MR.  d. [facility], 11/15/2015, Dr Palacio @ [facility]: Patent RCA stent, 40-50% LAD with FFR @ 0.92; mild inferior wall hypokinesis  e. [facility], 2016, [facility]: Normal CORS with patent stent. LAD improved since last [facility]. EF 55-60%.  f. [facility], 2019: EF 55-60%. Patent RCA stent, noncritical mid LAD with IFR 0.93, noncritical LCx.   2. Chronic venous stasis:  a. Venous duplex, 2010: Insufficiency to venous hypertension in the great saphenous system bilaterally.  b. Venous duplex : Right leg laser ablation of Dr. Garcia.  c. Venous duplex, 2016: No evidence of DVT, no superficial, no thrmobophlebitis, no valvular insufficiency.  d. AA with runoffs, 2016: Single-vessel [place]: No significant arterial disease.  e. Arterial doppler/GLYNN, 2019: No evidence of significant lower extremity arterial occlusive disease.  3. Palpitations:  a. Echocardiogram, 2014: Dr. Teran. Normal biventricular function; trace to mild MR. Atrial septal aneurysm.  b. Echocardiogram, 2015, Dr. Chavez: Borderline LVH, mild LAE, mild RV enlargement. Mild to moderate MR and TR with RVSP of 46 mmHg.  c. Echocardiogram, 2016: Dr. Palacio.  RV enlargement.  EF 50-55%.  Mild AI S, mild MR and TR with RVSP 37  "mmHg.  d. Echocardiogram, 07/11/20: Dr. Jany Wynn: EF 60-65%. Mild to moderate MR.  4. PAF/SSS:  a. [employer] PPM 2016  b. CHADS2 Vasc = 6 (HTN, DM, Age 65-74, Female, H/o TIA). On chronic anticoagulation.  c. ECV, 12/26/2018: Successful cardioversion. AV paced.  d. Echocardiogram, 12/25/2018:  EF 60%, mild MR/TR with RVSP 29 mmHg. Sclerotic AoV, no AS/AI. Mild MAC.  e. ECV, 03/05/2019: Successful cardioversion.  f. Zio, 10/01/2019, 14 days: NSR baseline. Normal average rates. Periods of RV pacing.   g. Patient has known undersensing in the atrial lead which is was known to Dr. Marie. She has an upcoming appointment with Dr. Francois to address this and establish care.   5. TIA:  a. Carotid duplex, 02/13/2014: No significant flow-limiting stenosis. Mild luminal disease present; both vertebrals are present.  6. Diabetes  7. Essential Hypertension  8. Hyperlipidemia  9. Hypothyroid  10. Hyponatremia  a. Secondary to SIADH  11. MILLI with CPAP  12. RLS  13. Parkinson's disease  14. GERD  15. Urinary Retention  a. S/P cystoscopy and ureter dilation, March 2018.  Dr. Terrence Mckenzie  16. Surgeries:  a. Rotator cuff repair  b. Cholecystectomy  c. Bilateral knee replacement  d. Tubal ligation  e. Breast surgery  f. Sinus surgery    Allergies   Allergen Reactions   • Toprol Xl [Metoprolol Tartrate] Shortness Of Breath     Extreme fatigue   • Amlodipine Besylate Swelling     Lower extremity (ankles, feet) swelling   • Codeine Unknown (See Comments)     Pt is unaware of what reaction she had   • Entacapone Other (See Comments)     \"extreme weakness in legs - caused several falls, which stopped after discontinuing this medication\"   • Epinephrine Other (See Comments)     6/4/16- had 3 shots to numb mouth to prepare teeth for crowns, the shots contained epi-  Caused pt to have chest discomfort- went to hospital in ambulance, discovered had a fib while there    • Levemir [Insulin Detemir] Hives     Hives / rash around " "injection site   • Penicillins Hives     Jitteriness    • Xarelto [Rivaroxaban] GI Bleeding     hgb dropped to 5.2   • Benztropine Mesylate Unknown - High Severity   • Bactrim [Sulfamethoxazole-Trimethoprim] Nausea Only and Other (See Comments)     Headache -  Cant be taken with tikosyn - septra ds   • Cimetidine Other (See Comments)     Cant be taken with tikosyn    • Ciprofloxacin Diarrhea   • Cogentin [Benztropine] Other (See Comments)     \"uncontrollable body movements\"   • Compazine [Prochlorperazine Edisylate] Other (See Comments)     Dystonic reaction   • Cortisone Other (See Comments)     MAKES BLOOD PRESSURE HIGH    • Duraprep [Antiseptic Products, Misc.] Itching and Rash     RASH AND ITCHING   • Erythromycin Base Other (See Comments)     Cant be taken with tikosyn - z pack and ketek    • Flurandrenolone Other (See Comments)     Cant be taken with tikosyn     Include - levaquin, cipro, norloxacin    • Haldol [Haloperidol Lactate] Other (See Comments)     Dystonic reaction   • Hydralazine Other (See Comments)     Headache    • Hydrochlorothiazide Other (See Comments)     Cant be taken with tikosyn -  Also hydrodiuril, microzide, hydro-par, oretic, esidrix, ezide or any medicine with hct or hctz in name    • Hydrocodone-Acetaminophen Nausea And Vomiting and Dizziness     Headache, nausea    • Levaquin [Levofloxacin] Other (See Comments)     Cannot take due to taking propafenone HCL- severe reaction with mixed.    • Lisinopril Cough   • Macrolides And Ketolides Other (See Comments)     antibx -   Cant be taken with tikosyn    • Statins Myalgia     Leg pain- all statins    • Tarka [Trandolapril-Verapamil Hcl Er] Other (See Comments)     Constipation    • Verapamil Other (See Comments)     Cant be taken with tikosyn - calan, covera-hs, isoptin, verelan or tarka        Current Medications:    Current Outpatient Medications:   •  Accu-Chek Guide test strip, AS DIRECTED THREE TIMES A DAY, Disp: 300 each, Rfl: 1  •  " Accu-Chek Softclix Lancets lancets, AS DIRECTED THREE TIMES A DAY, Disp: 300 each, Rfl: 1  •  albuterol (PROVENTIL) (2.5 MG/3ML) 0.083% nebulizer solution, Take 2.5 mg by nebulization Every 4 (Four) Hours As Needed., Disp: , Rfl:   •  albuterol sulfate HFA (Ventolin HFA) 108 (90 Base) MCG/ACT inhaler, Inhale 1 puff Every 4 (Four) Hours As Needed for Wheezing or Shortness of Air., Disp: 8 g, Rfl: 5  •  ALLERGY SERUM INJECTION, Inject  under the skin into the appropriate area as directed 1 (One) Time Per Week., Disp: , Rfl:   •  amantadine (SYMMETREL) 100 MG capsule, Take 100 mg by mouth 2 (Two) Times a Day., Disp: , Rfl:   •  apixaban (Eliquis) 5 MG tablet tablet, Take 1 tablet by mouth Every 12 (Twelve) Hours., Disp: 180 tablet, Rfl: 2  •  aspirin 81 MG EC tablet, Take 81 mg by mouth Daily., Disp: , Rfl:   •  B Complex-C (SUPER B COMPLEX PO), Take 1 tablet by mouth Daily., Disp: , Rfl:   •  baclofen (LIORESAL) 10 MG tablet, Take 1 tablet by mouth Every 8 (Eight) Hours. As needed, Disp: , Rfl:   •  bumetanide (BUMEX) 1 MG tablet, 1 tab po daily as directed, Disp: 90 tablet, Rfl: 0  •  Calcium Carbonate (CALTRATE 600 PO), Take 600 mg by mouth Daily., Disp: , Rfl:   •  carbidopa-levodopa (SINEMET)  MG per tablet, Take  by mouth. 2 tablets at 0600, 1 tablet at 0900, 1200, 1500, 1800, 2100, Disp: , Rfl:   •  Cholecalciferol 2000 units tablet, Take 2,000 Units by mouth 2 (Two) Times a Day., Disp: , Rfl:   •  citalopram (CeleXA) 20 MG tablet, TAKE 1 TABLET DAILY, Disp: 90 tablet, Rfl: 3  •  clindamycin (CLEOCIN) 150 MG capsule, Take 150 mg by mouth Daily. 4 capsules one hour before dental appointments., Disp: , Rfl:   •  clobetasol (TEMOVATE) 0.05 % cream, Apply 1 application topically 2 (Two) Times a Day As Needed (Lichens Sclerosis)., Disp: , Rfl:   •  dofetilide (TIKOSYN) 500 MCG capsule, Take 1 capsule by mouth Every 12 (Twelve) Hours., Disp: 180 capsule, Rfl: 3  •  donepezil (ARICEPT) 5 MG tablet, Take 5 mg by  mouth Every Night., Disp: , Rfl:   •  Emollient (AQUAPHOR ADVANCED THERAPY EX), Apply  topically., Disp: , Rfl:   •  epoetin lizzie (EPOGEN,PROCRIT) 35307 UNIT/ML injection, Inject  under the skin into the appropriate area as directed., Disp: , Rfl:   •  ferrous sulfate 325 (65 FE) MG tablet, Take 325 mg by mouth Daily With Breakfast., Disp: , Rfl:   •  Glucosamine-Chondroit-Calcium (TRIPLE FLEX BONE & JOINT PO), Take 1 tablet by mouth Daily. Move free, Disp: , Rfl:   •  Insulin Glargine (LANTUS SOLOSTAR) 100 UNIT/ML injection pen, Inject 30 Units under the skin into the appropriate area as directed Daily. (Patient taking differently: Inject 12 Units under the skin into the appropriate area as directed Every Night.), Disp: 30 mL, Rfl: 3  •  Insulin Pen Needle (B-D UF III MINI PEN NEEDLES) 31G X 5 MM misc, USE TO INJECT INSULIN DAILY, Disp: 100 each, Rfl: 3  •  IPRATROPIUM BROMIDE NA, 1 spray into the nostril(s) as directed by provider 2 (Two) Times a Day As Needed., Disp: , Rfl:   •  Loratadine 10 MG capsule, Take 10 mg by mouth Daily., Disp: , Rfl:   •  metFORMIN (GLUCOPHAGE) 1000 MG tablet, TAKE 1 TABLET TWICE A DAY WITH MEALS, Disp: 180 tablet, Rfl: 3  •  metOLazone (ZAROXOLYN) 2.5 MG tablet, TAKE 1 TABLET DAILY, Disp: 90 tablet, Rfl: 1  •  Multiple Vitamins-Minerals (CENTRUM ULTRA WOMENS PO), Take 1 tablet by mouth Daily., Disp: , Rfl:   •  nitroglycerin (NITROLINGUAL) 0.4 MG/SPRAY spray, Place 1 spray under the tongue Every 5 (Five) Minutes As Needed for Chest Pain., Disp: 1 each, Rfl: 6  •  O2 (OXYGEN), Inhale 2 L/min 1 (One) Time. 2L all the time now, Disp: , Rfl:   •  omeprazole (priLOSEC) 40 MG capsule, Take 40 mg by mouth 2 (Two) Times a Day., Disp: , Rfl:   •  pramipexole (MIRAPEX) 1.5 MG tablet, Take 1.5 mg by mouth Every Night., Disp: , Rfl:   •  ranolazine (RANEXA) 500 MG 12 hr tablet, TAKE 1 TABLET EVERY 12 HOURS, Disp: 180 tablet, Rfl: 3  •  senna (SENOKOT) 8.6 MG tablet, Take 1 tablet by mouth Daily.,  Disp: , Rfl:   •  spironolactone (ALDACTONE) 25 MG tablet, TAKE 2 TABLETS DAILY AS NEEDED FOR SWELLING (Patient taking differently: Take 50 mg by mouth Daily.), Disp: 180 tablet, Rfl: 3  •  traMADol (ULTRAM) 50 MG tablet, Take 1 tablet by mouth Every 6 (Six) Hours As Needed for Moderate Pain ., Disp: 30 tablet, Rfl: 2  •  triamcinolone (KENALOG) 0.1 % cream, As Needed., Disp: , Rfl:   •  Unithroid 175 MCG tablet, Take 1 tablet by mouth Daily., Disp: 90 tablet, Rfl: 3  •  valsartan (DIOVAN) 160 MG tablet, TAKE 1 TABLET TWICE A DAY, Disp: 180 tablet, Rfl: 3  •  vitamin C (ASCORBIC ACID) 500 MG tablet, Take 500 mg by mouth Daily. Chewable tablet, Disp: , Rfl:   •  Zinc 50 MG capsule, Take 1 capsule by mouth Daily., Disp: , Rfl:     HPI    Joanna Cross is a 71 y.o. female who presents today for a follow up of coronary artery disease, peripheral vascular disease, atrial fibrillation with RVR, and cardiac risk factors. Since last visit, the patient has been doing well overall from a cardiovascular standpoint. Patient states that on Monday night, her BiPap machine stopped working on her throughout the night and she woke up fighting for breath and her chest was tight. She took her mask off and checked her oxygen and it was 86%. So she hooked up to her oxygen tank and slept with the bed raised for the rest of the night. She states she occasionally feels her heart beating in the middle of her chest and it lasts than one minute. She anticipates a rotator cuff procedure and asked for cardiac clearance. She is on 2 liters of oxygen all the time. She states that if her hemoglobin stays above 10, she can go without oxygen at home. Patient denies shortness of breath, orthopnea, edema, dizziness, and syncope.     The following portions of the patient's history were reviewed and updated as appropriate: allergies, current medications and problem list.    Pertinent positives as listed in the HPI.  All other systems reviewed are  "negative.         Vitals:    07/13/22 1501   BP: 122/62   BP Location: Left arm   Patient Position: Sitting   Pulse: 77   SpO2: 98%   Weight: 103 kg (227 lb)   Height: 157.5 cm (62\")       Physical Exam:  General: Alert and oriented.  Neck: Jugular venous pressure is within normal limits. Carotids have normal upstrokes without bruits.   Cardiovascular: Heart has a nondisplaced focal PMI. Regular rate and rhythm. No murmur, gallop or rub.  Lungs: Clear, no rales or wheezes. Equal expansion is noted.   Extremities: Show no edema.  Skin: Warm and dry.  Neurologic: Nonfocal.     Diagnostic Data (reviewed with patient):  Lab Results   Component Value Date    GLUCOSE 133 (H) 04/14/2022    BUN 39 (H) 04/14/2022    CREATININE 1.30 (H) 04/14/2022    BCR 30 (H) 04/14/2022     04/14/2022    K 4.3 04/14/2022    CL 97 04/14/2022    CO2 21 04/14/2022    CALCIUM 9.6 04/14/2022    ALBUMIN 4.7 04/14/2022    ALKPHOS 70 04/14/2022    AST 13 04/14/2022    ALT 9 04/14/2022     Lab Results   Component Value Date    CHLPL 157 04/14/2022    TRIG 77 04/14/2022    HDL 42 04/14/2022     (H) 04/14/2022      Lab Results   Component Value Date    WBC 6.2 04/14/2022    RBC 3.33 (L) 04/14/2022    HGB 10.0 (L) 04/14/2022    HCT 31.0 (L) 04/14/2022    MCV 93 04/14/2022     04/14/2022      Lab Results   Component Value Date    TSH 0.939 06/01/2022          ECG 12 Lead    Date/Time: 7/13/2022 4:28 PM  Performed by: Albertina Corona MD  Authorized by: Albertina Corona MD   Comparison: compared with previous ECG from 4/19/2022  Similar to previous ECG  BPM: 63  Comments: Electronic atrial pacemaker              DEVICE INTERROGATION: [employer] /2016, PPM: RA pacing 75%, RV pacing 25%. P wave is 0.2 mV with a threshold of 2.9 V at 0.4 msec and an impedance of 684 ohms. R wave is 22.5 mV with a threshold of 0.9 V at 0.4 msec and an impedance of 423 ohms. Battery voltage is 2.5 years. No events. Known undersensing " on RA.        Assessment:    ICD-10-CM ICD-9-CM   1. Coronary artery disease involving native coronary artery of native heart without angina pectoris  I25.10 414.01   2. Peripheral vascular disease (HCC)  I73.9 443.9   3. Atrial fibrillation with RVR (HCC)  I48.91 427.31   4. Essential hypertension  I10 401.9   5. Hyperlipidemia LDL goal <70  E78.5 272.4   6. Pre-operative clearance  Z01.818 V72.84         Plan:  1. Lexiscan stress test to be preformed before cardiac clearance is given.   2. Continue on Eliquis 5 mg BID for stroke prophylaxis.    3. Continue on aspirin 81 mg for antiplatelet therapy.   4. Continue on Bumex 1 mg daily for fluid retention.    5. Continue on Tikosyn 500 mcg for rhythm control.    6. Continue on Ranexa 500 mg daily for chest pain.    7. Continue on spironolactone 50 mg daily for fluid retention and hypertension.    8. Continue on valsartan 160 mg daily for hypertension.    9. Continue all other current medications.  10. F/up in 6 months, sooner if needed.        Scribed for Albertina Corona MD by Elsie Wyman. 7/13/2022 16:00 EDT         I Albertina Corona MD personally performed the services described in this documentation as scribed by the above individual in my presence, and it is both accurate and complete.    Albertina Corona MD, [facility]

## 2022-07-15 ENCOUNTER — PATIENT MESSAGE (OUTPATIENT)
Dept: CARDIOLOGY | Facility: CLINIC | Age: 72
End: 2022-07-15

## 2022-07-19 RX ORDER — SPIRONOLACTONE 25 MG/1
50 TABLET ORAL DAILY
Qty: 180 TABLET | Refills: 2 | Status: SHIPPED | OUTPATIENT
Start: 2022-07-19 | End: 2023-02-23 | Stop reason: SDUPTHER

## 2022-07-19 RX ORDER — SPIRONOLACTONE 25 MG/1
TABLET ORAL
Qty: 180 TABLET | Refills: 3 | OUTPATIENT
Start: 2022-07-19

## 2022-07-26 ENCOUNTER — HOSPITAL ENCOUNTER (OUTPATIENT)
Dept: CARDIOLOGY | Facility: HOSPITAL | Age: 72
Discharge: HOME | End: 2022-07-26
Admitting: INTERNAL MEDICINE

## 2022-07-26 DIAGNOSIS — Z01.818 PRE-OPERATIVE CLEARANCE: ICD-10-CM

## 2022-07-26 DIAGNOSIS — I25.10 CORONARY ARTERY DISEASE INVOLVING NATIVE CORONARY ARTERY OF NATIVE HEART WITHOUT ANGINA PECTORIS: ICD-10-CM

## 2022-07-26 DIAGNOSIS — I48.91 ATRIAL FIBRILLATION WITH RVR: ICD-10-CM

## 2022-07-26 LAB
BH CV REST NUCLEAR ISOTOPE DOSE: 9.6 MCI
BH CV STRESS BP STAGE 1: NORMAL
BH CV STRESS BP STAGE 3: NORMAL
BH CV STRESS COMMENTS STAGE 1: NORMAL
BH CV STRESS DOSE REGADENOSON STAGE 1: 0.4
BH CV STRESS DURATION MIN STAGE 1: 1
BH CV STRESS DURATION MIN STAGE 2: 1
BH CV STRESS DURATION MIN STAGE 3: 1
BH CV STRESS DURATION MIN STAGE 4: 1
BH CV STRESS DURATION SEC STAGE 1: 0
BH CV STRESS DURATION SEC STAGE 2: 0
BH CV STRESS DURATION SEC STAGE 3: 0
BH CV STRESS DURATION SEC STAGE 4: 0
BH CV STRESS HR STAGE 1: 65
BH CV STRESS HR STAGE 2: 67
BH CV STRESS HR STAGE 3: 67
BH CV STRESS HR STAGE 4: 59
BH CV STRESS NUCLEAR ISOTOPE DOSE: 32.4 MCI
BH CV STRESS O2 STAGE 1: 100
BH CV STRESS O2 STAGE 2: 95
BH CV STRESS O2 STAGE 3: 98
BH CV STRESS O2 STAGE 4: 99
BH CV STRESS PROTOCOL 1: NORMAL
BH CV STRESS RECOVERY BP: NORMAL MMHG
BH CV STRESS RECOVERY HR: 65 BPM
BH CV STRESS RECOVERY O2: 99 %
BH CV STRESS STAGE 1: 1
BH CV STRESS STAGE 2: 2
BH CV STRESS STAGE 3: 3
BH CV STRESS STAGE 4: 4
LV EF NUC BP: 77 %
MAXIMAL PREDICTED HEART RATE: 147 BPM
PERCENT MAX PREDICTED HR: 69.39 %
STRESS BASELINE BP: NORMAL MMHG
STRESS BASELINE HR: 64 BPM
STRESS O2 SAT REST: 100 %
STRESS PERCENT HR: 82 %
STRESS POST ESTIMATED WORKLOAD: 1 METS
STRESS POST EXERCISE DUR MIN: 4 MIN
STRESS POST EXERCISE DUR SEC: 0 SEC
STRESS POST O2 SAT PEAK: 99 %
STRESS POST PEAK BP: NORMAL MMHG
STRESS POST PEAK HR: 102 BPM
STRESS TARGET HR: 125 BPM

## 2022-07-26 PROCEDURE — A9500 TC99M SESTAMIBI: HCPCS | Performed by: INTERNAL MEDICINE

## 2022-07-26 PROCEDURE — 93018 CV STRESS TEST I&R ONLY: CPT | Performed by: INTERNAL MEDICINE

## 2022-07-26 PROCEDURE — 78452 HT MUSCLE IMAGE SPECT MULT: CPT | Performed by: INTERNAL MEDICINE

## 2022-07-26 PROCEDURE — 93017 CV STRESS TEST TRACING ONLY: CPT

## 2022-07-26 PROCEDURE — 78452 HT MUSCLE IMAGE SPECT MULT: CPT

## 2022-07-26 PROCEDURE — 25010000002 REGADENOSON 0.4 MG/5ML SOLUTION: Performed by: INTERNAL MEDICINE

## 2022-07-26 PROCEDURE — 0 TECHNETIUM SESTAMIBI: Performed by: INTERNAL MEDICINE

## 2022-07-26 RX ADMIN — REGADENOSON 0.4 MG: 0.08 INJECTION, SOLUTION INTRAVENOUS at 11:41

## 2022-07-26 RX ADMIN — TECHNETIUM TC 99M SESTAMIBI 1 DOSE: 1 INJECTION INTRAVENOUS at 11:43

## 2022-07-26 RX ADMIN — TECHNETIUM TC 99M SESTAMIBI 1 DOSE: 1 INJECTION INTRAVENOUS at 09:50

## 2022-07-28 ENCOUNTER — OFFICE VISIT (OUTPATIENT)
Dept: CARDIOLOGY | Facility: CLINIC | Age: 72
End: 2022-07-28

## 2022-07-28 VITALS
HEART RATE: 88 BPM | DIASTOLIC BLOOD PRESSURE: 66 MMHG | BODY MASS INDEX: 42.69 KG/M2 | OXYGEN SATURATION: 97 % | HEIGHT: 62 IN | WEIGHT: 232 LBS | SYSTOLIC BLOOD PRESSURE: 124 MMHG

## 2022-07-28 DIAGNOSIS — G47.33 OSA TREATED WITH BIPAP: ICD-10-CM

## 2022-07-28 DIAGNOSIS — I49.5 TACHY-BRADY SYNDROME: ICD-10-CM

## 2022-07-28 DIAGNOSIS — Z95.0 CARDIAC PACEMAKER IN SITU: ICD-10-CM

## 2022-07-28 DIAGNOSIS — Z79.899 LONG TERM CURRENT USE OF ANTIARRHYTHMIC DRUG: Primary | ICD-10-CM

## 2022-07-28 DIAGNOSIS — I48.0 PAROXYSMAL ATRIAL FIBRILLATION: ICD-10-CM

## 2022-07-28 PROBLEM — R77.8 ELEVATED TROPONIN LEVEL: Status: RESOLVED | Noted: 2022-04-13 | Resolved: 2022-07-28

## 2022-07-28 PROBLEM — J18.9 BILATERAL PNEUMONIA: Status: RESOLVED | Noted: 2022-04-13 | Resolved: 2022-07-28

## 2022-07-28 PROBLEM — R79.89 ELEVATED TROPONIN LEVEL: Status: RESOLVED | Noted: 2022-04-13 | Resolved: 2022-07-28

## 2022-07-28 PROBLEM — T14.8XXA MUSCLE STRAIN: Status: RESOLVED | Noted: 2022-06-08 | Resolved: 2022-07-28

## 2022-07-28 PROBLEM — R51.9 HEADACHE: Status: RESOLVED | Noted: 2022-04-13 | Resolved: 2022-07-28

## 2022-07-28 PROBLEM — M79.10 MUSCLE PAIN: Status: RESOLVED | Noted: 2022-06-08 | Resolved: 2022-07-28

## 2022-07-28 PROBLEM — R91.8 GROUND GLASS OPACITY PRESENT ON IMAGING OF LUNG: Status: RESOLVED | Noted: 2022-04-13 | Resolved: 2022-07-28

## 2022-07-28 PROBLEM — I20.0 UNSTABLE ANGINA PECTORIS: Status: RESOLVED | Noted: 2022-06-08 | Resolved: 2022-07-28

## 2022-07-28 PROBLEM — I21.4 NON-ST ELEVATION (NSTEMI) MYOCARDIAL INFARCTION: Status: RESOLVED | Noted: 2022-06-08 | Resolved: 2022-07-28

## 2022-07-28 PROBLEM — J06.9 UPPER RESPIRATORY TRACT INFECTION: Status: RESOLVED | Noted: 2022-06-08 | Resolved: 2022-07-28

## 2022-07-28 PROBLEM — R09.89 BIBASILAR CRACKLES: Status: RESOLVED | Noted: 2022-04-13 | Resolved: 2022-07-28

## 2022-07-28 PROBLEM — R05.9 COUGH: Status: RESOLVED | Noted: 2022-04-13 | Resolved: 2022-07-28

## 2022-07-28 PROBLEM — J40 BRONCHITIS: Status: RESOLVED | Noted: 2022-04-13 | Resolved: 2022-07-28

## 2022-07-28 PROCEDURE — 93280 PM DEVICE PROGR EVAL DUAL: CPT | Performed by: PHYSICIAN ASSISTANT

## 2022-07-28 PROCEDURE — 99213 OFFICE O/P EST LOW 20 MIN: CPT | Performed by: PHYSICIAN ASSISTANT

## 2022-07-28 PROCEDURE — 93000 ELECTROCARDIOGRAM COMPLETE: CPT | Performed by: PHYSICIAN ASSISTANT

## 2022-07-28 RX ORDER — DOXYCYCLINE HYCLATE 100 MG/1
CAPSULE ORAL EVERY 12 HOURS SCHEDULED
COMMUNITY
End: 2022-09-08

## 2022-07-28 RX ORDER — INSULIN GLARGINE 100 [IU]/ML
INJECTION, SOLUTION SUBCUTANEOUS
Qty: 30 ML | Refills: 0 | Status: SHIPPED | OUTPATIENT
Start: 2022-07-28 | End: 2022-08-16

## 2022-07-28 NOTE — PROGRESS NOTES
Encounter Date:07/28/2022      Patient ID: Joanna Cross is a 71 y.o. female.    Reynaldo Fritz MD    Chief Complaint: Coronary Artery Disease      PROBLEM LIST:  Patient Active Problem List    Diagnosis Date Noted   • Tachy-thai syndrome (HCC) 06/10/2019     Priority: High     Note Last Updated: 7/28/2022     a. [employer] PPM 2016  b. Patient has known undersensing in the atrial lead which is was known to Dr. Marie.      • Atrial fibrillation (HCC) 12/24/2018     Priority: High     Note Last Updated: 7/28/2022       c. CHADS2 Vasc = 6 (HTN, DM, Age 65-74, Female, H/o TIA). On chronic anticoagulation.  d. ECV, 12/26/2018: Successful cardioversion. AV paced.  e. Echocardiogram, 12/25/2018:  EF 60%, mild MR/TR with RVSP 29 mmHg. Sclerotic AoV, no AS/AI. Mild MAC.  f. ECV, 03/05/2019: Successful cardioversion.  g. Zio, 10/01/2019, 14 days: NSR baseline. Normal average rates. Periods of RV pacing.      • Cardiac pacemaker in situ 04/13/2022     Priority: Medium   • Coronary artery disease involving native coronary artery without angina pectoris 03/01/2017     Priority: Medium     Note Last Updated: 7/28/2022     a. [facility], 02/15/2008, Dr Lutz: Mild, non-obstructive CAD, normal EF  b. [facility], 01/09/2013, Dr Stevenson Sutton: No LV gram done. Mild, non-obstructive CAD.  c. [facility], 11/9/2015, [facility]:  EF 60%. 70% RCA lesion with Promus ZAINAB placement. 40-50% mid LAD, no FFR performed. Other small blockages noted. No MR.  d. [facility], 11/15/2015, Dr Palacio @ [facility]: Patent RCA stent, 40-50% LAD with FFR @ 0.92; mild inferior wall hypokinesis  e. [facility], 07/29/2016[facility]: Normal CORS with patent stent. LAD improved since last [facility]. EF 55-60%.  f. [facility], 02/01/2019: EF 55-60%. Patent RCA stent, noncritical mid LAD with IFR 0.93, noncritical LCx.   g. [facility] 7-26-22: EF 77%.  No reversible ischemia.     • Long term current use of antiarrhythmic drug 10/27/2020     Priority: Low   • MILLI  "treated with BiPAP - Patient reports compliance.  10/11/2018     Priority: Low     Note Last Updated: 10/11/2018     - Patient reports compliance.      • Hypertension, essential 03/01/2017     Priority: Low   • Peripheral vascular disease (HCC) 03/01/2017     Priority: Low   • Hyperlipidemia LDL goal <70 03/01/2017     Priority: Low   • Primary osteoarthritis involving multiple joints 06/08/2022       History of Present Illness  Patient presents today for follow-up with a history of paroxysmal atrial fibrillation, sick sinus syndrome with dual-chamber permanent pacemaker implantation and long-term antiarrhythmic and anticoagulation.  She returns today for scheduled follow-up.  She reports having had a myocardial perfusion study about 2 days ago which went well.  She had no reversible ischemia and normal ejection fraction.  She has an upcoming shoulder replacement surgery.  She has no current awareness of tachyarrhythmias, denies dizziness or syncope.  Her blood pressure at home runs between 120 to 130 mmHg systolic.  She reports compliance with current medical regimen reports no significant adverse side effects.    Allergies   Allergen Reactions   • Toprol Xl [Metoprolol Tartrate] Shortness Of Breath     Extreme fatigue   • Amlodipine Besylate Swelling     Lower extremity (ankles, feet) swelling   • Codeine Unknown (See Comments)     Pt is unaware of what reaction she had   • Entacapone Other (See Comments)     \"extreme weakness in legs - caused several falls, which stopped after discontinuing this medication\"   • Epinephrine Other (See Comments)     6/4/16- had 3 shots to numb mouth to prepare teeth for crowns, the shots contained epi-  Caused pt to have chest discomfort- went to hospital in ambulance, discovered had a fib while there    • Levemir [Insulin Detemir] Hives     Hives / rash around injection site   • Penicillins Hives     Jitteriness    • Xarelto [Rivaroxaban] GI Bleeding     hgb dropped to 5.2   • " "Benztropine Mesylate Unknown - High Severity   • Bactrim [Sulfamethoxazole-Trimethoprim] Nausea Only and Other (See Comments)     Headache -  Cant be taken with tikosyn - septra ds   • Cimetidine Other (See Comments)     Cant be taken with tikosyn    • Ciprofloxacin Diarrhea   • Cogentin [Benztropine] Other (See Comments)     \"uncontrollable body movements\"   • Compazine [Prochlorperazine Edisylate] Other (See Comments)     Dystonic reaction   • Cortisone Other (See Comments)     MAKES BLOOD PRESSURE HIGH    • Duraprep [Antiseptic Products, Misc.] Itching and Rash     RASH AND ITCHING   • Erythromycin Base Other (See Comments)     Cant be taken with tikosyn - z pack and ketek    • Flurandrenolone Other (See Comments)     Cant be taken with tikosyn     Include - levaquin, cipro, norloxacin    • Haldol [Haloperidol Lactate] Other (See Comments)     Dystonic reaction   • Hydralazine Other (See Comments)     Headache    • Hydrochlorothiazide Other (See Comments)     Cant be taken with tikosyn -  Also hydrodiuril, microzide, hydro-par, oretic, esidrix, ezide or any medicine with hct or hctz in name    • Hydrocodone-Acetaminophen Nausea And Vomiting and Dizziness     Headache, nausea    • Levaquin [Levofloxacin] Other (See Comments)     Cannot take due to taking propafenone HCL- severe reaction with mixed.    • Lisinopril Cough   • Macrolides And Ketolides Other (See Comments)     antibx -   Cant be taken with tikosyn    • Statins Myalgia     Leg pain- all statins    • Tarka [Trandolapril-Verapamil Hcl Er] Other (See Comments)     Constipation    • Verapamil Other (See Comments)     Cant be taken with tikosyn - calan, covera-hs, isoptin, verelan or tarka        Current Outpatient Medications   Medication Instructions   • Accu-Chek Guide test strip AS DIRECTED THREE TIMES A DAY   • Accu-Chek Softclix Lancets lancets AS DIRECTED THREE TIMES A DAY   • albuterol (PROVENTIL) 2.5 mg, Nebulization, Every 4 Hours PRN   • " albuterol sulfate HFA (Ventolin HFA) 108 (90 Base) MCG/ACT inhaler 1 puff, Inhalation, Every 4 Hours PRN   • ALLERGY SERUM INJECTION Subcutaneous, Weekly   • amantadine (SYMMETREL) 100 mg, Oral, 2 Times Daily   • apixaban (ELIQUIS) 5 mg, Oral, Every 12 Hours Scheduled   • aspirin 81 mg, Oral, Daily   • B Complex-C (SUPER B COMPLEX PO) 1 tablet, Oral, Daily   • baclofen (LIORESAL) 10 MG tablet 1 tablet, Oral, Every 8 Hours, As needed   • bumetanide (BUMEX) 1 MG tablet 1 tab po daily as directed   • Calcium Carbonate (CALTRATE 600 PO) 600 mg, Oral, Daily   • carbidopa-levodopa (SINEMET)  MG per tablet Oral, 2 tablets at 0600, 1 tablet at 0900, 1200, 1500, 1800, 2100   • Cholecalciferol 2,000 Units, Oral, 2 Times Daily   • citalopram (CeleXA) 20 MG tablet TAKE 1 TABLET DAILY   • clindamycin (CLEOCIN) 150 mg, Oral, Daily, 4 capsules one hour before dental appointments.    • clobetasol (TEMOVATE) 0.05 % cream 1 application, Topical, 2 Times Daily PRN   • dofetilide (TIKOSYN) 500 mcg, Oral, Every 12 Hours   • donepezil (ARICEPT) 5 mg, Oral, Nightly   • doxycycline (VIBRAMYCIN) 100 MG capsule Every 12 Hours Scheduled   • Emollient (AQUAPHOR ADVANCED THERAPY EX) Apply externally   • epoetin lizzie (EPOGEN,PROCRIT) 54072 UNIT/ML injection Subcutaneous   • ferrous sulfate 325 mg, Oral, Daily With Breakfast   • Glucosamine-Chondroit-Calcium (TRIPLE FLEX BONE & JOINT PO) 1 tablet, Oral, Daily, Move free   • Insulin Pen Needle (B-D UF III MINI PEN NEEDLES) 31G X 5 MM misc USE TO INJECT INSULIN DAILY   • IPRATROPIUM BROMIDE NA 1 spray, Nasal, 2 Times Daily PRN   • Lantus SoloStar 100 UNIT/ML injection pen INJECT 30 UNITS UNDER THE SKIN INTO THE APPROPRIATE AREA AS DIRECTED DAILY   • Loratadine 10 mg, Oral, Daily   • metFORMIN (GLUCOPHAGE) 1000 MG tablet TAKE 1 TABLET TWICE A DAY WITH MEALS   • metOLazone (ZAROXOLYN) 2.5 MG tablet TAKE 1 TABLET DAILY   • Multiple Vitamins-Minerals (CENTRUM ULTRA WOMENS PO) 1 tablet, Oral,  "Daily   • nitroglycerin (NITROLINGUAL) 0.4 MG/SPRAY spray 1 spray, Sublingual, Every 5 Minutes PRN   • O2 (OXYGEN) 2 L/min, Inhalation, Once, 2L all the time now   • omeprazole (PRILOSEC) 40 mg, Oral, 2 Times Daily   • pramipexole (MIRAPEX) 1.5 mg, Oral, Nightly   • ranolazine (RANEXA) 500 MG 12 hr tablet TAKE 1 TABLET EVERY 12 HOURS   • senna (SENOKOT) 8.6 MG tablet 1 tablet, Oral, Daily   • spironolactone (ALDACTONE) 50 mg, Oral, Daily   • traMADol (ULTRAM) 50 mg, Oral, Every 6 Hours PRN   • triamcinolone (KENALOG) 0.1 % cream As Needed   • Unithroid 175 mcg, Oral, Daily   • valsartan (DIOVAN) 160 MG tablet TAKE 1 TABLET TWICE A DAY   • vitamin C (ASCORBIC ACID) 500 mg, Oral, Daily, Chewable tablet   • Zinc 50 MG capsule 1 capsule, Oral, Daily       .    Objective:     /66 (BP Location: Left arm, Patient Position: Sitting)   Pulse 88   Ht 157.5 cm (62\")   Wt 105 kg (232 lb)   LMP  (LMP Unknown) Comment: Mammogram- 1/28/20  SpO2 97%   BMI 42.43 kg/m²    Body mass index is 42.43 kg/m².     Vitals reviewed.   Constitutional:       Appearance: Well-developed.   Pulmonary:      Effort: Pulmonary effort is normal. No respiratory distress.      Breath sounds: Normal breath sounds. No wheezing. No rales.      Comments: Bases clear  Chest:      Chest wall: Not tender to palpatation.   Cardiovascular:      Normal rate. Regular rhythm.      Murmurs: There is no murmur.      No gallop. No click. No rub.   Pulses:     Intact distal pulses.   Musculoskeletal: Normal range of motion.       Lab Review:                 TSH    TSH 8/23/21 4/14/22 6/1/22   TSH 0.570 0.183 (A) 0.939   (A) Abnormal value                      ECG 12 Lead    Date/Time: 7/28/2022 12:07 PM  Performed by: Joe Anton PA  Authorized by: Joe Anton PA   Previous ECG: no previous ECG available  Rhythm: sinus rhythm  Rate: normal  BPM: 75  Conduction: conduction normal  ST Segments: ST segments normal  T Waves: T waves normal  QRS " axis: normal  Other: no other findings    Clinical impression: normal ECG                       Assessment:      Diagnosis Plan   1. Long term current use of antiarrhythmic drug   stable QT QTC and maintaining sinus rhythm, continue Tikosyn   2. Paroxysmal atrial fibrillation (HCC)   maintaining sinus rhythm, continue Tikosyn, continue chronic anticoagulation   3. Cardiac pacemaker in situ    This patient's Cardiac Implanted Electronic Device was manually interrogated and reprogrammed during the patient encounter today.  Iterative programming changes were manually made to determine the sensing threshold, pacing threshold, lead impedance as well as underlying cardiac rhythm.  These programming changes were not limited to but included some or all of the following when appropriate: pacing mode, programmed AV delays, blanking periods, and refractory periods.  Data obtained as a result of these manual programing changes informed the patient's CIED permanent programming.    Normal functioning dual-chamber permanent pacemaker with 2.5 years battery life remaining.  No events.  Known atrial lead elevated impedance current threshold 3 V at 0.4 ms impedance 651 ohms   4. Tachy-thai syndrome (HCC)   normal functioning dual-chamber permanent pacemaker.  We will consider atrial lead revision at the time of generator change   5. MILLI treated with BiPAP - Patient reports compliance.     Reports BiPAP compliance     Plan:     Stable cardiac status.  Continue current medications.   in 6 months, sooner as needed.  Thank you for allowing us to participate in the care of your patient.     Electronically signed by BRIGITTE Canales, 07/28/22, 12:11 PM EDT.

## 2022-08-15 RX ORDER — DOFETILIDE 0.5 MG/1
CAPSULE ORAL
Qty: 180 CAPSULE | Refills: 3 | Status: SHIPPED | OUTPATIENT
Start: 2022-08-15

## 2022-08-16 ENCOUNTER — OFFICE VISIT (OUTPATIENT)
Dept: ENDOCRINOLOGY | Facility: CLINIC | Age: 72
End: 2022-08-16

## 2022-08-16 VITALS
SYSTOLIC BLOOD PRESSURE: 122 MMHG | OXYGEN SATURATION: 97 % | HEART RATE: 86 BPM | DIASTOLIC BLOOD PRESSURE: 64 MMHG | BODY MASS INDEX: 42.33 KG/M2 | WEIGHT: 230 LBS | HEIGHT: 62 IN

## 2022-08-16 DIAGNOSIS — E03.9 ACQUIRED HYPOTHYROIDISM: Chronic | ICD-10-CM

## 2022-08-16 DIAGNOSIS — Z79.4 TYPE 2 DIABETES MELLITUS WITH HYPERGLYCEMIA, WITH LONG-TERM CURRENT USE OF INSULIN: Primary | Chronic | ICD-10-CM

## 2022-08-16 DIAGNOSIS — E11.65 TYPE 2 DIABETES MELLITUS WITH HYPERGLYCEMIA, WITH LONG-TERM CURRENT USE OF INSULIN: Primary | Chronic | ICD-10-CM

## 2022-08-16 LAB
EXPIRATION DATE: ABNORMAL
EXPIRATION DATE: NORMAL
GLUCOSE BLDC GLUCOMTR-MCNC: 164 MG/DL (ref 70–130)
HBA1C MFR BLD: 6.3 %
Lab: ABNORMAL
Lab: NORMAL

## 2022-08-16 PROCEDURE — 83036 HEMOGLOBIN GLYCOSYLATED A1C: CPT | Performed by: PHYSICIAN ASSISTANT

## 2022-08-16 PROCEDURE — 3044F HG A1C LEVEL LT 7.0%: CPT | Performed by: PHYSICIAN ASSISTANT

## 2022-08-16 PROCEDURE — 99214 OFFICE O/P EST MOD 30 MIN: CPT | Performed by: PHYSICIAN ASSISTANT

## 2022-08-16 PROCEDURE — 82947 ASSAY GLUCOSE BLOOD QUANT: CPT | Performed by: PHYSICIAN ASSISTANT

## 2022-08-16 RX ORDER — INSULIN GLARGINE 100 [IU]/ML
12-14 INJECTION, SOLUTION SUBCUTANEOUS DAILY
Start: 2022-08-16 | End: 2023-02-23 | Stop reason: SDUPTHER

## 2022-08-16 NOTE — PROGRESS NOTES
Office Note      Date: 2022  Patient Name: Joanna Cross  MRN: [id number]  : 1950    Chief Complaint   Patient presents with   • Diabetes     History of Present Illness:   Joanna Cross is a 71 y.o. female who presents today for follow up on type 2 diabetes and hypothyroidism.  Diabetes was diagnosed in .  Known diabetic complications: cardiovascular disease.  Current diabetic medications: Lantus and metformin.  She is testing FSBG 2 times per day plus PRN.  She denies any hypoglycemia.    Fasting readings typically around 100.  She reports readings are upper 100s after eating at night.  She reports that diet has not been as good, but is working on this again.  Feet: No sores. Podiatrist every 9 weeks. Intermittent burning sensations in toes right foot. Spinal stenosis in lower back. No evidence of peripheral neuropathy per [facility] last year.  Last eye exam: 2022, Dr. Vasu Garland. No retinopathy.  ACE inhibitor/ARB: valsartan.  Statin: intolerant.  Decreased Synthroid from 200 mcg to 175 mcg daily last visit.  Repeat TFTs normal.  Changed from Synthroid to Unithroid due to insurance.  She is taking this correctly and regularly.  She reports being started on Aricept by neurologist about 6 weeks ago.  She noted initial nausea and some diarrhea, but this has improved.    Subjective      Review of Systems   Constitutional: Negative.    Respiratory: Positive for shortness of breath.    Cardiovascular: Negative.    Gastrointestinal: Negative.    Endocrine: Negative.      Past Medical History:   Diagnosis Date   • Anemia    • Ankle problem     thinks back related causing pain    • Atrial fibrillation (HCC)    • AVM (arteriovenous malformation)    • Back pain    • CKD (chronic kidney disease)    • Clotting disorder (HCC) 2016    AVM - small intestine   • COVID-19 vaccine series completed    • Gastrocnemius muscle tear     left medial 91   • Generalized osteoarthritis    • GERD  (gastroesophageal reflux disease)    • GIB (gastrointestinal bleeding) 2016    d/t xarelto    • Headache    • Hiatal hernia    • History of shingles    • History of transfusion 2016    no reaction recalled    • Hypothyroidism    • IBS (irritable bowel syndrome)    • Klebsiella pneumonia (HCC)    • Lichen sclerosus    • Mouth problem     mouth guard used since pt bites tongue and lips excessively at night if not- with bipap    • MRSA infection 2018   • Obesity    • On home oxygen therapy     2L of oxygen all the time due to current congestion    • Osteoporosis    • Parkinson's disease (HCC)    • Peripheral vascular disease (HCC)    • Pleurisy    • Pneumonia    • Puerperal sepsis with acute hypoxic respiratory failure (HCC)     emergent intubation- 2016   • Right leg pain     from back issues    • Salivary gland stone    • Sciatic nerve pain    • Seborrheic dermatitis    • Skin cancer     on back    • Type 2 diabetes mellitus (HCC)    • UTI (urinary tract infection)       Past Surgical History:   Procedure Laterality Date   • TUBAL ABDOMINAL LIGATION Bilateral 1980   • DIAGNOSTIC LAPAROSCOPY  1981   • LIPOMA EXCISION  1999    left leg    • BREAST BIOPSY Left 05/2004    excisional, benign   • ENDOSCOPIC FUNCTIONAL SINUS SURGERY (FESS)  2011   • LUNG BIOPSY Left 2016   • ENDOSCOPY  2016   • COLONOSCOPY  2016   • PACEMAKER IMPLANTATION  11/2016    sss   • INCISION AND DRAINAGE OF WOUND  2018    back with wound infection    • LUMBAR LAMINECTOMY DISCECTOMY DECOMPRESSION N/A 07/06/2018    Procedure: LUMBAR LAMINECTOMY L4-5, HEMILAMIINECTOMY RIGHT L5-S1, FORAMINOTOMY L5-S1;  Surgeon: Lencho Gamino MD;  Location: [facility] OR;  Service: Neurosurgery   • LUMBAR LAMINECTOMY DISCECTOMY DECOMPRESSION N/A 09/19/2018    Procedure: INCISION AND DRAINAGE BACK WITH WOUND EXPLORATION;  Surgeon: Ritchie García MD;  Location:  [facility] OR;  Service: Neurosurgery   • CARDIAC CATHETERIZATION N/A 02/01/2019    Procedure: Left Heart Cath;   Surgeon: Albertina Corona MD;  Location:  [facility] CATH INVASIVE LOCATION;  Service: Cardiology   • COLONOSCOPY N/A 06/17/2021    Procedure: COLONOSCOPY WITH POLYPECTOMY;  Surgeon: Trenton Sosa MD;  Location:  [facility] ENDOSCOPY;  Service: Gastroenterology;  Laterality: N/A;   • ABLATION OF DYSRHYTHMIC FOCUS  05/16/13    Laser Ablation - Rt Leg   • BACK SURGERY      l4-l5 laminectomy    • BICEPS TENDON REPAIR Right     shoulder   • BRONCHOSCOPY RIGID / FLEXIBLE      2016   • BUNIONECTOMY Right    • CARDIAC CATHETERIZATION     • CHOLECYSTECTOMY     • CORONARY STENT PLACEMENT      x1 stent   • CYST REMOVAL      left ear, upper left back 2001   • CYSTOSCOPY      x2  18 and 20 -   in 20 urethra dilation    • ENTEROSCOPY SMALL BOWEL      with single ballon with fluoro    • HAMMER TOE REPAIR Left    • INSERT / REPLACE / REMOVE PACEMAKER  11/10/16    [facility]   • JOINT REPLACEMENT     • KNEE ARTHROSCOPY     • LASER ABLATION      right leg 13   • OTHER SURGICAL HISTORY      ct scan of chest and sinuses and lower back    • OTHER SURGICAL HISTORY      various echos    • OTHER SURGICAL HISTORY      electroencephalogram 16,   emg  ncv tests 2007   • OTHER SURGICAL HISTORY      mra 2007  and various mri with xrays, nuclear medicine lung ventilation with perfusion test 2016 with pft    • OTHER SURGICAL HISTORY      barium swallow testing 15, 12, 12   • OTHER SURGICAL HISTORY      vaginal ultrasound- 2012,   vas clementina lower extrem 2016, vas venous duplex lower extrem bilat 2019   • OTHER SURGICAL HISTORY      wdge biopsy spring of lung    • OTHER SURGICAL HISTORY      emergent intubation- hypoxic resp failure    • REPLACEMENT TOTAL KNEE Bilateral     left knee 2011, right 2012 per dr acuna    • REPLACEMENT TOTAL KNEE Bilateral    • SHOULDER ARTHROSCOPY Bilateral     2003-left, 2004- right    • SKIN BIOPSY      skin cancer back 2016   • SKIN CANCER EXCISION      upper rig tback    • MADHAV     • TEETH  "EXTRACTION      x2   • WISDOM TOOTH EXTRACTION       The following portions of the patient's history were reviewed and updated as appropriate: allergies, current medications, past family history, past medical history, past social history, past surgical history and problem list.    Objective     Vitals:    08/16/22 1124   BP: 122/64   Pulse: 86   SpO2: 97%   Weight: 104 kg (230 lb)   Height: 157.5 cm (62\")   Body mass index is 42.07 kg/m².    Physical Exam  Vitals reviewed.   Constitutional:       General: She is not in acute distress.  Neurological:      Mental Status: She is alert and oriented to person, place, and time.   Psychiatric:         Mood and Affect: Affect normal.       HEMOGLOBIN A1C  Lab Results   Component Value Date    HGBA1C 6.3 08/16/2022    HGBA1C 5.8 04/25/2022    HGBA1C 6.2 (H) 04/14/2022     GLUCOSE  Lab Results   Component Value Date    POCGLU 164 (A) 08/16/2022     CMP  Lab Results   Component Value Date    GLUCOSE 133 (H) 04/14/2022    BUN 39 (H) 04/14/2022    CREATININE 1.30 (H) 04/14/2022    BCR 30 (H) 04/14/2022     04/14/2022    K 4.3 04/14/2022    CO2 21 04/14/2022    CALCIUM 9.6 04/14/2022    PROTENTOTREF 6.9 04/14/2022    ALBUMIN 4.7 04/14/2022    LABGLOBREF 2.2 04/14/2022    BILITOT 0.3 04/14/2022    ALKPHOS 70 04/14/2022    AST 13 04/14/2022    ALT 9 04/14/2022     LIPID PANEL  Lab Results   Component Value Date    CHOL 185 02/01/2019    CHLPL 157 04/14/2022    TRIG 77 04/14/2022    HDL 42 04/14/2022     (H) 04/14/2022     THYROID  Lab Results   Component Value Date    TSH 0.939 06/01/2022    FREET4 1.67 06/01/2022       Current Outpatient Medications   Medication Instructions   • Accu-Chek Guide test strip AS DIRECTED THREE TIMES A DAY   • Accu-Chek Softclix Lancets lancets AS DIRECTED THREE TIMES A DAY   • albuterol (PROVENTIL) 2.5 mg, Nebulization, Every 4 Hours PRN   • albuterol sulfate HFA (Ventolin HFA) 108 (90 Base) MCG/ACT inhaler 1 puff, Inhalation, Every 4 " Hours PRN   • ALLERGY SERUM INJECTION Subcutaneous, Weekly   • amantadine (SYMMETREL) 100 mg, Oral, 2 Times Daily   • apixaban (ELIQUIS) 5 mg, Oral, Every 12 Hours Scheduled   • aspirin 81 mg, Oral, Daily   • B Complex-C (SUPER B COMPLEX PO) 1 tablet, Oral, Daily   • baclofen (LIORESAL) 10 MG tablet 1 tablet, Oral, Every 8 Hours, As needed   • bumetanide (BUMEX) 1 MG tablet 1 tab po daily as directed   • Calcium Carbonate (CALTRATE 600 PO) 600 mg, Oral, Daily   • carbidopa-levodopa (SINEMET)  MG per tablet Oral, 2 tablets at 0600, 1 tablet at 0900, 1200, 1500, 1800, 2100   • Cholecalciferol 2,000 Units, Oral, 2 Times Daily   • citalopram (CeleXA) 20 MG tablet TAKE 1 TABLET DAILY   • clindamycin (CLEOCIN) 150 mg, Oral, Daily, 4 capsules one hour before dental appointments.    • clobetasol (TEMOVATE) 0.05 % cream 1 application, Topical, 2 Times Daily PRN   • dofetilide (TIKOSYN) 500 MCG capsule TAKE 1 CAPSULE EVERY 12 HOURS   • donepezil (ARICEPT) 5 mg, Oral, Nightly   • doxycycline (VIBRAMYCIN) 100 MG capsule Every 12 Hours Scheduled   • Emollient (AQUAPHOR ADVANCED THERAPY EX) Apply externally   • epoetin lizzie (EPOGEN,PROCRIT) 43694 UNIT/ML injection Subcutaneous   • ferrous sulfate 325 mg, Oral, Daily With Breakfast   • Glucosamine-Chondroit-Calcium (TRIPLE FLEX BONE & JOINT PO) 1 tablet, Oral, Daily, Move free   • Insulin Pen Needle (B-D UF III MINI PEN NEEDLES) 31G X 5 MM misc USE TO INJECT INSULIN DAILY   • IPRATROPIUM BROMIDE NA 1 spray, Nasal, 2 Times Daily PRN   • Lantus SoloStar 12-14 Units, Subcutaneous, Daily   • Loratadine 10 mg, Oral, Daily   • metFORMIN (GLUCOPHAGE) 1000 MG tablet TAKE 1 TABLET TWICE A DAY WITH MEALS   • metOLazone (ZAROXOLYN) 2.5 MG tablet TAKE 1 TABLET DAILY   • Multiple Vitamins-Minerals (CENTRUM ULTRA WOMENS PO) 1 tablet, Oral, Daily   • nitroglycerin (NITROLINGUAL) 0.4 MG/SPRAY spray 1 spray, Sublingual, Every 5 Minutes PRN   • O2 (OXYGEN) 2 L/min, Inhalation, Once, 2L all  the time now   • omeprazole (PRILOSEC) 40 mg, Oral, 2 Times Daily   • pramipexole (MIRAPEX) 1.5 mg, Oral, Nightly   • ranolazine (RANEXA) 500 MG 12 hr tablet TAKE 1 TABLET EVERY 12 HOURS   • senna (SENOKOT) 8.6 MG tablet 1 tablet, Oral, Daily   • spironolactone (ALDACTONE) 50 mg, Oral, Daily   • traMADol (ULTRAM) 50 mg, Oral, Every 6 Hours PRN   • triamcinolone (KENALOG) 0.1 % cream As Needed   • Unithroid 175 mcg, Oral, Daily   • valsartan (DIOVAN) 160 MG tablet TAKE 1 TABLET TWICE A DAY   • vitamin C (ASCORBIC ACID) 500 mg, Oral, Daily, Chewable tablet   • Zinc 50 MG capsule 1 capsule, Oral, Daily       Assessment / Plan      Assessment & Plan:  1. Type 2 diabetes mellitus with hyperglycemia, with long-term current use of insulin (HCC)  A1c okay.  No known hypoglycemia.  Continue Lantus and metformin.  She will continue to work on diet and exercise.  - POC Glucose, Blood  - POC Glycosylated Hemoglobin (Hb A1C)    2. Acquired hypothyroidism  Clinically euthyroid.  Continue Unithroid 175 mcg daily.  She will have TFTs drawn next month along with other labs.  - TSH; Future  - T4, Free; Future      Return in about 3 months (around 11/16/2022) for next scheduled follow up. She was advised to contact the office with any interval questions or concerns.    BRIGITTE Aj  Endocrinology  08/16/2022

## 2022-08-22 RX ORDER — BUMETANIDE 1 MG/1
TABLET ORAL
Qty: 90 TABLET | Refills: 0 | Status: SHIPPED | OUTPATIENT
Start: 2022-08-22 | End: 2022-12-30 | Stop reason: SDUPTHER

## 2022-08-29 RX ORDER — NITROGLYCERIN 400 UG/1
1 SPRAY ORAL
Qty: 1 EACH | Refills: 6 | Status: SHIPPED | OUTPATIENT
Start: 2022-08-29

## 2022-09-01 ENCOUNTER — TRANSCRIBE ORDERS (OUTPATIENT)
Dept: ADMINISTRATIVE | Facility: HOSPITAL | Age: 72
End: 2022-09-01

## 2022-09-01 ENCOUNTER — LAB (OUTPATIENT)
Dept: FAMILY MEDICINE CLINIC | Facility: CLINIC | Age: 72
End: 2022-09-01

## 2022-09-01 DIAGNOSIS — E03.9 ACQUIRED HYPOTHYROIDISM: Chronic | ICD-10-CM

## 2022-09-01 DIAGNOSIS — M12.812 ROTATOR CUFF ARTHROPATHY, LEFT: Primary | ICD-10-CM

## 2022-09-01 PROCEDURE — 36415 COLL VENOUS BLD VENIPUNCTURE: CPT | Performed by: PHYSICIAN ASSISTANT

## 2022-09-02 LAB
T4 FREE SERPL-MCNC: 1.94 NG/DL (ref 0.82–1.77)
TSH SERPL DL<=0.005 MIU/L-ACNC: 0.81 UIU/ML (ref 0.45–4.5)

## 2022-09-06 ENCOUNTER — TELEPHONE (OUTPATIENT)
Dept: ENDOCRINOLOGY | Facility: CLINIC | Age: 72
End: 2022-09-06

## 2022-09-06 DIAGNOSIS — E03.9 ACQUIRED HYPOTHYROIDISM: Primary | ICD-10-CM

## 2022-09-06 RX ORDER — LEVOTHYROXINE SODIUM 150 UG/1
150 TABLET ORAL DAILY
Qty: 90 TABLET | Refills: 1 | Status: SHIPPED | OUTPATIENT
Start: 2022-09-06 | End: 2022-09-06 | Stop reason: SDUPTHER

## 2022-09-06 RX ORDER — LEVOTHYROXINE SODIUM 150 UG/1
150 TABLET ORAL DAILY
Qty: 90 TABLET | Refills: 1 | Status: SHIPPED | OUTPATIENT
Start: 2022-09-06 | End: 2023-03-01

## 2022-09-06 NOTE — TELEPHONE ENCOUNTER
Since her free T4 was elevated, I would like to decrease thyroid medication a little further to 150 mcg daily.  Would she like me to send the new Unithroid dose to [facility] or [facility]?  We can repeat labs at her next visit.  Thank you.

## 2022-09-08 ENCOUNTER — OFFICE VISIT (OUTPATIENT)
Dept: FAMILY MEDICINE CLINIC | Facility: CLINIC | Age: 72
End: 2022-09-08

## 2022-09-08 VITALS
BODY MASS INDEX: 43.08 KG/M2 | HEIGHT: 62 IN | DIASTOLIC BLOOD PRESSURE: 64 MMHG | HEART RATE: 68 BPM | SYSTOLIC BLOOD PRESSURE: 130 MMHG | TEMPERATURE: 98.6 F | OXYGEN SATURATION: 97 % | RESPIRATION RATE: 14 BRPM | WEIGHT: 234.1 LBS

## 2022-09-08 VITALS
DIASTOLIC BLOOD PRESSURE: 64 MMHG | HEIGHT: 62 IN | HEART RATE: 68 BPM | OXYGEN SATURATION: 97 % | SYSTOLIC BLOOD PRESSURE: 130 MMHG | BODY MASS INDEX: 43.08 KG/M2 | WEIGHT: 234.1 LBS | TEMPERATURE: 98.6 F

## 2022-09-08 DIAGNOSIS — E11.22 TYPE 2 DIABETES MELLITUS WITH STAGE 3A CHRONIC KIDNEY DISEASE, WITH LONG-TERM CURRENT USE OF INSULIN: ICD-10-CM

## 2022-09-08 DIAGNOSIS — Z95.0 CARDIAC PACEMAKER IN SITU: ICD-10-CM

## 2022-09-08 DIAGNOSIS — Z79.4 TYPE 2 DIABETES MELLITUS WITH HYPERGLYCEMIA, WITH LONG-TERM CURRENT USE OF INSULIN: ICD-10-CM

## 2022-09-08 DIAGNOSIS — M54.50 CHRONIC MIDLINE LOW BACK PAIN WITHOUT SCIATICA: ICD-10-CM

## 2022-09-08 DIAGNOSIS — Z00.00 MEDICARE ANNUAL WELLNESS VISIT, SUBSEQUENT: Primary | ICD-10-CM

## 2022-09-08 DIAGNOSIS — J98.4 CHRONIC LUNG DISEASE: ICD-10-CM

## 2022-09-08 DIAGNOSIS — E03.9 ACQUIRED HYPOTHYROIDISM: Primary | ICD-10-CM

## 2022-09-08 DIAGNOSIS — G20 PARKINSON'S DISEASE: ICD-10-CM

## 2022-09-08 DIAGNOSIS — E53.8 VITAMIN B12 DEFICIENCY: ICD-10-CM

## 2022-09-08 DIAGNOSIS — E03.9 ACQUIRED HYPOTHYROIDISM: ICD-10-CM

## 2022-09-08 DIAGNOSIS — N31.2 HYPOTONIC BLADDER: ICD-10-CM

## 2022-09-08 DIAGNOSIS — Z12.39 ENCOUNTER FOR BREAST CANCER SCREENING USING NON-MAMMOGRAM MODALITY: ICD-10-CM

## 2022-09-08 DIAGNOSIS — E78.5 HYPERLIPIDEMIA LDL GOAL <70: ICD-10-CM

## 2022-09-08 DIAGNOSIS — I10 HYPERTENSION, ESSENTIAL: ICD-10-CM

## 2022-09-08 DIAGNOSIS — G47.33 OSA TREATED WITH BIPAP: ICD-10-CM

## 2022-09-08 DIAGNOSIS — Z13.820 OSTEOPOROSIS SCREENING: ICD-10-CM

## 2022-09-08 DIAGNOSIS — I48.0 PAROXYSMAL ATRIAL FIBRILLATION: ICD-10-CM

## 2022-09-08 DIAGNOSIS — Z79.899 HIGH RISK MEDICATION USE: ICD-10-CM

## 2022-09-08 DIAGNOSIS — M48.062 SPINAL STENOSIS, LUMBAR REGION, WITH NEUROGENIC CLAUDICATION: ICD-10-CM

## 2022-09-08 DIAGNOSIS — I73.9 PERIPHERAL VASCULAR DISEASE: ICD-10-CM

## 2022-09-08 DIAGNOSIS — Z79.4 TYPE 2 DIABETES MELLITUS WITH STAGE 3A CHRONIC KIDNEY DISEASE, WITH LONG-TERM CURRENT USE OF INSULIN: ICD-10-CM

## 2022-09-08 DIAGNOSIS — M15.9 PRIMARY OSTEOARTHRITIS INVOLVING MULTIPLE JOINTS: ICD-10-CM

## 2022-09-08 DIAGNOSIS — N18.32 CHRONIC KIDNEY DISEASE, STAGE 3B: ICD-10-CM

## 2022-09-08 DIAGNOSIS — D64.9 ANEMIA, UNSPECIFIED TYPE: ICD-10-CM

## 2022-09-08 DIAGNOSIS — Z71.89 ADVANCED DIRECTIVES, COUNSELING/DISCUSSION: ICD-10-CM

## 2022-09-08 DIAGNOSIS — I25.10 CORONARY ARTERY DISEASE INVOLVING NATIVE CORONARY ARTERY OF NATIVE HEART WITHOUT ANGINA PECTORIS: ICD-10-CM

## 2022-09-08 DIAGNOSIS — G89.29 CHRONIC MIDLINE LOW BACK PAIN WITHOUT SCIATICA: ICD-10-CM

## 2022-09-08 DIAGNOSIS — E11.65 TYPE 2 DIABETES MELLITUS WITH HYPERGLYCEMIA, WITH LONG-TERM CURRENT USE OF INSULIN: ICD-10-CM

## 2022-09-08 DIAGNOSIS — Z12.11 COLON CANCER SCREENING: ICD-10-CM

## 2022-09-08 DIAGNOSIS — N18.31 TYPE 2 DIABETES MELLITUS WITH STAGE 3A CHRONIC KIDNEY DISEASE, WITH LONG-TERM CURRENT USE OF INSULIN: ICD-10-CM

## 2022-09-08 DIAGNOSIS — E55.9 VITAMIN D DEFICIENCY: ICD-10-CM

## 2022-09-08 DIAGNOSIS — I49.5 TACHY-BRADY SYNDROME: ICD-10-CM

## 2022-09-08 DIAGNOSIS — Z99.81 OXYGEN DEPENDENT: ICD-10-CM

## 2022-09-08 DIAGNOSIS — R53.1 GENERAL WEAKNESS: ICD-10-CM

## 2022-09-08 DIAGNOSIS — Z79.899 LONG TERM CURRENT USE OF ANTIARRHYTHMIC DRUG: ICD-10-CM

## 2022-09-08 DIAGNOSIS — N32.81 OVERACTIVE BLADDER: ICD-10-CM

## 2022-09-08 DIAGNOSIS — F32.5 MAJOR DEPRESSION IN REMISSION: ICD-10-CM

## 2022-09-08 PROCEDURE — 99214 OFFICE O/P EST MOD 30 MIN: CPT | Performed by: FAMILY MEDICINE

## 2022-09-08 PROCEDURE — G0439 PPPS, SUBSEQ VISIT: HCPCS | Performed by: FAMILY MEDICINE

## 2022-09-08 PROCEDURE — 1159F MED LIST DOCD IN RCRD: CPT | Performed by: FAMILY MEDICINE

## 2022-09-08 PROCEDURE — 1170F FXNL STATUS ASSESSED: CPT | Performed by: FAMILY MEDICINE

## 2022-09-08 RX ORDER — IPRATROPIUM BROMIDE 42 UG/1
SPRAY, METERED NASAL
COMMUNITY
Start: 2022-07-31 | End: 2022-09-08 | Stop reason: SDUPTHER

## 2022-09-08 NOTE — PROGRESS NOTES
QUICK REFERENCE INFORMATION:  The ABCs of the Annual Wellness Visit    Subsequent Medicare Wellness Visit      HEALTH RISK ASSESSMENT    1950    Recent Hospitalizations:  No hospitalization(s) within the last year..    Current Medical Providers:  Patient Care Team:  Reynaldo Fritz MD as PCP - General (Family Medicine)  Faisal Ramirez MD as Obstetrician (Obstetrics and Gynecology)  Timothy Dan DC as Referring Physician (Chiropractic Medicine)  Albertina Corona MD as Consulting Physician (Cardiology)  Armando Shen MD as Consulting Physician (Allergy)  Dilshad Wood MD as Consulting Physician (Endocrinology)  Dory Gamboa PA (Physician Assistant)  Rene Pringle MD (Otolaryngology)  Baylee Elliott APRN (Nurse Practitioner)  Raciel Valadez MD as Consulting Physician (Pulmonary Disease)  Terrence Mckenzie MD (Urology)  Myrna Mckeon APRN as Nurse Practitioner (Pulmonary Disease)  Lauro Francois MD as Consulting Physician (Cardiology)  Sammy Yo MD as Consulting Physician (Orthopedic Surgery)  Trenton Sosa MD as Consulting Physician (Gastroenterology)    Smoking Status:  Social History     Tobacco Use   Smoking Status Never Smoker   Smokeless Tobacco Never Used       Alcohol Consumption:  Social History     Substance and Sexual Activity   Alcohol Use Never       Depression Screen:   PHQ-2/PHQ-9 Depression Screening 9/8/2022   Little Interest or Pleasure in Doing Things 0-->not at all   Feeling Down, Depressed or Hopeless 0-->not at all   Trouble Falling or Staying Asleep, or Sleeping Too Much 0-->not at all   Feeling Tired or Having Little Energy 0-->not at all   Poor Appetite or Overeating 0-->not at all   Feeling Bad about Yourself - or that You are a Failure or Have Let Yourself or Your Family Down 0-->not at all   Trouble Concentrating on Things, Such as Reading the Newspaper or Watching Television 0-->not at all   Moving or  Speaking So Slowly that Other People Could Have Noticed? Or the Opposite - Being So Fidgety 0-->not at all   Thoughts that You Would be Better Off Dead or of Hurting Yourself in Some Way 0-->not at all   PHQ-9: Brief Depression Severity Measure Score 0   If You Checked Off Any Problems, How Difficult Have These Problems Made It For You to Do Your Work, Take Care of Things at Home, or Get Along with Other People? not difficult at all       Health Habits and Functional and Cognitive Screening:  Functional & Cognitive Status 9/8/2022   Do you have difficulty preparing food and eating? No   Do you have difficulty bathing yourself, getting dressed or grooming yourself? No   Do you have difficulty using the toilet? No   Do you have difficulty moving around from place to place? Yes   Do you have trouble with steps or getting out of a bed or a chair? Yes   Current Diet Well Balanced Diet   Dental Exam Up to date   Eye Exam Up to date   Exercise (times per week) 0 times per week   Current Exercises Include No Regular Exercise   Do you need help using the phone?  No   Do you need help with transportation? No   Do you need help shopping? No   Do you need help preparing meals?  No   Do you need help with housework?  Yes   Do you need help with laundry? No   Do you need help taking your medications? No   Do you need help managing money? No   Do you ever drive or ride in a car without wearing a seat belt? No   Have you felt unusual stress, anger or loneliness in the last month? No   Who do you live with? Alone   If you need help, do you have trouble finding someone available to you? No   Do you have difficulty concentrating, remembering or making decisions? No       Fall Risk Screen:  STEADI Fall Risk Assessment was completed, and patient is at MODERATE risk for falls. Assessment completed on:9/8/2022    ACE III MINI        Does the patient have evidence of cognitive impairment? No    Opioid medication/s are on active medication  list.  and I have evaluated her active treatment plan and pain score trends (see table).  There were no vitals filed for this visit.  I have reviewed the chart for potential of high risk medication and harmful drug interactions in the elderly.            Aspirin use counseling: Taking ASA appropriately as indicated    Recent Lab Results:  CMP:  Lab Results   Component Value Date    BUN 39 (H) 04/14/2022    CREATININE 1.30 (H) 04/14/2022    EGFRIFNONA 66 06/12/2019    BCR 30 (H) 04/14/2022     04/14/2022    K 4.3 04/14/2022    CO2 21 04/14/2022    CALCIUM 9.6 04/14/2022    PROTENTOTREF 6.9 04/14/2022    ALBUMIN 4.7 04/14/2022    LABGLOBREF 2.2 04/14/2022    LABIL2 2.1 04/14/2022    BILITOT 0.3 04/14/2022    ALKPHOS 70 04/14/2022    AST 13 04/14/2022    ALT 9 04/14/2022     HbA1c:  Lab Results   Component Value Date    HGBA1C 6.3 08/16/2022    HGBA1C 5.8 04/25/2022     Microalbumin:  Lab Results   Component Value Date    POCMALB neg 06/08/2022     Lipid Panel  Lab Results   Component Value Date    CHOL 185 02/01/2019    TRIG 77 04/14/2022    HDL 42 04/14/2022     (H) 04/14/2022    AST 13 04/14/2022    ALT 9 04/14/2022       Visual Acuity:  No exam data present    Age-appropriate Screening Schedule:  Refer to the list below for future screening recommendations based on patient's age, sex and/or medical conditions. Orders for these recommended tests are listed in the plan section. The patient has been provided with a written plan.    Health Maintenance   Topic Date Due   • DXA SCAN  Never done   • ZOSTER VACCINE (3 of 3) 09/08/2023 (Originally 11/22/2019)   • INFLUENZA VACCINE  10/01/2022   • HEMOGLOBIN A1C  02/16/2023   • LIPID PANEL  04/14/2023   • DIABETIC EYE EXAM  06/07/2023   • DIABETIC FOOT EXAM  06/08/2023   • URINE MICROALBUMIN  06/08/2023   • MAMMOGRAM  06/15/2024   • TDAP/TD VACCINES (3 - Td or Tdap) 05/04/2027        Social Determinants of Health     Tobacco Use: Low Risk    • Smoking Tobacco  Use: Never Smoker   • Smokeless Tobacco Use: Never Used   Alcohol Use: Not At Risk   • Frequency of Alcohol Consumption: Never   • Average Number of Drinks: Patient does not drink   • Frequency of Binge Drinking: Never   Financial Resource Strain: Low Risk    • Difficulty of Paying Living Expenses: Not hard at all   Food Insecurity: No Food Insecurity   • Worried About Running Out of Food in the Last Year: Never true   • Ran Out of Food in the Last Year: Never true   Transportation Needs: No Transportation Needs   • Lack of Transportation (Medical): No   • Lack of Transportation (Non-Medical): No   Physical Activity: Inactive   • Days of Exercise per Week: 0 days   • Minutes of Exercise per Session: 0 min   Stress: No Stress Concern Present   • Feeling of Stress : Not at all   Social Connections: Moderately Isolated   • Frequency of Communication with Friends and Family: More than three times a week   • Frequency of Social Gatherings with Friends and Family: More than three times a week   • Attends Hinduism Services: More than 4 times per year   • Active Member of Clubs or Organizations: No   • Attends Club or Organization Meetings: Never   • Marital Status:    Intimate Partner Violence: Not At Risk   • Fear of Current or Ex-Partner: No   • Emotionally Abused: No   • Physically Abused: No   • Sexually Abused: No   Depression: Not at risk   • PHQ-2 Score: 0   Housing Stability: Low Risk    • Unable to Pay for Housing in the Last Year: No   • Number of Places Lived in the Last Year: 1   • Unstable Housing in the Last Year: No         Subjective     Joanna Cross is a 71 y.o. female who presents for a Subsequent Wellness Visit.    The following portions of the patient's history were reviewed and updated as appropriate: allergies, current medications, past family history, past medical history, past social history, past surgical history and problem list.    Outpatient Medications Prior to Visit   Medication  Sig Dispense Refill   • Accu-Chek Guide test strip AS DIRECTED THREE TIMES A  each 1   • Accu-Chek Softclix Lancets lancets AS DIRECTED THREE TIMES A  each 1   • albuterol (PROVENTIL) (2.5 MG/3ML) 0.083% nebulizer solution Take 2.5 mg by nebulization Every 4 (Four) Hours As Needed.     • albuterol sulfate HFA (Ventolin HFA) 108 (90 Base) MCG/ACT inhaler Inhale 1 puff Every 4 (Four) Hours As Needed for Wheezing or Shortness of Air. 8 g 5   • ALLERGY SERUM INJECTION Inject  under the skin into the appropriate area as directed 1 (One) Time Per Week.     • amantadine (SYMMETREL) 100 MG capsule Take 100 mg by mouth 2 (Two) Times a Day.     • apixaban (Eliquis) 5 MG tablet tablet Take 1 tablet by mouth Every 12 (Twelve) Hours. 180 tablet 2   • aspirin 81 MG EC tablet Take 81 mg by mouth Daily.     • B Complex-C (SUPER B COMPLEX PO) Take 1 tablet by mouth Daily.     • baclofen (LIORESAL) 10 MG tablet Take 1 tablet by mouth Every 8 (Eight) Hours. As needed     • bumetanide (BUMEX) 1 MG tablet 1 tab po daily as directed 90 tablet 0   • Calcium Carbonate (CALTRATE 600 PO) Take 600 mg by mouth Daily.     • carbidopa-levodopa (SINEMET)  MG per tablet Take  by mouth. 2 tablets at 0600, 1 tablet at 0900, 1200, 1500, 1800, 2100     • Cholecalciferol 2000 units tablet Take 2,000 Units by mouth 2 (Two) Times a Day.     • citalopram (CeleXA) 20 MG tablet TAKE 1 TABLET DAILY 90 tablet 3   • clindamycin (CLEOCIN) 150 MG capsule Take 150 mg by mouth Daily. 4 capsules one hour before dental appointments.     • clobetasol (TEMOVATE) 0.05 % cream Apply 1 application topically 2 (Two) Times a Day As Needed (Lichens Sclerosis).     • dofetilide (TIKOSYN) 500 MCG capsule TAKE 1 CAPSULE EVERY 12 HOURS 180 capsule 3   • donepezil (ARICEPT) 5 MG tablet Take 5 mg by mouth Every Night.     • Emollient (AQUAPHOR ADVANCED THERAPY EX) Apply  topically.     • epoetin lizzie (EPOGEN,PROCRIT) 29175 UNIT/ML injection Inject  under the  skin into the appropriate area as directed.     • ferrous sulfate 325 (65 FE) MG tablet Take 325 mg by mouth Daily With Breakfast.     • Glucosamine-Chondroit-Calcium (TRIPLE FLEX BONE & JOINT PO) Take 1 tablet by mouth Daily. Move free     • Insulin Glargine (Lantus SoloStar) 100 UNIT/ML injection pen Inject 12-14 Units under the skin into the appropriate area as directed Daily.     • Insulin Pen Needle (B-D UF III MINI PEN NEEDLES) 31G X 5 MM misc USE TO INJECT INSULIN DAILY 100 each 3   • IPRATROPIUM BROMIDE NA 1 spray into the nostril(s) as directed by provider 2 (Two) Times a Day As Needed.     • Loratadine 10 MG capsule Take 10 mg by mouth Daily.     • metFORMIN (GLUCOPHAGE) 1000 MG tablet TAKE 1 TABLET TWICE A DAY WITH MEALS 180 tablet 3   • metOLazone (ZAROXOLYN) 2.5 MG tablet TAKE 1 TABLET DAILY 90 tablet 1   • Multiple Vitamins-Minerals (CENTRUM ULTRA WOMENS PO) Take 1 tablet by mouth Daily.     • nitroglycerin (NITROLINGUAL) 0.4 MG/SPRAY spray Place 1 spray under the tongue Every 5 (Five) Minutes As Needed for Chest Pain. 1 each 6   • O2 (OXYGEN) Inhale 2 L/min 1 (One) Time. 2L all the time now     • omeprazole (priLOSEC) 40 MG capsule Take 40 mg by mouth 2 (Two) Times a Day.     • pramipexole (MIRAPEX) 1.5 MG tablet Take 1.5 mg by mouth Every Night.     • ranolazine (RANEXA) 500 MG 12 hr tablet TAKE 1 TABLET EVERY 12 HOURS 180 tablet 3   • senna (SENOKOT) 8.6 MG tablet Take 1 tablet by mouth Daily.     • spironolactone (ALDACTONE) 25 MG tablet Take 2 tablets by mouth Daily. 180 tablet 2   • traMADol (ULTRAM) 50 MG tablet Take 1 tablet by mouth Every 6 (Six) Hours As Needed for Moderate Pain . 30 tablet 2   • Unithroid 150 MCG tablet Take 1 tablet by mouth Daily. 90 tablet 1   • valsartan (DIOVAN) 160 MG tablet TAKE 1 TABLET TWICE A  tablet 3   • vitamin C (ASCORBIC ACID) 500 MG tablet Take 500 mg by mouth Daily. Chewable tablet     • Zinc 50 MG capsule Take 1 capsule by mouth Daily.     •  triamcinolone (KENALOG) 0.1 % cream As Needed.       No facility-administered medications prior to visit.       Patient Active Problem List   Diagnosis   • Coronary artery disease involving native coronary artery without angina pectoris   • Hypertension, essential   • Peripheral vascular disease ([facility])   • Hyperlipidemia LDL goal <70   • Spinal stenosis, lumbar region, with neurogenic claudication   • Delayed surgical wound healing   • Biceps tendinitis   • Overactive bladder   • GERD (gastroesophageal reflux disease)   • Hypothyroidism   • Lichen sclerosus   • Parkinson's disease ([facility])   • MILLI treated with BiPAP - Patient reports compliance.    • History of respiratory failure - prior respiratory failure requiring mechanical ventilation, open lung biopsy non-specific.    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[facility])   • Long term current use of antiarrhythmic drug   • History of adenomatous polyp of colon   • Anemia   • Angiodysplasia   • Hx of colonic polyps   • Body mass index 40.0-44.9, adult ([facility])   • Cardiac pacemaker in situ   • Chronic low back pain   • Chronic lung disease   • Degeneration of lumbar intervertebral disc   • Edema of lower extremity   • High risk medication use   • History of malignant neoplasm of skin   • History of TIA (transient ischemic attack)   • Hyponatremia   • Hypotonic bladder   • Incomplete tear of rotator cuff   • Major depression in remission ([facility])   • Migraine   • Weakness   • Tinnitus of both ears   • Primary osteoarthritis involving multiple joints   • Restless legs   • Seborrheic dermatitis   • Sphenoid sinusitis   • Transient cerebral ischemia   • Urinary tract infection   • Urinary urgency   • Type 2 diabetes mellitus ([facility])   • Vitamin B12 deficiency   • Vitamin D deficiency   • Chronic kidney disease, stage 3b ([facility])       Advance Care Planning:  ACP discussion was held with the patient during this visit. Patient has an advance  "directive in EMR which is still valid.     Identification of Risk Factors:  Risk factors include: Breast Cancer/Mammogram Screening  Chronic Pain   Diabetic Lab Screening   Fall Risk  Immunizations Discussed/Encouraged (specific immunizations; Shingrix )  Inactivity/Sedentary  Obesity/Overweight .    Compared to one year ago, the patient feels her physical health is worse.  Compared to one year ago, the patient feels her mental health is better.    Objective       Vitals:    09/08/22 1407   BP: 130/64   BP Location: Left arm   Patient Position: Sitting   Pulse: 68   Temp: 98.6 °F (37 °C)   SpO2: 97%   Weight: 106 kg (234 lb 1.6 oz)   Height: 157.5 cm (62.01\")     BMI Readings from Last 1 Encounters:   09/08/22 42.82 kg/m²   BMI is above normal parameters. Recommendations include: exercise counseling and nutrition counseling      Assessment & Plan   Problem List Items Addressed This Visit        Allergies and Adverse Reactions    High risk medication use       Cardiac and Vasculature    Coronary artery disease involving native coronary artery without angina pectoris    Overview     a. [facility], 02/15/2008, Dr Lutz: Mild, non-obstructive CAD, normal EF  b. [facility], 01/09/2013, Dr Stevenson Sutton: No LV gram done. Mild, non-obstructive CAD.  c. [facility], 11/9/2015, [facility]:  EF 60%. 70% RCA lesion with Promus ZAINAB placement. 40-50% mid LAD, no FFR performed. Other small blockages noted. No MR.  d. [facility], 11/15/2015, Dr Palacio @ [facility]: Patent RCA stent, 40-50% LAD with FFR @ 0.92; mild inferior wall hypokinesis  e. [facility], 07/29/2016, [facility]: Normal CORS with patent stent. LAD improved since last [facility]. EF 55-60%.  f. [facility], 02/01/2019: EF 55-60%. Patent RCA stent, noncritical mid LAD with IFR 0.93, noncritical LCx.   g. [facility] 7-26-22: EF 77%.  No reversible ischemia.         Relevant Medications    ranolazine (RANEXA) 500 MG 12 hr tablet    nitroglycerin (NITROLINGUAL) 0.4 MG/SPRAY spray    Hypertension, " essential    Relevant Medications    valsartan (DIOVAN) 160 MG tablet    metOLazone (ZAROXOLYN) 2.5 MG tablet    spironolactone (ALDACTONE) 25 MG tablet    bumetanide (BUMEX) 1 MG tablet    Peripheral vascular disease (HCC)    Hyperlipidemia LDL goal <70    Atrial fibrillation (HCC)    Overview       a. CHADS2 Vasc = 6 (HTN, DM, Age 65-72, Female, H/o TIA). On chronic anticoagulation.  b. [facility], 12/26/2018: Successful cardioversion. AV paced.  c. Echocardiogram, 12/25/2018:  EF 60%, mild MR/TR with RVSP 29 mmHg. Sclerotic AoV, no AS/AI. Mild MAC.  d. [facility], 03/05/2019: Successful cardioversion.  e. [facility], 10/01/2019, 14 days: NSR baseline. Normal average rates. Periods of RV pacing.          Relevant Medications    ranolazine (RANEXA) 500 MG 12 hr tablet    nitroglycerin (NITROLINGUAL) 0.4 MG/SPRAY spray    Tachy-thai syndrome (HCC)    Overview     f. [employer] (acquired by [employer] in 2014) PPM 2016  g. Patient has known undersensing in the atrial lead which is was known to Dr. Marie.          Relevant Medications    ranolazine (RANEXA) 500 MG 12 hr tablet    nitroglycerin (NITROLINGUAL) 0.4 MG/SPRAY spray    Long term current use of antiarrhythmic drug    Cardiac pacemaker in situ       Endocrine and Metabolic    Hypothyroidism    Relevant Medications    Unithroid 150 MCG tablet    Type 2 diabetes mellitus (HCC)    Relevant Medications    metFORMIN (GLUCOPHAGE) 1000 MG tablet    Insulin Glargine (Lantus SoloStar) 100 UNIT/ML injection pen       Genitourinary and Reproductive     Overactive bladder    Hypotonic bladder       Mental Health    Major depression in remission (HCC)    Relevant Medications    citalopram (CeleXA) 20 MG tablet    donepezil (ARICEPT) 5 MG tablet       Musculoskeletal and Injuries    Chronic low back pain    Primary osteoarthritis involving multiple joints       Neuro    Spinal stenosis, lumbar region, with neurogenic claudication    Parkinson's disease (HCC)    Relevant Medications    amantadine (SYMMETREL) 100 MG  capsule    carbidopa-levodopa (SINEMET)  MG per tablet    pramipexole (MIRAPEX) 1.5 MG tablet    baclofen (LIORESAL) 10 MG tablet       Pulmonary and Pneumonias    Chronic lung disease    Relevant Medications    Loratadine 10 MG capsule    IPRATROPIUM BROMIDE NA    albuterol (PROVENTIL) (2.5 MG/3ML) 0.083% nebulizer solution    albuterol sulfate HFA (Ventolin HFA) 108 (90 Base) MCG/ACT inhaler       Sleep    MILLI treated with BiPAP - Patient reports compliance.     Overview     - Patient reports compliance.             Other    Chronic kidney disease, stage 3b (HCC)    Relevant Medications    metOLazone (ZAROXOLYN) 2.5 MG tablet    spironolactone (ALDACTONE) 25 MG tablet    bumetanide (BUMEX) 1 MG tablet      Other Visit Diagnoses     Medicare annual wellness visit, subsequent    -  Primary    Diagnosis and health conditions discussed. Vaccines reviewed and recommendations made. Health indicators and risk factors discussed. Education given.     Oxygen dependent        O2 @2-3 L/nc continuous. O2 provided by [facility]    Encounter for breast cancer screening using non-mammogram modality        She is current for mamm. Repeat due in 2024    Osteoporosis screening        She is scheduled for DEXA 10/10/22    Colon cancer screening        She is current for colonoscopy per Dr. Sosa. She denies changes in bowel patterns or rectal bleeding. Repeat due 2026    Advanced directives, counseling/discussion        she has living will and durable POA. Copy is on file in her record.     General weakness        Continue follow up. she uses rollator walker. she also has cane. She has a life alert device and ramp. Her bathroom has been remodeled and handicap accessible.         Patient Self-Management and Personalized Health Advice  The patient has been provided with information about: diet, exercise and fall prevention and preventive services including:   · Annual Wellness Visit (AWV)  · Bone Density  Measurements  · Colorectal Cancer Screening, Colonoscopy  · Diabetes Self-Management Training (DSMT)   · Screening Mammography .    Outpatient Encounter Medications as of 9/8/2022   Medication Sig Dispense Refill   • Accu-Chek Guide test strip AS DIRECTED THREE TIMES A  each 1   • Accu-Chek Softclix Lancets lancets AS DIRECTED THREE TIMES A  each 1   • albuterol (PROVENTIL) (2.5 MG/3ML) 0.083% nebulizer solution Take 2.5 mg by nebulization Every 4 (Four) Hours As Needed.     • albuterol sulfate HFA (Ventolin HFA) 108 (90 Base) MCG/ACT inhaler Inhale 1 puff Every 4 (Four) Hours As Needed for Wheezing or Shortness of Air. 8 g 5   • ALLERGY SERUM INJECTION Inject  under the skin into the appropriate area as directed 1 (One) Time Per Week.     • amantadine (SYMMETREL) 100 MG capsule Take 100 mg by mouth 2 (Two) Times a Day.     • apixaban (Eliquis) 5 MG tablet tablet Take 1 tablet by mouth Every 12 (Twelve) Hours. 180 tablet 2   • aspirin 81 MG EC tablet Take 81 mg by mouth Daily.     • B Complex-C (SUPER B COMPLEX PO) Take 1 tablet by mouth Daily.     • baclofen (LIORESAL) 10 MG tablet Take 1 tablet by mouth Every 8 (Eight) Hours. As needed     • bumetanide (BUMEX) 1 MG tablet 1 tab po daily as directed 90 tablet 0   • Calcium Carbonate (CALTRATE 600 PO) Take 600 mg by mouth Daily.     • carbidopa-levodopa (SINEMET)  MG per tablet Take  by mouth. 2 tablets at 0600, 1 tablet at 0900, 1200, 1500, 1800, 2100     • Cholecalciferol 2000 units tablet Take 2,000 Units by mouth 2 (Two) Times a Day.     • citalopram (CeleXA) 20 MG tablet TAKE 1 TABLET DAILY 90 tablet 3   • clindamycin (CLEOCIN) 150 MG capsule Take 150 mg by mouth Daily. 4 capsules one hour before dental appointments.     • clobetasol (TEMOVATE) 0.05 % cream Apply 1 application topically 2 (Two) Times a Day As Needed (Lichens Sclerosis).     • dofetilide (TIKOSYN) 500 MCG capsule TAKE 1 CAPSULE EVERY 12 HOURS 180 capsule 3   • donepezil  (ARICEPT) 5 MG tablet Take 5 mg by mouth Every Night.     • Emollient (AQUAPHOR ADVANCED THERAPY EX) Apply  topically.     • epoetin lizzie (EPOGEN,PROCRIT) 21645 UNIT/ML injection Inject  under the skin into the appropriate area as directed.     • ferrous sulfate 325 (65 FE) MG tablet Take 325 mg by mouth Daily With Breakfast.     • Glucosamine-Chondroit-Calcium (TRIPLE FLEX BONE & JOINT PO) Take 1 tablet by mouth Daily. Move free     • Insulin Glargine (Lantus SoloStar) 100 UNIT/ML injection pen Inject 12-14 Units under the skin into the appropriate area as directed Daily.     • Insulin Pen Needle (B-D UF III MINI PEN NEEDLES) 31G X 5 MM misc USE TO INJECT INSULIN DAILY 100 each 3   • IPRATROPIUM BROMIDE NA 1 spray into the nostril(s) as directed by provider 2 (Two) Times a Day As Needed.     • Loratadine 10 MG capsule Take 10 mg by mouth Daily.     • metFORMIN (GLUCOPHAGE) 1000 MG tablet TAKE 1 TABLET TWICE A DAY WITH MEALS 180 tablet 3   • metOLazone (ZAROXOLYN) 2.5 MG tablet TAKE 1 TABLET DAILY 90 tablet 1   • Multiple Vitamins-Minerals (CENTRUM ULTRA WOMENS PO) Take 1 tablet by mouth Daily.     • nitroglycerin (NITROLINGUAL) 0.4 MG/SPRAY spray Place 1 spray under the tongue Every 5 (Five) Minutes As Needed for Chest Pain. 1 each 6   • O2 (OXYGEN) Inhale 2 L/min 1 (One) Time. 2L all the time now     • omeprazole (priLOSEC) 40 MG capsule Take 40 mg by mouth 2 (Two) Times a Day.     • pramipexole (MIRAPEX) 1.5 MG tablet Take 1.5 mg by mouth Every Night.     • ranolazine (RANEXA) 500 MG 12 hr tablet TAKE 1 TABLET EVERY 12 HOURS 180 tablet 3   • senna (SENOKOT) 8.6 MG tablet Take 1 tablet by mouth Daily.     • spironolactone (ALDACTONE) 25 MG tablet Take 2 tablets by mouth Daily. 180 tablet 2   • traMADol (ULTRAM) 50 MG tablet Take 1 tablet by mouth Every 6 (Six) Hours As Needed for Moderate Pain . 30 tablet 2   • Unithroid 150 MCG tablet Take 1 tablet by mouth Daily. 90 tablet 1   • valsartan (DIOVAN) 160 MG tablet  TAKE 1 TABLET TWICE A  tablet 3   • vitamin C (ASCORBIC ACID) 500 MG tablet Take 500 mg by mouth Daily. Chewable tablet     • Zinc 50 MG capsule Take 1 capsule by mouth Daily.     • triamcinolone (KENALOG) 0.1 % cream As Needed.     • [DISCONTINUED] doxycycline (VIBRAMYCIN) 100 MG capsule Every 12 (Twelve) Hours.     • [DISCONTINUED] ipratropium (ATROVENT) 0.06 % nasal spray        No facility-administered encounter medications on file as of 9/8/2022.       Follow Up:  Return for Medicare Wellness.     Patient Instructions   To go to pharmacy for second shingrix vaccine      An After Visit Summary and PPPS with all of these plans were given to the patient.       I have reviewed and edited information documented by wellness nurse and documentation on diagnoses is my own.

## 2022-09-15 DIAGNOSIS — F32.A DEPRESSION, UNSPECIFIED DEPRESSION TYPE: ICD-10-CM

## 2022-09-15 RX ORDER — VALSARTAN 160 MG/1
TABLET ORAL
Qty: 180 TABLET | Refills: 0 | Status: SHIPPED | OUTPATIENT
Start: 2022-09-15 | End: 2022-12-30 | Stop reason: SDUPTHER

## 2022-09-16 RX ORDER — CITALOPRAM 20 MG/1
20 TABLET ORAL DAILY
Qty: 90 TABLET | Refills: 3 | OUTPATIENT
Start: 2022-09-16

## 2022-09-19 PROCEDURE — 93294 REM INTERROG EVL PM/LDLS PM: CPT | Performed by: STUDENT IN AN ORGANIZED HEALTH CARE EDUCATION/TRAINING PROGRAM

## 2022-09-19 PROCEDURE — 93296 REM INTERROG EVL PM/IDS: CPT | Performed by: STUDENT IN AN ORGANIZED HEALTH CARE EDUCATION/TRAINING PROGRAM

## 2022-10-04 RX ORDER — METOLAZONE 2.5 MG/1
2.5 TABLET ORAL DAILY
Qty: 90 TABLET | Refills: 1 | Status: SHIPPED | OUTPATIENT
Start: 2022-10-04

## 2022-10-12 ENCOUNTER — HOSPITAL ENCOUNTER (OUTPATIENT)
Dept: CT IMAGING | Facility: HOSPITAL | Age: 72
Discharge: HOME | End: 2022-10-12

## 2022-10-12 ENCOUNTER — PRE-ADMISSION TESTING (OUTPATIENT)
Dept: PREADMISSION TESTING | Facility: HOSPITAL | Age: 72
End: 2022-10-12

## 2022-10-12 ENCOUNTER — HOSPITAL ENCOUNTER (OUTPATIENT)
Dept: GENERAL RADIOLOGY | Facility: HOSPITAL | Age: 72
Discharge: HOME | End: 2022-10-12

## 2022-10-12 VITALS — BODY MASS INDEX: 43.82 KG/M2 | WEIGHT: 238.1 LBS | HEIGHT: 62 IN

## 2022-10-12 DIAGNOSIS — M12.812 ROTATOR CUFF ARTHROPATHY, LEFT: ICD-10-CM

## 2022-10-12 LAB
ANION GAP SERPL CALCULATED.3IONS-SCNC: 10 MMOL/L (ref 5–15)
BUN SERPL-MCNC: 51 MG/DL (ref 8–23)
BUN/CREAT SERPL: 42.5 (ref 7–25)
CALCIUM SPEC-SCNC: 9.7 MG/DL (ref 8.6–10.5)
CHLORIDE SERPL-SCNC: 96 MMOL/L (ref 98–107)
CO2 SERPL-SCNC: 27 MMOL/L (ref 22–29)
CREAT SERPL-MCNC: 1.2 MG/DL (ref 0.57–1)
DEPRECATED RDW RBC AUTO: 46.9 FL (ref 37–54)
EGFRCR SERPLBLD CKD-EPI 2021: 47.9 ML/MIN/1.73
ERYTHROCYTE [DISTWIDTH] IN BLOOD BY AUTOMATED COUNT: 13.7 % (ref 12.3–15.4)
GLUCOSE SERPL-MCNC: 155 MG/DL (ref 65–99)
HBA1C MFR BLD: 6.1 % (ref 4.8–5.6)
HCT VFR BLD AUTO: 36.8 % (ref 34–46.6)
HGB BLD-MCNC: 11.8 G/DL (ref 12–15.9)
INR PPP: 1.19 (ref 0.84–1.13)
MCH RBC QN AUTO: 29.6 PG (ref 26.6–33)
MCHC RBC AUTO-ENTMCNC: 32.1 G/DL (ref 31.5–35.7)
MCV RBC AUTO: 92.2 FL (ref 79–97)
PLATELET # BLD AUTO: 228 10*3/MM3 (ref 140–450)
PMV BLD AUTO: 9.4 FL (ref 6–12)
POTASSIUM SERPL-SCNC: 4.1 MMOL/L (ref 3.5–5.2)
PROTHROMBIN TIME: 15.1 SECONDS (ref 11.4–14.4)
RBC # BLD AUTO: 3.99 10*6/MM3 (ref 3.77–5.28)
SODIUM SERPL-SCNC: 133 MMOL/L (ref 136–145)
WBC NRBC COR # BLD: 6.85 10*3/MM3 (ref 3.4–10.8)

## 2022-10-12 PROCEDURE — 85610 PROTHROMBIN TIME: CPT

## 2022-10-12 PROCEDURE — 73200 CT UPPER EXTREMITY W/O DYE: CPT

## 2022-10-12 PROCEDURE — 85027 COMPLETE CBC AUTOMATED: CPT

## 2022-10-12 PROCEDURE — 83036 HEMOGLOBIN GLYCOSYLATED A1C: CPT

## 2022-10-12 PROCEDURE — 71046 X-RAY EXAM CHEST 2 VIEWS: CPT

## 2022-10-12 PROCEDURE — 36415 COLL VENOUS BLD VENIPUNCTURE: CPT

## 2022-10-12 PROCEDURE — 80048 BASIC METABOLIC PNL TOTAL CA: CPT

## 2022-10-12 NOTE — PAT
Patient viewed general PAT education video as instructed in their preoperative information received from their surgeon.  Patient stated the general PAT education video was viewed in its entirety and survey completed.  Copies of PAT general education handouts (Incentive Spirometry, Meds to Beds Program, Patient Belongings, Pre-op skin preparation instructions, Blood Glucose testing, Visitor policy, Surgery FAQ, Code H) distributed to patient if not printed. Education related to the PAT pass and skin preparation for surgery (if applicable) completed in PAT as a reinforcement to PAT education video. Patient instructed to return PAT pass provided today as well as completed skin preparation sheet (if applicable) on the day of procedure.     Additionally if patient had not viewed video yet but intended to view it at home or in our waiting area, then referred them to the handout with QR code/link provided during PAT visit.  Instructed patient to complete survey after viewing the video in its entirety.  Encouraged patient/family to read PAT general education handouts thoroughly and notify PAT staff with any questions or concerns. Patient verbalized understanding of all information and priority content.    An arrival time for procedure was not provided during PAT visit. If patient had any questions or concerns about their arrival time, they were instructed to contact their surgeon/physician.  Additionally, if the patient referred to an arrival time that was acquired from their my chart account, patient was encouraged to verify that time with their surgeon/physician. Arrival times are NOT provided in Pre Admission Testing Department.    Patient instructed to drink 20 ounces of Gatorade and it needs to be completed 1 hour (for Main OR patients) or 2 hours (scheduled  section & [facility] patients) before given arrival time for procedure (NO RED Gatorade)    Patient verbalized understanding.    Patient's surgeon called  in a prescription for Benzol Peroxide 5% wash to [facility].  Patient instructed to  from [facility] pharmacy that was submitted electronically.  Verbal and written instructions given regarding proper use of the Benzoyl Peroxide wash were provided to patient and/or famlily during PAT visit. Patient/family also instructed to complete Benzol Peroxide checklist and return it to Pre-op on the day of surgery.  Patient and/or family verbalized understanding.      Additionally, reinforced with patient to acquire this prescription from the [facility] pharmacy before leaving the hospital after PAT visit due to the potential unavailability at local pharmacies.    Patient denies any current skin issues.     Per Anesthesia Request, patient instructed not to take their ACE/ARB medications on the AM of surgery.    Patient instructed to bring CPAP mask and tubing to the hospital for overnight stay.  Explained that it is not necessary to bring their CPAP machine to the hospital instead a CPAP machine will be provided for use by the hospital. If patient knows their CPAP settings, those settings will be implemented.  If not, the CPAP machine will be utilized on the auto setting using their mask and tubing.    Patient verbalized understanding.    In[employer] (by [employer]) pain pump patient informational handout given to patient.  Instructed patient to watch [employer] Patient Education Video regarding Peripheral Nerve Catheter that will be in place for upcoming surgery unless contraindicated. The video can be accessed using QR code noted on handout.  Patient agreed to watch video.  Stressed to patient to call [employer] Nursing Hotline 24/7 if patient has any questions or concerns after discharge.     Verified patient previously completed cardiology visit for cardiac risk assessment in preparation for upcoming procedure, completion of 12-lead ECG within six months, and risk assessment letter reviewed. No further interventions  required.   PACEMAKER INTERROGATION IN [facility] FROM 7/22 AND CARDIAC CLEARANCE/ ELIQUIS ORDERS IN [facility] FROM DR ZARAGOZA ON 7/28/22    Patient directed to Radiology Department for CXR after Pre Admission Testing Appointment.     It was noted during Pre Admission Testing that patient was wearing some form of fingernail polish (gel/regular) and/or acrylic/artificial nails.  Patient was told that polish and/or artificial nails must be removed for surgery.  If a patient had recent manicure, and would rather not remove polish or artificial nails. Then the minimum requirement is that the polish/artificial nails must be removed from the middle finger on each hand.  Patient verbalized understanding.    If patient was having surgery on an upper extremity, then the patient was instructed that fingernail polish/artificial fingernails must be removed for surgery.  NO EXCEPTIONS.  Patient verbalized understanding.    If patient was having surgery on a lower extremity, then the patient was instructed that toenail polish on both extremities must be removed for surgery.  NO EXCEPTIONS. Patient verbalized understanding.

## 2022-10-23 ENCOUNTER — ANESTHESIA EVENT (OUTPATIENT)
Dept: PERIOP | Facility: HOSPITAL | Age: 72
End: 2022-10-23

## 2022-10-23 RX ORDER — SODIUM CHLORIDE 0.9 % (FLUSH) 0.9 %
10 SYRINGE (ML) INJECTION EVERY 12 HOURS SCHEDULED
Status: CANCELLED | OUTPATIENT
Start: 2022-10-23

## 2022-10-24 ENCOUNTER — ANESTHESIA EVENT CONVERTED (OUTPATIENT)
Dept: ANESTHESIOLOGY | Facility: HOSPITAL | Age: 72
End: 2022-10-24

## 2022-10-24 ENCOUNTER — HOSPITAL ENCOUNTER (OUTPATIENT)
Facility: HOSPITAL | Age: 72
LOS: 1 days | Discharge: NURSING, REHAB OR LONG-TERM CARE | End: 2022-10-27
Attending: ORTHOPAEDIC SURGERY | Admitting: ORTHOPAEDIC SURGERY

## 2022-10-24 ENCOUNTER — APPOINTMENT (OUTPATIENT)
Dept: GENERAL RADIOLOGY | Facility: HOSPITAL | Age: 72
End: 2022-10-24

## 2022-10-24 ENCOUNTER — ANESTHESIA (OUTPATIENT)
Dept: PERIOP | Facility: HOSPITAL | Age: 72
End: 2022-10-24

## 2022-10-24 DIAGNOSIS — Z96.612 STATUS POST REVERSE TOTAL REPLACEMENT OF LEFT SHOULDER: Primary | ICD-10-CM

## 2022-10-24 LAB
GLUCOSE BLDC GLUCOMTR-MCNC: 141 MG/DL (ref 70–130)
GLUCOSE BLDC GLUCOMTR-MCNC: 161 MG/DL (ref 70–130)
GLUCOSE BLDC GLUCOMTR-MCNC: 194 MG/DL (ref 70–130)
GLUCOSE BLDC GLUCOMTR-MCNC: 218 MG/DL (ref 70–130)
GLUCOSE BLDC GLUCOMTR-MCNC: 239 MG/DL (ref 70–130)
MAGNESIUM SERPL-MCNC: 1.7 MG/DL (ref 1.6–2.4)
POTASSIUM SERPL-SCNC: 4.2 MMOL/L (ref 3.5–5.2)

## 2022-10-24 PROCEDURE — 97110 THERAPEUTIC EXERCISES: CPT | Performed by: OCCUPATIONAL THERAPIST

## 2022-10-24 PROCEDURE — 76942 ECHO GUIDE FOR BIOPSY: CPT | Performed by: ORTHOPAEDIC SURGERY

## 2022-10-24 PROCEDURE — 97165 OT EVAL LOW COMPLEX 30 MIN: CPT | Performed by: OCCUPATIONAL THERAPIST

## 2022-10-24 PROCEDURE — 82962 GLUCOSE BLOOD TEST: CPT

## 2022-10-24 PROCEDURE — 25010000002 FENTANYL CITRATE (PF) 50 MCG/ML SOLUTION

## 2022-10-24 PROCEDURE — 25010000002 DEXAMETHASONE SODIUM PHOSPHATE 10 MG/ML SOLUTION: Performed by: ORTHOPAEDIC SURGERY

## 2022-10-24 PROCEDURE — 25010000002 ONDANSETRON PER 1 MG: Performed by: NURSE ANESTHETIST, CERTIFIED REGISTERED

## 2022-10-24 PROCEDURE — 94640 AIRWAY INHALATION TREATMENT: CPT

## 2022-10-24 PROCEDURE — 25010000002 DEXAMETHASONE PER 1 MG: Performed by: NURSE ANESTHETIST, CERTIFIED REGISTERED

## 2022-10-24 PROCEDURE — C1776 JOINT DEVICE (IMPLANTABLE): HCPCS | Performed by: ORTHOPAEDIC SURGERY

## 2022-10-24 PROCEDURE — 93010 ELECTROCARDIOGRAM REPORT: CPT | Performed by: INTERNAL MEDICINE

## 2022-10-24 PROCEDURE — 94761 N-INVAS EAR/PLS OXIMETRY MLT: CPT

## 2022-10-24 PROCEDURE — 94660 CPAP INITIATION&MGMT: CPT

## 2022-10-24 PROCEDURE — L3670 SO ACRO/CLAV CAN WEB PRE OTS: HCPCS | Performed by: ORTHOPAEDIC SURGERY

## 2022-10-24 PROCEDURE — 93005 ELECTROCARDIOGRAM TRACING: CPT | Performed by: INTERNAL MEDICINE

## 2022-10-24 PROCEDURE — 25010000002 PROPOFOL 10 MG/ML EMULSION: Performed by: NURSE ANESTHETIST, CERTIFIED REGISTERED

## 2022-10-24 PROCEDURE — 94799 UNLISTED PULMONARY SVC/PX: CPT

## 2022-10-24 PROCEDURE — 25010000002 ROPIVACAINE PER 1 MG: Performed by: NURSE ANESTHETIST, CERTIFIED REGISTERED

## 2022-10-24 PROCEDURE — 0 LIDOCAINE 1 % SOLUTION: Performed by: NURSE ANESTHETIST, CERTIFIED REGISTERED

## 2022-10-24 PROCEDURE — 73030 X-RAY EXAM OF SHOULDER: CPT

## 2022-10-24 PROCEDURE — 84132 ASSAY OF SERUM POTASSIUM: CPT | Performed by: ANESTHESIOLOGY

## 2022-10-24 PROCEDURE — 63710000001 INSULIN LISPRO (HUMAN) PER 5 UNITS: Performed by: INTERNAL MEDICINE

## 2022-10-24 PROCEDURE — 25010000002 VANCOMYCIN 10 G RECONSTITUTED SOLUTION: Performed by: ORTHOPAEDIC SURGERY

## 2022-10-24 PROCEDURE — C1889 IMPLANT/INSERT DEVICE, NOC: HCPCS | Performed by: ORTHOPAEDIC SURGERY

## 2022-10-24 PROCEDURE — 94664 DEMO&/EVAL PT USE INHALER: CPT

## 2022-10-24 PROCEDURE — 25010000002 CEFAZOLIN PER 500 MG: Performed by: ORTHOPAEDIC SURGERY

## 2022-10-24 PROCEDURE — 97535 SELF CARE MNGMENT TRAINING: CPT | Performed by: OCCUPATIONAL THERAPIST

## 2022-10-24 PROCEDURE — 23472 RECONSTRUCT SHOULDER JOINT: CPT | Performed by: PHYSICIAN ASSISTANT

## 2022-10-24 PROCEDURE — 83735 ASSAY OF MAGNESIUM: CPT

## 2022-10-24 DEVICE — TRY HUM EQUINOXE ADPT REV SHLDR PLS0: Type: IMPLANTABLE DEVICE | Site: SHOULDER | Status: FUNCTIONAL

## 2022-10-24 DEVICE — SCRW COMPR EQUINOXE LK 4.5X26MM: Type: IMPLANTABLE DEVICE | Site: SHOULDER | Status: FUNCTIONAL

## 2022-10-24 DEVICE — SUT FW #2 W/TPR NDL 1/2 CIR 38IN 97CM 26.5MM BLU: Type: IMPLANTABLE DEVICE | Site: SHOULDER | Status: FUNCTIONAL

## 2022-10-24 DEVICE — SCRW COMPR EQUINOXE LK 4.5X30MM: Type: IMPLANTABLE DEVICE | Site: SHOULDER | Status: FUNCTIONAL

## 2022-10-24 DEVICE — IMPLANTABLE DEVICE: Type: IMPLANTABLE DEVICE | Site: SHOULDER | Status: FUNCTIONAL

## 2022-10-24 DEVICE — GLENOSPHERE REV SHLDR EQUINOXE 36MM SM: Type: IMPLANTABLE DEVICE | Site: SHOULDER | Status: FUNCTIONAL

## 2022-10-24 DEVICE — LINER HUM EQUINOXE REV SHLDR PLS0 36MM: Type: IMPLANTABLE DEVICE | Site: SHOULDER | Status: FUNCTIONAL

## 2022-10-24 DEVICE — STEM HUM EQUINOXE PRESERVE 7MM: Type: IMPLANTABLE DEVICE | Site: SHOULDER | Status: FUNCTIONAL

## 2022-10-24 DEVICE — SCRW EQUINOXE TORQ DEFINE REV SHLDR KT: Type: IMPLANTABLE DEVICE | Site: SHOULDER | Status: FUNCTIONAL

## 2022-10-24 DEVICE — SCRW LK EQUINOXE GLENOSPHERE REV/SHLDR: Type: IMPLANTABLE DEVICE | Site: SHOULDER | Status: FUNCTIONAL

## 2022-10-24 RX ORDER — ROCURONIUM BROMIDE 10 MG/ML
INJECTION, SOLUTION INTRAVENOUS AS NEEDED
Status: DISCONTINUED | OUTPATIENT
Start: 2022-10-24 | End: 2022-10-24 | Stop reason: SURG

## 2022-10-24 RX ORDER — INSULIN LISPRO 100 [IU]/ML
0-7 INJECTION, SOLUTION INTRAVENOUS; SUBCUTANEOUS
Status: DISCONTINUED | OUTPATIENT
Start: 2022-10-24 | End: 2022-10-27 | Stop reason: HOSPADM

## 2022-10-24 RX ORDER — DOFETILIDE 0.5 MG/1
500 CAPSULE ORAL EVERY 12 HOURS SCHEDULED
Status: DISCONTINUED | OUTPATIENT
Start: 2022-10-24 | End: 2022-10-27 | Stop reason: HOSPADM

## 2022-10-24 RX ORDER — PANTOPRAZOLE SODIUM 40 MG/1
40 TABLET, DELAYED RELEASE ORAL 2 TIMES DAILY
Status: DISCONTINUED | OUTPATIENT
Start: 2022-10-24 | End: 2022-10-27 | Stop reason: HOSPADM

## 2022-10-24 RX ORDER — BUPIVACAINE HYDROCHLORIDE 2.5 MG/ML
INJECTION, SOLUTION EPIDURAL; INFILTRATION; INTRACAUDAL
Status: COMPLETED | OUTPATIENT
Start: 2022-10-24 | End: 2022-10-24

## 2022-10-24 RX ORDER — DEXAMETHASONE SODIUM PHOSPHATE 4 MG/ML
INJECTION, SOLUTION INTRA-ARTICULAR; INTRALESIONAL; INTRAMUSCULAR; INTRAVENOUS; SOFT TISSUE AS NEEDED
Status: DISCONTINUED | OUTPATIENT
Start: 2022-10-24 | End: 2022-10-24 | Stop reason: SURG

## 2022-10-24 RX ORDER — ALBUTEROL SULFATE 2.5 MG/3ML
2.5 SOLUTION RESPIRATORY (INHALATION) EVERY 6 HOURS PRN
Status: DISCONTINUED | OUTPATIENT
Start: 2022-10-24 | End: 2022-10-27 | Stop reason: HOSPADM

## 2022-10-24 RX ORDER — AMANTADINE HYDROCHLORIDE 100 MG/1
100 CAPSULE, GELATIN COATED ORAL 2 TIMES DAILY
Status: DISCONTINUED | OUTPATIENT
Start: 2022-10-24 | End: 2022-10-27 | Stop reason: HOSPADM

## 2022-10-24 RX ORDER — METOLAZONE 2.5 MG/1
2.5 TABLET ORAL DAILY
Status: DISCONTINUED | OUTPATIENT
Start: 2022-10-25 | End: 2022-10-27 | Stop reason: HOSPADM

## 2022-10-24 RX ORDER — LIDOCAINE HYDROCHLORIDE 10 MG/ML
INJECTION, SOLUTION INFILTRATION; PERINEURAL AS NEEDED
Status: DISCONTINUED | OUTPATIENT
Start: 2022-10-24 | End: 2022-10-24 | Stop reason: SURG

## 2022-10-24 RX ORDER — DONEPEZIL HYDROCHLORIDE 5 MG/1
5 TABLET, FILM COATED ORAL NIGHTLY
Status: DISCONTINUED | OUTPATIENT
Start: 2022-10-24 | End: 2022-10-27 | Stop reason: HOSPADM

## 2022-10-24 RX ORDER — SODIUM CHLORIDE, SODIUM LACTATE, POTASSIUM CHLORIDE, CALCIUM CHLORIDE 600; 310; 30; 20 MG/100ML; MG/100ML; MG/100ML; MG/100ML
9 INJECTION, SOLUTION INTRAVENOUS CONTINUOUS
Status: DISCONTINUED | OUTPATIENT
Start: 2022-10-24 | End: 2022-10-27 | Stop reason: HOSPADM

## 2022-10-24 RX ORDER — NALOXONE HCL 0.4 MG/ML
0.1 VIAL (ML) INJECTION
Status: DISCONTINUED | OUTPATIENT
Start: 2022-10-24 | End: 2022-10-27 | Stop reason: HOSPADM

## 2022-10-24 RX ORDER — ROPIVACAINE HYDROCHLORIDE 2 MG/ML
INJECTION, SOLUTION EPIDURAL; INFILTRATION; PERINEURAL CONTINUOUS
Status: DISCONTINUED | OUTPATIENT
Start: 2022-10-24 | End: 2022-10-27 | Stop reason: HOSPADM

## 2022-10-24 RX ORDER — CEFAZOLIN SODIUM IN 0.9 % NACL 2 G/100 ML
2 PLASTIC BAG, INJECTION (ML) INTRAVENOUS ONCE
Status: COMPLETED | OUTPATIENT
Start: 2022-10-24 | End: 2022-10-24

## 2022-10-24 RX ORDER — ALBUTEROL SULFATE 2.5 MG/3ML
2.5 SOLUTION RESPIRATORY (INHALATION) ONCE
Status: COMPLETED | OUTPATIENT
Start: 2022-10-24 | End: 2022-10-24

## 2022-10-24 RX ORDER — ASPIRIN 81 MG/1
81 TABLET ORAL DAILY
Status: DISCONTINUED | OUTPATIENT
Start: 2022-10-24 | End: 2022-10-27 | Stop reason: HOSPADM

## 2022-10-24 RX ORDER — ONDANSETRON 2 MG/ML
4 INJECTION INTRAMUSCULAR; INTRAVENOUS ONCE AS NEEDED
Status: DISCONTINUED | OUTPATIENT
Start: 2022-10-24 | End: 2022-10-24 | Stop reason: HOSPADM

## 2022-10-24 RX ORDER — SODIUM CHLORIDE 450 MG/100ML
50 INJECTION, SOLUTION INTRAVENOUS CONTINUOUS
Status: DISCONTINUED | OUTPATIENT
Start: 2022-10-24 | End: 2022-10-27 | Stop reason: HOSPADM

## 2022-10-24 RX ORDER — DEXTROSE MONOHYDRATE 25 G/50ML
25 INJECTION, SOLUTION INTRAVENOUS
Status: DISCONTINUED | OUTPATIENT
Start: 2022-10-24 | End: 2022-10-27 | Stop reason: HOSPADM

## 2022-10-24 RX ORDER — CETIRIZINE HYDROCHLORIDE 10 MG/1
10 TABLET ORAL DAILY
Status: DISCONTINUED | OUTPATIENT
Start: 2022-10-24 | End: 2022-10-27 | Stop reason: HOSPADM

## 2022-10-24 RX ORDER — ALBUTEROL SULFATE 2.5 MG/3ML
SOLUTION RESPIRATORY (INHALATION)
Status: COMPLETED
Start: 2022-10-24 | End: 2022-10-24

## 2022-10-24 RX ORDER — PROPOFOL 10 MG/ML
VIAL (ML) INTRAVENOUS AS NEEDED
Status: DISCONTINUED | OUTPATIENT
Start: 2022-10-24 | End: 2022-10-24 | Stop reason: SURG

## 2022-10-24 RX ORDER — RANOLAZINE 500 MG/1
500 TABLET, EXTENDED RELEASE ORAL EVERY 12 HOURS
Status: DISCONTINUED | OUTPATIENT
Start: 2022-10-24 | End: 2022-10-27 | Stop reason: HOSPADM

## 2022-10-24 RX ORDER — NITROGLYCERIN 400 UG/1
1 SPRAY ORAL
Status: DISCONTINUED | OUTPATIENT
Start: 2022-10-24 | End: 2022-10-27 | Stop reason: HOSPADM

## 2022-10-24 RX ORDER — BACLOFEN 10 MG/1
10 TABLET ORAL EVERY 8 HOURS PRN
Status: DISCONTINUED | OUTPATIENT
Start: 2022-10-24 | End: 2022-10-27 | Stop reason: HOSPADM

## 2022-10-24 RX ORDER — FENTANYL CITRATE 50 UG/ML
50 INJECTION, SOLUTION INTRAMUSCULAR; INTRAVENOUS
Status: DISCONTINUED | OUTPATIENT
Start: 2022-10-24 | End: 2022-10-24 | Stop reason: HOSPADM

## 2022-10-24 RX ORDER — ONDANSETRON 2 MG/ML
4 INJECTION INTRAMUSCULAR; INTRAVENOUS EVERY 6 HOURS PRN
Status: DISCONTINUED | OUTPATIENT
Start: 2022-10-24 | End: 2022-10-27 | Stop reason: HOSPADM

## 2022-10-24 RX ORDER — LIDOCAINE HYDROCHLORIDE 10 MG/ML
0.5 INJECTION, SOLUTION EPIDURAL; INFILTRATION; INTRACAUDAL; PERINEURAL ONCE AS NEEDED
Status: COMPLETED | OUTPATIENT
Start: 2022-10-24 | End: 2022-10-24

## 2022-10-24 RX ORDER — ONDANSETRON 4 MG/1
4 TABLET, FILM COATED ORAL EVERY 6 HOURS PRN
Status: DISCONTINUED | OUTPATIENT
Start: 2022-10-24 | End: 2022-10-27 | Stop reason: HOSPADM

## 2022-10-24 RX ORDER — SPIRONOLACTONE 25 MG/1
50 TABLET ORAL DAILY
Status: DISCONTINUED | OUTPATIENT
Start: 2022-10-25 | End: 2022-10-27 | Stop reason: HOSPADM

## 2022-10-24 RX ORDER — OXYCODONE HYDROCHLORIDE 5 MG/1
5 TABLET ORAL EVERY 4 HOURS PRN
Status: DISCONTINUED | OUTPATIENT
Start: 2022-10-24 | End: 2022-10-27 | Stop reason: HOSPADM

## 2022-10-24 RX ORDER — DEXAMETHASONE SODIUM PHOSPHATE 10 MG/ML
INJECTION, SOLUTION INTRAMUSCULAR; INTRAVENOUS
Status: COMPLETED | OUTPATIENT
Start: 2022-10-24 | End: 2022-10-24

## 2022-10-24 RX ORDER — FENTANYL CITRATE 50 UG/ML
INJECTION, SOLUTION INTRAMUSCULAR; INTRAVENOUS
Status: COMPLETED
Start: 2022-10-24 | End: 2022-10-24

## 2022-10-24 RX ORDER — ACETAMINOPHEN 500 MG
1000 TABLET ORAL ONCE
Status: COMPLETED | OUTPATIENT
Start: 2022-10-24 | End: 2022-10-24

## 2022-10-24 RX ORDER — PANTOPRAZOLE SODIUM 40 MG/1
40 TABLET, DELAYED RELEASE ORAL EVERY MORNING
Status: DISCONTINUED | OUTPATIENT
Start: 2022-10-25 | End: 2022-10-24

## 2022-10-24 RX ORDER — MIDAZOLAM HYDROCHLORIDE 1 MG/ML
0.5 INJECTION INTRAMUSCULAR; INTRAVENOUS
Status: DISCONTINUED | OUTPATIENT
Start: 2022-10-24 | End: 2022-10-24 | Stop reason: HOSPADM

## 2022-10-24 RX ORDER — ONDANSETRON 2 MG/ML
INJECTION INTRAMUSCULAR; INTRAVENOUS AS NEEDED
Status: DISCONTINUED | OUTPATIENT
Start: 2022-10-24 | End: 2022-10-24 | Stop reason: SURG

## 2022-10-24 RX ORDER — HYDROMORPHONE HYDROCHLORIDE 1 MG/ML
0.5 INJECTION, SOLUTION INTRAMUSCULAR; INTRAVENOUS; SUBCUTANEOUS
Status: DISCONTINUED | OUTPATIENT
Start: 2022-10-24 | End: 2022-10-27 | Stop reason: HOSPADM

## 2022-10-24 RX ORDER — ACETAMINOPHEN 650 MG/1
650 SUPPOSITORY RECTAL EVERY 4 HOURS PRN
Status: DISCONTINUED | OUTPATIENT
Start: 2022-10-24 | End: 2022-10-27 | Stop reason: HOSPADM

## 2022-10-24 RX ORDER — ACETAMINOPHEN 325 MG/1
650 TABLET ORAL EVERY 4 HOURS PRN
Status: DISCONTINUED | OUTPATIENT
Start: 2022-10-24 | End: 2022-10-27 | Stop reason: HOSPADM

## 2022-10-24 RX ORDER — SENNA PLUS 8.6 MG/1
1 TABLET ORAL DAILY
Status: DISCONTINUED | OUTPATIENT
Start: 2022-10-24 | End: 2022-10-27 | Stop reason: HOSPADM

## 2022-10-24 RX ORDER — TRANEXAMIC ACID 10 MG/ML
1000 INJECTION, SOLUTION INTRAVENOUS ONCE
Status: COMPLETED | OUTPATIENT
Start: 2022-10-24 | End: 2022-10-24

## 2022-10-24 RX ORDER — LEVOTHYROXINE SODIUM 0.15 MG/1
150 TABLET ORAL DAILY
Status: DISCONTINUED | OUTPATIENT
Start: 2022-10-25 | End: 2022-10-27 | Stop reason: HOSPADM

## 2022-10-24 RX ORDER — BUMETANIDE 1 MG/1
1 TABLET ORAL DAILY
Status: DISCONTINUED | OUTPATIENT
Start: 2022-10-25 | End: 2022-10-27 | Stop reason: HOSPADM

## 2022-10-24 RX ORDER — NICOTINE POLACRILEX 4 MG
15 LOZENGE BUCCAL
Status: DISCONTINUED | OUTPATIENT
Start: 2022-10-24 | End: 2022-10-27 | Stop reason: HOSPADM

## 2022-10-24 RX ORDER — SODIUM CHLORIDE 0.9 % (FLUSH) 0.9 %
10 SYRINGE (ML) INJECTION AS NEEDED
Status: DISCONTINUED | OUTPATIENT
Start: 2022-10-24 | End: 2022-10-24 | Stop reason: HOSPADM

## 2022-10-24 RX ADMIN — DEXAMETHASONE SODIUM PHOSPHATE 2 MG: 10 INJECTION, SOLUTION INTRAMUSCULAR; INTRAVENOUS at 07:17

## 2022-10-24 RX ADMIN — ONDANSETRON 4 MG: 2 INJECTION INTRAMUSCULAR; INTRAVENOUS at 09:17

## 2022-10-24 RX ADMIN — LIDOCAINE HYDROCHLORIDE 50 MG: 10 INJECTION, SOLUTION INFILTRATION; PERINEURAL at 07:40

## 2022-10-24 RX ADMIN — PRAMIPEXOLE DIHYDROCHLORIDE 1.5 MG: 1 TABLET ORAL at 21:34

## 2022-10-24 RX ADMIN — FENTANYL CITRATE 50 MCG: 50 INJECTION, SOLUTION INTRAMUSCULAR; INTRAVENOUS at 11:05

## 2022-10-24 RX ADMIN — ROPIVACAINE HYDROCHLORIDE 2 ML/HR: 2 INJECTION, SOLUTION EPIDURAL; INFILTRATION; PERINEURAL at 10:00

## 2022-10-24 RX ADMIN — DEXAMETHASONE SODIUM PHOSPHATE 4 MG: 4 INJECTION, SOLUTION INTRA-ARTICULAR; INTRALESIONAL; INTRAMUSCULAR; INTRAVENOUS; SOFT TISSUE at 07:40

## 2022-10-24 RX ADMIN — SUGAMMADEX 200 MG: 100 INJECTION, SOLUTION INTRAVENOUS at 09:26

## 2022-10-24 RX ADMIN — TRANEXAMIC ACID 1000 MG: 10 INJECTION, SOLUTION INTRAVENOUS at 08:12

## 2022-10-24 RX ADMIN — LIDOCAINE HYDROCHLORIDE 0.5 ML: 10 INJECTION, SOLUTION EPIDURAL; INFILTRATION; INTRACAUDAL; PERINEURAL at 06:35

## 2022-10-24 RX ADMIN — PROPOFOL 100 MG: 10 INJECTION, EMULSION INTRAVENOUS at 07:40

## 2022-10-24 RX ADMIN — Medication 1500 MG: at 07:50

## 2022-10-24 RX ADMIN — FENTANYL CITRATE 50 MCG: 50 INJECTION, SOLUTION INTRAMUSCULAR; INTRAVENOUS at 10:40

## 2022-10-24 RX ADMIN — ALBUTEROL SULFATE 2.5 MG: 2.5 SOLUTION RESPIRATORY (INHALATION) at 12:05

## 2022-10-24 RX ADMIN — DONEPEZIL HYDROCHLORIDE 5 MG: 5 TABLET, FILM COATED ORAL at 21:35

## 2022-10-24 RX ADMIN — BUPIVACAINE HYDROCHLORIDE 10 ML: 2.5 INJECTION, SOLUTION EPIDURAL; INFILTRATION; INTRACAUDAL at 07:05

## 2022-10-24 RX ADMIN — ALBUTEROL SULFATE 2.5 MG: 2.5 SOLUTION RESPIRATORY (INHALATION) at 23:06

## 2022-10-24 RX ADMIN — CARBIDOPA AND LEVODOPA 1 TABLET: 25; 100 TABLET ORAL at 21:36

## 2022-10-24 RX ADMIN — VANCOMYCIN HYDROCHLORIDE 1500 MG: 10 INJECTION, POWDER, LYOPHILIZED, FOR SOLUTION INTRAVENOUS at 21:36

## 2022-10-24 RX ADMIN — TRANEXAMIC ACID 1000 MG: 10 INJECTION, SOLUTION INTRAVENOUS at 09:16

## 2022-10-24 RX ADMIN — ACETAMINOPHEN 1000 MG: 500 TABLET ORAL at 06:39

## 2022-10-24 RX ADMIN — PANTOPRAZOLE SODIUM 40 MG: 40 TABLET, DELAYED RELEASE ORAL at 23:04

## 2022-10-24 RX ADMIN — DOFETILIDE 500 MCG: 0.5 CAPSULE ORAL at 21:36

## 2022-10-24 RX ADMIN — CARBIDOPA AND LEVODOPA 1 TABLET: 25; 100 TABLET ORAL at 14:20

## 2022-10-24 RX ADMIN — CARBIDOPA AND LEVODOPA 1 TABLET: 25; 100 TABLET ORAL at 15:59

## 2022-10-24 RX ADMIN — BUPIVACAINE HYDROCHLORIDE 30 ML: 2.5 INJECTION, SOLUTION EPIDURAL; INFILTRATION; INTRACAUDAL at 07:17

## 2022-10-24 RX ADMIN — CEFAZOLIN 2 G: 10 INJECTION, POWDER, FOR SOLUTION INTRAVENOUS at 07:22

## 2022-10-24 RX ADMIN — CARBIDOPA AND LEVODOPA 1 TABLET: 25; 100 TABLET ORAL at 18:14

## 2022-10-24 RX ADMIN — INSULIN LISPRO 3 UNITS: 100 INJECTION, SOLUTION INTRAVENOUS; SUBCUTANEOUS at 18:14

## 2022-10-24 RX ADMIN — AMANTADINE HYDROCHLORIDE 100 MG: 100 CAPSULE ORAL at 21:36

## 2022-10-24 RX ADMIN — SODIUM CHLORIDE, POTASSIUM CHLORIDE, SODIUM LACTATE AND CALCIUM CHLORIDE 9 ML/HR: 600; 310; 30; 20 INJECTION, SOLUTION INTRAVENOUS at 06:35

## 2022-10-24 RX ADMIN — RANOLAZINE 500 MG: 500 TABLET, FILM COATED, EXTENDED RELEASE ORAL at 21:35

## 2022-10-24 RX ADMIN — ROCURONIUM BROMIDE 50 MG: 10 INJECTION, SOLUTION INTRAVENOUS at 07:40

## 2022-10-24 NOTE — ANESTHESIA POSTPROCEDURE EVALUATION
Patient: Joanna Cross    Procedure Summary     Date: 10/24/22 Room / [facility]:  [facility]    Anesthesia Start: 0732 Anesthesia Stop: 0957    Procedure: REVERSE TOTAL SHOULDER ARTHROPLASTY, BICEPS TENODESIS - LEFT (Left: Shoulder) Diagnosis:       Rotator cuff arthropathy, left      Left bicipital tenosynovitis      (Rotator cuff arthropathy, left [[id number]])    Surgeons: Sammy Yo MD Provider: John Chandra MD    Anesthesia Type: general with block ASA Status: 3          Anesthesia Type: general with block    Vitals spo2: 94%        Post Anesthesia Care and Evaluation    Patient location during evaluation: PACU  Patient participation: complete - patient participated  Level of consciousness: awake and alert  Pain management: adequate    Airway patency: patent  Anesthetic complications: No anesthetic complications  PONV Status: none  Cardiovascular status: hemodynamically stable and acceptable  Respiratory status: nonlabored ventilation, acceptable and nasal cannula  Hydration status: acceptable

## 2022-10-24 NOTE — ANESTHESIA PROCEDURE NOTES
Airway  Urgency: elective    Airway not difficult    General Information and Staff    Patient location during procedure: OR  CRNA/CAA: Bj Maria CRNA    Indications and Patient Condition  Indications for airway management: airway protection    Preoxygenated: yes  MILS not maintained throughout  Mask difficulty assessment: 1 - vent by mask    Final Airway Details  Final airway type: endotracheal airway      Successful airway: ETT  Cuffed: yes   Successful intubation technique: direct laryngoscopy  Endotracheal tube insertion site: oral  Blade: Gene  Blade size: 3  ETT size (mm): 6.5  Cormack-Lehane Classification: grade I - full view of glottis  Placement verified by: chest auscultation and capnometry   Cuff volume (mL): 8  Measured from: lips  ETT/EBT  to lips (cm): 20  Number of attempts at approach: 1  Assessment: lips, teeth, and gum same as pre-op and atraumatic intubation    Additional Comments  Negative epigastric sounds, Breath sound equal bilaterally with symmetric chest rise and fall

## 2022-10-24 NOTE — ANESTHESIA PREPROCEDURE EVALUATION
Anesthesia Evaluation     Patient summary reviewed and Nursing notes reviewed   NPO Solid Status: > 8 hours  NPO Liquid Status: > 2 hours           Airway   Mallampati: III  TM distance: >3 FB  Neck ROM: full  Possible difficult intubation  Dental      Pulmonary     breath sounds clear to auscultation  (+) home oxygen, sleep apnea on CPAP,   Cardiovascular     ECG reviewed  Rhythm: regular  Rate: normal    (+) hypertension, dysrhythmias,       Neuro/Psych  (+) headaches, psychiatric history Depression,    GI/Hepatic/Renal/Endo    (+) morbid obesity,  renal disease, diabetes mellitus, thyroid problem     Musculoskeletal     (+) back pain,   Abdominal    Substance History      OB/GYN          Other   arthritis,      ROS/Med Hx Other: Blade: Maldonado  Blade size: #2  ETT size: 7.0 mm  Cormack-Lehane Classification: grade I - full view of glottis    ? Patient experienced pressure in the center of her chest rated 7/10 after lexiscan infusion, symptoms resolved within recovery.  ? Intermittently paced throughout the test with no ectopy noted.  ? No significant ST segment shift from baseline was seen with the infusion.  ? Normal hemodynamic response to regadenoson. Oxygen saturation % during the infusion  ? When the stress and rest Cardiolite images were compared a small area of fixed apical hypoperfusion was seen which was felt to be a normal variant. No areas of stress-induced hypoperfusion were seen. The 3D reconstruction showed normal contractility in all segments with a calculated ejection fraction 77%. No left ventricular dilation was seen with stress. A small amount of calcification was seen in the LAD territory on the CT portion of the study..  ? No evidence of inducible ischemia by scintigraphic criteria. The patient is low risk for cardiac events at the time of surgery.                       Anesthesia Plan    ASA 3     general with block     intravenous induction     Anesthetic plan, risks, benefits, and  alternatives have been provided, discussed and informed consent has been obtained with: patient.    Plan discussed with CRNA.        CODE STATUS:

## 2022-10-24 NOTE — ANESTHESIA PROCEDURE NOTES
PECS 1 &2  Block      Patient reassessed immediately prior to procedure    Patient location during procedure: OR  Start time: 10/24/2022 7:17 AM  Reason for block: at surgeon's request and post-op pain management  Performed by  CRNA/CAA: Kelley Hough CRNA  Assisted by: Bj Maria CRNA  Preanesthetic Checklist  Completed: patient identified, IV checked, site marked, risks and benefits discussed, surgical consent, monitors and equipment checked, pre-op evaluation and timeout performed  Prep:  Pt Position: right lateral decubitus  Sterile barriers:cap, gloves, mask and washed/disinfected hands  Prep: ChloraPrep  Patient monitoring: blood pressure monitoring, continuous pulse oximetry and EKG  Procedure    Sedation: no  Performed under: general  Guidance:ultrasound guided and landmark technique  Images:still images obtained, printed/placed on chart    Laterality:left  Block Type:PECS I and PECS II  Injection Technique:single-shot  Needle Type:short-bevel  Needle Gauge:20 G  Resistance on Injection: none    Medications Used: dexamethasone sodium phosphate injection - Injection   2 mg - 10/24/2022 7:17:00 AM  bupivacaine PF (MARCAINE) 0.25 % injection - Injection   30 mL - 10/24/2022 7:17:00 AM      Medications  Preservative Free Saline:10ml  Comment:       Post Assessment  Injection Assessment: negative aspiration for heme, incremental injection and no paresthesia on injection  Patient Tolerance:comfortable throughout block  Complications:no  Additional Notes  Interpectoral-Pectoserratus Plane   A high-frequency linear transducer, with sterile cover, was placed medial to the coracoid process in the paramedian sagittal plane. The transducer was moved caudally to the 4th rib and rotated slightly to allow an in-plane needle trajectory from medial to lateral. Pectoralis Major Muscle (PMM), Pectoralis Minor Muscle (PmM), Thoracoacromial Artery, Ribs, and Pleura were identified under ultrasound. The insertion site was  "prepped in sterile fashion and then localized with 2-5 ml of 1% Lidocaine. Using ultrasound-guidance, a 20-gauge B-Jordan 4\" Ultraplex 360 non-stimulating echogenic needle was advanced in plane until the tip of the needle was in the fascial plane between the PMM and PmM, lateral to the Thoracoacromial Artery. 1-3ml of preservative free normal saline was used to hydro-dissect the fascial planes. After the fascial plane was verified, 10ml local anesthetic (LA) was injected for Interpectoral fascial plane block. The needle was continued along the same path to the level of the 4th rib below PmM.  Initially preservative free normal saline was used to confirm needle position and then 20 ml of LA was injected for Pectoserratus fascial plane block. Aspiration every 5 ml to prevent intravascular injection. Injection was completed with negative aspiration of blood and negative intravascular injection. Injection pressures were normal with minimal resistance.             "

## 2022-10-24 NOTE — ANESTHESIA PROCEDURE NOTES
Interscalene catheter      Patient reassessed immediately prior to procedure    Patient location during procedure: pre-op  Start time: 10/24/2022 7:05 AM  Reason for block: at surgeon's request and post-op pain management  Performed by  CRNA/CAA: Kelley Hough CRNA  Assisted by: Bj Maria CRNASRNA: Lorraine Murillo SRNA  Preanesthetic Checklist  Completed: patient identified, IV checked, site marked, risks and benefits discussed, surgical consent, monitors and equipment checked, pre-op evaluation and timeout performed  Prep:  Pt Position: right lateral decubitus  Sterile barriers:cap, gloves, mask and washed/disinfected hands  Prep: ChloraPrep  Patient monitoring: blood pressure monitoring, continuous pulse oximetry and EKG  Procedure    Sedation: yes  Performed under: local infiltration  Guidance:ultrasound guided    ULTRASOUND INTERPRETATION.  Using ultrasound guidance a 20 G gauge needle was placed in close proximity to the nerve, at which point, under ultrasound guidance anesthetic was injected in the area of the nerve and spread of the anesthesia was seen on ultrasound in close proximity thereto.  There were no abnormalities seen on ultrasound; a digital image was taken; and the patient tolerated the procedure with no complications. Images:still images obtained, printed/placed on chart    Laterality:left  Block Type:interscalene  Injection Technique:catheter  Needle Type:Tuohy and echogenic  Needle Gauge:18 G  Resistance on Injection: none  Catheter Size:20 G (20g)    Medications Used: bupivacaine PF (MARCAINE) 0.25 % injection - Injection   10 mL - 10/24/2022 7:05:00 AM      Medications  Preservative Free Saline:5ml    Post Assessment  Injection Assessment: negative aspiration for heme, no paresthesia on injection and incremental injection  Patient Tolerance:comfortable throughout block  Complications:no  Additional Notes    CATHETER  A high-frequency linear transducer, with sterile cover, was placed  "in the supraclavicular fossa to identify the subclavian artery and trunks and divisions of the brachial plexus. The transducer was then moved in a cephalad orientation with a slight rotation to continue visualization of the brachial plexus from the trunks and divisions, on to the C5-C7 roots. The insertion site was prepped and draped in sterile fashion. Skin and cutaneous tissue was infiltrated with 2-5 ml of 1% Lidocaine. Using ultrasound-guidance, an 18-gauge Contiplex Ultra 360 Touhy needle was advanced in plane from lateral to medial. Preservative-free normal saline was utilized for hydro-dissection of tissue, advancement of Touhy, and to confirm final needle placement at the fascial plane between the middle scalene muscle and sheath of the brachial plexus (C5-C7). A 20-gauge Contiplex Echo catheter was placed through the needle and advance out the tip of the Touhy 3-5 cm with the \"Maldonado Flip\". The Touhy needle was then removed, and final catheter position verified lateral to the brachial plexus with local anesthetic (LA) and ultrasound visualization. The catheter was secured in the usual fashion with skin glue, benzoin, steri-strips, CHG tegaderm and label noting \"Nerve Block Catheter\". Jerk tape applied at yellow connector and catheter connection. All LA was injected in increments of 3-5 ml after catheter secured. Aspiration every 5 ml to prevent intravascular injection. Injection was completed with negative aspiration of blood and negative intravascular injection. Injection pressures were normal with minimal resistance.            "

## 2022-10-25 LAB
ANION GAP SERPL CALCULATED.3IONS-SCNC: 9 MMOL/L (ref 5–15)
BASOPHILS # BLD AUTO: 0.04 10*3/MM3 (ref 0–0.2)
BASOPHILS NFR BLD AUTO: 0.5 % (ref 0–1.5)
BUN SERPL-MCNC: 16 MG/DL (ref 8–23)
BUN/CREAT SERPL: 17.8 (ref 7–25)
CALCIUM SPEC-SCNC: 8.5 MG/DL (ref 8.6–10.5)
CHLORIDE SERPL-SCNC: 101 MMOL/L (ref 98–107)
CO2 SERPL-SCNC: 26 MMOL/L (ref 22–29)
CREAT SERPL-MCNC: 0.9 MG/DL (ref 0.57–1)
DEPRECATED RDW RBC AUTO: 47.1 FL (ref 37–54)
EGFRCR SERPLBLD CKD-EPI 2021: 67.6 ML/MIN/1.73
EOSINOPHIL # BLD AUTO: 0.16 10*3/MM3 (ref 0–0.4)
EOSINOPHIL NFR BLD AUTO: 2 % (ref 0.3–6.2)
ERYTHROCYTE [DISTWIDTH] IN BLOOD BY AUTOMATED COUNT: 13.6 % (ref 12.3–15.4)
GLUCOSE BLDC GLUCOMTR-MCNC: 142 MG/DL (ref 70–130)
GLUCOSE BLDC GLUCOMTR-MCNC: 197 MG/DL (ref 70–130)
GLUCOSE BLDC GLUCOMTR-MCNC: 232 MG/DL (ref 70–130)
GLUCOSE BLDC GLUCOMTR-MCNC: 273 MG/DL (ref 70–130)
GLUCOSE SERPL-MCNC: 239 MG/DL (ref 65–99)
HCT VFR BLD AUTO: 31.4 % (ref 34–46.6)
HGB BLD-MCNC: 9.6 G/DL (ref 12–15.9)
IMM GRANULOCYTES # BLD AUTO: 0.05 10*3/MM3 (ref 0–0.05)
IMM GRANULOCYTES NFR BLD AUTO: 0.6 % (ref 0–0.5)
LYMPHOCYTES # BLD AUTO: 0.56 10*3/MM3 (ref 0.7–3.1)
LYMPHOCYTES NFR BLD AUTO: 7.1 % (ref 19.6–45.3)
MAGNESIUM SERPL-MCNC: 1.6 MG/DL (ref 1.6–2.4)
MAGNESIUM SERPL-MCNC: 2.2 MG/DL (ref 1.6–2.4)
MCH RBC QN AUTO: 28.6 PG (ref 26.6–33)
MCHC RBC AUTO-ENTMCNC: 30.6 G/DL (ref 31.5–35.7)
MCV RBC AUTO: 93.5 FL (ref 79–97)
MONOCYTES # BLD AUTO: 0.73 10*3/MM3 (ref 0.1–0.9)
MONOCYTES NFR BLD AUTO: 9.2 % (ref 5–12)
NEUTROPHILS NFR BLD AUTO: 6.38 10*3/MM3 (ref 1.7–7)
NEUTROPHILS NFR BLD AUTO: 80.6 % (ref 42.7–76)
NRBC BLD AUTO-RTO: 0 /100 WBC (ref 0–0.2)
PLATELET # BLD AUTO: 139 10*3/MM3 (ref 140–450)
PMV BLD AUTO: 9.6 FL (ref 6–12)
POTASSIUM SERPL-SCNC: 3.7 MMOL/L (ref 3.5–5.2)
QT INTERVAL: 378 MS
QTC INTERVAL: 430 MS
RBC # BLD AUTO: 3.36 10*6/MM3 (ref 3.77–5.28)
SODIUM SERPL-SCNC: 136 MMOL/L (ref 136–145)
TROPONIN T SERPL-MCNC: 0.01 NG/ML (ref 0–0.03)
TROPONIN T SERPL-MCNC: 0.02 NG/ML (ref 0–0.03)
WBC NRBC COR # BLD: 7.92 10*3/MM3 (ref 3.4–10.8)

## 2022-10-25 PROCEDURE — 83735 ASSAY OF MAGNESIUM: CPT | Performed by: INTERNAL MEDICINE

## 2022-10-25 PROCEDURE — 82962 GLUCOSE BLOOD TEST: CPT

## 2022-10-25 PROCEDURE — 94799 UNLISTED PULMONARY SVC/PX: CPT

## 2022-10-25 PROCEDURE — 94761 N-INVAS EAR/PLS OXIMETRY MLT: CPT

## 2022-10-25 PROCEDURE — 84484 ASSAY OF TROPONIN QUANT: CPT | Performed by: INTERNAL MEDICINE

## 2022-10-25 PROCEDURE — 85025 COMPLETE CBC W/AUTO DIFF WBC: CPT | Performed by: ORTHOPAEDIC SURGERY

## 2022-10-25 PROCEDURE — 97110 THERAPEUTIC EXERCISES: CPT | Performed by: OCCUPATIONAL THERAPIST

## 2022-10-25 PROCEDURE — 94664 DEMO&/EVAL PT USE INHALER: CPT

## 2022-10-25 PROCEDURE — 97161 PT EVAL LOW COMPLEX 20 MIN: CPT

## 2022-10-25 PROCEDURE — 97535 SELF CARE MNGMENT TRAINING: CPT | Performed by: OCCUPATIONAL THERAPIST

## 2022-10-25 PROCEDURE — 97530 THERAPEUTIC ACTIVITIES: CPT

## 2022-10-25 PROCEDURE — 80048 BASIC METABOLIC PNL TOTAL CA: CPT | Performed by: ORTHOPAEDIC SURGERY

## 2022-10-25 PROCEDURE — 63710000001 INSULIN LISPRO (HUMAN) PER 5 UNITS: Performed by: INTERNAL MEDICINE

## 2022-10-25 PROCEDURE — 0 MAGNESIUM SULFATE 4 GM/100ML SOLUTION: Performed by: INTERNAL MEDICINE

## 2022-10-25 PROCEDURE — 84484 ASSAY OF TROPONIN QUANT: CPT

## 2022-10-25 RX ORDER — MAGNESIUM SULFATE HEPTAHYDRATE 40 MG/ML
2 INJECTION, SOLUTION INTRAVENOUS AS NEEDED
Status: DISCONTINUED | OUTPATIENT
Start: 2022-10-25 | End: 2022-10-27 | Stop reason: HOSPADM

## 2022-10-25 RX ORDER — MAGNESIUM SULFATE HEPTAHYDRATE 40 MG/ML
4 INJECTION, SOLUTION INTRAVENOUS AS NEEDED
Status: DISCONTINUED | OUTPATIENT
Start: 2022-10-25 | End: 2022-10-27 | Stop reason: HOSPADM

## 2022-10-25 RX ADMIN — INSULIN LISPRO 2 UNITS: 100 INJECTION, SOLUTION INTRAVENOUS; SUBCUTANEOUS at 18:22

## 2022-10-25 RX ADMIN — BUMETANIDE 1 MG: 1 TABLET ORAL at 08:50

## 2022-10-25 RX ADMIN — CARBIDOPA AND LEVODOPA 2 TABLET: 25; 100 TABLET ORAL at 06:05

## 2022-10-25 RX ADMIN — ALBUTEROL SULFATE 2.5 MG: 2.5 SOLUTION RESPIRATORY (INHALATION) at 15:59

## 2022-10-25 RX ADMIN — PRAMIPEXOLE DIHYDROCHLORIDE 1.5 MG: 1 TABLET ORAL at 21:12

## 2022-10-25 RX ADMIN — INSULIN LISPRO 3 UNITS: 100 INJECTION, SOLUTION INTRAVENOUS; SUBCUTANEOUS at 11:55

## 2022-10-25 RX ADMIN — CARBIDOPA AND LEVODOPA 1 TABLET: 25; 100 TABLET ORAL at 21:12

## 2022-10-25 RX ADMIN — ACETAMINOPHEN 650 MG: 325 TABLET, FILM COATED ORAL at 03:44

## 2022-10-25 RX ADMIN — PANTOPRAZOLE SODIUM 40 MG: 40 TABLET, DELAYED RELEASE ORAL at 21:12

## 2022-10-25 RX ADMIN — RANOLAZINE 500 MG: 500 TABLET, FILM COATED, EXTENDED RELEASE ORAL at 21:12

## 2022-10-25 RX ADMIN — RANOLAZINE 500 MG: 500 TABLET, FILM COATED, EXTENDED RELEASE ORAL at 08:51

## 2022-10-25 RX ADMIN — DOFETILIDE 500 MCG: 0.5 CAPSULE ORAL at 08:50

## 2022-10-25 RX ADMIN — LEVOTHYROXINE SODIUM 150 MCG: 150 TABLET ORAL at 08:49

## 2022-10-25 RX ADMIN — ACETAMINOPHEN 650 MG: 325 TABLET, FILM COATED ORAL at 16:12

## 2022-10-25 RX ADMIN — CARBIDOPA AND LEVODOPA 1 TABLET: 25; 100 TABLET ORAL at 11:59

## 2022-10-25 RX ADMIN — DONEPEZIL HYDROCHLORIDE 5 MG: 5 TABLET, FILM COATED ORAL at 21:13

## 2022-10-25 RX ADMIN — APIXABAN 5 MG: 5 TABLET, FILM COATED ORAL at 21:12

## 2022-10-25 RX ADMIN — DOFETILIDE 500 MCG: 0.5 CAPSULE ORAL at 21:13

## 2022-10-25 RX ADMIN — METOLAZONE 2.5 MG: 2.5 TABLET ORAL at 08:49

## 2022-10-25 RX ADMIN — CARBIDOPA AND LEVODOPA 1 TABLET: 25; 100 TABLET ORAL at 16:12

## 2022-10-25 RX ADMIN — AMANTADINE HYDROCHLORIDE 100 MG: 100 CAPSULE ORAL at 21:13

## 2022-10-25 RX ADMIN — CARBIDOPA AND LEVODOPA 1 TABLET: 25; 100 TABLET ORAL at 08:50

## 2022-10-25 RX ADMIN — CARBIDOPA AND LEVODOPA 1 TABLET: 25; 100 TABLET ORAL at 18:22

## 2022-10-25 RX ADMIN — PANTOPRAZOLE SODIUM 40 MG: 40 TABLET, DELAYED RELEASE ORAL at 08:49

## 2022-10-25 RX ADMIN — APIXABAN 5 MG: 5 TABLET, FILM COATED ORAL at 11:56

## 2022-10-25 RX ADMIN — AMANTADINE HYDROCHLORIDE 100 MG: 100 CAPSULE ORAL at 08:51

## 2022-10-25 RX ADMIN — MAGNESIUM SULFATE HEPTAHYDRATE 4 G: 40 INJECTION, SOLUTION INTRAVENOUS at 01:18

## 2022-10-26 LAB
GLUCOSE BLDC GLUCOMTR-MCNC: 175 MG/DL (ref 70–130)
GLUCOSE BLDC GLUCOMTR-MCNC: 220 MG/DL (ref 70–130)
GLUCOSE BLDC GLUCOMTR-MCNC: 245 MG/DL (ref 70–130)
GLUCOSE BLDC GLUCOMTR-MCNC: 273 MG/DL (ref 70–130)
MAGNESIUM SERPL-MCNC: 1.9 MG/DL (ref 1.6–2.4)
SARS-COV-2 AG RESP QL IA.RAPID: NORMAL

## 2022-10-26 PROCEDURE — 63710000001 INSULIN LISPRO (HUMAN) PER 5 UNITS: Performed by: INTERNAL MEDICINE

## 2022-10-26 PROCEDURE — 97530 THERAPEUTIC ACTIVITIES: CPT

## 2022-10-26 PROCEDURE — 97110 THERAPEUTIC EXERCISES: CPT

## 2022-10-26 PROCEDURE — 87426 SARSCOV CORONAVIRUS AG IA: CPT | Performed by: INTERNAL MEDICINE

## 2022-10-26 PROCEDURE — 97535 SELF CARE MNGMENT TRAINING: CPT | Performed by: OCCUPATIONAL THERAPIST

## 2022-10-26 PROCEDURE — 82962 GLUCOSE BLOOD TEST: CPT

## 2022-10-26 PROCEDURE — 97116 GAIT TRAINING THERAPY: CPT

## 2022-10-26 PROCEDURE — 94660 CPAP INITIATION&MGMT: CPT

## 2022-10-26 PROCEDURE — 97110 THERAPEUTIC EXERCISES: CPT | Performed by: OCCUPATIONAL THERAPIST

## 2022-10-26 PROCEDURE — 94799 UNLISTED PULMONARY SVC/PX: CPT

## 2022-10-26 PROCEDURE — 83735 ASSAY OF MAGNESIUM: CPT | Performed by: INTERNAL MEDICINE

## 2022-10-26 PROCEDURE — 0 MAGNESIUM SULFATE 4 GM/100ML SOLUTION: Performed by: INTERNAL MEDICINE

## 2022-10-26 RX ADMIN — INSULIN LISPRO 3 UNITS: 100 INJECTION, SOLUTION INTRAVENOUS; SUBCUTANEOUS at 17:28

## 2022-10-26 RX ADMIN — CARBIDOPA AND LEVODOPA 1 TABLET: 25; 100 TABLET ORAL at 09:45

## 2022-10-26 RX ADMIN — ACETAMINOPHEN 650 MG: 325 TABLET, FILM COATED ORAL at 04:35

## 2022-10-26 RX ADMIN — DONEPEZIL HYDROCHLORIDE 5 MG: 5 TABLET, FILM COATED ORAL at 20:34

## 2022-10-26 RX ADMIN — ASPIRIN 81 MG: 81 TABLET, COATED ORAL at 09:43

## 2022-10-26 RX ADMIN — MAGNESIUM SULFATE HEPTAHYDRATE 4 G: 40 INJECTION, SOLUTION INTRAVENOUS at 14:22

## 2022-10-26 RX ADMIN — RANOLAZINE 500 MG: 500 TABLET, FILM COATED, EXTENDED RELEASE ORAL at 20:34

## 2022-10-26 RX ADMIN — INSULIN LISPRO 2 UNITS: 100 INJECTION, SOLUTION INTRAVENOUS; SUBCUTANEOUS at 09:37

## 2022-10-26 RX ADMIN — PRAMIPEXOLE DIHYDROCHLORIDE 1.5 MG: 1 TABLET ORAL at 20:34

## 2022-10-26 RX ADMIN — ACETAMINOPHEN 650 MG: 325 TABLET, FILM COATED ORAL at 13:28

## 2022-10-26 RX ADMIN — AMANTADINE HYDROCHLORIDE 100 MG: 100 CAPSULE ORAL at 20:34

## 2022-10-26 RX ADMIN — PANTOPRAZOLE SODIUM 40 MG: 40 TABLET, DELAYED RELEASE ORAL at 09:40

## 2022-10-26 RX ADMIN — SPIRONOLACTONE 50 MG: 25 TABLET ORAL at 09:46

## 2022-10-26 RX ADMIN — DOFETILIDE 500 MCG: 0.5 CAPSULE ORAL at 20:34

## 2022-10-26 RX ADMIN — AMANTADINE HYDROCHLORIDE 100 MG: 100 CAPSULE ORAL at 09:43

## 2022-10-26 RX ADMIN — RANOLAZINE 500 MG: 500 TABLET, FILM COATED, EXTENDED RELEASE ORAL at 09:42

## 2022-10-26 RX ADMIN — APIXABAN 5 MG: 5 TABLET, FILM COATED ORAL at 09:40

## 2022-10-26 RX ADMIN — PANTOPRAZOLE SODIUM 40 MG: 40 TABLET, DELAYED RELEASE ORAL at 20:34

## 2022-10-26 RX ADMIN — SENNOSIDES 1 TABLET: 8.6 TABLET, FILM COATED ORAL at 09:43

## 2022-10-26 RX ADMIN — CARBIDOPA AND LEVODOPA 1 TABLET: 25; 100 TABLET ORAL at 13:24

## 2022-10-26 RX ADMIN — CETIRIZINE HYDROCHLORIDE 10 MG: 10 TABLET, FILM COATED ORAL at 09:42

## 2022-10-26 RX ADMIN — CARBIDOPA AND LEVODOPA 2 TABLET: 25; 100 TABLET ORAL at 05:25

## 2022-10-26 RX ADMIN — CARBIDOPA AND LEVODOPA 1 TABLET: 25; 100 TABLET ORAL at 20:34

## 2022-10-26 RX ADMIN — LEVOTHYROXINE SODIUM 150 MCG: 150 TABLET ORAL at 09:43

## 2022-10-26 RX ADMIN — INSULIN LISPRO 4 UNITS: 100 INJECTION, SOLUTION INTRAVENOUS; SUBCUTANEOUS at 13:23

## 2022-10-26 RX ADMIN — DOFETILIDE 500 MCG: 0.5 CAPSULE ORAL at 09:41

## 2022-10-26 RX ADMIN — APIXABAN 5 MG: 5 TABLET, FILM COATED ORAL at 20:34

## 2022-10-26 RX ADMIN — CARBIDOPA AND LEVODOPA 1 TABLET: 25; 100 TABLET ORAL at 17:28

## 2022-10-27 VITALS
RESPIRATION RATE: 18 BRPM | WEIGHT: 238.1 LBS | DIASTOLIC BLOOD PRESSURE: 99 MMHG | SYSTOLIC BLOOD PRESSURE: 174 MMHG | TEMPERATURE: 97.4 F | OXYGEN SATURATION: 98 % | HEIGHT: 62 IN | BODY MASS INDEX: 43.82 KG/M2 | HEART RATE: 62 BPM

## 2022-10-27 PROBLEM — G89.18 POSTOPERATIVE PAIN: Status: ACTIVE | Noted: 2022-10-27

## 2022-10-27 LAB
GLUCOSE BLDC GLUCOMTR-MCNC: 170 MG/DL (ref 70–130)
GLUCOSE BLDC GLUCOMTR-MCNC: 181 MG/DL (ref 70–130)
MAGNESIUM SERPL-MCNC: 2.1 MG/DL (ref 1.6–2.4)

## 2022-10-27 PROCEDURE — 94799 UNLISTED PULMONARY SVC/PX: CPT

## 2022-10-27 PROCEDURE — 94660 CPAP INITIATION&MGMT: CPT

## 2022-10-27 PROCEDURE — 83735 ASSAY OF MAGNESIUM: CPT | Performed by: INTERNAL MEDICINE

## 2022-10-27 PROCEDURE — A9270 NON-COVERED ITEM OR SERVICE: HCPCS | Performed by: INTERNAL MEDICINE

## 2022-10-27 PROCEDURE — 63710000001 CARBIDOPA-LEVODOPA 25-100 MG TABLET: Performed by: INTERNAL MEDICINE

## 2022-10-27 PROCEDURE — 97535 SELF CARE MNGMENT TRAINING: CPT | Performed by: OCCUPATIONAL THERAPIST

## 2022-10-27 PROCEDURE — 97110 THERAPEUTIC EXERCISES: CPT | Performed by: OCCUPATIONAL THERAPIST

## 2022-10-27 PROCEDURE — 82962 GLUCOSE BLOOD TEST: CPT

## 2022-10-27 PROCEDURE — 63710000001 INSULIN LISPRO (HUMAN) PER 5 UNITS: Performed by: INTERNAL MEDICINE

## 2022-10-27 RX ORDER — OXYCODONE HYDROCHLORIDE 5 MG/1
5 TABLET ORAL EVERY 4 HOURS PRN
Qty: 25 TABLET | Refills: 0 | Status: SHIPPED | OUTPATIENT
Start: 2022-10-27 | End: 2022-10-27 | Stop reason: SDUPTHER

## 2022-10-27 RX ORDER — ROPIVACAINE HYDROCHLORIDE 2 MG/ML
1 INJECTION, SOLUTION EPIDURAL; INFILTRATION; PERINEURAL CONTINUOUS
Start: 2022-10-27 | End: 2022-11-17

## 2022-10-27 RX ORDER — OXYCODONE HYDROCHLORIDE 5 MG/1
5 TABLET ORAL EVERY 4 HOURS PRN
Qty: 10 TABLET | Refills: 0 | Status: SHIPPED | OUTPATIENT
Start: 2022-10-27 | End: 2022-11-03

## 2022-10-27 RX ADMIN — RANOLAZINE 500 MG: 500 TABLET, FILM COATED, EXTENDED RELEASE ORAL at 08:15

## 2022-10-27 RX ADMIN — ACETAMINOPHEN 650 MG: 325 TABLET, FILM COATED ORAL at 10:27

## 2022-10-27 RX ADMIN — CARBIDOPA AND LEVODOPA 2 TABLET: 25; 100 TABLET ORAL at 06:20

## 2022-10-27 RX ADMIN — CARBIDOPA AND LEVODOPA 1 TABLET: 25; 100 TABLET ORAL at 08:14

## 2022-10-27 RX ADMIN — SENNOSIDES 1 TABLET: 8.6 TABLET, FILM COATED ORAL at 08:14

## 2022-10-27 RX ADMIN — ASPIRIN 81 MG: 81 TABLET, COATED ORAL at 08:13

## 2022-10-27 RX ADMIN — DOFETILIDE 500 MCG: 0.5 CAPSULE ORAL at 08:14

## 2022-10-27 RX ADMIN — AMANTADINE HYDROCHLORIDE 100 MG: 100 CAPSULE ORAL at 08:15

## 2022-10-27 RX ADMIN — LEVOTHYROXINE SODIUM 150 MCG: 150 TABLET ORAL at 08:13

## 2022-10-27 RX ADMIN — APIXABAN 5 MG: 5 TABLET, FILM COATED ORAL at 08:14

## 2022-10-27 RX ADMIN — CARBIDOPA AND LEVODOPA 1 TABLET: 25; 100 TABLET ORAL at 12:45

## 2022-10-27 RX ADMIN — CETIRIZINE HYDROCHLORIDE 10 MG: 10 TABLET, FILM COATED ORAL at 08:14

## 2022-10-27 RX ADMIN — INSULIN LISPRO 2 UNITS: 100 INJECTION, SOLUTION INTRAVENOUS; SUBCUTANEOUS at 08:13

## 2022-10-27 RX ADMIN — SPIRONOLACTONE 50 MG: 25 TABLET ORAL at 08:14

## 2022-10-27 RX ADMIN — INSULIN LISPRO 2 UNITS: 100 INJECTION, SOLUTION INTRAVENOUS; SUBCUTANEOUS at 12:45

## 2022-10-27 RX ADMIN — PANTOPRAZOLE SODIUM 40 MG: 40 TABLET, DELAYED RELEASE ORAL at 08:14

## 2022-11-02 ENCOUNTER — TELEPHONE (OUTPATIENT)
Dept: ENDOCRINOLOGY | Facility: CLINIC | Age: 72
End: 2022-11-02

## 2022-11-02 NOTE — TELEPHONE ENCOUNTER
PT'S DAUGHTER CALLED (WITH PT) AND REQUESTED TO CHANGE UPCOMING APPT W/ JBREILLY TO A TELE-APPT. THEY STATED PT IS IN A REHAB FACILITY FOR RECOVERY FROM SURGERY.

## 2022-11-09 ENCOUNTER — TELEPHONE (OUTPATIENT)
Dept: FAMILY MEDICINE CLINIC | Facility: CLINIC | Age: 72
End: 2022-11-09

## 2022-11-09 NOTE — TELEPHONE ENCOUNTER
Last note 9/8/2022 given [facility] at home. Verbal orders given for PT and OT and speech therapy and nurseing

## 2022-11-09 NOTE — TELEPHONE ENCOUNTER
Caller: JUN    Relationship: Home Health [facility] AT HOME    Best call back number: [phone]    What orders are you requesting (i.e. lab or imaging): VERBAL ORDERS FOR HOME HEALTH.    ALSO: SHE NEEDS NOTES FROM LAST VISIT FAXED TO HER AS WELL AS IF SHE HAS AN APPOINTMENT COMING UP.: [phone]    In what timeframe would the patient need to come in: ASAP    Where will you receive your lab/imaging services: HOME    Additional notes: SHE IS BEING DISCHARGED ON Friday FROM [facility] AND THEY WILL  NURSING,PT & OT AS WELL AS SOCIAL WORK AND SPEECH THERAPY

## 2022-11-14 ENCOUNTER — TELEPHONE (OUTPATIENT)
Dept: FAMILY MEDICINE CLINIC | Facility: CLINIC | Age: 72
End: 2022-11-14

## 2022-11-14 NOTE — TELEPHONE ENCOUNTER
When did you start taking these medications: UNKNOWN      Which medication are you concerned about:     dofetilide (TIKOSYN) 500 MCG capsul HAD 3 FLAGS metOLazone (ZAROXOLYN) 2.5 MG tablet, citalopram (CeleXA) 20 MG tablet AND   donepezil (ARICEPT) 5 MG tablet         citalopram (CeleXA) 20 MG tablet INTERACTS WITH   donepezil (ARICEPT) 5 MG tablet    AND ranolazine (RANEXA) 500 MG 12 hr table        Who prescribed you this medication: DR TAVAREZ     What are your concerns: HOME HEALTH WANTED TO REPORT THE FLAGGED INTERACTIONS, PLEASE CALL IF MEDICATION CHANGES MADE      How long have you had these concerns: HOME HEALTH STARTED 11.14.22

## 2022-11-14 NOTE — TELEPHONE ENCOUNTER
Caller: HELIO WITH HOME HEALTH    Relationship: Home Health    Best call back number: [phone]      Who are you requesting to speak with (clinical staff, provider,  specific staff member): CLINICAL     What was the call regarding: WANTED TO REPORT PHYSICAL THERAPY STARTING 11.14.22, WILL WORK ON STRENGTH, GATE AND BALANCE     Do you require a callback: NO

## 2022-11-14 NOTE — TELEPHONE ENCOUNTER
[facility] called AMAIRANI damon [facility] needs to call cardiology because we do not give this drug to pt, Dr. Fritz said this drug can do all kinds of things like abnormal heart rythems

## 2022-11-14 NOTE — TELEPHONE ENCOUNTER
Caller: DEEPA WITH [facility]    Relationship: Home Health    Best call back number: [phone]  What medications are you currently taking:   Current Outpatient Medications on File Prior to Visit   Medication Sig Dispense Refill   • Accu-Chek Guide test strip AS DIRECTED THREE TIMES A  each 1   • Accu-Chek Softclix Lancets lancets AS DIRECTED THREE TIMES A  each 1   • albuterol (PROVENTIL) (2.5 MG/3ML) 0.083% nebulizer solution Take 2.5 mg by nebulization Every 4 (Four) Hours As Needed for Wheezing or Shortness of Air.     • albuterol sulfate HFA (Ventolin HFA) 108 (90 Base) MCG/ACT inhaler Inhale 1 puff Every 4 (Four) Hours As Needed for Wheezing or Shortness of Air. 8 g 5   • amantadine (SYMMETREL) 100 MG capsule Take 100 mg by mouth 2 (Two) Times a Day.     • apixaban (Eliquis) 5 MG tablet tablet Take 1 tablet by mouth Every 12 (Twelve) Hours. 180 tablet 2   • aspirin 81 MG EC tablet Take 81 mg by mouth Daily.     • B Complex-C (SUPER B COMPLEX PO) Take 1 tablet by mouth Daily.     • baclofen (LIORESAL) 10 MG tablet Take 1 tablet by mouth Every 8 (Eight) Hours As Needed for Muscle Spasms. As needed     • bumetanide (BUMEX) 1 MG tablet 1 tab po daily as directed (Patient taking differently: Take 1 tablet by mouth Daily. 1 tab po daily as directed) 90 tablet 0   • Calcium Carbonate (CALTRATE 600 PO) Take 600 mg by mouth Daily.     • carbidopa-levodopa (SINEMET)  MG per tablet Take  by mouth. 2 tablets at 0600, 1 tablet at 0900, 1200, 1500, 1800, 2100     • Cholecalciferol 2000 units tablet Take 2,000 Units by mouth 2 (Two) Times a Day.     • citalopram (CeleXA) 20 MG tablet TAKE 1 TABLET DAILY (Patient taking differently: Take 1 tablet by mouth Daily.) 90 tablet 3   • clindamycin (CLEOCIN) 150 MG capsule Take 150 mg by mouth Daily. 4 capsules one hour before dental appointments.     • clobetasol (TEMOVATE) 0.05 % cream Apply 1 application topically 2 (Two) Times a Day As  Needed (Lichens Sclerosis).     • dofetilide (TIKOSYN) 500 MCG capsule TAKE 1 CAPSULE EVERY 12 HOURS (Patient taking differently: Take 1 capsule by mouth 2 (Two) Times a Day.) 180 capsule 3   • donepezil (ARICEPT) 5 MG tablet Take 5 mg by mouth Every Night.     • Emollient (AQUAPHOR ADVANCED THERAPY EX) Apply  topically.     • ferrous sulfate 325 (65 FE) MG tablet Take 325 mg by mouth Daily With Breakfast.     • Glucosamine-Chondroit-Calcium (TRIPLE FLEX BONE & JOINT PO) Take 1 tablet by mouth Daily. Move free     • Insulin Glargine (Lantus SoloStar) 100 UNIT/ML injection pen Inject 12-14 Units under the skin into the appropriate area as directed Daily.     • Insulin Pen Needle (B-D UF III MINI PEN NEEDLES) 31G X 5 MM misc USE TO INJECT INSULIN DAILY 100 each 3   • IPRATROPIUM BROMIDE NA 1 spray into the nostril(s) as directed by provider 2 (Two) Times a Day As Needed (CONGESTION SINUS).     • Loratadine 10 MG capsule Take 10 mg by mouth Daily.     • metFORMIN (GLUCOPHAGE) 1000 MG tablet TAKE 1 TABLET TWICE A DAY WITH MEALS (Patient taking differently: Take 1 tablet by mouth 2 (Two) Times a Day With Meals.) 180 tablet 3   • metOLazone (ZAROXOLYN) 2.5 MG tablet Take 1 tablet by mouth Daily. 90 tablet 1   • Multiple Vitamins-Minerals (CENTRUM ULTRA WOMENS PO) Take 1 tablet by mouth Daily.     • nitroglycerin (NITROLINGUAL) 0.4 MG/SPRAY spray Place 1 spray under the tongue Every 5 (Five) Minutes As Needed for Chest Pain. 1 each 6   • O2 (OXYGEN) Inhale 2 L/min 1 (One) Time. 2L all the time now     • omeprazole (priLOSEC) 40 MG capsule Take 40 mg by mouth 2 (Two) Times a Day.     • pramipexole (MIRAPEX) 1.5 MG tablet Take 1.5 mg by mouth Every Night.     • ranolazine (RANEXA) 500 MG 12 hr tablet TAKE 1 TABLET EVERY 12 HOURS (Patient taking differently: 1 tablet 2 (Two) Times a Day.) 180 tablet 3   • ropivacaine (NAROPIN) 0.2 % infusion (INFUSYSTEM) 2 mg/hr by Peripheral Nerve route Continuous.     • senna (SENOKOT) 8.6  MG tablet Take 1 tablet by mouth Daily.     • spironolactone (ALDACTONE) 25 MG tablet Take 2 tablets by mouth Daily. 180 tablet 2   • traMADol (ULTRAM) 50 MG tablet Take 1 tablet by mouth Every 6 (Six) Hours As Needed for Moderate Pain . 30 tablet 2   • triamcinolone (KENALOG) 0.1 % cream 1 application As Needed for Irritation or Rash.     • Unithroid 150 MCG tablet Take 1 tablet by mouth Daily. 90 tablet 1   • valsartan (DIOVAN) 160 MG tablet TAKE 1 TABLET TWICE A DAY (Patient taking differently: Take 1 tablet by mouth 2 (Two) Times a Day.) 180 tablet 0   • vitamin C (ASCORBIC ACID) 500 MG tablet Take 500 mg by mouth Daily. Chewable tablet     • Zinc 50 MG capsule Take 1 capsule by mouth Daily.       No current facility-administered medications on file prior to visit.          When did you start taking these medications: UNKNOWN     Which medication are you concerned about:    dofetilide (TIKOSYN) 500 MCG capsul HAD 3 FLAGS metOLazone (ZAROXOLYN) 2.5 MG tablet, citalopram (CeleXA) 20 MG tablet AND   donepezil (ARICEPT) 5 MG tablet        citalopram (CeleXA) 20 MG tablet INTERACTS WITH   donepezil (ARICEPT) 5 MG tablet    AND ranolazine (RANEXA) 500 MG 12 hr table      Who prescribed you this medication: DR TAVAREZ    What are your concerns: HOME HEALTH WANTED TO REPORT THE FLAGGED INTERACTIONS, PLEASE CALL IF MEDICATION CHANGES MADE     How long have you had these concerns: HOME HEALTH STARTED 11.14.22

## 2022-11-15 ENCOUNTER — TELEPHONE (OUTPATIENT)
Dept: CARDIOLOGY | Facility: CLINIC | Age: 72
End: 2022-11-15

## 2022-11-17 ENCOUNTER — TELEMEDICINE (OUTPATIENT)
Dept: FAMILY MEDICINE CLINIC | Facility: CLINIC | Age: 72
End: 2022-11-17

## 2022-11-17 VITALS — WEIGHT: 232 LBS | HEIGHT: 62 IN | BODY MASS INDEX: 42.69 KG/M2

## 2022-11-17 DIAGNOSIS — Z96.612 STATUS POST REVERSE TOTAL SHOULDER REPLACEMENT, LEFT: ICD-10-CM

## 2022-11-17 DIAGNOSIS — Z79.4 TYPE 2 DIABETES MELLITUS WITH STAGE 3B CHRONIC KIDNEY DISEASE, WITH LONG-TERM CURRENT USE OF INSULIN: ICD-10-CM

## 2022-11-17 DIAGNOSIS — E11.22 TYPE 2 DIABETES MELLITUS WITH STAGE 3B CHRONIC KIDNEY DISEASE, WITH LONG-TERM CURRENT USE OF INSULIN: ICD-10-CM

## 2022-11-17 DIAGNOSIS — M19.012 PRIMARY OSTEOARTHRITIS OF LEFT SHOULDER: Primary | ICD-10-CM

## 2022-11-17 DIAGNOSIS — N18.32 TYPE 2 DIABETES MELLITUS WITH STAGE 3B CHRONIC KIDNEY DISEASE, WITH LONG-TERM CURRENT USE OF INSULIN: ICD-10-CM

## 2022-11-17 PROCEDURE — 99214 OFFICE O/P EST MOD 30 MIN: CPT | Performed by: PHYSICIAN ASSISTANT

## 2022-11-17 RX ORDER — AMANTADINE HYDROCHLORIDE 100 MG/1
100 CAPSULE, GELATIN COATED ORAL 2 TIMES DAILY
COMMUNITY
Start: 2022-10-13

## 2022-11-17 NOTE — PROGRESS NOTES
Patient MyChart Video Visit     Patient Name: Joanna Cross  : 1950   MRN: [id number]     This provider is located at [facility], [address] [place] [address]  using a secure [employer] Video Visit through [employer]. Patient is being seen remotely via telehealth at their home address in [place], and stated they are in a secure environment for this session. The patient's condition being diagnosed/treated is appropriate for telemedicine. The provider identified herself as well as her credentials.   The patient, and/or patients guardian, consent to be seen remotely, and when consent is given they understand that the consent allows for patient identifiable information to be sent to a third party as needed.   They may refuse to be seen remotely at any time. The electronic data is encrypted and password protected, and the patient and/or guardian has been advised of the potential risks to privacy not withstanding such measures.     Chief Complaint:    Chief Complaint   Patient presents with   • Hospital Follow Up Visit       History of Present Illness: Joanna Cross is a 71 y.o. female who is here today to via [employer] video visit for a hospital follow-up.  She was admitted to [facility] on 10/24/2022 and discharged on 10/27/2022 for a reverse total replacement of the left shoulder.  She went to [facility] x2 weeks and was discharged from there a few days ago.  She says the pain that she was having in her shoulder is gone but has been replaced with a different type of pain from the surgery but this is tolerable with an occasional tramadol and Tylenol.  She says physical therapy came Monday (today is Thursday) to do an evaluation but she has not heard back about starting physical therapy.  She says she was supposed to have a visit from occupational therapy on Tuesday but has not heard back from them.  She has a follow-up appointment with her surgeon on  and has a  telehealth visit with her endocrinologist on that same day.  She says she has an appointment for labs and follow-up visit with Dr. Fritz coming up in December.  She also has an appointment coming up in the next few weeks with nephrology at [facility].  Since she is doing a telehealth visit with her endocrinologist she would like to have her A1c done when she has her labs done for Dr. Fritz.  He has a vitamin D, B12, CMP, lipid panel ordered.    Subjective      Review of Systems:         Past Medical History:   Past Medical History:   Diagnosis Date   • Anemia    • Ankle problem     thinks back related causing pain    • Atrial fibrillation (HCC)    • AVM (arteriovenous malformation)    • Back pain    • CKD (chronic kidney disease)    • Clotting disorder (HCC) 2016    AVM - small intestine   • COVID-19 vaccine series completed    • Gastrocnemius muscle tear     left medial 91   • Generalized osteoarthritis    • GERD (gastroesophageal reflux disease)    • GIB (gastrointestinal bleeding) 2016    d/t xarelto    • Headache    • Hearing decreased, bilateral     HAS HEARING AID, NOT WEARING IT CURRENTLY   • Hiatal hernia    • History of shingles    • History of transfusion 2016    no reaction recalled    • Hypothyroidism    • IBS (irritable bowel syndrome)    • Klebsiella pneumonia ([facility])    • Lichen sclerosus    • Mouth problem     mouth guard used since pt bites tongue and lips excessively at night if not- with bipap    • MRSA infection 2018   • Obesity    • On home oxygen therapy     2L of oxygen all the time due to current congestion    • Osteoporosis    • Parkinson's disease (HCC)    • Peripheral vascular disease (HCC)    • Pleurisy    • Pneumonia    • Puerperal sepsis with acute hypoxic respiratory failure ([facility])     emergent intubation- 2016   • Right leg pain     from back issues    • Salivary gland stone    • Sciatic nerve pain    • Seborrheic dermatitis    • Skin cancer     on back    • Sleep apnea     CPAP AND  HOME O2 2L/M   • Type 2 diabetes mellitus (HCC)    • UTI (urinary tract infection)    • Vitamin D deficiency 09/08/2022   • Wears glasses        Past Surgical History:   Past Surgical History:   Procedure Laterality Date   • ABLATION OF DYSRHYTHMIC FOCUS  05/16/13    Laser Ablation - Rt Leg   • BACK SURGERY      l4-l5 laminectomy    • BICEPS TENDON REPAIR Right     shoulder   • BREAST BIOPSY Left 05/2004    excisional, benign   • BRONCHOSCOPY RIGID / FLEXIBLE      2016   • BUNIONECTOMY Right    • CARDIAC CATHETERIZATION     • CARDIAC CATHETERIZATION N/A 02/01/2019    Procedure: Left Heart Cath;  Surgeon: Albertina Corona MD;  Location:  [facility] CATH INVASIVE LOCATION;  Service: Cardiology   • CHOLECYSTECTOMY     • COLONOSCOPY  2016   • COLONOSCOPY N/A 06/17/2021    Procedure: COLONOSCOPY WITH POLYPECTOMY;  Surgeon: Trenton Sosa MD;  Location:  [facility] ENDOSCOPY;  Service: Gastroenterology;  Laterality: N/A;   • CORONARY STENT PLACEMENT      x1 stent   • CYST REMOVAL      left ear, upper left back 2001   • CYSTOSCOPY      x2  18 and 20 -   in 20 urethra dilation    • DIAGNOSTIC LAPAROSCOPY  1981   • ENDOSCOPIC FUNCTIONAL SINUS SURGERY (FESS)  2011   • ENDOSCOPY  2016   • ENTEROSCOPY SMALL BOWEL      with single ballon with fluoro    • HAMMER TOE REPAIR Left    • INCISION AND DRAINAGE OF WOUND  2018    back with wound infection    • INSERT / REPLACE / REMOVE PACEMAKER  11/10/16    [facility]   • JOINT REPLACEMENT     • KNEE ARTHROSCOPY     • LASER ABLATION      right leg 13   • LIPOMA EXCISION  1999    left leg    • LUMBAR LAMINECTOMY DISCECTOMY DECOMPRESSION N/A 07/06/2018    Procedure: LUMBAR LAMINECTOMY L4-5, HEMILAMIINECTOMY RIGHT L5-S1, FORAMINOTOMY L5-S1;  Surgeon: Lencho Gamino MD;  Location:  [facility] OR;  Service: Neurosurgery   • LUMBAR LAMINECTOMY DISCECTOMY DECOMPRESSION N/A 09/19/2018    Procedure: INCISION AND DRAINAGE BACK WITH WOUND EXPLORATION;  Surgeon: Ritchie García,  MD;  Location: [facility] OR;  Service: Neurosurgery   • LUNG BIOPSY Left 2016   • OTHER SURGICAL HISTORY      ct scan of chest and sinuses and lower back    • OTHER SURGICAL HISTORY      various echos    • OTHER SURGICAL HISTORY      electroencephalogram 16,   emg  ncv tests    • OTHER SURGICAL HISTORY      mra   and various mri with xrays, nuclear medicine lung ventilation with perfusion test 2016 with pft    • OTHER SURGICAL HISTORY      barium swallow testing 15, 12, 12   • OTHER SURGICAL HISTORY      vaginal ultrasound- ,   vas clementina lower extrem , vas venous duplex lower extrem bilat    • OTHER SURGICAL HISTORY      wdge biopsy spring of lung    • OTHER SURGICAL HISTORY      emergent intubation- hypoxic resp failure    • PACEMAKER IMPLANTATION  2016    sss   • REPLACEMENT TOTAL KNEE Bilateral     left knee , right  per dr acuna    • REPLACEMENT TOTAL KNEE Bilateral    • SHOULDER ARTHROSCOPY Bilateral     -left, - right    • SKIN BIOPSY      skin cancer back    • SKIN CANCER EXCISION      upper righ tback    • MADHAV     • TEETH EXTRACTION      x2   • TOTAL SHOULDER ARTHROPLASTY W/ DISTAL CLAVICLE EXCISION Left 10/24/2022    Procedure: REVERSE TOTAL SHOULDER ARTHROPLASTY, BICEPS TENODESIS - LEFT;  Surgeon: Sammy Yo MD;  Location:  [facility] OR;  Service: Orthopedics;  Laterality: Left;   • TUBAL ABDOMINAL LIGATION Bilateral    • WISDOM TOOTH EXTRACTION         Family History:   Family History   Problem Relation Age of Onset   • Kidney disease Mother    • Coronary artery disease Mother    • Hypertension Mother            • Heart disease Mother            • Hyperlipidemia Mother            • Coronary artery disease Father    • Hypertension Father            • Hypothyroidism Father    • Cancer Father         Oral cancer   • Heart disease Father            • Coronary artery disease Brother    • Hypertension Brother    • Heart disease  Brother         Multiple stents, by-pass surgery   • Testicular cancer Brother    • Kidney cancer Maternal Uncle    • Testicular cancer Maternal Uncle    • Colon polyps Neg Hx    • Colon cancer Neg Hx        Social History:   Social History     Socioeconomic History   • Marital status:      Spouse name: N/A   • Number of children: 4   • Years of education: College   • Highest education level: Master's degree (e.g., MA, MS, Randi, MEd, MSW, NELLA)   Tobacco Use   • Smoking status: Never   • Smokeless tobacco: Never   Vaping Use   • Vaping Use: Never used   Substance and Sexual Activity   • Alcohol use: Never   • Drug use: No   • Sexual activity: Defer     Partners: Male     Birth control/protection: Post-menopausal       Medications:     Current Outpatient Medications:   •  Accu-Chek Guide test strip, AS DIRECTED THREE TIMES A DAY, Disp: 300 each, Rfl: 1  •  Accu-Chek Softclix Lancets lancets, AS DIRECTED THREE TIMES A DAY, Disp: 300 each, Rfl: 1  •  albuterol (PROVENTIL) (2.5 MG/3ML) 0.083% nebulizer solution, Take 2.5 mg by nebulization Every 4 (Four) Hours As Needed for Wheezing or Shortness of Air., Disp: , Rfl:   •  albuterol sulfate HFA (Ventolin HFA) 108 (90 Base) MCG/ACT inhaler, Inhale 1 puff Every 4 (Four) Hours As Needed for Wheezing or Shortness of Air., Disp: 8 g, Rfl: 5  •  amantadine (SYMMETREL) 100 MG capsule, Take 100 mg by mouth 2 (Two) Times a Day., Disp: , Rfl:   •  amantadine (SYMMETREL) 100 MG capsule, Take 100 mg by mouth., Disp: , Rfl:   •  apixaban (Eliquis) 5 MG tablet tablet, Take 1 tablet by mouth Every 12 (Twelve) Hours., Disp: 180 tablet, Rfl: 2  •  aspirin 81 MG EC tablet, Take 81 mg by mouth Daily., Disp: , Rfl:   •  B Complex-C (SUPER B COMPLEX PO), Take 1 tablet by mouth Daily., Disp: , Rfl:   •  bumetanide (BUMEX) 1 MG tablet, 1 tab po daily as directed (Patient taking differently: Take 1 mg by mouth Daily. 1 tab po daily as directed), Disp: 90 tablet, Rfl: 0  •  Calcium  Carbonate (CALTRATE 600 PO), Take 600 mg by mouth Daily., Disp: , Rfl:   •  carbidopa-levodopa (SINEMET)  MG per tablet, Take  by mouth. 2 tablets at 0600, 1 tablet at 0900, 1200, 1500, 1800, 2100, Disp: , Rfl:   •  Cholecalciferol 2000 units tablet, Take 2,000 Units by mouth 2 (Two) Times a Day., Disp: , Rfl:   •  citalopram (CeleXA) 20 MG tablet, TAKE 1 TABLET DAILY (Patient taking differently: Take 20 mg by mouth Daily.), Disp: 90 tablet, Rfl: 3  •  clindamycin (CLEOCIN) 150 MG capsule, Take 150 mg by mouth Daily. 4 capsules one hour before dental appointments., Disp: , Rfl:   •  clobetasol (TEMOVATE) 0.05 % cream, Apply 1 application topically 2 (Two) Times a Day As Needed (Lichens Sclerosis)., Disp: , Rfl:   •  dofetilide (TIKOSYN) 500 MCG capsule, TAKE 1 CAPSULE EVERY 12 HOURS (Patient taking differently: Take 500 mcg by mouth 2 (Two) Times a Day.), Disp: 180 capsule, Rfl: 3  •  donepezil (ARICEPT) 5 MG tablet, Take 5 mg by mouth Every Night., Disp: , Rfl:   •  Emollient (AQUAPHOR ADVANCED THERAPY EX), Apply  topically., Disp: , Rfl:   •  Glucosamine-Chondroit-Calcium (TRIPLE FLEX BONE & JOINT PO), Take 1 tablet by mouth Daily. Move free, Disp: , Rfl:   •  Insulin Glargine (Lantus SoloStar) 100 UNIT/ML injection pen, Inject 12-14 Units under the skin into the appropriate area as directed Daily., Disp: , Rfl:   •  Insulin Pen Needle (B-D UF III MINI PEN NEEDLES) 31G X 5 MM misc, USE TO INJECT INSULIN DAILY, Disp: 100 each, Rfl: 3  •  IPRATROPIUM BROMIDE NA, 1 spray into the nostril(s) as directed by provider 2 (Two) Times a Day As Needed (CONGESTION SINUS)., Disp: , Rfl:   •  Loratadine 10 MG capsule, Take 10 mg by mouth Daily., Disp: , Rfl:   •  metFORMIN (GLUCOPHAGE) 1000 MG tablet, TAKE 1 TABLET TWICE A DAY WITH MEALS (Patient taking differently: Take 1,000 mg by mouth 2 (Two) Times a Day With Meals.), Disp: 180 tablet, Rfl: 3  •  metOLazone (ZAROXOLYN) 2.5 MG tablet, Take 1 tablet by mouth Daily.,  "Disp: 90 tablet, Rfl: 1  •  Multiple Vitamins-Minerals (CENTRUM ULTRA WOMENS PO), Take 1 tablet by mouth Daily., Disp: , Rfl:   •  nitroglycerin (NITROLINGUAL) 0.4 MG/SPRAY spray, Place 1 spray under the tongue Every 5 (Five) Minutes As Needed for Chest Pain., Disp: 1 each, Rfl: 6  •  O2 (OXYGEN), Inhale 2 L/min 1 (One) Time. 2L all the time now, Disp: , Rfl:   •  omeprazole (priLOSEC) 40 MG capsule, Take 40 mg by mouth 2 (Two) Times a Day., Disp: , Rfl:   •  pramipexole (MIRAPEX) 1.5 MG tablet, Take 1.5 mg by mouth Every Night., Disp: , Rfl:   •  ranolazine (RANEXA) 500 MG 12 hr tablet, TAKE 1 TABLET EVERY 12 HOURS (Patient taking differently: 500 mg 2 (Two) Times a Day.), Disp: 180 tablet, Rfl: 3  •  senna (SENOKOT) 8.6 MG tablet, Take 1 tablet by mouth Daily., Disp: , Rfl:   •  spironolactone (ALDACTONE) 25 MG tablet, Take 2 tablets by mouth Daily., Disp: 180 tablet, Rfl: 2  •  traMADol (ULTRAM) 50 MG tablet, Take 1 tablet by mouth Every 6 (Six) Hours As Needed for Moderate Pain ., Disp: 30 tablet, Rfl: 2  •  triamcinolone (KENALOG) 0.1 % cream, 1 application As Needed for Irritation or Rash., Disp: , Rfl:   •  Unithroid 150 MCG tablet, Take 1 tablet by mouth Daily., Disp: 90 tablet, Rfl: 1  •  valsartan (DIOVAN) 160 MG tablet, TAKE 1 TABLET TWICE A DAY (Patient taking differently: Take 160 mg by mouth 2 (Two) Times a Day.), Disp: 180 tablet, Rfl: 0  •  vitamin C (ASCORBIC ACID) 500 MG tablet, Take 500 mg by mouth Daily. Chewable tablet, Disp: , Rfl:   •  Zinc 50 MG capsule, Take 1 capsule by mouth Daily., Disp: , Rfl:     Allergies:   Allergies   Allergen Reactions   • Toprol Xl [Metoprolol Tartrate] Shortness Of Breath     Extreme fatigue   • Amlodipine Besylate Swelling     Lower extremity (ankles, feet) swelling   • Codeine Unknown (See Comments)     Pt is unaware of what reaction she had   • Entacapone Other (See Comments)     \"extreme weakness in legs - caused several falls, which stopped after " "discontinuing this medication\"   • Epinephrine Other (See Comments)     6/4/16- had 3 shots to numb mouth to prepare teeth for crowns, the shots contained epi-  Caused pt to have chest discomfort- went to hospital in ambulance, discovered had a fib while there    • Levemir [Insulin Detemir] Hives     Hives / rash around injection site   • Penicillins Hives     Jitteriness    • Xarelto [Rivaroxaban] GI Bleeding     hgb dropped to 5.2   • Bactrim [Sulfamethoxazole-Trimethoprim] Nausea Only and Other (See Comments)     Headache -  Cant be taken with tikosyn - septra ds   • Benztropine Mesylate Other (See Comments)     Uncontrollable body movements   • Cimetidine Other (See Comments)     Cant be taken with tikosyn    • Ciprofloxacin Diarrhea   • Cogentin [Benztropine] Other (See Comments)     \"uncontrollable body movements\"   • Compazine [Prochlorperazine Edisylate] Other (See Comments)     Dystonic reaction   • Cortisone Other (See Comments)     MAKES BLOOD PRESSURE HIGH    • Duraprep [Antiseptic Products, Misc.] Itching and Rash     RASH AND ITCHING   • Erythromycin Base Other (See Comments)     Cant be taken with tikosyn - z pack and ketek    • Flurandrenolone Other (See Comments)     Cant be taken with tikosyn     Include - levaquin, cipro, norloxacin    • Haldol [Haloperidol Lactate] Other (See Comments)     Dystonic reaction   • Hydralazine Other (See Comments)     Headache    • Hydrochlorothiazide Other (See Comments)     Cant be taken with tikosyn -  Also hydrodiuril, microzide, hydro-par, oretic, esidrix, ezide or any medicine with hct or hctz in name    • Hydrocodone-Acetaminophen Nausea And Vomiting and Dizziness     Headache, nausea    • Levaquin [Levofloxacin] Other (See Comments)     Cannot take due to taking propafenone HCL- severe reaction with mixed.    • Lisinopril Cough   • Macrolides And Ketolides Other (See Comments)     antibx -   Cant be taken with tikosyn    • Statins Myalgia     Leg pain- all " "statins    • Tarka [Trandolapril-Verapamil Hcl Er] Other (See Comments)     Constipation    • Verapamil Other (See Comments)     Cant be taken with tikosyn - calan, covera-hs, isoptin, verelan or tarka        Objective     Physical Exam:    Due to extenuating circumstances and possible current health risks associated with the patient being present in a clinical setting (with current health restrictions in place in regards to possible COVID 19 transmission/exposure), the patient was seen remotely today via a MyChart Video Visit through [employer].  Unable to obtain vital signs due to nature of remote visit.      Blood pressure was self-reported at 129/71 with a pulse of 73.  She notes that she has a pacemaker.    Vital Signs:   Vitals:    11/17/22 1419   Weight: 105 kg (232 lb)   Height: 157.5 cm (62\")     Body mass index is 42.43 kg/m².        Physical Exam  Constitutional:       General: She is not in acute distress.     Comments: Wearing oxygen but is very alert and looks good.   Neurological:      General: No focal deficit present.      Mental Status: She is alert and oriented to person, place, and time.   Psychiatric:         Mood and Affect: Mood normal.         Behavior: Behavior normal.         Thought Content: Thought content normal.         Judgment: Judgment normal.         Procedures    Assessment / Plan      Assessment/Plan:   Diagnoses and all orders for this visit:    1. Primary osteoarthritis of left shoulder (Primary)    2. Status post reverse total shoulder replacement, left    3. Type 2 diabetes mellitus with stage 3b chronic kidney disease, with long-term current use of insulin (HCC)  -     Hemoglobin A1c; Future       Patient's medication list was updated.  She does not need any refills today.  She has all of her follow-up visits arranged.  I will add an order for hemoglobin A1c to be done with the labs that she is having done for Dr. Fritz in the middle of December.  She should keep her follow-up " appointment with Dr. Fritz in December.    I spent 30 minutes caring for Joanna on this date of service. This time includes time spent by me in the following activities:preparing for the visit, reviewing tests, obtaining and/or reviewing a separately obtained history, performing a medically appropriate examination and/or evaluation , counseling and educating the patient/family/caregiver and documenting information in the medical record    Follow Up:   No follow-ups on file.    Alicia Du PA-C   [facility]

## 2022-11-18 ENCOUNTER — TELEPHONE (OUTPATIENT)
Dept: FAMILY MEDICINE CLINIC | Facility: CLINIC | Age: 72
End: 2022-11-18

## 2022-11-21 ENCOUNTER — TELEPHONE (OUTPATIENT)
Dept: FAMILY MEDICINE CLINIC | Facility: CLINIC | Age: 72
End: 2022-11-21

## 2022-11-21 NOTE — TELEPHONE ENCOUNTER
Caller: Joanna Cross    Relationship to patient: Self    Best call back number: [phone]     Chief complaint: DIZZINESS, DRAINAGE     Type of visit: OFFICE VISIT    Requested date:TODAY OR TOMORROW MORNING ONLY

## 2022-11-21 NOTE — TELEPHONE ENCOUNTER
CALLER: Shayna(Speech Therapist)      REQUEST: Plan of Care: Speech therapy to treat displasia, 4-6 wks.   Vmail [email or web address]    TELEPHONE: [phone]

## 2022-11-22 ENCOUNTER — OFFICE VISIT (OUTPATIENT)
Dept: ENDOCRINOLOGY | Facility: CLINIC | Age: 72
End: 2022-11-22

## 2022-11-22 VITALS
WEIGHT: 235 LBS | SYSTOLIC BLOOD PRESSURE: 131 MMHG | DIASTOLIC BLOOD PRESSURE: 56 MMHG | BODY MASS INDEX: 43.24 KG/M2 | HEART RATE: 61 BPM | HEIGHT: 62 IN

## 2022-11-22 DIAGNOSIS — Z79.4 TYPE 2 DIABETES MELLITUS WITH HYPERGLYCEMIA, WITH LONG-TERM CURRENT USE OF INSULIN: Primary | Chronic | ICD-10-CM

## 2022-11-22 DIAGNOSIS — E11.65 TYPE 2 DIABETES MELLITUS WITH HYPERGLYCEMIA, WITH LONG-TERM CURRENT USE OF INSULIN: Primary | Chronic | ICD-10-CM

## 2022-11-22 DIAGNOSIS — E03.9 ACQUIRED HYPOTHYROIDISM: Chronic | ICD-10-CM

## 2022-11-22 PROCEDURE — 99443 PR PHYS/QHP TELEPHONE EVALUATION 21-30 MIN: CPT | Performed by: PHYSICIAN ASSISTANT

## 2022-11-22 NOTE — TELEPHONE ENCOUNTER
That's fine if verbal orders needed and they call back (no # left to call) Dr. Fritz said verbal orders are fine

## 2022-11-22 NOTE — PROGRESS NOTES
Office Note      Date: 2022  Patient Name: Joanna Cross  MRN: [id number]  : 1950    Chief Complaint   Patient presents with   • Diabetes   • Hypothyroidism     You have chosen to receive care through a telephone visit. Do you consent to use a telephone visit for your medical care today? Yes      History of Present Illness  Joanna Cross is a 71 y.o. female who presents today for follow up on type 2 diabetes and hypothyroidism.   Diabetes was diagnosed in .  Current diabetic medications: Lantus and metformin.  She is currently taking Lantus 12 units daily.  She is testing blood sugar 2 times per day.  Fasting readings typically <130.  She denies any hypoglycemia.  Feet: No sores.  She typically sees podiatrist q9 weeks.  She reports numbness and burning sensation improved.  She wonders whether this has improved due to PT since shoulder surgery last month.  Last eye exam: 2022, Dr. Vasu Garland. No retinopathy.  ACE inhibitor/ARB: Valsartan.  Statin: She has been statin intolerant.  We decreased Unithroid to 150 mcg daily 2 months ago.  She reports taking this correctly and regularly.  She reports that she has not felt any different on the lower dose.    She had left reverse shoulder replacement on 10/24/2022.  She reports returning home last week from rehab at [place].  She reports that surgery went well.  She reports seeing surgeon earlier today and sling was removed.  She reports current home PT, OT, and speech therapy.  She has noted lightheadedness and also occasional brief episodes of dizziness/room spinning.  She is concerned about developing vertigo as she has in the past.  She reports pulmonologist recommended that she see ENT or PCP.  She has contacted their offices to schedule appointment.      Review of systems  As noted in HPI.    Past Medical History:   Diagnosis Date   • Anemia    • Ankle problem     thinks back related causing pain    • Atrial fibrillation (HCC)     • AVM (arteriovenous malformation)    • Back pain    • CKD (chronic kidney disease)    • Clotting disorder (HCC) 2016    AVM - small intestine   • COVID-19 vaccine series completed    • Gastrocnemius muscle tear     left medial 91   • Generalized osteoarthritis    • GERD (gastroesophageal reflux disease)    • Gestational diabetes    • GIB (gastrointestinal bleeding) 2016    d/t xarelto    • Headache    • Hearing decreased, bilateral     HAS HEARING AID, NOT WEARING IT CURRENTLY   • Hiatal hernia    • History of shingles    • History of transfusion 2016    no reaction recalled    • Hypertension    • Hypothyroidism    • IBS (irritable bowel syndrome)    • Klebsiella pneumonia (HCC)    • Lichen sclerosus    • Mouth problem     mouth guard used since pt bites tongue and lips excessively at night if not- with bipap    • MRSA infection 2018   • Obesity    • On home oxygen therapy     2L of oxygen all the time due to current congestion    • Osteoporosis    • Parkinson's disease (HCC)    • Peripheral vascular disease (HCC)    • Pleurisy    • Pneumonia    • Puerperal sepsis with acute hypoxic respiratory failure ([facility])     emergent intubation- 2016   • Right leg pain     from back issues    • Salivary gland stone    • Sciatic nerve pain    • Seborrheic dermatitis    • Skin cancer     on back    • Sleep apnea     CPAP AND HOME O2 2L/M   • Type 2 diabetes mellitus (HCC)    • UTI (urinary tract infection)    • Vitamin D deficiency 09/08/2022   • Wears glasses       Past Surgical History:   Procedure Laterality Date   • TUBAL ABDOMINAL LIGATION Bilateral 1980   • DIAGNOSTIC LAPAROSCOPY  1981   • LIPOMA EXCISION  1999    left leg    • BREAST BIOPSY Left 05/2004    excisional, benign   • ENDOSCOPIC FUNCTIONAL SINUS SURGERY (FESS)  2011   • LUNG BIOPSY Left 2016   • ENDOSCOPY  2016   • COLONOSCOPY  2016   • PACEMAKER IMPLANTATION  11/2016    sss   • INCISION AND DRAINAGE OF WOUND  2018    back with wound infection    • LUMBAR  LAMINECTOMY DISCECTOMY DECOMPRESSION N/A 07/06/2018    Procedure: LUMBAR LAMINECTOMY L4-5, HEMILAMIINECTOMY RIGHT L5-S1, FORAMINOTOMY L5-S1;  Surgeon: Lencho Gamino MD;  Location:  [facility];  Service: Neurosurgery   • LUMBAR LAMINECTOMY DISCECTOMY DECOMPRESSION N/A 09/19/2018    Procedure: INCISION AND DRAINAGE BACK WITH WOUND EXPLORATION;  Surgeon: Ritchie García MD;  Location:  [facility];  Service: Neurosurgery   • CARDIAC CATHETERIZATION N/A 02/01/2019    Procedure: Left Heart Cath;  Surgeon: Albertina Corona MD;  Location:  [facility];  Service: Cardiology   • COLONOSCOPY N/A 06/17/2021    Procedure: COLONOSCOPY WITH POLYPECTOMY;  Surgeon: Trenton Sosa MD;  Location:  [facility];  Service: Gastroenterology;  Laterality: N/A;   • TOTAL SHOULDER ARTHROPLASTY W/ DISTAL CLAVICLE EXCISION Left 10/24/2022    Procedure: REVERSE TOTAL SHOULDER ARTHROPLASTY, BICEPS TENODESIS - LEFT;  Surgeon: Sammy Yo MD;  Location:  [facility];  Service: Orthopedics;  Laterality: Left;   • ABLATION OF DYSRHYTHMIC FOCUS  05/16/13    Laser Ablation - Rt Leg   • BACK SURGERY      l4-l5 laminectomy    • BICEPS TENDON REPAIR Right     shoulder   • BRONCHOSCOPY RIGID / FLEXIBLE      2016   • BUNIONECTOMY Right    • CARDIAC CATHETERIZATION     • CHOLECYSTECTOMY     • CORONARY STENT PLACEMENT      x1 stent   • CYST REMOVAL      left ear, upper left back 2001   • CYSTOSCOPY      x2  18 and 20 -   in 20 urethra dilation    • ENTEROSCOPY SMALL BOWEL      with single ballon with fluoro    • HAMMER TOE REPAIR Left    • INSERT / REPLACE / REMOVE PACEMAKER  11/10/16    [facility]   • JOINT REPLACEMENT     • KNEE ARTHROSCOPY     • LASER ABLATION      right leg 13   • OTHER SURGICAL HISTORY      ct scan of chest and sinuses and lower back    • OTHER SURGICAL HISTORY      various echos    • OTHER SURGICAL HISTORY      electroencephalogram 16,   emg  ncv tests 2007   • OTHER SURGICAL  "HISTORY      mra 2007  and various mri with xrays, nuclear medicine lung ventilation with perfusion test 2016 with pft    • OTHER SURGICAL HISTORY      barium swallow testing 15, 12, 12   • OTHER SURGICAL HISTORY      vaginal ultrasound- 2012,   vas clementina lower extrem 2016, vas venous duplex lower extrem bilat 2019   • OTHER SURGICAL HISTORY      wdge biopsy spring of lung    • OTHER SURGICAL HISTORY      emergent intubation- hypoxic resp failure    • REPLACEMENT TOTAL KNEE Bilateral     left knee 2011, right 2012 per dr acuna    • REPLACEMENT TOTAL KNEE Bilateral    • SHOULDER ARTHROSCOPY Bilateral     2003-left, 2004- right    • SKIN BIOPSY      skin cancer back 2016   • SKIN CANCER EXCISION      upper righ tback    • MADHAV     • TEETH EXTRACTION      x2   • WISDOM TOOTH EXTRACTION       The following portions of the patient's history were reviewed and updated as appropriate: allergies, current medications, past family history, past medical history, past social history, past surgical history and problem list.    Vitals per patient today:  /56   Pulse 61   Ht 157.5 cm (62\")   Wt 107 kg (235 lb)   BMI 42.98 kg/m²     Physical Exam  Vitals reviewed.   Neurological:      Mental Status: She is oriented to person, place, and time.       Labs/Imaging    Hemoglobin A1c  Lab Results   Component Value Date    HGBA1C 6.10 (H) 10/12/2022    HGBA1C 6.3 08/16/2022    HGBA1C 5.8 04/25/2022     Glucose  Lab Results   Component Value Date    POCGLU 181 (H) 10/27/2022     CMP  Lab Results   Component Value Date    GLUCOSE 239 (H) 10/25/2022    BUN 16 10/25/2022    CREATININE 0.90 10/25/2022    EGFR 67.6 10/25/2022    BCR 17.8 10/25/2022     10/25/2022    K 3.7 10/25/2022    CO2 26.0 10/25/2022    CALCIUM 8.5 (L) 10/25/2022    PROTENTOTREF 6.9 04/14/2022    ALBUMIN 4.7 04/14/2022    LABGLOBREF 2.2 04/14/2022    BILITOT 0.3 04/14/2022    ALKPHOS 70 04/14/2022    AST 13 04/14/2022    ALT 9 04/14/2022     Thyroid  Lab Results "   Component Value Date    TSH 0.813 09/01/2022    FREET4 1.94 (H) 09/01/2022       Current Outpatient Medications   Medication Instructions   • Accu-Chek Guide test strip AS DIRECTED THREE TIMES A DAY   • Accu-Chek Softclix Lancets lancets AS DIRECTED THREE TIMES A DAY   • albuterol (PROVENTIL) 2.5 mg, Nebulization, Every 4 Hours PRN   • albuterol sulfate HFA (Ventolin HFA) 108 (90 Base) MCG/ACT inhaler 1 puff, Inhalation, Every 4 Hours PRN   • amantadine (SYMMETREL) 100 mg, Oral, 2 Times Daily   • amantadine (SYMMETREL) 100 mg, Oral   • apixaban (ELIQUIS) 5 mg, Oral, Every 12 Hours Scheduled   • aspirin 81 mg, Oral, Daily   • B Complex-C (SUPER B COMPLEX PO) 1 tablet, Oral, Daily   • bumetanide (BUMEX) 1 MG tablet 1 tab po daily as directed   • Calcium Carbonate (CALTRATE 600 PO) 600 mg, Oral, Daily   • carbidopa-levodopa (SINEMET)  MG per tablet Oral, 2 tablets at 0600, 1 tablet at 0900, 1200, 1500, 1800, 2100   • Cholecalciferol 2,000 Units, Oral, 2 Times Daily   • citalopram (CeleXA) 20 MG tablet TAKE 1 TABLET DAILY   • clindamycin (CLEOCIN) 150 mg, Oral, Daily, 4 capsules one hour before dental appointments.    • clobetasol (TEMOVATE) 0.05 % cream 1 application, Topical, 2 Times Daily PRN   • dofetilide (TIKOSYN) 500 MCG capsule TAKE 1 CAPSULE EVERY 12 HOURS   • donepezil (ARICEPT) 5 mg, Oral, Nightly   • Emollient (AQUAPHOR ADVANCED THERAPY EX) Apply externally   • Glucosamine-Chondroit-Calcium (TRIPLE FLEX BONE & JOINT PO) 1 tablet, Oral, Daily, Move free   • Insulin Pen Needle (B-D UF III MINI PEN NEEDLES) 31G X 5 MM misc USE TO INJECT INSULIN DAILY   • IPRATROPIUM BROMIDE NA 1 spray, Nasal, 2 Times Daily PRN   • Lantus SoloStar 12-14 Units, Subcutaneous, Daily   • Loratadine 10 mg, Oral, Daily   • metFORMIN (GLUCOPHAGE) 1000 MG tablet TAKE 1 TABLET TWICE A DAY WITH MEALS   • metOLazone (ZAROXOLYN) 2.5 mg, Oral, Daily   • Multiple Vitamins-Minerals (CENTRUM ULTRA WOMENS PO) 1 tablet, Oral, Daily   •  nitroglycerin (NITROLINGUAL) 0.4 MG/SPRAY spray 1 spray, Sublingual, Every 5 Minutes PRN   • O2 (OXYGEN) 2 L/min, Inhalation, Once, 2L all the time now   • omeprazole (PRILOSEC) 40 mg, Oral, 2 Times Daily   • pramipexole (MIRAPEX) 1.5 mg, Oral, Nightly   • ranolazine (RANEXA) 500 MG 12 hr tablet TAKE 1 TABLET EVERY 12 HOURS   • senna (SENOKOT) 8.6 MG tablet 1 tablet, Oral, Daily   • spironolactone (ALDACTONE) 50 mg, Oral, Daily   • traMADol (ULTRAM) 50 mg, Oral, Every 6 Hours PRN   • triamcinolone (KENALOG) 0.1 % cream 1 application, As Needed   • Unithroid 150 mcg, Oral, Daily   • valsartan (DIOVAN) 160 MG tablet TAKE 1 TABLET TWICE A DAY   • vitamin C (ASCORBIC ACID) 500 mg, Oral, Daily, Chewable tablet   • Zinc 50 MG capsule 1 capsule, Oral, Daily       Assessment & Plan  1. Type 2 diabetes mellitus with hyperglycemia, with long-term current use of insulin ([facility])  A1c last month was okay.  Home blood sugar readings typically at goal.  No known hypoglycemia.  Continue metformin and Lantus.    2. Acquired hypothyroidism  Clinically euthyroid.  Continue Unithroid 150 mcg daily.  She will have labs next month prior to appointment with PCP.  I entered orders for TFTs at that time.  - TSH; Future  - T4, Free; Future    Spoke with patient by phone for 22 minutes.    Return in about 4 months (around 3/22/2023) for next scheduled follow up. She was advised to contact the office with any interval questions or concerns.    BRIGITTE Aj  Endocrinology  11/22/2022

## 2022-11-28 ENCOUNTER — OFFICE VISIT (OUTPATIENT)
Dept: FAMILY MEDICINE CLINIC | Facility: CLINIC | Age: 72
End: 2022-11-28

## 2022-11-28 VITALS
BODY MASS INDEX: 43.21 KG/M2 | SYSTOLIC BLOOD PRESSURE: 122 MMHG | HEART RATE: 70 BPM | OXYGEN SATURATION: 98 % | HEIGHT: 62 IN | WEIGHT: 234.8 LBS | DIASTOLIC BLOOD PRESSURE: 88 MMHG

## 2022-11-28 DIAGNOSIS — J01.01 ACUTE RECURRENT MAXILLARY SINUSITIS: Primary | ICD-10-CM

## 2022-11-28 DIAGNOSIS — G47.33 OSA TREATED WITH BIPAP: ICD-10-CM

## 2022-11-28 PROCEDURE — 99214 OFFICE O/P EST MOD 30 MIN: CPT | Performed by: FAMILY MEDICINE

## 2022-11-28 RX ORDER — DOXYCYCLINE HYCLATE 100 MG/1
100 CAPSULE ORAL 2 TIMES DAILY
Qty: 20 CAPSULE | Refills: 0 | Status: SHIPPED | OUTPATIENT
Start: 2022-11-28 | End: 2022-12-22

## 2022-11-28 NOTE — PROGRESS NOTES
"Chief Complaint  Sinusitis (Onset 1 month. Patient states she had shoulder surgery and was today by xray that she has sinus blockage. Patient is positive for Bilateral Ear Discomfort )    Subjective          Joanna Cross presents to [facility]  History of Present Illness  Patient is a 71-year-old female with chronic oxygen use 2 L via nasal cannula and chronic BiPAP use who presents here for follow-up after she recently had a procedure done there they did a CT of her sinuses showed she had evidence of sinusitis they treated her with a very short course of doxycycline which she thinks started to help her symptoms but did not fully cause resolution.    She states that because of her A. fib and her antiarrhythmic medication she is limited on the antibiotic she can be on.  The doxycycline did help but she feels that she did not get an adequate duration of medication she still has some congestion drainage no wheezing no trouble breathing no increased oxygen requirements      Objective   Vital Signs:   /88   Pulse 70   Ht 157.5 cm (62.01\")   Wt 107 kg (234 lb 12.8 oz)   SpO2 98%   BMI 42.93 kg/m²     Body mass index is 42.93 kg/m².    Review of Systems   Constitutional: Negative.    HENT: Positive for rhinorrhea, sinus pressure, sneezing and sore throat.    Eyes: Negative.    Respiratory: Positive for cough.    Cardiovascular: Negative.    Gastrointestinal: Negative.    Endocrine: Negative.    Genitourinary: Negative.    Musculoskeletal: Negative.    Skin: Negative.    Allergic/Immunologic: Negative.    Neurological: Negative.    Hematological: Negative.    Psychiatric/Behavioral: Negative.        Past History:  Medical History: has a past medical history of Anemia, Ankle problem, Atrial fibrillation ([facility]), AVM (arteriovenous malformation), Back pain, CKD (chronic kidney disease), Clotting disorder ([facility]) (2016), COVID-19 vaccine series completed, Gastrocnemius muscle tear, " Generalized osteoarthritis, GERD (gastroesophageal reflux disease), Gestational diabetes, GIB (gastrointestinal bleeding) (2016), Headache, Hearing decreased, bilateral, Hiatal hernia, History of shingles, History of transfusion (2016), Hypertension, Hypothyroidism, IBS (irritable bowel syndrome), Klebsiella pneumonia (HCC), Lichen sclerosus, Mouth problem, MRSA infection (2018), Obesity, On home oxygen therapy, Osteoporosis, Parkinson's disease (HCC), Peripheral vascular disease (HCC), Pleurisy, Pneumonia, Puerperal sepsis with acute hypoxic respiratory failure (HCC), Right leg pain, Salivary gland stone, Sciatic nerve pain, Seborrheic dermatitis, Skin cancer, Sleep apnea, Type 2 diabetes mellitus (HCC), UTI (urinary tract infection), Vitamin D deficiency (09/08/2022), and Wears glasses.   Surgical History: has a past surgical history that includes Pacemaker Implantation (11/2016); Cholecystectomy; Replacement total knee (Bilateral); [place] tooth extraction; Cyst Removal; Diagnostic laparoscopy (1981); Shoulder arthroscopy (Bilateral); Bunionectomy (Right); Hammer Toe Repair (Left); Functional Endoscopic Sinus Surgery (FESS) (2011); Biceps tendon repair (Right); Lung biopsy (Left, 2016); Esophagogastroduodenoscopy (2016); Cardiac catheterization; lumbar laminectomy discectomy decompression (N/A, 07/06/2018); lumbar laminectomy discectomy decompression (N/A, 09/19/2018); Coronary stent placement; Lipoma Excision (1999); Colonoscopy (2016); Replacement total knee (Bilateral); Incision and drainage of wound (2018); Cardiac catheterization (N/A, 02/01/2019); Skin biopsy; Tubal ligation (Bilateral, 1980); Knee Arthroscopy; MADHAV; Other surgical history; Other surgical history; Other surgical history; Other surgical history; Other surgical history; Other surgical history; Other surgical history; Teeth Extraction; Breast biopsy (Left, 05/2004); Joint replacement; Laser ablation; Skin cancer excision; Other surgical history;  Rigid / flex bronch w/ lavage / Bx / Fb removal; enteroscopy small bowel; Cystoscopy; Back surgery; Colonoscopy (N/A, 06/17/2021); Ablation of dysrhythmic focus (05/16/13); Insert / replace / remove pacemaker (11/10/16); and Total shoulder arthroplasty w/ distal clavicle excision (Left, 10/24/2022).   Family History: family history includes Cancer in her father; Coronary artery disease in her brother, father, and mother; Heart disease in her brother, father, and mother; Hyperlipidemia in her mother; Hypertension in her brother, father, and mother; Hypothyroidism in her father; Kidney cancer in her maternal uncle; Kidney disease in her mother; Testicular cancer in her brother and maternal uncle.   Social History: reports that she has never smoked. She has never used smokeless tobacco. She reports that she does not drink alcohol and does not use drugs.      Current Outpatient Medications:   •  Accu-Chek Guide test strip, AS DIRECTED THREE TIMES A DAY, Disp: 300 each, Rfl: 1  •  Accu-Chek Softclix Lancets lancets, AS DIRECTED THREE TIMES A DAY, Disp: 300 each, Rfl: 1  •  albuterol (PROVENTIL) (2.5 MG/3ML) 0.083% nebulizer solution, Take 2.5 mg by nebulization Every 4 (Four) Hours As Needed for Wheezing or Shortness of Air., Disp: , Rfl:   •  albuterol sulfate HFA (Ventolin HFA) 108 (90 Base) MCG/ACT inhaler, Inhale 1 puff Every 4 (Four) Hours As Needed for Wheezing or Shortness of Air., Disp: 8 g, Rfl: 5  •  amantadine (SYMMETREL) 100 MG capsule, Take 100 mg by mouth 2 (Two) Times a Day., Disp: , Rfl:   •  amantadine (SYMMETREL) 100 MG capsule, Take 100 mg by mouth., Disp: , Rfl:   •  apixaban (Eliquis) 5 MG tablet tablet, Take 1 tablet by mouth Every 12 (Twelve) Hours., Disp: 180 tablet, Rfl: 2  •  aspirin 81 MG EC tablet, Take 81 mg by mouth Daily., Disp: , Rfl:   •  B Complex-C (SUPER B COMPLEX PO), Take 1 tablet by mouth Daily., Disp: , Rfl:   •  bumetanide (BUMEX) 1 MG tablet, 1 tab po daily as directed (Patient  taking differently: Take 1 mg by mouth Daily. 1 tab po daily as directed), Disp: 90 tablet, Rfl: 0  •  Calcium Carbonate (CALTRATE 600 PO), Take 600 mg by mouth Daily., Disp: , Rfl:   •  carbidopa-levodopa (SINEMET)  MG per tablet, Take  by mouth. 2 tablets at 0600, 1 tablet at 0900, 1200, 1500, 1800, 2100, Disp: , Rfl:   •  Cholecalciferol 2000 units tablet, Take 2,000 Units by mouth 2 (Two) Times a Day., Disp: , Rfl:   •  citalopram (CeleXA) 20 MG tablet, TAKE 1 TABLET DAILY (Patient taking differently: Take 20 mg by mouth Daily.), Disp: 90 tablet, Rfl: 3  •  clindamycin (CLEOCIN) 150 MG capsule, Take 150 mg by mouth Daily. 4 capsules one hour before dental appointments., Disp: , Rfl:   •  clobetasol (TEMOVATE) 0.05 % cream, Apply 1 application topically 2 (Two) Times a Day As Needed (Lichens Sclerosis)., Disp: , Rfl:   •  dofetilide (TIKOSYN) 500 MCG capsule, TAKE 1 CAPSULE EVERY 12 HOURS (Patient taking differently: Take 500 mcg by mouth 2 (Two) Times a Day.), Disp: 180 capsule, Rfl: 3  •  donepezil (ARICEPT) 5 MG tablet, Take 5 mg by mouth Every Night., Disp: , Rfl:   •  doxycycline (VIBRAMYCIN) 100 MG capsule, Take 1 capsule by mouth 2 (Two) Times a Day., Disp: 20 capsule, Rfl: 0  •  Emollient (AQUAPHOR ADVANCED THERAPY EX), Apply  topically., Disp: , Rfl:   •  Glucosamine-Chondroit-Calcium (TRIPLE FLEX BONE & JOINT PO), Take 1 tablet by mouth Daily. Move free, Disp: , Rfl:   •  Insulin Glargine (Lantus SoloStar) 100 UNIT/ML injection pen, Inject 12-14 Units under the skin into the appropriate area as directed Daily., Disp: , Rfl:   •  Insulin Pen Needle (B-D UF III MINI PEN NEEDLES) 31G X 5 MM misc, USE TO INJECT INSULIN DAILY, Disp: 100 each, Rfl: 3  •  IPRATROPIUM BROMIDE NA, 1 spray into the nostril(s) as directed by provider 2 (Two) Times a Day As Needed (CONGESTION SINUS)., Disp: , Rfl:   •  Loratadine 10 MG capsule, Take 10 mg by mouth Daily., Disp: , Rfl:   •  metFORMIN (GLUCOPHAGE) 1000 MG  tablet, TAKE 1 TABLET TWICE A DAY WITH MEALS (Patient taking differently: Take 1,000 mg by mouth 2 (Two) Times a Day With Meals.), Disp: 180 tablet, Rfl: 3  •  metOLazone (ZAROXOLYN) 2.5 MG tablet, Take 1 tablet by mouth Daily., Disp: 90 tablet, Rfl: 1  •  Multiple Vitamins-Minerals (CENTRUM ULTRA WOMENS PO), Take 1 tablet by mouth Daily., Disp: , Rfl:   •  nitroglycerin (NITROLINGUAL) 0.4 MG/SPRAY spray, Place 1 spray under the tongue Every 5 (Five) Minutes As Needed for Chest Pain., Disp: 1 each, Rfl: 6  •  O2 (OXYGEN), Inhale 2 L/min 1 (One) Time. 2L all the time now, Disp: , Rfl:   •  omeprazole (priLOSEC) 40 MG capsule, Take 40 mg by mouth 2 (Two) Times a Day., Disp: , Rfl:   •  pramipexole (MIRAPEX) 1.5 MG tablet, Take 1.5 mg by mouth Every Night., Disp: , Rfl:   •  ranolazine (RANEXA) 500 MG 12 hr tablet, TAKE 1 TABLET EVERY 12 HOURS (Patient taking differently: 500 mg 2 (Two) Times a Day.), Disp: 180 tablet, Rfl: 3  •  senna (SENOKOT) 8.6 MG tablet, Take 1 tablet by mouth Daily., Disp: , Rfl:   •  spironolactone (ALDACTONE) 25 MG tablet, Take 2 tablets by mouth Daily., Disp: 180 tablet, Rfl: 2  •  traMADol (ULTRAM) 50 MG tablet, Take 1 tablet by mouth Every 6 (Six) Hours As Needed for Moderate Pain ., Disp: 30 tablet, Rfl: 2  •  triamcinolone (KENALOG) 0.1 % cream, 1 application As Needed for Irritation or Rash., Disp: , Rfl:   •  Unithroid 150 MCG tablet, Take 1 tablet by mouth Daily., Disp: 90 tablet, Rfl: 1  •  valsartan (DIOVAN) 160 MG tablet, TAKE 1 TABLET TWICE A DAY (Patient taking differently: Take 160 mg by mouth 2 (Two) Times a Day.), Disp: 180 tablet, Rfl: 0  •  vitamin C (ASCORBIC ACID) 500 MG tablet, Take 500 mg by mouth Daily. Chewable tablet, Disp: , Rfl:   •  Zinc 50 MG capsule, Take 1 capsule by mouth Daily., Disp: , Rfl:     Allergies: Toprol xl [metoprolol tartrate]; Amlodipine besylate; Codeine; Entacapone; Epinephrine; Levemir [insulin detemir]; Penicillins; Xarelto [rivaroxaban];  Bactrim [sulfamethoxazole-trimethoprim]; Benztropine mesylate; Cimetidine; Ciprofloxacin; Cogentin [benztropine]; Compazine [prochlorperazine edisylate]; Cortisone; Duraprep [antiseptic products, misc.]; Erythromycin base; Flurandrenolone; Haldol [haloperidol lactate]; Hydralazine; Hydrochlorothiazide; Hydrocodone-acetaminophen; Levaquin [levofloxacin]; Lisinopril; Macrolides and ketolides; Statins; Tarka [trandolapril-verapamil hcl er]; and Verapamil    Physical Exam  Vitals reviewed: on 2 L oxygen via [place] - in a wheelchair.   Constitutional:       Appearance: Normal appearance. She is normal weight.   HENT:      Head: Normocephalic and atraumatic.   Eyes:      Conjunctiva/sclera: Conjunctivae normal.   Cardiovascular:      Rate and Rhythm: Normal rate and regular rhythm.   Pulmonary:      Effort: Pulmonary effort is normal.      Breath sounds: Normal breath sounds.   Musculoskeletal:         General: Normal range of motion.      Cervical back: Normal range of motion and neck supple.   Skin:     General: Skin is warm and dry.   Neurological:      General: No focal deficit present.      Mental Status: She is alert and oriented to person, place, and time. Mental status is at baseline.   Psychiatric:         Mood and Affect: Mood normal.         Behavior: Behavior normal.         Thought Content: Thought content normal.         Judgment: Judgment normal.          Result Review :                   Assessment and Plan    Diagnoses and all orders for this visit:    1. Acute recurrent maxillary sinusitis (Primary)  No evidence of respiratory distress for now since she really does have significant amount of allergies and medication interactions listed to the doxycycline we will do that for a full 10-day course and I have discussed with her she has an upcoming appointment with ENT the second week of December to follow-up with Dr. Pringle of course return precautions otherwise provided patient voiced full understanding    2. MILLI  treated with BiPAP - Patient reports compliance.   As above    Other orders  -     doxycycline (VIBRAMYCIN) 100 MG capsule; Take 1 capsule by mouth 2 (Two) Times a Day.  Dispense: 20 capsule; Refill: 0      MEDICATION SIDE EFFECTS, RISKS AND SIDE EFFECTS HAVE BEEN DISCUSSED WITH PATIENT. PATIENT NOTES UNDERSTANDING. IF ANY CONCERN OR QUESTION REGARDING MEDICATION PLEASE CONTACT.         Follow Up   No follow-ups on file.  Patient was given instructions and counseling regarding her condition or for health maintenance advice. Please see specific information pulled into the AVS if appropriate.     Renae Patten DO

## 2022-12-06 RX ORDER — LANCETS
EACH MISCELLANEOUS
Qty: 300 EACH | Refills: 1 | Status: SHIPPED | OUTPATIENT
Start: 2022-12-06 | End: 2023-03-20 | Stop reason: SDUPTHER

## 2022-12-06 RX ORDER — BLOOD SUGAR DIAGNOSTIC
STRIP MISCELLANEOUS
Qty: 200 EACH | Refills: 3 | Status: SHIPPED | OUTPATIENT
Start: 2022-12-06 | End: 2023-03-20 | Stop reason: SDUPTHER

## 2022-12-07 ENCOUNTER — OFFICE VISIT (OUTPATIENT)
Dept: FAMILY MEDICINE CLINIC | Facility: CLINIC | Age: 72
End: 2022-12-07

## 2022-12-07 VITALS
OXYGEN SATURATION: 98 % | HEART RATE: 78 BPM | WEIGHT: 234 LBS | HEIGHT: 62 IN | SYSTOLIC BLOOD PRESSURE: 134 MMHG | BODY MASS INDEX: 43.06 KG/M2 | DIASTOLIC BLOOD PRESSURE: 82 MMHG

## 2022-12-07 DIAGNOSIS — E03.9 ACQUIRED HYPOTHYROIDISM: ICD-10-CM

## 2022-12-07 DIAGNOSIS — Z79.4 TYPE 2 DIABETES MELLITUS WITH HYPERGLYCEMIA, WITH LONG-TERM CURRENT USE OF INSULIN: ICD-10-CM

## 2022-12-07 DIAGNOSIS — I10 HYPERTENSION, ESSENTIAL: ICD-10-CM

## 2022-12-07 DIAGNOSIS — Z79.4 TYPE 2 DIABETES MELLITUS WITH STAGE 3B CHRONIC KIDNEY DISEASE, WITH LONG-TERM CURRENT USE OF INSULIN: ICD-10-CM

## 2022-12-07 DIAGNOSIS — N18.32 TYPE 2 DIABETES MELLITUS WITH STAGE 3B CHRONIC KIDNEY DISEASE, WITH LONG-TERM CURRENT USE OF INSULIN: ICD-10-CM

## 2022-12-07 DIAGNOSIS — E55.9 VITAMIN D DEFICIENCY: ICD-10-CM

## 2022-12-07 DIAGNOSIS — B37.2 YEAST DERMATITIS: Primary | ICD-10-CM

## 2022-12-07 DIAGNOSIS — J98.4 CHRONIC LUNG DISEASE: ICD-10-CM

## 2022-12-07 DIAGNOSIS — I25.10 CORONARY ARTERY DISEASE INVOLVING NATIVE CORONARY ARTERY OF NATIVE HEART WITHOUT ANGINA PECTORIS: ICD-10-CM

## 2022-12-07 DIAGNOSIS — N18.32 CHRONIC KIDNEY DISEASE, STAGE 3B: ICD-10-CM

## 2022-12-07 DIAGNOSIS — E11.65 TYPE 2 DIABETES MELLITUS WITH HYPERGLYCEMIA, WITH LONG-TERM CURRENT USE OF INSULIN: ICD-10-CM

## 2022-12-07 DIAGNOSIS — E11.22 TYPE 2 DIABETES MELLITUS WITH STAGE 3B CHRONIC KIDNEY DISEASE, WITH LONG-TERM CURRENT USE OF INSULIN: ICD-10-CM

## 2022-12-07 DIAGNOSIS — E53.8 VITAMIN B12 DEFICIENCY: ICD-10-CM

## 2022-12-07 DIAGNOSIS — D64.9 ANEMIA, UNSPECIFIED TYPE: ICD-10-CM

## 2022-12-07 DIAGNOSIS — K21.9 GASTROESOPHAGEAL REFLUX DISEASE WITHOUT ESOPHAGITIS: ICD-10-CM

## 2022-12-07 PROCEDURE — 99214 OFFICE O/P EST MOD 30 MIN: CPT | Performed by: FAMILY MEDICINE

## 2022-12-07 PROCEDURE — 36415 COLL VENOUS BLD VENIPUNCTURE: CPT | Performed by: FAMILY MEDICINE

## 2022-12-07 RX ORDER — NYSTATIN 100000 U/G
1 OINTMENT TOPICAL 2 TIMES DAILY
Qty: 3 G | Refills: 3 | Status: SHIPPED | OUTPATIENT
Start: 2022-12-07

## 2022-12-07 RX ORDER — PANTOPRAZOLE SODIUM 40 MG/1
40 TABLET, DELAYED RELEASE ORAL DAILY
Qty: 30 TABLET | Refills: 2 | Status: SHIPPED | OUTPATIENT
Start: 2022-12-07 | End: 2023-02-22 | Stop reason: SDUPTHER

## 2022-12-08 LAB
25(OH)D3+25(OH)D2 SERPL-MCNC: 73.9 NG/ML (ref 30–100)
ALBUMIN SERPL-MCNC: 4.2 G/DL (ref 3.7–4.7)
ALBUMIN/GLOB SERPL: 2 {RATIO} (ref 1.2–2.2)
ALP SERPL-CCNC: 98 IU/L (ref 44–121)
ALT SERPL-CCNC: 13 IU/L (ref 0–32)
AST SERPL-CCNC: 18 IU/L (ref 0–40)
BILIRUB SERPL-MCNC: 0.3 MG/DL (ref 0–1.2)
BUN SERPL-MCNC: 48 MG/DL (ref 8–27)
BUN/CREAT SERPL: 42 (ref 12–28)
CALCIUM SERPL-MCNC: 9.3 MG/DL (ref 8.7–10.3)
CHLORIDE SERPL-SCNC: 94 MMOL/L (ref 96–106)
CHOLEST SERPL-MCNC: 141 MG/DL (ref 100–199)
CO2 SERPL-SCNC: 24 MMOL/L (ref 20–29)
CREAT SERPL-MCNC: 1.14 MG/DL (ref 0.57–1)
EGFRCR SERPLBLD CKD-EPI 2021: 51 ML/MIN/1.73
GLOBULIN SER CALC-MCNC: 2.1 G/DL (ref 1.5–4.5)
GLUCOSE SERPL-MCNC: 192 MG/DL (ref 70–99)
HBA1C MFR BLD: 7 % (ref 4.8–5.6)
HDLC SERPL-MCNC: 37 MG/DL
LDLC SERPL CALC-MCNC: 87 MG/DL (ref 0–99)
POTASSIUM SERPL-SCNC: 3.9 MMOL/L (ref 3.5–5.2)
PROT SERPL-MCNC: 6.3 G/DL (ref 6–8.5)
SODIUM SERPL-SCNC: 135 MMOL/L (ref 134–144)
T4 FREE SERPL-MCNC: 1.69 NG/DL (ref 0.82–1.77)
TRIGL SERPL-MCNC: 89 MG/DL (ref 0–149)
TSH SERPL DL<=0.005 MIU/L-ACNC: 3.03 UIU/ML (ref 0.45–4.5)
VIT B12 SERPL-MCNC: 734 PG/ML (ref 232–1245)
VLDLC SERPL CALC-MCNC: 17 MG/DL (ref 5–40)

## 2022-12-19 ENCOUNTER — TELEPHONE (OUTPATIENT)
Dept: FAMILY MEDICINE CLINIC | Facility: CLINIC | Age: 72
End: 2022-12-19

## 2022-12-19 NOTE — TELEPHONE ENCOUNTER
CALLER: Shayna Thomas(Speech Therapist)      REQUEST: Release patient from therapy for their dysphasia. Patient is doing well and independent.       TELEPHONE: [phone]

## 2022-12-20 ENCOUNTER — TELEMEDICINE (OUTPATIENT)
Dept: PULMONOLOGY | Facility: CLINIC | Age: 72
End: 2022-12-20

## 2022-12-20 DIAGNOSIS — G47.33 OSA TREATED WITH BIPAP: Primary | ICD-10-CM

## 2022-12-20 PROCEDURE — 99213 OFFICE O/P EST LOW 20 MIN: CPT | Performed by: NURSE PRACTITIONER

## 2022-12-20 NOTE — PROGRESS NOTES
[facility] Pulmonary Follow up    CHIEF COMPLAINT    Fatigue    HISTORY OF PRESENT ILLNESS    Joanna Cross is a 72 y.o.female here today for a telemedicine visit.  She was last seen in the office by me in June.  She denies any respiratory illnesses since her last appointment.    She had a shoulder surgery performed in October and since that time she has been unable to get her BiPAP settings correct.  She states that she was sent to rehabilitation and thinks that her settings were changed by one of the workers.    She has been off her BiPAP for about 3 weeks and has noticed more sleeping difficulties and not feeling well being off her BiPAP.  She is also wanting to change masks to a nasal cushion.    She denies any sputum production or hemoptysis.  She denies any chest pain or palpitations.  She denies any lower extremity edema or calf tenderness.    She continues to use her oxygen anywhere from 2 to 3 L at home.    She is up-to-date on her current vaccinations  Patient Active Problem List   Diagnosis   • Coronary artery disease involving native coronary artery without angina pectoris   • Hypertension, essential   • Peripheral vascular disease ([facility])   • Hyperlipidemia LDL goal <70   • Spinal stenosis, lumbar region, with neurogenic claudication   • Delayed surgical wound healing   • Biceps tendinitis   • Overactive bladder   • GERD (gastroesophageal reflux disease)   • Hypothyroidism   • Lichen sclerosus   • Parkinson's disease ([facility])   • MILLI treated with BiPAP - Patient reports compliance.    • History of respiratory failure - prior respiratory failure requiring mechanical ventilation, open lung biopsy non-specific.    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[facility])   • Long term current use of antiarrhythmic drug   • History of adenomatous polyp of colon   • Anemia   • Angiodysplasia   • Hx of colonic polyps   • Body mass index 40.0-44.9, adult ([facility])   • Cardiac  "pacemaker in situ   • Chronic low back pain   • Chronic lung disease   • Degeneration of lumbar intervertebral disc   • Edema of lower extremity   • High risk medication use   • History of malignant neoplasm of skin   • History of TIA (transient ischemic attack)   • Hyponatremia   • Hypotonic bladder   • Incomplete tear of rotator cuff   • Major depression in remission ([facility])   • Migraine   • Weakness   • Tinnitus of both ears   • Primary osteoarthritis involving multiple joints   • Restless legs   • Seborrheic dermatitis   • Sphenoid sinusitis   • Transient cerebral ischemia   • Urinary tract infection   • Urinary urgency   • Type 2 diabetes mellitus ([facility])   • Vitamin B12 deficiency   • Vitamin D deficiency   • Chronic kidney disease, stage 3b ([facility])   • Status post reverse total replacement of left shoulder   • Postoperative pain       Allergies   Allergen Reactions   • Toprol Xl [Metoprolol Tartrate] Shortness Of Breath     Extreme fatigue   • Amlodipine Besylate Swelling     Lower extremity (ankles, feet) swelling   • Codeine Unknown (See Comments)     Pt is unaware of what reaction she had   • Entacapone Other (See Comments)     \"extreme weakness in legs - caused several falls, which stopped after discontinuing this medication\"   • Epinephrine Other (See Comments)     6/4/16- had 3 shots to numb mouth to prepare teeth for crowns, the shots contained epi-  Caused pt to have chest discomfort- went to hospital in ambulance, discovered had a fib while there    • Levemir [Insulin Detemir] Hives     Hives / rash around injection site   • Penicillins Hives     Jitteriness    • Xarelto [Rivaroxaban] GI Bleeding     hgb dropped to 5.2   • Bactrim [Sulfamethoxazole-Trimethoprim] Nausea Only and Other (See Comments)     Headache -  Cant be taken with tikosyn - septra ds   • Benztropine Mesylate Other (See Comments)     Uncontrollable body movements   • Cimetidine Other (See Comments)     Cant be taken with tikosyn    • " "Ciprofloxacin Diarrhea   • Cogentin [Benztropine] Other (See Comments)     \"uncontrollable body movements\"   • Compazine [Prochlorperazine Edisylate] Other (See Comments)     Dystonic reaction   • Cortisone Other (See Comments)     MAKES BLOOD PRESSURE HIGH    • Duraprep [Antiseptic Products, Misc.] Itching and Rash     RASH AND ITCHING   • Erythromycin Base Other (See Comments)     Cant be taken with tikosyn - z pack and ketek    • Flurandrenolone Other (See Comments)     Cant be taken with tikosyn     Include - levaquin, cipro, norloxacin    • Haldol [Haloperidol Lactate] Other (See Comments)     Dystonic reaction   • Hydralazine Other (See Comments)     Headache    • Hydrochlorothiazide Other (See Comments)     Cant be taken with tikosyn -  Also hydrodiuril, microzide, hydro-par, oretic, esidrix, ezide or any medicine with hct or hctz in name    • Hydrocodone-Acetaminophen Nausea And Vomiting and Dizziness     Headache, nausea    • Levaquin [Levofloxacin] Other (See Comments)     Cannot take due to taking propafenone HCL- severe reaction with mixed.    • Lisinopril Cough   • Macrolides And Ketolides Other (See Comments)     antibx -   Cant be taken with tikosyn    • Statins Myalgia     Leg pain- all statins    • Tarka [Trandolapril-Verapamil Hcl Er] Other (See Comments)     Constipation    • Verapamil Other (See Comments)     Cant be taken with tikosyn - calan, covera-hs, isoptin, verelan or tarka        Current Outpatient Medications:   •  Accu-Chek Guide test strip, USE TO TEST 3 TIMES DAILY, Disp: 200 each, Rfl: 3  •  Accu-Chek Softclix Lancets lancets, USE ONE LANCET TO TEST THREE TIMES A DAY, Disp: 300 each, Rfl: 1  •  albuterol (PROVENTIL) (2.5 MG/3ML) 0.083% nebulizer solution, Take 2.5 mg by nebulization Every 4 (Four) Hours As Needed for Wheezing or Shortness of Air., Disp: , Rfl:   •  albuterol sulfate HFA (Ventolin HFA) 108 (90 Base) MCG/ACT inhaler, Inhale 1 puff Every 4 (Four) Hours As Needed for " Wheezing or Shortness of Air., Disp: 8 g, Rfl: 5  •  amantadine (SYMMETREL) 100 MG capsule, Take 100 mg by mouth 2 (Two) Times a Day., Disp: , Rfl:   •  amantadine (SYMMETREL) 100 MG capsule, Take 100 mg by mouth., Disp: , Rfl:   •  apixaban (Eliquis) 5 MG tablet tablet, Take 1 tablet by mouth Every 12 (Twelve) Hours., Disp: 180 tablet, Rfl: 2  •  aspirin 81 MG EC tablet, Take 81 mg by mouth Daily., Disp: , Rfl:   •  B Complex-C (SUPER B COMPLEX PO), Take 1 tablet by mouth Daily., Disp: , Rfl:   •  bumetanide (BUMEX) 1 MG tablet, 1 tab po daily as directed (Patient taking differently: Take 1 mg by mouth Daily. 1 tab po daily as directed), Disp: 90 tablet, Rfl: 0  •  Calcium Carbonate (CALTRATE 600 PO), Take 600 mg by mouth Daily., Disp: , Rfl:   •  carbidopa-levodopa (SINEMET)  MG per tablet, Take  by mouth. 2 tablets at 0600, 1 tablet at 0900, 1200, 1500, 1800, 2100, Disp: , Rfl:   •  Cholecalciferol 2000 units tablet, Take 2,000 Units by mouth 2 (Two) Times a Day., Disp: , Rfl:   •  citalopram (CeleXA) 20 MG tablet, TAKE 1 TABLET DAILY (Patient taking differently: Take 20 mg by mouth Daily.), Disp: 90 tablet, Rfl: 3  •  clindamycin (CLEOCIN) 150 MG capsule, Take 150 mg by mouth Daily. 4 capsules one hour before dental appointments., Disp: , Rfl:   •  clobetasol (TEMOVATE) 0.05 % cream, Apply 1 application topically 2 (Two) Times a Day As Needed (Lichens Sclerosis)., Disp: , Rfl:   •  dofetilide (TIKOSYN) 500 MCG capsule, TAKE 1 CAPSULE EVERY 12 HOURS (Patient taking differently: Take 500 mcg by mouth 2 (Two) Times a Day.), Disp: 180 capsule, Rfl: 3  •  donepezil (ARICEPT) 5 MG tablet, Take 5 mg by mouth Every Night., Disp: , Rfl:   •  doxycycline (VIBRAMYCIN) 100 MG capsule, Take 1 capsule by mouth 2 (Two) Times a Day., Disp: 20 capsule, Rfl: 0  •  Emollient (AQUAPHOR ADVANCED THERAPY EX), Apply  topically., Disp: , Rfl:   •  Glucosamine-Chondroit-Calcium (TRIPLE FLEX BONE & JOINT PO), Take 1 tablet by mouth  Daily. Move free, Disp: , Rfl:   •  Insulin Glargine (Lantus SoloStar) 100 UNIT/ML injection pen, Inject 12-14 Units under the skin into the appropriate area as directed Daily., Disp: , Rfl:   •  Insulin Pen Needle (B-D UF III MINI PEN NEEDLES) 31G X 5 MM misc, USE TO INJECT INSULIN DAILY, Disp: 100 each, Rfl: 3  •  IPRATROPIUM BROMIDE NA, 1 spray into the nostril(s) as directed by provider 2 (Two) Times a Day As Needed (CONGESTION SINUS)., Disp: , Rfl:   •  Loratadine 10 MG capsule, Take 10 mg by mouth Daily., Disp: , Rfl:   •  metFORMIN (GLUCOPHAGE) 1000 MG tablet, TAKE 1 TABLET TWICE A DAY WITH MEALS (Patient taking differently: Take 1,000 mg by mouth 2 (Two) Times a Day With Meals.), Disp: 180 tablet, Rfl: 3  •  metOLazone (ZAROXOLYN) 2.5 MG tablet, Take 1 tablet by mouth Daily., Disp: 90 tablet, Rfl: 1  •  Multiple Vitamins-Minerals (CENTRUM ULTRA WOMENS PO), Take 1 tablet by mouth Daily., Disp: , Rfl:   •  nitroglycerin (NITROLINGUAL) 0.4 MG/SPRAY spray, Place 1 spray under the tongue Every 5 (Five) Minutes As Needed for Chest Pain., Disp: 1 each, Rfl: 6  •  nystatin (MYCOSTATIN) 165380 UNIT/GM ointment, Apply 1 application topically to the appropriate area as directed 2 (Two) Times a Day., Disp: 3 g, Rfl: 3  •  O2 (OXYGEN), Inhale 2 L/min 1 (One) Time. 2L all the time now, Disp: , Rfl:   •  pantoprazole (Protonix) 40 MG EC tablet, Take 1 tablet by mouth Daily., Disp: 30 tablet, Rfl: 2  •  pramipexole (MIRAPEX) 1.5 MG tablet, Take 1.5 mg by mouth Every Night., Disp: , Rfl:   •  ranolazine (RANEXA) 500 MG 12 hr tablet, TAKE 1 TABLET EVERY 12 HOURS (Patient taking differently: 500 mg 2 (Two) Times a Day.), Disp: 180 tablet, Rfl: 3  •  senna (SENOKOT) 8.6 MG tablet, Take 1 tablet by mouth Daily., Disp: , Rfl:   •  spironolactone (ALDACTONE) 25 MG tablet, Take 2 tablets by mouth Daily., Disp: 180 tablet, Rfl: 2  •  traMADol (ULTRAM) 50 MG tablet, Take 1 tablet by mouth Every 6 (Six) Hours As Needed for Moderate  Pain ., Disp: 30 tablet, Rfl: 2  •  triamcinolone (KENALOG) 0.1 % cream, 1 application As Needed for Irritation or Rash., Disp: , Rfl:   •  Unithroid 150 MCG tablet, Take 1 tablet by mouth Daily., Disp: 90 tablet, Rfl: 1  •  valsartan (DIOVAN) 160 MG tablet, TAKE 1 TABLET TWICE A DAY (Patient taking differently: Take 160 mg by mouth 2 (Two) Times a Day.), Disp: 180 tablet, Rfl: 0  •  vitamin C (ASCORBIC ACID) 500 MG tablet, Take 500 mg by mouth Daily. Chewable tablet, Disp: , Rfl:   •  Zinc 50 MG capsule, Take 1 capsule by mouth Daily., Disp: , Rfl:   MEDICATION LIST AND ALLERGIES REVIEWED.    Social History     Tobacco Use   • Smoking status: Never     Passive exposure: Past   • Smokeless tobacco: Never   • Tobacco comments:     Father and mother smoked for several years.   Vaping Use   • Vaping Use: Never used   Substance Use Topics   • Alcohol use: Never   • Drug use: No       FAMILY AND SOCIAL HISTORY REVIEWED.    Review of Systems   Constitutional: Positive for fatigue. Negative for activity change, appetite change, fever and unexpected weight change.   HENT: Negative for congestion, postnasal drip, rhinorrhea, sinus pressure, sore throat and voice change.    Eyes: Negative for visual disturbance.   Respiratory: Positive for shortness of breath. Negative for cough, chest tightness and wheezing.    Cardiovascular: Negative for chest pain, palpitations and leg swelling.   Gastrointestinal: Negative for abdominal distention, abdominal pain, nausea and vomiting.   Endocrine: Negative for cold intolerance and heat intolerance.   Genitourinary: Negative for difficulty urinating and urgency.   Musculoskeletal: Negative for arthralgias, back pain and neck pain.   Skin: Negative for color change and pallor.   Allergic/Immunologic: Negative for environmental allergies and food allergies.   Neurological: Negative for dizziness, syncope, weakness and light-headedness.   Hematological: Negative for adenopathy. Does not  bruise/bleed easily.   Psychiatric/Behavioral: Negative for agitation and behavioral problems.   .    LMP  (LMP Unknown) Comment: Mammogram- 1/28/20    Immunization History   Administered Date(s) Administered   • COVID-19 (MODERNA) 1st, 2nd, 3rd Dose Only 02/12/2021, 03/12/2021, 11/09/2021   • Covid-19 (Pfizer) Gray Cap 08/03/2022   • Flu Vaccine Quad PF >36MO 10/02/2017, 09/11/2018, 09/21/2019   • Fluzone High Dose =>65 Years ([facility] ONLY) 09/13/2017, 11/23/2022   • Fluzone High-Dose 65+yrs 09/16/2021   • Hepatitis A 05/03/1999, 10/05/1999   • INFLUENZA SPLIT TRI 09/15/2010, 10/02/2012, 10/02/2013   • Influenza, Unspecified 01/13/2004, 10/28/2004, 12/14/2005, 01/11/2007, 11/30/2007, 11/20/2008, 02/17/2010, 01/31/2011, 09/06/2011, 09/09/2013, 09/13/2017, 09/11/2018   • Pneumococcal Conjugate 13-Valent (PCV13) 09/04/2015, 12/04/2016   • Pneumococcal Conjugate 20-Valent (PCV20) 06/08/2022   • Pneumococcal Polysaccharide (PPSV23) 01/13/2004, 11/20/2008, 12/20/2013   • Pneumococcal, Unspecified 12/20/2013, 12/04/2016   • Shingrix 09/27/2019   • TD Preservative Free 02/01/2012, 05/04/2017   • Tdap 05/04/2017   • Zostavax 02/05/2013       Physical Exam    Unable to do physical exam due to telemedicine visit, patient was in no respiratory distress throughout the entire visit    RESULTS    PROBLEM LIST    Problem List Items Addressed This Visit        Sleep    MILLI treated with BiPAP - Patient reports compliance.  - Primary    Overview     - Patient reports compliance.               DISCUSSION  You have chosen to receive care through a telehealth visit.  Do you consent to use a video/audio connection for your medical care today? Yes    Mrs. Cross was here for follow-up.  She seems to be doing fairly well from pulmonary standpoint.  Fortunately her BiPAP machine is not working correctly and she does need the settings adjusted.  She has not been able to wear her BiPAP for 3 weeks.  I did encourage her to take her BiPAP down  to her local DME company to match the settings with her previous settings.  I think the settings were changed accidentally.      Once we can get her settings correctly she will be able to wear her BiPAP.  She will continue to wear her oxygen at 2 L at home.    She will follow-up in 6 months or sooner if her symptoms worsen.  I did advise her to call with any additional concerns or questions.    I personally spent a total of 23 minutes on patient visit today including chart review, face to face with the patient obtaining the history and physical exam, review of pertinent images and tests, counseling and discussion and/or coordination of care as described above, and documentation.  Total time excludes time spent on other separate services such as performing procedures or test interpretation, if applicable.        Myrna Mckeon, APRN  12/20/202214:11 EST  Electronically signed     Please note that portions of this note were completed with a voice recognition program.        CC: Reynaldo Fritz MD

## 2022-12-22 ENCOUNTER — TELEMEDICINE (OUTPATIENT)
Dept: FAMILY MEDICINE CLINIC | Facility: CLINIC | Age: 72
End: 2022-12-22

## 2022-12-22 VITALS
SYSTOLIC BLOOD PRESSURE: 143 MMHG | BODY MASS INDEX: 41.64 KG/M2 | DIASTOLIC BLOOD PRESSURE: 80 MMHG | HEIGHT: 63 IN | WEIGHT: 235 LBS

## 2022-12-22 DIAGNOSIS — G47.33 OSA TREATED WITH BIPAP: Primary | ICD-10-CM

## 2022-12-22 DIAGNOSIS — E55.9 VITAMIN D DEFICIENCY: ICD-10-CM

## 2022-12-22 DIAGNOSIS — E11.65 TYPE 2 DIABETES MELLITUS WITH HYPERGLYCEMIA, WITHOUT LONG-TERM CURRENT USE OF INSULIN: ICD-10-CM

## 2022-12-22 DIAGNOSIS — N18.32 CHRONIC KIDNEY DISEASE, STAGE 3B: ICD-10-CM

## 2022-12-22 DIAGNOSIS — E53.8 VITAMIN B12 DEFICIENCY: ICD-10-CM

## 2022-12-22 PROCEDURE — 99214 OFFICE O/P EST MOD 30 MIN: CPT | Performed by: FAMILY MEDICINE

## 2022-12-22 NOTE — ASSESSMENT & PLAN NOTE
GFR is 51, Patient is instructed to not take any NSAIDs.  Medicines as directed.  Stay well-hydrated.

## 2022-12-22 NOTE — PROGRESS NOTES
Telehealth E-Visit      Patient Name: Joanna Cross  : 1950   MRN: [id number]     Chief Complaint:    Chief Complaint   Patient presents with   • follow up on blood work       I have reviewed the E-Visit questionnaire and the patient's answers, my assessment and plan are listed below.   You have chosen to receive care through a telehealth visit.  Do you consent to use a video/audio connection for your medical care today? yes    History of Present Illness: Joanna Cross is a 72 y.o. female who is here today to follow up with thyroid      Subjective      Review of Systems:   Review of Systems   Constitutional: Positive for fatigue.   HENT: Negative.    Eyes: Negative.    Respiratory: Negative.    Cardiovascular: Negative.    Gastrointestinal: Negative.    Neurological: Negative.        I have reviewed and the following portions of the patient's history were updated as appropriate: past family history, past medical history, past social history, past surgical history and problem list.    Medications:     Current Outpatient Medications:   •  Accu-Chek Guide test strip, USE TO TEST 3 TIMES DAILY, Disp: 200 each, Rfl: 3  •  Accu-Chek Softclix Lancets lancets, USE ONE LANCET TO TEST THREE TIMES A DAY, Disp: 300 each, Rfl: 1  •  albuterol (PROVENTIL) (2.5 MG/3ML) 0.083% nebulizer solution, Take 2.5 mg by nebulization Every 4 (Four) Hours As Needed for Wheezing or Shortness of Air., Disp: , Rfl:   •  albuterol sulfate HFA (Ventolin HFA) 108 (90 Base) MCG/ACT inhaler, Inhale 1 puff Every 4 (Four) Hours As Needed for Wheezing or Shortness of Air., Disp: 8 g, Rfl: 5  •  aspirin 81 MG EC tablet, Take 81 mg by mouth Daily., Disp: , Rfl:   •  B Complex-C (SUPER B COMPLEX PO), Take 1 tablet by mouth Daily., Disp: , Rfl:   •  bumetanide (BUMEX) 1 MG tablet, 1 tab po daily as directed (Patient taking differently: Take 1 mg by mouth Daily. 1 tab po daily as directed), Disp: 90 tablet, Rfl: 0  •  Calcium Carbonate  (CALTRATE 600 PO), Take 600 mg by mouth Daily., Disp: , Rfl:   •  carbidopa-levodopa (SINEMET)  MG per tablet, Take  by mouth. 2 tablets at 0600, 1 tablet at 0900, 1200, 1500, 1800, 2100, Disp: , Rfl:   •  Cholecalciferol 2000 units tablet, Take 2,000 Units by mouth 2 (Two) Times a Day., Disp: , Rfl:   •  citalopram (CeleXA) 20 MG tablet, TAKE 1 TABLET DAILY (Patient taking differently: Take 20 mg by mouth Daily.), Disp: 90 tablet, Rfl: 3  •  clobetasol (TEMOVATE) 0.05 % cream, Apply 1 application topically 2 (Two) Times a Day As Needed (Lichens Sclerosis)., Disp: , Rfl:   •  dofetilide (TIKOSYN) 500 MCG capsule, TAKE 1 CAPSULE EVERY 12 HOURS (Patient taking differently: Take 500 mcg by mouth 2 (Two) Times a Day.), Disp: 180 capsule, Rfl: 3  •  Emollient (AQUAPHOR ADVANCED THERAPY EX), Apply  topically., Disp: , Rfl:   •  Glucosamine-Chondroit-Calcium (TRIPLE FLEX BONE & JOINT PO), Take 1 tablet by mouth Daily. Move free, Disp: , Rfl:   •  Insulin Glargine (Lantus SoloStar) 100 UNIT/ML injection pen, Inject 12-14 Units under the skin into the appropriate area as directed Daily., Disp: , Rfl:   •  Insulin Pen Needle (B-D UF III MINI PEN NEEDLES) 31G X 5 MM misc, USE TO INJECT INSULIN DAILY, Disp: 100 each, Rfl: 3  •  IPRATROPIUM BROMIDE NA, 1 spray into the nostril(s) as directed by provider 2 (Two) Times a Day As Needed (CONGESTION SINUS)., Disp: , Rfl:   •  Loratadine 10 MG capsule, Take 10 mg by mouth Daily., Disp: , Rfl:   •  metFORMIN (GLUCOPHAGE) 1000 MG tablet, TAKE 1 TABLET TWICE A DAY WITH MEALS (Patient taking differently: Take 1,000 mg by mouth 2 (Two) Times a Day With Meals.), Disp: 180 tablet, Rfl: 3  •  metOLazone (ZAROXOLYN) 2.5 MG tablet, Take 1 tablet by mouth Daily., Disp: 90 tablet, Rfl: 1  •  Multiple Vitamins-Minerals (CENTRUM ULTRA WOMENS PO), Take 1 tablet by mouth Daily., Disp: , Rfl:   •  nitroglycerin (NITROLINGUAL) 0.4 MG/SPRAY spray, Place 1 spray under the tongue Every 5 (Five)  Minutes As Needed for Chest Pain., Disp: 1 each, Rfl: 6  •  nystatin (MYCOSTATIN) 416633 UNIT/GM ointment, Apply 1 application topically to the appropriate area as directed 2 (Two) Times a Day., Disp: 3 g, Rfl: 3  •  O2 (OXYGEN), Inhale 2 L/min 1 (One) Time. 2L all the time now, Disp: , Rfl:   •  pantoprazole (Protonix) 40 MG EC tablet, Take 1 tablet by mouth Daily., Disp: 30 tablet, Rfl: 2  •  pramipexole (MIRAPEX) 1.5 MG tablet, Take 1.5 mg by mouth Every Night., Disp: , Rfl:   •  ranolazine (RANEXA) 500 MG 12 hr tablet, TAKE 1 TABLET EVERY 12 HOURS (Patient taking differently: 500 mg 2 (Two) Times a Day.), Disp: 180 tablet, Rfl: 3  •  senna (SENOKOT) 8.6 MG tablet, Take 1 tablet by mouth Daily., Disp: , Rfl:   •  spironolactone (ALDACTONE) 25 MG tablet, Take 2 tablets by mouth Daily., Disp: 180 tablet, Rfl: 2  •  traMADol (ULTRAM) 50 MG tablet, Take 1 tablet by mouth Every 6 (Six) Hours As Needed for Moderate Pain ., Disp: 30 tablet, Rfl: 2  •  triamcinolone (KENALOG) 0.1 % cream, 1 application As Needed for Irritation or Rash., Disp: , Rfl:   •  Unithroid 150 MCG tablet, Take 1 tablet by mouth Daily., Disp: 90 tablet, Rfl: 1  •  valsartan (DIOVAN) 160 MG tablet, TAKE 1 TABLET TWICE A DAY (Patient taking differently: Take 160 mg by mouth 2 (Two) Times a Day.), Disp: 180 tablet, Rfl: 0  •  vitamin C (ASCORBIC ACID) 500 MG tablet, Take 500 mg by mouth Daily. Chewable tablet, Disp: , Rfl:   •  Zinc 50 MG capsule, Take 1 capsule by mouth Daily., Disp: , Rfl:   •  amantadine (SYMMETREL) 100 MG capsule, Take 100 mg by mouth., Disp: , Rfl:   •  apixaban (Eliquis) 5 MG tablet tablet, Take 1 tablet by mouth Every 12 (Twelve) Hours., Disp: 180 tablet, Rfl: 2    Allergies:   Allergies   Allergen Reactions   • Toprol Xl [Metoprolol Tartrate] Shortness Of Breath     Extreme fatigue   • Amlodipine Besylate Swelling     Lower extremity (ankles, feet) swelling   • Codeine Unknown (See Comments)     Pt is unaware of what reaction  "she had   • Entacapone Other (See Comments)     \"extreme weakness in legs - caused several falls, which stopped after discontinuing this medication\"   • Epinephrine Other (See Comments)     6/4/16- had 3 shots to numb mouth to prepare teeth for crowns, the shots contained epi-  Caused pt to have chest discomfort- went to hospital in ambulance, discovered had a fib while there    • Levemir [Insulin Detemir] Hives     Hives / rash around injection site   • Penicillins Hives     Jitteriness    • Xarelto [Rivaroxaban] GI Bleeding     hgb dropped to 5.2   • Bactrim [Sulfamethoxazole-Trimethoprim] Nausea Only and Other (See Comments)     Headache -  Cant be taken with tikosyn - septra ds   • Benztropine Mesylate Other (See Comments)     Uncontrollable body movements   • Cimetidine Other (See Comments)     Cant be taken with tikosyn    • Ciprofloxacin Diarrhea   • Cogentin [Benztropine] Other (See Comments)     \"uncontrollable body movements\"   • Compazine [Prochlorperazine Edisylate] Other (See Comments)     Dystonic reaction   • Cortisone Other (See Comments)     MAKES BLOOD PRESSURE HIGH    • Duraprep [Antiseptic Products, Misc.] Itching and Rash     RASH AND ITCHING   • Erythromycin Base Other (See Comments)     Cant be taken with tikosyn - z pack and ketek    • Flurandrenolone Other (See Comments)     Cant be taken with tikosyn     Include - levaquin, cipro, norloxacin    • Haldol [Haloperidol Lactate] Other (See Comments)     Dystonic reaction   • Hydralazine Other (See Comments)     Headache    • Hydrochlorothiazide Other (See Comments)     Cant be taken with tikosyn -  Also hydrodiuril, microzide, hydro-par, oretic, esidrix, ezide or any medicine with hct or hctz in name    • Hydrocodone-Acetaminophen Nausea And Vomiting and Dizziness     Headache, nausea    • Levaquin [Levofloxacin] Other (See Comments)     Cannot take due to taking propafenone HCL- severe reaction with mixed.    • Lisinopril Cough   • Macrolides " "And Ketolides Other (See Comments)     antibx -   Cant be taken with tikosyn    • Statins Myalgia     Leg pain- all statins    • Tarka [Trandolapril-Verapamil Hcl Er] Other (See Comments)     Constipation    • Verapamil Other (See Comments)     Cant be taken with tikosyn - calan, covera-hs, isoptin, verelan or tarka        Objective     Physical Exam:  Vital Signs:   Vitals:    12/22/22 1353   BP: 143/80   Weight: 107 kg (235 lb)   Height: 160 cm (63\")   PainSc:   2   PainLoc: Shoulder     Body mass index is 41.63 kg/m².    Physical Exam  Vitals and nursing note reviewed.   Constitutional:       Appearance: Normal appearance. She is normal weight.   HENT:      Head: Normocephalic and atraumatic.      Right Ear: External ear normal.      Left Ear: External ear normal.      Nose: Nose normal.   Eyes:      Extraocular Movements: Extraocular movements intact.      Conjunctiva/sclera: Conjunctivae normal.   Pulmonary:      Effort: Pulmonary effort is normal.   Musculoskeletal:      Cervical back: Normal range of motion.   Feet:      Comments:      Neurological:      General: No focal deficit present.      Mental Status: She is alert and oriented to person, place, and time. Mental status is at baseline.   Psychiatric:         Mood and Affect: Mood normal.         Behavior: Behavior normal.         Thought Content: Thought content normal.         Judgment: Judgment normal.           Assessment / Plan      Assessment/Plan:   Diagnoses and all orders for this visit:    1. MILLI treated with BiPAP - Patient reports compliance.  (Primary)  Assessment & Plan:  Pt just started on CPAP again    Orders:  -     Hemoglobin A1c; Future  -     Lipid Panel; Future  -     Comprehensive Metabolic Panel; Future  -     Vitamin B12; Future  -     Vitamin D,25-Hydroxy; Future  -     TSH Rfx On Abnormal To Free T4; Future  -     CBC & Differential; Future    2. Type 2 diabetes mellitus with hyperglycemia, without long-term current use of " insulin (HCC)  Assessment & Plan:  A1c is 7.0, recheck in 6 months    Orders:  -     Hemoglobin A1c; Future  -     Lipid Panel; Future  -     Comprehensive Metabolic Panel; Future  -     Vitamin B12; Future  -     Vitamin D,25-Hydroxy; Future  -     TSH Rfx On Abnormal To Free T4; Future  -     CBC & Differential; Future    3. Vitamin B12 deficiency  Assessment & Plan:  Vit B 12 is 734    Orders:  -     Hemoglobin A1c; Future  -     Lipid Panel; Future  -     Comprehensive Metabolic Panel; Future  -     Vitamin B12; Future  -     Vitamin D,25-Hydroxy; Future  -     TSH Rfx On Abnormal To Free T4; Future  -     CBC & Differential; Future    4. Vitamin D deficiency  Assessment & Plan:  Vitamin D is 73.9, recheck in 6 months    Orders:  -     Hemoglobin A1c; Future  -     Lipid Panel; Future  -     Comprehensive Metabolic Panel; Future  -     Vitamin B12; Future  -     Vitamin D,25-Hydroxy; Future  -     TSH Rfx On Abnormal To Free T4; Future  -     CBC & Differential; Future    5. Chronic kidney disease, stage 3b (HCC)  Assessment & Plan:  GFR is 51, Patient is instructed to not take any NSAIDs.  Medicines as directed.  Stay well-hydrated.      Orders:  -     Hemoglobin A1c; Future  -     Lipid Panel; Future  -     Comprehensive Metabolic Panel; Future  -     Vitamin B12; Future  -     Vitamin D,25-Hydroxy; Future  -     TSH Rfx On Abnormal To Free T4; Future  -     CBC & Differential; Future       1.     Follow Up:   Return in about 3 months (around 3/22/2023) for Annual physical, Bloodwork 1 week prior to next appointment.    Any medications prescribed have been sent electronically to   [facility] [id number] - [place], [place] - [address] [facility] [address] - [phone] PH - [phone] FX  [address] [facility] [facility] [place] [address]  Phone: [phone] Fax: [phone]    [facility] - [place], [place] - [address] [place] [address] - [phone]  - [phone] [address]  [place] [address]  Phone: [phone] Fax: [phone]      15 minutes were spent reviewing the patient's questionnaire, formulating a treatment plan, and relaying information to the patient via [employer].    Reynaldo Fritz MD  [facility]   12/22/22  14:13 EST

## 2022-12-23 ENCOUNTER — TELEPHONE (OUTPATIENT)
Dept: FAMILY MEDICINE CLINIC | Facility: CLINIC | Age: 72
End: 2022-12-23

## 2022-12-23 NOTE — TELEPHONE ENCOUNTER
Caller: Joanna Cross    Relationship: Self    Best call back number: [phone]    What orders are you requesting (i.e. lab or imaging): LABS    In what timeframe would the patient need to come in: A WEEK PRIOR TO HER UPCOMING FOLLOW-UP ON 3.23.23    Where will you receive your lab/imaging services: IN OFFICE    Additional notes: PLEASE CALL THE PATIENT WHEN THESE ARE ACTIVE AND TO SCHEDULE FOR THE DRAW.     THANK YOU.

## 2022-12-27 ENCOUNTER — OFFICE VISIT (OUTPATIENT)
Dept: FAMILY MEDICINE CLINIC | Facility: CLINIC | Age: 72
End: 2022-12-27

## 2022-12-27 VITALS
HEART RATE: 87 BPM | RESPIRATION RATE: 14 BRPM | WEIGHT: 235 LBS | BODY MASS INDEX: 41.64 KG/M2 | TEMPERATURE: 97.2 F | HEIGHT: 63 IN | OXYGEN SATURATION: 98 % | DIASTOLIC BLOOD PRESSURE: 62 MMHG | SYSTOLIC BLOOD PRESSURE: 120 MMHG

## 2022-12-27 DIAGNOSIS — R21 RASH: Primary | ICD-10-CM

## 2022-12-27 DIAGNOSIS — L29.9 PRURITUS: ICD-10-CM

## 2022-12-27 DIAGNOSIS — R30.0 DYSURIA: ICD-10-CM

## 2022-12-27 LAB
BILIRUB BLD-MCNC: NEGATIVE MG/DL
CLARITY, POC: CLEAR
COLOR UR: YELLOW
EXPIRATION DATE: ABNORMAL
GLUCOSE UR STRIP-MCNC: ABNORMAL MG/DL
KETONES UR QL: NEGATIVE
LEUKOCYTE EST, POC: NEGATIVE
Lab: ABNORMAL
NITRITE UR-MCNC: NEGATIVE MG/ML
PH UR: 6.6 [PH] (ref 5–8)
PROT UR STRIP-MCNC: NEGATIVE MG/DL
RBC # UR STRIP: NEGATIVE /UL
SP GR UR: 1.02 (ref 1–1.03)
UROBILINOGEN UR QL: NORMAL

## 2022-12-27 PROCEDURE — 81003 URINALYSIS AUTO W/O SCOPE: CPT | Performed by: FAMILY MEDICINE

## 2022-12-27 PROCEDURE — 99214 OFFICE O/P EST MOD 30 MIN: CPT | Performed by: FAMILY MEDICINE

## 2022-12-27 NOTE — ASSESSMENT & PLAN NOTE
She has a rash on her left upper arm.  This is itching.  She has been putting a combination cream of nystatin and hydrocortisone.  This is helped.  The rash keeps coming back.  I do not think this is a fungal rash.  She is going to see a dermatologist and she will make her own appointment.  I told her if the dermatologist gets stuck to let me know.

## 2022-12-27 NOTE — PROGRESS NOTES
Patient Name: Joanna Cross  : 1950   MRN: [id number]     Chief Complaint:    Chief Complaint   Patient presents with   • breast      Pt c/o left breast iching   • Urinary Tract Infection     Pt states sometimes it hurts when she urinates       History of Present Illness: Joanna Cross is a 72 y.o. female who is here today for follow up on breast itcihing         Review of Systems:   Review of Systems   Constitutional: Negative.    HENT: Negative.    Eyes: Negative.    Respiratory: Negative.    Cardiovascular: Negative.    Gastrointestinal: Negative.    Skin: Positive for rash.   Neurological: Negative.         Past Medical History:   Past Medical History:   Diagnosis Date   • Anemia    • Ankle problem     thinks back related causing pain    • Atrial fibrillation (HCC)    • AVM (arteriovenous malformation)    • Back pain    • CKD (chronic kidney disease)    • Clotting disorder (HCC) 2016    AVM - small intestine   • COVID-19 vaccine series completed    • Gastrocnemius muscle tear     left medial 91   • Generalized osteoarthritis    • GERD (gastroesophageal reflux disease)    • Gestational diabetes    • GIB (gastrointestinal bleeding) 2016    d/t xarelto    • Headache    • Hearing decreased, bilateral     HAS HEARING AID, NOT WEARING IT CURRENTLY   • Hiatal hernia    • History of shingles    • History of transfusion 2016    no reaction recalled    • Hypertension    • Hypothyroidism    • IBS (irritable bowel syndrome)    • Klebsiella pneumonia ([facility])    • Lichen sclerosus    • Mouth problem     mouth guard used since pt bites tongue and lips excessively at night if not- with bipap    • MRSA infection 2018   • Obesity    • On home oxygen therapy     2L of oxygen all the time due to current congestion    • Osteoporosis    • Parkinson's disease (HCC)    • Peripheral vascular disease (HCC)    • Pleurisy    • Pneumonia    • Puerperal sepsis with acute hypoxic respiratory failure ([facility])     emergent  intubation- 2016   • Right leg pain     from back issues    • Salivary gland stone    • Sciatic nerve pain    • Seborrheic dermatitis    • Skin cancer     on back    • Sleep apnea     CPAP AND HOME O2 2L/M   • Type 2 diabetes mellitus (HCC)    • UTI (urinary tract infection)    • Vitamin D deficiency 09/08/2022   • Wears glasses        Past Surgical History:   Past Surgical History:   Procedure Laterality Date   • ABLATION OF DYSRHYTHMIC FOCUS  05/16/13    Laser Ablation - Rt Leg   • BACK SURGERY      l4-l5 laminectomy    • BICEPS TENDON REPAIR Right     shoulder   • BREAST BIOPSY Left 05/2004    excisional, benign   • BRONCHOSCOPY RIGID / FLEXIBLE      2016   • BUNIONECTOMY Right    • CARDIAC CATHETERIZATION     • CARDIAC CATHETERIZATION N/A 02/01/2019    Procedure: Left Heart Cath;  Surgeon: Albertina Corona MD;  Location:  [facility];  Service: Cardiology   • CHOLECYSTECTOMY     • COLONOSCOPY  2016   • COLONOSCOPY N/A 06/17/2021    Procedure: COLONOSCOPY WITH POLYPECTOMY;  Surgeon: Trenton Sosa MD;  Location:  [facility];  Service: Gastroenterology;  Laterality: N/A;   • CORONARY STENT PLACEMENT      x1 stent   • CYST REMOVAL      left ear, upper left back 2001   • CYSTOSCOPY      x2  18 and 20 -   in 20 urethra dilation    • DIAGNOSTIC LAPAROSCOPY  1981   • ENDOSCOPIC FUNCTIONAL SINUS SURGERY (FESS)  2011   • ENDOSCOPY  2016   • ENTEROSCOPY SMALL BOWEL      with single ballon with fluoro    • HAMMER TOE REPAIR Left    • INCISION AND DRAINAGE OF WOUND  2018    back with wound infection    • INSERT / REPLACE / REMOVE PACEMAKER  11/10/16    [facility]   • JOINT REPLACEMENT     • KNEE ARTHROSCOPY     • LASER ABLATION      right leg 13   • LIPOMA EXCISION  1999    left leg    • LUMBAR LAMINECTOMY DISCECTOMY DECOMPRESSION N/A 07/06/2018    Procedure: LUMBAR LAMINECTOMY L4-5, HEMILAMIINECTOMY RIGHT L5-S1, FORAMINOTOMY L5-S1;  Surgeon: Lencho Gamino MD;   Location:  [facility];  Service: Neurosurgery   • LUMBAR LAMINECTOMY DISCECTOMY DECOMPRESSION N/A 2018    Procedure: INCISION AND DRAINAGE BACK WITH WOUND EXPLORATION;  Surgeon: Ritchie García MD;  Location:  [facility];  Service: Neurosurgery   • LUNG BIOPSY Left    • OTHER SURGICAL HISTORY      ct scan of chest and sinuses and lower back    • OTHER SURGICAL HISTORY      various echos    • OTHER SURGICAL HISTORY      electroencephalogram 16,   emg  ncv tests    • OTHER SURGICAL HISTORY      mra   and various mri with xrays, nuclear medicine lung ventilation with perfusion test  with pft    • OTHER SURGICAL HISTORY      barium swallow testing 15, 12, 12   • OTHER SURGICAL HISTORY      vaginal ultrasound- ,   vas clementina lower extrem , vas venous duplex lower extrem bilat    • OTHER SURGICAL HISTORY      wdge biopsy spring of lung    • OTHER SURGICAL HISTORY      emergent intubation- hypoxic resp failure    • PACEMAKER IMPLANTATION  2016    sss   • REPLACEMENT TOTAL KNEE Bilateral     left knee , right  per dr acuna    • REPLACEMENT TOTAL KNEE Bilateral    • SHOULDER ARTHROSCOPY Bilateral     -left, - right    • SKIN BIOPSY      skin cancer back    • SKIN CANCER EXCISION      upper righ tback    • MADHAV     • TEETH EXTRACTION      x2   • TOTAL SHOULDER ARTHROPLASTY W/ DISTAL CLAVICLE EXCISION Left 10/24/2022    Procedure: REVERSE TOTAL SHOULDER ARTHROPLASTY, BICEPS TENODESIS - LEFT;  Surgeon: Sammy Yo MD;  Location:  [facility];  Service: Orthopedics;  Laterality: Left;   • TUBAL ABDOMINAL LIGATION Bilateral    • WISDOM TOOTH EXTRACTION         Family History:   Family History   Problem Relation Age of Onset   • Kidney disease Mother    • Coronary artery disease Mother    • Hypertension Mother            • Heart disease Mother            • Hyperlipidemia Mother            • Coronary artery disease Father    • Hypertension Father             • Hypothyroidism Father    • Cancer Father         Oral cancer   • Heart disease Father            • Coronary artery disease Brother    • Hypertension Brother    • Heart disease Brother         Multiple stents, by-pass surgery   • Testicular cancer Brother    • Kidney cancer Maternal Uncle    • Testicular cancer Maternal Uncle    • Colon polyps Neg Hx    • Colon cancer Neg Hx        Social History:   Social History     Socioeconomic History   • Marital status:      Spouse name: N/A   • Number of children: 4   • Years of education: College   • Highest education level: Master's degree (e.g., MA, MS, Randi, MEd, MSW, NELLA)   Tobacco Use   • Smoking status: Never     Passive exposure: Past   • Smokeless tobacco: Never   • Tobacco comments:     Father and mother smoked for several years.   Vaping Use   • Vaping Use: Never used   Substance and Sexual Activity   • Alcohol use: Never   • Drug use: No   • Sexual activity: Not Currently     Partners: Male     Birth control/protection: Post-menopausal       Medications:     Current Outpatient Medications:   •  Accu-Chek Guide test strip, USE TO TEST 3 TIMES DAILY, Disp: 200 each, Rfl: 3  •  Accu-Chek Softclix Lancets lancets, USE ONE LANCET TO TEST THREE TIMES A DAY, Disp: 300 each, Rfl: 1  •  albuterol (PROVENTIL) (2.5 MG/3ML) 0.083% nebulizer solution, Take 2.5 mg by nebulization Every 4 (Four) Hours As Needed for Wheezing or Shortness of Air., Disp: , Rfl:   •  albuterol sulfate HFA (Ventolin HFA) 108 (90 Base) MCG/ACT inhaler, Inhale 1 puff Every 4 (Four) Hours As Needed for Wheezing or Shortness of Air., Disp: 8 g, Rfl: 5  •  amantadine (SYMMETREL) 100 MG capsule, Take 100 mg by mouth., Disp: , Rfl:   •  apixaban (Eliquis) 5 MG tablet tablet, Take 1 tablet by mouth Every 12 (Twelve) Hours., Disp: 180 tablet, Rfl: 2  •  aspirin 81 MG EC tablet, Take 81 mg by mouth Daily., Disp: , Rfl:   •  B Complex-C (SUPER B COMPLEX PO), Take 1 tablet by mouth  Daily., Disp: , Rfl:   •  bumetanide (BUMEX) 1 MG tablet, 1 tab po daily as directed (Patient taking differently: Take 1 mg by mouth Daily. 1 tab po daily as directed), Disp: 90 tablet, Rfl: 0  •  Calcium Carbonate (CALTRATE 600 PO), Take 600 mg by mouth Daily., Disp: , Rfl:   •  carbidopa-levodopa (SINEMET)  MG per tablet, Take  by mouth. 2 tablets at 0600, 1 tablet at 0900, 1200, 1500, 1800, 2100, Disp: , Rfl:   •  Cholecalciferol 2000 units tablet, Take 2,000 Units by mouth 2 (Two) Times a Day., Disp: , Rfl:   •  citalopram (CeleXA) 20 MG tablet, TAKE 1 TABLET DAILY (Patient taking differently: Take 20 mg by mouth Daily.), Disp: 90 tablet, Rfl: 3  •  clobetasol (TEMOVATE) 0.05 % cream, Apply 1 application topically 2 (Two) Times a Day As Needed (Lichens Sclerosis)., Disp: , Rfl:   •  dofetilide (TIKOSYN) 500 MCG capsule, TAKE 1 CAPSULE EVERY 12 HOURS (Patient taking differently: Take 500 mcg by mouth 2 (Two) Times a Day.), Disp: 180 capsule, Rfl: 3  •  Emollient (AQUAPHOR ADVANCED THERAPY EX), Apply  topically., Disp: , Rfl:   •  Glucosamine-Chondroit-Calcium (TRIPLE FLEX BONE & JOINT PO), Take 1 tablet by mouth Daily. Move free, Disp: , Rfl:   •  Insulin Glargine (Lantus SoloStar) 100 UNIT/ML injection pen, Inject 12-14 Units under the skin into the appropriate area as directed Daily., Disp: , Rfl:   •  Insulin Pen Needle (B-D UF III MINI PEN NEEDLES) 31G X 5 MM misc, USE TO INJECT INSULIN DAILY, Disp: 100 each, Rfl: 3  •  IPRATROPIUM BROMIDE NA, 1 spray into the nostril(s) as directed by provider 2 (Two) Times a Day As Needed (CONGESTION SINUS)., Disp: , Rfl:   •  Loratadine 10 MG capsule, Take 10 mg by mouth Daily., Disp: , Rfl:   •  metFORMIN (GLUCOPHAGE) 1000 MG tablet, TAKE 1 TABLET TWICE A DAY WITH MEALS (Patient taking differently: Take 1,000 mg by mouth 2 (Two) Times a Day With Meals.), Disp: 180 tablet, Rfl: 3  •  metOLazone (ZAROXOLYN) 2.5 MG tablet, Take 1 tablet by mouth Daily., Disp: 90  tablet, Rfl: 1  •  Multiple Vitamins-Minerals (CENTRUM ULTRA WOMENS PO), Take 1 tablet by mouth Daily., Disp: , Rfl:   •  nitroglycerin (NITROLINGUAL) 0.4 MG/SPRAY spray, Place 1 spray under the tongue Every 5 (Five) Minutes As Needed for Chest Pain., Disp: 1 each, Rfl: 6  •  nystatin (MYCOSTATIN) 253426 UNIT/GM ointment, Apply 1 application topically to the appropriate area as directed 2 (Two) Times a Day., Disp: 3 g, Rfl: 3  •  O2 (OXYGEN), Inhale 2 L/min 1 (One) Time. 2L all the time now, Disp: , Rfl:   •  pantoprazole (Protonix) 40 MG EC tablet, Take 1 tablet by mouth Daily., Disp: 30 tablet, Rfl: 2  •  pramipexole (MIRAPEX) 1.5 MG tablet, Take 1.5 mg by mouth Every Night., Disp: , Rfl:   •  ranolazine (RANEXA) 500 MG 12 hr tablet, TAKE 1 TABLET EVERY 12 HOURS (Patient taking differently: 500 mg 2 (Two) Times a Day.), Disp: 180 tablet, Rfl: 3  •  senna (SENOKOT) 8.6 MG tablet, Take 1 tablet by mouth Daily., Disp: , Rfl:   •  spironolactone (ALDACTONE) 25 MG tablet, Take 2 tablets by mouth Daily., Disp: 180 tablet, Rfl: 2  •  traMADol (ULTRAM) 50 MG tablet, Take 1 tablet by mouth Every 6 (Six) Hours As Needed for Moderate Pain ., Disp: 30 tablet, Rfl: 2  •  triamcinolone (KENALOG) 0.1 % cream, 1 application As Needed for Irritation or Rash., Disp: , Rfl:   •  Unithroid 150 MCG tablet, Take 1 tablet by mouth Daily., Disp: 90 tablet, Rfl: 1  •  valsartan (DIOVAN) 160 MG tablet, TAKE 1 TABLET TWICE A DAY (Patient taking differently: Take 160 mg by mouth 2 (Two) Times a Day.), Disp: 180 tablet, Rfl: 0  •  vitamin C (ASCORBIC ACID) 500 MG tablet, Take 500 mg by mouth Daily. Chewable tablet, Disp: , Rfl:   •  Zinc 50 MG capsule, Take 1 capsule by mouth Daily., Disp: , Rfl:     Allergies:   Allergies   Allergen Reactions   • Toprol Xl [Metoprolol Tartrate] Shortness Of Breath     Extreme fatigue   • Amlodipine Besylate Swelling     Lower extremity (ankles, feet) swelling   • Codeine Unknown (See Comments)     Pt is  "unaware of what reaction she had   • Entacapone Other (See Comments)     \"extreme weakness in legs - caused several falls, which stopped after discontinuing this medication\"   • Epinephrine Other (See Comments)     6/4/16- had 3 shots to numb mouth to prepare teeth for crowns, the shots contained epi-  Caused pt to have chest discomfort- went to hospital in ambulance, discovered had a fib while there    • Levemir [Insulin Detemir] Hives     Hives / rash around injection site   • Penicillins Hives     Jitteriness    • Xarelto [Rivaroxaban] GI Bleeding     hgb dropped to 5.2   • Hydrocodone Unknown - High Severity   • Prochlorperazine Unknown - High Severity   • Toprol Xl [Metoprolol] Unknown - High Severity   • Bactrim [Sulfamethoxazole-Trimethoprim] Nausea Only and Other (See Comments)     Headache -  Cant be taken with tikosyn - septra ds   • Benztropine Mesylate Other (See Comments)     Uncontrollable body movements   • Cimetidine Other (See Comments)     Cant be taken with tikosyn    • Ciprofloxacin Diarrhea   • Cogentin [Benztropine] Other (See Comments)     \"uncontrollable body movements\"   • Compazine [Prochlorperazine Edisylate] Other (See Comments)     Dystonic reaction   • Cortisone Other (See Comments)     MAKES BLOOD PRESSURE HIGH    • Duraprep [Antiseptic Products, Misc.] Itching and Rash     RASH AND ITCHING   • Erythromycin Base Other (See Comments)     Cant be taken with tikosyn - z pack and ketek    • Flurandrenolone Other (See Comments)     Cant be taken with tikosyn     Include - levaquin, cipro, norloxacin    • Haldol [Haloperidol Lactate] Other (See Comments)     Dystonic reaction   • Hydralazine Other (See Comments)     Headache    • Hydrochlorothiazide Other (See Comments)     Cant be taken with tikosyn -  Also hydrodiuril, microzide, hydro-par, oretic, esidrix, ezide or any medicine with hct or hctz in name    • Hydrocodone-Acetaminophen Nausea And Vomiting and Dizziness     Headache, nausea  " "  • Levaquin [Levofloxacin] Other (See Comments)     Cannot take due to taking propafenone HCL- severe reaction with mixed.    • Lisinopril Cough   • Macrolides And Ketolides Other (See Comments)     antibx -   Cant be taken with tikosyn    • Statins Myalgia     Leg pain- all statins    • Tarka [Trandolapril-Verapamil Hcl Er] Other (See Comments)     Constipation    • Verapamil Other (See Comments)     Cant be taken with tikosyn - calan, covera-hs, isoptin, verelan or tarka          Physical Exam:  Vital Signs:   Vitals:    12/27/22 1401   BP: 120/62   BP Location: Right arm   Patient Position: Sitting   Cuff Size: Large Adult   Pulse: 87   Resp: 14   Temp: 97.2 °F (36.2 °C)   TempSrc: Temporal   SpO2: 98%   Weight: 107 kg (235 lb)   Height: 160 cm (63\")   PainSc: 0-No pain     Body mass index is 41.63 kg/m².     Physical Exam  Vitals and nursing note reviewed.   Constitutional:       Appearance: Normal appearance. She is normal weight.   HENT:      Head: Normocephalic and atraumatic.      Right Ear: Tympanic membrane, ear canal and external ear normal.      Left Ear: Tympanic membrane, ear canal and external ear normal.      Nose: Nose normal.      Mouth/Throat:      Mouth: Mucous membranes are dry.      Pharynx: Oropharynx is clear.   Eyes:      Extraocular Movements: Extraocular movements intact.      Conjunctiva/sclera: Conjunctivae normal.      Pupils: Pupils are equal, round, and reactive to light.   Cardiovascular:      Rate and Rhythm: Normal rate and regular rhythm.      Pulses: Normal pulses.      Heart sounds: Normal heart sounds.   Pulmonary:      Effort: Pulmonary effort is normal.      Breath sounds: Normal breath sounds.   Musculoskeletal:      Cervical back: Normal range of motion and neck supple.   Feet:      Comments:      Skin:     Findings: Rash present.   Neurological:      Mental Status: She is alert.         Procedures      Assessment/Plan:   Diagnoses and all orders for this visit:    1. Rash " (Primary)  Assessment & Plan:  She has a rash on her left upper arm.  This is itching.  She has been putting a combination cream of nystatin and hydrocortisone.  This is helped.  The rash keeps coming back.  I do not think this is a fungal rash.  She is going to see a dermatologist and she will make her own appointment.  I told her if the dermatologist gets stuck to let me know.      2. Dysuria  Assessment & Plan:  We will culture her urine.    Orders:  -     POCT urinalysis dipstick, automated  -     Urine Culture - Urine, Urine, Clean Catch; Future  -     Urine Culture - Urine, Urine, Clean Catch    3. Pruritus  Assessment & Plan:  Patient has itching on her left upper arm as well as her left breast.  I do not see any rash or feel any abnormality of her left breast.  She had a normal mammogram over the summer.  I told her if this is not resolved to come back to clinic.             Follow Up:   No follow-ups on file.    Reynaldo Fritz MD  [facility]     Answers for HPI/ROS submitted by the patient on 12/24/2022  Please describe your symptoms.: A lot of itching on my left breast.  Feels heavier than my right breast.  Have you had these symptoms before?: No  How long have you been having these symptoms?: Greater than 2 weeks  Please list any medications you are currently taking for this condition.: Triamcinolone  Please describe any probable cause for these symptoms. : Don't know.  What is the primary reason for your visit?: Other

## 2022-12-27 NOTE — ASSESSMENT & PLAN NOTE
Patient has itching on her left upper arm as well as her left breast.  I do not see any rash or feel any abnormality of her left breast.  She had a normal mammogram over the summer.  I told her if this is not resolved to come back to clinic.

## 2022-12-29 LAB
BACTERIA UR CULT: NORMAL
BACTERIA UR CULT: NORMAL

## 2023-01-03 RX ORDER — BUMETANIDE 1 MG/1
TABLET ORAL
Qty: 90 TABLET | Refills: 0 | Status: SHIPPED | OUTPATIENT
Start: 2023-01-03 | End: 2023-03-01 | Stop reason: SDUPTHER

## 2023-01-03 RX ORDER — VALSARTAN 160 MG/1
160 TABLET ORAL 2 TIMES DAILY
Qty: 180 TABLET | Refills: 0 | Status: SHIPPED | OUTPATIENT
Start: 2023-01-03

## 2023-01-03 RX ORDER — RANOLAZINE 500 MG/1
500 TABLET, EXTENDED RELEASE ORAL EVERY 12 HOURS
Qty: 180 TABLET | Refills: 0 | Status: SHIPPED | OUTPATIENT
Start: 2023-01-03 | End: 2023-03-18 | Stop reason: SDUPTHER

## 2023-01-03 NOTE — TELEPHONE ENCOUNTER
Lab Results   Component Value Date    GLUCOSE 192 (H) 12/07/2022    BUN 48 (H) 12/07/2022    CREATININE 1.14 (H) 12/07/2022    EGFRIFNONA 66 06/12/2019    BCR 42 (H) 12/07/2022    K 3.9 12/07/2022    CO2 24 12/07/2022    CALCIUM 9.3 12/07/2022    PROTENTOTREF 6.3 12/07/2022    ALBUMIN 4.2 12/07/2022    LABIL2 2.0 12/07/2022    AST 18 12/07/2022    ALT 13 12/07/2022

## 2023-01-20 NOTE — PROGRESS NOTES
Joanna Cross  1950  [phone]      09/06/2017    Reynaldo Fritz MD    Chief Complaint: Coronary Artery Disease    PROBLEM LIST:  1. Coronary artery disease  a. [facility] 2/15/2008, Dr Lutz.  Mild, non-obstructive CAD, normal EF  b. [facility] 1/9/2013, Dr Stevenson Sutton:  No LV gram done; mild, non-obstructive coronary artery disease.  c. [facility] 11/9/2015, [facility]:  EF 60%.  70% RCA lesion with Promus ZAINAB placement. 40-50% mid LAD, no FFR performed. Other small blockages noted.  No MR.  d. [facility] 11/15/2015, Dr Palacio @ [facility]:  Patent RCA stent, 40-50% LAD with FFR @ 0.92; mild inferior wall hypokinesis  e. [facility] 7/29/2016, [facility]:  Abnormal stress test.  Normal CORS with patent stent. LAD improved since last image EF 55-60%  2. Peripheral vascular disease/chronic venous stasis  a. Venous duplex 9/17/2010: Insufficiency to venous hypertension in the great saphenous system bilaterally.  b. Venous duplex 2013: Right leg laser ablation of Dr. Garcia.  c. Venous duplex 5/6/2016: No evidence of DVT, no superficial, no thrmobophlebitis, no valvular insufficiency.  d. AA with runoffs 8/31/2016: Single-vessel [place]: No significant arterial disease.  3. Palpitations  a. Echocardiogram 2/13/2014: Dr. Teran.  Normal biventricular function; trace to mild MR.  Atrial septal aneurysm or  b. Echocardiogram 12/22/15: Dr. Chavez.  Borderline LVH, mild LAE, mild RV enlargement.  Mild to moderate MR and TR with RVSP of 46 mmHg.  c. Conclusion/3/2016: Dr. Palacio.  RV enlargement.  EF 50-55%.  Mild AI S, mild MR and TR with RVSP 37 mmHg.  4. PAF/SSS  a. [facility] P[facility] 2016  5. TIA  a. Carotid duplex 2/13/2014 showed no significant flow-limiting stenosis.  Mild luminal disease present; both vertebrals are present.  6. Diabetes  7. Hypothyroid  8. Hyponatremia  a. Secondary to SIADH  9. MILLI with CPAP  10. RLS  11. Parkinson's disease  12. GERD  13. Surgeries:  a. Rotator cuff  Pre-op form faxed to Ramesh Salamanca at  repair  b. Cholecystectomy  c. Bilateral knee replacement  d. Tubal ligation  e. Breast surgery  f. Sinus surgery    Allergies   Allergen Reactions   • Amlodipine Besylate    • Bactrim [Sulfamethoxazole-Trimethoprim]    • Ciprofloxacin    • Cogentin [Benztropine]    • Compazine [Prochlorperazine Edisylate]    • Entacapone    • Haldol [Haloperidol Lactate]    • Hydralazine    • Hydrocodone-Acetaminophen    • Lisinopril    • Statins    • Tarka [Trandolapril-Verapamil Hcl Er]    • Toprol Xl [Metoprolol Succinate Er]        Current Medications:    Current Outpatient Prescriptions:   •  amantadine (SYMMETREL) 100 MG capsule, Take 100 mg by mouth Daily., Disp: , Rfl:   •  aspirin 81 MG EC tablet, Take 81 mg by mouth Daily., Disp: , Rfl:   •  B Complex-C (SUPER B COMPLEX PO), Take  by mouth Daily., Disp: , Rfl:   •  betamethasone dipropionate (DIPROLENE) 0.05 % cream, Apply 1 application topically 2 (Two) Times a Day., Disp: , Rfl:   •  Calcium Carbonate (CALTRATE 600 PO), Take 600 mg by mouth 2 (Two) Times a Day., Disp: , Rfl:   •  carbidopa-levodopa (SINEMET)  MG per tablet, Take 7 tablets by mouth Daily., Disp: , Rfl:   •  carbonyl iron (FEOSOL) 45 MG tablet tablet, Take 1 tablet by mouth Daily., Disp: , Rfl:   •  cholecalciferol (VITAMIN D3) 1000 UNITS tablet, Take 2,000 Units by mouth Daily., Disp: , Rfl:   •  citalopram (CeleXA) 20 MG tablet, Take 20 mg by mouth Daily., Disp: , Rfl:   •  clobetasol (TEMOVATE) 0.05 % cream, Apply 1 application topically As Needed., Disp: , Rfl:   •  CloNIDine (CATAPRES) 0.1 MG tablet, Take 1 tablet by mouth Every 12 (Twelve) Hours., Disp: 180 tablet, Rfl: 3  •  furosemide (LASIX) 40 MG tablet, 1-2 tabs po daily as directed, Disp: 135 tablet, Rfl: 1  •  Glucosamine-Chondroit-Vit C-Mn (GLUCOSAMINE 1500 COMPLEX PO), Take  by mouth 2 (Two) Times a Day., Disp: , Rfl:   •  insulin detemir (LEVEMIR) 100 UNIT/ML injection, Inject 24 Units under the skin Daily., Disp: , Rfl:   •   levothyroxine (SYNTHROID, LEVOTHROID) 175 MCG tablet, Take 175 mcg by mouth Daily., Disp: , Rfl:   •  Loratadine 10 MG capsule, Take 10 mg by mouth Daily., Disp: , Rfl:   •  losartan (COZAAR) 50 MG tablet, Take 1 tablet by mouth Every 12 (Twelve) Hours., Disp: 180 tablet, Rfl: 3  •  metFORMIN (GLUCOPHAGE) 1000 MG tablet, Take 1,000 mg by mouth 2 (Two) Times a Day With Meals., Disp: , Rfl:   •  Multiple Vitamins-Minerals (CENTRUM ULTRA WOMENS PO), Take 1 tablet by mouth Daily., Disp: , Rfl:   •  nitroglycerin (NITROSTAT) 0.4 MG SL tablet, Place 0.4 mg under the tongue As Needed for chest pain. Take no more than 3 doses in 15 minutes., Disp: , Rfl:   •  omeprazole (priLOSEC) 40 MG capsule, Take 40 mg by mouth 2 (Two) Times a Day., Disp: , Rfl:   •  oxybutynin XL (DITROPAN-XL) 10 MG 24 hr tablet, Take 10 mg by mouth Daily., Disp: , Rfl:   •  pramipexole (MIRAPEX) 1.5 MG tablet, Take 1.5 mg by mouth Every Night., Disp: , Rfl:   •  ranolazine (RANEXA) 500 MG 12 hr tablet, Take 500 mg by mouth 2 (Two) Times a Day., Disp: , Rfl:   •  sennosides-docusate sodium (SENOKOT-S) 8.6-50 MG tablet, Take 1 tablet by mouth Daily., Disp: , Rfl:   •  spironolactone (ALDACTONE) 25 MG tablet, Take 1 tablet by mouth Daily., Disp: 90 tablet, Rfl: 3  •  Triamcinolone Acetonide (NASACORT) 55 MCG/ACT nasal inhaler, 2 sprays into each nostril Daily., Disp: , Rfl:   •  vitamin C (ASCORBIC ACID) 500 MG tablet, Take 500 mg by mouth Daily., Disp: , Rfl:   •  warfarin (COUMADIN) 5 MG tablet, Take 5 mg by mouth Daily., Disp: , Rfl:   •  bumetanide (BUMEX) 2 MG tablet, 1 tab po every 12 hours, Disp: 180 tablet, Rfl: 1    HPI   Patient returns today for follow up. Her main concern is continuing shortness of breath and unintentional weight gain. She also experiences leg swelling, for which she uses support stockings but reports pain in her legs since she wears them all day. Support stockings do not stay up for the entire time she wears them. Is due for  "a follow up with Pulmonology. Overall, she is doing well from a cardiac standpoint. Denies and complaints of chest pain, pressure, tightness, or unusual dyspnea.     The following portions of the patient's history were reviewed and updated as appropriate: allergies, current medications and problem list.    Pertinent positives as listed in the HPI.  All other systems reviewed are negative.    Vitals:    09/06/17 1514   BP: 110/62   BP Location: Right arm   Patient Position: Sitting   Pulse: 84   Weight: 242 lb (110 kg)   Height: 63\" (160 cm)       General: Alert, awake, and oriented  Neck: Jugular venous pressure is within normal limits. Carotids have normal upstrokes without bruits.   Cardiovascular: Heart has a nondisplaced focal PMI. Regular rate and rhythm without murmur, gallop or rub.  Lungs: Clear without rales or wheezes. Equal expansion is noted.   Extremities: 1-2+ pitting edema noted.  Skin: Warm and dry.  Neurologic: nonfocal    DEVICE INTERROGATION:  [place]6/2017[place] [facility] [place] RA pacing 14%, RV pacing <1%. P wave is 0.3 mV with a threshold of 1.6 V at 0.4 msec and an impedance of 504 ohms. R wave is >25 mV with a threshold of 1.4 V at 0.4 msec and an impedance of 517 ohms. Battery life is 9 yrs..    Procedures    Assessment:    ICD-10-CM ICD-9-CM   1. Coronary artery disease involving native coronary artery of native heart without angina pectoris I25.10 414.01   2. Essential hypertension I10 401.9   3. Mixed hyperlipidemia E78.2 272.2   4. High risk medication use Z79.899 V58.69       Plan:    1. Switch Lasix to Bumex 2 mg twice daily. If she loses too much fluid will cut back to once daily dosage.  2. BMP 2 wks  3. Continue current medications.  4. F/up in 6 months or sooner if needed.      Scribed for Albertina Corona MD by Floyd Guerra. 9/7/2017  2:25 PM      I Albertina Corona MD personally performed the services described in this documentation as scribed by the above individual in my presence, " and it is both accurate and complete.    Albertina Corona MD, FACC

## 2023-01-23 ENCOUNTER — OFFICE VISIT (OUTPATIENT)
Dept: GASTROENTEROLOGY | Facility: CLINIC | Age: 73
End: 2023-01-23
Payer: MEDICARE

## 2023-01-23 VITALS — DIASTOLIC BLOOD PRESSURE: 57 MMHG | HEART RATE: 71 BPM | TEMPERATURE: 97.8 F | SYSTOLIC BLOOD PRESSURE: 137 MMHG

## 2023-01-23 DIAGNOSIS — K59.00 CONSTIPATION, UNSPECIFIED CONSTIPATION TYPE: Primary | ICD-10-CM

## 2023-01-23 DIAGNOSIS — R10.9 TRIGGER POINT OF ABDOMEN: ICD-10-CM

## 2023-01-23 PROCEDURE — 99213 OFFICE O/P EST LOW 20 MIN: CPT | Performed by: NURSE PRACTITIONER

## 2023-01-23 NOTE — PROGRESS NOTES
GASTROENTEROLOGY OFFICE NOTE  Joanna Cross  [id number]  1950    CARE TEAM  Patient Care Team:  Reynaldo Fritz MD as PCP - General (Family Medicine)  Faisal Ramirez MD as Obstetrician (Obstetrics and Gynecology)  Timothy Dan DC as Referring Physician (Chiropractic Medicine)  Albertina Corona MD as Consulting Physician (Cardiology)  Armando Shen MD as Consulting Physician (Allergy)  Dilshad Wood MD as Consulting Physician (Endocrinology)  Dory Gamboa PA (Physician Assistant)  Rene Pringle MD (Otolaryngology)  Baylee Elliott APRN (Nurse Practitioner)  Raciel Valadez MD as Consulting Physician (Pulmonary Disease)  Terrence Mckenzie MD (Urology)  Myrna Mckeon APRN as Nurse Practitioner (Pulmonary Disease)  Lauro Francois MD as Consulting Physician (Cardiology)  Sammy Yo MD as Consulting Physician (Orthopedic Surgery)  Trenton Sosa MD as Consulting Physician (Gastroenterology)    Referring Provider: Reynaldo Fritz MD    Chief Complaint   Patient presents with   • Nausea   • Abdominal Pain   • Constipation        HISTORY OF PRESENT ILLNESS:  Ms. Cross is a 72-year-old female presenting today with complaints of nausea, abdominal pain and constipation.    She states that she called to make her appointment originally because she was having so much nausea.  She was able to directly correlate the onset of her nausea with initiating the medication Aricept.  She has since discontinued Aricept and her nausea has completely resolved.    She has complaints today of a change in her bowel habits.  She is having small volume, pebble-like stool several times throughout the day rather than having a good soft formed bowel movement daily.    Further, she complains of having pain under her rib cage bilaterally.  It is a dull pressure, soreness type feeling that is exacerbated when she makes movement such as bending forward or  twisting.    PAST MEDICAL HISTORY  Past Medical History:   Diagnosis Date   • Anemia    • Ankle problem     thinks back related causing pain    • Atrial fibrillation (HCC)    • AVM (arteriovenous malformation)    • Back pain    • CKD (chronic kidney disease)    • Clotting disorder (HCC) 2016    AVM - small intestine   • COVID-19 vaccine series completed    • Gastrocnemius muscle tear     left medial 91   • Generalized osteoarthritis    • GERD (gastroesophageal reflux disease)    • Gestational diabetes    • GIB (gastrointestinal bleeding) 2016    d/t xarelto    • Headache    • Hearing decreased, bilateral     HAS HEARING AID, NOT WEARING IT CURRENTLY   • Hiatal hernia    • History of shingles    • History of transfusion 2016    no reaction recalled    • Hypertension    • Hypothyroidism    • IBS (irritable bowel syndrome)    • Klebsiella pneumonia (HCC)    • Lichen sclerosus    • Mouth problem     mouth guard used since pt bites tongue and lips excessively at night if not- with bipap    • MRSA infection 2018   • Obesity    • On home oxygen therapy     2L of oxygen all the time due to current congestion    • Osteoporosis    • Parkinson's disease (HCC)    • Peripheral vascular disease (HCC)    • Pleurisy    • Pneumonia    • Puerperal sepsis with acute hypoxic respiratory failure ([facility])     emergent intubation- 2016   • Right leg pain     from back issues    • Salivary gland stone    • Sciatic nerve pain    • Seborrheic dermatitis    • Skin cancer     on back    • Sleep apnea     CPAP AND HOME O2 2L/M   • Type 2 diabetes mellitus (HCC)    • UTI (urinary tract infection)    • Vitamin D deficiency 09/08/2022   • Wears glasses         PAST SURGICAL HISTORY  Past Surgical History:   Procedure Laterality Date   • ABLATION OF DYSRHYTHMIC FOCUS  05/16/13    Laser Ablation - Rt Leg   • BACK SURGERY      l4-l5 laminectomy    • BICEPS TENDON REPAIR Right     shoulder   • BREAST BIOPSY Left 05/2004    excisional, benign   •  BRONCHOSCOPY RIGID / FLEXIBLE      2016   • BUNIONECTOMY Right    • CARDIAC CATHETERIZATION     • CARDIAC CATHETERIZATION N/A 02/01/2019    Procedure: Left Heart Cath;  Surgeon: Albertina Corona MD;  Location:  [facility];  Service: Cardiology   • CHOLECYSTECTOMY     • COLONOSCOPY  2016   • COLONOSCOPY N/A 06/17/2021    Procedure: COLONOSCOPY WITH POLYPECTOMY;  Surgeon: Trenton Sosa MD;  Location:  [facility];  Service: Gastroenterology;  Laterality: N/A;   • CORONARY STENT PLACEMENT      x1 stent   • CYST REMOVAL      left ear, upper left back 2001   • CYSTOSCOPY      x2  18 and 20 -   in 20 urethra dilation    • DIAGNOSTIC LAPAROSCOPY  1981   • ENDOSCOPIC FUNCTIONAL SINUS SURGERY (FESS)  2011   • ENDOSCOPY  2016   • ENTEROSCOPY VIA STOMA      with single ballon with fluoro    • HAMMER TOE REPAIR Left    • INCISION AND DRAINAGE OF WOUND  2018    back with wound infection    • INSERT / REPLACE / REMOVE PACEMAKER  11/10/16    [facility]   • JOINT REPLACEMENT     • KNEE ARTHROSCOPY     • LASER ABLATION      right leg 13   • LIPOMA EXCISION  1999    left leg    • LUMBAR LAMINECTOMY DISCECTOMY DECOMPRESSION N/A 07/06/2018    Procedure: LUMBAR LAMINECTOMY L4-5, HEMILAMIINECTOMY RIGHT L5-S1, FORAMINOTOMY L5-S1;  Surgeon: Lencho Gamino MD;  Location:  [facility] OR;  Service: Neurosurgery   • LUMBAR LAMINECTOMY DISCECTOMY DECOMPRESSION N/A 09/19/2018    Procedure: INCISION AND DRAINAGE BACK WITH WOUND EXPLORATION;  Surgeon: Ritchie García MD;  Location:  [facility];  Service: Neurosurgery   • LUNG BIOPSY Left 2016   • OTHER SURGICAL HISTORY      ct scan of chest and sinuses and lower back    • OTHER SURGICAL HISTORY      various echos    • OTHER SURGICAL HISTORY      electroencephalogram 16,   emg  ncv tests 2007   • OTHER SURGICAL HISTORY      mra 2007  and various mri with xrays, nuclear medicine lung ventilation with perfusion test 2016 with pft    • OTHER SURGICAL  HISTORY      barium swallow testing 15, 12, 12   • OTHER SURGICAL HISTORY      vaginal ultrasound- 2012,   vas clementina lower extrem 2016, vas venous duplex lower extrem bilat 2019   • OTHER SURGICAL HISTORY      wdge biopsy spring of lung    • OTHER SURGICAL HISTORY      emergent intubation- hypoxic resp failure    • PACEMAKER IMPLANTATION  11/2016    [facility]   • REPLACEMENT TOTAL KNEE Bilateral     left knee 2011, right 2012 per dr acuna    • REPLACEMENT TOTAL KNEE Bilateral    • SHOULDER ARTHROSCOPY Bilateral     2003-left, 2004- right    • SKIN BIOPSY      skin cancer back 2016   • SKIN CANCER EXCISION      upper righ tback    • MADHAV     • TEETH EXTRACTION      x2   • TOTAL SHOULDER ARTHROPLASTY W/ DISTAL CLAVICLE EXCISION Left 10/24/2022    Procedure: REVERSE TOTAL SHOULDER ARTHROPLASTY, BICEPS TENODESIS - LEFT;  Surgeon: Sammy Yo MD;  Location: [facility];  Service: Orthopedics;  Laterality: Left;   • TUBAL ABDOMINAL LIGATION Bilateral 1980   • WISDOM TOOTH EXTRACTION          MEDICATIONS:    Current Outpatient Medications:   •  Accu-Chek Guide test strip, USE TO TEST 3 TIMES DAILY, Disp: 200 each, Rfl: 3  •  Accu-Chek Softclix Lancets lancets, USE ONE LANCET TO TEST THREE TIMES A DAY, Disp: 300 each, Rfl: 1  •  albuterol (PROVENTIL) (2.5 MG/3ML) 0.083% nebulizer solution, Take 2.5 mg by nebulization Every 4 (Four) Hours As Needed for Wheezing or Shortness of Air., Disp: , Rfl:   •  albuterol sulfate HFA (Ventolin HFA) 108 (90 Base) MCG/ACT inhaler, Inhale 1 puff Every 4 (Four) Hours As Needed for Wheezing or Shortness of Air., Disp: 8 g, Rfl: 5  •  amantadine (SYMMETREL) 100 MG capsule, Take 100 mg by mouth., Disp: , Rfl:   •  apixaban (Eliquis) 5 MG tablet tablet, Take 1 tablet by mouth Every 12 (Twelve) Hours., Disp: 180 tablet, Rfl: 2  •  aspirin 81 MG EC tablet, Take 81 mg by mouth Daily., Disp: , Rfl:   •  B Complex-C (SUPER B COMPLEX PO), Take 1 tablet by mouth Daily., Disp: , Rfl:   •  bumetanide (BUMEX)  1 MG tablet, 1 tab po daily as directed, Disp: 90 tablet, Rfl: 0  •  Calcium Carbonate (CALTRATE 600 PO), Take 600 mg by mouth Daily., Disp: , Rfl:   •  carbidopa-levodopa (SINEMET)  MG per tablet, Take  by mouth. 2 tablets at 0600, 1 tablet at 0900, 1200, 1500, 1800, 2100, Disp: , Rfl:   •  Cholecalciferol 2000 units tablet, Take 2,000 Units by mouth 2 (Two) Times a Day., Disp: , Rfl:   •  citalopram (CeleXA) 20 MG tablet, TAKE 1 TABLET DAILY (Patient taking differently: Take 20 mg by mouth Daily.), Disp: 90 tablet, Rfl: 3  •  clobetasol (TEMOVATE) 0.05 % cream, Apply 1 application topically 2 (Two) Times a Day As Needed (Lichens Sclerosis)., Disp: , Rfl:   •  dofetilide (TIKOSYN) 500 MCG capsule, TAKE 1 CAPSULE EVERY 12 HOURS (Patient taking differently: Take 500 mcg by mouth 2 (Two) Times a Day.), Disp: 180 capsule, Rfl: 3  •  Emollient (AQUAPHOR ADVANCED THERAPY EX), Apply  topically., Disp: , Rfl:   •  Glucosamine-Chondroit-Calcium (TRIPLE FLEX BONE & JOINT PO), Take 1 tablet by mouth Daily. Move free, Disp: , Rfl:   •  Insulin Glargine (Lantus SoloStar) 100 UNIT/ML injection pen, Inject 12-14 Units under the skin into the appropriate area as directed Daily., Disp: , Rfl:   •  Insulin Pen Needle (B-D UF III MINI PEN NEEDLES) 31G X 5 MM misc, USE TO INJECT INSULIN DAILY, Disp: 100 each, Rfl: 3  •  IPRATROPIUM BROMIDE NA, 1 spray into the nostril(s) as directed by provider 2 (Two) Times a Day As Needed (CONGESTION SINUS)., Disp: , Rfl:   •  Loratadine 10 MG capsule, Take 10 mg by mouth Daily., Disp: , Rfl:   •  metFORMIN (GLUCOPHAGE) 1000 MG tablet, TAKE 1 TABLET TWICE A DAY WITH MEALS (Patient taking differently: Take 1,000 mg by mouth 2 (Two) Times a Day With Meals.), Disp: 180 tablet, Rfl: 3  •  metOLazone (ZAROXOLYN) 2.5 MG tablet, Take 1 tablet by mouth Daily., Disp: 90 tablet, Rfl: 1  •  Multiple Vitamins-Minerals (CENTRUM ULTRA WOMENS PO), Take 1 tablet by mouth Daily., Disp: , Rfl:   •   "nitroglycerin (NITROLINGUAL) 0.4 MG/SPRAY spray, Place 1 spray under the tongue Every 5 (Five) Minutes As Needed for Chest Pain., Disp: 1 each, Rfl: 6  •  nystatin (MYCOSTATIN) 957287 UNIT/GM ointment, Apply 1 application topically to the appropriate area as directed 2 (Two) Times a Day., Disp: 3 g, Rfl: 3  •  O2 (OXYGEN), Inhale 2 L/min 1 (One) Time. 2L all the time now, Disp: , Rfl:   •  pantoprazole (Protonix) 40 MG EC tablet, Take 1 tablet by mouth Daily., Disp: 30 tablet, Rfl: 2  •  pramipexole (MIRAPEX) 1.5 MG tablet, Take 1.5 mg by mouth Every Night., Disp: , Rfl:   •  ranolazine (RANEXA) 500 MG 12 hr tablet, Take 1 tablet by mouth Every 12 (Twelve) Hours., Disp: 180 tablet, Rfl: 0  •  senna (SENOKOT) 8.6 MG tablet, Take 1 tablet by mouth Daily., Disp: , Rfl:   •  spironolactone (ALDACTONE) 25 MG tablet, Take 2 tablets by mouth Daily., Disp: 180 tablet, Rfl: 2  •  traMADol (ULTRAM) 50 MG tablet, Take 1 tablet by mouth Every 6 (Six) Hours As Needed for Moderate Pain ., Disp: 30 tablet, Rfl: 2  •  triamcinolone (KENALOG) 0.1 % cream, 1 application As Needed for Irritation or Rash., Disp: , Rfl:   •  Unithroid 150 MCG tablet, Take 1 tablet by mouth Daily., Disp: 90 tablet, Rfl: 1  •  valsartan (DIOVAN) 160 MG tablet, Take 1 tablet by mouth 2 (Two) Times a Day., Disp: 180 tablet, Rfl: 0  •  vitamin C (ASCORBIC ACID) 500 MG tablet, Take 500 mg by mouth Daily. Chewable tablet, Disp: , Rfl:   •  Zinc 50 MG capsule, Take 1 capsule by mouth Daily., Disp: , Rfl:   •  polyethylene glycol (MIRALAX) 17 g packet, Take 17 g by mouth Daily., Disp: 30 each, Rfl: 11    ALLERGIES  Allergies   Allergen Reactions   • Toprol Xl [Metoprolol Tartrate] Shortness Of Breath     Extreme fatigue   • Amlodipine Besylate Swelling     Lower extremity (ankles, feet) swelling   • Codeine Unknown (See Comments)     Pt is unaware of what reaction she had   • Entacapone Other (See Comments)     \"extreme weakness in legs - caused several falls, " "which stopped after discontinuing this medication\"   • Epinephrine Other (See Comments)     6/4/16- had 3 shots to numb mouth to prepare teeth for crowns, the shots contained epi-  Caused pt to have chest discomfort- went to hospital in ambulance, discovered had a fib while there    • Levemir [Insulin Detemir] Hives     Hives / rash around injection site   • Penicillins Hives     Jitteriness    • Xarelto [Rivaroxaban] GI Bleeding     hgb dropped to 5.2   • Hydrocodone Unknown - High Severity   • Prochlorperazine Unknown - High Severity   • Toprol Xl [Metoprolol] Unknown - High Severity   • Bactrim [Sulfamethoxazole-Trimethoprim] Nausea Only and Other (See Comments)     Headache -  Cant be taken with tikosyn - septra ds   • Benztropine Mesylate Other (See Comments)     Uncontrollable body movements   • Cimetidine Other (See Comments)     Cant be taken with tikosyn    • Ciprofloxacin Diarrhea   • Cogentin [Benztropine] Other (See Comments)     \"uncontrollable body movements\"   • Compazine [Prochlorperazine Edisylate] Other (See Comments)     Dystonic reaction   • Cortisone Other (See Comments)     MAKES BLOOD PRESSURE HIGH    • Duraprep [Antiseptic Products, Misc.] Itching and Rash     RASH AND ITCHING   • Erythromycin Base Other (See Comments)     Cant be taken with tikosyn - z pack and ketek    • Flurandrenolone Other (See Comments)     Cant be taken with tikosyn     Include - levaquin, cipro, norloxacin    • Haldol [Haloperidol Lactate] Other (See Comments)     Dystonic reaction   • Hydralazine Other (See Comments)     Headache    • Hydrochlorothiazide Other (See Comments)     Cant be taken with tikosyn -  Also hydrodiuril, microzide, hydro-par, oretic, esidrix, ezide or any medicine with hct or hctz in name    • Hydrocodone-Acetaminophen Nausea And Vomiting and Dizziness     Headache, nausea    • Levaquin [Levofloxacin] Other (See Comments)     Cannot take due to taking propafenone HCL- severe reaction with mixed. "    • Lisinopril Cough   • Macrolides And Ketolides Other (See Comments)     antibx -   Cant be taken with tikosyn    • Statins Myalgia     Leg pain- all statins    • Tarka [Trandolapril-Verapamil Hcl Er] Other (See Comments)     Constipation    • Verapamil Other (See Comments)     Cant be taken with tikosyn - calan, covera-hs, isoptin, verelan or tarka        FAMILY HISTORY:  Family History   Problem Relation Age of Onset   • Kidney disease Mother    • Coronary artery disease Mother    • Hypertension Mother            • Heart disease Mother            • Hyperlipidemia Mother            • Coronary artery disease Father    • Hypertension Father            • Hypothyroidism Father    • Cancer Father         Oral cancer   • Heart disease Father            • Coronary artery disease Brother    • Hypertension Brother    • Heart disease Brother         Multiple stents, by-pass surgery   • Testicular cancer Brother    • Kidney cancer Maternal Uncle    • Testicular cancer Maternal Uncle    • Colon polyps Neg Hx    • Colon cancer Neg Hx        SOCIAL HISTORY  Social History     Socioeconomic History   • Marital status:      Spouse name: N/A   • Number of children: 4   • Years of education: College   • Highest education level: Master's degree (e.g., MA, MS, Randi, MEd, MSW, NELLA)   Tobacco Use   • Smoking status: Never     Passive exposure: Past   • Smokeless tobacco: Never   • Tobacco comments:     Father and mother smoked for several years.   Vaping Use   • Vaping Use: Never used   Substance and Sexual Activity   • Alcohol use: Never   • Drug use: No   • Sexual activity: Not Currently     Partners: Male     Birth control/protection: Post-menopausal         PHYSICAL EXAM   /57 (BP Location: Right arm, Patient Position: Sitting, Cuff Size: Adult)   Pulse 71   Temp 97.8 °F (36.6 °C) (Temporal)   LMP  (LMP Unknown) Comment: Mammogram- 20  Physical Exam  Vitals and nursing  note reviewed.   Constitutional:       Appearance: Normal appearance. She is well-developed.   HENT:      Head: Normocephalic and atraumatic.      Nose: Nose normal.      Mouth/Throat:      Mouth: Mucous membranes are moist.      Pharynx: Oropharynx is clear.   Eyes:      Pupils: Pupils are equal, round, and reactive to light.   Cardiovascular:      Rate and Rhythm: Normal rate and regular rhythm.   Pulmonary:      Effort: Pulmonary effort is normal.      Breath sounds: Normal breath sounds. No wheezing or rales.   Abdominal:      General: Bowel sounds are normal.      Palpations: Abdomen is soft. There is no mass.      Tenderness: There is no abdominal tenderness. There is no guarding or rebound.      Hernia: No hernia is present.          Comments: Trigger points identified that reproduce patient's complaint of abdominal pain   Musculoskeletal:         General: Normal range of motion.      Cervical back: Normal range of motion and neck supple.   Skin:     General: Skin is warm and dry.   Neurological:      Mental Status: She is alert and oriented to person, place, and time.      Cranial Nerves: No cranial nerve deficit.   Psychiatric:         Behavior: Behavior normal.         Judgment: Judgment normal.           ASSESSMENT / PLAN  Diagnoses and all orders for this visit:    1. Constipation, unspecified constipation type (Primary)  -     polyethylene glycol (MIRALAX) 17 g packet; Take 17 g by mouth Daily.  Dispense: 30 each; Refill: 11    2. Trigger point of abdomen    Discussed the use of amitriptyline to treat this myofascial/trigger point pain.  She states that it really is not severe enough for her to want to take any medication for it she just wanted to be evaluated and make sure that it is nothing that is overly concerning.      Return if symptoms worsen or fail to improve.    I discussed the patients findings and my recommendations with patient    DEJA Barton

## 2023-01-24 RX ORDER — POLYETHYLENE GLYCOL 3350 17 G/17G
17 POWDER, FOR SOLUTION ORAL DAILY
Qty: 30 EACH | Refills: 11
Start: 2023-01-24

## 2023-02-01 NOTE — PROGRESS NOTES
Cardiac Electrophysiology Outpatient Note  [facility]    Office Visit     Joanna Cross  [id number]  02/23/2023    Primary Care Physician: Reynaldo Fritz MD    Referred By: No ref. provider found    Subjective     Chief Complaint   Patient presents with   • Paroxysmal atrial fibrillation (HCC)     Problem List:  1. Coronary artery disease  a. [facility], 02/15/2008, Dr Lutz: Mild, non-obstructive CAD, normal EF  b. [facility], 01/09/2013, Dr Stevenson Sutton: No LV gram done. Mild, non-obstructive CAD.  c. [facility], 11/9/2015, [facility]:  EF 60%. 70% RCA lesion with Promus ZAINAB placement. 40-50% mid LAD, no FFR performed. Other small blockages noted. No MR.  d. [facility], 11/15/2015, Dr Palacio @ [facility]: Patent RCA stent, 40-50% LAD with FFR @ 0.92; mild inferior wall hypokinesis  e. [facility], 07/29/2016, [facility]: Normal CORS with patent stent. LAD improved since last [facility]. EF 55-60%.  f. [facility], 02/01/2019: EF 55-60%. Patent RCA stent, noncritical mid LAD with IFR 0.93, noncritical LCx.   2. Chronic venous stasis:  a. Venous duplex, 09/17/2010: Insufficiency to venous hypertension in the great saphenous system bilaterally.  b. Venous duplex 2013: Right leg laser ablation of Dr. Garcia.  c. Venous duplex, 05/06/2016: No evidence of DVT, no superficial, no thrmobophlebitis, no valvular insufficiency.  d. AA with runoffs, 08/31/2016: Single-vessel [place]: No significant arterial disease.  e. Arterial doppler/GLYNN, 08/29/2019: No evidence of significant lower extremity arterial occlusive disease.  3. Palpitations:  a. Echocardiogram, 02/13/2014: Dr. Teran. Normal biventricular function; trace to mild MR. Atrial septal aneurysm.  b. Echocardiogram, 12/22/2015, Dr. Chavez: Borderline LVH, mild LAE, mild RV enlargement. Mild to moderate MR and TR with RVSP of 46 mmHg.  c. Echocardiogram, 03/2016: Dr. Palacio.  RV enlargement.  EF 50-55%.  Mild AI S, mild MR and TR with  RVSP 37 mmHg.  d. Echocardiogram, 07/11/20: Dr. Jany Wynn: EF 60-65%. Mild to moderate MR.  4. PAF/SSS:  a. [employer] PPM 2016  b. CHADS2 Vasc = 6 (HTN, DM, Age 65-74, Female, H/o TIA). On chronic anticoagulation.  c. ECV, 12/26/2018: Successful cardioversion. AV paced.  d. Echocardiogram, 12/25/2018:  EF 60%, mild MR/TR with RVSP 29 mmHg. Sclerotic AoV, no AS/AI. Mild MAC.  e. ECV, 03/05/2019: Successful cardioversion.  f. Zio, 10/01/2019, 14 days: NSR baseline. Normal average rates. Periods of RV pacing.   5. Patient has known undersensing in the atrial lead which is was known to Dr. Marie.    6.  TIA:  a. Carotid duplex, 02/13/2014: No significant flow-limiting stenosis. Mild luminal disease present; both vertebrals are present.  7. Diabetes  8. Essential Hypertension  9. Hyperlipidemia  10. Hypothyroid  11. Hyponatremia  a. Secondary to SIADH  12. MILLI with CPAP  13. RLS  14. Parkinson's disease  15. GERD  16. Urinary Retention  a. S/P cystoscopy and ureter dilation, March 2018.  Dr. Terrence Mckenzie    17. Surgeries:  a. Rotator cuff repair  b. Cholecystectomy  c. Bilateral knee replacement  d. Tubal ligation  e. Breast surgery  f. Sinus surgery  g. Left shoulder replacement 10/24/2022  History of Present Illness:   Joanna Cross is a 72 y.o. female who presents to my electrophysiology clinic for follow up of her atrial fibrillation and [employer] Pacemaker. Since she was last seen in July she has done well from a cardiac standpoint.  She denies chest pain, palpitations or heart failure symptoms.  She does note some shortness of breath when her H/H is low.  She underwent a left shoulder replacement surgery in October and is currently undergoing Physical therapy.  Her blood pressure has been well-controlled at home.  It is mildly elevated in the office today at 150/88.  She has no current issues with obvious bleeding. She has recently been diagnosed with Lupus and has started on  Plaquenil.    Past Medical History:   Diagnosis Date   • Anemia    • Ankle problem     thinks back related causing pain    • Atrial fibrillation (HCC)    • AVM (arteriovenous malformation)    • Back pain    • CKD (chronic kidney disease)    • Clotting disorder (HCC) 2016    AVM - small intestine   • COVID-19 vaccine series completed    • Gastrocnemius muscle tear     left medial 91   • Generalized osteoarthritis    • GERD (gastroesophageal reflux disease)    • Gestational diabetes    • GIB (gastrointestinal bleeding) 2016    d/t xarelto    • Headache    • Hearing decreased, bilateral     HAS HEARING AID, NOT WEARING IT CURRENTLY   • Hiatal hernia    • History of shingles    • History of transfusion 2016    no reaction recalled    • Hypertension    • Hypothyroidism    • IBS (irritable bowel syndrome)    • Klebsiella pneumonia (HCC)    • Lichen sclerosus    • Mouth problem     mouth guard used since pt bites tongue and lips excessively at night if not- with bipap    • MRSA infection 2018   • Obesity    • On home oxygen therapy     2L of oxygen all the time due to current congestion    • Osteoporosis    • Parkinson's disease (HCC)    • Peripheral vascular disease (HCC)    • Pleurisy    • Pneumonia    • Puerperal sepsis with acute hypoxic respiratory failure (HCC)     emergent intubation- 2016   • Right leg pain     from back issues    • Salivary gland stone    • Sciatic nerve pain    • Seborrheic dermatitis    • Skin cancer     on back    • Sleep apnea     CPAP AND HOME O2 2L/M   • Type 2 diabetes mellitus (HCC)    • UTI (urinary tract infection)    • Vitamin D deficiency 09/08/2022   • Wears glasses        Past Surgical History:   Procedure Laterality Date   • ABLATION OF DYSRHYTHMIC FOCUS  05/16/13    Laser Ablation - Rt Leg   • BACK SURGERY      l4-l5 laminectomy    • BICEPS TENDON REPAIR Right     shoulder   • BREAST BIOPSY Left 05/2004    excisional, benign   • BRONCHOSCOPY RIGID / FLEXIBLE      2016   •  BUNIONECTOMY Right    • CARDIAC CATHETERIZATION     • CARDIAC CATHETERIZATION N/A 02/01/2019    Procedure: Left Heart Cath;  Surgeon: Albertina Corona MD;  Location:  [facility];  Service: Cardiology   • CHOLECYSTECTOMY     • COLONOSCOPY  2016   • COLONOSCOPY N/A 06/17/2021    Procedure: COLONOSCOPY WITH POLYPECTOMY;  Surgeon: Trenton Sosa MD;  Location:  [facility];  Service: Gastroenterology;  Laterality: N/A;   • CORONARY STENT PLACEMENT      x1 stent   • CYST REMOVAL      left ear, upper left back 2001   • CYSTOSCOPY      x2  18 and 20 -   in 20 urethra dilation    • DIAGNOSTIC LAPAROSCOPY  1981   • ENDOSCOPIC FUNCTIONAL SINUS SURGERY (FESS)  2011   • ENDOSCOPY  2016   • ENTEROSCOPY VIA STOMA      with single ballon with fluoro    • HAMMER TOE REPAIR Left    • INCISION AND DRAINAGE OF WOUND  2018    back with wound infection    • INSERT / REPLACE / REMOVE PACEMAKER  11/10/16    [facility]   • JOINT REPLACEMENT     • KNEE ARTHROSCOPY     • LASER ABLATION      right leg 13   • LIPOMA EXCISION  1999    left leg    • LUMBAR LAMINECTOMY DISCECTOMY DECOMPRESSION N/A 07/06/2018    Procedure: LUMBAR LAMINECTOMY L4-5, HEMILAMIINECTOMY RIGHT L5-S1, FORAMINOTOMY L5-S1;  Surgeon: Lencho Gamino MD;  Location:  [facility];  Service: Neurosurgery   • LUMBAR LAMINECTOMY DISCECTOMY DECOMPRESSION N/A 09/19/2018    Procedure: INCISION AND DRAINAGE BACK WITH WOUND EXPLORATION;  Surgeon: Ritchie García MD;  Location:  [facility];  Service: Neurosurgery   • LUNG BIOPSY Left 2016   • OTHER SURGICAL HISTORY      ct scan of chest and sinuses and lower back    • OTHER SURGICAL HISTORY      various echos    • OTHER SURGICAL HISTORY      electroencephalogram 16,   emg  ncv tests 2007   • OTHER SURGICAL HISTORY      mra 2007  and various mri with xrays, nuclear medicine lung ventilation with perfusion test 2016 with pft    • OTHER SURGICAL HISTORY      barium swallow testing 15, 12, 12    • OTHER SURGICAL HISTORY      vaginal ultrasound- ,   vas clementina lower extrem , vas venous duplex lower extrem bilat    • OTHER SURGICAL HISTORY      wdge biopsy spring of lung    • OTHER SURGICAL HISTORY      emergent intubation- hypoxic resp failure    • PACEMAKER IMPLANTATION  2016    sss   • REPLACEMENT TOTAL KNEE Bilateral     left knee , right  per dr acuna    • REPLACEMENT TOTAL KNEE Bilateral    • SHOULDER ARTHROSCOPY Bilateral     -left, - right    • SKIN BIOPSY      skin cancer back    • SKIN CANCER EXCISION      upper righ tback    • MADHAV     • TEETH EXTRACTION      x2   • TOTAL SHOULDER ARTHROPLASTY W/ DISTAL CLAVICLE EXCISION Left 10/24/2022    Procedure: REVERSE TOTAL SHOULDER ARTHROPLASTY, BICEPS TENODESIS - LEFT;  Surgeon: Sammy Yo MD;  Location: [facility];  Service: Orthopedics;  Laterality: Left;   • TUBAL ABDOMINAL LIGATION Bilateral    • WISDOM TOOTH EXTRACTION         Family History   Problem Relation Age of Onset   • Kidney disease Mother    • Coronary artery disease Mother    • Hypertension Mother            • Heart disease Mother            • Hyperlipidemia Mother            • Coronary artery disease Father    • Hypertension Father            • Hypothyroidism Father    • Cancer Father         Oral cancer   • Heart disease Father            • Coronary artery disease Brother    • Hypertension Brother    • Heart disease Brother         Multiple stents, by-pass surgery   • Testicular cancer Brother    • Kidney cancer Maternal Uncle    • Testicular cancer Maternal Uncle    • Colon polyps Neg Hx    • Colon cancer Neg Hx        Social History     Socioeconomic History   • Marital status:      Spouse name: N/A   • Number of children: 4   • Years of education: College   • Highest education level: Master's degree (e.g., MA, MS, Randi, MEd, MSW, NELLA)   Tobacco Use   • Smoking status: Never     Passive exposure:  Past   • Smokeless tobacco: Never   • Tobacco comments:     Father and mother smoked for several years.   Vaping Use   • Vaping Use: Never used   Substance and Sexual Activity   • Alcohol use: Never   • Drug use: No   • Sexual activity: Not Currently     Partners: Male     Birth control/protection: Post-menopausal         Current Outpatient Medications:   •  Accu-Chek Guide test strip, USE TO TEST 3 TIMES DAILY, Disp: 200 each, Rfl: 3  •  Accu-Chek Softclix Lancets lancets, USE ONE LANCET TO TEST THREE TIMES A DAY, Disp: 300 each, Rfl: 1  •  albuterol (PROVENTIL) (2.5 MG/3ML) 0.083% nebulizer solution, Take 2.5 mg by nebulization Every 4 (Four) Hours As Needed for Wheezing or Shortness of Air., Disp: , Rfl:   •  albuterol sulfate HFA (Ventolin HFA) 108 (90 Base) MCG/ACT inhaler, Inhale 1 puff Every 4 (Four) Hours As Needed for Wheezing or Shortness of Air., Disp: 8 g, Rfl: 5  •  amantadine (SYMMETREL) 100 MG capsule, Take 100 mg by mouth., Disp: , Rfl:   •  apixaban (Eliquis) 5 MG tablet tablet, Take 1 tablet by mouth Every 12 (Twelve) Hours., Disp: 180 tablet, Rfl: 2  •  aspirin 81 MG EC tablet, Take 81 mg by mouth Daily., Disp: , Rfl:   •  B Complex-C (SUPER B COMPLEX PO), Take 1 tablet by mouth Daily., Disp: , Rfl:   •  bumetanide (BUMEX) 1 MG tablet, 1 tab po daily as directed, Disp: 90 tablet, Rfl: 0  •  Calcium Carbonate (CALTRATE 600 PO), Take 600 mg by mouth Daily., Disp: , Rfl:   •  carbidopa-levodopa (SINEMET)  MG per tablet, Take  by mouth. 2 tablets at 0600, 1 tablet at 0900, 1200, 1500, 1800, 2100, Disp: , Rfl:   •  Cholecalciferol 2000 units tablet, Take 2,000 Units by mouth 2 (Two) Times a Day., Disp: , Rfl:   •  citalopram (CeleXA) 20 MG tablet, TAKE 1 TABLET DAILY (Patient taking differently: Take 20 mg by mouth Daily.), Disp: 90 tablet, Rfl: 3  •  clobetasol (TEMOVATE) 0.05 % cream, Apply 1 application topically 2 (Two) Times a Day As Needed (Lichens Sclerosis)., Disp: , Rfl:   •  dofetilide  (TIKOSYN) 500 MCG capsule, TAKE 1 CAPSULE EVERY 12 HOURS (Patient taking differently: Take 500 mcg by mouth 2 (Two) Times a Day.), Disp: 180 capsule, Rfl: 3  •  Emollient (AQUAPHOR ADVANCED THERAPY EX), Apply  topically., Disp: , Rfl:   •  Glucosamine-Chondroit-Calcium (TRIPLE FLEX BONE & JOINT PO), Take 1 tablet by mouth Daily. Move free, Disp: , Rfl:   •  hydroxychloroquine (PLAQUENIL) 200 MG tablet, Daily., Disp: , Rfl:   •  Insulin Glargine (Lantus SoloStar) 100 UNIT/ML injection pen, Inject 12-14 Units under the skin into the appropriate area as directed Daily., Disp: , Rfl:   •  Insulin Pen Needle (B-D UF III MINI PEN NEEDLES) 31G X 5 MM misc, USE TO INJECT INSULIN DAILY, Disp: 100 each, Rfl: 3  •  IPRATROPIUM BROMIDE NA, 1 spray into the nostril(s) as directed by provider 2 (Two) Times a Day As Needed (CONGESTION SINUS)., Disp: , Rfl:   •  Loratadine 10 MG capsule, Take 10 mg by mouth Daily., Disp: , Rfl:   •  metFORMIN (GLUCOPHAGE) 1000 MG tablet, TAKE 1 TABLET TWICE A DAY WITH MEALS (Patient taking differently: Take 1,000 mg by mouth 2 (Two) Times a Day With Meals.), Disp: 180 tablet, Rfl: 3  •  metOLazone (ZAROXOLYN) 2.5 MG tablet, Take 1 tablet by mouth Daily., Disp: 90 tablet, Rfl: 1  •  Multiple Vitamins-Minerals (CENTRUM ULTRA WOMENS PO), Take 1 tablet by mouth Daily., Disp: , Rfl:   •  nitroglycerin (NITROLINGUAL) 0.4 MG/SPRAY spray, Place 1 spray under the tongue Every 5 (Five) Minutes As Needed for Chest Pain., Disp: 1 each, Rfl: 6  •  nystatin (MYCOSTATIN) 378636 UNIT/GM ointment, Apply 1 application topically to the appropriate area as directed 2 (Two) Times a Day., Disp: 3 g, Rfl: 3  •  O2 (OXYGEN), Inhale 2 L/min 1 (One) Time. 2L all the time now, Disp: , Rfl:   •  pantoprazole (Protonix) 40 MG EC tablet, Take 1 tablet by mouth Daily., Disp: 30 tablet, Rfl: 2  •  pramipexole (MIRAPEX) 1.5 MG tablet, Take 1.5 mg by mouth Every Night., Disp: , Rfl:   •  predniSONE (DELTASONE) 5 MG tablet, Take 1  "tablet by mouth Daily., Disp: , Rfl:   •  ranolazine (RANEXA) 500 MG 12 hr tablet, Take 1 tablet by mouth Every 12 (Twelve) Hours., Disp: 180 tablet, Rfl: 0  •  senna (SENOKOT) 8.6 MG tablet, Take 1 tablet by mouth Daily., Disp: , Rfl:   •  spironolactone (ALDACTONE) 25 MG tablet, Take 2 tablets by mouth Daily., Disp: 180 tablet, Rfl: 2  •  traMADol (ULTRAM) 50 MG tablet, Take 1 tablet by mouth Every 6 (Six) Hours As Needed for Moderate Pain ., Disp: 30 tablet, Rfl: 2  •  triamcinolone (KENALOG) 0.1 % cream, 1 application As Needed for Irritation or Rash., Disp: , Rfl:   •  Unithroid 150 MCG tablet, Take 1 tablet by mouth Daily., Disp: 90 tablet, Rfl: 1  •  valsartan (DIOVAN) 160 MG tablet, Take 1 tablet by mouth 2 (Two) Times a Day., Disp: 180 tablet, Rfl: 0  •  vitamin C (ASCORBIC ACID) 500 MG tablet, Take 500 mg by mouth Daily. Chewable tablet, Disp: , Rfl:   •  Zinc 50 MG capsule, Take 1 capsule by mouth Daily., Disp: , Rfl:   •  polyethylene glycol (MIRALAX) 17 g packet, Take 17 g by mouth Daily., Disp: 30 each, Rfl: 11    Allergies:   Allergies   Allergen Reactions   • Toprol Xl [Metoprolol Tartrate] Shortness Of Breath     Extreme fatigue   • Amlodipine Besylate Swelling     Lower extremity (ankles, feet) swelling   • Codeine Unknown (See Comments)     Pt is unaware of what reaction she had   • Entacapone Other (See Comments)     \"extreme weakness in legs - caused several falls, which stopped after discontinuing this medication\"   • Epinephrine Other (See Comments)     6/4/16- had 3 shots to numb mouth to prepare teeth for crowns, the shots contained epi-  Caused pt to have chest discomfort- went to hospital in ambulance, discovered had a fib while there    • Levemir [Insulin Detemir] Hives     Hives / rash around injection site   • Penicillins Hives     Jitteriness    • Xarelto [Rivaroxaban] GI Bleeding     hgb dropped to 5.2   • Hydrocodone Unknown - High Severity   • Prochlorperazine Unknown - High " "Severity   • Toprol Xl [Metoprolol] Unknown - High Severity   • Bactrim [Sulfamethoxazole-Trimethoprim] Nausea Only and Other (See Comments)     Headache -  Cant be taken with tikosyn - septra ds   • Benztropine Mesylate Other (See Comments)     Uncontrollable body movements   • Cimetidine Other (See Comments)     Cant be taken with tikosyn    • Ciprofloxacin Diarrhea   • Cogentin [Benztropine] Other (See Comments)     \"uncontrollable body movements\"   • Compazine [Prochlorperazine Edisylate] Other (See Comments)     Dystonic reaction   • Cortisone Other (See Comments)     MAKES BLOOD PRESSURE HIGH    • Duraprep [Antiseptic Products, Misc.] Itching and Rash     RASH AND ITCHING   • Erythromycin Base Other (See Comments)     Cant be taken with tikosyn - z pack and ketek    • Flurandrenolone Other (See Comments)     Cant be taken with tikosyn     Include - levaquin, cipro, norloxacin    • Haldol [Haloperidol Lactate] Other (See Comments)     Dystonic reaction   • Hydralazine Other (See Comments)     Headache    • Hydrochlorothiazide Other (See Comments)     Cant be taken with tikosyn -  Also hydrodiuril, microzide, hydro-par, oretic, esidrix, ezide or any medicine with hct or hctz in name    • Hydrocodone-Acetaminophen Nausea And Vomiting and Dizziness     Headache, nausea    • Levaquin [Levofloxacin] Other (See Comments)     Cannot take due to taking propafenone HCL- severe reaction with mixed.    • Lisinopril Cough   • Macrolides And Ketolides Other (See Comments)     antibx -   Cant be taken with tikosyn    • Statins Myalgia     Leg pain- all statins    • Tarka [Trandolapril-Verapamil Hcl Er] Other (See Comments)     Constipation    • Verapamil Other (See Comments)     Cant be taken with tikosyn - calan, covera-hs, isoptin, verelan or tarka        Objective   Vital Signs: Blood pressure 150/88, pulse 66, height 160 cm (63\"), weight 109 kg (240 lb 9.6 oz), SpO2 95 %, not currently breastfeeding.    PHYSICAL " EXAM  General appearance: Awake, alert, cooperative  Head: Normocephalic, without obvious abnormality, atraumatic  Neck: No JVD  Lungs: Clear to ascultation bilaterally  Heart: Regular rate and rhythm, no murmurs, 2+ LE pulses, no lower extremity swelling  Skin: Skin color, turgor normal, no rashes or lesions  Neurologic: Grossly normal     Lab Results   Component Value Date    GLUCOSE 192 (H) 12/07/2022    CALCIUM 9.3 12/07/2022     12/07/2022    K 3.9 12/07/2022    CO2 24 12/07/2022    CL 94 (L) 12/07/2022    BUN 48 (H) 12/07/2022    CREATININE 1.14 (H) 12/07/2022    EGFRIFNONA 66 06/12/2019    BCR 42 (H) 12/07/2022    ANIONGAP 9.0 10/25/2022     Lab Results   Component Value Date    WBC 7.92 10/25/2022    HGB 9.6 (L) 10/25/2022    HCT 31.4 (L) 10/25/2022    MCV 93.5 10/25/2022     (L) 10/25/2022     Lab Results   Component Value Date    INR 1.19 (H) 10/12/2022    INR 1.24 (H) 06/06/2019    INR 1.14 03/05/2019    PROTIME 15.1 (H) 10/12/2022    PROTIME 15.0 (H) 06/06/2019    PROTIME 14.1 03/05/2019     Lab Results   Component Value Date    TSH 3.030 12/07/2022          Results for orders placed during the hospital encounter of 12/24/18    Adult Transthoracic Echo Complete W/ Cont if Necessary Per Protocol    Interpretation Summary  · Mild mitral valve regurgitation is present.  · Calculated right ventricular systolic pressure from tricuspid regurgitation is 29 mmHg.  · Estimated EF = 60%.  · Left ventricular systolic function is normal.  · Mild tricuspid valve regurgitation is present.  · There is no evidence of pericardial effusion.  · No evidence of pulmonary hypertension is present.  · Mild MAC is present.  · The aortic valve exhibits sclerosis.  · Normal right ventricular cavity size, wall thickness, systolic function and septal motion noted.     Results for orders placed during the hospital encounter of 02/01/19    Cardiac Catheterization/Vascular Study    Narrative  FINAL    Impression  · Patent  right coronary stent  · Noncritical mid LAD disease with an IFR of 0.93  · Noncritical circumflex disease  · Normal LV systolic function wall motion    RECOMMENDATIONS:  · Other sources for the patient's chest discomfort should be considered  · Continued aggressive risk factor modification    Indications: Chest pain consistent with the patient's previous symptoms of unstable angina    Access: Right radial      Procedures:  · Left heart catheterization.  · Left ventriculogram.  · Selective coronary angiography.  · IFR of the mid LAD      Procedure narrative:  The patient was brought to the catheterization lab in a fasting condition.  Access site was prepped and draped in standard sterile fashion.  Lidocaine was injected and arterial access was obtained by percutaneous anterior wall puncture technique.  A 6 Czech arterial sheath was placed in the right radial using a modified Seldinger technique.  Selective coronary arteriography was performed using the Sonu technique with a 6 Czech 4 curved Sonu right catheter and a 6 Czech 4 curved Sonu left catheter.  Nonionic contrast was used and was injected manually.  Left ventriculography was performed using 30 ML of contrast power injected at 10 ML per second.  Left heart pressure pull back revealed no gradient.  At the time of the procedure the patient was noted to have a borderline-appearing areas within the mid LAD.  The decision was made to perform an IFR.  Heparin was administered per protocol for final ACT of 235 seconds.  A 6 Czech Mach 1 JL 3.5 guide was used as well as the Veratta wire.  IFR's were obtained of 0.94, 0.93 and 0.93 indicating no need for intervention.  That point the procedure was concluded.  There were no complications.  Sheath was removed and a TR band was placed.      Contrast: 135 ml    Hemodynamic Findings:    LV pressure: 140/2/18 mmHg, on pull back no gradient was recorded across the aortic valve.  Ao pressure: 130/60 mmHg    Left  ventriculography:    Single plane JORDAN left ventriculography disclosed normal left ventricular systolic function wall motion with an LVEF estimated at 55-60%.  No significant mitral regurgitation was seen.    Angiographic Findings:    LMCA: The left main coronary artery gives rise to LAD and circumflex vessels and is free of disease.    Circumflex: Circumflex coronary artery is co dominant for the posterior circulation and gives rise to 2 large obtuse marginal branches a moderate third obtuse marginal branch and a moderate fourth obtuse marginal branchWhich courses posterolaterally.  The circumflex and its branches contain minimal irregularities.  No stenosis greater than 20% to 30% is seen.    LAD: The LAD gives rise to a moderate first diagonal branch moderate second diagonal branch small third diagonal branch and a large apical recurrent branch.  The LAD contains a narrowing in the mid vessel which is estimated at 40% followed by another narrowing estimated at up to 60-70% and a third narrowing estimated at 50-60%.  IFR of the 3 tandem lesions is adequate at 0.93.    RCA: The right coronary artery has been previously stented proximal and mid segments.  The stented site is widely patent.  The right coronary is dominant codominant for the posterior circulation and gives rise to the PDA and small posterolateral branch.      I personally viewed and interpreted the patient's EKG/Telemetry/lab data      ECG 12 Lead    Date/Time: 2/23/2023 2:07 PM  Performed by: Lauro Francois MD  Authorized by: Lauro Francois MD   Comparison: compared with previous ECG from 10/24/2022  Comparison to previous ECG: Previous EKG was not pacing  Rhythm: paced  Rate: normal  BPM: 60  QRS axis: normal    Clinical impression: abnormal EKG        Manual device interrogation today reveals DDDR pacemaker at a rate of 68, right atrial pacing 35%, P waves 2 mV threshold 3.5 V at 1.0 ms impedance of 693 ohms, RV pacing 19%, R waves 22.8 mV threshold  1 V at 0.4 ms impedance of 989 ohms.  Battery life is 1.5 years.  Patient had 165 mode switches longest being 1 minute and 7-second.  Reprogramming was performed with the increase in the right atrial output to 4.5 V at 1 ms    Joanna Cross  reports that she has never smoked. She has been exposed to tobacco smoke. She has never used smokeless tobacco..      Advance Care Planning   Advance Care Planning: ACP discussion was held with the patient during this visit. Patient has an advance directive (not in EMR), copy requested.     Assessment & Plan    1. Paroxysmal atrial fibrillation (HCC)  -Device interrogation today reveals 165 mode switches with less than 1% total, maximum time was 1 minute and 7 seconds.  -Continue Tikosyn and Eliquis (normal QT interval today.).  She was started on hydroxychloroquine so we will need to continue to watch her QT closely.  Today is detectable.    2. Cardiac pacemaker in situ  -Normal device check as noted above    3. Long term current use of antiarrhythmic drug  -Normal QTc on EKG today, continue Tikosyn.    4.  Hypertension  -Blood pressure was mildly elevated at 150/88 today.  Patient states blood pressures at home are normal.  Continue valsartan.           Follow Up:  6 months with [employer]    Scribed for Lauro Francois MD by Silvana Gregorio RN. 2/23/2023  14:01 EST    I, Lauro Francois MD, personally performed the services described in this documentation as scribed by the above named individual in my presence, and it is both accurate and complete.  2/23/2023  14:27 EST      Thank you for allowing me to participate in the care of your patient. Please do not hesitate to contact me with additional questions or concerns.      Lauro Francois M.D.  Cardiac Electrophysiologist  [facility] / [facility]

## 2023-02-23 ENCOUNTER — TELEPHONE (OUTPATIENT)
Dept: FAMILY MEDICINE CLINIC | Facility: CLINIC | Age: 73
End: 2023-02-23
Payer: MEDICARE

## 2023-02-23 ENCOUNTER — OFFICE VISIT (OUTPATIENT)
Dept: CARDIOLOGY | Facility: CLINIC | Age: 73
End: 2023-02-23
Payer: MEDICARE

## 2023-02-23 VITALS
DIASTOLIC BLOOD PRESSURE: 88 MMHG | HEIGHT: 63 IN | HEART RATE: 66 BPM | BODY MASS INDEX: 42.63 KG/M2 | WEIGHT: 240.6 LBS | OXYGEN SATURATION: 95 % | SYSTOLIC BLOOD PRESSURE: 150 MMHG

## 2023-02-23 DIAGNOSIS — Z79.899 LONG TERM CURRENT USE OF ANTIARRHYTHMIC DRUG: ICD-10-CM

## 2023-02-23 DIAGNOSIS — Z95.0 CARDIAC PACEMAKER IN SITU: ICD-10-CM

## 2023-02-23 DIAGNOSIS — E78.5 HYPERLIPIDEMIA LDL GOAL <70: ICD-10-CM

## 2023-02-23 DIAGNOSIS — I49.5 TACHY-BRADY SYNDROME: ICD-10-CM

## 2023-02-23 DIAGNOSIS — I10 HYPERTENSION, ESSENTIAL: ICD-10-CM

## 2023-02-23 DIAGNOSIS — I25.10 CORONARY ARTERY DISEASE INVOLVING NATIVE CORONARY ARTERY OF NATIVE HEART WITHOUT ANGINA PECTORIS: ICD-10-CM

## 2023-02-23 DIAGNOSIS — I48.0 PAROXYSMAL ATRIAL FIBRILLATION: Primary | ICD-10-CM

## 2023-02-23 PROCEDURE — 93000 ELECTROCARDIOGRAM COMPLETE: CPT | Performed by: STUDENT IN AN ORGANIZED HEALTH CARE EDUCATION/TRAINING PROGRAM

## 2023-02-23 PROCEDURE — 99214 OFFICE O/P EST MOD 30 MIN: CPT | Performed by: STUDENT IN AN ORGANIZED HEALTH CARE EDUCATION/TRAINING PROGRAM

## 2023-02-23 PROCEDURE — 93280 PM DEVICE PROGR EVAL DUAL: CPT | Performed by: STUDENT IN AN ORGANIZED HEALTH CARE EDUCATION/TRAINING PROGRAM

## 2023-02-23 RX ORDER — SPIRONOLACTONE 25 MG/1
50 TABLET ORAL DAILY
Qty: 180 TABLET | Refills: 2 | Status: SHIPPED | OUTPATIENT
Start: 2023-02-23

## 2023-02-23 RX ORDER — HYDROXYCHLOROQUINE SULFATE 200 MG/1
TABLET, FILM COATED ORAL DAILY
COMMUNITY
Start: 2023-02-17

## 2023-02-23 RX ORDER — PREDNISONE 1 MG/1
1 TABLET ORAL DAILY
COMMUNITY
Start: 2023-02-06 | End: 2023-03-01

## 2023-02-23 RX ORDER — PANTOPRAZOLE SODIUM 40 MG/1
40 TABLET, DELAYED RELEASE ORAL DAILY
Qty: 30 TABLET | Refills: 2 | Status: SHIPPED | OUTPATIENT
Start: 2023-02-23 | End: 2023-02-24 | Stop reason: SDUPTHER

## 2023-02-23 NOTE — TELEPHONE ENCOUNTER
Caller: Joanna Cross    Relationship: Self    Best call back number: [phone]    What is the best time to reach you: ANY    Who are you requesting to speak with (clinical staff, provider,  specific staff member): CLINICAL    What was the call regarding: THE PATIENT STATES THAT SHE NEEDS A NEW PRESCRIPTION SENT IN FOR A 90 DAY SUPPLY OF 90 TABLETS TO [facility], AS SHE ONLY RECEIVED A PRESCRIPTION FOR 30 TABLETS.    Do you require a callback: YES, PLEASE CALL WHEN THIS NEW PRESCRIPTION HAS BEEN SENT IN.     THANK YOU.

## 2023-02-24 RX ORDER — INSULIN GLARGINE 100 [IU]/ML
12-14 INJECTION, SOLUTION SUBCUTANEOUS DAILY
Qty: 15 ML | Refills: 1 | Status: SHIPPED | OUTPATIENT
Start: 2023-02-24

## 2023-02-24 RX ORDER — PANTOPRAZOLE SODIUM 40 MG/1
40 TABLET, DELAYED RELEASE ORAL DAILY
Qty: 90 TABLET | Refills: 0 | Status: SHIPPED | OUTPATIENT
Start: 2023-02-24

## 2023-02-24 NOTE — TELEPHONE ENCOUNTER
Rx Refill Note    Requested Prescriptions     Pending Prescriptions Disp Refills   • pantoprazole (Protonix) 40 MG EC tablet 90 tablet 0     Sig: Take 1 tablet by mouth Daily.        Last office visit with prescribing clinician: 12/7/2022      Next office visit with prescribing clinician: Visit date not found   Last labs:   Last refill: needs 90 day   Pharmacy (be sure to add in Epic). correct

## 2023-03-01 ENCOUNTER — OFFICE VISIT (OUTPATIENT)
Dept: CARDIOLOGY | Facility: CLINIC | Age: 73
End: 2023-03-01
Payer: MEDICARE

## 2023-03-01 VITALS
BODY MASS INDEX: 42.88 KG/M2 | HEIGHT: 63 IN | WEIGHT: 242 LBS | HEART RATE: 63 BPM | SYSTOLIC BLOOD PRESSURE: 138 MMHG | DIASTOLIC BLOOD PRESSURE: 66 MMHG | OXYGEN SATURATION: 98 %

## 2023-03-01 DIAGNOSIS — I25.10 CORONARY ARTERY DISEASE INVOLVING NATIVE CORONARY ARTERY OF NATIVE HEART WITHOUT ANGINA PECTORIS: Primary | ICD-10-CM

## 2023-03-01 DIAGNOSIS — I48.91 ATRIAL FIBRILLATION WITH RVR: ICD-10-CM

## 2023-03-01 DIAGNOSIS — I73.9 PERIPHERAL VASCULAR DISEASE: ICD-10-CM

## 2023-03-01 DIAGNOSIS — E78.5 HYPERLIPIDEMIA LDL GOAL <70: ICD-10-CM

## 2023-03-01 DIAGNOSIS — E03.9 ACQUIRED HYPOTHYROIDISM: ICD-10-CM

## 2023-03-01 DIAGNOSIS — I10 ESSENTIAL HYPERTENSION: ICD-10-CM

## 2023-03-01 PROCEDURE — 99214 OFFICE O/P EST MOD 30 MIN: CPT | Performed by: PHYSICIAN ASSISTANT

## 2023-03-01 RX ORDER — BUMETANIDE 1 MG/1
TABLET ORAL
Qty: 90 TABLET | Refills: 0 | Status: SHIPPED | OUTPATIENT
Start: 2023-03-01

## 2023-03-01 RX ORDER — LEVOTHYROXINE SODIUM 150 UG/1
150 TABLET ORAL DAILY
Qty: 90 TABLET | Refills: 3 | Status: SHIPPED | OUTPATIENT
Start: 2023-03-01 | End: 2023-03-28

## 2023-03-17 ENCOUNTER — LAB (OUTPATIENT)
Dept: FAMILY MEDICINE CLINIC | Facility: CLINIC | Age: 73
End: 2023-03-17
Payer: MEDICARE

## 2023-03-17 DIAGNOSIS — G47.33 OSA TREATED WITH BIPAP: ICD-10-CM

## 2023-03-17 DIAGNOSIS — E53.8 VITAMIN B12 DEFICIENCY: ICD-10-CM

## 2023-03-17 DIAGNOSIS — N18.32 CHRONIC KIDNEY DISEASE, STAGE 3B: ICD-10-CM

## 2023-03-17 DIAGNOSIS — E55.9 VITAMIN D DEFICIENCY: ICD-10-CM

## 2023-03-17 DIAGNOSIS — E11.65 TYPE 2 DIABETES MELLITUS WITH HYPERGLYCEMIA, WITHOUT LONG-TERM CURRENT USE OF INSULIN: ICD-10-CM

## 2023-03-17 PROCEDURE — 36415 COLL VENOUS BLD VENIPUNCTURE: CPT | Performed by: FAMILY MEDICINE

## 2023-03-18 LAB
25(OH)D3+25(OH)D2 SERPL-MCNC: 52.5 NG/ML (ref 30–100)
ALBUMIN SERPL-MCNC: 4.4 G/DL (ref 3.7–4.7)
ALBUMIN/GLOB SERPL: 1.8 {RATIO} (ref 1.2–2.2)
ALP SERPL-CCNC: 81 IU/L (ref 44–121)
ALT SERPL-CCNC: 15 IU/L (ref 0–32)
AST SERPL-CCNC: 17 IU/L (ref 0–40)
BASOPHILS # BLD AUTO: 0.1 X10E3/UL (ref 0–0.2)
BASOPHILS NFR BLD AUTO: 1 %
BILIRUB SERPL-MCNC: 0.4 MG/DL (ref 0–1.2)
BUN SERPL-MCNC: 27 MG/DL (ref 8–27)
BUN/CREAT SERPL: 23 (ref 12–28)
CALCIUM SERPL-MCNC: 9.7 MG/DL (ref 8.7–10.3)
CHLORIDE SERPL-SCNC: 94 MMOL/L (ref 96–106)
CHOLEST SERPL-MCNC: 213 MG/DL (ref 100–199)
CO2 SERPL-SCNC: 26 MMOL/L (ref 20–29)
CREAT SERPL-MCNC: 1.15 MG/DL (ref 0.57–1)
EGFRCR SERPLBLD CKD-EPI 2021: 50 ML/MIN/1.73
EOSINOPHIL # BLD AUTO: 0.4 X10E3/UL (ref 0–0.4)
EOSINOPHIL NFR BLD AUTO: 6 %
ERYTHROCYTE [DISTWIDTH] IN BLOOD BY AUTOMATED COUNT: 14.6 % (ref 11.7–15.4)
GLOBULIN SER CALC-MCNC: 2.5 G/DL (ref 1.5–4.5)
GLUCOSE SERPL-MCNC: 199 MG/DL (ref 70–99)
HBA1C MFR BLD: 7.3 % (ref 4.8–5.6)
HCT VFR BLD AUTO: 33.5 % (ref 34–46.6)
HDLC SERPL-MCNC: 50 MG/DL
HGB BLD-MCNC: 11 G/DL (ref 11.1–15.9)
IMM GRANULOCYTES # BLD AUTO: 0 X10E3/UL (ref 0–0.1)
IMM GRANULOCYTES NFR BLD AUTO: 1 %
LDLC SERPL CALC-MCNC: 138 MG/DL (ref 0–99)
LYMPHOCYTES # BLD AUTO: 1.1 X10E3/UL (ref 0.7–3.1)
LYMPHOCYTES NFR BLD AUTO: 17 %
MCH RBC QN AUTO: 29.3 PG (ref 26.6–33)
MCHC RBC AUTO-ENTMCNC: 32.8 G/DL (ref 31.5–35.7)
MCV RBC AUTO: 89 FL (ref 79–97)
MONOCYTES # BLD AUTO: 0.5 X10E3/UL (ref 0.1–0.9)
MONOCYTES NFR BLD AUTO: 8 %
NEUTROPHILS # BLD AUTO: 4.3 X10E3/UL (ref 1.4–7)
NEUTROPHILS NFR BLD AUTO: 67 %
PLATELET # BLD AUTO: 199 X10E3/UL (ref 150–450)
POTASSIUM SERPL-SCNC: 4 MMOL/L (ref 3.5–5.2)
PROT SERPL-MCNC: 6.9 G/DL (ref 6–8.5)
RBC # BLD AUTO: 3.76 X10E6/UL (ref 3.77–5.28)
SODIUM SERPL-SCNC: 135 MMOL/L (ref 134–144)
T4 FREE SERPL-MCNC: 1.35 NG/DL (ref 0.82–1.77)
TRIGL SERPL-MCNC: 138 MG/DL (ref 0–149)
TSH SERPL DL<=0.005 MIU/L-ACNC: 7 UIU/ML (ref 0.45–4.5)
VIT B12 SERPL-MCNC: 658 PG/ML (ref 232–1245)
VLDLC SERPL CALC-MCNC: 25 MG/DL (ref 5–40)
WBC # BLD AUTO: 6.3 X10E3/UL (ref 3.4–10.8)

## 2023-03-20 RX ORDER — LANCETS
EACH MISCELLANEOUS
Qty: 300 EACH | Refills: 1 | Status: SHIPPED | OUTPATIENT
Start: 2023-03-20

## 2023-03-20 RX ORDER — RANOLAZINE 500 MG/1
500 TABLET, EXTENDED RELEASE ORAL EVERY 12 HOURS
Qty: 180 TABLET | Refills: 3 | Status: SHIPPED | OUTPATIENT
Start: 2023-03-20

## 2023-03-23 ENCOUNTER — OFFICE VISIT (OUTPATIENT)
Dept: FAMILY MEDICINE CLINIC | Facility: CLINIC | Age: 73
End: 2023-03-23
Payer: MEDICARE

## 2023-03-23 VITALS
WEIGHT: 246.1 LBS | HEIGHT: 63 IN | RESPIRATION RATE: 16 BRPM | BODY MASS INDEX: 43.61 KG/M2 | TEMPERATURE: 97.5 F | SYSTOLIC BLOOD PRESSURE: 132 MMHG | HEART RATE: 76 BPM | OXYGEN SATURATION: 99 % | DIASTOLIC BLOOD PRESSURE: 80 MMHG

## 2023-03-23 DIAGNOSIS — D64.9 ANEMIA, UNSPECIFIED TYPE: ICD-10-CM

## 2023-03-23 DIAGNOSIS — E03.9 ACQUIRED HYPOTHYROIDISM: ICD-10-CM

## 2023-03-23 DIAGNOSIS — E11.65 TYPE 2 DIABETES MELLITUS WITH HYPERGLYCEMIA, WITHOUT LONG-TERM CURRENT USE OF INSULIN: Primary | ICD-10-CM

## 2023-03-23 DIAGNOSIS — N18.32 CHRONIC KIDNEY DISEASE, STAGE 3B: ICD-10-CM

## 2023-03-23 DIAGNOSIS — I10 HYPERTENSION, ESSENTIAL: ICD-10-CM

## 2023-03-23 DIAGNOSIS — E78.5 HYPERLIPIDEMIA LDL GOAL <70: ICD-10-CM

## 2023-03-23 NOTE — ASSESSMENT & PLAN NOTE
He has stage 7.0.  Free T41.35.  Patient has appointment see Dr. Gunn next week.  He is following this

## 2023-03-23 NOTE — PROGRESS NOTES
Patient Name: Joanna Cross  : 1950   MRN: [id number]     Chief Complaint:    Chief Complaint   Patient presents with   • lab results     Pt here for lab results   • Hypertension   • Hypothyroidism   • Hyperlipidemia       History of Present Illness: Joanna Cross is a 72 y.o. female who is here today for follow up on BG and BP  HPI        Review of Systems:   Review of Systems   Constitutional: Negative.    HENT: Negative.    Eyes: Negative.    Respiratory: Negative.    Cardiovascular: Negative.    Gastrointestinal: Negative.    Neurological: Negative.         Past Medical History:   Past Medical History:   Diagnosis Date   • Anemia    • Ankle problem     thinks back related causing pain    • Atrial fibrillation (HCC)    • AVM (arteriovenous malformation)    • Back pain    • CKD (chronic kidney disease)    • Clotting disorder (HCC) 2016    AVM - small intestine   • COVID-19 vaccine series completed    • Gastrocnemius muscle tear     left medial 91   • Generalized osteoarthritis    • GERD (gastroesophageal reflux disease)    • Gestational diabetes    • GIB (gastrointestinal bleeding) 2016    d/t xarelto    • Headache    • Hearing decreased, bilateral     HAS HEARING AID, NOT WEARING IT CURRENTLY   • Hiatal hernia    • History of shingles    • History of transfusion 2016    no reaction recalled    • Hypertension    • Hypothyroidism    • IBS (irritable bowel syndrome)    • Klebsiella pneumonia (HCC)    • Lichen sclerosus    • Lupus (HCC)     subQ   • Mouth problem     mouth guard used since pt bites tongue and lips excessively at night if not- with bipap    • MRSA infection 2018   • Obesity    • On home oxygen therapy     2L of oxygen all the time due to current congestion    • Osteoporosis    • Parkinson's disease (HCC)    • Peripheral vascular disease (HCC)    • Pleurisy    • Pneumonia    • Puerperal sepsis with acute hypoxic respiratory failure (HCC)     emergent intubation-    • Right  leg pain     from back issues    • Salivary gland stone    • Sciatic nerve pain    • Seborrheic dermatitis    • Skin cancer     on back    • Sleep apnea     CPAP AND HOME O2 2L/M   • Type 2 diabetes mellitus (HCC)    • UTI (urinary tract infection)    • Vitamin D deficiency 09/08/2022   • Wears glasses        Past Surgical History:   Past Surgical History:   Procedure Laterality Date   • ABLATION OF DYSRHYTHMIC FOCUS  05/16/13    Laser Ablation - Rt Leg   • BACK SURGERY      l4-l5 laminectomy    • BICEPS TENDON REPAIR Right     shoulder   • BREAST BIOPSY Left 05/2004    excisional, benign   • BRONCHOSCOPY RIGID / FLEXIBLE      2016   • BUNIONECTOMY Right    • CARDIAC CATHETERIZATION     • CARDIAC CATHETERIZATION N/A 02/01/2019    Procedure: Left Heart Cath;  Surgeon: Albertina Corona MD;  Location:  [facility];  Service: Cardiology   • CHOLECYSTECTOMY     • COLONOSCOPY  2016   • COLONOSCOPY N/A 06/17/2021    Procedure: COLONOSCOPY WITH POLYPECTOMY;  Surgeon: Trenton Sosa MD;  Location:  [facility];  Service: Gastroenterology;  Laterality: N/A;   • CORONARY STENT PLACEMENT      x1 stent   • CYST REMOVAL      left ear, upper left back 2001   • CYSTOSCOPY      x2  18 and 20 -   in 20 urethra dilation    • DIAGNOSTIC LAPAROSCOPY  1981   • ENDOSCOPIC FUNCTIONAL SINUS SURGERY (FESS)  2011   • ENDOSCOPY  2016   • ENTEROSCOPY VIA STOMA      with single ballon with fluoro    • HAMMER TOE REPAIR Left    • INCISION AND DRAINAGE OF WOUND  2018    back with wound infection    • INSERT / REPLACE / REMOVE PACEMAKER  11/10/16    [facility]   • JOINT REPLACEMENT     • KNEE ARTHROSCOPY     • LASER ABLATION      right leg 13   • LIPOMA EXCISION  1999    left leg    • LUMBAR LAMINECTOMY DISCECTOMY DECOMPRESSION N/A 07/06/2018    Procedure: LUMBAR LAMINECTOMY L4-5, HEMILAMIINECTOMY RIGHT L5-S1, FORAMINOTOMY L5-S1;  Surgeon: Lencho Gamino MD;  Location:  [facility];  Service:  Neurosurgery   • LUMBAR LAMINECTOMY DISCECTOMY DECOMPRESSION N/A 2018    Procedure: INCISION AND DRAINAGE BACK WITH WOUND EXPLORATION;  Surgeon: Ritchie García MD;  Location: [facility] OR;  Service: Neurosurgery   • LUNG BIOPSY Left    • OTHER SURGICAL HISTORY      ct scan of chest and sinuses and lower back    • OTHER SURGICAL HISTORY      various echos    • OTHER SURGICAL HISTORY      electroencephalogram 16,   emg  ncv tests    • OTHER SURGICAL HISTORY      mra   and various mri with xrays, nuclear medicine lung ventilation with perfusion test  with pft    • OTHER SURGICAL HISTORY      barium swallow testing 15, 12, 12   • OTHER SURGICAL HISTORY      vaginal ultrasound- ,   vas clementina lower extrem , vas venous duplex lower extrem bilat    • OTHER SURGICAL HISTORY      wdge biopsy spring of lung    • OTHER SURGICAL HISTORY      emergent intubation- hypoxic resp failure    • PACEMAKER IMPLANTATION  2016    ss[facility]   • REPLACEMENT TOTAL KNEE Bilateral     left knee , right  per dr acuna    • REPLACEMENT TOTAL KNEE Bilateral    • SHOULDER ARTHROSCOPY Bilateral     -left, - right    • SKIN BIOPSY      skin cancer back    • SKIN CANCER EXCISION      upper righ tback    • MADHAV     • TEETH EXTRACTION      x2   • TOTAL SHOULDER ARTHROPLASTY W/ DISTAL CLAVICLE EXCISION Left 10/24/2022    Procedure: REVERSE TOTAL SHOULDER ARTHROPLASTY, BICEPS TENODESIS - LEFT;  Surgeon: Sammy Yo MD;  Location:  [facility];  Service: Orthopedics;  Laterality: Left;   • TUBAL ABDOMINAL LIGATION Bilateral    • WISDOM TOOTH EXTRACTION         Family History:   Family History   Problem Relation Age of Onset   • Kidney disease Mother    • Coronary artery disease Mother    • Hypertension Mother            • Heart disease Mother            • Hyperlipidemia Mother            • Coronary artery disease Father    • Hypertension Father            • Hypothyroidism  Father    • Cancer Father         Oral cancer   • Heart disease Father            • Coronary artery disease Brother    • Hypertension Brother    • Heart disease Brother         Multiple stents, by-pass surgery   • Testicular cancer Brother    • Kidney cancer Maternal Uncle    • Testicular cancer Maternal Uncle    • Colon polyps Neg Hx    • Colon cancer Neg Hx        Social History:   Social History     Socioeconomic History   • Marital status:      Spouse name: N/A   • Number of children: 4   • Years of education: College   • Highest education level: Master's degree (e.g., MA, MS, Randi, MEd, MSW, NELLA)   Tobacco Use   • Smoking status: Never     Passive exposure: Past   • Smokeless tobacco: Never   • Tobacco comments:     Father and mother smoked for several years.   Vaping Use   • Vaping Use: Never used   Substance and Sexual Activity   • Alcohol use: Never   • Drug use: No   • Sexual activity: Not Currently     Partners: Male     Birth control/protection: Post-menopausal       Medications:     Current Outpatient Medications:   •  Accu-Chek Softclix Lancets lancets, USE ONE LANCET TO TEST THREE TIMES A DAY, Disp: 300 each, Rfl: 1  •  albuterol (PROVENTIL) (2.5 MG/3ML) 0.083% nebulizer solution, Take 2.5 mg by nebulization Every 4 (Four) Hours As Needed for Wheezing or Shortness of Air., Disp: , Rfl:   •  albuterol sulfate HFA (Ventolin HFA) 108 (90 Base) MCG/ACT inhaler, Inhale 1 puff Every 4 (Four) Hours As Needed for Wheezing or Shortness of Air., Disp: 8 g, Rfl: 5  •  amantadine (SYMMETREL) 100 MG capsule, Take 1 capsule by mouth 2 (Two) Times a Day., Disp: , Rfl:   •  apixaban (Eliquis) 5 MG tablet tablet, Take 1 tablet by mouth Every 12 (Twelve) Hours., Disp: 180 tablet, Rfl: 2  •  aspirin 81 MG EC tablet, Take 1 tablet by mouth Daily., Disp: , Rfl:   •  B Complex-C (SUPER B COMPLEX PO), Take 1 tablet by mouth Daily., Disp: , Rfl:   •  bumetanide (BUMEX) 1 MG tablet, 1 tab po daily as directed,  Disp: 90 tablet, Rfl: 0  •  Calcium Carbonate (CALTRATE 600 PO), Take 600 mg by mouth Daily., Disp: , Rfl:   •  carbidopa-levodopa (SINEMET)  MG per tablet, Take  by mouth. 2 tablets at 0600, 1 tablet at 0900, 1200, 1500, 1800, 2100, Disp: , Rfl:   •  Cholecalciferol 2000 units tablet, Take 1 tablet by mouth 2 (Two) Times a Day., Disp: , Rfl:   •  citalopram (CeleXA) 20 MG tablet, TAKE 1 TABLET DAILY (Patient taking differently: Take 1 tablet by mouth Daily.), Disp: 90 tablet, Rfl: 3  •  clobetasol (TEMOVATE) 0.05 % cream, Apply 1 application topically to the appropriate area as directed 2 (Two) Times a Day As Needed (Lichens Sclerosis)., Disp: , Rfl:   •  dofetilide (TIKOSYN) 500 MCG capsule, TAKE 1 CAPSULE EVERY 12 HOURS (Patient taking differently: Take 1 capsule by mouth 2 (Two) Times a Day.), Disp: 180 capsule, Rfl: 3  •  Emollient (AQUAPHOR ADVANCED THERAPY EX), Apply  topically., Disp: , Rfl:   •  Glucosamine-Chondroit-Calcium (TRIPLE FLEX BONE & JOINT PO), Take 1 tablet by mouth Daily. Move free, Disp: , Rfl:   •  glucose blood (Accu-Chek Guide) test strip, Use as instructed, Disp: 200 each, Rfl: 3  •  hydrocortisone 2.5 % ointment, , Disp: , Rfl:   •  hydroxychloroquine (PLAQUENIL) 200 MG tablet, Daily., Disp: , Rfl:   •  Insulin Glargine (Lantus SoloStar) 100 UNIT/ML injection pen, Inject 12-14 Units under the skin into the appropriate area as directed Daily., Disp: 15 mL, Rfl: 1  •  Insulin Pen Needle (B-D UF III MINI PEN NEEDLES) 31G X 5 MM misc, USE TO INJECT INSULIN DAILY, Disp: 100 each, Rfl: 3  •  IPRATROPIUM BROMIDE NA, 1 spray into the nostril(s) as directed by provider 2 (Two) Times a Day As Needed (CONGESTION SINUS)., Disp: , Rfl:   •  Loratadine 10 MG capsule, Take 1 capsule by mouth Daily., Disp: , Rfl:   •  metFORMIN (GLUCOPHAGE) 1000 MG tablet, TAKE 1 TABLET TWICE A DAY WITH MEALS (Patient taking differently: Take 1 tablet by mouth 2 (Two) Times a Day With Meals.), Disp: 180 tablet,  Rfl: 3  •  metOLazone (ZAROXOLYN) 2.5 MG tablet, Take 1 tablet by mouth Daily., Disp: 90 tablet, Rfl: 1  •  Multiple Vitamins-Minerals (CENTRUM ULTRA WOMENS PO), Take 1 tablet by mouth Daily., Disp: , Rfl:   •  nitroglycerin (NITROLINGUAL) 0.4 MG/SPRAY spray, Place 1 spray under the tongue Every 5 (Five) Minutes As Needed for Chest Pain., Disp: 1 each, Rfl: 6  •  nystatin (MYCOSTATIN) 698376 UNIT/GM ointment, Apply 1 application topically to the appropriate area as directed 2 (Two) Times a Day. (Patient taking differently: Apply 1 application topically to the appropriate area as directed As Needed.), Disp: 3 g, Rfl: 3  •  O2 (OXYGEN), Inhale 2 L/min Every Night. 2L all the time now, Disp: , Rfl:   •  pantoprazole (Protonix) 40 MG EC tablet, Take 1 tablet by mouth Daily., Disp: 90 tablet, Rfl: 0  •  polyethylene glycol (MIRALAX) 17 g packet, Take 17 g by mouth Daily. (Patient taking differently: Take 17 g by mouth Daily As Needed.), Disp: 30 each, Rfl: 11  •  pramipexole (MIRAPEX) 1.5 MG tablet, Take 1 tablet by mouth Every Night., Disp: , Rfl:   •  ranolazine (RANEXA) 500 MG 12 hr tablet, Take 1 tablet by mouth Every 12 (Twelve) Hours., Disp: 180 tablet, Rfl: 3  •  senna (SENOKOT) 8.6 MG tablet, Take 1 tablet by mouth Daily., Disp: , Rfl:   •  spironolactone (ALDACTONE) 25 MG tablet, Take 2 tablets by mouth Daily., Disp: 180 tablet, Rfl: 2  •  traMADol (ULTRAM) 50 MG tablet, Take 1 tablet by mouth Every 6 (Six) Hours As Needed for Moderate Pain ., Disp: 30 tablet, Rfl: 2  •  triamcinolone (KENALOG) 0.1 % cream, 1 application As Needed for Irritation or Rash., Disp: , Rfl:   •  Unithroid 150 MCG tablet, Take 1 tablet by mouth Daily., Disp: 90 tablet, Rfl: 3  •  valsartan (DIOVAN) 160 MG tablet, Take 1 tablet by mouth 2 (Two) Times a Day., Disp: 180 tablet, Rfl: 0  •  vitamin C (ASCORBIC ACID) 500 MG tablet, Take 1 tablet by mouth Daily. Chewable tablet, Disp: , Rfl:   •  Zinc 50 MG capsule, Take 1 capsule by mouth  "Daily., Disp: , Rfl:     Allergies:   Allergies   Allergen Reactions   • Toprol Xl [Metoprolol Tartrate] Shortness Of Breath     Extreme fatigue   • Amlodipine Besylate Swelling     Lower extremity (ankles, feet) swelling   • Codeine Unknown (See Comments)     Pt is unaware of what reaction she had   • Entacapone Other (See Comments)     \"extreme weakness in legs - caused several falls, which stopped after discontinuing this medication\"   • Epinephrine Other (See Comments)     6/4/16- had 3 shots to numb mouth to prepare teeth for crowns, the shots contained epi-  Caused pt to have chest discomfort- went to hospital in ambulance, discovered had a fib while there    • Levemir [Insulin Detemir] Hives     Hives / rash around injection site   • Penicillins Hives     Jitteriness    • Xarelto [Rivaroxaban] GI Bleeding     hgb dropped to 5.2   • Hydrocodone Unknown - High Severity   • Prochlorperazine Unknown - High Severity   • Toprol Xl [Metoprolol] Unknown - High Severity   • Bactrim [Sulfamethoxazole-Trimethoprim] Nausea Only and Other (See Comments)     Headache -  Cant be taken with tikosyn - septra ds   • Benztropine Mesylate Other (See Comments)     Uncontrollable body movements   • Cimetidine Other (See Comments)     Cant be taken with tikosyn    • Ciprofloxacin Diarrhea   • Cogentin [Benztropine] Other (See Comments)     \"uncontrollable body movements\"   • Compazine [Prochlorperazine Edisylate] Other (See Comments)     Dystonic reaction   • Cortisone Other (See Comments)     MAKES BLOOD PRESSURE HIGH    • Duraprep [Antiseptic Products, Misc.] Itching and Rash     RASH AND ITCHING   • Erythromycin Base Other (See Comments)     Cant be taken with tikosyn - z pack and ketek    • Flurandrenolone Other (See Comments)     Cant be taken with tikosyn     Include - levaquin, cipro, norloxacin    • Haldol [Haloperidol Lactate] Other (See Comments)     Dystonic reaction   • Hydralazine Other (See Comments)     Headache    • " "Hydrochlorothiazide Other (See Comments)     Cant be taken with tikosyn -  Also hydrodiuril, microzide, hydro-par, oretic, esidrix, ezide or any medicine with hct or hctz in name    • Hydrocodone-Acetaminophen Nausea And Vomiting and Dizziness     Headache, nausea    • Levaquin [Levofloxacin] Other (See Comments)     Cannot take due to taking propafenone HCL- severe reaction with mixed.    • Lisinopril Cough   • Macrolides And Ketolides Other (See Comments)     antibx -   Cant be taken with tikosyn    • Statins Myalgia     Leg pain- all statins    • Tarka [Trandolapril-Verapamil Hcl Er] Other (See Comments)     Constipation    • Verapamil Other (See Comments)     Cant be taken with tikosyn - calan, covera-hs, isoptin, verelan or tarka          Physical Exam:  Vital Signs:   Vitals:    03/23/23 1353   BP: 132/80   BP Location: Left arm   Patient Position: Sitting   Cuff Size: Adult   Pulse: 76   Resp: 16   Temp: 97.5 °F (36.4 °C)   TempSrc: Temporal   SpO2: 99%  Comment: 2 liters   Weight: 112 kg (246 lb 1.6 oz)   Height: 160 cm (63\")   PainSc: 0-No pain     Body mass index is 43.59 kg/m².     Physical Exam  Vitals and nursing note reviewed.   Constitutional:       Appearance: Normal appearance. She is normal weight.   HENT:      Head: Normocephalic and atraumatic.      Right Ear: Tympanic membrane, ear canal and external ear normal.      Left Ear: Tympanic membrane, ear canal and external ear normal.      Nose: Nose normal.      Mouth/Throat:      Mouth: Mucous membranes are dry.      Pharynx: Oropharynx is clear.   Eyes:      Extraocular Movements: Extraocular movements intact.      Conjunctiva/sclera: Conjunctivae normal.      Pupils: Pupils are equal, round, and reactive to light.   Cardiovascular:      Rate and Rhythm: Normal rate and regular rhythm.      Pulses: Normal pulses.      Heart sounds: Normal heart sounds.   Pulmonary:      Effort: Pulmonary effort is normal.      Breath sounds: Normal breath sounds. "   Musculoskeletal:      Cervical back: Normal range of motion and neck supple.   Feet:      Comments:      Neurological:      Mental Status: She is alert.         Procedures      Assessment/Plan:   Diagnoses and all orders for this visit:    1. Type 2 diabetes mellitus with hyperglycemia, without long-term current use of insulin (HCC) (Primary)  -     Lipid Panel; Future  -     Comprehensive Metabolic Panel; Future  -     CBC & Differential; Future    2. Hypertension, essential  Assessment & Plan:  Discussed with patient to monitor their blood pressure and if systolic blood pressure goes above 140 or diastolic is above 90 to return to clinic.  Take medicines as directed, call for any problems, patient not having or any worrisome symptoms.        Orders:  -     Lipid Panel; Future  -     Comprehensive Metabolic Panel; Future  -     CBC & Differential; Future    3. Hyperlipidemia LDL goal <70  Assessment & Plan:  L .  Patient statin intolerant.  Recheck in 3 months.    Orders:  -     Lipid Panel; Future  -     Comprehensive Metabolic Panel; Future  -     CBC & Differential; Future    4. Acquired hypothyroidism  Assessment & Plan:  He has stage 7.0.  Free T41.35.  Patient has appointment see Dr. Gunn next week.  He is following this    Orders:  -     Lipid Panel; Future  -     Comprehensive Metabolic Panel; Future  -     CBC & Differential; Future    5. Chronic kidney disease, stage 3b (HCC)  -     Lipid Panel; Future  -     Comprehensive Metabolic Panel; Future  -     CBC & Differential; Future    6. Anemia, unspecified type  Assessment & Plan:  Patient has a chronic anemia.  This is probably due to her CKD.  We will follow.    Orders:  -     Lipid Panel; Future  -     Comprehensive Metabolic Panel; Future  -     CBC & Differential; Future           Follow Up:   Return in about 3 months (around 6/23/2023) for Annual physical, Bloodwork 1 week prior to next appointment.    Reynaldo Fritz MD  [facility]  [facility]     Answers for HPI/ROS submitted by the patient on 3/20/2023  Please describe your symptoms.: Follow-up on bloodwork.  Have you had these symptoms before?: No  How long have you been having these symptoms?: 1-4 days  What is the primary reason for your visit?: Other

## 2023-03-28 ENCOUNTER — OFFICE VISIT (OUTPATIENT)
Dept: ENDOCRINOLOGY | Facility: CLINIC | Age: 73
End: 2023-03-28
Payer: MEDICARE

## 2023-03-28 VITALS
WEIGHT: 242 LBS | DIASTOLIC BLOOD PRESSURE: 84 MMHG | HEIGHT: 63 IN | BODY MASS INDEX: 42.88 KG/M2 | SYSTOLIC BLOOD PRESSURE: 140 MMHG | HEART RATE: 68 BPM | OXYGEN SATURATION: 98 %

## 2023-03-28 DIAGNOSIS — E11.65 TYPE 2 DIABETES MELLITUS WITH HYPERGLYCEMIA, WITH LONG-TERM CURRENT USE OF INSULIN: Primary | Chronic | ICD-10-CM

## 2023-03-28 DIAGNOSIS — E03.9 ACQUIRED HYPOTHYROIDISM: Chronic | ICD-10-CM

## 2023-03-28 DIAGNOSIS — Z79.4 TYPE 2 DIABETES MELLITUS WITH HYPERGLYCEMIA, WITH LONG-TERM CURRENT USE OF INSULIN: Primary | Chronic | ICD-10-CM

## 2023-03-28 LAB
EXPIRATION DATE: NORMAL
GLUCOSE BLDC GLUCOMTR-MCNC: 89 MG/DL (ref 70–130)
Lab: NORMAL

## 2023-03-28 PROCEDURE — 3051F HG A1C>EQUAL 7.0%<8.0%: CPT | Performed by: PHYSICIAN ASSISTANT

## 2023-03-28 PROCEDURE — 3079F DIAST BP 80-89 MM HG: CPT | Performed by: PHYSICIAN ASSISTANT

## 2023-03-28 PROCEDURE — 1159F MED LIST DOCD IN RCRD: CPT | Performed by: PHYSICIAN ASSISTANT

## 2023-03-28 PROCEDURE — 1160F RVW MEDS BY RX/DR IN RCRD: CPT | Performed by: PHYSICIAN ASSISTANT

## 2023-03-28 PROCEDURE — 82947 ASSAY GLUCOSE BLOOD QUANT: CPT | Performed by: PHYSICIAN ASSISTANT

## 2023-03-28 PROCEDURE — 3077F SYST BP >= 140 MM HG: CPT | Performed by: PHYSICIAN ASSISTANT

## 2023-03-28 PROCEDURE — 99214 OFFICE O/P EST MOD 30 MIN: CPT | Performed by: PHYSICIAN ASSISTANT

## 2023-03-28 RX ORDER — BLOOD SUGAR DIAGNOSTIC
STRIP MISCELLANEOUS
Qty: 300 EACH | Refills: 3 | Status: SHIPPED | OUTPATIENT
Start: 2023-03-28

## 2023-03-28 RX ORDER — LEVOTHYROXINE SODIUM 175 UG/1
175 TABLET ORAL DAILY
Qty: 90 TABLET | Refills: 1 | Status: SHIPPED | OUTPATIENT
Start: 2023-03-28

## 2023-03-28 NOTE — PROGRESS NOTES
Office Note      Date: 2023  Patient Name: Joanna Cross  MRN: [id number]  : 1950    Chief Complaint   Patient presents with   • Diabetes       History of Present Illness  Joanna Cross is a 72 y.o. female who presents today for follow up on type 2 diabetes and hypothyroidism.   Diabetes was diagnosed in .  Current diabetic medications: Lantus and metformin.  She is taking Lantus 12 units daily.  She reports diet has not been as good.  Feet:  She reports developing blister left heel.  She reports that this has healed.  She has seen podiatry.  Last eye exam: 2022, Dr. Vasu Garland. No retinopathy.  ACE inhibitor/ARB: Valsartan.  Statin: She has been statin intolerant.  She remains on Unithroid 150 mcg daily.  She reports taking this correctly and regularly.  Level energy decreased.  Constipation not significantly changed.  Recent TSH was 7.0, free T4 1.35.    Review of systems  As noted in HPI.    Past Medical History:   Diagnosis Date   • Anemia    • Ankle problem     thinks back related causing pain    • Atrial fibrillation (HCC)    • AVM (arteriovenous malformation)    • Back pain    • CKD (chronic kidney disease)    • Clotting disorder (HCC) 2016    AVM - small intestine   • COVID-19 vaccine series completed    • Gastrocnemius muscle tear     left medial 91   • Generalized osteoarthritis    • GERD (gastroesophageal reflux disease)    • Gestational diabetes    • GIB (gastrointestinal bleeding) 2016    d/t xarelto    • Headache    • Hearing decreased, bilateral     HAS HEARING AID, NOT WEARING IT CURRENTLY   • Hiatal hernia    • History of shingles    • History of transfusion 2016    no reaction recalled    • Hypertension    • Hypothyroidism    • IBS (irritable bowel syndrome)    • Klebsiella pneumonia (HCC)    • Lichen sclerosus    • Lupus (HCC)     subQ   • Mouth problem     mouth guard used since pt bites tongue and lips excessively at night if not- with bipap    • MRSA  infection 2018   • Obesity    • On home oxygen therapy     2L of oxygen all the time due to current congestion    • Osteoporosis    • Parkinson's disease (HCC)    • Peripheral vascular disease (HCC)    • Pleurisy    • Pneumonia    • Puerperal sepsis with acute hypoxic respiratory failure (HCC)     emergent intubation- 2016   • Right leg pain     from back issues    • Salivary gland stone    • Sciatic nerve pain    • Seborrheic dermatitis    • Skin cancer     on back    • Sleep apnea     CPAP AND HOME O2 2L/M   • Type 2 diabetes mellitus (HCC)    • UTI (urinary tract infection)    • Vitamin D deficiency 09/08/2022   • Wears glasses       Past Surgical History:   Procedure Laterality Date   • TUBAL ABDOMINAL LIGATION Bilateral 1980   • DIAGNOSTIC LAPAROSCOPY  1981   • LIPOMA EXCISION  1999    left leg    • BREAST BIOPSY Left 05/2004    excisional, benign   • ENDOSCOPIC FUNCTIONAL SINUS SURGERY (FESS)  2011   • LUNG BIOPSY Left 2016   • ENDOSCOPY  2016   • COLONOSCOPY  2016   • PACEMAKER IMPLANTATION  11/2016    sss   • INCISION AND DRAINAGE OF WOUND  2018    back with wound infection    • LUMBAR LAMINECTOMY DISCECTOMY DECOMPRESSION N/A 07/06/2018    Procedure: LUMBAR LAMINECTOMY L4-5, HEMILAMIINECTOMY RIGHT L5-S1, FORAMINOTOMY L5-S1;  Surgeon: Lencho Gamino MD;  Location:  [facility] OR;  Service: Neurosurgery   • LUMBAR LAMINECTOMY DISCECTOMY DECOMPRESSION N/A 09/19/2018    Procedure: INCISION AND DRAINAGE BACK WITH WOUND EXPLORATION;  Surgeon: Ritchie García MD;  Location:  [facility] OR;  Service: Neurosurgery   • CARDIAC CATHETERIZATION N/A 02/01/2019    Procedure: Left Heart Cath;  Surgeon: Albertina Corona MD;  Location:  [facility] CATH INVASIVE LOCATION;  Service: Cardiology   • COLONOSCOPY N/A 06/17/2021    Procedure: COLONOSCOPY WITH POLYPECTOMY;  Surgeon: Trenton Sosa MD;  Location:  [facility] ENDOSCOPY;  Service: Gastroenterology;  Laterality: N/A;   • TOTAL SHOULDER ARTHROPLASTY W/ DISTAL CLAVICLE  EXCISION Left 10/24/2022    Procedure: REVERSE TOTAL SHOULDER ARTHROPLASTY, BICEPS TENODESIS - LEFT;  Surgeon: Sammy Yo MD;  Location: [facility];  Service: Orthopedics;  Laterality: Left;   • ABLATION OF DYSRHYTHMIC FOCUS  05/16/13    Laser Ablation - Rt Leg   • BACK SURGERY      l4-l5 laminectomy    • BICEPS TENDON REPAIR Right     shoulder   • BRONCHOSCOPY RIGID / FLEXIBLE      2016   • BUNIONECTOMY Right    • CARDIAC CATHETERIZATION     • CHOLECYSTECTOMY     • CORONARY STENT PLACEMENT      x1 stent   • CYST REMOVAL      left ear, upper left back 2001   • CYSTOSCOPY      x2  18 and 20 -   in 20 urethra dilation    • ENTEROSCOPY VIA STOMA      with single ballon with fluoro    • HAMMER TOE REPAIR Left    • INSERT / REPLACE / REMOVE PACEMAKER  11/10/16    [facility]   • JOINT REPLACEMENT     • KNEE ARTHROSCOPY     • LASER ABLATION      right leg 13   • OTHER SURGICAL HISTORY      ct scan of chest and sinuses and lower back    • OTHER SURGICAL HISTORY      various echos    • OTHER SURGICAL HISTORY      electroencephalogram 16,   emg  ncv tests 2007   • OTHER SURGICAL HISTORY      mra 2007  and various mri with xrays, nuclear medicine lung ventilation with perfusion test 2016 with pft    • OTHER SURGICAL HISTORY      barium swallow testing 15, 12, 12   • OTHER SURGICAL HISTORY      vaginal ultrasound- 2012,   vas clementina lower extrem 2016, vas venous duplex lower extrem bilat 2019   • OTHER SURGICAL HISTORY      wdge biopsy spring of lung    • OTHER SURGICAL HISTORY      emergent intubation- hypoxic resp failure    • REPLACEMENT TOTAL KNEE Bilateral     left knee 2011, right 2012 per dr acuna    • REPLACEMENT TOTAL KNEE Bilateral    • SHOULDER ARTHROSCOPY Bilateral     2003-left, 2004- right    • SKIN BIOPSY      skin cancer back 2016   • SKIN CANCER EXCISION      upper righ tback    • MADHAV     • TEETH EXTRACTION      x2   • WISDOM TOOTH EXTRACTION       The following portions of the patient's history  "were reviewed and updated as appropriate: allergies, current medications, past family history, past medical history, past social history, past surgical history and problem list.    Vitals:  /84   Pulse 68   Ht 160 cm (63\")   Wt 110 kg (242 lb)   LMP  (LMP Unknown) Comment: Mammogram- 1/28/20  SpO2 98%   BMI 42.87 kg/m²     Physical Exam  Vitals reviewed.   Constitutional:       General: She is not in acute distress.     Comments: O2 per NC   Cardiovascular:      Pulses:           Dorsalis pedis pulses are 2+ on the right side and 2+ on the left side.        Posterior tibial pulses are 2+ on the right side and 2+ on the left side.   Musculoskeletal:      Right foot: Deformity (overlapping toes, pes planus) present.      Left foot: Deformity (overlapping toes, pes planus) present.   Feet:      Right foot:      Protective Sensation: 8 sites tested. 8 sites sensed.      Skin integrity: Callus (mild) present. No ulcer or skin breakdown.      Left foot:      Protective Sensation: 8 sites tested. 8 sites sensed.      Skin integrity: Callus (mild) present. No ulcer or skin breakdown.   Neurological:      Mental Status: She is alert and oriented to person, place, and time.   Psychiatric:         Mood and Affect: Affect normal.       Labs/Imaging    Hemoglobin A1c  Lab Results   Component Value Date    HGBA1C 7.3 (H) 03/17/2023    HGBA1C 7.0 (H) 12/07/2022    HGBA1C 6.10 (H) 10/12/2022     Glucose  Lab Results   Component Value Date    POCGLU 89 03/28/2023       Current Outpatient Medications   Medication Instructions   • Accu-Chek Guide test strip Testing 3 times per day; E11.65   • Accu-Chek Softclix Lancets lancets USE ONE LANCET TO TEST THREE TIMES A DAY   • albuterol (PROVENTIL) 2.5 mg, Nebulization, Every 4 Hours PRN   • albuterol sulfate HFA (Ventolin HFA) 108 (90 Base) MCG/ACT inhaler 1 puff, Inhalation, Every 4 Hours PRN   • amantadine (SYMMETREL) 100 mg, Oral, 2 Times Daily   • apixaban (ELIQUIS) 5 mg, " Oral, Every 12 Hours Scheduled   • aspirin 81 mg, Oral, Daily   • B Complex-C (SUPER B COMPLEX PO) 1 tablet, Oral, Daily   • bumetanide (BUMEX) 1 MG tablet 1 tab po daily as directed   • Calcium Carbonate (CALTRATE 600 PO) 600 mg, Oral, Daily   • carbidopa-levodopa (SINEMET)  MG per tablet Oral, 2 tablets at 0600, 1 tablet at 0900, 1200, 1500, 1800, 2100   • Cholecalciferol 2,000 Units, Oral, 2 Times Daily   • citalopram (CeleXA) 20 MG tablet TAKE 1 TABLET DAILY   • clobetasol (TEMOVATE) 0.05 % cream 1 application, Topical, 2 Times Daily PRN   • dofetilide (TIKOSYN) 500 MCG capsule TAKE 1 CAPSULE EVERY 12 HOURS   • Emollient (AQUAPHOR ADVANCED THERAPY EX) Apply externally   • Glucosamine-Chondroit-Calcium (TRIPLE FLEX BONE & JOINT PO) 1 tablet, Oral, Daily, Move free   • hydrocortisone 2.5 % ointment No dose, route, or frequency recorded.   • hydroxychloroquine (PLAQUENIL) 200 MG tablet Daily   • Insulin Pen Needle (B-D UF III MINI PEN NEEDLES) 31G X 5 MM misc USE TO INJECT INSULIN DAILY   • IPRATROPIUM BROMIDE NA 1 spray, Nasal, 2 Times Daily PRN   • Lantus SoloStar 12-14 Units, Subcutaneous, Daily   • Loratadine 10 mg, Oral, Daily   • metFORMIN (GLUCOPHAGE) 1000 MG tablet TAKE 1 TABLET TWICE A DAY WITH MEALS   • metOLazone (ZAROXOLYN) 2.5 mg, Oral, Daily   • Multiple Vitamins-Minerals (CENTRUM ULTRA WOMENS PO) 1 tablet, Oral, Daily   • nitroglycerin (NITROLINGUAL) 0.4 MG/SPRAY spray 1 spray, Sublingual, Every 5 Minutes PRN   • nystatin (MYCOSTATIN) 163755 UNIT/GM ointment 1 application, Topical, 2 Times Daily   • O2 (OXYGEN) 2 L/min, Inhalation, Nightly, 2L all the time now   • pantoprazole (PROTONIX) 40 mg, Oral, Daily   • polyethylene glycol (MIRALAX) 17 g, Oral, Daily   • pramipexole (MIRAPEX) 1.5 mg, Oral, Nightly   • ranolazine (RANEXA) 500 mg, Oral, Every 12 Hours   • senna (SENOKOT) 8.6 MG tablet 1 tablet, Oral, Daily   • spironolactone (ALDACTONE) 50 mg, Oral, Daily   • traMADol (ULTRAM) 50 mg, Oral,  Every 6 Hours PRN   • triamcinolone (KENALOG) 0.1 % cream 1 application, As Needed   • Unithroid 175 mcg, Oral, Daily   • valsartan (DIOVAN) 160 mg, Oral, 2 Times Daily   • vitamin C (ASCORBIC ACID) 500 mg, Oral, Daily, Chewable tablet   • Zinc 50 MG capsule 1 capsule, Oral, Daily       Assessment & Plan  1. Type 2 diabetes mellitus with hyperglycemia, with long-term current use of insulin (HCC)  Diabetes control has worsened.  A1c still reasonable at 7.3%.  She will get back on track with her diet.  Encouraged regular physical activity as tolerated.  Continue Lantus and metformin.  - POC Glucose, Blood  - Accu-Chek Guide test strip; Testing 3 times per day; E11.65  Dispense: 300 each; Refill: 3    2. Acquired hypothyroidism  Increase Unithroid to 175 mcg daily.  Check TSH in 2-3 months.  She plans to have labs on 6/15/2023 in [place].   - Unithroid 175 MCG tablet; Take 1 tablet by mouth Daily.  Dispense: 90 tablet; Refill: 1  - TSH Rfx On Abnormal To Free T4; Future      Return in about 4 months (around 7/28/2023) for next scheduled follow up. She was advised to contact the office with any interval questions or concerns.    BRIGITTE Aj  Endocrinology  03/28/2023

## 2023-04-17 DIAGNOSIS — F32.A DEPRESSION, UNSPECIFIED DEPRESSION TYPE: ICD-10-CM

## 2023-04-18 RX ORDER — VALSARTAN 160 MG/1
160 TABLET ORAL 2 TIMES DAILY
Qty: 180 TABLET | Refills: 2 | Status: SHIPPED | OUTPATIENT
Start: 2023-04-18

## 2023-04-18 NOTE — TELEPHONE ENCOUNTER
Rx Refill Note    Requested Prescriptions     Pending Prescriptions Disp Refills   • citalopram (CeleXA) 20 MG tablet 90 tablet 3     Sig: Take 1 tablet by mouth Daily.        Last office visit with prescribing clinician: 3/23/2023      Next office visit with prescribing clinician: 6/22/2023   Last labs:   Last refill:    [facility]

## 2023-04-19 ENCOUNTER — TELEPHONE (OUTPATIENT)
Dept: CARDIOLOGY | Facility: CLINIC | Age: 73
End: 2023-04-19
Payer: MEDICARE

## 2023-04-19 RX ORDER — CITALOPRAM 20 MG/1
20 TABLET ORAL DAILY
Qty: 90 TABLET | Refills: 0 | OUTPATIENT
Start: 2023-04-19

## 2023-04-19 NOTE — TELEPHONE ENCOUNTER
Per Dr. Corona- pt may take celexa with her current cardiac status and her current cardiac medications. She has PPM. Last ekg is stable. QT/QTC are acceptable.

## 2023-04-27 DIAGNOSIS — F32.A DEPRESSION, UNSPECIFIED DEPRESSION TYPE: ICD-10-CM

## 2023-04-27 NOTE — TELEPHONE ENCOUNTER
Caller: TayJoanna Lalita    Relationship: Self    Best call back number: [phone]  Requested Prescriptions:       citalopram (CeleXA) 20 MG tablet         Pharmacy where request should be sent: [facility] [address] [phone] [facility] [phone] [facility]     Last office visit with prescribing clinician: 3/23/2023   Last telemedicine visit with prescribing clinician: 6/15/2023   Next office visit with prescribing clinician: 6/22/2023     Additional details provided by patient: PATIENT STATED SHE SPOKE TO HER CARDIOLOGIST GALINA ZARAGOZA AND SHE IS OK TO TAKE CELEXA.    PLEASE REFER TO TELEPHONE ENCOUNTER ON April 19TH FROM CARDIOLOGIST    Does the patient have less than a 3 day supply:  [] Yes  [x] No    Would you like a call back once the refill request has been completed: [x] Yes [] No    If the office needs to give you a call back, can they leave a voicemail: [x] Yes [] No    Martin Kennedy Rep   04/27/23 16:09 EDT

## 2023-04-28 RX ORDER — CITALOPRAM 20 MG/1
20 TABLET ORAL DAILY
Qty: 90 TABLET | Refills: 3 | Status: SHIPPED | OUTPATIENT
Start: 2023-04-28

## 2023-04-28 NOTE — TELEPHONE ENCOUNTER
Rx Refill Note    Requested Prescriptions     Pending Prescriptions Disp Refills   • citalopram (CeleXA) 20 MG tablet 90 tablet 3     Sig: Take 1 tablet by mouth Daily.        Last office visit with prescribing clinician: 3/23/2023      Next office visit with prescribing clinician: 6/22/2023   Last labs:   Last refill: please see below message   Pharmacy (be sure to add in Epic). correct

## 2023-05-02 ENCOUNTER — OFFICE VISIT (OUTPATIENT)
Dept: FAMILY MEDICINE CLINIC | Facility: CLINIC | Age: 73
End: 2023-05-02
Payer: MEDICARE

## 2023-05-02 VITALS
BODY MASS INDEX: 45.27 KG/M2 | OXYGEN SATURATION: 96 % | SYSTOLIC BLOOD PRESSURE: 122 MMHG | HEIGHT: 62 IN | RESPIRATION RATE: 15 BRPM | DIASTOLIC BLOOD PRESSURE: 74 MMHG | HEART RATE: 69 BPM | WEIGHT: 246 LBS | TEMPERATURE: 98 F

## 2023-05-02 DIAGNOSIS — G44.319 ACUTE POST-TRAUMATIC HEADACHE, NOT INTRACTABLE: Primary | ICD-10-CM

## 2023-05-02 DIAGNOSIS — Z91.81 HISTORY OF RECENT FALL: ICD-10-CM

## 2023-05-02 NOTE — ASSESSMENT & PLAN NOTE
Mild bruising of the left parietal scalp and mild bruising over the left elbow appear to be healing.  Visible trauma minimal considering that she is on a blood thinner.

## 2023-05-02 NOTE — ASSESSMENT & PLAN NOTE
ER records were reviewed and discussed with patient.  By definition she had a mild concussion.  Postconcussion symptoms can last for a few weeks.  There are no signs or symptoms that would indicate the need to be rescanned.  We did discuss to return to the emergency department for any significant worsening of the headache or for any focal neurologic deficits.  She was able to be reassured.

## 2023-05-02 NOTE — PROGRESS NOTES
Patient Office Visit      Patient Name: Joanna Cross  : 1950   MRN: [id number]     Chief Complaint:    Chief Complaint   Patient presents with   • Head Injury       History of Present Illness: Joanna Cross is a 72 y.o. female who is here today for an emergency department follow-up.  She was seen [facility] on [facility] after she tripped and fell hitting her head on the left side.  She also hit her left elbow.  She complains of left side of her scalp and her left elbow still being tender.  She still says she has a headache severity of 4 out of 10 since right after the fall.  She does not have any new neurological symptoms.  She has been somewhat fatigued and sleeping more.  She is on Eliquis.  She had a negative noncontrast CT of the brain and spine.  She had a lot of questions about her symptoms and her injury that she says were not answered when she was at the emergency department saying they were very busy when she was there.    Subjective      Review of Systems:   Review of Systems   Constitutional: Positive for fatigue.   Musculoskeletal: Positive for arthralgias.   Neurological: Negative for dizziness, seizures, syncope, facial asymmetry, speech difficulty, weakness, light-headedness and numbness.        Past Medical History:   Past Medical History:   Diagnosis Date   • Anemia    • Ankle problem     thinks back related causing pain    • Atrial fibrillation    • AVM (arteriovenous malformation)    • Back pain    • CKD (chronic kidney disease)    • Clotting disorder 2016    AVM - small intestine   • COVID-19 vaccine series completed    • Gastrocnemius muscle tear     left medial 91   • Generalized osteoarthritis    • GERD (gastroesophageal reflux disease)    • Gestational diabetes    • GIB (gastrointestinal bleeding) 2016    d/t xarelto    • Headache    • Hearing decreased, bilateral     HAS HEARING AID, NOT WEARING IT CURRENTLY   • Hiatal hernia    • History of  shingles    • History of transfusion 2016    no reaction recalled    • Hypertension    • Hypothyroidism    • IBS (irritable bowel syndrome)    • Klebsiella pneumonia    • Lichen sclerosus    • Lupus     subQ   • Mouth problem     mouth guard used since pt bites tongue and lips excessively at night if not- with bipap    • MRSA infection 2018   • Obesity    • On home oxygen therapy     2L of oxygen all the time due to current congestion    • Osteoporosis    • Parkinson's disease    • Peripheral vascular disease    • Pleurisy    • Pneumonia    • Puerperal sepsis with acute hypoxic respiratory failure     emergent intubation- 2016   • Right leg pain     from back issues    • Salivary gland stone    • Sciatic nerve pain    • Seborrheic dermatitis    • Skin cancer     on back    • Sleep apnea     CPAP AND HOME O2 2L/M   • Type 2 diabetes mellitus    • UTI (urinary tract infection)    • Vitamin D deficiency 09/08/2022   • Wears glasses        Past Surgical History:   Past Surgical History:   Procedure Laterality Date   • ABLATION OF DYSRHYTHMIC FOCUS  05/16/13    Laser Ablation - Rt Leg   • BACK SURGERY      l4-l5 laminectomy    • BICEPS TENDON REPAIR Right     shoulder   • BREAST BIOPSY Left 05/2004    excisional, benign   • BRONCHOSCOPY RIGID / FLEXIBLE      2016   • BUNIONECTOMY Right    • CARDIAC CATHETERIZATION     • CARDIAC CATHETERIZATION N/A 02/01/2019    Procedure: Left Heart Cath;  Surgeon: Albertina Corona MD;  Location:  [facility] CATH INVASIVE LOCATION;  Service: Cardiology   • CHOLECYSTECTOMY     • COLONOSCOPY  2016   • COLONOSCOPY N/A 06/17/2021    Procedure: COLONOSCOPY WITH POLYPECTOMY;  Surgeon: Trenton Sosa MD;  Location:  [facility];  Service: Gastroenterology;  Laterality: N/A;   • CORONARY STENT PLACEMENT      x1 stent   • CYST REMOVAL      left ear, upper left back 2001   • CYSTOSCOPY      x2  18 and 20 -   in 20 urethra dilation    • DIAGNOSTIC LAPAROSCOPY  1981   •  ENDOSCOPIC FUNCTIONAL SINUS SURGERY (FESS)  2011   • ENDOSCOPY  2016   • ENTEROSCOPY VIA STOMA      with single ballon with fluoro    • HAMMER TOE REPAIR Left    • INCISION AND DRAINAGE OF WOUND  2018    back with wound infection    • INSERT / REPLACE / REMOVE PACEMAKER  11/10/16    [facility]   • JOINT REPLACEMENT     • KNEE ARTHROSCOPY     • LASER ABLATION      right leg 13   • LIPOMA EXCISION  1999    left leg    • LUMBAR LAMINECTOMY DISCECTOMY DECOMPRESSION N/A 07/06/2018    Procedure: LUMBAR LAMINECTOMY L4-5, HEMILAMIINECTOMY RIGHT L5-S1, FORAMINOTOMY L5-S1;  Surgeon: Lencho Gamino MD;  Location:  [facility];  Service: Neurosurgery   • LUMBAR LAMINECTOMY DISCECTOMY DECOMPRESSION N/A 09/19/2018    Procedure: INCISION AND DRAINAGE BACK WITH WOUND EXPLORATION;  Surgeon: Ritchie García MD;  Location:  [facility];  Service: Neurosurgery   • LUNG BIOPSY Left 2016   • OTHER SURGICAL HISTORY      ct scan of chest and sinuses and lower back    • OTHER SURGICAL HISTORY      various echos    • OTHER SURGICAL HISTORY      electroencephalogram 16,   emg  ncv tests 2007   • OTHER SURGICAL HISTORY      mra 2007  and various mri with xrays, nuclear medicine lung ventilation with perfusion test 2016 with pft    • OTHER SURGICAL HISTORY      barium swallow testing 15, 12, 12   • OTHER SURGICAL HISTORY      vaginal ultrasound- 2012,   vas clementina lower extrem 2016, vas venous duplex lower extrem bilat 2019   • OTHER SURGICAL HISTORY      wdge biopsy spring of lung    • OTHER SURGICAL HISTORY      emergent intubation- hypoxic resp failure    • PACEMAKER IMPLANTATION  11/2016    [facility]   • REPLACEMENT TOTAL KNEE Bilateral     left knee 2011, right 2012 per dr acuna    • REPLACEMENT TOTAL KNEE Bilateral    • SHOULDER ARTHROSCOPY Bilateral     2003-left, 2004- right    • SKIN BIOPSY      skin cancer back 2016   • SKIN CANCER EXCISION      upper righ tback    • MADHAV     • TEETH EXTRACTION      x2   • TOTAL SHOULDER ARTHROPLASTY W/ DISTAL  CLAVICLE EXCISION Left 10/24/2022    Procedure: REVERSE TOTAL SHOULDER ARTHROPLASTY, BICEPS TENODESIS - LEFT;  Surgeon: Sammy Yo MD;  Location: [facility];  Service: Orthopedics;  Laterality: Left;   • TUBAL ABDOMINAL LIGATION Bilateral    • WISDOM TOOTH EXTRACTION         Family History:   Family History   Problem Relation Age of Onset   • Kidney disease Mother    • Coronary artery disease Mother    • Hypertension Mother            • Heart disease Mother            • Hyperlipidemia Mother            • Coronary artery disease Father    • Hypertension Father            • Hypothyroidism Father    • Cancer Father         Oral cancer   • Heart disease Father            • Coronary artery disease Brother    • Hypertension Brother    • Heart disease Brother         Multiple stents, by-pass surgery   • Testicular cancer Brother    • Kidney cancer Maternal Uncle    • Testicular cancer Maternal Uncle    • Colon polyps Neg Hx    • Colon cancer Neg Hx        Social History:   Social History     Socioeconomic History   • Marital status:      Spouse name: N/A   • Number of children: 4   • Years of education: College   • Highest education level: Master's degree (e.g., MA, MS, Randi, MEd, MSW, NELLA)   Tobacco Use   • Smoking status: Never     Passive exposure: Past   • Smokeless tobacco: Never   • Tobacco comments:     Father and mother smoked for several years.   Vaping Use   • Vaping Use: Never used   Substance and Sexual Activity   • Alcohol use: Never   • Drug use: No   • Sexual activity: Not Currently     Partners: Male     Birth control/protection: Post-menopausal       Allergies:   Allergies   Allergen Reactions   • Toprol Xl [Metoprolol Tartrate] Shortness Of Breath     Extreme fatigue   • Amlodipine Besylate Swelling     Lower extremity (ankles, feet) swelling   • Codeine Unknown (See Comments)     Pt is unaware of what reaction she had   • Entacapone Other (See  "Comments)     \"extreme weakness in legs - caused several falls, which stopped after discontinuing this medication\"   • Epinephrine Other (See Comments)     6/4/16- had 3 shots to numb mouth to prepare teeth for crowns, the shots contained epi-  Caused pt to have chest discomfort- went to hospital in ambulance, discovered had a fib while there    • Levemir [Insulin Detemir] Hives     Hives / rash around injection site   • Penicillins Hives     Jitteriness    • Xarelto [Rivaroxaban] GI Bleeding     hgb dropped to 5.2   • Hydrocodone Unknown - High Severity   • Prochlorperazine Unknown - High Severity   • Toprol Xl [Metoprolol] Unknown - High Severity   • Bactrim [Sulfamethoxazole-Trimethoprim] Nausea Only and Other (See Comments)     Headache -  Cant be taken with tikosyn - septra ds   • Benztropine Mesylate Other (See Comments)     Uncontrollable body movements   • Cimetidine Other (See Comments)     Cant be taken with tikosyn    • Ciprofloxacin Diarrhea   • Cogentin [Benztropine] Other (See Comments)     \"uncontrollable body movements\"   • Compazine [Prochlorperazine Edisylate] Other (See Comments)     Dystonic reaction   • Cortisone Other (See Comments)     MAKES BLOOD PRESSURE HIGH    • Duraprep [Antiseptic Products, Misc.] Itching and Rash     RASH AND ITCHING   • Erythromycin Base Other (See Comments)     Cant be taken with tikosyn - z pack and ketek    • Flurandrenolone Other (See Comments)     Cant be taken with tikosyn     Include - levaquin, cipro, norloxacin    • Haldol [Haloperidol Lactate] Other (See Comments)     Dystonic reaction   • Hydralazine Other (See Comments)     Headache    • Hydrochlorothiazide Other (See Comments)     Cant be taken with tikosyn -  Also hydrodiuril, microzide, hydro-par, oretic, esidrix, ezide or any medicine with hct or hctz in name    • Hydrocodone-Acetaminophen Nausea And Vomiting and Dizziness     Headache, nausea    • Levaquin [Levofloxacin] Other (See Comments)     Cannot " "take due to taking propafenone HCL- severe reaction with mixed.    • Lisinopril Cough   • Macrolides And Ketolides Other (See Comments)     antibx -   Cant be taken with tikosyn    • Statins Myalgia     Leg pain- all statins    • Tarka [Trandolapril-Verapamil Hcl Er] Other (See Comments)     Constipation    • Verapamil Other (See Comments)     Cant be taken with tikosyn - calan, covera-hs, isoptin, verelan or tarka        Objective     Physical Exam:  Vital Signs:   Vitals:    05/02/23 1509   BP: 122/74   BP Location: Right arm   Patient Position: Sitting   Cuff Size: Adult   Pulse: 69   Resp: 15   Temp: 98 °F (36.7 °C)   TempSrc: Temporal   SpO2: 96%   Weight: 112 kg (246 lb)   Height: 157.5 cm (62\")     Body mass index is 44.99 kg/m².        Physical Exam  Constitutional:       Appearance: She is obese.   Eyes:      Extraocular Movements: Extraocular movements intact.      Pupils: Pupils are equal, round, and reactive to light.   Neurological:      General: No focal deficit present.      Mental Status: She is alert and oriented to person, place, and time.      Cranial Nerves: Cranial nerves 2-12 are intact.      Motor: Motor function is intact.      Coordination: Coordination is intact.      Gait: Gait is intact.      Comments: Walks with a walker         Procedures    Assessment / Plan      Assessment/Plan:   Diagnoses and all orders for this visit:    1. Acute post-traumatic headache, not intractable (Primary)  Assessment & Plan:  ER records were reviewed and discussed with patient.  By definition she had a mild concussion.  Postconcussion symptoms can last for a few weeks.  There are no signs or symptoms that would indicate the need to be rescanned.  We did discuss to return to the emergency department for any significant worsening of the headache or for any focal neurologic deficits.  She was able to be reassured.      2. History of recent fall  Assessment & Plan:  Mild bruising of the left parietal scalp and " mild bruising over the left elbow appear to be healing.  Visible trauma minimal considering that she is on a blood thinner.           Medications:     Current Outpatient Medications:   •  Accu-Chek Guide test strip, Testing 3 times per day; E11.65, Disp: 300 each, Rfl: 3  •  Accu-Chek Softclix Lancets lancets, USE ONE LANCET TO TEST THREE TIMES A DAY, Disp: 300 each, Rfl: 1  •  albuterol (PROVENTIL) (2.5 MG/3ML) 0.083% nebulizer solution, Take 2.5 mg by nebulization Every 4 (Four) Hours As Needed for Wheezing or Shortness of Air., Disp: , Rfl:   •  albuterol sulfate HFA (Ventolin HFA) 108 (90 Base) MCG/ACT inhaler, Inhale 1 puff Every 4 (Four) Hours As Needed for Wheezing or Shortness of Air., Disp: 8 g, Rfl: 5  •  amantadine (SYMMETREL) 100 MG capsule, Take 1 capsule by mouth 2 (Two) Times a Day., Disp: , Rfl:   •  apixaban (Eliquis) 5 MG tablet tablet, Take 1 tablet by mouth Every 12 (Twelve) Hours., Disp: 180 tablet, Rfl: 2  •  aspirin 81 MG EC tablet, Take 1 tablet by mouth Daily., Disp: , Rfl:   •  B Complex-C (SUPER B COMPLEX PO), Take 1 tablet by mouth Daily., Disp: , Rfl:   •  bumetanide (BUMEX) 1 MG tablet, 1 tab po daily as directed, Disp: 90 tablet, Rfl: 0  •  Calcium Carbonate (CALTRATE 600 PO), Take 600 mg by mouth Daily., Disp: , Rfl:   •  carbidopa-levodopa (SINEMET)  MG per tablet, Take  by mouth. 2 tablets at 0600, 1 tablet at 0900, 1200, 1500, 1800, 2100, Disp: , Rfl:   •  Cholecalciferol 2000 units tablet, Take 1 tablet by mouth 2 (Two) Times a Day., Disp: , Rfl:   •  citalopram (CeleXA) 20 MG tablet, Take 1 tablet by mouth Daily., Disp: 90 tablet, Rfl: 3  •  clobetasol (TEMOVATE) 0.05 % cream, Apply 1 application topically to the appropriate area as directed 2 (Two) Times a Day As Needed (Lichens Sclerosis)., Disp: , Rfl:   •  dofetilide (TIKOSYN) 500 MCG capsule, TAKE 1 CAPSULE EVERY 12 HOURS (Patient taking differently: Take 1 capsule by mouth 2 (Two) Times a Day.), Disp: 180 capsule,  Rfl: 3  •  Emollient (AQUAPHOR ADVANCED THERAPY EX), Apply  topically., Disp: , Rfl:   •  Glucosamine-Chondroit-Calcium (TRIPLE FLEX BONE & JOINT PO), Take 1 tablet by mouth Daily. Move free, Disp: , Rfl:   •  hydrocortisone 2.5 % ointment, , Disp: , Rfl:   •  hydroxychloroquine (PLAQUENIL) 200 MG tablet, Daily., Disp: , Rfl:   •  Insulin Glargine (Lantus SoloStar) 100 UNIT/ML injection pen, Inject 12-14 Units under the skin into the appropriate area as directed Daily., Disp: 15 mL, Rfl: 1  •  Insulin Pen Needle (B-D UF III MINI PEN NEEDLES) 31G X 5 MM misc, USE TO INJECT INSULIN DAILY, Disp: 100 each, Rfl: 3  •  IPRATROPIUM BROMIDE NA, 1 spray into the nostril(s) as directed by provider 2 (Two) Times a Day As Needed (CONGESTION SINUS)., Disp: , Rfl:   •  Loratadine 10 MG capsule, Take 1 capsule by mouth Daily., Disp: , Rfl:   •  metFORMIN (GLUCOPHAGE) 1000 MG tablet, TAKE 1 TABLET TWICE A DAY WITH MEALS (Patient taking differently: Take 1 tablet by mouth 2 (Two) Times a Day With Meals.), Disp: 180 tablet, Rfl: 3  •  metOLazone (ZAROXOLYN) 2.5 MG tablet, Take 1 tablet by mouth Daily., Disp: 90 tablet, Rfl: 1  •  Multiple Vitamins-Minerals (CENTRUM ULTRA WOMENS PO), Take 1 tablet by mouth Daily., Disp: , Rfl:   •  nitroglycerin (NITROLINGUAL) 0.4 MG/SPRAY spray, Place 1 spray under the tongue Every 5 (Five) Minutes As Needed for Chest Pain., Disp: 1 each, Rfl: 6  •  nystatin (MYCOSTATIN) 042242 UNIT/GM ointment, Apply 1 application topically to the appropriate area as directed 2 (Two) Times a Day. (Patient taking differently: Apply 1 application topically to the appropriate area as directed As Needed.), Disp: 3 g, Rfl: 3  •  O2 (OXYGEN), Inhale 2 L/min Every Night. 2L all the time now, Disp: , Rfl:   •  pantoprazole (Protonix) 40 MG EC tablet, Take 1 tablet by mouth Daily., Disp: 90 tablet, Rfl: 0  •  pramipexole (MIRAPEX) 1.5 MG tablet, Take 1 tablet by mouth Every Night., Disp: , Rfl:   •  ranolazine (RANEXA) 500  MG 12 hr tablet, Take 1 tablet by mouth Every 12 (Twelve) Hours., Disp: 180 tablet, Rfl: 3  •  senna (SENOKOT) 8.6 MG tablet, Take 1 tablet by mouth Daily., Disp: , Rfl:   •  spironolactone (ALDACTONE) 25 MG tablet, Take 2 tablets by mouth Daily., Disp: 180 tablet, Rfl: 2  •  traMADol (ULTRAM) 50 MG tablet, Take 1 tablet by mouth Every 6 (Six) Hours As Needed for Moderate Pain ., Disp: 30 tablet, Rfl: 2  •  triamcinolone (KENALOG) 0.1 % cream, 1 application As Needed for Irritation or Rash., Disp: , Rfl:   •  Unithroid 175 MCG tablet, Take 1 tablet by mouth Daily., Disp: 90 tablet, Rfl: 1  •  valsartan (DIOVAN) 160 MG tablet, Take 1 tablet by mouth 2 (Two) Times a Day., Disp: 180 tablet, Rfl: 2  •  vitamin C (ASCORBIC ACID) 500 MG tablet, Take 1 tablet by mouth Daily. Chewable tablet, Disp: , Rfl:   •  Zinc 50 MG capsule, Take 1 capsule by mouth Daily., Disp: , Rfl:     I spent 30 minutes caring for Joanna on this date of service. This time includes time spent by me in the following activities:preparing for the visit, reviewing tests, obtaining and/or reviewing a separately obtained history, performing a medically appropriate examination and/or evaluation , counseling and educating the patient/family/caregiver and documenting information in the medical record    Follow Up:   No follow-ups on file.    Alicia Du PA-C   [facility]

## 2023-05-16 RX ORDER — RANOLAZINE 500 MG/1
500 TABLET, EXTENDED RELEASE ORAL EVERY 12 HOURS
Qty: 180 TABLET | Refills: 3 | Status: SHIPPED | OUTPATIENT
Start: 2023-05-16

## 2023-06-05 RX ORDER — PANTOPRAZOLE SODIUM 40 MG/1
40 TABLET, DELAYED RELEASE ORAL DAILY
Qty: 90 TABLET | Refills: 1 | Status: SHIPPED | OUTPATIENT
Start: 2023-06-05

## 2023-06-05 NOTE — TELEPHONE ENCOUNTER
Rx Refill Note  Requested Prescriptions     Pending Prescriptions Disp Refills    metFORMIN (GLUCOPHAGE) 1000 MG tablet 180 tablet 3     Sig: Take 1 tablet by mouth 2 (Two) Times a Day With Meals.      Last office visit with prescribing clinician: Visit date not found   Last telemedicine visit with prescribing clinician: 07/28/2023   Next office visit with prescribing clinician: Visit date not found                             Juliette Baumann CMA  06/05/23, 09:10 EDT

## 2023-06-05 NOTE — TELEPHONE ENCOUNTER
Rx Refill Note    Requested Prescriptions     Pending Prescriptions Disp Refills    pantoprazole (Protonix) 40 MG EC tablet 90 tablet 0     Sig: Take 1 tablet by mouth Daily.        Last office visit with prescribing clinician: 3/23/2023      Next office visit with prescribing clinician:   Last labs:   Last refill:    [facility]

## 2023-06-15 ENCOUNTER — LAB (OUTPATIENT)
Dept: FAMILY MEDICINE CLINIC | Facility: CLINIC | Age: 73
End: 2023-06-15
Payer: MEDICARE

## 2023-06-15 DIAGNOSIS — I10 HYPERTENSION, ESSENTIAL: ICD-10-CM

## 2023-06-15 DIAGNOSIS — N18.32 CHRONIC KIDNEY DISEASE, STAGE 3B: ICD-10-CM

## 2023-06-15 DIAGNOSIS — E78.5 HYPERLIPIDEMIA LDL GOAL <70: ICD-10-CM

## 2023-06-15 DIAGNOSIS — E03.9 ACQUIRED HYPOTHYROIDISM: ICD-10-CM

## 2023-06-15 DIAGNOSIS — E11.65 TYPE 2 DIABETES MELLITUS WITH HYPERGLYCEMIA, WITHOUT LONG-TERM CURRENT USE OF INSULIN: ICD-10-CM

## 2023-06-15 DIAGNOSIS — D64.9 ANEMIA, UNSPECIFIED TYPE: ICD-10-CM

## 2023-06-15 PROCEDURE — 36415 COLL VENOUS BLD VENIPUNCTURE: CPT | Performed by: PHYSICIAN ASSISTANT

## 2023-06-16 LAB
ALBUMIN SERPL-MCNC: 4.5 G/DL (ref 3.7–4.7)
ALBUMIN/GLOB SERPL: 2.3 {RATIO} (ref 1.2–2.2)
ALP SERPL-CCNC: 74 IU/L (ref 44–121)
ALT SERPL-CCNC: 7 IU/L (ref 0–32)
AST SERPL-CCNC: 18 IU/L (ref 0–40)
BASOPHILS # BLD AUTO: 0.1 X10E3/UL (ref 0–0.2)
BASOPHILS NFR BLD AUTO: 1 %
BILIRUB SERPL-MCNC: 0.3 MG/DL (ref 0–1.2)
BUN SERPL-MCNC: 21 MG/DL (ref 8–27)
BUN/CREAT SERPL: 21 (ref 12–28)
CALCIUM SERPL-MCNC: 9.4 MG/DL (ref 8.7–10.3)
CHLORIDE SERPL-SCNC: 102 MMOL/L (ref 96–106)
CHOLEST SERPL-MCNC: 174 MG/DL (ref 100–199)
CO2 SERPL-SCNC: 23 MMOL/L (ref 20–29)
CREAT SERPL-MCNC: 1.02 MG/DL (ref 0.57–1)
EGFRCR SERPLBLD CKD-EPI 2021: 58 ML/MIN/1.73
EOSINOPHIL # BLD AUTO: 0.5 X10E3/UL (ref 0–0.4)
EOSINOPHIL NFR BLD AUTO: 9 %
ERYTHROCYTE [DISTWIDTH] IN BLOOD BY AUTOMATED COUNT: 13.2 % (ref 11.7–15.4)
GLOBULIN SER CALC-MCNC: 2 G/DL (ref 1.5–4.5)
GLUCOSE SERPL-MCNC: 194 MG/DL (ref 70–99)
HCT VFR BLD AUTO: 30.6 % (ref 34–46.6)
HDLC SERPL-MCNC: 43 MG/DL
HGB BLD-MCNC: 10.2 G/DL (ref 11.1–15.9)
IMM GRANULOCYTES # BLD AUTO: 0 X10E3/UL (ref 0–0.1)
IMM GRANULOCYTES NFR BLD AUTO: 1 %
LDLC SERPL CALC-MCNC: 110 MG/DL (ref 0–99)
LYMPHOCYTES # BLD AUTO: 0.8 X10E3/UL (ref 0.7–3.1)
LYMPHOCYTES NFR BLD AUTO: 15 %
MCH RBC QN AUTO: 30.4 PG (ref 26.6–33)
MCHC RBC AUTO-ENTMCNC: 33.3 G/DL (ref 31.5–35.7)
MCV RBC AUTO: 91 FL (ref 79–97)
MONOCYTES # BLD AUTO: 0.4 X10E3/UL (ref 0.1–0.9)
MONOCYTES NFR BLD AUTO: 8 %
NEUTROPHILS # BLD AUTO: 3.8 X10E3/UL (ref 1.4–7)
NEUTROPHILS NFR BLD AUTO: 66 %
PLATELET # BLD AUTO: 175 X10E3/UL (ref 150–450)
POTASSIUM SERPL-SCNC: 4.1 MMOL/L (ref 3.5–5.2)
PROT SERPL-MCNC: 6.5 G/DL (ref 6–8.5)
RBC # BLD AUTO: 3.36 X10E6/UL (ref 3.77–5.28)
SODIUM SERPL-SCNC: 139 MMOL/L (ref 134–144)
TRIGL SERPL-MCNC: 113 MG/DL (ref 0–149)
TSH SERPL DL<=0.005 MIU/L-ACNC: 0.84 UIU/ML (ref 0.45–4.5)
VLDLC SERPL CALC-MCNC: 21 MG/DL (ref 5–40)
WBC # BLD AUTO: 5.6 X10E3/UL (ref 3.4–10.8)

## 2023-06-22 PROBLEM — N18.31 CHRONIC KIDNEY DISEASE, STAGE 3A: Status: ACTIVE | Noted: 2023-06-22

## 2023-07-24 RX ORDER — PRAMIPEXOLE DIHYDROCHLORIDE 1.5 MG/1
1.5 TABLET ORAL NIGHTLY
Qty: 90 TABLET | Refills: 0 | OUTPATIENT
Start: 2023-07-24

## 2023-07-24 RX ORDER — PANTOPRAZOLE SODIUM 40 MG/1
40 TABLET, DELAYED RELEASE ORAL DAILY
Qty: 90 TABLET | Refills: 1 | OUTPATIENT
Start: 2023-07-24

## 2023-07-25 RX ORDER — BUMETANIDE 1 MG/1
TABLET ORAL
Qty: 90 TABLET | Refills: 3 | Status: SHIPPED | OUTPATIENT
Start: 2023-07-25

## 2023-07-25 NOTE — TELEPHONE ENCOUNTER
Lab Results   Component Value Date    GLUCOSE 194 (H) 06/15/2023    BUN 21 06/15/2023    CREATININE 1.02 (H) 06/15/2023    EGFRRESULT 58 (L) 06/15/2023    EGFR 67.6 10/25/2022    BCR 21 06/15/2023    K 4.1 06/15/2023    CO2 23 06/15/2023    CALCIUM 9.4 06/15/2023    PROTENTOTREF 6.5 06/15/2023    ALBUMIN 4.5 06/15/2023    BILITOT 0.3 06/15/2023    AST 18 06/15/2023    ALT 7 06/15/2023

## 2023-08-01 ENCOUNTER — HOSPITAL ENCOUNTER (OUTPATIENT)
Dept: SLEEP MEDICINE | Facility: HOSPITAL | Age: 73
Discharge: HOME | End: 2023-08-01
Admitting: NURSE PRACTITIONER
Payer: MEDICARE

## 2023-08-01 VITALS — HEART RATE: 130 BPM | HEIGHT: 62 IN | BODY MASS INDEX: 44.63 KG/M2 | WEIGHT: 242.51 LBS

## 2023-08-01 DIAGNOSIS — G47.33 OSA (OBSTRUCTIVE SLEEP APNEA): ICD-10-CM

## 2023-08-01 PROCEDURE — 95811 POLYSOM 6/>YRS CPAP 4/> PARM: CPT | Performed by: INTERNAL MEDICINE

## 2023-08-01 PROCEDURE — 95811 POLYSOM 6/>YRS CPAP 4/> PARM: CPT

## 2023-08-21 DIAGNOSIS — G47.33 OSA TREATED WITH BIPAP: Primary | ICD-10-CM

## 2023-09-08 DIAGNOSIS — I49.5 TACHY-BRADY SYNDROME: ICD-10-CM

## 2023-09-08 DIAGNOSIS — Z79.899 LONG TERM CURRENT USE OF ANTIARRHYTHMIC DRUG: ICD-10-CM

## 2023-09-08 DIAGNOSIS — I25.10 CORONARY ARTERY DISEASE INVOLVING NATIVE CORONARY ARTERY OF NATIVE HEART WITHOUT ANGINA PECTORIS: Primary | ICD-10-CM

## 2023-09-08 DIAGNOSIS — Z95.0 CARDIAC PACEMAKER IN SITU: ICD-10-CM

## 2023-09-08 RX ORDER — ENOXAPARIN SODIUM 100 MG/ML
1 INJECTION SUBCUTANEOUS EVERY 12 HOURS SCHEDULED
Qty: 3 ML | Refills: 0 | Status: SHIPPED | OUTPATIENT
Start: 2023-09-08

## 2023-09-11 ENCOUNTER — HOSPITAL ENCOUNTER (OUTPATIENT)
Dept: GENERAL RADIOLOGY | Facility: HOSPITAL | Age: 73
Discharge: HOME | End: 2023-09-11
Payer: MEDICARE

## 2023-09-11 ENCOUNTER — PRE-ADMISSION TESTING (OUTPATIENT)
Dept: PREADMISSION TESTING | Facility: HOSPITAL | Age: 73
End: 2023-09-11
Payer: MEDICARE

## 2023-09-11 VITALS — HEIGHT: 62 IN | BODY MASS INDEX: 44.2 KG/M2 | WEIGHT: 240.19 LBS

## 2023-09-11 LAB
ANION GAP SERPL CALCULATED.3IONS-SCNC: 12 MMOL/L (ref 5–15)
BUN SERPL-MCNC: 44 MG/DL (ref 8–23)
BUN/CREAT SERPL: 32.4 (ref 7–25)
CALCIUM SPEC-SCNC: 9.6 MG/DL (ref 8.6–10.5)
CHLORIDE SERPL-SCNC: 102 MMOL/L (ref 98–107)
CO2 SERPL-SCNC: 27 MMOL/L (ref 22–29)
CREAT SERPL-MCNC: 1.36 MG/DL (ref 0.57–1)
DEPRECATED RDW RBC AUTO: 46.2 FL (ref 37–54)
EGFRCR SERPLBLD CKD-EPI 2021: 41 ML/MIN/1.73
ERYTHROCYTE [DISTWIDTH] IN BLOOD BY AUTOMATED COUNT: 13.6 % (ref 12.3–15.4)
GLUCOSE SERPL-MCNC: 103 MG/DL (ref 65–99)
HBA1C MFR BLD: 5.8 % (ref 4.8–5.6)
HCT VFR BLD AUTO: 32.7 % (ref 34–46.6)
HGB BLD-MCNC: 10.4 G/DL (ref 12–15.9)
INR PPP: 1.13 (ref 0.89–1.12)
MCH RBC QN AUTO: 29.9 PG (ref 26.6–33)
MCHC RBC AUTO-ENTMCNC: 31.8 G/DL (ref 31.5–35.7)
MCV RBC AUTO: 94 FL (ref 79–97)
PLATELET # BLD AUTO: 160 10*3/MM3 (ref 140–450)
PMV BLD AUTO: 9.7 FL (ref 6–12)
POTASSIUM SERPL-SCNC: 4.1 MMOL/L (ref 3.5–5.2)
PROTHROMBIN TIME: 14.7 SECONDS (ref 12.2–14.5)
QT INTERVAL: 448 MS
QTC INTERVAL: 476 MS
RBC # BLD AUTO: 3.48 10*6/MM3 (ref 3.77–5.28)
SODIUM SERPL-SCNC: 141 MMOL/L (ref 136–145)
WBC NRBC COR # BLD: 6.64 10*3/MM3 (ref 3.4–10.8)

## 2023-09-11 PROCEDURE — 85027 COMPLETE CBC AUTOMATED: CPT

## 2023-09-11 PROCEDURE — 83036 HEMOGLOBIN GLYCOSYLATED A1C: CPT

## 2023-09-11 PROCEDURE — 36415 COLL VENOUS BLD VENIPUNCTURE: CPT

## 2023-09-11 PROCEDURE — 80048 BASIC METABOLIC PNL TOTAL CA: CPT

## 2023-09-11 PROCEDURE — 71046 X-RAY EXAM CHEST 2 VIEWS: CPT

## 2023-09-11 PROCEDURE — 85610 PROTHROMBIN TIME: CPT

## 2023-09-11 PROCEDURE — 93005 ELECTROCARDIOGRAM TRACING: CPT

## 2023-09-11 NOTE — PAT
Patient viewed general PAT education video as instructed in their preoperative information received from their surgeon.  Patient stated the general PAT education video was viewed in its entirety and survey completed.  Copies of PAT general education handouts (Incentive Spirometry, Meds to Beds Program, Patient Belongings, Pre-op skin preparation instructions, Blood Glucose testing, Visitor policy, Surgery FAQ, Code H) distributed to patient if not printed. Education related to the PAT pass and skin preparation for surgery (if applicable) completed in PAT as a reinforcement to PAT education video. Patient instructed to return PAT pass provided today as well as completed skin preparation sheet (if applicable) on the day of procedure.     Additionally if patient had not viewed video yet but intended to view it at home or in our waiting area, then referred them to the handout with QR code/link provided during PAT visit.  Instructed patient to complete survey after viewing the video in its entirety.  Encouraged patient/family to read [facility] general education handouts thoroughly and notify PAT staff with any questions or concerns. Patient verbalized understanding of all information and priority content.    Patient's surgeon called in a prescription for Benzol Peroxide 5% wash to [facility].  Patient instructed to  from [facility] pharmacy that was submitted electronically.  Verbal and written instructions given regarding proper use of the Benzoyl Peroxide wash were provided to patient and/or famlily during PAT visit. Patient/family also instructed to complete Benzol Peroxide checklist and return it to Pre-op on the day of surgery.  Patient and/or family verbalized understanding.      Additionally, reinforced with patient to acquire this prescription from the [facility] pharmacy before leaving the hospital after PAT visit due to the potential unavailability at local pharmacies.    Patient instructed to drink 20  ounces of Gatorade and it needs to be completed 1 hour (for Main OR patients) or 2 hours (scheduled  section & [facility] patients) before given arrival time for procedure (NO RED Gatorade)    Patient verbalized understanding.    [employer] (by [employer]) pain pump patient informational handout given to patient.  Instructed patient to watch [employer] Patient Education Video regarding Peripheral Nerve Catheter that will be in place for upcoming surgery unless contraindicated. The video can be accessed using QR code noted on handout.  Patient agreed to watch video.  Stressed to patient to call [employer] Nursing Hotline  if patient has any questions or concerns after discharge.     Per Anesthesia Request, patient instructed not to take their ACE/ARB medications on the AM of surgery.    Left voicemail for Dory surgery scheduler, re: History MRSA and to review consent order for spelling.     Incorrect order or no order for procedure consent.  Please obtain procedure consent on the day of procedure.    Cardiac clearance on chart from Dr. Corona 2023, pt reports Dr. Francois's office called her to come in for appointment 2023.  Left voicemail for Dr. Francois's nurse that EKG today shows A-fib (has hx) and requested cardiac clearance from Dr. Francois prior to surgery.   Pt denies any chest pain.      Pacemaker interrogation 2023, battery life 11 months.

## 2023-09-12 ENCOUNTER — OFFICE VISIT (OUTPATIENT)
Dept: CARDIOLOGY | Facility: CLINIC | Age: 73
End: 2023-09-12
Payer: MEDICARE

## 2023-09-12 VITALS
DIASTOLIC BLOOD PRESSURE: 72 MMHG | WEIGHT: 236 LBS | OXYGEN SATURATION: 98 % | BODY MASS INDEX: 43.43 KG/M2 | HEART RATE: 98 BPM | SYSTOLIC BLOOD PRESSURE: 124 MMHG | HEIGHT: 62 IN

## 2023-09-12 DIAGNOSIS — Z79.899 LONG TERM CURRENT USE OF ANTIARRHYTHMIC DRUG: ICD-10-CM

## 2023-09-12 DIAGNOSIS — I48.0 PAROXYSMAL ATRIAL FIBRILLATION: ICD-10-CM

## 2023-09-12 DIAGNOSIS — Z95.0 CARDIAC PACEMAKER IN SITU: ICD-10-CM

## 2023-09-12 DIAGNOSIS — I49.5 TACHY-BRADY SYNDROME: Primary | ICD-10-CM

## 2023-09-12 PROCEDURE — 99214 OFFICE O/P EST MOD 30 MIN: CPT | Performed by: STUDENT IN AN ORGANIZED HEALTH CARE EDUCATION/TRAINING PROGRAM

## 2023-09-12 PROCEDURE — 3078F DIAST BP <80 MM HG: CPT | Performed by: STUDENT IN AN ORGANIZED HEALTH CARE EDUCATION/TRAINING PROGRAM

## 2023-09-12 PROCEDURE — 3074F SYST BP LT 130 MM HG: CPT | Performed by: STUDENT IN AN ORGANIZED HEALTH CARE EDUCATION/TRAINING PROGRAM

## 2023-09-12 PROCEDURE — 93280 PM DEVICE PROGR EVAL DUAL: CPT | Performed by: STUDENT IN AN ORGANIZED HEALTH CARE EDUCATION/TRAINING PROGRAM

## 2023-09-12 NOTE — PROGRESS NOTES
Cardiac Electrophysiology Outpatient Note  [facility]    Office Visit     Joanna Cross  [id number]  09/12/2023    Primary Care Physician: Reynaldo Fritz MD    Referred By: No ref. provider found    Subjective     Problem List:  Coronary artery disease  [facility], 02/15/2008, Dr Lutz: Mild, non-obstructive CAD, normal EF  [facility], 01/09/2013, Dr Stevenson Sutton: No LV gram done. Mild, non-obstructive CAD.  [facility], 11/9/2015, [facility]:  EF 60%. 70% RCA lesion with Promus ZAINAB placement. 40-50% mid LAD, no FFR performed. Other small blockages noted. No MR.  [facility], 11/15/2015, Dr Palacio @ [facility]: Patent RCA stent, 40-50% LAD with FFR @ 0.92; mild inferior wall hypokinesis  [facility], 07/29/2016, [facility]: Normal CORS with patent stent. LAD improved since last [facility]. EF 55-60%.  [facility], 02/01/2019: EF 55-60%. Patent RCA stent, noncritical mid LAD with IFR 0.93, noncritical LCx.   Chronic venous stasis:  Venous duplex, 09/17/2010: Insufficiency to venous hypertension in the great saphenous system bilaterally.  Venous duplex 2013: Right leg laser ablation of Dr. Garcia.  Venous duplex, 05/06/2016: No evidence of DVT, no superficial, no thrmobophlebitis, no valvular insufficiency.  AA with runoffs, 08/31/2016: Single-vessel [place]: No significant arterial disease.  Arterial doppler/GLYNN, 08/29/2019: No evidence of significant lower extremity arterial occlusive disease.  Palpitations:  Echocardiogram, 02/13/2014: Dr. Teran. Normal biventricular function; trace to mild MR. Atrial septal aneurysm.  Echocardiogram, 12/22/2015, Dr. Chavez: Borderline LVH, mild LAE, mild RV enlargement. Mild to moderate MR and TR with RVSP of 46 mmHg.  Echocardiogram, 03/2016: Dr. Palacio.  RV enlargement.  EF 50-55%.  Mild AI S, mild MR and TR with RVSP 37 mmHg.  Echocardiogram, 07/11/20: Dr. Jany Wynn: EF 60-65%. Mild to moderate MR.  PAF/SSS:  [employer] PPM 2016  CHADS2  Vasc = 6 (HTN, DM, Age 65-74, Female, H/o TIA). On chronic anticoagulation.  ECV, 12/26/2018: Successful cardioversion. AV paced.  Echocardiogram, 12/25/2018:  EF 60%, mild MR/TR with RVSP 29 mmHg. Sclerotic AoV, no AS/AI. Mild MAC.  ECV, 03/05/2019: Successful cardioversion.  Zio, 10/01/2019, 14 days: NSR baseline. Normal average rates. Periods of RV pacing.   Patient has known undersensing in the atrial lead which is was known to Dr. Marie.     TIA:  Carotid duplex, 02/13/2014: No significant flow-limiting stenosis. Mild luminal disease present; both vertebrals are present.  Diabetes  Essential Hypertension  Hyperlipidemia  Hypothyroid  Hyponatremia  Secondary to SIADH  MILLI with CPAP  RLS  Parkinson's disease  GERD  Urinary Retention  S/P cystoscopy and ureter dilation, March 2018.  Dr. Terrence Mckenzie   Surgeries:  Rotator cuff repair  Cholecystectomy  Bilateral knee replacement  Tubal ligation  Breast surgery  Sinus surgery  Left shoulder replacement 10/24/2022    Chief Complaint   Patient presents with    Paroxysmal atrial fibrillation       History of Present Illness:   Joanna Cross is a 72 y.o. female who presents to my electrophysiology clinic for follow up of sick sinus syndrome s/p BSC DC PPM and paroxysmal atrial fibrillation on Tikosyn and Eliquis.  Since we last saw the patient, she has continued to have some baseline shortness of breath which she follows with pulmonology for and has as needed supplemental oxygen.  It has not changed from baseline.  She has end-stage osteoarthritis of both shoulders and has surgery scheduled next week.  Otherwise, the patient denies any chest pain, lower extremity swelling, palpitations or syncopal episodes.    Past Medical History:   Diagnosis Date    Anemia     Ankle problem     thinks back related causing pain     Atrial fibrillation     AVM (arteriovenous malformation)     Back pain     Chronic kidney disease     stage 3 per pt    CKD (chronic kidney  disease)     Clotting disorder 2016    AVM - small intestine    COVID-19 vaccine series completed     Gastrocnemius muscle tear     left medial 91    Generalized osteoarthritis     GERD (gastroesophageal reflux disease)     Gestational diabetes     GIB (gastrointestinal bleeding) 2016    d/t xarelto     Headache     Hearing decreased, bilateral     HAS HEARING AID, NOT WEARING IT CURRENTLY    Hiatal hernia     History of shingles     History of transfusion 2016    no reaction recalled     Hypertension     Hypothyroidism     IBS (irritable bowel syndrome)     Klebsiella pneumonia     Lichen sclerosus     Lupus     subQ    Mouth problem     mouth guard used since pt bites tongue and lips excessively at night if not- with bipap     MRSA infection 2018    Obesity     On home oxygen therapy     2L of oxygen all the time due to current congestion     Osteoporosis     Parkinson's disease     Peripheral vascular disease     Pleurisy     Pneumonia     Puerperal sepsis with acute hypoxic respiratory failure     emergent intubation- 2016    Right leg pain     from back issues     Salivary gland stone     Sciatic nerve pain     Seborrheic dermatitis     Skin cancer     on back     Sleep apnea     CPAP AND HOME O2 2L/M    TIA (transient ischemic attack) 2014    no residual effects    Type 2 diabetes mellitus     UTI (urinary tract infection)     Vitamin D deficiency 09/08/2022    Wears glasses        Past Surgical History:   Procedure Laterality Date    ABLATION OF DYSRHYTHMIC FOCUS  05/16/13    Laser Ablation - Rt Leg    BACK SURGERY      l4-l5 laminectomy     BICEPS TENDON REPAIR Right     shoulder    BREAST BIOPSY Left 05/2004    excisional, benign    BRONCHOSCOPY RIGID / FLEXIBLE      2016    BUNIONECTOMY Right     CARDIAC CATHETERIZATION      CARDIAC CATHETERIZATION N/A 02/01/2019    Procedure: Left Heart Cath;  Surgeon: Albertina Corona MD;  Location: [facility] INVASIVE LOCATION;  Service: Cardiology     CHOLECYSTECTOMY      COLONOSCOPY  2016    COLONOSCOPY N/A 06/17/2021    Procedure: COLONOSCOPY WITH POLYPECTOMY;  Surgeon: Trenton Sosa MD;  Location: [facility] ENDOSCOPY;  Service: Gastroenterology;  Laterality: N/A;    CORONARY STENT PLACEMENT      x1 stent    CYST REMOVAL      left ear, upper left back 2001    CYSTOSCOPY      x2  18 and 20 -   in 20 urethra dilation     DIAGNOSTIC LAPAROSCOPY  1981    ENDOSCOPIC FUNCTIONAL SINUS SURGERY (FESS)  2011    ENDOSCOPY  2016    ENTEROSCOPY VIA STOMA      with single ballon with fluoro     HAMMER TOE REPAIR Left     INCISION AND DRAINAGE OF WOUND  2018    back with wound infection     INSERT / REPLACE / REMOVE PACEMAKER  11/10/16    [facility]    JOINT REPLACEMENT      KNEE ARTHROSCOPY      LASER ABLATION      right leg 13    LIPOMA EXCISION  1999    left leg     LUMBAR LAMINECTOMY DISCECTOMY DECOMPRESSION N/A 07/06/2018    Procedure: LUMBAR LAMINECTOMY L4-5, HEMILAMIINECTOMY RIGHT L5-S1, FORAMINOTOMY L5-S1;  Surgeon: Lencho Gamino MD;  Location:  [place] OR;  Service: Neurosurgery    LUMBAR LAMINECTOMY DISCECTOMY DECOMPRESSION N/A 09/19/2018    Procedure: INCISION AND DRAINAGE BACK WITH WOUND EXPLORATION;  Surgeon: Ritchie García MD;  Location:  [place] [facility];  Service: Neurosurgery    LUNG BIOPSY Left 2016    OTHER SURGICAL HISTORY      ct scan of chest and sinuses and lower back     OTHER SURGICAL HISTORY      various echos     OTHER SURGICAL HISTORY      electroencephalogram 16,   emg  ncv tests 2007    OTHER SURGICAL HISTORY      mra 2007  and various mri with xrays, nuclear medicine lung ventilation with perfusion test 2016 with pft     OTHER SURGICAL HISTORY      barium swallow testing 15, 12, 12    OTHER SURGICAL HISTORY      vaginal ultrasound- 2012,   vas clementina lower extrem 2016, vas venous duplex lower extrem bilat 2019    OTHER SURGICAL HISTORY      wdge biopsy spring of lung     OTHER SURGICAL HISTORY      emergent intubation- hypoxic resp  failure     PACEMAKER IMPLANTATION  2016    sss    REPLACEMENT TOTAL KNEE Bilateral     left knee , right 2012 per dr acuna     REPLACEMENT TOTAL KNEE Bilateral     SHOULDER ARTHROSCOPY Bilateral     2003-left, 2004- right     SKIN BIOPSY      skin cancer back 2016    SKIN CANCER EXCISION      upper righ tback     MADHAV      TEETH EXTRACTION      x2    TOTAL SHOULDER ARTHROPLASTY W/ DISTAL CLAVICLE EXCISION Left 10/24/2022    Procedure: REVERSE TOTAL SHOULDER ARTHROPLASTY, BICEPS TENODESIS - LEFT;  Surgeon: Sammy Yo MD;  Location: [facility];  Service: Orthopedics;  Laterality: Left;    TUBAL ABDOMINAL LIGATION Bilateral     WISDOM TOOTH EXTRACTION         Family History   Problem Relation Age of Onset    Kidney disease Mother     Coronary artery disease Mother     Hypertension Mother             Heart disease Mother             Hyperlipidemia Mother             Coronary artery disease Father     Hypertension Father             Hypothyroidism Father     Cancer Father         Oral cancer    Heart disease Father             Coronary artery disease Brother     Hypertension Brother     Heart disease Brother         Multiple stents, by-pass surgery    Testicular cancer Brother     Kidney cancer Maternal Uncle     Testicular cancer Maternal Uncle     Colon polyps Neg Hx     Colon cancer Neg Hx        Social History     Socioeconomic History    Marital status:      Spouse name: N/A    Number of children: 4    Years of education: College    Highest education level: Master's degree (e.g., MA, MS, Randi, MEd, MSW, NELLA)   Tobacco Use    Smoking status: Never     Passive exposure: Past    Smokeless tobacco: Never    Tobacco comments:     Father and mother smoked for several years.   Vaping Use    Vaping Use: Never used   Substance and Sexual Activity    Alcohol use: Never    Drug use: No    Sexual activity: Not Currently     Partners: Male     Birth  control/protection: Post-menopausal         Current Outpatient Medications:     Accu-Chek Guide test strip, Testing 3 times per day; E11.65, Disp: 300 each, Rfl: 3    Accu-Chek Softclix Lancets lancets, USE ONE LANCET TO TEST THREE TIMES A DAY, Disp: 300 each, Rfl: 1    albuterol (PROVENTIL) (2.5 MG/3ML) 0.083% nebulizer solution, Take 2.5 mg by nebulization Every 4 (Four) Hours As Needed for Wheezing or Shortness of Air., Disp: 1 each, Rfl: 3    albuterol sulfate HFA (Ventolin HFA) 108 (90 Base) MCG/ACT inhaler, Inhale 1 puff Every 4 (Four) Hours As Needed for Wheezing or Shortness of Air., Disp: 8 g, Rfl: 5    amantadine (SYMMETREL) 100 MG capsule, Take 1 capsule by mouth 2 (Two) Times a Day., Disp: , Rfl:     apixaban (Eliquis) 5 MG tablet tablet, Take 1 tablet by mouth Every 12 (Twelve) Hours., Disp: 180 tablet, Rfl: 2    aspirin 81 MG EC tablet, Take 1 tablet by mouth Daily., Disp: , Rfl:     B Complex-C (SUPER B COMPLEX PO), Take 1 tablet by mouth Daily., Disp: , Rfl:     bumetanide (BUMEX) 1 MG tablet, 1 tab po daily as directed, Disp: 90 tablet, Rfl: 3    Calcium Carbonate (CALTRATE 600 PO), Take 600 mg by mouth Daily., Disp: , Rfl:     carbidopa-levodopa (SINEMET)  MG per tablet, Take  by mouth. 2 tablets at 0600, 1 tablet at 0900, 1200, 1500, 1800, 2100, Disp: , Rfl:     Cholecalciferol 2000 units tablet, Take 1 tablet by mouth 2 (Two) Times a Day., Disp: , Rfl:     citalopram (CeleXA) 20 MG tablet, Take 1 tablet by mouth Daily., Disp: 90 tablet, Rfl: 3    clobetasol (TEMOVATE) 0.05 % cream, Apply 1 application  topically to the appropriate area as directed 2 (Two) Times a Day As Needed (Lichens Sclerosis)., Disp: , Rfl:     dofetilide (TIKOSYN) 500 MCG capsule, TAKE 1 CAPSULE EVERY 12 HOURS (Patient taking differently: Take 1 capsule by mouth 2 (Two) Times a Day.), Disp: 180 capsule, Rfl: 3    Emollient (AQUAPHOR ADVANCED THERAPY EX), Apply  topically., Disp: , Rfl:     Glucosamine-Chondroit-Calcium  (TRIPLE FLEX BONE & JOINT PO), Take 1 tablet by mouth Daily. Move free, Disp: , Rfl:     hydrocortisone 2.5 % ointment, , Disp: , Rfl:     hydroxychloroquine (PLAQUENIL) 200 MG tablet, Daily., Disp: , Rfl:     Insulin Glargine (Lantus SoloStar) 100 UNIT/ML injection pen, Inject 12-14 Units under the skin into the appropriate area as directed Daily., Disp: 15 mL, Rfl: 1    Insulin Pen Needle (B-D UF III MINI PEN NEEDLES) 31G X 5 MM misc, USE TO INJECT INSULIN DAILY, Disp: 100 each, Rfl: 3    ipratropium (ATROVENT) 0.03 % nasal spray, 2 sprays into the nostril(s) as directed by provider 2 (Two) Times a Day As Needed (CONGESTION SINUS)., Disp: 30 mL, Rfl: 11    Loratadine 10 MG capsule, Take 1 capsule by mouth Daily., Disp: , Rfl:     metFORMIN (GLUCOPHAGE) 1000 MG tablet, Take 1 tablet by mouth 2 (Two) Times a Day With Meals., Disp: 180 tablet, Rfl: 1    metOLazone (ZAROXOLYN) 2.5 MG tablet, Take 1 tablet by mouth Daily., Disp: 90 tablet, Rfl: 1    Multiple Vitamins-Minerals (CENTRUM ULTRA WOMENS PO), Take 1 tablet by mouth Daily., Disp: , Rfl:     nitroglycerin (NITROLINGUAL) 0.4 MG/SPRAY spray, Place 1 spray under the tongue Every 5 (Five) Minutes As Needed for Chest Pain., Disp: 1 each, Rfl: 6    nystatin (MYCOSTATIN) 510883 UNIT/GM ointment, Apply 1 application topically to the appropriate area as directed 2 (Two) Times a Day. (Patient taking differently: Apply 1 application  topically to the appropriate area as directed As Needed.), Disp: 3 g, Rfl: 3    O2 (OXYGEN), Inhale 2 L/min Every Night. 2L all the time now, Disp: , Rfl:     pantoprazole (Protonix) 40 MG EC tablet, Take 1 tablet by mouth Daily., Disp: 90 tablet, Rfl: 1    pramipexole (MIRAPEX) 1.5 MG tablet, Take 1 tablet by mouth Every Night., Disp: 90 tablet, Rfl: 0    ranolazine (RANEXA) 500 MG 12 hr tablet, Take 1 tablet by mouth Every 12 (Twelve) Hours., Disp: 180 tablet, Rfl: 3    senna (SENOKOT) 8.6 MG tablet, Take 1 tablet by mouth Daily., Disp: ,  "Rfl:     spironolactone (ALDACTONE) 25 MG tablet, Take 2 tablets by mouth Daily., Disp: 180 tablet, Rfl: 2    traMADol (ULTRAM) 50 MG tablet, Take 1 tablet by mouth Every 6 (Six) Hours As Needed for Moderate Pain . (Patient taking differently: Take 1 tablet by mouth Every 8 (Eight) Hours As Needed for Moderate Pain.), Disp: 30 tablet, Rfl: 2    triamcinolone (KENALOG) 0.1 % cream, 1 application  As Needed for Irritation or Rash., Disp: , Rfl:     Unithroid 175 MCG tablet, Take 1 tablet by mouth Daily., Disp: 90 tablet, Rfl: 1    valsartan (DIOVAN) 160 MG tablet, Take 1 tablet by mouth 2 (Two) Times a Day., Disp: 180 tablet, Rfl: 2    vitamin C (ASCORBIC ACID) 500 MG tablet, Take 1 tablet by mouth Daily. Chewable tablet, Disp: , Rfl:     Zinc 50 MG capsule, Take 1 capsule by mouth Daily., Disp: , Rfl:     Enoxaparin Sodium (LOVENOX) 100 MG/ML solution prefilled syringe syringe, Inject 1.1 mL under the skin into the appropriate area as directed Every 12 (Twelve) Hours. (Patient not taking: Reported on 9/12/2023), Disp: 3 mL, Rfl: 0    Allergies:   Allergies   Allergen Reactions    Toprol Xl [Metoprolol Tartrate] Shortness Of Breath     Extreme fatigue    Amlodipine Besylate Swelling     Lower extremity (ankles, feet) swelling    Codeine Unknown (See Comments)     Pt is unaware of what reaction she had    Entacapone Other (See Comments)     \"extreme weakness in legs - caused several falls, which stopped after discontinuing this medication\"    Epinephrine Other (See Comments)     6/4/16- had 3 shots to numb mouth to prepare teeth for crowns, the shots contained epi-  Caused pt to have chest discomfort- went to hospital in ambulance, discovered had a fib while there     Levemir [Insulin Detemir] Hives     Hives / rash around injection site    Penicillins Hives     Jitteriness     Xarelto [Rivaroxaban] GI Bleeding     hgb dropped to 5.2    Hydrocodone Unknown - High Severity    Prochlorperazine Unknown - High Severity    " "Toprol Xl [Metoprolol] Unknown - High Severity    Aricept [Donepezil Hcl] Nausea Only     Vivid dreams    Bactrim [Sulfamethoxazole-Trimethoprim] Nausea Only and Other (See Comments)     Headache -  Cant be taken with tikosyn - septra ds    Benztropine Mesylate Other (See Comments)     Uncontrollable body movements    Cimetidine Other (See Comments)     Cant be taken with tikosyn     Ciprofloxacin Diarrhea    Cogentin [Benztropine] Other (See Comments)     \"uncontrollable body movements\"    Compazine [Prochlorperazine Edisylate] Other (See Comments)     Dystonic reaction    Cortisone Other (See Comments)     MAKES BLOOD PRESSURE HIGH     Duraprep [Antiseptic Products, Misc.] Itching and Rash     RASH AND ITCHING    Erythromycin Base Other (See Comments)     Cant be taken with tikosyn - z pack and ketek     Flurandrenolone Other (See Comments)     Cant be taken with tikosyn     Include - levaquin, cipro, norloxacin     Haldol [Haloperidol Lactate] Other (See Comments)     Dystonic reaction    Hydralazine Other (See Comments)     Headache     Hydrochlorothiazide Other (See Comments)     Cant be taken with tikosyn -  Also hydrodiuril, microzide, hydro-par, oretic, esidrix, ezide or any medicine with hct or hctz in name     Hydrocodone-Acetaminophen Nausea And Vomiting and Dizziness     Headache, nausea     Levaquin [Levofloxacin] Other (See Comments)     Cannot take due to taking propafenone HCL- severe reaction with mixed.     Lisinopril Cough    Macrolides And Ketolides Other (See Comments)     antibx -   Cant be taken with tikosyn     Statins Myalgia     Leg pain- all statins     Tarka [Trandolapril-Verapamil Hcl Er] Other (See Comments)     Constipation     Verapamil Other (See Comments)     Cant be taken with tikosyn - calan, covera-hs, isoptin, verelan or tarka        Objective   Vital Signs: Blood pressure 124/72, pulse 98, height 157.5 cm (62\"), weight 107 kg (236 lb), SpO2 98 %, not currently " breastfeeding.    PHYSICAL EXAM  General appearance: Awake, alert, cooperative  Lungs: Clear to ascultation bilaterally  Heart: Regular rate and rhythm, no murmurs, 2+ LE pulses, no lower extremity swelling  Skin: Skin color, turgor normal, no rashes or lesions  Neurologic: Grossly normal     Lab Results   Component Value Date    GLUCOSE 103 (H) 09/11/2023    CALCIUM 9.6 09/11/2023     09/11/2023    K 4.1 09/11/2023    CO2 27.0 09/11/2023     09/11/2023    BUN 44 (H) 09/11/2023    CREATININE 1.36 (H) 09/11/2023    EGFRIFNONA 66 06/12/2019    BCR 32.4 (H) 09/11/2023    ANIONGAP 12.0 09/11/2023     Lab Results   Component Value Date    WBC 6.64 09/11/2023    HGB 10.4 (L) 09/11/2023    HCT 32.7 (L) 09/11/2023    MCV 94.0 09/11/2023     09/11/2023     Lab Results   Component Value Date    INR 1.13 (H) 09/11/2023    INR 1.19 (H) 10/12/2022    INR 1.24 (H) 06/06/2019    PROTIME 14.7 (H) 09/11/2023    PROTIME 15.1 (H) 10/12/2022    PROTIME 15.0 (H) 06/06/2019     Lab Results   Component Value Date    TSH 0.838 06/15/2023          Results for orders placed during the hospital encounter of 12/24/18    Adult Transthoracic Echo Complete W/ Cont if Necessary Per Protocol    Interpretation Summary  · Mild mitral valve regurgitation is present.  · Calculated right ventricular systolic pressure from tricuspid regurgitation is 29 mmHg.  · Estimated EF = 60%.  · Left ventricular systolic function is normal.  · Mild tricuspid valve regurgitation is present.  · There is no evidence of pericardial effusion.  · No evidence of pulmonary hypertension is present.  · Mild MAC is present.  · The aortic valve exhibits sclerosis.  · Normal right ventricular cavity size, wall thickness, systolic function and septal motion noted.     Results for orders placed during the hospital encounter of 02/01/19    Cardiac Catheterization/Vascular Study    Narrative  FINAL    Impression  · Patent right coronary stent  · Noncritical mid  LAD disease with an IFR of 0.93  · Noncritical circumflex disease  · Normal LV systolic function wall motion    RECOMMENDATIONS:  · Other sources for the patient's chest discomfort should be considered  · Continued aggressive risk factor modification    Indications: Chest pain consistent with the patient's previous symptoms of unstable angina    Access: Right radial      Procedures:  · Left heart catheterization.  · Left ventriculogram.  · Selective coronary angiography.  · IFR of the mid LAD      Procedure narrative:  The patient was brought to the catheterization lab in a fasting condition.  Access site was prepped and draped in standard sterile fashion.  Lidocaine was injected and arterial access was obtained by percutaneous anterior wall puncture technique.  A 6 [place] arterial sheath was placed in the right radial using a modified Seldinger technique.  Selective coronary arteriography was performed using the Sonu technique with a 6 [place] 4 curved Sonu right catheter and a 6 [place] 4 curved Sonu left catheter.  Nonionic contrast was used and was injected manually.  Left ventriculography was performed using 30 ML of contrast power injected at 10 ML per second.  Left heart pressure pull back revealed no gradient.  At the time of the procedure the patient was noted to have a borderline-appearing areas within the mid LAD.  The decision was made to perform an IFR.  Heparin was administered per protocol for final ACT of 235 seconds.  A 6 [place] Mach 1 JL 3.5 guide was used as well as the Veratta wire.  IFR's were obtained of 0.94, 0.93 and 0.93 indicating no need for intervention.  That point the procedure was concluded.  There were no complications.  Sheath was removed and a TR band was placed.      Contrast: 135 ml    Hemodynamic Findings:    LV pressure: 140/2/18 mmHg, on pull back no gradient was recorded across the aortic valve.  Ao pressure: 130/60 mmHg    Left ventriculography:    Single plane JORDAN  left ventriculography disclosed normal left ventricular systolic function wall motion with an LVEF estimated at 55-60%.  No significant mitral regurgitation was seen.    Angiographic Findings:    LMCA: The left main coronary artery gives rise to LAD and circumflex vessels and is free of disease.    Circumflex: Circumflex coronary artery is co dominant for the posterior circulation and gives rise to 2 large obtuse marginal branches a moderate third obtuse marginal branch and a moderate fourth obtuse marginal branchWhich courses posterolaterally.  The circumflex and its branches contain minimal irregularities.  No stenosis greater than 20% to 30% is seen.    LAD: The LAD gives rise to a moderate first diagonal branch moderate second diagonal branch small third diagonal branch and a large apical recurrent branch.  The LAD contains a narrowing in the mid vessel which is estimated at 40% followed by another narrowing estimated at up to 60-70% and a third narrowing estimated at 50-60%.  IFR of the 3 tandem lesions is adequate at 0.93.    RCA: The right coronary artery has been previously stented proximal and mid segments.  The stented site is widely patent.  The right coronary is dominant codominant for the posterior circulation and gives rise to the PDA and small posterolateral branch.      I personally viewed and interpreted the patient's EKG/Telemetry/lab data    Procedures    Joanna Cross  reports that she has never smoked. She has been exposed to tobacco smoke. She has never used smokeless tobacco.         Advance Care Planning   Advance Care Planning: ACP discussion was declined by the patient. Patient has an advance directive in EMR which is still valid.      Assessment & Plan    1. Tachy-thai syndrome  S/p DC PPM    Device interrogation today showed RA pacing 30%, RV pacing 3%, RA threshold 3.2 at 1.0 likely draining the battery fairly quickly with poor pacing at 0.2.  8 months left on the battery.  There  has been noise present on the device transmission/interrogation since a fall in 2018.  It is likely not accurately detecting atrial fibrillation at all.  RV pacing 3% with normal threshold and impedance values.    Patient has a shoulder surgery coming up next week.  Patient's risk is nonprohibitive from a cardiac electrophysiological standpoint.  Her pacemaker malfunction is not dangerous, but is likely just draining the battery much quicker than otherwise.  We discussed possible new lead implantation with next generator change.  The patient will think about it and call our office once she has recovered from her shoulder surgery and we will schedule from there.    2. Paroxysmal atrial fibrillation  CHADSVASC=7, on Eliquis.  Continue Tikosyn with acceptable QT on EKG yesterday. Patient denies any episodes of pervasive palpitations. Device interrogation is unreliable for atrial fibrillation burden due to poor sensing.    3. Long term current use of antiarrhythmic drug    4. Cardiac pacemaker in situ  See (1) for device interrogation       Follow Up:  No follow-ups on file. Patient will call when she has recovered from shoulder surgery to schedule generator change with new lead implantation.      Thank you for allowing me to participate in the care of your patient. Please do not hesitate to contact me with additional questions or concerns.        Scribed by   Justin Teixeira PA-C for:  Lauro Francois MD  [facility]    I, Lauro Francois MD, personally performed the services described in this documentation as scribed by the above named individual in my presence, and it is both accurate and complete.  9/12/2023  20:44 EDT

## 2023-09-13 RX ORDER — ENOXAPARIN SODIUM 100 MG/ML
1 INJECTION SUBCUTANEOUS EVERY 12 HOURS SCHEDULED
Qty: 3 ML | Refills: 0 | Status: ON HOLD | OUTPATIENT
Start: 2023-09-13

## 2023-09-13 RX ORDER — ENOXAPARIN SODIUM 100 MG/ML
1 INJECTION SUBCUTANEOUS EVERY 12 HOURS SCHEDULED
Qty: 3 ML | Refills: 0 | Status: SHIPPED | OUTPATIENT
Start: 2023-09-13 | End: 2023-09-13 | Stop reason: SDUPTHER

## 2023-09-14 ENCOUNTER — TELEPHONE (OUTPATIENT)
Dept: CARDIOLOGY | Facility: CLINIC | Age: 73
End: 2023-09-14

## 2023-09-14 NOTE — TELEPHONE ENCOUNTER
Caller: Tay Joanna Lalita    Relationship: Self    Best call back number: [phone]    What is the best time to reach you: ANY    Who are you requesting to speak with (clinical staff, provider,  specific staff member): NIK NURSE    Do you know the name of the person who called:     What was the call regarding: PT SAID MEDICATION WAS SENT IN WRONG AND WOULD LIKE TO SPEWAK TO DR. NIK SANCHEZ.    Is it okay if the provider responds through MyChart:

## 2023-09-15 ENCOUNTER — TELEPHONE (OUTPATIENT)
Dept: CARDIOLOGY | Facility: CLINIC | Age: 73
End: 2023-09-15
Payer: MEDICARE

## 2023-09-15 NOTE — TELEPHONE ENCOUNTER
Spoke with Dr. Moreno as [facility] is out of office.  Lovenox is not covered at all by patients insurance.  Patient can only hold eliquis two days prior to procedure.  I will contact Dr. Yo's office.  Spoke with patient.  She is instructed as above and verbalizes understanding.  Left message for Dr. Yo's nurse as to above.  I will send another letter to the office that states this.  She is encouraged to call with any questions or concerns.

## 2023-09-17 ENCOUNTER — ANESTHESIA EVENT (OUTPATIENT)
Dept: PERIOP | Facility: HOSPITAL | Age: 73
End: 2023-09-17
Payer: MEDICARE

## 2023-09-17 RX ORDER — SODIUM CHLORIDE 0.9 % (FLUSH) 0.9 %
10 SYRINGE (ML) INJECTION EVERY 12 HOURS SCHEDULED
Status: CANCELLED | OUTPATIENT
Start: 2023-09-17

## 2023-09-17 RX ORDER — SODIUM CHLORIDE, SODIUM LACTATE, POTASSIUM CHLORIDE, CALCIUM CHLORIDE 600; 310; 30; 20 MG/100ML; MG/100ML; MG/100ML; MG/100ML
9 INJECTION, SOLUTION INTRAVENOUS CONTINUOUS
Status: CANCELLED | OUTPATIENT
Start: 2023-09-17

## 2023-09-17 RX ORDER — SODIUM CHLORIDE 9 MG/ML
40 INJECTION, SOLUTION INTRAVENOUS AS NEEDED
Status: CANCELLED | OUTPATIENT
Start: 2023-09-17

## 2023-09-18 ENCOUNTER — ANESTHESIA (OUTPATIENT)
Dept: PERIOP | Facility: HOSPITAL | Age: 73
End: 2023-09-18
Payer: MEDICARE

## 2023-09-18 ENCOUNTER — ANESTHESIA EVENT CONVERTED (OUTPATIENT)
Dept: ANESTHESIOLOGY | Facility: HOSPITAL | Age: 73
End: 2023-09-18
Payer: MEDICARE

## 2023-09-18 ENCOUNTER — HOSPITAL ENCOUNTER (OUTPATIENT)
Facility: HOSPITAL | Age: 73
Discharge: NURSING, REHAB OR LONG-TERM CARE | End: 2023-09-21
Attending: ORTHOPAEDIC SURGERY | Admitting: ORTHOPAEDIC SURGERY
Payer: MEDICARE

## 2023-09-18 ENCOUNTER — APPOINTMENT (OUTPATIENT)
Dept: GENERAL RADIOLOGY | Facility: HOSPITAL | Age: 73
End: 2023-09-18
Payer: MEDICARE

## 2023-09-18 DIAGNOSIS — G89.29 OTHER CHRONIC PAIN: ICD-10-CM

## 2023-09-18 PROBLEM — Z96.611 STATUS POST REVERSE ARTHROPLASTY OF RIGHT SHOULDER: Status: ACTIVE | Noted: 2023-09-18

## 2023-09-18 LAB
ABO GROUP BLD: NORMAL
ABO GROUP BLD: NORMAL
BLD GP AB SCN SERPL QL: NEGATIVE
GLUCOSE BLDC GLUCOMTR-MCNC: 123 MG/DL (ref 70–130)
GLUCOSE BLDC GLUCOMTR-MCNC: 148 MG/DL (ref 70–130)
GLUCOSE BLDC GLUCOMTR-MCNC: 155 MG/DL (ref 70–130)
GLUCOSE BLDC GLUCOMTR-MCNC: 158 MG/DL (ref 70–130)
RH BLD: POSITIVE
RH BLD: POSITIVE
T&S EXPIRATION DATE: NORMAL

## 2023-09-18 PROCEDURE — 86850 RBC ANTIBODY SCREEN: CPT | Performed by: ANESTHESIOLOGY

## 2023-09-18 PROCEDURE — 86901 BLOOD TYPING SEROLOGIC RH(D): CPT | Performed by: ANESTHESIOLOGY

## 2023-09-18 PROCEDURE — L3670 SO ACRO/CLAV CAN WEB PRE OTS: HCPCS | Performed by: ORTHOPAEDIC SURGERY

## 2023-09-18 PROCEDURE — 63710000001 VALSARTAN 160 MG TABLET: Performed by: INTERNAL MEDICINE

## 2023-09-18 PROCEDURE — 86901 BLOOD TYPING SEROLOGIC RH(D): CPT

## 2023-09-18 PROCEDURE — A9270 NON-COVERED ITEM OR SERVICE: HCPCS | Performed by: INTERNAL MEDICINE

## 2023-09-18 PROCEDURE — 25010000002 ONDANSETRON PER 1 MG: Performed by: ANESTHESIOLOGY

## 2023-09-18 PROCEDURE — 63710000001 OXYCODONE 5 MG TABLET: Performed by: ORTHOPAEDIC SURGERY

## 2023-09-18 PROCEDURE — 25010000002 PROPOFOL 10 MG/ML EMULSION: Performed by: ANESTHESIOLOGY

## 2023-09-18 PROCEDURE — 25010000002 MIDAZOLAM PER 1 MG: Performed by: ANESTHESIOLOGY

## 2023-09-18 PROCEDURE — 25010000002 SUGAMMADEX 200 MG/2ML SOLUTION: Performed by: ANESTHESIOLOGY

## 2023-09-18 PROCEDURE — 63710000001 AMANTADINE 100 MG CAPSULE: Performed by: INTERNAL MEDICINE

## 2023-09-18 PROCEDURE — 86900 BLOOD TYPING SEROLOGIC ABO: CPT | Performed by: ANESTHESIOLOGY

## 2023-09-18 PROCEDURE — 25010000002 PHENYLEPHRINE 10 MG/ML SOLUTION 1 ML VIAL: Performed by: ANESTHESIOLOGY

## 2023-09-18 PROCEDURE — A9270 NON-COVERED ITEM OR SERVICE: HCPCS | Performed by: ORTHOPAEDIC SURGERY

## 2023-09-18 PROCEDURE — 25010000002 DEXAMETHASONE PER 1 MG: Performed by: ANESTHESIOLOGY

## 2023-09-18 PROCEDURE — 63710000001 CITALOPRAM 20 MG TABLET: Performed by: INTERNAL MEDICINE

## 2023-09-18 PROCEDURE — 25010000002 VANCOMYCIN 10 G RECONSTITUTED SOLUTION: Performed by: ORTHOPAEDIC SURGERY

## 2023-09-18 PROCEDURE — 86900 BLOOD TYPING SEROLOGIC ABO: CPT

## 2023-09-18 PROCEDURE — 97110 THERAPEUTIC EXERCISES: CPT | Performed by: OCCUPATIONAL THERAPIST

## 2023-09-18 PROCEDURE — 63710000001 PRAMIPEXOLE 0.25 MG TABLET: Performed by: INTERNAL MEDICINE

## 2023-09-18 PROCEDURE — 25010000002 CEFAZOLIN IN DEXTROSE 2-4 GM/100ML-% SOLUTION: Performed by: ORTHOPAEDIC SURGERY

## 2023-09-18 PROCEDURE — 82948 REAGENT STRIP/BLOOD GLUCOSE: CPT

## 2023-09-18 PROCEDURE — 63710000001 POVIDONE-IODINE 10 % SOLUTION 30 ML BOTTLE: Performed by: ORTHOPAEDIC SURGERY

## 2023-09-18 PROCEDURE — 25010000002 ROPIVACAINE PER 1 MG: Performed by: NURSE ANESTHETIST, CERTIFIED REGISTERED

## 2023-09-18 PROCEDURE — 63710000001 PRAMIPEXOLE 1 MG TABLET: Performed by: INTERNAL MEDICINE

## 2023-09-18 PROCEDURE — 94660 CPAP INITIATION&MGMT: CPT

## 2023-09-18 PROCEDURE — 63710000001 RANOLAZINE 500 MG TABLET SUSTAINED-RELEASE 12 HOUR: Performed by: INTERNAL MEDICINE

## 2023-09-18 PROCEDURE — C1776 JOINT DEVICE (IMPLANTABLE): HCPCS | Performed by: ORTHOPAEDIC SURGERY

## 2023-09-18 PROCEDURE — 63710000001 INSULIN DETEMIR PER 5 UNITS: Performed by: INTERNAL MEDICINE

## 2023-09-18 PROCEDURE — 97535 SELF CARE MNGMENT TRAINING: CPT | Performed by: OCCUPATIONAL THERAPIST

## 2023-09-18 PROCEDURE — 63710000001 INSULIN LISPRO (HUMAN) PER 5 UNITS: Performed by: INTERNAL MEDICINE

## 2023-09-18 PROCEDURE — 97165 OT EVAL LOW COMPLEX 30 MIN: CPT | Performed by: OCCUPATIONAL THERAPIST

## 2023-09-18 PROCEDURE — A9270 NON-COVERED ITEM OR SERVICE: HCPCS

## 2023-09-18 PROCEDURE — C1889 IMPLANT/INSERT DEVICE, NOC: HCPCS | Performed by: ORTHOPAEDIC SURGERY

## 2023-09-18 PROCEDURE — 73030 X-RAY EXAM OF SHOULDER: CPT

## 2023-09-18 PROCEDURE — 63710000001 DOFETILIDE 500 MCG CAPSULE: Performed by: INTERNAL MEDICINE

## 2023-09-18 PROCEDURE — 25010000002 BUPIVACAINE (PF) 0.25 % SOLUTION: Performed by: NURSE ANESTHETIST, CERTIFIED REGISTERED

## 2023-09-18 PROCEDURE — 63710000001 CARBIDOPA-LEVODOPA 25-100 MG TABLET

## 2023-09-18 PROCEDURE — 97530 THERAPEUTIC ACTIVITIES: CPT | Performed by: OCCUPATIONAL THERAPIST

## 2023-09-18 DEVICE — IMPLANTABLE DEVICE
Type: IMPLANTABLE DEVICE | Site: SHOULDER | Status: FUNCTIONAL
Brand: EQUINOXE

## 2023-09-18 DEVICE — GLENOSPHERE
Type: IMPLANTABLE DEVICE | Site: SHOULDER | Status: FUNCTIONAL
Brand: EQUINOXE

## 2023-09-18 DEVICE — IMPLANTABLE DEVICE: Type: IMPLANTABLE DEVICE | Site: SHOULDER | Status: FUNCTIONAL

## 2023-09-18 DEVICE — GLENOID PLATE
Type: IMPLANTABLE DEVICE | Site: SHOULDER | Status: FUNCTIONAL
Brand: EQUINOXE

## 2023-09-18 RX ORDER — VALSARTAN 160 MG/1
160 TABLET ORAL 2 TIMES DAILY
Status: DISCONTINUED | OUTPATIENT
Start: 2023-09-18 | End: 2023-09-21 | Stop reason: HOSPADM

## 2023-09-18 RX ORDER — BUMETANIDE 1 MG/1
1 TABLET ORAL DAILY
Status: DISCONTINUED | OUTPATIENT
Start: 2023-09-19 | End: 2023-09-21 | Stop reason: HOSPADM

## 2023-09-18 RX ORDER — HYDRALAZINE HYDROCHLORIDE 20 MG/ML
5 INJECTION INTRAMUSCULAR; INTRAVENOUS
Status: DISCONTINUED | OUTPATIENT
Start: 2023-09-18 | End: 2023-09-18 | Stop reason: HOSPADM

## 2023-09-18 RX ORDER — INSULIN LISPRO 100 [IU]/ML
2-7 INJECTION, SOLUTION INTRAVENOUS; SUBCUTANEOUS
Status: DISCONTINUED | OUTPATIENT
Start: 2023-09-18 | End: 2023-09-21 | Stop reason: HOSPADM

## 2023-09-18 RX ORDER — DEXMEDETOMIDINE HYDROCHLORIDE 100 UG/ML
INJECTION, SOLUTION INTRAVENOUS AS NEEDED
Status: DISCONTINUED | OUTPATIENT
Start: 2023-09-18 | End: 2023-09-18 | Stop reason: SURG

## 2023-09-18 RX ORDER — OXYCODONE HYDROCHLORIDE 5 MG/1
5 TABLET ORAL EVERY 4 HOURS PRN
Status: DISCONTINUED | OUTPATIENT
Start: 2023-09-18 | End: 2023-09-21 | Stop reason: HOSPADM

## 2023-09-18 RX ORDER — HYDROMORPHONE HYDROCHLORIDE 1 MG/ML
0.5 INJECTION, SOLUTION INTRAMUSCULAR; INTRAVENOUS; SUBCUTANEOUS
Status: DISCONTINUED | OUTPATIENT
Start: 2023-09-18 | End: 2023-09-21 | Stop reason: HOSPADM

## 2023-09-18 RX ORDER — ALBUTEROL SULFATE 2.5 MG/3ML
2.5 SOLUTION RESPIRATORY (INHALATION) EVERY 4 HOURS PRN
Status: DISCONTINUED | OUTPATIENT
Start: 2023-09-18 | End: 2023-09-21 | Stop reason: HOSPADM

## 2023-09-18 RX ORDER — EPHEDRINE SULFATE 50 MG/ML
INJECTION INTRAVENOUS AS NEEDED
Status: DISCONTINUED | OUTPATIENT
Start: 2023-09-18 | End: 2023-09-18 | Stop reason: SURG

## 2023-09-18 RX ORDER — DOFETILIDE 0.5 MG/1
500 CAPSULE ORAL EVERY 12 HOURS
Status: DISCONTINUED | OUTPATIENT
Start: 2023-09-18 | End: 2023-09-21 | Stop reason: HOSPADM

## 2023-09-18 RX ORDER — ASPIRIN 81 MG/1
81 TABLET ORAL DAILY
Status: DISCONTINUED | OUTPATIENT
Start: 2023-09-19 | End: 2023-09-21 | Stop reason: HOSPADM

## 2023-09-18 RX ORDER — TRANEXAMIC ACID 10 MG/ML
1000 INJECTION, SOLUTION INTRAVENOUS ONCE
Status: COMPLETED | OUTPATIENT
Start: 2023-09-18 | End: 2023-09-18

## 2023-09-18 RX ORDER — LABETALOL HYDROCHLORIDE 5 MG/ML
10 INJECTION, SOLUTION INTRAVENOUS EVERY 4 HOURS PRN
Status: DISCONTINUED | OUTPATIENT
Start: 2023-09-18 | End: 2023-09-21 | Stop reason: HOSPADM

## 2023-09-18 RX ORDER — NALOXONE HCL 0.4 MG/ML
0.1 VIAL (ML) INJECTION
Status: DISCONTINUED | OUTPATIENT
Start: 2023-09-18 | End: 2023-09-21 | Stop reason: HOSPADM

## 2023-09-18 RX ORDER — MAGNESIUM HYDROXIDE 1200 MG/15ML
LIQUID ORAL AS NEEDED
Status: DISCONTINUED | OUTPATIENT
Start: 2023-09-18 | End: 2023-09-18 | Stop reason: HOSPADM

## 2023-09-18 RX ORDER — CITALOPRAM 20 MG/1
20 TABLET ORAL NIGHTLY
Status: DISCONTINUED | OUTPATIENT
Start: 2023-09-18 | End: 2023-09-21 | Stop reason: HOSPADM

## 2023-09-18 RX ORDER — HYDROMORPHONE HYDROCHLORIDE 1 MG/ML
0.2 INJECTION, SOLUTION INTRAMUSCULAR; INTRAVENOUS; SUBCUTANEOUS
Status: DISCONTINUED | OUTPATIENT
Start: 2023-09-18 | End: 2023-09-18 | Stop reason: HOSPADM

## 2023-09-18 RX ORDER — CEFAZOLIN SODIUM 2 G/100ML
2000 INJECTION, SOLUTION INTRAVENOUS ONCE
Status: COMPLETED | OUTPATIENT
Start: 2023-09-18 | End: 2023-09-18

## 2023-09-18 RX ORDER — VANCOMYCIN/0.9 % SOD CHLORIDE 1.5G/250ML
15 PLASTIC BAG, INJECTION (ML) INTRAVENOUS ONCE
Status: COMPLETED | OUTPATIENT
Start: 2023-09-19 | End: 2023-09-19

## 2023-09-18 RX ORDER — ROPIVACAINE HYDROCHLORIDE 2 MG/ML
INJECTION, SOLUTION EPIDURAL; INFILTRATION; PERINEURAL CONTINUOUS
Status: DISCONTINUED | OUTPATIENT
Start: 2023-09-18 | End: 2023-09-20

## 2023-09-18 RX ORDER — SODIUM CHLORIDE 450 MG/100ML
50 INJECTION, SOLUTION INTRAVENOUS CONTINUOUS
Status: DISCONTINUED | OUTPATIENT
Start: 2023-09-18 | End: 2023-09-19

## 2023-09-18 RX ORDER — SODIUM CHLORIDE 9 MG/ML
9 INJECTION, SOLUTION INTRAVENOUS CONTINUOUS
Status: DISCONTINUED | OUTPATIENT
Start: 2023-09-18 | End: 2023-09-21 | Stop reason: HOSPADM

## 2023-09-18 RX ORDER — LIDOCAINE HYDROCHLORIDE 10 MG/ML
INJECTION, SOLUTION EPIDURAL; INFILTRATION; INTRACAUDAL; PERINEURAL AS NEEDED
Status: DISCONTINUED | OUTPATIENT
Start: 2023-09-18 | End: 2023-09-18 | Stop reason: SURG

## 2023-09-18 RX ORDER — HYDROCODONE BITARTRATE AND ACETAMINOPHEN 5; 325 MG/1; MG/1
1 TABLET ORAL ONCE AS NEEDED
Status: DISCONTINUED | OUTPATIENT
Start: 2023-09-18 | End: 2023-09-18

## 2023-09-18 RX ORDER — FENTANYL CITRATE 50 UG/ML
50 INJECTION, SOLUTION INTRAMUSCULAR; INTRAVENOUS
Status: DISCONTINUED | OUTPATIENT
Start: 2023-09-18 | End: 2023-09-18 | Stop reason: HOSPADM

## 2023-09-18 RX ORDER — LIDOCAINE HYDROCHLORIDE 10 MG/ML
0.5 INJECTION, SOLUTION EPIDURAL; INFILTRATION; INTRACAUDAL; PERINEURAL ONCE AS NEEDED
Status: COMPLETED | OUTPATIENT
Start: 2023-09-18 | End: 2023-09-18

## 2023-09-18 RX ORDER — RANOLAZINE 500 MG/1
500 TABLET, EXTENDED RELEASE ORAL EVERY 12 HOURS
Status: DISCONTINUED | OUTPATIENT
Start: 2023-09-18 | End: 2023-09-21 | Stop reason: HOSPADM

## 2023-09-18 RX ORDER — BUPIVACAINE HYDROCHLORIDE 2.5 MG/ML
INJECTION, SOLUTION EPIDURAL; INFILTRATION; INTRACAUDAL
Status: COMPLETED | OUTPATIENT
Start: 2023-09-18 | End: 2023-09-18

## 2023-09-18 RX ORDER — SODIUM CHLORIDE 0.9 % (FLUSH) 0.9 %
3 SYRINGE (ML) INJECTION EVERY 12 HOURS SCHEDULED
Status: DISCONTINUED | OUTPATIENT
Start: 2023-09-18 | End: 2023-09-18 | Stop reason: HOSPADM

## 2023-09-18 RX ORDER — ONDANSETRON 2 MG/ML
4 INJECTION INTRAMUSCULAR; INTRAVENOUS ONCE AS NEEDED
Status: DISCONTINUED | OUTPATIENT
Start: 2023-09-18 | End: 2023-09-18 | Stop reason: HOSPADM

## 2023-09-18 RX ORDER — FLURBIPROFEN SODIUM 0.3 MG/ML
SOLUTION/ DROPS OPHTHALMIC
Qty: 90 EACH | Refills: 3 | Status: SHIPPED | OUTPATIENT
Start: 2023-09-18

## 2023-09-18 RX ORDER — ACETAMINOPHEN 650 MG/1
650 SUPPOSITORY RECTAL EVERY 4 HOURS PRN
Status: DISCONTINUED | OUTPATIENT
Start: 2023-09-18 | End: 2023-09-21 | Stop reason: HOSPADM

## 2023-09-18 RX ORDER — ROCURONIUM BROMIDE 10 MG/ML
INJECTION, SOLUTION INTRAVENOUS AS NEEDED
Status: DISCONTINUED | OUTPATIENT
Start: 2023-09-18 | End: 2023-09-18 | Stop reason: SURG

## 2023-09-18 RX ORDER — ACETAMINOPHEN 325 MG/1
650 TABLET ORAL EVERY 4 HOURS PRN
Status: DISCONTINUED | OUTPATIENT
Start: 2023-09-18 | End: 2023-09-21 | Stop reason: HOSPADM

## 2023-09-18 RX ORDER — PANTOPRAZOLE SODIUM 40 MG/1
40 TABLET, DELAYED RELEASE ORAL DAILY
Status: DISCONTINUED | OUTPATIENT
Start: 2023-09-19 | End: 2023-09-21 | Stop reason: HOSPADM

## 2023-09-18 RX ORDER — SODIUM CHLORIDE 9 MG/ML
40 INJECTION, SOLUTION INTRAVENOUS AS NEEDED
Status: DISCONTINUED | OUTPATIENT
Start: 2023-09-18 | End: 2023-09-18 | Stop reason: HOSPADM

## 2023-09-18 RX ORDER — SODIUM CHLORIDE 0.9 % (FLUSH) 0.9 %
10 SYRINGE (ML) INJECTION AS NEEDED
Status: DISCONTINUED | OUTPATIENT
Start: 2023-09-18 | End: 2023-09-18 | Stop reason: HOSPADM

## 2023-09-18 RX ORDER — MIDAZOLAM HYDROCHLORIDE 1 MG/ML
0.5 INJECTION INTRAMUSCULAR; INTRAVENOUS
Status: COMPLETED | OUTPATIENT
Start: 2023-09-18 | End: 2023-09-18

## 2023-09-18 RX ORDER — ONDANSETRON 2 MG/ML
INJECTION INTRAMUSCULAR; INTRAVENOUS AS NEEDED
Status: DISCONTINUED | OUTPATIENT
Start: 2023-09-18 | End: 2023-09-18 | Stop reason: SURG

## 2023-09-18 RX ORDER — PROPOFOL 10 MG/ML
VIAL (ML) INTRAVENOUS AS NEEDED
Status: DISCONTINUED | OUTPATIENT
Start: 2023-09-18 | End: 2023-09-18 | Stop reason: SURG

## 2023-09-18 RX ORDER — DEXAMETHASONE SODIUM PHOSPHATE 4 MG/ML
INJECTION, SOLUTION INTRA-ARTICULAR; INTRALESIONAL; INTRAMUSCULAR; INTRAVENOUS; SOFT TISSUE AS NEEDED
Status: DISCONTINUED | OUTPATIENT
Start: 2023-09-18 | End: 2023-09-18 | Stop reason: SURG

## 2023-09-18 RX ORDER — BUPIVACAINE HCL/0.9 % NACL/PF 0.125 %
PLASTIC BAG, INJECTION (ML) EPIDURAL AS NEEDED
Status: DISCONTINUED | OUTPATIENT
Start: 2023-09-18 | End: 2023-09-18 | Stop reason: SURG

## 2023-09-18 RX ORDER — SODIUM CHLORIDE 0.9 % (FLUSH) 0.9 %
3-10 SYRINGE (ML) INJECTION AS NEEDED
Status: DISCONTINUED | OUTPATIENT
Start: 2023-09-18 | End: 2023-09-18 | Stop reason: HOSPADM

## 2023-09-18 RX ORDER — METOLAZONE 2.5 MG/1
2.5 TABLET ORAL DAILY
Status: DISCONTINUED | OUTPATIENT
Start: 2023-09-19 | End: 2023-09-21 | Stop reason: HOSPADM

## 2023-09-18 RX ORDER — ACETAMINOPHEN 500 MG
1000 TABLET ORAL ONCE
Status: COMPLETED | OUTPATIENT
Start: 2023-09-18 | End: 2023-09-18

## 2023-09-18 RX ORDER — NICOTINE POLACRILEX 4 MG
15 LOZENGE BUCCAL
Status: DISCONTINUED | OUTPATIENT
Start: 2023-09-18 | End: 2023-09-21 | Stop reason: HOSPADM

## 2023-09-18 RX ORDER — IPRATROPIUM BROMIDE AND ALBUTEROL SULFATE 2.5; .5 MG/3ML; MG/3ML
3 SOLUTION RESPIRATORY (INHALATION) ONCE AS NEEDED
Status: DISCONTINUED | OUTPATIENT
Start: 2023-09-18 | End: 2023-09-18 | Stop reason: HOSPADM

## 2023-09-18 RX ORDER — VANCOMYCIN/0.9 % SOD CHLORIDE 1.5G/250ML
15 PLASTIC BAG, INJECTION (ML) INTRAVENOUS ONCE
Status: COMPLETED | OUTPATIENT
Start: 2023-09-18 | End: 2023-09-18

## 2023-09-18 RX ORDER — NALOXONE HCL 0.4 MG/ML
0.4 VIAL (ML) INJECTION AS NEEDED
Status: DISCONTINUED | OUTPATIENT
Start: 2023-09-18 | End: 2023-09-18 | Stop reason: HOSPADM

## 2023-09-18 RX ORDER — AMANTADINE HYDROCHLORIDE 100 MG/1
100 CAPSULE, GELATIN COATED ORAL 2 TIMES DAILY
Status: DISCONTINUED | OUTPATIENT
Start: 2023-09-18 | End: 2023-09-21 | Stop reason: HOSPADM

## 2023-09-18 RX ORDER — DEXTROSE MONOHYDRATE 25 G/50ML
25 INJECTION, SOLUTION INTRAVENOUS
Status: DISCONTINUED | OUTPATIENT
Start: 2023-09-18 | End: 2023-09-21 | Stop reason: HOSPADM

## 2023-09-18 RX ORDER — SPIRONOLACTONE 50 MG/1
50 TABLET, FILM COATED ORAL DAILY
Status: DISCONTINUED | OUTPATIENT
Start: 2023-09-19 | End: 2023-09-21 | Stop reason: HOSPADM

## 2023-09-18 RX ADMIN — CITALOPRAM HYDROBROMIDE 20 MG: 20 TABLET ORAL at 21:24

## 2023-09-18 RX ADMIN — ROCURONIUM BROMIDE 10 MG: 10 SOLUTION INTRAVENOUS at 11:05

## 2023-09-18 RX ADMIN — EPHEDRINE SULFATE 5 MG: 50 INJECTION INTRAVENOUS at 12:27

## 2023-09-18 RX ADMIN — ONDANSETRON 4 MG: 2 INJECTION INTRAMUSCULAR; INTRAVENOUS at 12:26

## 2023-09-18 RX ADMIN — EPHEDRINE SULFATE 10 MG: 50 INJECTION INTRAVENOUS at 11:03

## 2023-09-18 RX ADMIN — EPHEDRINE SULFATE 5 MG: 50 INJECTION INTRAVENOUS at 10:45

## 2023-09-18 RX ADMIN — INSULIN DETEMIR 12 UNITS: 100 INJECTION, SOLUTION SUBCUTANEOUS at 21:19

## 2023-09-18 RX ADMIN — ROCURONIUM BROMIDE 10 MG: 10 SOLUTION INTRAVENOUS at 12:12

## 2023-09-18 RX ADMIN — CARBIDOPA AND LEVODOPA 1 TABLET: 25; 100 TABLET ORAL at 17:27

## 2023-09-18 RX ADMIN — SUGAMMADEX 200 MG: 100 INJECTION, SOLUTION INTRAVENOUS at 12:34

## 2023-09-18 RX ADMIN — RANOLAZINE 500 MG: 500 TABLET, FILM COATED, EXTENDED RELEASE ORAL at 21:23

## 2023-09-18 RX ADMIN — PHENYLEPHRINE HYDROCHLORIDE 0.2 MCG/KG/MIN: 10 INJECTION INTRAVENOUS at 11:48

## 2023-09-18 RX ADMIN — ROCURONIUM BROMIDE 50 MG: 10 SOLUTION INTRAVENOUS at 10:22

## 2023-09-18 RX ADMIN — SODIUM CHLORIDE 9 ML/HR: 9 INJECTION, SOLUTION INTRAVENOUS at 08:30

## 2023-09-18 RX ADMIN — TRANEXAMIC ACID 1000 MG: 10 INJECTION, SOLUTION INTRAVENOUS at 12:18

## 2023-09-18 RX ADMIN — ROCURONIUM BROMIDE 10 MG: 10 SOLUTION INTRAVENOUS at 11:38

## 2023-09-18 RX ADMIN — Medication 1000 MG: at 13:06

## 2023-09-18 RX ADMIN — DEXAMETHASONE SODIUM PHOSPHATE 4 MG: 4 INJECTION INTRA-ARTICULAR; INTRALESIONAL; INTRAMUSCULAR; INTRAVENOUS; SOFT TISSUE at 10:35

## 2023-09-18 RX ADMIN — DEXMEDETOMIDINE HYDROCHLORIDE 8 MCG: 100 INJECTION, SOLUTION INTRAVENOUS at 08:46

## 2023-09-18 RX ADMIN — BUPIVACAINE HYDROCHLORIDE 5 ML: 2.5 INJECTION, SOLUTION EPIDURAL; INFILTRATION; INTRACAUDAL; PERINEURAL at 08:58

## 2023-09-18 RX ADMIN — OXYCODONE HYDROCHLORIDE 5 MG: 5 TABLET ORAL at 21:28

## 2023-09-18 RX ADMIN — INSULIN LISPRO 2 UNITS: 100 INJECTION, SOLUTION INTRAVENOUS; SUBCUTANEOUS at 17:28

## 2023-09-18 RX ADMIN — VALSARTAN 160 MG: 160 TABLET, FILM COATED ORAL at 21:23

## 2023-09-18 RX ADMIN — CEFAZOLIN SODIUM 2000 MG: 2 INJECTION, SOLUTION INTRAVENOUS at 09:31

## 2023-09-18 RX ADMIN — PROPOFOL 80 MG: 10 INJECTION, EMULSION INTRAVENOUS at 10:22

## 2023-09-18 RX ADMIN — ACETAMINOPHEN 1000 MG: 500 TABLET ORAL at 08:29

## 2023-09-18 RX ADMIN — PRAMIPEXOLE DIHYDROCHLORIDE 1.5 MG: 1 TABLET ORAL at 21:22

## 2023-09-18 RX ADMIN — AMANTADINE HYDROCHLORIDE 100 MG: 100 CAPSULE ORAL at 21:22

## 2023-09-18 RX ADMIN — VANCOMYCIN HYDROCHLORIDE 1500 MG: 10 INJECTION, POWDER, LYOPHILIZED, FOR SOLUTION INTRAVENOUS at 10:21

## 2023-09-18 RX ADMIN — Medication 100 MCG: at 10:49

## 2023-09-18 RX ADMIN — LIDOCAINE HYDROCHLORIDE 50 MG: 10 INJECTION, SOLUTION EPIDURAL; INFILTRATION; INTRACAUDAL; PERINEURAL at 10:22

## 2023-09-18 RX ADMIN — MIDAZOLAM HYDROCHLORIDE 1 MG: 1 INJECTION, SOLUTION INTRAMUSCULAR; INTRAVENOUS at 09:54

## 2023-09-18 RX ADMIN — INSULIN LISPRO 2 UNITS: 100 INJECTION, SOLUTION INTRAVENOUS; SUBCUTANEOUS at 21:19

## 2023-09-18 RX ADMIN — BUPIVACAINE HYDROCHLORIDE 30 ML: 2.5 INJECTION, SOLUTION EPIDURAL; INFILTRATION; INTRACAUDAL at 09:10

## 2023-09-18 RX ADMIN — EPHEDRINE SULFATE 5 MG: 50 INJECTION INTRAVENOUS at 10:51

## 2023-09-18 RX ADMIN — SODIUM CHLORIDE 50 ML/HR: 4.5 INJECTION, SOLUTION INTRAVENOUS at 18:33

## 2023-09-18 RX ADMIN — DOFETILIDE 500 MCG: 0.5 CAPSULE ORAL at 21:23

## 2023-09-18 RX ADMIN — LIDOCAINE HYDROCHLORIDE 0.5 ML: 10 INJECTION, SOLUTION EPIDURAL; INFILTRATION; INTRACAUDAL; PERINEURAL at 08:30

## 2023-09-18 RX ADMIN — MIDAZOLAM HYDROCHLORIDE 1 MG: 1 INJECTION, SOLUTION INTRAMUSCULAR; INTRAVENOUS at 10:22

## 2023-09-18 RX ADMIN — CARBIDOPA AND LEVODOPA 1 TABLET: 25; 100 TABLET ORAL at 21:23

## 2023-09-18 RX ADMIN — TRANEXAMIC ACID 1000 MG: 10 INJECTION, SOLUTION INTRAVENOUS at 10:31

## 2023-09-18 NOTE — THERAPY EVALUATION
Patient Name: Joanna Cross  : 1950    MRN: [id number]                              Today's Date: 2023       Admit Date: 2023    Visit Dx: No diagnosis found.  Patient Active Problem List   Diagnosis    Coronary artery disease involving native coronary artery without angina pectoris    Hypertension, essential    Peripheral vascular disease    Hyperlipidemia LDL goal <70    Spinal stenosis, lumbar region, with neurogenic claudication    Delayed surgical wound healing    Biceps tendinitis    Overactive bladder    GERD (gastroesophageal reflux disease)    Hypothyroidism    Lichen sclerosus    Parkinson's disease    MILLI treated with BiPAP - Patient reports compliance.     History of respiratory failure - prior respiratory failure requiring mechanical ventilation, open lung biopsy non-specific.     Atrial fibrillation    CKD (chronic kidney disease)    Tachy-thai syndrome    Long term current use of antiarrhythmic drug    History of adenomatous polyp of colon    Anemia    Angiodysplasia    Hx of colonic polyps    Body mass index 40.0-44.9, adult    Cardiac pacemaker in situ    Chronic low back pain    Chronic lung disease    Degeneration of lumbar intervertebral disc    Edema of lower extremity    High risk medication use    History of malignant neoplasm of skin    History of TIA (transient ischemic attack)    Hyponatremia    Hypotonic bladder    Incomplete tear of rotator cuff    Major depression in remission    Migraine    Weakness    Tinnitus of both ears    Primary osteoarthritis involving multiple joints    Restless legs    Seborrheic dermatitis    Sphenoid sinusitis    Transient cerebral ischemia    Urinary tract infection    Urinary urgency    Type 2 diabetes mellitus with hyperglycemia, without long-term current use of insulin    Vitamin B12 deficiency    Vitamin D deficiency    Chronic kidney disease, stage 3b    Status post reverse total replacement of left shoulder    Postoperative pain     Dysuria    Rash    Pruritus    Acute post-traumatic headache, not intractable    History of recent fall    Chronic kidney disease, stage 3a    Status post reverse arthroplasty of right shoulder     Past Medical History:   Diagnosis Date    Anemia     Ankle problem     thinks back related causing pain     Atrial fibrillation     AVM (arteriovenous malformation)     Back pain     Chronic kidney disease     stage 3 per pt    CKD (chronic kidney disease)     Clotting disorder 2016    AVM - small intestine    COVID-19 vaccine series completed     Gastrocnemius muscle tear     left medial 91    Generalized osteoarthritis     GERD (gastroesophageal reflux disease)     Gestational diabetes     GIB (gastrointestinal bleeding) 2016    d/t xarelto     Headache     Hearing decreased, bilateral     HAS HEARING AID, NOT WEARING IT CURRENTLY    Hiatal hernia     History of shingles     History of transfusion 2016    no reaction recalled     Hypertension     Hypothyroidism     IBS (irritable bowel syndrome)     Klebsiella pneumonia     Lichen sclerosus     Lupus     subQ    Mouth problem     mouth guard used since pt bites tongue and lips excessively at night if not- with bipap     MRSA infection 2018    Obesity     On home oxygen therapy     2L of oxygen all the time due to current congestion     Osteoporosis     Parkinson's disease     Peripheral vascular disease     Pleurisy     Pneumonia     Puerperal sepsis with acute hypoxic respiratory failure     emergent intubation- 2016    Right leg pain     from back issues     Salivary gland stone     Sciatic nerve pain     Seborrheic dermatitis     Skin cancer     on back     Sleep apnea     CPAP AND HOME O2 2L/M    TIA (transient ischemic attack) 2014    no residual effects    Type 2 diabetes mellitus     UTI (urinary tract infection)     Vitamin D deficiency 09/08/2022    Wears glasses      Past Surgical History:   Procedure Laterality Date    ABLATION OF DYSRHYTHMIC FOCUS   05/16/13    Laser Ablation - Rt Leg    BACK SURGERY      l4-l5 laminectomy     BICEPS TENDON REPAIR Right     shoulder    BREAST BIOPSY Left 05/2004    excisional, benign    BRONCHOSCOPY RIGID / FLEXIBLE      2016    BUNIONECTOMY Right     CARDIAC CATHETERIZATION      CARDIAC CATHETERIZATION N/A 02/01/2019    Procedure: Left Heart Cath;  Surgeon: Albertina Corona MD;  Location: [facility] CATH INVASIVE LOCATION;  Service: Cardiology    CHOLECYSTECTOMY      COLONOSCOPY  2016    COLONOSCOPY N/A 06/17/2021    Procedure: COLONOSCOPY WITH POLYPECTOMY;  Surgeon: Trenton Sosa MD;  Location: [facility] ENDOSCOPY;  Service: Gastroenterology;  Laterality: N/A;    CORONARY STENT PLACEMENT      x1 stent    CYST REMOVAL      left ear, upper left back 2001    CYSTOSCOPY      x2  18 and 20 -   in 20 urethra dilation     DIAGNOSTIC LAPAROSCOPY  1981    ENDOSCOPIC FUNCTIONAL SINUS SURGERY (FESS)  2011    ENDOSCOPY  2016    ENTEROSCOPY VIA STOMA      with single ballon with fluoro     HAMMER TOE REPAIR Left     INCISION AND DRAINAGE OF WOUND  2018    back with wound infection     INSERT / REPLACE / REMOVE PACEMAKER  11/10/16    [facility]    JOINT REPLACEMENT      KNEE ARTHROSCOPY      LASER ABLATION      right leg 13    LIPOMA EXCISION  1999    left leg     LUMBAR LAMINECTOMY DISCECTOMY DECOMPRESSION N/A 07/06/2018    Procedure: LUMBAR LAMINECTOMY L4-5, HEMILAMIINECTOMY RIGHT L5-S1, FORAMINOTOMY L5-S1;  Surgeon: Lencho Gamino MD;  Location:  [facility] OR;  Service: Neurosurgery    LUMBAR LAMINECTOMY DISCECTOMY DECOMPRESSION N/A 09/19/2018    Procedure: INCISION AND DRAINAGE BACK WITH WOUND EXPLORATION;  Surgeon: Ritchie García MD;  Location:  [facility] OR;  Service: Neurosurgery    LUNG BIOPSY Left 2016    OTHER SURGICAL HISTORY      ct scan of chest and sinuses and lower back     OTHER SURGICAL HISTORY      various echos     OTHER SURGICAL HISTORY      electroencephalogram 16,   emg  ncv tests 2007    OTHER  SURGICAL HISTORY      mra 2007  and various mri with xrays, nuclear medicine lung ventilation with perfusion test 2016 with pft     OTHER SURGICAL HISTORY      barium swallow testing 15, 12, 12    OTHER SURGICAL HISTORY      vaginal ultrasound- 2012,   vas clementina lower extrem 2016, vas venous duplex lower extrem bilat 2019    OTHER SURGICAL HISTORY      wdge biopsy spring of lung     OTHER SURGICAL HISTORY      emergent intubation- hypoxic resp failure     PACEMAKER IMPLANTATION  11/2016    sss    REPLACEMENT TOTAL KNEE Bilateral     left knee 2011, right 2012 per dr acuna     REPLACEMENT TOTAL KNEE Bilateral     SHOULDER ARTHROSCOPY Bilateral     2003-left, 2004- right     SKIN BIOPSY      skin cancer back 2016    SKIN CANCER EXCISION      upper righ tback     MADHAV      TEETH EXTRACTION      x2    TOTAL SHOULDER ARTHROPLASTY W/ DISTAL CLAVICLE EXCISION Left 10/24/2022    Procedure: REVERSE TOTAL SHOULDER ARTHROPLASTY, BICEPS TENODESIS - LEFT;  Surgeon: Sammy Yo MD;  Location: [facility] OR;  Service: Orthopedics;  Laterality: Left;    TUBAL ABDOMINAL LIGATION Bilateral 1980    WISDOM TOOTH EXTRACTION        General Information       Row Name 09/18/23 2090          OT Time and Intention    Document Type evaluation  -AR     Mode of Treatment individual therapy;occupational therapy  -AR       Row Name 09/18/23 3903          General Information    Patient Profile Reviewed yes  -AR     Prior Level of Function independent:;all household mobility;community mobility;gait;transfer;bed mobility;min assist:;ADL's  uses drive Nitro or straight cane for household mobility, Nitro for commuity mobility  -AR     Existing Precautions/Restrictions fall;oxygen therapy device and L/min;non-weight bearing;right;shoulder  -AR     Barriers to Rehab medically complex  -AR       Row Name 09/18/23 1651          Living Environment    People in Home child(lisa), adult  -AR       Row Name 09/18/23 1655          Home Main Entrance    Number of  Stairs, Main Entrance none;other (see comments)  ramp  -AR       Row Name 09/18/23 1653          Stairs Within Home, Primary    Number of Stairs, Within Home, Primary none  -AR       Row Name 09/18/23 1653          Cognition    Orientation Status (Cognition) oriented x 4  -AR       Row Name 09/18/23 1653          Safety Issues, Functional Mobility    Safety Issues Affecting Function (Mobility) safety precaution awareness;safety precautions follow-through/compliance;sequencing abilities  -AR     Impairments Affecting Function (Mobility) balance;coordination;endurance/activity tolerance;motor control;pain;postural/trunk control;range of motion (ROM);sensation/sensory awareness;shortness of breath;strength  -AR               User Key  (r) = Recorded By, (t) = Taken By, (c) = Cosigned By      Initials Name Provider Type    Patience Fu, OT Occupational Therapist                     Mobility/ADL's       Row Name 09/18/23 1707          Bed Mobility    Bed Mobility supine-sit;scooting/bridging  -AR     Supine-Sit [place] (Bed Mobility) maximum assist (25% patient effort);verbal cues  -AR     Assistive Device (Bed Mobility) head of bed elevated  -AR     Comment, (Bed Mobility) Limited with posterior pushing  -AR       Row Name 09/18/23 1707          Transfers    Transfers sit-stand transfer;stand-sit transfer;toilet transfer  -AR       Row Name 09/18/23 1707          Sit-Stand Transfer    Sit-Stand [place] (Transfers) minimum assist (75% patient effort)  -AR     Assistive Device (Sit-Stand Transfers) other (see comments)  CATHY HHA  -AR       Row Name 09/18/23 1707          Stand-Sit Transfer    Stand-Sit [place] (Transfers) minimum assist (75% patient effort)  -AR     Assistive Device (Stand-Sit Transfers) other (see comments)  -AR       Row Name 09/18/23 1707          Toilet Transfer    Type (Toilet Transfer) sit-stand;stand-sit  -AR     [place] Level (Toilet Transfer) minimum assist (75%  patient effort);verbal cues  -AR     Assistive Device (Toilet Transfer) other (see comments)  -AR       Row Name 09/18/23 1707          Functional Mobility    Functional Mobility- Ind. Level minimum assist (75% patient effort);verbal cues required;1 person + 1 person to manage equipment  -AR     Functional Mobility- Device other (see comments)  -AR     Functional Mobility-Distance (Feet) 30  -AR     Functional Mobility- Safety Issues balance decreased during turns;weight-shifting ability decreased;sequencing ability decreased;loses balance backward  -AR     Functional Mobility- Comment Pt with significant dyspnea and desaturation to 81% on 2L NC. Oxygen increased to 3L NC with permission from nurse for mobility and no desaturation under 89% noted. Pt scored 14 on mobility screen, recommend PT eval.  -AR     Patient was able to Ambulate yes  -AR       Row Name 09/18/23 1707          Activities of Daily Living    BADL Assessment/Intervention bathing;upper body dressing;lower body dressing;feeding;toileting  -AR       Row Name 09/18/23 1707          Bathing Assessment/Intervention    Comment, (Bathing) Educated pt and daughter on R shoulder precautions, axilla care to maintain  -AR       Row Name 09/18/23 1707          Upper Body Dressing Assessment/Training    [place] Level (Upper Body Dressing) don;doff;dependent (less than 25% patient effort)  sling  -AR     Position (Upper Body Dressing) edge of bed sitting;supported sitting  -AR     Comment, (Upper Body Dressing) Educated pt on R shoulder precautions, ADL retraining to maintain, sling management and care of nerve catheter. Pt required dependence sling management.  -AR       Row Name 09/18/23 1707          Lower Body Dressing Assessment/Training    [place] Level (Lower Body Dressing) doff;socks;dependent (less than 25% patient effort)  -AR     Position (Lower Body Dressing) long sitting  -AR       Row Name 09/18/23 1707          Self-Feeding  Assessment/Training    [place] Level (Feeding) moderate assist (50% patient effort)  -AR     Position (Self-Feeding) supported sitting  -AR       Row Name 09/18/23 1707          Toileting Assessment/Training    [place] Level (Toileting) toileting skills;adjust/manage clothing;perform perineal hygiene;dependent (less than 25% patient effort)  -AR     Position (Toileting) supported standing;unsupported standing  -AR               User Key  (r) = Recorded By, (t) = Taken By, (c) = Cosigned By      Initials Name Provider Type    Patience Fu, OT Occupational Therapist                   Obj/Interventions       Row Name 09/18/23 1713          Sensory Assessment (Somatosensory)    Sensory Assessment (Somatosensory) right UE  -AR     Sensory Subjective Reports numbness  -AR       Row Name 09/18/23 1713          Vision Assessment/Intervention    Visual Impairment/Limitations corrective lenses for reading  -AR       Row Name 09/18/23 1713          Range of Motion Comprehensive    Comment, General Range of Motion LUE WFL, R elbow/wrist/hand WFL  -AR       Row Name 09/18/23 1713          Strength Comprehensive (MMT)    Comment, General Manual Muscle Testing (MMT) Assessment LUE 3+/5, RUE deferred  -AR       Row Name 09/18/23 1713          Shoulder (Therapeutic Exercise)    Shoulder (Therapeutic Exercise) AROM (active range of motion);PROM (passive range of motion)  -AR     Shoulder AROM (Therapeutic Exercise) bilateral;scapular retraction;sitting;10 repetitions  -AR     Shoulder PROM (Therapeutic Exercise) right;flexion;extension;internal rotation;external rotation;sitting;10 repetitions  -AR       Row Name 09/18/23 1713          Elbow/Forearm (Therapeutic Exercise)    Elbow/Forearm (Therapeutic Exercise) AAROM (active assistive range of motion)  -AR     Elbow/Forearm AAROM (Therapeutic Exercise) right;flexion;extension;supination;pronation;sitting;10 repetitions  -AR       Row Name 09/18/23 1713           Wrist (Therapeutic Exercise)    Wrist (Therapeutic Exercise) AROM (active range of motion)  -AR     Wrist AROM (Therapeutic Exercise) right;flexion;extension;10 repetitions  -AR       Row Name 09/18/23 1713          Hand (Therapeutic Exercise)    Hand (Therapeutic Exercise) AROM (active range of motion)  -AR     Hand AROM/AAROM (Therapeutic Exercise) right;AROM (active range of motion);finger flexion;10 repetitions;finger extension  -AR       Row Name 09/18/23 1713          Motor Skills    Motor Skills neuro-muscular function  -AR     Neuromuscular Function bilateral;upper extremity;tremor, intention  -AR     Therapeutic Exercise shoulder;elbow/forearm;wrist;hand  Issued and reviewed written RUE HEP  -AR       Row Name 09/18/23 1713          Balance    Balance Assessment sitting static balance;sitting dynamic balance;standing static balance;standing dynamic balance  -AR     Static Sitting Balance contact guard  -AR     Dynamic Sitting Balance contact guard  -AR     Position, Sitting Balance unsupported;sitting edge of bed  -AR     Static Standing Balance contact guard  -AR     Dynamic Standing Balance minimal assist  -AR     Position/Device Used, Standing Balance supported  -AR               User Key  (r) = Recorded By, (t) = Taken By, (c) = Cosigned By      Initials Name Provider Type    Patience Fu, MONICA Occupational Therapist                   Goals/Plan       Row Name 09/18/23 1720          Transfer Goal 1 (OT)    Activity/Assistive Device (Transfer Goal 1, OT) sit-to-stand/stand-to-sit;toilet  AD per PT  -AR     [place] Level/Cues Needed (Transfer Goal 1, OT) verbal cues required;contact guard required  -AR     Time Frame (Transfer Goal 1, OT) long term goal (LTG);by discharge  -AR     Progress/Outcome (Transfer Goal 1, OT) goal ongoing  -AR       Row Name 09/18/23 1720          Dressing Goal 1 (OT)    Activity/Device (Dressing Goal 1, OT) upper body dressing  -AR     [place]/Cues Needed  (Dressing Goal 1, OT) moderate assist (50-74% patient effort);verbal cues required  -AR     Time Frame (Dressing Goal 1, OT) long term goal (LTG);by discharge  -AR     Progress/Outcome (Dressing Goal 1, OT) goal ongoing  -AR       Row Name 09/18/23 1720          Self-Feeding Goal 1 (OT)    Activity/Device (Self-Feeding Goal 1, OT) self-feeding skills, all;other (see comments)  AU PRN  -AR     [place] Level/Cues Needed (Self-Feeding Goal 1, OT) verbal cues required;minimum assist (75% or more patient effort)  -AR     Time Frame (Self-Feeding Goal 1, OT) long term goal (LTG);by discharge  -AR     Progress/Outcomes (Self-Feeding Goal 1, OT) goal ongoing  -AR       Row Name 09/18/23 1720          ROM Goal 1 (OT)    ROM Goal 1 (OT) Pt and family will complete RUE HEP within physician parameters with supervision  -AR     Time Frame (ROM Goal 1, OT) long term goal (LTG);by discharge  -AR     Progress/Outcome (ROM Goal 1, OT) goal ongoing  -AR       Row Name 09/18/23 1720          Therapy Assessment/Plan (OT)    Planned Therapy Interventions (OT) activity tolerance training;adaptive equipment training;BADL retraining;edema control/reduction;functional balance retraining;IADL retraining;neuromuscular control/coordination retraining;occupation/activity based interventions;orthotic fabrication/fitting/training;passive ROM/stretching;patient/caregiver education/training;ROM/therapeutic exercise;transfer/mobility retraining  -AR               User Key  (r) = Recorded By, (t) = Taken By, (c) = Cosigned By      Initials Name Provider Type    Patience Fu, OT Occupational Therapist                   Clinical Impression       Row Name 09/18/23 1710          Pain Assessment    Pretreatment Pain Rating 2/10  -AR     Posttreatment Pain Rating 2/10  -AR     Pain Location - Side/Orientation Right  -AR     Pain Location - --  pecs  -AR     Pain Intervention(s) Medication (See MAR);Cold  applied;Repositioned;Ambulation/increased activity  -AR       Row Name 09/18/23 1715          Plan of Care Review    Plan of Care Reviewed With patient;daughter  -AR     Outcome Evaluation OT educated pt and family on R shoulder precautions, ADL retraining to maintain, sling management and HEP. She ambulated 30 feet with min assist and significant dyspnea and desaturation to 81% on 2L NC. Pt tolerated R shoulder PROM , IR chest/ER 30 and unable to trial SROM d/t tremoring. Pt limited with dyspnea and desaturation with exertion, impaired balance, NWB/immobilization RUE and is performing significantly below baseline status. She lives with daughter who works 12-hour shifts. Recommend IPR.  -AR       Row Name 09/18/23 1715          Therapy Assessment/Plan (OT)    Rehab Potential (OT) good, to achieve stated therapy goals  -AR     Criteria for Skilled Therapeutic Interventions Met (OT) yes  -AR     Therapy Frequency (OT) daily  -AR       Row Name 09/18/23 1715          Therapy Plan Review/Discharge Plan (OT)    Anticipated Discharge Disposition (OT) inpatient rehabilitation facility  -AR       Row Name 09/18/23 1715          Vital Signs    Pre SpO2 (%) 95  -AR     O2 Delivery Pre Treatment supplemental O2  -AR     Intra SpO2 (%) 81   -AR     O2 Delivery Intra Treatment supplemental O2  -AR     Post SpO2 (%) 94  -AR     O2 Delivery Post Treatment supplemental O2  -AR     Pre Patient Position Supine  -AR     Intra Patient Position Standing  -AR     Post Patient Position Sitting  -AR       Row Name 09/18/23 1715          Positioning and Restraints    Pre-Treatment Position in bed  -AR     Post Treatment Position chair  -AR     In Chair notified nsg;reclined;call light within reach;encouraged to call for assist;exit alarm on;with family/caregiver;compression device;with brace;legs elevated  cold pack applied over incision  -AR               User Key  (r) = Recorded By, (t) = Taken By, (c) = Cosigned By      Initials  Name Provider Type    Patience Fu OT Occupational Therapist                   Outcome Measures       Row Name 09/18/23 1721          How much help from another is currently needed...    Putting on and taking off regular lower body clothing? 1  -AR     Bathing (including washing, rinsing, and drying) 2  -AR     Toileting (which includes using toilet bed pan or urinal) 1  -AR     Putting on and taking off regular upper body clothing 1  -AR     Taking care of personal grooming (such as brushing teeth) 2  -AR     Eating meals 2  -AR     AM-PAC 6 Clicks Score (OT) 9  -AR       Row Name 09/18/23 1721          Functional Assessment    Outcome Measure Options AM-PAC 6 Clicks Daily Activity (OT)  -AR               User Key  (r) = Recorded By, (t) = Taken By, (c) = Cosigned By      Initials Name Provider Type    Patience Fu OT Occupational Therapist                    Occupational Therapy Education       Title: PT OT SLP Therapies (Done)       Topic: Occupational Therapy (Done)       Point: ADL training (Done)       Description:   Instruct learner(s) on proper safety adaptation and remediation techniques during self care or transfers.   Instruct in proper use of assistive devices.                  Learning Progress Summary             Patient Eager, E,TB,D,H, VU,NR by AR at 9/18/2023 1722   Family Eager, E,TB,D,H, VU,NR by AR at 9/18/2023 1722                         Point: Home exercise program (Done)       Description:   Instruct learner(s) on appropriate technique for monitoring, assisting and/or progressing therapeutic exercises/activities.                  Learning Progress Summary             Patient Eager, E,TB,D,H, VU,NR by AR at 9/18/2023 1722   Family Eager, E,TB,D,H, VU,NR by AR at 9/18/2023 1722                         Point: Precautions (Done)       Description:   Instruct learner(s) on prescribed precautions during self-care and functional transfers.                  Learning Progress  Summary             Patient Eager, E,TB,D,H, VU,NR by AR at 9/18/2023 1722   Family Eager, E,TB,D,H, VU,NR by AR at 9/18/2023 1722                         Point: Body mechanics (Done)       Description:   Instruct learner(s) on proper positioning and spine alignment during self-care, functional mobility activities and/or exercises.                  Learning Progress Summary             Patient Eager, E,TB,D,H, VU,NR by AR at 9/18/2023 1722   Family Eager, E,TB,D,H, VU,NR by AR at 9/18/2023 1722                                         User Key       Initials Effective Dates Name Provider Type Discipline    AR 07/11/23 -  Patience Perez, OT Occupational Therapist OT                  OT Recommendation and Plan  Planned Therapy Interventions (OT): activity tolerance training, adaptive equipment training, BADL retraining, edema control/reduction, functional balance retraining, IADL retraining, neuromuscular control/coordination retraining, occupation/activity based interventions, orthotic fabrication/fitting/training, passive ROM/stretching, patient/caregiver education/training, ROM/therapeutic exercise, transfer/mobility retraining  Therapy Frequency (OT): daily  Plan of Care Review  Plan of Care Reviewed With: patient, daughter  Outcome Evaluation: OT educated pt and family on R shoulder precautions, ADL retraining to maintain, sling management and HEP. She ambulated 30 feet with min assist and significant dyspnea and desaturation to 81% on 2L NC. Pt tolerated R shoulder PROM , IR chest/ER 30 and unable to trial SROM d/t tremoring. Pt limited with dyspnea and desaturation with exertion, impaired balance, NWB/immobilization RUE and is performing significantly below baseline status. She lives with daughter who works 12-hour shifts. Recommend IPR.     Time Calculation:   Evaluation Complexity (OT)  Review Occupational Profile/Medical/Therapy History Complexity: brief/low complexity  Assessment, Occupational  Performance/Identification of Deficit Complexity: 1-3 performance deficits  Clinical Decision Making Complexity (OT): problem focused assessment/low complexity  Overall Complexity of Evaluation (OT): low complexity     Time Calculation- OT       Row Name 09/18/23 1722             Time Calculation- OT    OT Start Time 1559  -AR      OT Received On 09/18/23  -AR      OT Goal Re-Cert Due Date 09/28/23  -AR         Timed Charges    43147 - OT Therapeutic Exercise Minutes 18  -AR      82980 - OT Therapeutic Activity Minutes 11  -AR      87626 - OT Self Care/Mgmt Minutes 15  -AR         Untimed Charges    OT Eval/Re-eval Minutes 44  -AR         Total Minutes    Timed Charges Total Minutes 44  -AR      Untimed Charges Total Minutes 44  -AR       Total Minutes 88  -AR                User Key  (r) = Recorded By, (t) = Taken By, (c) = Cosigned By      Initials Name Provider Type    Patience Fu OT Occupational Therapist                  Therapy Charges for Today       Code Description Service Date Service Provider Modifiers Qty    65259064260 HC OT SELF CARE/MGMT/TRAIN EA 15 MIN 9/18/2023 Patience Perez, OT GO 1    76092568710 HC OT THERAPEUTIC ACT EA 15 MIN 9/18/2023 Patience Perez, OT GO 1    47871960302 HC OT THER PROC EA 15 MIN 9/18/2023 Patience Perez, OT GO 1    72180787165 HC OT EVAL LOW COMPLEXITY 3 9/18/2023 Patience Perez, OT GO 1    47604238522 HC OT THER SUPP EA 15 MIN 9/18/2023 Patience Perez, OT GO 3                 Patience Perez OT  9/18/2023

## 2023-09-18 NOTE — ANESTHESIA POSTPROCEDURE EVALUATION
Patient: Joanna Cross    Procedure Summary       Date: 09/18/23 Room / [facility]:  [facility]    Anesthesia Start: 1018 Anesthesia Stop: 1251    Procedure: REVERSE TOTAL SHOULDER  ARTHROPLASTY WITH BICEPS TENODESIS - RIGHT (Right: Shoulder) Diagnosis:       Rotator cuff tear arthropathy, right      Right bicipital tenosynovitis      (Rotator cuff tear arthropathy, right [[id number]])    Surgeons: Sammy Yo MD Provider: John Chandra MD    Anesthesia Type: general with block ASA Status: 3            Anesthesia Type: general with block    Vitals  Vitals Value Taken Time   /67 09/18/23 1251   Temp 97 °F (36.1 °C) 09/18/23 1251   Pulse 73 09/18/23 1251   Resp 14 09/18/23 1251   SpO2 100 % 09/18/23 1251           Post Anesthesia Care and Evaluation    Patient location during evaluation: PACU  Patient participation: complete - patient participated  Level of consciousness: sleepy but conscious  Pain score: 0  Pain management: adequate    Airway patency: patent  Anesthetic complications: No anesthetic complications  PONV Status: none  Cardiovascular status: hemodynamically stable and acceptable  Respiratory status: nonlabored ventilation, acceptable and nasal cannula  Hydration status: acceptable

## 2023-09-18 NOTE — ANESTHESIA PREPROCEDURE EVALUATION
Anesthesia Evaluation     Patient summary reviewed and Nursing notes reviewed   NPO Solid Status: > 8 hours  NPO Liquid Status: > 2 hours           Airway   Mallampati: II  TM distance: >3 FB  Neck ROM: full  No difficulty expected  Dental      Pulmonary     breath sounds clear to auscultation  (+) COPD severe,home oxygen, shortness of breath, sleep apnea on CPAP  Cardiovascular     ECG reviewed  Rhythm: regular  Rate: normal    (+) hypertension, CAD, dysrhythmias Atrial Fib, PVD, hyperlipidemia      Neuro/Psych  (+) Parkinson's disease, TIA, headaches, weakness, numbness, psychiatric history  GI/Hepatic/Renal/Endo    (+) morbid obesity, hiatal hernia, GERD, GI bleeding , renal disease, diabetes mellitus using insulin, thyroid problem hypothyroidism    Musculoskeletal     (+) back pain  Abdominal    Substance History      OB/GYN          Other   arthritis,     ROS/Med Hx Other: EF 55%                  Anesthesia Plan    ASA 3     general with block     (Discussed increased risk respiratory compromise post procedure.  She understands and consents.)  intravenous induction     Anesthetic plan, risks, benefits, and alternatives have been provided, discussed and informed consent has been obtained with: patient and child.  Pre-procedure education provided  Plan discussed with CRNA.    CODE STATUS:          DOCUMENTATION OF PAR STATUS:    1. Name of Medication & Dose: Terbinafine 250 mg     2. Name of Prescription Coverage Company & phone #: [employer]    3. Date Prior Auth Submitted: 10/10/2017    4. What information was given to obtain insurance decision? Cover my meds    5. Prior Auth Status? Pending    6. Patient Notified: N\A

## 2023-09-18 NOTE — H&P
"  Pre-Op H&P  Joanna Cross  [id number]  1950      Chief complaint: Right shoulder pain      Subjective:  Patient is a 72 y.o.female presents for scheduled surgery by Dr. Yo. She anticipates a REVERSE TOTAL SHOULDER  ARTHROPLASTY WITH BICEPS TENODESIS - RIGHT  today. Her shoulder has been painful with limited ROM for many years. She had previous right shoulder surgery in 2014. She reports RUE weakness. She tried cortisone injections and PT with short-term benefit.      Review of Systems:  Constitutional-- No fever, chills or sweats. + fatigue.  CV-- No chest pain, palpitation or syncope. +HTN, afib  Resp-- No cough, hemoptysis. +SOB, MILLI on cpap   Skin--No rashes or lesions      Allergies:   Allergies   Allergen Reactions    Toprol Xl [Metoprolol Tartrate] Shortness Of Breath     Extreme fatigue    Amlodipine Besylate Swelling     Lower extremity (ankles, feet) swelling    Codeine Unknown (See Comments)     Pt is unaware of what reaction she had    Entacapone Other (See Comments)     \"extreme weakness in legs - caused several falls, which stopped after discontinuing this medication\"    Epinephrine Other (See Comments)     6/4/16- had 3 shots to numb mouth to prepare teeth for crowns, the shots contained epi-  Caused pt to have chest discomfort- went to hospital in ambulance, discovered had a fib while there     Levemir [Insulin Detemir] Hives     Hives / rash around injection site    Penicillins Hives     Jitteriness     Xarelto [Rivaroxaban] GI Bleeding     hgb dropped to 5.2    Hydrocodone Unknown - High Severity    Prochlorperazine Unknown - High Severity    Toprol Xl [Metoprolol] Unknown - High Severity    Aricept [Donepezil Hcl] Nausea Only     Vivid dreams    Bactrim [Sulfamethoxazole-Trimethoprim] Nausea Only and Other (See Comments)     Headache -  Cant be taken with tikosyn - septra ds    Benztropine Mesylate Other (See Comments)     Uncontrollable body movements    Cimetidine Other (See " "Comments)     Cant be taken with tikosyn     Ciprofloxacin Diarrhea    Cogentin [Benztropine] Other (See Comments)     \"uncontrollable body movements\"    Compazine [Prochlorperazine Edisylate] Other (See Comments)     Dystonic reaction    Cortisone Other (See Comments)     MAKES BLOOD PRESSURE HIGH     Duraprep [Antiseptic Products, Misc.] Itching and Rash     RASH AND ITCHING    Erythromycin Base Other (See Comments)     Cant be taken with tikosyn - z pack and ketek     Flurandrenolone Other (See Comments)     Cant be taken with tikosyn     Include - levaquin, cipro, norloxacin     Haldol [Haloperidol Lactate] Other (See Comments)     Dystonic reaction    Hydralazine Other (See Comments)     Headache     Hydrochlorothiazide Other (See Comments)     Cant be taken with tikosyn -  Also hydrodiuril, microzide, hydro-par, oretic, esidrix, ezide or any medicine with hct or hctz in name     Hydrocodone-Acetaminophen Nausea And Vomiting and Dizziness     Headache, nausea     Levaquin [Levofloxacin] Other (See Comments)     Cannot take due to taking propafenone HCL- severe reaction with mixed.     Lisinopril Cough    Macrolides And Ketolides Other (See Comments)     antibx -   Cant be taken with tikosyn     Statins Myalgia     Leg pain- all statins     Tarka [Trandolapril-Verapamil Hcl Er] Other (See Comments)     Constipation     Verapamil Other (See Comments)     Cant be taken with tikosyn - calan, covera-hs, isoptin, verelan or tarka          Home Meds:  Medications Prior to Admission   Medication Sig Dispense Refill Last Dose    Accu-Chek Guide test strip Testing 3 times per day; E11.65 300 each 3     Accu-Chek Softclix Lancets lancets USE ONE LANCET TO TEST THREE TIMES A  each 1     albuterol (PROVENTIL) (2.5 MG/3ML) 0.083% nebulizer solution Take 2.5 mg by nebulization Every 4 (Four) Hours As Needed for Wheezing or Shortness of Air. 1 each 3     albuterol sulfate HFA (Ventolin HFA) 108 (90 Base) MCG/ACT inhaler " Inhale 1 puff Every 4 (Four) Hours As Needed for Wheezing or Shortness of Air. 8 g 5     amantadine (SYMMETREL) 100 MG capsule Take 1 capsule by mouth 2 (Two) Times a Day.       apixaban (Eliquis) 5 MG tablet tablet Take 1 tablet by mouth Every 12 (Twelve) Hours. 180 tablet 2     aspirin 81 MG EC tablet Take 1 tablet by mouth Daily.       B Complex-C (SUPER B COMPLEX PO) Take 1 tablet by mouth Daily.       bumetanide (BUMEX) 1 MG tablet 1 tab po daily as directed 90 tablet 3     Calcium Carbonate (CALTRATE 600 PO) Take 600 mg by mouth Daily.       carbidopa-levodopa (SINEMET)  MG per tablet Take  by mouth. 2 tablets at 0600, 1 tablet at 0900, 1200, 1500, 1800, 2100       Cholecalciferol 2000 units tablet Take 1 tablet by mouth 2 (Two) Times a Day.       citalopram (CeleXA) 20 MG tablet Take 1 tablet by mouth Daily. 90 tablet 3     clobetasol (TEMOVATE) 0.05 % cream Apply 1 application  topically to the appropriate area as directed 2 (Two) Times a Day As Needed (Lichens Sclerosis).       dofetilide (TIKOSYN) 500 MCG capsule TAKE 1 CAPSULE EVERY 12 HOURS (Patient taking differently: Take 1 capsule by mouth 2 (Two) Times a Day.) 180 capsule 3     Emollient (AQUAPHOR ADVANCED THERAPY EX) Apply  topically.       Enoxaparin Sodium (LOVENOX) 100 MG/ML solution prefilled syringe syringe Inject 1.1 mL under the skin into the appropriate area as directed Every 12 (Twelve) Hours. 3 mL 0     Glucosamine-Chondroit-Calcium (TRIPLE FLEX BONE & JOINT PO) Take 1 tablet by mouth Daily. Move free       hydrocortisone 2.5 % ointment        hydroxychloroquine (PLAQUENIL) 200 MG tablet Daily.       Insulin Glargine (Lantus SoloStar) 100 UNIT/ML injection pen Inject 12-14 Units under the skin into the appropriate area as directed Daily. 15 mL 1     Insulin Pen Needle (B-D UF III MINI PEN NEEDLES) 31G X 5 MM misc USE TO INJECT INSULIN DAILY 100 each 3     ipratropium (ATROVENT) 0.03 % nasal spray 2 sprays into the nostril(s) as  directed by provider 2 (Two) Times a Day As Needed (CONGESTION SINUS). 30 mL 11     Loratadine 10 MG capsule Take 1 capsule by mouth Daily.       metFORMIN (GLUCOPHAGE) 1000 MG tablet Take 1 tablet by mouth 2 (Two) Times a Day With Meals. 180 tablet 1     metOLazone (ZAROXOLYN) 2.5 MG tablet Take 1 tablet by mouth Daily. 90 tablet 1     Multiple Vitamins-Minerals (CENTRUM ULTRA WOMENS PO) Take 1 tablet by mouth Daily.       nitroglycerin (NITROLINGUAL) 0.4 MG/SPRAY spray Place 1 spray under the tongue Every 5 (Five) Minutes As Needed for Chest Pain. 1 each 6     nystatin (MYCOSTATIN) 065534 UNIT/GM ointment Apply 1 application topically to the appropriate area as directed 2 (Two) Times a Day. (Patient taking differently: Apply 1 application  topically to the appropriate area as directed As Needed.) 3 g 3     O2 (OXYGEN) Inhale 2 L/min Every Night. 2L all the time now       pantoprazole (Protonix) 40 MG EC tablet Take 1 tablet by mouth Daily. 90 tablet 1     pramipexole (MIRAPEX) 1.5 MG tablet Take 1 tablet by mouth Every Night. 90 tablet 0     ranolazine (RANEXA) 500 MG 12 hr tablet Take 1 tablet by mouth Every 12 (Twelve) Hours. 180 tablet 3     senna (SENOKOT) 8.6 MG tablet Take 1 tablet by mouth Daily.       spironolactone (ALDACTONE) 25 MG tablet Take 2 tablets by mouth Daily. 180 tablet 2     traMADol (ULTRAM) 50 MG tablet Take 1 tablet by mouth Every 6 (Six) Hours As Needed for Moderate Pain . (Patient taking differently: Take 1 tablet by mouth Every 8 (Eight) Hours As Needed for Moderate Pain.) 30 tablet 2     triamcinolone (KENALOG) 0.1 % cream 1 application  As Needed for Irritation or Rash.       Unithroid 175 MCG tablet Take 1 tablet by mouth Daily. 90 tablet 1     valsartan (DIOVAN) 160 MG tablet Take 1 tablet by mouth 2 (Two) Times a Day. 180 tablet 2     vitamin C (ASCORBIC ACID) 500 MG tablet Take 1 tablet by mouth Daily. Chewable tablet       Zinc 50 MG capsule Take 1 capsule by mouth Daily.             PMH:   Past Medical History:   Diagnosis Date    Anemia     Ankle problem     thinks back related causing pain     Atrial fibrillation     AVM (arteriovenous malformation)     Back pain     Chronic kidney disease     stage 3 per pt    CKD (chronic kidney disease)     Clotting disorder 2016    AVM - small intestine    COVID-19 vaccine series completed     Gastrocnemius muscle tear     left medial 91    Generalized osteoarthritis     GERD (gastroesophageal reflux disease)     Gestational diabetes     GIB (gastrointestinal bleeding) 2016    d/t xarelto     Headache     Hearing decreased, bilateral     HAS HEARING AID, NOT WEARING IT CURRENTLY    Hiatal hernia     History of shingles     History of transfusion 2016    no reaction recalled     Hypertension     Hypothyroidism     IBS (irritable bowel syndrome)     Klebsiella pneumonia     Lichen sclerosus     Lupus     subQ    Mouth problem     mouth guard used since pt bites tongue and lips excessively at night if not- with bipap     MRSA infection 2018    Obesity     On home oxygen therapy     2L of oxygen all the time due to current congestion     Osteoporosis     Parkinson's disease     Peripheral vascular disease     Pleurisy     Pneumonia     Puerperal sepsis with acute hypoxic respiratory failure     emergent intubation- 2016    Right leg pain     from back issues     Salivary gland stone     Sciatic nerve pain     Seborrheic dermatitis     Skin cancer     on back     Sleep apnea     CPAP AND HOME O2 2L/M    TIA (transient ischemic attack) 2014    no residual effects    Type 2 diabetes mellitus     UTI (urinary tract infection)     Vitamin D deficiency 09/08/2022    Wears glasses      PSH:    Past Surgical History:   Procedure Laterality Date    ABLATION OF DYSRHYTHMIC FOCUS  05/16/13    Laser Ablation - Rt Leg    BACK SURGERY      l4-l5 laminectomy     BICEPS TENDON REPAIR Right     shoulder    BREAST BIOPSY Left 05/2004    excisional, benign     BRONCHOSCOPY RIGID / FLEXIBLE      2016    BUNIONECTOMY Right     CARDIAC CATHETERIZATION      CARDIAC CATHETERIZATION N/A 02/01/2019    Procedure: Left Heart Cath;  Surgeon: Albertina Corona MD;  Location:  [facility];  Service: Cardiology    CHOLECYSTECTOMY      COLONOSCOPY  2016    COLONOSCOPY N/A 06/17/2021    Procedure: COLONOSCOPY WITH POLYPECTOMY;  Surgeon: Trenton Sosa MD;  Location:  [facility];  Service: Gastroenterology;  Laterality: N/A;    CORONARY STENT PLACEMENT      x1 stent    CYST REMOVAL      left ear, upper left back 2001    CYSTOSCOPY      x2  18 and 20 -   in 20 urethra dilation     DIAGNOSTIC LAPAROSCOPY  1981    ENDOSCOPIC FUNCTIONAL SINUS SURGERY (FESS)  2011    ENDOSCOPY  2016    ENTEROSCOPY VIA STOMA      with single ballon with fluoro     HAMMER TOE REPAIR Left     INCISION AND DRAINAGE OF WOUND  2018    back with wound infection     INSERT / REPLACE / REMOVE PACEMAKER  11/10/16    [facility]    JOINT REPLACEMENT      KNEE ARTHROSCOPY      LASER ABLATION      right leg 13    LIPOMA EXCISION  1999    left leg     LUMBAR LAMINECTOMY DISCECTOMY DECOMPRESSION N/A 07/06/2018    Procedure: LUMBAR LAMINECTOMY L4-5, HEMILAMIINECTOMY RIGHT L5-S1, FORAMINOTOMY L5-S1;  Surgeon: Lencho Gamino MD;  Location:  [facility];  Service: Neurosurgery    LUMBAR LAMINECTOMY DISCECTOMY DECOMPRESSION N/A 09/19/2018    Procedure: INCISION AND DRAINAGE BACK WITH WOUND EXPLORATION;  Surgeon: Ritchie García MD;  Location:  [facility];  Service: Neurosurgery    LUNG BIOPSY Left 2016    OTHER SURGICAL HISTORY      ct scan of chest and sinuses and lower back     OTHER SURGICAL HISTORY      various echos     OTHER SURGICAL HISTORY      electroencephalogram 16,   emg  ncv tests 2007    OTHER SURGICAL HISTORY      mra 2007  and various mri with xrays, nuclear medicine lung ventilation with perfusion test 2016 with pft     OTHER SURGICAL HISTORY      barium swallow  testing 15, 12, 12    OTHER SURGICAL HISTORY      vaginal ultrasound- 2012,   vas clementina lower extrem 2016, vas venous duplex lower extrem bilat 2019    OTHER SURGICAL HISTORY      wdge biopsy spring of lung     OTHER SURGICAL HISTORY      emergent intubation- hypoxic resp failure     PACEMAKER IMPLANTATION  11/2016    [facility]    REPLACEMENT TOTAL KNEE Bilateral     left knee 2011, right 2012 per dr acuna     REPLACEMENT TOTAL KNEE Bilateral     SHOULDER ARTHROSCOPY Bilateral     2003-left, 2004- right     SKIN BIOPSY      skin cancer back 2016    SKIN CANCER EXCISION      upper righ tback     MADHAV      TEETH EXTRACTION      x2    TOTAL SHOULDER ARTHROPLASTY W/ DISTAL CLAVICLE EXCISION Left 10/24/2022    Procedure: REVERSE TOTAL SHOULDER ARTHROPLASTY, BICEPS TENODESIS - LEFT;  Surgeon: Sammy Yo MD;  Location: [facility];  Service: Orthopedics;  Laterality: Left;    TUBAL ABDOMINAL LIGATION Bilateral 1980    WISDOM TOOTH EXTRACTION         Immunization History:  Influenza: No  Pneumococcal: UTD  Tetanus: UTD  Covid : x4    Social History:   Tobacco:   Social History     Tobacco Use   Smoking Status Never    Passive exposure: Past   Smokeless Tobacco Never   Tobacco Comments    Father and mother smoked for several years.      Alcohol:     Social History     Substance and Sexual Activity   Alcohol Use Never         Physical Exam:/53 (BP Location: Left arm, Patient Position: Lying)   Pulse 65   Temp 97.1 °F (36.2 °C) (Temporal)   Resp 18   LMP  (LMP Unknown) Comment: Mammogram- 1/28/20  SpO2 99%       General Appearance:    Alert, cooperative, no distress, appears stated age   Head:    Normocephalic, without obvious abnormality, atraumatic   Lungs:     Clear to auscultation bilaterally, respirations unlabored    Heart:   Regular rate and rhythm, S1 and S2 normal    Abdomen:    Soft without tenderness   Extremities:   Extremities normal, atraumatic, no cyanosis or edema   Skin:   Skin color, texture, turgor  normal, no rashes or lesions   Neurologic:   Grossly intact     Results Review:     LABS:  Lab Results   Component Value Date    WBC 6.64 09/11/2023    HGB 10.4 (L) 09/11/2023    HCT 32.7 (L) 09/11/2023    MCV 94.0 09/11/2023     09/11/2023    NEUTROABS 3.8 06/15/2023    GLUCOSE 103 (H) 09/11/2023    BUN 44 (H) 09/11/2023    CREATININE 1.36 (H) 09/11/2023    EGFRIFNONA 66 06/12/2019     09/11/2023    K 4.1 09/11/2023     09/11/2023    CO2 27.0 09/11/2023    MG 2.1 10/27/2022    PHOS 3.7 12/25/2018    CALCIUM 9.6 09/11/2023    ALBUMIN 4.5 06/15/2023    AST 18 06/15/2023    ALT 7 06/15/2023    BILITOT 0.3 06/15/2023       RADIOLOGY:  Imaging Results (Last 72 Hours)       ** No results found for the last 72 hours. **            I reviewed the patient's new clinical results.    Cancer Staging (if applicable)  Cancer Patient: __ yes __no __unknown; If yes, clinical stage T:__ N:__M:__, stage group or __N/A      Impression: Right shoulder pain      Plan: REVERSE TOTAL SHOULDER  ARTHROPLASTY WITH BICEPS TENODESIS - RIGHT       DEJA Barrios   9/18/2023   08:06 EDT

## 2023-09-18 NOTE — H&P
"Patient Name: Joanna Cross  MRN: [id number]  : 1950  DOS: 2023    Attending: Sammy Yo MD    Primary Care Provider: Reynaldo Fritz MD      Chief complaint: Right shoulder pain    Subjective   Patient is a pleasant 72 y.o. female presented for scheduled surgery by .    She underwent the following procedures:  1. Right reverse total shoulder arthroplasty.    2. Right biceps tenodesis.    3. Computer-assisted imageless navigation     Surgery was done under [place] and a block, she tolerated surgery well, was admitted for further management.     Seen in PACU, doing well with good pain control, no nausea, vomiting or shortness of breath.    Reviewed with pt her past medical history and medications.     She is hoping to go to [facility] after this hospital stay.       Allergies   Allergen Reactions    Toprol Xl [Metoprolol Tartrate] Shortness Of Breath     Extreme fatigue    Amlodipine Besylate Swelling     Lower extremity (ankles, feet) swelling    Codeine Unknown (See Comments)     Pt is unaware of what reaction she had    Entacapone Other (See Comments)     \"extreme weakness in legs - caused several falls, which stopped after discontinuing this medication\"    Epinephrine Other (See Comments)     16- had 3 shots to numb mouth to prepare teeth for crowns, the shots contained epi-  Caused pt to have chest discomfort- went to hospital in ambulance, discovered had a fib while there     Levemir [Insulin Detemir] Hives     Hives / rash around injection site    Penicillins Hives     Jitteriness     Xarelto [Rivaroxaban] GI Bleeding     hgb dropped to 5.2    Hydrocodone Unknown - High Severity    Prochlorperazine Unknown - High Severity    Toprol Xl [Metoprolol] Unknown - High Severity    Aricept [Donepezil Hcl] Nausea Only     Vivid dreams    Bactrim [Sulfamethoxazole-Trimethoprim] Nausea Only and Other (See Comments)     Headache -  Cant be taken with tikosyn - septra ds    Benztropine " "Mesylate Other (See Comments)     Uncontrollable body movements    Cimetidine Other (See Comments)     Cant be taken with tikosyn     Ciprofloxacin Diarrhea    Cogentin [Benztropine] Other (See Comments)     \"uncontrollable body movements\"    Compazine [Prochlorperazine Edisylate] Other (See Comments)     Dystonic reaction    Cortisone Other (See Comments)     MAKES BLOOD PRESSURE HIGH     Duraprep [Antiseptic Products, Misc.] Itching and Rash     RASH AND ITCHING    Erythromycin Base Other (See Comments)     Cant be taken with tikosyn - z pack and ketek     Flurandrenolone Other (See Comments)     Cant be taken with tikosyn     Include - levaquin, cipro, norloxacin     Haldol [Haloperidol Lactate] Other (See Comments)     Dystonic reaction    Hydralazine Other (See Comments)     Headache     Hydrochlorothiazide Other (See Comments)     Cant be taken with tikosyn -  Also hydrodiuril, microzide, hydro-par, oretic, esidrix, ezide or any medicine with hct or hctz in name     Hydrocodone-Acetaminophen Nausea And Vomiting and Dizziness     Headache, nausea     Levaquin [Levofloxacin] Other (See Comments)     Cannot take due to taking propafenone HCL- severe reaction with mixed.     Lisinopril Cough    Macrolides And Ketolides Other (See Comments)     antibx -   Cant be taken with tikosyn     Statins Myalgia     Leg pain- all statins     Tarka [Trandolapril-Verapamil Hcl Er] Other (See Comments)     Constipation     Verapamil Other (See Comments)     Cant be taken with tikosyn - calan, covera-hs, isoptin, verelan or tarka         Medications Prior to Admission   Medication Sig Dispense Refill Last Dose    albuterol (PROVENTIL) (2.5 MG/3ML) 0.083% nebulizer solution Take 2.5 mg by nebulization Every 4 (Four) Hours As Needed for Wheezing or Shortness of Air. 1 each 3 Past Month    amantadine (SYMMETREL) 100 MG capsule Take 1 capsule by mouth 2 (Two) Times a Day.   9/18/2023 at 0548    aspirin 81 MG EC tablet Take 1 tablet " by mouth Daily.   9/17/2023    B Complex-C (SUPER B COMPLEX PO) Take 1 tablet by mouth Daily.   9/17/2023 at 1715    bumetanide (BUMEX) 1 MG tablet 1 tab po daily as directed 90 tablet 3 9/17/2023 at 1115    Calcium Carbonate (CALTRATE 600 PO) Take 600 mg by mouth Daily.   9/17/2023 at 1715    carbidopa-levodopa (SINEMET)  MG per tablet Take  by mouth. 2 tablets at 0600, 1 tablet at 0900, 1200, 1500, 1800, 2100   9/18/2023 at 0548    Cholecalciferol 2000 units tablet Take 1 tablet by mouth 2 (Two) Times a Day.   9/17/2023 at 2100    citalopram (CeleXA) 20 MG tablet Take 1 tablet by mouth Daily. 90 tablet 3 9/17/2023 at 2100    dofetilide (TIKOSYN) 500 MCG capsule TAKE 1 CAPSULE EVERY 12 HOURS (Patient taking differently: Take 1 capsule by mouth 2 (Two) Times a Day.) 180 capsule 3 9/17/2023 at 2230    Enoxaparin Sodium (LOVENOX) 100 MG/ML solution prefilled syringe syringe Inject 1.1 mL under the skin into the appropriate area as directed Every 12 (Twelve) Hours. 3 mL 0 9/17/2023 at 1230    Glucosamine-Chondroit-Calcium (TRIPLE FLEX BONE & JOINT PO) Take 1 tablet by mouth Daily. Move free   9/17/2023 at 2100    hydroxychloroquine (PLAQUENIL) 200 MG tablet Daily.   9/17/2023 at 2100    Insulin Glargine (Lantus SoloStar) 100 UNIT/ML injection pen Inject 12-14 Units under the skin into the appropriate area as directed Daily. 15 mL 1 9/17/2023 at 2100    ipratropium (ATROVENT) 0.03 % nasal spray 2 sprays into the nostril(s) as directed by provider 2 (Two) Times a Day As Needed (CONGESTION SINUS). 30 mL 11 9/17/2023 at 1230    Loratadine 10 MG capsule Take 1 capsule by mouth Daily.   9/17/2023 at 2100    metFORMIN (GLUCOPHAGE) 1000 MG tablet Take 1 tablet by mouth 2 (Two) Times a Day With Meals. 180 tablet 1 9/17/2023 at 2100    metOLazone (ZAROXOLYN) 2.5 MG tablet Take 1 tablet by mouth Daily. 90 tablet 1 Past Week    Multiple Vitamins-Minerals (CENTRUM ULTRA WOMENS PO) Take 1 tablet by mouth Daily.   9/17/2023 at  1715    nystatin (MYCOSTATIN) 393539 UNIT/GM ointment Apply 1 application topically to the appropriate area as directed 2 (Two) Times a Day. (Patient taking differently: Apply 1 application  topically to the appropriate area as directed As Needed.) 3 g 3 9/17/2023    O2 (OXYGEN) Inhale 2 L/min Every Night. 2L all the time now   9/18/2023    pantoprazole (Protonix) 40 MG EC tablet Take 1 tablet by mouth Daily. 90 tablet 1 9/17/2023 at 0548    pramipexole (MIRAPEX) 1.5 MG tablet Take 1 tablet by mouth Every Night. 90 tablet 0 9/17/2023 at 2100    senna (SENOKOT) 8.6 MG tablet Take 1 tablet by mouth Daily.   9/17/2023 at 2100    spironolactone (ALDACTONE) 25 MG tablet Take 2 tablets by mouth Daily. 180 tablet 2 9/17/2023 at 1115    Unithroid 175 MCG tablet Take 1 tablet by mouth Daily. 90 tablet 1 9/18/2023 at 0548    valsartan (DIOVAN) 160 MG tablet Take 1 tablet by mouth 2 (Two) Times a Day. 180 tablet 2 9/17/2023 at 2100    vitamin C (ASCORBIC ACID) 500 MG tablet Take 1 tablet by mouth Daily. Chewable tablet   9/17/2023 at 1715    Zinc 50 MG capsule Take 1 capsule by mouth Daily.   9/17/2023 at 1715    Accu-Chek Guide test strip Testing 3 times per day; E11.65 300 each 3     Accu-Chek Softclix Lancets lancets USE ONE LANCET TO TEST THREE TIMES A  each 1     albuterol sulfate HFA (Ventolin HFA) 108 (90 Base) MCG/ACT inhaler Inhale 1 puff Every 4 (Four) Hours As Needed for Wheezing or Shortness of Air. 8 g 5 More than a month    apixaban (Eliquis) 5 MG tablet tablet Take 1 tablet by mouth Every 12 (Twelve) Hours. 180 tablet 2 9/15/2023    clobetasol (TEMOVATE) 0.05 % cream Apply 1 application  topically to the appropriate area as directed 2 (Two) Times a Day As Needed (Lichens Sclerosis).   More than a month    Emollient (AQUAPHOR ADVANCED THERAPY EX) Apply  topically.   More than a month    hydrocortisone 2.5 % ointment        nitroglycerin (NITROLINGUAL) 0.4 MG/SPRAY spray Place 1 spray under the tongue Every  5 (Five) Minutes As Needed for Chest Pain. 1 each 6 More than a month    ranolazine (RANEXA) 500 MG 12 hr tablet Take 1 tablet by mouth Every 12 (Twelve) Hours. 180 tablet 3  at 0548    traMADol (ULTRAM) 50 MG tablet Take 1 tablet by mouth Every 6 (Six) Hours As Needed for Moderate Pain . (Patient taking differently: Take 1 tablet by mouth Every 8 (Eight) Hours As Needed for Moderate Pain.) 30 tablet 2 More than a month    triamcinolone (KENALOG) 0.1 % cream 1 application  As Needed for Irritation or Rash.   More than a month          Past Medical History:   Diagnosis Date    Anemia     Ankle problem     thinks back related causing pain     Atrial fibrillation     AVM (arteriovenous malformation)     Back pain     Chronic kidney disease     stage 3 per pt    CKD (chronic kidney disease)     Clotting disorder 2016    AVM - small intestine    COVID-19 vaccine series completed     Gastrocnemius muscle tear     left medial 91    Generalized osteoarthritis     GERD (gastroesophageal reflux disease)     Gestational diabetes     GIB (gastrointestinal bleeding) 2016    d/t xarelto     Headache     Hearing decreased, bilateral     HAS HEARING AID, NOT WEARING IT CURRENTLY    Hiatal hernia     History of shingles     History of transfusion 2016    no reaction recalled     Hypertension     Hypothyroidism     IBS (irritable bowel syndrome)     Klebsiella pneumonia     Lichen sclerosus     Lupus     subQ    Mouth problem     mouth guard used since pt bites tongue and lips excessively at night if not- with bipap     MRSA infection 2018    Obesity     On home oxygen therapy     2L of oxygen all the time due to current congestion     Osteoporosis     Parkinson's disease     Peripheral vascular disease     Pleurisy     Pneumonia     Puerperal sepsis with acute hypoxic respiratory failure     emergent intubation- 2016    Right leg pain     from back issues     Salivary gland stone     Sciatic nerve pain     Seborrheic dermatitis      Skin cancer     on back     Sleep apnea     CPAP AND HOME O2 2L/M    TIA (transient ischemic attack) 2014    no residual effects    Type 2 diabetes mellitus     UTI (urinary tract infection)     Vitamin D deficiency 09/08/2022    Wears glasses      Past Surgical History:   Procedure Laterality Date    ABLATION OF DYSRHYTHMIC FOCUS  05/16/13    Laser Ablation - Rt Leg    BACK SURGERY      l4-l5 laminectomy     BICEPS TENDON REPAIR Right     shoulder    BREAST BIOPSY Left 05/2004    excisional, benign    BRONCHOSCOPY RIGID / FLEXIBLE      2016    BUNIONECTOMY Right     CARDIAC CATHETERIZATION      CARDIAC CATHETERIZATION N/A 02/01/2019    Procedure: Left Heart Cath;  Surgeon: Albertina Corona MD;  Location:  [facility] CATH INVASIVE LOCATION;  Service: Cardiology    CHOLECYSTECTOMY      COLONOSCOPY  2016    COLONOSCOPY N/A 06/17/2021    Procedure: COLONOSCOPY WITH POLYPECTOMY;  Surgeon: Trenton Sosa MD;  Location:  [facility];  Service: Gastroenterology;  Laterality: N/A;    CORONARY STENT PLACEMENT      x1 stent    CYST REMOVAL      left ear, upper left back 2001    CYSTOSCOPY      x2  18 and 20 -   in 20 urethra dilation     DIAGNOSTIC LAPAROSCOPY  1981    ENDOSCOPIC FUNCTIONAL SINUS SURGERY (FESS)  2011    ENDOSCOPY  2016    ENTEROSCOPY VIA STOMA      with single ballon with fluoro     HAMMER TOE REPAIR Left     INCISION AND DRAINAGE OF WOUND  2018    back with wound infection     INSERT / REPLACE / REMOVE PACEMAKER  11/10/16    [facility]    JOINT REPLACEMENT      KNEE ARTHROSCOPY      LASER ABLATION      right leg 13    LIPOMA EXCISION  1999    left leg     LUMBAR LAMINECTOMY DISCECTOMY DECOMPRESSION N/A 07/06/2018    Procedure: LUMBAR LAMINECTOMY L4-5, HEMILAMIINECTOMY RIGHT L5-S1, FORAMINOTOMY L5-S1;  Surgeon: Lencho Gamino MD;  Location:  [facility];  Service: Neurosurgery    LUMBAR LAMINECTOMY DISCECTOMY DECOMPRESSION N/A 09/19/2018    Procedure: INCISION AND DRAINAGE BACK  WITH WOUND EXPLORATION;  Surgeon: Ritchie García MD;  Location:  [facility];  Service: Neurosurgery    LUNG BIOPSY Left 2016    OTHER SURGICAL HISTORY      ct scan of chest and sinuses and lower back     OTHER SURGICAL HISTORY      various echos     OTHER SURGICAL HISTORY      electroencephalogram 16,   emg  ncv tests     OTHER SURGICAL HISTORY      mra   and various mri with xrays, nuclear medicine lung ventilation with perfusion test 2016 with pft     OTHER SURGICAL HISTORY      barium swallow testing 15, 12, 12    OTHER SURGICAL HISTORY      vaginal ultrasound- ,   vas clementina lower extrem , vas venous duplex lower extrem bilat     OTHER SURGICAL HISTORY      wdge biopsy spring of lung     OTHER SURGICAL HISTORY      emergent intubation- hypoxic resp failure     PACEMAKER IMPLANTATION  2016    sss    REPLACEMENT TOTAL KNEE Bilateral     left knee , right 2012 per dr acuna     REPLACEMENT TOTAL KNEE Bilateral     SHOULDER ARTHROSCOPY Bilateral     -left, - right     SKIN BIOPSY      skin cancer back 2016    SKIN CANCER EXCISION      upper righ tback     MADHAV      TEETH EXTRACTION      x2    TOTAL SHOULDER ARTHROPLASTY W/ DISTAL CLAVICLE EXCISION Left 10/24/2022    Procedure: REVERSE TOTAL SHOULDER ARTHROPLASTY, BICEPS TENODESIS - LEFT;  Surgeon: Sammy Yo MD;  Location:  [facility];  Service: Orthopedics;  Laterality: Left;    TUBAL ABDOMINAL LIGATION Bilateral     WISDOM TOOTH EXTRACTION       Family History   Problem Relation Age of Onset    Kidney disease Mother     Coronary artery disease Mother     Hypertension Mother             Heart disease Mother             Hyperlipidemia Mother             Coronary artery disease Father     Hypertension Father             Hypothyroidism Father     Cancer Father         Oral cancer    Heart disease Father             Coronary artery disease Brother     Hypertension Brother     Heart disease  Brother         Multiple stents, by-pass surgery    Testicular cancer Brother     Kidney cancer Maternal Uncle     Testicular cancer Maternal Uncle     Colon polyps Neg Hx     Colon cancer Neg Hx      Social History     Tobacco Use    Smoking status: Never     Passive exposure: Past    Smokeless tobacco: Never    Tobacco comments:     Father and mother smoked for several years.   Vaping Use    Vaping Use: Never used   Substance Use Topics    Alcohol use: Never    Drug use: No   .    Review of Systems  Pertinent items are noted in HPI    Vital Signs  /70 (BP Location: Left arm, Patient Position: Lying)   Pulse 77   Temp 97.4 °F (36.3 °C) (Temporal)   Resp 18   LMP  (LMP Unknown) Comment: Mammogram- 1/28/20  SpO2 99%     Physical Exam:    General Appearance:    Alert, cooperative, in no acute distress   Head:    Normocephalic, without obvious abnormality, atraumatic   Eyes:            Lids and lashes normal, conjunctivae and sclerae normal, no   icterus, no pallor, corneas clear,    Ears:    Ears appear intact with no abnormalities noted   Throat:   No oral lesions, no thrush, oral mucosa moist   Neck:   No adenopathy, supple, trachea midline, no thyromegaly         Lungs:     Clear to auscultation,respirations regular, even and   unlabored. Decreased breath sounds.     Heart:    Regular rhythm and normal rate, normal S1 and S2, no      murmur    Abdomen:     Normal bowel sounds, no masses, no organomegaly, soft        non-tender, non-distended, no guarding, no rebound                 tenderness   Genitalia:    Deferred   Extremities:   RUE in a sling, CDI aquacel dressing shoulder. Interscalene nerve block cath present.  Distal pulses, cap refill, movements of fingers, wrist, intact.     Pulses:   Pulses palpable and equal bilaterally   Skin:   No bleeding, bruising or rash   Neurologic:   Cranial nerves 2 - 12 grossly intact      I reviewed the patient's new clinical results.             Invalid  input(s): NEUTOPHILPCT,  EOSPCT        Invalid input(s): LABALBU, PROT  Lab Results   Component Value Date    HGBA1C 5.80 (H) 09/11/2023      Latest Reference Range & Units 09/11/23 14:45   Sodium 136 - 145 mmol/L 141   Potassium 3.5 - 5.2 mmol/L 4.1   Chloride 98 - 107 mmol/L 102   CO2 22.0 - 29.0 mmol/L 27.0   Anion Gap 5.0 - 15.0 mmol/L 12.0   BUN 8 - 23 mg/dL 44 (H)   Creatinine 0.57 - 1.00 mg/dL 1.36 (H)   BUN/Creatinine Ratio 7.0 - 25.0  32.4 (H)   eGFR >60.0 mL/min/1.73 41.0 (L)   Glucose 65 - 99 mg/dL 103 (H)   Calcium 8.6 - 10.5 mg/dL 9.6   (H): Data is abnormally high  (L): Data is abnormally low     Latest Reference Range & Units 09/11/23 14:45   WBC 3.40 - 10.80 10*3/mm3 6.64   RBC 3.77 - 5.28 10*6/mm3 3.48 (L)   Hemoglobin 12.0 - 15.9 g/dL 10.4 (L)   Hematocrit 34.0 - 46.6 % 32.7 (L)   Platelets 140 - 450 10*3/mm3 160   RDW 12.3 - 15.4 % 13.6   MCV 79.0 - 97.0 fL 94.0   MCH 26.6 - 33.0 pg 29.9   MCHC 31.5 - 35.7 g/dL 31.8   MPV 6.0 - 12.0 fL 9.7   RDW-SD 37.0 - 54.0 fl 46.2   (L): Data is abnormally low  Assessment and Plan:       Status post reverse arthroplasty of right shoulder    Hypertension, essential    GERD (gastroesophageal reflux disease)    Hypothyroidism    Parkinson's disease    MILLI treated with BiPAP - Patient reports compliance.     Atrial fibrillation    Cardiac pacemaker in situ    Chronic kidney disease, stage 3a          Plan     1. PT/OT. NWB, left UE, ROM hand, wrist, elbow.  2. Pain control-prns, interscalene nerve block cath with ropivacaine infusion.           3. IS-encourage  4. DVT proph- Mech/ mobilize.  5. Bowel regimen  6. Resume home medications as appropriate  7. Monitor post-op labs  8. DC planning, likely inpatient rehab will be required   consultation.    -DM type 2  Hgb A1C 5.8  Hold OHA as appropriate  FSBG AC/HS, ( q 6 when NPO), along with correction humalog.  Long acting insulin        - Hypertension:  Resume home medications as appropriate, formulary  substitution when indicated.  Holding parameters.  Prn medications for elevated blood pressure.     -Parkinson's:  Resume home regimen, Sinemet.     -MILLI:  Continue CPAP.   Monitor O2 sats.     -CKD:  Monitor renal function, avoid nephrotoxic agents.  Maintain adequate volume status..     -Atrial fibrillation  Hx:   Resume anticoagulation postop day 1.  Resume antiarrhythmics, Tikosyn.    -MILLI:  Continue CPAP.   Monitor O2 sats.      Dragon disclaimer:  Part of this encounter note is an electronic transcription/translation of spoken language to printed text. The electronic translation of spoken language may permit erroneous, or at times, nonsensical words or phrases to be inadvertently transcribed; Although I have reviewed the note for such errors, some may still exist.    Angelica Pattne MD  09/18/23  15:28 EDT

## 2023-09-18 NOTE — OP NOTE
DATE OF OPERATION: 09/18/23  PREOPERATIVE DIAGNOSIS: Right shoulder rotator cuff arthropathy.    POSTOPERATIVE DIAGNOSES:  1. Right shoulder rotator cuff arthropathy  2. Biceps tenosynovitis.    PROCEDURES PERFORMED:  1. Right reverse total shoulder arthroplasty.    2. Right biceps tenodesis.    3. Computer-assisted imageless navigation  SURGEON: Sammy Yo MD  ASSISTANTS:  1. Radha Walker DO, PGY-6 Sports Fellow  2. Ludwig Guerra MD, PGY-5    ANESTHESIA: General plus block.   SURGICAL APPROACH: Deltopectoral      SURGICAL TECHNIQUE: Tenotomy        ESTIMATED BLOOD LOSS:100mL.    COMPLICATIONS: None.    DISPOSITION: Recovery room in stable condition.    IMPLANTS: Ex[employer] Equinoxe reverse total shoulder system, 6 mm preserve stem press-fit, 0 metal liner tray, 36 x 0 polyethylene tray, right small superior posterior augment baseplate with 4 screws with locking caps, and a 36mm glenosphere.    INDICATIONS: This is a 72-year-old female with right shoulder pain and limited function and motion secondary to rotator cuff arthropathy. They have failed conservative treatment and after a discussion of risks, benefits, and alternatives, wished to proceed with shoulder arthroplasty.  DESCRIPTION OF PROCEDURE: On the day of surgery, the patient identified the right shoulder as the correct operative extremity. This was initialed by the surgeon with the patients's acknowledgment. The patient underwent placement of an interscalene block and was taken to the operating room and placed in the supine position. Upon induction of adequate anesthesia, the patient was brought up to the beach chair position and the shoulder and upper extremity were prepped and draped in the usual sterile fashion. Timeout confirmed the correct patient and operative extremity as well as that antibiotics were on board. A standard deltopectoral approach to the shoulder was carried out. It was carried sharply through the skin and subcutaneous tissue. Medial  and lateral flaps were developed over the deltopectoral fascia. The cephalic vein was identified and mobilized laterally with the deltoid. The subdeltoid and subpectoral spaces were mobilized and a blunt retractor was placed deep to this. The clavipectoral fascia was opened on the lateral edge of the conjoined tendon and the retractor was moved deep to this. The leading edge of the pectoralis was released exposing the long head of the biceps. This was tenosynovitic. It was tenodesed to the pectoralis and released proximal to this. The 3 sisters were identified and coagulated. A subscapularis tenotomy was performed 1 cm medial to the lesser tuberosity and rotator interval was released to the glenoid exposing the humeral head. The inferior capsule was released directly off the humerus to allow greater than 90° of external rotation. The anatomic neck was exposed and the humeral head osteotomy was performed in approximately 25° of retroversion. The remainder of the osteophytes were removed. The canal was then entered, reamed, and broached. The final stem impacted in in approximately 25° of retroversion. A head protector was placed. The humerus was subluxed posteriorly. The glenoid exposed. Circumferential labral excision and capsular release were performed. A 270° mobilization of the subscapularis was carried out as well.  The coracoid and coracoid base were exposed for CT-guided navigation. The coracoid block was applied and fixed with 2 screws. We then registered the glenoid using the probe and CT-guided navigation for calibration. Using the live navigation system, a centering hole was drilled, a guidewire placed, and the glenoid was reamed to the appropriate depth, version, and inclination according to the preoperative plan. The large central hole for the baseplate was drilled under live navigation and the cage glenoid baseplate impacted in, with excellent purchase to press-fit. A small bone graft from the humeral  head was positioned superiorly. Screws were then placed also under live navigation in the inferior, superior, anteroinferior, and posteroinferior locations.  Locking caps were placed. The glenosphere was then inserted and locked into place with a set screw.  The humerus was carefully subluxed back anteriorly. A liner tray and polyethylene were placed and trialing was carried out. The appropriate final sizes were chosen and locked into place.  The shoulder was then reduced.  This allowed nearly full passive range of motion with no instability. The joint was copiously irrigated with orthopedic irrigation mixed with Betadine after the final implants were assembled and locked into place. The subscap was not repairable. Passive range range of motion will be full elevation but external rotation will be limited to 30° in the perioperative period. The deltopectoral interval was approximated with 0 Vicryl, the subcutaneous tissue with 2-0 Vicryl, and the skin with Monocryl and Dermabond. A sterile dressing was placed. Anesthesia was reversed and the patient was taken to the recovery room in stable condition. All instrument, needle, and sponge counts were correct.      Sammy Yo MD*

## 2023-09-18 NOTE — ANESTHESIA PROCEDURE NOTES
IS Catheter      Patient reassessed immediately prior to procedure    Patient location during procedure: pre-op  Reason for block: at surgeon's request and post-op pain management  Performed by  CRNA/CAA: Andrew Snyder CRNA  Assisted by: Daphney Vitale RN  Preanesthetic Checklist  Completed: patient identified, IV checked, site marked, risks and benefits discussed, surgical consent, monitors and equipment checked, pre-op evaluation and timeout performed  Prep:  Pt Position: left lateral decubitus  Sterile barriers:cap, gloves, mask and washed/disinfected hands  Prep: ChloraPrep  Patient monitoring: blood pressure monitoring, continuous pulse oximetry and EKG  Procedure    Sedation: yes  Performed under: local infiltration  Guidance:ultrasound guided    ULTRASOUND INTERPRETATION.  Using ultrasound guidance a 20 G gauge needle was placed in close proximity to the nerve, at which point, under ultrasound guidance anesthetic was injected in the area of the nerve and spread of the anesthesia was seen on ultrasound in close proximity thereto.  There were no abnormalities seen on ultrasound; a digital image was taken; and the patient tolerated the procedure with no complications. Images:still images obtained, printed/placed on chart    Laterality:right  Block Type:interscalene  Injection Technique:catheter  Needle Type:Tuohy and echogenic  Needle Gauge:18 G  Resistance on Injection: none  Catheter Size:20 G (20g)  Cath Depth at skin: 8 cm    Medications Used: bupivacaine PF (MARCAINE) 0.25 % injection - Injection   5 mL - 9/18/2023 8:58:00 AM      Medications  Preservative Free Saline:5ml    Post Assessment  Injection Assessment: negative aspiration for heme, no paresthesia on injection and incremental injection  Patient Tolerance:comfortable throughout block  Complications:no  Additional Notes  CATHETER  A high-frequency linear transducer, with sterile cover, was placed in the supraclavicular fossa to identify the  "subclavian artery and trunks and divisions of the brachial plexus. The transducer was then moved in a cephalad orientation with a slight rotation to continue visualization of the brachial plexus from the trunks and divisions, on to the C5-C7 roots. The insertion site was prepped and draped in sterile fashion. Skin and cutaneous tissue was infiltrated with 2-5 ml of 1% Lidocaine. Using ultrasound-guidance, an 18-gauge Contiplex Ultra 360 Touhy needle was advanced in plane from lateral to medial. Preservative-free normal saline was utilized for hydro-dissection of tissue, advancement of Touhy, and to confirm final needle placement at the fascial plane between the middle scalene muscle and sheath of the brachial plexus (C5-C7). A 20-gauge Contiplex Echo catheter was placed through the needle and advance out the tip of the Touhy 3-5 cm with the \"Maldonado Flip\". The Touhy needle was then removed, and final catheter position verified lateral to the brachial plexus with local anesthetic (LA) and ultrasound visualization. The catheter was secured in the usual fashion with skin glue, benzoin, steri-strips, CHG tegaderm and label noting \"Nerve Block Catheter\". Jerk tape applied at yellow connector and catheter connection. All LA was injected in increments of 3-5 ml after catheter secured. Aspiration every 5 ml to prevent intravascular injection. Injection was completed with negative aspiration of blood and negative intravascular injection. Injection pressures were normal with minimal resistance.           "

## 2023-09-18 NOTE — PLAN OF CARE
Goal Outcome Evaluation:  Plan of Care Reviewed With: patient, daughter           Outcome Evaluation: OT educated pt and family on R shoulder precautions, ADL retraining to maintain, sling management and HEP. She ambulated 30 feet with min assist and significant dyspnea and desaturation to 81% on 2L NC. Pt tolerated R shoulder PROM , IR chest/ER 30 and unable to trial SROM d/t tremoring. Pt limited with dyspnea and desaturation with exertion, impaired balance, NWB/immobilization RUE and is performing significantly below baseline status. She lives with daughter who works 12-hour shifts. Recommend IPR.      Anticipated Discharge Disposition (OT): inpatient rehabilitation facility

## 2023-09-18 NOTE — ANESTHESIA PROCEDURE NOTES
Airway  Urgency: elective    Date/Time: 9/18/2023 10:23 AM  Airway not difficult    General Information and Staff    Patient location during procedure: OR  SRNA: Tiff Carson SRNA  Indications and Patient Condition  Indications for airway management: airway protection    Preoxygenated: yes  MILS not maintained throughout  Mask difficulty assessment: 1 - vent by mask    Final Airway Details  Final airway type: endotracheal airway      Successful airway: ETT  Cuffed: yes   Successful intubation technique: direct laryngoscopy  Facilitating devices/methods: intubating stylet  Endotracheal tube insertion site: oral  Blade: Mason  Blade size: 3  ETT size (mm): 7.0  Cormack-Lehane Classification: grade I - full view of glottis  Placement verified by: chest auscultation and capnometry   Cuff volume (mL): 7  Measured from: lips  ETT/EBT  to lips (cm): 22  Number of attempts at approach: 1  Assessment: lips, teeth, and gum same as pre-op and atraumatic intubation    Additional Comments  Negative epigastric sounds, Breath sound equal bilaterally with symmetric chest rise and fall. Mason used d/t body habitus and shorter handle needed

## 2023-09-18 NOTE — ANESTHESIA PROCEDURE NOTES
"PECs 1&2      Patient reassessed immediately prior to procedure    Patient location during procedure: OR  Reason for block: at surgeon's request and post-op pain management  Performed by  CRNA/CAA: Andrew Snyder CRNA  Assisted by: Daphney Vitale RN  Preanesthetic Checklist  Completed: patient identified, IV checked, site marked, risks and benefits discussed, surgical consent, monitors and equipment checked, pre-op evaluation and timeout performed  Prep:  Pt Position: supine  Sterile barriers:cap, gloves, mask and washed/disinfected hands  Prep: ChloraPrep  Patient monitoring: blood pressure monitoring, continuous pulse oximetry and EKG  Procedure  Performed under: general  Guidance:ultrasound guided and landmark technique  Images:still images obtained, printed/placed on chart    Laterality:right  Block Type:PECS I and PECS II  Injection Technique:single-shot  Needle Type:short-bevel  Needle Gauge:20 G  Resistance on Injection: none    Medications Used: bupivacaine PF (MARCAINE) 0.25 % injection - Injection   30 mL - 9/18/2023 9:10:00 AM      Medications  Preservative Free Saline:5ml  Comment:      Post Assessment  Injection Assessment: negative aspiration for heme, incremental injection and no paresthesia on injection  Patient Tolerance:comfortable throughout block  Complications:no  Additional Notes  Interpectoral-Pectoserratus Plane   A high-frequency linear transducer, with sterile cover, was placed medial to the coracoid process in the paramedian sagittal plane. The transducer was moved caudally to the 4th rib and rotated slightly to allow an in-plane needle trajectory from medial to lateral. Pectoralis Major Muscle (PMM), Pectoralis Minor Muscle (PmM), Thoracoacromial Artery, Ribs, and Pleura were identified under ultrasound. The insertion site was prepped in sterile fashion and then localized with 2-5 ml of 1% Lidocaine. Using ultrasound-guidance, a 20-gauge B-Jordan 4\" Ultraplex 360 non-stimulating " echogenic needle was advanced in plane until the tip of the needle was in the fascial plane between the PMM and PmM, lateral to the Thoracoacromial Artery. 1-3ml of preservative free normal saline was used to hydro-dissect the fascial planes. After the fascial plane was verified, 10ml local anesthetic (LA) was injected for Interpectoral fascial plane block. The needle was continued along the same path to the level of the 4th rib below PmM.  Initially preservative free normal saline was used to confirm needle position and then 20 ml of LA was injected for Pectoserratus fascial plane block. Aspiration every 5 ml to prevent intravascular injection. Injection was completed with negative aspiration of blood and negative intravascular injection. Injection pressures were normal with minimal resistance.

## 2023-09-19 LAB
ANION GAP SERPL CALCULATED.3IONS-SCNC: 9 MMOL/L (ref 5–15)
BASOPHILS # BLD AUTO: 0.03 10*3/MM3 (ref 0–0.2)
BASOPHILS NFR BLD AUTO: 0.4 % (ref 0–1.5)
BUN SERPL-MCNC: 19 MG/DL (ref 8–23)
BUN/CREAT SERPL: 21.3 (ref 7–25)
CALCIUM SPEC-SCNC: 8.4 MG/DL (ref 8.6–10.5)
CHLORIDE SERPL-SCNC: 109 MMOL/L (ref 98–107)
CO2 SERPL-SCNC: 22 MMOL/L (ref 22–29)
CREAT SERPL-MCNC: 0.89 MG/DL (ref 0.57–1)
DEPRECATED RDW RBC AUTO: 47.8 FL (ref 37–54)
EGFRCR SERPLBLD CKD-EPI 2021: 68.1 ML/MIN/1.73
EOSINOPHIL # BLD AUTO: 0.19 10*3/MM3 (ref 0–0.4)
EOSINOPHIL NFR BLD AUTO: 2.5 % (ref 0.3–6.2)
ERYTHROCYTE [DISTWIDTH] IN BLOOD BY AUTOMATED COUNT: 14.2 % (ref 12.3–15.4)
GLUCOSE BLDC GLUCOMTR-MCNC: 145 MG/DL (ref 70–130)
GLUCOSE BLDC GLUCOMTR-MCNC: 222 MG/DL (ref 70–130)
GLUCOSE BLDC GLUCOMTR-MCNC: 228 MG/DL (ref 70–130)
GLUCOSE BLDC GLUCOMTR-MCNC: 96 MG/DL (ref 70–130)
GLUCOSE SERPL-MCNC: 84 MG/DL (ref 65–99)
HCT VFR BLD AUTO: 28.2 % (ref 34–46.6)
HGB BLD-MCNC: 9.1 G/DL (ref 12–15.9)
IMM GRANULOCYTES # BLD AUTO: 0.06 10*3/MM3 (ref 0–0.05)
IMM GRANULOCYTES NFR BLD AUTO: 0.8 % (ref 0–0.5)
LYMPHOCYTES # BLD AUTO: 0.49 10*3/MM3 (ref 0.7–3.1)
LYMPHOCYTES NFR BLD AUTO: 6.4 % (ref 19.6–45.3)
MCH RBC QN AUTO: 30 PG (ref 26.6–33)
MCHC RBC AUTO-ENTMCNC: 32.3 G/DL (ref 31.5–35.7)
MCV RBC AUTO: 93.1 FL (ref 79–97)
MONOCYTES # BLD AUTO: 0.84 10*3/MM3 (ref 0.1–0.9)
MONOCYTES NFR BLD AUTO: 10.9 % (ref 5–12)
NEUTROPHILS NFR BLD AUTO: 6.07 10*3/MM3 (ref 1.7–7)
NEUTROPHILS NFR BLD AUTO: 79 % (ref 42.7–76)
NRBC BLD AUTO-RTO: 0 /100 WBC (ref 0–0.2)
PLATELET # BLD AUTO: 124 10*3/MM3 (ref 140–450)
PMV BLD AUTO: 9.9 FL (ref 6–12)
POTASSIUM SERPL-SCNC: 3.6 MMOL/L (ref 3.5–5.2)
RBC # BLD AUTO: 3.03 10*6/MM3 (ref 3.77–5.28)
SODIUM SERPL-SCNC: 140 MMOL/L (ref 136–145)
WBC NRBC COR # BLD: 7.68 10*3/MM3 (ref 3.4–10.8)

## 2023-09-19 PROCEDURE — A9270 NON-COVERED ITEM OR SERVICE: HCPCS | Performed by: INTERNAL MEDICINE

## 2023-09-19 PROCEDURE — 63710000001 VALSARTAN 160 MG TABLET: Performed by: INTERNAL MEDICINE

## 2023-09-19 PROCEDURE — 63710000001 CITALOPRAM 20 MG TABLET: Performed by: INTERNAL MEDICINE

## 2023-09-19 PROCEDURE — 63710000001 DOFETILIDE 500 MCG CAPSULE: Performed by: INTERNAL MEDICINE

## 2023-09-19 PROCEDURE — A9270 NON-COVERED ITEM OR SERVICE: HCPCS | Performed by: ORTHOPAEDIC SURGERY

## 2023-09-19 PROCEDURE — 63710000001 INSULIN LISPRO (HUMAN) PER 5 UNITS: Performed by: INTERNAL MEDICINE

## 2023-09-19 PROCEDURE — 25010000002 VANCOMYCIN 10 G RECONSTITUTED SOLUTION: Performed by: ORTHOPAEDIC SURGERY

## 2023-09-19 PROCEDURE — 63710000001 PANTOPRAZOLE 40 MG TABLET DELAYED-RELEASE: Performed by: INTERNAL MEDICINE

## 2023-09-19 PROCEDURE — 85025 COMPLETE CBC W/AUTO DIFF WBC: CPT | Performed by: ORTHOPAEDIC SURGERY

## 2023-09-19 PROCEDURE — 63710000001 LEVOTHYROXINE 50 MCG TABLET: Performed by: INTERNAL MEDICINE

## 2023-09-19 PROCEDURE — 63710000001 RANOLAZINE 500 MG TABLET SUSTAINED-RELEASE 12 HOUR: Performed by: INTERNAL MEDICINE

## 2023-09-19 PROCEDURE — 94799 UNLISTED PULMONARY SVC/PX: CPT

## 2023-09-19 PROCEDURE — 63710000001 INSULIN DETEMIR PER 5 UNITS: Performed by: INTERNAL MEDICINE

## 2023-09-19 PROCEDURE — 63710000001 CARBIDOPA-LEVODOPA 25-100 MG TABLET

## 2023-09-19 PROCEDURE — 97162 PT EVAL MOD COMPLEX 30 MIN: CPT

## 2023-09-19 PROCEDURE — A9270 NON-COVERED ITEM OR SERVICE: HCPCS

## 2023-09-19 PROCEDURE — 94660 CPAP INITIATION&MGMT: CPT

## 2023-09-19 PROCEDURE — 63710000001 AMANTADINE 100 MG CAPSULE: Performed by: INTERNAL MEDICINE

## 2023-09-19 PROCEDURE — 80048 BASIC METABOLIC PNL TOTAL CA: CPT | Performed by: ORTHOPAEDIC SURGERY

## 2023-09-19 PROCEDURE — 97535 SELF CARE MNGMENT TRAINING: CPT | Performed by: OCCUPATIONAL THERAPIST

## 2023-09-19 PROCEDURE — 82948 REAGENT STRIP/BLOOD GLUCOSE: CPT

## 2023-09-19 PROCEDURE — 63710000001 LEVOTHYROXINE 125 MCG TABLET: Performed by: INTERNAL MEDICINE

## 2023-09-19 PROCEDURE — 63710000001 ACETAMINOPHEN 325 MG TABLET: Performed by: ORTHOPAEDIC SURGERY

## 2023-09-19 PROCEDURE — 63710000001 METOLAZONE 2.5 MG TABLET: Performed by: INTERNAL MEDICINE

## 2023-09-19 PROCEDURE — 63710000001 PRAMIPEXOLE 1 MG TABLET: Performed by: INTERNAL MEDICINE

## 2023-09-19 PROCEDURE — 63710000001 PRAMIPEXOLE 0.25 MG TABLET: Performed by: INTERNAL MEDICINE

## 2023-09-19 PROCEDURE — 63710000001 ASPIRIN 81 MG TABLET DELAYED-RELEASE: Performed by: INTERNAL MEDICINE

## 2023-09-19 PROCEDURE — 97110 THERAPEUTIC EXERCISES: CPT | Performed by: OCCUPATIONAL THERAPIST

## 2023-09-19 PROCEDURE — 63710000001 APIXABAN 5 MG TABLET: Performed by: INTERNAL MEDICINE

## 2023-09-19 PROCEDURE — 94640 AIRWAY INHALATION TREATMENT: CPT

## 2023-09-19 PROCEDURE — 63710000001 OXYCODONE 5 MG TABLET: Performed by: ORTHOPAEDIC SURGERY

## 2023-09-19 PROCEDURE — 63710000001 BUMETANIDE 1 MG TABLET: Performed by: INTERNAL MEDICINE

## 2023-09-19 RX ADMIN — ACETAMINOPHEN 650 MG: 325 TABLET ORAL at 09:50

## 2023-09-19 RX ADMIN — CARBIDOPA AND LEVODOPA 1 TABLET: 25; 100 TABLET ORAL at 08:06

## 2023-09-19 RX ADMIN — ALBUTEROL SULFATE 2.5 MG: 2.5 SOLUTION RESPIRATORY (INHALATION) at 16:05

## 2023-09-19 RX ADMIN — ASPIRIN 81 MG: 81 TABLET, COATED ORAL at 08:06

## 2023-09-19 RX ADMIN — METOLAZONE 2.5 MG: 2.5 TABLET ORAL at 08:06

## 2023-09-19 RX ADMIN — ACETAMINOPHEN 650 MG: 325 TABLET ORAL at 05:08

## 2023-09-19 RX ADMIN — CARBIDOPA AND LEVODOPA 1 TABLET: 25; 100 TABLET ORAL at 15:13

## 2023-09-19 RX ADMIN — DOFETILIDE 500 MCG: 0.5 CAPSULE ORAL at 08:05

## 2023-09-19 RX ADMIN — BUMETANIDE 1 MG: 1 TABLET ORAL at 08:06

## 2023-09-19 RX ADMIN — CARBIDOPA AND LEVODOPA 2 TABLET: 25; 100 TABLET ORAL at 05:09

## 2023-09-19 RX ADMIN — ALBUTEROL SULFATE 2.5 MG: 2.5 SOLUTION RESPIRATORY (INHALATION) at 22:42

## 2023-09-19 RX ADMIN — RANOLAZINE 500 MG: 500 TABLET, FILM COATED, EXTENDED RELEASE ORAL at 08:06

## 2023-09-19 RX ADMIN — VANCOMYCIN HYDROCHLORIDE 1500 MG: 10 INJECTION, POWDER, LYOPHILIZED, FOR SOLUTION INTRAVENOUS at 02:22

## 2023-09-19 RX ADMIN — OXYCODONE HYDROCHLORIDE 5 MG: 5 TABLET ORAL at 02:18

## 2023-09-19 RX ADMIN — ACETAMINOPHEN 650 MG: 325 TABLET ORAL at 18:41

## 2023-09-19 RX ADMIN — AMANTADINE HYDROCHLORIDE 100 MG: 100 CAPSULE ORAL at 21:56

## 2023-09-19 RX ADMIN — CITALOPRAM HYDROBROMIDE 20 MG: 20 TABLET ORAL at 21:54

## 2023-09-19 RX ADMIN — OXYCODONE HYDROCHLORIDE 5 MG: 5 TABLET ORAL at 09:50

## 2023-09-19 RX ADMIN — INSULIN LISPRO 3 UNITS: 100 INJECTION, SOLUTION INTRAVENOUS; SUBCUTANEOUS at 21:53

## 2023-09-19 RX ADMIN — APIXABAN 5 MG: 5 TABLET, FILM COATED ORAL at 21:54

## 2023-09-19 RX ADMIN — INSULIN DETEMIR 12 UNITS: 100 INJECTION, SOLUTION SUBCUTANEOUS at 21:53

## 2023-09-19 RX ADMIN — RANOLAZINE 500 MG: 500 TABLET, FILM COATED, EXTENDED RELEASE ORAL at 21:54

## 2023-09-19 RX ADMIN — CARBIDOPA AND LEVODOPA 1 TABLET: 25; 100 TABLET ORAL at 21:54

## 2023-09-19 RX ADMIN — INSULIN LISPRO 3 UNITS: 100 INJECTION, SOLUTION INTRAVENOUS; SUBCUTANEOUS at 12:10

## 2023-09-19 RX ADMIN — OXYCODONE HYDROCHLORIDE 5 MG: 5 TABLET ORAL at 18:41

## 2023-09-19 RX ADMIN — VALSARTAN 160 MG: 160 TABLET, FILM COATED ORAL at 08:05

## 2023-09-19 RX ADMIN — CARBIDOPA AND LEVODOPA 1 TABLET: 25; 100 TABLET ORAL at 12:11

## 2023-09-19 RX ADMIN — PRAMIPEXOLE DIHYDROCHLORIDE 1.5 MG: 1 TABLET ORAL at 21:53

## 2023-09-19 RX ADMIN — VALSARTAN 160 MG: 160 TABLET, FILM COATED ORAL at 21:53

## 2023-09-19 RX ADMIN — AMANTADINE HYDROCHLORIDE 100 MG: 100 CAPSULE ORAL at 08:05

## 2023-09-19 RX ADMIN — OXYCODONE HYDROCHLORIDE 5 MG: 5 TABLET ORAL at 14:31

## 2023-09-19 RX ADMIN — DOFETILIDE 500 MCG: 0.5 CAPSULE ORAL at 21:56

## 2023-09-19 RX ADMIN — LEVOTHYROXINE SODIUM 175 MCG: 125 TABLET ORAL at 05:09

## 2023-09-19 RX ADMIN — CARBIDOPA AND LEVODOPA 1 TABLET: 25; 100 TABLET ORAL at 18:41

## 2023-09-19 RX ADMIN — PANTOPRAZOLE SODIUM 40 MG: 40 TABLET, DELAYED RELEASE ORAL at 08:06

## 2023-09-19 RX ADMIN — ACETAMINOPHEN 650 MG: 325 TABLET ORAL at 14:33

## 2023-09-19 NOTE — PLAN OF CARE
Goal Outcome Evaluation:  Plan of Care Reviewed With: patient        Progress: improving          Pt is alert and oriented X 4. VSS. 2L/CPAP. Voiding spontaneously. Pain controlled with prn tylenol and roxicodone. Infublock infusing and sling in place.

## 2023-09-19 NOTE — THERAPY EVALUATION
Patient Name: Joanna Cross  : 1950    MRN: [id number]                              Today's Date: 2023       Admit Date: 2023    Visit Dx: No diagnosis found.  Patient Active Problem List   Diagnosis    Coronary artery disease involving native coronary artery without angina pectoris    Hypertension, essential    Peripheral vascular disease    Hyperlipidemia LDL goal <70    Spinal stenosis, lumbar region, with neurogenic claudication    Delayed surgical wound healing    Biceps tendinitis    Overactive bladder    GERD (gastroesophageal reflux disease)    Hypothyroidism    Lichen sclerosus    Parkinson's disease    MILLI treated with BiPAP - Patient reports compliance.     History of respiratory failure - prior respiratory failure requiring mechanical ventilation, open lung biopsy non-specific.     Atrial fibrillation    CKD (chronic kidney disease)    Tachy-thai syndrome    Long term current use of antiarrhythmic drug    History of adenomatous polyp of colon    Anemia    Angiodysplasia    Hx of colonic polyps    Body mass index 40.0-44.9, adult    Cardiac pacemaker in situ    Chronic low back pain    Chronic lung disease    Degeneration of lumbar intervertebral disc    Edema of lower extremity    High risk medication use    History of malignant neoplasm of skin    History of TIA (transient ischemic attack)    Hyponatremia    Hypotonic bladder    Incomplete tear of rotator cuff    Major depression in remission    Migraine    Weakness    Tinnitus of both ears    Primary osteoarthritis involving multiple joints    Restless legs    Seborrheic dermatitis    Sphenoid sinusitis    Transient cerebral ischemia    Urinary tract infection    Urinary urgency    Type 2 diabetes mellitus with hyperglycemia, without long-term current use of insulin    Vitamin B12 deficiency    Vitamin D deficiency    Chronic kidney disease, stage 3b    Status post reverse total replacement of left shoulder    Postoperative pain     Dysuria    Rash    Pruritus    Acute post-traumatic headache, not intractable    History of recent fall    Chronic kidney disease, stage 3a    Status post reverse arthroplasty of right shoulder     Past Medical History:   Diagnosis Date    Anemia     Ankle problem     thinks back related causing pain     Atrial fibrillation     AVM (arteriovenous malformation)     Back pain     Chronic kidney disease     stage 3 per pt    CKD (chronic kidney disease)     Clotting disorder 2016    AVM - small intestine    COVID-19 vaccine series completed     Gastrocnemius muscle tear     left medial 91    Generalized osteoarthritis     GERD (gastroesophageal reflux disease)     Gestational diabetes     GIB (gastrointestinal bleeding) 2016    d/t xarelto     Headache     Hearing decreased, bilateral     HAS HEARING AID, NOT WEARING IT CURRENTLY    Hiatal hernia     History of shingles     History of transfusion 2016    no reaction recalled     Hypertension     Hypothyroidism     IBS (irritable bowel syndrome)     Klebsiella pneumonia     Lichen sclerosus     Lupus     subQ    Mouth problem     mouth guard used since pt bites tongue and lips excessively at night if not- with bipap     MRSA infection 2018    Obesity     On home oxygen therapy     2L of oxygen all the time due to current congestion     Osteoporosis     Parkinson's disease     Peripheral vascular disease     Pleurisy     Pneumonia     Puerperal sepsis with acute hypoxic respiratory failure     emergent intubation- 2016    Right leg pain     from back issues     Salivary gland stone     Sciatic nerve pain     Seborrheic dermatitis     Skin cancer     on back     Sleep apnea     CPAP AND HOME O2 2L/M    TIA (transient ischemic attack) 2014    no residual effects    Type 2 diabetes mellitus     UTI (urinary tract infection)     Vitamin D deficiency 09/08/2022    Wears glasses      Past Surgical History:   Procedure Laterality Date    ABLATION OF DYSRHYTHMIC FOCUS   05/16/13    Laser Ablation - Rt Leg    BACK SURGERY      l4-l5 laminectomy     BICEPS TENDON REPAIR Right     shoulder    BREAST BIOPSY Left 05/2004    excisional, benign    BRONCHOSCOPY RIGID / FLEXIBLE      2016    BUNIONECTOMY Right     CARDIAC CATHETERIZATION      CARDIAC CATHETERIZATION N/A 02/01/2019    Procedure: Left Heart Cath;  Surgeon: Albertina Corona MD;  Location: [facility] CATH INVASIVE LOCATION;  Service: Cardiology    CHOLECYSTECTOMY      COLONOSCOPY  2016    COLONOSCOPY N/A 06/17/2021    Procedure: COLONOSCOPY WITH POLYPECTOMY;  Surgeon: Trenton Sosa MD;  Location: [facility] ENDOSCOPY;  Service: Gastroenterology;  Laterality: N/A;    CORONARY STENT PLACEMENT      x1 stent    CYST REMOVAL      left ear, upper left back 2001    CYSTOSCOPY      x2  18 and 20 -   in 20 urethra dilation     DIAGNOSTIC LAPAROSCOPY  1981    ENDOSCOPIC FUNCTIONAL SINUS SURGERY (FESS)  2011    ENDOSCOPY  2016    ENTEROSCOPY VIA STOMA      with single ballon with fluoro     HAMMER TOE REPAIR Left     INCISION AND DRAINAGE OF WOUND  2018    back with wound infection     INSERT / REPLACE / REMOVE PACEMAKER  11/10/16    [facility]    JOINT REPLACEMENT      KNEE ARTHROSCOPY      LASER ABLATION      right leg 13    LIPOMA EXCISION  1999    left leg     LUMBAR LAMINECTOMY DISCECTOMY DECOMPRESSION N/A 07/06/2018    Procedure: LUMBAR LAMINECTOMY L4-5, HEMILAMIINECTOMY RIGHT L5-S1, FORAMINOTOMY L5-S1;  Surgeon: Lencho Gamino MD;  Location:  [place] [facility];  Service: Neurosurgery    LUMBAR LAMINECTOMY DISCECTOMY DECOMPRESSION N/A 09/19/2018    Procedure: INCISION AND DRAINAGE BACK WITH WOUND EXPLORATION;  Surgeon: Ritchie García MD;  Location:  [place] [facility];  Service: Neurosurgery    LUNG BIOPSY Left 2016    OTHER SURGICAL HISTORY      ct scan of chest and sinuses and lower back     OTHER SURGICAL HISTORY      various echos     OTHER SURGICAL HISTORY      electroencephalogram 16,   emg  ncv tests 2007    OTHER  SURGICAL HISTORY      mra 2007  and various mri with xrays, nuclear medicine lung ventilation with perfusion test 2016 with pft     OTHER SURGICAL HISTORY      barium swallow testing 15, 12, 12    OTHER SURGICAL HISTORY      vaginal ultrasound- 2012,   vas clementina lower extrem 2016, vas venous duplex lower extrem bilat 2019    OTHER SURGICAL HISTORY      wdge biopsy spring of lung     OTHER SURGICAL HISTORY      emergent intubation- hypoxic resp failure     PACEMAKER IMPLANTATION  11/2016    sss    REPLACEMENT TOTAL KNEE Bilateral     left knee 2011, right 2012 per dr acuna     REPLACEMENT TOTAL KNEE Bilateral     SHOULDER ARTHROSCOPY Bilateral     2003-left, 2004- right     SKIN BIOPSY      skin cancer back 2016    SKIN CANCER EXCISION      upper righ tback     MADHAV      TEETH EXTRACTION      x2    TOTAL SHOULDER ARTHROPLASTY W/ DISTAL CLAVICLE EXCISION Left 10/24/2022    Procedure: REVERSE TOTAL SHOULDER ARTHROPLASTY, BICEPS TENODESIS - LEFT;  Surgeon: Sammy Yo MD;  Location:  [place] [facility];  Service: Orthopedics;  Laterality: Left;    TOTAL SHOULDER ARTHROPLASTY W/ DISTAL CLAVICLE EXCISION Right 9/18/2023    Procedure: REVERSE TOTAL SHOULDER  ARTHROPLASTY WITH BICEPS TENODESIS - RIGHT;  Surgeon: Sammy Yo MD;  Location:  [facility] [facility];  Service: Orthopedics;  Laterality: Right;    TUBAL ABDOMINAL LIGATION Bilateral 1980    WISDOM TOOTH EXTRACTION        General Information       Row Name 09/19/23 1508          Physical Therapy Time and Intention    Document Type evaluation  -SC     Mode of Treatment physical therapy  -SC       Row Name 09/19/23 1504          General Information    Patient Profile Reviewed yes  -SC     Existing Precautions/Restrictions fall;oxygen therapy device and L/min;non-weight bearing;right;shoulder  nerve cath,parkinsons, on oxygen  -SC     Barriers to Rehab medically complex;previous functional deficit  -SC       Row Name 09/19/23 1500          Living Environment    People in Home  child(lisa), adult  -SC       Row Name 09/19/23 1508          Home Main Entrance    Number of Stairs, Main Entrance none  has a ramp  -SC       Row Name 09/19/23 1508          Cognition    Orientation Status (Cognition) oriented x 4  -SC       Row Name 09/19/23 1508          Safety Issues, Functional Mobility    Impairments Affecting Function (Mobility) coordination;endurance/activity tolerance;motor control;pain;range of motion (ROM);sensation/sensory awareness;shortness of breath  -SC     Comment, Safety Issues/Impairments (Mobility) alert, following commands  -SC               User Key  (r) = Recorded By, (t) = Taken By, (c) = Cosigned By      Initials Name Provider Type    SC Sharon Purcell, PT Physical Therapist                   Mobility       Row Name 09/19/23 1509          Bed Mobility    Comment, (Bed Mobility) uic  -SC       Row Name 09/19/23 1509          Transfers    Comment, (Transfers) STS from recliner and commode with cues for hand placement.  Demonstrated slow transfers  -SC       Row Name 09/19/23 1509          Sit-Stand Transfer    Sit-Stand [place] (Transfers) 1 person assist;1 person to manage equipment;contact guard  -SC     Assistive Device (Sit-Stand Transfers) other (see comments)  UE suport  -SC       Row Name 09/19/23 1509          Gait/Stairs (Locomotion)    [place] Level (Gait) 1 person to manage equipment;2 person assist;contact guard  -SC     Assistive Device (Gait) other (see comments)  L UE support  -SC     Distance in Feet (Gait) 40+40+40  -SC     Deviations/Abnormal Patterns (Gait) stride length decreased;base of support, wide;manjit decreased  -SC     Comment, (Gait/Stairs) Demonstrated slow shuffling steps. Encouraged to focus on her breathing. Followed by chair and took multiple sitting rest breaks. Cues for breathing technique when resting.  -SC       Row Name 09/19/23 1509          Mobility    Extremity Weight-bearing Status right upper extremity  -SC     Left Upper  Extremity (Weight-bearing Status) non weight-bearing (NWB)  -SC               User Key  (r) = Recorded By, (t) = Taken By, (c) = Cosigned By      Initials Name Provider Type    SC Sharon Purcell PT Physical Therapist                   Obj/Interventions       [facility] Name 09/19/23 1512          Range of Motion Comprehensive    General Range of Motion upper extremity range of motion deficits identified;bilateral lower extremity ROM L  -SC       Row Name 09/19/23 1512          Strength Comprehensive (MMT)    Comment, General Manual Muscle Testing (MMT) Assessment B LE grossly 4+/5, R UE: in sling, moving fingers, L UE WFL  -[facility] Name 09/19/23 1512          Balance    Dynamic Standing Balance 2-person assist;1 person to manage equipment;contact guard  -SC     Position/Device Used, Standing Balance supported  -SC     Comment, Balance unsteady gait  -SC       Row Name 09/19/23 1512          Sensory Assessment (Somatosensory)    Sensory Assessment (Somatosensory) other (see comments)  R UE diminished LT due to nerve cath  -SC               User Key  (r) = Recorded By, (t) = Taken By, (c) = Cosigned By      Initials Name Provider Type    SC Sharon Purcell, PT Physical Therapist                   Goals/Plan       Row Name 09/19/23 1515          Bed Mobility Goal 1 (PT)    Activity/Assistive Device (Bed Mobility Goal 1, PT) overhead trapeze  -SC     [place] Level/Cues Needed (Bed Mobility Goal 1, PT) modified independence  -SC     Time Frame (Bed Mobility Goal 1, PT) long term goal (LTG);10 days  -SC       Row Name 09/19/23 1515          Transfer Goal 1 (PT)    Activity/Assistive Device (Transfer Goal 1, PT) sit-to-stand/stand-to-sit;bed-to-chair/chair-to-bed;cane, straight  -SC     [place] Level/Cues Needed (Transfer Goal 1, PT) standby assist  -SC     Time Frame (Transfer Goal 1, PT) long term goal (LTG);10 days  -SC       Row Name 09/19/23 1515          Gait Training Goal 1 (PT)    Activity/Assistive  Device (Gait Training Goal 1, PT) gait (walking locomotion);cane, straight  -SC     [place] Level (Gait Training Goal 1, PT) contact guard required  -SC     Distance (Gait Training Goal 1, PT) 50 x2  -SC     Time Frame (Gait Training Goal 1, PT) long term goal (LTG);10 days  -SC               User Key  (r) = Recorded By, (t) = Taken By, (c) = Cosigned By      Initials Name Provider Type    SC Sharon Purcell, PT Physical Therapist                   Clinical Impression       [facility] Name 09/19/23 1513          Pain    Additional Documentation Pain Scale: FACES Pre/Post-Treatment (Group)  -SC       Row Name 09/19/23 1513          Pain Scale: FACES Pre/Post-Treatment    Pain: FACES Scale, Pretreatment 2-->hurts little bit  -SC     Posttreatment Pain Rating 4-->hurts little more  -SC     Pain Location - Side/Orientation Right  -SC     Pain Location upper  -SC     Pain Location - extremity  -SC       Row Name 09/19/23 1513          Plan of Care Review    Plan of Care Reviewed With patient  -SC     Progress improving  -SC     Outcome Evaluation Patient is below her baseline function requireing assist to ambulate safely. Recommend rehab at discharge  -[facility] Name 09/19/23 1513          Therapy Assessment/Plan (PT)    Patient/Family Therapy Goals Statement (PT) get stronger  -SC     Rehab Potential (PT) good, to achieve stated therapy goals  -SC     Criteria for Skilled Interventions Met (PT) yes;meets criteria  -SC     Therapy Frequency (PT) daily  -[facility] Name 09/19/23 1518          Vital Signs    Pre SpO2 (%) 92  -SC     O2 Delivery Pre Treatment supplemental O2  -SC     Intra SpO2 (%) 91  -SC     O2 Delivery Intra Treatment supplemental O2  -SC     Post SpO2 (%) 97  -SC     O2 Delivery Post Treatment supplemental O2  -[facility] Name 09/19/23 1518          Positioning and Restraints    Pre-Treatment Position sitting in chair/recliner  -SC     Post Treatment Position chair  -SC     In Chair notified  nsg;reclined;sitting;call light within reach;encouraged to call for assist;exit alarm on;with family/caregiver  -SC               User Key  (r) = Recorded By, (t) = Taken By, (c) = Cosigned By      Initials Name Provider Type    SC Sharon Purcell, PT Physical Therapist                   Outcome Measures       Row Name 09/19/23 1519 09/19/23 0805       How much help from another person do you currently need...    Turning from your back to your side while in flat bed without using bedrails? 3  -SC 3  -GJ    Moving from lying on back to sitting on the side of a flat bed without bedrails? 3  -SC 3  -GJ    Moving to and from a bed to a chair (including a wheelchair)? 3  -SC 3  -GJ    Standing up from a chair using your arms (e.g., wheelchair, bedside chair)? 3  -SC 3  -GJ    Climbing 3-5 steps with a railing? 2  -SC 2  -GJ    To walk in hospital room? 3  -SC 3  -GJ    AM-PAC 6 Clicks Score (PT) 17  -SC 17  -GJ    Highest level of mobility 5 --> Static standing  -SC 5 --> Static standing  -      Row Name 09/19/23 1519 09/19/23 1133       Functional Assessment    Outcome Measure Options AM-PAC 6 Clicks Basic Mobility (PT)  -SC AM-PAC 6 Clicks Daily Activity (OT)  -AR              User Key  (r) = Recorded By, (t) = Taken By, (c) = Cosigned By      Initials Name Provider Type    SC Sharon Purcell, PT Physical Therapist    Patience Fu, OT Occupational Therapist    Aleyda Sitll RN Registered Nurse                                 Physical Therapy Education       Title: PT OT SLP Therapies (Done)       Topic: Physical Therapy (Done)       Point: Mobility training (Done)       Learning Progress Summary             Patient JUVENTINO Still VU by SC at 9/19/2023 1520    Comment: reviewed benefits of activity                         Point: Home exercise program (Done)       Learning Progress Summary             Patient Eager, E, VU by SC at 9/19/2023 1520    Comment: reviewed benefits of activity                          Point: Body mechanics (Done)       Learning Progress Summary             Patient JUVENTINO Still, VU by SC at 9/19/2023 1520    Comment: reviewed benefits of activity                         Point: Precautions (Done)       Learning Progress Summary             Patient JUVENTINO Still, VU by SC at 9/19/2023 1520    Comment: reviewed benefits of activity                                         User Key       Initials Effective Dates Name Provider Type Discipline    SC 02/03/23 -  Sharon Purcell, PT Physical Therapist PT                  PT Recommendation and Plan     Plan of Care Reviewed With: patient  Progress: improving  Outcome Evaluation: Patient is below her baseline function requireing assist to ambulate safely. Recommend rehab at discharge     Time Calculation:   PT Evaluation Complexity  History, PT Evaluation Complexity: 3 or more personal factors and/or comorbidities  Examination of Body Systems (PT Eval Complexity): total of 4 or more elements  Clinical Presentation (PT Evaluation Complexity): evolving  Clinical Decision Making (PT Evaluation Complexity): moderate complexity  Overall Complexity (PT Evaluation Complexity): moderate complexity     PT Charges       Row Name 09/19/23 1440             Time Calculation    Start Time 1440  -SC      PT Received On 09/19/23  -SC      PT Goal Re-Cert Due Date 09/29/23  -SC         Untimed Charges    PT Eval/Re-eval Minutes 46  -SC         Total Minutes    Untimed Charges Total Minutes 46  -SC       Total Minutes 46  -SC                User Key  (r) = Recorded By, (t) = Taken By, (c) = Cosigned By      Initials Name Provider Type    SC Sharon Purcell, PT Physical Therapist                  Therapy Charges for Today       Code Description Service Date Service Provider Modifiers Qty    58683819738 HC PT EVAL MOD COMPLEXITY 4 9/19/2023 Sharon Purcell, PT GP 1    [id number]  PT THER SUPP EA 15 MIN 9/19/2023 Sharon Purcell, PT GP 2            PT G-Codes  Outcome Measure  Options: AM-PAC 6 Clicks Basic Mobility (PT)  AM-PAC 6 Clicks Score (PT): 17  AM-PAC 6 Clicks Score (OT): 9  PT Discharge Summary  Anticipated Discharge Disposition (PT): inpatient rehabilitation facility    Sharon Purcell, PT  9/19/2023

## 2023-09-19 NOTE — PROGRESS NOTES
[facility]    Acute pain service Inpatient Progress Note    Patient Name: Joanna Cross  :  1950  MRN:  [id number]        Acute Pain  Service Inpatient Progress Note:    Analgesia:Excellent  Pain Score:0/10  LOC: alert and awake  Resp Status: supplemental oxygen  Cardiac: VS stable  Side Effects:None  Catheter Site:clean, dressing intact and dry  Cath type: peripheral nerve cath with ON Q  Infusion rate: Ext/Pop: Basal: 1ml/hr, PIB: 5ml q 2 h, PCA: 5 ml q 30 min (1mL,5ml, 5ml InfuSystem Pump)  Dosing/Volume: ropivacaine 0.2%  Catheter Plan:Catheter to remain Insitu and Continue catheter infusion rate unchanged  Comments: The neuro assessment of the operative extremity includes the ability to do finger flexion and extension; includes the ability to do wrist flexion and extension, includes the ability to do elbow flexion and extension.  The neuro exam of the patient includes sensory function throughout the operative extremity.     0/10 shoulder. 4/10 axillae

## 2023-09-19 NOTE — PLAN OF CARE
Goal Outcome Evaluation:  Plan of Care Reviewed With: patient        Progress: improving  Outcome Evaluation: Patient is below her baseline function requireing assist to ambulate safely. Recommend rehab at discharge      Anticipated Discharge Disposition (PT): inpatient rehabilitation facility

## 2023-09-19 NOTE — PROGRESS NOTES
Orthopaedic Surgery Progress Note      SUBJECTIVE:      No acute events overnight. Doing well. Pain controlled. Tolerating diet. No nausea, vomiting, fevers or chills. Worked with therapy yesterday. Ambulated and sat in chair. Discussed post op rehab recs and post op restrictions.     OBJECTIVE:  /89   Pulse 93   Temp 97.5 °F (36.4 °C) (Axillary)   Resp 18   LMP  (LMP Unknown) Comment: Mammogram- 1/28/20  SpO2 96%       PE:  No acute distress  Non labored breathing  Peripheral perfusion intact  Right Upper Extremity:    Sling in place  Dressings c/d/I  Soft and compressible  Motor intact to epl, fpl, ff, fe, fabd  Sensation intact to light touch in med, rad, ul  2+ pulses      Results from last 7 days   Lab Units 09/19/23  0557   WBC 10*3/mm3 7.68   HEMOGLOBIN g/dL 9.1*   PLATELETS 10*3/mm3 124*     Results from last 7 days   Lab Units 09/19/23  0557   SODIUM mmol/L 140   POTASSIUM mmol/L 3.6   CO2 mmol/L 22.0   CREATININE mg/dL 0.89   GLUCOSE mg/dL 84         ASSESSMENT/PLAN:  Joanna is a 72 y.o. female  with RIGHT rotator cuff arthropathy s/p rTSA (9/18/23)    WB Status: NWB RUE, Sling at all times except for therapy and hygiene x 4 weeks  Pain control and bowel regimen   Nutritional optimization   PT/OT: recommend IPR  Follow up: in 1 week for post op check  Disposition: discharge to [facility]      Ludwig Guerra MD

## 2023-09-19 NOTE — THERAPY TREATMENT NOTE
Patient Name: Joanna Cross  : 1950    MRN: [id number]                              Today's Date: 2023       Admit Date: 2023    Visit Dx: No diagnosis found.  Patient Active Problem List   Diagnosis    Coronary artery disease involving native coronary artery without angina pectoris    Hypertension, essential    Peripheral vascular disease    Hyperlipidemia LDL goal <70    Spinal stenosis, lumbar region, with neurogenic claudication    Delayed surgical wound healing    Biceps tendinitis    Overactive bladder    GERD (gastroesophageal reflux disease)    Hypothyroidism    Lichen sclerosus    Parkinson's disease    MILLI treated with BiPAP - Patient reports compliance.     History of respiratory failure - prior respiratory failure requiring mechanical ventilation, open lung biopsy non-specific.     Atrial fibrillation    CKD (chronic kidney disease)    Tachy-thai syndrome    Long term current use of antiarrhythmic drug    History of adenomatous polyp of colon    Anemia    Angiodysplasia    Hx of colonic polyps    Body mass index 40.0-44.9, adult    Cardiac pacemaker in situ    Chronic low back pain    Chronic lung disease    Degeneration of lumbar intervertebral disc    Edema of lower extremity    High risk medication use    History of malignant neoplasm of skin    History of TIA (transient ischemic attack)    Hyponatremia    Hypotonic bladder    Incomplete tear of rotator cuff    Major depression in remission    Migraine    Weakness    Tinnitus of both ears    Primary osteoarthritis involving multiple joints    Restless legs    Seborrheic dermatitis    Sphenoid sinusitis    Transient cerebral ischemia    Urinary tract infection    Urinary urgency    Type 2 diabetes mellitus with hyperglycemia, without long-term current use of insulin    Vitamin B12 deficiency    Vitamin D deficiency    Chronic kidney disease, stage 3b    Status post reverse total replacement of left shoulder    Postoperative pain     Dysuria    Rash    Pruritus    Acute post-traumatic headache, not intractable    History of recent fall    Chronic kidney disease, stage 3a    Status post reverse arthroplasty of right shoulder     Past Medical History:   Diagnosis Date    Anemia     Ankle problem     thinks back related causing pain     Atrial fibrillation     AVM (arteriovenous malformation)     Back pain     Chronic kidney disease     stage 3 per pt    CKD (chronic kidney disease)     Clotting disorder 2016    AVM - small intestine    COVID-19 vaccine series completed     Gastrocnemius muscle tear     left medial 91    Generalized osteoarthritis     GERD (gastroesophageal reflux disease)     Gestational diabetes     GIB (gastrointestinal bleeding) 2016    d/t xarelto     Headache     Hearing decreased, bilateral     HAS HEARING AID, NOT WEARING IT CURRENTLY    Hiatal hernia     History of shingles     History of transfusion 2016    no reaction recalled     Hypertension     Hypothyroidism     IBS (irritable bowel syndrome)     Klebsiella pneumonia     Lichen sclerosus     Lupus     subQ    Mouth problem     mouth guard used since pt bites tongue and lips excessively at night if not- with bipap     MRSA infection 2018    Obesity     On home oxygen therapy     2L of oxygen all the time due to current congestion     Osteoporosis     Parkinson's disease     Peripheral vascular disease     Pleurisy     Pneumonia     Puerperal sepsis with acute hypoxic respiratory failure     emergent intubation- 2016    Right leg pain     from back issues     Salivary gland stone     Sciatic nerve pain     Seborrheic dermatitis     Skin cancer     on back     Sleep apnea     CPAP AND HOME O2 2L/M    TIA (transient ischemic attack) 2014    no residual effects    Type 2 diabetes mellitus     UTI (urinary tract infection)     Vitamin D deficiency 09/08/2022    Wears glasses      Past Surgical History:   Procedure Laterality Date    ABLATION OF DYSRHYTHMIC FOCUS   05/16/13    Laser Ablation - Rt Leg    BACK SURGERY      l4-l5 laminectomy     BICEPS TENDON REPAIR Right     shoulder    BREAST BIOPSY Left 05/2004    excisional, benign    BRONCHOSCOPY RIGID / FLEXIBLE      2016    BUNIONECTOMY Right     CARDIAC CATHETERIZATION      CARDIAC CATHETERIZATION N/A 02/01/2019    Procedure: Left Heart Cath;  Surgeon: Albertina Corona MD;  Location: [facility] CATH INVASIVE LOCATION;  Service: Cardiology    CHOLECYSTECTOMY      COLONOSCOPY  2016    COLONOSCOPY N/A 06/17/2021    Procedure: COLONOSCOPY WITH POLYPECTOMY;  Surgeon: Trenton Sosa MD;  Location: [facility] ENDOSCOPY;  Service: Gastroenterology;  Laterality: N/A;    CORONARY STENT PLACEMENT      x1 stent    CYST REMOVAL      left ear, upper left back 2001    CYSTOSCOPY      x2  18 and 20 -   in 20 urethra dilation     DIAGNOSTIC LAPAROSCOPY  1981    ENDOSCOPIC FUNCTIONAL SINUS SURGERY (FESS)  2011    ENDOSCOPY  2016    ENTEROSCOPY VIA STOMA      with single ballon with fluoro     HAMMER TOE REPAIR Left     INCISION AND DRAINAGE OF WOUND  2018    back with wound infection     INSERT / REPLACE / REMOVE PACEMAKER  11/10/16    [facility]    JOINT REPLACEMENT      KNEE ARTHROSCOPY      LASER ABLATION      right leg 13    LIPOMA EXCISION  1999    left leg     LUMBAR LAMINECTOMY DISCECTOMY DECOMPRESSION N/A 07/06/2018    Procedure: LUMBAR LAMINECTOMY L4-5, HEMILAMIINECTOMY RIGHT L5-S1, FORAMINOTOMY L5-S1;  Surgeon: Lencho Gamino MD;  Location:  [facility] OR;  Service: Neurosurgery    LUMBAR LAMINECTOMY DISCECTOMY DECOMPRESSION N/A 09/19/2018    Procedure: INCISION AND DRAINAGE BACK WITH WOUND EXPLORATION;  Surgeon: Ritchie García MD;  Location:  [facility] OR;  Service: Neurosurgery    LUNG BIOPSY Left 2016    OTHER SURGICAL HISTORY      ct scan of chest and sinuses and lower back     OTHER SURGICAL HISTORY      various echos     OTHER SURGICAL HISTORY      electroencephalogram 16,   emg  ncv tests 2007    OTHER  SURGICAL HISTORY      mra 2007  and various mri with xrays, nuclear medicine lung ventilation with perfusion test 2016 with pft     OTHER SURGICAL HISTORY      barium swallow testing 15, 12, 12    OTHER SURGICAL HISTORY      vaginal ultrasound- 2012,   vas clementina lower extrem 2016, vas venous duplex lower extrem bilat 2019    OTHER SURGICAL HISTORY      wdge biopsy spring of lung     OTHER SURGICAL HISTORY      emergent intubation- hypoxic resp failure     PACEMAKER IMPLANTATION  11/2016    sss    REPLACEMENT TOTAL KNEE Bilateral     left knee 2011, right 2012 per dr acuna     REPLACEMENT TOTAL KNEE Bilateral     SHOULDER ARTHROSCOPY Bilateral     2003-left, 2004- right     SKIN BIOPSY      skin cancer back 2016    SKIN CANCER EXCISION      upper righ tback     MADHAV      TEETH EXTRACTION      x2    TOTAL SHOULDER ARTHROPLASTY W/ DISTAL CLAVICLE EXCISION Left 10/24/2022    Procedure: REVERSE TOTAL SHOULDER ARTHROPLASTY, BICEPS TENODESIS - LEFT;  Surgeon: Sammy Yo MD;  Location: [facility] OR;  Service: Orthopedics;  Laterality: Left;    TOTAL SHOULDER ARTHROPLASTY W/ DISTAL CLAVICLE EXCISION Right 9/18/2023    Procedure: REVERSE TOTAL SHOULDER  ARTHROPLASTY WITH BICEPS TENODESIS - RIGHT;  Surgeon: Sammy Yo MD;  Location: [facility] OR;  Service: Orthopedics;  Laterality: Right;    TUBAL ABDOMINAL LIGATION Bilateral 1980    WISDOM TOOTH EXTRACTION        General Information       Row Name 09/19/23 1122          OT Time and Intention    Document Type therapy note (daily note)  -AR     Mode of Treatment individual therapy;occupational therapy  -AR       Row Name 09/19/23 1122          General Information    Existing Precautions/Restrictions fall;oxygen therapy device and L/min;non-weight bearing;right;shoulder  interscalene nerve catheter, Donjoy Ultra II sling with pillow, Parkinson's  -AR       Row Name 09/19/23 1122          Cognition    Orientation Status (Cognition) oriented x 4  -AR       Row Name  09/19/23 1122          Safety Issues, Functional Mobility    Safety Issues Affecting Function (Mobility) safety precautions follow-through/compliance;safety precaution awareness  -AR     Impairments Affecting Function (Mobility) coordination;endurance/activity tolerance;motor control;pain;range of motion (ROM);sensation/sensory awareness;shortness of breath  -AR               User Key  (r) = Recorded By, (t) = Taken By, (c) = Cosigned By      Initials Name Provider Type    Patience Fu, OT Occupational Therapist                     Mobility/ADL's       Row Name 09/19/23 1122          Bed Mobility    Comment, (Bed Mobility) Pt declined OOB activity d/t dyspnea and awaiting breathing treatment  -AR       Row Name 09/19/23 1122          Transfers    Comment, (Transfers) pt declined d/t dyspnea  -AR       Row Name 09/19/23 1122          Activities of Daily Living    BADL Assessment/Intervention bathing;upper body dressing;feeding  -AR       Row Name 09/19/23 1122          Mobility    Extremity Weight-bearing Status left upper extremity  -AR     Left Upper Extremity (Weight-bearing Status) non weight-bearing (NWB)  -AR       Row Name 09/19/23 1122          Bathing Assessment/Intervention    Comment, (Bathing) Reviewed R shoulder precautions, pt unable to recall axilla care to maintain. Reviewed prior teaching content with pt and issued LH sponge to assist. Reviewed that interscalene cannot get wet.  -AR       Row Name 09/19/23 1122          Upper Body Dressing Assessment/Training    Ira Level (Upper Body Dressing) don;doff;dependent (less than 25% patient effort)  sling  -AR     Comment, (Upper Body Dressing) Reviewed R shoulder precautions, ADL retraining to maintain, sling management and care of interscalene nerve catheter during ADLs to avoid dislodgement. Pt with limited carry-over of teachingt from al.  -AR       Row Name 09/19/23 1122          Self-Feeding Assessment/Training    Comment,  (Feeding) Pt with Parkinson's and reports diffiuculty self-feeding d/t LUE tremors. Issued good  utenils and sippy cup to assist.  -AR               User Key  (r) = Recorded By, (t) = Taken By, (c) = Cosigned By      Initials Name Provider Type    Patience Fu OT Occupational Therapist                   Obj/Interventions       Row Name 09/19/23 1127          Sensory Assessment (Somatosensory)    Sensory Assessment (Somatosensory) right UE  -AR     Sensory Subjective Reports numbness  -AR       Row Name 09/19/23 1127          Shoulder (Therapeutic Exercise)    Shoulder AROM (Therapeutic Exercise) bilateral;scapular retraction;supine;10 repetitions  -AR     Shoulder PROM (Therapeutic Exercise) right;flexion;extension;external rotation;internal rotation;supine;10 repetitions  -AR       Row Name 09/19/23 1127          Elbow/Forearm (Therapeutic Exercise)    Elbow/Forearm (Therapeutic Exercise) AAROM (active assistive range of motion)  -AR     Elbow/Forearm AAROM (Therapeutic Exercise) right;flexion;extension;10 repetitions;supination;pronation;supine  -AR       Row Name 09/19/23 1127          Wrist (Therapeutic Exercise)    Wrist AROM (Therapeutic Exercise) right;flexion;extension;10 repetitions  -AR       Row Name 09/19/23 1127          Hand (Therapeutic Exercise)    Hand AROM/AAROM (Therapeutic Exercise) AROM (active range of motion);finger extension;finger flexion;10 repetitions;right  -AR       Row Name 09/19/23 1127          Motor Skills    Neuromuscular Function bilateral;upper extremity;tremor, intention  -AR     Therapeutic Exercise shoulder;elbow/forearm;hand;wrist  -AR               User Key  (r) = Recorded By, (t) = Taken By, (c) = Cosigned By      Initials Name Provider Type    Patience Fu OT Occupational Therapist                   Goals/Plan    No documentation.                  Clinical Impression       Row Name 09/19/23 1129          Pain Assessment    Pretreatment Pain Rating  3/10  -AR     Posttreatment Pain Rating 2/10  -AR     Pain Location - Side/Orientation Right  -AR     Pain Location - --  axilla  -AR     Pain Intervention(s) Medication (See MAR);Cold applied;Repositioned  -AR       Row Name 09/19/23 1129          Plan of Care Review    Plan of Care Reviewed With patient;daughter  -AR     Progress no change  -AR     Outcome Evaluation OT educated pt on R shoulder precautions, ADL retraining to maintain, sling management and HEP. She tolerated R shoulder PROM FE 15, IR chest/ER 30. Pt limited with dyspnea and awaiting breating treatment, deferred out of bed activity. Pt limited with altered sensation, decreased motor control, dyspnea, BUE tremoring, NWB/immobilization RUE and is performing significantly below baseline status. Recommend IPR, pt and family in agreement.  -AR       Row Name 09/19/23 1129          Therapy Plan Review/Discharge Plan (OT)    Anticipated Discharge Disposition (OT) inpatient rehabilitation facility  -AR       Row Name 09/19/23 1129          Vital Signs    Pre Systolic BP Rehab 176  -AR     Pre Treatment Diastolic BP 89  -AR     Pretreatment Heart Rate (beats/min) 86  -AR     Posttreatment Heart Rate (beats/min) 88  -AR     Pre SpO2 (%) 92  2L NC  -AR     O2 Delivery Pre Treatment supplemental O2  -AR     Intra SpO2 (%) 90  -AR     O2 Delivery Intra Treatment supplemental O2  -AR     Post SpO2 (%) 92  -AR     O2 Delivery Post Treatment supplemental O2  -AR     Pre Patient Position Supine  -AR     Intra Patient Position Supine  -AR     Post Patient Position Supine  -AR       Row Name 09/19/23 1129          Positioning and Restraints    Pre-Treatment Position in bed  -AR     Post Treatment Position bed  -AR     In Bed supine;call light within reach;encouraged to call for assist;exit alarm on;with family/caregiver;with brace  cold pack applied  -AR               User Key  (r) = Recorded By, (t) = Taken By, (c) = Cosigned By      Initials Name Provider Type     Patience Fu, MONICA Occupational Therapist                   Outcome Measures       Row Name 09/19/23 1133          How much help from another is currently needed...    Putting on and taking off regular lower body clothing? 1  -AR     Bathing (including washing, rinsing, and drying) 2  -AR     Toileting (which includes using toilet bed pan or urinal) 1  -AR     Putting on and taking off regular upper body clothing 1  -AR     Taking care of personal grooming (such as brushing teeth) 2  -AR     Eating meals 2  -AR     AM-PAC 6 Clicks Score (OT) 9  -AR       Row Name 09/19/23 0805          How much help from another person do you currently need...    Turning from your back to your side while in flat bed without using bedrails? 3  -GJ     Moving from lying on back to sitting on the side of a flat bed without bedrails? 3  -GJ     Moving to and from a bed to a chair (including a wheelchair)? 3  -GJ     Standing up from a chair using your arms (e.g., wheelchair, bedside chair)? 3  -GJ     Climbing 3-5 steps with a railing? 2  -GJ     To walk in hospital room? 3  -GJ     AM-PAC 6 Clicks Score (PT) 17  -GJ     Highest level of mobility 5 --> Static standing  -GJ       Row Name 09/19/23 1133          Functional Assessment    Outcome Measure Options AM-PAC 6 Clicks Daily Activity (OT)  -AR               User Key  (r) = Recorded By, (t) = Taken By, (c) = Cosigned By      Initials Name Provider Type    Patience Fu OT Occupational Therapist    Aleyda Still RN Registered Nurse                    Occupational Therapy Education       Title: PT OT SLP Therapies (Done)       Topic: Occupational Therapy (Done)       Point: ADL training (Done)       Description:   Instruct learner(s) on proper safety adaptation and remediation techniques during self care or transfers.   Instruct in proper use of assistive devices.                  Learning Progress Summary             Patient Tessy, JUVENTINO,TB,D,H, VU,NR by AR at  9/19/2023 1133    Eager, E,TB,D,H, VU,NR by AR at 9/18/2023 1722   Family Eager, E,TB,D,H, VU,NR by AR at 9/19/2023 1133    Eager, E,TB,D,H, VU,NR by AR at 9/18/2023 1722                         Point: Home exercise program (Done)       Description:   Instruct learner(s) on appropriate technique for monitoring, assisting and/or progressing therapeutic exercises/activities.                  Learning Progress Summary             Patient Eager, E,TB,D,H, VU,NR by AR at 9/19/2023 1133    Eager, E,TB,D,H, VU,NR by AR at 9/18/2023 1722   Family Eager, E,TB,D,H, VU,NR by AR at 9/19/2023 1133    Eager, E,TB,D,H, VU,NR by AR at 9/18/2023 1722                         Point: Precautions (Done)       Description:   Instruct learner(s) on prescribed precautions during self-care and functional transfers.                  Learning Progress Summary             Patient Eager, E,TB,D,H, VU,NR by AR at 9/19/2023 1133    Eager, E,TB,D,H, VU,NR by AR at 9/18/2023 1722   Family Eager, E,TB,D,H, VU,NR by AR at 9/19/2023 1133    Eager, E,TB,D,H, VU,NR by AR at 9/18/2023 1722                         Point: Body mechanics (Done)       Description:   Instruct learner(s) on proper positioning and spine alignment during self-care, functional mobility activities and/or exercises.                  Learning Progress Summary             Patient Eager, E,TB,D,H, VU,NR by AR at 9/19/2023 1133    Eager, E,TB,D,H, VU,NR by AR at 9/18/2023 1722   Family Eager, E,TB,D,H, VU,NR by AR at 9/19/2023 1133    Eager, E,TB,D,H, VU,NR by AR at 9/18/2023 1722                                         User Key       Initials Effective Dates Name Provider Type Discipline    AR 07/11/23 -  Patience Perez, OT Occupational Therapist OT                  OT Recommendation and Plan  Planned Therapy Interventions (OT): activity tolerance training, adaptive equipment training, BADL retraining, edema control/reduction, functional balance retraining, IADL retraining,  neuromuscular control/coordination retraining, occupation/activity based interventions, orthotic fabrication/fitting/training, passive ROM/stretching, patient/caregiver education/training, ROM/therapeutic exercise, transfer/mobility retraining  Therapy Frequency (OT): daily  Plan of Care Review  Plan of Care Reviewed With: patient, daughter  Progress: no change  Outcome Evaluation: OT educated pt on R shoulder precautions, ADL retraining to maintain, sling management and HEP. She tolerated R shoulder PROM FE 15, IR chest/ER 30. Pt limited with dyspnea and awaiting breating treatment, deferred out of bed activity. Pt limited with altered sensation, decreased motor control, dyspnea, BUE tremoring, NWB/immobilization RUE and is performing significantly below baseline status. Recommend IPR, pt and family in agreement.     Time Calculation:   Evaluation Complexity (OT)  Review Occupational Profile/Medical/Therapy History Complexity: brief/low complexity  Assessment, Occupational Performance/Identification of Deficit Complexity: 1-3 performance deficits  Clinical Decision Making Complexity (OT): problem focused assessment/low complexity  Overall Complexity of Evaluation (OT): low complexity     Time Calculation- OT       Row Name 09/19/23 1134             Time Calculation- OT    OT Start Time 1033  -AR      OT Received On 09/19/23  -AR      OT Goal Re-Cert Due Date 09/28/23  -AR         Timed Charges    00740 - OT Therapeutic Exercise Minutes 20  -AR      45511 - OT Self Care/Mgmt Minutes 19  -AR         Total Minutes    Timed Charges Total Minutes 39  -AR       Total Minutes 39  -AR                User Key  (r) = Recorded By, (t) = Taken By, (c) = Cosigned By      Initials Name Provider Type    Patience Fu OT Occupational Therapist                  Therapy Charges for Today       Code Description Service Date Service Provider Modifiers Qty    53297442630  OT SELF CARE/MGMT/TRAIN EA 15 MIN 9/18/2023 Ana  Patience Chávez, OT GO 1    06404318703 HC OT THERAPEUTIC ACT EA 15 MIN 9/18/2023 Patience Perez, OT GO 1    99511454120 HC OT THER PROC EA 15 MIN 9/18/2023 Patience Perez, OT GO 1    74080647821 HC OT EVAL LOW COMPLEXITY 3 9/18/2023 Patience Perez, OT GO 1    39785394793 HC OT THER SUPP EA 15 MIN 9/18/2023 Patience Perez, OT GO 3    02224397943 HC OT THER PROC EA 15 MIN 9/19/2023 Patience Perez, OT GO 2    19037788474 HC OT SELF CARE/MGMT/TRAIN EA 15 MIN 9/19/2023 Patience Perez, OT GO 1                 Patience Perez OT  9/19/2023

## 2023-09-19 NOTE — CASE MANAGEMENT/SOCIAL WORK
Discharge Planning Assessment  [facility]     Patient Name: Joanna Cross  MRN: [id number]  Today's Date: 9/19/2023    Admit Date: 9/18/2023    Plan: SNF   Discharge Needs Assessment       Row Name 09/19/23 1110       Living Environment    People in Home child(lisa), adult    Current Living Arrangements home    Potentially Unsafe Housing Conditions none    Primary Care Provided by self    Provides Primary Care For no one    Family Caregiver if Needed child(lisa), adult    Quality of Family Relationships helpful;involved;supportive       Transition Planning    Patient/Family Anticipates Transition to inpatient rehabilitation facility    Transportation Anticipated family or friend will provide       Discharge Needs Assessment    Equipment Currently Used at Home oxygen;other (see comments);commode;grab bar;shower chair;walker, rolling;cane, straight  Lift chair                   Discharge Plan       Row Name 09/19/23 1111       Plan    Plan SNF    Patient/Family in Agreement with Plan yes    Plan Comments I spoke with pt and pt's daughter/Silvana, on the phone, for IDP. PT/OT recommend IPR. Pt wants to go to [facility] in [place], [place]. I spoke with Annel/guero, she has access to EPIC. She will call CM back after reviewing. Pt's dtr/Silvana lives with her but works 12 hr shifts. They are trying to get someone to be able to come help pt on the days the dtr works. Pt is independent with ADL's. Pt has Parkinsons, has multiple DME, see list. Wears O2 HS 2L through [facility]. PRN 3L during day.PCP- Dr. Reynaldo Fritz. Confirmed [facility] Medicare. D/C plan is SNF. Family will transport at D/C.  will continue to follow.    Final Discharge Disposition Code 03 - skilled nursing facility (SNF)                  Continued Care and Services - Admitted Since 9/18/2023       Destination       Service Provider Request Status Selected Services Address Phone Fax Patient Preferred    [facility] Pending -  No Request Sent N/A [address] [facility] [address] [id number] [phone] --              Durable Medical Equipment       Service Provider Request Status Selected Services Address Phone Fax Patient Preferred    [facility]  Selected Oxygen Equipment and Accessories [address] [address] [phone] --                     Demographic Summary       Row Name 09/19/23 1109       General Information    Admission Type other (see comments)  Outpatient    Reason for Consult discharge planning    Preferred Language English       Contact Information    Permission Granted to Share Info With                    Functional Status       Row Name 09/19/23 1110       Functional Status    Usual Activity Tolerance moderate       Functional Status, IADL    Medications independent    Meal Preparation independent    Housekeeping independent    Laundry independent    Shopping independent                   Psychosocial    No documentation.                  Abuse/Neglect    No documentation.                  Legal    No documentation.                  Substance Abuse    No documentation.                  Patient Forms    No documentation.                     Faye Vera RN

## 2023-09-19 NOTE — PROGRESS NOTES
IM progress note      Joanna Cross  [id number]  1950     LOS: 0 days     Attending: Sammy Yo MD    Primary Care Provider: Reynaldo Fritz MD      Chief Complaint/Reason for visit:  No chief complaint on file.      Subjective    Feels ok. Some axillary pain. No n/vom. Mild dyspnea. On NC oxygen( baseline for pt)      Objective        Visit Vitals  /89   Pulse 93   Temp 97.5 °F (36.4 °C) (Axillary)   Resp 18   LMP  (LMP Unknown) Comment: Mammogram- 20   SpO2 96%     Temp (24hrs), Av.5 °F (36.4 °C), Min:97 °F (36.1 °C), Max:98.6 °F (37 °C)      Intake/Output:    Intake/Output Summary (Last 24 hours) at 2023 1040  Last data filed at 2023 0922  Gross per 24 hour   Intake 1520 ml   Output --   Net 1520 ml        OT:      Goal Outcome Evaluation:  Plan of Care Reviewed With: patient, daughter  Progress: no change  Outcome Evaluation: OT educated pt on R shoulder precautions, ADL retraining to maintain, sling management and HEP. She tolerated R shoulder PROM FE 15, IR chest/ER 30. Pt limited with dyspnea and awaiting breating treatment, deferred out of bed activity. Pt limited with altered sensation, decreased motor control, dyspnea, BUE tremoring, NWB/immobilization RUE and is performing significantly below baseline status. Recommend IPR, pt and family in agreement.        Anticipated Discharge Disposition (OT): inpatient rehabilitation facility                 Physical Exam:     General Appearance:    Alert, cooperative, mildly tachypneic.    Head:    Normocephalic, without obvious abnormality, atraumatic    Lungs:     Normal effort, symmetric chest rise,  clear to      auscultation bilaterally              Heart:    Regular rhythm and normal rate, normal S1 and S2    Abdomen:     Normal bowel sounds, no masses, no organomegaly, soft        non-tender, non-distended, no guarding, no rebound                tenderness   Extremities:   CDI dressing over left shoulder.   PNB  cath present , interscalene.   Moves hand and fingers well.   No clubbing, cyanosis or edema.  No deformities.    Pulses:   Pulses palpable and equal bilaterally   Skin:   No bleeding, bruising or rash          Results Review:     I reviewed the patient's new clinical results.   Results from last 7 days   Lab Units 09/19/23  0557   WBC 10*3/mm3 7.68   HEMOGLOBIN g/dL 9.1*   HEMATOCRIT % 28.2*   PLATELETS 10*3/mm3 124*     Results from last 7 days   Lab Units 09/19/23  0557   SODIUM mmol/L 140   POTASSIUM mmol/L 3.6   CHLORIDE mmol/L 109*   CO2 mmol/L 22.0   BUN mg/dL 19   CREATININE mg/dL 0.89   CALCIUM mg/dL 8.4*   GLUCOSE mg/dL 84     I reviewed the patient's new imaging including images and reports.   Latest Reference Range & Units 09/18/23 14:34 09/18/23 16:55 09/18/23 20:40 09/19/23 05:57 09/19/23 07:51   Glucose 70 - 130 mg/dL 148 (H) 155 (H) 158 (H) 84 96   (H): Data is abnormally high  All medications reviewed.   amantadine, 100 mg, Oral, BID  aspirin, 81 mg, Oral, Daily  bumetanide, 1 mg, Oral, Daily  carbidopa-levodopa, 2 tablet, Oral, Q AM   And  carbidopa-levodopa, 1 tablet, Oral, 5x Daily  citalopram, 20 mg, Oral, Nightly  dofetilide, 500 mcg, Oral, Q12H  insulin detemir, 12 Units, Subcutaneous, Nightly  insulin lispro, 2-7 Units, Subcutaneous, 4x Daily AC & at Bedtime  levothyroxine, 175 mcg, Oral, Q AM  metOLazone, 2.5 mg, Oral, Daily  pantoprazole, 40 mg, Oral, Daily  pramipexole, 1.5 mg, Oral, Nightly  ranolazine, 500 mg, Oral, Q12H  spironolactone, 50 mg, Oral, Daily  valsartan, 160 mg, Oral, BID      acetaminophen, 650 mg, Q4H PRN   Or  acetaminophen, 650 mg, Q4H PRN  albuterol, 2.5 mg, Q4H PRN  dextrose, 25 g, Q15 Min PRN  dextrose, 15 g, Q15 Min PRN  glucagon (human recombinant), 1 mg, Q15 Min PRN  HYDROmorphone, 0.5 mg, Q2H PRN   And  naloxone, 0.1 mg, Q5 Min PRN  labetalol, 10 mg, Q4H PRN  oxyCODONE, 5 mg, Q4H PRN  sodium chloride, 500 mL, TID PRN        Assessment & Plan       Status post  reverse arthroplasty of right shoulder    Hypertension, essential    GERD (gastroesophageal reflux disease)    Hypothyroidism    Parkinson's disease    MILLI treated with BiPAP - Patient reports compliance.     Atrial fibrillation    Cardiac pacemaker in situ    Chronic kidney disease, stage 3a         Plan   1. PT/OT. NWB, left UE, ROM hand, wrist, elbow.  2. Pain control-prns, interscalene nerve block cath with ropivacaine infusion.           3. IS-encourage  4. DVT proph- Mech/ mobilize.  5. Bowel regimen  6. Resume home medications as appropriate  7. Monitor post-op labs  8. DC planning, likely inpatient rehab will be required   consultation.     -DM type 2  Hgb A1C 5.8  Hold OHA as appropriate  FSBG AC/HS, ( q 6 when NPO), along with correction humalog.  Long acting insulin         - Hypertension:  Resume home medications as appropriate, formulary substitution when indicated.  Holding parameters.  Prn medications for elevated blood pressure.     -Parkinson's:  Resume home regimen, Sinemet.     -MILLI:  Continue CPAP.   Monitor O2 sats.     -CKD:  Monitor renal function, avoid nephrotoxic agents.  Maintain adequate volume status..     -Atrial fibrillation  Hx:   Resume anticoagulation postop day 1.  Resume antiarrhythmics, Tikosyn.     -MILLI:  Continue CPAP.   Monitor O2 sats.    -Home O2, resumed.  PRN Albuterol.       Angelica Patten MD  09/19/23  10:40 EDT

## 2023-09-19 NOTE — PLAN OF CARE
Goal Outcome Evaluation:  Plan of Care Reviewed With: patient, daughter        Progress: no change  Outcome Evaluation: OT educated pt on R shoulder precautions, ADL retraining to maintain, sling management and HEP. She tolerated R shoulder PROM FE 15, IR chest/ER 30. Pt limited with dyspnea and awaiting breating treatment, deferred out of bed activity. Pt limited with altered sensation, decreased motor control, dyspnea, BUE tremoring, NWB/immobilization RUE and is performing significantly below baseline status. Recommend IPR, pt and family in agreement.      Anticipated Discharge Disposition (OT): inpatient rehabilitation facility

## 2023-09-20 LAB
GLUCOSE BLDC GLUCOMTR-MCNC: 128 MG/DL (ref 70–130)
GLUCOSE BLDC GLUCOMTR-MCNC: 163 MG/DL (ref 70–130)
GLUCOSE BLDC GLUCOMTR-MCNC: 168 MG/DL (ref 70–130)
GLUCOSE BLDC GLUCOMTR-MCNC: 218 MG/DL (ref 70–130)
HCT VFR BLD AUTO: 25.7 % (ref 34–46.6)
HGB BLD-MCNC: 8.2 G/DL (ref 12–15.9)

## 2023-09-20 PROCEDURE — A9270 NON-COVERED ITEM OR SERVICE: HCPCS | Performed by: INTERNAL MEDICINE

## 2023-09-20 PROCEDURE — 63710000001 LEVOTHYROXINE 125 MCG TABLET: Performed by: INTERNAL MEDICINE

## 2023-09-20 PROCEDURE — A9270 NON-COVERED ITEM OR SERVICE: HCPCS | Performed by: ORTHOPAEDIC SURGERY

## 2023-09-20 PROCEDURE — 94664 DEMO&/EVAL PT USE INHALER: CPT

## 2023-09-20 PROCEDURE — 94799 UNLISTED PULMONARY SVC/PX: CPT

## 2023-09-20 PROCEDURE — 63710000001 PANTOPRAZOLE 40 MG TABLET DELAYED-RELEASE: Performed by: INTERNAL MEDICINE

## 2023-09-20 PROCEDURE — 63710000001 VALSARTAN 160 MG TABLET: Performed by: INTERNAL MEDICINE

## 2023-09-20 PROCEDURE — A9270 NON-COVERED ITEM OR SERVICE: HCPCS

## 2023-09-20 PROCEDURE — 63710000001 CITALOPRAM 20 MG TABLET: Performed by: INTERNAL MEDICINE

## 2023-09-20 PROCEDURE — 63710000001 ASPIRIN 81 MG TABLET DELAYED-RELEASE: Performed by: INTERNAL MEDICINE

## 2023-09-20 PROCEDURE — 63710000001 LEVOTHYROXINE 50 MCG TABLET: Performed by: INTERNAL MEDICINE

## 2023-09-20 PROCEDURE — 63710000001 OXYCODONE 5 MG TABLET: Performed by: ORTHOPAEDIC SURGERY

## 2023-09-20 PROCEDURE — 97110 THERAPEUTIC EXERCISES: CPT

## 2023-09-20 PROCEDURE — 63710000001 APIXABAN 5 MG TABLET: Performed by: INTERNAL MEDICINE

## 2023-09-20 PROCEDURE — 63710000001 PRAMIPEXOLE 0.25 MG TABLET: Performed by: INTERNAL MEDICINE

## 2023-09-20 PROCEDURE — 63710000001 AMANTADINE 100 MG CAPSULE: Performed by: INTERNAL MEDICINE

## 2023-09-20 PROCEDURE — 85018 HEMOGLOBIN: CPT | Performed by: NURSE PRACTITIONER

## 2023-09-20 PROCEDURE — 94660 CPAP INITIATION&MGMT: CPT

## 2023-09-20 PROCEDURE — 97535 SELF CARE MNGMENT TRAINING: CPT

## 2023-09-20 PROCEDURE — 63710000001 CARBIDOPA-LEVODOPA 25-100 MG TABLET

## 2023-09-20 PROCEDURE — 97530 THERAPEUTIC ACTIVITIES: CPT

## 2023-09-20 PROCEDURE — 63710000001 INSULIN DETEMIR PER 5 UNITS: Performed by: INTERNAL MEDICINE

## 2023-09-20 PROCEDURE — 63710000001 PRAMIPEXOLE 1 MG TABLET: Performed by: INTERNAL MEDICINE

## 2023-09-20 PROCEDURE — 85014 HEMATOCRIT: CPT | Performed by: NURSE PRACTITIONER

## 2023-09-20 PROCEDURE — 63710000001 INSULIN LISPRO (HUMAN) PER 5 UNITS: Performed by: INTERNAL MEDICINE

## 2023-09-20 PROCEDURE — 63710000001 BUMETANIDE 1 MG TABLET: Performed by: INTERNAL MEDICINE

## 2023-09-20 PROCEDURE — 82948 REAGENT STRIP/BLOOD GLUCOSE: CPT

## 2023-09-20 PROCEDURE — 63710000001 ACETAMINOPHEN 325 MG TABLET: Performed by: ORTHOPAEDIC SURGERY

## 2023-09-20 PROCEDURE — 63710000001 RANOLAZINE 500 MG TABLET SUSTAINED-RELEASE 12 HOUR: Performed by: INTERNAL MEDICINE

## 2023-09-20 PROCEDURE — 63710000001 DOFETILIDE 500 MCG CAPSULE: Performed by: INTERNAL MEDICINE

## 2023-09-20 RX ORDER — ROPIVACAINE HYDROCHLORIDE 2 MG/ML
INJECTION, SOLUTION EPIDURAL; INFILTRATION; PERINEURAL CONTINUOUS
Status: DISCONTINUED | OUTPATIENT
Start: 2023-09-20 | End: 2023-09-21 | Stop reason: HOSPADM

## 2023-09-20 RX ADMIN — INSULIN LISPRO 2 UNITS: 100 INJECTION, SOLUTION INTRAVENOUS; SUBCUTANEOUS at 12:05

## 2023-09-20 RX ADMIN — CARBIDOPA AND LEVODOPA 2 TABLET: 25; 100 TABLET ORAL at 05:56

## 2023-09-20 RX ADMIN — CARBIDOPA AND LEVODOPA 1 TABLET: 25; 100 TABLET ORAL at 16:44

## 2023-09-20 RX ADMIN — BUMETANIDE 1 MG: 1 TABLET ORAL at 09:12

## 2023-09-20 RX ADMIN — PANTOPRAZOLE SODIUM 40 MG: 40 TABLET, DELAYED RELEASE ORAL at 09:11

## 2023-09-20 RX ADMIN — CARBIDOPA AND LEVODOPA 1 TABLET: 25; 100 TABLET ORAL at 11:08

## 2023-09-20 RX ADMIN — ASPIRIN 81 MG: 81 TABLET, COATED ORAL at 09:12

## 2023-09-20 RX ADMIN — AMANTADINE HYDROCHLORIDE 100 MG: 100 CAPSULE ORAL at 09:12

## 2023-09-20 RX ADMIN — AMANTADINE HYDROCHLORIDE 100 MG: 100 CAPSULE ORAL at 21:34

## 2023-09-20 RX ADMIN — DOFETILIDE 500 MCG: 0.5 CAPSULE ORAL at 09:12

## 2023-09-20 RX ADMIN — PRAMIPEXOLE DIHYDROCHLORIDE 1.5 MG: 1 TABLET ORAL at 21:33

## 2023-09-20 RX ADMIN — CARBIDOPA AND LEVODOPA 1 TABLET: 25; 100 TABLET ORAL at 18:23

## 2023-09-20 RX ADMIN — OXYCODONE HYDROCHLORIDE 5 MG: 5 TABLET ORAL at 01:46

## 2023-09-20 RX ADMIN — CITALOPRAM HYDROBROMIDE 20 MG: 20 TABLET ORAL at 21:34

## 2023-09-20 RX ADMIN — INSULIN LISPRO 2 UNITS: 100 INJECTION, SOLUTION INTRAVENOUS; SUBCUTANEOUS at 16:44

## 2023-09-20 RX ADMIN — ACETAMINOPHEN 650 MG: 325 TABLET ORAL at 11:08

## 2023-09-20 RX ADMIN — CARBIDOPA AND LEVODOPA 1 TABLET: 25; 100 TABLET ORAL at 21:34

## 2023-09-20 RX ADMIN — INSULIN LISPRO 3 UNITS: 100 INJECTION, SOLUTION INTRAVENOUS; SUBCUTANEOUS at 22:04

## 2023-09-20 RX ADMIN — VALSARTAN 160 MG: 160 TABLET, FILM COATED ORAL at 09:12

## 2023-09-20 RX ADMIN — INSULIN DETEMIR 12 UNITS: 100 INJECTION, SOLUTION SUBCUTANEOUS at 22:04

## 2023-09-20 RX ADMIN — RANOLAZINE 500 MG: 500 TABLET, FILM COATED, EXTENDED RELEASE ORAL at 09:12

## 2023-09-20 RX ADMIN — CARBIDOPA AND LEVODOPA 1 TABLET: 25; 100 TABLET ORAL at 09:11

## 2023-09-20 RX ADMIN — VALSARTAN 160 MG: 160 TABLET, FILM COATED ORAL at 21:34

## 2023-09-20 RX ADMIN — APIXABAN 5 MG: 5 TABLET, FILM COATED ORAL at 21:35

## 2023-09-20 RX ADMIN — ALBUTEROL SULFATE 2.5 MG: 2.5 SOLUTION RESPIRATORY (INHALATION) at 17:21

## 2023-09-20 RX ADMIN — APIXABAN 5 MG: 5 TABLET, FILM COATED ORAL at 09:11

## 2023-09-20 RX ADMIN — LEVOTHYROXINE SODIUM 175 MCG: 125 TABLET ORAL at 05:56

## 2023-09-20 RX ADMIN — DOFETILIDE 500 MCG: 0.5 CAPSULE ORAL at 21:34

## 2023-09-20 RX ADMIN — ALBUTEROL SULFATE 2.5 MG: 2.5 SOLUTION RESPIRATORY (INHALATION) at 09:47

## 2023-09-20 RX ADMIN — RANOLAZINE 500 MG: 500 TABLET, FILM COATED, EXTENDED RELEASE ORAL at 21:34

## 2023-09-20 NOTE — CASE MANAGEMENT/SOCIAL WORK
Continued Stay Note  [facility]     Patient Name: Joanna Cross  MRN: [id number]  Today's Date: 9/20/2023    Admit Date: 9/18/2023    Plan: SNF   Discharge Plan       Row Name 09/20/23 1146       Plan    Plan Comments [facility] does not take pts insurance. Referral given to April with [facility]                   Discharge Codes    No documentation.                       Jazmin Ludwig RN

## 2023-09-20 NOTE — PLAN OF CARE
Problem: Adult Inpatient Plan of Care  Goal: Plan of Care Review  Outcome: Ongoing, Progressing  Goal: Patient-Specific Goal (Individualized)  Outcome: Ongoing, Progressing  Goal: Absence of Hospital-Acquired Illness or Injury  Outcome: Ongoing, Progressing  Intervention: Identify and Manage Fall Risk  Recent Flowsheet Documentation  Taken 9/20/2023 0000 by Bhavani Ruiz RN  Safety Promotion/Fall Prevention: activity supervised  Taken 9/19/2023 2200 by Bhavani Ruiz RN  Safety Promotion/Fall Prevention: activity supervised  Intervention: Prevent Skin Injury  Recent Flowsheet Documentation  Taken 9/20/2023 0000 by Bhavani Ruiz RN  Body Position: neutral body alignment  Taken 9/19/2023 2200 by Bhavani Ruiz RN  Body Position: neutral body alignment  Skin Protection: adhesive use limited  Intervention: Prevent and Manage VTE (Venous Thromboembolism) Risk  Recent Flowsheet Documentation  Taken 9/20/2023 0000 by Bhavani Ruiz RN  VTE Prevention/Management:   bilateral   sequential compression devices on  Taken 9/19/2023 2200 by Bhavani Ruiz RN  Activity Management: ambulated to bathroom  VTE Prevention/Management:   bilateral   sequential compression devices on  Intervention: Prevent Infection  Recent Flowsheet Documentation  Taken 9/20/2023 0000 by Bhavani Ruiz RN  Infection Prevention: environmental surveillance performed  Taken 9/19/2023 2200 by Bhavani Ruiz RN  Infection Prevention: environmental surveillance performed  Goal: Optimal Comfort and Wellbeing  Outcome: Ongoing, Progressing  Intervention: Provide Person-Centered Care  Recent Flowsheet Documentation  Taken 9/20/2023 0000 by Bhavani Ruiz RN  Trust Relationship/Rapport: care explained  Taken 9/19/2023 2200 by Bhavani Ruiz RN  Trust Relationship/Rapport: care explained  Goal: Readiness for Transition of Care  Outcome: Ongoing, Progressing     Problem: Skin Injury Risk Increased  Goal: Skin Health and Integrity  Outcome: Ongoing,  Progressing  Intervention: Optimize Skin Protection  Recent Flowsheet Documentation  Taken 9/20/2023 0000 by Bhavani Ruiz RN  Head of Bed (HOB) Positioning: HOB elevated  Taken 9/19/2023 2200 by Bhavani Ruiz RN  Pressure Reduction Techniques: weight shift assistance provided  Head of Bed (HOB) Positioning: HOB elevated  Pressure Reduction Devices: pressure-redistributing mattress utilized  Skin Protection: adhesive use limited     Problem: Fall Injury Risk  Goal: Absence of Fall and Fall-Related Injury  Outcome: Ongoing, Progressing  Intervention: Identify and Manage Contributors  Recent Flowsheet Documentation  Taken 9/19/2023 2200 by Bhavani Ruiz RN  Medication Review/Management: medications reviewed  Intervention: Promote Injury-Free Environment  Recent Flowsheet Documentation  Taken 9/20/2023 0000 by Bhavani Ruiz RN  Safety Promotion/Fall Prevention: activity supervised  Taken 9/19/2023 2200 by Bhavani Ruiz RN  Safety Promotion/Fall Prevention: activity supervised     Problem: Diabetes Comorbidity  Goal: Blood Glucose Level Within Targeted Range  Outcome: Ongoing, Progressing  Intervention: Monitor and Manage Glycemia  Recent Flowsheet Documentation  Taken 9/19/2023 2200 by Bhavani Ruiz RN  Glycemic Management: blood glucose monitored   Goal Outcome Evaluation:

## 2023-09-20 NOTE — THERAPY TREATMENT NOTE
Patient Name: Joanna Cross  : 1950    MRN: [id number]                              Today's Date: 2023       Admit Date: 2023    Visit Dx: No diagnosis found.  Patient Active Problem List   Diagnosis    Coronary artery disease involving native coronary artery without angina pectoris    Hypertension, essential    Peripheral vascular disease    Hyperlipidemia LDL goal <70    Spinal stenosis, lumbar region, with neurogenic claudication    Delayed surgical wound healing    Biceps tendinitis    Overactive bladder    GERD (gastroesophageal reflux disease)    Hypothyroidism    Lichen sclerosus    Parkinson's disease    MILLI treated with BiPAP - Patient reports compliance.     History of respiratory failure - prior respiratory failure requiring mechanical ventilation, open lung biopsy non-specific.     Atrial fibrillation    CKD (chronic kidney disease)    Tachy-thai syndrome    Long term current use of antiarrhythmic drug    History of adenomatous polyp of colon    Anemia    Angiodysplasia    Hx of colonic polyps    Body mass index 40.0-44.9, adult    Cardiac pacemaker in situ    Chronic low back pain    Chronic lung disease    Degeneration of lumbar intervertebral disc    Edema of lower extremity    High risk medication use    History of malignant neoplasm of skin    History of TIA (transient ischemic attack)    Hyponatremia    Hypotonic bladder    Incomplete tear of rotator cuff    Major depression in remission    Migraine    Weakness    Tinnitus of both ears    Primary osteoarthritis involving multiple joints    Restless legs    Seborrheic dermatitis    Sphenoid sinusitis    Transient cerebral ischemia    Urinary tract infection    Urinary urgency    Type 2 diabetes mellitus with hyperglycemia, without long-term current use of insulin    Vitamin B12 deficiency    Vitamin D deficiency    Chronic kidney disease, stage 3b    Status post reverse total replacement of left shoulder    Postoperative pain     Dysuria    Rash    Pruritus    Acute post-traumatic headache, not intractable    History of recent fall    Chronic kidney disease, stage 3a    Status post reverse arthroplasty of right shoulder     Past Medical History:   Diagnosis Date    Anemia     Ankle problem     thinks back related causing pain     Atrial fibrillation     AVM (arteriovenous malformation)     Back pain     Chronic kidney disease     stage 3 per pt    CKD (chronic kidney disease)     Clotting disorder 2016    AVM - small intestine    COVID-19 vaccine series completed     Gastrocnemius muscle tear     left medial 91    Generalized osteoarthritis     GERD (gastroesophageal reflux disease)     Gestational diabetes     GIB (gastrointestinal bleeding) 2016    d/t xarelto     Headache     Hearing decreased, bilateral     HAS HEARING AID, NOT WEARING IT CURRENTLY    Hiatal hernia     History of shingles     History of transfusion 2016    no reaction recalled     Hypertension     Hypothyroidism     IBS (irritable bowel syndrome)     Klebsiella pneumonia     Lichen sclerosus     Lupus     subQ    Mouth problem     mouth guard used since pt bites tongue and lips excessively at night if not- with bipap     MRSA infection 2018    Obesity     On home oxygen therapy     2L of oxygen all the time due to current congestion     Osteoporosis     Parkinson's disease     Peripheral vascular disease     Pleurisy     Pneumonia     Puerperal sepsis with acute hypoxic respiratory failure     emergent intubation- 2016    Right leg pain     from back issues     Salivary gland stone     Sciatic nerve pain     Seborrheic dermatitis     Skin cancer     on back     Sleep apnea     CPAP AND HOME O2 2L/M    TIA (transient ischemic attack) 2014    no residual effects    Type 2 diabetes mellitus     UTI (urinary tract infection)     Vitamin D deficiency 09/08/2022    Wears glasses      Past Surgical History:   Procedure Laterality Date    ABLATION OF DYSRHYTHMIC FOCUS   05/16/13    Laser Ablation - Rt Leg    BACK SURGERY      l4-l5 laminectomy     BICEPS TENDON REPAIR Right     shoulder    BREAST BIOPSY Left 05/2004    excisional, benign    BRONCHOSCOPY RIGID / FLEXIBLE      2016    BUNIONECTOMY Right     CARDIAC CATHETERIZATION      CARDIAC CATHETERIZATION N/A 02/01/2019    Procedure: Left Heart Cath;  Surgeon: Albertina Corona MD;  Location: [facility] CATH INVASIVE LOCATION;  Service: Cardiology    CHOLECYSTECTOMY      COLONOSCOPY  2016    COLONOSCOPY N/A 06/17/2021    Procedure: COLONOSCOPY WITH POLYPECTOMY;  Surgeon: Trenton Sosa MD;  Location: [facility] ENDOSCOPY;  Service: Gastroenterology;  Laterality: N/A;    CORONARY STENT PLACEMENT      x1 stent    CYST REMOVAL      left ear, upper left back 2001    CYSTOSCOPY      x2  18 and 20 -   in 20 urethra dilation     DIAGNOSTIC LAPAROSCOPY  1981    ENDOSCOPIC FUNCTIONAL SINUS SURGERY (FESS)  2011    ENDOSCOPY  2016    ENTEROSCOPY VIA STOMA      with single ballon with fluoro     HAMMER TOE REPAIR Left     INCISION AND DRAINAGE OF WOUND  2018    back with wound infection     INSERT / REPLACE / REMOVE PACEMAKER  11/10/16    [facility]    JOINT REPLACEMENT      KNEE ARTHROSCOPY      LASER ABLATION      right leg 13    LIPOMA EXCISION  1999    left leg     LUMBAR LAMINECTOMY DISCECTOMY DECOMPRESSION N/A 07/06/2018    Procedure: LUMBAR LAMINECTOMY L4-5, HEMILAMIINECTOMY RIGHT L5-S1, FORAMINOTOMY L5-S1;  Surgeon: Lencho Gamino MD;  Location:  [facility];  Service: Neurosurgery    LUMBAR LAMINECTOMY DISCECTOMY DECOMPRESSION N/A 09/19/2018    Procedure: INCISION AND DRAINAGE BACK WITH WOUND EXPLORATION;  Surgeon: Ritchie García MD;  Location:  [facility];  Service: Neurosurgery    LUNG BIOPSY Left 2016    OTHER SURGICAL HISTORY      ct scan of chest and sinuses and lower back     OTHER SURGICAL HISTORY      various echos     OTHER SURGICAL HISTORY      electroencephalogram 16,   emg  ncv tests 2007    OTHER  SURGICAL HISTORY      mra 2007  and various mri with xrays, nuclear medicine lung ventilation with perfusion test 2016 with pft     OTHER SURGICAL HISTORY      barium swallow testing 15, 12, 12    OTHER SURGICAL HISTORY      vaginal ultrasound- 2012,   vas clementina lower extrem 2016, vas venous duplex lower extrem bilat 2019    OTHER SURGICAL HISTORY      wdge biopsy spring of lung     OTHER SURGICAL HISTORY      emergent intubation- hypoxic resp failure     PACEMAKER IMPLANTATION  11/2016    sss    REPLACEMENT TOTAL KNEE Bilateral     left knee 2011, right 2012 per dr acuna     REPLACEMENT TOTAL KNEE Bilateral     SHOULDER ARTHROSCOPY Bilateral     2003-left, 2004- right     SKIN BIOPSY      skin cancer back 2016    SKIN CANCER EXCISION      upper righ tback     MADHAV      TEETH EXTRACTION      x2    TOTAL SHOULDER ARTHROPLASTY W/ DISTAL CLAVICLE EXCISION Left 10/24/2022    Procedure: REVERSE TOTAL SHOULDER ARTHROPLASTY, BICEPS TENODESIS - LEFT;  Surgeon: Sammy Yo MD;  Location: [facility] OR;  Service: Orthopedics;  Laterality: Left;    TOTAL SHOULDER ARTHROPLASTY W/ DISTAL CLAVICLE EXCISION Right 9/18/2023    Procedure: REVERSE TOTAL SHOULDER  ARTHROPLASTY WITH BICEPS TENODESIS - RIGHT;  Surgeon: Sammy Yo MD;  Location: [facility] OR;  Service: Orthopedics;  Laterality: Right;    TUBAL ABDOMINAL LIGATION Bilateral 1980    WISDOM TOOTH EXTRACTION        General Information       Row Name 09/20/23 1458          OT Time and Intention    Document Type therapy note (daily note)  -KF     Mode of Treatment occupational therapy;individual therapy  -KF       Row Name 09/20/23 1455          General Information    Patient Profile Reviewed yes  -KF     Existing Precautions/Restrictions fall;oxygen therapy device and L/min;non-weight bearing;right;shoulder  nerve cath, parkinsons, 2L NC  -KF     Barriers to Rehab medically complex;previous functional deficit  -KF       Row Name 09/20/23 1452          Cognition     Orientation Status (Cognition) oriented x 4  -       Row Name 09/20/23 1458          Safety Issues, Functional Mobility    Safety Issues Affecting Function (Mobility) insight into deficits/self-awareness;safety precaution awareness;safety precautions follow-through/compliance  -     Impairments Affecting Function (Mobility) coordination;endurance/activity tolerance;pain;range of motion (ROM);sensation/sensory awareness;shortness of breath;strength  -               User Key  (r) = Recorded By, (t) = Taken By, (c) = Cosigned By      Initials Name Provider Type    [place] OT Occupational Therapist                     Mobility/ADL's       Row Name 09/20/23 1459          Bed Mobility    Bed Mobility sit-supine  -     Sit-Supine [place] (Bed Mobility) moderate assist (50% patient effort);1 person assist  -       Row Name 09/20/23 1459          Transfers    Transfers sit-stand transfer;stand-sit transfer;toilet transfer  -     Comment, (Transfers) Increased time and effort needed to complete STS and steps to BSC. Pt reported SOA, though SpO2 remained >92%. HR elevated to 92bpm. PLB education and multiple rest breaks taken.  -       Row Name 09/20/23 1459          Sit-Stand Transfer    Sit-Stand [place] (Transfers) contact guard;1 person assist  -     Assistive Device (Sit-Stand Transfers) other (see comments)  [facility]       Row Name 09/20/23 1459          Stand-Sit Transfer    Stand-Sit [place] (Transfers) contact guard;1 person assist  -     Assistive Device (Stand-Sit Transfers) other (see comments)  [facility]ATH[facility]       Row Name 09/20/23 1459          Toilet Transfer    Type (Toilet Transfer) sit-stand;stand-sit  -     [place] Level (Toilet Transfer) minimum assist (75% patient effort);1 person assist  -     Assistive Device (Toilet Transfer) other (see comments)  [facility]ATH[facility]       Row Name 09/20/23 1455          Activities of Daily Living    BADL  Assessment/Intervention toileting  -       Row Name 09/20/23 1459          Mobility    Extremity Weight-bearing Status right upper extremity  -     Left Upper Extremity (Weight-bearing Status) non weight-bearing (NWB)  -       Row Name 09/20/23 1459          Toileting Assessment/Training    [place] Level (Toileting) maximum assist (25% patient effort)  -     Assistive Devices (Toileting) commode, bedside without drop arms  -     Position (Toileting) unsupported sitting;supported standing  -               User Key  (r) = Recorded By, (t) = Taken By, (c) = Cosigned By      Initials Name Provider Type    KF Cindy Rhodes OT Occupational Therapist                   Obj/Interventions       Row Name 09/20/23 1502          Shoulder (Therapeutic Exercise)    Shoulder AROM (Therapeutic Exercise) bilateral;scapular retraction;supine;10 repetitions  -     Shoulder PROM (Therapeutic Exercise) right;flexion;extension;external rotation;internal rotation;supine;10 repetitions  -       Row Name 09/20/23 1502          Elbow/Forearm (Therapeutic Exercise)    Elbow/Forearm AAROM (Therapeutic Exercise) right;flexion;extension;supination;pronation;supine;10 repetitions  -       Row Name 09/20/23 1502          Wrist (Therapeutic Exercise)    Wrist AROM (Therapeutic Exercise) right;flexion;extension;10 repetitions  -[facility] Name 09/20/23 1502          Hand (Therapeutic Exercise)    Hand AROM/AAROM (Therapeutic Exercise) AROM (active range of motion);right;finger flexion;finger extension;10 repetitions  -       Row Name 09/20/23 1502          Motor Skills    Therapeutic Exercise shoulder;elbow/forearm;wrist;hand  -[facility] Name 09/20/23 1502          Balance    Balance Assessment sitting static balance;sitting dynamic balance;standing static balance;standing dynamic balance  -     Static Sitting Balance standby assist  -     Dynamic Sitting Balance standby assist  -     Position, Sitting Balance  sitting in chair  -KF     Static Standing Balance contact guard  -KF     Dynamic Standing Balance contact guard  -KF     Position/Device Used, Standing Balance other (see comments)  LUE HHA  -KF     Balance Interventions sitting;standing;supported;static;dynamic;occupation based/functional task  -KF               User Key  (r) = Recorded By, (t) = Taken By, (c) = Cosigned By      Initials Name Provider Type    KF Cindy Rhodes OT Occupational Therapist                   Goals/Plan    No documentation.                  Clinical Impression       Row Name 09/20/23 1502          Pain Assessment    Pretreatment Pain Rating 5/10  -KF     Posttreatment Pain Rating 5/10  -KF     Pain Location - Side/Orientation Right  -KF     Pain Location generalized  -KF     Pain Location - shoulder  -KF     Pain Intervention(s) Repositioned;Ambulation/increased activity;Cold applied  -KF       Row Name 09/20/23 7575          Plan of Care Review    Plan of Care Reviewed With patient;daughter  -KF     Progress no change  -KF     Outcome Evaluation Pt tolerated OT intervention well. Pt performed transfer from chair>BSC>bed with RUE HHAx1. Pt with no LOB, though did report increased SOA. SpO2 remained >92% throughout session, multiple rest breaks taken. HR elevated to 92bpm during transfer. OT reviewed L shoulder precautions, ADL retraining, and HEP. Pt tolerated HEP as designated by surgeon well completing PROM FE to 90 and IR to chest/ER to 15. Pt continues to present below her functional baseline. Recommend a d/c to IRF for best functional outcome.  -KF       Row Name 09/20/23 4738          Therapy Assessment/Plan (OT)    Rehab Potential (OT) good, to achieve stated therapy goals  -KF     Criteria for Skilled Therapeutic Interventions Met (OT) yes  -KF     Therapy Frequency (OT) daily  -KF       Row Name 09/20/23 2708          Therapy Plan Review/Discharge Plan (OT)    Anticipated Discharge Disposition (OT) inpatient rehabilitation  facility  -KF       Row Name 09/20/23 1504          Vital Signs    Pretreatment Heart Rate (beats/min) 75  -KF     Intratreatment Heart Rate (beats/min) 92  -KF     Posttreatment Heart Rate (beats/min) 77  -KF     Pre SpO2 (%) 93  -KF     O2 Delivery Pre Treatment nasal cannula  -KF     Intra SpO2 (%) 92  -KF     O2 Delivery Intra Treatment nasal cannula  -KF     Post SpO2 (%) 99  -KF     O2 Delivery Post Treatment nasal cannula  -KF     Pre Patient Position Sitting  -KF     Intra Patient Position Standing  -KF     Post Patient Position Supine  -KF       Row Name 09/20/23 1504          Positioning and Restraints    Pre-Treatment Position sitting in chair/recliner  -KF     Post Treatment Position bed  -KF     In Bed supine;call light within reach;encouraged to call for assist;exit alarm on;with family/caregiver;SCD pump applied;with brace  -KF               User Key  (r) = Recorded By, (t) = Taken By, (c) = Cosigned By      Initials Name Provider Type    KF Cindy Rhodes, MONICA Occupational Therapist                   Outcome Measures       Row Name 09/20/23 1509          How much help from another is currently needed...    Putting on and taking off regular lower body clothing? 1  -KF     Bathing (including washing, rinsing, and drying) 2  -KF     Toileting (which includes using toilet bed pan or urinal) 1  -KF     Putting on and taking off regular upper body clothing 2  -KF     Taking care of personal grooming (such as brushing teeth) 2  -KF     Eating meals 3  -KF     AM-PAC 6 Clicks Score (OT) 11  -KF       Row Name 09/20/23 1218          How much help from another person do you currently need...    Turning from your back to your side while in flat bed without using bedrails? 3  -HP     Moving from lying on back to sitting on the side of a flat bed without bedrails? 2  -HP     Moving to and from a bed to a chair (including a wheelchair)? 3  -HP     Standing up from a chair using your arms (e.g., wheelchair, bedside  chair)? 3  -HP     Climbing 3-5 steps with a railing? 2  -HP     To walk in hospital room? 2  -HP     AM-PAC 6 Clicks Score (PT) 15  -HP     Highest level of mobility 4 --> Transferred to chair/commode  -HP       Row Name 09/20/23 1509 09/20/23 1218       Functional Assessment    Outcome Measure Options AM-PAC 6 Clicks Daily Activity (OT)  -KF AM-PAC 6 Clicks Basic Mobility (PT)  -HP              User Key  (r) = Recorded By, (t) = Taken By, (c) = Cosigned By      Initials Name Provider Type    HP Lashon Oakley, PT Physical Therapist    KF Cindy Rhodes, OT Occupational Therapist                    Occupational Therapy Education       Title: PT OT SLP Therapies (Done)       Topic: Occupational Therapy (Done)       Point: ADL training (Done)       Description:   Instruct learner(s) on proper safety adaptation and remediation techniques during self care or transfers.   Instruct in proper use of assistive devices.                  Learning Progress Summary             Patient Acceptance, E, VU,DU by  at 9/20/2023 1339    Acceptance, E,TB, VU by AC at 9/20/2023 0325    Eager, E,TB,D,H, VU,NR by AR at 9/19/2023 1133    Eager, E,TB,D,H, VU,NR by AR at 9/18/2023 1722   Family Eager, E,TB,D,H, VU,NR by AR at 9/19/2023 1133    Eager, E,TB,D,H, VU,NR by AR at 9/18/2023 1722                         Point: Home exercise program (Done)       Description:   Instruct learner(s) on appropriate technique for monitoring, assisting and/or progressing therapeutic exercises/activities.                  Learning Progress Summary             Patient Acceptance, E, VU,DU by  at 9/20/2023 1339    Acceptance, E,TB, VU by AC at 9/20/2023 0325    Eager, E,TB,D,H, VU,NR by AR at 9/19/2023 1133    Eager, E,TB,D,H, VU,NR by AR at 9/18/2023 1722   Family Eager, E,TB,D,H, VU,NR by AR at 9/19/2023 1133    Eager, E,TB,D,H, VU,NR by AR at 9/18/2023 2032                         Point: Precautions (Done)       Description:   Instruct learner(s) on  prescribed precautions during self-care and functional transfers.                  Learning Progress Summary             Patient Acceptance, E, VU,DU by  at 9/20/2023 1339    Acceptance, E,TB, VU by AC at 9/20/2023 0325    Eager, E,TB,D,H, VU,NR by AR at 9/19/2023 1133    Eager, E,TB,D,H, VU,NR by AR at 9/18/2023 1722   Family Eager, E,TB,D,H, VU,NR by AR at 9/19/2023 1133    Eager, E,TB,D,H, VU,NR by AR at 9/18/2023 1722                         Point: Body mechanics (Done)       Description:   Instruct learner(s) on proper positioning and spine alignment during self-care, functional mobility activities and/or exercises.                  Learning Progress Summary             Patient Acceptance, E, VU,DU by KF at 9/20/2023 1339    Acceptance, E,TB, VU by  at 9/20/2023 0325    Eager, E,TB,D,H, VU,NR by AR at 9/19/2023 1133    Eager, E,TB,D,H, VU,NR by AR at 9/18/2023 1722   Family Eager, E,TB,D,H, VU,NR by AR at 9/19/2023 1133    Eager, E,TB,D,H, VU,NR by AR at 9/18/2023 1722                                         User Key       Initials Effective Dates Name Provider Type Discipline    AR 07/11/23 -  Patience Perez OT Occupational Therapist OT     07/28/23 -  Bhavani Ruiz, MAURICE Registered Nurse Nurse     08/09/23 -  Cindy Rhodes OT Occupational Therapist OT                  OT Recommendation and Plan  Therapy Frequency (OT): daily  Plan of Care Review  Plan of Care Reviewed With: patient, daughter  Progress: no change  Outcome Evaluation: Pt tolerated OT intervention well. Pt performed transfer from chair>BSC>bed with RUE HHAx1. Pt with no LOB, though did report increased SOA. SpO2 remained >92% throughout session, multiple rest breaks taken. HR elevated to 92bpm during transfer. OT reviewed L shoulder precautions, ADL retraining, and HEP. Pt tolerated HEP as designated by surgeon well completing PROM FE to 90 and IR to chest/ER to 15. Pt continues to present below her functional baseline. Recommend  a d/c to IRF for best functional outcome.     Time Calculation:         Time Calculation- OT       Row Name 09/20/23 1510             Time Calculation- OT    OT Start Time 1339  -KF      OT Received On 09/20/23  -KF      OT Goal Re-Cert Due Date 09/28/23  -KF         Timed Charges    48290 - OT Therapeutic Exercise Minutes 15  -KF      61569 - OT Therapeutic Activity Minutes 10  -KF      77045 - OT Self Care/Mgmt Minutes 15  -KF         Total Minutes    Timed Charges Total Minutes 40  -KF       Total Minutes 40  -KF                User Key  (r) = Recorded By, (t) = Taken By, (c) = Cosigned By      Initials Name Provider Type    KF Cindy Rhodes OT Occupational Therapist                  Therapy Charges for Today       Code Description Service Date Service Provider Modifiers Qty    73856622849 HC OT THER PROC EA 15 MIN 9/20/2023 Cindy Rhodes OT GO 1    13612851283 HC OT THERAPEUTIC ACT EA 15 MIN 9/20/2023 Cindy Rhodes OT GO 1    21715442443 HC OT SELF CARE/MGMT/TRAIN EA 15 MIN 9/20/2023 Cindy Rhodes OT GO 1                 Cindy Rhodes OT  9/20/2023

## 2023-09-20 NOTE — PLAN OF CARE
Goal Outcome Evaluation:  Plan of Care Reviewed With: patient, daughter        Progress: no change  Outcome Evaluation: Pt tolerated OT intervention well. Pt performed transfer from chair>BSC>bed with RUE HHAx1. Pt with no LOB, though did report increased SOA. SpO2 remained >92% throughout session, multiple rest breaks taken. HR elevated to 92bpm during transfer. OT reviewed L shoulder precautions, ADL retraining, and HEP. Pt tolerated HEP as designated by surgeon well completing PROM FE to 90 and IR to chest/ER to 15. Pt continues to present below her functional baseline. Recommend a d/c to IRF for best functional outcome.      Anticipated Discharge Disposition (OT): inpatient rehabilitation facility

## 2023-09-20 NOTE — PROGRESS NOTES
IM progress note      Joanna Cross  [id number]  1950     LOS: 0 days     Attending: Sammy Yo MD    Primary Care Provider: Reynaldo Fritz MD      Chief Complaint/Reason for visit:  No chief complaint on file.      Subjective   Doing ok. Adequate pain control. Some shortness of breath, on supplemental O2. Denies f/c/n/v/cp.    Objective        Visit Vitals  /63 (BP Location: Left arm, Patient Position: Lying)   Pulse 84   Temp 97.9 °F (36.6 °C) (Oral)   Resp 20   LMP  (LMP Unknown) Comment: Mammogram- 20   SpO2 100%     Temp (24hrs), Av.7 °F (37.1 °C), Min:97.6 °F (36.4 °C), Max:100.1 °F (37.8 °C)      Intake/Output:    Intake/Output Summary (Last 24 hours) at 2023 1159  Last data filed at 2023 0900  Gross per 24 hour   Intake 515 ml   Output --   Net 515 ml        OT: 23:      Goal Outcome Evaluation:  Plan of Care Reviewed With: patient, daughter  Progress: no change  Outcome Evaluation: OT educated pt on R shoulder precautions, ADL retraining to maintain, sling management and HEP. She tolerated R shoulder PROM FE 15, IR chest/ER 30. Pt limited with dyspnea and awaiting breating treatment, deferred out of bed activity. Pt limited with altered sensation, decreased motor control, dyspnea, BUE tremoring, NWB/immobilization RUE and is performing significantly below baseline status. Recommend IPR, pt and family in agreement.        Anticipated Discharge Disposition (OT): inpatient rehabilitation facility                 Physical Exam:     General Appearance:    Alert, cooperative, mildly tachypneic.    Head:    Normocephalic, without obvious abnormality, atraumatic    Lungs:     Normal effort, symmetric chest rise,  clear to      auscultation bilaterally              Heart:    Regular rhythm and normal rate, normal S1 and S2    Abdomen:     Normal bowel sounds, no masses, no organomegaly, soft        non-tender, non-distended, no guarding, no rebound                 tenderness   Extremities:   RUE sling, nerve block present. CDI dressing over left shoulder. Moves hand and fingers well.   No clubbing, cyanosis or edema.  No deformities.    Pulses:   Pulses palpable and equal bilaterally   Skin:   No bleeding, bruising or rash          Results Review:     I reviewed the patient's new clinical results.   Results from last 7 days   Lab Units 09/19/23  0557   WBC 10*3/mm3 7.68   HEMOGLOBIN g/dL 9.1*   HEMATOCRIT % 28.2*   PLATELETS 10*3/mm3 124*     Results from last 7 days   Lab Units 09/19/23  0557   SODIUM mmol/L 140   POTASSIUM mmol/L 3.6   CHLORIDE mmol/L 109*   CO2 mmol/L 22.0   BUN mg/dL 19   CREATININE mg/dL 0.89   CALCIUM mg/dL 8.4*   GLUCOSE mg/dL 84      Latest Reference Range & Units 09/19/23 20:50 09/20/23 07:28 09/20/23 11:32   Glucose 70 - 130 mg/dL 222 (H) 128 168 (H)   (H): Data is abnormally high    All medications reviewed.   amantadine, 100 mg, Oral, BID  apixaban, 5 mg, Oral, Q12H  aspirin, 81 mg, Oral, Daily  bumetanide, 1 mg, Oral, Daily  carbidopa-levodopa, 2 tablet, Oral, Q AM   And  carbidopa-levodopa, 1 tablet, Oral, 5x Daily  citalopram, 20 mg, Oral, Nightly  dofetilide, 500 mcg, Oral, Q12H  insulin detemir, 12 Units, Subcutaneous, Nightly  insulin lispro, 2-7 Units, Subcutaneous, 4x Daily AC & at Bedtime  levothyroxine, 175 mcg, Oral, Q AM  metOLazone, 2.5 mg, Oral, Daily  pantoprazole, 40 mg, Oral, Daily  pramipexole, 1.5 mg, Oral, Nightly  ranolazine, 500 mg, Oral, Q12H  spironolactone, 50 mg, Oral, Daily  valsartan, 160 mg, Oral, BID      acetaminophen, 650 mg, Q4H PRN   Or  acetaminophen, 650 mg, Q4H PRN  albuterol, 2.5 mg, Q4H PRN  dextrose, 25 g, Q15 Min PRN  dextrose, 15 g, Q15 Min PRN  glucagon (human recombinant), 1 mg, Q15 Min PRN  HYDROmorphone, 0.5 mg, Q2H PRN   And  naloxone, 0.1 mg, Q5 Min PRN  labetalol, 10 mg, Q4H PRN  oxyCODONE, 5 mg, Q4H PRN  sodium chloride, 500 mL, TID PRN        Assessment & Plan       Status post reverse  arthroplasty of right shoulder    Hypertension, essential    GERD (gastroesophageal reflux disease)    Hypothyroidism    Parkinson's disease    MILLI treated with BiPAP - Patient reports compliance.     Atrial fibrillation    Cardiac pacemaker in situ    Postoperative pain    Chronic kidney disease, stage 3a         Plan   1. PT/OT. NWB, left UE, ROM hand, wrist, elbow.  2. Pain control-prns, interscalene nerve block cath with ropivacaine infusion.           3. IS-encourage  4. DVT proph- Mech/ mobilize.  5. Bowel regimen  6. Monitor post-op labs  7. DC planning, likely inpatient rehab will be required.  following      -DM type 2  Hgb A1C 5.8  Hold OHA as appropriate  FSBG AC/HS, ( q 6 when NPO), along with correction humalog.  Long acting insulin       - Hypertension:  Resume home medications as appropriate, formulary substitution when indicated.  Holding parameters.  Prn medications for elevated blood pressure.     -Parkinson's:  Resume home regimen, Sinemet.     -MILLI:  Continue CPAP.   Monitor O2 sats.     -CKD:  Monitor renal function, avoid nephrotoxic agents.  Maintain adequate volume status..     -Atrial fibrillation  Hx:   Resume anticoagulation postop day 1.  Resume antiarrhythmics, Tikosyn.     -MILLI:  Continue CPAP.   Monitor O2 sats.    -Home O2, resumed.  PRN Albuterol.       DEJA Barrios  09/20/23  11:59 EDT

## 2023-09-20 NOTE — THERAPY TREATMENT NOTE
Patient Name: Joanna Cross  : 1950    MRN: [id number]                              Today's Date: 2023       Admit Date: 2023    Visit Dx: No diagnosis found.  Patient Active Problem List   Diagnosis    Coronary artery disease involving native coronary artery without angina pectoris    Hypertension, essential    Peripheral vascular disease    Hyperlipidemia LDL goal <70    Spinal stenosis, lumbar region, with neurogenic claudication    Delayed surgical wound healing    Biceps tendinitis    Overactive bladder    GERD (gastroesophageal reflux disease)    Hypothyroidism    Lichen sclerosus    Parkinson's disease    MILLI treated with BiPAP - Patient reports compliance.     History of respiratory failure - prior respiratory failure requiring mechanical ventilation, open lung biopsy non-specific.     Atrial fibrillation    CKD (chronic kidney disease)    Tachy-thai syndrome    Long term current use of antiarrhythmic drug    History of adenomatous polyp of colon    Anemia    Angiodysplasia    Hx of colonic polyps    Body mass index 40.0-44.9, adult    Cardiac pacemaker in situ    Chronic low back pain    Chronic lung disease    Degeneration of lumbar intervertebral disc    Edema of lower extremity    High risk medication use    History of malignant neoplasm of skin    History of TIA (transient ischemic attack)    Hyponatremia    Hypotonic bladder    Incomplete tear of rotator cuff    Major depression in remission    Migraine    Weakness    Tinnitus of both ears    Primary osteoarthritis involving multiple joints    Restless legs    Seborrheic dermatitis    Sphenoid sinusitis    Transient cerebral ischemia    Urinary tract infection    Urinary urgency    Type 2 diabetes mellitus with hyperglycemia, without long-term current use of insulin    Vitamin B12 deficiency    Vitamin D deficiency    Chronic kidney disease, stage 3b    Status post reverse total replacement of left shoulder    Postoperative pain     Dysuria    Rash    Pruritus    Acute post-traumatic headache, not intractable    History of recent fall    Chronic kidney disease, stage 3a    Status post reverse arthroplasty of right shoulder     Past Medical History:   Diagnosis Date    Anemia     Ankle problem     thinks back related causing pain     Atrial fibrillation     AVM (arteriovenous malformation)     Back pain     Chronic kidney disease     stage 3 per pt    CKD (chronic kidney disease)     Clotting disorder 2016    AVM - small intestine    COVID-19 vaccine series completed     Gastrocnemius muscle tear     left medial 91    Generalized osteoarthritis     GERD (gastroesophageal reflux disease)     Gestational diabetes     GIB (gastrointestinal bleeding) 2016    d/t xarelto     Headache     Hearing decreased, bilateral     HAS HEARING AID, NOT WEARING IT CURRENTLY    Hiatal hernia     History of shingles     History of transfusion 2016    no reaction recalled     Hypertension     Hypothyroidism     IBS (irritable bowel syndrome)     Klebsiella pneumonia     Lichen sclerosus     Lupus     subQ    Mouth problem     mouth guard used since pt bites tongue and lips excessively at night if not- with bipap     MRSA infection 2018    Obesity     On home oxygen therapy     2L of oxygen all the time due to current congestion     Osteoporosis     Parkinson's disease     Peripheral vascular disease     Pleurisy     Pneumonia     Puerperal sepsis with acute hypoxic respiratory failure     emergent intubation- 2016    Right leg pain     from back issues     Salivary gland stone     Sciatic nerve pain     Seborrheic dermatitis     Skin cancer     on back     Sleep apnea     CPAP AND HOME O2 2L/M    TIA (transient ischemic attack) 2014    no residual effects    Type 2 diabetes mellitus     UTI (urinary tract infection)     Vitamin D deficiency 09/08/2022    Wears glasses      Past Surgical History:   Procedure Laterality Date    ABLATION OF DYSRHYTHMIC FOCUS   05/16/13    Laser Ablation - Rt Leg    BACK SURGERY      l4-l5 laminectomy     BICEPS TENDON REPAIR Right     shoulder    BREAST BIOPSY Left 05/2004    excisional, benign    BRONCHOSCOPY RIGID / FLEXIBLE      2016    BUNIONECTOMY Right     CARDIAC CATHETERIZATION      CARDIAC CATHETERIZATION N/A 02/01/2019    Procedure: Left Heart Cath;  Surgeon: Albertina Corona MD;  Location: [facility] CATH INVASIVE LOCATION;  Service: Cardiology    CHOLECYSTECTOMY      COLONOSCOPY  2016    COLONOSCOPY N/A 06/17/2021    Procedure: COLONOSCOPY WITH POLYPECTOMY;  Surgeon: Trenton Sosa MD;  Location: [facility] ENDOSCOPY;  Service: Gastroenterology;  Laterality: N/A;    CORONARY STENT PLACEMENT      x1 stent    CYST REMOVAL      left ear, upper left back 2001    CYSTOSCOPY      x2  18 and 20 -   in 20 urethra dilation     DIAGNOSTIC LAPAROSCOPY  1981    ENDOSCOPIC FUNCTIONAL SINUS SURGERY (FESS)  2011    ENDOSCOPY  2016    ENTEROSCOPY VIA STOMA      with single ballon with fluoro     HAMMER TOE REPAIR Left     INCISION AND DRAINAGE OF WOUND  2018    back with wound infection     INSERT / REPLACE / REMOVE PACEMAKER  11/10/16    [facility]    JOINT REPLACEMENT      KNEE ARTHROSCOPY      LASER ABLATION      right leg 13    LIPOMA EXCISION  1999    left leg     LUMBAR LAMINECTOMY DISCECTOMY DECOMPRESSION N/A 07/06/2018    Procedure: LUMBAR LAMINECTOMY L4-5, HEMILAMIINECTOMY RIGHT L5-S1, FORAMINOTOMY L5-S1;  Surgeon: Lencho Gamino MD;  Location:  [facility] OR;  Service: Neurosurgery    LUMBAR LAMINECTOMY DISCECTOMY DECOMPRESSION N/A 09/19/2018    Procedure: INCISION AND DRAINAGE BACK WITH WOUND EXPLORATION;  Surgeon: Ritchie García MD;  Location:  [facility] OR;  Service: Neurosurgery    LUNG BIOPSY Left 2016    OTHER SURGICAL HISTORY      ct scan of chest and sinuses and lower back     OTHER SURGICAL HISTORY      various echos     OTHER SURGICAL HISTORY      electroencephalogram 16,   emg  ncv tests 2007    OTHER  SURGICAL HISTORY      mra 2007  and various mri with xrays, nuclear medicine lung ventilation with perfusion test 2016 with pft     OTHER SURGICAL HISTORY      barium swallow testing 15, 12, 12    OTHER SURGICAL HISTORY      vaginal ultrasound- 2012,   vas clementina lower extrem 2016, vas venous duplex lower extrem bilat 2019    OTHER SURGICAL HISTORY      wdge biopsy spring of lung     OTHER SURGICAL HISTORY      emergent intubation- hypoxic resp failure     PACEMAKER IMPLANTATION  11/2016    sss    REPLACEMENT TOTAL KNEE Bilateral     left knee 2011, right 2012 per dr acuna     REPLACEMENT TOTAL KNEE Bilateral     SHOULDER ARTHROSCOPY Bilateral     2003-left, 2004- right     SKIN BIOPSY      skin cancer back 2016    SKIN CANCER EXCISION      upper righ tback     MADHAV      TEETH EXTRACTION      x2    TOTAL SHOULDER ARTHROPLASTY W/ DISTAL CLAVICLE EXCISION Left 10/24/2022    Procedure: REVERSE TOTAL SHOULDER ARTHROPLASTY, BICEPS TENODESIS - LEFT;  Surgeon: Sammy Yo MD;  Location: [facility] OR;  Service: Orthopedics;  Laterality: Left;    TOTAL SHOULDER ARTHROPLASTY W/ DISTAL CLAVICLE EXCISION Right 9/18/2023    Procedure: REVERSE TOTAL SHOULDER  ARTHROPLASTY WITH BICEPS TENODESIS - RIGHT;  Surgeon: Sammy Yo MD;  Location: [facility] OR;  Service: Orthopedics;  Laterality: Right;    TUBAL ABDOMINAL LIGATION Bilateral 1980    WISDOM TOOTH EXTRACTION        General Information       Row Name 09/20/23 1211          Physical Therapy Time and Intention    Document Type therapy note (daily note)  -     Mode of Treatment physical therapy  -       Row Name 09/20/23 1211          General Information    Patient Profile Reviewed yes  -HP     Existing Precautions/Restrictions fall;oxygen therapy device and L/min;non-weight bearing;right;shoulder  nerve cath,parkinsons, on oxygen  -HP       Row Name 09/20/23 1211          Cognition    Orientation Status (Cognition) oriented x 4  -HP       Row Name 09/20/23 1211           Safety Issues, Functional Mobility    Impairments Affecting Function (Mobility) coordination;endurance/activity tolerance;motor control;pain;range of motion (ROM);sensation/sensory awareness;shortness of breath  -               User Key  (r) = Recorded By, (t) = Taken By, (c) = Cosigned By      Initials Name Provider Type    Lashon Jordan, PT Physical Therapist                   Mobility       Row Name 09/20/23 1212          Bed Mobility    Bed Mobility supine-sit;scooting/bridging  -     Supine-Sit [place] (Bed Mobility) moderate assist (50% patient effort);2 person assist  -     Assistive Device (Bed Mobility) head of bed elevated;draw sheet;bed rails  -     Comment, (Bed Mobility) VC for sequencing. Increased time and effort to complete task. Pt required assistance for trunk management. Pt able to initiate moving BLE to EOB.  -       Row Name 09/20/23 1212          Transfers    Comment, (Transfers) Pt performed STS and SPT to chair with CGA and SPC. No LOB noted. SOA reported. HR elevated to 145 bpm. Unclear reading on O2 sat at 83% while on 2L NC. Required sitting rest break for a couple minutes prior to recovery to 96 bpm and >90% O2 sat. Activity limited by tachycardia and SOA. Pt reported SOA is no worse than at home.  -       Row Name 09/20/23 1212          Bed-Chair Transfer    Bed-Chair [place] (Transfers) contact guard;1 person assist  -     Assistive Device (Bed-Chair Transfers) cane, straight  -       Row Name 09/20/23 1212          Sit-Stand Transfer    Sit-Stand [place] (Transfers) contact guard;verbal cues  -     Assistive Device (Sit-Stand Transfers) cane, straight  -       Row Name 09/20/23 1212          Mobility    Extremity Weight-bearing Status right upper extremity  -     Left Upper Extremity (Weight-bearing Status) non weight-bearing (NWB)  -               User Key  (r) = Recorded By, (t) = Taken By, (c) = Cosigned By      Initials Name Provider  Type     Lashon Oakley PT Physical Therapist                   Obj/Interventions       Row Name 09/20/23 1215          Balance    Balance Assessment sitting static balance;sitting dynamic balance;sit to stand dynamic balance;standing static balance;standing dynamic balance  -     Static Sitting Balance standby assist  -     Dynamic Sitting Balance standby assist  -     Position, Sitting Balance sitting edge of bed  -     Static Standing Balance contact guard  -     Dynamic Standing Balance contact guard  -     Position/Device Used, Standing Balance supported;cane, straight  -     Balance Interventions sitting;standing;sit to stand;occupation based/functional task  -               User Key  (r) = Recorded By, (t) = Taken By, (c) = Cosigned By      Initials Name Provider Type     Lashon Oakley PT Physical Therapist                   Goals/Plan    No documentation.                  Clinical Impression       Row Name 09/20/23 1215          Pain    Pretreatment Pain Rating 2/10  -HP     Posttreatment Pain Rating 3/10  -     Pain Location - Side/Orientation Right  -     Pain Location incisional  -     Pain Location - shoulder  -     Pain Intervention(s) Cold applied;Repositioned;Elevated  -       Row Name 09/20/23 1215          Plan of Care Review    Plan of Care Reviewed With patient  -     Progress no change  -     Outcome Evaluation Pt performed STS and SPT to chair with CGA and SPC. Pt required Mod A x2 for bed mobility. Activity limited by tachycardia to 145 bpm and SOA. Continue to recommend d/c to IRF to increase functional mobility.  -       Row Name 09/20/23 1215          Vital Signs    Post Systolic BP Rehab 137  -HP     Post Treatment Diastolic BP 63  -HP     Pretreatment Heart Rate (beats/min) 86  -HP     Intratreatment Heart Rate (beats/min) 145  -HP     Posttreatment Heart Rate (beats/min) 84  -HP     Pre SpO2 (%) 98  -HP     O2 Delivery Pre Treatment supplemental O2   -HP     Intra SpO2 (%) 84  -HP     O2 Delivery Intra Treatment supplemental O2  -HP     Post SpO2 (%) 98  -HP     O2 Delivery Post Treatment supplemental O2  -HP     Pre Patient Position Supine  -HP     Intra Patient Position Standing  -HP     Post Patient Position Sitting  -HP       Row Name 09/20/23 1215          Positioning and Restraints    Pre-Treatment Position in bed  -HP     Post Treatment Position chair  -HP     In Chair notified nsg;reclined;sitting;call light within reach;encouraged to call for assist;exit alarm on;with family/caregiver;legs elevated;compression device  -HP               User Key  (r) = Recorded By, (t) = Taken By, (c) = Cosigned By      Initials Name Provider Type    Lashon Jordan PT Physical Therapist                   Outcome Measures       Row Name 09/20/23 1218          How much help from another person do you currently need...    Turning from your back to your side while in flat bed without using bedrails? 3  -HP     Moving from lying on back to sitting on the side of a flat bed without bedrails? 2  -HP     Moving to and from a bed to a chair (including a wheelchair)? 3  -HP     Standing up from a chair using your arms (e.g., wheelchair, bedside chair)? 3  -HP     Climbing 3-5 steps with a railing? 2  -HP     To walk in hospital room? 2  -HP     AM-PAC 6 Clicks Score (PT) 15  -HP     Highest level of mobility 4 --> Transferred to chair/commode  -HP       Row Name 09/20/23 1218          Functional Assessment    Outcome Measure Options AM-PAC 6 Clicks Basic Mobility (PT)  -HP               User Key  (r) = Recorded By, (t) = Taken By, (c) = Cosigned By      Initials Name Provider Type    Lashon Jordan PT Physical Therapist                                 Physical Therapy Education       Title: PT OT SLP Therapies (Done)       Topic: Physical Therapy (Done)       Point: Mobility training (Done)       Learning Progress Summary             Patient Acceptance, E,D, VU,NR by  at  9/20/2023 1219    Acceptance, E,TB, VU by  at 9/20/2023 0325    Eager, E, VU by SC at 9/19/2023 1520    Comment: reviewed benefits of activity                         Point: Home exercise program (Done)       Learning Progress Summary             Patient Acceptance, E,D, VU,NR by  at 9/20/2023 1219    Acceptance, E,TB, VU by  at 9/20/2023 0325    Eager, E, VU by SC at 9/19/2023 1520    Comment: reviewed benefits of activity                         Point: Body mechanics (Done)       Learning Progress Summary             Patient Acceptance, E,D, VU,NR by  at 9/20/2023 1219    Acceptance, E,TB, VU by  at 9/20/2023 0325    Eager, E, VU by SC at 9/19/2023 1520    Comment: reviewed benefits of activity                         Point: Precautions (Done)       Learning Progress Summary             Patient Acceptance, E,D, VU,NR by  at 9/20/2023 1219    Acceptance, E,TB, VU by  at 9/20/2023 0325    Eager, E, VU by SC at 9/19/2023 1520    Comment: reviewed benefits of activity                                         User Key       Initials Effective Dates Name Provider Type Discipline    SC 02/03/23 -  Sharon Purcell, PT Physical Therapist PT     06/01/21 -  Lashon Oakley, PT Physical Therapist PT     07/28/23 -  Bhavani Ruiz, RN Registered Nurse Nurse                  PT Recommendation and Plan     Plan of Care Reviewed With: patient  Progress: no change  Outcome Evaluation: Pt performed STS and SPT to chair with CGA and SPC. Pt required Mod A x2 for bed mobility. Activity limited by tachycardia to 145 bpm and SOA. Continue to recommend d/c to IRF to increase functional mobility.     Time Calculation:         PT Charges       Row Name 09/20/23 1031             Time Calculation    Start Time 1031  -HP      PT Received On 09/20/23  -         Timed Charges    04204 - PT Therapeutic Activity Minutes 24  -HP         Total Minutes    Timed Charges Total Minutes 24  -HP       Total Minutes 24  -HP                 User Key  (r) = Recorded By, (t) = Taken By, (c) = Cosigned By      Initials Name Provider Type     Lashon Oakley, PT Physical Therapist                  Therapy Charges for Today       Code Description Service Date Service Provider Modifiers Qty    [id number]  PT THERAPEUTIC ACT EA 15 MIN 9/20/2023 Lashon Oakley, PT GP 2            PT G-Codes  Outcome Measure Options: AM-PAC 6 Clicks Basic Mobility (PT)  AM-PAC 6 Clicks Score (PT): 15  AM-PAC 6 Clicks Score (OT): 9       Lashon Oakley PT  9/20/2023

## 2023-09-20 NOTE — PLAN OF CARE
Goal Outcome Evaluation:  Plan of Care Reviewed With: patient       AOx4, VSS, 2L NC, SOA when walking, duo neb req x2, immobilizer in place, infupump changed to respiratory setting         Problem: Adult Inpatient Plan of Care  Goal: Optimal Comfort and Wellbeing  Outcome: Ongoing, Progressing  Intervention: Monitor Pain and Promote Comfort  Recent Flowsheet Documentation  Taken 9/20/2023 1108 by Carolina Kimbrough, RN  Pain Management Interventions:   cold applied   see MAR  Intervention: Provide Person-Centered Care  Recent Flowsheet Documentation  Taken 9/20/2023 0830 by Carolina Kimbrough, RN  Trust Relationship/Rapport:   care explained   choices provided

## 2023-09-20 NOTE — DISCHARGE PLACEMENT REQUEST
"Joanna Cross (72 y.o. Female)     Jazmin Ludwig RN   [phone]          Date of Birth   1950    Social Security Number       Address   [address] [place] [address]    Home Phone   [phone]    MRN   [id number]       [facility]    Marital Status                               Admission Date   9/18/23    Admission Type   Elective    Admitting Provider   Sammy Yo MD    Attending Provider   Sammy Yo MD    Department, Room/Bed   [address], S384/1       Discharge Date       Discharge Disposition       Discharge Destination                                 Attending Provider: Sammy Yo MD    Allergies: Toprol Xl [Metoprolol Tartrate], Amlodipine Besylate, Codeine, Entacapone, Epinephrine, Levemir [Insulin Detemir], Penicillins, Xarelto [Rivaroxaban], Hydrocodone, Prochlorperazine, Toprol Xl [Metoprolol], Aricept [Donepezil Hcl], Bactrim [Sulfamethoxazole-trimethoprim], Benztropine Mesylate, Cimetidine, Ciprofloxacin, Cogentin [Benztropine], Compazine [Prochlorperazine Edisylate], Cortisone, Duraprep [Antiseptic Products, Misc.], Erythromycin Base, Flurandrenolone, Haldol [Haloperidol Lactate], Hydralazine, Hydrochlorothiazide, Hydrocodone-acetaminophen, Levaquin [Levofloxacin], Lisinopril, Macrolides And Ketolides, Statins, Tarka [Trandolapril-verapamil Hcl Er], Verapamil    Isolation: None   Infection: MRSA (09/21/18)   Code Status: Prior    Ht: 157.5 cm (62\")   Wt: 107 kg (236 lb)    Admission Cmt: None   Principal Problem: Status post reverse arthroplasty of right shoulder [Z96.611]                   Active Insurance as of 9/18/2023       Primary Coverage       Payor Plan Insurance Group Employer/Plan Group    [facility] MEDICARE REPLACEMENT [facility] MEDICARE REPLACEMENT [id number]       Payor Plan Address Payor Plan Phone Number Payor Plan Fax Number Effective Dates    [address] [address]   1/1/2017 - None " "Entered    [facility] [id number]         Subscriber Name Subscriber Birth Date Member ID       JOANNA TREJO 1950 [id number]                     Emergency Contacts        (Rel.) Home Phone Work Phone Mobile Phone    Silvana Trejo (Daughter) [phone] -- [phone]    Valerie Pitts (Daughter) [phone] -- [phone]    Nela Trejo (Daughter) [phone] -- [phone]    Candida Trejo (Lester Hipolito) (Son) [phone] -- [phone]              Insurance Information                  [facility] MEDICARE REPLACEMENT/[facility] MEDICARE REPLACEMENT Phone: --    Subscriber: Joanna Trejo Subscriber#: [id number]    Group#: [id number] Precert#: --             History & Physical        Angelica Patten MD at 23 1526          Patient Name: Joanna Trejo  MRN: [id number]  : 1950  DOS: 2023    Attending: Sammy Yo MD    Primary Care Provider: Reynaldo Fritz MD      Chief complaint: Right shoulder pain    Subjective  Patient is a pleasant 72 y.o. female presented for scheduled surgery by .    She underwent the following procedures:  1. Right reverse total shoulder arthroplasty.    2. Right biceps tenodesis.    3. Computer-assisted imageless navigation     Surgery was done under GA and a block, she tolerated surgery well, was admitted for further management.     Seen in PACU, doing well with good pain control, no nausea, vomiting or shortness of breath.    Reviewed with pt her past medical history and medications.     She is hoping to go to [facility] after this hospital stay.       Allergies   Allergen Reactions    Toprol Xl [Metoprolol Tartrate] Shortness Of Breath     Extreme fatigue    Amlodipine Besylate Swelling     Lower extremity (ankles, feet) swelling    Codeine Unknown (See Comments)     Pt is unaware of what reaction she had    Entacapone Other (See Comments)     \"extreme weakness in legs - caused several falls, which " "stopped after discontinuing this medication\"    Epinephrine Other (See Comments)     6/4/16- had 3 shots to numb mouth to prepare teeth for crowns, the shots contained epi-  Caused pt to have chest discomfort- went to hospital in ambulance, discovered had a fib while there     Levemir [Insulin Detemir] Hives     Hives / rash around injection site    Penicillins Hives     Jitteriness     Xarelto [Rivaroxaban] GI Bleeding     hgb dropped to 5.2    Hydrocodone Unknown - High Severity    Prochlorperazine Unknown - High Severity    Toprol Xl [Metoprolol] Unknown - High Severity    Aricept [Donepezil Hcl] Nausea Only     Vivid dreams    Bactrim [Sulfamethoxazole-Trimethoprim] Nausea Only and Other (See Comments)     Headache -  Cant be taken with tikosyn - septra ds    Benztropine Mesylate Other (See Comments)     Uncontrollable body movements    Cimetidine Other (See Comments)     Cant be taken with tikosyn     Ciprofloxacin Diarrhea    Cogentin [Benztropine] Other (See Comments)     \"uncontrollable body movements\"    Compazine [Prochlorperazine Edisylate] Other (See Comments)     Dystonic reaction    Cortisone Other (See Comments)     MAKES BLOOD PRESSURE HIGH     Duraprep [Antiseptic Products, Misc.] Itching and Rash     RASH AND ITCHING    Erythromycin Base Other (See Comments)     Cant be taken with tikosyn - z pack and ketek     Flurandrenolone Other (See Comments)     Cant be taken with tikosyn     Include - levaquin, cipro, norloxacin     Haldol [Haloperidol Lactate] Other (See Comments)     Dystonic reaction    Hydralazine Other (See Comments)     Headache     Hydrochlorothiazide Other (See Comments)     Cant be taken with tikosyn -  Also hydrodiuril, microzide, hydro-par, oretic, esidrix, ezide or any medicine with hct or hctz in name     Hydrocodone-Acetaminophen Nausea And Vomiting and Dizziness     Headache, nausea     Levaquin [Levofloxacin] Other (See Comments)     Cannot take due to taking propafenone HCL- " severe reaction with mixed.     Lisinopril Cough    Macrolides And Ketolides Other (See Comments)     antibx -   Cant be taken with tikosyn     Statins Myalgia     Leg pain- all statins     Tarka [Trandolapril-Verapamil Hcl Er] Other (See Comments)     Constipation     Verapamil Other (See Comments)     Cant be taken with tikosyn - calan, covera-hs, isoptin, verelan or tarka         Medications Prior to Admission   Medication Sig Dispense Refill Last Dose    albuterol (PROVENTIL) (2.5 MG/3ML) 0.083% nebulizer solution Take 2.5 mg by nebulization Every 4 (Four) Hours As Needed for Wheezing or Shortness of Air. 1 each 3 Past Month    amantadine (SYMMETREL) 100 MG capsule Take 1 capsule by mouth 2 (Two) Times a Day.   9/18/2023 at 0548    aspirin 81 MG EC tablet Take 1 tablet by mouth Daily.   9/17/2023    B Complex-C (SUPER B COMPLEX PO) Take 1 tablet by mouth Daily.   9/17/2023 at 1715    bumetanide (BUMEX) 1 MG tablet 1 tab po daily as directed 90 tablet 3 9/17/2023 at 1115    Calcium Carbonate (CALTRATE 600 PO) Take 600 mg by mouth Daily.   9/17/2023 at 1715    carbidopa-levodopa (SINEMET)  MG per tablet Take  by mouth. 2 tablets at 0600, 1 tablet at 0900, 1200, 1500, 1800, 2100   9/18/2023 at 0548    Cholecalciferol 2000 units tablet Take 1 tablet by mouth 2 (Two) Times a Day.   9/17/2023 at 2100    citalopram (CeleXA) 20 MG tablet Take 1 tablet by mouth Daily. 90 tablet 3 9/17/2023 at 2100    dofetilide (TIKOSYN) 500 MCG capsule TAKE 1 CAPSULE EVERY 12 HOURS (Patient taking differently: Take 1 capsule by mouth 2 (Two) Times a Day.) 180 capsule 3 9/17/2023 at 2230    Enoxaparin Sodium (LOVENOX) 100 MG/ML solution prefilled syringe syringe Inject 1.1 mL under the skin into the appropriate area as directed Every 12 (Twelve) Hours. 3 mL 0 9/17/2023 at 1230    Glucosamine-Chondroit-Calcium (TRIPLE FLEX BONE & JOINT PO) Take 1 tablet by mouth Daily. Move free   9/17/2023 at 2100    hydroxychloroquine (PLAQUENIL)  200 MG tablet Daily.   9/17/2023 at 2100    Insulin Glargine (Lantus SoloStar) 100 UNIT/ML injection pen Inject 12-14 Units under the skin into the appropriate area as directed Daily. 15 mL 1 9/17/2023 at 2100    ipratropium (ATROVENT) 0.03 % nasal spray 2 sprays into the nostril(s) as directed by provider 2 (Two) Times a Day As Needed (CONGESTION SINUS). 30 mL 11 9/17/2023 at 1230    Loratadine 10 MG capsule Take 1 capsule by mouth Daily.   9/17/2023 at 2100    metFORMIN (GLUCOPHAGE) 1000 MG tablet Take 1 tablet by mouth 2 (Two) Times a Day With Meals. 180 tablet 1 9/17/2023 at 2100    metOLazone (ZAROXOLYN) 2.5 MG tablet Take 1 tablet by mouth Daily. 90 tablet 1 Past Week    Multiple Vitamins-Minerals (CENTRUM ULTRA WOMENS PO) Take 1 tablet by mouth Daily.   9/17/2023 at 1715    nystatin (MYCOSTATIN) 519888 UNIT/GM ointment Apply 1 application topically to the appropriate area as directed 2 (Two) Times a Day. (Patient taking differently: Apply 1 application  topically to the appropriate area as directed As Needed.) 3 g 3 9/17/2023    O2 (OXYGEN) Inhale 2 L/min Every Night. 2L all the time now   9/18/2023    pantoprazole (Protonix) 40 MG EC tablet Take 1 tablet by mouth Daily. 90 tablet 1 9/17/2023 at 0548    pramipexole (MIRAPEX) 1.5 MG tablet Take 1 tablet by mouth Every Night. 90 tablet 0 9/17/2023 at 2100    senna (SENOKOT) 8.6 MG tablet Take 1 tablet by mouth Daily.   9/17/2023 at 2100    spironolactone (ALDACTONE) 25 MG tablet Take 2 tablets by mouth Daily. 180 tablet 2 9/17/2023 at 1115    Unithroid 175 MCG tablet Take 1 tablet by mouth Daily. 90 tablet 1 9/18/2023 at 0548    valsartan (DIOVAN) 160 MG tablet Take 1 tablet by mouth 2 (Two) Times a Day. 180 tablet 2 9/17/2023 at 2100    vitamin C (ASCORBIC ACID) 500 MG tablet Take 1 tablet by mouth Daily. Chewable tablet   9/17/2023 at 1715    Zinc 50 MG capsule Take 1 capsule by mouth Daily.   9/17/2023 at 1715    Accu-Chek Guide test strip Testing 3 times  per day; E11.65 300 each 3     Accu-Chek Softclix Lancets lancets USE ONE LANCET TO TEST THREE TIMES A  each 1     albuterol sulfate HFA (Ventolin HFA) 108 (90 Base) MCG/ACT inhaler Inhale 1 puff Every 4 (Four) Hours As Needed for Wheezing or Shortness of Air. 8 g 5 More than a month    apixaban (Eliquis) 5 MG tablet tablet Take 1 tablet by mouth Every 12 (Twelve) Hours. 180 tablet 2 9/15/2023    clobetasol (TEMOVATE) 0.05 % cream Apply 1 application  topically to the appropriate area as directed 2 (Two) Times a Day As Needed (Lichens Sclerosis).   More than a month    Emollient (AQUAPHOR ADVANCED THERAPY EX) Apply  topically.   More than a month    hydrocortisone 2.5 % ointment        nitroglycerin (NITROLINGUAL) 0.4 MG/SPRAY spray Place 1 spray under the tongue Every 5 (Five) Minutes As Needed for Chest Pain. 1 each 6 More than a month    ranolazine (RANEXA) 500 MG 12 hr tablet Take 1 tablet by mouth Every 12 (Twelve) Hours. 180 tablet 3  at 0548    traMADol (ULTRAM) 50 MG tablet Take 1 tablet by mouth Every 6 (Six) Hours As Needed for Moderate Pain . (Patient taking differently: Take 1 tablet by mouth Every 8 (Eight) Hours As Needed for Moderate Pain.) 30 tablet 2 More than a month    triamcinolone (KENALOG) 0.1 % cream 1 application  As Needed for Irritation or Rash.   More than a month          Past Medical History:   Diagnosis Date    Anemia     Ankle problem     thinks back related causing pain     Atrial fibrillation     AVM (arteriovenous malformation)     Back pain     Chronic kidney disease     stage 3 per pt    CKD (chronic kidney disease)     Clotting disorder 2016    AVM - small intestine    COVID-19 vaccine series completed     Gastrocnemius muscle tear     left medial 91    Generalized osteoarthritis     GERD (gastroesophageal reflux disease)     Gestational diabetes     GIB (gastrointestinal bleeding) 2016    d/t xarelto     Headache     Hearing decreased, bilateral     HAS HEARING AID, NOT  WEARING IT CURRENTLY    Hiatal hernia     History of shingles     History of transfusion 2016    no reaction recalled     Hypertension     Hypothyroidism     IBS (irritable bowel syndrome)     Klebsiella pneumonia     Lichen sclerosus     Lupus     subQ    Mouth problem     mouth guard used since pt bites tongue and lips excessively at night if not- with bipap     MRSA infection 2018    Obesity     On home oxygen therapy     2L of oxygen all the time due to current congestion     Osteoporosis     Parkinson's disease     Peripheral vascular disease     Pleurisy     Pneumonia     Puerperal sepsis with acute hypoxic respiratory failure     emergent intubation- 2016    Right leg pain     from back issues     Salivary gland stone     Sciatic nerve pain     Seborrheic dermatitis     Skin cancer     on back     Sleep apnea     CPAP AND HOME O2 2L/M    TIA (transient ischemic attack) 2014    no residual effects    Type 2 diabetes mellitus     UTI (urinary tract infection)     Vitamin D deficiency 09/08/2022    Wears glasses      Past Surgical History:   Procedure Laterality Date    ABLATION OF DYSRHYTHMIC FOCUS  05/16/13    Laser Ablation - Rt Leg    BACK SURGERY      l4-l5 laminectomy     BICEPS TENDON REPAIR Right     shoulder    BREAST BIOPSY Left 05/2004    excisional, benign    BRONCHOSCOPY RIGID / FLEXIBLE      2016    BUNIONECTOMY Right     CARDIAC CATHETERIZATION      CARDIAC CATHETERIZATION N/A 02/01/2019    Procedure: Left Heart Cath;  Surgeon: Albertina Corona MD;  Location:  [facility]AS[facility]ATION;  Service: Cardiology    CHOLECYSTECTOMY      COLONOSCOPY  2016    COLONOSCOPY N/A 06/17/2021    Procedure: COLONOSCOPY WITH POLYPECTOMY;  Surgeon: Trenton Sosa MD;  Location:  [facility];  Service: Gastroenterology;  Laterality: N/A;    CORONARY STENT PLACEMENT      x1 stent    CYST REMOVAL      left ear, upper left back 2001    CYSTOSCOPY      x2  18 and 20 -   in 20 urethra  dilation     DIAGNOSTIC LAPAROSCOPY  1981    ENDOSCOPIC FUNCTIONAL SINUS SURGERY (FESS)  2011    ENDOSCOPY  2016    ENTEROSCOPY VIA STOMA      with single ballon with fluoro     HAMMER TOE REPAIR Left     INCISION AND DRAINAGE OF WOUND  2018    back with wound infection     INSERT / REPLACE / REMOVE PACEMAKER  11/10/16    [facility]    JOINT REPLACEMENT      KNEE ARTHROSCOPY      LASER ABLATION      right leg 13    LIPOMA EXCISION  1999    left leg     LUMBAR LAMINECTOMY DISCECTOMY DECOMPRESSION N/A 07/06/2018    Procedure: LUMBAR LAMINECTOMY L4-5, HEMILAMIINECTOMY RIGHT L5-S1, FORAMINOTOMY L5-S1;  Surgeon: Lencho Gamino MD;  Location:  [facility];  Service: Neurosurgery    LUMBAR LAMINECTOMY DISCECTOMY DECOMPRESSION N/A 09/19/2018    Procedure: INCISION AND DRAINAGE BACK WITH WOUND EXPLORATION;  Surgeon: Ritchie García MD;  Location:  [facility];  Service: Neurosurgery    LUNG BIOPSY Left 2016    OTHER SURGICAL HISTORY      ct scan of chest and sinuses and lower back     OTHER SURGICAL HISTORY      various echos     OTHER SURGICAL HISTORY      electroencephalogram 16,   emg  ncv tests 2007    OTHER SURGICAL HISTORY      mra 2007  and various mri with xrays, nuclear medicine lung ventilation with perfusion test 2016 with pft     OTHER SURGICAL HISTORY      barium swallow testing 15, 12, 12    OTHER SURGICAL HISTORY      vaginal ultrasound- 2012,   vas clementina lower extrem 2016, vas venous duplex lower extrem bilat 2019    OTHER SURGICAL HISTORY      wdge biopsy spring of lung     OTHER SURGICAL HISTORY      emergent intubation- hypoxic resp failure     PACEMAKER IMPLANTATION  11/2016    [facility]    REPLACEMENT TOTAL KNEE Bilateral     left knee 2011, right 2012 per dr acuna     REPLACEMENT TOTAL KNEE Bilateral     SHOULDER ARTHROSCOPY Bilateral     2003-left, 2004- right     SKIN BIOPSY      skin cancer back 2016    SKIN CANCER EXCISION      upper righ tback     MADHAV      TEETH EXTRACTION      x2    TOTAL SHOULDER  ARTHROPLASTY W/ DISTAL CLAVICLE EXCISION Left 10/24/2022    Procedure: REVERSE TOTAL SHOULDER ARTHROPLASTY, BICEPS TENODESIS - LEFT;  Surgeon: Sammy Yo MD;  Location: [facility];  Service: Orthopedics;  Laterality: Left;    TUBAL ABDOMINAL LIGATION Bilateral     WISDOM TOOTH EXTRACTION       Family History   Problem Relation Age of Onset    Kidney disease Mother     Coronary artery disease Mother     Hypertension Mother             Heart disease Mother             Hyperlipidemia Mother             Coronary artery disease Father     Hypertension Father             Hypothyroidism Father     Cancer Father         Oral cancer    Heart disease Father             Coronary artery disease Brother     Hypertension Brother     Heart disease Brother         Multiple stents, by-pass surgery    Testicular cancer Brother     Kidney cancer Maternal Uncle     Testicular cancer Maternal Uncle     Colon polyps Neg Hx     Colon cancer Neg Hx      Social History     Tobacco Use    Smoking status: Never     Passive exposure: Past    Smokeless tobacco: Never    Tobacco comments:     Father and mother smoked for several years.   Vaping Use    Vaping Use: Never used   Substance Use Topics    Alcohol use: Never    Drug use: No   .    Review of Systems  Pertinent items are noted in HPI    Vital Signs  /70 (BP Location: Left arm, Patient Position: Lying)   Pulse 77   Temp 97.4 °F (36.3 °C) (Temporal)   Resp 18   LMP  (LMP Unknown) Comment: Mammogram- 20  SpO2 99%     Physical Exam:    General Appearance:    Alert, cooperative, in no acute distress   Head:    Normocephalic, without obvious abnormality, atraumatic   Eyes:            Lids and lashes normal, conjunctivae and sclerae normal, no   icterus, no pallor, corneas clear,    Ears:    Ears appear intact with no abnormalities noted   Throat:   No oral lesions, no thrush, oral mucosa moist   Neck:   No adenopathy,  supple, trachea midline, no thyromegaly         Lungs:     Clear to auscultation,respirations regular, even and   unlabored. Decreased breath sounds.     Heart:    Regular rhythm and normal rate, normal S1 and S2, no      murmur    Abdomen:     Normal bowel sounds, no masses, no organomegaly, soft        non-tender, non-distended, no guarding, no rebound                 tenderness   Genitalia:    Deferred   Extremities:   RUE in a sling, CDI aquacel dressing shoulder. Interscalene nerve block cath present.  Distal pulses, cap refill, movements of fingers, wrist, intact.     Pulses:   Pulses palpable and equal bilaterally   Skin:   No bleeding, bruising or rash   Neurologic:   Cranial nerves 2 - 12 grossly intact      I reviewed the patient's new clinical results.             Invalid input(s): NEUTOPHILPCT,  EOSPCT        Invalid input(s): LABALBU, PROT  Lab Results   Component Value Date    HGBA1C 5.80 (H) 09/11/2023      Latest Reference Range & Units 09/11/23 14:45   Sodium 136 - 145 mmol/L 141   Potassium 3.5 - 5.2 mmol/L 4.1   Chloride 98 - 107 mmol/L 102   CO2 22.0 - 29.0 mmol/L 27.0   Anion Gap 5.0 - 15.0 mmol/L 12.0   BUN 8 - 23 mg/dL 44 (H)   Creatinine 0.57 - 1.00 mg/dL 1.36 (H)   BUN/Creatinine Ratio 7.0 - 25.0  32.4 (H)   eGFR >60.0 mL/min/1.73 41.0 (L)   Glucose 65 - 99 mg/dL 103 (H)   Calcium 8.6 - 10.5 mg/dL 9.6   (H): Data is abnormally high  (L): Data is abnormally low     Latest Reference Range & Units 09/11/23 14:45   WBC 3.40 - 10.80 10*3/mm3 6.64   RBC 3.77 - 5.28 10*6/mm3 3.48 (L)   Hemoglobin 12.0 - 15.9 g/dL 10.4 (L)   Hematocrit 34.0 - 46.6 % 32.7 (L)   Platelets 140 - 450 10*3/mm3 160   RDW 12.3 - 15.4 % 13.6   MCV 79.0 - 97.0 fL 94.0   MCH 26.6 - 33.0 pg 29.9   MCHC 31.5 - 35.7 g/dL 31.8   MPV 6.0 - 12.0 fL 9.7   RDW-SD 37.0 - 54.0 fl 46.2   (L): Data is abnormally low  Assessment and Plan:       Status post reverse arthroplasty of right shoulder    Hypertension, essential    GERD  (gastroesophageal reflux disease)    Hypothyroidism    Parkinson's disease    MILLI treated with BiPAP - Patient reports compliance.     Atrial fibrillation    Cardiac pacemaker in situ    Chronic kidney disease, stage 3a          Plan     1. PT/OT. NWB, left UE, ROM hand, wrist, elbow.  2. Pain control-prns, interscalene nerve block cath with ropivacaine infusion.           3. IS-encourage  4. DVT proph- Mech/ mobilize.  5. Bowel regimen  6. Resume home medications as appropriate  7. Monitor post-op labs  8. DC planning, likely inpatient rehab will be required   consultation.    -DM type 2  Hgb A1C 5.8  Hold OHA as appropriate  FSBG AC/HS, ( q 6 when NPO), along with correction humalog.  Long acting insulin        - Hypertension:  Resume home medications as appropriate, formulary substitution when indicated.  Holding parameters.  Prn medications for elevated blood pressure.     -Parkinson's:  Resume home regimen, Sinemet.     -MILLI:  Continue CPAP.   Monitor O2 sats.     -CKD:  Monitor renal function, avoid nephrotoxic agents.  Maintain adequate volume status..     -Atrial fibrillation  Hx:   Resume anticoagulation postop day 1.  Resume antiarrhythmics, Tikosyn.    -MILLI:  Continue CPAP.   Monitor O2 sats.      Dragon disclaimer:  Part of this encounter note is an electronic transcription/translation of spoken language to printed text. The electronic translation of spoken language may permit erroneous, or at times, nonsensical words or phrases to be inadvertently transcribed; Although I have reviewed the note for such errors, some may still exist.    Angelica Patten MD  09/18/23  15:28 EDT            Electronically signed by Angelica Patten MD at 09/19/23 1040       Patience Mccullough APRN at 09/18/23 0806       Attestation signed by Sammy Yo MD at 09/18/23 0952    I have reviewed this documentation and agree.                    Pre-Op H&P  Joanna Celis  "Tay  [id number]  1950      Chief complaint: Right shoulder pain      Subjective:  Patient is a 72 y.o.female presents for scheduled surgery by Dr. Yo. She anticipates a REVERSE TOTAL SHOULDER  ARTHROPLASTY WITH BICEPS TENODESIS - RIGHT  today. Her shoulder has been painful with limited ROM for many years. She had previous right shoulder surgery in 2014. She reports RUE weakness. She tried cortisone injections and PT with short-term benefit.      Review of Systems:  Constitutional-- No fever, chills or sweats. + fatigue.  CV-- No chest pain, palpitation or syncope. +HTN, afib  Resp-- No cough, hemoptysis. +SOB, MILLI on cpap   Skin--No rashes or lesions      Allergies:   Allergies   Allergen Reactions    Toprol Xl [Metoprolol Tartrate] Shortness Of Breath     Extreme fatigue    Amlodipine Besylate Swelling     Lower extremity (ankles, feet) swelling    Codeine Unknown (See Comments)     Pt is unaware of what reaction she had    Entacapone Other (See Comments)     \"extreme weakness in legs - caused several falls, which stopped after discontinuing this medication\"    Epinephrine Other (See Comments)     6/4/16- had 3 shots to numb mouth to prepare teeth for crowns, the shots contained epi-  Caused pt to have chest discomfort- went to hospital in ambulance, discovered had a fib while there     Levemir [Insulin Detemir] Hives     Hives / rash around injection site    Penicillins Hives     Jitteriness     Xarelto [Rivaroxaban] GI Bleeding     hgb dropped to 5.2    Hydrocodone Unknown - High Severity    Prochlorperazine Unknown - High Severity    Toprol Xl [Metoprolol] Unknown - High Severity    Aricept [Donepezil Hcl] Nausea Only     Vivid dreams    Bactrim [Sulfamethoxazole-Trimethoprim] Nausea Only and Other (See Comments)     Headache -  Cant be taken with tikosyn - septra ds    Benztropine Mesylate Other (See Comments)     Uncontrollable body movements    Cimetidine Other (See Comments)     Cant be taken " "with tikosyn     Ciprofloxacin Diarrhea    Cogentin [Benztropine] Other (See Comments)     \"uncontrollable body movements\"    Compazine [Prochlorperazine Edisylate] Other (See Comments)     Dystonic reaction    Cortisone Other (See Comments)     MAKES BLOOD PRESSURE HIGH     Duraprep [Antiseptic Products, Misc.] Itching and Rash     RASH AND ITCHING    Erythromycin Base Other (See Comments)     Cant be taken with tikosyn - z pack and ketek     Flurandrenolone Other (See Comments)     Cant be taken with tikosyn     Include - levaquin, cipro, norloxacin     Haldol [Haloperidol Lactate] Other (See Comments)     Dystonic reaction    Hydralazine Other (See Comments)     Headache     Hydrochlorothiazide Other (See Comments)     Cant be taken with tikosyn -  Also hydrodiuril, microzide, hydro-par, oretic, esidrix, ezide or any medicine with hct or hctz in name     Hydrocodone-Acetaminophen Nausea And Vomiting and Dizziness     Headache, nausea     Levaquin [Levofloxacin] Other (See Comments)     Cannot take due to taking propafenone HCL- severe reaction with mixed.     Lisinopril Cough    Macrolides And Ketolides Other (See Comments)     antibx -   Cant be taken with tikosyn     Statins Myalgia     Leg pain- all statins     Tarka [Trandolapril-Verapamil Hcl Er] Other (See Comments)     Constipation     Verapamil Other (See Comments)     Cant be taken with tikosyn - calan, covera-hs, isoptin, verelan or tarka          Home Meds:  Medications Prior to Admission   Medication Sig Dispense Refill Last Dose    Accu-Chek Guide test strip Testing 3 times per day; E11.65 300 each 3     Accu-Chek Softclix Lancets lancets USE ONE LANCET TO TEST THREE TIMES A  each 1     albuterol (PROVENTIL) (2.5 MG/3ML) 0.083% nebulizer solution Take 2.5 mg by nebulization Every 4 (Four) Hours As Needed for Wheezing or Shortness of Air. 1 each 3     albuterol sulfate HFA (Ventolin HFA) 108 (90 Base) MCG/ACT inhaler Inhale 1 puff Every 4 " (Four) Hours As Needed for Wheezing or Shortness of Air. 8 g 5     amantadine (SYMMETREL) 100 MG capsule Take 1 capsule by mouth 2 (Two) Times a Day.       apixaban (Eliquis) 5 MG tablet tablet Take 1 tablet by mouth Every 12 (Twelve) Hours. 180 tablet 2     aspirin 81 MG EC tablet Take 1 tablet by mouth Daily.       B Complex-C (SUPER B COMPLEX PO) Take 1 tablet by mouth Daily.       bumetanide (BUMEX) 1 MG tablet 1 tab po daily as directed 90 tablet 3     Calcium Carbonate (CALTRATE 600 PO) Take 600 mg by mouth Daily.       carbidopa-levodopa (SINEMET)  MG per tablet Take  by mouth. 2 tablets at 0600, 1 tablet at 0900, 1200, 1500, 1800, 2100       Cholecalciferol 2000 units tablet Take 1 tablet by mouth 2 (Two) Times a Day.       citalopram (CeleXA) 20 MG tablet Take 1 tablet by mouth Daily. 90 tablet 3     clobetasol (TEMOVATE) 0.05 % cream Apply 1 application  topically to the appropriate area as directed 2 (Two) Times a Day As Needed (Lichens Sclerosis).       dofetilide (TIKOSYN) 500 MCG capsule TAKE 1 CAPSULE EVERY 12 HOURS (Patient taking differently: Take 1 capsule by mouth 2 (Two) Times a Day.) 180 capsule 3     Emollient (AQUAPHOR ADVANCED THERAPY EX) Apply  topically.       Enoxaparin Sodium (LOVENOX) 100 MG/ML solution prefilled syringe syringe Inject 1.1 mL under the skin into the appropriate area as directed Every 12 (Twelve) Hours. 3 mL 0     Glucosamine-Chondroit-Calcium (TRIPLE FLEX BONE & JOINT PO) Take 1 tablet by mouth Daily. Move free       hydrocortisone 2.5 % ointment        hydroxychloroquine (PLAQUENIL) 200 MG tablet Daily.       Insulin Glargine (Lantus SoloStar) 100 UNIT/ML injection pen Inject 12-14 Units under the skin into the appropriate area as directed Daily. 15 mL 1     Insulin Pen Needle (B-D UF III MINI PEN NEEDLES) 31G X 5 MM misc USE TO INJECT INSULIN DAILY 100 each 3     ipratropium (ATROVENT) 0.03 % nasal spray 2 sprays into the nostril(s) as directed by provider 2  (Two) Times a Day As Needed (CONGESTION SINUS). 30 mL 11     Loratadine 10 MG capsule Take 1 capsule by mouth Daily.       metFORMIN (GLUCOPHAGE) 1000 MG tablet Take 1 tablet by mouth 2 (Two) Times a Day With Meals. 180 tablet 1     metOLazone (ZAROXOLYN) 2.5 MG tablet Take 1 tablet by mouth Daily. 90 tablet 1     Multiple Vitamins-Minerals (CENTRUM ULTRA WOMENS PO) Take 1 tablet by mouth Daily.       nitroglycerin (NITROLINGUAL) 0.4 MG/SPRAY spray Place 1 spray under the tongue Every 5 (Five) Minutes As Needed for Chest Pain. 1 each 6     nystatin (MYCOSTATIN) 733290 UNIT/GM ointment Apply 1 application topically to the appropriate area as directed 2 (Two) Times a Day. (Patient taking differently: Apply 1 application  topically to the appropriate area as directed As Needed.) 3 g 3     O2 (OXYGEN) Inhale 2 L/min Every Night. 2L all the time now       pantoprazole (Protonix) 40 MG EC tablet Take 1 tablet by mouth Daily. 90 tablet 1     pramipexole (MIRAPEX) 1.5 MG tablet Take 1 tablet by mouth Every Night. 90 tablet 0     ranolazine (RANEXA) 500 MG 12 hr tablet Take 1 tablet by mouth Every 12 (Twelve) Hours. 180 tablet 3     senna (SENOKOT) 8.6 MG tablet Take 1 tablet by mouth Daily.       spironolactone (ALDACTONE) 25 MG tablet Take 2 tablets by mouth Daily. 180 tablet 2     traMADol (ULTRAM) 50 MG tablet Take 1 tablet by mouth Every 6 (Six) Hours As Needed for Moderate Pain . (Patient taking differently: Take 1 tablet by mouth Every 8 (Eight) Hours As Needed for Moderate Pain.) 30 tablet 2     triamcinolone (KENALOG) 0.1 % cream 1 application  As Needed for Irritation or Rash.       Unithroid 175 MCG tablet Take 1 tablet by mouth Daily. 90 tablet 1     valsartan (DIOVAN) 160 MG tablet Take 1 tablet by mouth 2 (Two) Times a Day. 180 tablet 2     vitamin C (ASCORBIC ACID) 500 MG tablet Take 1 tablet by mouth Daily. Chewable tablet       Zinc 50 MG capsule Take 1 capsule by mouth Daily.            PMH:   Past Medical  History:   Diagnosis Date    Anemia     Ankle problem     thinks back related causing pain     Atrial fibrillation     AVM (arteriovenous malformation)     Back pain     Chronic kidney disease     stage 3 per pt    CKD (chronic kidney disease)     Clotting disorder 2016    AVM - small intestine    COVID-19 vaccine series completed     Gastrocnemius muscle tear     left medial 91    Generalized osteoarthritis     GERD (gastroesophageal reflux disease)     Gestational diabetes     GIB (gastrointestinal bleeding) 2016    d/t xarelto     Headache     Hearing decreased, bilateral     HAS HEARING AID, NOT WEARING IT CURRENTLY    Hiatal hernia     History of shingles     History of transfusion 2016    no reaction recalled     Hypertension     Hypothyroidism     IBS (irritable bowel syndrome)     Klebsiella pneumonia     Lichen sclerosus     Lupus     subQ    Mouth problem     mouth guard used since pt bites tongue and lips excessively at night if not- with bipap     MRSA infection 2018    Obesity     On home oxygen therapy     2L of oxygen all the time due to current congestion     Osteoporosis     Parkinson's disease     Peripheral vascular disease     Pleurisy     Pneumonia     Puerperal sepsis with acute hypoxic respiratory failure     emergent intubation- 2016    Right leg pain     from back issues     Salivary gland stone     Sciatic nerve pain     Seborrheic dermatitis     Skin cancer     on back     Sleep apnea     CPAP AND HOME O2 2L/M    TIA (transient ischemic attack) 2014    no residual effects    Type 2 diabetes mellitus     UTI (urinary tract infection)     Vitamin D deficiency 09/08/2022    Wears glasses      PSH:    Past Surgical History:   Procedure Laterality Date    ABLATION OF DYSRHYTHMIC FOCUS  05/16/13    Laser Ablation - Rt Leg    BACK SURGERY      l4-l5 laminectomy     BICEPS TENDON REPAIR Right     shoulder    BREAST BIOPSY Left 05/2004    excisional, benign    BRONCHOSCOPY RIGID / FLEXIBLE       2016    BUNIONECTOMY Right     CARDIAC CATHETERIZATION      CARDIAC CATHETERIZATION N/A 02/01/2019    Procedure: Left Heart Cath;  Surgeon: Albertina Corona MD;  Location:  [facility];  Service: Cardiology    CHOLECYSTECTOMY      COLONOSCOPY  2016    COLONOSCOPY N/A 06/17/2021    Procedure: COLONOSCOPY WITH POLYPECTOMY;  Surgeon: Trenton Sosa MD;  Location:  [facility];  Service: Gastroenterology;  Laterality: N/A;    CORONARY STENT PLACEMENT      x1 stent    CYST REMOVAL      left ear, upper left back 2001    CYSTOSCOPY      x2  18 and 20 -   in 20 urethra dilation     DIAGNOSTIC LAPAROSCOPY  1981    ENDOSCOPIC FUNCTIONAL SINUS SURGERY (FESS)  2011    ENDOSCOPY  2016    ENTEROSCOPY VIA STOMA      with single ballon with fluoro     HAMMER TOE REPAIR Left     INCISION AND DRAINAGE OF WOUND  2018    back with wound infection     INSERT / REPLACE / REMOVE PACEMAKER  11/10/16    [facility]    JOINT REPLACEMENT      KNEE ARTHROSCOPY      LASER ABLATION      right leg 13    LIPOMA EXCISION  1999    left leg     LUMBAR LAMINECTOMY DISCECTOMY DECOMPRESSION N/A 07/06/2018    Procedure: LUMBAR LAMINECTOMY L4-5, HEMILAMIINECTOMY RIGHT L5-S1, FORAMINOTOMY L5-S1;  Surgeon: Lencho Gamino MD;  Location:  [facility];  Service: Neurosurgery    LUMBAR LAMINECTOMY DISCECTOMY DECOMPRESSION N/A 09/19/2018    Procedure: INCISION AND DRAINAGE BACK WITH WOUND EXPLORATION;  Surgeon: Ritchie García MD;  Location:  [facility];  Service: Neurosurgery    LUNG BIOPSY Left 2016    OTHER SURGICAL HISTORY      ct scan of chest and sinuses and lower back     OTHER SURGICAL HISTORY      various echos     OTHER SURGICAL HISTORY      electroencephalogram 16,   emg  ncv tests 2007    OTHER SURGICAL HISTORY      mra 2007  and various mri with xrays, nuclear medicine lung ventilation with perfusion test 2016 with pft     OTHER SURGICAL HISTORY      barium swallow testing 15, 12, 12    OTHER SURGICAL  HISTORY      vaginal ultrasound- 2012,   vas clementina lower extrem 2016, vas venous duplex lower extrem bilat 2019    OTHER SURGICAL HISTORY      wdge biopsy spring of lung     OTHER SURGICAL HISTORY      emergent intubation- hypoxic resp failure     PACEMAKER IMPLANTATION  11/2016    sss    REPLACEMENT TOTAL KNEE Bilateral     left knee 2011, right 2012 per dr acuna     REPLACEMENT TOTAL KNEE Bilateral     SHOULDER ARTHROSCOPY Bilateral     2003-left, 2004- right     SKIN BIOPSY      skin cancer back 2016    SKIN CANCER EXCISION      upper righ tback     MADHAV      TEETH EXTRACTION      x2    TOTAL SHOULDER ARTHROPLASTY W/ DISTAL CLAVICLE EXCISION Left 10/24/2022    Procedure: REVERSE TOTAL SHOULDER ARTHROPLASTY, BICEPS TENODESIS - LEFT;  Surgeon: Sammy Yo MD;  Location: [facility];  Service: Orthopedics;  Laterality: Left;    TUBAL ABDOMINAL LIGATION Bilateral 1980    WISDOM TOOTH EXTRACTION         Immunization History:  Influenza: No  Pneumococcal: UTD  Tetanus: UTD  Covid : x4    Social History:   Tobacco:   Social History     Tobacco Use   Smoking Status Never    Passive exposure: Past   Smokeless Tobacco Never   Tobacco Comments    Father and mother smoked for several years.      Alcohol:     Social History     Substance and Sexual Activity   Alcohol Use Never         Physical Exam:/53 (BP Location: Left arm, Patient Position: Lying)   Pulse 65   Temp 97.1 °F (36.2 °C) (Temporal)   Resp 18   LMP  (LMP Unknown) Comment: Mammogram- 1/28/20  SpO2 99%       General Appearance:    Alert, cooperative, no distress, appears stated age   Head:    Normocephalic, without obvious abnormality, atraumatic   Lungs:     Clear to auscultation bilaterally, respirations unlabored    Heart:   Regular rate and rhythm, S1 and S2 normal    Abdomen:    Soft without tenderness   Extremities:   Extremities normal, atraumatic, no cyanosis or edema   Skin:   Skin color, texture, turgor normal, no rashes or lesions    Neurologic:   Grossly intact     Results Review:     LABS:  Lab Results   Component Value Date    WBC 6.64 09/11/2023    HGB 10.4 (L) 09/11/2023    HCT 32.7 (L) 09/11/2023    MCV 94.0 09/11/2023     09/11/2023    NEUTROABS 3.8 06/15/2023    GLUCOSE 103 (H) 09/11/2023    BUN 44 (H) 09/11/2023    CREATININE 1.36 (H) 09/11/2023    EGFRIFNONA 66 06/12/2019     09/11/2023    K 4.1 09/11/2023     09/11/2023    CO2 27.0 09/11/2023    MG 2.1 10/27/2022    PHOS 3.7 12/25/2018    CALCIUM 9.6 09/11/2023    ALBUMIN 4.5 06/15/2023    AST 18 06/15/2023    ALT 7 06/15/2023    BILITOT 0.3 06/15/2023       RADIOLOGY:  Imaging Results (Last 72 Hours)       ** No results found for the last 72 hours. **            I reviewed the patient's new clinical results.    Cancer Staging (if applicable)  Cancer Patient: __ yes __no __unknown; If yes, clinical stage T:__ N:__M:__, stage group or __N/A      Impression: Right shoulder pain      Plan: REVERSE TOTAL SHOULDER  ARTHROPLASTY WITH BICEPS TENODESIS - RIGHT       Patience DEJA Mccullough   9/18/2023   08:06 EDT    Electronically signed by Sammy Yo MD at 09/18/23 0933       Current Facility-Administered Medications   Medication Dose Route Frequency Provider Last Rate Last Admin    acetaminophen (TYLENOL) tablet 650 mg  650 mg Oral Q4H PRN Sammy Yo MD   650 mg at 09/20/23 1108    Or    acetaminophen (TYLENOL) suppository 650 mg  650 mg Rectal Q4H PRN Sammy Yo MD        albuterol (PROVENTIL) nebulizer solution 0.083% 2.5 mg/3mL  2.5 mg Nebulization Q4H PRN Angelica Patten MD   2.5 mg at 09/20/23 0947    amantadine (SYMMETREL) capsule 100 mg  100 mg Oral BID Angelica Patten MD   100 mg at 09/20/23 0912    apixaban (ELIQUIS) tablet 5 mg  5 mg Oral Q12H Angelica Patten MD   5 mg at 09/20/23 0911    aspirin EC tablet 81 mg  81 mg Oral Daily Angelica Patten MD   81 mg at 09/20/23 0912    bumetanide (BUMEX) tablet 1 mg  1  mg Oral Daily Angelica Patten MD   1 mg at 09/20/23 0912    carbidopa-levodopa (SINEMET)  MG per tablet 2 tablet  2 tablet Oral Q AM Filemon Faisal A, [facility]   2 tablet at 09/20/23 0556    And    carbidopa-levodopa (SINEMET)  MG per tablet 1 tablet  1 tablet Oral 5x Daily Faisal Colbert [facility]   1 tablet at 09/20/23 1108    citalopram (CeleXA) tablet 20 mg  20 mg Oral Nightly Angelica Patten MD   20 mg at 09/19/23 2154    dextrose (D50W) (25 g/50 mL) IV injection 25 g  25 g Intravenous Q15 Min PRN Angelica Patten MD        dextrose (GLUTOSE) oral gel 15 g  15 g Oral Q15 Min PRN Angelica Patten MD        dofetilide (TIKOSYN) capsule 500 mcg  500 mcg Oral Q12H Angelica Patten MD   500 mcg at 09/20/23 0912    glucagon (GLUCAGEN) injection 1 mg  1 mg Intramuscular Q15 Min PRN Angelica Patten MD        HYDROmorphone (DILAUDID) injection 0.5 mg  0.5 mg Intravenous Q2H PRN Sammy Yo MD        And    naloxone (NARCAN) injection 0.1 mg  0.1 mg Intravenous Q5 Min PRN Sammy Yo MD        insulin detemir (LEVEMIR) injection 12 Units  12 Units Subcutaneous Nightly Angelica Patten MD   12 Units at 09/19/23 2153    Insulin Lispro (humaLOG) injection 2-7 Units  2-7 Units Subcutaneous 4x Daily AC & at Bedtime Angelica Patten MD   3 Units at 09/19/23 2153    labetalol (NORMODYNE,TRANDATE) injection 10 mg  10 mg Intravenous Q4H PRN Angelica Patten MD        levothyroxine (SYNTHROID, LEVOTHROID) tablet 175 mcg  175 mcg Oral Q AM Angelica Patten MD   175 mcg at 09/20/23 0556    metOLazone (ZAROXOLYN) tablet 2.5 mg  2.5 mg Oral Daily Angelica Patten MD   2.5 mg at 09/19/23 0806    oxyCODONE (ROXICODONE) immediate release tablet 5 mg  5 mg Oral Q4H HILDAN Sammy Yo MD   5 mg at 09/20/23 0146    pantoprazole (PROTONIX) EC tablet 40 mg  40 mg Oral Daily Angelica Patten MD   40 mg at 09/20/23 0911    pramipexole (MIRAPEX) tablet 1.5 mg   1.5 mg Oral Nightly Angelica Patten MD   1.5 mg at 09/19/23 2153    ranolazine (RANEXA) 12 hr tablet 500 mg  500 mg Oral Q12H Angelica Patten MD   500 mg at 09/20/23 0912    ropivacaine (NAROPIN) 0.2 % infusion (INFUSYSTEM)   Peripheral Nerve Continuous Matt Sr CRNA        sodium chloride 0.9 % bolus 500 mL  500 mL Intravenous TID PRN Angelica Patten MD        sodium chloride 0.9 % infusion  9 mL/hr Intravenous Continuous Sammy Yo MD 9 mL/hr at 09/18/23 1018 Restarted at 09/18/23 1153    spironolactone (ALDACTONE) tablet 50 mg  50 mg Oral Daily Angelica Patten MD        valsartan (DIOVAN) tablet 160 mg  160 mg Oral BID Angelica Patten MD   160 mg at 09/20/23 0912        Physician Progress Notes (most recent note)        Ludwig Guerra MD at 09/20/23 1104          Orthopaedic Surgery Progress Note      SUBJECTIVE:      No acute events overnight. Doing well. Pain controlled. Tolerating diet. No nausea, vomiting, fevers or chills. Completed more therapy yesterday. Continuing to make progress with pain control. IPR recommendations discussed with patient.     OBJECTIVE:  /63 (BP Location: Left arm, Patient Position: Lying)   Pulse 84   Temp 97.9 °F (36.6 °C) (Oral)   Resp 20   LMP  (LMP Unknown) Comment: Mammogram- 1/28/20  SpO2 100%       PE:  No acute distress  Non labored breathing  Peripheral perfusion intact  Right Upper Extremity:    Sling in place  Dressings c/d/I  Soft and compressible  Motor intact to epl, fpl, ff, fe, fabd  Sensation intact to light touch in med, rad, ul  2+ pulses      Results from last 7 days   Lab Units 09/19/23  0557   WBC 10*3/mm3 7.68   HEMOGLOBIN g/dL 9.1*   PLATELETS 10*3/mm3 124*     Results from last 7 days   Lab Units 09/19/23  0557   SODIUM mmol/L 140   POTASSIUM mmol/L 3.6   CO2 mmol/L 22.0   CREATININE mg/dL 0.89   GLUCOSE mg/dL 84         ASSESSMENT/PLAN:  Joanna is a 72 y.o. female  with RIGHT rotator cuff arthropathy  s/p rTSA (23)    WB Status: NWB RUE, Sling at all times except for therapy and hygiene x 4 weeks  Pain control and bowel regimen   Nutritional optimization   PT/OT: recommend IPR  Follow up: in 1 week for post op check  Disposition: discharge to [facility], pending placement      Ludwig Guerra MD       Electronically signed by Ludwig Guerra MD at 23 1107          Physical Therapy Notes (most recent note)        Sharon Purcell PT at 23 2450  Version 1 of 1         Patient Name: Joanna Cross  : 1950    MRN: [id number]                              Today's Date: 2023       Admit Date: 2023    Visit Dx: No diagnosis found.  Patient Active Problem List   Diagnosis    Coronary artery disease involving native coronary artery without angina pectoris    Hypertension, essential    Peripheral vascular disease    Hyperlipidemia LDL goal <70    Spinal stenosis, lumbar region, with neurogenic claudication    Delayed surgical wound healing    Biceps tendinitis    Overactive bladder    GERD (gastroesophageal reflux disease)    Hypothyroidism    Lichen sclerosus    Parkinson's disease    MILLI treated with BiPAP - Patient reports compliance.     History of respiratory failure - prior respiratory failure requiring mechanical ventilation, open lung biopsy non-specific.     Atrial fibrillation    CKD (chronic kidney disease)    Tachy-thai syndrome    Long term current use of antiarrhythmic drug    History of adenomatous polyp of colon    Anemia    Angiodysplasia    Hx of colonic polyps    Body mass index 40.0-44.9, adult    Cardiac pacemaker in situ    Chronic low back pain    Chronic lung disease    Degeneration of lumbar intervertebral disc    Edema of lower extremity    High risk medication use    History of malignant neoplasm of skin    History of TIA (transient ischemic attack)    Hyponatremia    Hypotonic bladder    Incomplete tear of rotator cuff    Major depression in remission     Migraine    Weakness    Tinnitus of both ears    Primary osteoarthritis involving multiple joints    Restless legs    Seborrheic dermatitis    Sphenoid sinusitis    Transient cerebral ischemia    Urinary tract infection    Urinary urgency    Type 2 diabetes mellitus with hyperglycemia, without long-term current use of insulin    Vitamin B12 deficiency    Vitamin D deficiency    Chronic kidney disease, stage 3b    Status post reverse total replacement of left shoulder    Postoperative pain    Dysuria    Rash    Pruritus    Acute post-traumatic headache, not intractable    History of recent fall    Chronic kidney disease, stage 3a    Status post reverse arthroplasty of right shoulder     Past Medical History:   Diagnosis Date    Anemia     Ankle problem     thinks back related causing pain     Atrial fibrillation     AVM (arteriovenous malformation)     Back pain     Chronic kidney disease     stage 3 per pt    CKD (chronic kidney disease)     Clotting disorder 2016    AVM - small intestine    COVID-19 vaccine series completed     Gastrocnemius muscle tear     left medial 91    Generalized osteoarthritis     GERD (gastroesophageal reflux disease)     Gestational diabetes     GIB (gastrointestinal bleeding) 2016    d/t xarelto     Headache     Hearing decreased, bilateral     HAS HEARING AID, NOT WEARING IT CURRENTLY    Hiatal hernia     History of shingles     History of transfusion 2016    no reaction recalled     Hypertension     Hypothyroidism     IBS (irritable bowel syndrome)     Klebsiella pneumonia     Lichen sclerosus     Lupus     subQ    Mouth problem     mouth guard used since pt bites tongue and lips excessively at night if not- with bipap     MRSA infection 2018    Obesity     On home oxygen therapy     2L of oxygen all the time due to current congestion     Osteoporosis     Parkinson's disease     Peripheral vascular disease     Pleurisy     Pneumonia     Puerperal sepsis with acute hypoxic respiratory  failure     emergent intubation- 2016    Right leg pain     from back issues     Salivary gland stone     Sciatic nerve pain     Seborrheic dermatitis     Skin cancer     on back     Sleep apnea     CPAP AND HOME O2 2L/M    TIA (transient ischemic attack) 2014    no residual effects    Type 2 diabetes mellitus     UTI (urinary tract infection)     Vitamin D deficiency 09/08/2022    Wears glasses      Past Surgical History:   Procedure Laterality Date    ABLATION OF DYSRHYTHMIC FOCUS  05/16/13    Laser Ablation - Rt Leg    BACK SURGERY      l4-l5 laminectomy     BICEPS TENDON REPAIR Right     shoulder    BREAST BIOPSY Left 05/2004    excisional, benign    BRONCHOSCOPY RIGID / FLEXIBLE      2016    BUNIONECTOMY Right     CARDIAC CATHETERIZATION      CARDIAC CATHETERIZATION N/A 02/01/2019    Procedure: Left Heart Cath;  Surgeon: Albertina Corona MD;  Location:  [facility];  Service: Cardiology    CHOLECYSTECTOMY      COLONOSCOPY  2016    COLONOSCOPY N/A 06/17/2021    Procedure: COLONOSCOPY WITH POLYPECTOMY;  Surgeon: Trenton Sosa MD;  Location:  [facility];  Service: Gastroenterology;  Laterality: N/A;    CORONARY STENT PLACEMENT      x1 stent    CYST REMOVAL      left ear, upper left back 2001    CYSTOSCOPY      x2  18 and 20 -   in 20 urethra dilation     DIAGNOSTIC LAPAROSCOPY  1981    ENDOSCOPIC FUNCTIONAL SINUS SURGERY (FESS)  2011    ENDOSCOPY  2016    ENTEROSCOPY VIA STOMA      with single ballon with fluoro     HAMMER TOE REPAIR Left     INCISION AND DRAINAGE OF WOUND  2018    back with wound infection     INSERT / REPLACE / REMOVE PACEMAKER  11/10/16    [facility]    JOINT REPLACEMENT      KNEE ARTHROSCOPY      LASER ABLATION      right leg 13    LIPOMA EXCISION  1999    left leg     LUMBAR LAMINECTOMY DISCECTOMY DECOMPRESSION N/A 07/06/2018    Procedure: LUMBAR LAMINECTOMY L4-5, HEMILAMIINECTOMY RIGHT L5-S1, FORAMINOTOMY L5-S1;  Surgeon: Lencho OWEN  MD Stevenson;  Location:  [facility];  Service: Neurosurgery    LUMBAR LAMINECTOMY DISCECTOMY DECOMPRESSION N/A 09/19/2018    Procedure: INCISION AND DRAINAGE BACK WITH WOUND EXPLORATION;  Surgeon: Ritchie García MD;  Location:  [facility];  Service: Neurosurgery    LUNG BIOPSY Left 2016    OTHER SURGICAL HISTORY      ct scan of chest and sinuses and lower back     OTHER SURGICAL HISTORY      various echos     OTHER SURGICAL HISTORY      electroencephalogram 16,   emg  ncv tests 2007    OTHER SURGICAL HISTORY      mra 2007  and various mri with xrays, nuclear medicine lung ventilation with perfusion test 2016 with pft     OTHER SURGICAL HISTORY      barium swallow testing 15, 12, 12    OTHER SURGICAL HISTORY      vaginal ultrasound- 2012,   vas clementina lower extrem 2016, vas venous duplex lower extrem bilat 2019    OTHER SURGICAL HISTORY      wdge biopsy spring of lung     OTHER SURGICAL HISTORY      emergent intubation- hypoxic resp failure     PACEMAKER IMPLANTATION  11/2016    sss    REPLACEMENT TOTAL KNEE Bilateral     left knee 2011, right 2012 per dr acuna     REPLACEMENT TOTAL KNEE Bilateral     SHOULDER ARTHROSCOPY Bilateral     2003-left, 2004- right     SKIN BIOPSY      skin cancer back 2016    SKIN CANCER EXCISION      upper righ tback     MADHAV      TEETH EXTRACTION      x2    TOTAL SHOULDER ARTHROPLASTY W/ DISTAL CLAVICLE EXCISION Left 10/24/2022    Procedure: REVERSE TOTAL SHOULDER ARTHROPLASTY, BICEPS TENODESIS - LEFT;  Surgeon: Sammy Yo MD;  Location:  [facility];  Service: Orthopedics;  Laterality: Left;    TOTAL SHOULDER ARTHROPLASTY W/ DISTAL CLAVICLE EXCISION Right 9/18/2023    Procedure: REVERSE TOTAL SHOULDER  ARTHROPLASTY WITH BICEPS TENODESIS - RIGHT;  Surgeon: Sammy Yo MD;  Location:  [facility];  Service: Orthopedics;  Laterality: Right;    TUBAL ABDOMINAL LIGATION Bilateral 1980    WISDOM TOOTH EXTRACTION        General Information       Row Name 09/19/23 1508          Physical  Therapy Time and Intention    Document Type evaluation  -SC     Mode of Treatment physical therapy  -[facility] Name 09/19/23 1508          General Information    Patient Profile Reviewed yes  -SC     Existing Precautions/Restrictions fall;oxygen therapy device and L/min;non-weight bearing;right;shoulder  nerve cath,parkinsons, on oxygen  -SC     Barriers to Rehab medically complex;previous functional deficit  -[facility] Name 09/19/23 1508          Living Environment    People in Home child(lisa), adult  -[facility] Name 09/19/23 1508          Home Main Entrance    Number of Stairs, Main Entrance none  has a ramp  -[facility] Name 09/19/23 1508          Cognition    Orientation Status (Cognition) oriented x 4  -[facility] Name 09/19/23 1508          Safety Issues, Functional Mobility    Impairments Affecting Function (Mobility) coordination;endurance/activity tolerance;motor control;pain;range of motion (ROM);sensation/sensory awareness;shortness of breath  -SC     Comment, Safety Issues/Impairments (Mobility) alert, following commands  -SC               User Key  (r) = Recorded By, (t) = Taken By, (c) = Cosigned By      Initials Name Provider Type    SC Sharon Purcell, PT Physical Therapist                   Mobility       [facility] Name 09/19/23 1509          Bed Mobility    Comment, (Bed Mobility) uic  -[facility] Name 09/19/23 1509          Transfers    Comment, (Transfers) STS from recliner and commode with cues for hand placement.  Demonstrated slow transfers  -[facility] Name 09/19/23 1509          Sit-Stand Transfer    Sit-Stand [place] (Transfers) 1 person assist;1 person to manage equipment;contact guard  -SC     Assistive Device (Sit-Stand Transfers) other (see comments)  UE suport  -[facility] Name 09/19/23 1509          Gait/Stairs (Locomotion)    [place] Level (Gait) 1 person to manage equipment;2 person assist;contact guard  -SC     Assistive Device (Gait) other (see comments)  L  UE support  -SC     Distance in Feet (Gait) 40+40+40  -SC     Deviations/Abnormal Patterns (Gait) stride length decreased;base of support, wide;manjit decreased  -SC     Comment, (Gait/Stairs) Demonstrated slow shuffling steps. Encouraged to focus on her breathing. Followed by chair and took multiple sitting rest breaks. Cues for breathing technique when resting.  -SC       Row Name 09/19/23 1509          Mobility    Extremity Weight-bearing Status right upper extremity  -SC     Left Upper Extremity (Weight-bearing Status) non weight-bearing (NWB)  -SC               User Key  (r) = Recorded By, (t) = Taken By, (c) = Cosigned By      Initials Name Provider Type    SC Sharon Purcell, PT Physical Therapist                   Obj/Interventions       [facility] Name 09/19/23 1512          Range of Motion Comprehensive    General Range of Motion upper extremity range of motion deficits identified;bilateral lower extremity ROM L  -[facility] Name 09/19/23 1512          Strength Comprehensive (MMT)    Comment, General Manual Muscle Testing (MMT) Assessment B LE grossly 4+/5, R UE: in sling, moving fingers, L UE WFL  -[facility] Name 09/19/23 1512          Balance    Dynamic Standing Balance 2-person assist;1 person to manage equipment;contact guard  -SC     Position/Device Used, Standing Balance supported  -SC     Comment, Balance unsteady gait  -[facility] Name 09/19/23 1512          Sensory Assessment (Somatosensory)    Sensory Assessment (Somatosensory) other (see comments)  R UE diminished LT due to nerve cath  -SC               User Key  (r) = Recorded By, (t) = Taken By, (c) = Cosigned By      Initials Name Provider Type    SC Sharon Purcell, PT Physical Therapist                   Goals/Plan       Row Name 09/19/23 1515          Bed Mobility Goal 1 (PT)    Activity/Assistive Device (Bed Mobility Goal 1, PT) overhead trapeze  -SC     [place] Level/Cues Needed (Bed Mobility Goal 1, PT) modified independence   -SC     Time Frame (Bed Mobility Goal 1, PT) long term goal (LTG);10 days  -[facility] Name 09/19/23 1515          Transfer Goal 1 (PT)    Activity/Assistive Device (Transfer Goal 1, PT) sit-to-stand/stand-to-sit;bed-to-chair/chair-to-bed;cane, straight  -SC     [place] Level/Cues Needed (Transfer Goal 1, PT) standby assist  -SC     Time Frame (Transfer Goal 1, PT) long term goal (LTG);10 days  -[facility] Name 09/19/23 1515          Gait Training Goal 1 (PT)    Activity/Assistive Device (Gait Training Goal 1, PT) gait (walking locomotion);cane, straight  -SC     [place] Level (Gait Training Goal 1, PT) contact guard required  -SC     Distance (Gait Training Goal 1, PT) 50 x2  -SC     Time Frame (Gait Training Goal 1, PT) long term goal (LTG);10 days  -SC               User Key  (r) = Recorded By, (t) = Taken By, (c) = Cosigned By      Initials Name Provider Type    SC Sharon Purcell, PT Physical Therapist                   Clinical Impression       [facility] Name 09/19/23 1513          Pain    Additional Documentation Pain Scale: FACES Pre/Post-Treatment (Group)  -SC       Row Name 09/19/23 1513          Pain Scale: FACES Pre/Post-Treatment    Pain: FACES Scale, Pretreatment 2-->hurts little bit  -SC     Posttreatment Pain Rating 4-->hurts little more  -SC     Pain Location - Side/Orientation Right  -SC     Pain Location upper  -SC     Pain Location - extremity  -SC       Row Name 09/19/23 1513          Plan of Care Review    Plan of Care Reviewed With patient  -SC     Progress improving  -SC     Outcome Evaluation Patient is below her baseline function requireing assist to ambulate safely. Recommend rehab at discharge  -[facility] Name 09/19/23 1513          Therapy Assessment/Plan (PT)    Patient/Family Therapy Goals Statement (PT) get stronger  -SC     Rehab Potential (PT) good, to achieve stated therapy goals  -SC     Criteria for Skilled Interventions Met (PT) yes;meets criteria  -SC     Therapy  Frequency (PT) daily  -SC       Row Name 09/19/23 1513          Vital Signs    Pre SpO2 (%) 92  -SC     O2 Delivery Pre Treatment supplemental O2  -SC     Intra SpO2 (%) 91  -SC     O2 Delivery Intra Treatment supplemental O2  -SC     Post SpO2 (%) 97  -SC     O2 Delivery Post Treatment supplemental O2  -SC       Row Name 09/19/23 1513          Positioning and Restraints    Pre-Treatment Position sitting in chair/recliner  -SC     Post Treatment Position chair  -SC     In Chair notified nsg;reclined;sitting;call light within reach;encouraged to call for assist;exit alarm on;with family/caregiver  -SC               User Key  (r) = Recorded By, (t) = Taken By, (c) = Cosigned By      Initials Name Provider Type    Sharon Almodovar, PT Physical Therapist                   Outcome Measures       Row Name 09/19/23 1519 09/19/23 0805       How much help from another person do you currently need...    Turning from your back to your side while in flat bed without using bedrails? 3  -SC 3  -GJ    Moving from lying on back to sitting on the side of a flat bed without bedrails? 3  -SC 3  -GJ    Moving to and from a bed to a chair (including a wheelchair)? 3  -SC 3  -GJ    Standing up from a chair using your arms (e.g., wheelchair, bedside chair)? 3  -SC 3  -GJ    Climbing 3-5 steps with a railing? 2  -SC 2  -GJ    To walk in hospital room? 3  -SC 3  -GJ    AM-PAC 6 Clicks Score (PT) 17  -SC 17  -GJ    Highest level of mobility 5 --> Static standing  -SC 5 --> Static standing  -GJ      Row Name 09/19/23 1519 09/19/23 1133       Functional Assessment    Outcome Measure Options AM-PAC 6 Clicks Basic Mobility (PT)  -SC AM-PAC 6 Clicks Daily Activity (OT)  -AR              User Key  (r) = Recorded By, (t) = Taken By, (c) = Cosigned By      Initials Name Provider Type    SC Sharon Purcell, PT Physical Therapist    Patience Fu, OT Occupational Therapist    Aleyda Still RN Registered Nurse                                  Physical Therapy Education       Title: PT OT SLP Therapies (Done)       Topic: Physical Therapy (Done)       Point: Mobility training (Done)       Learning Progress Summary             Patient JUVENTINO Still, VU by SC at 9/19/2023 1520    Comment: reviewed benefits of activity                         Point: Home exercise program (Done)       Learning Progress Summary             Patient JUVENTINO Still, VU by SC at 9/19/2023 1520    Comment: reviewed benefits of activity                         Point: Body mechanics (Done)       Learning Progress Summary             Patient JUVENTINO Still, VU by SC at 9/19/2023 1520    Comment: reviewed benefits of activity                         Point: Precautions (Done)       Learning Progress Summary             Patient JUVENTINO Still, VU by SC at 9/19/2023 1520    Comment: reviewed benefits of activity                                         User Key       Initials Effective Dates Name Provider Type Discipline    SC 02/03/23 -  Sharon Purcell, PT Physical Therapist PT                  PT Recommendation and Plan     Plan of Care Reviewed With: patient  Progress: improving  Outcome Evaluation: Patient is below her baseline function requireing assist to ambulate safely. Recommend rehab at discharge     Time Calculation:   PT Evaluation Complexity  History, PT Evaluation Complexity: 3 or more personal factors and/or comorbidities  Examination of Body Systems (PT Eval Complexity): total of 4 or more elements  Clinical Presentation (PT Evaluation Complexity): evolving  Clinical Decision Making (PT Evaluation Complexity): moderate complexity  Overall Complexity (PT Evaluation Complexity): moderate complexity     PT Charges       Row Name 09/19/23 1440             Time Calculation    Start Time 1440  -SC      PT Received On 09/19/23  -SC      PT Goal Re-Cert Due Date 09/29/23  -SC         Untimed Charges    PT Eval/Re-eval Minutes 46  -SC         Total Minutes    Untimed Charges Total Minutes 46  -SC        Total Minutes 46  -SC                User Key  (r) = Recorded By, (t) = Taken By, (c) = Cosigned By      Initials Name Provider Type    SC Sharon Purcell PT Physical Therapist                  Therapy Charges for Today       Code Description Service Date Service Provider Modifiers Qty    81510722548 HC PT EVAL MOD COMPLEXITY 4 2023 Sharon Purcell, PT GP 1    25941616075 HC PT THER SUPP EA 15 MIN 2023 Sharon Purcell PT GP 2            PT G-Codes  Outcome Measure Options: AM-PAC 6 Clicks Basic Mobility (PT)  AM-PAC 6 Clicks Score (PT): 17  AM-PAC 6 Clicks Score (OT): 9  PT Discharge Summary  Anticipated Discharge Disposition (PT): inpatient rehabilitation facility    Sharon Purcell PT  2023      Electronically signed by Sharon Purcell PT at 23 1522          Occupational Therapy Notes (most recent note)        Patience Perez, OT at 23 1136          Patient Name: Joanna Cross  : 1950    MRN: [id number]                              Today's Date: 2023       Admit Date: 2023    Visit Dx: No diagnosis found.  Patient Active Problem List   Diagnosis    Coronary artery disease involving native coronary artery without angina pectoris    Hypertension, essential    Peripheral vascular disease    Hyperlipidemia LDL goal <70    Spinal stenosis, lumbar region, with neurogenic claudication    Delayed surgical wound healing    Biceps tendinitis    Overactive bladder    GERD (gastroesophageal reflux disease)    Hypothyroidism    Lichen sclerosus    Parkinson's disease    MILLI treated with BiPAP - Patient reports compliance.     History of respiratory failure - prior respiratory failure requiring mechanical ventilation, open lung biopsy non-specific.     Atrial fibrillation    CKD (chronic kidney disease)    Tachy-thai syndrome    Long term current use of antiarrhythmic drug    History of adenomatous polyp of colon    Anemia    Angiodysplasia    Hx of colonic polyps     Body mass index 40.0-44.9, adult    Cardiac pacemaker in situ    Chronic low back pain    Chronic lung disease    Degeneration of lumbar intervertebral disc    Edema of lower extremity    High risk medication use    History of malignant neoplasm of skin    History of TIA (transient ischemic attack)    Hyponatremia    Hypotonic bladder    Incomplete tear of rotator cuff    Major depression in remission    Migraine    Weakness    Tinnitus of both ears    Primary osteoarthritis involving multiple joints    Restless legs    Seborrheic dermatitis    Sphenoid sinusitis    Transient cerebral ischemia    Urinary tract infection    Urinary urgency    Type 2 diabetes mellitus with hyperglycemia, without long-term current use of insulin    Vitamin B12 deficiency    Vitamin D deficiency    Chronic kidney disease, stage 3b    Status post reverse total replacement of left shoulder    Postoperative pain    Dysuria    Rash    Pruritus    Acute post-traumatic headache, not intractable    History of recent fall    Chronic kidney disease, stage 3a    Status post reverse arthroplasty of right shoulder     Past Medical History:   Diagnosis Date    Anemia     Ankle problem     thinks back related causing pain     Atrial fibrillation     AVM (arteriovenous malformation)     Back pain     Chronic kidney disease     stage 3 per pt    CKD (chronic kidney disease)     Clotting disorder 2016    AVM - small intestine    COVID-19 vaccine series completed     Gastrocnemius muscle tear     left medial 91    Generalized osteoarthritis     GERD (gastroesophageal reflux disease)     Gestational diabetes     GIB (gastrointestinal bleeding) 2016    d/t xarelto     Headache     Hearing decreased, bilateral     HAS HEARING AID, NOT WEARING IT CURRENTLY    Hiatal hernia     History of shingles     History of transfusion 2016    no reaction recalled     Hypertension     Hypothyroidism     IBS (irritable bowel syndrome)     Klebsiella pneumonia      Lichen sclerosus     Lupus     subQ    Mouth problem     mouth guard used since pt bites tongue and lips excessively at night if not- with bipap     MRSA infection 2018    Obesity     On home oxygen therapy     2L of oxygen all the time due to current congestion     Osteoporosis     Parkinson's disease     Peripheral vascular disease     Pleurisy     Pneumonia     Puerperal sepsis with acute hypoxic respiratory failure     emergent intubation- 2016    Right leg pain     from back issues     Salivary gland stone     Sciatic nerve pain     Seborrheic dermatitis     Skin cancer     on back     Sleep apnea     CPAP AND HOME O2 2L/M    TIA (transient ischemic attack) 2014    no residual effects    Type 2 diabetes mellitus     UTI (urinary tract infection)     Vitamin D deficiency 09/08/2022    Wears glasses      Past Surgical History:   Procedure Laterality Date    ABLATION OF DYSRHYTHMIC FOCUS  05/16/13    Laser Ablation - Rt Leg    BACK SURGERY      l4-l5 laminectomy     BICEPS TENDON REPAIR Right     shoulder    BREAST BIOPSY Left 05/2004    excisional, benign    BRONCHOSCOPY RIGID / FLEXIBLE      2016    BUNIONECTOMY Right     CARDIAC CATHETERIZATION      CARDIAC CATHETERIZATION N/A 02/01/2019    Procedure: Left Heart Cath;  Surgeon: Albertina Corona MD;  Location:  [facility];  Service: Cardiology    CHOLECYSTECTOMY      COLONOSCOPY  2016    COLONOSCOPY N/A 06/17/2021    Procedure: COLONOSCOPY WITH POLYPECTOMY;  Surgeon: Trenton Sosa MD;  Location: [facility];  Service: Gastroenterology;  Laterality: N/A;    CORONARY STENT PLACEMENT      x1 stent    CYST REMOVAL      left ear, upper left back 2001    CYSTOSCOPY      x2  18 and 20 -   in 20 urethra dilation     DIAGNOSTIC LAPAROSCOPY  1981    ENDOSCOPIC FUNCTIONAL SINUS SURGERY (FESS)  2011    ENDOSCOPY  2016    ENTEROSCOPY VIA STOMA      with single ballon with fluoro     HAMMER TOE REPAIR Left     INCISION AND  DRAINAGE OF WOUND  2018    back with wound infection     INSERT / REPLACE / REMOVE PACEMAKER  11/10/16    [facility]    JOINT REPLACEMENT      KNEE ARTHROSCOPY      LASER ABLATION      right leg 13    LIPOMA EXCISION  1999    left leg     LUMBAR LAMINECTOMY DISCECTOMY DECOMPRESSION N/A 07/06/2018    Procedure: LUMBAR LAMINECTOMY L4-5, HEMILAMIINECTOMY RIGHT L5-S1, FORAMINOTOMY L5-S1;  Surgeon: Lencho Gamino MD;  Location:  [facility];  Service: Neurosurgery    LUMBAR LAMINECTOMY DISCECTOMY DECOMPRESSION N/A 09/19/2018    Procedure: INCISION AND DRAINAGE BACK WITH WOUND EXPLORATION;  Surgeon: Ritchie García MD;  Location:  [facility];  Service: Neurosurgery    LUNG BIOPSY Left 2016    OTHER SURGICAL HISTORY      ct scan of chest and sinuses and lower back     OTHER SURGICAL HISTORY      various echos     OTHER SURGICAL HISTORY      electroencephalogram 16,   emg  ncv tests 2007    OTHER SURGICAL HISTORY      mra 2007  and various mri with xrays, nuclear medicine lung ventilation with perfusion test 2016 with pft     OTHER SURGICAL HISTORY      barium swallow testing 15, 12, 12    OTHER SURGICAL HISTORY      vaginal ultrasound- 2012,   vas clementina lower extrem 2016, vas venous duplex lower extrem bilat 2019    OTHER SURGICAL HISTORY      wdge biopsy spring of lung     OTHER SURGICAL HISTORY      emergent intubation- hypoxic resp failure     PACEMAKER IMPLANTATION  11/2016    sss    REPLACEMENT TOTAL KNEE Bilateral     left knee 2011, right 2012 per dr acuna     REPLACEMENT TOTAL KNEE Bilateral     SHOULDER ARTHROSCOPY Bilateral     2003-left, 2004- right     SKIN BIOPSY      skin cancer back 2016    SKIN CANCER EXCISION      upper righ tback     MADHAV      TEETH EXTRACTION      x2    TOTAL SHOULDER ARTHROPLASTY W/ DISTAL CLAVICLE EXCISION Left 10/24/2022    Procedure: REVERSE TOTAL SHOULDER ARTHROPLASTY, BICEPS TENODESIS - LEFT;  Surgeon: Sammy Yo MD;  Location:  [facility];  Service: Orthopedics;   Laterality: Left;    TOTAL SHOULDER ARTHROPLASTY W/ DISTAL CLAVICLE EXCISION Right 9/18/2023    Procedure: REVERSE TOTAL SHOULDER  ARTHROPLASTY WITH BICEPS TENODESIS - RIGHT;  Surgeon: Sammy Yo MD;  Location: [facility];  Service: Orthopedics;  Laterality: Right;    TUBAL ABDOMINAL LIGATION Bilateral 1980    WISDOM TOOTH EXTRACTION        General Information       Row Name 09/19/23 1122          OT Time and Intention    Document Type therapy note (daily note)  -AR     Mode of Treatment individual therapy;occupational therapy  -AR       Row Name 09/19/23 1122          General Information    Existing Precautions/Restrictions fall;oxygen therapy device and L/min;non-weight bearing;right;shoulder  interscalene nerve catheter, Donjoy Ultra II sling with pillow, Parkinson's  -AR       Row Name 09/19/23 1122          Cognition    Orientation Status (Cognition) oriented x 4  -AR       Row Name 09/19/23 1122          Safety Issues, Functional Mobility    Safety Issues Affecting Function (Mobility) safety precautions follow-through/compliance;safety precaution awareness  -AR     Impairments Affecting Function (Mobility) coordination;endurance/activity tolerance;motor control;pain;range of motion (ROM);sensation/sensory awareness;shortness of breath  -AR               User Key  (r) = Recorded By, (t) = Taken By, (c) = Cosigned By      Initials Name Provider Type    AR Patience Perez, OT Occupational Therapist                     Mobility/ADL's       Row Name 09/19/23 1122          Bed Mobility    Comment, (Bed Mobility) Pt declined OOB activity d/t dyspnea and awaiting breathing treatment  -AR       Row Name 09/19/23 1122          Transfers    Comment, (Transfers) pt declined d/t dyspnea  -AR       Row Name 09/19/23 1122          Activities of Daily Living    BADL Assessment/Intervention bathing;upper body dressing;feeding  -AR       Row Name 09/19/23 1122          Mobility    Extremity Weight-bearing Status  left upper extremity  -AR     Left Upper Extremity (Weight-bearing Status) non weight-bearing (NWB)  -AR       Row Name 09/19/23 1122          Bathing Assessment/Intervention    Comment, (Bathing) Reviewed R shoulder precautions, pt unable to recall axilla care to maintain. Reviewed prior teaching content with pt and issued LH sponge to assist. Reviewed that interscalene cannot get wet.  -AR       Row Name 09/19/23 1122          Upper Body Dressing Assessment/Training    [place] Level (Upper Body Dressing) don;doff;dependent (less than 25% patient effort)  sling  -AR     Comment, (Upper Body Dressing) Reviewed R shoulder precautions, ADL retraining to maintain, sling management and care of interscalene nerve catheter during ADLs to avoid dislodgement. Pt with limited carry-over of teachingt from al.  -AR       Row Name 09/19/23 1122          Self-Feeding Assessment/Training    Comment, (Feeding) Pt with Parkinson's and reports diffiuculty self-feeding d/t LUE tremors. Issued good  utenils and sippy cup to assist.  -AR               User Key  (r) = Recorded By, (t) = Taken By, (c) = Cosigned By      Initials Name Provider Type    AR Patience Perez, OT Occupational Therapist                   Obj/Interventions       Row Name 09/19/23 1127          Sensory Assessment (Somatosensory)    Sensory Assessment (Somatosensory) right UE  -AR     Sensory Subjective Reports numbness  -AR       Row Name 09/19/23 1127          Shoulder (Therapeutic Exercise)    Shoulder AROM (Therapeutic Exercise) bilateral;scapular retraction;supine;10 repetitions  -AR     Shoulder PROM (Therapeutic Exercise) right;flexion;extension;external rotation;internal rotation;supine;10 repetitions  -AR       Row Name 09/19/23 1127          Elbow/Forearm (Therapeutic Exercise)    Elbow/Forearm (Therapeutic Exercise) AAROM (active assistive range of motion)  -AR     Elbow/Forearm AAROM (Therapeutic Exercise) right;flexion;extension;10  repetitions;supination;pronation;supine  -AR       Row Name 09/19/23 1127          Wrist (Therapeutic Exercise)    Wrist AROM (Therapeutic Exercise) right;flexion;extension;10 repetitions  -AR       Row Name 09/19/23 1127          Hand (Therapeutic Exercise)    Hand AROM/AAROM (Therapeutic Exercise) AROM (active range of motion);finger extension;finger flexion;10 repetitions;right  -AR       Row Name 09/19/23 1127          Motor Skills    Neuromuscular Function bilateral;upper extremity;tremor, intention  -AR     Therapeutic Exercise shoulder;elbow/forearm;hand;wrist  -AR               User Key  (r) = Recorded By, (t) = Taken By, (c) = Cosigned By      Initials Name Provider Type    AR Patience Perez, MONICA Occupational Therapist                   Goals/Plan    No documentation.                  Clinical Impression       Row Name 09/19/23 1129          Pain Assessment    Pretreatment Pain Rating 3/10  -AR     Posttreatment Pain Rating 2/10  -AR     Pain Location - Side/Orientation Right  -AR     Pain Location - --  axilla  -AR     Pain Intervention(s) Medication (See MAR);Cold applied;Repositioned  -AR       Row Name 09/19/23 1129          Plan of Care Review    Plan of Care Reviewed With patient;daughter  -AR     Progress no change  -AR     Outcome Evaluation OT educated pt on R shoulder precautions, ADL retraining to maintain, sling management and HEP. She tolerated R shoulder PROM FE 15, IR chest/ER 30. Pt limited with dyspnea and awaiting breating treatment, deferred out of bed activity. Pt limited with altered sensation, decreased motor control, dyspnea, BUE tremoring, NWB/immobilization RUE and is performing significantly below baseline status. Recommend IPR, pt and family in agreement.  -AR       Row Name 09/19/23 1129          Therapy Plan Review/Discharge Plan (OT)    Anticipated Discharge Disposition (OT) inpatient rehabilitation facility  -AR       Row Name 09/19/23 1129          Vital Signs    Pre  Systolic BP Rehab 176  -AR     Pre Treatment Diastolic BP 89  -AR     Pretreatment Heart Rate (beats/min) 86  -AR     Posttreatment Heart Rate (beats/min) 88  -AR     Pre SpO2 (%) 92  2L NC  -AR     O2 Delivery Pre Treatment supplemental O2  -AR     Intra SpO2 (%) 90  -AR     O2 Delivery Intra Treatment supplemental O2  -AR     Post SpO2 (%) 92  -AR     O2 Delivery Post Treatment supplemental O2  -AR     Pre Patient Position Supine  -AR     Intra Patient Position Supine  -AR     Post Patient Position Supine  -AR       Row Name 09/19/23 1129          Positioning and Restraints    Pre-Treatment Position in bed  -AR     Post Treatment Position bed  -AR     In Bed supine;call light within reach;encouraged to call for assist;exit alarm on;with family/caregiver;with brace  cold pack applied  -AR               User Key  (r) = Recorded By, (t) = Taken By, (c) = Cosigned By      Initials Name Provider Type    Patience Fu, OT Occupational Therapist                   Outcome Measures       Row Name 09/19/23 1133          How much help from another is currently needed...    Putting on and taking off regular lower body clothing? 1  -AR     Bathing (including washing, rinsing, and drying) 2  -AR     Toileting (which includes using toilet bed pan or urinal) 1  -AR     Putting on and taking off regular upper body clothing 1  -AR     Taking care of personal grooming (such as brushing teeth) 2  -AR     Eating meals 2  -AR     AM-PAC 6 Clicks Score (OT) 9  -AR       Row Name 09/19/23 0805          How much help from another person do you currently need...    Turning from your back to your side while in flat bed without using bedrails? 3  -GJ     Moving from lying on back to sitting on the side of a flat bed without bedrails? 3  -GJ     Moving to and from a bed to a chair (including a wheelchair)? 3  -GJ     Standing up from a chair using your arms (e.g., wheelchair, bedside chair)? 3  -GJ     Climbing 3-5 steps with a  railing? 2  -GJ     To walk in hospital room? 3  -GJ     AM-PAC 6 Clicks Score (PT) 17  -GJ     Highest level of mobility 5 --> Static standing  -GJ       Row Name 09/19/23 1133          Functional Assessment    Outcome Measure Options AM-PAC 6 Clicks Daily Activity (OT)  -AR               User Key  (r) = Recorded By, (t) = Taken By, (c) = Cosigned By      Initials Name Provider Type    Patience Fu OT Occupational Therapist    Aleyda Still RN Registered Nurse                    Occupational Therapy Education       Title: PT OT SLP Therapies (Done)       Topic: Occupational Therapy (Done)       Point: ADL training (Done)       Description:   Instruct learner(s) on proper safety adaptation and remediation techniques during self care or transfers.   Instruct in proper use of assistive devices.                  Learning Progress Summary             Patient Eager, E,TB,D,H, VU,NR by AR at 9/19/2023 1133    Eager, E,TB,D,H, VU,NR by AR at 9/18/2023 1722   Family Eager, E,TB,D,H, VU,NR by AR at 9/19/2023 1133    Eager, E,TB,D,H, VU,NR by AR at 9/18/2023 1722                         Point: Home exercise program (Done)       Description:   Instruct learner(s) on appropriate technique for monitoring, assisting and/or progressing therapeutic exercises/activities.                  Learning Progress Summary             Patient Eager, E,TB,D,H, VU,NR by AR at 9/19/2023 1133    Eager, E,TB,D,H, VU,NR by AR at 9/18/2023 1722   Family Eager, E,TB,D,H, VU,NR by AR at 9/19/2023 1133    Eager, E,TB,D,H, VU,NR by AR at 9/18/2023 1722                         Point: Precautions (Done)       Description:   Instruct learner(s) on prescribed precautions during self-care and functional transfers.                  Learning Progress Summary             Patient Eager, E,TB,D,H, VU,NR by AR at 9/19/2023 1133    Eager, E,TB,D,H, VU,NR by AR at 9/18/2023 1722   Family Eager, E,TB,D,H, VU,NR by AR at 9/19/2023 1133    Milaer,  E,TB,D,H, VU,NR by AR at 9/18/2023 1722                         Point: Body mechanics (Done)       Description:   Instruct learner(s) on proper positioning and spine alignment during self-care, functional mobility activities and/or exercises.                  Learning Progress Summary             Patient Eager, E,TB,D,H, VU,NR by AR at 9/19/2023 1133    Eager, E,TB,D,H, VU,NR by AR at 9/18/2023 1722   Family Eager, E,TB,D,H, VU,NR by AR at 9/19/2023 1133    Eager, E,TB,D,H, VU,NR by AR at 9/18/2023 1722                                         User Key       Initials Effective Dates Name Provider Type Discipline    AR 07/11/23 -  Patience Perez, OT Occupational Therapist OT                  OT Recommendation and Plan  Planned Therapy Interventions (OT): activity tolerance training, adaptive equipment training, BADL retraining, edema control/reduction, functional balance retraining, IADL retraining, neuromuscular control/coordination retraining, occupation/activity based interventions, orthotic fabrication/fitting/training, passive ROM/stretching, patient/caregiver education/training, ROM/therapeutic exercise, transfer/mobility retraining  Therapy Frequency (OT): daily  Plan of Care Review  Plan of Care Reviewed With: patient, daughter  Progress: no change  Outcome Evaluation: OT educated pt on R shoulder precautions, ADL retraining to maintain, sling management and HEP. She tolerated R shoulder PROM FE 15, IR chest/ER 30. Pt limited with dyspnea and awaiting breating treatment, deferred out of bed activity. Pt limited with altered sensation, decreased motor control, dyspnea, BUE tremoring, NWB/immobilization RUE and is performing significantly below baseline status. Recommend IPR, pt and family in agreement.     Time Calculation:   Evaluation Complexity (OT)  Review Occupational Profile/Medical/Therapy History Complexity: brief/low complexity  Assessment, Occupational Performance/Identification of Deficit  Complexity: 1-3 performance deficits  Clinical Decision Making Complexity (OT): problem focused assessment/low complexity  Overall Complexity of Evaluation (OT): low complexity     Time Calculation- OT       Row Name 09/19/23 1134             Time Calculation- OT    OT Start Time 1033  -AR      OT Received On 09/19/23  -AR      OT Goal Re-Cert Due Date 09/28/23  -AR         Timed Charges    24420 - OT Therapeutic Exercise Minutes 20  -AR      58928 - OT Self Care/Mgmt Minutes 19  -AR         Total Minutes    Timed Charges Total Minutes 39  -AR       Total Minutes 39  -AR                User Key  (r) = Recorded By, (t) = Taken By, (c) = Cosigned By      Initials Name Provider Type    AR Patience Perez, OT Occupational Therapist                  Therapy Charges for Today       Code Description Service Date Service Provider Modifiers Qty    01489452910 HC OT SELF CARE/MGMT/TRAIN EA 15 MIN 9/18/2023 Patience Perez, OT GO 1    13703730595 HC OT THERAPEUTIC ACT EA 15 MIN 9/18/2023 Patience Perez, OT GO 1    91690756539 HC OT THER PROC EA 15 MIN 9/18/2023 Patience Perez, OT GO 1    71506018437 HC OT EVAL LOW COMPLEXITY 3 9/18/2023 Patience Perez, OT GO 1    00496909313 HC OT THER SUPP EA 15 MIN 9/18/2023 Patience Perez, OT GO 3    97220613828 HC OT THER PROC EA 15 MIN 9/19/2023 Patience Perez, OT GO 2    63329376760 HC OT SELF CARE/MGMT/TRAIN EA 15 MIN 9/19/2023 Patience Perez, OT GO 1                 Patience Perez OT  9/19/2023    Electronically signed by Patience Perez OT at 09/19/23 1136

## 2023-09-20 NOTE — PROGRESS NOTES
Orthopaedic Surgery Progress Note      SUBJECTIVE:      No acute events overnight. Doing well. Pain controlled. Tolerating diet. No nausea, vomiting, fevers or chills. Completed more therapy yesterday. Continuing to make progress with pain control. IPR recommendations discussed with patient.     OBJECTIVE:  /63 (BP Location: Left arm, Patient Position: Lying)   Pulse 84   Temp 97.9 °F (36.6 °C) (Oral)   Resp 20   LMP  (LMP Unknown) Comment: Mammogram- 1/28/20  SpO2 100%       PE:  No acute distress  Non labored breathing  Peripheral perfusion intact  Right Upper Extremity:    Sling in place  Dressings c/d/I  Soft and compressible  Motor intact to epl, fpl, ff, fe, fabd  Sensation intact to light touch in med, rad, ul  2+ pulses      Results from last 7 days   Lab Units 09/19/23  0557   WBC 10*3/mm3 7.68   HEMOGLOBIN g/dL 9.1*   PLATELETS 10*3/mm3 124*     Results from last 7 days   Lab Units 09/19/23  0557   SODIUM mmol/L 140   POTASSIUM mmol/L 3.6   CO2 mmol/L 22.0   CREATININE mg/dL 0.89   GLUCOSE mg/dL 84         ASSESSMENT/PLAN:  Joanna is a 72 y.o. female  with RIGHT rotator cuff arthropathy s/p rTSA (9/18/23)    WB Status: NWB RUE, Sling at all times except for therapy and hygiene x 4 weeks  Pain control and bowel regimen   Nutritional optimization   PT/OT: recommend IPR  Follow up: in 1 week for post op check  Disposition: discharge to IPR, pending placement      Ludwig Guerra MD

## 2023-09-20 NOTE — PLAN OF CARE
Goal Outcome Evaluation:  Plan of Care Reviewed With: patient        Progress: no change  Outcome Evaluation: Pt performed STS and SPT to chair with CGA and SPC. Pt required Mod A x2 for bed mobility. Activity limited by tachycardia to 145 bpm and SOA. Continue to recommend d/c to IRF to increase functional mobility.

## 2023-09-21 VITALS
OXYGEN SATURATION: 97 % | HEART RATE: 81 BPM | TEMPERATURE: 98.4 F | RESPIRATION RATE: 18 BRPM | DIASTOLIC BLOOD PRESSURE: 52 MMHG | SYSTOLIC BLOOD PRESSURE: 131 MMHG

## 2023-09-21 LAB
GLUCOSE BLDC GLUCOMTR-MCNC: 114 MG/DL (ref 70–130)
GLUCOSE BLDC GLUCOMTR-MCNC: 193 MG/DL (ref 70–130)

## 2023-09-21 PROCEDURE — A9270 NON-COVERED ITEM OR SERVICE: HCPCS | Performed by: INTERNAL MEDICINE

## 2023-09-21 PROCEDURE — 63710000001 ASPIRIN 81 MG TABLET DELAYED-RELEASE: Performed by: INTERNAL MEDICINE

## 2023-09-21 PROCEDURE — 82948 REAGENT STRIP/BLOOD GLUCOSE: CPT

## 2023-09-21 PROCEDURE — 63710000001 METOLAZONE 2.5 MG TABLET: Performed by: INTERNAL MEDICINE

## 2023-09-21 PROCEDURE — 63710000001 APIXABAN 5 MG TABLET: Performed by: INTERNAL MEDICINE

## 2023-09-21 PROCEDURE — 97110 THERAPEUTIC EXERCISES: CPT

## 2023-09-21 PROCEDURE — 97535 SELF CARE MNGMENT TRAINING: CPT

## 2023-09-21 PROCEDURE — 63710000001 BISACODYL 10 MG SUPPOSITORY: Performed by: INTERNAL MEDICINE

## 2023-09-21 PROCEDURE — 63710000001 CARBIDOPA-LEVODOPA 25-100 MG TABLET

## 2023-09-21 PROCEDURE — 94664 DEMO&/EVAL PT USE INHALER: CPT

## 2023-09-21 PROCEDURE — 94799 UNLISTED PULMONARY SVC/PX: CPT

## 2023-09-21 PROCEDURE — 63710000001 SENNOSIDES-DOCUSATE 8.6-50 MG TABLET: Performed by: INTERNAL MEDICINE

## 2023-09-21 PROCEDURE — 63710000001 LEVOTHYROXINE 50 MCG TABLET: Performed by: INTERNAL MEDICINE

## 2023-09-21 PROCEDURE — 94660 CPAP INITIATION&MGMT: CPT

## 2023-09-21 PROCEDURE — 63710000001 RANOLAZINE 500 MG TABLET SUSTAINED-RELEASE 12 HOUR: Performed by: INTERNAL MEDICINE

## 2023-09-21 PROCEDURE — 63710000001 VALSARTAN 160 MG TABLET: Performed by: INTERNAL MEDICINE

## 2023-09-21 PROCEDURE — 63710000001 POLYETHYLENE GLYCOL 17 G PACK: Performed by: INTERNAL MEDICINE

## 2023-09-21 PROCEDURE — 63710000001 AMANTADINE 100 MG CAPSULE: Performed by: INTERNAL MEDICINE

## 2023-09-21 PROCEDURE — 63710000001 DOFETILIDE 500 MCG CAPSULE: Performed by: INTERNAL MEDICINE

## 2023-09-21 PROCEDURE — 63710000001 INSULIN LISPRO (HUMAN) PER 5 UNITS: Performed by: INTERNAL MEDICINE

## 2023-09-21 PROCEDURE — 63710000001 PANTOPRAZOLE 40 MG TABLET DELAYED-RELEASE: Performed by: INTERNAL MEDICINE

## 2023-09-21 PROCEDURE — 63710000001 LEVOTHYROXINE 125 MCG TABLET: Performed by: INTERNAL MEDICINE

## 2023-09-21 PROCEDURE — 63710000001 BISACODYL 5 MG TABLET DELAYED-RELEASE: Performed by: INTERNAL MEDICINE

## 2023-09-21 PROCEDURE — A9270 NON-COVERED ITEM OR SERVICE: HCPCS

## 2023-09-21 RX ORDER — ROPIVACAINE HYDROCHLORIDE 2 MG/ML
1 INJECTION, SOLUTION EPIDURAL; INFILTRATION; PERINEURAL CONTINUOUS
Start: 2023-09-21

## 2023-09-21 RX ORDER — AMOXICILLIN 250 MG
2 CAPSULE ORAL 2 TIMES DAILY
Status: DISCONTINUED | OUTPATIENT
Start: 2023-09-21 | End: 2023-09-21 | Stop reason: HOSPADM

## 2023-09-21 RX ORDER — TRAMADOL HYDROCHLORIDE 50 MG/1
50 TABLET ORAL EVERY 8 HOURS PRN
Start: 2023-09-21

## 2023-09-21 RX ORDER — POLYETHYLENE GLYCOL 3350 17 G/17G
17 POWDER, FOR SOLUTION ORAL DAILY PRN
Start: 2023-09-21

## 2023-09-21 RX ORDER — BISACODYL 5 MG/1
5 TABLET, DELAYED RELEASE ORAL DAILY PRN
Status: DISCONTINUED | OUTPATIENT
Start: 2023-09-21 | End: 2023-09-21 | Stop reason: HOSPADM

## 2023-09-21 RX ORDER — NYSTATIN 100000 U/G
1 OINTMENT TOPICAL 2 TIMES DAILY
Start: 2023-09-21

## 2023-09-21 RX ORDER — POLYETHYLENE GLYCOL 3350 17 G/17G
17 POWDER, FOR SOLUTION ORAL DAILY PRN
Status: DISCONTINUED | OUTPATIENT
Start: 2023-09-21 | End: 2023-09-21 | Stop reason: HOSPADM

## 2023-09-21 RX ORDER — BISACODYL 10 MG
10 SUPPOSITORY, RECTAL RECTAL DAILY PRN
Status: DISCONTINUED | OUTPATIENT
Start: 2023-09-21 | End: 2023-09-21 | Stop reason: HOSPADM

## 2023-09-21 RX ORDER — AMOXICILLIN 250 MG
2 CAPSULE ORAL 2 TIMES DAILY
Start: 2023-09-21

## 2023-09-21 RX ADMIN — VALSARTAN 160 MG: 160 TABLET, FILM COATED ORAL at 08:33

## 2023-09-21 RX ADMIN — ASPIRIN 81 MG: 81 TABLET, COATED ORAL at 08:32

## 2023-09-21 RX ADMIN — POLYETHYLENE GLYCOL 3350 17 G: 17 POWDER, FOR SOLUTION ORAL at 10:12

## 2023-09-21 RX ADMIN — APIXABAN 5 MG: 5 TABLET, FILM COATED ORAL at 08:33

## 2023-09-21 RX ADMIN — BISACODYL 10 MG: 10 SUPPOSITORY RECTAL at 12:17

## 2023-09-21 RX ADMIN — LEVOTHYROXINE SODIUM 175 MCG: 125 TABLET ORAL at 05:21

## 2023-09-21 RX ADMIN — METOLAZONE 2.5 MG: 2.5 TABLET ORAL at 08:30

## 2023-09-21 RX ADMIN — SENNOSIDES AND DOCUSATE SODIUM 2 TABLET: 50; 8.6 TABLET ORAL at 10:12

## 2023-09-21 RX ADMIN — AMANTADINE HYDROCHLORIDE 100 MG: 100 CAPSULE ORAL at 08:32

## 2023-09-21 RX ADMIN — INSULIN LISPRO 2 UNITS: 100 INJECTION, SOLUTION INTRAVENOUS; SUBCUTANEOUS at 12:18

## 2023-09-21 RX ADMIN — CARBIDOPA AND LEVODOPA 1 TABLET: 25; 100 TABLET ORAL at 12:17

## 2023-09-21 RX ADMIN — CARBIDOPA AND LEVODOPA 2 TABLET: 25; 100 TABLET ORAL at 05:21

## 2023-09-21 RX ADMIN — BISACODYL 5 MG: 5 TABLET, COATED ORAL at 10:12

## 2023-09-21 RX ADMIN — DOFETILIDE 500 MCG: 0.5 CAPSULE ORAL at 08:32

## 2023-09-21 RX ADMIN — CARBIDOPA AND LEVODOPA 1 TABLET: 25; 100 TABLET ORAL at 08:32

## 2023-09-21 RX ADMIN — RANOLAZINE 500 MG: 500 TABLET, FILM COATED, EXTENDED RELEASE ORAL at 08:32

## 2023-09-21 RX ADMIN — ALBUTEROL SULFATE 2.5 MG: 2.5 SOLUTION RESPIRATORY (INHALATION) at 09:48

## 2023-09-21 RX ADMIN — PANTOPRAZOLE SODIUM 40 MG: 40 TABLET, DELAYED RELEASE ORAL at 08:32

## 2023-09-21 NOTE — DISCHARGE SUMMARY
Patient Name: Joanna Cross  MRN: [id number]  : 1950  DOS: 2023    Attending: Sammy Yo MD    Primary Care Provider: Reynaldo Fritz MD    Date of Admission:.2023  7:22 AM    Date of Discharge:  2023    Discharge Diagnosis:     Status post reverse arthroplasty of right shoulder    Hypertension, essential    GERD (gastroesophageal reflux disease)    Hypothyroidism    Parkinson's disease    MILLI treated with BiPAP - Patient reports compliance.     Atrial fibrillation    Cardiac pacemaker in situ    Postoperative pain    Chronic kidney disease, stage 3a      Hospital Course    At admit:  Patient is a pleasant 72 y.o. female presented for scheduled surgery by .     She underwent the following procedures:  1. Right reverse total shoulder arthroplasty.    2. Right biceps tenodesis.    3. Computer-assisted imageless navigation     Surgery was done under GA and a block, she tolerated surgery well, was admitted for further management.     Seen in PACU, doing well with good pain control, no nausea, vomiting or shortness of breath.     Reviewed with pt her past medical history and medications.      She is hoping to go to [facility] after this hospital stay.     After admit:  Patient was provided pain medications as needed for pain control, along with interscalene nerve block infusion of Ropivacaine    Adjustments were made to pain medications to optimize postop pain management.   Risks and benefits of opiate medications discussed with patient. WILFREOD report on chart was reviewed.    The patient was seen by OT and was taught exercises for right arm.  The patient used an IS for atelectasis prophylaxis and mechanicals for DVT prophylaxis.    Home medications were resumed as appropriate, and labs were monitored and remained fairly stable. Expected H/H drop c/w postop anemia, no transfusion was required.     She was maintained on bronchodilators, and oxygen. BiPap at night.    Her  anticoagulation was resumed and tolerated.     With the progress she has made,  is ready for DC to [facility] today.      The patient will have an Infupump ( instructed on it during this admit) ( may be removed prior to dc. )    Discussed with patient regarding plan and she shows understanding and agreement.        Procedures Performed  Procedure(s):  REVERSE TOTAL SHOULDER  ARTHROPLASTY WITH BICEPS TENODESIS - RIGHT       Pertinent Test Results:    I reviewed the patient's new clinical results.   Results from last 7 days   Lab Units 23  1905 23  0557   WBC 10*3/mm3  --  7.68   HEMOGLOBIN g/dL 8.2* 9.1*   HEMATOCRIT % 25.7* 28.2*   PLATELETS 10*3/mm3  --  124*     Results from last 7 days   Lab Units 23  0557   SODIUM mmol/L 140   POTASSIUM mmol/L 3.6   CHLORIDE mmol/L 109*   CO2 mmol/L 22.0   BUN mg/dL 19   CREATININE mg/dL 0.89   CALCIUM mg/dL 8.4*   GLUCOSE mg/dL 84     I reviewed the patient's new imaging including images and reports.      Occupational Therapy    Goal Outcome Evaluation:  Plan of Care Reviewed With: patient  Progress: improving  Outcome Evaluation: Pt tolerated OT intervention well. Pt performed surgeon specific HEP well, completing PROM FE to 90 and IR to chest/ER to 15. Pt completed transfer to BSC with CGA, no AD, needing maxA for hygiene. OT reviewed L shoulder precautions, ADL restraining to maintain, and HEP. Pt remains below her functional baseline due to NWB RUE status, as well as deficits in strength and activity tolerance. Recommend a d/c to IRF for best functional outcome.        Anticipated Discharge Disposition (OT): inpatient rehabilitation facility                   Discharge Assessment:       Visit Vitals  /52 (BP Location: Left arm, Patient Position: Sitting)   Pulse 81   Temp 98.4 °F (36.9 °C) (Oral)   Resp 18   LMP  (LMP Unknown) Comment: Mammogram- 20   SpO2 97%     Temp (24hrs), Av.8 °F (37.1 °C), Min:98.3 °F (36.8 °C), Max:99.9 °F (37.7  °C)      General Appearance:    Alert, cooperative, in no acute distress   Lungs:     Clear to auscultation,respirations regular, even and   unlabored    Heart:    Regular rhythm and normal rate, normal S1 and S2    Abdomen:     Normal bowel sounds, no masses, no organomegaly, soft        non-tender, non-distended, no guarding, no rebound                 tenderness   Extremities:   RUE in a sling, CDI aquacel dressing over right shoulder incision . Interscalene nerve block cath present.  Distal pulses, cap refill,  intact. Movement and sensation intact distally.     Pulses:   Pulses palpable and equal bilaterally   Skin:   No bleeding, bruising or rash        Discharge Disposition:H.          Discharge Medications        New Medications        Instructions Start Date   polyethylene glycol 17 g packet  Commonly known as: MIRALAX   17 g, Oral, Daily PRN      ropivacaine 0.2 % infusion (INFUSYSTEM)  Commonly known as: NAROPIN   1 mL/hr (2 mg/hr), Peripheral Nerve, Continuous      sennosides-docusate 8.6-50 MG per tablet  Commonly known as: PERICOLACE   2 tablets, Oral, 2 Times Daily             Changes to Medications        Instructions Start Date   dofetilide 500 MCG capsule  Commonly known as: TIKOSYN  What changed: when to take this   TAKE 1 CAPSULE EVERY 12 HOURS      nystatin 639074 UNIT/GM ointment  Commonly known as: MYCOSTATIN  What changed:   when to take this  reasons to take this   1 application , Topical, 2 Times Daily             Continue These Medications        Instructions Start Date   Accu-Chek Guide test strip  Generic drug: glucose blood   Testing 3 times per day; E11.65      Accu-Chek Softclix Lancets lancets   USE ONE LANCET TO TEST THREE TIMES A DAY      albuterol sulfate  (90 Base) MCG/ACT inhaler  Commonly known as: Ventolin HFA   1 puff, Inhalation, Every 4 Hours PRN      albuterol (2.5 MG/3ML) 0.083% nebulizer solution  Commonly known as: PROVENTIL   2.5 mg, Nebulization, Every 4  Hours PRN      amantadine 100 MG capsule  Commonly known as: SYMMETREL   100 mg, Oral, 2 Times Daily      apixaban 5 MG tablet tablet  Commonly known as: Eliquis   5 mg, Oral, Every 12 Hours Scheduled      AQUAPHOR ADVANCED THERAPY EX   Apply externally      aspirin 81 MG EC tablet   81 mg, Oral, Daily      B-D UF III MINI PEN NEEDLES 31G X 5 MM misc  Generic drug: Insulin Pen Needle   USE TO INJECT INSULIN DAILY      bumetanide 1 MG tablet  Commonly known as: BUMEX   1 tab po daily as directed      CALTRATE 600 PO   600 mg, Oral, Daily      carbidopa-levodopa  MG per tablet  Commonly known as: SINEMET   Oral, 2 tablets at 0600, 1 tablet at 0900, 1200, 1500, 1800, 2100      Cholecalciferol 50 MCG (2000 UT) tablet   2,000 Units, Oral, 2 Times Daily      citalopram 20 MG tablet  Commonly known as: CeleXA   20 mg, Oral, Daily      clobetasol 0.05 % cream  Commonly known as: TEMOVATE   1 application , Topical, 2 Times Daily PRN      hydrocortisone 2.5 % ointment   No dose, route, or frequency recorded.      hydroxychloroquine 200 MG tablet  Commonly known as: PLAQUENIL   Daily      ipratropium 0.03 % nasal spray  Commonly known as: ATROVENT   2 sprays, Nasal, 2 Times Daily PRN      Lantus SoloStar 100 UNIT/ML injection pen  Generic drug: Insulin Glargine   12-14 Units, Subcutaneous, Daily      Loratadine 10 MG capsule   10 mg, Oral, Daily      metFORMIN 1000 MG tablet  Commonly known as: GLUCOPHAGE   1,000 mg, Oral, 2 Times Daily With Meals      metOLazone 2.5 MG tablet  Commonly known as: ZAROXOLYN   2.5 mg, Oral, Daily      multivitamin with minerals tablet tablet   1 tablet, Oral, Daily      nitroglycerin 0.4 MG/SPRAY spray  Commonly known as: NITROLINGUAL   1 spray, Sublingual, Every 5 Minutes PRN      O2  Commonly known as: OXYGEN   2 L/min, Inhalation, Nightly, 2L all the time now      pantoprazole 40 MG EC tablet  Commonly known as: Protonix   40 mg, Oral, Daily      pramipexole 1.5 MG tablet  Commonly  known as: MIRAPEX   1.5 mg, Oral, Nightly      ranolazine 500 MG 12 hr tablet  Commonly known as: RANEXA   500 mg, Oral, Every 12 Hours      senna 8.6 MG tablet  Commonly known as: SENOKOT   1 tablet, Oral, Daily      spironolactone 25 MG tablet  Commonly known as: ALDACTONE   50 mg, Oral, Daily      SUPER B COMPLEX PO   1 tablet, Oral, Daily      traMADol 50 MG tablet  Commonly known as: ULTRAM   50 mg, Oral, Every 8 Hours PRN      triamcinolone 0.1 % cream  Commonly known as: KENALOG   1 application , As Needed      TRIPLE FLEX BONE & JOINT PO   1 tablet, Oral, Daily, Move free      Unithroid 175 MCG tablet  Generic drug: levothyroxine   175 mcg, Oral, Daily      valsartan 160 MG tablet  Commonly known as: DIOVAN   160 mg, Oral, 2 Times Daily      vitamin C 500 MG tablet  Commonly known as: ASCORBIC ACID   500 mg, Oral, Daily, Chewable tablet      Zinc 50 MG capsule   1 capsule, Oral, Daily             Stop These Medications      Enoxaparin Sodium 100 MG/ML solution prefilled syringe syringe  Commonly known as: LOVENOX              Discharge Diet: Cardiac.     Activity at Discharge:   NWB RUE, ROM elbow, wrist, and hand per 's instructions.       Future Appointments   Date Time Provider Department Center   10/19/2023 10:00 AM LAB [facility] [place]   12/13/2023 11:30 AM Albertina Corona MD [facility] [place]   12/19/2023  1:30 PM Myrna Mckeon APRN [facility] [place]     Sammy Yo MD per his orders.       Angelica Patten MD  09/21/23  12:21 EDT

## 2023-09-21 NOTE — PROGRESS NOTES
Orthopedic Daily Progress Note      CC: Comfortable but still with difficulty breathing    Pain controlled  General: no fevers, chills  Abdomen: No nausea, vomiting, or diarrhea    No other complaints    Physical Exam:  I have reviewed the vital signs.  Temp:  [97.9 °F (36.6 °C)-99.9 °F (37.7 °C)] 98.3 °F (36.8 °C)  Heart Rate:  [] 70  Resp:  [18-26] 18  BP: (110-137)/(52-63) 110/58    Objective:  Vital signs: (most recent): Blood pressure 110/58, pulse 70, temperature 98.3 °F (36.8 °C), temperature source Axillary, resp. rate 18, SpO2 96 %, not currently breastfeeding.            General Appearance:    Alert, cooperative, no distress  Extremities: No clubbing, cyanosis, or edema to lower extremities  Pulses:  2+ in distal surgical extremity  Skin: Dressing Clean/dry/intact      Results Review:    I have reviewed the labs, radiology results and diagnostic studies:    Results from last 7 days   Lab Units 09/20/23  1905 09/19/23  0557   WBC 10*3/mm3  --  7.68   HEMOGLOBIN g/dL 8.2* 9.1*   PLATELETS 10*3/mm3  --  124*     Results from last 7 days   Lab Units 09/19/23  0557   SODIUM mmol/L 140   POTASSIUM mmol/L 3.6   CO2 mmol/L 22.0   CREATININE mg/dL 0.89   GLUCOSE mg/dL 84       I have reviewed the medications.    Assessment/Problem List  POD#3 s/p right reverse total shoulder arthroplasty    Plan  PT/OT  Medical management  Await placement      Sammy Yo MD  09/21/23  07:59 EDT

## 2023-09-21 NOTE — THERAPY TREATMENT NOTE
Patient Name: Joanna Cross  : 1950    MRN: [id number]                              Today's Date: 2023       Admit Date: 2023    Visit Dx: No diagnosis found.  Patient Active Problem List   Diagnosis    Coronary artery disease involving native coronary artery without angina pectoris    Hypertension, essential    Peripheral vascular disease    Hyperlipidemia LDL goal <70    Spinal stenosis, lumbar region, with neurogenic claudication    Delayed surgical wound healing    Biceps tendinitis    Overactive bladder    GERD (gastroesophageal reflux disease)    Hypothyroidism    Lichen sclerosus    Parkinson's disease    MILLI treated with BiPAP - Patient reports compliance.     History of respiratory failure - prior respiratory failure requiring mechanical ventilation, open lung biopsy non-specific.     Atrial fibrillation    CKD (chronic kidney disease)    Tachy-thai syndrome    Long term current use of antiarrhythmic drug    History of adenomatous polyp of colon    Anemia    Angiodysplasia    Hx of colonic polyps    Body mass index 40.0-44.9, adult    Cardiac pacemaker in situ    Chronic low back pain    Chronic lung disease    Degeneration of lumbar intervertebral disc    Edema of lower extremity    High risk medication use    History of malignant neoplasm of skin    History of TIA (transient ischemic attack)    Hyponatremia    Hypotonic bladder    Incomplete tear of rotator cuff    Major depression in remission    Migraine    Weakness    Tinnitus of both ears    Primary osteoarthritis involving multiple joints    Restless legs    Seborrheic dermatitis    Sphenoid sinusitis    Transient cerebral ischemia    Urinary tract infection    Urinary urgency    Type 2 diabetes mellitus with hyperglycemia, without long-term current use of insulin    Vitamin B12 deficiency    Vitamin D deficiency    Chronic kidney disease, stage 3b    Status post reverse total replacement of left shoulder    Postoperative pain     Dysuria    Rash    Pruritus    Acute post-traumatic headache, not intractable    History of recent fall    Chronic kidney disease, stage 3a    Status post reverse arthroplasty of right shoulder     Past Medical History:   Diagnosis Date    Anemia     Ankle problem     thinks back related causing pain     Atrial fibrillation     AVM (arteriovenous malformation)     Back pain     Chronic kidney disease     stage 3 per pt    CKD (chronic kidney disease)     Clotting disorder 2016    AVM - small intestine    COVID-19 vaccine series completed     Gastrocnemius muscle tear     left medial 91    Generalized osteoarthritis     GERD (gastroesophageal reflux disease)     Gestational diabetes     GIB (gastrointestinal bleeding) 2016    d/t xarelto     Headache     Hearing decreased, bilateral     HAS HEARING AID, NOT WEARING IT CURRENTLY    Hiatal hernia     History of shingles     History of transfusion 2016    no reaction recalled     Hypertension     Hypothyroidism     IBS (irritable bowel syndrome)     Klebsiella pneumonia     Lichen sclerosus     Lupus     subQ    Mouth problem     mouth guard used since pt bites tongue and lips excessively at night if not- with bipap     MRSA infection 2018    Obesity     On home oxygen therapy     2L of oxygen all the time due to current congestion     Osteoporosis     Parkinson's disease     Peripheral vascular disease     Pleurisy     Pneumonia     Puerperal sepsis with acute hypoxic respiratory failure     emergent intubation- 2016    Right leg pain     from back issues     Salivary gland stone     Sciatic nerve pain     Seborrheic dermatitis     Skin cancer     on back     Sleep apnea     CPAP AND HOME O2 2L/M    TIA (transient ischemic attack) 2014    no residual effects    Type 2 diabetes mellitus     UTI (urinary tract infection)     Vitamin D deficiency 09/08/2022    Wears glasses      Past Surgical History:   Procedure Laterality Date    ABLATION OF DYSRHYTHMIC FOCUS   05/16/13    Laser Ablation - Rt Leg    BACK SURGERY      l4-l5 laminectomy     BICEPS TENDON REPAIR Right     shoulder    BREAST BIOPSY Left 05/2004    excisional, benign    BRONCHOSCOPY RIGID / FLEXIBLE      2016    BUNIONECTOMY Right     CARDIAC CATHETERIZATION      CARDIAC CATHETERIZATION N/A 02/01/2019    Procedure: Left Heart Cath;  Surgeon: Albertina Corona MD;  Location: [facility] CATH INVASIVE LOCATION;  Service: Cardiology    CHOLECYSTECTOMY      COLONOSCOPY  2016    COLONOSCOPY N/A 06/17/2021    Procedure: COLONOSCOPY WITH POLYPECTOMY;  Surgeon: Trenton Sosa MD;  Location: [facility] ENDOSCOPY;  Service: Gastroenterology;  Laterality: N/A;    CORONARY STENT PLACEMENT      x1 stent    CYST REMOVAL      left ear, upper left back 2001    CYSTOSCOPY      x2  18 and 20 -   in 20 urethra dilation     DIAGNOSTIC LAPAROSCOPY  1981    ENDOSCOPIC FUNCTIONAL SINUS SURGERY (FESS)  2011    ENDOSCOPY  2016    ENTEROSCOPY VIA STOMA      with single ballon with fluoro     HAMMER TOE REPAIR Left     INCISION AND DRAINAGE OF WOUND  2018    back with wound infection     INSERT / REPLACE / REMOVE PACEMAKER  11/10/16    [facility]    JOINT REPLACEMENT      KNEE ARTHROSCOPY      LASER ABLATION      right leg 13    LIPOMA EXCISION  1999    left leg     LUMBAR LAMINECTOMY DISCECTOMY DECOMPRESSION N/A 07/06/2018    Procedure: LUMBAR LAMINECTOMY L4-5, HEMILAMIINECTOMY RIGHT L5-S1, FORAMINOTOMY L5-S1;  Surgeon: Lencho Gamino MD;  Location:  [facility] OR;  Service: Neurosurgery    LUMBAR LAMINECTOMY DISCECTOMY DECOMPRESSION N/A 09/19/2018    Procedure: INCISION AND DRAINAGE BACK WITH WOUND EXPLORATION;  Surgeon: Ritchie García MD;  Location:  [facility] OR;  Service: Neurosurgery    LUNG BIOPSY Left 2016    OTHER SURGICAL HISTORY      ct scan of chest and sinuses and lower back     OTHER SURGICAL HISTORY      various echos     OTHER SURGICAL HISTORY      electroencephalogram 16,   emg  ncv tests 2007    OTHER  SURGICAL HISTORY      mra 2007  and various mri with xrays, nuclear medicine lung ventilation with perfusion test 2016 with pft     OTHER SURGICAL HISTORY      barium swallow testing 15, 12, 12    OTHER SURGICAL HISTORY      vaginal ultrasound- 2012,   vas clementina lower extrem 2016, vas venous duplex lower extrem bilat 2019    OTHER SURGICAL HISTORY      wdge biopsy spring of lung     OTHER SURGICAL HISTORY      emergent intubation- hypoxic resp failure     PACEMAKER IMPLANTATION  11/2016    sss    REPLACEMENT TOTAL KNEE Bilateral     left knee 2011, right 2012 per dr acuna     REPLACEMENT TOTAL KNEE Bilateral     SHOULDER ARTHROSCOPY Bilateral     2003-left, 2004- right     SKIN BIOPSY      skin cancer back 2016    SKIN CANCER EXCISION      upper righ tback     MADHAV      TEETH EXTRACTION      x2    TOTAL SHOULDER ARTHROPLASTY W/ DISTAL CLAVICLE EXCISION Left 10/24/2022    Procedure: REVERSE TOTAL SHOULDER ARTHROPLASTY, BICEPS TENODESIS - LEFT;  Surgeon: Sammy Yo MD;  Location: [facility] OR;  Service: Orthopedics;  Laterality: Left;    TOTAL SHOULDER ARTHROPLASTY W/ DISTAL CLAVICLE EXCISION Right 9/18/2023    Procedure: REVERSE TOTAL SHOULDER  ARTHROPLASTY WITH BICEPS TENODESIS - RIGHT;  Surgeon: Sammy Yo MD;  Location: [facility] OR;  Service: Orthopedics;  Laterality: Right;    TUBAL ABDOMINAL LIGATION Bilateral 1980    WISDOM TOOTH EXTRACTION        General Information       Row Name 09/21/23 1051          OT Time and Intention    Document Type therapy note (daily note)  -KF     Mode of Treatment occupational therapy;individual therapy  -KF       Row Name 09/21/23 1051          General Information    Patient Profile Reviewed yes  -KF     Existing Precautions/Restrictions fall;oxygen therapy device and L/min;non-weight bearing;right;shoulder;other (see comments)  nerve cath, parkinsons, 3L NC  -KF     Barriers to Rehab medically complex;previous functional deficit  -KF       Row Name 09/21/23 1051           Cognition    Orientation Status (Cognition) oriented x 4  -       Row Name 09/21/23 1051          Safety Issues, Functional Mobility    Safety Issues Affecting Function (Mobility) insight into deficits/self-awareness;safety precaution awareness;safety precautions follow-through/compliance;sequencing abilities  -KF     Impairments Affecting Function (Mobility) endurance/activity tolerance;pain;range of motion (ROM);sensation/sensory awareness;shortness of breath;strength  -KF               User Key  (r) = Recorded By, (t) = Taken By, (c) = Cosigned By      Initials Name Provider Type    KF Cindy Rhodes OT Occupational Therapist                     Mobility/ADL's       Row Name 09/21/23 1051          Bed Mobility    Comment, (Bed Mobility) Pt found and left up in the chair.  -       Row Name 09/21/23 1051          Transfers    Transfers toilet transfer;sit-stand transfer;stand-sit transfer  -       Row Name 09/21/23 1051          Sit-Stand Transfer    Sit-Stand [place] (Transfers) contact guard;1 person assist  -     Assistive Device (Sit-Stand Transfers) other (see comments)  no AD  -       Row Name 09/21/23 1051          Stand-Sit Transfer    Stand-Sit [place] (Transfers) contact guard;1 person assist;other (see comments)  no AD  -       Row Name 09/21/23 1051          Toilet Transfer    Type (Toilet Transfer) sit-stand;stand-sit  -KF     [place] Level (Toilet Transfer) contact guard;1 person assist  -     Assistive Device (Toilet Transfer) other (see comments)  no AD  -       Row Name 09/21/23 1051          Activities of Daily Living    BADL Assessment/Intervention grooming;toileting  -       Row Name 09/21/23 1051          Mobility    Extremity Weight-bearing Status right upper extremity  -KF     Left Upper Extremity (Weight-bearing Status) non weight-bearing (NWB)  -       Row Name 09/21/23 1051          Toileting Assessment/Training    [place] Level (Toileting)  maximum assist (25% patient effort)  -     Assistive Devices (Toileting) commode, bedside without drop arms;grab bar/safety frame  -     Position (Toileting) unsupported sitting;supported standing  -[facility] Name 09/21/23 1051          Grooming Assessment/Training    [place] Level (Grooming) wash face, hands;set up  -     Position (Grooming) supported sitting  -               User Key  (r) = Recorded By, (t) = Taken By, (c) = Cosigned By      Initials Name Provider Type    KF Cindy Rhodes OT Occupational Therapist                   Obj/Interventions       [facility] Name 09/21/23 1053          Shoulder (Therapeutic Exercise)    Shoulder AROM (Therapeutic Exercise) bilateral;scapular retraction;sitting;10 repetitions  -     Shoulder PROM (Therapeutic Exercise) right;flexion;extension;external rotation;internal rotation;sitting;10 repetitions  -[facility] Name 09/21/23 1053          Elbow/Forearm (Therapeutic Exercise)    Elbow/Forearm AAROM (Therapeutic Exercise) right;flexion;extension;supination;pronation;sitting;10 repetitions  -[facility] Name 09/21/23 1053          Wrist (Therapeutic Exercise)    Wrist AROM (Therapeutic Exercise) right;flexion;extension;10 repetitions  -[facility] Name 09/21/23 1053          Hand (Therapeutic Exercise)    Hand AROM/AAROM (Therapeutic Exercise) AROM (active range of motion);right;finger flexion;finger extension;10 repetitions  -[facility] Name 09/21/23 1053          Motor Skills    Therapeutic Exercise shoulder;elbow/forearm;wrist;hand  -[facility] Name 09/21/23 1053          Balance    Balance Assessment sitting static balance;sitting dynamic balance;standing static balance;standing dynamic balance  -     Static Sitting Balance supervision  -     Dynamic Sitting Balance standby assist  -     Position, Sitting Balance sitting in chair  -     Static Standing Balance contact guard  -     Dynamic Standing Balance contact guard  -      Position/Device Used, Standing Balance other (see comments)  no AD  -KF     Balance Interventions sitting;standing;supported;static;dynamic;occupation based/functional task  -KF               User Key  (r) = Recorded By, (t) = Taken By, (c) = Cosigned By      Initials Name Provider Type    [place]indy [place] OT Occupational Therapist                   Goals/Plan    No documentation.                  Clinical Impression       Row Name 09/21/23 1054          Pain Assessment    Pretreatment Pain Rating 0/10 - no pain  -KF     Posttreatment Pain Rating 2/10  -KF     Pain Location - Side/Orientation Right  -KF     Pain Location generalized  -KF     Pain Location - shoulder  -KF     Pain Intervention(s) Cold applied;Repositioned;Ambulation/increased activity  -KF       Row Name 09/21/23 1054          Plan of Care Review    Plan of Care Reviewed With patient  -KF     Progress improving  -KF     Outcome Evaluation Pt tolerated OT intervention well. Pt performed surgeon specific HEP well, completing PROM FE  to 90 and IR to chest/ER to 15. [place] [facility] [place], no AD, needing maxA for hygiene. OT reviewed L shoulder precautions, ADL restraining to maintain, and HEP. Pt remains below her functional baseline due to NWB RUE status, as well as deficits in strength and activity tolerance. Recommend a d/c to IRF for best functional outcome.  -KF       Row Name 09/21/23 1054          Therapy Assessment/Plan (OT)    Rehab Potential (OT) good, to achieve stated therapy goals  -     Criteria for Skilled Therapeutic Interventions Met (OT) yes  -KF     Therapy Frequency (OT) daily  -KF       Row Name 09/21/23 1054          Therapy Plan Review/Discharge Plan (OT)    Anticipated Discharge Disposition (OT) inpatient rehabilitation facility  -       Row Name 09/21/23 1054          Vital Signs    Pre SpO2 (%) 99  -KF     O2 Delivery Pre Treatment nasal cannula  -KF     Intra SpO2 (%) 94  -KF     O2 Delivery Intra  Treatment nasal cannula  -KF     Post SpO2 (%) 96  -KF     O2 Delivery Post Treatment nasal cannula  -KF     Pre Patient Position Sitting  -KF     Intra Patient Position Standing  -KF     Post Patient Position Sitting  -KF       Row Name 09/21/23 1054          Positioning and Restraints    Pre-Treatment Position sitting in chair/recliner  -KF     Post Treatment Position chair  -KF     In Chair notified nsg;reclined;call light within reach;encouraged to call for assist;exit alarm on;with brace;legs elevated  -KF               User Key  (r) = Recorded By, (t) = Taken By, (c) = Cosigned By      Initials Name Provider Type    KF Cindy Rhodes OT Occupational Therapist                   Outcome Measures       Row Name 09/21/23 1058          How much help from another is currently needed...    Putting on and taking off regular lower body clothing? 1  -KF     Bathing (including washing, rinsing, and drying) 2  -KF     Toileting (which includes using toilet bed pan or urinal) 2  -KF     Putting on and taking off regular upper body clothing 2  -KF     Taking care of personal grooming (such as brushing teeth) 2  -KF     Eating meals 3  -KF     AM-PAC 6 Clicks Score (OT) 12  -KF       Row Name 09/21/23 0830          How much help from another person do you currently need...    Turning from your back to your side while in flat bed without using bedrails? 3  -KB     Moving from lying on back to sitting on the side of a flat bed without bedrails? 2  -KB     Moving to and from a bed to a chair (including a wheelchair)? 3  -KB     Standing up from a chair using your arms (e.g., wheelchair, bedside chair)? 3  -KB     Climbing 3-5 steps with a railing? 2  -KB     To walk in hospital room? 2  -KB     AM-PAC 6 Clicks Score (PT) 15  -KB     Highest level of mobility 4 --> Transferred to chair/commode  -KB       Row Name 09/21/23 1058          Functional Assessment    Outcome Measure Options AM-PAC 6 Clicks Daily Activity (OT)  -KF                User Key  (r) = Recorded By, (t) = Taken By, (c) = Cosigned By      Initials Name Provider Type    Allison Sharpe, RN Registered Nurse    [place]indy [place] OT Occupational Therapist                    Occupational Therapy Education       Title: PT OT SLP Therapies (Done)       Topic: Occupational Therapy (Done)       Point: ADL training (Done)       Description:   Instruct learner(s) on proper safety adaptation and remediation techniques during self care or transfers.   Instruct in proper use of assistive devices.                  Learning Progress Summary             Patient Acceptance, E, VU,DU by  at 9/21/2023 1011    Acceptance, E, VU,DU by  at 9/20/2023 1339    Acceptance, E,TB, VU by  at 9/20/2023 0325    Eager, E,TB,D,H, VU,NR by AR at 9/19/2023 1133    Eager, E,TB,D,H, VU,NR by AR at 9/18/2023 1722   Family Eager, E,TB,D,H, VU,NR by AR at 9/19/2023 1133    Eager, E,TB,D,H, VU,NR by AR at 9/18/2023 1722                         Point: Home exercise program (Done)       Description:   Instruct learner(s) on appropriate technique for monitoring, assisting and/or progressing therapeutic exercises/activities.                  Learning Progress Summary             Patient Acceptance, E, VU,DU by  at 9/21/2023 1011    Acceptance, E, VU,DU by  at 9/20/2023 1339    Acceptance, E,TB, VU by  at 9/20/2023 0325    Eager, E,TB,D,H, VU,NR by AR at 9/19/2023 1133    Eager, E,TB,D,H, VU,NR by AR at 9/18/2023 1722   Family Eager, E,TB,D,H, VU,NR by AR at 9/19/2023 1133    Eager, E,TB,D,H, VU,NR by AR at 9/18/2023 1722                         Point: Precautions (Done)       Description:   Instruct learner(s) on prescribed precautions during self-care and functional transfers.                  Learning Progress Summary             Patient Acceptance, E, VU,DU by  at 9/21/2023 1011    Acceptance, E, VU,DU by KF at 9/20/2023 1339    Acceptance, E,TB, VU by AC at 9/20/2023 0325    JUVENTINO Still,ANGE,D,H,  VU,NR by AR at 9/19/2023 1133    Eager, E,TB,D,H, VU,NR by AR at 9/18/2023 1722   Family Eager, E,TB,D,H, VU,NR by AR at 9/19/2023 1133    Eager, E,TB,D,H, VU,NR by AR at 9/18/2023 1722                         Point: Body mechanics (Done)       Description:   Instruct learner(s) on proper positioning and spine alignment during self-care, functional mobility activities and/or exercises.                  Learning Progress Summary             Patient Acceptance, E, VU,DU by  at 9/21/2023 1011    Acceptance, E, VU,DU by  at 9/20/2023 1339    Acceptance, E,TB, VU by  at 9/20/2023 0325    Eager, E,TB,D,H, VU,NR by AR at 9/19/2023 1133    Eager, E,TB,D,H, VU,NR by AR at 9/18/2023 1722   Family Eager, E,TB,D,H, VU,NR by AR at 9/19/2023 1133    Eager, E,TB,D,H, VU,NR by AR at 9/18/2023 1722                                         User Key       Initials Effective Dates Name Provider Type Discipline    AR 07/11/23 -  Patience Perez, OT Occupational Therapist OT     07/28/23 -  Bhavani Ruiz, RN Registered Nurse Nurse     08/09/23 -  Cindy Rhodes OT Occupational Therapist OT                  OT Recommendation and Plan  Therapy Frequency (OT): daily  Plan of Care Review  Plan of Care Reviewed With: patient  Progress: improving  Outcome Evaluation: Pt tolerated OT intervention well. Pt performed surgeon specific HEP well, completing PROM FE  to 90 and IR to chest/ER to 15. Pt completed transfer to [facility] with CGA, no AD, needing maxA for hygiene. OT reviewed L shoulder precautions, ADL restraining to maintain, and HEP. Pt remains below her functional baseline due to NWB RUE status, as well as deficits in strength and activity tolerance. Recommend a d/c to IRF for best functional outcome.     Time Calculation:         Time Calculation- OT       Row Name 09/21/23 1100             Time Calculation- OT    OT Start Time 1011  -KF      OT Received On 09/21/23  -KF      OT Goal Re-Cert Due Date 09/28/23  -KF          Timed Charges    64137 - OT Therapeutic Exercise Minutes 20  -KF      68029 - OT Therapeutic Activity Minutes 4  -KF      78177 - OT Self Care/Mgmt Minutes 15  -KF         Total Minutes    Timed Charges Total Minutes 39  -KF       Total Minutes 39  -KF                User Key  (r) = Recorded By, (t) = Taken By, (c) = Cosigned By      Initials Name Provider Type    KF Cindy Rhodes OT Occupational Therapist                  Therapy Charges for Today       Code Description Service Date Service Provider Modifiers Qty    50138794773 HC OT THER PROC EA 15 MIN 9/20/2023 Cindy Rhodes OT GO 1    43654588312 HC OT THERAPEUTIC ACT EA 15 MIN 9/20/2023 Cindy Rhodes OT GO 1    53163113027 HC OT SELF CARE/MGMT/TRAIN EA 15 MIN 9/20/2023 Cindy Rhodes OT GO 1    71087687095 HC OT THER PROC EA 15 MIN 9/21/2023 Cindy Rhodes OT GO 2    25321967708 HC OT SELF CARE/MGMT/TRAIN EA 15 MIN 9/21/2023 Cindy Rhodes OT GO 1                 Cindy Rhodes OT  9/21/2023

## 2023-09-21 NOTE — PLAN OF CARE
Problem: Adult Inpatient Plan of Care  Goal: Plan of Care Review  Outcome: Ongoing, Progressing  Goal: Patient-Specific Goal (Individualized)  Outcome: Ongoing, Progressing  Goal: Absence of Hospital-Acquired Illness or Injury  Outcome: Ongoing, Progressing  Intervention: Identify and Manage Fall Risk  Recent Flowsheet Documentation  Taken 9/21/2023 0000 by Bhavani Ruiz RN  Safety Promotion/Fall Prevention: activity supervised  Taken 9/20/2023 2200 by Bhavani Ruiz RN  Safety Promotion/Fall Prevention: activity supervised  Intervention: Prevent Skin Injury  Recent Flowsheet Documentation  Taken 9/21/2023 0000 by Bhavani Ruiz RN  Body Position:   neutral body alignment   upper extremity elevated  Taken 9/20/2023 2200 by Bhavani Ruiz RN  Body Position:   weight shifting   upper extremity elevated  Skin Protection: adhesive use limited  Intervention: Prevent and Manage VTE (Venous Thromboembolism) Risk  Recent Flowsheet Documentation  Taken 9/21/2023 0000 by Bhavani Ruiz RN  VTE Prevention/Management:   bilateral   sequential compression devices on  Taken 9/20/2023 2200 by Bhavani Ruiz RN  Activity Management: activity encouraged  VTE Prevention/Management:   sequential compression devices on   bilateral  Range of Motion: active ROM (range of motion) encouraged  Intervention: Prevent Infection  Recent Flowsheet Documentation  Taken 9/21/2023 0000 by Bhavani Ruiz RN  Infection Prevention: environmental surveillance performed  Taken 9/20/2023 2200 by Bhavani Ruiz RN  Infection Prevention: environmental surveillance performed  Goal: Optimal Comfort and Wellbeing  Outcome: Ongoing, Progressing  Intervention: Provide Person-Centered Care  Recent Flowsheet Documentation  Taken 9/20/2023 2200 by Bhavani Ruiz RN  Trust Relationship/Rapport: care explained  Goal: Readiness for Transition of Care  Outcome: Ongoing, Progressing     Problem: Skin Injury Risk Increased  Goal: Skin Health and Integrity  Outcome:  Ongoing, Progressing  Intervention: Optimize Skin Protection  Recent Flowsheet Documentation  Taken 9/21/2023 0000 by Bhavani Ruiz RN  Head of Bed (HOB) Positioning: HOB at 30-45 degrees  Taken 9/20/2023 2200 by Bhavani Ruiz RN  Pressure Reduction Techniques: frequent weight shift encouraged  Head of Bed (HOB) Positioning: HOB at 30-45 degrees  Pressure Reduction Devices: pressure-redistributing mattress utilized  Skin Protection: adhesive use limited     Problem: Fall Injury Risk  Goal: Absence of Fall and Fall-Related Injury  Outcome: Ongoing, Progressing  Intervention: Identify and Manage Contributors  Recent Flowsheet Documentation  Taken 9/20/2023 2200 by Bhavani Ruiz RN  Medication Review/Management: medications reviewed  Intervention: Promote Injury-Free Environment  Recent Flowsheet Documentation  Taken 9/21/2023 0000 by Bhavani Ruiz RN  Safety Promotion/Fall Prevention: activity supervised  Taken 9/20/2023 2200 by Bhavani Ruiz RN  Safety Promotion/Fall Prevention: activity supervised     Problem: Diabetes Comorbidity  Goal: Blood Glucose Level Within Targeted Range  Outcome: Ongoing, Progressing  Intervention: Monitor and Manage Glycemia  Recent Flowsheet Documentation  Taken 9/20/2023 2200 by Bhavani Ruiz RN  Glycemic Management: blood glucose monitored   Goal Outcome Evaluation:

## 2023-09-21 NOTE — PLAN OF CARE
Goal Outcome Evaluation:  Plan of Care Reviewed With: patient        Progress: improving  Outcome Evaluation: Pt tolerated OT intervention well. Pt performed surgeon specific HEP well, completing PROM FE to 90 and IR to chest/ER to 15. Pt completed transfer to [facility] with CGA, no AD, needing maxA for hygiene. OT reviewed L shoulder precautions, ADL restraining to maintain, and HEP. Pt remains below her functional baseline due to NWB RUE status, as well as deficits in strength and activity tolerance. Recommend a d/c to IRF for best functional outcome.      Anticipated Discharge Disposition (OT): inpatient rehabilitation facility

## 2023-09-21 NOTE — CASE MANAGEMENT/SOCIAL WORK
Case Management Discharge Note      Final Note: Pt has been accepted to [facility] with transport today at 1400 via [facility]. Report can be called to [phone]. Pt is in agreement with plan         Selected Continued Care - Admitted Since 9/18/2023       Destination    No services have been selected for the patient.                Durable Medical Equipment       Service Provider Selected Services Address Phone Fax Patient Preferred    [facility] [address] [id number] [phone] --              Dialysis/Infusion    No services have been selected for the patient.                Home Medical Care    No services have been selected for the patient.                Therapy    No services have been selected for the patient.                Community Resources    No services have been selected for the patient.                Community & DME    No services have been selected for the patient.                         Final Discharge Disposition Code: 03 - skilled nursing facility (SNF)

## 2023-09-21 NOTE — DISCHARGE INSTRUCTIONS
[facility] COLD THERAPY - PATIENT INSTRUCTION SHEET    Cold Compression Therapy for your comfort and rehabilitation  Your caregivers want you to be productive in your rehab and comfortable during your stay. In keeping with those goals, you will be receiving an SMI Cold Therapy Wrap to help ease post-operative pain and swelling that might keep you from getting back on track! Your SMI Cold Therapy Wrap is effective and simple-to-use, and you will be encouraged to apply it throughout your hospital stay and at home through the duration of your recovery.    When you are ready to go home  Be sure to take your SMI Cold Therapy Wrap and both sets of Gel Bags with you for continued comfort and use throughout your rehabilitation. If you don't already have them, ask your nurse or aide to retrieve your SMI Gel Bags from the patient freezer.    Home use precautions  Always follow your medical professional's application instructions upon discharge. Your SMI Cold Therapy Wrap and Gel Bags are designed to last for months following your surgery. Never heat the Gel Bags unless specified by your healthcare provider. Supervision is advised when using this product on children or geriatric patients. To avoid danger of suffocation, please keep the outer plastic packaging away from children & pets.    Cold Therapy Instructions  Place Gel Bags in a freezer set ¾ of the way to max temperature for at least (4) hours. For best results, lay the Gel Bags flat and [id number] in the freezer. Once frozen, slide Gel Bags into the gel pouch and secure your wrap to the affected area with the straps.  Gel wraps that have been stored in a freezer for an extended period of time may require a (10) minute period of softening up in a room temperature environment before application.  The gel pouch acts as a protective barrier. NEVER place frozen bags directly onto skin, as this may cause frostbite injury.  The SMI Cold Therapy Wrap is designed to be able to be  worm while ambulating. The compression straps can be secured well enough so that the Wrap won't fall off while moving.  Wrap Application Videos can be viewed at [employer].  An additional protective barrier such as clothing, a washcloth, hand-towel or pillowcase may be used during prolonged treatment applications.  The Gel-Pouch and Wrap are both Latex-Free and the Gel Bag ingredients are non toxic.    [facility] Wrap care instructions  The [facility] Cold Therapy Wrap may be hand washed and hung to dry when needed.    [facility] re-order information  Additional [facility] body specific wraps and/or Gel Bags can be re-ordered from [employer] or call [email or web address] ([phone])   InfuBLOCK - Patient Information    What is a pain pump?  The InfuBLOCK pump delivers post-operative, non-narcotic, numbing medication to the nerve near the surgical site for pain relief.     Where can I find information about my pain pump?           For more information about your pain pump, scan the QR code.  For additional patient resources, visit [employer]/resources-pain-management.                                                                                               While your physician is your primary source for information about your treatment there may be times during your treatment that you need assistance with your infusion pump.     If you need assistance take the following steps:    The [facility] Hotline is Here for You 24/7.  Please call 1-566.380.9985 for the following concerns or complications:    Answers to questions about your infusion pump                 Tubing disconnect  Assistance with pump alarms                                                      Dislodged catheter  Excessive leakage noted from pump                                         Inadequate pain control    2.   [facility] Anesthesia Acute Pain Service: [phone] is available 24/7 for any further needs or concerns about  medication or pain control.

## 2023-10-05 ENCOUNTER — READMISSION MANAGEMENT (OUTPATIENT)
Dept: CALL CENTER | Facility: HOSPITAL | Age: 73
End: 2023-10-05
Payer: MEDICARE

## 2023-10-05 NOTE — OUTREACH NOTE
Prep Survey      Flowsheet Row Responses   [facility] facility patient discharged from? Non-BH   Is LACE score < 7 ? Non-BH Discharge   Eligibility [facility]   Date of Discharge 10/05/23   Discharge Disposition [facility] [place] [facility]   Discharge diagnosis Aftercare following joint replacement surgery   Does the patient have one of the following disease processes/diagnoses(primary or secondary)? Total Joint Replacement   Prep survey completed? Yes            Lily JACKSON - Registered Nurse

## 2023-10-06 ENCOUNTER — TRANSITIONAL CARE MANAGEMENT TELEPHONE ENCOUNTER (OUTPATIENT)
Dept: CALL CENTER | Facility: HOSPITAL | Age: 73
End: 2023-10-06
Payer: MEDICARE

## 2023-10-06 ENCOUNTER — TELEPHONE (OUTPATIENT)
Dept: FAMILY MEDICINE CLINIC | Facility: CLINIC | Age: 73
End: 2023-10-06

## 2023-10-06 NOTE — OUTREACH NOTE
Call Center TCM Note      Flowsheet Row Responses   [facility] facility patient discharged from? Non-BH   Does the patient have one of the following disease processes/diagnoses(primary or secondary)? Total Joint Replacement   TCM attempt successful? No  [verbal for Silvana Cross]   Unsuccessful attempts Attempt 1   Call Status Left message            Lindsey Palma RN    10/6/2023, 15:26 EDT

## 2023-10-06 NOTE — TELEPHONE ENCOUNTER
Caller: [facility]    Relationship to patient: Home Health    Best call back number: [phone]    Patient is needing: G[facility]O CALLED TO REPORT PATIENTS START OF CARE    STATED SKILLED NURSING FOR COUPLE WEEKS    PHYSICAL THERAPY AND OCCUPATIONAL THERAPY EVALUATION NEXT WEEK.    STATED PATIENT DID HAVE MULTIPLE MEDICATION INTERACTIONS BUT MOST OF THEM HER CARDIOLOGIST IS AWARE OF THEM AND MONITORING.

## 2023-10-06 NOTE — OUTREACH NOTE
Call Center TCM Note      Flowsheet Row Responses   [facility] facility patient discharged from? Non-  [[facility]]   Does the patient have one of the following disease processes/diagnoses(primary or secondary)? Total Joint Replacement   TCM attempt successful? No   Unsuccessful attempts Attempt 2            Darlene Garcia RN    10/6/2023, 16:46 EDT

## 2023-10-07 ENCOUNTER — TRANSITIONAL CARE MANAGEMENT TELEPHONE ENCOUNTER (OUTPATIENT)
Dept: CALL CENTER | Facility: HOSPITAL | Age: 73
End: 2023-10-07
Payer: MEDICARE

## 2023-10-07 NOTE — OUTREACH NOTE
Call Center TCM Note      Flowsheet Row Responses   [facility] patient discharged from? Non-   Does the patient have one of the following disease processes/diagnoses(primary or secondary)? Other   TCM attempt successful? Yes   Call start time 0958   Call end time 1004   Meds reviewed with patient/caregiver? Yes   Is the patient having any side effects they believe may be caused by any medication additions or changes? No   Does the patient have all medications ordered at discharge? Yes   Is the patient taking all medications as directed (includes completed medication regime)? Yes   Comments PCP hospital f/u appt 10/19/23. Cardiology appt 10/11/23, surgeon appt 11/02/23.   Does the patient have an appointment with their PCP within 7-14 days of discharge? Yes   Has home health visited the patient within 72 hours of discharge? Yes   Home health comments  nurse visited yesterday. Will also have PT and OT.   DME comments Using a sling.   Psychosocial issues? No   Did the patient receive a copy of their discharge instructions? --  [facility]   What is the patient's perception of their health status since discharge? Improving   Is the patient/caregiver able to teach back signs and symptoms related to disease process for when to call PCP? Yes   Is the patient/caregiver able to teach back signs and symptoms related to disease process for when to call 911? Yes   Is the patient/caregiver able to teach back the hierarchy of who to call/visit for symptoms/problems? PCP, Specialist, Home health nurse, Urgent Care, ED, 911 Yes   If the patient is a current smoker, are they able to teach back resources for cessation? Not a smoker   Additional teach back comments Reviewed s/s of infection at incision.   TCM call completed? Yes   Wrap up additional comments Patient states is improving. States continues with sling to arm. Denies any s/s of infection at incision. Will be working with   PT/OT/nursing. Denies any needs/concerns today. TCM complete.   Call end time 1004   Would this patient benefit from a Referral to [facility] Social Work? No   Is the patient interested in additional calls from an ambulatory ? No            Chyna Thompson RN    10/7/2023, 10:04 EDT         Orthopedic

## 2023-10-08 NOTE — PROGRESS NOTES
[facility] Cardiology    Encounter Date: 10/11/2023    Patient ID: Joanna Cross is a 72 y.o. female.  : 1950     PCP: Reynaldo Fritz MD       Chief Complaint: Coronary Artery Disease      PROBLEM LIST:  Coronary artery disease  [facility], 02/15/2008, Dr Lutz: Mild, non-obstructive CAD, normal EF  [facility], 2013, Dr Stevenson Sutton: No LV gram done. Mild, non-obstructive CAD.  [facility], 2015, [facility]:  EF 60%. 70% RCA lesion with Promus ZAINAB placement. 40-50% mid LAD, no FFR performed. Other small blockages noted. No MR.  [facility], 11/15/2015, Dr Palacio @ [facility]: Patent RCA stent, 40-50% LAD with FFR @ 0.92; mild inferior wall hypokinesis  [facility], 2016, [facility]: Normal CORS with patent stent. LAD improved since last [facility]. EF 55-60%.  [facility], 2019: EF 55-60%. Patent RCA stent, noncritical mid LAD with IFR 0.93, noncritical LCx.   Chronic venous stasis:  Venous duplex, 2010: Insufficiency to venous hypertension in the great saphenous system bilaterally.  Venous duplex : Right leg laser ablation of Dr. Garcia.  Venous duplex, 2016: No evidence of DVT, no superficial, no thrmobophlebitis, no valvular insufficiency.  AA with runoffs, 2016: Single-vessel Dupree: No significant arterial disease.  Arterial doppler/GLYNN, 2019: No evidence of significant lower extremity arterial occlusive disease.  Palpitations:  Echocardiogram, 2014: Dr. Teran. Normal biventricular function; trace to mild MR. Atrial septal aneurysm.  Echocardiogram, 2015, Dr. Chavez: Borderline LVH, mild LAE, mild RV enlargement. Mild to moderate MR and TR with RVSP of 46 mmHg.  Echocardiogram, 2016: Dr. Palacio.  RV enlargement.  EF 50-55%.  Mild AI S, mild MR and TR with RVSP 37 mmHg.  Echocardiogram, 20: Dr. Jany Wynn: EF 60-65%. Mild to moderate MR.  PAF/SSS:  [employer] PPM 2016  CHADS2 Vasc = 6 (HTN, DM, Age 65-74,  "Female, H/o TIA). On chronic anticoagulation.  ECV, 12/26/2018: Successful cardioversion. AV paced.  Echocardiogram, 12/25/2018:  EF 60%, mild MR/TR with RVSP 29 mmHg. Sclerotic AoV, no AS/AI. Mild MAC.  ECV, 03/05/2019: Successful cardioversion.  Zio, 10/01/2019, 14 days: NSR baseline. Normal average rates. Periods of RV pacing.   Patient has known undersensing in the atrial lead which is was known to Dr. Marie.     TIA:  Carotid duplex, 02/13/2014: No significant flow-limiting stenosis. Mild luminal disease present; both vertebrals are present.  Diabetes  Essential Hypertension  Hyperlipidemia  Hypothyroid  Hyponatremia  Secondary to SIADH  MILLI with CPAP  RLS  Parkinson's disease  GERD  Urinary Retention  S/P cystoscopy and ureter dilation, March 2018.  Dr. Terrence Mckenzie   Surgeries:  Rotator cuff repair  Cholecystectomy  Bilateral knee replacement  Tubal ligation  Breast surgery  Sinus surgery  Left shoulder replacement 10/24/2022    History of Present Illness  Patient presents today for 6-month follow-up with a history of coronary artery disease, paroxysmal atrial fibrillation status post pacemaker implantation with under sensing atrial lead and cardiac risk factors. Since last visit, patient underwent total shoulder arthroplasty here at [facility].  She will come out of her sling next week and begin physical therapy.  She does state over the last several months she has been having progressive shortness of breath.  She was hospitalized at [facility] in June for this and did have an echocardiogram with questionable pulmonary hypertension.  She states that she was seen by \"a couple different people\" who had different recommendations including 1 recommendation of a right heart catheterization.  She states that this was not performed due to the lower pressures on her echocardiogram.  She has continued to have shortness of breath that is worse with exertion.  No shortness of breath at rest.  She did think " "that she was having increased episodes of atrial fibrillation but device interrogation today shows less than 1% A-fib with episodes lasting less than a minute.    Allergies   Allergen Reactions    Toprol Xl [Metoprolol Tartrate] Shortness Of Breath     Extreme fatigue    Amlodipine Besylate Swelling     Lower extremity (ankles, feet) swelling    Codeine Unknown (See Comments)     Pt is unaware of what reaction she had    Entacapone Other (See Comments)     \"extreme weakness in legs - caused several falls, which stopped after discontinuing this medication\"    Epinephrine Other (See Comments)     6/4/16- had 3 shots to numb mouth to prepare teeth for crowns, the shots contained epi-  Caused pt to have chest discomfort- went to hospital in ambulance, discovered had a fib while there     Levemir [Insulin Detemir] Hives     Hives / rash around injection site    Penicillins Hives     Jitteriness     Xarelto [Rivaroxaban] GI Bleeding     hgb dropped to 5.2    Hydrocodone Unknown - High Severity    Prochlorperazine Unknown - High Severity    Toprol Xl [Metoprolol] Unknown - High Severity    Aricept [Donepezil Hcl] Nausea Only     Vivid dreams    Bactrim [Sulfamethoxazole-Trimethoprim] Nausea Only and Other (See Comments)     Headache -  Cant be taken with tikosyn - septra ds    Benztropine Mesylate Other (See Comments)     Uncontrollable body movements    Cimetidine Other (See Comments)     Cant be taken with tikosyn     Ciprofloxacin Diarrhea    Cogentin [Benztropine] Other (See Comments)     \"uncontrollable body movements\"    Compazine [Prochlorperazine Edisylate] Other (See Comments)     Dystonic reaction    Cortisone Other (See Comments)     MAKES BLOOD PRESSURE HIGH     Duraprep [Antiseptic Products, Misc.] Itching and Rash     RASH AND ITCHING    Erythromycin Base Other (See Comments)     Cant be taken with tikosyn - z pack and ketek     Flurandrenolone Other (See Comments)     Cant be taken with tikosyn     Include - " levaquin, cipro, norloxacin     Haldol [Haloperidol Lactate] Other (See Comments)     Dystonic reaction    Hydralazine Other (See Comments)     Headache     Hydrochlorothiazide Other (See Comments)     Cant be taken with tikosyn -  Also hydrodiuril, microzide, hydro-par, oretic, esidrix, ezide or any medicine with hct or hctz in name     Hydrocodone-Acetaminophen Nausea And Vomiting and Dizziness     Headache, nausea     Levaquin [Levofloxacin] Other (See Comments)     Cannot take due to taking propafenone HCL- severe reaction with mixed.     Lisinopril Cough    Macrolides And Ketolides Other (See Comments)     antibx -   Cant be taken with tikosyn     Statins Myalgia     Leg pain- all statins     Tarka [Trandolapril-Verapamil Hcl Er] Other (See Comments)     Constipation     Verapamil Other (See Comments)     Cant be taken with tikosyn - calan, covera-hs, isoptin, verelan or tarka          Current Outpatient Medications:     Accu-Chek Guide test strip, Testing 3 times per day; E11.65, Disp: 300 each, Rfl: 3    Accu-Chek Softclix Lancets lancets, USE ONE LANCET TO TEST THREE TIMES A DAY, Disp: 300 each, Rfl: 1    albuterol (PROVENTIL) (2.5 MG/3ML) 0.083% nebulizer solution, Take 2.5 mg by nebulization Every 4 (Four) Hours As Needed for Wheezing or Shortness of Air., Disp: 1 each, Rfl: 3    albuterol sulfate HFA (Ventolin HFA) 108 (90 Base) MCG/ACT inhaler, Inhale 1 puff Every 4 (Four) Hours As Needed for Wheezing or Shortness of Air., Disp: 8 g, Rfl: 5    amantadine (SYMMETREL) 100 MG capsule, Take 1 capsule by mouth 2 (Two) Times a Day., Disp: , Rfl:     apixaban (Eliquis) 5 MG tablet tablet, Take 1 tablet by mouth Every 12 (Twelve) Hours., Disp: 180 tablet, Rfl: 2    aspirin 81 MG EC tablet, Take 1 tablet by mouth Daily., Disp: , Rfl:     B Complex-C (SUPER B COMPLEX PO), Take 1 tablet by mouth Daily., Disp: , Rfl:     B-D UF III MINI PEN NEEDLES 31G X 5 MM misc, USE TO INJECT INSULIN DAILY, Disp: 90 each, Rfl:  3    bumetanide (BUMEX) 1 MG tablet, 1 tab po daily as directed, Disp: 90 tablet, Rfl: 3    Calcium Carbonate (CALTRATE 600 PO), Take 600 mg by mouth Daily., Disp: , Rfl:     carbidopa-levodopa (SINEMET)  MG per tablet, Take  by mouth. 2 tablets at 0600, 1 tablet at 0900, 1200, 1500, 1800, 2100, Disp: , Rfl:     Cholecalciferol 2000 units tablet, Take 1 tablet by mouth 2 (Two) Times a Day., Disp: , Rfl:     citalopram (CeleXA) 20 MG tablet, Take 1 tablet by mouth Daily., Disp: 90 tablet, Rfl: 3    clobetasol (TEMOVATE) 0.05 % cream, Apply 1 application  topically to the appropriate area as directed 2 (Two) Times a Day As Needed (Lichens Sclerosis)., Disp: , Rfl:     dofetilide (TIKOSYN) 500 MCG capsule, TAKE 1 CAPSULE EVERY 12 HOURS (Patient taking differently: Take 1 capsule by mouth 2 (Two) Times a Day.), Disp: 180 capsule, Rfl: 3    Emollient (AQUAPHOR ADVANCED THERAPY EX), Apply  topically., Disp: , Rfl:     Glucosamine-Chondroit-Calcium (TRIPLE FLEX BONE & JOINT PO), Take 1 tablet by mouth Daily. Move free, Disp: , Rfl:     hydrocortisone 2.5 % ointment, , Disp: , Rfl:     hydroxychloroquine (PLAQUENIL) 200 MG tablet, Daily., Disp: , Rfl:     Insulin Glargine (Lantus SoloStar) 100 UNIT/ML injection pen, Inject 12-14 Units under the skin into the appropriate area as directed Daily., Disp: 15 mL, Rfl: 1    ipratropium (ATROVENT) 0.03 % nasal spray, 2 sprays into the nostril(s) as directed by provider 2 (Two) Times a Day As Needed (CONGESTION SINUS)., Disp: 30 mL, Rfl: 11    Loratadine 10 MG capsule, Take 1 capsule by mouth Daily., Disp: , Rfl:     metFORMIN (GLUCOPHAGE) 500 MG tablet, Take 1 tablet by mouth 2 (Two) Times a Day With Meals. NEW DOSE, Disp: 180 tablet, Rfl: 2    metOLazone (ZAROXOLYN) 2.5 MG tablet, Take 1 tablet by mouth Daily., Disp: 90 tablet, Rfl: 1    Multiple Vitamins-Minerals (CENTRUM ULTRA WOMENS PO), Take 1 tablet by mouth Daily., Disp: , Rfl:     nitroglycerin (NITROLINGUAL) 0.4  MG/SPRAY spray, Place 1 spray under the tongue Every 5 (Five) Minutes As Needed for Chest Pain., Disp: 1 each, Rfl: 6    nystatin (MYCOSTATIN) 660115 UNIT/GM ointment, Apply 1 application  topically to the appropriate area as directed 2 (Two) Times a Day., Disp: , Rfl:     O2 (OXYGEN), Inhale 2 L/min Every Night. 2L all the time now, Disp: , Rfl:     pantoprazole (Protonix) 40 MG EC tablet, Take 1 tablet by mouth Daily., Disp: 90 tablet, Rfl: 1    pramipexole (MIRAPEX) 1.5 MG tablet, Take 1 tablet by mouth Every Night., Disp: 90 tablet, Rfl: 0    ranolazine (RANEXA) 500 MG 12 hr tablet, Take 1 tablet by mouth Every 12 (Twelve) Hours., Disp: 180 tablet, Rfl: 3    ropivacaine (NAROPIN) 0.2 % infusion (INFUSYSTEM), 2 mg/hr by Peripheral Nerve route Continuous., Disp: , Rfl:     senna (SENOKOT) 8.6 MG tablet, Take 1 tablet by mouth Daily., Disp: , Rfl:     spironolactone (ALDACTONE) 25 MG tablet, Take 2 tablets by mouth Daily., Disp: 180 tablet, Rfl: 2    traMADol (ULTRAM) 50 MG tablet, Take 1 tablet by mouth Every 8 (Eight) Hours As Needed for Moderate Pain., Disp: , Rfl:     Unithroid 175 MCG tablet, Take 1 tablet by mouth Daily., Disp: 90 tablet, Rfl: 1    valsartan (DIOVAN) 160 MG tablet, Take 1 tablet by mouth 2 (Two) Times a Day., Disp: 180 tablet, Rfl: 2    Zinc 50 MG capsule, Take 1 capsule by mouth Daily., Disp: , Rfl:     polyethylene glycol (MIRALAX) 17 g packet, Take 17 g by mouth Daily As Needed (Use if senna-docusate is ineffective). (Patient not taking: Reported on 10/11/2023), Disp: , Rfl:     sennosides-docusate (PERICOLACE) 8.6-50 MG per tablet, Take 2 tablets by mouth 2 (Two) Times a Day. (Patient not taking: Reported on 10/11/2023), Disp: , Rfl:     triamcinolone (KENALOG) 0.1 % cream, 1 application  As Needed for Irritation or Rash. (Patient not taking: Reported on 10/11/2023), Disp: , Rfl:     vitamin C (ASCORBIC ACID) 500 MG tablet, Take 1 tablet by mouth Daily. Chewable tablet, Disp: , Rfl:  "    The following portions of the patient's history were reviewed and updated as appropriate: allergies, current medications, past family history, past medical history, past social history, past surgical history and problem list.    Review of Systems   12 point ROS negative except for that listed in the HPI.        Objective:     /72 (BP Location: Left arm, Patient Position: Sitting)   Pulse 65   Ht 157.5 cm (62\")   Wt 109 kg (239 lb 3.2 oz)   LMP  (LMP Unknown) Comment: Mammogram- 1/28/20  SpO2 93%   BMI 43.75 kg/mý      Physical Exam  Constitutional: Patient appears well-developed and well-nourished.   HENT: HEENT exam unremarkable.   Neck: Neck supple. No JVD present. No carotid bruits.   Cardiovascular: Normal rate, regular rhythm and normal heart sounds. No murmur heard.   2+ symmetric pulses.   Pulmonary/Chest: Breath sounds normal. Does not exhibit tenderness.   Abdominal: Abdomen benign.   Musculoskeletal: Does not exhibit edema.   Neurological: Neurological exam unremarkable.   Vitals reviewed.    Data Review:   Lab Results   Component Value Date    GLUCOSE 84 09/19/2023    BUN 19 09/19/2023    CREATININE 0.89 09/19/2023    EGFRIFNONA 66 06/12/2019    BCR 21.3 09/19/2023    K 3.6 09/19/2023    CO2 22.0 09/19/2023    CALCIUM 8.4 (L) 09/19/2023    ALBUMIN 4.5 06/15/2023    AST 18 06/15/2023    ALT 7 06/15/2023     Lab Results   Component Value Date    CHOL 185 02/01/2019    CHLPL 174 06/15/2023    TRIG 113 06/15/2023    HDL 43 06/15/2023     (H) 06/15/2023      Lab Results   Component Value Date    WBC 7.68 09/19/2023    RBC 3.03 (L) 09/19/2023    HGB 8.2 (L) 09/20/2023    HCT 25.7 (L) 09/20/2023    MCV 93.1 09/19/2023     (L) 09/19/2023     Lab Results   Component Value Date    TSH 0.838 06/15/2023     Lab Results   Component Value Date    HGBA1C 5.80 (H) 09/11/2023        Procedures       Dual-chamber pacemaker interrogation  Mode DDDR, rate 60  RA 36%, P0.1, threshold 3.0 at 1.0, " impedance 665  RV 4% , RR 21.3, threshold 1.00.4, impedance 389  Battery voltage: 9 months  Events: AF less than 1% isolated episodes less than 1 minute               Assessment:      Diagnosis   1. Coronary artery disease involving native coronary artery of native heart without angina pectoris       2. Paroxysmal atrial fibrillation       3. Cardiac pacemaker in situ       4. Hypertension, essential       5. Hyperlipidemia LDL goal <70       6. Long term current use of antiarrhythmic drug         Plan:   Patient with angina equivalent symptom including dyspnea on exertion.  Due to known coronary artery disease, we will obtain a stress test for ischemic evaluation.  CBC ordered to assess hemoglobin level as this was low on last labs in September.  Low hemoglobin may be contributing to shortness of breath.  Pacemaker interrogation today with less than 1% atrial fibrillation, likely not contributing to shortness of breath.  I have reached out to Dr. Moore nurse about possible lead upgrade at the time of GEN change.  Request echocardiogram films from [facility] to calculate pulmonary pressures and determine if patient needs a right heart catheterization.  Continue current medications.   FU in 6 MO, sooner as needed.  Thank you for allowing us to participate in the care of your patient.         Amena Grover PA-C    Please note that portions of this note may have been completed with a voice recognition program. Efforts were made to edit the dictations, but occasionally words are mistranscribed.

## 2023-10-10 ENCOUNTER — OFFICE VISIT (OUTPATIENT)
Dept: ENDOCRINOLOGY | Facility: CLINIC | Age: 73
End: 2023-10-10
Payer: MEDICARE

## 2023-10-10 VITALS
DIASTOLIC BLOOD PRESSURE: 63 MMHG | SYSTOLIC BLOOD PRESSURE: 126 MMHG | WEIGHT: 229 LBS | HEART RATE: 67 BPM | HEIGHT: 62 IN | OXYGEN SATURATION: 97 % | BODY MASS INDEX: 42.14 KG/M2

## 2023-10-10 DIAGNOSIS — E03.9 ACQUIRED HYPOTHYROIDISM: ICD-10-CM

## 2023-10-10 DIAGNOSIS — Z79.4 TYPE 2 DIABETES MELLITUS WITH HYPERGLYCEMIA, WITH LONG-TERM CURRENT USE OF INSULIN: Primary | ICD-10-CM

## 2023-10-10 DIAGNOSIS — E11.65 TYPE 2 DIABETES MELLITUS WITH HYPERGLYCEMIA, WITH LONG-TERM CURRENT USE OF INSULIN: Primary | ICD-10-CM

## 2023-10-10 LAB
EXPIRATION DATE: ABNORMAL
GLUCOSE BLDC GLUCOMTR-MCNC: 131 MG/DL (ref 70–130)
Lab: ABNORMAL

## 2023-10-10 PROCEDURE — 99214 OFFICE O/P EST MOD 30 MIN: CPT | Performed by: PHYSICIAN ASSISTANT

## 2023-10-10 PROCEDURE — 3074F SYST BP LT 130 MM HG: CPT | Performed by: PHYSICIAN ASSISTANT

## 2023-10-10 PROCEDURE — 1159F MED LIST DOCD IN RCRD: CPT | Performed by: PHYSICIAN ASSISTANT

## 2023-10-10 PROCEDURE — 1160F RVW MEDS BY RX/DR IN RCRD: CPT | Performed by: PHYSICIAN ASSISTANT

## 2023-10-10 PROCEDURE — 3078F DIAST BP <80 MM HG: CPT | Performed by: PHYSICIAN ASSISTANT

## 2023-10-10 PROCEDURE — 82947 ASSAY GLUCOSE BLOOD QUANT: CPT | Performed by: PHYSICIAN ASSISTANT

## 2023-10-10 PROCEDURE — 3044F HG A1C LEVEL LT 7.0%: CPT | Performed by: PHYSICIAN ASSISTANT

## 2023-10-10 RX ORDER — INSULIN GLARGINE 100 [IU]/ML
12-14 INJECTION, SOLUTION SUBCUTANEOUS DAILY
Qty: 15 ML | Refills: 1 | Status: SHIPPED | OUTPATIENT
Start: 2023-10-10

## 2023-10-10 NOTE — PROGRESS NOTES
Office Note      Date: 10/10/2023  Patient Name: Joanna Cross  MRN: [id number]  : 1950    Chief Complaint   Patient presents with    Diabetes       History of Present Illness:   Joanna Cross is a 72 y.o. female who presents for follow-up for type 2 diabetes diagnosed in .  She was previously followed by Latha Trotter.  She had a left shoulder replacement last month and spent 2 weeks for rehab at [facility].  She just returned home  and is getting ready to start home therapy.  She reports overall she is doing well she is healing well and things seem to be moving along.  She had a hemoglobin A1c prior to her surgery and this was 5.8%.  She checks her blood sugar twice a day and typically in the mornings these are 80 to 98 mg/dL when she checks them later in the day they are typically in the low 100s but have been as high as 180 mg/dL.  She is taking Lantus 12 units daily and metformin 1000 mg twice a day.  She is asking about reducing her metformin dose today because she is concerned about her kidneys.  She is up-to-date on her eye exam she had her appointment in  and all was okay.  She sees the podiatrist regularly and Lilibeth did her foot exam at her appointment in March.  She has a spot on the top of her great toe she has been putting ointment on recently.  Latha increased her Unithroid dose to 175 mcg last visit.  She reports she feels well on this dose and had a normal TSH in .  She reports she has a cardiology appointment tomorrow.  They are going to need to replace the battery in her pacemaker soon.      Subjective     Review of Systems:   Review of Systems   Cardiovascular: Negative.    Gastrointestinal: Negative.    Endocrine: Negative.    Musculoskeletal:  Positive for arthralgias.   Neurological:  Positive for weakness.       The following portions of the patient's history were reviewed and updated as appropriate: allergies, current medications, past family  "history, past medical history, past social history, past surgical history, and problem list.    Objective     Vitals:    10/10/23 1417   BP: 126/63   Pulse: 67   SpO2: 97%   Weight: 104 kg (229 lb)   Height: 157.5 cm (62\")     Body mass index is 41.88 kg/mý.    Physical Exam  Vitals reviewed.   Constitutional:       General: She is not in acute distress.     Appearance: Normal appearance.      Comments: In a wheelchair today   Feet:      Comments: Small lesion on the top of her right great toe.  This does not appear to be infected.  Neurological:      Mental Status: She is alert.         HEMOGLOBIN A1C  Lab Results   Component Value Date    HGBA1C 5.80 (H) 09/11/2023       GLUCOSE  Glucose   Date Value Ref Range Status   10/10/2023 131 (A) 70 - 130 mg/dL Final       CMP  Lab Results   Component Value Date    GLUCOSE 84 09/19/2023    BUN 19 09/19/2023    CREATININE 0.89 09/19/2023    EGFRIFNONA 66 06/12/2019    BCR 21.3 09/19/2023    K 3.6 09/19/2023    CO2 22.0 09/19/2023    CALCIUM 8.4 (L) 09/19/2023    PROTENTOTREF 6.5 06/15/2023    LABIL2 2.3 (H) 06/15/2023    AST 18 06/15/2023    ALT 7 06/15/2023       LIPID PANEL  Lab Results   Component Value Date    CHOL 185 02/01/2019    CHLPL 174 06/15/2023    TRIG 113 06/15/2023    HDL 43 06/15/2023     (H) 06/15/2023         TSH  Lab Results   Component Value Date    TSH 0.838 06/15/2023       Assessment / Plan      Assessment & Plan:  1. Type 2 diabetes mellitus with hyperglycemia, with long-term current use of insulin  Her hemoglobin A1c has improved and in September it was 5.8%.  For now she will continue Lantus 12 units daily and we will try reducing her metformin to 500 mg twice a day.  I encouraged her to reach out to me if her blood sugars creep up on the reduced metformin dose.  Her blood sugar readings look good.  Her weight is down 13 pounds since her appointment in March.  I encouraged continued healthy eating habits.  She will send in blood sugars for " review and adjustment if needed.  Her blood pressure is okay today.  I refilled her Lantus and sent a prescription for the new dose of metformin to her pharmacy today.  - POC Glucose, Blood    2. Acquired hypothyroidism  Her TSH was within normal limits Deborah 15, 2023.  For now she will continue Unithroid 175 mcg daily.  We will plan to recheck her thyroid levels next visit for monitoring.  Patient to call as needed.      Return in about 4 months (around 2/10/2024) for Recheck.     This note was dictated using Dragon voice recognition.    Liv Marshall PA-C  10/10/2023

## 2023-10-11 ENCOUNTER — OFFICE VISIT (OUTPATIENT)
Dept: CARDIOLOGY | Facility: CLINIC | Age: 73
End: 2023-10-11
Payer: MEDICARE

## 2023-10-11 ENCOUNTER — TELEPHONE (OUTPATIENT)
Dept: FAMILY MEDICINE CLINIC | Facility: CLINIC | Age: 73
End: 2023-10-11

## 2023-10-11 VITALS
OXYGEN SATURATION: 93 % | DIASTOLIC BLOOD PRESSURE: 72 MMHG | WEIGHT: 239.2 LBS | HEART RATE: 65 BPM | BODY MASS INDEX: 44.02 KG/M2 | SYSTOLIC BLOOD PRESSURE: 124 MMHG | HEIGHT: 62 IN

## 2023-10-11 DIAGNOSIS — D64.9 ANEMIA, UNSPECIFIED TYPE: ICD-10-CM

## 2023-10-11 DIAGNOSIS — Z79.899 LONG TERM CURRENT USE OF ANTIARRHYTHMIC DRUG: ICD-10-CM

## 2023-10-11 DIAGNOSIS — I10 HYPERTENSION, ESSENTIAL: ICD-10-CM

## 2023-10-11 DIAGNOSIS — Z95.0 CARDIAC PACEMAKER IN SITU: ICD-10-CM

## 2023-10-11 DIAGNOSIS — E78.5 HYPERLIPIDEMIA LDL GOAL <70: ICD-10-CM

## 2023-10-11 DIAGNOSIS — I48.0 PAROXYSMAL ATRIAL FIBRILLATION: ICD-10-CM

## 2023-10-11 DIAGNOSIS — I25.10 CORONARY ARTERY DISEASE INVOLVING NATIVE CORONARY ARTERY OF NATIVE HEART WITHOUT ANGINA PECTORIS: Primary | ICD-10-CM

## 2023-10-11 NOTE — TELEPHONE ENCOUNTER
Caller: PAVITHAR WITH [facility]    Relationship: Home Health    Best call back number: [phone]     What was the call regarding: LETTING US KNOW SHE IS GETTING OCCUPATIONAL THERAPY HOME HEALTH 1XS WEEK 1, 2XS WEEK 2 AND 1X WEEKLY FOR 5 MORE WEEKS

## 2023-10-12 ENCOUNTER — TELEPHONE (OUTPATIENT)
Dept: CARDIOLOGY | Facility: CLINIC | Age: 73
End: 2023-10-12
Payer: MEDICARE

## 2023-10-12 NOTE — TELEPHONE ENCOUNTER
Patient left voice mail message.  She is purchasing a medical alert assistance device for her home.  She is also purchasing one that goes around her neck.  This one has a speaker.  She knows it has a magnet.  The company has assured her it will not interfere with her pacemaker, but she is unsure. She requested a return call.  Spoke with Timothy at [employer].  He does not think the magnet in the pendant will cause a problem.  But, he suggests the patient call [employer] 1-800 number and speak to [employer] services.  Spoke with the patient.  She is instructed of above and verbalizes understanding.

## 2023-10-16 RX ORDER — DOFETILIDE 0.5 MG/1
CAPSULE ORAL
Qty: 180 CAPSULE | Refills: 3 | Status: SHIPPED | OUTPATIENT
Start: 2023-10-16

## 2023-10-16 RX ORDER — PRAMIPEXOLE DIHYDROCHLORIDE 1.5 MG/1
1.5 TABLET ORAL NIGHTLY
Qty: 90 TABLET | Refills: 0 | Status: SHIPPED | OUTPATIENT
Start: 2023-10-16

## 2023-10-17 ENCOUNTER — HOSPITAL ENCOUNTER (OUTPATIENT)
Dept: CARDIOLOGY | Facility: HOSPITAL | Age: 73
Discharge: HOME | End: 2023-10-17
Payer: MEDICARE

## 2023-10-17 DIAGNOSIS — Z00.6 ENCOUNTER FOR EXAMINATION FOR NORMAL COMPARISON AND CONTROL IN CLINICAL RESEARCH PROGRAM: ICD-10-CM

## 2023-10-17 NOTE — TELEPHONE ENCOUNTER
Please let patient know that I sent in a refill on her Mirapex and to keep her follow-up appointment scheduled with Alicia Du on October 19, 2023

## 2023-10-19 ENCOUNTER — LAB (OUTPATIENT)
Dept: FAMILY MEDICINE CLINIC | Facility: CLINIC | Age: 73
End: 2023-10-19
Payer: MEDICARE

## 2023-10-19 ENCOUNTER — OFFICE VISIT (OUTPATIENT)
Dept: FAMILY MEDICINE CLINIC | Facility: CLINIC | Age: 73
End: 2023-10-19
Payer: MEDICARE

## 2023-10-19 VITALS
OXYGEN SATURATION: 97 % | WEIGHT: 234.7 LBS | BODY MASS INDEX: 43.19 KG/M2 | TEMPERATURE: 98 F | SYSTOLIC BLOOD PRESSURE: 118 MMHG | HEIGHT: 62 IN | DIASTOLIC BLOOD PRESSURE: 80 MMHG | HEART RATE: 83 BPM | RESPIRATION RATE: 15 BRPM

## 2023-10-19 DIAGNOSIS — I25.10 CORONARY ARTERY DISEASE INVOLVING NATIVE CORONARY ARTERY OF NATIVE HEART WITHOUT ANGINA PECTORIS: ICD-10-CM

## 2023-10-19 DIAGNOSIS — Z96.611 STATUS POST REVERSE ARTHROPLASTY OF RIGHT SHOULDER: Primary | ICD-10-CM

## 2023-10-19 DIAGNOSIS — E11.65 TYPE 2 DIABETES MELLITUS WITH HYPERGLYCEMIA, WITHOUT LONG-TERM CURRENT USE OF INSULIN: ICD-10-CM

## 2023-10-19 DIAGNOSIS — E53.8 VITAMIN B12 DEFICIENCY: ICD-10-CM

## 2023-10-19 DIAGNOSIS — I10 HYPERTENSION, ESSENTIAL: ICD-10-CM

## 2023-10-19 DIAGNOSIS — Z23 ENCOUNTER FOR IMMUNIZATION: ICD-10-CM

## 2023-10-19 DIAGNOSIS — N18.31 CHRONIC KIDNEY DISEASE, STAGE 3A: ICD-10-CM

## 2023-10-19 DIAGNOSIS — E78.5 HYPERLIPIDEMIA LDL GOAL <70: ICD-10-CM

## 2023-10-19 DIAGNOSIS — E55.9 VITAMIN D DEFICIENCY: ICD-10-CM

## 2023-10-19 DIAGNOSIS — D64.9 ANEMIA, UNSPECIFIED TYPE: ICD-10-CM

## 2023-10-19 PROBLEM — M62.81 GENERALIZED MUSCLE WEAKNESS: Status: ACTIVE | Noted: 2022-10-28

## 2023-10-19 PROBLEM — R26.2 DIFFICULTY IN WALKING, NOT ELSEWHERE CLASSIFIED: Status: ACTIVE | Noted: 2022-10-27

## 2023-10-19 PROBLEM — M81.0 OSTEOPOROSIS, SENILE: Status: ACTIVE | Noted: 2023-03-06

## 2023-10-19 NOTE — ASSESSMENT & PLAN NOTE
Patient is doing well.  All of her medications were reviewed.  She is due some labs and follow-up with Dr. Fritz.  Labs were collected today and she will follow-up with Dr. Fritz in about 2 weeks.  She did receive her flu vaccination at today's visit.

## 2023-10-19 NOTE — PROGRESS NOTES
Transitional Care Follow Up Visit  Subjective     Joanna Cross is a 72 y.o. female who presents for a transitional care management visit.    Within 48 business hours after discharge our office contacted her via telephone to coordinate her care and needs.      I reviewed and discussed the details of that call along with the discharge summary, hospital problems, inpatient lab results, inpatient diagnostic studies, and consultation reports with Joanna.     Current outpatient and discharge medications have been reconciled for the patient.  Reviewed by: BRIGITTE Richards          10/5/2023     2:08 PM   Date of TCM Phone Call   [facility]   Date of Discharge 10/5/2023   Discharge Disposition Home-[facility] Care [facility]     Risk for Readmission (LACE) No data recorded    History of Present Illness   Course During Hospital Stay:      Date of Admission:.9/18/2023  7:22 AM     Date of Discharge:  9/21/2023     Discharge Diagnosis:     Status post reverse arthroplasty of right shoulder    Hypertension, essential    GERD (gastroesophageal reflux disease)    Hypothyroidism    Parkinson's disease    MILLI treated with BiPAP - Patient reports compliance.     Atrial fibrillation    Cardiac pacemaker in situ    Postoperative pain    Chronic kidney disease, stage 3a        Hospital Course     At admit:  Patient is a pleasant 72 y.o. female presented for scheduled surgery by .     She underwent the following procedures:  1. Right reverse total shoulder arthroplasty.    2. Right biceps tenodesis.    3. Computer-assisted imageless navigation     Surgery was done under GA and a block, she tolerated surgery well, was admitted for further management.     Seen in PACU, doing well with good pain control, no nausea, vomiting or shortness of breath.     Reviewed with pt her past medical history and medications.      She is hoping to go to [facility] after this hospital stay.      After  admit:  Patient was provided pain medications as needed for pain control, along with interscalene nerve block infusion of Ropivacaine     Adjustments were made to pain medications to optimize postop pain management.   Risks and benefits of opiate medications discussed with patient. WILFREDO report on chart was reviewed.     The patient was seen by OT and was taught exercises for right arm.  The patient used an IS for atelectasis prophylaxis and mechanicals for DVT prophylaxis.     Home medications were resumed as appropriate, and labs were monitored and remained fairly stable. Expected H/H drop c/w postop anemia, no transfusion was required.      She was maintained on bronchodilators, and oxygen. BiPap at night.     Her anticoagulation was resumed and tolerated.      With the progress she has made,  is ready for DC to [facility] today.       The patient will have an Infupump ( instructed on it during this admit) ( may be removed prior to dc. )     Patient was discharged to [facility] where she stays for two weeks.      At the time of her visit today she is doing well.       The following portions of the patient's history were reviewed and updated as appropriate: allergies, current medications, past family history, past medical history, past social history, past surgical history, and problem list.    Review of Systems    Objective   Physical Exam  Cardiovascular:      Rate and Rhythm: Normal rate and regular rhythm.   Pulmonary:      Effort: Pulmonary effort is normal.      Breath sounds: Normal breath sounds.   Neurological:      Mental Status: She is alert.         Assessment & Plan   Problems Addressed this Visit          Cardiac and Vasculature    Coronary artery disease involving native coronary artery without angina pectoris    Hyperlipidemia LDL goal <70    Hypertension, essential       Endocrine and Metabolic    Type 2 diabetes mellitus with hyperglycemia, without long-term current use of  insulin    Vitamin B12 deficiency    Vitamin D deficiency       Genitourinary and Reproductive     Chronic kidney disease, stage 3a       Hematology and Neoplasia    Anemia       Musculoskeletal and Injuries    Status post reverse arthroplasty of right shoulder - Primary     Other Visit Diagnoses       Encounter for immunization        Relevant Orders    Fluzone High-Dose 65+yrs (Completed)          Diagnoses         Codes Comments    Status post reverse arthroplasty of right shoulder    -  Primary ICD-10-CM: Z96.611  ICD-9-CM: V43.61     Encounter for immunization     ICD-10-CM: Z23  ICD-9-CM: V03.89     Hypertension, essential     ICD-10-CM: I10  ICD-9-CM: 401.9     Hyperlipidemia LDL goal <70     ICD-10-CM: E78.5  ICD-9-CM: 272.4     Coronary artery disease involving native coronary artery of native heart without angina pectoris     ICD-10-CM: I25.10  ICD-9-CM: 414.01     Vitamin D deficiency     ICD-10-CM: E55.9  ICD-9-CM: 268.9     Vitamin B12 deficiency     ICD-10-CM: E53.8  ICD-9-CM: 266.2     Type 2 diabetes mellitus with hyperglycemia, without long-term current use of insulin     ICD-10-CM: E11.65  ICD-9-CM: 250.00, 790.29     Chronic kidney disease, stage 3a     ICD-10-CM: N18.31  ICD-9-CM: 585.3     Anemia, unspecified type     ICD-10-CM: D64.9  ICD-9-CM: 285.9

## 2023-10-20 LAB
25(OH)D3+25(OH)D2 SERPL-MCNC: 58.8 NG/ML (ref 30–100)
ALBUMIN SERPL-MCNC: 4.5 G/DL (ref 3.8–4.8)
ALBUMIN/GLOB SERPL: 2.1 {RATIO} (ref 1.2–2.2)
ALP SERPL-CCNC: 85 IU/L (ref 44–121)
ALT SERPL-CCNC: 8 IU/L (ref 0–32)
AST SERPL-CCNC: 17 IU/L (ref 0–40)
BASOPHILS # BLD AUTO: 0.1 X10E3/UL (ref 0–0.2)
BASOPHILS NFR BLD AUTO: 2 %
BILIRUB SERPL-MCNC: 0.3 MG/DL (ref 0–1.2)
BUN SERPL-MCNC: 31 MG/DL (ref 8–27)
BUN/CREAT SERPL: 27 (ref 12–28)
CALCIUM SERPL-MCNC: 9.6 MG/DL (ref 8.7–10.3)
CHLORIDE SERPL-SCNC: 100 MMOL/L (ref 96–106)
CHOLEST SERPL-MCNC: 184 MG/DL (ref 100–199)
CO2 SERPL-SCNC: 23 MMOL/L (ref 20–29)
CREAT SERPL-MCNC: 1.15 MG/DL (ref 0.57–1)
EGFRCR SERPLBLD CKD-EPI 2021: 50 ML/MIN/1.73
EOSINOPHIL # BLD AUTO: 0.5 X10E3/UL (ref 0–0.4)
EOSINOPHIL NFR BLD AUTO: 10 %
ERYTHROCYTE [DISTWIDTH] IN BLOOD BY AUTOMATED COUNT: 14.8 % (ref 11.7–15.4)
GLOBULIN SER CALC-MCNC: 2.1 G/DL (ref 1.5–4.5)
GLUCOSE SERPL-MCNC: 168 MG/DL (ref 70–99)
HBA1C MFR BLD: 5.8 % (ref 4.8–5.6)
HCT VFR BLD AUTO: 31.2 % (ref 34–46.6)
HDLC SERPL-MCNC: 51 MG/DL
HGB BLD-MCNC: 10.3 G/DL (ref 11.1–15.9)
IMM GRANULOCYTES # BLD AUTO: 0 X10E3/UL (ref 0–0.1)
IMM GRANULOCYTES NFR BLD AUTO: 1 %
LDLC SERPL CALC-MCNC: 111 MG/DL (ref 0–99)
LYMPHOCYTES # BLD AUTO: 0.7 X10E3/UL (ref 0.7–3.1)
LYMPHOCYTES NFR BLD AUTO: 13 %
MCH RBC QN AUTO: 30.2 PG (ref 26.6–33)
MCHC RBC AUTO-ENTMCNC: 33 G/DL (ref 31.5–35.7)
MCV RBC AUTO: 92 FL (ref 79–97)
MONOCYTES # BLD AUTO: 0.5 X10E3/UL (ref 0.1–0.9)
MONOCYTES NFR BLD AUTO: 10 %
NEUTROPHILS # BLD AUTO: 3.4 X10E3/UL (ref 1.4–7)
NEUTROPHILS NFR BLD AUTO: 64 %
PLATELET # BLD AUTO: 160 X10E3/UL (ref 150–450)
POTASSIUM SERPL-SCNC: 3.9 MMOL/L (ref 3.5–5.2)
PROT SERPL-MCNC: 6.6 G/DL (ref 6–8.5)
RBC # BLD AUTO: 3.41 X10E6/UL (ref 3.77–5.28)
SODIUM SERPL-SCNC: 140 MMOL/L (ref 134–144)
TRIGL SERPL-MCNC: 121 MG/DL (ref 0–149)
TSH SERPL DL<=0.005 MIU/L-ACNC: 0.45 UIU/ML (ref 0.45–4.5)
VIT B12 SERPL-MCNC: 720 PG/ML (ref 232–1245)
VLDLC SERPL CALC-MCNC: 22 MG/DL (ref 5–40)
WBC # BLD AUTO: 5.2 X10E3/UL (ref 3.4–10.8)

## 2023-10-24 ENCOUNTER — HOSPITAL ENCOUNTER (OUTPATIENT)
Dept: CARDIOLOGY | Facility: HOSPITAL | Age: 73
Discharge: HOME | End: 2023-10-24
Payer: MEDICARE

## 2023-10-24 VITALS
WEIGHT: 233.69 LBS | HEIGHT: 62 IN | HEART RATE: 71 BPM | DIASTOLIC BLOOD PRESSURE: 74 MMHG | BODY MASS INDEX: 43 KG/M2 | SYSTOLIC BLOOD PRESSURE: 127 MMHG

## 2023-10-24 DIAGNOSIS — I25.10 CORONARY ARTERY DISEASE INVOLVING NATIVE CORONARY ARTERY OF NATIVE HEART WITHOUT ANGINA PECTORIS: ICD-10-CM

## 2023-10-24 PROCEDURE — 0 RUBIDIUM CHLORIDE

## 2023-10-24 PROCEDURE — 93017 CV STRESS TEST TRACING ONLY: CPT

## 2023-10-24 PROCEDURE — A9555 RB82 RUBIDIUM: HCPCS

## 2023-10-24 PROCEDURE — 25010000002 REGADENOSON 0.4 MG/5ML SOLUTION

## 2023-10-24 PROCEDURE — 78431 MYOCRD IMG PET RST&STRS CT: CPT

## 2023-10-24 RX ORDER — REGADENOSON 0.08 MG/ML
0.4 INJECTION, SOLUTION INTRAVENOUS ONCE
Status: COMPLETED | OUTPATIENT
Start: 2023-10-24 | End: 2023-10-24

## 2023-10-24 RX ADMIN — RUBIDIUM CHLORIDE RB-82 1 DOSE: 150 INJECTION, SOLUTION INTRAVENOUS at 12:52

## 2023-10-24 RX ADMIN — REGADENOSON 0.4 MG: 0.08 INJECTION, SOLUTION INTRAVENOUS at 12:50

## 2023-10-24 RX ADMIN — RUBIDIUM CHLORIDE RB-82 1 DOSE: 150 INJECTION, SOLUTION INTRAVENOUS at 12:41

## 2023-10-26 LAB
BH CV REST NUCLEAR ISOTOPE DOSE: 28.7 MCI
BH CV STRESS BP STAGE 1: NORMAL
BH CV STRESS BP STAGE 3: NORMAL
BH CV STRESS COMMENTS STAGE 1: NORMAL
BH CV STRESS DOSE REGADENOSON STAGE 1: 0.4
BH CV STRESS DURATION MIN STAGE 1: 1
BH CV STRESS DURATION MIN STAGE 2: 1
BH CV STRESS DURATION MIN STAGE 3: 1
BH CV STRESS DURATION MIN STAGE 4: 1
BH CV STRESS DURATION SEC STAGE 1: 0
BH CV STRESS DURATION SEC STAGE 2: 0
BH CV STRESS DURATION SEC STAGE 3: 0
BH CV STRESS DURATION SEC STAGE 4: 0
BH CV STRESS HR STAGE 1: 71
BH CV STRESS HR STAGE 2: 69
BH CV STRESS HR STAGE 3: 68
BH CV STRESS HR STAGE 4: 73
BH CV STRESS NUCLEAR ISOTOPE DOSE: 28.6 MCI
BH CV STRESS O2 STAGE 1: 97
BH CV STRESS O2 STAGE 2: 100
BH CV STRESS O2 STAGE 3: 100
BH CV STRESS O2 STAGE 4: 100
BH CV STRESS PROTOCOL 1: NORMAL
BH CV STRESS RECOVERY BP: NORMAL MMHG
BH CV STRESS RECOVERY HR: 65 BPM
BH CV STRESS RECOVERY O2: 100 %
BH CV STRESS STAGE 1: 1
BH CV STRESS STAGE 2: 2
BH CV STRESS STAGE 3: 3
BH CV STRESS STAGE 4: 4
LV EF NUC BP: 67 %
MAXIMAL PREDICTED HEART RATE: 146 BPM
PERCENT MAX PREDICTED HR: 56.16 %
STRESS BASELINE BP: NORMAL MMHG
STRESS BASELINE HR: 71 BPM
STRESS O2 SAT REST: 100 %
STRESS PERCENT HR: 66 %
STRESS POST ESTIMATED WORKLOAD: 1 METS
STRESS POST EXERCISE DUR MIN: 4 MIN
STRESS POST EXERCISE DUR SEC: 0 SEC
STRESS POST O2 SAT PEAK: 100 %
STRESS POST PEAK BP: NORMAL MMHG
STRESS POST PEAK HR: 82 BPM
STRESS TARGET HR: 124 BPM

## 2023-10-30 ENCOUNTER — TELEPHONE (OUTPATIENT)
Dept: FAMILY MEDICINE CLINIC | Facility: CLINIC | Age: 73
End: 2023-10-30
Payer: MEDICARE

## 2023-10-30 ENCOUNTER — OFFICE VISIT (OUTPATIENT)
Dept: FAMILY MEDICINE CLINIC | Facility: CLINIC | Age: 73
End: 2023-10-30
Payer: MEDICARE

## 2023-10-30 VITALS
RESPIRATION RATE: 15 BRPM | HEART RATE: 64 BPM | OXYGEN SATURATION: 99 % | HEIGHT: 62 IN | SYSTOLIC BLOOD PRESSURE: 136 MMHG | WEIGHT: 227 LBS | BODY MASS INDEX: 41.77 KG/M2 | DIASTOLIC BLOOD PRESSURE: 84 MMHG

## 2023-10-30 DIAGNOSIS — Z88.9 MULTIPLE DRUG ALLERGIES: ICD-10-CM

## 2023-10-30 DIAGNOSIS — J06.9 URI WITH COUGH AND CONGESTION: Primary | ICD-10-CM

## 2023-10-30 LAB
EXPIRATION DATE: NORMAL
FLUAV AG UPPER RESP QL IA.RAPID: NOT DETECTED
FLUBV AG UPPER RESP QL IA.RAPID: NOT DETECTED
INTERNAL CONTROL: NORMAL
Lab: NORMAL
SARS-COV-2 AG UPPER RESP QL IA.RAPID: NOT DETECTED

## 2023-10-30 RX ORDER — DOXYCYCLINE HYCLATE 100 MG/1
100 CAPSULE ORAL 2 TIMES DAILY
Qty: 20 CAPSULE | Refills: 0 | Status: SHIPPED | OUTPATIENT
Start: 2023-10-30

## 2023-10-30 RX ORDER — MONTELUKAST SODIUM 10 MG/1
1 TABLET ORAL DAILY
COMMUNITY
Start: 2023-10-25

## 2023-10-30 RX ORDER — PREDNISONE 10 MG/1
TABLET ORAL
Qty: 28 TABLET | Refills: 0 | Status: SHIPPED | OUTPATIENT
Start: 2023-10-30 | End: 2023-11-09

## 2023-10-30 RX ORDER — DEXTROMETHORPHAN HYDROBROMIDE AND PROMETHAZINE HYDROCHLORIDE 15; 6.25 MG/5ML; MG/5ML
5 SYRUP ORAL 4 TIMES DAILY PRN
Qty: 120 ML | Refills: 1 | Status: SHIPPED | OUTPATIENT
Start: 2023-10-30

## 2023-10-30 RX ORDER — TRIAMCINOLONE ACETONIDE 55 UG/1
2 SPRAY, METERED NASAL DAILY
COMMUNITY
Start: 2023-10-27

## 2023-10-30 NOTE — PROGRESS NOTES
Patient Office Visit      Patient Name: Joanna Cross  : 1950   MRN: [id number]     Chief Complaint:    Chief Complaint   Patient presents with    URI       History of Present Illness: Joanna Cross is a 72 y.o. female who is here today with upper respiratory symptoms that started 5 days ago.  She has been using her nebulizer.    Subjective      Review of Systems:   Review of Systems   Constitutional:  Negative for chills and fever.   HENT:  Positive for congestion, postnasal drip and rhinorrhea.    Respiratory:  Positive for cough and wheezing.         Past Medical History:   Past Medical History:   Diagnosis Date    Anemia     Ankle problem     thinks back related causing pain     Atrial fibrillation     AVM (arteriovenous malformation)     Back pain     Chronic kidney disease     stage 3 per pt    CKD (chronic kidney disease)     Clotting disorder 2016    AVM - small intestine    COVID-19 vaccine series completed     Gastrocnemius muscle tear     left medial 91    Generalized osteoarthritis     GERD (gastroesophageal reflux disease)     Gestational diabetes     GIB (gastrointestinal bleeding) 2016    d/t xarelto     Headache     Hearing decreased, bilateral     HAS HEARING AID, NOT WEARING IT CURRENTLY    Hiatal hernia     History of shingles     History of transfusion 2016    no reaction recalled     Hypertension     Hypothyroidism     IBS (irritable bowel syndrome)     Klebsiella pneumonia     Lichen sclerosus     Lupus     subQ    Mouth problem     mouth guard used since pt bites tongue and lips excessively at night if not- with bipap     MRSA infection 2018    Obesity     On home oxygen therapy     2L of oxygen all the time due to current congestion     Osteoporosis     Parkinson's disease     Peripheral vascular disease     Pleurisy     Pneumonia     Puerperal sepsis with acute hypoxic respiratory failure     emergent intubation- 2016    Right leg pain     from back issues     Salivary  gland stone     Sciatic nerve pain     Seborrheic dermatitis     Skin cancer     on back     Sleep apnea     CPAP AND HOME O2 2L/M    TIA (transient ischemic attack) 2014    no residual effects    Type 2 diabetes mellitus     UTI (urinary tract infection)     Vitamin D deficiency 09/08/2022    Wears glasses        Past Surgical History:   Past Surgical History:   Procedure Laterality Date    ABLATION OF DYSRHYTHMIC FOCUS  05/16/13    Laser Ablation - Rt Leg    BACK SURGERY      l4-l5 laminectomy     BICEPS TENDON REPAIR Right     shoulder    BREAST BIOPSY Left 05/2004    excisional, benign    BRONCHOSCOPY RIGID / FLEXIBLE      2016    BUNIONECTOMY Right     CARDIAC CATHETERIZATION      CARDIAC CATHETERIZATION N/A 02/01/2019    Procedure: Left Heart Cath;  Surgeon: Albertina Corona MD;  Location:  [facility] CATH INVASIVE LOCATION;  Service: Cardiology    CHOLECYSTECTOMY      COLONOSCOPY  2016    COLONOSCOPY N/A 06/17/2021    Procedure: COLONOSCOPY WITH POLYPECTOMY;  Surgeon: Trenton Sosa MD;  Location:  [facility];  Service: Gastroenterology;  Laterality: N/A;    CORONARY STENT PLACEMENT      x1 stent    CYST REMOVAL      left ear, upper left back 2001    CYSTOSCOPY      x2  18 and 20 -   in 20 urethra dilation     DIAGNOSTIC LAPAROSCOPY  1981    ENDOSCOPIC FUNCTIONAL SINUS SURGERY (FESS)  2011    ENDOSCOPY  2016    ENTEROSCOPY VIA STOMA      with single ballon with fluoro     HAMMER TOE REPAIR Left     INCISION AND DRAINAGE OF WOUND  2018    back with wound infection     INSERT / REPLACE / REMOVE PACEMAKER  11/10/16    [facility]    JOINT REPLACEMENT      KNEE ARTHROSCOPY      LASER ABLATION      right leg 13    LIPOMA EXCISION  1999    left leg     LUMBAR LAMINECTOMY DISCECTOMY DECOMPRESSION N/A 07/06/2018    Procedure: LUMBAR LAMINECTOMY L4-5, HEMILAMIINECTOMY RIGHT L5-S1, FORAMINOTOMY L5-S1;  Surgeon: Lencho Gamino MD;  Location:  [facility];  Service: Neurosurgery    LUMBAR  LAMINECTOMY DISCECTOMY DECOMPRESSION N/A 09/19/2018    Procedure: INCISION AND DRAINAGE BACK WITH WOUND EXPLORATION;  Surgeon: Ritchie García MD;  Location:  [facility];  Service: Neurosurgery    LUNG BIOPSY Left 2016    OTHER SURGICAL HISTORY      ct scan of chest and sinuses and lower back     OTHER SURGICAL HISTORY      various echos     OTHER SURGICAL HISTORY      electroencephalogram 16,   emg  ncv tests 2007    OTHER SURGICAL HISTORY      mra 2007  and various mri with xrays, nuclear medicine lung ventilation with perfusion test 2016 with pft     OTHER SURGICAL HISTORY      barium swallow testing 15, 12, 12    OTHER SURGICAL HISTORY      vaginal ultrasound- 2012,   vas clementina lower extrem 2016, vas venous duplex lower extrem bilat 2019    OTHER SURGICAL HISTORY      wdge biopsy spring of lung     OTHER SURGICAL HISTORY      emergent intubation- hypoxic resp failure     PACEMAKER IMPLANTATION  11/2016    [facility]    REPLACEMENT TOTAL KNEE Bilateral     left knee 2011, right 2012 per dr acuna     REPLACEMENT TOTAL KNEE Bilateral     SHOULDER ARTHROSCOPY Bilateral     2003-left, 2004- right     SKIN BIOPSY      skin cancer back 2016    SKIN CANCER EXCISION      upper righ tback     MADHAV      TEETH EXTRACTION      x2    TOTAL SHOULDER ARTHROPLASTY W/ DISTAL CLAVICLE EXCISION Left 10/24/2022    Procedure: REVERSE TOTAL SHOULDER ARTHROPLASTY, BICEPS TENODESIS - LEFT;  Surgeon: Sammy Yo MD;  Location:  [facility];  Service: Orthopedics;  Laterality: Left;    TOTAL SHOULDER ARTHROPLASTY W/ DISTAL CLAVICLE EXCISION Right 9/18/2023    Procedure: REVERSE TOTAL SHOULDER  ARTHROPLASTY WITH BICEPS TENODESIS - RIGHT;  Surgeon: Sammy Yo MD;  Location:  [facility];  Service: Orthopedics;  Laterality: Right;    TUBAL ABDOMINAL LIGATION Bilateral 1980    WISDOM TOOTH EXTRACTION         Family History:   Family History   Problem Relation Age of Onset    Kidney disease Mother     Coronary artery disease Mother      "Hypertension Mother             Heart disease Mother             Hyperlipidemia Mother             Coronary artery disease Father     Hypertension Father             Hypothyroidism Father     Cancer Father         Oral cancer    Heart disease Father             Coronary artery disease Brother     Hypertension Brother     Heart disease Brother         Multiple stents, by-pass surgery    Testicular cancer Brother     Kidney cancer Maternal Uncle     Testicular cancer Maternal Uncle     Colon polyps Neg Hx     Colon cancer Neg Hx        Social History:   Social History     Socioeconomic History    Marital status:      Spouse name: N/A    Number of children: 4    Years of education: College    Highest education level: Master's degree (e.g., MA, MS, Randi, MEd, MSW, NELLA)   Tobacco Use    Smoking status: Never     Passive exposure: Past    Smokeless tobacco: Never    Tobacco comments:     Father and mother smoked for several years.   Vaping Use    Vaping Use: Never used    Passive vaping exposure: Yes   Substance and Sexual Activity    Alcohol use: Never    Drug use: No    Sexual activity: Not Currently     Partners: Male     Birth control/protection: Post-menopausal       Allergies:   Allergies   Allergen Reactions    Amlodipine Besylate Swelling     Lower extremity (ankles, feet) swelling    Entacapone Other (See Comments)     \"extreme weakness in legs - caused several falls, which stopped after discontinuing this medication\"    Epinephrine Other (See Comments)     14- had 3 shots to numb mouth to prepare teeth for crowns, the shots contained epi-  Caused pt to have chest discomfort- went to hospital in ambulance, discovered had a fib while there     Levemir [Insulin Detemir] Hives     Hives / rash around injection site    Penicillins Hives     Jitteriness     Xarelto [Rivaroxaban] GI Bleeding     hgb dropped to 5.2    Aricept [Donepezil Hcl] Nausea Only     Vivid dreams " "   Benztropine Mesylate Other (See Comments)     Uncontrollable body movements    Cogentin [Benztropine] Other (See Comments)     \"uncontrollable body movements\"    Compazine [Prochlorperazine Edisylate] Other (See Comments)     Dystonic reaction    Duraprep [Antiseptic Products, Misc.] Itching and Rash     RASH AND ITCHING    Haldol [Haloperidol Lactate] Other (See Comments)     Dystonic reaction    Hydralazine Other (See Comments)     Headache     Lisinopril Cough    Statins Myalgia     Leg pain- all statins     Toprol Xl [Metoprolol Tartrate] Other (See Comments)     Extreme fatigue, decreased exercise tolerance       Objective     Physical Exam:  Vital Signs:   Vitals:    10/30/23 1531   BP: 136/84   BP Location: Right arm   Patient Position: Sitting   Cuff Size: Adult   Pulse: 64   Resp: 15   SpO2: 99%   Weight: 103 kg (227 lb)   Height: 157.5 cm (62\")     Body mass index is 41.52 kg/m².        Physical Exam  Constitutional:       General: She is not in acute distress.     Appearance: She is obese. She is not ill-appearing.   HENT:      Nose: Congestion present.   Cardiovascular:      Rate and Rhythm: Normal rate and regular rhythm.   Pulmonary:      Breath sounds: Wheezing and rhonchi present.   Neurological:      Mental Status: She is alert.         Procedures    Assessment / Plan      Assessment/Plan:   Diagnoses and all orders for this visit:    1. URI with cough and congestion (Primary)  -     POCT SARS-CoV-2 Antigen VIKTORIA + Flu  -     predniSONE (DELTASONE) 10 MG tablet; Take 4 tablets by mouth Daily for 4 days, THEN 3 tablets Daily for 2 days, THEN 2 tablets Daily for 2 days, THEN 1 tablet Daily for 2 days.  Dispense: 28 tablet; Refill: 0  -     doxycycline (VIBRAMYCIN) 100 MG capsule; Take 1 capsule by mouth 2 (Two) Times a Day.  Dispense: 20 capsule; Refill: 0  -     promethazine-dextromethorphan (PROMETHAZINE-DM) 6.25-15 MG/5ML syrup; Take 5 mL by mouth 4 (Four) Times a Day As Needed for Cough.  " Dispense: 120 mL; Refill: 1    2. Multiple drug allergies       Negative rapid COVID and flu.  She has of very congested sounding cough with wheezing and rhonchi.  She has been using her nebulizer.  We will go ahead and treat with prednisone and doxycycline.  Her daughter is asking for prescription Promethazine DM for cough the promethazine can cause movement disorder to get worse so she was counseled and if does not like the way it makes her feel or if causes too much sedation she can discontinue.  Patient has a very long allergy list which was updated.  Many items were inappropriately listed as an allergy or intolerance.  They were listed because of potential drug interaction with Tikosyn which is not an allergy or an intolerance but a drug interaction and that should be detected with the drug interaction software both here and at the pharmacy that the medication should not be listed as an allergy and intolerance or intolerance due to potential drug interactions.  Patient given a new written list of medicines that remain listed as an allergy or intolerance.      Medications:     Current Outpatient Medications:     Accu-Chek Guide test strip, Testing 3 times per day; E11.65, Disp: 300 each, Rfl: 3    Accu-Chek Softclix Lancets lancets, USE ONE LANCET TO TEST THREE TIMES A DAY, Disp: 300 each, Rfl: 1    albuterol (PROVENTIL) (2.5 MG/3ML) 0.083% nebulizer solution, Take 2.5 mg by nebulization Every 4 (Four) Hours As Needed for Wheezing or Shortness of Air., Disp: 1 each, Rfl: 3    albuterol sulfate HFA (Ventolin HFA) 108 (90 Base) MCG/ACT inhaler, Inhale 1 puff Every 4 (Four) Hours As Needed for Wheezing or Shortness of Air., Disp: 8 g, Rfl: 5    amantadine (SYMMETREL) 100 MG capsule, Take 1 capsule by mouth 2 (Two) Times a Day., Disp: , Rfl:     apixaban (Eliquis) 5 MG tablet tablet, Take 1 tablet by mouth Every 12 (Twelve) Hours., Disp: 180 tablet, Rfl: 2    aspirin 81 MG EC tablet, Take 1 tablet by mouth Daily.,  Disp: , Rfl:     B Complex-C (SUPER B COMPLEX PO), Take 1 tablet by mouth Daily., Disp: , Rfl:     B-D UF III MINI PEN NEEDLES 31G X 5 MM misc, USE TO INJECT INSULIN DAILY, Disp: 90 each, Rfl: 3    bumetanide (BUMEX) 1 MG tablet, 1 tab po daily as directed, Disp: 90 tablet, Rfl: 3    Calcium Carbonate (CALTRATE 600 PO), Take 600 mg by mouth Daily., Disp: , Rfl:     carbidopa-levodopa (SINEMET)  MG per tablet, Take  by mouth. 2 tablets at 0600, 1 tablet at 0900, 1200, 1500, 1800, 2100, Disp: , Rfl:     Cholecalciferol 2000 units tablet, Take 1 tablet by mouth 2 (Two) Times a Day., Disp: , Rfl:     citalopram (CeleXA) 20 MG tablet, Take 1 tablet by mouth Daily., Disp: 90 tablet, Rfl: 3    clobetasol (TEMOVATE) 0.05 % cream, Apply 1 application  topically to the appropriate area as directed 2 (Two) Times a Day As Needed (Lichens Sclerosis)., Disp: , Rfl:     dofetilide (TIKOSYN) 500 MCG capsule, TAKE 1 CAPSULE EVERY 12 HOURS, Disp: 180 capsule, Rfl: 3    Glucosamine-Chondroit-Calcium (TRIPLE FLEX BONE & JOINT PO), Take 1 tablet by mouth Daily. Move free, Disp: , Rfl:     hydrocortisone 2.5 % ointment, , Disp: , Rfl:     hydroxychloroquine (PLAQUENIL) 200 MG tablet, Daily., Disp: , Rfl:     Insulin Glargine (Lantus SoloStar) 100 UNIT/ML injection pen, Inject 12-14 Units under the skin into the appropriate area as directed Daily., Disp: 15 mL, Rfl: 1    ipratropium (ATROVENT) 0.03 % nasal spray, 2 sprays into the nostril(s) as directed by provider 2 (Two) Times a Day As Needed (CONGESTION SINUS)., Disp: 30 mL, Rfl: 11    Loratadine 10 MG capsule, Take 1 capsule by mouth Daily., Disp: , Rfl:     metFORMIN (GLUCOPHAGE) 500 MG tablet, Take 1 tablet by mouth 2 (Two) Times a Day With Meals. NEW DOSE, Disp: 180 tablet, Rfl: 2    metOLazone (ZAROXOLYN) 2.5 MG tablet, Take 1 tablet by mouth Daily., Disp: 90 tablet, Rfl: 1    montelukast (SINGULAIR) 10 MG tablet, Take 1 tablet by mouth Daily., Disp: , Rfl:     Multiple  Vitamins-Minerals (CENTRUM ULTRA WOMENS PO), Take 1 tablet by mouth Daily., Disp: , Rfl:     nitroglycerin (NITROLINGUAL) 0.4 MG/SPRAY spray, Place 1 spray under the tongue Every 5 (Five) Minutes As Needed for Chest Pain., Disp: 1 each, Rfl: 6    nystatin (MYCOSTATIN) 763743 UNIT/GM ointment, Apply 1 application  topically to the appropriate area as directed 2 (Two) Times a Day., Disp: , Rfl:     O2 (OXYGEN), Inhale 2 L/min Every Night. 2L all the time now, Disp: , Rfl:     pantoprazole (Protonix) 40 MG EC tablet, Take 1 tablet by mouth Daily., Disp: 90 tablet, Rfl: 1    pramipexole (MIRAPEX) 1.5 MG tablet, TAKE 1 TABLET EVERY NIGHT, Disp: 90 tablet, Rfl: 0    ranolazine (RANEXA) 500 MG 12 hr tablet, Take 1 tablet by mouth Every 12 (Twelve) Hours., Disp: 180 tablet, Rfl: 3    senna (SENOKOT) 8.6 MG tablet, Take 1 tablet by mouth Daily., Disp: , Rfl:     spironolactone (ALDACTONE) 25 MG tablet, Take 2 tablets by mouth Daily., Disp: 180 tablet, Rfl: 2    traMADol (ULTRAM) 50 MG tablet, Take 1 tablet by mouth Every 8 (Eight) Hours As Needed for Moderate Pain., Disp: , Rfl:     Triamcinolone Acetonide (NASACORT) 55 MCG/ACT nasal inhaler, 2 sprays Daily., Disp: , Rfl:     Unithroid 175 MCG tablet, Take 1 tablet by mouth Daily., Disp: 90 tablet, Rfl: 1    valsartan (DIOVAN) 160 MG tablet, Take 1 tablet by mouth 2 (Two) Times a Day., Disp: 180 tablet, Rfl: 2    vitamin C (ASCORBIC ACID) 500 MG tablet, Take 1 tablet by mouth Daily. Chewable tablet, Disp: , Rfl:     Zinc 50 MG capsule, Take 1 capsule by mouth Daily., Disp: , Rfl:     doxycycline (VIBRAMYCIN) 100 MG capsule, Take 1 capsule by mouth 2 (Two) Times a Day., Disp: 20 capsule, Rfl: 0    predniSONE (DELTASONE) 10 MG tablet, Take 4 tablets by mouth Daily for 4 days, THEN 3 tablets Daily for 2 days, THEN 2 tablets Daily for 2 days, THEN 1 tablet Daily for 2 days., Disp: 28 tablet, Rfl: 0    promethazine-dextromethorphan (PROMETHAZINE-DM) 6.25-15 MG/5ML syrup, Take 5  mL by mouth 4 (Four) Times a Day As Needed for Cough., Disp: 120 mL, Rfl: 1    I spent 45 minutes caring for Joanna on this date of service. This time includes time spent by me in the following activities:preparing for the visit, reviewing tests, obtaining and/or reviewing a separately obtained history, performing a medically appropriate examination and/or evaluation , counseling and educating the patient/family/caregiver, ordering medications, tests, or procedures, and documenting information in the medical record    Follow Up:   No follow-ups on file.    Alicia Du PA-C   [facility]

## 2023-10-30 NOTE — TELEPHONE ENCOUNTER
[facility] CALLED TO REPORT A MISSED PHYSICAL THERAPY VISIT ON 10/27/23.

## 2023-11-01 ENCOUNTER — TELEPHONE (OUTPATIENT)
Dept: CARDIOLOGY | Facility: CLINIC | Age: 73
End: 2023-11-01
Payer: MEDICARE

## 2023-11-01 NOTE — TELEPHONE ENCOUNTER
Please call this patient and let her know that her stress test did not show any signs of blockage. Because she had chest pain during the test, we will increase her Ranexa to 1000mg BID. If she continue to have chest pain, we will consider repeat cardiac catheterization.

## 2023-11-02 ENCOUNTER — TELEPHONE (OUTPATIENT)
Dept: FAMILY MEDICINE CLINIC | Facility: CLINIC | Age: 73
End: 2023-11-02

## 2023-11-02 NOTE — TELEPHONE ENCOUNTER
Caller: [facility]    Relationship to patient: Home Health    Best call back number: [phone]     Patient is needing: PAVITHRA STATED PATIENT HAS AN UPPER RESPIRATORY INFECTION AND HAS DECLINED PHYSICAL AND OCCUPATIONAL THERAPY.

## 2023-11-03 ENCOUNTER — OFFICE VISIT (OUTPATIENT)
Dept: FAMILY MEDICINE CLINIC | Facility: CLINIC | Age: 73
End: 2023-11-03
Payer: MEDICARE

## 2023-11-03 ENCOUNTER — TELEPHONE (OUTPATIENT)
Dept: CARDIOLOGY | Facility: CLINIC | Age: 73
End: 2023-11-03
Payer: MEDICARE

## 2023-11-03 VITALS
HEART RATE: 78 BPM | WEIGHT: 227 LBS | SYSTOLIC BLOOD PRESSURE: 132 MMHG | OXYGEN SATURATION: 98 % | BODY MASS INDEX: 41.77 KG/M2 | DIASTOLIC BLOOD PRESSURE: 84 MMHG | RESPIRATION RATE: 16 BRPM | HEIGHT: 62 IN

## 2023-11-03 DIAGNOSIS — I25.10 CORONARY ARTERY DISEASE INVOLVING NATIVE CORONARY ARTERY OF NATIVE HEART WITHOUT ANGINA PECTORIS: ICD-10-CM

## 2023-11-03 DIAGNOSIS — I10 HYPERTENSION, ESSENTIAL: ICD-10-CM

## 2023-11-03 DIAGNOSIS — N18.32 CHRONIC KIDNEY DISEASE, STAGE 3B: ICD-10-CM

## 2023-11-03 DIAGNOSIS — E55.9 VITAMIN D DEFICIENCY: ICD-10-CM

## 2023-11-03 DIAGNOSIS — E03.9 ACQUIRED HYPOTHYROIDISM: ICD-10-CM

## 2023-11-03 DIAGNOSIS — E53.8 VITAMIN B12 DEFICIENCY: ICD-10-CM

## 2023-11-03 DIAGNOSIS — J40 BRONCHITIS: Primary | ICD-10-CM

## 2023-11-03 RX ORDER — HYDROCODONE POLISTIREX AND CHLORPHENIRAMINE POLISTIREX 10; 8 MG/5ML; MG/5ML
5 SUSPENSION, EXTENDED RELEASE ORAL EVERY 12 HOURS PRN
Qty: 100 ML | Refills: 0 | Status: SHIPPED | OUTPATIENT
Start: 2023-11-03

## 2023-11-03 RX ORDER — RANOLAZINE 500 MG/1
1000 TABLET, EXTENDED RELEASE ORAL EVERY 12 HOURS
Qty: 180 TABLET | Refills: 3 | Status: SHIPPED | OUTPATIENT
Start: 2023-11-03 | End: 2023-11-06 | Stop reason: SDUPTHER

## 2023-11-03 NOTE — ASSESSMENT & PLAN NOTE
Patient has been ill for 2 weeks.  She is currently on doxycycline.  Her flu COVID and RSV test were negative.  I am going to give her some Tussionex, have her finish out the doxycycline, and get a chest x-ray.

## 2023-11-03 NOTE — TELEPHONE ENCOUNTER
for patient to return call.  Per Christie Grover PA-C.  Patient had chest pain during her stress test.  She would like patient to increase her ranexa to 1000mg BID.  If she continues to have chest pain, we will consider LHC.

## 2023-11-03 NOTE — TELEPHONE ENCOUNTER
"Spoke with patient.  She states she tried to increase the dose of ranexa to 1000mg BID \"a couple years ago\" and [employer] told her it would interfere with her metformin.  She states it will interfere with her tikosyn.  She then states she thought she was going to have a heart attack during her stress test her chest pain was so bad.  She has not had any since the test.   Patient is instructed that I will discuss with Amena and someone will call her back.  She verbalizes understanding.  "

## 2023-11-03 NOTE — PROGRESS NOTES
Patient Name: Joanna Cross  : 1950   MRN: [id number]     Chief Complaint:    Chief Complaint   Patient presents with    Cough       History of Present Illness: Joanna Cross is a 72 y.o. female who is here today for follow up on cough and BW  Cough            Review of Systems:   Review of Systems   Constitutional: Negative.    HENT: Negative.     Eyes: Negative.    Respiratory:  Positive for cough.    Cardiovascular: Negative.    Gastrointestinal: Negative.    Neurological: Negative.         Past Medical History:   Past Medical History:   Diagnosis Date    Anemia     Ankle problem     thinks back related causing pain     Atrial fibrillation     AVM (arteriovenous malformation)     Back pain     Chronic kidney disease     stage 3 per pt    CKD (chronic kidney disease)     Clotting disorder 2016    AVM - small intestine    COVID-19 vaccine series completed     Gastrocnemius muscle tear     left medial 91    Generalized osteoarthritis     GERD (gastroesophageal reflux disease)     Gestational diabetes     GIB (gastrointestinal bleeding) 2016    d/t xarelto     Headache     Hearing decreased, bilateral     HAS HEARING AID, NOT WEARING IT CURRENTLY    Hiatal hernia     History of shingles     History of transfusion 2016    no reaction recalled     Hypertension     Hypothyroidism     IBS (irritable bowel syndrome)     Klebsiella pneumonia     Lichen sclerosus     Lupus     subQ    Mouth problem     mouth guard used since pt bites tongue and lips excessively at night if not- with bipap     MRSA infection 2018    Obesity     On home oxygen therapy     2L of oxygen all the time due to current congestion     Osteoporosis     Parkinson's disease     Peripheral vascular disease     Pleurisy     Pneumonia     Puerperal sepsis with acute hypoxic respiratory failure     emergent intubation- 2016    Right leg pain     from back issues     Salivary gland stone     Sciatic nerve pain     Seborrheic dermatitis      Skin cancer     on back     Sleep apnea     CPAP AND HOME O2 2L/M    TIA (transient ischemic attack) 2014    no residual effects    Type 2 diabetes mellitus     UTI (urinary tract infection)     Vitamin D deficiency 09/08/2022    Wears glasses        Past Surgical History:   Past Surgical History:   Procedure Laterality Date    ABLATION OF DYSRHYTHMIC FOCUS  05/16/13    Laser Ablation - Rt Leg    BACK SURGERY      l4-l5 laminectomy     BICEPS TENDON REPAIR Right     shoulder    BREAST BIOPSY Left 05/2004    excisional, benign    BRONCHOSCOPY RIGID / FLEXIBLE      2016    BUNIONECTOMY Right     CARDIAC CATHETERIZATION      CARDIAC CATHETERIZATION N/A 02/01/2019    Procedure: Left Heart Cath;  Surgeon: Albertina Corona MD;  Location:  [facility];  Service: Cardiology    CHOLECYSTECTOMY      COLONOSCOPY  2016    COLONOSCOPY N/A 06/17/2021    Procedure: COLONOSCOPY WITH POLYPECTOMY;  Surgeon: Trenton Sosa MD;  Location: [facility] ENDOSCOPY;  Service: Gastroenterology;  Laterality: N/A;    CORONARY STENT PLACEMENT      x1 stent    CYST REMOVAL      left ear, upper left back 2001    CYSTOSCOPY      x2  18 and 20 -   in 20 urethra dilation     DIAGNOSTIC LAPAROSCOPY  1981    ENDOSCOPIC FUNCTIONAL SINUS SURGERY (FESS)  2011    ENDOSCOPY  2016    ENTEROSCOPY VIA STOMA      with single ballon with fluoro     HAMMER TOE REPAIR Left     INCISION AND DRAINAGE OF WOUND  2018    back with wound infection     INSERT / REPLACE / REMOVE PACEMAKER  11/10/16    [facility]    JOINT REPLACEMENT      KNEE ARTHROSCOPY      LASER ABLATION      right leg 13    LIPOMA EXCISION  1999    left leg     LUMBAR LAMINECTOMY DISCECTOMY DECOMPRESSION N/A 07/06/2018    Procedure: LUMBAR LAMINECTOMY L4-5, HEMILAMIINECTOMY RIGHT L5-S1, FORAMINOTOMY L5-S1;  Surgeon: Lencho Gamino MD;  Location: [facility] OR;  Service: Neurosurgery    LUMBAR LAMINECTOMY DISCECTOMY DECOMPRESSION N/A 09/19/2018     Procedure: INCISION AND DRAINAGE BACK WITH WOUND EXPLORATION;  Surgeon: Ritchie García MD;  Location:  [facility] OR;  Service: Neurosurgery    LUNG BIOPSY Left 2016    OTHER SURGICAL HISTORY      ct scan of chest and sinuses and lower back     OTHER SURGICAL HISTORY      various echos     OTHER SURGICAL HISTORY      electroencephalogram 16,   emg  ncv tests 2007    OTHER SURGICAL HISTORY      mra   and various mri with xrays, nuclear medicine lung ventilation with perfusion test 2016 with pft     OTHER SURGICAL HISTORY      barium swallow testing 15, 12, 12    OTHER SURGICAL HISTORY      vaginal ultrasound- ,   vas clementina lower extrem , vas venous duplex lower extrem bilat     OTHER SURGICAL HISTORY      wdge biopsy spring of lung     OTHER SURGICAL HISTORY      emergent intubation- hypoxic resp failure     PACEMAKER IMPLANTATION  2016    sss    REPLACEMENT TOTAL KNEE Bilateral     left knee , right 2012 per dr acuna     REPLACEMENT TOTAL KNEE Bilateral     SHOULDER ARTHROSCOPY Bilateral     -left, - right     SKIN BIOPSY      skin cancer back 2016    SKIN CANCER EXCISION      upper righ tback     MADHAV      TEETH EXTRACTION      x2    TOTAL SHOULDER ARTHROPLASTY W/ DISTAL CLAVICLE EXCISION Left 10/24/2022    Procedure: REVERSE TOTAL SHOULDER ARTHROPLASTY, BICEPS TENODESIS - LEFT;  Surgeon: Sammy Yo MD;  Location:  [facility] OR;  Service: Orthopedics;  Laterality: Left;    TOTAL SHOULDER ARTHROPLASTY W/ DISTAL CLAVICLE EXCISION Right 2023    Procedure: REVERSE TOTAL SHOULDER  ARTHROPLASTY WITH BICEPS TENODESIS - RIGHT;  Surgeon: Sammy Yo MD;  Location:  [facility] OR;  Service: Orthopedics;  Laterality: Right;    TUBAL ABDOMINAL LIGATION Bilateral     WISDOM TOOTH EXTRACTION         Family History:   Family History   Problem Relation Age of Onset    Kidney disease Mother     Coronary artery disease Mother     Hypertension Mother             Heart disease Mother              Hyperlipidemia Mother             Coronary artery disease Father     Hypertension Father             Hypothyroidism Father     Cancer Father         Oral cancer    Heart disease Father             Coronary artery disease Brother     Hypertension Brother     Heart disease Brother         Multiple stents, by-pass surgery    Testicular cancer Brother     Kidney cancer Maternal Uncle     Testicular cancer Maternal Uncle     Colon polyps Neg Hx     Colon cancer Neg Hx        Social History:   Social History     Socioeconomic History    Marital status:      Spouse name: N/A    Number of children: 4    Years of education: College    Highest education level: Master's degree (e.g., MA, MS, Randi, MEd, MSW, NELLA)   Tobacco Use    Smoking status: Never     Passive exposure: Past    Smokeless tobacco: Never    Tobacco comments:     Father and mother smoked for several years.   Vaping Use    Vaping Use: Never used    Passive vaping exposure: Yes   Substance and Sexual Activity    Alcohol use: Never    Drug use: No    Sexual activity: Not Currently     Partners: Male     Birth control/protection: Post-menopausal       Medications:     Current Outpatient Medications:     Accu-Chek Guide test strip, Testing 3 times per day; E11.65, Disp: 300 each, Rfl: 3    Accu-Chek Softclix Lancets lancets, USE ONE LANCET TO TEST THREE TIMES A DAY, Disp: 300 each, Rfl: 1    albuterol (PROVENTIL) (2.5 MG/3ML) 0.083% nebulizer solution, Take 2.5 mg by nebulization Every 4 (Four) Hours As Needed for Wheezing or Shortness of Air., Disp: 1 each, Rfl: 3    albuterol sulfate HFA (Ventolin HFA) 108 (90 Base) MCG/ACT inhaler, Inhale 1 puff Every 4 (Four) Hours As Needed for Wheezing or Shortness of Air., Disp: 8 g, Rfl: 5    amantadine (SYMMETREL) 100 MG capsule, Take 1 capsule by mouth 2 (Two) Times a Day., Disp: , Rfl:     apixaban (Eliquis) 5 MG tablet tablet, Take 1 tablet by mouth Every 12 (Twelve) Hours.,  Disp: 180 tablet, Rfl: 2    aspirin 81 MG EC tablet, Take 1 tablet by mouth Daily., Disp: , Rfl:     B Complex-C (SUPER B COMPLEX PO), Take 1 tablet by mouth Daily., Disp: , Rfl:     B-D UF III MINI PEN NEEDLES 31G X 5 MM misc, USE TO INJECT INSULIN DAILY, Disp: 90 each, Rfl: 3    bumetanide (BUMEX) 1 MG tablet, 1 tab po daily as directed, Disp: 90 tablet, Rfl: 3    Calcium Carbonate (CALTRATE 600 PO), Take 600 mg by mouth Daily., Disp: , Rfl:     carbidopa-levodopa (SINEMET)  MG per tablet, Take  by mouth. 2 tablets at 0600, 1 tablet at 0900, 1200, 1500, 1800, 2100, Disp: , Rfl:     Cholecalciferol 2000 units tablet, Take 1 tablet by mouth 2 (Two) Times a Day., Disp: , Rfl:     citalopram (CeleXA) 20 MG tablet, Take 1 tablet by mouth Daily., Disp: 90 tablet, Rfl: 3    clobetasol (TEMOVATE) 0.05 % cream, Apply 1 application  topically to the appropriate area as directed 2 (Two) Times a Day As Needed (Lichens Sclerosis)., Disp: , Rfl:     dofetilide (TIKOSYN) 500 MCG capsule, TAKE 1 CAPSULE EVERY 12 HOURS, Disp: 180 capsule, Rfl: 3    doxycycline (VIBRAMYCIN) 100 MG capsule, Take 1 capsule by mouth 2 (Two) Times a Day., Disp: 20 capsule, Rfl: 0    Glucosamine-Chondroit-Calcium (TRIPLE FLEX BONE & JOINT PO), Take 1 tablet by mouth Daily. Move free, Disp: , Rfl:     hydrocortisone 2.5 % ointment, , Disp: , Rfl:     hydroxychloroquine (PLAQUENIL) 200 MG tablet, Daily., Disp: , Rfl:     Insulin Glargine (Lantus SoloStar) 100 UNIT/ML injection pen, Inject 12-14 Units under the skin into the appropriate area as directed Daily., Disp: 15 mL, Rfl: 1    ipratropium (ATROVENT) 0.03 % nasal spray, 2 sprays into the nostril(s) as directed by provider 2 (Two) Times a Day As Needed (CONGESTION SINUS)., Disp: 30 mL, Rfl: 11    Loratadine 10 MG capsule, Take 1 capsule by mouth Daily., Disp: , Rfl:     metFORMIN (GLUCOPHAGE) 500 MG tablet, Take 1 tablet by mouth 2 (Two) Times a Day With Meals. NEW DOSE, Disp: 180 tablet, Rfl:  2    metOLazone (ZAROXOLYN) 2.5 MG tablet, Take 1 tablet by mouth Daily., Disp: 90 tablet, Rfl: 1    montelukast (SINGULAIR) 10 MG tablet, Take 1 tablet by mouth Daily., Disp: , Rfl:     Multiple Vitamins-Minerals (CENTRUM ULTRA WOMENS PO), Take 1 tablet by mouth Daily., Disp: , Rfl:     nitroglycerin (NITROLINGUAL) 0.4 MG/SPRAY spray, Place 1 spray under the tongue Every 5 (Five) Minutes As Needed for Chest Pain., Disp: 1 each, Rfl: 6    nystatin (MYCOSTATIN) 093164 UNIT/GM ointment, Apply 1 application  topically to the appropriate area as directed 2 (Two) Times a Day., Disp: , Rfl:     O2 (OXYGEN), Inhale 2 L/min Every Night. 2L all the time now, Disp: , Rfl:     pantoprazole (Protonix) 40 MG EC tablet, Take 1 tablet by mouth Daily., Disp: 90 tablet, Rfl: 1    pramipexole (MIRAPEX) 1.5 MG tablet, TAKE 1 TABLET EVERY NIGHT, Disp: 90 tablet, Rfl: 0    predniSONE (DELTASONE) 10 MG tablet, Take 4 tablets by mouth Daily for 4 days, THEN 3 tablets Daily for 2 days, THEN 2 tablets Daily for 2 days, THEN 1 tablet Daily for 2 days., Disp: 28 tablet, Rfl: 0    promethazine-dextromethorphan (PROMETHAZINE-DM) 6.25-15 MG/5ML syrup, Take 5 mL by mouth 4 (Four) Times a Day As Needed for Cough., Disp: 120 mL, Rfl: 1    senna (SENOKOT) 8.6 MG tablet, Take 1 tablet by mouth Daily., Disp: , Rfl:     spironolactone (ALDACTONE) 25 MG tablet, Take 2 tablets by mouth Daily., Disp: 180 tablet, Rfl: 2    traMADol (ULTRAM) 50 MG tablet, Take 1 tablet by mouth Every 8 (Eight) Hours As Needed for Moderate Pain., Disp: , Rfl:     Triamcinolone Acetonide (NASACORT) 55 MCG/ACT nasal inhaler, 2 sprays Daily., Disp: , Rfl:     Unithroid 175 MCG tablet, Take 1 tablet by mouth Daily., Disp: 90 tablet, Rfl: 1    valsartan (DIOVAN) 160 MG tablet, Take 1 tablet by mouth 2 (Two) Times a Day., Disp: 180 tablet, Rfl: 2    vitamin C (ASCORBIC ACID) 500 MG tablet, Take 1 tablet by mouth Daily. Chewable tablet, Disp: , Rfl:     Zinc 50 MG capsule, Take 1  "capsule by mouth Daily., Disp: , Rfl:     Hydrocod Javad-Chlorphe Javad ER (TUSSIONEX PENNKINETIC) 10-8 MG/5ML ER suspension, Take 5 mL by mouth Every 12 (Twelve) Hours As Needed for Cough., Disp: 100 mL, Rfl: 0    ranolazine (RANEXA) 500 MG 12 hr tablet, Take 2 tablets by mouth Every 12 (Twelve) Hours., Disp: 180 tablet, Rfl: 3    Allergies:   Allergies   Allergen Reactions    Amlodipine Besylate Swelling     Lower extremity (ankles, feet) swelling    Entacapone Other (See Comments)     \"extreme weakness in legs - caused several falls, which stopped after discontinuing this medication\"    Epinephrine Other (See Comments)     6/4/16- had 3 shots to numb mouth to prepare teeth for crowns, the shots contained epi-  Caused pt to have chest discomfort- went to hospital in ambulance, discovered had a fib while there     Levemir [Insulin Detemir] Hives     Hives / rash around injection site    Penicillins Hives     Jitteriness     Xarelto [Rivaroxaban] GI Bleeding     hgb dropped to 5.2    Aricept [Donepezil Hcl] Nausea Only     Vivid dreams    Benztropine Mesylate Other (See Comments)     Uncontrollable body movements    Cogentin [Benztropine] Other (See Comments)     \"uncontrollable body movements\"    Compazine [Prochlorperazine Edisylate] Other (See Comments)     Dystonic reaction    Duraprep [Antiseptic Products, Misc.] Itching and Rash     RASH AND ITCHING    Haldol [Haloperidol Lactate] Other (See Comments)     Dystonic reaction    Hydralazine Other (See Comments)     Headache     Lisinopril Cough    Statins Myalgia     Leg pain- all statins     Toprol Xl [Metoprolol Tartrate] Other (See Comments)     Extreme fatigue, decreased exercise tolerance         Physical Exam:  Vital Signs:   Vitals:    11/03/23 1026   BP: 132/84   BP Location: Left arm   Patient Position: Sitting   Cuff Size: Adult   Pulse: 78   Resp: 16   SpO2: 98%   Weight: 103 kg (227 lb)   Height: 157.5 cm (62.01\")     Body mass index is 41.51 kg/m². "     Physical Exam  Vitals and nursing note reviewed.   Constitutional:       Appearance: Normal appearance. She is obese.   HENT:      Head: Normocephalic and atraumatic.      Right Ear: Tympanic membrane, ear canal and external ear normal.      Left Ear: Tympanic membrane, ear canal and external ear normal.      Nose: Nose normal.      Mouth/Throat:      Mouth: Mucous membranes are dry.      Pharynx: Oropharynx is clear.   Eyes:      Extraocular Movements: Extraocular movements intact.      Conjunctiva/sclera: Conjunctivae normal.      Pupils: Pupils are equal, round, and reactive to light.   Cardiovascular:      Rate and Rhythm: Normal rate and regular rhythm.      Pulses: Normal pulses.      Heart sounds: Normal heart sounds.   Pulmonary:      Effort: Pulmonary effort is normal.      Breath sounds: Normal breath sounds.   Musculoskeletal:      Cervical back: Normal range of motion and neck supple.   Feet:      Comments:      Neurological:      Mental Status: She is alert.         Procedures      Assessment/Plan:   Diagnoses and all orders for this visit:    1. Bronchitis (Primary)  Assessment & Plan:  Patient has been ill for 2 weeks.  She is currently on doxycycline.  Her flu COVID and RSV test were negative.  I am going to give her some Tussionex, have her finish out the doxycycline, and get a chest x-ray.    Orders:  -     XR Chest PA & Lateral (In Office)  -     Hydrocod Javad-Chlorphe Javad ER (TUSSIONEX PENNKINETIC) 10-8 MG/5ML ER suspension; Take 5 mL by mouth Every 12 (Twelve) Hours As Needed for Cough.  Dispense: 100 mL; Refill: 0    2. Hypertension, essential  Assessment & Plan:  Discussed with patient to monitor their blood pressure and if systolic blood pressure goes above 140 or diastolic is above 90 to return to clinic.  Take medicines as directed, call for any problems, patient not having or any worrisome symptoms.          3. Coronary artery disease involving native coronary artery of native heart  without angina pectoris  Assessment & Plan:  Aggressive risk factor modification      4. Acquired hypothyroidism  Assessment & Plan:  TSH is 0.453.  Recheck in 6 months.      5. Vitamin B12 deficiency  Assessment & Plan:  B12 is 720.  Recheck in 6 months.      6. Vitamin D deficiency  Assessment & Plan:  Vitamin D is 58.6.  Recheck in 6 months.      7. Chronic kidney disease, stage 3b             Follow Up:   Return in about 3 weeks (around 11/24/2023) for Annual physical.    Reynaldo Fritz MD  [facility]     Answers submitted by the patient for this visit:  Other (Submitted on 10/27/2023)  Please describe your symptoms.: Follow up on bloodwork.  Have you had these symptoms before?: Yes  How long have you been having these symptoms?: Greater than 2 weeks  Primary Reason for Visit (Submitted on 10/27/2023)  What is the primary reason for your visit?: Other

## 2023-11-06 ENCOUNTER — TELEPHONE (OUTPATIENT)
Dept: CARDIOLOGY | Facility: CLINIC | Age: 73
End: 2023-11-06
Payer: MEDICARE

## 2023-11-06 RX ORDER — RANOLAZINE 500 MG/1
1000 TABLET, EXTENDED RELEASE ORAL EVERY 12 HOURS
Qty: 360 TABLET | Refills: 3 | Status: SHIPPED | OUTPATIENT
Start: 2023-11-06

## 2023-11-06 NOTE — TELEPHONE ENCOUNTER
Patient left voice mail message requesting a return call with questions about LHC and ranexa.  Spoke with patient.  She is instructed on the mechanics of ranexa and how it helps.  She is made aware that [facility] is aware of her medication list and possible interactions.  She is asked to try the new dosage and call in a week to 10 days if she continues to have symptoms of SOB>  She states she has SOB when walking from the handicapped space to the front door of [facility].  The patient states she has to stop 2-3 times in this short distance.  She states that since she was intubated with pneumonia in 2017 she has had breathing problems.  Patient verbalizes understanding of returning call if her symptoms do not improve.

## 2023-11-07 ENCOUNTER — DOCUMENTATION (OUTPATIENT)
Dept: CARDIOLOGY | Facility: CLINIC | Age: 73
End: 2023-11-07
Payer: MEDICARE

## 2023-11-07 NOTE — PROGRESS NOTES
Normal ejection fraction.  RVSP is less than 35 mmHg.  Trace to mild MR.  No evidence of pulmonary hypertension.  Albertina Corona MD, FACC

## 2023-11-08 ENCOUNTER — TELEPHONE (OUTPATIENT)
Dept: CARDIOLOGY | Facility: CLINIC | Age: 73
End: 2023-11-08
Payer: MEDICARE

## 2023-11-08 NOTE — TELEPHONE ENCOUNTER
Patient called and left a message. I tried to call her back but she did not answer. I did not get the option to leave a message.

## 2023-11-08 NOTE — TELEPHONE ENCOUNTER
Patient called and left a voicemail for a return call. I attempted to return patient's call. Patient did not answer. Unable to leave a voicemail.

## 2023-11-17 ENCOUNTER — OFFICE VISIT (OUTPATIENT)
Dept: FAMILY MEDICINE CLINIC | Facility: CLINIC | Age: 73
End: 2023-11-17
Payer: MEDICARE

## 2023-11-17 ENCOUNTER — TELEPHONE (OUTPATIENT)
Dept: FAMILY MEDICINE CLINIC | Facility: CLINIC | Age: 73
End: 2023-11-17
Payer: MEDICARE

## 2023-11-17 VITALS
HEIGHT: 62 IN | OXYGEN SATURATION: 92 % | TEMPERATURE: 98 F | SYSTOLIC BLOOD PRESSURE: 110 MMHG | DIASTOLIC BLOOD PRESSURE: 72 MMHG | HEART RATE: 61 BPM | BODY MASS INDEX: 41.52 KG/M2

## 2023-11-17 DIAGNOSIS — J98.4 CHRONIC LUNG DISEASE: ICD-10-CM

## 2023-11-17 DIAGNOSIS — R07.89 CHEST PRESSURE: ICD-10-CM

## 2023-11-17 DIAGNOSIS — R05.2 SUBACUTE COUGH: Primary | ICD-10-CM

## 2023-11-17 DIAGNOSIS — H61.22 IMPACTED CERUMEN OF LEFT EAR: ICD-10-CM

## 2023-11-17 LAB
EXPIRATION DATE: NORMAL
EXPIRATION DATE: NORMAL
FLUAV AG UPPER RESP QL IA.RAPID: NOT DETECTED
FLUBV AG UPPER RESP QL IA.RAPID: NOT DETECTED
INTERNAL CONTROL: NORMAL
INTERNAL CONTROL: NORMAL
Lab: NORMAL
Lab: NORMAL
RSV AG SPEC QL: NEGATIVE
SARS-COV-2 AG UPPER RESP QL IA.RAPID: NOT DETECTED

## 2023-11-17 RX ORDER — DOXYCYCLINE HYCLATE 100 MG/1
100 CAPSULE ORAL 2 TIMES DAILY
Qty: 20 CAPSULE | Refills: 0 | Status: SHIPPED | OUTPATIENT
Start: 2023-11-17

## 2023-11-17 RX ORDER — DOXYCYCLINE HYCLATE 100 MG/1
100 CAPSULE ORAL 2 TIMES DAILY
Qty: 20 CAPSULE | Refills: 0 | Status: SHIPPED | OUTPATIENT
Start: 2023-11-17 | End: 2023-11-17 | Stop reason: SDUPTHER

## 2023-11-17 NOTE — PROGRESS NOTES
EKG unremarkable compared to previous, patient with known chronic lung disease      Patient Office Visit      Patient Name: Joanna Cross  : 1950   MRN: [id number]     Chief Complaint:    Chief Complaint   Patient presents with    Earache    Cough       History of Present Illness: Joanna Cross is a 72 y.o. female who is here today complaining of ears aching right worse than left and a new sore throat.  She has consistently had a cough for about 6 to 7 weeks now.  I had seen her and put her on doxycycline and prednisone then she ended up following up with Dr. Fritz and he gave her some stronger cough medicine.  She reports having a headache for the past several days and says she started to feel some substernal discomfort and pressure in her chest a couple of hours ago.    Subjective      Review of Systems:   Review of Systems   Constitutional:  Positive for fatigue. Negative for fever.   HENT:  Positive for congestion, ear pain, postnasal drip and sore throat.    Respiratory:  Positive for cough.         Past Medical History:   Past Medical History:   Diagnosis Date    Anemia     Ankle problem     thinks back related causing pain     Atrial fibrillation     AVM (arteriovenous malformation)     Back pain     Chronic kidney disease     stage 3 per pt    Chronic lung disease 2022    CKD (chronic kidney disease)     Clotting disorder 2016    AVM - small intestine    Coronary artery disease involving native coronary artery without angina pectoris 2017    COVID-19 vaccine series completed     Gastrocnemius muscle tear     left medial 91    Generalized osteoarthritis     GERD (gastroesophageal reflux disease)     Gestational diabetes     GIB (gastrointestinal bleeding) 2016    d/t xarelto     Headache     Hearing decreased, bilateral     HAS HEARING AID, NOT WEARING IT CURRENTLY    Hiatal hernia     History of shingles     History of transfusion 2016    no reaction recalled     Hyperlipidemia  LDL goal <70 03/01/2017    Hypertension     Hypothyroidism     IBS (irritable bowel syndrome)     Klebsiella pneumonia     Lichen sclerosus     Lupus     subQ    Mouth problem     mouth guard used since pt bites tongue and lips excessively at night if not- with bipap     MRSA infection 2018    Obesity     On home oxygen therapy     2L of oxygen all the time due to current congestion     Osteoporosis     Parkinson's disease     Peripheral vascular disease     Pleurisy     Pneumonia     Puerperal sepsis with acute hypoxic respiratory failure     emergent intubation- 2016    Right leg pain     from back issues     Salivary gland stone     Sciatic nerve pain     Seborrheic dermatitis     Skin cancer     on back     Sleep apnea     CPAP AND HOME O2 2L/M    TIA (transient ischemic attack) 2014    no residual effects    Type 2 diabetes mellitus     UTI (urinary tract infection)     Vitamin D deficiency 09/08/2022    Wears glasses        Past Surgical History:   Past Surgical History:   Procedure Laterality Date    ABLATION OF DYSRHYTHMIC FOCUS  05/16/13    Laser Ablation - Rt Leg    BACK SURGERY      l4-l5 laminectomy     BICEPS TENDON REPAIR Right     shoulder    BREAST BIOPSY Left 05/2004    excisional, benign    BRONCHOSCOPY RIGID / FLEXIBLE      2016    BUNIONECTOMY Right     CARDIAC CATHETERIZATION      CARDIAC CATHETERIZATION N/A 02/01/2019    Procedure: Left Heart Cath;  Surgeon: Albertina Corona MD;  Location:  [facility];  Service: Cardiology    CHOLECYSTECTOMY      COLONOSCOPY  2016    COLONOSCOPY N/A 06/17/2021    Procedure: COLONOSCOPY WITH POLYPECTOMY;  Surgeon: Tretnon Sosa MD;  Location:  [facility];  Service: Gastroenterology;  Laterality: N/A;    CORONARY STENT PLACEMENT      x1 stent    CYST REMOVAL      left ear, upper left back 2001    CYSTOSCOPY      x2  18 and 20 -   in 20 urethra dilation     DIAGNOSTIC LAPAROSCOPY  1981    ENDOSCOPIC FUNCTIONAL SINUS  SURGERY (FESS)  2011    ENDOSCOPY  2016    ENTEROSCOPY VIA STOMA      with single ballon with fluoro     HAMMER TOE REPAIR Left     INCISION AND DRAINAGE OF WOUND  2018    back with wound infection     INSERT / REPLACE / REMOVE PACEMAKER  11/10/16    [facility]    JOINT REPLACEMENT      KNEE ARTHROSCOPY      LASER ABLATION      right leg 13    LIPOMA EXCISION  1999    left leg     LUMBAR LAMINECTOMY DISCECTOMY DECOMPRESSION N/A 07/06/2018    Procedure: LUMBAR LAMINECTOMY L4-5, HEMILAMIINECTOMY RIGHT L5-S1, FORAMINOTOMY L5-S1;  Surgeon: Lencho Gamino MD;  Location:  [facility];  Service: Neurosurgery    LUMBAR LAMINECTOMY DISCECTOMY DECOMPRESSION N/A 09/19/2018    Procedure: INCISION AND DRAINAGE BACK WITH WOUND EXPLORATION;  Surgeon: Ritchie García MD;  Location:  [facility];  Service: Neurosurgery    LUNG BIOPSY Left 2016    OTHER SURGICAL HISTORY      ct scan of chest and sinuses and lower back     OTHER SURGICAL HISTORY      various echos     OTHER SURGICAL HISTORY      electroencephalogram 16,   emg  ncv tests 2007    OTHER SURGICAL HISTORY      mra 2007  and various mri with xrays, nuclear medicine lung ventilation with perfusion test 2016 with pft     OTHER SURGICAL HISTORY      barium swallow testing 15, 12, 12    OTHER SURGICAL HISTORY      vaginal ultrasound- 2012,   vas clementina lower extrem 2016, vas venous duplex lower extrem bilat 2019    OTHER SURGICAL HISTORY      wdge biopsy spring of lung     OTHER SURGICAL HISTORY      emergent intubation- hypoxic resp failure     PACEMAKER IMPLANTATION  11/2016    [facility]    REPLACEMENT TOTAL KNEE Bilateral     left knee 2011, right 2012 per dr acuna     REPLACEMENT TOTAL KNEE Bilateral     SHOULDER ARTHROSCOPY Bilateral     2003-left, 2004- right     SKIN BIOPSY      skin cancer back 2016    SKIN CANCER EXCISION      upper righ tback     MADHAV      TEETH EXTRACTION      x2    TOTAL SHOULDER ARTHROPLASTY W/ DISTAL CLAVICLE EXCISION Left 10/24/2022    Procedure: REVERSE  TOTAL SHOULDER ARTHROPLASTY, BICEPS TENODESIS - LEFT;  Surgeon: Sammy Yo MD;  Location:  [facility];  Service: Orthopedics;  Laterality: Left;    TOTAL SHOULDER ARTHROPLASTY W/ DISTAL CLAVICLE EXCISION Right 2023    Procedure: REVERSE TOTAL SHOULDER  ARTHROPLASTY WITH BICEPS TENODESIS - RIGHT;  Surgeon: Sammy Yo MD;  Location:  [facility];  Service: Orthopedics;  Laterality: Right;    TUBAL ABDOMINAL LIGATION Bilateral     WISDOM TOOTH EXTRACTION         Family History:   Family History   Problem Relation Age of Onset    Kidney disease Mother     Coronary artery disease Mother     Hypertension Mother             Heart disease Mother             Hyperlipidemia Mother             Coronary artery disease Father     Hypertension Father             Hypothyroidism Father     Cancer Father         Oral cancer    Heart disease Father             Coronary artery disease Brother     Hypertension Brother     Heart disease Brother         Multiple stents, by-pass surgery    Testicular cancer Brother     Kidney cancer Maternal Uncle     Testicular cancer Maternal Uncle     Colon polyps Neg Hx     Colon cancer Neg Hx        Social History:   Social History     Socioeconomic History    Marital status:      Spouse name: N/A    Number of children: 4    Years of education: College    Highest education level: Master's degree (e.g., MA, MS, Randi, MEd, MSW, NELLA)   Tobacco Use    Smoking status: Never     Passive exposure: Past    Smokeless tobacco: Never    Tobacco comments:     Father and mother smoked for several years.   Vaping Use    Vaping Use: Never used    Passive vaping exposure: Yes   Substance and Sexual Activity    Alcohol use: Never    Drug use: No    Sexual activity: Not Currently     Partners: Male     Birth control/protection: Post-menopausal       Allergies:   Allergies   Allergen Reactions    Amlodipine Besylate Swelling     Lower extremity  "(ankles, feet) swelling    Entacapone Other (See Comments)     \"extreme weakness in legs - caused several falls, which stopped after discontinuing this medication\"    Epinephrine Other (See Comments)     6/4/16- had 3 shots to numb mouth to prepare teeth for crowns, the shots contained epi-  Caused pt to have chest discomfort- went to hospital in ambulance, discovered had a fib while there     Levemir [Insulin Detemir] Hives     Hives / rash around injection site    Penicillins Hives     Jitteriness     Xarelto [Rivaroxaban] GI Bleeding     hgb dropped to 5.2    Aricept [Donepezil Hcl] Nausea Only     Vivid dreams    Benztropine Mesylate Other (See Comments)     Uncontrollable body movements    Cogentin [Benztropine] Other (See Comments)     \"uncontrollable body movements\"    Compazine [Prochlorperazine Edisylate] Other (See Comments)     Dystonic reaction    Duraprep [Antiseptic Products, Misc.] Itching and Rash     RASH AND ITCHING    Haldol [Haloperidol Lactate] Other (See Comments)     Dystonic reaction    Hydralazine Other (See Comments)     Headache     Lisinopril Cough    Statins Myalgia     Leg pain- all statins     Sulfamethoxazole Nausea Only and Other (See Comments)    Toprol Xl [Metoprolol Tartrate] Other (See Comments)     Extreme fatigue, decreased exercise tolerance    Trimethoprim Other (See Comments)     Other reaction(s): Nausea       Objective     Physical Exam:  Vital Signs:   Vitals:    11/17/23 1324   BP: 110/72   BP Location: Left arm   Patient Position: Sitting   Cuff Size: Large Adult   Pulse: 61   Temp: 98 °F (36.7 °C)   TempSrc: Oral   SpO2: 92%   Weight: Comment: unobtainable   Height: 157.5 cm (62\")   PainSc:   4   PainLoc: Ear     Body mass index is 41.52 kg/m².        Physical Exam  Constitutional:       Appearance: She is ill-appearing (Chronically ill-appearing but appears more acutely ill today than previously.).   HENT:      Right Ear: Tympanic membrane, ear canal and external ear " normal.      Left Ear: There is impacted cerumen.      Nose: Congestion present.      Mouth/Throat:      Pharynx: Posterior oropharyngeal erythema (Mild erythema) present.   Cardiovascular:      Rate and Rhythm: Normal rate and regular rhythm.   Pulmonary:      Effort: Accessory muscle usage present. No respiratory distress.      Comments: Breathing appears mildly labored.  She is wearing her oxygen via nasal cannula as usual.  Lungs with a few scattered coarse breath sounds but otherwise sound pretty clear.  She does have occasional congested sounding cough.          ECG 12 Lead    Date/Time: 11/17/2023 4:46 PM  Performed by: Alicia Du PA    Authorized by: Alicia Du PA  Comparison: compared with previous ECG from 9/11/2023  Similar to previous ECG  Rhythm: atrial fibrillation  Rate: bradycardic  BPM: 64    Clinical impression: abnormal EKG  Comments: Low QRS voltage in precordial leads, possible anterior MI- probably old- 30 mc q wave in V3/V4, QR R < 0.2 mV in V4- over read by Dr. Fritz          Assessment / Plan      Assessment/Plan:   Diagnoses and all orders for this visit:    1. Subacute cough (Primary)  -     POCT SARS-CoV-2 Antigen VIKTORIA + Flu  -     POCT RSV  -     Discontinue: doxycycline (VIBRAMYCIN) 100 MG capsule; Take 1 capsule by mouth 2 (Two) Times a Day.  Dispense: 20 capsule; Refill: 0  -     doxycycline (VIBRAMYCIN) 100 MG capsule; Take 1 capsule by mouth 2 (Two) Times a Day.  Dispense: 20 capsule; Refill: 0    2. Chest pressure  -     ECG 12 Lead    3. Chronic lung disease  Assessment & Plan:  Patient with known chronic lung disease on oxygen with worsening cough, new headache, new sore throat and worsening head congestion.  She was recently treated with doxycycline and prednisone and started to feel some better but much worse in the past few days.  She had a negative rapid COVID, flu and RSV.  Her EKG was unremarkable compared to previous and over read by Dr. Fritz.  Lungs sound  relatively clear.  This is still concerning for community-acquired pneumonia so will cover with doxycycline.  I discussed with patient to have a low threshold for reporting to the emergency department.  I recommend repeating home COVID test the next couple of days.  A negative test today does not rule out.      4. Impacted cerumen of left ear  Assessment & Plan:  Right ear hurting worse than left.  Right ear was completely normal.  Left ear was impacted with hard cerumen.  I did try to remove with a curette but there was too much wax packed in and I recommend coming back when she is feeling better so we can try to flush the wax out of the ear but I do not think it was worth it today with how bad she was feeling.             Medications:     Current Outpatient Medications:     Accu-Chek Guide test strip, Testing 3 times per day; E11.65, Disp: 300 each, Rfl: 3    Accu-Chek Softclix Lancets lancets, USE ONE LANCET TO TEST THREE TIMES A DAY, Disp: 300 each, Rfl: 1    albuterol (PROVENTIL) (2.5 MG/3ML) 0.083% nebulizer solution, Take 2.5 mg by nebulization Every 4 (Four) Hours As Needed for Wheezing or Shortness of Air., Disp: 1 each, Rfl: 3    albuterol sulfate HFA (Ventolin HFA) 108 (90 Base) MCG/ACT inhaler, Inhale 1 puff Every 4 (Four) Hours As Needed for Wheezing or Shortness of Air., Disp: 8 g, Rfl: 5    amantadine (SYMMETREL) 100 MG capsule, Take 1 capsule by mouth 2 (Two) Times a Day., Disp: , Rfl:     apixaban (Eliquis) 5 MG tablet tablet, Take 1 tablet by mouth Every 12 (Twelve) Hours., Disp: 180 tablet, Rfl: 2    aspirin 81 MG EC tablet, Take 1 tablet by mouth Daily., Disp: , Rfl:     B Complex-C (SUPER B COMPLEX PO), Take 1 tablet by mouth Daily., Disp: , Rfl:     B-D UF III MINI PEN NEEDLES 31G X 5 MM misc, USE TO INJECT INSULIN DAILY, Disp: 90 each, Rfl: 3    bumetanide (BUMEX) 1 MG tablet, 1 tab po daily as directed, Disp: 90 tablet, Rfl: 3    Calcium Carbonate (CALTRATE 600 PO), Take 600 mg by mouth  Daily., Disp: , Rfl:     carbidopa-levodopa (SINEMET)  MG per tablet, Take  by mouth. 2 tablets at 0600, 1 tablet at 0900, 1200, 1500, 1800, 2100, Disp: , Rfl:     Cholecalciferol 2000 units tablet, Take 1 tablet by mouth 2 (Two) Times a Day., Disp: , Rfl:     citalopram (CeleXA) 20 MG tablet, Take 1 tablet by mouth Daily., Disp: 90 tablet, Rfl: 3    clobetasol (TEMOVATE) 0.05 % cream, Apply 1 application  topically to the appropriate area as directed 2 (Two) Times a Day As Needed (Lichens Sclerosis)., Disp: , Rfl:     dofetilide (TIKOSYN) 500 MCG capsule, TAKE 1 CAPSULE EVERY 12 HOURS, Disp: 180 capsule, Rfl: 3    doxycycline (VIBRAMYCIN) 100 MG capsule, Take 1 capsule by mouth 2 (Two) Times a Day., Disp: 20 capsule, Rfl: 0    Glucosamine-Chondroit-Calcium (TRIPLE FLEX BONE & JOINT PO), Take 1 tablet by mouth Daily. Move free, Disp: , Rfl:     Hydrocod Javad-Chlorphe Javad ER (TUSSIONEX PENNKINETIC) 10-8 MG/5ML ER suspension, Take 5 mL by mouth Every 12 (Twelve) Hours As Needed for Cough., Disp: 100 mL, Rfl: 0    hydrocortisone 2.5 % ointment, , Disp: , Rfl:     hydroxychloroquine (PLAQUENIL) 200 MG tablet, Daily., Disp: , Rfl:     Insulin Glargine (Lantus SoloStar) 100 UNIT/ML injection pen, Inject 12-14 Units under the skin into the appropriate area as directed Daily., Disp: 15 mL, Rfl: 1    ipratropium (ATROVENT) 0.03 % nasal spray, 2 sprays into the nostril(s) as directed by provider 2 (Two) Times a Day As Needed (CONGESTION SINUS)., Disp: 30 mL, Rfl: 11    Loratadine 10 MG capsule, Take 1 capsule by mouth Daily., Disp: , Rfl:     metFORMIN (GLUCOPHAGE) 500 MG tablet, Take 1 tablet by mouth 2 (Two) Times a Day With Meals. NEW DOSE, Disp: 180 tablet, Rfl: 2    metOLazone (ZAROXOLYN) 2.5 MG tablet, Take 1 tablet by mouth Daily., Disp: 90 tablet, Rfl: 1    montelukast (SINGULAIR) 10 MG tablet, Take 1 tablet by mouth Daily., Disp: , Rfl:     Multiple Vitamins-Minerals (CENTRUM ULTRA WOMENS PO), Take 1 tablet by  mouth Daily., Disp: , Rfl:     nitroglycerin (NITROLINGUAL) 0.4 MG/SPRAY spray, Place 1 spray under the tongue Every 5 (Five) Minutes As Needed for Chest Pain., Disp: 1 each, Rfl: 6    nystatin (MYCOSTATIN) 733347 UNIT/GM ointment, Apply 1 application  topically to the appropriate area as directed 2 (Two) Times a Day., Disp: , Rfl:     O2 (OXYGEN), Inhale 2 L/min Every Night. 2L all the time now, Disp: , Rfl:     pantoprazole (Protonix) 40 MG EC tablet, Take 1 tablet by mouth Daily., Disp: 90 tablet, Rfl: 1    pramipexole (MIRAPEX) 1.5 MG tablet, TAKE 1 TABLET EVERY NIGHT, Disp: 90 tablet, Rfl: 0    ranolazine (RANEXA) 500 MG 12 hr tablet, Take 2 tablets by mouth Every 12 (Twelve) Hours., Disp: 360 tablet, Rfl: 3    senna (SENOKOT) 8.6 MG tablet, Take 1 tablet by mouth Daily., Disp: , Rfl:     spironolactone (ALDACTONE) 25 MG tablet, Take 2 tablets by mouth Daily., Disp: 180 tablet, Rfl: 2    traMADol (ULTRAM) 50 MG tablet, Take 1 tablet by mouth Every 8 (Eight) Hours As Needed for Moderate Pain., Disp: , Rfl:     Triamcinolone Acetonide (NASACORT) 55 MCG/ACT nasal inhaler, 2 sprays Daily., Disp: , Rfl:     Unithroid 175 MCG tablet, Take 1 tablet by mouth Daily., Disp: 90 tablet, Rfl: 1    valsartan (DIOVAN) 160 MG tablet, Take 1 tablet by mouth 2 (Two) Times a Day., Disp: 180 tablet, Rfl: 2    vitamin C (ASCORBIC ACID) 500 MG tablet, Take 1 tablet by mouth Daily. Chewable tablet, Disp: , Rfl:     Zinc 50 MG capsule, Take 1 capsule by mouth Daily., Disp: , Rfl:     I spent 48 minutes caring for Joanna on this date of service 5 of which were spent interpreting and documenting patient's EKG. This time includes time spent by me in the following activities:preparing for the visit, reviewing tests, obtaining and/or reviewing a separately obtained history, performing a medically appropriate examination and/or evaluation , counseling and educating the patient/family/caregiver, ordering medications, tests, or procedures,  and documenting information in the medical record.    Follow Up:   No follow-ups on file.    Alicia Du PA-C   [facility]

## 2023-11-17 NOTE — TELEPHONE ENCOUNTER
Patient had questions about an order for a new wheelchair.  She stated that she discussed this with Dr. Fritz already?

## 2023-11-17 NOTE — ASSESSMENT & PLAN NOTE
Patient with known chronic lung disease on oxygen with worsening cough, new headache, new sore throat and worsening head congestion.  She was recently treated with doxycycline and prednisone and started to feel some better but much worse in the past few days.  She had a negative rapid COVID, flu and RSV.  Her EKG was unremarkable compared to previous and over read by Dr. Fritz.  Lungs sound relatively clear.  This is still concerning for community-acquired pneumonia so will cover with doxycycline.  I discussed with patient to have a low threshold for reporting to the emergency department.  I recommend repeating home COVID test the next couple of days.  A negative test today does not rule out.

## 2023-11-17 NOTE — ASSESSMENT & PLAN NOTE
Right ear hurting worse than left.  Right ear was completely normal.  Left ear was impacted with hard cerumen.  I did try to remove with a curette but there was too much wax packed in and I recommend coming back when she is feeling better so we can try to flush the wax out of the ear but I do not think it was worth it today with how bad she was feeling.

## 2023-11-29 RX ORDER — METOLAZONE 2.5 MG/1
2.5 TABLET ORAL DAILY
Qty: 90 TABLET | Refills: 3 | Status: SHIPPED | OUTPATIENT
Start: 2023-11-29

## 2023-11-30 DIAGNOSIS — K21.9 GASTROESOPHAGEAL REFLUX DISEASE WITHOUT ESOPHAGITIS: Primary | ICD-10-CM

## 2023-11-30 DIAGNOSIS — E03.9 ACQUIRED HYPOTHYROIDISM: Chronic | ICD-10-CM

## 2023-11-30 RX ORDER — LEVOTHYROXINE SODIUM 175 UG/1
175 TABLET ORAL DAILY
Qty: 90 TABLET | Refills: 1 | Status: SHIPPED | OUTPATIENT
Start: 2023-11-30

## 2023-11-30 NOTE — TELEPHONE ENCOUNTER
Caller: Joanna Cross    Relationship: Self    Best call back number: [phone]     What medication are you requesting: OMEPRAZOLE 40 MG     What are your current symptoms:     How long have you been experiencing symptoms:     Have you had these symptoms before:    [] Yes  [] No    Have you been treated for these symptoms before:   [] Yes  [] No    If a prescription is needed, what is your preferred pharmacy and phone number: [facility] [address] [phone] [facility] [phone]      Additional notes:  PATIENT CANNOT TAKE PANTOPRAZOLE, HARD TO SWALLOW.  WOULD LIKE TO RETURN TO OMEPRAZOLE INSTEAD.  PLEASE CALL BACK.

## 2023-11-30 NOTE — TELEPHONE ENCOUNTER
Rx Refill Note  Requested Prescriptions     Pending Prescriptions Disp Refills    Unithroid 175 MCG tablet 90 tablet 0     Sig: Take 1 tablet by mouth Daily.    DX Code : E11.65  Last office visit with prescribing clinician: 10/10/2023   Last telemedicine visit with prescribing clinician: Visit date not found   Next office visit with prescribing clinician: 2/15/2024                         Would you like a call back once the refill request has been completed: [] Yes [] No    If the office needs to give you a call back, can they leave a voicemail: [] Yes [] No    Mckenna Thomas MA  11/30/23, 14:08 EST

## 2023-12-01 DIAGNOSIS — K21.9 GASTROESOPHAGEAL REFLUX DISEASE WITHOUT ESOPHAGITIS: ICD-10-CM

## 2023-12-01 RX ORDER — OMEPRAZOLE 40 MG/1
40 CAPSULE, DELAYED RELEASE ORAL 2 TIMES DAILY
Qty: 180 CAPSULE | Refills: 0 | Status: SHIPPED | OUTPATIENT
Start: 2023-12-01

## 2023-12-01 RX ORDER — OMEPRAZOLE 40 MG/1
40 CAPSULE, DELAYED RELEASE ORAL DAILY
COMMUNITY
End: 2023-12-01 | Stop reason: SDUPTHER

## 2023-12-01 RX ORDER — OMEPRAZOLE 40 MG/1
40 CAPSULE, DELAYED RELEASE ORAL DAILY
Qty: 90 CAPSULE | Refills: 1 | Status: SHIPPED | OUTPATIENT
Start: 2023-12-01 | End: 2023-12-01 | Stop reason: SDUPTHER

## 2023-12-01 NOTE — TELEPHONE ENCOUNTER
Caller: Joanna Cross    Relationship: Self    Best call back number: [phone]     Requested Prescriptions:   Requested Prescriptions     Pending Prescriptions Disp Refills    omeprazole (priLOSEC) 40 MG capsule 90 capsule 1     Sig: Take 1 capsule by mouth Daily.        Pharmacy where request should be sent: [facility] - [address] [phone] [facility] [phone]      Last office visit with prescribing clinician: 11/3/2023   Last telemedicine visit with prescribing clinician: Visit date not found   Next office visit with prescribing clinician: 12/5/2023     Additional details provided by patient:   PATIENT STATED THAT HER MEDICATION WAS CALLED INTO THE PHARMACY YESTERDAY 11/30/2023 BUT THE DIRECTIONS ON THIS MEDICATION OR NOT RIGHT SINCE PATIENT IS TAKING MEDICATION TWICE A DAY NOT JUST ONCE A DAY AND PATIENT WOULD LIKE FOR THE MEDICATION DIRECTION'S TO BE CHANGED TO TWICE A DAY AND CALLED INTO THE PHARMACY WITH THE UPDATED DIRECTION'S       Does the patient have less than a 3 day supply:  [x] Yes  [] No    Would you like a call back once the refill request has been completed: [] Yes [x] No    If the office needs to give you a call back, can they leave a voicemail: [] Yes [x] No    Martin Cr Rep   12/01/23 13:30 EST

## 2023-12-01 NOTE — TELEPHONE ENCOUNTER
Rx Refill Note    Requested Prescriptions     Pending Prescriptions Disp Refills    omeprazole (priLOSEC) 40 MG capsule 180 capsule 0     Sig: Take 1 capsule by mouth 2 (Two) Times a Day.        Last office visit with prescribing clinician: 11/3/2023      Next office visit with prescribing clinician: 12/5/2023   Last labs:   Last refill: needs   Pharmacy (be sure to add in Epic). Correct    PATIENT STATES THAT SHE TAKES THIS BID AND NOT QD

## 2023-12-04 RX ORDER — PANTOPRAZOLE SODIUM 40 MG/1
40 TABLET, DELAYED RELEASE ORAL DAILY
Qty: 90 TABLET | Refills: 3 | OUTPATIENT
Start: 2023-12-04

## 2023-12-05 ENCOUNTER — TELEPHONE (OUTPATIENT)
Dept: FAMILY MEDICINE CLINIC | Facility: CLINIC | Age: 73
End: 2023-12-05

## 2023-12-05 ENCOUNTER — OFFICE VISIT (OUTPATIENT)
Dept: FAMILY MEDICINE CLINIC | Facility: CLINIC | Age: 73
End: 2023-12-05
Payer: MEDICARE

## 2023-12-05 VITALS
DIASTOLIC BLOOD PRESSURE: 68 MMHG | HEART RATE: 87 BPM | OXYGEN SATURATION: 98 % | WEIGHT: 238 LBS | HEIGHT: 62 IN | RESPIRATION RATE: 18 BRPM | SYSTOLIC BLOOD PRESSURE: 112 MMHG | BODY MASS INDEX: 43.79 KG/M2

## 2023-12-05 DIAGNOSIS — I25.10 CORONARY ARTERY DISEASE INVOLVING NATIVE CORONARY ARTERY OF NATIVE HEART WITHOUT ANGINA PECTORIS: ICD-10-CM

## 2023-12-05 DIAGNOSIS — Z00.00 MEDICARE ANNUAL WELLNESS VISIT, SUBSEQUENT: Primary | ICD-10-CM

## 2023-12-05 DIAGNOSIS — Z91.81 HISTORY OF RECENT FALL: ICD-10-CM

## 2023-12-05 DIAGNOSIS — E66.01 MORBID (SEVERE) OBESITY DUE TO EXCESS CALORIES: ICD-10-CM

## 2023-12-05 DIAGNOSIS — G20.B2 PARKINSON'S DISEASE WITH DYSKINESIA AND FLUCTUATING MANIFESTATIONS: ICD-10-CM

## 2023-12-05 DIAGNOSIS — J40 BRONCHITIS: ICD-10-CM

## 2023-12-05 NOTE — ASSESSMENT & PLAN NOTE
Patient had a recent fall.  There were no consequences to this fall.  It was a controlled fall.  She definitely needs a wheelchair.

## 2023-12-05 NOTE — ASSESSMENT & PLAN NOTE
Patient's (Body mass index is 43.52 kg/m².) indicates that they are morbidly/severely obese (BMI > 40 or > 35 with obesity - related health condition) with health conditions that include hypertension . Weight is unchanged. BMI  is above average; BMI management plan is completed. We discussed portion control and increasing exercise.

## 2023-12-05 NOTE — TELEPHONE ENCOUNTER
Calling to let you know she had a fall getting into bed - no injury reported . But daughter had to call ambulance to get her up she could not lift her

## 2023-12-05 NOTE — ASSESSMENT & PLAN NOTE
Patient was sick for about a month.  Her chest x-ray was okay.  She is doing better now.  We will follow

## 2023-12-05 NOTE — PROGRESS NOTES
The ABCs of the Annual Wellness Visit  Subsequent Medicare Wellness Visit    Subjective    Joanna Cross is a 73 y.o. female who presents for a Subsequent Medicare Wellness Visit.    The following portions of the patient's history were reviewed and   updated as appropriate: allergies, current medications, past family history, past medical history, past social history, past surgical history, and problem list.    Compared to one year ago, the patient feels her physical   health is worse.    Compared to one year ago, the patient feels her mental   health is the same.    Recent Hospitalizations:  She was admitted within the past 365 days at [facility].       Current Medical Providers:  Patient Care Team:  Reynaldo Fritz MD as PCP - General (Family Medicine)  Faisal Ramirez MD as Obstetrician (Obstetrics and Gynecology)  Timothy Dan DC as Referring Physician (Chiropractic Medicine)  Albertina Corona MD as Consulting Physician (Cardiology)  Dory Gamboa PA (Physician Assistant)  Rene Pringle MD (Otolaryngology)  Baylee Elliott APRN (Nurse Practitioner)  Terrence Mckenzie MD (Urology)  Myrna Mckeon APRN as Nurse Practitioner (Pulmonary Disease)  Lauro Francois MD as Consulting Physician (Cardiology)  Sammy Yo MD as Consulting Physician (Orthopedic Surgery)  Trenton Sosa MD as Consulting Physician (Gastroenterology)  Armando Shen MD as Consulting Physician (Allergy)  Dilshad Wood MD as Consulting Physician (Endocrinology)  Myrna Mckeon APRN as Nurse Practitioner (Pulmonary Disease)  Amena Grover PA-C as Physician Assistant (Physician Assistant)  Raciel Valadez MD as Consulting Physician (Pulmonary Disease)    Outpatient Medications Prior to Visit   Medication Sig Dispense Refill    Accu-Chek Guide test strip Testing 3 times per day; E11.65 300 each 3    Accu-Chek Softclix Lancets lancets USE ONE LANCET TO TEST  THREE TIMES A  each 1    albuterol (PROVENTIL) (2.5 MG/3ML) 0.083% nebulizer solution Take 2.5 mg by nebulization Every 4 (Four) Hours As Needed for Wheezing or Shortness of Air. 1 each 3    albuterol sulfate HFA (Ventolin HFA) 108 (90 Base) MCG/ACT inhaler Inhale 1 puff Every 4 (Four) Hours As Needed for Wheezing or Shortness of Air. 8 g 5    amantadine (SYMMETREL) 100 MG capsule Take 1 capsule by mouth 2 (Two) Times a Day.      apixaban (Eliquis) 5 MG tablet tablet Take 1 tablet by mouth Every 12 (Twelve) Hours. 180 tablet 2    aspirin 81 MG EC tablet Take 1 tablet by mouth Daily.      B Complex-C (SUPER B COMPLEX PO) Take 1 tablet by mouth Daily.      B-D UF III MINI PEN NEEDLES 31G X 5 MM misc USE TO INJECT INSULIN DAILY 90 each 3    bumetanide (BUMEX) 1 MG tablet 1 tab po daily as directed 90 tablet 3    Calcium Carbonate (CALTRATE 600 PO) Take 600 mg by mouth Daily.      carbidopa-levodopa (SINEMET)  MG per tablet Take  by mouth. 2 tablets at 0600, 1 tablet at 0900, 1200, 1500, 1800, 2100      Cholecalciferol 2000 units tablet Take 1 tablet by mouth 2 (Two) Times a Day.      citalopram (CeleXA) 20 MG tablet Take 1 tablet by mouth Daily. 90 tablet 3    clobetasol (TEMOVATE) 0.05 % cream Apply 1 application  topically to the appropriate area as directed 2 (Two) Times a Day As Needed (Lichens Sclerosis).      dofetilide (TIKOSYN) 500 MCG capsule TAKE 1 CAPSULE EVERY 12 HOURS 180 capsule 3    doxycycline (VIBRAMYCIN) 100 MG capsule Take 1 capsule by mouth 2 (Two) Times a Day. 20 capsule 0    Glucosamine-Chondroit-Calcium (TRIPLE FLEX BONE & JOINT PO) Take 1 tablet by mouth Daily. Move free      Hydrocod Javad-Chlorphe Javad ER (TUSSIONEX PENNKINETIC) 10-8 MG/5ML ER suspension Take 5 mL by mouth Every 12 (Twelve) Hours As Needed for Cough. 100 mL 0    hydrocortisone 2.5 % ointment       hydroxychloroquine (PLAQUENIL) 200 MG tablet Daily.      Insulin Glargine (Lantus SoloStar) 100 UNIT/ML injection pen  Inject 12-14 Units under the skin into the appropriate area as directed Daily. 15 mL 1    ipratropium (ATROVENT) 0.03 % nasal spray 2 sprays into the nostril(s) as directed by provider 2 (Two) Times a Day As Needed (CONGESTION SINUS). 30 mL 11    Loratadine 10 MG capsule Take 1 capsule by mouth Daily.      metFORMIN (GLUCOPHAGE) 500 MG tablet Take 1 tablet by mouth 2 (Two) Times a Day With Meals. NEW DOSE 180 tablet 2    metOLazone (ZAROXOLYN) 2.5 MG tablet TAKE 1 TABLET DAILY 90 tablet 3    Multiple Vitamins-Minerals (CENTRUM ULTRA WOMENS PO) Take 1 tablet by mouth Daily.      nitroglycerin (NITROLINGUAL) 0.4 MG/SPRAY spray Place 1 spray under the tongue Every 5 (Five) Minutes As Needed for Chest Pain. 1 each 6    nystatin (MYCOSTATIN) 178398 UNIT/GM ointment Apply 1 application  topically to the appropriate area as directed 2 (Two) Times a Day.      O2 (OXYGEN) Inhale 2 L/min Every Night. 2L all the time now      omeprazole (priLOSEC) 40 MG capsule Take 1 capsule by mouth 2 (Two) Times a Day. 180 capsule 0    pramipexole (MIRAPEX) 1.5 MG tablet TAKE 1 TABLET EVERY NIGHT 90 tablet 0    ranolazine (RANEXA) 500 MG 12 hr tablet Take 2 tablets by mouth Every 12 (Twelve) Hours. 360 tablet 3    senna (SENOKOT) 8.6 MG tablet Take 1 tablet by mouth Daily.      spironolactone (ALDACTONE) 25 MG tablet Take 2 tablets by mouth Daily. 180 tablet 2    traMADol (ULTRAM) 50 MG tablet Take 1 tablet by mouth Every 8 (Eight) Hours As Needed for Moderate Pain.      Triamcinolone Acetonide (NASACORT) 55 MCG/ACT nasal inhaler 2 sprays Daily.      Unithroid 175 MCG tablet Take 1 tablet by mouth Daily. 90 tablet 1    valsartan (DIOVAN) 160 MG tablet Take 1 tablet by mouth 2 (Two) Times a Day. 180 tablet 2    vitamin C (ASCORBIC ACID) 500 MG tablet Take 1 tablet by mouth Daily. Chewable tablet      Zinc 50 MG capsule Take 1 capsule by mouth Daily.      montelukast (SINGULAIR) 10 MG tablet Take 1 tablet by mouth Daily.       No  facility-administered medications prior to visit.       Opioid medication/s are on active medication list.  and I have evaluated her active treatment plan and pain score trends (see table).  There were no vitals filed for this visit.  I have reviewed the chart for potential of high risk medication and harmful drug interactions in the elderly.          Aspirin is on active medication list. Aspirin use is indicated based on review of current medical condition/s. Pros and cons of this therapy have been discussed today. Benefits of this medication outweigh potential harm.  Patient has been encouraged to continue taking this medication.  .      Patient Active Problem List   Diagnosis    Coronary artery disease involving native coronary artery without angina pectoris    Hypertension, essential    Peripheral vascular disease    Hyperlipidemia LDL goal <70    Spinal stenosis, lumbar region, with neurogenic claudication    Delayed surgical wound healing    Biceps tendinitis    Overactive bladder    GERD (gastroesophageal reflux disease)    Hypothyroidism    Lichen sclerosus    Parkinson's disease    MILLI treated with BiPAP - Patient reports compliance.     History of respiratory failure - prior respiratory failure requiring mechanical ventilation, open lung biopsy non-specific.     Atrial fibrillation    CKD (chronic kidney disease)    Tachy-thai syndrome    Long term current use of antiarrhythmic drug    History of adenomatous polyp of colon    Anemia    Angiodysplasia    Hx of colonic polyps    Body mass index 40.0-44.9, adult    Bronchitis    Cardiac pacemaker in situ    Chronic low back pain    Chronic lung disease    Subacute cough    Degeneration of lumbar intervertebral disc    Edema of lower extremity    High risk medication use    History of malignant neoplasm of skin    History of TIA (transient ischemic attack)    Hyponatremia    Hypotonic bladder    Incomplete tear of rotator cuff    Major depression in remission  "   Migraine    Weakness    Tinnitus of both ears    Primary osteoarthritis involving multiple joints    Restless legs    Seborrheic dermatitis    Sphenoid sinusitis    Transient cerebral ischemia    Urinary tract infection    Urinary urgency    Type 2 diabetes mellitus with hyperglycemia, without long-term current use of insulin    Vitamin B12 deficiency    Vitamin D deficiency    Chronic kidney disease, stage 3b    Status post reverse total replacement of left shoulder    Postoperative pain    Dysuria    Rash    Pruritus    Acute post-traumatic headache, not intractable    History of recent fall    Chronic kidney disease, stage 3a    Status post reverse arthroplasty of right shoulder    Generalized muscle weakness    Difficulty in walking, not elsewhere classified    Osteoporosis, senile    Chest pressure    Impacted cerumen of left ear    Medicare annual wellness visit, subsequent    Morbid (severe) obesity due to excess calories     Advance Care Planning   Advance Care Planning     Advance Directive is on file.  ACP discussion was held with the patient during this visit. Patient has an advance directive in EMR which is still valid.      Objective    Vitals:    12/05/23 1359   BP: 112/68   BP Location: Left arm   Patient Position: Sitting   Cuff Size: Large Adult   Pulse: 87   Resp: 18   SpO2: 98%   Weight: 108 kg (238 lb)   Height: 157.5 cm (62.01\")     Estimated body mass index is 43.52 kg/m² as calculated from the following:    Height as of this encounter: 157.5 cm (62.01\").    Weight as of this encounter: 108 kg (238 lb).    Class 3 Severe Obesity (BMI >=40). Obesity-related health conditions include the following: hypertension and diabetes mellitus. Obesity is unchanged. BMI is is above average; BMI management plan is completed. We discussed portion control and increasing exercise.      Does the patient have evidence of cognitive impairment? No    Lab Results   Component Value Date    CHLPL 184 10/19/2023    " TRIG 121 10/19/2023    HDL 51 10/19/2023     (H) 10/19/2023    VLDL 22 10/19/2023    HGBA1C 5.8 (H) 10/19/2023    HGBA1C 5.80 (H) 2023        HEALTH RISK ASSESSMENT    Smoking Status:  Social History     Tobacco Use   Smoking Status Never    Passive exposure: Past   Smokeless Tobacco Never   Tobacco Comments    Father and mother smoked for several years.     Alcohol Consumption:  Social History     Substance and Sexual Activity   Alcohol Use Never     Fall Risk Screen:    STEADI Fall Risk Assessment was completed, and patient is at HIGH risk for falls. Assessment completed on:2023    Depression Screenin/3/2023    10:26 AM   PHQ-2/PHQ-9 Depression Screening   Little Interest or Pleasure in Doing Things 0-->not at all   Feeling Down, Depressed or Hopeless 0-->not at all   PHQ-9: Brief Depression Severity Measure Score 0       Health Habits and Functional and Cognitive Screenin/5/2023     1:55 PM   Functional & Cognitive Status   Do you have difficulty preparing food and eating? Yes   Do you have difficulty bathing yourself, getting dressed or grooming yourself? Yes   Do you have difficulty using the toilet? No   Do you have difficulty moving around from place to place? Yes   Do you have trouble with steps or getting out of a bed or a chair? Yes   Current Diet Well Balanced Diet   Dental Exam Up to date   Eye Exam Up to date   Exercise (times per week) 0 times per week   Current Exercises Include Walking   Do you need help using the phone?  No   Are you deaf or do you have serious difficulty hearing?  Yes   Do you need help to go to places out of walking distance? Yes   Do you need help shopping? Yes   Do you need help preparing meals?  Yes   Do you need help with housework?  Yes   Do you need help with laundry? No   Do you need help taking your medications? No   Do you need help managing money? No   Do you ever drive or ride in a car without wearing a seat belt? No   Have you felt  unusual stress, anger or loneliness in the last month? No   Who do you live with? Child   If you need help, do you have trouble finding someone available to you? No       Age-appropriate Screening Schedule:  Refer to the list below for future screening recommendations based on patient's age, sex and/or medical conditions. Orders for these recommended tests are listed in the plan section. The patient has been provided with a written plan.    Health Maintenance   Topic Date Due    Hepatitis B (1 of 3 - Risk 3-dose series) Never done    ZOSTER VACCINE (3 of 3) 11/22/2019    DIABETIC FOOT EXAM  06/08/2023    URINE MICROALBUMIN  06/08/2023    COVID-19 Vaccine (5 - 2023-24 season) 09/01/2023    HEMOGLOBIN A1C  04/19/2024    DIABETIC EYE EXAM  06/13/2024    DXA SCAN  10/10/2024    LIPID PANEL  10/19/2024    ANNUAL WELLNESS VISIT  12/05/2024    BMI FOLLOWUP  12/05/2024    COLORECTAL CANCER SCREENING  06/17/2026    TDAP/TD VACCINES (4 - Td or Tdap) 05/04/2027    HEPATITIS C SCREENING  Completed    INFLUENZA VACCINE  Completed    Pneumococcal Vaccine 65+  Completed                  CMS Preventative Services Quick Reference  Risk Factors Identified During Encounter  None Identified  The above risks/problems have been discussed with the patient.  Pertinent information has been shared with the patient in the After Visit Summary.  An After Visit Summary and PPPS were made available to the patient.    Follow Up:   Next Medicare Wellness visit to be scheduled in 1 year.       Additional E&M Note during same encounter follows:  Patient has multiple medical problems which are significant and separately identifiable that require additional work above and beyond the Medicare Wellness Visit.      Chief Complaint  Medicare Wellness-subsequent and Follow-up    Subjective        HPI  Joanna Lalita Cross is also being seen today for Wellness and govover medical problems    Review of Systems   Constitutional: Negative.    HENT: Negative.    "  Eyes: Negative.    Respiratory: Negative.     Cardiovascular: Negative.    Gastrointestinal: Negative.        Objective   Vital Signs:  /68 (BP Location: Left arm, Patient Position: Sitting, Cuff Size: Large Adult)   Pulse 87   Resp 18   Ht 157.5 cm (62.01\")   Wt 108 kg (238 lb)   SpO2 98%   BMI 43.52 kg/m²     Physical Exam  Vitals and nursing note reviewed.   Constitutional:       Appearance: Normal appearance. She is normal weight.   HENT:      Head: Normocephalic and atraumatic.      Right Ear: Tympanic membrane, ear canal and external ear normal.      Left Ear: Tympanic membrane, ear canal and external ear normal.      Nose: Nose normal.      Mouth/Throat:      Mouth: Mucous membranes are dry.      Pharynx: Oropharynx is clear.   Eyes:      Extraocular Movements: Extraocular movements intact.      Conjunctiva/sclera: Conjunctivae normal.      Pupils: Pupils are equal, round, and reactive to light.   Cardiovascular:      Rate and Rhythm: Normal rate and regular rhythm.      Pulses: Normal pulses.      Heart sounds: Normal heart sounds.   Pulmonary:      Effort: Pulmonary effort is normal.      Breath sounds: Normal breath sounds.   Musculoskeletal:      Cervical back: Normal range of motion and neck supple.   Feet:      Comments:      Neurological:      Mental Status: She is alert.            CMP          9/11/2023    14:45 9/19/2023    05:57 10/19/2023    10:01   CMP   Glucose 103  84  168    BUN 44  19  31    Creatinine 1.36  0.89  1.15    EGFR 41.0  68.1     Sodium 141  140  140    Potassium 4.1  3.6  3.9    Chloride 102  109  100    Calcium 9.6  8.4  9.6    Total Protein   6.6    Albumin   4.5    Globulin   2.1    Total Bilirubin   0.3    Alkaline Phosphatase   85    AST (SGOT)   17    ALT (SGPT)   8    BUN/Creatinine Ratio 32.4  21.3  27    Anion Gap 12.0  9.0       CBC          9/19/2023    05:57 9/20/2023    19:05 10/19/2023    10:01   CBC   WBC 7.68   5.2    RBC 3.03   3.41    Hemoglobin " 9.1  8.2  10.3    Hematocrit 28.2  25.7  31.2    MCV 93.1   92    MCH 30.0   30.2    MCHC 32.3   33.0    RDW 14.2   14.8    Platelets 124   160      TSH          3/17/2023    10:46 6/15/2023    11:04 10/19/2023    10:01   TSH   TSH 7.000  0.838  0.453                 Assessment and Plan   Diagnoses and all orders for this visit:    1. Medicare annual wellness visit, subsequent (Primary)    2. Bronchitis  Assessment & Plan:  Patient was sick for about a month.  Her chest x-ray was okay.  She is doing better now.  We will follow      3. History of recent fall  Assessment & Plan:  Patient had a recent fall.  There were no consequences to this fall.  It was a controlled fall.  She definitely needs a wheelchair.      4. Coronary artery disease involving native coronary artery of native heart without angina pectoris  Assessment & Plan:  Aggressive risk factor modification.      5. Parkinson's disease with dyskinesia and fluctuating manifestations  Assessment & Plan:  Since illness is progressing.  She is in a wheelchair now.  We will follow.      6. Morbid (severe) obesity due to excess calories  Assessment & Plan:  Patient's (Body mass index is 43.52 kg/m².) indicates that they are morbidly/severely obese (BMI > 40 or > 35 with obesity - related health condition) with health conditions that include hypertension . Weight is unchanged. BMI  is above average; BMI management plan is completed. We discussed portion control and increasing exercise.              I spent 10 minutes caring for Joanna on this date of service. This time includes time spent by me in the following activities:preparing for the visit, reviewing tests, obtaining and/or reviewing a separately obtained history, performing a medically appropriate examination and/or evaluation , counseling and educating the patient/family/caregiver, ordering medications, tests, or procedures, referring and communicating with other health care professionals , documenting  information in the medical record, independently interpreting results and communicating that information with the patient/family/caregiver, and care coordination  Follow Up   Return in about 3 months (around 3/5/2024) for Annual physical, Bloodwork 1 week prior to next appointment.  Patient was given instructions and counseling regarding her condition or for health maintenance advice. Please see specific information pulled into the AVS if appropriate.           Answers submitted by the patient for this visit:  Other (Submitted on 11/29/2023)  Please describe your symptoms.: Started with cough 10/18, worse next day  sore throat and runny nose, wheezing.  No fever.  Tested in office for Covid, flu and RSV.  ALL Negative. Chest x-ray was clear. Was put on Doxycycline, Prednisone and Promethazine.   Saw Dr. Fritz a few days later.  No better.  Put me on Hydocodone/chlorphen ER suspension for cough (night).   WentThird trip to doctor - started second round of Doxycycline.  Dr. Fritz wants to see if I am getting over this stuff.  Have you had these symptoms before?: Yes  How long have you been having these symptoms?: Greater than 2 weeks  Please list any medications you are currently taking for this condition.: Doxycycline  Please describe any probable cause for these symptoms. : Viral, maybe  Primary Reason for Visit (Submitted on 11/29/2023)  What is the primary reason for your visit?: Other

## 2023-12-06 ENCOUNTER — TELEPHONE (OUTPATIENT)
Dept: FAMILY MEDICINE CLINIC | Facility: CLINIC | Age: 73
End: 2023-12-06

## 2023-12-06 NOTE — TELEPHONE ENCOUNTER
Caller: Joanna Cross    Relationship: Self    Best call back number: [phone]     What is the best time to reach you: [phone]    Who are you requesting to speak with (clinical staff, provider,  specific staff member): CLINICAL    Do you know the name of the person who called: JOANNA    What was the call regarding: PATIENT WAS TOLD BY THE OFFICE TO GET A FORM, FILL IT OUT AND FAX TO [facility]. HER INSURANCE HAS INFORMED HER THAT SHE HAS OUT OF NETWORK COVERAGE AND THE FORM IS NOT NECESSARY. SHE WOULD LIKE TO DISCUSS THIS WITH SOMEONE.    Is it okay if the provider responds through MyChart: NO

## 2023-12-13 ENCOUNTER — TELEPHONE (OUTPATIENT)
Dept: CARDIOLOGY | Facility: CLINIC | Age: 73
End: 2023-12-13

## 2023-12-13 NOTE — TELEPHONE ENCOUNTER
"Patient left voice mail message stating she was \"holding more fluid\".  She states she has gained five pounds over the last week and her feet are swelling.  She reports feeling weak and \"jittery\".  She requests a return call.  LM for patient to return call.  "

## 2023-12-13 NOTE — TELEPHONE ENCOUNTER
Patient returned call and requested a call back.  Spoke with patient.  She reports having increased swelling in her feet and ankles over the last 5-6 days.  Also, increased SOB.  She states she has gained five pound over the last week and has not changed her eating habits.  All medications reviewed and correct.  Patient has been compliant with all medications.  She is instructed to increase bumetanide to 2mg QD today, tomorrow and Friday.  On Saturday she is to go back to 1mg daily.  If this does not help, and she becomes more SOB, she will need to go to the ER.  She verbalizes understanding.

## 2023-12-15 RX ORDER — LANCETS
EACH MISCELLANEOUS
Qty: 300 EACH | Refills: 3 | Status: SHIPPED | OUTPATIENT
Start: 2023-12-15

## 2023-12-15 NOTE — TELEPHONE ENCOUNTER
Rx Refill Note  Requested Prescriptions     Pending Prescriptions Disp Refills    Accu-Chek Softclix Lancets lancets 300 each 1     Sig: USE ONE LANCET TO TEST THREE TIMES A DAY        Last office visit with prescribing clinician: Visit date not found      Next office visit with prescribing clinician: Visit date not found       Jessica Partida (Jodi)  12/15/23, 11:38 EST

## 2023-12-18 ENCOUNTER — TELEPHONE (OUTPATIENT)
Dept: CARDIOLOGY | Facility: CLINIC | Age: 73
End: 2023-12-18
Payer: MEDICARE

## 2023-12-18 NOTE — TELEPHONE ENCOUNTER
Patient left voice mail message requesting a return call with B/P readings.  Spoke with patient.  She reports her feet are not as swollen, there is just a little.  Her blood pressures have been improved as well.  Her systolics have been 130s-140s and diastolics in the 70s.  She has gone back to the bumetanide 1mg daily.  She is encouraged to call with any questions or concerns.

## 2023-12-19 ENCOUNTER — OFFICE VISIT (OUTPATIENT)
Dept: PULMONOLOGY | Facility: CLINIC | Age: 73
End: 2023-12-19
Payer: MEDICARE

## 2023-12-19 VITALS
HEIGHT: 62 IN | TEMPERATURE: 98.7 F | SYSTOLIC BLOOD PRESSURE: 136 MMHG | WEIGHT: 240.38 LBS | BODY MASS INDEX: 44.23 KG/M2 | OXYGEN SATURATION: 99 % | RESPIRATION RATE: 16 BRPM | DIASTOLIC BLOOD PRESSURE: 74 MMHG | HEART RATE: 58 BPM

## 2023-12-19 DIAGNOSIS — G47.33 OSA TREATED WITH BIPAP: ICD-10-CM

## 2023-12-19 DIAGNOSIS — R06.00 DYSPNEA, UNSPECIFIED TYPE: Primary | ICD-10-CM

## 2023-12-19 DIAGNOSIS — R05.2 SUBACUTE COUGH: ICD-10-CM

## 2023-12-19 DIAGNOSIS — R60.0 EDEMA OF LOWER EXTREMITY: ICD-10-CM

## 2023-12-19 DIAGNOSIS — Z87.09 HISTORY OF RESPIRATORY FAILURE: ICD-10-CM

## 2023-12-19 DIAGNOSIS — K21.9 GASTROESOPHAGEAL REFLUX DISEASE WITHOUT ESOPHAGITIS: ICD-10-CM

## 2023-12-19 LAB — D DIMER PPP FEU-MCNC: <0.27 MCGFEU/ML (ref 0–0.75)

## 2023-12-19 PROCEDURE — 1159F MED LIST DOCD IN RCRD: CPT | Performed by: NURSE PRACTITIONER

## 2023-12-19 PROCEDURE — 3075F SYST BP GE 130 - 139MM HG: CPT | Performed by: NURSE PRACTITIONER

## 2023-12-19 PROCEDURE — 85379 FIBRIN DEGRADATION QUANT: CPT | Performed by: NURSE PRACTITIONER

## 2023-12-19 PROCEDURE — 36415 COLL VENOUS BLD VENIPUNCTURE: CPT | Performed by: NURSE PRACTITIONER

## 2023-12-19 PROCEDURE — 3078F DIAST BP <80 MM HG: CPT | Performed by: NURSE PRACTITIONER

## 2023-12-19 PROCEDURE — 99214 OFFICE O/P EST MOD 30 MIN: CPT | Performed by: NURSE PRACTITIONER

## 2023-12-19 PROCEDURE — 1160F RVW MEDS BY RX/DR IN RCRD: CPT | Performed by: NURSE PRACTITIONER

## 2023-12-19 NOTE — PROGRESS NOTES
[facility], [place] [facility] Follow up    CHIEF COMPLAINT    Cough/shortness of breath    HISTORY OF PRESENT ILLNESS    Joanna Cross is a 73 y.o.female here today for follow-up.  She was last seen in the office by me in June.  She denies any respiratory illnesses since her last appointment.  She did have her right rotator cuff repaired 3 months ago and has tolerated the surgery.  She is only working with PT and OT twice a week.    She states that for the last month she had worsening shortness of breath and lower extremity edema.  She contacted her cardiologist and was advised to increase her Bumex for 3 days.  She lost 10 pounds and noticed her shortness of breath had improved.  She states that her weight has increased 8 pounds over the last 4 days.  She does have worsening shortness of breath and a nonproductive dry cough.  She has some slight lower extremity edema.  She denies any calf tenderness or pain with walking.    She states that she has been less active over the last 4 to 6 weeks.    She continues to wear her oxygen during the day at 2 to 3 L as needed and at nighttime bled through her BiPAP.  She wears her BiPAP every night faithfully.  She does feel well rested in the mornings.  She denies any sleeping difficulties.    She denies any sputum production or hemoptysis.  She denies any chest pain or palpitations.    She is a lifetime non-smoker.    Patient Active Problem List   Diagnosis    Coronary artery disease involving native coronary artery without angina pectoris    Hypertension, essential    Peripheral vascular disease    Hyperlipidemia LDL goal <70    Spinal stenosis, lumbar region, with neurogenic claudication    Delayed surgical wound healing    Biceps tendinitis    Overactive bladder    GERD (gastroesophageal reflux disease)    Hypothyroidism    Lichen sclerosus    Parkinson's disease    MILLI treated with BiPAP - Patient reports compliance.     History of respiratory failure - prior respiratory failure  "requiring mechanical ventilation, open lung biopsy non-specific.     Atrial fibrillation    CKD (chronic kidney disease)    Tachy-thai syndrome    Long term current use of antiarrhythmic drug    History of adenomatous polyp of colon    Anemia    Angiodysplasia    Hx of colonic polyps    Body mass index 40.0-44.9, adult    Bronchitis    Cardiac pacemaker in situ    Chronic low back pain    Chronic lung disease    Subacute cough    Degeneration of lumbar intervertebral disc    Edema of lower extremity    High risk medication use    History of malignant neoplasm of skin    History of TIA (transient ischemic attack)    Hyponatremia    Hypotonic bladder    Incomplete tear of rotator cuff    Major depression in remission    Migraine    Weakness    Tinnitus of both ears    Primary osteoarthritis involving multiple joints    Restless legs    Seborrheic dermatitis    Sphenoid sinusitis    Transient cerebral ischemia    Urinary tract infection    Urinary urgency    Type 2 diabetes mellitus with hyperglycemia, without long-term current use of insulin    Vitamin B12 deficiency    Vitamin D deficiency    Chronic kidney disease, stage 3b    Status post reverse total replacement of left shoulder    Postoperative pain    Dysuria    Rash    Pruritus    Acute post-traumatic headache, not intractable    History of recent fall    Chronic kidney disease, stage 3a    Status post reverse arthroplasty of right shoulder    Generalized muscle weakness    Difficulty in walking, not elsewhere classified    Osteoporosis, senile    Chest pressure    Impacted cerumen of left ear    Medicare annual wellness visit, subsequent    Morbid (severe) obesity due to excess calories       Allergies   Allergen Reactions    Amlodipine Besylate Swelling     Lower extremity (ankles, feet) swelling    Entacapone Other (See Comments)     \"extreme weakness in legs - caused several falls, which stopped after discontinuing this medication\"    Epinephrine Other " "(See Comments)     6/4/16- had 3 shots to numb mouth to prepare teeth for crowns, the shots contained epi-  Caused pt to have chest discomfort- went to hospital in ambulance, discovered had a fib while there     Levemir [Insulin Detemir] Hives     Hives / rash around injection site    Penicillins Hives     Jitteriness     Xarelto [Rivaroxaban] GI Bleeding     hgb dropped to 5.2    Aricept [Donepezil Hcl] Nausea Only     Vivid dreams    Benztropine Mesylate Other (See Comments)     Uncontrollable body movements    Cogentin [Benztropine] Other (See Comments)     \"uncontrollable body movements\"    Compazine [Prochlorperazine Edisylate] Other (See Comments)     Dystonic reaction    Duraprep [Antiseptic Products, Misc.] Itching and Rash     RASH AND ITCHING    Haldol [Haloperidol Lactate] Other (See Comments)     Dystonic reaction    Hydralazine Other (See Comments)     Headache     Lisinopril Cough    Statins Myalgia     Leg pain- all statins     Sulfamethoxazole Nausea Only and Other (See Comments)    Toprol Xl [Metoprolol Tartrate] Other (See Comments)     Extreme fatigue, decreased exercise tolerance    Trimethoprim Other (See Comments)     Other reaction(s): Nausea       Current Outpatient Medications:     Accu-Chek Guide test strip, Testing 3 times per day; E11.65, Disp: 300 each, Rfl: 3    Accu-Chek Softclix Lancets lancets, USE ONE LANCET TO TEST THREE TIMES A DAY dx e11.65 on insulin, Disp: 300 each, Rfl: 3    albuterol (PROVENTIL) (2.5 MG/3ML) 0.083% nebulizer solution, Take 2.5 mg by nebulization Every 4 (Four) Hours As Needed for Wheezing or Shortness of Air., Disp: 1 each, Rfl: 3    albuterol sulfate HFA (Ventolin HFA) 108 (90 Base) MCG/ACT inhaler, Inhale 1 puff Every 4 (Four) Hours As Needed for Wheezing or Shortness of Air., Disp: 8 g, Rfl: 5    amantadine (SYMMETREL) 100 MG capsule, Take 1 capsule by mouth 2 (Two) Times a Day., Disp: , Rfl:     apixaban (Eliquis) 5 MG tablet tablet, Take 1 tablet by " mouth Every 12 (Twelve) Hours., Disp: 180 tablet, Rfl: 2    aspirin 81 MG EC tablet, Take 1 tablet by mouth Daily., Disp: , Rfl:     B Complex-C (SUPER B COMPLEX PO), Take 1 tablet by mouth Daily., Disp: , Rfl:     B-D UF III MINI PEN NEEDLES 31G X 5 MM misc, USE TO INJECT INSULIN DAILY, Disp: 90 each, Rfl: 3    bumetanide (BUMEX) 1 MG tablet, 1 tab po daily as directed, Disp: 90 tablet, Rfl: 3    Calcium Carbonate (CALTRATE 600 PO), Take 600 mg by mouth Daily., Disp: , Rfl:     carbidopa-levodopa (SINEMET)  MG per tablet, Take  by mouth. 2 tablets at 0600, 1 tablet at 0900, 1200, 1500, 1800, 2100, Disp: , Rfl:     Cholecalciferol 2000 units tablet, Take 1 tablet by mouth 2 (Two) Times a Day., Disp: , Rfl:     clobetasol (TEMOVATE) 0.05 % cream, Apply 1 application  topically to the appropriate area as directed 2 (Two) Times a Day As Needed (Lichens Sclerosis)., Disp: , Rfl:     dofetilide (TIKOSYN) 500 MCG capsule, TAKE 1 CAPSULE EVERY 12 HOURS, Disp: 180 capsule, Rfl: 3    Glucosamine-Chondroit-Calcium (TRIPLE FLEX BONE & JOINT PO), Take 1 tablet by mouth Daily. Move free, Disp: , Rfl:     hydrocortisone 2.5 % ointment, , Disp: , Rfl:     hydroxychloroquine (PLAQUENIL) 200 MG tablet, Daily., Disp: , Rfl:     Insulin Glargine (Lantus SoloStar) 100 UNIT/ML injection pen, Inject 12-14 Units under the skin into the appropriate area as directed Daily., Disp: 15 mL, Rfl: 1    ipratropium (ATROVENT) 0.03 % nasal spray, 2 sprays into the nostril(s) as directed by provider 2 (Two) Times a Day As Needed (CONGESTION SINUS)., Disp: 30 mL, Rfl: 11    Loratadine 10 MG capsule, Take 1 capsule by mouth Daily., Disp: , Rfl:     metFORMIN (GLUCOPHAGE) 500 MG tablet, Take 1 tablet by mouth 2 (Two) Times a Day With Meals. NEW DOSE, Disp: 180 tablet, Rfl: 2    metOLazone (ZAROXOLYN) 2.5 MG tablet, TAKE 1 TABLET DAILY, Disp: 90 tablet, Rfl: 3    Multiple Vitamins-Minerals (CENTRUM ULTRA WOMENS PO), Take 1 tablet by mouth Daily.,  Disp: , Rfl:     nitroglycerin (NITROLINGUAL) 0.4 MG/SPRAY spray, Place 1 spray under the tongue Every 5 (Five) Minutes As Needed for Chest Pain., Disp: 1 each, Rfl: 6    nystatin (MYCOSTATIN) 922703 UNIT/GM ointment, Apply 1 application  topically to the appropriate area as directed 2 (Two) Times a Day., Disp: , Rfl:     O2 (OXYGEN), Inhale 2 L/min Every Night. 2L all the time now, Disp: , Rfl:     omeprazole (priLOSEC) 40 MG capsule, Take 1 capsule by mouth 2 (Two) Times a Day., Disp: 180 capsule, Rfl: 0    pramipexole (MIRAPEX) 1.5 MG tablet, TAKE 1 TABLET EVERY NIGHT, Disp: 90 tablet, Rfl: 0    ranolazine (RANEXA) 500 MG 12 hr tablet, Take 2 tablets by mouth Every 12 (Twelve) Hours., Disp: 360 tablet, Rfl: 3    senna (SENOKOT) 8.6 MG tablet, Take 1 tablet by mouth Daily., Disp: , Rfl:     spironolactone (ALDACTONE) 25 MG tablet, Take 2 tablets by mouth Daily., Disp: 180 tablet, Rfl: 2    traMADol (ULTRAM) 50 MG tablet, Take 1 tablet by mouth Every 8 (Eight) Hours As Needed for Moderate Pain., Disp: , Rfl:     Triamcinolone Acetonide (NASACORT) 55 MCG/ACT nasal inhaler, 2 sprays Daily., Disp: , Rfl:     Unithroid 175 MCG tablet, Take 1 tablet by mouth Daily., Disp: 90 tablet, Rfl: 1    valsartan (DIOVAN) 160 MG tablet, Take 1 tablet by mouth 2 (Two) Times a Day., Disp: 180 tablet, Rfl: 2    vitamin C (ASCORBIC ACID) 500 MG tablet, Take 1 tablet by mouth Daily. Chewable tablet, Disp: , Rfl:     Zinc 50 MG capsule, Take 1 capsule by mouth Daily., Disp: , Rfl:     citalopram (CeleXA) 20 MG tablet, Take 1 tablet by mouth Daily., Disp: 90 tablet, Rfl: 3    doxycycline (VIBRAMYCIN) 100 MG capsule, Take 1 capsule by mouth 2 (Two) Times a Day., Disp: 20 capsule, Rfl: 0  MEDICATION LIST AND ALLERGIES REVIEWED.    Social History     Tobacco Use    Smoking status: Never     Passive exposure: Past    Smokeless tobacco: Never    Tobacco comments:     Father and mother smoked for several years.   Vaping Use    Vaping Use:  "Never used    Passive vaping exposure: Yes   Substance Use Topics    Alcohol use: Never    Drug use: No       FAMILY AND SOCIAL HISTORY REVIEWED.    Review of Systems   Constitutional:  Negative for activity change, appetite change, fatigue, fever and unexpected weight change.   HENT:  Negative for congestion, postnasal drip, rhinorrhea, sinus pressure, sore throat and voice change.    Eyes:  Negative for visual disturbance.   Respiratory:  Positive for shortness of breath. Negative for cough, chest tightness and wheezing.    Cardiovascular:  Negative for chest pain, palpitations and leg swelling.   Gastrointestinal:  Negative for abdominal distention, abdominal pain, nausea and vomiting.   Endocrine: Negative for cold intolerance and heat intolerance.   Genitourinary:  Negative for difficulty urinating and urgency.   Musculoskeletal:  Negative for arthralgias, back pain and neck pain.   Skin:  Negative for color change and pallor.   Allergic/Immunologic: Negative for environmental allergies and food allergies.   Neurological:  Negative for dizziness, syncope, weakness and light-headedness.   Hematological:  Negative for adenopathy. Does not bruise/bleed easily.   Psychiatric/Behavioral:  Negative for agitation and behavioral problems.    .    /74   Pulse 58   Temp 98.7 °F (37.1 °C)   Resp 16   Ht 157.5 cm (62.01\")   Wt 109 kg (240 lb 6 oz)   LMP  (LMP Unknown) Comment: Mammogram- 1/28/20  SpO2 99% Comment: room air at rest  BMI 43.95 kg/m²     Immunization History   Administered Date(s) Administered    COVID-19 (MODERNA) 1st,2nd,3rd Dose Monovalent 02/12/2021, 03/12/2021, 11/09/2021    Covid-19 (Pfizer) Gray Cap Monovalent 08/03/2022    Flu Vaccine Quad PF >36MO 10/02/2017, 09/11/2018, 09/21/2019    Fluad Quad 65+ 11/23/2022    Fluzone High Dose =>65 Years ([employer] ONLY) 09/13/2017, 11/23/2022    Fluzone High-Dose 65+yrs 09/16/2021, 10/19/2023    Hepatitis A 05/03/1999, 10/05/1999    INFLUENZA SPLIT " TRI 09/15/2010, 10/02/2012, 10/02/2013    Influenza, Unspecified 01/13/2004, 10/28/2004, 12/14/2005, 01/11/2007, 11/30/2007, 11/20/2008, 02/17/2010, 01/31/2011, 09/06/2011, 09/09/2013, 09/13/2017, 09/11/2018    PPD Test 10/28/2022, 11/07/2022    Pneumococcal Conjugate 13-Valent (PCV13) 09/04/2015, 12/04/2016    Pneumococcal Conjugate 20-Valent (PCV20) 06/08/2022    Pneumococcal Conjugate Unspecified 12/20/2013, 12/04/2016    Pneumococcal Polysaccharide (PPSV23) 01/13/2004, 11/20/2008, 12/20/2013    Pneumococcal, Unspecified 12/20/2013, 12/04/2016    Shingrix 09/27/2019    TD Preservative Free (Tenivac) 02/01/2012, 05/04/2017    Tdap 05/04/2017    Zostavax 02/05/2013       Physical Exam  Vitals and nursing note reviewed.   Constitutional:       Appearance: She is well-developed. She is not diaphoretic.   HENT:      Head: Normocephalic and atraumatic.   Eyes:      Pupils: Pupils are equal, round, and reactive to light.   Neck:      Thyroid: No thyromegaly.   Cardiovascular:      Rate and Rhythm: Normal rate and regular rhythm.      Heart sounds: Normal heart sounds. No murmur heard.     No friction rub. No gallop.   Pulmonary:      Effort: Pulmonary effort is normal. No respiratory distress.      Breath sounds: Normal breath sounds. No wheezing or rales.   Chest:      Chest wall: No tenderness.   Abdominal:      General: Bowel sounds are normal.      Palpations: Abdomen is soft.      Tenderness: There is no abdominal tenderness.   Musculoskeletal:         General: Normal range of motion.      Cervical back: Normal range of motion and neck supple.      Right lower leg: Edema present.      Left lower leg: Edema present.      Comments: She has mild lower extremity edema, left ankle is a little warm to touch and erythema is present.   Lymphadenopathy:      Cervical: No cervical adenopathy.   Skin:     General: Skin is warm and dry.      Capillary Refill: Capillary refill takes less than 2 seconds.   Neurological:       Mental Status: She is alert and oriented to person, place, and time.   Psychiatric:         Mood and Affect: Mood normal.         Behavior: Behavior normal.           RESULTS    Chest PA/lateral: Official report pending    PROBLEM LIST    Problem List Items Addressed This Visit          Gastrointestinal Abdominal     GERD (gastroesophageal reflux disease)       Pulmonary and Pneumonias    History of respiratory failure - prior respiratory failure requiring mechanical ventilation, open lung biopsy non-specific.     Overview     - prior respiratory failure requiring mechanical ventilation, open lung biopsy non-specific.  - 2016         Subacute cough       Sleep    MILLI treated with BiPAP - Patient reports compliance.     Overview     - Patient reports compliance.             Symptoms and Signs    Edema of lower extremity     Other Visit Diagnoses       Dyspnea, unspecified type    -  Primary    Relevant Orders    D-dimer, Quantitative    XR Chest PA & Lateral    NM lung scan perfusion particulate              DISCUSSION    Ms. Cross was here for follow-up of her shortness of breath.  I did review her chest x-ray and the official report is pending.  I will notify her of any abnormalities.  I would like to go ahead and proceed with a perfusion scan to rule out chronic blood clots.  Unfortunately she has kidney disease and I do not think that she can tolerate a CT of the chest with contrast.  I will contact her once I get the perfusion study results.    I do suspect that her shortness of breath is due to her 8 pound weight gain over the last 4 days.  I will reach out to cardiology and let them know that she may need to be seen in the office in the near future.  She is currently taking 3 different types of diuretics and continues to gain weight.    She will continue to wear her BiPAP nightly with 3 L bled in the machine.  She increased her oxygen a couple days ago and has noticed an improvement in her sleeping.    Her  left ankle is a little edematous and warm to the touch.  She could be developing cellulitis.  I did advise her if this was to worsen she needs to present to her PCP for further evaluation.    I did encourage her to wear her oxygen to maintain a saturation above 88% at all times.    She will continue omeprazole daily for GERD.    We will have her follow-up in 3 months.    I personally spent a total of 34 minutes on patient visit today including chart review, face to face with the patient obtaining the history and physical exam, review of pertinent images and tests, counseling and discussion and/or coordination of care as described above, and documentation.  Total time excludes time spent on other separate services such as performing procedures or test interpretation, if applicable.        Myrna Mckeon, APRN  12/19/202315:38 EST  Electronically signed     Please note that portions of this note were completed with a voice recognition program.        CC: Reynaldo Fritz MD

## 2023-12-21 ENCOUNTER — TELEPHONE (OUTPATIENT)
Dept: CARDIOLOGY | Facility: CLINIC | Age: 73
End: 2023-12-21
Payer: MEDICARE

## 2023-12-21 NOTE — TELEPHONE ENCOUNTER
Spoke with patient.  She saw DEJA Alarcon at the pulmonary office two days ago.  She was concerned d/t patients weight gain of 8 pounds.  Patient states that her daughter works tomorrow and she is unsure if she can bring her.  She will talk to her daughter and call us back.

## 2023-12-22 ENCOUNTER — OFFICE VISIT (OUTPATIENT)
Dept: CARDIOLOGY | Facility: CLINIC | Age: 73
End: 2023-12-22
Payer: MEDICARE

## 2023-12-22 VITALS
HEIGHT: 62 IN | OXYGEN SATURATION: 100 % | WEIGHT: 240 LBS | SYSTOLIC BLOOD PRESSURE: 120 MMHG | DIASTOLIC BLOOD PRESSURE: 56 MMHG | BODY MASS INDEX: 44.16 KG/M2 | HEART RATE: 60 BPM

## 2023-12-22 DIAGNOSIS — Z95.0 CARDIAC PACEMAKER IN SITU: ICD-10-CM

## 2023-12-22 DIAGNOSIS — T82.198A OTHER MECHANICAL COMPLICATION OF OTHER CARDIAC ELECTRONIC DEVICE, INITIAL ENCOUNTER: ICD-10-CM

## 2023-12-22 DIAGNOSIS — I48.0 PAROXYSMAL ATRIAL FIBRILLATION: ICD-10-CM

## 2023-12-22 DIAGNOSIS — I50.21 ACUTE SYSTOLIC CHF (CONGESTIVE HEART FAILURE): Primary | ICD-10-CM

## 2023-12-22 RX ORDER — BUMETANIDE 1 MG/1
1 TABLET ORAL 2 TIMES DAILY
Qty: 60 TABLET | Refills: 6 | Status: SHIPPED | OUTPATIENT
Start: 2023-12-22

## 2023-12-22 RX ORDER — PREDNISONE 5 MG/1
5 TABLET ORAL DAILY
COMMUNITY
Start: 2023-10-30

## 2023-12-22 NOTE — PROGRESS NOTES
[facility] at [facility]   OFFICE NOTE      Joanna Cross  1950  PCP: Reynaldo Fritz MD    SUBJECTIVE:   Joanna Cross is a 73 y.o. female seen for a follow up visit regarding the following:     CC:Edema     HPI:   Pleasant 73-year-old female added on her schedule today due to the patient and her daughter's concern regarding recent weight gain of over 8 pounds with severe lower extremity edema.  Patient daughter states that she was on Bumex twice a day but this was cut back due to some dehydration.  She now put back over 8 pounds and her legs are swelling to the point where she is concerned about cellulitis.  She is not having any further chest pain.  She does have some mild orthopnea.  She denies PND.  She has not experienced any tachypalpitations syncope or near syncope.  In addition above she has a history of a pacemaker with atrial lead that is known not to capture well.  It was discussed with her in the past after her most recent shoulder surgery it would be considered an option for revision which she would like to do at this point.    Cardiac PMH: (Old records have been reviewed and summarized below)    Coronary artery disease  [facility], 02/15/2008, Dr Lutz: Mild, non-obstructive CAD, normal EF  [facility], 01/09/2013, Dr Stevenson Sutton: No LV gram done. Mild, non-obstructive CAD.  [facility], 11/9/2015[facility]:  EF 60%. 70% RCA lesion with Promus ZAINAB placement. 40-50% mid LAD, no FFR performed. Other small blockages noted. No MR.  [facility], 11/15/2015, Dr Palacio @ [facility]: Patent RCA stent, 40-50% LAD with FFR @ 0.92; mild inferior wall hypokinesis  [facility], 07/29/2016, [facility]: Normal CORS with patent stent. LAD improved since last [facility]. EF 55-60%.  [facility], 02/01/2019: EF 55-60%. Patent RCA stent, noncritical mid LAD with IFR 0.93, noncritical LCx.   Chronic venous stasis:  Venous duplex, 09/17/2010: Insufficiency to venous hypertension in the great saphenous system  bilaterally.  Venous duplex 2013: Right leg laser ablation of Dr. Garcia.  Venous duplex, 05/06/2016: No evidence of DVT, no superficial, no thrmobophlebitis, no valvular insufficiency.  AA with runoffs, 08/31/2016: Single-vessel [place]: No significant arterial disease.  Arterial doppler/GLYNN, 08/29/2019: No evidence of significant lower extremity arterial occlusive disease.  Palpitations:  Echocardiogram, 02/13/2014: Dr. Teran. Normal biventricular function; trace to mild MR. Atrial septal aneurysm.  Echocardiogram, 12/22/2015, Dr. Chavez: Borderline LVH, mild LAE, mild RV enlargement. Mild to moderate MR and TR with RVSP of 46 mmHg.  Echocardiogram, 03/2016: Dr. Palacio.  RV enlargement.  EF 50-55%.  Mild AI S, mild MR and TR with RVSP 37 mmHg.  Echocardiogram, 07/11/20: Dr. Jany Wynn: EF 60-65%. Mild to moderate MR.  PAF/SSS:  [employer] PPM 2016  CHADS2 Vasc = 6 (HTN, DM, Age 65-74, Female, H/o TIA). On chronic anticoagulation.  ECV, 12/26/2018: Successful cardioversion. AV paced.  Echocardiogram, 12/25/2018:  EF 60%, mild MR/TR with RVSP 29 mmHg. Sclerotic AoV, no AS/AI. Mild MAC.  ECV, 03/05/2019: Successful cardioversion.  [facility]io, 10/01/2019, 14 days: NSR baseline. Normal average rates. Periods of RV pacing.   Patient has known undersensing in the atrial lead which is was known to Dr. Marie.     TIA:  Carotid duplex, 02/13/2014: No significant flow-limiting stenosis. Mild luminal disease present; both vertebrals are present.  Diabetes  Essential Hypertension  Hyperlipidemia  Hypothyroid  Hyponatremia  Secondary to SIADH  MILLI with CPAP  RLS  Parkinson's disease  GERD  Urinary Retention  S/P cystoscopy and ureter dilation, March 2018.  Dr. Terrence Mckenzie   Surgeries:  Rotator cuff repair  Cholecystectomy  Bilateral knee replacement  Tubal ligation  Breast surgery  Sinus surgery  Left shoulder replacement 10/24/2022    Past Medical History, Past Surgical History, Family history, Social History,  and Medications were all reviewed with the patient today and updated as necessary.       Current Outpatient Medications:     Accu-Chek Guide test strip, Testing 3 times per day; E11.65, Disp: 300 each, Rfl: 3    Accu-Chek Softclix Lancets lancets, USE ONE LANCET TO TEST THREE TIMES A DAY dx e11.65 on insulin, Disp: 300 each, Rfl: 3    albuterol (PROVENTIL) (2.5 MG/3ML) 0.083% nebulizer solution, Take 2.5 mg by nebulization Every 4 (Four) Hours As Needed for Wheezing or Shortness of Air., Disp: 1 each, Rfl: 3    albuterol sulfate HFA (Ventolin HFA) 108 (90 Base) MCG/ACT inhaler, Inhale 1 puff Every 4 (Four) Hours As Needed for Wheezing or Shortness of Air., Disp: 8 g, Rfl: 5    amantadine (SYMMETREL) 100 MG capsule, Take 1 capsule by mouth 2 (Two) Times a Day., Disp: , Rfl:     apixaban (Eliquis) 5 MG tablet tablet, Take 1 tablet by mouth Every 12 (Twelve) Hours., Disp: 180 tablet, Rfl: 2    aspirin 81 MG EC tablet, Take 1 tablet by mouth Daily., Disp: , Rfl:     B Complex-C (SUPER B COMPLEX PO), Take 1 tablet by mouth Daily., Disp: , Rfl:     B-D UF III MINI PEN NEEDLES 31G X 5 MM misc, USE TO INJECT INSULIN DAILY, Disp: 90 each, Rfl: 3    bumetanide (BUMEX) 1 MG tablet, 1 tab po daily as directed, Disp: 90 tablet, Rfl: 3    Calcium Carbonate (CALTRATE 600 PO), Take 600 mg by mouth Daily., Disp: , Rfl:     carbidopa-levodopa (SINEMET)  MG per tablet, Take  by mouth. 2 tablets at 0600, 1 tablet at 0900, 1200, 1500, 1800, 2100, Disp: , Rfl:     Cholecalciferol 2000 units tablet, Take 1 tablet by mouth 2 (Two) Times a Day., Disp: , Rfl:     clobetasol (TEMOVATE) 0.05 % cream, Apply 1 application  topically to the appropriate area as directed 2 (Two) Times a Day As Needed (Lichens Sclerosis)., Disp: , Rfl:     dofetilide (TIKOSYN) 500 MCG capsule, TAKE 1 CAPSULE EVERY 12 HOURS, Disp: 180 capsule, Rfl: 3    Glucosamine-Chondroit-Calcium (TRIPLE FLEX BONE & JOINT PO), Take 1 tablet by mouth Daily. Move free, Disp: ,  Rfl:     hydrocortisone 2.5 % ointment, , Disp: , Rfl:     hydroxychloroquine (PLAQUENIL) 200 MG tablet, Daily., Disp: , Rfl:     Insulin Glargine (Lantus SoloStar) 100 UNIT/ML injection pen, Inject 12-14 Units under the skin into the appropriate area as directed Daily., Disp: 15 mL, Rfl: 1    ipratropium (ATROVENT) 0.03 % nasal spray, 2 sprays into the nostril(s) as directed by provider 2 (Two) Times a Day As Needed (CONGESTION SINUS)., Disp: 30 mL, Rfl: 11    Loratadine 10 MG capsule, Take 1 capsule by mouth Daily., Disp: , Rfl:     metFORMIN (GLUCOPHAGE) 500 MG tablet, Take 1 tablet by mouth 2 (Two) Times a Day With Meals. NEW DOSE, Disp: 180 tablet, Rfl: 2    metOLazone (ZAROXOLYN) 2.5 MG tablet, TAKE 1 TABLET DAILY, Disp: 90 tablet, Rfl: 3    Multiple Vitamins-Minerals (CENTRUM ULTRA WOMENS PO), Take 1 tablet by mouth Daily., Disp: , Rfl:     nitroglycerin (NITROLINGUAL) 0.4 MG/SPRAY spray, Place 1 spray under the tongue Every 5 (Five) Minutes As Needed for Chest Pain., Disp: 1 each, Rfl: 6    nystatin (MYCOSTATIN) 206487 UNIT/GM ointment, Apply 1 application  topically to the appropriate area as directed 2 (Two) Times a Day., Disp: , Rfl:     O2 (OXYGEN), Inhale 2 L/min Every Night. 2L all the time now, Disp: , Rfl:     omeprazole (priLOSEC) 40 MG capsule, Take 1 capsule by mouth 2 (Two) Times a Day., Disp: 180 capsule, Rfl: 0    pramipexole (MIRAPEX) 1.5 MG tablet, TAKE 1 TABLET EVERY NIGHT, Disp: 90 tablet, Rfl: 0    predniSONE (DELTASONE) 5 MG tablet, Take 1 tablet by mouth Daily., Disp: , Rfl:     ranolazine (RANEXA) 500 MG 12 hr tablet, Take 2 tablets by mouth Every 12 (Twelve) Hours., Disp: 360 tablet, Rfl: 3    senna (SENOKOT) 8.6 MG tablet, Take 1 tablet by mouth Daily., Disp: , Rfl:     spironolactone (ALDACTONE) 25 MG tablet, Take 2 tablets by mouth Daily., Disp: 180 tablet, Rfl: 2    traMADol (ULTRAM) 50 MG tablet, Take 1 tablet by mouth Every 8 (Eight) Hours As Needed for Moderate Pain., Disp: ,  "Rfl:     Triamcinolone Acetonide (NASACORT) 55 MCG/ACT nasal inhaler, 2 sprays Daily., Disp: , Rfl:     Unithroid 175 MCG tablet, Take 1 tablet by mouth Daily., Disp: 90 tablet, Rfl: 1    valsartan (DIOVAN) 160 MG tablet, Take 1 tablet by mouth 2 (Two) Times a Day., Disp: 180 tablet, Rfl: 2    vitamin C (ASCORBIC ACID) 500 MG tablet, Take 1 tablet by mouth Daily. Chewable tablet, Disp: , Rfl:     Zinc 50 MG capsule, Take 1 capsule by mouth Daily., Disp: , Rfl:       Allergies   Allergen Reactions    Amlodipine Besylate Swelling     Lower extremity (ankles, feet) swelling    Entacapone Other (See Comments)     \"extreme weakness in legs - caused several falls, which stopped after discontinuing this medication\"    Epinephrine Other (See Comments)     6/4/16- had 3 shots to numb mouth to prepare teeth for crowns, the shots contained epi-  Caused pt to have chest discomfort- went to hospital in ambulance, discovered had a fib while there     Levemir [Insulin Detemir] Hives     Hives / rash around injection site    Penicillins Hives     Jitteriness     Xarelto [Rivaroxaban] GI Bleeding     hgb dropped to 5.2    Aricept [Donepezil Hcl] Nausea Only     Vivid dreams    Benztropine Mesylate Other (See Comments)     Uncontrollable body movements    Cogentin [Benztropine] Other (See Comments)     \"uncontrollable body movements\"    Compazine [Prochlorperazine Edisylate] Other (See Comments)     Dystonic reaction    Duraprep [Antiseptic Products, Misc.] Itching and Rash     RASH AND ITCHING    Haldol [Haloperidol Lactate] Other (See Comments)     Dystonic reaction    Hydralazine Other (See Comments)     Headache     Lisinopril Cough    Statins Myalgia     Leg pain- all statins     Sulfamethoxazole Nausea Only and Other (See Comments)    Toprol Xl [Metoprolol Tartrate] Other (See Comments)     Extreme fatigue, decreased exercise tolerance    Trimethoprim Other (See Comments)     Other reaction(s): Nausea         PHYSICAL " "EXAM:    /56 (BP Location: Left arm, Patient Position: Sitting, Cuff Size: Adult)   Pulse 60   Ht 157.5 cm (62\")   Wt 109 kg (240 lb)   LMP  (LMP Unknown) Comment: Mammogram- 1/28/20  SpO2 100%   BMI 43.90 kg/m²        Wt Readings from Last 5 Encounters:   12/22/23 109 kg (240 lb)   12/19/23 109 kg (240 lb 6 oz)   12/05/23 108 kg (238 lb)   11/03/23 103 kg (227 lb)   10/30/23 103 kg (227 lb)       BP Readings from Last 5 Encounters:   12/22/23 120/56   12/19/23 136/74   12/05/23 112/68   11/17/23 110/72   11/03/23 132/84       General appearance - Alert, well appearing, and in no distress sitting in wheelchair on oxygen  Mental status - Affect appropriate to mood.  Eyes - Sclerae anicteric,  ENMT - Hearing grossly normal bilaterally, Dental hygiene good.  Neck - Carotids upstroke normal bilaterally, no bruits, no JVD.  Resp - Clear to auscultation, no wheezes, rales or rhonchi, symmetric air entry.  Heart - Normal rate, regular rhythm, normal S1, S2, no murmurs, rubs, clicks or gallops.  GI - Soft, nontender, nondistended, no masses or organomegaly.  Neurological - Grossly intact - normal speech, no focal findings  Musculoskeletal - No joint tenderness, deformity or swelling, no muscular tenderness noted.  Extremities -2+ lower extremity edema, some redness in her left lower extremity no skin breakdown.  No fevers or chills.  Skin - Normal coloration and turgor.  Psych -  oriented to person, place, and time.    Medical problems and test results were reviewed with the patient today.     Recent Results (from the past 672 hour(s))   D-dimer, Quantitative    Collection Time: 12/19/23  2:01 PM    Specimen: Arm, Right; Blood   Result Value Ref Range    D-Dimer, Quantitative <0.27 0.00 - 0.75 MCGFEU/mL         EKG: (EKG has been independently visualized by me and summarized below)    ECG 12 Lead    Date/Time: 12/22/2023 12:10 PM  Performed by: Joe Ceron PA    Authorized by: Joe Ceron PA  " Comparison: compared with previous ECG from 11/17/2023  Similar to previous ECG  Rhythm: sinus rhythm and paced  Rate: normal  Conduction: conduction normal  QRS axis: normal    Clinical impression: non-specific ECG           Device Interrogation:  Please see device interrogation form which has been signed and updated for full details.  [employer] pacemaker interrogation.  A paced and 3%.  RV pacing 10%.  Difficult sermon P wave, threshold 4.5 at 1.0 ms with impedance of 707 ohms.  RV threshold 1.1 V at 0.5 ms impedance 3 7 8 ohms.  Battery voltage is 5 months remaining.  Mode switch may be under sensing due to poor atrial lead function.    ASSESSMENT   1. CAD: Negative stress test 10/26/2023.  Patient reports no further chest pain today.    2. PAF: TIkosymartinez Eliquis.  She tolerated medications well.  QTc acceptable on EKG.  She is unaware of any A-fib.    3. Cardiac pacemaker in situ: Interrogation today confirms atrial lead is not capturing well and requires a high threshold which is reducing her battery life.  Dr. Francois had discussed with her in the past and atrial lead abrasion which she would like to pursue now that she has had her shoulder surgery.    4. Recent Weight gain and lower extremity edema.:  This is a chronic problem but she has gained 8 pounds in the past few weeks and her edema lower extremity quite severe.  She would like to try to go back up to Bumex twice a day and continue Zaroxolyn Aldactone to see if this improves.  We also discussed with her it would benefit her to have lower leg wraps, she has difficult time with compression stockings.  She does have some redness of her left extremity she is going to follow with her PCP next week to rule out cellulitis.  She will get a BMP next week.        PLAN  Increase Bumex to 1 mg twice daily for the 4 days recheck BMP and reduce back to once a day and then take twice a day as needed for weight gain or edema  Follow-up BMP next week  Consider  physical therapy for leg wraps lower compression stockings  Long discussion regarding requirement of atrial lead revision and pacemaker generator change. We discussed the procedure in great detail including risks, benefits, and alternatives. The patient understands that risks include bleeding, infection, stroke, MI, cardiac perforation, and even death. She will Hold eliquis 48 hours prior to the procedure. She understands this carries a risk of a CVA.        12/22/2023  11:04 EST     Electronically signed by BRIGITTE Chris, 12/22/23, 12:15 PM EST.

## 2023-12-22 NOTE — PROGRESS NOTES
Cardiac Electrophysiology Outpatient Note  [facility] at [facility]    Office Visit     Joanna Cross  [id number]  09/12/2023    Primary Care Physician: Reynaldo Fritz MD        Subjective     Problem List:  Coronary artery disease  [facility], 02/15/2008, Dr Lutz: Mild, non-obstructive CAD, normal EF  [facility], 01/09/2013, Dr Stevenson Sutton: No LV gram done. Mild, non-obstructive CAD.  [facility], 11/9/2015, [facility]:  EF 60%. 70% RCA lesion with Promus ZAINAB placement. 40-50% mid LAD, no FFR performed. Other small blockages noted. No MR.  [facility], 11/15/2015, Dr Palacio @ [facility]: Patent RCA stent, 40-50% LAD with FFR @ 0.92; mild inferior wall hypokinesis  [facility], 07/29/2016, [facility]: Normal CORS with patent stent. LAD improved since last [facility]. EF 55-60%.  [facility], 02/01/2019: EF 55-60%. Patent RCA stent, noncritical mid LAD with IFR 0.93, noncritical LCx.   Chronic venous stasis:  Venous duplex, 09/17/2010: Insufficiency to venous hypertension in the great saphenous system bilaterally.  Venous duplex 2013: Right leg laser ablation of Dr. Garcia.  Venous duplex, 05/06/2016: No evidence of DVT, no superficial, no thrmobophlebitis, no valvular insufficiency.  AA with runoffs, 08/31/2016: Single-vessel Parnell: No significant arterial disease.  Arterial doppler/GLYNN, 08/29/2019: No evidence of significant lower extremity arterial occlusive disease.  Palpitations:  Echocardiogram, 02/13/2014: Dr. Teran. Normal biventricular function; trace to mild MR. Atrial septal aneurysm.  Echocardiogram, 12/22/2015, Dr. Chavez: Borderline LVH, mild LAE, mild RV enlargement. Mild to moderate MR and TR with RVSP of 46 mmHg.  Echocardiogram, 03/2016: Dr. Palacio.  RV enlargement.  EF 50-55%.  Mild AI S, mild MR and TR with RVSP 37 mmHg.  Echocardiogram, 07/11/20: Dr. Jany Wynn: EF 60-65%. Mild to moderate MR.  PAF/SSS:  [employer] PPM 2016  CHADS2 Vasc = 6 (HTN, DM, Age 65-74,  Female, H/o TIA). On chronic anticoagulation.  ECV, 12/26/2018: Successful cardioversion. AV paced.  Echocardiogram, 12/25/2018:  EF 60%, mild MR/TR with RVSP 29 mmHg. Sclerotic AoV, no AS/AI. Mild MAC.  ECV, 03/05/2019: Successful cardioversion.  Zio, 10/01/2019, 14 days: NSR baseline. Normal average rates. Periods of RV pacing.   Patient has known undersensing in the atrial lead which is was known to Dr. Marie.     TIA:  Carotid duplex, 02/13/2014: No significant flow-limiting stenosis. Mild luminal disease present; both vertebrals are present.  Diabetes  Essential Hypertension  Hyperlipidemia  Hypothyroid  Hyponatremia  Secondary to SIADH  MILLI with CPAP  RLS  Parkinson's disease  GERD  Urinary Retention  S/P cystoscopy and ureter dilation, March 2018.  Dr. Terrence Mckenzie   Surgeries:  Rotator cuff repair  Cholecystectomy  Bilateral knee replacement  Tubal ligation  Breast surgery  Sinus surgery  Left shoulder replacement 10/24/2022    Chief Complaint   Patient presents with    Paroxysmal atrial fibrillation    Shortness of Breath    Leg Swelling     LEHS AND FEET       History of Present Illness:   Joanna Cross is a 73 y.o. female who presents to my electrophysiology clinic for follow up of sick sinus syndrome s/p BSC DC PPM and paroxysmal atrial fibrillation on Tikosyn and Eliquis.  Since we last saw the patient, she has continued to have some baseline shortness of breath which she follows with pulmonology for and has as needed supplemental oxygen.  It has not changed from baseline.  She has end-stage osteoarthritis of both shoulders and has surgery scheduled next week.  Otherwise, the patient denies any chest pain, lower extremity swelling, palpitations or syncopal episodes.    Past Medical History:   Diagnosis Date    Anemia     Ankle problem     thinks back related causing pain     Atrial fibrillation     AVM (arteriovenous malformation)     Back pain     Chronic kidney disease     stage 3 per pt     Chronic lung disease 04/13/2022    CKD (chronic kidney disease)     Clotting disorder 2016    AVM - small intestine    Coronary artery disease involving native coronary artery without angina pectoris 03/01/2017    COVID-19 vaccine series completed     Gastrocnemius muscle tear     left medial 91    Generalized osteoarthritis     GERD (gastroesophageal reflux disease)     Gestational diabetes     GIB (gastrointestinal bleeding) 2016    d/t xarelto     Headache     Hearing decreased, bilateral     HAS HEARING AID, NOT WEARING IT CURRENTLY    Hiatal hernia     History of shingles     History of transfusion 2016    no reaction recalled     Hyperlipidemia LDL goal <70 03/01/2017    Hypertension     Hypothyroidism     IBS (irritable bowel syndrome)     Klebsiella pneumonia     Lichen sclerosus     Lupus     subQ    Mouth problem     mouth guard used since pt bites tongue and lips excessively at night if not- with bipap     MRSA infection 2018    Myocardial infarction     Obesity     On home oxygen therapy     2L of oxygen all the time due to current congestion     Osteoporosis     Parkinson's disease     Peripheral vascular disease     Pleurisy     Pneumonia     Puerperal sepsis with acute hypoxic respiratory failure     emergent intubation- 2016    Pulmonary embolism     Right leg pain     from back issues     Salivary gland stone     Sciatic nerve pain     Seborrheic dermatitis     Skin cancer     on back     Sleep apnea     CPAP AND HOME O2 2L/M    TIA (transient ischemic attack) 2014    no residual effects    Type 2 diabetes mellitus     UTI (urinary tract infection)     Vitamin D deficiency 09/08/2022    Wears glasses        Past Surgical History:   Procedure Laterality Date    ABLATION OF DYSRHYTHMIC FOCUS  05/16/13    Laser Ablation - Rt Leg    BACK SURGERY      l4-l5 laminectomy     BICEPS TENDON REPAIR Right     shoulder    BREAST BIOPSY Left 05/2004    excisional, benign    BRONCHOSCOPY RIGID / FLEXIBLE       2016    BUNIONECTOMY Right     CARDIAC CATHETERIZATION      CARDIAC CATHETERIZATION N/A 02/01/2019    Procedure: Left Heart Cath;  Surgeon: Albertina Corona MD;  Location:  [facility];  Service: Cardiology    CHOLECYSTECTOMY      COLONOSCOPY  2016    COLONOSCOPY N/A 06/17/2021    Procedure: COLONOSCOPY WITH POLYPECTOMY;  Surgeon: Trenton Sosa MD;  Location:  [facility];  Service: Gastroenterology;  Laterality: N/A;    CORONARY STENT PLACEMENT      x1 stent    CYST REMOVAL      left ear, upper left back 2001    CYSTOSCOPY      x2  18 and 20 -   in 20 urethra dilation     DIAGNOSTIC LAPAROSCOPY  1981    ENDOSCOPIC FUNCTIONAL SINUS SURGERY (FESS)  2011    ENDOSCOPY  2016    ENTEROSCOPY VIA STOMA      with single ballon with fluoro     HAMMER TOE REPAIR Left     INCISION AND DRAINAGE OF WOUND  2018    back with wound infection     INSERT / REPLACE / REMOVE PACEMAKER  11/10/16    [facility]    JOINT REPLACEMENT      KNEE ARTHROSCOPY      LASER ABLATION      right leg 13    LIPOMA EXCISION  1999    left leg     LUMBAR LAMINECTOMY DISCECTOMY DECOMPRESSION N/A 07/06/2018    Procedure: LUMBAR LAMINECTOMY L4-5, HEMILAMIINECTOMY RIGHT L5-S1, FORAMINOTOMY L5-S1;  Surgeon: Lencho Gamino MD;  Location:  [facility];  Service: Neurosurgery    LUMBAR LAMINECTOMY DISCECTOMY DECOMPRESSION N/A 09/19/2018    Procedure: INCISION AND DRAINAGE BACK WITH WOUND EXPLORATION;  Surgeon: Ritchie García MD;  Location:  [facility];  Service: Neurosurgery    LUNG BIOPSY Left 2016    OTHER SURGICAL HISTORY      ct scan of chest and sinuses and lower back     OTHER SURGICAL HISTORY      various echos     OTHER SURGICAL HISTORY      electroencephalogram 16,   emg  ncv tests 2007    OTHER SURGICAL HISTORY      mra 2007  and various mri with xrays, nuclear medicine lung ventilation with perfusion test 2016 with pft     OTHER SURGICAL HISTORY      barium swallow testing 15, 12, 12    OTHER SURGICAL  HISTORY      vaginal ultrasound- ,   vas clementina lower extrem , vas venous duplex lower extrem bilat     OTHER SURGICAL HISTORY      wdge biopsy spring of lung     OTHER SURGICAL HISTORY      emergent intubation- hypoxic resp failure     PACEMAKER IMPLANTATION  2016    sss    REPLACEMENT TOTAL KNEE Bilateral     left knee , right 2012 per dr acuna     REPLACEMENT TOTAL KNEE Bilateral     SHOULDER ARTHROSCOPY Bilateral     -left, - right     SKIN BIOPSY      skin cancer back 2016    SKIN CANCER EXCISION      upper righ tback     MADHAV      TEETH EXTRACTION      x2    TOTAL SHOULDER ARTHROPLASTY W/ DISTAL CLAVICLE EXCISION Left 10/24/2022    Procedure: REVERSE TOTAL SHOULDER ARTHROPLASTY, BICEPS TENODESIS - LEFT;  Surgeon: Sammy Yo MD;  Location:  [facility];  Service: Orthopedics;  Laterality: Left;    TOTAL SHOULDER ARTHROPLASTY W/ DISTAL CLAVICLE EXCISION Right 2023    Procedure: REVERSE TOTAL SHOULDER  ARTHROPLASTY WITH BICEPS TENODESIS - RIGHT;  Surgeon: Sammy Yo MD;  Location:  [facility];  Service: Orthopedics;  Laterality: Right;    TUBAL ABDOMINAL LIGATION Bilateral     WISDOM TOOTH EXTRACTION         Family History   Problem Relation Age of Onset    Kidney disease Mother     Coronary artery disease Mother     Hypertension Mother             Heart disease Mother             Hyperlipidemia Mother             Coronary artery disease Father     Hypertension Father             Hypothyroidism Father     Cancer Father         Oral cancer    Heart disease Father             Coronary artery disease Brother     Hypertension Brother     Heart disease Brother         Multiple stents, by-pass surgery    Testicular cancer Brother     Kidney cancer Maternal Uncle     Testicular cancer Maternal Uncle     Colon polyps Neg Hx     Colon cancer Neg Hx        Social History     Socioeconomic History    Marital status:      Spouse name:  N/A    Number of children: 4    Years of education: College    Highest education level: Master's degree (e.g., MA, MS, Randi, MEd, MSW, NELLA)   Tobacco Use    Smoking status: Never     Passive exposure: Past    Smokeless tobacco: Never    Tobacco comments:     Father and mother smoked for several years.   Vaping Use    Vaping Use: Never used    Passive vaping exposure: Yes   Substance and Sexual Activity    Alcohol use: Never    Drug use: No    Sexual activity: Not Currently     Partners: Male     Birth control/protection: Post-menopausal, Tubal ligation, Vaginal insert contraception         Current Outpatient Medications:     Accu-Chek Guide test strip, Testing 3 times per day; E11.65, Disp: 300 each, Rfl: 3    Accu-Chek Softclix Lancets lancets, USE ONE LANCET TO TEST THREE TIMES A DAY dx e11.65 on insulin, Disp: 300 each, Rfl: 3    albuterol (PROVENTIL) (2.5 MG/3ML) 0.083% nebulizer solution, Take 2.5 mg by nebulization Every 4 (Four) Hours As Needed for Wheezing or Shortness of Air., Disp: 1 each, Rfl: 3    albuterol sulfate HFA (Ventolin HFA) 108 (90 Base) MCG/ACT inhaler, Inhale 1 puff Every 4 (Four) Hours As Needed for Wheezing or Shortness of Air., Disp: 8 g, Rfl: 5    amantadine (SYMMETREL) 100 MG capsule, Take 1 capsule by mouth 2 (Two) Times a Day., Disp: , Rfl:     apixaban (Eliquis) 5 MG tablet tablet, Take 1 tablet by mouth Every 12 (Twelve) Hours., Disp: 180 tablet, Rfl: 2    aspirin 81 MG EC tablet, Take 1 tablet by mouth Daily., Disp: , Rfl:     B Complex-C (SUPER B COMPLEX PO), Take 1 tablet by mouth Daily., Disp: , Rfl:     B-D UF III MINI PEN NEEDLES 31G X 5 MM misc, USE TO INJECT INSULIN DAILY, Disp: 90 each, Rfl: 3    bumetanide (BUMEX) 1 MG tablet, 1 tab po daily as directed, Disp: 90 tablet, Rfl: 3    Calcium Carbonate (CALTRATE 600 PO), Take 600 mg by mouth Daily., Disp: , Rfl:     carbidopa-levodopa (SINEMET)  MG per tablet, Take  by mouth. 2 tablets at 0600, 1 tablet at 0900, 1200,  1500, 1800, 2100, Disp: , Rfl:     Cholecalciferol 2000 units tablet, Take 1 tablet by mouth 2 (Two) Times a Day., Disp: , Rfl:     clobetasol (TEMOVATE) 0.05 % cream, Apply 1 application  topically to the appropriate area as directed 2 (Two) Times a Day As Needed (Lichens Sclerosis)., Disp: , Rfl:     dofetilide (TIKOSYN) 500 MCG capsule, TAKE 1 CAPSULE EVERY 12 HOURS, Disp: 180 capsule, Rfl: 3    Glucosamine-Chondroit-Calcium (TRIPLE FLEX BONE & JOINT PO), Take 1 tablet by mouth Daily. Move free, Disp: , Rfl:     hydrocortisone 2.5 % ointment, , Disp: , Rfl:     hydroxychloroquine (PLAQUENIL) 200 MG tablet, Daily., Disp: , Rfl:     Insulin Glargine (Lantus SoloStar) 100 UNIT/ML injection pen, Inject 12-14 Units under the skin into the appropriate area as directed Daily., Disp: 15 mL, Rfl: 1    ipratropium (ATROVENT) 0.03 % nasal spray, 2 sprays into the nostril(s) as directed by provider 2 (Two) Times a Day As Needed (CONGESTION SINUS)., Disp: 30 mL, Rfl: 11    Loratadine 10 MG capsule, Take 1 capsule by mouth Daily., Disp: , Rfl:     metFORMIN (GLUCOPHAGE) 500 MG tablet, Take 1 tablet by mouth 2 (Two) Times a Day With Meals. NEW DOSE, Disp: 180 tablet, Rfl: 2    metOLazone (ZAROXOLYN) 2.5 MG tablet, TAKE 1 TABLET DAILY, Disp: 90 tablet, Rfl: 3    Multiple Vitamins-Minerals (CENTRUM ULTRA WOMENS PO), Take 1 tablet by mouth Daily., Disp: , Rfl:     nitroglycerin (NITROLINGUAL) 0.4 MG/SPRAY spray, Place 1 spray under the tongue Every 5 (Five) Minutes As Needed for Chest Pain., Disp: 1 each, Rfl: 6    nystatin (MYCOSTATIN) 861386 UNIT/GM ointment, Apply 1 application  topically to the appropriate area as directed 2 (Two) Times a Day., Disp: , Rfl:     O2 (OXYGEN), Inhale 2 L/min Every Night. 2L all the time now, Disp: , Rfl:     omeprazole (priLOSEC) 40 MG capsule, Take 1 capsule by mouth 2 (Two) Times a Day., Disp: 180 capsule, Rfl: 0    pramipexole (MIRAPEX) 1.5 MG tablet, TAKE 1 TABLET EVERY NIGHT, Disp: 90  "tablet, Rfl: 0    predniSONE (DELTASONE) 5 MG tablet, Take 1 tablet by mouth Daily., Disp: , Rfl:     ranolazine (RANEXA) 500 MG 12 hr tablet, Take 2 tablets by mouth Every 12 (Twelve) Hours., Disp: 360 tablet, Rfl: 3    senna (SENOKOT) 8.6 MG tablet, Take 1 tablet by mouth Daily., Disp: , Rfl:     spironolactone (ALDACTONE) 25 MG tablet, Take 2 tablets by mouth Daily., Disp: 180 tablet, Rfl: 2    traMADol (ULTRAM) 50 MG tablet, Take 1 tablet by mouth Every 8 (Eight) Hours As Needed for Moderate Pain., Disp: , Rfl:     Triamcinolone Acetonide (NASACORT) 55 MCG/ACT nasal inhaler, 2 sprays Daily., Disp: , Rfl:     Unithroid 175 MCG tablet, Take 1 tablet by mouth Daily., Disp: 90 tablet, Rfl: 1    valsartan (DIOVAN) 160 MG tablet, Take 1 tablet by mouth 2 (Two) Times a Day., Disp: 180 tablet, Rfl: 2    vitamin C (ASCORBIC ACID) 500 MG tablet, Take 1 tablet by mouth Daily. Chewable tablet, Disp: , Rfl:     Zinc 50 MG capsule, Take 1 capsule by mouth Daily., Disp: , Rfl:     Allergies:   Allergies   Allergen Reactions    Amlodipine Besylate Swelling     Lower extremity (ankles, feet) swelling    Entacapone Other (See Comments)     \"extreme weakness in legs - caused several falls, which stopped after discontinuing this medication\"    Epinephrine Other (See Comments)     6/4/16- had 3 shots to numb mouth to prepare teeth for crowns, the shots contained epi-  Caused pt to have chest discomfort- went to hospital in ambulance, discovered had a fib while there     Levemir [Insulin Detemir] Hives     Hives / rash around injection site    Penicillins Hives     Jitteriness     Xarelto [Rivaroxaban] GI Bleeding     hgb dropped to 5.2    Aricept [Donepezil Hcl] Nausea Only     Vivid dreams    Benztropine Mesylate Other (See Comments)     Uncontrollable body movements    Cogentin [Benztropine] Other (See Comments)     \"uncontrollable body movements\"    Compazine [Prochlorperazine Edisylate] Other (See Comments)     Dystonic reaction " "   Duraprep [Antiseptic Products, Misc.] Itching and Rash     RASH AND ITCHING    Haldol [Haloperidol Lactate] Other (See Comments)     Dystonic reaction    Hydralazine Other (See Comments)     Headache     Lisinopril Cough    Statins Myalgia     Leg pain- all statins     Sulfamethoxazole Nausea Only and Other (See Comments)    Toprol Xl [Metoprolol Tartrate] Other (See Comments)     Extreme fatigue, decreased exercise tolerance    Trimethoprim Other (See Comments)     Other reaction(s): Nausea       Objective   Vital Signs: Blood pressure 120/56, pulse 60, height 157.5 cm (62\"), weight 109 kg (240 lb), SpO2 100%, not currently breastfeeding.    PHYSICAL EXAM  General appearance: Awake, alert, cooperative  Lungs: Clear to ascultation bilaterally  Heart: Regular rate and rhythm, no murmurs, 2+ LE pulses, no lower extremity swelling  Skin: Skin color, turgor normal, no rashes or lesions  Neurologic: Grossly normal     Lab Results   Component Value Date    GLUCOSE 168 (H) 10/19/2023    CALCIUM 9.6 10/19/2023     10/19/2023    K 3.9 10/19/2023    CO2 23 10/19/2023     10/19/2023    BUN 31 (H) 10/19/2023    CREATININE 1.15 (H) 10/19/2023    EGFRIFNONA 66 06/12/2019    BCR 27 10/19/2023    ANIONGAP 9.0 09/19/2023     Lab Results   Component Value Date    WBC 5.2 10/19/2023    HGB 10.3 (L) 10/19/2023    HCT 31.2 (L) 10/19/2023    MCV 92 10/19/2023     10/19/2023     Lab Results   Component Value Date    INR 1.13 (H) 09/11/2023    INR 1.19 (H) 10/12/2022    INR 1.24 (H) 06/06/2019    PROTIME 14.7 (H) 09/11/2023    PROTIME 15.1 (H) 10/12/2022    PROTIME 15.0 (H) 06/06/2019     Lab Results   Component Value Date    TSH 0.453 10/19/2023          Results for orders placed during the hospital encounter of 12/24/18    Adult Transthoracic Echo Complete W/ Cont if Necessary Per Protocol    Interpretation Summary  · Mild mitral valve regurgitation is present.  · Calculated right ventricular systolic pressure from " tricuspid regurgitation is 29 mmHg.  · Estimated EF = 60%.  · Left ventricular systolic function is normal.  · Mild tricuspid valve regurgitation is present.  · There is no evidence of pericardial effusion.  · No evidence of pulmonary hypertension is present.  · Mild MAC is present.  · The aortic valve exhibits sclerosis.  · Normal right ventricular cavity size, wall thickness, systolic function and septal motion noted.     Results for orders placed during the hospital encounter of 02/01/19    Cardiac Catheterization/Vascular Study    Narrative  FINAL    Impression  · Patent right coronary stent  · Noncritical mid LAD disease with an IFR of 0.93  · Noncritical circumflex disease  · Normal LV systolic function wall motion    RECOMMENDATIONS:  · Other sources for the patient's chest discomfort should be considered  · Continued aggressive risk factor modification    Indications: Chest pain consistent with the patient's previous symptoms of unstable angina    Access: Right radial      Procedures:  · Left heart catheterization.  · Left ventriculogram.  · Selective coronary angiography.  · IFR of the mid LAD      Procedure narrative:  The patient was brought to the catheterization lab in a fasting condition.  Access site was prepped and draped in standard sterile fashion.  Lidocaine was injected and arterial access was obtained by percutaneous anterior wall puncture technique.  A 6 Sudanese arterial sheath was placed in the right radial using a modified Seldinger technique.  Selective coronary arteriography was performed using the Sonu technique with a 6 Sudanese 4 curved Sonu right catheter and a 6 Sudanese 4 curved Sonu left catheter.  Nonionic contrast was used and was injected manually.  Left ventriculography was performed using 30 ML of contrast power injected at 10 ML per second.  Left heart pressure pull back revealed no gradient.  At the time of the procedure the patient was noted to have a borderline-appearing  areas within the mid LAD.  The decision was made to perform an IFR.  Heparin was administered per protocol for final ACT of 235 seconds.  A 6 [place] Mach 1 JL 3.5 guide was used as well as the Veratta wire.  IFR's were obtained of 0.94, 0.93 and 0.93 indicating no need for intervention.  That point the procedure was concluded.  There were no complications.  Sheath was removed and a TR band was placed.      Contrast: 135 ml    Hemodynamic Findings:    LV pressure: 140/2/18 mmHg, on pull back no gradient was recorded across the aortic valve.  Ao pressure: 130/60 mmHg    Left ventriculography:    Single plane JORDAN left ventriculography disclosed normal left ventricular systolic function wall motion with an LVEF estimated at 55-60%.  No significant mitral regurgitation was seen.    Angiographic Findings:    LMCA: The left main coronary artery gives rise to LAD and circumflex vessels and is free of disease.    Circumflex: Circumflex coronary artery is co dominant for the posterior circulation and gives rise to 2 large obtuse marginal branches a moderate third obtuse marginal branch and a moderate fourth obtuse marginal branchWhich courses posterolaterally.  The circumflex and its branches contain minimal irregularities.  No stenosis greater than 20% to 30% is seen.    LAD: The LAD gives rise to a moderate first diagonal branch moderate second diagonal branch small third diagonal branch and a large apical recurrent branch.  The LAD contains a narrowing in the mid vessel which is estimated at 40% followed by another narrowing estimated at up to 60-70% and a third narrowing estimated at 50-60%.  IFR of the 3 tandem lesions is adequate at 0.93.    RCA: The right coronary artery has been previously stented proximal and mid segments.  The stented site is widely patent.  The right coronary is dominant codominant for the posterior circulation and gives rise to the PDA and small posterolateral branch.      I personally viewed and  interpreted the patient's EKG/Telemetry/lab data    Procedures    Joanna Cross  reports that she has never smoked. She has been exposed to tobacco smoke. She has never used smokeless tobacco.         Advance Care Planning   Advance Care Planning: ACP discussion was declined by the patient. Patient has an advance directive in EMR which is still valid.      Assessment & Plan    1. Tachy-thai syndrome  S/p DC PPM    Device interrogation today showed RA pacing 30%, RV pacing 3%, RA threshold 3.2 at 1.0 likely draining the battery fairly quickly with poor pacing at 0.2.  8 months left on the battery.  There has been noise present on the device transmission/interrogation since a fall in 2018.  It is likely not accurately detecting atrial fibrillation at all.  RV pacing 3% with normal threshold and impedance values.    Patient has a shoulder surgery coming up next week.  Patient's risk is nonprohibitive from a cardiac electrophysiological standpoint.  Her pacemaker malfunction is not dangerous, but is likely just draining the battery much quicker than otherwise.  We discussed possible new lead implantation with next generator change.  The patient will think about it and call our office once she has recovered from her shoulder surgery and we will schedule from there.    2. Paroxysmal atrial fibrillation  CHADSVASC=7, on Eliquis.  Continue Tikosyn with acceptable QT on EKG yesterday. Patient denies any episodes of pervasive palpitations. Device interrogation is unreliable for atrial fibrillation burden due to poor sensing.    3. Long term current use of antiarrhythmic drug    4. Cardiac pacemaker in situ  See (1) for device interrogation       Follow Up:  No follow-ups on file. Patient will call when she has recovered from shoulder surgery to schedule generator change with new lead implantation.      Thank you for allowing me to participate in the care of your patient. Please do not hesitate to contact me with  additional questions or concerns.        Scribed by   Justin Teixeira PA-C for:  Lauro Francois MD  [facility]    I, BRIGITTE Chris, personally performed the services described in this documentation as scribed by the above named individual in my presence, and it is both accurate and complete.  12/22/2023  10:59 EST

## 2023-12-27 PROBLEM — T82.599A MECHANICAL COMPLICATION OF CARDIOVASCULAR DEVICE: Status: ACTIVE | Noted: 2023-12-22

## 2024-01-02 ENCOUNTER — TELEPHONE (OUTPATIENT)
Dept: CARDIOLOGY | Facility: CLINIC | Age: 74
End: 2024-01-02
Payer: MEDICARE

## 2024-01-02 NOTE — TELEPHONE ENCOUNTER
"Patient left voice mail message stating she has had a sharp pin her her jaw, especially at night.  She states it feels like a \"giant bee\" stinging her and radiates to her cheek.  Spoke with patient.  She reports having three episodes of sharp pain in her left jaw.  There are no associated symptoms.  She states they occur when she lays down at night and last about 5 minutes.  She states they hurt so bad they make her cry.  Her last stress test in October of last year was normal.  Her ranexa was increased to 1000mg BID.  She has had no change in her breathing.  I will discuss with Amena Grover PA-C who saw her last in clinic and call her back.  She verbalizes understanding.  "

## 2024-01-04 NOTE — TELEPHONE ENCOUNTER
Discussed with [facility] - she does not think patients pain is cardiac related.  It may be TMJ.  She is encouraged to follow up with her PCP.  Spoke with patient.  She is encouraged to follow up with her PCP or dentist.  She verbalizes understanding.

## 2024-01-11 ENCOUNTER — PREP FOR SURGERY (OUTPATIENT)
Dept: OTHER | Facility: HOSPITAL | Age: 74
End: 2024-01-11
Payer: MEDICARE

## 2024-01-11 DIAGNOSIS — T82.110D PACEMAKER LEAD MALFUNCTION, SUBSEQUENT ENCOUNTER: ICD-10-CM

## 2024-01-11 DIAGNOSIS — Z95.0 CARDIAC PACEMAKER IN SITU: Primary | ICD-10-CM

## 2024-01-11 RX ORDER — SODIUM CHLORIDE 0.9 % (FLUSH) 0.9 %
10 SYRINGE (ML) INJECTION AS NEEDED
OUTPATIENT
Start: 2024-01-11

## 2024-01-11 RX ORDER — SODIUM CHLORIDE 9 MG/ML
40 INJECTION, SOLUTION INTRAVENOUS AS NEEDED
OUTPATIENT
Start: 2024-01-11

## 2024-01-11 RX ORDER — ONDANSETRON 2 MG/ML
4 INJECTION INTRAMUSCULAR; INTRAVENOUS EVERY 6 HOURS PRN
OUTPATIENT
Start: 2024-01-11

## 2024-01-11 RX ORDER — NITROGLYCERIN 0.4 MG/1
0.4 TABLET SUBLINGUAL
OUTPATIENT
Start: 2024-01-11

## 2024-01-11 RX ORDER — ACETAMINOPHEN 325 MG/1
650 TABLET ORAL EVERY 4 HOURS PRN
OUTPATIENT
Start: 2024-01-11

## 2024-01-11 RX ORDER — CEFAZOLIN SODIUM 2 G/100ML
2000 INJECTION, SOLUTION INTRAVENOUS ONCE
OUTPATIENT
Start: 2024-01-11 | End: 2024-01-11

## 2024-01-11 RX ORDER — SODIUM CHLORIDE 0.9 % (FLUSH) 0.9 %
3 SYRINGE (ML) INJECTION EVERY 12 HOURS SCHEDULED
OUTPATIENT
Start: 2024-01-11

## 2024-01-12 ENCOUNTER — TELEPHONE (OUTPATIENT)
Dept: FAMILY MEDICINE CLINIC | Facility: CLINIC | Age: 74
End: 2024-01-12

## 2024-01-16 ENCOUNTER — HOSPITAL ENCOUNTER (OUTPATIENT)
Dept: GENERAL RADIOLOGY | Facility: HOSPITAL | Age: 74
Discharge: HOME | End: 2024-01-16
Admitting: RADIOLOGY
Payer: MEDICARE

## 2024-01-16 ENCOUNTER — HOSPITAL ENCOUNTER (OUTPATIENT)
Dept: NUCLEAR MEDICINE | Facility: HOSPITAL | Age: 74
Discharge: HOME | End: 2024-01-16
Payer: MEDICARE

## 2024-01-16 DIAGNOSIS — R06.00 DYSPNEA, UNSPECIFIED TYPE: ICD-10-CM

## 2024-01-16 DIAGNOSIS — R06.09 DYSPNEA ON EXERTION: ICD-10-CM

## 2024-01-16 PROCEDURE — 71045 X-RAY EXAM CHEST 1 VIEW: CPT

## 2024-01-16 PROCEDURE — 78580 LUNG PERFUSION IMAGING: CPT

## 2024-01-16 PROCEDURE — A9540 TC99M MAA: HCPCS | Performed by: NURSE PRACTITIONER

## 2024-01-16 PROCEDURE — 0 TECHNETIUM ALBUMIN AGGREGATED: Performed by: NURSE PRACTITIONER

## 2024-01-16 RX ADMIN — KIT FOR THE PREPARATION OF TECHNETIUM TC 99M ALBUMIN AGGREGATED 1 DOSE: 2.5 INJECTION, POWDER, FOR SOLUTION INTRAVENOUS at 15:30

## 2024-01-17 RX ORDER — VALSARTAN 160 MG/1
160 TABLET ORAL 2 TIMES DAILY
Qty: 180 TABLET | Refills: 3 | Status: SHIPPED | OUTPATIENT
Start: 2024-01-17

## 2024-01-17 RX ORDER — PRAMIPEXOLE DIHYDROCHLORIDE 1.5 MG/1
1.5 TABLET ORAL NIGHTLY
Qty: 90 TABLET | Refills: 3 | Status: SHIPPED | OUTPATIENT
Start: 2024-01-17

## 2024-01-22 ENCOUNTER — OFFICE VISIT (OUTPATIENT)
Dept: FAMILY MEDICINE CLINIC | Facility: CLINIC | Age: 74
End: 2024-01-22
Payer: MEDICARE
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
## 2024-01-22 DIAGNOSIS — R60.0 EDEMA OF LOWER EXTREMITY: ICD-10-CM

## 2024-01-22 DIAGNOSIS — L03.119 CELLULITIS OF LOWER EXTREMITY, UNSPECIFIED LATERALITY: ICD-10-CM

## 2024-01-22 DIAGNOSIS — R53.1 WEAKNESS: ICD-10-CM
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
## 2024-01-22 PROCEDURE — 99214 OFFICE O/P EST MOD 30 MIN: CPT | Performed by: FAMILY MEDICINE

## 2024-01-22 RX ORDER — CLINDAMYCIN HYDROCHLORIDE 300 MG/1
300 CAPSULE ORAL 3 TIMES DAILY
Qty: 30 CAPSULE | Refills: 0 | Status: SHIPPED | OUTPATIENT
Start: 2024-01-22

## 2024-01-22 RX ORDER — NYSTATIN 100000 U/G
1 OINTMENT TOPICAL 2 TIMES DAILY
Start: 2024-01-22 | End: 2024-01-29 | Stop reason: SDUPTHER

## 2024-01-22 RX ORDER — CITALOPRAM 20 MG/1
20 TABLET ORAL DAILY
COMMUNITY
Start: 2024-01-16

## 2024-01-22 RX ORDER — DOXYCYCLINE 100 MG/1
100 TABLET ORAL 2 TIMES DAILY
Qty: 20 TABLET | Refills: 0 | Status: SHIPPED | OUTPATIENT
Start: 2024-01-22 | End: 2024-01-22

## 2024-01-22 NOTE — ASSESSMENT & PLAN NOTE
She is going to find some support hose that fit around her leg.  I told her to ask the pharmacist for some help in finding some.

## 2024-01-22 NOTE — PROGRESS NOTES
Patient Name: Joanna Cross  : 1950   MRN: [id number]     Chief Complaint:    Chief Complaint   Patient presents with    ER Follow up       History of Present Illness: Joanna Cross is a 73 y.o. female who is here today for follow up.  HPI        Review of Systems:   Review of Systems     Past Medical History:   Past Medical History:   Diagnosis Date    Anemia     Ankle problem     thinks back related causing pain     Atrial fibrillation     AVM (arteriovenous malformation)     Back pain     Chronic kidney disease     stage 3 per pt    Chronic lung disease 2022    CKD (chronic kidney disease)     Clotting disorder 2016    AVM - small intestine    Coronary artery disease involving native coronary artery without angina pectoris 2017    COVID-19 vaccine series completed     Gastrocnemius muscle tear     left medial 91    Generalized osteoarthritis     GERD (gastroesophageal reflux disease)     Gestational diabetes     GIB (gastrointestinal bleeding) 2016    d/t xarelto     Headache     Hearing decreased, bilateral     HAS HEARING AID, NOT WEARING IT CURRENTLY    Hiatal hernia     History of shingles     History of transfusion 2016    no reaction recalled     Hyperlipidemia LDL goal <70 2017    Hypertension     Hypothyroidism     IBS (irritable bowel syndrome)     Klebsiella pneumonia     Lichen sclerosus     Lupus     subQ    Mouth problem     mouth guard used since pt bites tongue and lips excessively at night if not- with bipap     MRSA infection 2018    Myocardial infarction     Obesity     On home oxygen therapy     2L of oxygen all the time due to current congestion     Osteoporosis     Parkinson's disease     Peripheral vascular disease     Pleurisy     Pneumonia     Puerperal sepsis with acute hypoxic respiratory failure     emergent intubation- 2016    Pulmonary embolism     Right leg pain     from back issues     Salivary gland stone     Sciatic nerve pain      Seborrheic dermatitis     Skin cancer     on back     Sleep apnea     CPAP AND HOME O2 2L/M    TIA (transient ischemic attack) 2014    no residual effects    Type 2 diabetes mellitus     UTI (urinary tract infection)     Vitamin D deficiency 09/08/2022    Wears glasses        Past Surgical History:   Past Surgical History:   Procedure Laterality Date    ABLATION OF DYSRHYTHMIC FOCUS  05/16/13    Laser Ablation - Rt Leg    BACK SURGERY      l4-l5 laminectomy     BICEPS TENDON REPAIR Right     shoulder    BREAST BIOPSY Left 05/2004    excisional, benign    BRONCHOSCOPY RIGID / FLEXIBLE      2016    BUNIONECTOMY Right     CARDIAC CATHETERIZATION      CARDIAC CATHETERIZATION N/A 02/01/2019    Procedure: Left Heart Cath;  Surgeon: Albertina Corona MD;  Location:  [facility];  Service: Cardiology    CHOLECYSTECTOMY      COLONOSCOPY  2016    COLONOSCOPY N/A 06/17/2021    Procedure: COLONOSCOPY WITH POLYPECTOMY;  Surgeon: Trenton Sosa MD;  Location: [facility] ENDOSCOPY;  Service: Gastroenterology;  Laterality: N/A;    CORONARY STENT PLACEMENT      x1 stent    CYST REMOVAL      left ear, upper left back 2001    CYSTOSCOPY      x2  18 and 20 -   in 20 urethra dilation     DIAGNOSTIC LAPAROSCOPY  1981    ENDOSCOPIC FUNCTIONAL SINUS SURGERY (FESS)  2011    ENDOSCOPY  2016    ENTEROSCOPY VIA STOMA      with single ballon with fluoro     HAMMER TOE REPAIR Left     INCISION AND DRAINAGE OF WOUND  2018    back with wound infection     INSERT / REPLACE / REMOVE PACEMAKER  11/10/16    [facility]    JOINT REPLACEMENT      KNEE ARTHROSCOPY      LASER ABLATION      right leg 13    LIPOMA EXCISION  1999    left leg     LUMBAR LAMINECTOMY DISCECTOMY DECOMPRESSION N/A 07/06/2018    Procedure: LUMBAR LAMINECTOMY L4-5, HEMILAMIINECTOMY RIGHT L5-S1, FORAMINOTOMY L5-S1;  Surgeon: Lencho Gamino MD;  Location: [facility] OR;  Service: Neurosurgery    LUMBAR LAMINECTOMY DISCECTOMY DECOMPRESSION N/A  2018    Procedure: INCISION AND DRAINAGE BACK WITH WOUND EXPLORATION;  Surgeon: Ritchie García MD;  Location:  [facility] OR;  Service: Neurosurgery    LUNG BIOPSY Left 2016    OTHER SURGICAL HISTORY      ct scan of chest and sinuses and lower back     OTHER SURGICAL HISTORY      various echos     OTHER SURGICAL HISTORY      electroencephalogram 16,   emg  ncv tests     OTHER SURGICAL HISTORY      mra   and various mri with xrays, nuclear medicine lung ventilation with perfusion test 2016 with pft     OTHER SURGICAL HISTORY      barium swallow testing 15, 12, 12    OTHER SURGICAL HISTORY      vaginal ultrasound- ,   vas clementina lower extrem , vas venous duplex lower extrem bilat     OTHER SURGICAL HISTORY      wdge biopsy spring of lung     OTHER SURGICAL HISTORY      emergent intubation- hypoxic resp failure     PACEMAKER IMPLANTATION  2016    ss[facility]    REPLACEMENT TOTAL KNEE Bilateral     left knee , right  per dr acuna     REPLACEMENT TOTAL KNEE Bilateral     SHOULDER ARTHROSCOPY Bilateral     -left, - right     SKIN BIOPSY      skin cancer back 2016    SKIN CANCER EXCISION      upper righ tback     MADHAV      TEETH EXTRACTION      x2    TOTAL SHOULDER ARTHROPLASTY W/ DISTAL CLAVICLE EXCISION Left 10/24/2022    Procedure: REVERSE TOTAL SHOULDER ARTHROPLASTY, BICEPS TENODESIS - LEFT;  Surgeon: Sammy Yo MD;  Location:  [facility] OR;  Service: Orthopedics;  Laterality: Left;    TOTAL SHOULDER ARTHROPLASTY W/ DISTAL CLAVICLE EXCISION Right 2023    Procedure: REVERSE TOTAL SHOULDER  ARTHROPLASTY WITH BICEPS TENODESIS - RIGHT;  Surgeon: Sammy Yo MD;  Location:  [facility] OR;  Service: Orthopedics;  Laterality: Right;    TUBAL ABDOMINAL LIGATION Bilateral     WISDOM TOOTH EXTRACTION         Family History:   Family History   Problem Relation Age of Onset    Kidney disease Mother     Coronary artery disease Mother     Hypertension Mother             Heart  disease Mother             Hyperlipidemia Mother             Coronary artery disease Father     Hypertension Father             Hypothyroidism Father     Cancer Father         Oral cancer    Heart disease Father             Coronary artery disease Brother     Hypertension Brother     Heart disease Brother         Multiple stents, by-pass surgery    Testicular cancer Brother     Kidney cancer Maternal Uncle     Testicular cancer Maternal Uncle     Colon polyps Neg Hx     Colon cancer Neg Hx        Social History:   Social History     Socioeconomic History    Marital status:      Spouse name: N/A    Number of children: 4    Years of education: College    Highest education level: Master's degree (e.g., MA, MS, Randi, MEd, MSW, NELLA)   Tobacco Use    Smoking status: Never     Passive exposure: Past    Smokeless tobacco: Never    Tobacco comments:     Father and mother smoked for several years.   Vaping Use    Vaping Use: Never used    Passive vaping exposure: Yes   Substance and Sexual Activity    Alcohol use: Never    Drug use: No    Sexual activity: Not Currently     Partners: Male     Birth control/protection: Post-menopausal, Tubal ligation, Vaginal insert contraception       Medications:     Current Outpatient Medications:     Accu-Chek Guide test strip, Testing 3 times per day; E11.65, Disp: 300 each, Rfl: 3    Accu-Chek Softclix Lancets lancets, USE ONE LANCET TO TEST THREE TIMES A DAY dx e11.65 on insulin, Disp: 300 each, Rfl: 3    albuterol (PROVENTIL) (2.5 MG/3ML) 0.083% nebulizer solution, Take 2.5 mg by nebulization Every 4 (Four) Hours As Needed for Wheezing or Shortness of Air., Disp: 1 each, Rfl: 3    albuterol sulfate HFA (Ventolin HFA) 108 (90 Base) MCG/ACT inhaler, Inhale 1 puff Every 4 (Four) Hours As Needed for Wheezing or Shortness of Air., Disp: 8 g, Rfl: 5    amantadine (SYMMETREL) 100 MG capsule, Take 1 capsule by mouth 2 (Two) Times a Day., Disp: , Rfl:      apixaban (Eliquis) 5 MG tablet tablet, Take 1 tablet by mouth Every 12 (Twelve) Hours., Disp: 180 tablet, Rfl: 2    aspirin 81 MG EC tablet, Take 1 tablet by mouth Daily., Disp: , Rfl:     B Complex-C (SUPER B COMPLEX PO), Take 1 tablet by mouth Daily., Disp: , Rfl:     B-D UF III MINI PEN NEEDLES 31G X 5 MM misc, USE TO INJECT INSULIN DAILY, Disp: 90 each, Rfl: 3    bumetanide (Bumex) 1 MG tablet, Take 1 tablet by mouth 2 (Two) Times a Day. tAKE BID FOR 3 DAYS THEN ONCE A DAY, Disp: 60 tablet, Rfl: 6    Calcium Carbonate (CALTRATE 600 PO), Take 600 mg by mouth Daily., Disp: , Rfl:     carbidopa-levodopa (SINEMET)  MG per tablet, Take  by mouth. 2 tablets at 0600, 1 tablet at 0900, 1200, 1500, 1800, 2100, Disp: , Rfl:     Cholecalciferol 2000 units tablet, Take 1 tablet by mouth 2 (Two) Times a Day., Disp: , Rfl:     citalopram (CeleXA) 20 MG tablet, Take 1 tablet by mouth Daily., Disp: , Rfl:     clobetasol (TEMOVATE) 0.05 % cream, Apply 1 application  topically to the appropriate area as directed 2 (Two) Times a Day As Needed (Lichens Sclerosis)., Disp: , Rfl:     dofetilide (TIKOSYN) 500 MCG capsule, TAKE 1 CAPSULE EVERY 12 HOURS, Disp: 180 capsule, Rfl: 3    Glucosamine-Chondroit-Calcium (TRIPLE FLEX BONE & JOINT PO), Take 1 tablet by mouth Daily. Move free, Disp: , Rfl:     hydrocortisone 2.5 % ointment, Apply  topically to the appropriate area as directed 3 (Three) Times a Day., Disp: 28.35 g, Rfl: 1    hydroxychloroquine (PLAQUENIL) 200 MG tablet, Daily., Disp: , Rfl:     Insulin Glargine (Lantus SoloStar) 100 UNIT/ML injection pen, Inject 12-14 Units under the skin into the appropriate area as directed Daily., Disp: 15 mL, Rfl: 1    ipratropium (ATROVENT) 0.03 % nasal spray, 2 sprays into the nostril(s) as directed by provider 2 (Two) Times a Day As Needed (CONGESTION SINUS)., Disp: 30 mL, Rfl: 11    Loratadine 10 MG capsule, Take 1 capsule by mouth Daily., Disp: , Rfl:     metFORMIN (GLUCOPHAGE) 500  MG tablet, Take 1 tablet by mouth 2 (Two) Times a Day With Meals. NEW DOSE, Disp: 180 tablet, Rfl: 2    metOLazone (ZAROXOLYN) 2.5 MG tablet, TAKE 1 TABLET DAILY, Disp: 90 tablet, Rfl: 3    Multiple Vitamins-Minerals (CENTRUM ULTRA WOMENS PO), Take 1 tablet by mouth Daily., Disp: , Rfl:     nitroglycerin (NITROLINGUAL) 0.4 MG/SPRAY spray, Place 1 spray under the tongue Every 5 (Five) Minutes As Needed for Chest Pain., Disp: 1 each, Rfl: 6    nystatin (MYCOSTATIN) 534464 UNIT/GM ointment, Apply 1 Application topically to the appropriate area as directed 2 (Two) Times a Day., Disp: , Rfl:     O2 (OXYGEN), Inhale 2 L/min Every Night. 2L all the time now, Disp: , Rfl:     omeprazole (priLOSEC) 40 MG capsule, Take 1 capsule by mouth 2 (Two) Times a Day., Disp: 180 capsule, Rfl: 0    pramipexole (MIRAPEX) 1.5 MG tablet, TAKE 1 TABLET EVERY NIGHT, Disp: 90 tablet, Rfl: 3    predniSONE (DELTASONE) 5 MG tablet, Take 1 tablet by mouth Daily., Disp: , Rfl:     ranolazine (RANEXA) 500 MG 12 hr tablet, Take 2 tablets by mouth Every 12 (Twelve) Hours., Disp: 360 tablet, Rfl: 3    senna (SENOKOT) 8.6 MG tablet, Take 1 tablet by mouth Daily., Disp: , Rfl:     spironolactone (ALDACTONE) 25 MG tablet, Take 2 tablets by mouth Daily., Disp: 180 tablet, Rfl: 2    traMADol (ULTRAM) 50 MG tablet, Take 1 tablet by mouth Every 8 (Eight) Hours As Needed for Moderate Pain., Disp: , Rfl:     Triamcinolone Acetonide (NASACORT) 55 MCG/ACT nasal inhaler, 2 sprays Daily., Disp: , Rfl:     Unithroid 175 MCG tablet, Take 1 tablet by mouth Daily., Disp: 90 tablet, Rfl: 1    valsartan (DIOVAN) 160 MG tablet, TAKE 1 TABLET TWICE A DAY, Disp: 180 tablet, Rfl: 3    vitamin C (ASCORBIC ACID) 500 MG tablet, Take 1 tablet by mouth Daily. Chewable tablet, Disp: , Rfl:     Zinc 50 MG capsule, Take 1 capsule by mouth Daily., Disp: , Rfl:     clindamycin (Cleocin) 300 MG capsule, Take 1 capsule by mouth 3 (Three) Times a Day., Disp: 30 capsule, Rfl:  "0    Allergies:   Allergies   Allergen Reactions    Amlodipine Besylate Swelling     Lower extremity (ankles, feet) swelling    Entacapone Other (See Comments)     \"extreme weakness in legs - caused several falls, which stopped after discontinuing this medication\"    Epinephrine Other (See Comments)     6/4/16- had 3 shots to numb mouth to prepare teeth for crowns, the shots contained epi-  Caused pt to have chest discomfort- went to hospital in ambulance, discovered had a fib while there     Levemir [Insulin Detemir] Hives     Hives / rash around injection site    Penicillins Hives     Jitteriness     Xarelto [Rivaroxaban] GI Bleeding     hgb dropped to 5.2    Aricept [Donepezil Hcl] Nausea Only     Vivid dreams    Benztropine Mesylate Other (See Comments)     Uncontrollable body movements    Cogentin [Benztropine] Other (See Comments)     \"uncontrollable body movements\"    Compazine [Prochlorperazine Edisylate] Other (See Comments)     Dystonic reaction    Duraprep [Antiseptic Products, Misc.] Itching and Rash     RASH AND ITCHING    Haldol [Haloperidol Lactate] Other (See Comments)     Dystonic reaction    Hydralazine Other (See Comments)     Headache     Lisinopril Cough    Statins Myalgia     Leg pain- all statins     Sulfamethoxazole Nausea Only and Other (See Comments)    Toprol Xl [Metoprolol Tartrate] Other (See Comments)     Extreme fatigue, decreased exercise tolerance    Trimethoprim Other (See Comments)     Other reaction(s): Nausea         Physical Exam:  Vital Signs: There were no vitals filed for this visit.  There is no height or weight on file to calculate BMI.     Physical Exam    Procedures      Assessment/Plan:   Diagnoses and all orders for this visit:    1. Fall, sequela (Primary)  Assessment & Plan:  Patient fell and was seen in the ER.  She has a bruise on her left arm.  Reviewed hospital stay with her.  She is improving.  We will follow.      2. Cellulitis of lower extremity, unspecified " laterality  Assessment & Plan:  Patient has a bilateral cellulitis.  She is allergic to penicillin and sulfa.  Her orthopedic surgeon wants her to take antibiotics prior to dental procedures as she has bilateral knee replacement.  She is having dental surgery next week.  She usually takes clindamycin for this.  I am going to call in clindamycin for her cellulitis and dental prophylaxis.  I warned her about the possibility of C. difficile diarrhea she is and she should take a probiotic.      3. Weakness  Assessment & Plan:  This is improving since fall.      4. Edema of lower extremity  Assessment & Plan:  She is going to find some support hose that fit around her leg.  I told her to ask the pharmacist for some help in finding some.      Other orders  -     hydrocortisone 2.5 % ointment; Apply  topically to the appropriate area as directed 3 (Three) Times a Day.  Dispense: 28.35 g; Refill: 1  -     nystatin (MYCOSTATIN) 060921 UNIT/GM ointment; Apply 1 Application topically to the appropriate area as directed 2 (Two) Times a Day.  -     Discontinue: doxycycline (ADOXA) 100 MG tablet; Take 1 tablet by mouth 2 (Two) Times a Day.  Dispense: 20 tablet; Refill: 0  -     clindamycin (Cleocin) 300 MG capsule; Take 1 capsule by mouth 3 (Three) Times a Day.  Dispense: 30 capsule; Refill: 0             Follow Up:   No follow-ups on file.      Reynaldo Fritz MD  [facility]

## 2024-01-22 NOTE — ASSESSMENT & PLAN NOTE
Patient has a bilateral cellulitis.  She is allergic to penicillin and sulfa.  Her orthopedic surgeon wants her to take antibiotics prior to dental procedures as she has bilateral knee replacement.  She is having dental surgery next week.  She usually takes clindamycin for this.  I am going to call in clindamycin for her cellulitis and dental prophylaxis.  I warned her about the possibility of C. difficile diarrhea she is and she should take a probiotic.

## 2024-01-22 NOTE — ASSESSMENT & PLAN NOTE
Patient fell and was seen in the ER.  She has a bruise on her left arm.  Reviewed hospital stay with her.  She is improving.  We will follow.

## 2024-01-26 ENCOUNTER — APPOINTMENT (OUTPATIENT)
Dept: CARDIOLOGY | Facility: HOSPITAL | Age: 74
End: 2024-01-26
Payer: MEDICARE

## 2024-01-26 ENCOUNTER — HOSPITAL ENCOUNTER (OUTPATIENT)
Facility: HOSPITAL | Age: 74
Setting detail: HOSPITAL OUTPATIENT SURGERY
Discharge: HOME | End: 2024-01-26
Attending: STUDENT IN AN ORGANIZED HEALTH CARE EDUCATION/TRAINING PROGRAM | Admitting: STUDENT IN AN ORGANIZED HEALTH CARE EDUCATION/TRAINING PROGRAM
Payer: MEDICARE

## 2024-01-26 ENCOUNTER — APPOINTMENT (OUTPATIENT)
Dept: GENERAL RADIOLOGY | Facility: HOSPITAL | Age: 74
End: 2024-01-26
Payer: MEDICARE

## 2024-01-26 VITALS
OXYGEN SATURATION: 96 % | SYSTOLIC BLOOD PRESSURE: 141 MMHG | DIASTOLIC BLOOD PRESSURE: 78 MMHG | WEIGHT: 240.3 LBS | HEIGHT: 63 IN | BODY MASS INDEX: 42.58 KG/M2 | HEART RATE: 66 BPM | RESPIRATION RATE: 14 BRPM | TEMPERATURE: 97.3 F

## 2024-01-26 DIAGNOSIS — T82.198A OTHER MECHANICAL COMPLICATION OF OTHER CARDIAC ELECTRONIC DEVICE, INITIAL ENCOUNTER: ICD-10-CM

## 2024-01-26 DIAGNOSIS — I48.0 PAROXYSMAL ATRIAL FIBRILLATION: ICD-10-CM

## 2024-01-26 DIAGNOSIS — T82.110D PACEMAKER LEAD MALFUNCTION, SUBSEQUENT ENCOUNTER: ICD-10-CM

## 2024-01-26 DIAGNOSIS — Z95.0 CARDIAC PACEMAKER IN SITU: ICD-10-CM

## 2024-01-26 LAB
ANION GAP SERPL CALCULATED.3IONS-SCNC: 12 MMOL/L (ref 5–15)
BH CV ECHO LEFT VENTRICLE GLOBAL LONGITUDINAL STRAIN: -12 %
BH CV ECHO MEAS - AO MAX PG: 10.1 MMHG
BH CV ECHO MEAS - AO MEAN PG: 5 MMHG
BH CV ECHO MEAS - AO ROOT DIAM: 2.9 CM
BH CV ECHO MEAS - AO V2 MAX: 159 CM/SEC
BH CV ECHO MEAS - AO V2 VTI: 38.5 CM
BH CV ECHO MEAS - AVA(I,D): 2.33 CM2
BH CV ECHO MEAS - EDV(CUBED): 85.2 ML
BH CV ECHO MEAS - EDV(MOD-SP2): 83.9 ML
BH CV ECHO MEAS - EDV(MOD-SP4): 74 ML
BH CV ECHO MEAS - EF(MOD-BP): 50.1 %
BH CV ECHO MEAS - EF(MOD-SP2): 45.2 %
BH CV ECHO MEAS - EF(MOD-SP4): 52.7 %
BH CV ECHO MEAS - ESV(CUBED): 24.4 ML
BH CV ECHO MEAS - ESV(MOD-SP2): 46 ML
BH CV ECHO MEAS - ESV(MOD-SP4): 35 ML
BH CV ECHO MEAS - FS: 34.1 %
BH CV ECHO MEAS - IVS/LVPW: 1 CM
BH CV ECHO MEAS - IVSD: 1 CM
BH CV ECHO MEAS - LA DIMENSION: 3.9 CM
BH CV ECHO MEAS - LAT PEAK E' VEL: 13.9 CM/SEC
BH CV ECHO MEAS - LV MASS(C)D: 147.8 GRAMS
BH CV ECHO MEAS - LV MAX PG: 7.2 MMHG
BH CV ECHO MEAS - LV MEAN PG: 4 MMHG
BH CV ECHO MEAS - LV V1 MAX: 134 CM/SEC
BH CV ECHO MEAS - LV V1 VTI: 28.5 CM
BH CV ECHO MEAS - LVIDD: 4.4 CM
BH CV ECHO MEAS - LVIDS: 2.9 CM
BH CV ECHO MEAS - LVOT AREA: 3.1 CM2
BH CV ECHO MEAS - LVOT DIAM: 2 CM
BH CV ECHO MEAS - LVPWD: 1 CM
BH CV ECHO MEAS - MED PEAK E' VEL: 12 CM/SEC
BH CV ECHO MEAS - MV DEC SLOPE: 478 CM/SEC2
BH CV ECHO MEAS - MV DEC TIME: 0.17 SEC
BH CV ECHO MEAS - MV E MAX VEL: 127 CM/SEC
BH CV ECHO MEAS - MV MAX PG: 6.9 MMHG
BH CV ECHO MEAS - MV MEAN PG: 2 MMHG
BH CV ECHO MEAS - MV P1/2T: 81.5 MSEC
BH CV ECHO MEAS - MV V2 VTI: 38.9 CM
BH CV ECHO MEAS - MVA(P1/2T): 2.7 CM2
BH CV ECHO MEAS - MVA(VTI): 2.3 CM2
BH CV ECHO MEAS - PA ACC TIME: 0.13 SEC
BH CV ECHO MEAS - PI END-D VEL: 119 CM/SEC
BH CV ECHO MEAS - RAP SYSTOLE: 15 MMHG
BH CV ECHO MEAS - RVSP: 41 MMHG
BH CV ECHO MEAS - SV(LVOT): 89.5 ML
BH CV ECHO MEAS - SV(MOD-SP2): 37.9 ML
BH CV ECHO MEAS - SV(MOD-SP4): 39 ML
BH CV ECHO MEAS - TAPSE (>1.6): 2.43 CM
BH CV ECHO MEAS - TR MAX PG: 25.8 MMHG
BH CV ECHO MEAS - TR MAX VEL: 252.8 CM/SEC
BH CV ECHO MEASUREMENTS AVERAGE E/E' RATIO: 9.81
BH CV VAS BP RIGHT ARM: NORMAL MMHG
BH CV XLRA - RV BASE: 3.8 CM
BH CV XLRA - RV LENGTH: 8.1 CM
BH CV XLRA - RV MID: 2.9 CM
BH CV XLRA - TDI S': 12.3 CM/SEC
BUN SERPL-MCNC: 34 MG/DL (ref 8–23)
BUN/CREAT SERPL: 26.6 (ref 7–25)
CALCIUM SPEC-SCNC: 9.1 MG/DL (ref 8.6–10.5)
CHLORIDE SERPL-SCNC: 99 MMOL/L (ref 98–107)
CO2 SERPL-SCNC: 24 MMOL/L (ref 22–29)
CREAT SERPL-MCNC: 1.28 MG/DL (ref 0.57–1)
DEPRECATED RDW RBC AUTO: 52.7 FL (ref 37–54)
EGFRCR SERPLBLD CKD-EPI 2021: 43.8 ML/MIN/1.73
ERYTHROCYTE [DISTWIDTH] IN BLOOD BY AUTOMATED COUNT: 14.6 % (ref 12.3–15.4)
GLUCOSE BLDC GLUCOMTR-MCNC: 105 MG/DL (ref 70–130)
GLUCOSE SERPL-MCNC: 111 MG/DL (ref 65–99)
HCT VFR BLD AUTO: 29.4 % (ref 34–46.6)
HGB BLD-MCNC: 9.6 G/DL (ref 12–15.9)
LEFT ATRIUM VOLUME INDEX: 41.6 ML/M2
LV EF 2D ECHO EST: 55 %
MAGNESIUM SERPL-MCNC: 1.7 MG/DL (ref 1.6–2.4)
MCH RBC QN AUTO: 32.2 PG (ref 26.6–33)
MCHC RBC AUTO-ENTMCNC: 32.7 G/DL (ref 31.5–35.7)
MCV RBC AUTO: 98.7 FL (ref 79–97)
PLATELET # BLD AUTO: 137 10*3/MM3 (ref 140–450)
PMV BLD AUTO: 10.1 FL (ref 6–12)
POTASSIUM SERPL-SCNC: 4.1 MMOL/L (ref 3.5–5.2)
RBC # BLD AUTO: 2.98 10*6/MM3 (ref 3.77–5.28)
SODIUM SERPL-SCNC: 135 MMOL/L (ref 136–145)
WBC NRBC COR # BLD AUTO: 6.37 10*3/MM3 (ref 3.4–10.8)

## 2024-01-26 PROCEDURE — 25010000002 LIDOCAINE 1 % SOLUTION: Performed by: STUDENT IN AN ORGANIZED HEALTH CARE EDUCATION/TRAINING PROGRAM

## 2024-01-26 PROCEDURE — 33233 REMOVAL OF PM GENERATOR: CPT | Performed by: STUDENT IN AN ORGANIZED HEALTH CARE EDUCATION/TRAINING PROGRAM

## 2024-01-26 PROCEDURE — 83735 ASSAY OF MAGNESIUM: CPT

## 2024-01-26 PROCEDURE — 25010000002 FENTANYL CITRATE (PF) 50 MCG/ML SOLUTION: Performed by: STUDENT IN AN ORGANIZED HEALTH CARE EDUCATION/TRAINING PROGRAM

## 2024-01-26 PROCEDURE — C1785 PMKR, DUAL, RATE-RESP: HCPCS | Performed by: STUDENT IN AN ORGANIZED HEALTH CARE EDUCATION/TRAINING PROGRAM

## 2024-01-26 PROCEDURE — C1892 INTRO/SHEATH,FIXED,PEEL-AWAY: HCPCS | Performed by: STUDENT IN AN ORGANIZED HEALTH CARE EDUCATION/TRAINING PROGRAM

## 2024-01-26 PROCEDURE — 25810000003 SODIUM CHLORIDE 0.9 % SOLUTION: Performed by: STUDENT IN AN ORGANIZED HEALTH CARE EDUCATION/TRAINING PROGRAM

## 2024-01-26 PROCEDURE — C1894 INTRO/SHEATH, NON-LASER: HCPCS | Performed by: STUDENT IN AN ORGANIZED HEALTH CARE EDUCATION/TRAINING PROGRAM

## 2024-01-26 PROCEDURE — 25010000002 CEFAZOLIN PER 500 MG

## 2024-01-26 PROCEDURE — C1898 LEAD, PMKR, OTHER THAN TRANS: HCPCS | Performed by: STUDENT IN AN ORGANIZED HEALTH CARE EDUCATION/TRAINING PROGRAM

## 2024-01-26 PROCEDURE — 93306 TTE W/DOPPLER COMPLETE: CPT

## 2024-01-26 PROCEDURE — 25010000002 BUPIVACAINE 0.5 % SOLUTION: Performed by: STUDENT IN AN ORGANIZED HEALTH CARE EDUCATION/TRAINING PROGRAM

## 2024-01-26 PROCEDURE — 93306 TTE W/DOPPLER COMPLETE: CPT | Performed by: INTERNAL MEDICINE

## 2024-01-26 PROCEDURE — 25010000002 MIDAZOLAM PER 1 MG: Performed by: STUDENT IN AN ORGANIZED HEALTH CARE EDUCATION/TRAINING PROGRAM

## 2024-01-26 PROCEDURE — 93356 MYOCRD STRAIN IMG SPCKL TRCK: CPT | Performed by: INTERNAL MEDICINE

## 2024-01-26 PROCEDURE — 99152 MOD SED SAME PHYS/QHP 5/>YRS: CPT | Performed by: STUDENT IN AN ORGANIZED HEALTH CARE EDUCATION/TRAINING PROGRAM

## 2024-01-26 PROCEDURE — 33206 INSERT HEART PM ATRIAL: CPT | Performed by: STUDENT IN AN ORGANIZED HEALTH CARE EDUCATION/TRAINING PROGRAM

## 2024-01-26 PROCEDURE — 25010000002 ONDANSETRON PER 1 MG: Performed by: STUDENT IN AN ORGANIZED HEALTH CARE EDUCATION/TRAINING PROGRAM

## 2024-01-26 PROCEDURE — 80048 BASIC METABOLIC PNL TOTAL CA: CPT

## 2024-01-26 PROCEDURE — 93356 MYOCRD STRAIN IMG SPCKL TRCK: CPT

## 2024-01-26 PROCEDURE — 25510000001 IOPAMIDOL PER 1 ML: Performed by: STUDENT IN AN ORGANIZED HEALTH CARE EDUCATION/TRAINING PROGRAM

## 2024-01-26 PROCEDURE — 99153 MOD SED SAME PHYS/QHP EA: CPT | Performed by: STUDENT IN AN ORGANIZED HEALTH CARE EDUCATION/TRAINING PROGRAM

## 2024-01-26 PROCEDURE — C1769 GUIDE WIRE: HCPCS | Performed by: STUDENT IN AN ORGANIZED HEALTH CARE EDUCATION/TRAINING PROGRAM

## 2024-01-26 PROCEDURE — C1889 IMPLANT/INSERT DEVICE, NOC: HCPCS | Performed by: STUDENT IN AN ORGANIZED HEALTH CARE EDUCATION/TRAINING PROGRAM

## 2024-01-26 PROCEDURE — 82948 REAGENT STRIP/BLOOD GLUCOSE: CPT

## 2024-01-26 PROCEDURE — 71046 X-RAY EXAM CHEST 2 VIEWS: CPT

## 2024-01-26 PROCEDURE — 85027 COMPLETE CBC AUTOMATED: CPT

## 2024-01-26 DEVICE — IMPLANTABLE DEVICE: Type: IMPLANTABLE DEVICE | Status: FUNCTIONAL

## 2024-01-26 DEVICE — ENV PM AIGISRX ANTIBAC RESORB 2.5X2.7IN MD: Type: IMPLANTABLE DEVICE | Status: FUNCTIONAL

## 2024-01-26 RX ORDER — ACETAMINOPHEN 325 MG/1
650 TABLET ORAL EVERY 4 HOURS PRN
Status: DISCONTINUED | OUTPATIENT
Start: 2024-01-26 | End: 2024-01-26 | Stop reason: HOSPADM

## 2024-01-26 RX ORDER — TRIAMCINOLONE ACETONIDE 1 MG/G
1 CREAM TOPICAL 2 TIMES DAILY
COMMUNITY

## 2024-01-26 RX ORDER — SODIUM CHLORIDE 0.9 % (FLUSH) 0.9 %
10 SYRINGE (ML) INJECTION AS NEEDED
Status: DISCONTINUED | OUTPATIENT
Start: 2024-01-26 | End: 2024-01-26 | Stop reason: HOSPADM

## 2024-01-26 RX ORDER — SODIUM CHLORIDE 9 MG/ML
40 INJECTION, SOLUTION INTRAVENOUS AS NEEDED
Status: DISCONTINUED | OUTPATIENT
Start: 2024-01-26 | End: 2024-01-26 | Stop reason: HOSPADM

## 2024-01-26 RX ORDER — ACETAMINOPHEN 650 MG/1
650 SUPPOSITORY RECTAL EVERY 4 HOURS PRN
Status: DISCONTINUED | OUTPATIENT
Start: 2024-01-26 | End: 2024-01-26 | Stop reason: HOSPADM

## 2024-01-26 RX ORDER — ONDANSETRON 2 MG/ML
4 INJECTION INTRAMUSCULAR; INTRAVENOUS EVERY 6 HOURS PRN
Status: DISCONTINUED | OUTPATIENT
Start: 2024-01-26 | End: 2024-01-26 | Stop reason: HOSPADM

## 2024-01-26 RX ORDER — SODIUM CHLORIDE 9 MG/ML
INJECTION, SOLUTION INTRAVENOUS
Status: DISCONTINUED | OUTPATIENT
Start: 2024-01-26 | End: 2024-01-26 | Stop reason: HOSPADM

## 2024-01-26 RX ORDER — SODIUM CHLORIDE 0.9 % (FLUSH) 0.9 %
3 SYRINGE (ML) INJECTION EVERY 12 HOURS SCHEDULED
Status: DISCONTINUED | OUTPATIENT
Start: 2024-01-26 | End: 2024-01-26 | Stop reason: HOSPADM

## 2024-01-26 RX ORDER — MIDAZOLAM HYDROCHLORIDE 1 MG/ML
INJECTION INTRAMUSCULAR; INTRAVENOUS
Status: DISCONTINUED | OUTPATIENT
Start: 2024-01-26 | End: 2024-01-26 | Stop reason: HOSPADM

## 2024-01-26 RX ORDER — FENTANYL CITRATE 50 UG/ML
INJECTION, SOLUTION INTRAMUSCULAR; INTRAVENOUS
Status: DISCONTINUED | OUTPATIENT
Start: 2024-01-26 | End: 2024-01-26 | Stop reason: HOSPADM

## 2024-01-26 RX ORDER — LIDOCAINE HYDROCHLORIDE 10 MG/ML
INJECTION, SOLUTION INFILTRATION; PERINEURAL
Status: DISCONTINUED | OUTPATIENT
Start: 2024-01-26 | End: 2024-01-26 | Stop reason: HOSPADM

## 2024-01-26 RX ORDER — BUPIVACAINE HYDROCHLORIDE 5 MG/ML
INJECTION, SOLUTION PERINEURAL
Status: DISCONTINUED | OUTPATIENT
Start: 2024-01-26 | End: 2024-01-26 | Stop reason: HOSPADM

## 2024-01-26 RX ORDER — ONDANSETRON 2 MG/ML
INJECTION INTRAMUSCULAR; INTRAVENOUS
Status: DISCONTINUED | OUTPATIENT
Start: 2024-01-26 | End: 2024-01-26 | Stop reason: HOSPADM

## 2024-01-26 RX ORDER — NITROGLYCERIN 0.4 MG/1
0.4 TABLET SUBLINGUAL
Status: DISCONTINUED | OUTPATIENT
Start: 2024-01-26 | End: 2024-01-26 | Stop reason: HOSPADM

## 2024-01-26 RX ADMIN — SODIUM CHLORIDE 2000 MG: 900 INJECTION INTRAVENOUS at 08:10

## 2024-01-26 RX ADMIN — ACETAMINOPHEN 650 MG: 325 TABLET ORAL at 13:34

## 2024-01-26 NOTE — H&P
Pre-cardiac Procedure History and Physical  [facility] at [facility]      Patient:  Joanna Cross  :  1950  MRN: [id number]    PCP:  Reynaldo Fritz MD  PHONE:  [phone]    DATE: 2024  ID: Joanna Cross is a 73 y.o. female from [facility]    CC: Pacemaker lead malfunction    Cardiac PMH: (Old records have been reviewed and summarized below)     Coronary artery disease  [facility], 02/15/2008, Dr Lutz: Mild, non-obstructive CAD, normal EF  [facility], 2013, Dr Stevenson Sutton: No LV gram done. Mild, non-obstructive CAD.  [facility], 2015, [facility]:  EF 60%. 70% RCA lesion with Promus ZAINAB placement. 40-50% mid LAD, no FFR performed. Other small blockages noted. No MR.  [facility], 11/15/2015, Dr Palacio @ [facility]: Patent RCA stent, 40-50% LAD with FFR @ 0.92; mild inferior wall hypokinesis  [facility], 2016, [facility]: Normal CORS with patent stent. LAD improved since last [facility]. EF 55-60%.  [facility], 2019: EF 55-60%. Patent RCA stent, noncritical mid LAD with IFR 0.93, noncritical LCx.   Chronic venous stasis:  Venous duplex, 2010: Insufficiency to venous hypertension in the great saphenous system bilaterally.  Venous duplex : Right leg laser ablation of Dr. Garcia.  Venous duplex, 2016: No evidence of DVT, no superficial, no thrmobophlebitis, no valvular insufficiency.  AA with runoffs, 2016: Single-vessel [place]: No significant arterial disease.  Arterial doppler/GLYNN, 2019: No evidence of significant lower extremity arterial occlusive disease.  Palpitations:  Echocardiogram, 2014: Dr. Teran. Normal biventricular function; trace to mild MR. Atrial septal aneurysm.  Echocardiogram, 2015, Dr. Chavez: Borderline LVH, mild LAE, mild RV enlargement. Mild to moderate MR and TR with RVSP of 46 mmHg.  Echocardiogram, 2016: Dr. Palacio.  RV enlargement.  EF 50-55%.  Mild AI S, mild MR and TR with RVSP 37  mmHg.  Echocardiogram, 07/11/20: Dr. Jany Wynn: EF 60-65%. Mild to moderate MR.  PAF/SSS:  [employer] PPM 2016  CHADS2 Vasc = 6 (HTN, DM, Age 65-74, Female, H/o TIA). On chronic anticoagulation.  ECV, 12/26/2018: Successful cardioversion. AV paced.  Echocardiogram, 12/25/2018:  EF 60%, mild MR/TR with RVSP 29 mmHg. Sclerotic AoV, no AS/AI. Mild MAC.  ECV, 03/05/2019: Successful cardioversion.  Zio, 10/01/2019, 14 days: NSR baseline. Normal average rates. Periods of RV pacing.   Patient has known undersensing in the atrial lead which is was known to Dr. Marie.     TIA:  Carotid duplex, 02/13/2014: No significant flow-limiting stenosis. Mild luminal disease present; both vertebrals are present.  Diabetes  Essential Hypertension  Hyperlipidemia  Hypothyroid  Hyponatremia  Secondary to SIADH  MILLI with CPAP  RLS  Parkinson's disease  GERD  Urinary Retention  S/P cystoscopy and ureter dilation, March 2018.  Dr. Terrence Mckenzie   Surgeries:  Rotator cuff repair  Cholecystectomy  Bilateral knee replacement  Tubal ligation  Breast surgery  Sinus surgery  Left shoulder replacement 10/24/2022      BRIEF HPI: Ms. Cross is a 73-year-old female with above medical history who presents today for pacemaker generator change and lead revision.  Her right atrial lead is not capturing very well requiring threshold and subsequent rapid battery depletion.  Plans for new RA lead implantation today.  Since we last saw the patient, she has had continued lower extremity edema which is chronic.  She has been on clindamycin for cellulitis of bilateral lower extremities.  Cellulitis is greatly improved according to the patient and there is mild erythema currently.  She also had a fall while taking down [place] tree of months ago requiring ED visit.  Imaging was negative for acute bleed or fracture but she still has a huge ecchymosis of her left upper extremity.    Allergies:      Allergies   Allergen Reactions    Amlodipine  "Besylate Swelling     Lower extremity (ankles, feet) swelling    Entacapone Other (See Comments)     \"extreme weakness in legs - caused several falls, which stopped after discontinuing this medication\"    Epinephrine Other (See Comments)     6/4/16- had 3 shots to numb mouth to prepare teeth for crowns, the shots contained epi-  Caused pt to have chest discomfort- went to hospital in ambulance, discovered had a fib while there     Levemir [Insulin Detemir] Hives     Hives / rash around injection site    Penicillins Hives     Jitteriness     Xarelto [Rivaroxaban] GI Bleeding     hgb dropped to 5.2    Aricept [Donepezil Hcl] Nausea Only     Vivid dreams    Benztropine Mesylate Other (See Comments)     Uncontrollable body movements    Cogentin [Benztropine] Other (See Comments)     \"uncontrollable body movements\"    Compazine [Prochlorperazine Edisylate] Other (See Comments)     Dystonic reaction    Duraprep [Antiseptic Products, Misc.] Itching and Rash     RASH AND ITCHING    Haldol [Haloperidol Lactate] Other (See Comments)     Dystonic reaction    Hydralazine Other (See Comments)     Headache     Lisinopril Cough    Statins Myalgia     Leg pain- all statins     Sulfamethoxazole Nausea Only and Other (See Comments)    Toprol Xl [Metoprolol Tartrate] Other (See Comments)     Extreme fatigue, decreased exercise tolerance    Trimethoprim Other (See Comments)     Other reaction(s): Nausea       MEDICATIONS:  Current Outpatient Medications   Medication Instructions    Accu-Chek Guide test strip Testing 3 times per day; E11.65    Accu-Chek Softclix Lancets lancets USE ONE LANCET TO TEST THREE TIMES A DAY dx e11.65 on insulin    albuterol (PROVENTIL) 2.5 mg, Nebulization, Every 4 Hours PRN    albuterol sulfate HFA (Ventolin HFA) 108 (90 Base) MCG/ACT inhaler 1 puff, Inhalation, Every 4 Hours PRN    amantadine (SYMMETREL) 100 mg, Oral, 2 Times Daily    apixaban (ELIQUIS) 5 mg, Oral, Every 12 Hours Scheduled    aspirin 81 mg, " Oral, Daily    B Complex-C (SUPER B COMPLEX PO) 1 tablet, Oral, Daily    B-D UF III MINI PEN NEEDLES 31G X 5 MM misc USE TO INJECT INSULIN DAILY    bumetanide (BUMEX) 1 mg, Oral, 2 Times Daily, tAKE BID FOR 3 DAYS THEN ONCE A DAY    Calcium Carbonate (CALTRATE 600 PO) 600 mg, Oral, Daily    carbidopa-levodopa (SINEMET)  MG per tablet Oral, 2 tablets at 0600, 1 tablet at 0900, 1200, 1500, 1800, 2100    Cholecalciferol 2,000 Units, Oral, 2 Times Daily    citalopram (CELEXA) 20 mg, Oral, Daily    clindamycin (CLEOCIN) 300 mg, Oral, 3 Times Daily    clobetasol (TEMOVATE) 0.05 % cream 1 application , Topical, 2 Times Daily PRN    dofetilide (TIKOSYN) 500 MCG capsule TAKE 1 CAPSULE EVERY 12 HOURS    Emollient (AQUAPHOR ADV PROTECT HEALING EX) 1 dose, Apply externally, As Needed    Glucosamine-Chondroit-Calcium (TRIPLE FLEX BONE & JOINT PO) 1 tablet, Oral, Daily, Move free    hydrocortisone 2.5 % ointment Topical, 3 Times Daily    hydroxychloroquine (PLAQUENIL) 200 MG tablet Daily    ipratropium (ATROVENT) 0.03 % nasal spray 2 sprays, Nasal, 2 Times Daily PRN    Lantus SoloStar 12-14 Units, Subcutaneous, Daily    Loratadine 10 mg, Oral, Daily    metFORMIN (GLUCOPHAGE) 500 mg, Oral, 2 Times Daily With Meals, NEW DOSE    metOLazone (ZAROXOLYN) 2.5 mg, Oral, Daily    Multiple Vitamins-Minerals (CENTRUM ULTRA WOMENS PO) 1 tablet, Oral, Daily    nitroglycerin (NITROLINGUAL) 0.4 MG/SPRAY spray 1 spray, Sublingual, Every 5 Minutes PRN    nystatin (MYCOSTATIN) 952988 UNIT/GM ointment 1 Application, Topical, 2 Times Daily    O2 (OXYGEN) 2 L/min, Inhalation, Nightly, 2L all the time now    omeprazole (PRILOSEC) 40 mg, Oral, 2 Times Daily    pramipexole (MIRAPEX) 1.5 mg, Oral, Nightly    predniSONE (DELTASONE) 5 mg, Oral, As Needed    ranolazine (RANEXA) 1,000 mg, Oral, Every 12 Hours    senna (SENOKOT) 8.6 MG tablet 1 tablet, Oral, Daily    spironolactone (ALDACTONE) 50 mg, Oral, Daily    traMADol (ULTRAM) 50 mg, Oral, Every 8  "Hours PRN    triamcinolone (KENALOG) 0.1 % cream 1 application , Topical, 2 Times Daily    Triamcinolone Acetonide (NASACORT) 55 MCG/ACT nasal inhaler 2 sprays, Daily    Unithroid 175 mcg, Oral, Daily    valsartan (DIOVAN) 160 mg, Oral, 2 Times Daily    vitamin C (ASCORBIC ACID) 500 mg, Oral, Daily, Chewable tablet    Zinc 50 MG capsule 1 capsule, Oral, Daily       Past medical & surgical history, social and family history reviewed in the electronic medical record.    ROS: Pertinent positives listed in the HPI and problem list above. All others reviewed and negative.     Physical Exam:   /76 (BP Location: Left arm, Patient Position: Lying)   Pulse 60   Temp 97.3 °F (36.3 °C) (Temporal)   Resp 18   Ht 160 cm (63\")   Wt 109 kg (241 lb 6.4 oz)   LMP  (LMP Unknown) Comment: Mammogram- 1/28/20  SpO2 96%   BMI 42.76 kg/m²     Constitutional:    Well-appearing 73 y.o. y/o adult  in no acute distress        Heart:  Paced rhythm and normal rate, no murmurs, rubs or gallops   Lungs:     Clear to auscultation bilaterally, no wheezes, rhales or rhonchi, nonlabored respirations   Abdomen:     Soft, nontender   Extremities: Mild erythema to bilateral pretibial region, 2+ pedal/pretibial edema   Pulses:    Neuro:  Psych:   Radial pulses palpable and equal bilaterally.  No gross focal deficits  Mood and behavior appropriate for situation       Labs and Diagnostic Data:  Lab Results   Component Value Date    GLUCOSE 168 (H) 10/19/2023    BUN 31 (H) 10/19/2023    CREATININE 1.15 (H) 10/19/2023    EGFRRESULT 50 (L) 10/19/2023    EGFR 68.1 09/19/2023    BCR 27 10/19/2023    K 3.9 10/19/2023    CO2 23 10/19/2023    CALCIUM 9.6 10/19/2023    PROTENTOTREF 6.6 10/19/2023    ALBUMIN 4.5 10/19/2023    BILITOT 0.3 10/19/2023    AST 17 10/19/2023    ALT 8 10/19/2023     Lab Results   Component Value Date    WBC 6.37 01/26/2024    HGB 9.6 (L) 01/26/2024    HCT 29.4 (L) 01/26/2024    MCV 98.7 (H) 01/26/2024     (L) " 01/26/2024     Lab Results   Component Value Date    CHOL 185 02/01/2019    CHLPL 184 10/19/2023    TRIG 121 10/19/2023    HDL 51 10/19/2023     (H) 10/19/2023     Lab Results   Component Value Date    HGBA1C 5.8 (H) 10/19/2023         Tele: Atrial paced rhythm    IMPRESSION:  Ms. Cross is a 73-year-old female with a history of atrial fibrillation and tachybradycardia syndrome s/p DC PPM with poor RA lead function who presents today for new RA lead and generator change.  RV lead >3000 ohms and bipolar, but normal in unipolar  Last dose of Eliquis 1/23/2020 4 PM    PLAN:  Procedure to perform: Pacemaker generator change with new RA lead implantation. Risks, benefits and alternatives to the procedure explained to the patient and she understands and wishes to proceed.     Scribed by Justin Teixeira PA-C for Dr. Lauro Francios MD on 1/26/2024

## 2024-01-29 ENCOUNTER — TELEPHONE (OUTPATIENT)
Dept: CARDIOLOGY | Facility: CLINIC | Age: 74
End: 2024-01-29
Payer: MEDICARE

## 2024-01-29 ENCOUNTER — CALL CENTER PROGRAMS (OUTPATIENT)
Dept: CALL CENTER | Facility: HOSPITAL | Age: 74
End: 2024-01-29
Payer: MEDICARE

## 2024-01-29 RX ORDER — NYSTATIN 100000 U/G
1 OINTMENT TOPICAL 2 TIMES DAILY
Qty: 30 G | Refills: 0
Start: 2024-01-29

## 2024-01-29 NOTE — TELEPHONE ENCOUNTER
Caller: Joanna Cross    Relationship: Self    Best call back number: [phone]     Requested Prescriptions:   Requested Prescriptions     Pending Prescriptions Disp Refills    nystatin (MYCOSTATIN) [id number] UNIT/GM ointment       Sig: Apply 1 Application topically to the appropriate area as directed 2 (Two) Times a Day.        Pharmacy where request should be sent: [id number] - [place], [place] - [address]  AT [facility] [address] - [phone] [facility] [phone]      Last office visit with prescribing clinician: 1/22/2024   Last telemedicine visit with prescribing clinician: Visit date not found   Next office visit with prescribing clinician: 3/12/2024     Additional details provided by patient:     Does the patient have less than a 3 day supply:  [x] Yes  [] No    Would you like a call back once the refill request has been completed: [] Yes [x] No    If the office needs to give you a call back, can they leave a voicemail: [] Yes [x] No    Cadance Dunaway, RegSched Rep   01/29/24 09:36 EST

## 2024-01-29 NOTE — TELEPHONE ENCOUNTER
Rx Refill Note    Requested Prescriptions     Pending Prescriptions Disp Refills    nystatin (MYCOSTATIN) [id number] UNIT/GM ointment 30 g 0     Sig: Apply 1 Application topically to the appropriate area as directed 2 (Two) Times a Day.        Last office visit with prescribing clinician: 1/22/2024      Next office visit with prescribing clinician: 3/12/2024   Last labs:   Last refill: needs   Pharmacy (be sure to add in Epic). correct

## 2024-01-29 NOTE — OUTREACH NOTE
PCI/Device Survey      Flowsheet Row Responses   Facility patient discharged from? [place]   Procedure date 01/26/24   Procedure (if device, specify in description) Device   Device Description Pacemaker generator change and lead revision   Performing MD Dr. Lauro Francois   Attempt successful? Yes   Call start time 1043   Call end time 1046   Has the patient had any of the following symptoms since discharge? --  [No symptoms noted]   Nursing interventions Patient education provided   Is the patient taking prescribed medications: --  [Eliquis]   Nursing intervention Reminded to continue to take prescribed medications   Does the patient have any of the following symptoms related to the cath/surgical site? --  [No symptoms noted]   Nursing intervention Patient education provided   Does the patient have an appointment scheduled with the cardiologist? Yes   Appointment comments f/u with cardiologist Dr. Francois on 4/25   Did the patient feel prepared to go home on the same day as the procedure? Yes   Is the patient satisfied with the same day discharge process? Yes   PCI/Device call completed Yes   Wrap up additional comments Doing well, all concerns addressed.            Darlene ALATORRE - Registered Nurse

## 2024-01-29 NOTE — TELEPHONE ENCOUNTER
Name: Joanna Cross    Relationship: Self    Best Callback Number: [phone]     Incoming call to the Hub, requesting to  Reschedule their HFU appointment on 04.25.24.     Per Hub workflow, further review is needed before the task can be completed.    Result of Call: Please reach out to the patient to reschedule

## 2024-02-05 ENCOUNTER — OFFICE VISIT (OUTPATIENT)
Dept: CARDIOLOGY | Facility: CLINIC | Age: 74
End: 2024-02-05
Payer: MEDICARE

## 2024-02-05 DIAGNOSIS — I48.0 PAF (PAROXYSMAL ATRIAL FIBRILLATION): Primary | ICD-10-CM

## 2024-02-05 NOTE — PROGRESS NOTES
2024    Joanna Cross, : 1950      Fever: No    Temperature if indicated:     Wound Location: Left Infraclavicular    Dressing Removed: Removed by MA/RN      Old Dressing Appearance:  Clean, dry    Wound Appearance: Redness []                  Drainage []                  Culture obtained []        Color: Clear     Consistency:        Amount: none         Gloves used, wound cleansed with sterile 4x4 and peroxide        MD notified []     MD orders:     Antibiotic started []      If checked, type     Other:       Appointment for follow-up scheduled for 3 months post procedure [x]    Future Appointments   Date Time Provider Department Center   3/4/2024 10:40 AM LAB PC [facility] [place]   3/12/2024 11:00 AM Gregory Linton MD [facility] [place]   3/12/2024  2:00 PM Reynaldo Fritz MD [facility] [place]   3/18/2024  2:30 PM [facility] [place], PFT LAB 1 [facility] [place]   3/18/2024  3:00 PM Myrna Mckeon APRN [facility] [place]   4/15/2024  2:00 PM Liv Marshall PA [facility] END [facility] [place]   2024  2:30 PM Lauro Francois MD [facility] [place]   2024  1:30 PM Albertina Corona MD [facility] [place]           Licha Mercado MA, 24      MD Signature:______________________________ Completed By/Date:

## 2024-02-19 ENCOUNTER — TELEPHONE (OUTPATIENT)
Dept: FAMILY MEDICINE CLINIC | Facility: CLINIC | Age: 74
End: 2024-02-19
Payer: MEDICARE

## 2024-03-04 ENCOUNTER — LAB (OUTPATIENT)
Dept: FAMILY MEDICINE CLINIC | Facility: CLINIC | Age: 74
End: 2024-03-04
Payer: MEDICARE

## 2024-03-04 DIAGNOSIS — I50.21 ACUTE SYSTOLIC CHF (CONGESTIVE HEART FAILURE): ICD-10-CM

## 2024-03-04 DIAGNOSIS — D64.9 ANEMIA, UNSPECIFIED TYPE: ICD-10-CM

## 2024-03-04 PROCEDURE — 36415 COLL VENOUS BLD VENIPUNCTURE: CPT | Performed by: PHYSICIAN ASSISTANT

## 2024-03-05 ENCOUNTER — TELEPHONE (OUTPATIENT)
Dept: ONCOLOGY | Facility: CLINIC | Age: 74
End: 2024-03-05
Payer: MEDICARE

## 2024-03-05 DIAGNOSIS — D64.9 ANEMIA, UNSPECIFIED TYPE: Primary | ICD-10-CM

## 2024-03-05 LAB
BUN SERPL-MCNC: 27 MG/DL (ref 8–27)
BUN/CREAT SERPL: 22 (ref 12–28)
CALCIUM SERPL-MCNC: 9.3 MG/DL (ref 8.7–10.3)
CHLORIDE SERPL-SCNC: 95 MMOL/L (ref 96–106)
CO2 SERPL-SCNC: 23 MMOL/L (ref 20–29)
CREAT SERPL-MCNC: 1.24 MG/DL (ref 0.57–1)
EGFRCR SERPLBLD CKD-EPI 2021: 45 ML/MIN/1.73
ERYTHROCYTE [DISTWIDTH] IN BLOOD BY AUTOMATED COUNT: 13.4 % (ref 11.7–15.4)
GLUCOSE SERPL-MCNC: 87 MG/DL (ref 70–99)
HCT VFR BLD AUTO: 26.6 % (ref 34–46.6)
HGB BLD-MCNC: 8.7 G/DL (ref 11.1–15.9)
MCH RBC QN AUTO: 31.4 PG (ref 26.6–33)
MCHC RBC AUTO-ENTMCNC: 32.7 G/DL (ref 31.5–35.7)
MCV RBC AUTO: 96 FL (ref 79–97)
PLATELET # BLD AUTO: 132 X10E3/UL (ref 150–450)
POTASSIUM SERPL-SCNC: 4.1 MMOL/L (ref 3.5–5.2)
RBC # BLD AUTO: 2.77 X10E6/UL (ref 3.77–5.28)
SODIUM SERPL-SCNC: 133 MMOL/L (ref 134–144)
WBC # BLD AUTO: 4.7 X10E3/UL (ref 3.4–10.8)

## 2024-03-05 NOTE — TELEPHONE ENCOUNTER
Discussed with Dr. Linton, we can order Iron studies and a ferritin to be performed now and then once those results are available we can start the authorization process for Ginger. Called patient and she verbalized understanding. She will get the labs drawn at her PCP's office.

## 2024-03-05 NOTE — TELEPHONE ENCOUNTER
----- Message from Ward Paige sent at 3/5/2024 12:26 PM EST -----  Regarding: phone call  Pt called to report she had labs done yesterday and she was feelng weak. Thinks they are worsening and wants to see if she can get in sooner. Appt next Tuesday in [facility]  [phone]

## 2024-03-06 ENCOUNTER — LAB (OUTPATIENT)
Dept: FAMILY MEDICINE CLINIC | Facility: CLINIC | Age: 74
End: 2024-03-06
Payer: MEDICARE

## 2024-03-06 DIAGNOSIS — D64.9 ANEMIA, UNSPECIFIED TYPE: ICD-10-CM

## 2024-03-06 PROCEDURE — 36415 COLL VENOUS BLD VENIPUNCTURE: CPT | Performed by: INTERNAL MEDICINE

## 2024-03-07 LAB
FERRITIN SERPL-MCNC: NORMAL NG/ML
IRON SATN MFR SERPL: 40 % (ref 15–55)
IRON SERPL-MCNC: 115 UG/DL (ref 27–139)
SPECIMEN STATUS: NORMAL
TIBC SERPL-MCNC: 291 UG/DL (ref 250–450)
UIBC SERPL-MCNC: 176 UG/DL (ref 118–369)

## 2024-03-08 ENCOUNTER — TELEPHONE (OUTPATIENT)
Dept: ONCOLOGY | Facility: CLINIC | Age: 74
End: 2024-03-08
Payer: MEDICARE

## 2024-03-08 LAB — FERRITIN SERPL-MCNC: 263 NG/ML (ref 15–150)

## 2024-03-08 NOTE — TELEPHONE ENCOUNTER
Dr. Linton reviewed lab results, patient is not iron deficient and does not need IV Iron right now. Called and left message for patient with this information.

## 2024-03-08 NOTE — TELEPHONE ENCOUNTER
Patient called back and she verbalized understanding. She asked if Procrit could be ordered to start next week after she sees Dr. Linton. Discussed with Dr. Linton and he is agreeable. Orders entered.

## 2024-03-12 ENCOUNTER — CONSULT (OUTPATIENT)
Dept: ONCOLOGY | Facility: CLINIC | Age: 74
End: 2024-03-12
Payer: MEDICARE

## 2024-03-12 ENCOUNTER — INFUSION (OUTPATIENT)
Dept: ONCOLOGY | Facility: HOSPITAL | Age: 74
End: 2024-03-12
Payer: MEDICARE

## 2024-03-12 VITALS
HEIGHT: 62 IN | RESPIRATION RATE: 18 BRPM | DIASTOLIC BLOOD PRESSURE: 72 MMHG | SYSTOLIC BLOOD PRESSURE: 117 MMHG | HEART RATE: 78 BPM | TEMPERATURE: 97.8 F | WEIGHT: 237 LBS | BODY MASS INDEX: 43.61 KG/M2 | OXYGEN SATURATION: 94 %

## 2024-03-12 DIAGNOSIS — N18.30 ANEMIA ASSOCIATED WITH STAGE 3 CHRONIC RENAL FAILURE: Primary | ICD-10-CM

## 2024-03-12 DIAGNOSIS — D63.1 ANEMIA ASSOCIATED WITH STAGE 3 CHRONIC RENAL FAILURE: Primary | ICD-10-CM

## 2024-03-12 PROCEDURE — 25010000002 EPOETIN ALFA-EPBX 40000 UNIT/ML SOLUTION: Performed by: INTERNAL MEDICINE

## 2024-03-12 PROCEDURE — 36415 COLL VENOUS BLD VENIPUNCTURE: CPT

## 2024-03-12 PROCEDURE — 96372 THER/PROPH/DIAG INJ SC/IM: CPT

## 2024-03-12 RX ADMIN — EPOETIN ALFA-EPBX 40000 UNITS: 40000 INJECTION, SOLUTION INTRAVENOUS; SUBCUTANEOUS at 12:15

## 2024-03-12 NOTE — LETTER
March 12, 2024       No Recipients    Patient: Joanna Cross   YOB: 1950   Date of Visit: 3/12/2024     Dear Reynaldo Fritz MD:       Thank you for referring Joanna Cross to me for evaluation. Below are the relevant portions of my assessment and plan of care.    If you have questions, please do not hesitate to call me. I look forward to following Joanna along with you.         Sincerely,        Gregory Linton MD        CC:   No Recipients    Gregory Linton MD  03/12/24 1148  Sign when Signing Visit  CHIEF COMPLAINT: Multifactorial anemia    REASON FOR REFERRAL: Multifactorial anemia      RECORDS OBTAINED  Records of the patients history including those obtained from [facility] were reviewed and summarized in detail.    Oncology/Hematology History Overview Note   1.  Anemia since at least 2016 managed with erythropoietin injections by [facility].  With polypectomies from cecum June 2021 Dr. Marino's and capsule endoscopy at [facility] November 2016 showing small bowel AVMs cecal polyps Bj Rivera September 2016 and Dr. Reynaldo Fritz polypectomy 2007.  EGD Dr. Rivera 2012, 2015, 2016 with dilation of esophagus.  [facility] enteroscopy single balloon with fluoroscopy [facility] with 1 small nonbleeding AVM ablated.  2.  Cardiovascular disease with MI 2015 stent RCA [facility]  3.  Atrial fib managed by [facility] in [place] 2016 with pacemaker and subsequently polyp Cj  4.  Repetitive falls with possible concussion April 2023 and January 2024  5.  Wedge biopsy left upper lobe Dr. Faisal Lundy October 2016  6.  Eczema  7.  Reflux  8.  Hypertension  9.  Hypothyroidism  10.  Lichen sclerosis on vulvar biopsy 1982  11.  Parkinson's diagnosed 2007 [facility]  12.  Sleep apnea managed by [facility]  13.  Subcutaneous lupus diagnosed [facility] Carole Roy February 2023  14.  2019 back surgeries Dr. Musa  Stevenson and wound infection drained by Dr. Alonso in 2018  15.  Urethral dilations with Dr. Terrence Mckenzie  16.  Bilateral total knee replacements Dr. Springer.    Hematology history timeline:  -10/26/2023 hematology note Dr. Nunez.  Received parenteral iron September 2023 with hemoglobin stable 9.8.  He states this is multifactorial anemia due to iron deficiency suspecting GI blood loss due to history of AVMs and chronic kidney disease with erythropoietin deficiency.  White count and platelets normal.  Plan for continued Procrit per protocol with monitoring of iron indices periodically.  Numerous prior B12 levels normal during 2023.  Patient considering colonoscopy  -12/28/2023 40,000 units Procrit last dose given at [facility] being held for hemoglobin over 10.  -1/22/2024 hemoglobin 11 received IV iron 510 mg 1/22/2024 and 1/31/2024.  -3/6/2024 ferritin 263 with iron normal 115 and iron saturation 40% with normal total iron binding capacity 291.  Creatinine 1.24 GFR 45.  Hemoglobin 8.7 with platelets 132,000.  MCV 96 with normal RDW.    -3/12/2024 [facility] consult: I reviewed her extensive records she brought me that she collated herself and the note from [facility] I summarized above.  She has anemia of renal disease and iron deficiency due to periodic GI losses intolerant of oral iron because she cannot take it with the Sinemet for her Parkinson's that she takes 6 times a day so she gets periodic IV iron.  For now, with her hemoglobin of 8.7 and GFR 45 with normal iron indices we will hop back on the Procrit has per protocol 40,000 units subcu weekly holding for hemoglobin over 10 and she will see my nurse practitioner back in May.  Following that my nurse practitioner will watch her and periodically we will need to check her iron indices as she does have a history of AVMs and may need periodic IV iron as well.  I discussed with her that I would not put her through a bone  marrow biopsy despite the mild thrombocytopenia given normochromic normocytic indices and normal Red cell distribution with an unremarkable differential and the fact that, even if I found myelodysplasia, at most what I would do is just Procrit which we are already doing.  She has not had jaundice or icterus or other evidence is to suggest hemolysis and I will not work that up.  In essence we will just continue the care that she was already receiving at [facility].  She has had thorough GI evaluations as outlined above.     Anemia associated with stage 3 chronic renal failure   5/14/2021 Initial Diagnosis    Anemia associated with stage 3 chronic renal failure         HISTORY OF PRESENT ILLNESS:  The patient is a 73 y.o.  female, referred for multifactorial anemia as summarized with a history of AVMs and anemia of renal disease    REVIEW OF SYSTEMS:  Denies changes in the color caliber or consistency of her stools.  Denies pruritus or jaundice or icterus.    Past Medical History:   Diagnosis Date   • Anemia    • Ankle problem     thinks back related causing pain    • Atrial fibrillation    • AVM (arteriovenous malformation)    • Back pain    • Chronic kidney disease     stage 3 per pt   • Chronic lung disease 04/13/2022   • CKD (chronic kidney disease)    • Clotting disorder 2016    AVM - small intestine   • Coronary artery disease involving native coronary artery without angina pectoris 03/01/2017   • COVID-19 vaccine series completed    • Gastrocnemius muscle tear     left medial 91   • Generalized osteoarthritis    • GERD (gastroesophageal reflux disease)    • Gestational diabetes    • GIB (gastrointestinal bleeding) 2016    d/t xarelto    • Headache    • Hearing decreased, bilateral     HAS HEARING AID, NOT WEARING IT CURRENTLY   • Hiatal hernia    • History of shingles    • History of transfusion 2016    no reaction recalled    • Hyperlipidemia LDL goal <70 03/01/2017   • Hypertension    •  Hypothyroidism    • IBS (irritable bowel syndrome)    • Klebsiella pneumonia    • Lichen sclerosus    • Lupus     subQ   • Mouth problem     mouth guard used since pt bites tongue and lips excessively at night if not- with bipap    • MRSA infection 2018   • Myocardial infarction    • Obesity    • On home oxygen therapy     2L of oxygen all the time due to current congestion    • Osteoporosis    • Parkinson's disease    • Peripheral vascular disease    • Pleurisy    • Pneumonia    • Puerperal sepsis with acute hypoxic respiratory failure     emergent intubation- 2016   • Pulmonary embolism    • Right leg pain     from back issues    • Salivary gland stone    • Sciatic nerve pain    • Seborrheic dermatitis    • Skin cancer     on back    • Sleep apnea     CPAP AND HOME O2 2L/M   • TIA (transient ischemic attack) 2014    no residual effects   • Type 2 diabetes mellitus    • UTI (urinary tract infection)    • Vitamin D deficiency 09/08/2022   • Wears glasses      Past Surgical History:   Procedure Laterality Date   • ABLATION OF DYSRHYTHMIC FOCUS  05/16/13    Laser Ablation - Rt Leg   • BACK SURGERY      l4-l5 laminectomy    • BICEPS TENDON REPAIR Right     shoulder   • BREAST BIOPSY Left 05/2004    excisional, benign   • BRONCHOSCOPY RIGID / FLEXIBLE      2016   • BUNIONECTOMY Right    • CARDIAC CATHETERIZATION     • CARDIAC CATHETERIZATION N/A 02/01/2019    Procedure: Left Heart Cath;  Surgeon: Albertina Corona MD;  Location:  [facility]ASIVE [facility];  Service: Cardiology   • CARDIAC ELECTROPHYSIOLOGY PROCEDURE N/A 1/26/2024    Procedure: Lead Revision RA or RV, PPM or ICD;  Surgeon: Lauro Francois MD;  Location:  [facility] EP INVASIVE LOCATION;  Service: Cardiovascular;  Laterality: N/A;   • CHOLECYSTECTOMY     • COLONOSCOPY  2016   • COLONOSCOPY N/A 06/17/2021    Procedure: COLONOSCOPY WITH POLYPECTOMY;  Surgeon: Trenton Sosa MD;  Location:  [facility] ENDOSCOPY;  Service: Gastroenterology;   Laterality: N/A;   • CORONARY STENT PLACEMENT      x1 stent   • CYST REMOVAL      left ear, upper left back 2001   • CYSTOSCOPY      x2  18 and 20 -   in 20 urethra dilation    • DIAGNOSTIC LAPAROSCOPY  1981   • ENDOSCOPIC FUNCTIONAL SINUS SURGERY (FESS)  2011   • ENDOSCOPY  2016   • ENTEROSCOPY VIA STOMA      with single ballon with fluoro    • HAMMER TOE REPAIR Left    • INCISION AND DRAINAGE OF WOUND  2018    back with wound infection    • INSERT / REPLACE / REMOVE PACEMAKER  11/10/16    [facility]   • JOINT REPLACEMENT     • KNEE ARTHROSCOPY     • LASER ABLATION      right leg 13   • LIPOMA EXCISION  1999    left leg    • LUMBAR LAMINECTOMY DISCECTOMY DECOMPRESSION N/A 07/06/2018    Procedure: LUMBAR LAMINECTOMY L4-5, HEMILAMIINECTOMY RIGHT L5-S1, FORAMINOTOMY L5-S1;  Surgeon: Lencho Gamino MD;  Location:  [facility] OR;  Service: Neurosurgery   • LUMBAR LAMINECTOMY DISCECTOMY DECOMPRESSION N/A 09/19/2018    Procedure: INCISION AND DRAINAGE BACK WITH WOUND EXPLORATION;  Surgeon: Ritchie García MD;  Location:  [facility] OR;  Service: Neurosurgery   • LUNG BIOPSY Left 2016   • OTHER SURGICAL HISTORY      ct scan of chest and sinuses and lower back    • OTHER SURGICAL HISTORY      various echos    • OTHER SURGICAL HISTORY      electroencephalogram 16,   emg  ncv tests 2007   • OTHER SURGICAL HISTORY      mra 2007  and various mri with xrays, nuclear medicine lung ventilation with perfusion test 2016 with pft    • OTHER SURGICAL HISTORY      barium swallow testing 15, 12, 12   • OTHER SURGICAL HISTORY      vaginal ultrasound- 2012,   vas clementina lower extrem 2016, vas venous duplex lower extrem bilat 2019   • OTHER SURGICAL HISTORY      wdge biopsy spring of lung    • OTHER SURGICAL HISTORY      emergent intubation- hypoxic resp failure    • OTHER SURGICAL HISTORY      01/26/2024 ppm generator change and lead revision per Dr. Francois   • PACEMAKER IMPLANTATION  11/2016    sss   • REPLACEMENT TOTAL KNEE Bilateral     left  knee 2011, right 2012 per dr acuna    • REPLACEMENT TOTAL KNEE Bilateral    • SHOULDER ARTHROSCOPY Bilateral     2003-left, 2004- right    • SKIN BIOPSY      skin cancer back 2016   • SKIN CANCER EXCISION      upper righ tback    • MADHAV     • TEETH EXTRACTION      x2   • TOTAL SHOULDER ARTHROPLASTY W/ DISTAL CLAVICLE EXCISION Left 10/24/2022    Procedure: REVERSE TOTAL SHOULDER ARTHROPLASTY, BICEPS TENODESIS - LEFT;  Surgeon: Sammy Yo MD;  Location:  [facility] OR;  Service: Orthopedics;  Laterality: Left;   • TOTAL SHOULDER ARTHROPLASTY W/ DISTAL CLAVICLE EXCISION Right 09/18/2023    Procedure: REVERSE TOTAL SHOULDER  ARTHROPLASTY WITH BICEPS TENODESIS - RIGHT;  Surgeon: Sammy Yo MD;  Location:  [facility] OR;  Service: Orthopedics;  Laterality: Right;   • TUBAL ABDOMINAL LIGATION Bilateral 1980   • WISDOM TOOTH EXTRACTION         Current Outpatient Medications on File Prior to Visit   Medication Sig Dispense Refill   • Accu-Chek Guide test strip Testing 3 times per day; E11.65 300 each 3   • Accu-Chek Softclix Lancets lancets USE ONE LANCET TO TEST THREE TIMES A DAY dx e11.65 on insulin 300 each 3   • albuterol (PROVENTIL) (2.5 MG/3ML) 0.083% nebulizer solution Take 2.5 mg by nebulization Every 4 (Four) Hours As Needed for Wheezing or Shortness of Air. 1 each 3   • albuterol sulfate HFA (Ventolin HFA) 108 (90 Base) MCG/ACT inhaler Inhale 1 puff Every 4 (Four) Hours As Needed for Wheezing or Shortness of Air. 8 g 5   • amantadine (SYMMETREL) 100 MG capsule Take 1 capsule by mouth 2 (Two) Times a Day.     • apixaban (Eliquis) 5 MG tablet tablet Take 1 tablet by mouth Every 12 (Twelve) Hours. Restart 1/29/24 180 tablet 2   • aspirin 81 MG EC tablet Take 1 tablet by mouth Daily.     • B Complex-C (SUPER B COMPLEX PO) Take 1 tablet by mouth Daily.     • B-D UF III MINI PEN NEEDLES 31G X 5 MM misc USE TO INJECT INSULIN DAILY 90 each 3   • bumetanide (Bumex) 1 MG tablet Take 1 tablet by mouth 2 (Two) Times  a Day. tAKE BID FOR 3 DAYS THEN ONCE A DAY 60 tablet 6   • Calcium Carbonate (CALTRATE 600 PO) Take 600 mg by mouth Daily.     • carbidopa-levodopa (SINEMET)  MG per tablet Take  by mouth. 2 tablets at 0600, 1 tablet at 0900, 1200, 1500, 1800, 2100     • citalopram (CeleXA) 20 MG tablet Take 1 tablet by mouth Daily.     • clobetasol (TEMOVATE) 0.05 % cream Apply 1 application  topically to the appropriate area as directed 2 (Two) Times a Day As Needed (Lichens Sclerosis).     • dofetilide (TIKOSYN) 500 MCG capsule TAKE 1 CAPSULE EVERY 12 HOURS 180 capsule 3   • Emollient (AQUAPHOR ADV PROTECT HEALING EX) Apply 1 dose topically As Needed.     • Glucosamine-Chondroit-Calcium (TRIPLE FLEX BONE & JOINT PO) Take 1 tablet by mouth Daily. Move free     • hydrocortisone 2.5 % ointment Apply  topically to the appropriate area as directed 3 (Three) Times a Day. 28.35 g 1   • hydroxychloroquine (PLAQUENIL) 200 MG tablet Daily.     • Insulin Glargine (Lantus SoloStar) 100 UNIT/ML injection pen Inject 12-14 Units under the skin into the appropriate area as directed Daily. 15 mL 1   • ipratropium (ATROVENT) 0.03 % nasal spray 2 sprays into the nostril(s) as directed by provider 2 (Two) Times a Day As Needed (CONGESTION SINUS). 30 mL 11   • Loratadine 10 MG capsule Take 1 capsule by mouth Daily.     • metFORMIN (GLUCOPHAGE) 500 MG tablet Take 1 tablet by mouth 2 (Two) Times a Day With Meals. NEW DOSE 180 tablet 2   • Multiple Vitamins-Minerals (CENTRUM ULTRA WOMENS PO) Take 1 tablet by mouth Daily.     • nitroglycerin (NITROLINGUAL) 0.4 MG/SPRAY spray Place 1 spray under the tongue Every 5 (Five) Minutes As Needed for Chest Pain. 1 each 6   • nystatin (MYCOSTATIN) 295701 UNIT/GM ointment Apply 1 Application topically to the appropriate area as directed 2 (Two) Times a Day. 30 g 0   • O2 (OXYGEN) Inhale 2 L/min Every Night. 2L all the time now     • omeprazole (priLOSEC) 40 MG capsule Take 1 capsule by mouth 2 (Two) Times a  "Day. 180 capsule 0   • pramipexole (MIRAPEX) 1.5 MG tablet TAKE 1 TABLET EVERY NIGHT 90 tablet 3   • predniSONE (DELTASONE) 5 MG tablet Take 1 tablet by mouth As Needed.     • ranolazine (RANEXA) 500 MG 12 hr tablet Take 2 tablets by mouth Every 12 (Twelve) Hours. 360 tablet 3   • senna (SENOKOT) 8.6 MG tablet Take 1 tablet by mouth Daily.     • spironolactone (ALDACTONE) 25 MG tablet Take 2 tablets by mouth Daily. 180 tablet 2   • traMADol (ULTRAM) 50 MG tablet Take 1 tablet by mouth Every 8 (Eight) Hours As Needed for Moderate Pain.     • triamcinolone (KENALOG) 0.1 % cream Apply 1 application  topically to the appropriate area as directed 2 (Two) Times a Day.     • Triamcinolone Acetonide (NASACORT) 55 MCG/ACT nasal inhaler 2 sprays Daily.     • Unithroid 175 MCG tablet Take 1 tablet by mouth Daily. 90 tablet 1   • valsartan (DIOVAN) 160 MG tablet TAKE 1 TABLET TWICE A  tablet 3   • vitamin C (ASCORBIC ACID) 500 MG tablet Take 1 tablet by mouth Daily. Chewable tablet     • Zinc 50 MG capsule Take 1 capsule by mouth Daily.     • [DISCONTINUED] Cholecalciferol 2000 units tablet Take 1 tablet by mouth 2 (Two) Times a Day.     • [DISCONTINUED] clindamycin (Cleocin) 300 MG capsule Take 1 capsule by mouth 3 (Three) Times a Day. 30 capsule 0   • [DISCONTINUED] metOLazone (ZAROXOLYN) 2.5 MG tablet TAKE 1 TABLET DAILY 90 tablet 3     No current facility-administered medications on file prior to visit.       Allergies   Allergen Reactions   • Amlodipine Besylate Swelling     Lower extremity (ankles, feet) swelling   • Entacapone Other (See Comments)     \"extreme weakness in legs - caused several falls, which stopped after discontinuing this medication\"   • Epinephrine Other (See Comments)     6/4/16- had 3 shots to numb mouth to prepare teeth for crowns, the shots contained epi-  Caused pt to have chest discomfort- went to hospital in ambulance, discovered had a fib while there    • Levemir [Insulin Detemir] Hives " "    Hives / rash around injection site   • Penicillins Hives     Jitteriness    • Xarelto [Rivaroxaban] GI Bleeding     hgb dropped to 5.2   • Aricept [Donepezil Hcl] Nausea Only     Vivid dreams   • Benztropine Mesylate Other (See Comments)     Uncontrollable body movements   • Cogentin [Benztropine] Other (See Comments)     \"uncontrollable body movements\"   • Compazine [Prochlorperazine Edisylate] Other (See Comments)     Dystonic reaction   • Duraprep [Antiseptic Products, Misc.] Itching and Rash     RASH AND ITCHING   • Haldol [Haloperidol Lactate] Other (See Comments)     Dystonic reaction   • Hydralazine Other (See Comments)     Headache    • Lisinopril Cough   • Statins Myalgia     Leg pain- all statins    • Sulfamethoxazole Nausea Only and Other (See Comments)   • Toprol Xl [Metoprolol Tartrate] Other (See Comments)     Extreme fatigue, decreased exercise tolerance   • Trimethoprim Other (See Comments)     Other reaction(s): Nausea       Social History     Socioeconomic History   • Marital status:      Spouse name: N/A   • Number of children: 4   • Years of education: College   • Highest education level: Master's degree (e.g., MA, MS, Randi, MEd, MSW, NELLA)   Tobacco Use   • Smoking status: Never     Passive exposure: Past   • Smokeless tobacco: Never   • Tobacco comments:     Father and mother smoked for several years.   Vaping Use   • Vaping status: Never Used   • Passive vaping exposure: Yes   Substance and Sexual Activity   • Alcohol use: Never   • Drug use: No   • Sexual activity: Not Currently     Partners: Male     Birth control/protection: Post-menopausal, Tubal ligation, Vaginal insert contraception       Family History   Problem Relation Age of Onset   • Kidney disease Mother    • Coronary artery disease Mother    • Hypertension Mother            • Heart disease Mother            • Hyperlipidemia Mother            • Coronary artery disease Father    • Hypertension " "Father            • Hypothyroidism Father    • Cancer Father         Oral cancer   • Heart disease Father            • Coronary artery disease Brother    • Hypertension Brother    • Heart disease Brother         Multiple stents, by-pass surgery   • Testicular cancer Brother    • Kidney cancer Maternal Uncle    • Testicular cancer Maternal Uncle    • Colon polyps Neg Hx    • Colon cancer Neg Hx        PHYSICAL EXAM:  No pallor jaundice or icterus.  No respiratory distress.  No rashes.  Presbycusis.  Comes in a wheelchair with ambulatory difficulties but can get up with her efforts.    /72   Pulse 78   Temp 97.8 °F (36.6 °C)   Resp 18   Ht 157.5 cm (62\")   Wt 108 kg (237 lb)   LMP  (LMP Unknown) Comment: Mammogram- 20  SpO2 94% Comment: o2@2LPNC  BMI 43.35 kg/m²     ECOG score: 3           ECOG: (3) Capable of Limited Self Care, Confined to Bed or Chair Greater Than 50% of Waking Hours    Lab Results   Component Value Date    HGB 8.7 (L) 2024    HCT 26.6 (L) 2024    MCV 96 2024     (L) 2024    WBC 4.7 2024    NEUTROABS 3.4 10/19/2023    LYMPHSABS 0.7 10/19/2023    MONOSABS 0.5 10/19/2023    EOSABS 0.5 (H) 10/19/2023    BASOSABS 0.1 10/19/2023     Lab Results   Component Value Date    GLUCOSE 87 2024    BUN 27 2024    CREATININE 1.24 (H) 2024     (L) 2024    K 4.1 2024    CL 95 (L) 2024    CO2 23 2024    CALCIUM 9.3 2024    PROTEINTOT 6.6 2019    ALBUMIN 4.5 10/19/2023    BILITOT 0.3 10/19/2023    ALKPHOS 85 10/19/2023    AST 17 10/19/2023    ALT 8 10/19/2023         Assessment & Plan  1.  Anemia since at least  managed with erythropoietin injections by [facility].  With polypectomies from [facility] 2021 Dr. Rasheeds and capsule endoscopy at [facility] 2016 showing small bowel AVMs cecal polyps Bj Rivera 2016 and Dr. Reynaldo Fritz " polypectomy 2007.  EGD Dr. Rivera 2012, 2015, 2016 with dilation of esophagus.  [facility] enteroscopy single balloon with fluoroscopy [facility] with 1 small nonbleeding AVM ablated.  2.  Cardiovascular disease with MI 2015 stent RCA [facility]  3.  Atrial fib managed by [facility] in [place] 2016 with pacemaker and subsequently polyp Cj  4.  Repetitive falls with possible concussion April 2023 and January 2024  5.  Wedge biopsy left upper lobe Dr. Faisal Lundy October 2016  6.  Eczema  7.  Reflux  8.  Hypertension  9.  Hypothyroidism  10.  Lichen sclerosis on vulvar biopsy 1982  11.  Parkinson's diagnosed 2007 [facility]  12.  Sleep apnea managed by [facility]  13.  Subcutaneous lupus diagnosed [facility] Carole Levinerly February 2023  14.  2019 back surgeries Dr. Lencho Gamino and wound infection drained by Dr. Alonso in 2018  15.  Urethral dilations with Dr. Terrence Mckenzie  16.  Bilateral total knee replacements Dr. Springer.    Hematology history timeline:  -10/26/2023 hematology note Dr. Nunez.  Received parenteral iron September 2023 with hemoglobin stable 9.8.  He states this is multifactorial anemia due to iron deficiency suspecting GI blood loss due to history of AVMs and chronic kidney disease with erythropoietin deficiency.  White count and platelets normal.  Plan for continued Procrit per protocol with monitoring of iron indices periodically.  Numerous prior B12 levels normal during 2023.  Patient considering colonoscopy  -12/28/2023 40,000 units Procrit last dose given at [facility] being held for hemoglobin over 10.  -1/22/2024 hemoglobin 11 received IV iron 510 mg 1/22/2024 and 1/31/2024.  -3/6/2024 ferritin 263 with iron normal 115 and iron saturation 40% with normal total iron binding capacity 291.  Creatinine 1.24 GFR 45.  Hemoglobin 8.7 with platelets 132,000.  MCV 96 with normal RDW.    -3/12/2024 [facility] consult: I reviewed  her extensive records she brought me that she collated herself and the note from [facility] I summarized above.  She has anemia of renal disease and iron deficiency due to periodic GI losses intolerant of oral iron because she cannot take it with the Sinemet for her Parkinson's that she takes 6 times a day so she gets periodic IV iron.  For now, with her hemoglobin of 8.7 and GFR 45 with normal iron indices we will hop back on the Procrit has per protocol 40,000 units subcu weekly holding for hemoglobin over 10 and she will see my nurse practitioner back in May.  Following that my nurse practitioner will watch her and periodically we will need to check her iron indices as she does have a history of AVMs and may need periodic IV iron as well.  I discussed with her that I would not put her through a bone marrow biopsy despite the mild thrombocytopenia given normochromic normocytic indices and normal Red cell distribution with an unremarkable differential and the fact that, even if I found myelodysplasia, at most what I would do is just Procrit which we are already doing.  She has not had jaundice or icterus or other evidence is to suggest hemolysis and I will not work that up.  In essence we will just continue the care that she was already receiving at [facility].  She has had thorough GI evaluations as outlined above.    Total time of care today inclusive of time spent prior to patient's arrival reviewing extensive past records and collating and summarizing above and during visit interviewing her as to signs or symptoms of her disease and management thereof and after visit instituting this plan took 1 hour patient care time throughout the day today.  Gregory Linton MD    3/12/2024

## 2024-03-12 NOTE — PROGRESS NOTES
CHIEF COMPLAINT: Multifactorial anemia    REASON FOR REFERRAL: Multifactorial anemia      RECORDS OBTAINED  Records of the patients history including those obtained from [facility] were reviewed and summarized in detail.    Oncology/Hematology History Overview Note   1.  Anemia since at least 2016 managed with erythropoietin injections by [facility].  With polypectomies from cecum June 2021 Dr. Marino's and capsule endoscopy at [facility] November 2016 showing small bowel AVMs cecal polyps Bj Rivera September 2016 and Dr. Reynaldo Fritz polypectomy 2007.  EGD Dr. Rivera 2012, 2015, 2016 with dilation of esophagus.  [facility] enteroscopy single balloon with fluoroscopy [facility] with 1 small nonbleeding AVM ablated.  2.  Cardiovascular disease with MI 2015 stent RCA [facility]  3.  Atrial fib managed by [facility] in [place] 2016 with pacemaker and subsequently polyp Cj  4.  Repetitive falls with possible concussion April 2023 and January 2024  5.  Wedge biopsy left upper lobe Dr. Faisal Lundy October 2016  6.  Eczema  7.  Reflux  8.  Hypertension  9.  Hypothyroidism  10.  Lichen sclerosis on vulvar biopsy 1982  11.  Parkinson's diagnosed 2007 [facility]  12.  Sleep apnea managed by [facility] pulmonary medicine  13.  Subcutaneous lupus diagnosed [facility] Carole Roy February 2023  14.  2019 back surgeries Dr. Lencho Gamino and wound infection drained by Dr. Alonso in 2018  15.  Urethral dilations with Dr. Terrence Mckenzie  16.  Bilateral total knee replacements Dr. Springer.    Hematology history timeline:  -10/26/2023 hematology note Dr. Nunez.  Received parenteral iron September 2023 with hemoglobin stable 9.8.  He states this is multifactorial anemia due to iron deficiency suspecting GI blood loss due to history of AVMs and chronic kidney disease with erythropoietin deficiency.  White count and platelets normal.  Plan for continued  Procrit per protocol with monitoring of iron indices periodically.  Numerous prior B12 levels normal during 2023.  Patient considering colonoscopy  -12/28/2023 40,000 units Procrit last dose given at [facility] being held for hemoglobin over 10.  -1/22/2024 hemoglobin 11 received IV iron 510 mg 1/22/2024 and 1/31/2024.  -3/6/2024 ferritin 263 with iron normal 115 and iron saturation 40% with normal total iron binding capacity 291.  Creatinine 1.24 GFR 45.  Hemoglobin 8.7 with platelets 132,000.  MCV 96 with normal RDW.    -3/12/2024 [facility] consult: I reviewed her extensive records she brought me that she collated herself and the note from [facility] I summarized above.  She has anemia of renal disease and iron deficiency due to periodic GI losses intolerant of oral iron because she cannot take it with the Sinemet for her Parkinson's that she takes 6 times a day so she gets periodic IV iron.  For now, with her hemoglobin of 8.7 and GFR 45 with normal iron indices we will hop back on the Procrit has per protocol 40,000 units subcu weekly holding for hemoglobin over 10 and she will see my nurse practitioner back in May.  Following that my nurse practitioner will watch her and periodically we will need to check her iron indices as she does have a history of AVMs and may need periodic IV iron as well.  I discussed with her that I would not put her through a bone marrow biopsy despite the mild thrombocytopenia given normochromic normocytic indices and normal Red cell distribution with an unremarkable differential and the fact that, even if I found myelodysplasia, at most what I would do is just Procrit which we are already doing.  She has not had jaundice or icterus or other evidence is to suggest hemolysis and I will not work that up.  In essence we will just continue the care that she was already receiving at [facility].  She has had thorough GI evaluations as  outlined above.     Anemia associated with stage 3 chronic renal failure   5/14/2021 Initial Diagnosis    Anemia associated with stage 3 chronic renal failure         HISTORY OF PRESENT ILLNESS:  The patient is a 73 y.o.  female, referred for multifactorial anemia as summarized with a history of AVMs and anemia of renal disease    REVIEW OF SYSTEMS:  Denies changes in the color caliber or consistency of her stools.  Denies pruritus or jaundice or icterus.    Past Medical History:   Diagnosis Date    Anemia     Ankle problem     thinks back related causing pain     Atrial fibrillation     AVM (arteriovenous malformation)     Back pain     Chronic kidney disease     stage 3 per pt    Chronic lung disease 04/13/2022    CKD (chronic kidney disease)     Clotting disorder 2016    AVM - small intestine    Coronary artery disease involving native coronary artery without angina pectoris 03/01/2017    COVID-19 vaccine series completed     Gastrocnemius muscle tear     left medial 91    Generalized osteoarthritis     GERD (gastroesophageal reflux disease)     Gestational diabetes     GIB (gastrointestinal bleeding) 2016    d/t xarelto     Headache     Hearing decreased, bilateral     HAS HEARING AID, NOT WEARING IT CURRENTLY    Hiatal hernia     History of shingles     History of transfusion 2016    no reaction recalled     Hyperlipidemia LDL goal <70 03/01/2017    Hypertension     Hypothyroidism     IBS (irritable bowel syndrome)     Klebsiella pneumonia     Lichen sclerosus     Lupus     subQ    Mouth problem     mouth guard used since pt bites tongue and lips excessively at night if not- with bipap     MRSA infection 2018    Myocardial infarction     Obesity     On home oxygen therapy     2L of oxygen all the time due to current congestion     Osteoporosis     Parkinson's disease     Peripheral vascular disease     Pleurisy     Pneumonia     Puerperal sepsis with acute hypoxic respiratory failure     emergent intubation-  2016    Pulmonary embolism     Right leg pain     from back issues     Salivary gland stone     Sciatic nerve pain     Seborrheic dermatitis     Skin cancer     on back     Sleep apnea     CPAP AND HOME O2 2L/M    TIA (transient ischemic attack) 2014    no residual effects    Type 2 diabetes mellitus     UTI (urinary tract infection)     Vitamin D deficiency 09/08/2022    Wears glasses      Past Surgical History:   Procedure Laterality Date    ABLATION OF DYSRHYTHMIC FOCUS  05/16/13    Laser Ablation - Rt Leg    BACK SURGERY      l4-l5 laminectomy     BICEPS TENDON REPAIR Right     shoulder    BREAST BIOPSY Left 05/2004    excisional, benign    BRONCHOSCOPY RIGID / FLEXIBLE      2016    BUNIONECTOMY Right     CARDIAC CATHETERIZATION      CARDIAC CATHETERIZATION N/A 02/01/2019    Procedure: Left Heart Cath;  Surgeon: Albertina Corona MD;  Location:  [facility] CATH INVASIVE LOCATION;  Service: Cardiology    CARDIAC ELECTROPHYSIOLOGY PROCEDURE N/A 1/26/2024    Procedure: Lead Revision RA or RV, PPM or ICD;  Surgeon: Lauro Francois MD;  Location:  [facility] INVASIVE LOCATION;  Service: Cardiovascular;  Laterality: N/A;    CHOLECYSTECTOMY      COLONOSCOPY  2016    COLONOSCOPY N/A 06/17/2021    Procedure: COLONOSCOPY WITH POLYPECTOMY;  Surgeon: Trenton Sosa MD;  Location: [facility];  Service: Gastroenterology;  Laterality: N/A;    CORONARY STENT PLACEMENT      x1 stent    CYST REMOVAL      left ear, upper left back 2001    CYSTOSCOPY      x2  18 and 20 -   in 20 urethra dilation     DIAGNOSTIC LAPAROSCOPY  1981    ENDOSCOPIC FUNCTIONAL SINUS SURGERY (FESS)  2011    ENDOSCOPY  2016    ENTEROSCOPY VIA STOMA      with single ballon with fluoro     HAMMER TOE REPAIR Left     INCISION AND DRAINAGE OF WOUND  2018    back with wound infection     INSERT / REPLACE / REMOVE PACEMAKER  11/10/16    Psychiatric    JOINT REPLACEMENT      KNEE ARTHROSCOPY      LASER ABLATION      right leg 13     LIPOMA EXCISION  1999    left leg     LUMBAR LAMINECTOMY DISCECTOMY DECOMPRESSION N/A 07/06/2018    Procedure: LUMBAR LAMINECTOMY L4-5, HEMILAMIINECTOMY RIGHT L5-S1, FORAMINOTOMY L5-S1;  Surgeon: Lencho Gamino MD;  Location:  [facility] OR;  Service: Neurosurgery    LUMBAR LAMINECTOMY DISCECTOMY DECOMPRESSION N/A 09/19/2018    Procedure: INCISION AND DRAINAGE BACK WITH WOUND EXPLORATION;  Surgeon: Ritchie García MD;  Location:  [facility];  Service: Neurosurgery    LUNG BIOPSY Left 2016    OTHER SURGICAL HISTORY      ct scan of chest and sinuses and lower back     OTHER SURGICAL HISTORY      various echos     OTHER SURGICAL HISTORY      electroencephalogram 16,   emg  ncv tests 2007    OTHER SURGICAL HISTORY      mra 2007  and various mri with xrays, nuclear medicine lung ventilation with perfusion test 2016 with pft     OTHER SURGICAL HISTORY      barium swallow testing 15, 12, 12    OTHER SURGICAL HISTORY      vaginal ultrasound- 2012,   vas clementina lower extrem 2016, vas venous duplex lower extrem bilat 2019    OTHER SURGICAL HISTORY      wdge biopsy spring of lung     OTHER SURGICAL HISTORY      emergent intubation- hypoxic resp failure     OTHER SURGICAL HISTORY      01/26/2024 ppm generator change and lead revision per Dr. Francois    PACEMAKER IMPLANTATION  11/2016    sss    REPLACEMENT TOTAL KNEE Bilateral     left knee 2011, right 2012 per dr acuna     REPLACEMENT TOTAL KNEE Bilateral     SHOULDER ARTHROSCOPY Bilateral     2003-left, 2004- right     SKIN BIOPSY      skin cancer back 2016    SKIN CANCER EXCISION      upper righ tback     MADHAV      TEETH EXTRACTION      x2    TOTAL SHOULDER ARTHROPLASTY W/ DISTAL CLAVICLE EXCISION Left 10/24/2022    Procedure: REVERSE TOTAL SHOULDER ARTHROPLASTY, BICEPS TENODESIS - LEFT;  Surgeon: Sammy Yo MD;  Location:  [facility];  Service: Orthopedics;  Laterality: Left;    TOTAL SHOULDER ARTHROPLASTY W/ DISTAL CLAVICLE EXCISION Right 09/18/2023    Procedure: REVERSE TOTAL  SHOULDER  ARTHROPLASTY WITH BICEPS TENODESIS - RIGHT;  Surgeon: Sammy Yo MD;  Location: [facility];  Service: Orthopedics;  Laterality: Right;    TUBAL ABDOMINAL LIGATION Bilateral 1980    WISDOM TOOTH EXTRACTION         Current Outpatient Medications on File Prior to Visit   Medication Sig Dispense Refill    Accu-Chek Guide test strip Testing 3 times per day; E11.65 300 each 3    Accu-Chek Softclix Lancets lancets USE ONE LANCET TO TEST THREE TIMES A DAY dx e11.65 on insulin 300 each 3    albuterol (PROVENTIL) (2.5 MG/3ML) 0.083% nebulizer solution Take 2.5 mg by nebulization Every 4 (Four) Hours As Needed for Wheezing or Shortness of Air. 1 each 3    albuterol sulfate HFA (Ventolin HFA) 108 (90 Base) MCG/ACT inhaler Inhale 1 puff Every 4 (Four) Hours As Needed for Wheezing or Shortness of Air. 8 g 5    amantadine (SYMMETREL) 100 MG capsule Take 1 capsule by mouth 2 (Two) Times a Day.      apixaban (Eliquis) 5 MG tablet tablet Take 1 tablet by mouth Every 12 (Twelve) Hours. Restart 1/29/24 180 tablet 2    aspirin 81 MG EC tablet Take 1 tablet by mouth Daily.      B Complex-C (SUPER B COMPLEX PO) Take 1 tablet by mouth Daily.      B-D UF III MINI PEN NEEDLES 31G X 5 MM misc USE TO INJECT INSULIN DAILY 90 each 3    bumetanide (Bumex) 1 MG tablet Take 1 tablet by mouth 2 (Two) Times a Day. tAKE BID FOR 3 DAYS THEN ONCE A DAY 60 tablet 6    Calcium Carbonate (CALTRATE 600 PO) Take 600 mg by mouth Daily.      carbidopa-levodopa (SINEMET)  MG per tablet Take  by mouth. 2 tablets at 0600, 1 tablet at 0900, 1200, 1500, 1800, 2100      citalopram (CeleXA) 20 MG tablet Take 1 tablet by mouth Daily.      clobetasol (TEMOVATE) 0.05 % cream Apply 1 application  topically to the appropriate area as directed 2 (Two) Times a Day As Needed (Lichens Sclerosis).      dofetilide (TIKOSYN) 500 MCG capsule TAKE 1 CAPSULE EVERY 12 HOURS 180 capsule 3    Emollient (AQUAPHOR ADV PROTECT HEALING EX) Apply 1 dose topically  As Needed.      Glucosamine-Chondroit-Calcium (TRIPLE FLEX BONE & JOINT PO) Take 1 tablet by mouth Daily. Move free      hydrocortisone 2.5 % ointment Apply  topically to the appropriate area as directed 3 (Three) Times a Day. 28.35 g 1    hydroxychloroquine (PLAQUENIL) 200 MG tablet Daily.      Insulin Glargine (Lantus SoloStar) 100 UNIT/ML injection pen Inject 12-14 Units under the skin into the appropriate area as directed Daily. 15 mL 1    ipratropium (ATROVENT) 0.03 % nasal spray 2 sprays into the nostril(s) as directed by provider 2 (Two) Times a Day As Needed (CONGESTION SINUS). 30 mL 11    Loratadine 10 MG capsule Take 1 capsule by mouth Daily.      metFORMIN (GLUCOPHAGE) 500 MG tablet Take 1 tablet by mouth 2 (Two) Times a Day With Meals. NEW DOSE 180 tablet 2    Multiple Vitamins-Minerals (CENTRUM ULTRA WOMENS PO) Take 1 tablet by mouth Daily.      nitroglycerin (NITROLINGUAL) 0.4 MG/SPRAY spray Place 1 spray under the tongue Every 5 (Five) Minutes As Needed for Chest Pain. 1 each 6    nystatin (MYCOSTATIN) 479453 UNIT/GM ointment Apply 1 Application topically to the appropriate area as directed 2 (Two) Times a Day. 30 g 0    O2 (OXYGEN) Inhale 2 L/min Every Night. 2L all the time now      omeprazole (priLOSEC) 40 MG capsule Take 1 capsule by mouth 2 (Two) Times a Day. 180 capsule 0    pramipexole (MIRAPEX) 1.5 MG tablet TAKE 1 TABLET EVERY NIGHT 90 tablet 3    predniSONE (DELTASONE) 5 MG tablet Take 1 tablet by mouth As Needed.      ranolazine (RANEXA) 500 MG 12 hr tablet Take 2 tablets by mouth Every 12 (Twelve) Hours. 360 tablet 3    senna (SENOKOT) 8.6 MG tablet Take 1 tablet by mouth Daily.      spironolactone (ALDACTONE) 25 MG tablet Take 2 tablets by mouth Daily. 180 tablet 2    traMADol (ULTRAM) 50 MG tablet Take 1 tablet by mouth Every 8 (Eight) Hours As Needed for Moderate Pain.      triamcinolone (KENALOG) 0.1 % cream Apply 1 application  topically to the appropriate area as directed 2 (Two)  "Times a Day.      Triamcinolone Acetonide (NASACORT) 55 MCG/ACT nasal inhaler 2 sprays Daily.      Unithroid 175 MCG tablet Take 1 tablet by mouth Daily. 90 tablet 1    valsartan (DIOVAN) 160 MG tablet TAKE 1 TABLET TWICE A  tablet 3    vitamin C (ASCORBIC ACID) 500 MG tablet Take 1 tablet by mouth Daily. Chewable tablet      Zinc 50 MG capsule Take 1 capsule by mouth Daily.      [DISCONTINUED] Cholecalciferol 2000 units tablet Take 1 tablet by mouth 2 (Two) Times a Day.      [DISCONTINUED] clindamycin (Cleocin) 300 MG capsule Take 1 capsule by mouth 3 (Three) Times a Day. 30 capsule 0    [DISCONTINUED] metOLazone (ZAROXOLYN) 2.5 MG tablet TAKE 1 TABLET DAILY 90 tablet 3     No current facility-administered medications on file prior to visit.       Allergies   Allergen Reactions    Amlodipine Besylate Swelling     Lower extremity (ankles, feet) swelling    Entacapone Other (See Comments)     \"extreme weakness in legs - caused several falls, which stopped after discontinuing this medication\"    Epinephrine Other (See Comments)     6/4/16- had 3 shots to numb mouth to prepare teeth for crowns, the shots contained epi-  Caused pt to have chest discomfort- went to hospital in ambulance, discovered had a fib while there     Levemir [Insulin Detemir] Hives     Hives / rash around injection site    Penicillins Hives     Jitteriness     Xarelto [Rivaroxaban] GI Bleeding     hgb dropped to 5.2    Aricept [Donepezil Hcl] Nausea Only     Vivid dreams    Benztropine Mesylate Other (See Comments)     Uncontrollable body movements    Cogentin [Benztropine] Other (See Comments)     \"uncontrollable body movements\"    Compazine [Prochlorperazine Edisylate] Other (See Comments)     Dystonic reaction    Duraprep [Antiseptic Products, Misc.] Itching and Rash     RASH AND ITCHING    Haldol [Haloperidol Lactate] Other (See Comments)     Dystonic reaction    Hydralazine Other (See Comments)     Headache     Lisinopril Cough    " "Statins Myalgia     Leg pain- all statins     Sulfamethoxazole Nausea Only and Other (See Comments)    Toprol Xl [Metoprolol Tartrate] Other (See Comments)     Extreme fatigue, decreased exercise tolerance    Trimethoprim Other (See Comments)     Other reaction(s): Nausea       Social History     Socioeconomic History    Marital status:      Spouse name: N/A    Number of children: 4    Years of education: College    Highest education level: Master's degree (e.g., MA, MS, Randi, MEd, MSW, NELLA)   Tobacco Use    Smoking status: Never     Passive exposure: Past    Smokeless tobacco: Never    Tobacco comments:     Father and mother smoked for several years.   Vaping Use    Vaping status: Never Used    Passive vaping exposure: Yes   Substance and Sexual Activity    Alcohol use: Never    Drug use: No    Sexual activity: Not Currently     Partners: Male     Birth control/protection: Post-menopausal, Tubal ligation, Vaginal insert contraception       Family History   Problem Relation Age of Onset    Kidney disease Mother     Coronary artery disease Mother     Hypertension Mother             Heart disease Mother             Hyperlipidemia Mother             Coronary artery disease Father     Hypertension Father             Hypothyroidism Father     Cancer Father         Oral cancer    Heart disease Father             Coronary artery disease Brother     Hypertension Brother     Heart disease Brother         Multiple stents, by-pass surgery    Testicular cancer Brother     Kidney cancer Maternal Uncle     Testicular cancer Maternal Uncle     Colon polyps Neg Hx     Colon cancer Neg Hx        PHYSICAL EXAM:  No pallor jaundice or icterus.  No respiratory distress.  No rashes.  Presbycusis.  Comes in a wheelchair with ambulatory difficulties but can get up with her efforts.    /72   Pulse 78   Temp 97.8 °F (36.6 °C)   Resp 18   Ht 157.5 cm (62\")   Wt 108 kg (237 lb)   LMP  " (LMP Unknown) Comment: Mammogram- 1/28/20  SpO2 94% Comment: o2@2LPNC  BMI 43.35 kg/m²     ECOG score: 3           ECOG: (3) Capable of Limited Self Care, Confined to Bed or Chair Greater Than 50% of Waking Hours    Lab Results   Component Value Date    HGB 8.7 (L) 03/04/2024    HCT 26.6 (L) 03/04/2024    MCV 96 03/04/2024     (L) 03/04/2024    WBC 4.7 03/04/2024    NEUTROABS 3.4 10/19/2023    LYMPHSABS 0.7 10/19/2023    MONOSABS 0.5 10/19/2023    EOSABS 0.5 (H) 10/19/2023    BASOSABS 0.1 10/19/2023     Lab Results   Component Value Date    GLUCOSE 87 03/04/2024    BUN 27 03/04/2024    CREATININE 1.24 (H) 03/04/2024     (L) 03/04/2024    K 4.1 03/04/2024    CL 95 (L) 03/04/2024    CO2 23 03/04/2024    CALCIUM 9.3 03/04/2024    PROTEINTOT 6.6 01/31/2019    ALBUMIN 4.5 10/19/2023    BILITOT 0.3 10/19/2023    ALKPHOS 85 10/19/2023    AST 17 10/19/2023    ALT 8 10/19/2023         Assessment & Plan   1.  Anemia since at least 2016 managed with erythropoietin injections by [facility].  With polypectomies from cecum June 2021 Dr. Marino's and capsule endoscopy at [facility] November 2016 showing small bowel AVMs cecal polyps Bj Rivera September 2016 and Dr. Reynaldo Fritz polypectomy 2007.  EGD Dr. Rivera 2012, 2015, 2016 with dilation of esophagus.  [facility] enteroscopy single balloon with fluoroscopy [facility] with 1 small nonbleeding AVM ablated.  2.  Cardiovascular disease with MI 2015 stent RCA [facility]  3.  Atrial fib managed by [facility] in [place] 2016 with pacemaker and subsequently polyp Cj  4.  Repetitive falls with possible concussion April 2023 and January 2024  5.  Wedge biopsy left upper lobe Dr. Faisal Lundy October 2016  6.  Eczema  7.  Reflux  8.  Hypertension  9.  Hypothyroidism  10.  Lichen sclerosis on vulvar biopsy 1982  11.  Parkinson's diagnosed 2007 [facility]  12.  Sleep apnea managed by [facility] pulmonary medicine  13.   Subcutaneous lupus diagnosed [facility] Carole Roy February 2023  14.  2019 back surgeries Dr. Lencho Gamino and wound infection drained by Dr. Alonso in 2018  15.  Urethral dilations with Dr. Terrence Mckenzie  16.  Bilateral total knee replacements Dr. Springer.    Hematology history timeline:  -10/26/2023 hematology note Dr. Nunez.  Received parenteral iron September 2023 with hemoglobin stable 9.8.  He states this is multifactorial anemia due to iron deficiency suspecting GI blood loss due to history of AVMs and chronic kidney disease with erythropoietin deficiency.  White count and platelets normal.  Plan for continued Procrit per protocol with monitoring of iron indices periodically.  Numerous prior B12 levels normal during 2023.  Patient considering colonoscopy  -12/28/2023 40,000 units Procrit last dose given at [facility] being held for hemoglobin over 10.  -1/22/2024 hemoglobin 11 received IV iron 510 mg 1/22/2024 and 1/31/2024.  -3/6/2024 ferritin 263 with iron normal 115 and iron saturation 40% with normal total iron binding capacity 291.  Creatinine 1.24 GFR 45.  Hemoglobin 8.7 with platelets 132,000.  MCV 96 with normal RDW.    -3/12/2024 [facility] hematology consult: I reviewed her extensive records she brought me that she collated herself and the note from [facility] I summarized above.  She has anemia of renal disease and iron deficiency due to periodic GI losses intolerant of oral iron because she cannot take it with the Sinemet for her Parkinson's that she takes 6 times a day so she gets periodic IV iron.  For now, with her hemoglobin of 8.7 and GFR 45 with normal iron indices we will hop back on the Procrit has per protocol 40,000 units subcu weekly holding for hemoglobin over 10 and she will see my nurse practitioner back in May.  Following that my nurse practitioner will watch her and periodically we will need to check her iron indices as she does have a  history of AVMs and may need periodic IV iron as well.  I discussed with her that I would not put her through a bone marrow biopsy despite the mild thrombocytopenia given normochromic normocytic indices and normal Red cell distribution with an unremarkable differential and the fact that, even if I found myelodysplasia, at most what I would do is just Procrit which we are already doing.  She has not had jaundice or icterus or other evidence is to suggest hemolysis and I will not work that up.  In essence we will just continue the care that she was already receiving at [facility].  She has had thorough GI evaluations as outlined above.    Total time of care today inclusive of time spent prior to patient's arrival reviewing extensive past records and collating and summarizing above and during visit interviewing her as to signs or symptoms of her disease and management thereof and after visit instituting this plan took 1 hour patient care time throughout the day today.  Gregory Linton MD    3/12/2024

## 2024-03-18 RX ORDER — SPIRONOLACTONE 25 MG/1
50 TABLET ORAL DAILY
Qty: 180 TABLET | Refills: 1 | Status: SHIPPED | OUTPATIENT
Start: 2024-03-18

## 2024-03-19 ENCOUNTER — TELEPHONE (OUTPATIENT)
Dept: ONCOLOGY | Facility: CLINIC | Age: 74
End: 2024-03-19
Payer: MEDICARE

## 2024-03-19 NOTE — TELEPHONE ENCOUNTER
Called Radha back and informed her patient gets 40,000 units of Retacrit weekly for hgb <10. She verbalized understanding.

## 2024-03-19 NOTE — TELEPHONE ENCOUNTER
Radha with [facility] pharmacy called she states patient is inpatient there, and they need to know what dose of procrit does patient get? Please call.

## 2024-03-20 ENCOUNTER — TELEPHONE (OUTPATIENT)
Dept: ONCOLOGY | Facility: CLINIC | Age: 74
End: 2024-03-20
Payer: MEDICARE

## 2024-03-20 NOTE — TELEPHONE ENCOUNTER
Caller: Silvana Cross    Relationship to patient: Emergency Contact    Best call back number: [phone]    Chief complaint: PT HAS BEEN ADMITTED TO [facility] AND NEEDS TO CANCEL APPOINTMENT AND DAUGHTER WILL CALL BACK TO RESCHEDULE     Type of visit: INJECTION       If rescheduling, when is the original appointment: 3-21-24

## 2024-03-21 ENCOUNTER — TELEPHONE (OUTPATIENT)
Dept: FAMILY MEDICINE CLINIC | Facility: CLINIC | Age: 74
End: 2024-03-21
Payer: MEDICARE

## 2024-03-21 NOTE — TELEPHONE ENCOUNTER
Caller: Misha Cross    Relationship: Emergency Contact    Best call back number: [phone]     What is the best time to reach you: ANY    Who are you requesting to speak with (clinical staff, provider,  specific staff member): CLINICAL       What was the call regarding:   FACILITY: [facility]   dIAGNOSIS: CONGESTION HEART FAILURE AND PRESERVED EJECTION FRACTIONS  ADMITTED:3/18/24  DISCARGED: POSSIBLE DISCHARGE TO GO TO REHAB AT [facility] IN [place] ON 3/22/24     THE HOSPITAL ADVISED THE PATIENT DAUGHTER  THAT THEY NEEDED DR. TAVAREZ TO BE IN CHARGE WITH ALL OF HER MEDICATIONS AND MANAGING ALL THE BLOOD LEVELS FOR MULTIPLE DIFFERENT SPECIALTIES.     PLEASE CALL TO DISCUSS WITH MISHA.

## 2024-03-22 ENCOUNTER — OUTSIDE FACILITY SERVICE (OUTPATIENT)
Dept: CARDIOLOGY | Facility: CLINIC | Age: 74
End: 2024-03-22
Payer: MEDICARE

## 2024-03-22 PROCEDURE — 99222 1ST HOSP IP/OBS MODERATE 55: CPT | Performed by: INTERNAL MEDICINE

## 2024-03-25 ENCOUNTER — OUTSIDE FACILITY SERVICE (OUTPATIENT)
Dept: CARDIOLOGY | Facility: CLINIC | Age: 74
End: 2024-03-25
Payer: MEDICARE

## 2024-03-25 PROCEDURE — 99232 SBSQ HOSP IP/OBS MODERATE 35: CPT | Performed by: INTERNAL MEDICINE

## 2024-03-26 ENCOUNTER — READMISSION MANAGEMENT (OUTPATIENT)
Dept: CALL CENTER | Facility: HOSPITAL | Age: 74
End: 2024-03-26
Payer: MEDICARE

## 2024-03-26 ENCOUNTER — OUTSIDE FACILITY SERVICE (OUTPATIENT)
Dept: CARDIOLOGY | Facility: CLINIC | Age: 74
End: 2024-03-26
Payer: MEDICARE

## 2024-03-26 PROCEDURE — 99232 SBSQ HOSP IP/OBS MODERATE 35: CPT | Performed by: INTERNAL MEDICINE

## 2024-03-26 NOTE — OUTREACH NOTE
Prep Survey      Flowsheet Row Responses   [facility] facility patient discharged from? Non-[facility]   Is LACE score < 7 ? Non-BH Discharge   Eligibility [facility]   Date of Discharge 03/26/24   Discharge Disposition Home or Self Care   Discharge diagnosis Chest pain   Does the patient have one of the following disease processes/diagnoses(primary or secondary)? Other   Prep survey completed? Yes            Lily JACKSON - Registered Nurse

## 2024-03-27 ENCOUNTER — TELEPHONE (OUTPATIENT)
Dept: FAMILY MEDICINE CLINIC | Facility: CLINIC | Age: 74
End: 2024-03-27
Payer: MEDICARE

## 2024-03-27 ENCOUNTER — TRANSITIONAL CARE MANAGEMENT TELEPHONE ENCOUNTER (OUTPATIENT)
Dept: CALL CENTER | Facility: HOSPITAL | Age: 74
End: 2024-03-27
Payer: MEDICARE

## 2024-03-27 NOTE — OUTREACH NOTE
Call Center TCM Note      Flowsheet Row Responses   [facility] patient [place]  [place]   Does the patient have one of the following disease processes/diagnoses(primary or secondary)? Other   TCM attempt successful? No  [verbal release for ana m Pina]   Unsuccessful attempts Attempt 1  [attempted home and reached ana m Pina and POA--requested a call later at more convenient time]            Lindsey Palma RN    3/27/2024, 09:27 EDT

## 2024-03-27 NOTE — TELEPHONE ENCOUNTER
Caller: Silvana Cross     Relationship: [unfilled]     Best call back number: [phone]    What is your medical concern? STATED THAT SHE WAS TOLD BY [facility] THAT THE PROVIDER WOULD HAVE TO CALL THEM IN ORDER TO OBTAIN PT MEDICAL RECORDS FROM WHEN SHE WAS SEEN THERE.

## 2024-03-27 NOTE — OUTREACH NOTE
Call Center TCM Note      Flowsheet Row Responses   [facility] [place]   Does the patient have one of the following disease processes/diagnoses(primary or secondary)? Other   TCM attempt successful? Yes   Call start time 1535   Call end time 1553   Discharge diagnosis Chest pain/ dx'd CHF with preserved EF   Meds reviewed with patient/caregiver? Yes   Is the patient having any side effects they believe may be caused by any medication additions or changes? No   Does the patient have all medications ordered at discharge? Yes   Is the patient taking all medications as directed (includes completed medication regime)? No   What is preventing the patient from taking all medications as directed? Other   Nursing Interventions Nurse provided patient education, Advised patient to call provider   Medication comments metformin, valsartan and metoprolol were discharged and started on Farxiga, Magnesium and Xetia. Pt reports that she does not have Farxiga and Xetia that was prescribed at discharge,  family has called PCP to discuss.  This RN does not have discharge instructions for review.  Dtr reports that it is not written on discharge but was discussed,  will send to MD for f/u as it appears that office requesting records for f/u and review. Dofetilide dosing was changed to 125 mg BID   Comments Hospital f/u on 4/5/24@1100am, Nephro 3/28/24   Does the patient have an appointment with their PCP within 7-14 days of discharge? Yes   Has home health visited the patient within 72 hours of discharge? N/A   What DME was ordered? Pt uses O2 at home per [place] at bedtime   Psychosocial issues? No   Did the patient receive a copy of their discharge instructions? Yes   Nursing interventions --  [no instructions for RN to review]   What is the patient's perception of their health status since discharge? Improving  [Pt reports she is doing much better now, pt diuresed about 18# during admission.  Pt  reports that she had DOUGLAS but much better and can ambulate greater distance.  Reviewed wt gain parameters with pt, encouraged daily wts and diet.]   Is the patient/caregiver able to teach back signs and symptoms related to disease process for when to call PCP? Yes   Is the patient/caregiver able to teach back signs and symptoms related to disease process for when to call 911? Yes   TCM call completed? Yes   Wrap up additional comments Pt would like further educational materials on HF as [place] [facility] [place] information   Call end time [id number]            Lindsey Palma, MAURICE    3/27/2024, 15:56 EDT

## 2024-03-27 NOTE — TELEPHONE ENCOUNTER
I spoke to mrs sweta salazar was [place] yesterday [place] [facility] [place] and [place] issues. Her speciality doctors stated they only focus on the medications for that organ and that her primary should do all of her medications we have a follow up on the 5th of april

## 2024-03-28 ENCOUNTER — TELEPHONE (OUTPATIENT)
Dept: FAMILY MEDICINE CLINIC | Facility: CLINIC | Age: 74
End: 2024-03-28
Payer: MEDICARE

## 2024-03-28 RX ORDER — INSULIN GLARGINE 100 [IU]/ML
12-14 INJECTION, SOLUTION SUBCUTANEOUS DAILY
Qty: 15 ML | Refills: 1 | Status: SHIPPED | OUTPATIENT
Start: 2024-03-28

## 2024-03-28 RX ORDER — FLURBIPROFEN SODIUM 0.3 MG/ML
SOLUTION/ DROPS OPHTHALMIC
Qty: 90 EACH | Refills: 3 | Status: SHIPPED | OUTPATIENT
Start: 2024-03-28

## 2024-03-28 NOTE — TELEPHONE ENCOUNTER
Rx Refill Note  Requested Prescriptions     Pending Prescriptions Disp Refills    Insulin Glargine (Lantus SoloStar) 100 UNIT/ML injection pen 15 mL 1     Sig: Inject 12-14 Units under the skin into the appropriate area as directed Daily.    Insulin Pen Needle (B-D UF III MINI PEN NEEDLES) 31G X 5 MM misc 90 each 3     Sig: USE TO INJECT INSULIN DAILY      Last office visit with prescribing clinician: 10/10/2023   Last telemedicine visit with prescribing clinician: Visit date not found   Next office visit with prescribing clinician: 4/15/2024                         Would you like a call back once the refill request has been completed: [] Yes [] No    If the office needs to give you a call back, can they leave a voicemail: [] Yes [] No    Mckenna Thomas MA  03/28/24, 13:39 EDT

## 2024-03-29 ENCOUNTER — TELEPHONE (OUTPATIENT)
Dept: FAMILY MEDICINE CLINIC | Facility: CLINIC | Age: 74
End: 2024-03-29

## 2024-03-29 NOTE — TELEPHONE ENCOUNTER
Caller: Joanna Cross    Relationship to patient: Self    Best call back number: [phone]     Patient is needing: PATIENT STATES THAT SHE RECENTLY GOT OUT OF THE HOSPITAL WHERE THEY WERE DIAGNOSED WITH HEART FAILURE, AND WAS INFORMED BY DR TAVAREZ TO CONTACT HIM AND UPDATE HIM ON HER CURRENT STATUS    PATIENT STATES THAT THEY HAVE GAINED 5 POUNDS SINCE, AND THAT SOME OF HER MEDICATION HAS BEEN HARD TO GET RECENTLY    PLEASE BE ADVISED

## 2024-04-01 ENCOUNTER — TELEPHONE (OUTPATIENT)
Dept: ONCOLOGY | Facility: CLINIC | Age: 74
End: 2024-04-01
Payer: MEDICARE

## 2024-04-01 NOTE — TELEPHONE ENCOUNTER
Caller: Joanna Cross    Relationship: Self    Best call back number: [phone]     What is the best time to reach you: ASAP    Who are you requesting to speak with (clinical staff, provider,  specific staff member): CLINICAL    What was the call regarding: PATIENT WAS IN HOSPITAL FROM 3/18 TO 3/26 FOR OVERLOAD OF FLUID, HEART FAILURE, THEY ENDED UP GIVING HER 4 UNITS OF IRON SPACED OUT, AND A PROCRIT SHOT WHILE THERE, SHE NEEDS TO FIND OUT WHEN SHE SHOULD COME BACK IN TO BE CHECKED SINCE THEY ARE NOT SURE WHAT HER IRON LEVELS ARE RIGHT NOW.  PLEASE CALL PT BACK TO [facility].  PROCRIT SHOT WAS ON 25TH.   PLEASE DISCUSS WITH PATIENT THE LOCATION FOR ANY ADDITIONAL APPTS.

## 2024-04-04 ENCOUNTER — HOSPITAL ENCOUNTER (OUTPATIENT)
Dept: ONCOLOGY | Facility: HOSPITAL | Age: 74
Discharge: HOME | End: 2024-04-04
Admitting: INTERNAL MEDICINE
Payer: MEDICARE

## 2024-04-04 DIAGNOSIS — N18.30 ANEMIA ASSOCIATED WITH STAGE 3 CHRONIC RENAL FAILURE: Primary | ICD-10-CM

## 2024-04-04 DIAGNOSIS — D63.1 ANEMIA ASSOCIATED WITH STAGE 3 CHRONIC RENAL FAILURE: Primary | ICD-10-CM

## 2024-04-04 LAB
BASOPHILS # BLD AUTO: 0.04 10*3/MM3 (ref 0–0.2)
BASOPHILS NFR BLD AUTO: 0.6 % (ref 0–1.5)
DEPRECATED RDW RBC AUTO: 57.2 FL (ref 37–54)
EOSINOPHIL # BLD AUTO: 0.46 10*3/MM3 (ref 0–0.4)
EOSINOPHIL NFR BLD AUTO: 7.1 % (ref 0.3–6.2)
ERYTHROCYTE [DISTWIDTH] IN BLOOD BY AUTOMATED COUNT: 15.6 % (ref 12.3–15.4)
HCT VFR BLD AUTO: 31.6 % (ref 34–46.6)
HGB BLD-MCNC: 10.2 G/DL (ref 12–15.9)
IMM GRANULOCYTES # BLD AUTO: 0.02 10*3/MM3 (ref 0–0.05)
IMM GRANULOCYTES NFR BLD AUTO: 0.3 % (ref 0–0.5)
LYMPHOCYTES # BLD AUTO: 0.76 10*3/MM3 (ref 0.7–3.1)
LYMPHOCYTES NFR BLD AUTO: 11.7 % (ref 19.6–45.3)
MCH RBC QN AUTO: 32.1 PG (ref 26.6–33)
MCHC RBC AUTO-ENTMCNC: 32.3 G/DL (ref 31.5–35.7)
MCV RBC AUTO: 99.4 FL (ref 79–97)
MONOCYTES # BLD AUTO: 0.62 10*3/MM3 (ref 0.1–0.9)
MONOCYTES NFR BLD AUTO: 9.5 % (ref 5–12)
NEUTROPHILS NFR BLD AUTO: 4.62 10*3/MM3 (ref 1.7–7)
NEUTROPHILS NFR BLD AUTO: 70.8 % (ref 42.7–76)
PLATELET # BLD AUTO: 161 10*3/MM3 (ref 140–450)
PMV BLD AUTO: 8.9 FL (ref 6–12)
RBC # BLD AUTO: 3.18 10*6/MM3 (ref 3.77–5.28)
WBC NRBC COR # BLD AUTO: 6.52 10*3/MM3 (ref 3.4–10.8)

## 2024-04-04 PROCEDURE — 36415 COLL VENOUS BLD VENIPUNCTURE: CPT

## 2024-04-04 PROCEDURE — 85025 COMPLETE CBC W/AUTO DIFF WBC: CPT | Performed by: INTERNAL MEDICINE

## 2024-04-04 RX ORDER — PANTOPRAZOLE SODIUM 40 MG/1
40 TABLET, DELAYED RELEASE ORAL DAILY
COMMUNITY

## 2024-04-04 RX ORDER — MELATONIN
1000 2 TIMES DAILY
COMMUNITY

## 2024-04-05 ENCOUNTER — OFFICE VISIT (OUTPATIENT)
Dept: FAMILY MEDICINE CLINIC | Facility: CLINIC | Age: 74
End: 2024-04-05
Payer: MEDICARE

## 2024-04-05 VITALS
OXYGEN SATURATION: 98 % | WEIGHT: 227.4 LBS | RESPIRATION RATE: 16 BRPM | SYSTOLIC BLOOD PRESSURE: 153 MMHG | BODY MASS INDEX: 41.85 KG/M2 | HEIGHT: 62 IN | HEART RATE: 67 BPM | DIASTOLIC BLOOD PRESSURE: 82 MMHG

## 2024-04-05 DIAGNOSIS — I48.0 PAROXYSMAL ATRIAL FIBRILLATION: ICD-10-CM

## 2024-04-05 DIAGNOSIS — I50.31 ACUTE DIASTOLIC CHF (CONGESTIVE HEART FAILURE): Primary | ICD-10-CM

## 2024-04-05 DIAGNOSIS — E66.01 MORBID (SEVERE) OBESITY DUE TO EXCESS CALORIES: ICD-10-CM

## 2024-04-05 DIAGNOSIS — I10 HYPERTENSION, ESSENTIAL: ICD-10-CM

## 2024-04-05 DIAGNOSIS — N18.32 CHRONIC KIDNEY DISEASE, STAGE 3B: ICD-10-CM

## 2024-04-05 DIAGNOSIS — E11.65 TYPE 2 DIABETES MELLITUS WITH HYPERGLYCEMIA, WITHOUT LONG-TERM CURRENT USE OF INSULIN: ICD-10-CM

## 2024-04-05 RX ORDER — DOFETILIDE 0.12 MG/1
125 CAPSULE ORAL 2 TIMES DAILY
COMMUNITY

## 2024-04-05 NOTE — ASSESSMENT & PLAN NOTE
Patient's (Body mass index is 41.69 kg/m².) indicates that they are morbidly/severely obese (BMI > 40 or > 35 with obesity - related health condition) with health conditions that include hypertension . Weight is unchanged. BMI  is above average; BMI management plan is completed. We discussed portion control and increasing exercise.

## 2024-04-05 NOTE — PROGRESS NOTES
Patient Name: Joanna Cross  : 1950   MRN: [id number]     Chief Complaint:    Chief Complaint   Patient presents with    Hospital Follow Up Visit     Admitted to [facility] on 3/18/2024 for Chest pain discharged on 3/26/2024 HFpEF.CKDIII, AFIB,PARKINSON, AND DIABETES II. All medications reconciled.        History of Present Illness: Joanna Cross is a 73 y.o. female who is here today for follow up.  HPI        Review of Systems:   Review of Systems   Constitutional: Negative.    HENT: Negative.     Eyes: Negative.    Respiratory: Negative.     Cardiovascular: Negative.    Gastrointestinal: Negative.    Neurological: Negative.         Past Medical History:   Past Medical History:   Diagnosis Date    Anemia     Ankle problem     thinks back related causing pain     Atrial fibrillation     AVM (arteriovenous malformation)     Back pain     Chronic kidney disease     stage 3 per pt    Chronic lung disease 2022    CKD (chronic kidney disease)     Clotting disorder 2016    AVM - small intestine    Coronary artery disease involving native coronary artery without angina pectoris 2017    COVID-19 vaccine series completed     Gastrocnemius muscle tear     left medial 91    Generalized osteoarthritis     GERD (gastroesophageal reflux disease)     Gestational diabetes     GIB (gastrointestinal bleeding) 2016    d/t xarelto     Headache     Hearing decreased, bilateral     HAS HEARING AID, NOT WEARING IT CURRENTLY    Hiatal hernia     History of shingles     History of transfusion 2016    no reaction recalled     Hyperlipidemia LDL goal <70 2017    Hypertension     Hypothyroidism     IBS (irritable bowel syndrome)     Klebsiella pneumonia     Lichen sclerosus     Lupus     subQ    Mouth problem     mouth guard used since pt bites tongue and lips excessively at night if not- with bipap     MRSA infection 2018    Myocardial infarction     Obesity     On home oxygen therapy     2L of oxygen all  the time due to current congestion     Osteoporosis     Parkinson's disease     Peripheral vascular disease     Pleurisy     Pneumonia     Puerperal sepsis with acute hypoxic respiratory failure     emergent intubation- 2016    Pulmonary embolism     Right leg pain     from back issues     Salivary gland stone     Sciatic nerve pain     Seborrheic dermatitis     Skin cancer     on back     Sleep apnea     CPAP AND HOME O2 2L/M    TIA (transient ischemic attack) 2014    no residual effects    Type 2 diabetes mellitus     UTI (urinary tract infection)     Vitamin D deficiency 09/08/2022    Wears glasses        Past Surgical History:   Past Surgical History:   Procedure Laterality Date    ABLATION OF DYSRHYTHMIC FOCUS  05/16/13    Laser Ablation - Rt Leg    BACK SURGERY      l4-l5 laminectomy     BICEPS TENDON REPAIR Right     shoulder    BREAST BIOPSY Left 05/2004    excisional, benign    BRONCHOSCOPY RIGID / FLEXIBLE      2016    BUNIONECTOMY Right     CARDIAC CATHETERIZATION      CARDIAC CATHETERIZATION N/A 02/01/2019    Procedure: Left Heart Cath;  Surgeon: Albertina Corona MD;  Location:  [facility] CATH INVASIVE LOCATION;  Service: Cardiology    CARDIAC ELECTROPHYSIOLOGY PROCEDURE N/A 1/26/2024    Procedure: Lead Revision RA or RV, PPM or ICD;  Surgeon: Lauro Francois MD;  Location:  [facility] INVASIVE LOCATION;  Service: Cardiovascular;  Laterality: N/A;    CHOLECYSTECTOMY      COLONOSCOPY  2016    COLONOSCOPY N/A 06/17/2021    Procedure: COLONOSCOPY WITH POLYPECTOMY;  Surgeon: Trenton Sosa MD;  Location:  [facility];  Service: Gastroenterology;  Laterality: N/A;    CORONARY STENT PLACEMENT      x1 stent    CYST REMOVAL      left ear, upper left back 2001    CYSTOSCOPY      x2  18 and 20 -   in 20 urethra dilation     DIAGNOSTIC LAPAROSCOPY  1981    ENDOSCOPIC FUNCTIONAL SINUS SURGERY (FESS)  2011    ENDOSCOPY  2016    ENTEROSCOPY VIA STOMA      with single ballon with fluoro     HAMMER  TOE REPAIR Left     INCISION AND DRAINAGE OF WOUND  2018    back with wound infection     INSERT / REPLACE / REMOVE PACEMAKER  11/10/16    [facility]    JOINT REPLACEMENT      KNEE ARTHROSCOPY      LASER ABLATION      right leg 13    LIPOMA EXCISION  1999    left leg     LUMBAR LAMINECTOMY DISCECTOMY DECOMPRESSION N/A 07/06/2018    Procedure: LUMBAR LAMINECTOMY L4-5, HEMILAMIINECTOMY RIGHT L5-S1, FORAMINOTOMY L5-S1;  Surgeon: Lencho Gamino MD;  Location:  [facility];  Service: Neurosurgery    LUMBAR LAMINECTOMY DISCECTOMY DECOMPRESSION N/A 09/19/2018    Procedure: INCISION AND DRAINAGE BACK WITH WOUND EXPLORATION;  Surgeon: Ritchie García MD;  Location:  [facility];  Service: Neurosurgery    LUNG BIOPSY Left 2016    OTHER SURGICAL HISTORY      ct scan of chest and sinuses and lower back     OTHER SURGICAL HISTORY      various echos     OTHER SURGICAL HISTORY      electroencephalogram 16,   emg  ncv tests 2007    OTHER SURGICAL HISTORY      mra 2007  and various mri with xrays, nuclear medicine lung ventilation with perfusion test 2016 with pft     OTHER SURGICAL HISTORY      barium swallow testing 15, 12, 12    OTHER SURGICAL HISTORY      vaginal ultrasound- 2012,   vas clementina lower extrem 2016, vas venous duplex lower extrem bilat 2019    OTHER SURGICAL HISTORY      wdge biopsy spring of lung     OTHER SURGICAL HISTORY      emergent intubation- hypoxic resp failure     OTHER SURGICAL HISTORY      01/26/2024 ppm generator change and lead revision per Dr. Francois    PACEMAKER IMPLANTATION  11/2016    [facility]    REPLACEMENT TOTAL KNEE Bilateral     left knee 2011, right 2012 per dr acuna     REPLACEMENT TOTAL KNEE Bilateral     SHOULDER ARTHROSCOPY Bilateral     2003-left, 2004- right     SKIN BIOPSY      skin cancer back 2016    SKIN CANCER EXCISION      upper righ tback     MADHAV      TEETH EXTRACTION      x2    TOTAL SHOULDER ARTHROPLASTY W/ DISTAL CLAVICLE EXCISION Left 10/24/2022    Procedure: REVERSE TOTAL SHOULDER  ARTHROPLASTY, BICEPS TENODESIS - LEFT;  Surgeon: Sammy Yo MD;  Location:  [facility];  Service: Orthopedics;  Laterality: Left;    TOTAL SHOULDER ARTHROPLASTY W/ DISTAL CLAVICLE EXCISION Right 2023    Procedure: REVERSE TOTAL SHOULDER  ARTHROPLASTY WITH BICEPS TENODESIS - RIGHT;  Surgeon: Smamy Yo MD;  Location:  [facility];  Service: Orthopedics;  Laterality: Right;    TUBAL ABDOMINAL LIGATION Bilateral     WISDOM TOOTH EXTRACTION         Family History:   Family History   Problem Relation Age of Onset    Kidney disease Mother     Coronary artery disease Mother     Hypertension Mother             Heart disease Mother             Hyperlipidemia Mother             Coronary artery disease Father     Hypertension Father             Hypothyroidism Father     Cancer Father         Oral cancer    Heart disease Father             Coronary artery disease Brother     Hypertension Brother     Heart disease Brother         Multiple stents, by-pass surgery    Testicular cancer Brother     Kidney cancer Maternal Uncle     Testicular cancer Maternal Uncle     Colon polyps Neg Hx     Colon cancer Neg Hx        Social History:   Social History     Socioeconomic History    Marital status:      Spouse name: N/A    Number of children: 4    Years of education: College    Highest education level: Master's degree (e.g., MA, MS, Randi, MEd, MSW, NELLA)   Tobacco Use    Smoking status: Never     Passive exposure: Past    Smokeless tobacco: Never    Tobacco comments:     Father and mother smoked for several years.   Vaping Use    Vaping status: Never Used    Passive vaping exposure: Yes   Substance and Sexual Activity    Alcohol use: Never    Drug use: No    Sexual activity: Not Currently     Partners: Male     Birth control/protection: Post-menopausal, Tubal ligation, Vaginal insert contraception       Medications:     Current Outpatient Medications:     Accu-Chek  Guide test strip, Testing 3 times per day; E11.65, Disp: 300 each, Rfl: 3    Accu-Chek Softclix Lancets lancets, USE ONE LANCET TO TEST THREE TIMES A DAY dx e11.65 on insulin, Disp: 300 each, Rfl: 3    albuterol sulfate HFA (Ventolin HFA) 108 (90 Base) MCG/ACT inhaler, Inhale 1 puff Every 4 (Four) Hours As Needed for Wheezing or Shortness of Air. (Patient taking differently: Inhale 2 puffs Every 6 (Six) Hours As Needed for Wheezing or Shortness of Air.), Disp: 8 g, Rfl: 5    amantadine (SYMMETREL) 100 MG capsule, Take 1 capsule by mouth 2 (Two) Times a Day., Disp: , Rfl:     apixaban (Eliquis) 5 MG tablet tablet, Take 1 tablet by mouth Every 12 (Twelve) Hours. Restart 1/29/24, Disp: 180 tablet, Rfl: 2    aspirin 81 MG EC tablet, Take 1 tablet by mouth Daily., Disp: , Rfl:     B Complex-C (SUPER B COMPLEX PO), Take 1 tablet by mouth Daily., Disp: , Rfl:     bumetanide (Bumex) 1 MG tablet, Take 1 tablet by mouth 2 (Two) Times a Day. tAKE BID FOR 3 DAYS THEN ONCE A DAY, Disp: 60 tablet, Rfl: 6    Calcium Carbonate (CALTRATE 600 PO), Take 600 mg by mouth Daily., Disp: , Rfl:     carbidopa-levodopa (SINEMET)  MG per tablet, Take  by mouth. 2 tablets at 0600, 1 tablet at 0900, 1200, 1500, 1800, 2100, Disp: , Rfl:     Cholecalciferol 25 MCG (1000 UT) tablet, Take 1 tablet by mouth 2 (Two) Times a Day., Disp: , Rfl:     citalopram (CeleXA) 20 MG tablet, Take 1 tablet by mouth Daily., Disp: , Rfl:     clobetasol (TEMOVATE) 0.05 % cream, Apply 1 Application topically to the appropriate area as directed 2 (Two) Times a Week. PRN, Disp: , Rfl:     Dapagliflozin Propanediol (FARXIGA PO), Take 25 mg by mouth Daily., Disp: , Rfl:     dofetilide (TIKOSYN) 125 MCG capsule, Take 1 capsule by mouth 2 (Two) Times a Day., Disp: , Rfl:     Emollient (AQUAPHOR ADV PROTECT HEALING EX), Apply 1 dose topically As Needed., Disp: , Rfl:     Glucosamine-Chondroit-Calcium (TRIPLE FLEX BONE & JOINT PO), Take 1 tablet by mouth Daily. Move  free, Disp: , Rfl:     hydroxychloroquine (PLAQUENIL) 200 MG tablet, Daily., Disp: , Rfl:     Insulin Glargine (Lantus SoloStar) 100 UNIT/ML injection pen, Inject 12-14 Units under the skin into the appropriate area as directed Daily. (Patient taking differently: Inject 12-15 Units under the skin into the appropriate area as directed Daily. Increase by 3 units if morning blood sugar is consistently over 130 for 3 days. Decrease by 3 units if morning blood sugar less than 80 for three days.), Disp: 15 mL, Rfl: 1    Insulin Pen Needle (B-D UF III MINI PEN NEEDLES) 31G X 5 MM misc, USE TO INJECT INSULIN DAILY, Disp: 90 each, Rfl: 3    ipratropium (ATROVENT) 0.03 % nasal spray, 2 sprays into the nostril(s) as directed by provider 2 (Two) Times a Day As Needed (CONGESTION SINUS). (Patient taking differently: 1 spray into the nostril(s) as directed by provider 2 (Two) Times a Day As Needed (CONGESTION SINUS).), Disp: 30 mL, Rfl: 11    Loratadine 10 MG capsule, Take 1 capsule by mouth Daily., Disp: , Rfl:     Multiple Vitamins-Minerals (CENTRUM ULTRA WOMENS PO), Take 1 tablet by mouth Daily., Disp: , Rfl:     nitroglycerin (NITROLINGUAL) 0.4 MG/SPRAY spray, Place 1 spray under the tongue Every 5 (Five) Minutes As Needed for Chest Pain., Disp: 1 each, Rfl: 6    O2 (OXYGEN), Inhale 2 L/min Every Night. 2L all the time now, Disp: , Rfl:     pantoprazole (PROTONIX) 40 MG EC tablet, Take 1 tablet by mouth Daily., Disp: , Rfl:     pramipexole (MIRAPEX) 1.5 MG tablet, TAKE 1 TABLET EVERY NIGHT, Disp: 90 tablet, Rfl: 3    ranolazine (RANEXA) 500 MG 12 hr tablet, Take 2 tablets by mouth Every 12 (Twelve) Hours., Disp: 360 tablet, Rfl: 3    senna (SENOKOT) 8.6 MG tablet, Take 1 tablet by mouth Daily., Disp: , Rfl:     spironolactone (ALDACTONE) 25 MG tablet, TAKE 2 TABLETS DAILY (Patient taking differently: Take 1 tablet by mouth 2 (Two) Times a Day.), Disp: 180 tablet, Rfl: 1    traMADol (ULTRAM) 50 MG tablet, Take 1 tablet by  "mouth Every 8 (Eight) Hours As Needed for Moderate Pain., Disp: , Rfl:     triamcinolone (KENALOG) 0.1 % cream, Apply 1 Application topically to the appropriate area as directed 2 (Two) Times a Day., Disp: , Rfl:     Triamcinolone Acetonide (NASACORT) 55 MCG/ACT nasal inhaler, 2 sprays Daily As Needed for Rhinitis., Disp: , Rfl:     Unithroid 175 MCG tablet, Take 1 tablet by mouth Daily., Disp: 90 tablet, Rfl: 1    vitamin C (ASCORBIC ACID) 500 MG tablet, Take 1 tablet by mouth Daily. Chewable tablet, Disp: , Rfl:     Zinc 30 MG capsule, Take 2 capsules by mouth Daily., Disp: , Rfl:   No current facility-administered medications for this visit.    Allergies:   Allergies   Allergen Reactions    Amlodipine Besylate Swelling     Lower extremity (ankles, feet) swelling    Entacapone Other (See Comments)     \"extreme weakness in legs - caused several falls, which stopped after discontinuing this medication\"    Epinephrine Other (See Comments)     6/4/16- had 3 shots to numb mouth to prepare teeth for crowns, the shots contained epi-  Caused pt to have chest discomfort- went to hospital in ambulance, discovered had a fib while there     Levemir [Insulin Detemir] Hives     Hives / rash around injection site    Penicillins Hives     Jitteriness     Xarelto [Rivaroxaban] GI Bleeding     hgb dropped to 5.2    Hydrocodone Unknown - High Severity     Headache, nausea, dizziness, & vomiting    Aricept [Donepezil Hcl] Nausea Only     Vivid dreams    Benztropine Mesylate Other (See Comments)     Uncontrollable body movements    Cogentin [Benztropine] Other (See Comments)     \"uncontrollable body movements\"    Compazine [Prochlorperazine Edisylate] Other (See Comments)     Dystonic reaction    Duraprep [Antiseptic Products, Misc.] Itching and Rash     RASH AND ITCHING    Haldol [Haloperidol Lactate] Other (See Comments)     Dystonic reaction    Hydralazine Other (See Comments)     Headache     Lisinopril Cough    Statins Myalgia    " " Leg pain- all statins     Sulfamethoxazole Nausea Only and Other (See Comments)     Nausea & headaches    Tarka [Trandolapril-Verapamil Hcl Er] Other (See Comments)     Constipation     Toprol Xl [Metoprolol Tartrate] Other (See Comments)     Extreme fatigue, decreased exercise tolerance    Trimethoprim Other (See Comments)     Other reaction(s): Nausea         Physical Exam:  Vital Signs:   Vitals:    04/05/24 1100   BP: 153/82   BP Location: Left arm   Patient Position: Sitting   Cuff Size: Large Adult   Pulse: 67   Resp: 16   SpO2: 98%   Weight: 103 kg (227 lb 6.4 oz)   Height: 157.3 cm (61.93\")   PainSc: 0-No pain     Body mass index is 41.69 kg/m².     Physical Exam  Vitals and nursing note reviewed.   Constitutional:       Appearance: Normal appearance. She is obese.   HENT:      Head: Normocephalic and atraumatic.      Right Ear: Tympanic membrane, ear canal and external ear normal.      Left Ear: Tympanic membrane, ear canal and external ear normal.      Nose: Nose normal.      Mouth/Throat:      Mouth: Mucous membranes are dry.      Pharynx: Oropharynx is clear.   Eyes:      Extraocular Movements: Extraocular movements intact.      Conjunctiva/sclera: Conjunctivae normal.      Pupils: Pupils are equal, round, and reactive to light.   Cardiovascular:      Rate and Rhythm: Normal rate and regular rhythm.      Pulses: Normal pulses.      Heart sounds: Normal heart sounds.   Pulmonary:      Effort: Pulmonary effort is normal.      Breath sounds: Normal breath sounds.   Musculoskeletal:      Cervical back: Normal range of motion and neck supple.   Feet:      Comments:      Neurological:      Mental Status: She is alert.         Procedures      Assessment/Plan:   Diagnoses and all orders for this visit:    1. Acute diastolic CHF (congestive heart failure) (Primary)  Assessment & Plan:  Reviewed patient's hospital stay.  She has gained 8 pounds since the hospital.  I am going to have her take an extra Bumex for " the next 3 days in the morning.  I told her to watch out for shortness of breath.  She has not had any chest pain since discharge from the hospital and has follow-up with Dr. Teague in 2 weeks.  I will recheck in 3 months.    Orders:  -     Comprehensive Metabolic Panel; Future  -     Hemoglobin A1c; Future  -     Magnesium; Future    2. Paroxysmal atrial fibrillation  Assessment & Plan:  Patient has follow-up with multiple cardiologist.    Orders:  -     Comprehensive Metabolic Panel; Future  -     Hemoglobin A1c; Future  -     Magnesium; Future    3. Hypertension, essential  Assessment & Plan:  Discussed with patient to monitor their blood pressure and if systolic blood pressure goes above 140 or diastolic is above 90 to return to clinic.  Take medicines as directed, call for any problems, patient not having or any worrisome symptoms.        Orders:  -     Comprehensive Metabolic Panel; Future  -     Hemoglobin A1c; Future  -     Magnesium; Future    4. Morbid (severe) obesity due to excess calories  Assessment & Plan:  Patient's (Body mass index is 41.69 kg/m².) indicates that they are morbidly/severely obese (BMI > 40 or > 35 with obesity - related health condition) with health conditions that include hypertension . Weight is unchanged. BMI  is above average; BMI management plan is completed. We discussed portion control and increasing exercise.     Orders:  -     Comprehensive Metabolic Panel; Future  -     Hemoglobin A1c; Future  -     Magnesium; Future    5. Type 2 diabetes mellitus with hyperglycemia, without long-term current use of insulin  Assessment & Plan:  Patient sees endocrinology.  We will check an A1c today.    Orders:  -     Comprehensive Metabolic Panel; Future  -     Hemoglobin A1c; Future  -     Magnesium; Future    6. Chronic kidney disease, stage 3b  Assessment & Plan:  Patient sees nephrology.  We will check blood work today including a magnesium level.    Orders:  -     Comprehensive  Metabolic Panel; Future  -     Hemoglobin A1c; Future  -     Magnesium; Future             Follow Up:   Return in about 3 months (around 7/5/2024) for Annual physical.      Reynaldo Fritz MD  [facility]

## 2024-04-05 NOTE — ASSESSMENT & PLAN NOTE
Reviewed patient's hospital stay.  She has gained 8 pounds since the hospital.  I am going to have her take an extra Bumex for the next 3 days in the morning.  I told her to watch out for shortness of breath.  She has not had any chest pain since discharge from the hospital and has follow-up with Dr. Teague in 2 weeks.  I will recheck in 3 months.

## 2024-04-06 LAB
ALBUMIN SERPL-MCNC: 4.5 G/DL (ref 3.8–4.8)
ALBUMIN/GLOB SERPL: 2.1 {RATIO} (ref 1.2–2.2)
ALP SERPL-CCNC: 61 IU/L (ref 44–121)
ALT SERPL-CCNC: 18 IU/L (ref 0–32)
AST SERPL-CCNC: 22 IU/L (ref 0–40)
BILIRUB SERPL-MCNC: 0.5 MG/DL (ref 0–1.2)
BUN SERPL-MCNC: 38 MG/DL (ref 8–27)
BUN/CREAT SERPL: 24 (ref 12–28)
CALCIUM SERPL-MCNC: 9.7 MG/DL (ref 8.7–10.3)
CHLORIDE SERPL-SCNC: 101 MMOL/L (ref 96–106)
CO2 SERPL-SCNC: 24 MMOL/L (ref 20–29)
CREAT SERPL-MCNC: 1.56 MG/DL (ref 0.57–1)
EGFRCR SERPLBLD CKD-EPI 2021: 34 ML/MIN/1.73
GLOBULIN SER CALC-MCNC: 2.1 G/DL (ref 1.5–4.5)
GLUCOSE SERPL-MCNC: 104 MG/DL (ref 70–99)
HBA1C MFR BLD: 5.9 % (ref 4.8–5.6)
MAGNESIUM SERPL-MCNC: 2.2 MG/DL (ref 1.6–2.3)
POTASSIUM SERPL-SCNC: 4.2 MMOL/L (ref 3.5–5.2)
PROT SERPL-MCNC: 6.6 G/DL (ref 6–8.5)
SODIUM SERPL-SCNC: 140 MMOL/L (ref 134–144)

## 2024-04-09 ENCOUNTER — TELEPHONE (OUTPATIENT)
Dept: FAMILY MEDICINE CLINIC | Facility: CLINIC | Age: 74
End: 2024-04-09
Payer: MEDICARE

## 2024-04-09 NOTE — TELEPHONE ENCOUNTER
Caller: Joanna Cross    Relationship: Self    Best call back number: [phone]     What is the best time to reach you: ANY    Who are you requesting to speak with (clinical staff, provider,  specific staff member): CLINICAL    What was the call regarding: PATIENT STATED THAT DR. TAVAREZ MADE CHANGES TEMPORARILY IN HER MEDICATIONS TO TAKE AWAY SOME EXTRA FLUID AWAY AND SHE HAS NOT  SEEN A DIFFERENCE AND IS WANTING A CALL BACK TO DISCUSS .   Stable

## 2024-04-11 DIAGNOSIS — E11.65 TYPE 2 DIABETES MELLITUS WITH HYPERGLYCEMIA, WITH LONG-TERM CURRENT USE OF INSULIN: Chronic | ICD-10-CM

## 2024-04-11 DIAGNOSIS — Z79.4 TYPE 2 DIABETES MELLITUS WITH HYPERGLYCEMIA, WITH LONG-TERM CURRENT USE OF INSULIN: Chronic | ICD-10-CM

## 2024-04-12 RX ORDER — BLOOD SUGAR DIAGNOSTIC
STRIP MISCELLANEOUS
Qty: 300 EACH | Refills: 1 | Status: SHIPPED | OUTPATIENT
Start: 2024-04-12

## 2024-04-12 NOTE — TELEPHONE ENCOUNTER
Rx Refill Note  Requested Prescriptions     Pending Prescriptions Disp Refills    glucose blood (Accu-Chek Guide) test strip [Pharmacy Med Name: ACCU-CHEK GUIDE TEST STRIPS 100S]       Sig: USE TO CHECK THREE TIMES DAILY      Last office visit with prescribing clinician: Visit date not found     Next office visit with prescribing clinician: Visit date not found

## 2024-04-15 ENCOUNTER — OFFICE VISIT (OUTPATIENT)
Dept: ENDOCRINOLOGY | Facility: CLINIC | Age: 74
End: 2024-04-15
Payer: MEDICARE

## 2024-04-15 VITALS
HEART RATE: 71 BPM | DIASTOLIC BLOOD PRESSURE: 64 MMHG | BODY MASS INDEX: 41.41 KG/M2 | OXYGEN SATURATION: 99 % | SYSTOLIC BLOOD PRESSURE: 116 MMHG | HEIGHT: 62 IN | WEIGHT: 225 LBS

## 2024-04-15 DIAGNOSIS — Z79.4 TYPE 2 DIABETES MELLITUS WITH HYPERGLYCEMIA, WITH LONG-TERM CURRENT USE OF INSULIN: Primary | ICD-10-CM

## 2024-04-15 DIAGNOSIS — E03.9 ACQUIRED HYPOTHYROIDISM: ICD-10-CM

## 2024-04-15 DIAGNOSIS — E11.65 TYPE 2 DIABETES MELLITUS WITH HYPERGLYCEMIA, WITH LONG-TERM CURRENT USE OF INSULIN: Primary | ICD-10-CM

## 2024-04-15 LAB
EXPIRATION DATE: ABNORMAL
GLUCOSE BLDC GLUCOMTR-MCNC: 161 MG/DL (ref 70–130)
Lab: ABNORMAL

## 2024-04-15 PROCEDURE — 3078F DIAST BP <80 MM HG: CPT | Performed by: PHYSICIAN ASSISTANT

## 2024-04-15 PROCEDURE — 1159F MED LIST DOCD IN RCRD: CPT | Performed by: PHYSICIAN ASSISTANT

## 2024-04-15 PROCEDURE — 3074F SYST BP LT 130 MM HG: CPT | Performed by: PHYSICIAN ASSISTANT

## 2024-04-15 PROCEDURE — 99214 OFFICE O/P EST MOD 30 MIN: CPT | Performed by: PHYSICIAN ASSISTANT

## 2024-04-15 PROCEDURE — 82947 ASSAY GLUCOSE BLOOD QUANT: CPT | Performed by: PHYSICIAN ASSISTANT

## 2024-04-15 PROCEDURE — 1160F RVW MEDS BY RX/DR IN RCRD: CPT | Performed by: PHYSICIAN ASSISTANT

## 2024-04-15 PROCEDURE — 3044F HG A1C LEVEL LT 7.0%: CPT | Performed by: PHYSICIAN ASSISTANT

## 2024-04-15 RX ORDER — BLOOD-GLUCOSE METER
1 EACH MISCELLANEOUS DAILY
Qty: 1 KIT | Refills: 0 | Status: SHIPPED | OUTPATIENT
Start: 2024-04-15

## 2024-04-15 RX ORDER — OMEPRAZOLE 40 MG/1
40 CAPSULE, DELAYED RELEASE ORAL 2 TIMES DAILY
COMMUNITY

## 2024-04-15 NOTE — PROGRESS NOTES
"     Office Note      Date: 04/15/2024  Patient Name: Joanna Cross  MRN: [id number]  : 1950    Chief Complaint   Patient presents with    Diabetes     Type 2 diabetes mellitus with hyperglycemia, with long-term current use of insulin       History of Present Illness:   Joanna Cross is a 73 y.o. female who presents for follow-up for type 2 diabetes diagnosed in .  She was hospitalized with congestive heart failure  through the 26.  She reports her metformin was stopped and they added Farxiga 5 mg daily.  She reports she is feeling better now but continues to have some trouble with swelling.  She reports her pacemaker needs to be replaced but they are unable to do this.  She reports overall her blood sugars have been okay.  She is taking Lantus 15 units daily.  She is up-to-date on her eye exam she goes annually in .  She continues to see the podiatrist regularly.  She reports that spot on the top of her foot has healed since last visit.  She remains on Unithroid 175 mcg daily.  She had a TSH completed in October and this was okay.      Subjective     Review of Systems:   Review of Systems   Cardiovascular:  Positive for leg swelling.   Gastrointestinal: Negative.    Endocrine: Negative.    Musculoskeletal:  Positive for arthralgias and myalgias.   Neurological:  Positive for weakness.       The following portions of the patient's history were reviewed and updated as appropriate: allergies, current medications, past family history, past medical history, past social history, past surgical history, and problem list.    Objective     Vitals:    04/15/24 1400   BP: 116/64   BP Location: Right arm   Patient Position: Sitting   Cuff Size: Adult   Pulse: 71   SpO2: 99%   Weight: 102 kg (225 lb)   Height: 157.5 cm (62\")     Body mass index is 41.15 kg/m².    Physical Exam  Vitals reviewed.   Constitutional:       General: She is not in acute distress.     Appearance: Normal appearance.      " Comments: Uses a walker to ambulate.  On oxygen via nasal cannula   Neurological:      Mental Status: She is alert.         HEMOGLOBIN A1C  Lab Results   Component Value Date    HGBA1C 5.9 (H) 04/05/2024       GLUCOSE  Glucose   Date Value Ref Range Status   04/15/2024 161 (A) 70 - 130 mg/dL Final       CMP  Lab Results   Component Value Date    GLUCOSE 104 (H) 04/05/2024    BUN 38 (H) 04/05/2024    CREATININE 1.56 (H) 04/05/2024    EGFRIFNONA 66 06/12/2019    BCR 24 04/05/2024    K 4.2 04/05/2024    CO2 24 04/05/2024    CALCIUM 9.7 04/05/2024    PROTENTOTREF 6.6 04/05/2024    LABIL2 2.1 04/05/2024    AST 22 04/05/2024    ALT 18 04/05/2024       LIPID PANEL  Lab Results   Component Value Date    CHOL 185 02/01/2019    CHLPL 184 10/19/2023    TRIG 121 10/19/2023    HDL 51 10/19/2023     (H) 10/19/2023         TSH  Lab Results   Component Value Date    TSH 0.453 10/19/2023       Assessment / Plan      Assessment & Plan:  1. Type 2 diabetes mellitus with hyperglycemia, with long-term current use of insulin  Her hemoglobin A1c earlier this month was to goal at 5.9%.  Her reported blood sugar readings have been good.  She has only been on the Farxiga for for 5 days.  We discussed monitoring her blood glucose readings and if her blood sugars start to drop she will reduce her Lantus dose.  If she continues to have trouble she will reach out to me.  For now she will continue Lantus 15 units daily and Farxiga 5 mg daily.  She will remain off metformin.  Her weight is down 4 pounds since her appointment in October.  I encouraged continued healthy eating habits.  Her blood pressure is excellent today.  - POC Glucose, Blood    2. Acquired hypothyroidism  Her TSH was normal after her appointment in October.  She will continue Unithroid 175 mcg daily.    Return in about 6 months (around 10/15/2024).     This note was dictated using Dragon voice recognition.    Electronically signed by: BRIGITTE Parikh  04/15/2024

## 2024-04-18 ENCOUNTER — HOSPITAL ENCOUNTER (OUTPATIENT)
Dept: ONCOLOGY | Facility: HOSPITAL | Age: 74
Discharge: HOME | End: 2024-04-18
Admitting: INTERNAL MEDICINE
Payer: MEDICARE

## 2024-04-18 VITALS
SYSTOLIC BLOOD PRESSURE: 131 MMHG | BODY MASS INDEX: 41.59 KG/M2 | TEMPERATURE: 97.5 F | DIASTOLIC BLOOD PRESSURE: 60 MMHG | WEIGHT: 226 LBS | HEIGHT: 62 IN | RESPIRATION RATE: 16 BRPM | HEART RATE: 67 BPM

## 2024-04-18 DIAGNOSIS — D63.1 ANEMIA ASSOCIATED WITH STAGE 3 CHRONIC RENAL FAILURE: ICD-10-CM

## 2024-04-18 DIAGNOSIS — N18.30 ANEMIA ASSOCIATED WITH STAGE 3 CHRONIC RENAL FAILURE: ICD-10-CM

## 2024-04-18 LAB
BASOPHILS # BLD AUTO: 0.03 10*3/MM3 (ref 0–0.2)
BASOPHILS NFR BLD AUTO: 0.6 % (ref 0–1.5)
DEPRECATED RDW RBC AUTO: 53.6 FL (ref 37–54)
EOSINOPHIL # BLD AUTO: 0.52 10*3/MM3 (ref 0–0.4)
EOSINOPHIL NFR BLD AUTO: 9.6 % (ref 0.3–6.2)
ERYTHROCYTE [DISTWIDTH] IN BLOOD BY AUTOMATED COUNT: 14.4 % (ref 12.3–15.4)
HCT VFR BLD AUTO: 31.2 % (ref 34–46.6)
HGB BLD-MCNC: 10.1 G/DL (ref 12–15.9)
IMM GRANULOCYTES # BLD AUTO: 0.02 10*3/MM3 (ref 0–0.05)
IMM GRANULOCYTES NFR BLD AUTO: 0.4 % (ref 0–0.5)
LYMPHOCYTES # BLD AUTO: 0.65 10*3/MM3 (ref 0.7–3.1)
LYMPHOCYTES NFR BLD AUTO: 12 % (ref 19.6–45.3)
MCH RBC QN AUTO: 32.5 PG (ref 26.6–33)
MCHC RBC AUTO-ENTMCNC: 32.4 G/DL (ref 31.5–35.7)
MCV RBC AUTO: 100.3 FL (ref 79–97)
MONOCYTES # BLD AUTO: 0.54 10*3/MM3 (ref 0.1–0.9)
MONOCYTES NFR BLD AUTO: 10 % (ref 5–12)
NEUTROPHILS NFR BLD AUTO: 3.66 10*3/MM3 (ref 1.7–7)
NEUTROPHILS NFR BLD AUTO: 67.4 % (ref 42.7–76)
PLATELET # BLD AUTO: 143 10*3/MM3 (ref 140–450)
PMV BLD AUTO: 9.2 FL (ref 6–12)
RBC # BLD AUTO: 3.11 10*6/MM3 (ref 3.77–5.28)
WBC NRBC COR # BLD AUTO: 5.42 10*3/MM3 (ref 3.4–10.8)

## 2024-04-18 PROCEDURE — 36415 COLL VENOUS BLD VENIPUNCTURE: CPT

## 2024-04-18 PROCEDURE — 85025 COMPLETE CBC W/AUTO DIFF WBC: CPT | Performed by: INTERNAL MEDICINE

## 2024-04-23 ENCOUNTER — OFFICE VISIT (OUTPATIENT)
Dept: PULMONOLOGY | Facility: CLINIC | Age: 74
End: 2024-04-23
Payer: MEDICARE

## 2024-04-23 ENCOUNTER — TELEPHONE (OUTPATIENT)
Dept: CARDIOLOGY | Facility: CLINIC | Age: 74
End: 2024-04-23
Payer: MEDICARE

## 2024-04-23 VITALS
OXYGEN SATURATION: 99 % | WEIGHT: 227 LBS | TEMPERATURE: 97.5 F | BODY MASS INDEX: 41.52 KG/M2 | DIASTOLIC BLOOD PRESSURE: 82 MMHG | HEART RATE: 78 BPM | SYSTOLIC BLOOD PRESSURE: 146 MMHG

## 2024-04-23 DIAGNOSIS — I50.31 ACUTE DIASTOLIC CHF (CONGESTIVE HEART FAILURE): ICD-10-CM

## 2024-04-23 DIAGNOSIS — J98.4 CHRONIC LUNG DISEASE: ICD-10-CM

## 2024-04-23 DIAGNOSIS — R05.2 SUBACUTE COUGH: Primary | ICD-10-CM

## 2024-04-23 DIAGNOSIS — G47.33 OSA TREATED WITH BIPAP: ICD-10-CM

## 2024-04-23 DIAGNOSIS — K21.9 GASTROESOPHAGEAL REFLUX DISEASE WITHOUT ESOPHAGITIS: ICD-10-CM

## 2024-04-23 PROCEDURE — 3079F DIAST BP 80-89 MM HG: CPT | Performed by: NURSE PRACTITIONER

## 2024-04-23 PROCEDURE — 1159F MED LIST DOCD IN RCRD: CPT | Performed by: NURSE PRACTITIONER

## 2024-04-23 PROCEDURE — 1160F RVW MEDS BY RX/DR IN RCRD: CPT | Performed by: NURSE PRACTITIONER

## 2024-04-23 PROCEDURE — 94726 PLETHYSMOGRAPHY LUNG VOLUMES: CPT | Performed by: NURSE PRACTITIONER

## 2024-04-23 PROCEDURE — 3077F SYST BP >= 140 MM HG: CPT | Performed by: NURSE PRACTITIONER

## 2024-04-23 PROCEDURE — 94729 DIFFUSING CAPACITY: CPT | Performed by: NURSE PRACTITIONER

## 2024-04-23 PROCEDURE — 94010 BREATHING CAPACITY TEST: CPT | Performed by: NURSE PRACTITIONER

## 2024-04-23 PROCEDURE — 99214 OFFICE O/P EST MOD 30 MIN: CPT | Performed by: NURSE PRACTITIONER

## 2024-04-23 RX ORDER — SACUBITRIL AND VALSARTAN 24; 26 MG/1; MG/1
1 TABLET, FILM COATED ORAL EVERY 12 HOURS SCHEDULED
COMMUNITY
Start: 2024-04-18

## 2024-04-23 NOTE — TELEPHONE ENCOUNTER
Name: Joanna Cross    Relationship: Self    Best Callback Number: [phone]    Incoming call to the Hub, requesting to  Cancel their HFU appointment on 5.7.24.     Per Hub workflow, further review is needed before the task can be completed.    Result of Call: Please cancel this appointment    PT DOES NOT WISH TO R/S

## 2024-04-24 ENCOUNTER — HOSPITAL ENCOUNTER (OUTPATIENT)
Dept: ONCOLOGY | Facility: HOSPITAL | Age: 74
Discharge: HOME | End: 2024-04-24
Admitting: INTERNAL MEDICINE
Payer: MEDICARE

## 2024-04-24 ENCOUNTER — TELEPHONE (OUTPATIENT)
Dept: ONCOLOGY | Facility: CLINIC | Age: 74
End: 2024-04-24
Payer: MEDICARE

## 2024-04-24 VITALS
SYSTOLIC BLOOD PRESSURE: 138 MMHG | RESPIRATION RATE: 16 BRPM | DIASTOLIC BLOOD PRESSURE: 64 MMHG | TEMPERATURE: 97.5 F | HEIGHT: 62 IN | WEIGHT: 224.3 LBS | BODY MASS INDEX: 41.28 KG/M2 | HEART RATE: 85 BPM

## 2024-04-24 DIAGNOSIS — N18.30 ANEMIA ASSOCIATED WITH STAGE 3 CHRONIC RENAL FAILURE: Primary | ICD-10-CM

## 2024-04-24 DIAGNOSIS — D63.1 ANEMIA ASSOCIATED WITH STAGE 3 CHRONIC RENAL FAILURE: Primary | ICD-10-CM

## 2024-04-24 LAB
BASOPHILS # BLD AUTO: 0.03 10*3/MM3 (ref 0–0.2)
BASOPHILS NFR BLD AUTO: 0.5 % (ref 0–1.5)
DEPRECATED RDW RBC AUTO: 53.2 FL (ref 37–54)
EOSINOPHIL # BLD AUTO: 0.38 10*3/MM3 (ref 0–0.4)
EOSINOPHIL NFR BLD AUTO: 6.5 % (ref 0.3–6.2)
ERYTHROCYTE [DISTWIDTH] IN BLOOD BY AUTOMATED COUNT: 14.4 % (ref 12.3–15.4)
HCT VFR BLD AUTO: 33.3 % (ref 34–46.6)
HGB BLD-MCNC: 10.9 G/DL (ref 12–15.9)
IMM GRANULOCYTES # BLD AUTO: 0.05 10*3/MM3 (ref 0–0.05)
IMM GRANULOCYTES NFR BLD AUTO: 0.9 % (ref 0–0.5)
LYMPHOCYTES # BLD AUTO: 0.66 10*3/MM3 (ref 0.7–3.1)
LYMPHOCYTES NFR BLD AUTO: 11.3 % (ref 19.6–45.3)
MCH RBC QN AUTO: 32.8 PG (ref 26.6–33)
MCHC RBC AUTO-ENTMCNC: 32.7 G/DL (ref 31.5–35.7)
MCV RBC AUTO: 100.3 FL (ref 79–97)
MONOCYTES # BLD AUTO: 0.49 10*3/MM3 (ref 0.1–0.9)
MONOCYTES NFR BLD AUTO: 8.4 % (ref 5–12)
NEUTROPHILS NFR BLD AUTO: 4.22 10*3/MM3 (ref 1.7–7)
NEUTROPHILS NFR BLD AUTO: 72.4 % (ref 42.7–76)
PLATELET # BLD AUTO: 150 10*3/MM3 (ref 140–450)
PMV BLD AUTO: 9 FL (ref 6–12)
RBC # BLD AUTO: 3.32 10*6/MM3 (ref 3.77–5.28)
WBC NRBC COR # BLD AUTO: 5.83 10*3/MM3 (ref 3.4–10.8)

## 2024-04-24 PROCEDURE — 36415 COLL VENOUS BLD VENIPUNCTURE: CPT

## 2024-04-24 PROCEDURE — G0463 HOSPITAL OUTPT CLINIC VISIT: HCPCS

## 2024-04-24 PROCEDURE — 85025 COMPLETE CBC W/AUTO DIFF WBC: CPT | Performed by: INTERNAL MEDICINE

## 2024-04-24 NOTE — PROGRESS NOTES
[facility] Pulmonary Follow up    CHIEF COMPLAINT    Dyspnea with exertion    HISTORY OF PRESENT ILLNESS    Joanna Cross is a 73 y.o.female here today for follow-up.  She was last seen in the office by me in December.  She was hospitalized at [facility] in March for worsening shortness of breath.  She was diuresed and her symptoms improved.  She has done well since discharge.    She continues to follow with Dr. Teague and has a follow-up scheduled with him soon.  She continues to take her diuretics regularly.  She states that her weight has remained stable.    She denies any chest pain or palpitations.  She denies any lower extremity edema or calf tenderness.    She has a history of interstitial lung disease that was diagnosed back in 2017 and has followed in our office since this time.  She has had normal PFTs in the office.    She continues to have difficulty with shortness of breath with exertion.  She does try to stay active at home.  She is more active due to the weight loss from her fluid status.    She denies any sputum production or hemoptysis.  She denies any fever, chills or night sweats.    She continues to wear her BiPAP nightly.  She denies any sleeping difficulties.  She does feel well rested.    She denies any reflux symptoms.  She does take omeprazole daily.    She is a lifetime non-smoker.  She is accompanied by her family.    Patient Active Problem List   Diagnosis    Coronary artery disease involving native coronary artery without angina pectoris    Hypertension, essential    Peripheral vascular disease    Hyperlipidemia LDL goal <70    Spinal stenosis, lumbar region, with neurogenic claudication    Delayed surgical wound healing    Biceps tendinitis    Overactive bladder    GERD (gastroesophageal reflux disease)    Hypothyroidism    Lichen sclerosus    Parkinson's disease    MILLI treated with BiPAP - Patient reports compliance.     History of respiratory failure - prior  respiratory failure requiring mechanical ventilation, open lung biopsy non-specific.     Atrial fibrillation    CKD (chronic kidney disease)    Tachy-thai syndrome    Long term current use of antiarrhythmic drug    History of adenomatous polyp of colon    Anemia associated with stage 3 chronic renal failure    Angiodysplasia    Hx of colonic polyps    Body mass index 40.0-44.9, adult    Bronchitis    Cardiac pacemaker in situ    Chronic low back pain    Chronic lung disease    Subacute cough    Degeneration of lumbar intervertebral disc    Edema of lower extremity    High risk medication use    History of malignant neoplasm of skin    History of TIA (transient ischemic attack)    Hyponatremia    Hypotonic bladder    Incomplete tear of rotator cuff    Major depression in remission    Migraine    Weakness    Tinnitus of both ears    Primary osteoarthritis involving multiple joints    Restless legs    Seborrheic dermatitis    Sphenoid sinusitis    Transient cerebral ischemia    Urinary tract infection    Urinary urgency    Type 2 diabetes mellitus with hyperglycemia, without long-term current use of insulin    Vitamin B12 deficiency    Vitamin D deficiency    Chronic kidney disease, stage 3b    Status post reverse total replacement of left shoulder    Postoperative pain    Dysuria    Rash    Pruritus    Acute post-traumatic headache, not intractable    History of recent fall    Chronic kidney disease, stage 3a    Status post reverse arthroplasty of right shoulder    Generalized muscle weakness    Difficulty in walking, not elsewhere classified    Osteoporosis, senile    Chest pressure    Impacted cerumen of left ear    Medicare annual wellness visit, subsequent    Morbid (severe) obesity due to excess calories    Mechanical complication of cardiovascular device    Fall    Cellulitis of lower extremity    Acute diastolic CHF (congestive heart failure)       Allergies   Allergen Reactions    Amlodipine Besylate Swelling  "    Lower extremity (ankles, feet) swelling    Entacapone Other (See Comments)     \"extreme weakness in legs - caused several falls, which stopped after discontinuing this medication\"    Epinephrine Other (See Comments)     6/4/16- had 3 shots to numb mouth to prepare teeth for crowns, the shots contained epi-  Caused pt to have chest discomfort- went to hospital in ambulance, discovered had a fib while there     Levemir [Insulin Detemir] Hives     Hives / rash around injection site    Penicillins Hives     Jitteriness     Xarelto [Rivaroxaban] GI Bleeding     hgb dropped to 5.2    Hydrocodone Unknown - High Severity     Headache, nausea, dizziness, & vomiting    Aricept [Donepezil Hcl] Nausea Only     Vivid dreams    Benztropine Mesylate Other (See Comments)     Uncontrollable body movements    Cogentin [Benztropine] Other (See Comments)     \"uncontrollable body movements\"    Compazine [Prochlorperazine Edisylate] Other (See Comments)     Dystonic reaction    Duraprep [Antiseptic Products, Misc.] Itching and Rash     RASH AND ITCHING    Haldol [Haloperidol Lactate] Other (See Comments)     Dystonic reaction    Hydralazine Other (See Comments)     Headache     Lisinopril Cough    Statins Myalgia     Leg pain- all statins     Sulfamethoxazole Nausea Only and Other (See Comments)     Nausea & headaches    Tarka [Trandolapril-Verapamil Hcl Er] Other (See Comments)     Constipation     Toprol Xl [Metoprolol Tartrate] Other (See Comments)     Extreme fatigue, decreased exercise tolerance    Trimethoprim Other (See Comments)     Other reaction(s): Nausea       Current Outpatient Medications:     Accu-Chek Softclix Lancets lancets, USE ONE LANCET TO TEST THREE TIMES A DAY dx e11.65 on insulin, Disp: 300 each, Rfl: 3    albuterol sulfate HFA (Ventolin HFA) 108 (90 Base) MCG/ACT inhaler, Inhale 1 puff Every 4 (Four) Hours As Needed for Wheezing or Shortness of Air. (Patient taking differently: Inhale 2 puffs Every 6 (Six) " Hours As Needed for Wheezing or Shortness of Air.), Disp: 8 g, Rfl: 5    amantadine (SYMMETREL) 100 MG capsule, Take 1 capsule by mouth 2 (Two) Times a Day., Disp: , Rfl:     apixaban (Eliquis) 5 MG tablet tablet, Take 1 tablet by mouth Every 12 (Twelve) Hours. Restart 1/29/24, Disp: 180 tablet, Rfl: 2    aspirin 81 MG EC tablet, Take 1 tablet by mouth Daily., Disp: , Rfl:     B Complex-C (SUPER B COMPLEX PO), Take 1 tablet by mouth Daily., Disp: , Rfl:     Blood Glucose Monitoring Suppl (Accu-Chek Guide) w/Device kit, Use 1 each Daily. Dx: E11.65, Disp: 1 kit, Rfl: 0    bumetanide (Bumex) 1 MG tablet, Take 1 tablet by mouth 2 (Two) Times a Day. tAKE BID FOR 3 DAYS THEN ONCE A DAY, Disp: 60 tablet, Rfl: 6    Calcium Carbonate (CALTRATE 600 PO), Take 600 mg by mouth Daily., Disp: , Rfl:     carbidopa-levodopa (SINEMET)  MG per tablet, Take  by mouth. 2 tablets at 0600, 1 tablet at 0900, 1200, 1500, 1800, 2100, Disp: , Rfl:     Cholecalciferol 25 MCG (1000 UT) tablet, Take 1 tablet by mouth 2 (Two) Times a Day., Disp: , Rfl:     citalopram (CeleXA) 20 MG tablet, Take 1 tablet by mouth Daily., Disp: , Rfl:     clobetasol (TEMOVATE) 0.05 % cream, Apply 1 Application topically to the appropriate area as directed 2 (Two) Times a Week. PRN, Disp: , Rfl:     Dapagliflozin Propanediol (FARXIGA PO), Take 10 mg by mouth Daily., Disp: , Rfl:     dofetilide (TIKOSYN) 125 MCG capsule, Take 1 capsule by mouth 2 (Two) Times a Day., Disp: , Rfl:     Emollient (AQUAPHOR ADV PROTECT HEALING EX), Apply 1 dose topically As Needed., Disp: , Rfl:     Entresto 24-26 MG tablet, Take 1 tablet by mouth Every 12 (Twelve) Hours., Disp: , Rfl:     Glucosamine-Chondroit-Calcium (TRIPLE FLEX BONE & JOINT PO), Take 1 tablet by mouth Daily. Move free, Disp: , Rfl:     glucose blood (Accu-Chek Guide) test strip, USE TO CHECK THREE TIMES DAILY, Disp: 300 each, Rfl: 1    hydroxychloroquine (PLAQUENIL) 200 MG tablet, Daily., Disp: , Rfl:      Insulin Glargine (Lantus SoloStar) 100 UNIT/ML injection pen, Inject 12-14 Units under the skin into the appropriate area as directed Daily. (Patient taking differently: Inject 12-15 Units under the skin into the appropriate area as directed Daily. Increase by 3 units if morning blood sugar is consistently over 130 for 3 days. Decrease by 3 units if morning blood sugar less than 80 for three days.), Disp: 15 mL, Rfl: 1    Insulin Pen Needle (B-D UF III MINI PEN NEEDLES) 31G X 5 MM misc, USE TO INJECT INSULIN DAILY, Disp: 90 each, Rfl: 3    ipratropium (ATROVENT) 0.03 % nasal spray, 2 sprays into the nostril(s) as directed by provider 2 (Two) Times a Day As Needed (CONGESTION SINUS). (Patient taking differently: 1 spray into the nostril(s) as directed by provider 2 (Two) Times a Day As Needed (CONGESTION SINUS).), Disp: 30 mL, Rfl: 11    Loratadine 10 MG capsule, Take 1 capsule by mouth Daily., Disp: , Rfl:     Multiple Vitamins-Minerals (CENTRUM ULTRA WOMENS PO), Take 1 tablet by mouth Daily., Disp: , Rfl:     nitroglycerin (NITROLINGUAL) 0.4 MG/SPRAY spray, Place 1 spray under the tongue Every 5 (Five) Minutes As Needed for Chest Pain., Disp: 1 each, Rfl: 6    O2 (OXYGEN), Inhale 2 L/min Every Night. 2L all the time now, Disp: , Rfl:     omeprazole (priLOSEC) 40 MG capsule, Take 1 capsule by mouth 2 (Two) Times a Day., Disp: , Rfl:     pramipexole (MIRAPEX) 1.5 MG tablet, TAKE 1 TABLET EVERY NIGHT, Disp: 90 tablet, Rfl: 3    ranolazine (RANEXA) 500 MG 12 hr tablet, Take 2 tablets by mouth Every 12 (Twelve) Hours., Disp: 360 tablet, Rfl: 3    senna (SENOKOT) 8.6 MG tablet, Take 1 tablet by mouth Daily., Disp: , Rfl:     spironolactone (ALDACTONE) 25 MG tablet, TAKE 2 TABLETS DAILY (Patient taking differently: Take 1 tablet by mouth 2 (Two) Times a Day.), Disp: 180 tablet, Rfl: 1    traMADol (ULTRAM) 50 MG tablet, Take 1 tablet by mouth Every 8 (Eight) Hours As Needed for Moderate Pain., Disp: , Rfl:      triamcinolone (KENALOG) 0.1 % cream, Apply 1 Application topically to the appropriate area as directed 2 (Two) Times a Day., Disp: , Rfl:     Triamcinolone Acetonide (NASACORT) 55 MCG/ACT nasal inhaler, 2 sprays Daily As Needed for Rhinitis., Disp: , Rfl:     Unithroid 175 MCG tablet, Take 1 tablet by mouth Daily., Disp: 90 tablet, Rfl: 1    vitamin C (ASCORBIC ACID) 500 MG tablet, Take 1 tablet by mouth Daily. Chewable tablet, Disp: , Rfl:     Zinc 30 MG capsule, Take 2 capsules by mouth Daily., Disp: , Rfl:   MEDICATION LIST AND ALLERGIES REVIEWED.    Social History     Tobacco Use    Smoking status: Never     Passive exposure: Past    Smokeless tobacco: Never    Tobacco comments:     Father and mother smoked for several years.   Vaping Use    Vaping status: Never Used    Passive vaping exposure: Yes   Substance Use Topics    Alcohol use: Never    Drug use: No       FAMILY AND SOCIAL HISTORY REVIEWED.    Review of Systems   Constitutional:  Negative for activity change, appetite change, fatigue, fever and unexpected weight change.   HENT:  Negative for congestion, postnasal drip, rhinorrhea, sinus pressure, sore throat and voice change.    Eyes:  Negative for visual disturbance.   Respiratory:  Positive for shortness of breath. Negative for cough, chest tightness and wheezing.    Cardiovascular:  Negative for chest pain, palpitations and leg swelling.   Gastrointestinal:  Negative for abdominal distention, abdominal pain, nausea and vomiting.   Endocrine: Negative for cold intolerance and heat intolerance.   Genitourinary:  Negative for difficulty urinating and urgency.   Musculoskeletal:  Negative for arthralgias, back pain and neck pain.   Skin:  Negative for color change and pallor.   Allergic/Immunologic: Negative for environmental allergies and food allergies.   Neurological:  Negative for dizziness, syncope, weakness and light-headedness.   Hematological:  Negative for adenopathy. Does not bruise/bleed  easily.   Psychiatric/Behavioral:  Negative for agitation and behavioral problems.    .    /82   Pulse 78   Temp 97.5 °F (36.4 °C) (Temporal)   Wt 103 kg (227 lb)   LMP  (LMP Unknown) Comment: Mammogram- 1/28/20  SpO2 99%   BMI 41.52 kg/m²     Immunization History   Administered Date(s) Administered    COVID-19 (MODERNA) 1st,2nd,3rd Dose Monovalent 02/12/2021, 03/12/2021, 11/09/2021    Covid-19 (Pfizer) Gray Cap Monovalent 08/03/2022    Flu Vaccine Quad PF >36MO 10/02/2017, 09/11/2018, 09/21/2019    Fluad Quad 65+ 11/23/2022    Fluzone High Dose =>65 Years ([employer] ONLY) 09/13/2017, 11/23/2022    Fluzone High-Dose 65+yrs 09/16/2021, 10/19/2023    Hepatitis A 05/03/1999, 10/05/1999    INFLUENZA SPLIT TRI 09/15/2010, 10/02/2012, 10/02/2013    Influenza, Unspecified 01/13/2004, 10/28/2004, 12/14/2005, 01/11/2007, 11/30/2007, 11/20/2008, 02/17/2010, 01/31/2011, 09/06/2011, 09/09/2013, 09/13/2017, 09/11/2018    PPD Test 10/28/2022, 11/07/2022    Pneumococcal Conjugate 13-Valent (PCV13) 09/04/2015, 12/04/2016    Pneumococcal Conjugate 20-Valent (PCV20) 06/08/2022    Pneumococcal Conjugate Unspecified 12/20/2013, 12/04/2016    Pneumococcal Polysaccharide (PPSV23) 01/13/2004, 11/20/2008, 12/20/2013    Pneumococcal, Unspecified 12/20/2013, 12/04/2016    Shingrix 09/27/2019    TD Preservative Free (Tenivac) 02/01/2012, 05/04/2017    Tdap 05/04/2017    Zostavax 02/05/2013       Physical Exam  Vitals and nursing note reviewed.   Constitutional:       Appearance: She is well-developed. She is not diaphoretic.   HENT:      Head: Normocephalic and atraumatic.   Eyes:      Pupils: Pupils are equal, round, and reactive to light.   Neck:      Thyroid: No thyromegaly.   Cardiovascular:      Rate and Rhythm: Normal rate and regular rhythm.      Heart sounds: Normal heart sounds. No murmur heard.     No friction rub. No gallop.   Pulmonary:      Effort: Pulmonary effort is normal. No respiratory distress.      Breath sounds:  Normal breath sounds. No wheezing or rales.   Chest:      Chest wall: No tenderness.   Abdominal:      General: Bowel sounds are normal.      Palpations: Abdomen is soft.      Tenderness: There is no abdominal tenderness.   Musculoskeletal:         General: No swelling. Normal range of motion.      Cervical back: Normal range of motion and neck supple.   Lymphadenopathy:      Cervical: No cervical adenopathy.   Skin:     General: Skin is warm and dry.      Capillary Refill: Capillary refill takes less than 2 seconds.   Neurological:      Mental Status: She is alert and oriented to person, place, and time.   Psychiatric:         Mood and Affect: Mood normal.         Behavior: Behavior normal.           RESULTS    Spirometry Interpretation: FVC 2.44 98% predicted, FEV1 1.75 91% predicted, FEV1/FVC 72% predicted, TLC 4.32 94% predicted, DLCO 76% predicted, no obstruction, no restriction and reduced DLCO.    BiPAP download: Patient is on a auto BiPAP bilevel IPAP of 16, EPAP 10, average use is 5 hours and 43 minutes, 100% compliant, average AHI 4.6    PROBLEM LIST    Problem List Items Addressed This Visit          Cardiac and Vasculature    Acute diastolic CHF (congestive heart failure)    Relevant Medications    Entresto 24-26 MG tablet       Gastrointestinal Abdominal     GERD (gastroesophageal reflux disease)       Pulmonary and Pneumonias    Chronic lung disease    Subacute cough - Primary    Relevant Orders    Spirometry with Diffusion Capacity & Lung Volumes (Completed)       Sleep    MILLI treated with BiPAP - Patient reports compliance.     Overview     - Patient reports compliance.               DISCUSSION    Ms. Cross was here for follow-up.  She seems to doing fairly well from a pulmonary standpoint.  We did review her PFTs in the office today shows no obstruction or restriction.      She has fully recovered from her recent hospitalization at [facility].  She does have a preserved EF  CHF.  With the adjustments to her medication she has improved.  She will continue to follow with cardiology closely.     She will continue to wear BiPAP nightly.  I did review her compliance and she is compliant.  She does benefit from her BiPAP.    She will continue omeprazole daily for GERD.    Her cough has improved since her fluid volumes are better managed.    We will have her follow-up in 6 months.    I personally spent a total of 32 minutes on patient visit today including chart review, face to face with the patient obtaining the history and physical exam, review of pertinent images and tests, counseling and discussion and/or coordination of care as described above, and documentation.  Total time excludes time spent on other separate services such as performing procedures or test interpretation, if applicable.        Myrna Mckeon, APRN  04/23/202412:30 EDT  Electronically signed     Please note that portions of this note were completed with a voice recognition program.        CC: Reynaldo Fritz MD

## 2024-04-24 NOTE — TELEPHONE ENCOUNTER
Called and spoke with patients daughter to inform her if patient would like to cancel 5/2 lab/injection appt and just get it done on 5/8 prior to seeing Denise she would be okay with that, she will discuss with her mother and they will call the office back to cancel if she would like.

## 2024-05-08 ENCOUNTER — HOSPITAL ENCOUNTER (OUTPATIENT)
Dept: ONCOLOGY | Facility: HOSPITAL | Age: 74
Discharge: HOME | End: 2024-05-08
Admitting: INTERNAL MEDICINE
Payer: MEDICARE

## 2024-05-08 ENCOUNTER — OFFICE VISIT (OUTPATIENT)
Dept: ONCOLOGY | Facility: CLINIC | Age: 74
End: 2024-05-08
Payer: MEDICARE

## 2024-05-08 VITALS
TEMPERATURE: 97.1 F | HEIGHT: 62 IN | HEART RATE: 82 BPM | RESPIRATION RATE: 22 BRPM | SYSTOLIC BLOOD PRESSURE: 137 MMHG | BODY MASS INDEX: 41.59 KG/M2 | OXYGEN SATURATION: 98 % | DIASTOLIC BLOOD PRESSURE: 64 MMHG | WEIGHT: 226 LBS

## 2024-05-08 VITALS
SYSTOLIC BLOOD PRESSURE: 137 MMHG | WEIGHT: 226 LBS | RESPIRATION RATE: 16 BRPM | HEIGHT: 62 IN | TEMPERATURE: 97.1 F | BODY MASS INDEX: 41.59 KG/M2 | HEART RATE: 82 BPM | DIASTOLIC BLOOD PRESSURE: 64 MMHG

## 2024-05-08 DIAGNOSIS — D63.1 ANEMIA ASSOCIATED WITH STAGE 3 CHRONIC RENAL FAILURE: Primary | ICD-10-CM

## 2024-05-08 DIAGNOSIS — N18.30 ANEMIA ASSOCIATED WITH STAGE 3 CHRONIC RENAL FAILURE: Primary | ICD-10-CM

## 2024-05-08 LAB
BASOPHILS # BLD AUTO: 0.04 10*3/MM3 (ref 0–0.2)
BASOPHILS NFR BLD AUTO: 0.8 % (ref 0–1.5)
DEPRECATED RDW RBC AUTO: 48.3 FL (ref 37–54)
EOSINOPHIL # BLD AUTO: 0.51 10*3/MM3 (ref 0–0.4)
EOSINOPHIL NFR BLD AUTO: 10.1 % (ref 0.3–6.2)
ERYTHROCYTE [DISTWIDTH] IN BLOOD BY AUTOMATED COUNT: 13 % (ref 12.3–15.4)
HCT VFR BLD AUTO: 32.1 % (ref 34–46.6)
HGB BLD-MCNC: 10.5 G/DL (ref 12–15.9)
IMM GRANULOCYTES # BLD AUTO: 0.03 10*3/MM3 (ref 0–0.05)
IMM GRANULOCYTES NFR BLD AUTO: 0.6 % (ref 0–0.5)
LYMPHOCYTES # BLD AUTO: 0.74 10*3/MM3 (ref 0.7–3.1)
LYMPHOCYTES NFR BLD AUTO: 14.7 % (ref 19.6–45.3)
MCH RBC QN AUTO: 32.6 PG (ref 26.6–33)
MCHC RBC AUTO-ENTMCNC: 32.7 G/DL (ref 31.5–35.7)
MCV RBC AUTO: 99.7 FL (ref 79–97)
MONOCYTES # BLD AUTO: 0.45 10*3/MM3 (ref 0.1–0.9)
MONOCYTES NFR BLD AUTO: 8.9 % (ref 5–12)
NEUTROPHILS NFR BLD AUTO: 3.28 10*3/MM3 (ref 1.7–7)
NEUTROPHILS NFR BLD AUTO: 64.9 % (ref 42.7–76)
PLATELET # BLD AUTO: 135 10*3/MM3 (ref 140–450)
PMV BLD AUTO: 8.9 FL (ref 6–12)
RBC # BLD AUTO: 3.22 10*6/MM3 (ref 3.77–5.28)
WBC NRBC COR # BLD AUTO: 5.05 10*3/MM3 (ref 3.4–10.8)

## 2024-05-08 PROCEDURE — 3078F DIAST BP <80 MM HG: CPT | Performed by: NURSE PRACTITIONER

## 2024-05-08 PROCEDURE — 1159F MED LIST DOCD IN RCRD: CPT | Performed by: NURSE PRACTITIONER

## 2024-05-08 PROCEDURE — 36415 COLL VENOUS BLD VENIPUNCTURE: CPT

## 2024-05-08 PROCEDURE — 3075F SYST BP GE 130 - 139MM HG: CPT | Performed by: NURSE PRACTITIONER

## 2024-05-08 PROCEDURE — 1125F AMNT PAIN NOTED PAIN PRSNT: CPT | Performed by: NURSE PRACTITIONER

## 2024-05-08 PROCEDURE — 1160F RVW MEDS BY RX/DR IN RCRD: CPT | Performed by: NURSE PRACTITIONER

## 2024-05-08 PROCEDURE — 99214 OFFICE O/P EST MOD 30 MIN: CPT | Performed by: NURSE PRACTITIONER

## 2024-05-08 PROCEDURE — 85025 COMPLETE CBC W/AUTO DIFF WBC: CPT | Performed by: INTERNAL MEDICINE

## 2024-05-21 LAB
BASOPHILS # BLD AUTO: 0.1 X10E3/UL (ref 0–0.2)
BASOPHILS NFR BLD AUTO: 1 %
EOSINOPHIL # BLD AUTO: 0.5 X10E3/UL (ref 0–0.4)
EOSINOPHIL NFR BLD AUTO: 9 %
ERYTHROCYTE [DISTWIDTH] IN BLOOD BY AUTOMATED COUNT: 12.4 % (ref 11.7–15.4)
HCT VFR BLD AUTO: 35 % (ref 34–46.6)
HGB BLD-MCNC: 11 G/DL (ref 11.1–15.9)
IMM GRANULOCYTES # BLD AUTO: 0 X10E3/UL (ref 0–0.1)
IMM GRANULOCYTES NFR BLD AUTO: 1 %
LYMPHOCYTES # BLD AUTO: 0.9 X10E3/UL (ref 0.7–3.1)
LYMPHOCYTES NFR BLD AUTO: 15 %
MCH RBC QN AUTO: 31.2 PG (ref 26.6–33)
MCHC RBC AUTO-ENTMCNC: 31.4 G/DL (ref 31.5–35.7)
MCV RBC AUTO: 99 FL (ref 79–97)
MONOCYTES # BLD AUTO: 0.6 X10E3/UL (ref 0.1–0.9)
MONOCYTES NFR BLD AUTO: 10 %
NEUTROPHILS # BLD AUTO: 3.7 X10E3/UL (ref 1.4–7)
NEUTROPHILS NFR BLD AUTO: 64 %
PLATELET # BLD AUTO: 181 X10E3/UL (ref 150–450)
RBC # BLD AUTO: 3.53 X10E6/UL (ref 3.77–5.28)
WBC # BLD AUTO: 5.7 X10E3/UL (ref 3.4–10.8)

## 2024-05-22 ENCOUNTER — TELEPHONE (OUTPATIENT)
Dept: FAMILY MEDICINE CLINIC | Facility: CLINIC | Age: 74
End: 2024-05-22
Payer: MEDICARE

## 2024-05-22 NOTE — TELEPHONE ENCOUNTER
Caller: Joanna Cross    Relationship: Self    Best call back number: [phone]     What is the best time to reach you: ANY    Who are you requesting to speak with (clinical staff, provider,  specific staff member):   CLINICAL STAFF    What was the call regarding: PATIENT IS SCHEDULED TO RECEIVE SOME BLOOD WORK FROM HEMATOLOGY AND ONCOLOGY ON 6/3/24. PATIENT WILL ALSO BE COMPLETING BLOOD WORK FOR DR TAVAREZ ON 6/4/24.      PATIENT HAS BEEN NOTIFIED BY HEMATOLOGY AND ONCOLOGY TO ASK DR TAVAREZ IF HE WILL BE REQUESTING A CBC? IF SO, THE PATIENT WOULD LIKE TO COMPLETE THIS ON 6/3/24 WHEN SHE COMPLETES HER LABS FOR HEMATOLOGY AND ONCOLOGY TO AVOID MULTIPLE STICKS.    Is it okay if the provider responds through [employer]: NO

## 2024-05-22 NOTE — TELEPHONE ENCOUNTER
Care navigators : called asking if dr washington would be okay with Following the pt if she is admitted to hospice care as well as if he could Verbally certify that she has a terminal diagnosis of 6 months or less     Callback: [phone] option #2

## 2024-05-22 NOTE — TELEPHONE ENCOUNTER
HUB TO READ        Yes a CBC will be drawn and she is more than welcome to get it done there

## 2024-05-26 DIAGNOSIS — E03.9 ACQUIRED HYPOTHYROIDISM: Chronic | ICD-10-CM

## 2024-05-28 ENCOUNTER — TELEPHONE (OUTPATIENT)
Dept: FAMILY MEDICINE CLINIC | Facility: CLINIC | Age: 74
End: 2024-05-28

## 2024-05-28 RX ORDER — OMEPRAZOLE 40 MG/1
40 CAPSULE, DELAYED RELEASE ORAL 2 TIMES DAILY
Qty: 180 CAPSULE | Refills: 0 | Status: ON HOLD | OUTPATIENT
Start: 2024-05-28

## 2024-05-28 RX ORDER — LEVOTHYROXINE SODIUM 175 UG/1
175 TABLET ORAL DAILY
Qty: 90 TABLET | Refills: 1 | Status: ON HOLD | OUTPATIENT
Start: 2024-05-28

## 2024-05-28 RX ORDER — CITALOPRAM 20 MG/1
20 TABLET ORAL DAILY
Qty: 90 TABLET | Refills: 0 | Status: ON HOLD | OUTPATIENT
Start: 2024-05-28

## 2024-05-28 NOTE — TELEPHONE ENCOUNTER
"  Caller: BonyJoanna toth \"SIXTO\"    Relationship: Self    Best call back number: [phone]     What is the best time to reach you: ASAP    Who are you requesting to speak with (clinical staff, provider,  specific staff member): CLINICAL      What was the call regarding: PATIENT WILL BE IN TO GET HER LABS DONE 06/04/24. PATIENT HAS ANOTHER BH ORDER FROM DR GALICIA AND A PAPER LAB ORDER FROM HER NEPHROLOGIST, SHE WANTS TO KNOW IF SHE WILL BE ABLE TO GET THEM DONE IN OUR OFFICE AS WELL SO SHE DOESN'T HAVE TO GET PRICKED TWICE.    ITS FOR A RENAL FUNCTION PANEL. PLEASE ADVISE     "

## 2024-05-28 NOTE — TELEPHONE ENCOUNTER
Rx Refill Note    Requested Prescriptions     Pending Prescriptions Disp Refills    citalopram (CeleXA) 20 MG tablet [Pharmacy Med Name: CITALOPRAM HYDROBROMIDE TABS 20MG] 90 tablet 0     Sig: TAKE 1 TABLET DAILY    omeprazole (priLOSEC) 40 MG capsule [Pharmacy Med Name: OMEPRAZOLE DR CAPS 40MG] 180 capsule 0     Sig: TAKE 1 CAPSULE TWICE A DAY        Last office visit with prescribing clinician: 4/5/2024      Next office visit with prescribing clinician: 5/28/2024   Last labs:   Last refill: n/a   Pharmacy (be sure to add in Epic). correct

## 2024-05-28 NOTE — TELEPHONE ENCOUNTER
Rx Refill Note  Requested Prescriptions     Pending Prescriptions Disp Refills    Unithroid 175 MCG tablet [Pharmacy Med Name: UNITHROID TABS 175MCG] 90 tablet 1     Sig: TAKE 1 TABLET DAILY      Last office visit with prescribing clinician: 4/15/2024     Next office visit with prescribing clinician: 10/17/2024                           Ivy Paige MA  05/28/24, 11:38 EDT

## 2024-05-29 ENCOUNTER — TELEPHONE (OUTPATIENT)
Dept: FAMILY MEDICINE CLINIC | Facility: CLINIC | Age: 74
End: 2024-05-29
Payer: MEDICARE

## 2024-05-30 ENCOUNTER — TELEPHONE (OUTPATIENT)
Dept: ONCOLOGY | Facility: CLINIC | Age: 74
End: 2024-05-30
Payer: MEDICARE

## 2024-05-30 DIAGNOSIS — N18.30 ANEMIA ASSOCIATED WITH STAGE 3 CHRONIC RENAL FAILURE: ICD-10-CM

## 2024-05-30 DIAGNOSIS — D63.1 ANEMIA ASSOCIATED WITH STAGE 3 CHRONIC RENAL FAILURE: ICD-10-CM

## 2024-05-30 NOTE — TELEPHONE ENCOUNTER
"  Caller: Joanna Crsos \"SIXTO\"    Relationship: Self    Best call back number: [phone]      What was the call regarding: PATIENT WANTED TO GET HER LABS DONE AT [facility] IN [place] AND NEEDS ORDERS SENT     PLEASE CALL PATIENT TO ADVISE       "

## 2024-06-02 ENCOUNTER — HOSPITAL ENCOUNTER (INPATIENT)
Facility: HOSPITAL | Age: 74
LOS: 3 days | Discharge: HOME | End: 2024-06-05
Attending: EMERGENCY MEDICINE | Admitting: PEDIATRICS
Payer: MEDICARE

## 2024-06-02 ENCOUNTER — APPOINTMENT (OUTPATIENT)
Dept: GENERAL RADIOLOGY | Facility: HOSPITAL | Age: 74
End: 2024-06-02
Payer: MEDICARE

## 2024-06-02 ENCOUNTER — APPOINTMENT (OUTPATIENT)
Dept: CARDIOLOGY | Facility: HOSPITAL | Age: 74
End: 2024-06-02
Payer: MEDICARE

## 2024-06-02 DIAGNOSIS — I50.33 ACUTE ON CHRONIC DIASTOLIC CONGESTIVE HEART FAILURE: Primary | ICD-10-CM

## 2024-06-02 DIAGNOSIS — R73.09 ELEVATED GLUCOSE: ICD-10-CM

## 2024-06-02 DIAGNOSIS — D53.9 MACROCYTIC ANEMIA: ICD-10-CM

## 2024-06-02 DIAGNOSIS — Z74.09 DECREASED AMBULATION STATUS: ICD-10-CM

## 2024-06-02 DIAGNOSIS — R79.89 TROPONIN LEVEL ELEVATED: ICD-10-CM

## 2024-06-02 DIAGNOSIS — N17.9 ACUTE RENAL FAILURE SUPERIMPOSED ON STAGE 3B CHRONIC KIDNEY DISEASE, UNSPECIFIED ACUTE RENAL FAILURE TYPE: ICD-10-CM

## 2024-06-02 DIAGNOSIS — N18.32 ACUTE RENAL FAILURE SUPERIMPOSED ON STAGE 3B CHRONIC KIDNEY DISEASE, UNSPECIFIED ACUTE RENAL FAILURE TYPE: ICD-10-CM

## 2024-06-02 PROBLEM — T81.89XA DELAYED SURGICAL WOUND HEALING: Status: RESOLVED | Noted: 2018-09-13 | Resolved: 2024-06-02

## 2024-06-02 PROBLEM — R39.15 URINARY URGENCY: Status: RESOLVED | Noted: 2022-01-20 | Resolved: 2024-06-02

## 2024-06-02 PROBLEM — G89.18 POSTOPERATIVE PAIN: Status: RESOLVED | Noted: 2022-10-27 | Resolved: 2024-06-02

## 2024-06-02 PROBLEM — Z79.01 CHRONIC ANTICOAGULATION: Chronic | Status: ACTIVE | Noted: 2024-06-02

## 2024-06-02 PROBLEM — J32.3 SPHENOID SINUSITIS: Status: RESOLVED | Noted: 2022-06-08 | Resolved: 2024-06-02

## 2024-06-02 PROBLEM — J96.11 CHRONIC RESPIRATORY FAILURE WITH HYPOXIA: Status: ACTIVE | Noted: 2024-06-02

## 2024-06-02 PROBLEM — L03.119 CELLULITIS OF LOWER EXTREMITY: Status: RESOLVED | Noted: 2024-01-22 | Resolved: 2024-06-02

## 2024-06-02 PROBLEM — I50.9 CHF (CONGESTIVE HEART FAILURE): Status: ACTIVE | Noted: 2024-06-02
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
## 2024-06-02 PROBLEM — J40 BRONCHITIS: Status: RESOLVED | Noted: 2022-04-13 | Resolved: 2024-06-02

## 2024-06-02 PROBLEM — M62.81 GENERALIZED MUSCLE WEAKNESS: Status: RESOLVED | Noted: 2022-10-28 | Resolved: 2024-06-02

## 2024-06-02 PROBLEM — R53.1 WEAKNESS: Status: RESOLVED | Noted: 2022-04-14 | Resolved: 2024-06-02

## 2024-06-02 PROBLEM — Z91.81 HISTORY OF RECENT FALL: Status: RESOLVED | Noted: 2023-05-02 | Resolved: 2024-06-02

## 2024-06-02 PROBLEM — L29.9 PRURITUS: Status: RESOLVED | Noted: 2022-12-27 | Resolved: 2024-06-02

## 2024-06-02 PROBLEM — N39.0 URINARY TRACT INFECTION: Status: RESOLVED | Noted: 2019-02-07 | Resolved: 2024-06-02

## 2024-06-02 PROBLEM — H61.22 IMPACTED CERUMEN OF LEFT EAR: Status: RESOLVED | Noted: 2023-11-17 | Resolved: 2024-06-02

## 2024-06-02 PROBLEM — R05.2 SUBACUTE COUGH: Status: RESOLVED | Noted: 2022-04-13 | Resolved: 2024-06-02

## 2024-06-02 PROBLEM — R21 RASH: Status: RESOLVED | Noted: 2022-12-27 | Resolved: 2024-06-02

## 2024-06-02 PROBLEM — E87.1 HYPONATREMIA: Status: RESOLVED | Noted: 2022-04-13 | Resolved: 2024-06-02

## 2024-06-02 PROBLEM — G43.909 MIGRAINE: Status: RESOLVED | Noted: 2022-04-13 | Resolved: 2024-06-02

## 2024-06-02 PROBLEM — R30.0 DYSURIA: Status: RESOLVED | Noted: 2022-12-27 | Resolved: 2024-06-02

## 2024-06-02 PROBLEM — R26.2 DIFFICULTY IN WALKING, NOT ELSEWHERE CLASSIFIED: Status: RESOLVED | Noted: 2022-10-27 | Resolved: 2024-06-02

## 2024-06-02 PROBLEM — G44.319 ACUTE POST-TRAUMATIC HEADACHE, NOT INTRACTABLE: Status: RESOLVED | Noted: 2023-05-02 | Resolved: 2024-06-02

## 2024-06-02 PROBLEM — Z79.899 HIGH RISK MEDICATION USE: Status: RESOLVED | Noted: 2022-04-13 | Resolved: 2024-06-02

## 2024-06-02 PROBLEM — R07.89 CHEST PRESSURE: Status: RESOLVED | Noted: 2023-11-17 | Resolved: 2024-06-02

## 2024-06-02 LAB
ALBUMIN SERPL-MCNC: 4.1 G/DL (ref 3.5–5.2)
ALBUMIN/GLOB SERPL: 2 G/DL
ALP SERPL-CCNC: 78 U/L (ref 39–117)
ALT SERPL W P-5'-P-CCNC: 8 U/L (ref 1–33)
ANION GAP SERPL CALCULATED.3IONS-SCNC: 12 MMOL/L (ref 5–15)
ASCENDING AORTA: 2.8 CM
AST SERPL-CCNC: 21 U/L (ref 1–32)
BASOPHILS # BLD AUTO: 0.05 10*3/MM3 (ref 0–0.2)
BASOPHILS NFR BLD AUTO: 0.9 % (ref 0–1.5)
BH CV ECHO LEFT VENTRICLE GLOBAL LONGITUDINAL STRAIN: -22.8 %
BH CV ECHO MEAS - AO MAX PG: 9 MMHG
BH CV ECHO MEAS - AO MEAN PG: 5 MMHG
BH CV ECHO MEAS - AO ROOT DIAM: 3 CM
BH CV ECHO MEAS - AO V2 MAX: 148.3 CM/SEC
BH CV ECHO MEAS - EF(MOD-BP): 54.6 %
BH CV ECHO MEAS - IVS/LVPW: 1 CM
BH CV ECHO MEAS - IVSD: 1 CM
BH CV ECHO MEAS - LA DIMENSION: 4.4 CM
BH CV ECHO MEAS - LAT PEAK E' VEL: 16.1 CM/SEC
BH CV ECHO MEAS - LV MAX PG: 4.6 MMHG
BH CV ECHO MEAS - LV MEAN PG: 2.1 MMHG
BH CV ECHO MEAS - LV V1 MAX: 107.4 CM/SEC
BH CV ECHO MEAS - LV V1 VTI: 21.5 CM
BH CV ECHO MEAS - LVIDD: 4.8 CM
BH CV ECHO MEAS - LVIDS: 3.2 CM
BH CV ECHO MEAS - LVOT DIAM: 1.8 CM
BH CV ECHO MEAS - LVPWD: 1 CM
BH CV ECHO MEAS - MED PEAK E' VEL: 13.3 CM/SEC
BH CV ECHO MEAS - MV E MAX VEL: 119.2 CM/SEC
BH CV ECHO MEAS - MV MAX PG: 5.8 MMHG
BH CV ECHO MEAS - MV MEAN PG: 2.4 MMHG
BH CV ECHO MEAS - MV V2 VTI: 34.8 CM
BH CV ECHO MEAS - PA ACC TIME: 0.2 SEC
BH CV ECHO MEAS - PA V2 MAX: 89.1 CM/SEC
BH CV ECHO MEAS - PI END-D VEL: 90.2 CM/SEC
BH CV ECHO MEAS - RAP SYSTOLE: 8 MMHG
BH CV ECHO MEAS - RVSP: 21 MMHG
BH CV ECHO MEAS - TAPSE (>1.6): 2.15 CM
BH CV ECHO MEAS - TR MAX PG: 13 MMHG
BH CV ECHO MEAS - TR MAX VEL: 178.6 CM/SEC
BH CV ECHO MEASUREMENTS AVERAGE E/E' RATIO: 8.11
BH CV VAS BP LEFT ARM: NORMAL MMHG
BH CV XLRA - RV BASE: 3.78 CM
BH CV XLRA - RV LENGTH: 8.21 CM
BH CV XLRA - RV MID: 2.44 CM
BH CV XLRA - TDI S': 12.9 CM/SEC
BILIRUB SERPL-MCNC: 0.3 MG/DL (ref 0–1.2)
BUN SERPL-MCNC: 43 MG/DL (ref 8–23)
BUN/CREAT SERPL: 26.2 (ref 7–25)
CALCIUM SPEC-SCNC: 9.1 MG/DL (ref 8.6–10.5)
CHLORIDE SERPL-SCNC: 103 MMOL/L (ref 98–107)
CO2 SERPL-SCNC: 24 MMOL/L (ref 22–29)
CREAT SERPL-MCNC: 1.64 MG/DL (ref 0.57–1)
DEPRECATED RDW RBC AUTO: 49.5 FL (ref 37–54)
EGFRCR SERPLBLD CKD-EPI 2021: 32.5 ML/MIN/1.73
EOSINOPHIL # BLD AUTO: 0.57 10*3/MM3 (ref 0–0.4)
EOSINOPHIL NFR BLD AUTO: 10.2 % (ref 0.3–6.2)
ERYTHROCYTE [DISTWIDTH] IN BLOOD BY AUTOMATED COUNT: 13.2 % (ref 12.3–15.4)
GEN 5 2HR TROPONIN T REFLEX: 41 NG/L
GLOBULIN UR ELPH-MCNC: 2.1 GM/DL
GLUCOSE BLDC GLUCOMTR-MCNC: 138 MG/DL (ref 70–130)
GLUCOSE BLDC GLUCOMTR-MCNC: 149 MG/DL (ref 70–130)
GLUCOSE BLDC GLUCOMTR-MCNC: 151 MG/DL (ref 70–130)
GLUCOSE SERPL-MCNC: 149 MG/DL (ref 65–99)
HBA1C MFR BLD: 5.9 % (ref 4.8–5.6)
HCT VFR BLD AUTO: 32.6 % (ref 34–46.6)
HGB BLD-MCNC: 10.5 G/DL (ref 12–15.9)
HOLD SPECIMEN: NORMAL
IMM GRANULOCYTES # BLD AUTO: 0.03 10*3/MM3 (ref 0–0.05)
IMM GRANULOCYTES NFR BLD AUTO: 0.5 % (ref 0–0.5)
LEFT ATRIUM VOLUME INDEX: 29.2 ML/M2
LYMPHOCYTES # BLD AUTO: 0.64 10*3/MM3 (ref 0.7–3.1)
LYMPHOCYTES NFR BLD AUTO: 11.4 % (ref 19.6–45.3)
MAGNESIUM SERPL-MCNC: 2.1 MG/DL (ref 1.6–2.4)
MCH RBC QN AUTO: 32.6 PG (ref 26.6–33)
MCHC RBC AUTO-ENTMCNC: 32.2 G/DL (ref 31.5–35.7)
MCV RBC AUTO: 101.2 FL (ref 79–97)
MONOCYTES # BLD AUTO: 0.54 10*3/MM3 (ref 0.1–0.9)
MONOCYTES NFR BLD AUTO: 9.6 % (ref 5–12)
NEUTROPHILS NFR BLD AUTO: 3.77 10*3/MM3 (ref 1.7–7)
NEUTROPHILS NFR BLD AUTO: 67.4 % (ref 42.7–76)
NRBC BLD AUTO-RTO: 0 /100 WBC (ref 0–0.2)
NT-PROBNP SERPL-MCNC: 2206 PG/ML (ref 0–1800)
PLATELET # BLD AUTO: 121 10*3/MM3 (ref 140–450)
PMV BLD AUTO: 9.6 FL (ref 6–12)
POTASSIUM SERPL-SCNC: 4.2 MMOL/L (ref 3.5–5.2)
PROT SERPL-MCNC: 6.2 G/DL (ref 6–8.5)
QT INTERVAL: 386 MS
QTC INTERVAL: 459 MS
RBC # BLD AUTO: 3.22 10*6/MM3 (ref 3.77–5.28)
SODIUM SERPL-SCNC: 139 MMOL/L (ref 136–145)
TROPONIN T DELTA: -1 NG/L
TROPONIN T SERPL HS-MCNC: 42 NG/L
TROPONIN T SERPL HS-MCNC: 49 NG/L
TSH SERPL DL<=0.05 MIU/L-ACNC: 4.05 UIU/ML (ref 0.27–4.2)
WBC NRBC COR # BLD AUTO: 5.6 10*3/MM3 (ref 3.4–10.8)
WHOLE BLOOD HOLD COAG: NORMAL
WHOLE BLOOD HOLD SPECIMEN: NORMAL

## 2024-06-02 PROCEDURE — 93005 ELECTROCARDIOGRAM TRACING: CPT | Performed by: EMERGENCY MEDICINE

## 2024-06-02 PROCEDURE — 99285 EMERGENCY DEPT VISIT HI MDM: CPT

## 2024-06-02 PROCEDURE — 84443 ASSAY THYROID STIM HORMONE: CPT | Performed by: EMERGENCY MEDICINE

## 2024-06-02 PROCEDURE — 83036 HEMOGLOBIN GLYCOSYLATED A1C: CPT | Performed by: INTERNAL MEDICINE

## 2024-06-02 PROCEDURE — 25010000002 BUMETANIDE PER 0.5 MG: Performed by: EMERGENCY MEDICINE

## 2024-06-02 PROCEDURE — 71045 X-RAY EXAM CHEST 1 VIEW: CPT

## 2024-06-02 PROCEDURE — 84484 ASSAY OF TROPONIN QUANT: CPT | Performed by: EMERGENCY MEDICINE

## 2024-06-02 PROCEDURE — 83735 ASSAY OF MAGNESIUM: CPT | Performed by: EMERGENCY MEDICINE

## 2024-06-02 PROCEDURE — 85025 COMPLETE CBC W/AUTO DIFF WBC: CPT | Performed by: EMERGENCY MEDICINE

## 2024-06-02 PROCEDURE — 93306 TTE W/DOPPLER COMPLETE: CPT

## 2024-06-02 PROCEDURE — 83880 ASSAY OF NATRIURETIC PEPTIDE: CPT | Performed by: EMERGENCY MEDICINE

## 2024-06-02 PROCEDURE — 80053 COMPREHEN METABOLIC PANEL: CPT | Performed by: EMERGENCY MEDICINE

## 2024-06-02 PROCEDURE — 63710000001 INSULIN LISPRO (HUMAN) PER 5 UNITS: Performed by: INTERNAL MEDICINE

## 2024-06-02 PROCEDURE — 99223 1ST HOSP IP/OBS HIGH 75: CPT | Performed by: INTERNAL MEDICINE

## 2024-06-02 PROCEDURE — 82948 REAGENT STRIP/BLOOD GLUCOSE: CPT

## 2024-06-02 PROCEDURE — 84484 ASSAY OF TROPONIN QUANT: CPT | Performed by: INTERNAL MEDICINE

## 2024-06-02 RX ORDER — SODIUM CHLORIDE 0.9 % (FLUSH) 0.9 %
10 SYRINGE (ML) INJECTION EVERY 12 HOURS SCHEDULED
Status: DISCONTINUED | OUTPATIENT
Start: 2024-06-02 | End: 2024-06-05 | Stop reason: HOSPADM

## 2024-06-02 RX ORDER — BISACODYL 5 MG/1
5 TABLET, DELAYED RELEASE ORAL DAILY PRN
Status: DISCONTINUED | OUTPATIENT
Start: 2024-06-02 | End: 2024-06-02

## 2024-06-02 RX ORDER — SODIUM CHLORIDE 0.9 % (FLUSH) 0.9 %
10 SYRINGE (ML) INJECTION AS NEEDED
Status: DISCONTINUED | OUTPATIENT
Start: 2024-06-02 | End: 2024-06-05 | Stop reason: HOSPADM

## 2024-06-02 RX ORDER — ONDANSETRON 4 MG/1
4 TABLET, ORALLY DISINTEGRATING ORAL EVERY 6 HOURS PRN
Status: DISCONTINUED | OUTPATIENT
Start: 2024-06-02 | End: 2024-06-05 | Stop reason: HOSPADM

## 2024-06-02 RX ORDER — ALBUTEROL SULFATE 2.5 MG/3ML
2.5 SOLUTION RESPIRATORY (INHALATION) EVERY 6 HOURS PRN
Status: DISCONTINUED | OUTPATIENT
Start: 2024-06-02 | End: 2024-06-05 | Stop reason: HOSPADM

## 2024-06-02 RX ORDER — BUMETANIDE 0.25 MG/ML
2 INJECTION INTRAMUSCULAR; INTRAVENOUS ONCE
Status: COMPLETED | OUTPATIENT
Start: 2024-06-03 | End: 2024-06-03

## 2024-06-02 RX ORDER — IBUPROFEN 600 MG/1
1 TABLET ORAL
Status: DISCONTINUED | OUTPATIENT
Start: 2024-06-02 | End: 2024-06-05 | Stop reason: HOSPADM

## 2024-06-02 RX ORDER — IPRATROPIUM BROMIDE 21 UG/1
2 SPRAY, METERED NASAL 2 TIMES DAILY PRN
Status: DISCONTINUED | OUTPATIENT
Start: 2024-06-02 | End: 2024-06-05 | Stop reason: HOSPADM

## 2024-06-02 RX ORDER — SPIRONOLACTONE 25 MG/1
25 TABLET ORAL 2 TIMES DAILY
Status: DISCONTINUED | OUTPATIENT
Start: 2024-06-02 | End: 2024-06-05 | Stop reason: HOSPADM

## 2024-06-02 RX ORDER — ASPIRIN 81 MG/1
81 TABLET ORAL NIGHTLY
Status: DISCONTINUED | OUTPATIENT
Start: 2024-06-02 | End: 2024-06-05 | Stop reason: HOSPADM

## 2024-06-02 RX ORDER — PANTOPRAZOLE SODIUM 40 MG/1
40 TABLET, DELAYED RELEASE ORAL
Status: DISCONTINUED | OUTPATIENT
Start: 2024-06-02 | End: 2024-06-05 | Stop reason: HOSPADM

## 2024-06-02 RX ORDER — POLYETHYLENE GLYCOL 3350 17 G/17G
17 POWDER, FOR SOLUTION ORAL DAILY PRN
Status: DISCONTINUED | OUTPATIENT
Start: 2024-06-02 | End: 2024-06-02

## 2024-06-02 RX ORDER — DEXTROSE MONOHYDRATE 25 G/50ML
25 INJECTION, SOLUTION INTRAVENOUS
Status: DISCONTINUED | OUTPATIENT
Start: 2024-06-02 | End: 2024-06-05 | Stop reason: HOSPADM

## 2024-06-02 RX ORDER — INSULIN LISPRO 100 [IU]/ML
2-7 INJECTION, SOLUTION INTRAVENOUS; SUBCUTANEOUS
Status: DISCONTINUED | OUTPATIENT
Start: 2024-06-02 | End: 2024-06-05 | Stop reason: HOSPADM

## 2024-06-02 RX ORDER — HYDROXYCHLOROQUINE SULFATE 200 MG/1
200 TABLET, FILM COATED ORAL EVERY 12 HOURS SCHEDULED
Status: DISCONTINUED | OUTPATIENT
Start: 2024-06-02 | End: 2024-06-05 | Stop reason: HOSPADM

## 2024-06-02 RX ORDER — BUMETANIDE 0.25 MG/ML
2 INJECTION INTRAMUSCULAR; INTRAVENOUS ONCE
Status: COMPLETED | OUTPATIENT
Start: 2024-06-02 | End: 2024-06-02

## 2024-06-02 RX ORDER — SENNOSIDES A AND B 8.6 MG/1
1 TABLET, FILM COATED ORAL DAILY
Status: DISCONTINUED | OUTPATIENT
Start: 2024-06-02 | End: 2024-06-02

## 2024-06-02 RX ORDER — BISACODYL 10 MG
10 SUPPOSITORY, RECTAL RECTAL DAILY PRN
Status: DISCONTINUED | OUTPATIENT
Start: 2024-06-02 | End: 2024-06-05 | Stop reason: HOSPADM

## 2024-06-02 RX ORDER — ONDANSETRON 2 MG/ML
4 INJECTION INTRAMUSCULAR; INTRAVENOUS EVERY 6 HOURS PRN
Status: DISCONTINUED | OUTPATIENT
Start: 2024-06-02 | End: 2024-06-05 | Stop reason: HOSPADM

## 2024-06-02 RX ORDER — POLYETHYLENE GLYCOL 3350 17 G/17G
17 POWDER, FOR SOLUTION ORAL DAILY PRN
Status: DISCONTINUED | OUTPATIENT
Start: 2024-06-02 | End: 2024-06-05 | Stop reason: HOSPADM

## 2024-06-02 RX ORDER — CITALOPRAM 20 MG/1
20 TABLET ORAL NIGHTLY
Status: DISCONTINUED | OUTPATIENT
Start: 2024-06-02 | End: 2024-06-05 | Stop reason: HOSPADM

## 2024-06-02 RX ORDER — ASPIRIN 81 MG/1
81 TABLET ORAL DAILY
Status: DISCONTINUED | OUTPATIENT
Start: 2024-06-02 | End: 2024-06-02

## 2024-06-02 RX ORDER — HYDROXYZINE HYDROCHLORIDE 25 MG/1
25 TABLET, FILM COATED ORAL 3 TIMES DAILY PRN
Status: DISCONTINUED | OUTPATIENT
Start: 2024-06-02 | End: 2024-06-05 | Stop reason: HOSPADM

## 2024-06-02 RX ORDER — RANOLAZINE 500 MG/1
1000 TABLET, EXTENDED RELEASE ORAL EVERY 12 HOURS
Status: DISCONTINUED | OUTPATIENT
Start: 2024-06-02 | End: 2024-06-05 | Stop reason: HOSPADM

## 2024-06-02 RX ORDER — BISACODYL 10 MG
10 SUPPOSITORY, RECTAL RECTAL DAILY PRN
Status: DISCONTINUED | OUTPATIENT
Start: 2024-06-02 | End: 2024-06-02

## 2024-06-02 RX ORDER — AMOXICILLIN 250 MG
2 CAPSULE ORAL 2 TIMES DAILY PRN
Status: DISCONTINUED | OUTPATIENT
Start: 2024-06-02 | End: 2024-06-02

## 2024-06-02 RX ORDER — ACETAMINOPHEN 325 MG/1
650 TABLET ORAL EVERY 4 HOURS PRN
Status: DISCONTINUED | OUTPATIENT
Start: 2024-06-02 | End: 2024-06-05 | Stop reason: HOSPADM

## 2024-06-02 RX ORDER — BISACODYL 5 MG/1
5 TABLET, DELAYED RELEASE ORAL DAILY PRN
Status: DISCONTINUED | OUTPATIENT
Start: 2024-06-02 | End: 2024-06-05 | Stop reason: HOSPADM

## 2024-06-02 RX ORDER — NICOTINE POLACRILEX 4 MG
15 LOZENGE BUCCAL
Status: DISCONTINUED | OUTPATIENT
Start: 2024-06-02 | End: 2024-06-05 | Stop reason: HOSPADM

## 2024-06-02 RX ORDER — SODIUM CHLORIDE 9 MG/ML
40 INJECTION, SOLUTION INTRAVENOUS AS NEEDED
Status: DISCONTINUED | OUTPATIENT
Start: 2024-06-02 | End: 2024-06-05 | Stop reason: HOSPADM

## 2024-06-02 RX ORDER — PANTOPRAZOLE SODIUM 40 MG/1
40 TABLET, DELAYED RELEASE ORAL DAILY
Status: DISCONTINUED | OUTPATIENT
Start: 2024-06-02 | End: 2024-06-02

## 2024-06-02 RX ORDER — AMANTADINE HYDROCHLORIDE 100 MG/1
100 CAPSULE, GELATIN COATED ORAL 2 TIMES DAILY
Status: DISCONTINUED | OUTPATIENT
Start: 2024-06-02 | End: 2024-06-05 | Stop reason: HOSPADM

## 2024-06-02 RX ORDER — DOFETILIDE 0.12 MG/1
125 CAPSULE ORAL 2 TIMES DAILY
Status: DISCONTINUED | OUTPATIENT
Start: 2024-06-02 | End: 2024-06-02

## 2024-06-02 RX ORDER — CITALOPRAM 20 MG/1
20 TABLET ORAL DAILY
Status: DISCONTINUED | OUTPATIENT
Start: 2024-06-02 | End: 2024-06-02

## 2024-06-02 RX ORDER — AMOXICILLIN 250 MG
2 CAPSULE ORAL 2 TIMES DAILY
Status: DISCONTINUED | OUTPATIENT
Start: 2024-06-02 | End: 2024-06-05 | Stop reason: HOSPADM

## 2024-06-02 RX ORDER — LEVOTHYROXINE SODIUM 175 UG/1
175 TABLET ORAL
Status: DISCONTINUED | OUTPATIENT
Start: 2024-06-03 | End: 2024-06-05 | Stop reason: HOSPADM

## 2024-06-02 RX ORDER — SENNOSIDES A AND B 8.6 MG/1
1 TABLET, FILM COATED ORAL DAILY PRN
Status: DISCONTINUED | OUTPATIENT
Start: 2024-06-02 | End: 2024-06-05 | Stop reason: HOSPADM

## 2024-06-02 RX ADMIN — Medication 10 ML: at 21:29

## 2024-06-02 RX ADMIN — INSULIN LISPRO 2 UNITS: 100 INJECTION, SOLUTION INTRAVENOUS; SUBCUTANEOUS at 21:29

## 2024-06-02 RX ADMIN — HYDROXYZINE HYDROCHLORIDE 25 MG: 25 TABLET ORAL at 21:26

## 2024-06-02 RX ADMIN — PANTOPRAZOLE SODIUM 40 MG: 40 TABLET, DELAYED RELEASE ORAL at 18:46

## 2024-06-02 RX ADMIN — CARBIDOPA AND LEVODOPA 1 TABLET: 25; 100 TABLET ORAL at 16:18

## 2024-06-02 RX ADMIN — APIXABAN 5 MG: 5 TABLET, FILM COATED ORAL at 12:39

## 2024-06-02 RX ADMIN — Medication 10 ML: at 12:40

## 2024-06-02 RX ADMIN — AMANTADINE HYDROCHLORIDE 100 MG: 100 CAPSULE ORAL at 12:38

## 2024-06-02 RX ADMIN — ASPIRIN 81 MG: 81 TABLET, COATED ORAL at 21:27

## 2024-06-02 RX ADMIN — PANTOPRAZOLE SODIUM 40 MG: 40 TABLET, DELAYED RELEASE ORAL at 12:38

## 2024-06-02 RX ADMIN — HYDROXYCHLOROQUINE SULFATE 200 MG: 200 TABLET ORAL at 12:39

## 2024-06-02 RX ADMIN — APIXABAN 5 MG: 5 TABLET, FILM COATED ORAL at 21:28

## 2024-06-02 RX ADMIN — CARBIDOPA AND LEVODOPA 1 TABLET: 25; 100 TABLET ORAL at 21:28

## 2024-06-02 RX ADMIN — RANOLAZINE 1000 MG: 500 TABLET, FILM COATED, EXTENDED RELEASE ORAL at 21:27

## 2024-06-02 RX ADMIN — BUMETANIDE 2 MG: 0.25 INJECTION, SOLUTION INTRAMUSCULAR; INTRAVENOUS at 07:42

## 2024-06-02 RX ADMIN — CARBIDOPA AND LEVODOPA 1 TABLET: 25; 100 TABLET ORAL at 18:45

## 2024-06-02 RX ADMIN — PRAMIPEXOLE DIHYDROCHLORIDE 1.5 MG: 1 TABLET ORAL at 21:28

## 2024-06-02 RX ADMIN — HYDROXYCHLOROQUINE SULFATE 200 MG: 200 TABLET ORAL at 21:28

## 2024-06-02 RX ADMIN — CITALOPRAM HYDROBROMIDE 20 MG: 20 TABLET ORAL at 21:26

## 2024-06-02 RX ADMIN — CARBIDOPA AND LEVODOPA 1 TABLET: 25; 100 TABLET ORAL at 12:38

## 2024-06-02 RX ADMIN — SENNOSIDES AND DOCUSATE SODIUM 2 TABLET: 8.6; 5 TABLET ORAL at 21:28

## 2024-06-02 RX ADMIN — AMANTADINE HYDROCHLORIDE 100 MG: 100 CAPSULE ORAL at 21:26

## 2024-06-02 NOTE — Clinical Note
Level of Care: Telemetry [5]   Diagnosis: CHF (congestive heart failure) [197293]   Admitting Physician: SKYLER GRAF [[id number]]   Attending Physician: SKYLER GRAF [[id number]]   Certification: I Certify That Inpatient Hospital Services Are Medically Necessary For Greater Than 2 Midnights

## 2024-06-02 NOTE — ED NOTES
Joanna Cross    Nursing Report ED to Floor:  Mental status: A & O x 4   Ambulatory status: up x 2 assist  Oxygen Therapy:  2 L NC   Cardiac Rhythm: A fib  Admitted from: ED, Home  Safety Concerns:  Fall Risk  Social Issues: None  ED Room #:  20    ED Nurse Phone Extension - [phone] or may call [phone].      HPI:   Chief Complaint   Patient presents with    Edema       Past Medical History:  Past Medical History:   Diagnosis Date    Allergic rhinitis     Anemia     Ankle problem     thinks back related causing pain     Atrial fibrillation     AVM (arteriovenous malformation)     Back pain     CHF (congestive heart failure) 6/2/2024    Chronic kidney disease     stage 3 per pt    Chronic lung disease 04/13/2022    CKD (chronic kidney disease)     Clotting disorder 2016    AVM - small intestine    Coronary artery disease involving native coronary artery without angina pectoris 03/01/2017    COVID-19 vaccine series completed     Cystic fibrosis     Diastolic dysfunction     Gastrocnemius muscle tear     left medial 91    Generalized osteoarthritis     GERD (gastroesophageal reflux disease)     Gestational diabetes     GIB (gastrointestinal bleeding) 2016    d/t xarelto     Headache     Hearing decreased, bilateral     HAS HEARING AID, NOT WEARING IT CURRENTLY    Hiatal hernia     History of shingles     History of transfusion 2016    no reaction recalled     Hyperlipidemia LDL goal <70 03/01/2017    Hypertension     Hypothyroidism     IBS (irritable bowel syndrome)     Klebsiella pneumonia     Lichen sclerosus     Lupus     subQ    Mouth problem     mouth guard used since pt bites tongue and lips excessively at night if not- with bipap     MRSA infection 2018    Myocardial infarction     Obesity     On home oxygen therapy     2L of oxygen all the time due to current congestion     Osteoporosis     Parkinson's disease     Peripheral vascular disease     Pleurisy     Pneumonia     Puerperal sepsis with acute hypoxic  respiratory failure     emergent intubation- 2016    Pulmonary embolism     Right leg pain     from back issues     Salivary gland stone     Sciatic nerve pain     Seborrheic dermatitis     Skin cancer     on back     Sleep apnea     CPAP AND HOME O2 2L/M    Sleep apnea, obstructive 04/15/01    TIA (transient ischemic attack) 2014    no residual effects    Type 2 diabetes mellitus     UTI (urinary tract infection)     Vitamin D deficiency 09/08/2022    Wears glasses         Past Surgical History:  Past Surgical History:   Procedure Laterality Date    ABLATION OF DYSRHYTHMIC FOCUS  05/16/13    Laser Ablation - Rt Leg    BACK SURGERY      l4-l5 laminectomy     BICEPS TENDON REPAIR Right     shoulder    BREAST BIOPSY Left 05/2004    excisional, benign    BRONCHOSCOPY RIGID / FLEXIBLE      2016    BUNIONECTOMY Right     CARDIAC CATHETERIZATION      CARDIAC CATHETERIZATION N/A 02/01/2019    Procedure: Left Heart Cath;  Surgeon: Albertina Corona MD;  Location:  [facility];  Service: Cardiology    CARDIAC ELECTROPHYSIOLOGY PROCEDURE N/A 01/26/2024    Procedure: Lead Revision RA or RV, PPM or ICD;  Surgeon: Lauro Francois MD;  Location:  [facility];  Service: Cardiovascular;  Laterality: N/A;    CHOLECYSTECTOMY      COLONOSCOPY  2016    COLONOSCOPY N/A 06/17/2021    Procedure: COLONOSCOPY WITH POLYPECTOMY;  Surgeon: Trenton Sosa MD;  Location:  [facility];  Service: Gastroenterology;  Laterality: N/A;    CORONARY STENT PLACEMENT      x1 stent    CYST REMOVAL      left ear, upper left back 2001    CYSTOSCOPY      x2  18 and 20 -   in 20 urethra dilation     DIAGNOSTIC LAPAROSCOPY  1981    ENDOSCOPIC FUNCTIONAL SINUS SURGERY (FESS)  2011    ENDOSCOPY  2016    ENTEROSCOPY VIA STOMA      with single ballon with fluoro     HAMMER TOE REPAIR Left     INCISION AND DRAINAGE OF WOUND  2018    back with wound infection     INSERT / REPLACE / REMOVE PACEMAKER  11/10/16     [facility]    JOINT REPLACEMENT      KNEE ARTHROSCOPY      LASER ABLATION      right leg 11    LIPOMA EXCISION  1999    left leg     LUMBAR LAMINECTOMY DISCECTOMY DECOMPRESSION N/A 07/06/2018    Procedure: LUMBAR LAMINECTOMY L4-5, HEMILAMIINECTOMY RIGHT L5-S1, FORAMINOTOMY L5-S1;  Surgeon: Lencho Gamino MD;  Location:  [facility];  Service: Neurosurgery    LUMBAR LAMINECTOMY DISCECTOMY DECOMPRESSION N/A 09/19/2018    Procedure: INCISION AND DRAINAGE BACK WITH WOUND EXPLORATION;  Surgeon: Ritchie García MD;  Location:  [facility];  Service: Neurosurgery    LUNG BIOPSY Left 2016    OTHER SURGICAL HISTORY      ct scan of chest and sinuses and lower back     OTHER SURGICAL HISTORY      various echos     OTHER SURGICAL HISTORY      electroencephalogram 16,   emg  ncv tests 2007    OTHER SURGICAL HISTORY      mra 2007  and various mri with xrays, nuclear medicine lung ventilation with perfusion test 2016 with pft     OTHER SURGICAL HISTORY      barium swallow testing 15, 12, 12    OTHER SURGICAL HISTORY      vaginal ultrasound- 2012,   vas clementina lower extrem 2016, vas venous duplex lower extrem bilat 2019    OTHER SURGICAL HISTORY      wdge biopsy spring of lung     OTHER SURGICAL HISTORY      emergent intubation- hypoxic resp failure     OTHER SURGICAL HISTORY      01/26/2024 ppm generator change and lead revision per Dr. Francois    PACEMAKER IMPLANTATION  11/2016    [facility]    REPLACEMENT TOTAL KNEE Bilateral     left knee 2011, right 2012 per dr acuna     REPLACEMENT TOTAL KNEE Bilateral     SHOULDER ARTHROSCOPY Bilateral     2003-left, 2004- right     SKIN BIOPSY      skin cancer back 2016    SKIN CANCER EXCISION      upper righ tback     MADHAV      TEETH EXTRACTION      x2    TOTAL SHOULDER ARTHROPLASTY W/ DISTAL CLAVICLE EXCISION Left 10/24/2022    Procedure: REVERSE TOTAL SHOULDER ARTHROPLASTY, BICEPS TENODESIS - LEFT;  Surgeon: Sammy Yo MD;  Location:  [facility];  Service: Orthopedics;  Laterality: Left;     TOTAL SHOULDER ARTHROPLASTY W/ DISTAL CLAVICLE EXCISION Right 09/18/2023    Procedure: REVERSE TOTAL SHOULDER  ARTHROPLASTY WITH BICEPS TENODESIS - RIGHT;  Surgeon: Sammy Yo MD;  Location: [facility];  Service: Orthopedics;  Laterality: Right;    TUBAL ABDOMINAL LIGATION Bilateral 1980    WISDOM TOOTH EXTRACTION          Admitting Doctor:   Miguel A Rivas MD    Consulting Provider(s):  Consults       No orders found from 5/4/2024 to 6/3/2024.             Admitting Diagnosis:   The primary encounter diagnosis was Acute on chronic diastolic congestive heart failure. Diagnoses of Acute renal failure superimposed on stage 3b chronic kidney disease, unspecified acute renal failure type, Macrocytic anemia, Elevated glucose, and Decreased ambulation status were also pertinent to this visit.    Most Recent Vitals:   Vitals:    06/02/24 0708 06/02/24 0728 06/02/24 0742 06/02/24 0743   BP: 125/68 113/77 113/77 121/60   BP Location:       Patient Position:       Pulse: 83 76 71 73   Resp:       Temp:       TempSrc:       SpO2: 99% 99%  99%   Weight:       Height:           Active LDAs/IV Access:   Lines, Drains & Airways       Active LDAs       Name Placement date Placement time Site Days    Peripheral IV 06/02/24 0739 Left Antecubital 06/02/24  0739  Antecubital  less than 1    Nerve Block Catheter 09/18/23 0813 right 09/18/23  0813  created via procedure documentation  -- 258                    Labs (abnormal labs have a star):   Labs Reviewed   COMPREHENSIVE METABOLIC PANEL - Abnormal; Notable for the following components:       Result Value    Glucose 149 (*)     BUN 43 (*)     Creatinine 1.64 (*)     BUN/Creatinine Ratio 26.2 (*)     eGFR 32.5 (*)     All other components within normal limits    Narrative:     GFR Normal >60  Chronic Kidney Disease <60  Kidney Failure <15    The GFR formula is only valid for adults with stable renal function between ages 18 and 70.   BNP (IN-HOUSE) - Abnormal; Notable for the  following components:    proBNP 2,206.0 (*)     All other components within normal limits    Narrative:     This assay is used as an aid in the diagnosis of individuals suspected of having heart failure. It can be used as an aid in the diagnosis of acute decompensated heart failure (ADHF) in patients presenting with signs and symptoms of ADHF to the emergency department (ED). In addition, NT-proBNP of <300 pg/mL indicates ADHF is not likely.    Age Range Result Interpretation  NT-proBNP Concentration (pg/mL:      <50             Positive            >450                   Gray                 300-450                    Negative             <300    50-75           Positive            >900                  Gray                300-900                  Negative            <300      >75             Positive            >1800                  Gray                300-1800                  Negative            <300   SINGLE HS TROPONIN T - Abnormal; Notable for the following components:    HS Troponin T 49 (*)     All other components within normal limits    Narrative:     High Sensitive Troponin T Reference Range:  <14.0 ng/L- Negative Female for AMI  <22.0 ng/L- Negative Male for AMI  >=14 - Abnormal Female indicating possible myocardial injury.  >=22 - Abnormal Male indicating possible myocardial injury.   Clinicians would have to utilize clinical acumen, EKG, Troponin, and serial changes to determine if it is an Acute Myocardial Infarction or myocardial injury due to an underlying chronic condition.        CBC WITH AUTO DIFFERENTIAL - Abnormal; Notable for the following components:    RBC 3.22 (*)     Hemoglobin 10.5 (*)     Hematocrit 32.6 (*)     .2 (*)     Platelets 121 (*)     Lymphocyte % 11.4 (*)     Eosinophil % 10.2 (*)     Lymphocytes, Absolute 0.64 (*)     Eosinophils, Absolute 0.57 (*)     All other components within normal limits   TSH - Normal   MAGNESIUM - Normal   RAINBOW DRAW    Narrative:     The  following orders were created for panel order [facility] Draw.  Procedure                               Abnormality         Status                     ---------                               -----------         ------                     Green Top (Gel)[[id number]]                                  Final result               Lavender Top[[id number]]                                     Final result               Gold Top - SST[[id number]]                                   Final result               Amado Top[[id number]]                                         Final result               Light Blue Top[[id number]]                                   Final result                 Please view results for these tests on the individual orders.   CBC AND DIFFERENTIAL    Narrative:     The following orders were created for panel order CBC & Differential.  Procedure                               Abnormality         Status                     ---------                               -----------         ------                     CBC Auto Differential[[id number]]        Abnormal            Final result                 Please view results for these tests on the individual orders.   GREEN TOP   LAVENDER TOP   GOLD TOP - SST   GRAY TOP   LIGHT BLUE TOP       Meds Given in ED:   Medications   sodium chloride 0.9 % flush 10 mL (has no administration in time range)   bumetanide (BUMEX) injection 2 mg (2 mg Intravenous Given 6/2/24 0742)           Last NIH score:                                                          Dysphagia screening results:  Patient Factors Component (Dysphagia:Stroke or Rule-out)  Best Eye Response: 4-->(E4) spontaneous (06/02/24 0717)  Best Motor Response: 6-->(M6) obeys commands (06/02/24 0717)  Best Verbal Response: 5-->(V5) oriented (06/02/24 0717)  Quinten Coma Scale Score: 15 (06/02/24 0717)     Qu[place]en Coma Scale:  No data recorded     CIWA:        Restraint Type:            Isolation Status:  No active isolations

## 2024-06-02 NOTE — ED PROVIDER NOTES
Subjective   History of Present Illness  This is a pleasant, unfortunate 73-year-old female very complex medical history.  I have reviewed her most recent notes available to me including her hematology and cardiology note.  Companied by 2 of her daughters today.    She lives in [place] with her daughter.  Baseline gets around room to room gets out to the store occasionally though she has to ride a buggy.  She does have Parkinson's disease.  Unfortunately she fell on 11 May was seen at [facility] and had x-rays that were apparently unremarkable but subsequently she followed up with Dr. Yo, orthopedics was diagnosed with a fracture in her right acromion is currently using a sling on the right side and a elevated walker and her activity level is substantially decreased.  Last time she was out to the store was with difficulty to [employer] last week.    She is on chronic home oxygen has been for 4 to 5 years and is wearing it 24/7 now and also is on BiPAP at night for sleep apnea with oxygen.    Her last hospitalization was at [facility] in March of this year for fluid overload she was diuresed and apparently diuresed down 18 pounds and improved considerably she was able to walk initially 75 feet and then was up to 500 feet at discharge.    She has changed cardiologist and is currently seeing Dr. Teague.  He took her off Tikosyn for A-fib.  She has had problems with her pacemaker and apparently lead is off.  He has started her on dapagliflozin as well as Entresto that was several weeks ago and she is on Bumex and spironolactone as her diuretics.    However despite this she has had increased fluid retention a weight gain of several pounds and feels more short of breath.  She comes the emergency department today for evaluation of this.    She is not had any other medication changes or falls except as noted above.  Her last echo I have access to was from January of this  year when her ejection fraction was preserved but her right ventricular systolic pressure was about 41.  She never had a right heart catheterization that is been talked about in the past.  She is now in permanent A-fib    Appetite remains okay.  She has had no fevers or chills.  No cough.  She has chronic constipation.  She has not been passing blood per any orifice.  Urine output has been okay.  Lower extremity edema is bit worse than her baseline.    All other systems reviewed and are negative except as noted above.        Review of Systems   All other systems reviewed and are negative.      Past Medical History:   Diagnosis Date    Allergic rhinitis     Anemia     Ankle problem     thinks back related causing pain     Atrial fibrillation     AVM (arteriovenous malformation)     Back pain     CHF (congestive heart failure) 6/2/2024    Chronic kidney disease     stage 3 per pt    Chronic lung disease 04/13/2022    CKD (chronic kidney disease)     Clotting disorder 2016    AVM - small intestine    Coronary artery disease involving native coronary artery without angina pectoris 03/01/2017    COVID-19 vaccine series completed     Cystic fibrosis     Diastolic dysfunction     Gastrocnemius muscle tear     left medial 91    Generalized osteoarthritis     GERD (gastroesophageal reflux disease)     Gestational diabetes     GIB (gastrointestinal bleeding) 2016    d/t xarelto     Headache     Hearing decreased, bilateral     HAS HEARING AID, NOT WEARING IT CURRENTLY    Hiatal hernia     History of shingles     History of transfusion 2016    no reaction recalled     Hyperlipidemia LDL goal <70 03/01/2017    Hypertension     Hypothyroidism     IBS (irritable bowel syndrome)     Klebsiella pneumonia     Lichen sclerosus     Lupus     subQ    Mouth problem     mouth guard used since pt bites tongue and lips excessively at night if not- with bipap     MRSA infection 2018    Myocardial infarction     Obesity     On home oxygen  therapy     2L of oxygen all the time due to current congestion     Osteoporosis     Parkinson's disease     Peripheral vascular disease     Pleurisy     Pneumonia     Puerperal sepsis with acute hypoxic respiratory failure     emergent intubation- 2016    Pulmonary embolism     Right leg pain     from back issues     Salivary gland stone     Sciatic nerve pain     Seborrheic dermatitis     Skin cancer     on back     Sleep apnea     CPAP AND HOME O2 2L/M    Sleep apnea, obstructive 04/15/01    TIA (transient ischemic attack) 2014    no residual effects    Type 2 diabetes mellitus     UTI (urinary tract infection)     Vitamin D deficiency 09/08/2022    Wears glasses    Veronica note 5/8/24  Oncology/Hematology History Overview Note    1.  Anemia since at least 2016 managed with erythropoietin injections by [facility].  With polypectomies from cecum June 2021 Dr. Marino's and capsule endoscopy at [facility] November 2016 showing small bowel AVMs cecal polyps Bj Rivera September 2016 and Dr. Reynaldo Fritz polypectomy 2007.  EGD Dr. Rivera 2012, 2015, 2016 with dilation of esophagus.  [facility] enteroscopy single balloon with fluoroscopy [facility] with 1 small nonbleeding AVM ablated.  2.  Cardiovascular disease with MI 2015 stent RCA [facility]  3.  Atrial fib managed by [facility] in [place] 2016 with pacemaker and subsequently polyp Cj  4.  Repetitive falls with possible concussion April 2023 and January 2024  5.  Wedge biopsy left upper lobe Dr. Faisal Lundy October 2016  6.  Eczema  7.  Reflux  8.  Hypertension  9.  Hypothyroidism  10.  Lichen sclerosis on vulvar biopsy 1982  11.  Parkinson's diagnosed 2007 [facility]  12.  Sleep apnea managed by [facility] pulmonary medicine  13.  Subcutaneous lupus diagnosed [facility] Carole Roy February 2023  14.  2019 back surgeries Dr. Lencho Gamino and wound infection drained by Dr. Alonso in 2018  15.  Urethral  dilations with Dr. Terrence Mckenzie  16.  Bilateral total knee replacements Dr. Springer.     Hematology history timeline:  -10/26/2023 hematology note Dr. Nunez.  Received parenteral iron September 2023 with hemoglobin stable 9.8.  He states this is multifactorial anemia due to iron deficiency suspecting GI blood loss due to history of AVMs and chronic kidney disease with erythropoietin deficiency.  White count and platelets normal.  Plan for continued Procrit per protocol with monitoring of iron indices periodically.  Numerous prior B12 levels normal during 2023.  Patient considering colonoscopy  -12/28/2023 40,000 units Procrit last dose given at [facility] being held for hemoglobin over 10.  -1/22/2024 hemoglobin 11 received IV iron 510 mg 1/22/2024 and 1/31/2024.  -3/6/2024 ferritin 263 with iron normal 115 and iron saturation 40% with normal total iron binding capacity 291.  Creatinine 1.24 GFR 45.  Hemoglobin 8.7 with platelets 132,000.  MCV 96 with normal RDW.     -3/12/2024 [facility] hematology consult: I reviewed her extensive records she brought me that she collated herself and the note from [facility] I summarized above.  She has anemia of renal disease and iron deficiency due to periodic GI losses intolerant of oral iron because she cannot take it with the Sinemet for her Parkinson's that she takes 6 times a day so she gets periodic IV iron.  For now, with her hemoglobin of 8.7 and GFR 45 with normal iron indices we will hop back on the Procrit has per protocol 40,000 units subcu weekly holding for hemoglobin over 10 and she will see my nurse practitioner back in May.  Following that my nurse practitioner will watch her and periodically we will need to check her iron indices as she does have a history of AVMs and may need periodic IV iron as well.  I discussed with her that I would not put her through a bone marrow biopsy despite the mild thrombocytopenia given normochromic  normocytic indices and normal Red cell distribution with an unremarkable differential and the fact that, even if I found myelodysplasia, at most what I would do is just Procrit which we are already doing.  She has not had jaundice or icterus or other evidence is to suggest hemolysis and I will not work that up.  In essence we will just continue the care that she was already receiving at [facility].  She has had thorough GI evaluations as outlined above.      Anemia associated with stage 3 chronic renal failure    3/12/2024 -  Chemotherapy      OP SUPPORTIVE Epoetin  Parker / Epoetin Parker-epbx             HISTORY OF PRESENT ILLNESS:  The patient is a 73 y.o. female, here for follow up on management of anemia.  Since we saw the patient last she was admitted to the hospital for management of her CHF.  After aggressive diuresis she states that she has been feeling much better until more recently she has noted some weight gain and more fatigue in her legs seem to give out on her sooner which is how she felt before her hospitalization.  In regards to her anemia, she states that she did receive iron while hospitalized, I do not have those records.  She has been on Procrit for her anemia of renal disease, her Procrit is held for hemoglobin greater than 10.  Since we saw her on 3/12/2024 she has received Procrit only on 1 occurrence which was actually on 3/12/2024. Her hemoglobin since that time has remained just above 10.0. Her CBC from today was stable with hemoglobin of 10.5.    Cardiology note 12/22/23  Coronary artery disease  [facility], 02/15/2008, Dr Lutz: Mild, non-obstructive CAD, normal EF  [facility], 01/09/2013, Dr Stevenson Sutton: No LV gram done. Mild, non-obstructive CAD.  [facility], 11/9/2015, [facility]:  EF 60%. 70% RCA lesion with Promus ZAINAB placement. 40-50% mid LAD, no FFR performed. Other small blockages noted. No MR.  [facility], 11/15/2015, Dr Palacio @ [facility]: Patent RCA stent, 40-50% LAD with  FFR @ 0.92; mild inferior wall hypokinesis  [facility], 07/29/2016, [facility]: Normal CORS with patent stent. LAD improved since last [facility]. EF 55-60%.  [facility], 02/01/2019: EF 55-60%. Patent RCA stent, noncritical mid LAD with IFR 0.93, noncritical LCx.   Chronic venous stasis:  Venous duplex, 09/17/2010: Insufficiency to venous hypertension in the great saphenous system bilaterally.  Venous duplex 2013: Right leg laser ablation of Dr. Garcia.  Venous duplex, 05/06/2016: No evidence of DVT, no superficial, no thrmobophlebitis, no valvular insufficiency.  AA with runoffs, 08/31/2016: Single-vessel [place]: No significant arterial disease.  Arterial doppler/GLYNN, 08/29/2019: No evidence of significant lower extremity arterial occlusive disease.  Palpitations:  Echocardiogram, 02/13/2014: Dr. Teran. Normal biventricular function; trace to mild MR. Atrial septal aneurysm.  Echocardiogram, 12/22/2015, Dr. Chavez: Borderline LVH, mild LAE, mild RV enlargement. Mild to moderate MR and TR with RVSP of 46 mmHg.  Echocardiogram, 03/2016: Dr. Palacio.  RV enlargement.  EF 50-55%.  Mild AI S, mild MR and TR with RVSP 37 mmHg.  Echocardiogram, 07/11/20: Dr. Jany Wynn: EF 60-65%. Mild to moderate MR.  PAF/SSS:  [employer] PPM 2016  CHADS2 Vasc = 6 (HTN, DM, Age 65-74, Female, H/o TIA). On chronic anticoagulation.  ECV, 12/26/2018: Successful cardioversion. AV paced.  Echocardiogram, 12/25/2018:  EF 60%, mild MR/TR with RVSP 29 mmHg. Sclerotic AoV, no AS/AI. Mild MAC.  ECV, 03/05/2019: Successful cardioversion.  Zio, 10/01/2019, 14 days: NSR baseline. Normal average rates. Periods of RV pacing.   Patient has known undersensing in the atrial lead which is was known to Dr. Marie.     TIA:  Carotid duplex, 02/13/2014: No significant flow-limiting stenosis. Mild luminal disease present; both vertebrals are present.  Diabetes  Essential Hypertension  Hyperlipidemia  Hypothyroid  Hyponatremia  Secondary to SIADH  MILLI with  "CPAP  RLS  Parkinson's disease  GERD  Urinary Retention  S/P cystoscopy and ureter dilation, March 2018.  Dr. Terrence Mckenzie   Surgeries:  Rotator cuff repair  Cholecystectomy  Bilateral knee replacement  Tubal ligation  Breast surgery  Sinus surgery  Left shoulder replacement 10/24/2022   echo 1/26/24  Interpretation Summary         Left ventricular systolic function is normal. Estimated left ventricular EF = 55%    Left ventricular wall thickness is consistent with borderline concentric hypertrophy.    Left ventricular diastolic function is consistent with (grade I) impaired relaxation.    Calculated right ventricular systolic pressure from tricuspid regurgitation is 41 mmHg.    Mild mitral valve regurgitation is present.    Mild tricuspid valve regurgitation is present.    No evidence of amyloidosis by echocardiographic criteria       Allergies   Allergen Reactions    Amlodipine Besylate Swelling     Lower extremity (ankles, feet) swelling    Entacapone Other (See Comments)     \"extreme weakness in legs - caused several falls, which stopped after discontinuing this medication\"    Epinephrine Other (See Comments)     6/4/16- had 3 shots to numb mouth to prepare teeth for crowns, the shots contained epi-  Caused pt to have chest discomfort- went to hospital in ambulance, discovered had a fib while there     Levemir [Insulin Detemir] Hives     Hives / rash around injection site    Penicillins Hives     Jitteriness     Xarelto [Rivaroxaban] GI Bleeding     hgb dropped to 5.2    Hydrocodone Unknown - High Severity     Headache, nausea, dizziness, & vomiting    Aricept [Donepezil Hcl] Nausea Only     Vivid dreams    Benztropine Mesylate Other (See Comments)     Uncontrollable body movements    Cogentin [Benztropine] Other (See Comments)     \"uncontrollable body movements\"    Compazine [Prochlorperazine Edisylate] Other (See Comments)     Dystonic reaction    Duraprep [Antiseptic Products, Misc.] Itching and Rash     " RASH AND ITCHING    Haldol [Haloperidol Lactate] Other (See Comments)     Dystonic reaction    Hydralazine Other (See Comments)     Headache     Lisinopril Cough    Statins Myalgia     Leg pain- all statins     Sulfamethoxazole Nausea Only and Other (See Comments)     Nausea & headaches    Tarka [Trandolapril-Verapamil Hcl Er] Other (See Comments)     Constipation     Toprol Xl [Metoprolol Tartrate] Other (See Comments)     Extreme fatigue, decreased exercise tolerance    Trimethoprim Other (See Comments)     Other reaction(s): Nausea       Past Surgical History:   Procedure Laterality Date    ABLATION OF DYSRHYTHMIC FOCUS  05/16/13    Laser Ablation - Rt Leg    BACK SURGERY      l4-l5 laminectomy     BICEPS TENDON REPAIR Right     shoulder    BREAST BIOPSY Left 05/2004    excisional, benign    BRONCHOSCOPY RIGID / FLEXIBLE      2016    BUNIONECTOMY Right     CARDIAC CATHETERIZATION      CARDIAC CATHETERIZATION N/A 02/01/2019    Procedure: Left Heart Cath;  Surgeon: Albertina Corona MD;  Location:  [email or web address] CATH INVASIVE LOCATION;  Service: Cardiology    CARDIAC ELECTROPHYSIOLOGY PROCEDURE N/A 01/26/2024    Procedure: Lead Revision RA or RV, PPM or ICD;  Surgeon: Lauro Francois MD;  Location:  [email or web address] EP INVASIVE LOCATION;  Service: Cardiovascular;  Laterality: N/A;    CHOLECYSTECTOMY      COLONOSCOPY  2016    COLONOSCOPY N/A 06/17/2021    Procedure: COLONOSCOPY WITH POLYPECTOMY;  Surgeon: Trenton Sosa MD;  Location:  [email or web address] ENDOSCOPY;  Service: Gastroenterology;  Laterality: N/A;    CORONARY STENT PLACEMENT      x1 stent    CYST REMOVAL      left ear, upper left back 2001    CYSTOSCOPY      x2  18 and 20 -   in 20 urethra dilation     DIAGNOSTIC LAPAROSCOPY  1981    ENDOSCOPIC FUNCTIONAL SINUS SURGERY (FESS)  2011    ENDOSCOPY  2016    ENTEROSCOPY VIA STOMA      with single ballon with fluoro     HAMMER TOE REPAIR Left     INCISION AND DRAINAGE OF WOUND  2018    back with wound infection      INSERT / REPLACE / REMOVE PACEMAKER  11/10/16    [facility]    JOINT REPLACEMENT      KNEE ARTHROSCOPY      LASER ABLATION      right leg 13    LIPOMA EXCISION  1999    left leg     LUMBAR LAMINECTOMY DISCECTOMY DECOMPRESSION N/A 07/06/2018    Procedure: LUMBAR LAMINECTOMY L4-5, HEMILAMIINECTOMY RIGHT L5-S1, FORAMINOTOMY L5-S1;  Surgeon: Lencho Gamino MD;  Location: [facility] OR;  Service: Neurosurgery    LUMBAR LAMINECTOMY DISCECTOMY DECOMPRESSION N/A 09/19/2018    Procedure: INCISION AND DRAINAGE BACK WITH WOUND EXPLORATION;  Surgeon: Ritchie García MD;  Location: [facility] OR;  Service: Neurosurgery    LUNG BIOPSY Left 2016    OTHER SURGICAL HISTORY      ct scan of chest and sinuses and lower back     OTHER SURGICAL HISTORY      various echos     OTHER SURGICAL HISTORY      electroencephalogram 16,   emg  ncv tests 2007    OTHER SURGICAL HISTORY      mra 2007  and various mri with xrays, nuclear medicine lung ventilation with perfusion test 2016 with pft     OTHER SURGICAL HISTORY      barium swallow testing 15, 12, 12    OTHER SURGICAL HISTORY      vaginal ultrasound- 2012,   vas clementina lower extrem 2016, vas venous duplex lower extrem bilat 2019    OTHER SURGICAL HISTORY      wdge biopsy spring of lung     OTHER SURGICAL HISTORY      emergent intubation- hypoxic resp failure     OTHER SURGICAL HISTORY      01/26/2024 ppm generator change and lead revision per Dr. Francois    PACEMAKER IMPLANTATION  11/2016    sss    REPLACEMENT TOTAL KNEE Bilateral     left knee 2011, right 2012 per dr acuna     REPLACEMENT TOTAL KNEE Bilateral     SHOULDER ARTHROSCOPY Bilateral     2003-left, 2004- right     SKIN BIOPSY      skin cancer back 2016    SKIN CANCER EXCISION      upper righ tback     MADHAV      TEETH EXTRACTION      x2    TOTAL SHOULDER ARTHROPLASTY W/ DISTAL CLAVICLE EXCISION Left 10/24/2022    Procedure: REVERSE TOTAL SHOULDER ARTHROPLASTY, BICEPS TENODESIS - LEFT;  Surgeon: Sammy Yo MD;  Location: [facility]  OR;  Service: Orthopedics;  Laterality: Left;    TOTAL SHOULDER ARTHROPLASTY W/ DISTAL CLAVICLE EXCISION Right 2023    Procedure: REVERSE TOTAL SHOULDER  ARTHROPLASTY WITH BICEPS TENODESIS - RIGHT;  Surgeon: Sammy Yo MD;  Location: [facility] OR;  Service: Orthopedics;  Laterality: Right;    TUBAL ABDOMINAL LIGATION Bilateral     WISDOM TOOTH EXTRACTION         Family History   Problem Relation Age of Onset    Kidney disease Mother     Coronary artery disease Mother     Hypertension Mother             Heart disease Mother             Hyperlipidemia Mother             Coronary artery disease Father     Hypertension Father             Hypothyroidism Father     Cancer Father         Oral cancer    Heart disease Father             Coronary artery disease Brother     Hypertension Brother     Heart disease Brother         Multiple stents, by-pass surgery    Testicular cancer Brother     Kidney cancer Maternal Uncle     Testicular cancer Maternal Uncle     Colon polyps Neg Hx     Colon cancer Neg Hx        Social History     Socioeconomic History    Marital status:      Spouse name: N/A    Number of children: 4    Years of education: College    Highest education level: Master's degree (e.g., MA, MS, Randi, MEd, MSW, NELLA)   Tobacco Use    Smoking status: Never     Passive exposure: Past    Smokeless tobacco: Never    Tobacco comments:     Father and mother smoked for several years.   Vaping Use    Vaping status: Never Used    Passive vaping exposure: Yes   Substance and Sexual Activity    Alcohol use: Never    Drug use: No    Sexual activity: Not Currently     Partners: Male     Birth control/protection: Post-menopausal, Tubal ligation, Vaginal insert contraception           Objective   Physical Exam  Vitals and nursing note reviewed.   Constitutional:       Comments: This is a pleasant 73-year-old female alert and oriented GCS is 15 vital signs are noted she  is able to speak in complete sentences but she does get a little dyspneic when she does so.   HENT:      Head: Normocephalic.      Comments: She has bruising on her face from her previous fall.     Right Ear: External ear normal.      Left Ear: External ear normal.      Nose: Nose normal.      Mouth/Throat:      Mouth: Mucous membranes are moist.      Pharynx: Oropharynx is clear.   Eyes:      Extraocular Movements: Extraocular movements intact.      Conjunctiva/sclera: Conjunctivae normal.      Pupils: Pupils are equal, round, and reactive to light.   Neck:      Vascular: No carotid bruit.   Cardiovascular:      Rate and Rhythm: Normal rate and regular rhythm.      Pulses: Normal pulses.      Comments: Think she has little bit of an S4.  Pulmonary:      Comments: Decreased breath sounds bilaterally.  Abdominal:      Comments: BMI 41 positive bowel sounds soft and nontender no organomegaly, masses, or guarding.   Musculoskeletal:      Cervical back: Normal range of motion and neck supple. No rigidity or tenderness.      Comments: Right upper extremity in a sling a custom sling good pulses she is tender in the right posterior shoulder.    Lower extremities she essentially has pitting edema up to her thighs.  Some venous stasis changes but no overt cellulitis.  3 of 4 pulses in the feet no tissue loss.   Lymphadenopathy:      Cervical: No cervical adenopathy.   Skin:     Capillary Refill: Capillary refill takes less than 2 seconds.   Neurological:      Mental Status: She is alert.      Comments: Face symmetric voice soft tongue midline.  Vision, hearing, and speech preserved.  She is modestly stiff.         Procedures           ED Course                     Recent Results (from the past 24 hour(s))   ECG 12 Lead ED Triage Standing Order; SOA    Collection Time: 06/02/24  7:02 AM   Result Value Ref Range    QT Interval 386 ms    QTC Interval 459 ms   Comprehensive Metabolic Panel    Collection Time: 06/02/24  7:29 AM     Specimen: Blood   Result Value Ref Range    Glucose 149 (H) 65 - 99 mg/dL    BUN 43 (H) 8 - 23 mg/dL    Creatinine 1.64 (H) 0.57 - 1.00 mg/dL    Sodium 139 136 - 145 mmol/L    Potassium 4.2 3.5 - 5.2 mmol/L    Chloride 103 98 - 107 mmol/L    CO2 24.0 22.0 - 29.0 mmol/L    Calcium 9.1 8.6 - 10.5 mg/dL    Total Protein 6.2 6.0 - 8.5 g/dL    Albumin 4.1 3.5 - 5.2 g/dL    ALT (SGPT) 8 1 - 33 U/L    AST (SGOT) 21 1 - 32 U/L    Alkaline Phosphatase 78 39 - 117 U/L    Total Bilirubin 0.3 0.0 - 1.2 mg/dL    Globulin 2.1 gm/dL    A/G Ratio 2.0 g/dL    BUN/Creatinine Ratio 26.2 (H) 7.0 - 25.0    Anion Gap 12.0 5.0 - 15.0 mmol/L    eGFR 32.5 (L) >60.0 mL/min/1.73   BNP    Collection Time: 06/02/24  7:29 AM    Specimen: Blood   Result Value Ref Range    proBNP 2,206.0 (H) 0.0 - 1,800.0 pg/mL   Single High Sensitivity Troponin T    Collection Time: 06/02/24  7:29 AM    Specimen: Blood   Result Value Ref Range    HS Troponin T 49 (H) <14 ng/L   Green Top (Gel)    Collection Time: 06/02/24  7:29 AM   Result Value Ref Range    Extra Tube Hold for add-ons.    Lavender Top    Collection Time: 06/02/24  7:29 AM   Result Value Ref Range    Extra Tube hold for add-on    Gold Top - SST    Collection Time: 06/02/24  7:29 AM   Result Value Ref Range    Extra Tube Hold for add-ons.    Gray Top    Collection Time: 06/02/24  7:29 AM   Result Value Ref Range    Extra Tube Hold for add-ons.    Light Blue Top    Collection Time: 06/02/24  7:29 AM   Result Value Ref Range    Extra Tube Hold for add-ons.    CBC Auto Differential    Collection Time: 06/02/24  7:29 AM    Specimen: Blood   Result Value Ref Range    WBC 5.60 3.40 - 10.80 10*3/mm3    RBC 3.22 (L) 3.77 - 5.28 10*6/mm3    Hemoglobin 10.5 (L) 12.0 - 15.9 g/dL    Hematocrit 32.6 (L) 34.0 - 46.6 %    .2 (H) 79.0 - 97.0 fL    MCH 32.6 26.6 - 33.0 pg    MCHC 32.2 31.5 - 35.7 g/dL    RDW 13.2 12.3 - 15.4 %    RDW-SD 49.5 37.0 - 54.0 fl    MPV 9.6 6.0 - 12.0 fL    Platelets 121 (L)  140 - 450 10*3/mm3    Neutrophil % 67.4 42.7 - 76.0 %    Lymphocyte % 11.4 (L) 19.6 - 45.3 %    Monocyte % 9.6 5.0 - 12.0 %    Eosinophil % 10.2 (H) 0.3 - 6.2 %    Basophil % 0.9 0.0 - 1.5 %    Immature Grans % 0.5 0.0 - 0.5 %    Neutrophils, Absolute 3.77 1.70 - 7.00 10*3/mm3    Lymphocytes, Absolute 0.64 (L) 0.70 - 3.10 10*3/mm3    Monocytes, Absolute 0.54 0.10 - 0.90 10*3/mm3    Eosinophils, Absolute 0.57 (H) 0.00 - 0.40 10*3/mm3    Basophils, Absolute 0.05 0.00 - 0.20 10*3/mm3    Immature Grans, Absolute 0.03 0.00 - 0.05 10*3/mm3    nRBC 0.0 0.0 - 0.2 /100 WBC   TSH    Collection Time: 06/02/24  7:29 AM    Specimen: Blood   Result Value Ref Range    TSH 4.050 0.270 - 4.200 uIU/mL   Magnesium    Collection Time: 06/02/24  7:29 AM    Specimen: Blood   Result Value Ref Range    Magnesium 2.1 1.6 - 2.4 mg/dL   Hemoglobin A1c    Collection Time: 06/02/24  7:29 AM    Specimen: Blood   Result Value Ref Range    Hemoglobin A1C 5.90 (H) 4.80 - 5.60 %   POC Glucose Once    Collection Time: 06/02/24 12:29 PM    Specimen: Blood   Result Value Ref Range    Glucose 149 (H) 70 - 130 mg/dL   High Sensitivity Troponin T    Collection Time: 06/02/24  1:06 PM    Specimen: Blood   Result Value Ref Range    HS Troponin T 42 (H) <14 ng/L     Note: In addition to lab results from this visit, the labs listed above may include labs taken at another facility or during a different encounter within the last 24 hours. Please correlate lab times with ED admission and discharge times for further clarification of the services performed during this visit.    XR Chest 1 View   Final Result   Impression:   No acute cardiopulmonary process.         Electronically Signed: Lelia Barrientos MD     6/2/2024 7:42 AM EDT     Workstation ID: [id number]        Vitals:    06/02/24 0742 06/02/24 0743 06/02/24 0758 06/02/24 1030   BP: 113/77 121/60 130/67 114/82   BP Location:    Left arm   Patient Position:    Lying   Pulse: 71 73 66 67   Resp:    18   Temp:     97.3 °F (36.3 °C)   TempSrc:    Oral   SpO2:  99% 98% 100%   Weight:       Height:         Medications   sodium chloride 0.9 % flush 10 mL (has no administration in time range)   albuterol (PROVENTIL) nebulizer solution 0.083% 2.5 mg/3mL (has no administration in time range)   amantadine (SYMMETREL) capsule 100 mg (100 mg Oral Given 6/2/24 1238)   apixaban (ELIQUIS) tablet 5 mg (5 mg Oral Given 6/2/24 1239)   carbidopa-levodopa (SINEMET)  MG per tablet 2 tablet (has no administration in time range)   sacubitril-valsartan (ENTRESTO) 24-26 MG tablet 1 tablet ( Oral Dose Auto Held 6/10/24 2100)   hydroxychloroquine (PLAQUENIL) tablet 200 mg (200 mg Oral Given 6/2/24 1239)   ipratropium (ATROVENT) nasal spray 0.03% (has no administration in time range)   pramipexole (MIRAPEX) tablet 1.5 mg (has no administration in time range)   spironolactone (ALDACTONE) tablet 25 mg ( Oral Dose Auto Held 6/10/24 2100)   levothyroxine (SYNTHROID, LEVOTHROID) tablet 175 mcg (has no administration in time range)   sodium chloride 0.9 % flush 10 mL (10 mL Intravenous Given 6/2/24 1240)   sodium chloride 0.9 % flush 10 mL (has no administration in time range)   sodium chloride 0.9 % infusion 40 mL (has no administration in time range)   acetaminophen (TYLENOL) tablet 650 mg (has no administration in time range)   ondansetron ODT (ZOFRAN-ODT) disintegrating tablet 4 mg (has no administration in time range)     Or   ondansetron (ZOFRAN) injection 4 mg (has no administration in time range)   bumetanide (BUMEX) injection 2 mg (has no administration in time range)   dextrose (GLUTOSE) oral gel 15 g (has no administration in time range)   dextrose (D50W) (25 g/50 mL) IV injection 25 g (has no administration in time range)   glucagon (GLUCAGEN) injection 1 mg (has no administration in time range)   Insulin Lispro (humaLOG) injection 2-7 Units ( Subcutaneous Not Given 6/2/24 4673)   carbidopa-levodopa (SINEMET)  MG per tablet 1 tablet  (1 tablet Oral Given 6/2/24 1238)   hydrOXYzine (ATARAX) tablet 25 mg (has no administration in time range)   aspirin EC tablet 81 mg (has no administration in time range)   citalopram (CeleXA) tablet 20 mg (has no administration in time range)   ranolazine (RANEXA) 12 hr tablet 1,000 mg (has no administration in time range)   pantoprazole (PROTONIX) EC tablet 40 mg (has no administration in time range)   senna (SENOKOT) tablet 1 tablet (has no administration in time range)   sennosides-docusate (PERICOLACE) 8.6-50 MG per tablet 2 tablet (has no administration in time range)     And   polyethylene glycol (MIRALAX) packet 17 g (has no administration in time range)     And   bisacodyl (DULCOLAX) EC tablet 5 mg (has no administration in time range)     And   bisacodyl (DULCOLAX) suppository 10 mg (has no administration in time range)   bumetanide (BUMEX) injection 2 mg (2 mg Intravenous Given 6/2/24 0742)     ECG/EMG Results (last 24 hours)       Procedure Component Value Units Date/Time    ECG 12 Lead ED Triage Standing Order; SOA [[id number]] Collected: 06/02/24 0702     Updated: 06/02/24 0703     QT Interval 386 ms      QTC Interval 459 ms     Narrative:      Test Reason : ED Triage Standing Order~  Blood Pressure :   */*   mmHG  Vent. Rate :  85 BPM     Atrial Rate :  56 BPM     P-R Int :   * ms          QRS Dur :  90 ms      QT Int : 386 ms       P-R-T Axes :   *  18  95 degrees     QTc Int : 459 ms    Atrial fibrillation  Anterior infarct , age undetermined  Abnormal ECG  When compared with ECG of 11-SEP-2023 15:00,  Atrial fibrillation has replaced Electronic atrial pacemaker  Nonspecific T wave abnormality now evident in Lateral leads    Referred By: EDMD           Confirmed By:           ECG 12 Lead ED Triage Standing Order; SOA   Final Result   Test Reason : ED Triage Standing Order~   Blood Pressure :   */*   mmHG   Vent. Rate :  85 BPM     Atrial Rate :  56 BPM      P-R Int :   * ms          QRS Dur :  90 ms        QT Int : 386 ms       P-R-T Axes :   *  18  95 degrees      QTc Int : 459 ms      Atrial fibrillation   Anterior infarct , age undetermined   Abnormal ECG   When compared with ECG of 11-SEP-2023 15:00,   Atrial fibrillation has replaced Electronic atrial pacemaker   Nonspecific T wave abnormality now evident in Lateral leads   Confirmed by DAY CEE, ANIA ([id number]) on 6/2/2024 7:27:48 AM      Referred By: EDMD           Confirmed By: ANIA BERNSTEIN MD                                  Medical Decision Making      I reviewed all available studies at the bedside with the patient and her family.  She has worsening heart failure based on her symptoms and BNP.  She does not have fluid buildup on her chest x-ray.  Likely this is being exacerbated by her worsening renal function.  She has chronic macrocytic anemia her glucose is little bit elevated.    Have given her IV Bumex here and she is starting to have some urine output from it.    Opponent also elevated I think unlikely to represent an acute cardiac event but more likely in demand imbalance.    I contacted Dr. Mcelroy on-call for Dr. Teague and he and his colleagues will see the patient in consultation but given her multitude of medical problems is probably best served by being admitted to the hospitalist service I contact Dr. Villanueva and her colleagues who graciously agreed to admit the patient.      Problems Addressed:  Acute on chronic diastolic congestive heart failure: complicated acute illness or injury with systemic symptoms that poses a threat to life or bodily functions  Acute renal failure superimposed on stage 3b chronic kidney disease, unspecified acute renal failure type: complicated acute illness or injury with systemic symptoms that poses a threat to life or bodily functions  Decreased ambulation status: complicated acute illness or injury with systemic symptoms that poses a threat to life or bodily functions  Elevated glucose: complicated acute  illness or injury  Macrocytic anemia: chronic illness or injury  Troponin level elevated: complicated acute illness or injury with systemic symptoms that poses a threat to life or bodily functions    Amount and/or Complexity of Data Reviewed  Independent Historian: caregiver  External Data Reviewed: labs, radiology, ECG and notes.  Labs: ordered. Decision-making details documented in ED Course.  Radiology: ordered and independent interpretation performed. Decision-making details documented in ED Course.  ECG/medicine tests: ordered.    Risk  Prescription drug management.  Decision regarding hospitalization.        Final diagnoses:   Acute on chronic diastolic congestive heart failure   Acute renal failure superimposed on stage 3b chronic kidney disease, unspecified acute renal failure type   Macrocytic anemia   Elevated glucose   Decreased ambulation status   Troponin level elevated       ED Disposition  ED Disposition       ED Disposition   Decision to Admit    Condition   --    Comment   Level of Care: Telemetry [5]   Diagnosis: CHF (congestive heart failure) [300104]   Admitting Physician: SKYLER GRAF [[id number]]   Attending Physician: SKYLER GRAF [[id number]]                 No follow-up provider specified.       Medication List      No changes were made to your prescriptions during this visit.            Matt Salmon MD  06/02/24 8932

## 2024-06-02 NOTE — H&P
[facility] Medicine Services  HISTORY AND PHYSICAL    Patient Name: Joanna Cross  : 1950  MRN: [id number]  Primary Care Physician: Reynaldo Fritz MD  Date of admission: 2024      Subjective   Subjective     Chief Complaint:  Soa, le swelling    HPI:  Joanna Cross is a 73 y.o. female with h/o with HFpEF, CKD 3, MILLI on bipap qhs, T2DM, afib on eliquis with increased SOA and increased LE edema.  Reports nonproductive cough.  No fever.        Personal History     Past Medical History:   Diagnosis Date   • Allergic rhinitis    • Anemia    • Ankle problem     thinks back related causing pain    • Atrial fibrillation    • AVM (arteriovenous malformation)    • Back pain    • CHF (congestive heart failure) 2024   • Chronic kidney disease     stage 3 per pt   • Chronic lung disease 2022   • CKD (chronic kidney disease)    • Clotting disorder     AVM - small intestine   • Coronary artery disease involving native coronary artery without angina pectoris 2017   • COVID-19 vaccine series completed    • Cystic fibrosis    • Diastolic dysfunction    • Gastrocnemius muscle tear     left medial 91   • Generalized osteoarthritis    • GERD (gastroesophageal reflux disease)    • Gestational diabetes    • GIB (gastrointestinal bleeding) 2016    d/t xarelto    • Headache    • Hearing decreased, bilateral     HAS HEARING AID, NOT WEARING IT CURRENTLY   • Hiatal hernia    • History of shingles    • History of transfusion 2016    no reaction recalled    • Hyperlipidemia LDL goal <70 2017   • Hypertension    • Hypothyroidism    • IBS (irritable bowel syndrome)    • Klebsiella pneumonia    • Lichen sclerosus    • Lupus     subQ   • Mouth problem     mouth guard used since pt bites tongue and lips excessively at night if not- with bipap    • MRSA infection    • Myocardial infarction    • Obesity    • On home oxygen therapy     2L of oxygen all the time due to  current congestion    • Osteoporosis    • Parkinson's disease    • Peripheral vascular disease    • Pleurisy    • Pneumonia    • Puerperal sepsis with acute hypoxic respiratory failure     emergent intubation- 2016   • Pulmonary embolism    • Right leg pain     from back issues    • Salivary gland stone    • Sciatic nerve pain    • Seborrheic dermatitis    • Skin cancer     on back    • Sleep apnea     CPAP AND HOME O2 2L/M   • Sleep apnea, obstructive 04/15/01   • TIA (transient ischemic attack) 2014    no residual effects   • Type 2 diabetes mellitus    • UTI (urinary tract infection)    • Vitamin D deficiency 09/08/2022   • Wears glasses            Past Surgical History:   Procedure Laterality Date   • ABLATION OF DYSRHYTHMIC FOCUS  05/16/13    Laser Ablation - Rt Leg   • BACK SURGERY      l4-l5 laminectomy    • BICEPS TENDON REPAIR Right     shoulder   • BREAST BIOPSY Left 05/2004    excisional, benign   • BRONCHOSCOPY RIGID / FLEXIBLE      2016   • BUNIONECTOMY Right    • CARDIAC CATHETERIZATION     • CARDIAC CATHETERIZATION N/A 02/01/2019    Procedure: Left Heart Cath;  Surgeon: Albertina Corona MD;  Location:  [facility];  Service: Cardiology   • CARDIAC ELECTROPHYSIOLOGY PROCEDURE N/A 01/26/2024    Procedure: Lead Revision RA or RV, PPM or ICD;  Surgeon: Lauro Francois MD;  Location:  [facility] EP INVASIVE LOCATION;  Service: Cardiovascular;  Laterality: N/A;   • CHOLECYSTECTOMY     • COLONOSCOPY  2016   • COLONOSCOPY N/A 06/17/2021    Procedure: COLONOSCOPY WITH POLYPECTOMY;  Surgeon: Trenton Sosa MD;  Location:  [facility];  Service: Gastroenterology;  Laterality: N/A;   • CORONARY STENT PLACEMENT      x1 stent   • CYST REMOVAL      left ear, upper left back 2001   • CYSTOSCOPY      x2  18 and 20 -   in 20 urethra dilation    • DIAGNOSTIC LAPAROSCOPY  1981   • ENDOSCOPIC FUNCTIONAL SINUS SURGERY (FESS)  2011   • ENDOSCOPY  2016   • ENTEROSCOPY VIA STOMA      with  single ballon with fluoro    • HAMMER TOE REPAIR Left    • INCISION AND DRAINAGE OF WOUND  2018    back with wound infection    • INSERT / REPLACE / REMOVE PACEMAKER  11/10/16    [facility]   • JOINT REPLACEMENT     • KNEE ARTHROSCOPY     • LASER ABLATION      right leg 13   • LIPOMA EXCISION  1999    left leg    • LUMBAR LAMINECTOMY DISCECTOMY DECOMPRESSION N/A 07/06/2018    Procedure: LUMBAR LAMINECTOMY L4-5, HEMILAMIINECTOMY RIGHT L5-S1, FORAMINOTOMY L5-S1;  Surgeon: Lencho Gamino MD;  Location:  [facility] OR;  Service: Neurosurgery   • LUMBAR LAMINECTOMY DISCECTOMY DECOMPRESSION N/A 09/19/2018    Procedure: INCISION AND DRAINAGE BACK WITH WOUND EXPLORATION;  Surgeon: Ritchie García MD;  Location:  [facility] OR;  Service: Neurosurgery   • LUNG BIOPSY Left 2016   • OTHER SURGICAL HISTORY      ct scan of chest and sinuses and lower back    • OTHER SURGICAL HISTORY      various echos    • OTHER SURGICAL HISTORY      electroencephalogram 16,   emg  ncv tests 2007   • OTHER SURGICAL HISTORY      mra 2007  and various mri with xrays, nuclear medicine lung ventilation with perfusion test 2016 with pft    • OTHER SURGICAL HISTORY      barium swallow testing 15, 12, 12   • OTHER SURGICAL HISTORY      vaginal ultrasound- 2012,   vas clementina lower extrem 2016, vas venous duplex lower extrem bilat 2019   • OTHER SURGICAL HISTORY      wdge biopsy spring of lung    • OTHER SURGICAL HISTORY      emergent intubation- hypoxic resp failure    • OTHER SURGICAL HISTORY      01/26/2024 ppm generator change and lead revision per Dr. Francois   • PACEMAKER IMPLANTATION  11/2016    sss   • REPLACEMENT TOTAL KNEE Bilateral     left knee 2011, right 2012 per dr acuna    • REPLACEMENT TOTAL KNEE Bilateral    • SHOULDER ARTHROSCOPY Bilateral     2003-left, 2004- right    • SKIN BIOPSY      skin cancer back 2016   • SKIN CANCER EXCISION      upper rig tback    • MADHAV     • TEETH EXTRACTION      x2   • TOTAL SHOULDER ARTHROPLASTY W/ DISTAL CLAVICLE  EXCISION Left 10/24/2022    Procedure: REVERSE TOTAL SHOULDER ARTHROPLASTY, BICEPS TENODESIS - LEFT;  Surgeon: Sammy Yo MD;  Location: [facility] OR;  Service: Orthopedics;  Laterality: Left;   • TOTAL SHOULDER ARTHROPLASTY W/ DISTAL CLAVICLE EXCISION Right 09/18/2023    Procedure: REVERSE TOTAL SHOULDER  ARTHROPLASTY WITH BICEPS TENODESIS - RIGHT;  Surgeon: Sammy Yo MD;  Location: [facility] OR;  Service: Orthopedics;  Laterality: Right;   • TUBAL ABDOMINAL LIGATION Bilateral 1980   • WISDOM TOOTH EXTRACTION         Family History: family history includes Cancer in her father; Coronary artery disease in her brother, father, and mother; Heart disease in her brother, father, and mother; Hyperlipidemia in her mother; Hypertension in her brother, father, and mother; Hypothyroidism in her father; Kidney cancer in her maternal uncle; Kidney disease in her mother; Testicular cancer in her brother and maternal uncle.     Social History:  reports that she has never smoked. She has been exposed to tobacco smoke. She has never used smokeless tobacco. She reports that she does not drink alcohol and does not use drugs.  Social History     Social History Narrative    Mrs. Cross lives at home. She has 4 children. One son and daughter live closest to her.        Medications:  Available home medication information reviewed.  Accu-Chek Guide, Accu-Chek Softclix Lancets, B Complex-C, Calcium Carbonate, Dapagliflozin Propanediol, Emollient, Glucosamine-Chondroit-Calcium, Insulin Glargine, Insulin Pen Needle, Loratadine, O2, Triamcinolone Acetonide, Zinc, albuterol sulfate HFA, amantadine, apixaban, aspirin, bumetanide, carbidopa-levodopa, cholecalciferol, citalopram, clobetasol propionate, dofetilide, glucose blood, hydroxychloroquine, ipratropium, levothyroxine, multivitamin with minerals, nitroglycerin, omeprazole, pramipexole, ranolazine, sacubitril-valsartan, senna, spironolactone, traMADol, triamcinolone, and  "vitamin C    Allergies   Allergen Reactions   • Amlodipine Besylate Swelling     Lower extremity (ankles, feet) swelling   • Entacapone Other (See Comments)     \"extreme weakness in legs - caused several falls, which stopped after discontinuing this medication\"   • Epinephrine Other (See Comments)     6/4/16- had 3 shots to numb mouth to prepare teeth for crowns, the shots contained epi-  Caused pt to have chest discomfort- went to hospital in ambulance, discovered had a fib while there    • Levemir [Insulin Detemir] Hives     Hives / rash around injection site   • Penicillins Hives     Jitteriness    • Xarelto [Rivaroxaban] GI Bleeding     hgb dropped to 5.2   • Hydrocodone Unknown - High Severity     Headache, nausea, dizziness, & vomiting   • Aricept [Donepezil Hcl] Nausea Only     Vivid dreams   • Benztropine Mesylate Other (See Comments)     Uncontrollable body movements   • Cogentin [Benztropine] Other (See Comments)     \"uncontrollable body movements\"   • Compazine [Prochlorperazine Edisylate] Other (See Comments)     Dystonic reaction   • Duraprep [Antiseptic Products, Misc.] Itching and Rash     RASH AND ITCHING   • Haldol [Haloperidol Lactate] Other (See Comments)     Dystonic reaction   • Hydralazine Other (See Comments)     Headache    • Lisinopril Cough   • Statins Myalgia     Leg pain- all statins    • Sulfamethoxazole Nausea Only and Other (See Comments)     Nausea & headaches   • Tarka [Trandolapril-Verapamil Hcl Er] Other (See Comments)     Constipation    • Toprol Xl [Metoprolol Tartrate] Other (See Comments)     Extreme fatigue, decreased exercise tolerance   • Trimethoprim Other (See Comments)     Other reaction(s): Nausea       Objective   Objective     Vital Signs:   Temp:  [97.3 °F (36.3 °C)-97.9 °F (36.6 °C)] 97.3 °F (36.3 °C)  Heart Rate:  [66-90] 67  Resp:  [18] 18  BP: (113-130)/(60-82) 114/82  Flow (L/min):  [2] 2       Physical Exam   Constitutional: Awake, alert  Eyes: PERRLA, sclerae " anicteric, no conjunctival injection  HENT: NCAT, mucous membranes moist  Neck: Supple, no thyromegaly, no lymphadenopathy, trachea midline  Respiratory: Clear to auscultation bilaterally, nonlabored respirations   Cardiovascular: RRR, no murmurs, rubs, or gallops, palpable pedal pulses bilaterally  Gastrointestinal: Positive bowel sounds, soft, nontender, nondistended  Musculoskeletal: 2-3+pitting edema with erythema distal LE's, RUE in sling  Psychiatric: Appropriate affect, cooperative  Neurologic: Oriented x 3, strength symmetric in all extremities, Cranial Nerves grossly intact to confrontation, speech clear  Skin: No rashes      Result Review:  I have personally reviewed the results from the time of this admission to 6/2/2024 11:41 EDT and agree with these findings:  [x]  Laboratory list / accordion  [x]  Microbiology  [x]  Radiology  [x]  EKG/Telemetry   []  Cardiology/Vascular   []  Pathology  []  Old records  []  Other:  Most notable findings include:       LAB RESULTS:      Lab 06/02/24  0729   WBC 5.60   HEMOGLOBIN 10.5*   HEMATOCRIT 32.6*   PLATELETS 121*   NEUTROS ABS 3.77   IMMATURE GRANS (ABS) 0.03   LYMPHS ABS 0.64*   MONOS ABS 0.54   EOS ABS 0.57*   .2*         Lab 06/02/24  0729   SODIUM 139   POTASSIUM 4.2   CHLORIDE 103   CO2 24.0   ANION GAP 12.0   BUN 43*   CREATININE 1.64*   EGFR 32.5*   GLUCOSE 149*   CALCIUM 9.1   MAGNESIUM 2.1   TSH 4.050         Lab 06/02/24  0729   TOTAL PROTEIN 6.2   ALBUMIN 4.1   GLOBULIN 2.1   ALT (SGPT) 8   AST (SGOT) 21   BILIRUBIN 0.3   ALK PHOS 78         Lab 06/02/24  0729   PROBNP 2,206.0*   HSTROP T 49*                     Microbiology Results (last 10 days)       ** No results found for the last 240 hours. **            XR Chest 1 View    Result Date: 6/2/2024  XR CHEST 1 VW Date of Exam: 6/2/2024 7:20 AM EDT Indication: SOA triage protocol Comparison: 1/26/2024. Findings: There are no airspace consolidations. No pleural fluid. No pneumothorax. The  pulmonary vasculature appears within normal limits. The cardiac and mediastinal silhouette appear unremarkable. No acute osseous abnormality identified.     Impression: Impression: No acute cardiopulmonary process. Electronically Signed: Lelia Barrientos MD  6/2/2024 7:42 AM EDT  Workstation ID: [id number]     Results for orders placed during the hospital encounter of 01/26/24    Adult Transthoracic Echo Complete W/ Cont if Necessary Per Protocol    Interpretation Summary  •  Left ventricular systolic function is normal. Estimated left ventricular EF = 55%  •  Left ventricular wall thickness is consistent with borderline concentric hypertrophy.  •  Left ventricular diastolic function is consistent with (grade I) impaired relaxation.  •  Calculated right ventricular systolic pressure from tricuspid regurgitation is 41 mmHg.  •  Mild mitral valve regurgitation is present.  •  Mild tricuspid valve regurgitation is present.  •  No evidence of amyloidosis by echocardiographic criteria      Assessment & Plan   Assessment & Plan       Acute diastolic CHF (congestive heart failure)    Coronary artery disease involving native coronary artery without angina pectoris    Hypertension, essential    Hypothyroidism    Parkinson's disease    MILLI treated with BiPAP - Patient reports compliance.     Atrial fibrillation    Anemia associated with stage 3 chronic renal failure    Type 2 diabetes mellitus with hyperglycemia, without long-term current use of insulin    Chronic kidney disease, stage 3b    Chronic respiratory failure with hypoxia    Jonana Cross is a 73 y.o. female with h/o with HFpEF, CKD 3, MILLI on bipap qhs, T2DM, afib on eliquis with increased SOA and increased LE edema    A/C DHF  Chronic Respiratory Failure, home O2 dependent 2LNC  --bumex 2mg IV given in ER.  Will continue daily  --consult Dr. Teague/cards  --strict I and o  --hold entresto for now d/t hypotension and spironolactone d/t elevated creatinine    CKD  3  --baseline creatinine about 1.2    Afib  --continue home eliquis and tikosyn    Shoulder surgery with Dr. Yo  Recent right scapula fracture  --follows with Dr. Yo, in sling    T2DM  --HbA1C pending  --low dose SSI for now.  Takes lantus 15units daily at home.      Hypothyroid  --TSH wnl  --continue levothyroxine    Anemia of chronic disease d/t CKD  --seen by Dr. Linton  --will check b12/folate since macrocytic    MILLI  --bipap qhs at home          DVT prophylaxis:  Medical and mechanical DVT prophylaxis orders are present.          CODE STATUS:    Code Status and Medical Interventions:   Ordered at: 06/02/24 1127     Level Of Support Discussed With:    Patient     Code Status (Patient has no pulse and is not breathing):    CPR (Attempt to Resuscitate)     Medical Interventions (Patient has pulse or is breathing):    Full Support       Expected Discharge   Expected discharge date/ time has not been documented.     Miguel A Rivas MD  06/02/24

## 2024-06-03 ENCOUNTER — TELEPHONE (OUTPATIENT)
Dept: ONCOLOGY | Facility: CLINIC | Age: 74
End: 2024-06-03
Payer: MEDICARE

## 2024-06-03 DIAGNOSIS — D63.1 ANEMIA ASSOCIATED WITH STAGE 3 CHRONIC RENAL FAILURE: Primary | ICD-10-CM

## 2024-06-03 DIAGNOSIS — N18.30 ANEMIA ASSOCIATED WITH STAGE 3 CHRONIC RENAL FAILURE: Primary | ICD-10-CM

## 2024-06-03 LAB
ALBUMIN SERPL-MCNC: 3.7 G/DL (ref 3.5–5.2)
ANION GAP SERPL CALCULATED.3IONS-SCNC: 8 MMOL/L (ref 5–15)
BUN SERPL-MCNC: 32 MG/DL (ref 8–23)
BUN/CREAT SERPL: 21.8 (ref 7–25)
CALCIUM SPEC-SCNC: 9.2 MG/DL (ref 8.6–10.5)
CHLORIDE SERPL-SCNC: 105 MMOL/L (ref 98–107)
CO2 SERPL-SCNC: 29 MMOL/L (ref 22–29)
CREAT SERPL-MCNC: 1.47 MG/DL (ref 0.57–1)
DEPRECATED RDW RBC AUTO: 46.2 FL (ref 37–54)
EGFRCR SERPLBLD CKD-EPI 2021: 37.1 ML/MIN/1.73
ERYTHROCYTE [DISTWIDTH] IN BLOOD BY AUTOMATED COUNT: 12.9 % (ref 12.3–15.4)
FERRITIN SERPL-MCNC: 597.8 NG/ML (ref 13–150)
FOLATE SERPL-MCNC: >20 NG/ML (ref 4.78–24.2)
GLUCOSE BLDC GLUCOMTR-MCNC: 107 MG/DL (ref 70–130)
GLUCOSE BLDC GLUCOMTR-MCNC: 143 MG/DL (ref 70–130)
GLUCOSE BLDC GLUCOMTR-MCNC: 173 MG/DL (ref 70–130)
GLUCOSE BLDC GLUCOMTR-MCNC: 99 MG/DL (ref 70–130)
GLUCOSE SERPL-MCNC: 76 MG/DL (ref 65–99)
HCT VFR BLD AUTO: 31.2 % (ref 34–46.6)
HGB BLD-MCNC: 10.3 G/DL (ref 12–15.9)
IRON 24H UR-MRATE: 83 MCG/DL (ref 37–145)
IRON SATN MFR SERPL: 30 % (ref 20–50)
MCH RBC QN AUTO: 31.9 PG (ref 26.6–33)
MCHC RBC AUTO-ENTMCNC: 33 G/DL (ref 31.5–35.7)
MCV RBC AUTO: 96.6 FL (ref 79–97)
PHOSPHATE SERPL-MCNC: 4.1 MG/DL (ref 2.5–4.5)
PLATELET # BLD AUTO: 133 10*3/MM3 (ref 140–450)
PMV BLD AUTO: 9.6 FL (ref 6–12)
POTASSIUM SERPL-SCNC: 3.8 MMOL/L (ref 3.5–5.2)
RBC # BLD AUTO: 3.23 10*6/MM3 (ref 3.77–5.28)
SODIUM SERPL-SCNC: 142 MMOL/L (ref 136–145)
TIBC SERPL-MCNC: 279 MCG/DL (ref 298–536)
TRANSFERRIN SERPL-MCNC: 187 MG/DL (ref 200–360)
VIT B12 BLD-MCNC: 765 PG/ML (ref 211–946)
WBC NRBC COR # BLD AUTO: 5.06 10*3/MM3 (ref 3.4–10.8)

## 2024-06-03 PROCEDURE — 84466 ASSAY OF TRANSFERRIN: CPT | Performed by: INTERNAL MEDICINE

## 2024-06-03 PROCEDURE — 82948 REAGENT STRIP/BLOOD GLUCOSE: CPT

## 2024-06-03 PROCEDURE — 82728 ASSAY OF FERRITIN: CPT | Performed by: INTERNAL MEDICINE

## 2024-06-03 PROCEDURE — 97530 THERAPEUTIC ACTIVITIES: CPT

## 2024-06-03 PROCEDURE — 25010000002 BUMETANIDE PER 0.5 MG: Performed by: INTERNAL MEDICINE

## 2024-06-03 PROCEDURE — 63710000001 INSULIN LISPRO (HUMAN) PER 5 UNITS: Performed by: INTERNAL MEDICINE

## 2024-06-03 PROCEDURE — 82607 VITAMIN B-12: CPT | Performed by: INTERNAL MEDICINE

## 2024-06-03 PROCEDURE — 97165 OT EVAL LOW COMPLEX 30 MIN: CPT

## 2024-06-03 PROCEDURE — 85027 COMPLETE CBC AUTOMATED: CPT | Performed by: INTERNAL MEDICINE

## 2024-06-03 PROCEDURE — 97535 SELF CARE MNGMENT TRAINING: CPT

## 2024-06-03 PROCEDURE — 80069 RENAL FUNCTION PANEL: CPT | Performed by: INTERNAL MEDICINE

## 2024-06-03 PROCEDURE — 82746 ASSAY OF FOLIC ACID SERUM: CPT | Performed by: INTERNAL MEDICINE

## 2024-06-03 PROCEDURE — 83540 ASSAY OF IRON: CPT | Performed by: INTERNAL MEDICINE

## 2024-06-03 PROCEDURE — 97161 PT EVAL LOW COMPLEX 20 MIN: CPT

## 2024-06-03 PROCEDURE — 99232 SBSQ HOSP IP/OBS MODERATE 35: CPT | Performed by: STUDENT IN AN ORGANIZED HEALTH CARE EDUCATION/TRAINING PROGRAM

## 2024-06-03 RX ORDER — BUMETANIDE 0.25 MG/ML
1 INJECTION INTRAMUSCULAR; INTRAVENOUS ONCE
Status: DISCONTINUED | OUTPATIENT
Start: 2024-06-03 | End: 2024-06-03

## 2024-06-03 RX ADMIN — CARBIDOPA AND LEVODOPA 1 TABLET: 25; 100 TABLET ORAL at 16:10

## 2024-06-03 RX ADMIN — HYDROXYCHLOROQUINE SULFATE 200 MG: 200 TABLET ORAL at 21:27

## 2024-06-03 RX ADMIN — CARBIDOPA AND LEVODOPA 1 TABLET: 25; 100 TABLET ORAL at 12:56

## 2024-06-03 RX ADMIN — LEVOTHYROXINE SODIUM 175 MCG: 0.17 TABLET ORAL at 05:33

## 2024-06-03 RX ADMIN — APIXABAN 5 MG: 5 TABLET, FILM COATED ORAL at 21:27

## 2024-06-03 RX ADMIN — PANTOPRAZOLE SODIUM 40 MG: 40 TABLET, DELAYED RELEASE ORAL at 18:20

## 2024-06-03 RX ADMIN — AMANTADINE HYDROCHLORIDE 100 MG: 100 CAPSULE ORAL at 09:35

## 2024-06-03 RX ADMIN — Medication 10 ML: at 09:36

## 2024-06-03 RX ADMIN — RANOLAZINE 1000 MG: 500 TABLET, FILM COATED, EXTENDED RELEASE ORAL at 09:35

## 2024-06-03 RX ADMIN — ASPIRIN 81 MG: 81 TABLET, COATED ORAL at 21:27

## 2024-06-03 RX ADMIN — CARBIDOPA AND LEVODOPA 1 TABLET: 25; 100 TABLET ORAL at 21:27

## 2024-06-03 RX ADMIN — APIXABAN 5 MG: 5 TABLET, FILM COATED ORAL at 09:35

## 2024-06-03 RX ADMIN — CITALOPRAM HYDROBROMIDE 20 MG: 20 TABLET ORAL at 21:27

## 2024-06-03 RX ADMIN — PANTOPRAZOLE SODIUM 40 MG: 40 TABLET, DELAYED RELEASE ORAL at 09:35

## 2024-06-03 RX ADMIN — HYDROXYZINE HYDROCHLORIDE 25 MG: 25 TABLET ORAL at 18:22

## 2024-06-03 RX ADMIN — HYDROXYZINE HYDROCHLORIDE 25 MG: 25 TABLET ORAL at 09:35

## 2024-06-03 RX ADMIN — SENNOSIDES AND DOCUSATE SODIUM 2 TABLET: 8.6; 5 TABLET ORAL at 09:35

## 2024-06-03 RX ADMIN — RANOLAZINE 1000 MG: 500 TABLET, FILM COATED, EXTENDED RELEASE ORAL at 21:27

## 2024-06-03 RX ADMIN — INSULIN LISPRO 2 UNITS: 100 INJECTION, SOLUTION INTRAVENOUS; SUBCUTANEOUS at 18:20

## 2024-06-03 RX ADMIN — Medication 10 ML: at 21:29

## 2024-06-03 RX ADMIN — CARBIDOPA AND LEVODOPA 1 TABLET: 25; 100 TABLET ORAL at 09:35

## 2024-06-03 RX ADMIN — AMANTADINE HYDROCHLORIDE 100 MG: 100 CAPSULE ORAL at 21:33

## 2024-06-03 RX ADMIN — HYDROXYCHLOROQUINE SULFATE 200 MG: 200 TABLET ORAL at 09:35

## 2024-06-03 RX ADMIN — CARBIDOPA AND LEVODOPA 2 TABLET: 25; 100 TABLET ORAL at 05:33

## 2024-06-03 RX ADMIN — CARBIDOPA AND LEVODOPA 1 TABLET: 25; 100 TABLET ORAL at 18:20

## 2024-06-03 RX ADMIN — BUMETANIDE 2 MG: 0.25 INJECTION, SOLUTION INTRAMUSCULAR; INTRAVENOUS at 09:36

## 2024-06-03 RX ADMIN — PRAMIPEXOLE DIHYDROCHLORIDE 1.5 MG: 1 TABLET ORAL at 21:26

## 2024-06-03 NOTE — PLAN OF CARE
Goal Outcome Evaluation:  Plan of Care Reviewed With: patient        Progress: no change  Outcome Evaluation: Patient present with impaired strength, balance, activity tolerance and limitations from R scapular fx. with sling limiting ADL independence.  Pt. appropriate  for continued skilled OT services to address deficit areas and promote return to PLOF.  Recommend home with  24/7 assist.      Anticipated Discharge Disposition (OT): home with 24/7 care

## 2024-06-03 NOTE — THERAPY EVALUATION
Patient Name: Joanna Cross  : 1950    MRN: [id number]                              Today's Date: 6/3/2024       Admit Date: 2024    Visit Dx:     ICD-10-CM ICD-9-CM   1. Acute on chronic diastolic congestive heart failure  I50.33 428.33     428.0   2. Acute renal failure superimposed on stage 3b chronic kidney disease, unspecified acute renal failure type  N17.9 584.9    N18.32 585.3   3. Macrocytic anemia  D53.9 281.9   4. Elevated glucose  R73.09 790.29   5. Decreased ambulation status  Z74.09 780.99   6. Troponin level elevated  R79.89 790.6     Patient Active Problem List   Diagnosis    Coronary artery disease involving native coronary artery without angina pectoris    Hypertension, essential    Peripheral vascular disease    Hyperlipidemia LDL goal <70    Spinal stenosis, lumbar region, with neurogenic claudication    Overactive bladder    GERD (gastroesophageal reflux disease)    Hypothyroidism    Lichen sclerosus    Parkinson's disease    MILLI treated with BiPAP    History of respiratory failure - prior respiratory failure requiring mechanical ventilation, open lung biopsy non-specific.     Atrial fibrillation    Tachy-thai syndrome    Long term current use of antiarrhythmic drug    History of adenomatous polyp of colon    Anemia associated with stage 3 chronic renal failure    Angiodysplasia    Hx of colonic polyps    Body mass index 40.0-44.9, adult    Cardiac pacemaker in situ    Chronic low back pain    Chronic lung disease    Degeneration of lumbar intervertebral disc    Edema of lower extremity    History of malignant neoplasm of skin    History of TIA (transient ischemic attack)    Hypotonic bladder    Incomplete tear of rotator cuff    Major depression in remission    Tinnitus of both ears    Primary osteoarthritis involving multiple joints    Restless legs    Seborrheic dermatitis    Transient cerebral ischemia    Type 2 diabetes mellitus with hyperglycemia, without long-term current  use of insulin    Vitamin B12 deficiency    Vitamin D deficiency    Chronic kidney disease, stage 3b    Osteoporosis, senile    Acute diastolic CHF (congestive heart failure)    Chronic respiratory failure with hypoxia    Chronic anticoagulation     Past Medical History:   Diagnosis Date    Allergic rhinitis     Anemia     Ankle problem     thinks back related causing pain     Atrial fibrillation     AVM (arteriovenous malformation)     Back pain     CHF (congestive heart failure) 6/2/2024    Chronic kidney disease     stage 3 per pt    Chronic lung disease 04/13/2022    CKD (chronic kidney disease)     Clotting disorder 2016    AVM - small intestine    Coronary artery disease involving native coronary artery without angina pectoris 03/01/2017    COVID-19 vaccine series completed     Cystic fibrosis     Diastolic dysfunction     Gastrocnemius muscle tear     left medial 91    Generalized osteoarthritis     GERD (gastroesophageal reflux disease)     Gestational diabetes     GIB (gastrointestinal bleeding) 2016    d/t xarelto     Headache     Hearing decreased, bilateral     HAS HEARING AID, NOT WEARING IT CURRENTLY    Hiatal hernia     History of shingles     History of transfusion 2016    no reaction recalled     Hyperlipidemia LDL goal <70 03/01/2017    Hypertension     Hypothyroidism     IBS (irritable bowel syndrome)     Klebsiella pneumonia     Lichen sclerosus     Lupus     subQ    Mouth problem     mouth guard used since pt bites tongue and lips excessively at night if not- with bipap     MRSA infection 2018    Myocardial infarction     Obesity     On home oxygen therapy     2L of oxygen all the time due to current congestion     Osteoporosis     Parkinson's disease     Peripheral vascular disease     Pleurisy     Pneumonia     Puerperal sepsis with acute hypoxic respiratory failure     emergent intubation- 2016    Pulmonary embolism     Right leg pain     from back issues     Salivary gland stone     Sciatic  nerve pain     Seborrheic dermatitis     Skin cancer     on back     Sleep apnea     CPAP AND HOME O2 2L/M    Sleep apnea, obstructive 04/15/01    TIA (transient ischemic attack) 2014    no residual effects    Type 2 diabetes mellitus     UTI (urinary tract infection)     Vitamin D deficiency 09/08/2022    Wears glasses      Past Surgical History:   Procedure Laterality Date    ABLATION OF DYSRHYTHMIC FOCUS  05/16/13    Laser Ablation - Rt Leg    BACK SURGERY      l4-l5 laminectomy     BICEPS TENDON REPAIR Right     shoulder    BREAST BIOPSY Left 05/2004    excisional, benign    BRONCHOSCOPY RIGID / FLEXIBLE      2016    BUNIONECTOMY Right     CARDIAC CATHETERIZATION      CARDIAC CATHETERIZATION N/A 02/01/2019    Procedure: Left Heart Cath;  Surgeon: Albertina Corona MD;  Location:  [facility] CATH INVASIVE LOCATION;  Service: Cardiology    CARDIAC ELECTROPHYSIOLOGY PROCEDURE N/A 01/26/2024    Procedure: Lead Revision RA or RV, PPM or ICD;  Surgeon: Lauro Francois MD;  Location:  [facility] EP INVASIVE LOCATION;  Service: Cardiovascular;  Laterality: N/A;    CHOLECYSTECTOMY      COLONOSCOPY  2016    COLONOSCOPY N/A 06/17/2021    Procedure: COLONOSCOPY WITH POLYPECTOMY;  Surgeon: Trenton Sosa MD;  Location:  [facility];  Service: Gastroenterology;  Laterality: N/A;    CORONARY STENT PLACEMENT      x1 stent    CYST REMOVAL      left ear, upper left back 2001    CYSTOSCOPY      x2  18 and 20 -   in 20 urethra dilation     DIAGNOSTIC LAPAROSCOPY  1981    ENDOSCOPIC FUNCTIONAL SINUS SURGERY (FESS)  2011    ENDOSCOPY  2016    ENTEROSCOPY VIA STOMA      with single ballon with fluoro     HAMMER TOE REPAIR Left     INCISION AND DRAINAGE OF WOUND  2018    back with wound infection     INSERT / REPLACE / REMOVE PACEMAKER  11/10/16    [facility]    JOINT REPLACEMENT      KNEE ARTHROSCOPY      LASER ABLATION      right leg 13    LIPOMA EXCISION  1999    left leg     LUMBAR LAMINECTOMY DISCECTOMY  DECOMPRESSION N/A 07/06/2018    Procedure: LUMBAR LAMINECTOMY L4-5, HEMILAMIINECTOMY RIGHT L5-S1, FORAMINOTOMY L5-S1;  Surgeon: Lencho Gamino MD;  Location:  [facility];  Service: Neurosurgery    LUMBAR LAMINECTOMY DISCECTOMY DECOMPRESSION N/A 09/19/2018    Procedure: INCISION AND DRAINAGE BACK WITH WOUND EXPLORATION;  Surgeon: Ritchie García MD;  Location:  [facility];  Service: Neurosurgery    LUNG BIOPSY Left 2016    OTHER SURGICAL HISTORY      ct scan of chest and sinuses and lower back     OTHER SURGICAL HISTORY      various echos     OTHER SURGICAL HISTORY      electroencephalogram 16,   emg  ncv tests 2007    OTHER SURGICAL HISTORY      mra 2007  and various mri with xrays, nuclear medicine lung ventilation with perfusion test 2016 with pft     OTHER SURGICAL HISTORY      barium swallow testing 15, 12, 12    OTHER SURGICAL HISTORY      vaginal ultrasound- 2012,   vas clementina lower extrem 2016, vas venous duplex lower extrem bilat 2019    OTHER SURGICAL HISTORY      wdge biopsy spring of lung     OTHER SURGICAL HISTORY      emergent intubation- hypoxic resp failure     OTHER SURGICAL HISTORY      01/26/2024 ppm generator change and lead revision per Dr. Francois    PACEMAKER IMPLANTATION  11/2016    sss    REPLACEMENT TOTAL KNEE Bilateral     left knee 2011, right 2012 per dr acuna     REPLACEMENT TOTAL KNEE Bilateral     SHOULDER ARTHROSCOPY Bilateral     2003-left, 2004- right     SKIN BIOPSY      skin cancer back 2016    SKIN CANCER EXCISION      upper righ tback     MADHAV      TEETH EXTRACTION      x2    TOTAL SHOULDER ARTHROPLASTY W/ DISTAL CLAVICLE EXCISION Left 10/24/2022    Procedure: REVERSE TOTAL SHOULDER ARTHROPLASTY, BICEPS TENODESIS - LEFT;  Surgeon: Sammy Yo MD;  Location:  [facility];  Service: Orthopedics;  Laterality: Left;    TOTAL SHOULDER ARTHROPLASTY W/ DISTAL CLAVICLE EXCISION Right 09/18/2023    Procedure: REVERSE TOTAL SHOULDER  ARTHROPLASTY WITH BICEPS TENODESIS - RIGHT;  Surgeon: Srinath  Sammy Acharya MD;  Location: [facility];  Service: Orthopedics;  Laterality: Right;    TUBAL ABDOMINAL LIGATION Bilateral 1980    WISDOM TOOTH EXTRACTION        General Information       Row Name 06/03/24 1248          OT Time and Intention    Document Type evaluation  -     Mode of Treatment occupational therapy  -JUANITA       Row Name 06/03/24 1248          General Information    Patient Profile Reviewed yes  -JUANITA     Prior Level of Function independent:;gait;transfer;all household mobility;min assist:;bed mobility;feeding;grooming;max assist:;dressing;bathing;dependent:;cooking;cleaning;driving;shopping  pt. with bidet's on her toilets, pt. uses a toilet aid to towel dry after use  -JUANITA     Existing Precautions/Restrictions fall;other (see comments)  Pt. with R scalpular fracture, pt. in sling, per pt. supposed to immobilize for 5 weeks starting 5/16.  -JUANITA     Barriers to Rehab previous functional deficit  -JUANITA       Row Name 06/03/24 1248          Occupational Profile    Environmental Supports and Barriers (Occupational Profile) wx in shower with 3 grabs in shower and one outside of shower and also some by toilet.  pt. with tod walker, rw wx, w/c, bidet and adjustable bed, stepping pad 2-3 inches high by bed with rail on it, ramp, 02 use night, day prn  -JUANITA       Row Name 06/03/24 1248          Living Environment    People in Home child(lisa), adult  daughter lives with patient, but works 12 hour shifts, hired caregiver when daughter at work  -JUANITA       Row Name 06/03/24 1248          Home Main Entrance    Number of Stairs, Main Entrance other (see comments)  ramp  -JUANITA       Row Name 06/03/24 1248          Cognition    Orientation Status (Cognition) oriented x 3  -JUANITA       Row Name 06/03/24 1248          Safety Issues, Functional Mobility    Impairments Affecting Function (Mobility) endurance/activity tolerance;strength;balance;range of motion (ROM)  -JUANITA               User Key  (r) = Recorded By, (t) = Taken By, (c) =  Cosigned By      Initials Name Provider Type    JUANITA Sharla Reyes, OT Occupational Therapist                     Mobility/ADL's       Row Name 06/03/24 1255          Bed Mobility    Bed Mobility supine-sit  -JUANITA     Supine-Sit [place] (Bed Mobility) minimum assist (75% patient effort);verbal cues  -JUANITA     Bed Mobility, Safety Issues decreased use of arms for pushing/pulling;impaired trunk control for bed mobility  -     Assistive Device (Bed Mobility) head of bed elevated;bed rails  -JUANITA     Comment, (Bed Mobility) limited by inability to use RUE  -       Row Name 06/03/24 1255          Transfers    Transfers sit-stand transfer;stand-sit transfer;toilet transfer  -       Row Name 06/03/24 1255          Sit-Stand Transfer    Sit-Stand [place] (Transfers) contact guard  -     Assistive Device (Sit-Stand Transfers) walker, tod  -JUANITA       Row Name 06/03/24 1255          Stand-Sit Transfer    Stand-Sit [place] (Transfers) contact guard  -     Assistive Device (Stand-Sit Transfers) walker, tod  -JUANITA       Row Name 06/03/24 1255          Toilet Transfer    Type (Toilet Transfer) sit-stand;stand-sit  -JUANITA     [place] Level (Toilet Transfer) contact guard  -     Assistive Device (Toilet Transfer) commode, bedside without drop arms;walker, tod  -JUANITA       Row Name 06/03/24 1255          Functional Mobility    Functional Mobility- Ind. Level contact guard assist  -     Functional Mobility- Device walker, tod  -JUANITA     Functional Mobility-Distance (Feet) --  household distance  -     Functional Mobility- Safety Issues step length decreased  -       Row Name 06/03/24 1255          Activities of Daily Living    BADL Assessment/Intervention upper body dressing;toileting;grooming;bathing;lower body dressing  -       Row Name 06/03/24 1255          Mobility    Extremity Weight-bearing Status right upper extremity   -JUANITA     Right Upper Extremity (Weight-bearing Status) non weight-bearing (NWB)    -JUANITA       Row Name 06/03/24 1255          Upper Body Dressing Assessment/Training    [place] Level (Upper Body Dressing) doff;don;maximum assist (25% patient effort)  -JUANITA     Position (Upper Body Dressing) edge of bed sitting  -JUANITA     Comment, (Upper Body Dressing) bed and gown wet due to Purewick canister full, nurse alerted to change, bed stripped and wiped down while pt. was on the BSC, gown changed out, tank and doff sling dependent  -JUANITA       Row Name 06/03/24 1255          Toileting Assessment/Training    [place] Level (Toileting) perform perineal hygiene;dependent (less than 25% patient effort)  -JUANITA     Assistive Devices (Toileting) commode, bedside without drop arms  -JUANITA     Position (Toileting) supported standing  -JUANITA     Comment, (Toileting) pt. uses bidet at home and toilet aid to towel dry, pt. with BM on BSC, got up and moved around room a short distance and had to return to [facility] to finish BM, pt. with some urine when stood from EOB initially, floor cleaned and socks changed out  -JUANITA       Row Name 06/03/24 1255          Grooming Assessment/Training    Position (Grooming) supported sitting  -JUANITA     Comment, (Grooming) pt. washed face post setup  -JUANITA       Row Name 06/03/24 1255          Bathing Assessment/Intervention    [place] Level (Bathing) perineal area;distal lower extremities/feet;dependent (less than 25% patient effort)  -JUANITA     Position (Bathing) supported standing;supported sitting  -JUANITA     Comment, (Bathing) legs and feet cleaned from incontinence and purewick leak  -JUANITA       Row Name 06/03/24 1255          Lower Body Dressing Assessment/Training    [place] Level (Lower Body Dressing) doff;don;socks  -JUANITA     Position (Lower Body Dressing) supported sitting  -JUANITA               User Key  (r) = Recorded By, (t) = Taken By, (c) = Cosigned By      Initials Name Provider Type    Sharla Patino OT Occupational Therapist                   Obj/Interventions       Row Name  06/03/24 1301          Sensory Assessment (Somatosensory)    Sensory Assessment (Somatosensory) UE sensation intact  -[facility] Name 06/03/24 1301          Vision Assessment/Intervention    Visual Impairment/Limitations corrective lenses full-time  -[facility] Name 06/03/24 1301          Range of Motion Comprehensive    General Range of Motion upper extremity range of motion deficits identified  -     Comment, General Range of Motion LUE WFL, RUE in sling, pt. demonstrated full ROM with elbow, wrist and hand when sling removed to change out gown  -[facility] Name 06/03/24 1301          Strength Comprehensive (MMT)    General Manual Muscle Testing (MMT) Assessment upper extremity strength deficits identified  -JUANITA     Comment, General Manual Muscle Testing (MMT) Assessment LUE at least 3+/5 to 4/5, full resistance not given as to not stress R scapula, LUE elbow, wrist and hand at least 3 to 3+/5 with observationof movement and use, R shoulder NOT due to scapula fracture  -[facility] Name 06/03/24 1301          Elbow/Forearm (Therapeutic Exercise)    Elbow/Forearm (Therapeutic Exercise) AROM (active range of motion)  -     Elbow/Forearm AROM (Therapeutic Exercise) right;3 repetitions;flexion;extension  educated on need to fully straighten R elbow at times during day with bathing and clothing change to prevent elbow contracture  -[facility] Name 06/03/24 1301          Wrist (Therapeutic Exercise)    Wrist (Therapeutic Exercise) AROM (active range of motion)  -     Wrist AROM (Therapeutic Exercise) right;flexion;extension;3 repetitions  -[facility] Name 06/03/24 1301          Hand (Therapeutic Exercise)    Hand (Therapeutic Exercise) AROM (active range of motion)  -     Hand AROM/AAROM (Therapeutic Exercise) right;finger flexion;finger extension;3 repetitions  -[facility] Name 06/03/24 1301          Motor Skills    Therapeutic Exercise elbow/forearm;wrist;hand  -[facility] Name 06/03/24 1301           Balance    Balance Assessment sitting static balance;sitting dynamic balance;standing static balance;standing dynamic balance  -JUANITA     Static Sitting Balance independent  -JUANITA     Dynamic Sitting Balance standby assist  -JUANITA     Position, Sitting Balance unsupported;sitting edge of bed  -JUANITA     Static Standing Balance contact guard  -JUANITA     Dynamic Standing Balance contact guard  -JUANITA     Position/Device Used, Standing Balance supported;walker, tod  -JUANITA     Balance Interventions sit to stand;occupation based/functional task  -JUANITA     Comment, Balance UBD, toileting, bathing  -JUANITA               User Key  (r) = Recorded By, (t) = Taken By, (c) = Cosigned By      Initials Name Provider Type    JUANITA Sharla Reyes, OT Occupational Therapist                   Goals/Plan       Row Name 06/03/24 1310          Bed Mobility Goal 1 (OT)    Activity/Assistive Device (Bed Mobility Goal 1, OT) bed mobility activities, all  -JUANITA     [place] Level/Cues Needed (Bed Mobility Goal 1, OT) modified independence  -JUANITA     Time Frame (Bed Mobility Goal 1, OT) long term goal (LTG);1 week  -JUANITA     Progress/Outcomes (Bed Mobility Goal 1, OT) new goal  -JUANITA       Row Name 06/03/24 1310          Transfer Goal 1 (OT)    Activity/Assistive Device (Transfer Goal 1, OT) sit-to-stand/stand-to-sit;toilet;commode;other (see comments);walker, tod  grab bar  -JUANITA     [place] Level/Cues Needed (Transfer Goal 1, OT) standby assist  -JUANITA     Time Frame (Transfer Goal 1, OT) long term goal (LTG);1 week  -JUANITA     Progress/Outcome (Transfer Goal 1, OT) new goal  -JUANITA       Row Name 06/03/24 1310          Toileting Goal 1 (OT)    Activity/Device (Toileting Goal 1, OT) adjust/manage clothing;commode;grab bar/safety frame  -JUANITA     [place] Level/Cues Needed (Toileting Goal 1, OT) minimum assist (75% or more patient effort)  -JUANITA     Time Frame (Toileting Goal 1, OT) long term goal (LTG);1 week  -JUANITA     Strategies/Barriers (Toileting Goal 1, OT) dry  self with toilet aid  min A to simulate drying after bidet use at home  -JUANITA     Progress/Outcome (Toileting Goal 1, OT) new goal  -JUANITA       Row Name 06/03/24 1310          Grooming Goal 1 (OT)    Activity/Device (Grooming Goal 1, OT) hair care;oral care;wash face, hands  -JUANITA     [place] (Grooming Goal 1, OT) set-up required;standby assist  -JUANITA     Time Frame (Grooming Goal 1, OT) short term goal (STG);4 days  -JUANITA     Strategies/Barriers (Grooming Goal 1, OT) sinkside, tod technique as needed  -JUANITA     Progress/Outcome (Grooming Goal 1, OT) new goal  -JUANITA       Row Name 06/03/24 1310          Therapy Assessment/Plan (OT)    Planned Therapy Interventions (OT) activity tolerance training;BADL retraining;adaptive equipment training;occupation/activity based interventions;patient/caregiver education/training;strengthening exercise;transfer/mobility retraining;ROM/therapeutic exercise;functional balance retraining  -JUANITA               User Key  (r) = Recorded By, (t) = Taken By, (c) = Cosigned By      Initials Name Provider Type    Sharla Patino, OT Occupational Therapist                   Clinical Impression       Row Name 06/03/24 1305          Pain Assessment    Pretreatment Pain Rating 0/10 - no pain  -JUANITA     Posttreatment Pain Rating 0/10 - no pain  -JUANITA       Row Name 06/03/24 1305          Plan of Care Review    Plan of Care Reviewed With patient  -JUANITA     Progress no change  -JUANITA     Outcome Evaluation Patient present with impaired strength, balance, activity tolerance and limitations from R scapular fx. with sling limiting ADL independence.  Pt. appropriate  for continued skilled OT services to address deficit areas and promote return to PLOF.  Recommend home with  24/7 assist.  -JUANITA       Row Name 06/03/24 1302          Therapy Assessment/Plan (OT)    Patient/Family Therapy Goal Statement (OT) regain ability to return home with daughter and caregiver and resume OP therapy once scapul healed  -JUANITA     Rehab  Potential (OT) good, to achieve stated therapy goals  -JUANITA     Criteria for Skilled Therapeutic Interventions Met (OT) yes;meets criteria;skilled treatment is necessary  -JUANITA     Therapy Frequency (OT) daily  -JUANITA     Predicted Duration of Therapy Intervention (OT) 7 days  -JUANITA       Row Name 06/03/24 1305          Therapy Plan Review/Discharge Plan (OT)    Anticipated Discharge Disposition (OT) home with 24/7 care  -JUANITA       Row Name 06/03/24 1305          Vital Signs    Pre Systolic BP Rehab 123  -JUANITA     Pre Treatment Diastolic BP 61  -JUANITA     Pretreatment Heart Rate (beats/min) 71  -JUANITA     Posttreatment Heart Rate (beats/min) 83  -JUANITA     Pre SpO2 (%) 93  -JUANITA     O2 Delivery Pre Treatment room air  -JUANITA     O2 Delivery Intra Treatment room air  -JUANITA     Post SpO2 (%) 98  -JUANITA     O2 Delivery Post Treatment room air  -JUANITA     Pre Patient Position Supine  -JUANITA     Intra Patient Position Standing  -JUANITA     Post Patient Position Sitting  -JUANITA       Row Name 06/03/24 1305          Positioning and Restraints    Pre-Treatment Position in bed  -JUANITA     Post Treatment Position chair  -JUANITA     In Chair notified nsg;reclined;call light within reach;encouraged to call for assist;exit alarm on;waffle cushion;legs elevated;heels elevated  RUE in sling  -JUANITA               User Key  (r) = Recorded By, (t) = Taken By, (c) = Cosigned By      Initials Name Provider Type    Sharla Patino, OT Occupational Therapist                   Outcome Measures       Row Name 06/03/24 1312          How much help from another is currently needed...    Putting on and taking off regular lower body clothing? 1  -JUANITA     Bathing (including washing, rinsing, and drying) 2  -JUANITA     Toileting (which includes using toilet bed pan or urinal) 2  -JUANITA     Putting on and taking off regular upper body clothing 2  -JUANITA     Taking care of personal grooming (such as brushing teeth) 3  -JUANITA     Eating meals 3  -JUANITA     AM-PAC 6 Clicks Score (OT) 13  -JUANITA       Row Name 06/03/24  1312          Functional Assessment    Outcome Measure Options AM-PAC 6 Clicks Daily Activity (OT)  -JUANITA               User Key  (r) = Recorded By, (t) = Taken By, (c) = Cosigned By      Initials Name Provider Type    Sharla Patino OT Occupational Therapist                    Occupational Therapy Education       Title: PT OT SLP Therapies (In Progress)       Topic: Occupational Therapy (Done)       Point: ADL training (Done)       Description:   Instruct learner(s) on proper safety adaptation and remediation techniques during self care or transfers.   Instruct in proper use of assistive devices.                  Learning Progress Summary             Patient Acceptance, E,D, VU,NR by JUANITA at 6/3/2024 1313    Comment: reason for consult, benefit of full elbow extension when sling off, discharge recommendations, gait belt use reason                         Point: Home exercise program (Done)       Description:   Instruct learner(s) on appropriate technique for monitoring, assisting and/or progressing therapeutic exercises/activities.                  Learning Progress Summary             Patient Acceptance, E,D, VU,NR by JUANITA at 6/3/2024 1313    Comment: reason for consult, benefit of full elbow extension when sling off, discharge recommendations, gait belt use reason                         Point: Precautions (Done)       Description:   Instruct learner(s) on prescribed precautions during self-care and functional transfers.                  Learning Progress Summary             Patient Acceptance, E,D, VU,NR by JUANITA at 6/3/2024 1313    Comment: reason for consult, benefit of full elbow extension when sling off, discharge recommendations, gait belt use reason                         Point: Body mechanics (Done)       Description:   Instruct learner(s) on proper positioning and spine alignment during self-care, functional mobility activities and/or exercises.                  Learning Progress Summary             Patient  Acceptance, E,D, VU,NR by  at 6/3/2024 1313    Comment: reason for consult, benefit of full elbow extension when sling off, discharge recommendations, gait belt use reason                                         User Key       Initials Effective Dates Name Provider Type Discipline     07/11/23 -  Sharla Reyes, OT Occupational Therapist OT                  OT Recommendation and Plan  Planned Therapy Interventions (OT): activity tolerance training, BADL retraining, adaptive equipment training, occupation/activity based interventions, patient/caregiver education/training, strengthening exercise, transfer/mobility retraining, ROM/therapeutic exercise, functional balance retraining  Therapy Frequency (OT): daily  Plan of Care Review  Plan of Care Reviewed With: patient  Progress: no change  Outcome Evaluation: Patient present with impaired strength, balance, activity tolerance and limitations from R scapular fx. with sling limiting ADL independence.  Pt. appropriate  for continued skilled OT services to address deficit areas and promote return to PLOF.  Recommend home with  24/7 assist.     Time Calculation:   Evaluation Complexity (OT)  Review Occupational Profile/Medical/Therapy History Complexity: expanded/moderate complexity  Assessment, Occupational Performance/Identification of Deficit Complexity: 1-3 performance deficits  Clinical Decision Making Complexity (OT): problem focused assessment/low complexity  Overall Complexity of Evaluation (OT): low complexity     Time Calculation- OT       Row Name 06/03/24 1314             Time Calculation- OT    OT Start Time 1042  -JUANITA      OT Received On 06/03/24  -JUANITA      OT Goal Re-Cert Due Date 06/13/24  -JUANITA         Timed Charges    43846 - OT Therapeutic Exercise Minutes 2  -JUANITA      60155 - OT Self Care/Mgmt Minutes 12  -JUANITA         Untimed Charges    OT Eval/Re-eval Minutes 52  -JUANITA         Total Minutes    Timed Charges Total Minutes 14  -JUANITA      Untimed Charges Total  Minutes 52  -JUANITA       Total Minutes 66  -JUANITA                User Key  (r) = Recorded By, (t) = Taken By, (c) = Cosigned By      Initials Name Provider Type    Sharla Patino, OT Occupational Therapist                  Therapy Charges for Today       Code Description Service Date Service Provider Modifiers Qty    13351792592  OT SELF CARE/MGMT/TRAIN EA 15 MIN 6/3/2024 Sharla Reyes, OT GO 1    [id number]  OT EVAL LOW COMPLEXITY 4 6/3/2024 Sharla Reyes, OT GO 1                 Sharla Reyes OT  6/3/2024

## 2024-06-03 NOTE — TELEPHONE ENCOUNTER
Called and informed patient she does not need her EPO injection on Wednesday and we will see her in 2 weeks. She verbalized understanding.

## 2024-06-03 NOTE — TELEPHONE ENCOUNTER
"  Caller: BonyjaneyJoanna \"SIXTO\"    Relationship: Self    Best call back number: [phone]    What is the best time to reach you: ANY    Who are you requesting to speak with (clinical staff, provider,  specific staff member): CLINICAL     What was the call regarding: SIXTO IS CURRENTLY A PATIENT AT     SHE HAD LABS DRAWN THIS MORNING AND HER HEMOGLOBIN WAS 10.3    SHE HAS AN INJECTION SCHEDULED ON WEDNESDAY AND WAS WANTING TO MAKE SURE SHE DID NOT NEED TO COME IN FOR THAT DUE TO HER HEMOGLOBIN     PLEASE ADVISE     "

## 2024-06-03 NOTE — THERAPY EVALUATION
Patient Name: Joanna Cross  : 1950    MRN: [id number]                              Today's Date: 6/3/2024       Admit Date: 2024    Visit Dx:     ICD-10-CM ICD-9-CM   1. Acute on chronic diastolic congestive heart failure  I50.33 428.33     428.0   2. Acute renal failure superimposed on stage 3b chronic kidney disease, unspecified acute renal failure type  N17.9 584.9    N18.32 585.3   3. Macrocytic anemia  D53.9 281.9   4. Elevated glucose  R73.09 790.29   5. Decreased ambulation status  Z74.09 780.99   6. Troponin level elevated  R79.89 790.6     Patient Active Problem List   Diagnosis    Coronary artery disease involving native coronary artery without angina pectoris    Hypertension, essential    Peripheral vascular disease    Hyperlipidemia LDL goal <70    Spinal stenosis, lumbar region, with neurogenic claudication    Overactive bladder    GERD (gastroesophageal reflux disease)    Hypothyroidism    Lichen sclerosus    Parkinson's disease    MILLI treated with BiPAP    History of respiratory failure - prior respiratory failure requiring mechanical ventilation, open lung biopsy non-specific.     Atrial fibrillation    Tachy-thai syndrome    Long term current use of antiarrhythmic drug    History of adenomatous polyp of colon    Anemia associated with stage 3 chronic renal failure    Angiodysplasia    Hx of colonic polyps    Body mass index 40.0-44.9, adult    Cardiac pacemaker in situ    Chronic low back pain    Chronic lung disease    Degeneration of lumbar intervertebral disc    Edema of lower extremity    History of malignant neoplasm of skin    History of TIA (transient ischemic attack)    Hypotonic bladder    Incomplete tear of rotator cuff    Major depression in remission    Tinnitus of both ears    Primary osteoarthritis involving multiple joints    Restless legs    Seborrheic dermatitis    Transient cerebral ischemia    Type 2 diabetes mellitus with hyperglycemia, without long-term current  use of insulin    Vitamin B12 deficiency    Vitamin D deficiency    Chronic kidney disease, stage 3b    Osteoporosis, senile    Acute diastolic CHF (congestive heart failure)    Chronic respiratory failure with hypoxia    Chronic anticoagulation     Past Medical History:   Diagnosis Date    Allergic rhinitis     Anemia     Ankle problem     thinks back related causing pain     Atrial fibrillation     AVM (arteriovenous malformation)     Back pain     CHF (congestive heart failure) 6/2/2024    Chronic kidney disease     stage 3 per pt    Chronic lung disease 04/13/2022    CKD (chronic kidney disease)     Clotting disorder 2016    AVM - small intestine    Coronary artery disease involving native coronary artery without angina pectoris 03/01/2017    COVID-19 vaccine series completed     Cystic fibrosis     Diastolic dysfunction     Gastrocnemius muscle tear     left medial 91    Generalized osteoarthritis     GERD (gastroesophageal reflux disease)     Gestational diabetes     GIB (gastrointestinal bleeding) 2016    d/t xarelto     Headache     Hearing decreased, bilateral     HAS HEARING AID, NOT WEARING IT CURRENTLY    Hiatal hernia     History of shingles     History of transfusion 2016    no reaction recalled     Hyperlipidemia LDL goal <70 03/01/2017    Hypertension     Hypothyroidism     IBS (irritable bowel syndrome)     Klebsiella pneumonia     Lichen sclerosus     Lupus     subQ    Mouth problem     mouth guard used since pt bites tongue and lips excessively at night if not- with bipap     MRSA infection 2018    Myocardial infarction     Obesity     On home oxygen therapy     2L of oxygen all the time due to current congestion     Osteoporosis     Parkinson's disease     Peripheral vascular disease     Pleurisy     Pneumonia     Puerperal sepsis with acute hypoxic respiratory failure     emergent intubation- 2016    Pulmonary embolism     Right leg pain     from back issues     Salivary gland stone     Sciatic  nerve pain     Seborrheic dermatitis     Skin cancer     on back     Sleep apnea     CPAP AND HOME O2 2L/M    Sleep apnea, obstructive 04/15/01    TIA (transient ischemic attack) 2014    no residual effects    Type 2 diabetes mellitus     UTI (urinary tract infection)     Vitamin D deficiency 09/08/2022    Wears glasses      Past Surgical History:   Procedure Laterality Date    ABLATION OF DYSRHYTHMIC FOCUS  05/16/13    Laser Ablation - Rt Leg    BACK SURGERY      l4-l5 laminectomy     BICEPS TENDON REPAIR Right     shoulder    BREAST BIOPSY Left 05/2004    excisional, benign    BRONCHOSCOPY RIGID / FLEXIBLE      2016    BUNIONECTOMY Right     CARDIAC CATHETERIZATION      CARDIAC CATHETERIZATION N/A 02/01/2019    Procedure: Left Heart Cath;  Surgeon: Albertina Corona MD;  Location:  [facility] CATH INVASIVE LOCATION;  Service: Cardiology    CARDIAC ELECTROPHYSIOLOGY PROCEDURE N/A 01/26/2024    Procedure: Lead Revision RA or RV, PPM or ICD;  Surgeon: Lauro Francois MD;  Location:  [facility] EP INVASIVE LOCATION;  Service: Cardiovascular;  Laterality: N/A;    CHOLECYSTECTOMY      COLONOSCOPY  2016    COLONOSCOPY N/A 06/17/2021    Procedure: COLONOSCOPY WITH POLYPECTOMY;  Surgeon: Trenton Sosa MD;  Location:  [facility];  Service: Gastroenterology;  Laterality: N/A;    CORONARY STENT PLACEMENT      x1 stent    CYST REMOVAL      left ear, upper left back 2001    CYSTOSCOPY      x2  18 and 20 -   in 20 urethra dilation     DIAGNOSTIC LAPAROSCOPY  1981    ENDOSCOPIC FUNCTIONAL SINUS SURGERY (FESS)  2011    ENDOSCOPY  2016    ENTEROSCOPY VIA STOMA      with single ballon with fluoro     HAMMER TOE REPAIR Left     INCISION AND DRAINAGE OF WOUND  2018    back with wound infection     INSERT / REPLACE / REMOVE PACEMAKER  11/10/16    [facility]    JOINT REPLACEMENT      KNEE ARTHROSCOPY      LASER ABLATION      right leg 13    LIPOMA EXCISION  1999    left leg     LUMBAR LAMINECTOMY DISCECTOMY  DECOMPRESSION N/A 07/06/2018    Procedure: LUMBAR LAMINECTOMY L4-5, HEMILAMIINECTOMY RIGHT L5-S1, FORAMINOTOMY L5-S1;  Surgeon: Lencho Gamino MD;  Location:  [facility];  Service: Neurosurgery    LUMBAR LAMINECTOMY DISCECTOMY DECOMPRESSION N/A 09/19/2018    Procedure: INCISION AND DRAINAGE BACK WITH WOUND EXPLORATION;  Surgeon: Ritchie García MD;  Location:  [facility];  Service: Neurosurgery    LUNG BIOPSY Left 2016    OTHER SURGICAL HISTORY      ct scan of chest and sinuses and lower back     OTHER SURGICAL HISTORY      various echos     OTHER SURGICAL HISTORY      electroencephalogram 16,   emg  ncv tests 2007    OTHER SURGICAL HISTORY      mra 2007  and various mri with xrays, nuclear medicine lung ventilation with perfusion test 2016 with pft     OTHER SURGICAL HISTORY      barium swallow testing 15, 12, 12    OTHER SURGICAL HISTORY      vaginal ultrasound- 2012,   vas [place]lement[place] lower extrem 2016, vas venous duplex lower extrem bilat 2019    OTHER SURGICAL HISTORY      wdge biopsy spring of lung     OTHER SURGICAL HISTORY      emergent intubation- hypoxic resp failure     OTHER SURGICAL HISTORY      01/26/2024 ppm generator change and lead revision per Dr. Francois    PACEMAKER IMPLANTATION  11/2016    sss    REPLACEMENT TOTAL KNEE Bilateral     left knee 2011, right 2012 per dr acuna     REPLACEMENT TOTAL KNEE Bilateral     SHOULDER ARTHROSCOPY Bilateral     2003-left, 2004- right     SKIN BIOPSY      skin cancer back 2016    SKIN CANCER EXCISION      upper righ tback     MADHAV      TEETH EXTRACTION      x2    TOTAL SHOULDER ARTHROPLASTY W/ DISTAL CLAVICLE EXCISION Left 10/24/2022    Procedure: REVERSE TOTAL SHOULDER ARTHROPLASTY, BICEPS TENODESIS - LEFT;  Surgeon: Sammy Yo MD;  Location:  [facility];  Service: Orthopedics;  Laterality: Left;    TOTAL SHOULDER ARTHROPLASTY W/ DISTAL CLAVICLE EXCISION Right 09/18/2023    Procedure: REVERSE TOTAL SHOULDER  ARTHROPLASTY WITH BICEPS TENODESIS - RIGHT;  Surgeon: Srinath  Sammy Acharya MD;  Location: [facility];  Service: Orthopedics;  Laterality: Right;    TUBAL ABDOMINAL LIGATION Bilateral 1980    WISDOM TOOTH EXTRACTION        General Information       Row Name 06/03/24 1040          Physical Therapy Time and Intention    Document Type evaluation  -NS     Mode of Treatment physical therapy  -NS       Row Name 06/03/24 1040          General Information    Patient Profile Reviewed yes  -NS     Prior Level of Function independent:;all household mobility;gait;transfer;bed mobility;max assist:;ADL's  pt has been needing assist with ADLs following recent fracture  -NS     Existing Precautions/Restrictions fall;other (see comments)  R scapular fracture- per pt, immobilize for 5 weeks starting 5/16  -NS     Barriers to Rehab previous functional deficit  -NS       Row Name 06/03/24 1040          Living Environment    People in Home child(lisa), adult;other (see comments)  + caregiver present while daughter at work  -NS       Row Name 06/03/24 1040          Home Main Entrance    Number of Stairs, Main Entrance none  ramp  -NS       Row Name 06/03/24 1040          Stairs Within Home, Primary    Number of Stairs, Within Home, Primary none  -NS       Row Name 06/03/24 1040          Cognition    Orientation Status (Cognition) oriented x 3  -NS       Row Name 06/03/24 1040          Safety Issues, Functional Mobility    Safety Issues Affecting Function (Mobility) safety precaution awareness;safety precautions follow-through/compliance  -NS     Impairments Affecting Function (Mobility) endurance/activity tolerance;strength;balance;range of motion (ROM)  -NS               User Key  (r) = Recorded By, (t) = Taken By, (c) = Cosigned By      Initials Name Provider Type    Shahana Smith, PT Physical Therapist                   Mobility       Row Name 06/03/24 1040          Bed Mobility    Bed Mobility supine-sit  -NS     Supine-Sit [place] (Bed Mobility) minimum assist (75% patient effort)  -NS      Assistive Device (Bed Mobility) bed rails;head of bed elevated  -NS       [facility] Name 06/03/24 1040          Transfers    Comment, (Transfers) Appropriate hand placement  -NS       Row Name 06/03/24 1040          Sit-Stand Transfer    Sit-Stand [place] (Transfers) contact guard  -NS     Assistive Device (Sit-Stand Transfers) walker, tod  -NS       Row Name 06/03/24 1040          Gait/Stairs (Locomotion)    [place] Level (Gait) contact guard  -NS     Assistive Device (Gait) walker, tod  -NS     Distance in Feet (Gait) 35  -NS     Deviations/Abnormal Patterns (Gait) manjit decreased;gait speed decreased;stride length decreased  -NS     Bilateral Gait Deviations heel strike decreased;forward flexed posture;decreased arm swing  -NS     Comment, (Gait/Stairs) Patient ambulated at slow pace, demonstrating wide ALDO and B lateral trunk sway. Mild unsteadiness due to decreased arm swing. Pt ambulated 35ft +15ft- rest break for BSC twice.  -NS       Row Name 06/03/24 1040          Mobility    Extremity Weight-bearing Status right upper extremity  -NS     Right Upper Extremity (Weight-bearing Status) non weight-bearing (NWB)  -NS               User Key  (r) = Recorded By, (t) = Taken By, (c) = Cosigned By      Initials Name Provider Type    Shahana Smith PT Physical Therapist                   Obj/Interventions       Row Name 06/03/24 1040          Range of Motion Comprehensive    General Range of Motion lower extremity range of motion deficits identified  -NS       Row Name 06/03/24 1040          Strength Comprehensive (MMT)    General Manual Muscle Testing (MMT) Assessment lower extremity strength deficits identified  -NS     Comment, General Manual Muscle Testing (MMT) Assessment B ankle DF: 4+/5, B knee ext: 4+/5, B hip flexion: 4+/5  -NS       Row Name 06/03/24 1040          Balance    Balance Assessment sitting static balance;sitting dynamic balance;standing static balance;standing dynamic balance  -NS      Static Sitting Balance independent  -NS     Dynamic Sitting Balance standby assist  -NS     Position, Sitting Balance unsupported;sitting edge of bed  -NS     Static Standing Balance contact guard  -NS     Dynamic Standing Balance contact guard  -NS     Position/Device Used, Standing Balance supported;walker, tod  -NS       Row Name 06/03/24 1040          Sensory Assessment (Somatosensory)    Sensory Assessment (Somatosensory) LE sensation intact  -NS               User Key  (r) = Recorded By, (t) = Taken By, (c) = Cosigned By      Initials Name Provider Type    Shahana Smith, PT Physical Therapist                   Goals/Plan       Row Name 06/03/24 1040          Bed Mobility Goal 1 (PT)    Activity/Assistive Device (Bed Mobility Goal 1, PT) supine to sit  -NS     [place] Level/Cues Needed (Bed Mobility Goal 1, PT) standby assist  -NS     Time Frame (Bed Mobility Goal 1, PT) long term goal (LTG);10 days  -NS       Row Name 06/03/24 1040          Transfer Goal 1 (PT)    Activity/Assistive Device (Transfer Goal 1, PT) sit-to-stand/stand-to-sit;bed-to-chair/chair-to-bed  -NS     [place] Level/Cues Needed (Transfer Goal 1, PT) standby assist  -NS     Time Frame (Transfer Goal 1, PT) short term goal (STG);1 week  -NS       Row Name 06/03/24 1040          Gait Training Goal 1 (PT)    Activity/Assistive Device (Gait Training Goal 1, PT) gait (walking locomotion);walker, tod  -NS     [place] Level (Gait Training Goal 1, PT) modified independence  -NS     Distance (Gait Training Goal 1, PT) 100  -NS     Time Frame (Gait Training Goal 1, PT) long term goal (LTG);10 days  -NS       [facility] Name 06/03/24 1040          Therapy Assessment/Plan (PT)    Planned Therapy Interventions (PT) balance training;bed mobility training;gait training;home exercise program;patient/family education;strengthening;neuromuscular re-education;transfer training  -NS               User Key  (r) = Recorded By, (t) = Taken By, (c) =  Cosigned By      Initials Name Provider Type    Shahana Smith, PT Physical Therapist                   Clinical Impression       Row Name 06/03/24 1040          Pain    Pretreatment Pain Rating 0/10 - no pain  -NS     Posttreatment Pain Rating 0/10 - no pain  -NS       Row Name 06/03/24 1040          Plan of Care Review    Plan of Care Reviewed With patient  -NS     Progress no change  -NS     Outcome Evaluation Patient presents with weakness, decreased functional endurance, and balance impairment affecting functional mobility. Skilled IP PT services warranted to address deficits and support return to independence. Recommend home with 24/7 assist at d/c.  -NS       Row Name 06/03/24 1040          Therapy Assessment/Plan (PT)    Patient/Family Therapy Goals Statement (PT) return to independence  -NS     Rehab Potential (PT) good, to achieve stated therapy goals  -NS     Criteria for Skilled Interventions Met (PT) yes;meets criteria;skilled treatment is necessary  -NS     Therapy Frequency (PT) daily  -NS     Predicted Duration of Therapy Intervention (PT) 10 days  -NS       [facility] Name 06/03/24 1040          Vital Signs    Pre Systolic BP Rehab 123  -NS     Pre Treatment Diastolic BP 61  -NS     Pretreatment Heart Rate (beats/min) 89  -NS     Posttreatment Heart Rate (beats/min) 89  -NS     Pre SpO2 (%) 98  -NS     O2 Delivery Pre Treatment room air  -NS     Post SpO2 (%) 95  -NS     O2 Delivery Post Treatment room air  -NS     Pre Patient Position Supine  -NS     Intra Patient Position Standing  -NS     Post Patient Position Sitting  -NS       [facility] Name 06/03/24 1040          Positioning and Restraints    Pre-Treatment Position in bed  -NS     Post Treatment Position chair  -NS     In Chair notified nsg;reclined;call light within reach;encouraged to call for assist;exit alarm on;waffle cushion;legs elevated;heels elevated  -NS               User Key  (r) = Recorded By, (t) = Taken By, (c) = Cosigned By      Initials  Name Provider Type    Shahana Smith, EDYTA Physical Therapist                   Outcome Measures       Row Name 06/03/24 1040          How much help from another person do you currently need...    Turning from your back to your side while in flat bed without using bedrails? 3  -NS     Moving from lying on back to sitting on the side of a flat bed without bedrails? 2  -NS     Moving to and from a bed to a chair (including a wheelchair)? 3  -NS     Standing up from a chair using your arms (e.g., wheelchair, bedside chair)? 3  -NS     Climbing 3-5 steps with a railing? 2  -NS     To walk in hospital room? 3  -NS     AM-PAC 6 Clicks Score (PT) 16  -NS     Highest Level of Mobility Goal 5 --> Static standing  -NS       Row Name 06/03/24 1312 06/03/24 1040       Functional Assessment    Outcome Measure Options AM-PAC 6 Clicks Daily Activity (OT)  -JUANITA AM-PAC 6 Clicks Basic Mobility (PT)  -NS              User Key  (r) = Recorded By, (t) = Taken By, (c) = Cosigned By      Initials Name Provider Type    Sharla Patino, OT Occupational Therapist    Shahana Smith, EDYTA Physical Therapist                                 Physical Therapy Education       Title: PT OT SLP Therapies (Done)       Topic: Physical Therapy (Done)       Point: Mobility training (Done)       Learning Progress Summary             Patient Acceptance, E, VU,NR by NS at 6/3/2024 1320    Comment: safety with mobility                         Point: Home exercise program (Done)       Learning Progress Summary             Patient Acceptance, E, VU,NR by NS at 6/3/2024 1320    Comment: safety with mobility                         Point: Body mechanics (Done)       Learning Progress Summary             Patient Acceptance, E, VU,NR by NS at 6/3/2024 1320    Comment: safety with mobility                         Point: Precautions (Done)       Learning Progress Summary             Patient Acceptance, E, VU,NR by NS at 6/3/2024 1320    Comment: safety with  mobility                                         User Key       Initials Effective Dates Name Provider Type Discipline    NS 06/16/21 -  Shahana Wagner PT Physical Therapist PT                  PT Recommendation and Plan  Planned Therapy Interventions (PT): balance training, bed mobility training, gait training, home exercise program, patient/family education, strengthening, neuromuscular re-education, transfer training  Plan of Care Reviewed With: patient  Progress: no change  Outcome Evaluation: Patient presents with weakness, decreased functional endurance, and balance impairment affecting functional mobility. Skilled IP PT services warranted to address deficits and support return to independence. Recommend home with 24/7 assist at d/c.     Time Calculation:   PT Evaluation Complexity  History, PT Evaluation Complexity: 3 or more personal factors and/or comorbidities  Examination of Body Systems (PT Eval Complexity): total of 4 or more elements  Clinical Presentation (PT Evaluation Complexity): stable  Clinical Decision Making (PT Evaluation Complexity): low complexity  Overall Complexity (PT Evaluation Complexity): low complexity     PT Charges       Row Name 06/03/24 1040             Time Calculation    Start Time 1040  -NS      PT Received On 06/03/24  -NS      PT Goal Re-Cert Due Date 06/13/24  -NS         Timed Charges    81438 - PT Therapeutic Activity Minutes 14  -NS         Untimed Charges    PT Eval/Re-eval Minutes 48  -NS         Total Minutes    Timed Charges Total Minutes 14  -NS      Untimed Charges Total Minutes 48  -NS       Total Minutes 62  -NS                User Key  (r) = Recorded By, (t) = Taken By, (c) = Cosigned By      Initials Name Provider Type    Shahana Smith PT Physical Therapist                  Therapy Charges for Today       Code Description Service Date Service Provider Modifiers Qty    05170643720 HC PT THERAPEUTIC ACT EA 15 MIN 6/3/2024 Shahana Wagner, PT GP 1    [id number]  HC PT EVAL LOW COMPLEXITY 4 6/3/2024 Shahana Wagner, PT GP 1            PT G-Codes  Outcome Measure Options: AM-PAC 6 Clicks Daily Activity (OT)  AM-PAC 6 Clicks Score (PT): 16  AM-PAC 6 Clicks Score (OT): 13  PT Discharge Summary  Anticipated Discharge Disposition (PT): home with 24/7 care    Shahana Wagner, PT  6/3/2024

## 2024-06-03 NOTE — PLAN OF CARE
Goal Outcome Evaluation:  Plan of Care Reviewed With: patient        Progress: no change  Outcome Evaluation: Patient presents with weakness, decreased functional endurance, and balance impairment affecting functional mobility. Skilled IP PT services warranted to address deficits and support return to independence. Recommend home with 24/7 assist at d/c.      Anticipated Discharge Disposition (PT): home with 24/7 care

## 2024-06-03 NOTE — PROGRESS NOTES
[facility] Medicine Services  PROGRESS NOTE    Patient Name: Joanna Cross  : 1950  MRN: [id number]    Date of Admission: 2024  Primary Care Physician: Reynaldo Fritz MD    Subjective   Subjective     CC:  Follow-up shortness of breath    HPI:  Net -2.9 L since admission.  On 2 L nasal cannula.  Afebrile.  Still with some shortness of breath, but reports it is improved compared to yesterday.  Denied cough.  No chest pain.  No GI or  symptoms.      Objective   Objective     Vital Signs:   Temp:  [97.4 °F (36.3 °C)-98 °F (36.7 °C)] 98 °F (36.7 °C)  Heart Rate:  [62-77] 75  Resp:  [16-18] 18  BP: (103-126)/(48-82) 123/61  Flow (L/min):  [2] 2     Physical Exam:  Constitutional: No acute distress, awake, alert  HENT: NCAT, mucous membranes moist  Respiratory: Bibasilar crackles, respiratory effort normal   Cardiovascular: RRR  Gastrointestinal: Positive bowel sounds, soft, nontender, nondistended  Musculoskeletal: No bilateral ankle edema  Psychiatric: Appropriate affect, cooperative  Neurologic: Symmetric facies, moves all extremities well, speech clear  Skin: No rashes      Results Reviewed:  LAB RESULTS:      Lab 24  0436 24  0729   WBC 5.06 5.60   HEMOGLOBIN 10.3* 10.5*   HEMATOCRIT 31.2* 32.6*   PLATELETS 133* 121*   NEUTROS ABS  --  3.77   IMMATURE GRANS (ABS)  --  0.03   LYMPHS ABS  --  0.64*   MONOS ABS  --  0.54   EOS ABS  --  0.57*   MCV 96.6 101.2*         Lab 24  0436 24  0729   SODIUM 142 139   POTASSIUM 3.8 4.2   CHLORIDE 105 103   CO2 29.0 24.0   ANION GAP 8.0 12.0   BUN 32* 43*   CREATININE 1.47* 1.64*   EGFR 37.1* 32.5*   GLUCOSE 76 149*   CALCIUM 9.2 9.1   MAGNESIUM  --  2.1   PHOSPHORUS 4.1  --    HEMOGLOBIN A1C  --  5.90*   TSH  --  4.050         Lab 24  0436 24  0729   TOTAL PROTEIN  --  6.2   ALBUMIN 3.7 4.1   GLOBULIN  --  2.1   ALT (SGPT)  --  8   AST (SGOT)  --  21   BILIRUBIN  --  0.3   ALK PHOS  --  78          Lab 06/02/24  1524 06/02/24  1306 06/02/24  0729   PROBNP  --   --  2,206.0*   HSTROP T 41* 42* 49*             Lab 06/03/24  0436   IRON 83   IRON SATURATION (TSAT) 30   TIBC 279*   TRANSFERRIN 187*   FERRITIN 597.80*   FOLATE >20.00   VITAMIN B 12 765         Brief Urine Lab Results       None            Microbiology Results Abnormal       None            Adult Transthoracic Echo Complete W/ Cont if Necessary Per Protocol    Result Date: 6/2/2024  •  Left ventricular systolic function is normal. Calculated left ventricular EF = 54.6% •  The left atrial cavity is dilated. •  Estimated right ventricular systolic pressure from tricuspid regurgitation is normal (<35 mmHg). Calculated right ventricular systolic pressure from tricuspid regurgitation is 21 mmHg. •  The aortic root measures 3 cm.     XR Chest 1 View    Result Date: 6/2/2024  XR CHEST 1 VW Date of Exam: 6/2/2024 7:20 AM EDT Indication: SOA triage protocol Comparison: 1/26/2024. Findings: There are no airspace consolidations. No pleural fluid. No pneumothorax. The pulmonary vasculature appears within normal limits. The cardiac and mediastinal silhouette appear unremarkable. No acute osseous abnormality identified.     Impression: Impression: No acute cardiopulmonary process. Electronically Signed: Lelia Barrientos MD  6/2/2024 7:42 AM EDT  Workstation ID: [id number]     Results for orders placed during the hospital encounter of 06/02/24    Adult Transthoracic Echo Complete W/ Cont if Necessary Per Protocol    Interpretation Summary  •  Left ventricular systolic function is normal. Calculated left ventricular EF = 54.6%  •  The left atrial cavity is dilated.  •  Estimated right ventricular systolic pressure from tricuspid regurgitation is normal (<35 mmHg). Calculated right ventricular systolic pressure from tricuspid regurgitation is 21 mmHg.  •  The aortic root measures 3 cm.      Current medications:  Scheduled Meds:amantadine, 100 mg, Oral, BID  apixaban, 5  mg, Oral, Q12H  aspirin, 81 mg, Oral, Nightly  carbidopa-levodopa, 1 tablet, Oral, 5x Daily  carbidopa-levodopa, 2 tablet, Oral, Q AM  citalopram, 20 mg, Oral, Nightly  hydroxychloroquine, 200 mg, Oral, Q12H  insulin lispro, 2-7 Units, Subcutaneous, 4x Daily AC & at Bedtime  levothyroxine, 175 mcg, Oral, Q AM  pantoprazole, 40 mg, Oral, BID AC  pharmacy consult - MTM, , Does not apply, Daily  pramipexole, 1.5 mg, Oral, Nightly  ranolazine, 1,000 mg, Oral, Q12H  [Held by provider] sacubitril-valsartan, 1 tablet, Oral, Q12H  senna-docusate sodium, 2 tablet, Oral, BID  sodium chloride, 10 mL, Intravenous, Q12H  [Held by provider] spironolactone, 25 mg, Oral, BID      Continuous Infusions:   PRN Meds:.•  acetaminophen  •  albuterol  •  senna-docusate sodium **AND** polyethylene glycol **AND** bisacodyl **AND** bisacodyl  •  dextrose  •  dextrose  •  glucagon (human recombinant)  •  hydrOXYzine  •  ipratropium  •  ondansetron ODT **OR** ondansetron  •  senna  •  sodium chloride  •  sodium chloride  •  sodium chloride    Assessment & Plan   Assessment & Plan     Active Hospital Problems    Diagnosis  POA   • Chronic respiratory failure with hypoxia [J96.11]  Yes   • Chronic anticoagulation [Z79.01]  Not Applicable   • Acute diastolic CHF (congestive heart failure) [I50.31]  Yes   • Chronic kidney disease, stage 3b [N18.32]  Yes   • Type 2 diabetes mellitus with hyperglycemia, without long-term current use of insulin [E11.65]  Yes   • Anemia associated with stage 3 chronic renal failure [N18.30, D63.1]  Yes   • Long term current use of antiarrhythmic drug [Z79.899]  Not Applicable   • 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        Brief Hospital Course to date:  Joanna Cross is a 73 y.o. female with h/o with HFpEF, CKD 3, MILLI on bipap qhs, T2DM, afib on eliquis with increased SOA and increased LE edema.    This patient's problems and plans were partially entered by my partner and updated as appropriate by me 06/03/24.     A/C DHF  Chronic Respiratory Failure, home O2 dependent 2LNC  --bumex 2mg IV given in ER with good response, additional dose this morning  --strict I&O, daily weight  --hold entresto for now and spironolactone d/t elevated creatinine  --Already improving, likely home on 6/4 if renal function continues to improve     CORNELIUS on CKD 3  --baseline creatinine about 0.9-1.2; on admit was 1.64  --improving with diuresis, continue to monitor     Shoulder surgery with Dr. Yo  Recent right scapula fracture  --follows with Dr. Yo, in sling     T2DM--low dose SSI for now.  Takes lantus 15units daily at home.  Glucose 76 and 99 so far today, so will hold off on scheduled insulin  Hypothyroid--continue levothyroxine  Afib--continue home eliquis and tikosyn     Anemia of chronic disease d/t CKD  --seen by Dr. Linton  --follow-up b12/folate      MILLI--bipap qhs at home      Expected Discharge Location and Transportation: home  Expected Discharge   Expected Discharge Date: 6/4/2024; Expected Discharge Time:      DVT prophylaxis:  Medical and mechanical DVT prophylaxis orders are present.         AM-PAC 6 Clicks Score (PT): 11 (06/02/24 1037)    CODE STATUS:   Code Status and Medical Interventions:   Ordered at: 06/02/24 1127     Level Of Support Discussed With:    Patient     Code Status (Patient has no pulse and is not breathing):    CPR (Attempt to Resuscitate)     Medical Interventions (Patient has pulse or is breathing):    Full Support       Hamida Vallecillo MD  06/03/24

## 2024-06-03 NOTE — CASE MANAGEMENT/SOCIAL WORK
Discharge Planning Assessment  [facility]     Patient Name: Joanna Cross  MRN: [id number]  Today's Date: 6/3/2024    Admit Date: 6/2/2024    Plan: discharge   Discharge Needs Assessment       Row Name 06/03/24 1417       Living Environment    People in Home child(lisa), adult    Name(s) of People in Home Silvana Cross(daughter)    Primary Care Provided by self;child(lisa)  Adult daughter assists    Provides Primary Care For no one, unable/limited ability to care for self    Family Caregiver if Needed child(lisa), adult    Family Caregiver Names Silvana Cross(daughter)    Quality of Family Relationships helpful;involved;supportive    Able to Return to Prior Arrangements yes    Living Arrangement Comments I met with pt in room with permission regarding discharge plan. Pt sitting up in a chair. Pt resides in [facility] and daughterSilvana lives with her.       Transition Planning    Patient/Family Anticipates Transition to home with family    Patient/Family Anticipated Services at Transition     Transportation Anticipated family or friend will provide       Discharge Needs Assessment    Readmission Within the Last 30 Days no previous admission in last 30 days    Equipment Currently Used at Home respiratory supplies;oxygen;walker, standard;walker, tod;grab bar;pulse ox;shoulder immobilizer;hospital bed;glucometer  Pt uses home O2 at 2-3 L prn and bipap at night through [facility]    Concerns to be Addressed discharge planning    Equipment Needed After Discharge grab bar, toilet;grab bar, tub/shower;glucometer;hospital bed;respiratory supplies;oxygen;shoulder immobilizer;walker, tod;walker, standard    Outpatient/Agency/Support Group Needs other (see comments)  TBD    Discharge Facility/Level of Care Needs other (see comments)  TBD.  Pt wants to go home    Discharge Coordination/Progress Pt confirms that she has [employer] Medicare with prescription coverage and [facility](mail  order) or [facility] in [place]. Pt  reports she fell on 5/11 and has been in a right shoulder immobilizer since 5/16 when fractured shoulder was determined. Pt reports prior to this fall, she was independent with ADLs. Since the fall, she uses a tod walker for mobility. and daughter(Silvana) stays with her with bathing, dressing, meals, and transportation. Pt also has a caregiver on days her daughter works from 8 a.m. -6pm.  Other DME is home O2 at 2-3 L prn through [facility], bipap at , W/C, grabbars, and hospital bed. Pt has a history of heart failure, CKD3, AFib, T2DM and here with SOA and lower leg edema. Pt plans to go home at discharge and her daughter or caregiver Meri Garcia will transport her. CM will cont to follow                   Discharge Plan       Row Name 06/03/24 3092       Plan    Plan discharge    Plan Comments Pt plans to go home at discharge and her daughter or caregiver Meri Garcia will transport her. Pt denies HH or rehab at this time, stating she is suppose to follow up with ortho to determine if shoulder is healing. CM will cont to follow    Final Discharge Disposition Code 01 - home or self-care                  Continued Care and Services - Admitted Since 6/2/2024    No active coordination exists for this encounter.       Expected Discharge Date and Time       Expected Discharge Date Expected Discharge Time    Jun 4, 2024            Demographic Summary       Row Name 06/03/24 2235       General Information    General Information Comments PCP is Reynaldo Fritz       Contact Information    Permission Granted to Share Info With     Contact Information Obtained for     Contact Information Comments Silvana Cross(daughter) [phone]                   Functional Status    No documentation.                  Psychosocial    No documentation.                  Abuse/Neglect    No documentation.                  Legal    No documentation.                   Substance Abuse    No documentation.                  Patient Forms    No documentation.                     Evelyne Falcon RN

## 2024-06-04 LAB
ANION GAP SERPL CALCULATED.3IONS-SCNC: 10 MMOL/L (ref 5–15)
BUN SERPL-MCNC: 36 MG/DL (ref 8–23)
BUN/CREAT SERPL: 23.5 (ref 7–25)
CALCIUM SPEC-SCNC: 8.9 MG/DL (ref 8.6–10.5)
CHLORIDE SERPL-SCNC: 102 MMOL/L (ref 98–107)
CO2 SERPL-SCNC: 27 MMOL/L (ref 22–29)
CREAT SERPL-MCNC: 1.53 MG/DL (ref 0.57–1)
DEPRECATED RDW RBC AUTO: 47.1 FL (ref 37–54)
EGFRCR SERPLBLD CKD-EPI 2021: 35.3 ML/MIN/1.73
ERYTHROCYTE [DISTWIDTH] IN BLOOD BY AUTOMATED COUNT: 13.2 % (ref 12.3–15.4)
GLUCOSE BLDC GLUCOMTR-MCNC: 118 MG/DL (ref 70–130)
GLUCOSE BLDC GLUCOMTR-MCNC: 121 MG/DL (ref 70–130)
GLUCOSE BLDC GLUCOMTR-MCNC: 200 MG/DL (ref 70–130)
GLUCOSE BLDC GLUCOMTR-MCNC: 95 MG/DL (ref 70–130)
GLUCOSE SERPL-MCNC: 77 MG/DL (ref 65–99)
HCT VFR BLD AUTO: 33.2 % (ref 34–46.6)
HGB BLD-MCNC: 10.7 G/DL (ref 12–15.9)
MAGNESIUM SERPL-MCNC: 2.1 MG/DL (ref 1.6–2.4)
MCH RBC QN AUTO: 31.8 PG (ref 26.6–33)
MCHC RBC AUTO-ENTMCNC: 32.2 G/DL (ref 31.5–35.7)
MCV RBC AUTO: 98.8 FL (ref 79–97)
PLATELET # BLD AUTO: 130 10*3/MM3 (ref 140–450)
PMV BLD AUTO: 9.7 FL (ref 6–12)
POTASSIUM SERPL-SCNC: 4.2 MMOL/L (ref 3.5–5.2)
RBC # BLD AUTO: 3.36 10*6/MM3 (ref 3.77–5.28)
SODIUM SERPL-SCNC: 139 MMOL/L (ref 136–145)
WBC NRBC COR # BLD AUTO: 5.86 10*3/MM3 (ref 3.4–10.8)

## 2024-06-04 PROCEDURE — 99232 SBSQ HOSP IP/OBS MODERATE 35: CPT | Performed by: NURSE PRACTITIONER

## 2024-06-04 PROCEDURE — 82948 REAGENT STRIP/BLOOD GLUCOSE: CPT

## 2024-06-04 PROCEDURE — 94799 UNLISTED PULMONARY SVC/PX: CPT

## 2024-06-04 PROCEDURE — 85027 COMPLETE CBC AUTOMATED: CPT | Performed by: STUDENT IN AN ORGANIZED HEALTH CARE EDUCATION/TRAINING PROGRAM

## 2024-06-04 PROCEDURE — 83735 ASSAY OF MAGNESIUM: CPT | Performed by: STUDENT IN AN ORGANIZED HEALTH CARE EDUCATION/TRAINING PROGRAM

## 2024-06-04 PROCEDURE — 80048 BASIC METABOLIC PNL TOTAL CA: CPT | Performed by: STUDENT IN AN ORGANIZED HEALTH CARE EDUCATION/TRAINING PROGRAM

## 2024-06-04 PROCEDURE — 63710000001 INSULIN LISPRO (HUMAN) PER 5 UNITS: Performed by: INTERNAL MEDICINE

## 2024-06-04 RX ORDER — BUMETANIDE 1 MG/1
1 TABLET ORAL
Status: DISCONTINUED | OUTPATIENT
Start: 2024-06-04 | End: 2024-06-05 | Stop reason: HOSPADM

## 2024-06-04 RX ADMIN — LEVOTHYROXINE SODIUM 175 MCG: 0.17 TABLET ORAL at 05:42

## 2024-06-04 RX ADMIN — PANTOPRAZOLE SODIUM 40 MG: 40 TABLET, DELAYED RELEASE ORAL at 18:37

## 2024-06-04 RX ADMIN — CARBIDOPA AND LEVODOPA 1 TABLET: 25; 100 TABLET ORAL at 15:21

## 2024-06-04 RX ADMIN — SENNOSIDES AND DOCUSATE SODIUM 2 TABLET: 8.6; 5 TABLET ORAL at 21:34

## 2024-06-04 RX ADMIN — APIXABAN 5 MG: 5 TABLET, FILM COATED ORAL at 08:08

## 2024-06-04 RX ADMIN — SACUBITRIL AND VALSARTAN 1 TABLET: 24; 26 TABLET, FILM COATED ORAL at 21:35

## 2024-06-04 RX ADMIN — Medication 10 ML: at 21:35

## 2024-06-04 RX ADMIN — CARBIDOPA AND LEVODOPA 1 TABLET: 25; 100 TABLET ORAL at 21:35

## 2024-06-04 RX ADMIN — CARBIDOPA AND LEVODOPA 2 TABLET: 25; 100 TABLET ORAL at 05:42

## 2024-06-04 RX ADMIN — PANTOPRAZOLE SODIUM 40 MG: 40 TABLET, DELAYED RELEASE ORAL at 08:08

## 2024-06-04 RX ADMIN — APIXABAN 5 MG: 5 TABLET, FILM COATED ORAL at 21:34

## 2024-06-04 RX ADMIN — PRAMIPEXOLE DIHYDROCHLORIDE 1.5 MG: 1 TABLET ORAL at 21:35

## 2024-06-04 RX ADMIN — RANOLAZINE 1000 MG: 500 TABLET, FILM COATED, EXTENDED RELEASE ORAL at 21:34

## 2024-06-04 RX ADMIN — RANOLAZINE 1000 MG: 500 TABLET, FILM COATED, EXTENDED RELEASE ORAL at 08:08

## 2024-06-04 RX ADMIN — HYDROXYZINE HYDROCHLORIDE 25 MG: 25 TABLET ORAL at 21:33

## 2024-06-04 RX ADMIN — AMANTADINE HYDROCHLORIDE 100 MG: 100 CAPSULE ORAL at 08:08

## 2024-06-04 RX ADMIN — CARBIDOPA AND LEVODOPA 1 TABLET: 25; 100 TABLET ORAL at 12:11

## 2024-06-04 RX ADMIN — ASPIRIN 81 MG: 81 TABLET, COATED ORAL at 21:34

## 2024-06-04 RX ADMIN — HYDROXYCHLOROQUINE SULFATE 200 MG: 200 TABLET ORAL at 21:35

## 2024-06-04 RX ADMIN — Medication 10 ML: at 08:09

## 2024-06-04 RX ADMIN — SENNOSIDES AND DOCUSATE SODIUM 2 TABLET: 8.6; 5 TABLET ORAL at 08:08

## 2024-06-04 RX ADMIN — CARBIDOPA AND LEVODOPA 1 TABLET: 25; 100 TABLET ORAL at 08:15

## 2024-06-04 RX ADMIN — INSULIN LISPRO 2 UNITS: 100 INJECTION, SOLUTION INTRAVENOUS; SUBCUTANEOUS at 18:37

## 2024-06-04 RX ADMIN — BUMETANIDE 1 MG: 1 TABLET ORAL at 18:12

## 2024-06-04 RX ADMIN — HYDROXYCHLOROQUINE SULFATE 200 MG: 200 TABLET ORAL at 08:08

## 2024-06-04 RX ADMIN — AMANTADINE HYDROCHLORIDE 100 MG: 100 CAPSULE ORAL at 21:35

## 2024-06-04 RX ADMIN — CARBIDOPA AND LEVODOPA 1 TABLET: 25; 100 TABLET ORAL at 17:46

## 2024-06-04 RX ADMIN — CITALOPRAM HYDROBROMIDE 20 MG: 20 TABLET ORAL at 21:35

## 2024-06-04 NOTE — PROGRESS NOTES
[facility] Medicine Services  PROGRESS NOTE    Patient Name: Joanna Cross  : 1950  MRN: [id number]    Date of Admission: 2024  Primary Care Physician: Reynaldo Fritz MD    Subjective   Subjective     CC:  Follow-up shortness of breath    HPI:  No new issues overnight  On RA, breathing improved      Objective   Objective     Vital Signs:   Temp:  [97.3 °F (36.3 °C)-97.8 °F (36.6 °C)] 97.8 °F (36.6 °C)  Heart Rate:  [63-76] 76  Resp:  [16-18] 18  BP: (106-134)/(50-69) 134/56  Flow (L/min):  [2] 2     Physical Exam:  Constitutional: No acute distress, awake, alert, up in chair, daughter at bedside  HENT: NCAT, mucous membranes moist  Respiratory: Clear to auscultation bilaterally, respiratory effort normal   Cardiovascular: RRR, no murmurs, rubs, or gallops  Gastrointestinal: Positive bowel sounds, soft, nontender, nondistended  Musculoskeletal: No bilateral ankle edema, RUE sling in place  Psychiatric: Appropriate affect, cooperative  Neurologic: Oriented x 3, SIMON, speech clear  Skin: No rashes noted        Results Reviewed:  LAB RESULTS:      Lab 24  0556 24  0436 24  0729   WBC 5.86 5.06 5.60   HEMOGLOBIN 10.7* 10.3* 10.5*   HEMATOCRIT 33.2* 31.2* 32.6*   PLATELETS 130* 133* 121*   NEUTROS ABS  --   --  3.77   IMMATURE GRANS (ABS)  --   --  0.03   LYMPHS ABS  --   --  0.64*   MONOS ABS  --   --  0.54   EOS ABS  --   --  0.57*   MCV 98.8* 96.6 101.2*         Lab 24  0556 24  0436 24  0729   SODIUM 139 142 139   POTASSIUM 4.2 3.8 4.2   CHLORIDE 102 105 103   CO2 27.0 29.0 24.0   ANION GAP 10.0 8.0 12.0   BUN 36* 32* 43*   CREATININE 1.53* 1.47* 1.64*   EGFR 35.3* 37.1* 32.5*   GLUCOSE 77 76 149*   CALCIUM 8.9 9.2 9.1   MAGNESIUM 2.1  --  2.1   PHOSPHORUS  --  4.1  --    HEMOGLOBIN A1C  --   --  5.90*   TSH  --   --  4.050         Lab 24  0436 24  0729   TOTAL PROTEIN  --  6.2   ALBUMIN 3.7 4.1   GLOBULIN  --  2.1   ALT  (SGPT)  --  8   AST (SGOT)  --  21   BILIRUBIN  --  0.3   ALK PHOS  --  78         Lab 06/02/24  1524 06/02/24  1306 06/02/24  0729   PROBNP  --   --  2,206.0*   HSTROP T 41* 42* 49*             Lab 06/03/24  0436   IRON 83   IRON SATURATION (TSAT) 30   TIBC 279*   TRANSFERRIN 187*   FERRITIN 597.80*   FOLATE >20.00   VITAMIN B 12 765         Brief Urine Lab Results       None            Microbiology Results Abnormal       None            No radiology results from the last 24 hrs    Results for orders placed during the hospital encounter of 06/02/24    Adult Transthoracic Echo Complete W/ Cont if Necessary Per Protocol    Interpretation Summary  •  Left ventricular systolic function is normal. Calculated left ventricular EF = 54.6%  •  The left atrial cavity is dilated.  •  Estimated right ventricular systolic pressure from tricuspid regurgitation is normal (<35 mmHg). Calculated right ventricular systolic pressure from tricuspid regurgitation is 21 mmHg.  •  The aortic root measures 3 cm.      Current medications:  Scheduled Meds:amantadine, 100 mg, Oral, BID  apixaban, 5 mg, Oral, Q12H  aspirin, 81 mg, Oral, Nightly  bumetanide, 1 mg, Oral, BID  carbidopa-levodopa, 1 tablet, Oral, 5x Daily  carbidopa-levodopa, 2 tablet, Oral, Q AM  citalopram, 20 mg, Oral, Nightly  hydroxychloroquine, 200 mg, Oral, Q12H  insulin lispro, 2-7 Units, Subcutaneous, 4x Daily AC & at Bedtime  levothyroxine, 175 mcg, Oral, Q AM  pantoprazole, 40 mg, Oral, BID AC  pharmacy consult - MTM, , Does not apply, Daily  pramipexole, 1.5 mg, Oral, Nightly  ranolazine, 1,000 mg, Oral, Q12H  sacubitril-valsartan, 1 tablet, Oral, Q12H  senna-docusate sodium, 2 tablet, Oral, BID  sodium chloride, 10 mL, Intravenous, Q12H  [Held by provider] spironolactone, 25 mg, Oral, BID      Continuous Infusions:   PRN Meds:.•  acetaminophen  •  albuterol  •  senna-docusate sodium **AND** polyethylene glycol **AND** bisacodyl **AND** bisacodyl  •  dextrose  •   dextrose  •  glucagon (human recombinant)  •  hydrOXYzine  •  ipratropium  •  ondansetron ODT **OR** ondansetron  •  senna  •  sodium chloride  •  sodium chloride  •  sodium chloride    Assessment & Plan   Assessment & Plan     Active Hospital Problems    Diagnosis  POA   • Chronic respiratory failure with hypoxia [J96.11]  Yes   • Chronic anticoagulation [Z79.01]  Not Applicable   • Acute diastolic CHF (congestive heart failure) [I50.31]  Yes   • Chronic kidney disease, stage 3b [N18.32]  Yes   • Type 2 diabetes mellitus with hyperglycemia, without long-term current use of insulin [E11.65]  Yes   • Anemia associated with stage 3 chronic renal failure [N18.30, D63.1]  Yes   • Long term current use of antiarrhythmic drug [Z79.899]  Not Applicable   • 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        Brief Hospital Course to date:  Joanna Cross is a 73 y.o. female with h/o with HFpEF, CKD 3, MILLI on bipap qhs, T2DM, afib on eliquis with increased SOA and increased LE edema.    This patient's problems and plans were partially entered by my partner and updated as appropriate by me 06/04/24.     A/C DHF  Chronic Respiratory Failure, home O2 dependent 2LNC  --s/p bumex 2mg IV given in ER with good response, additional dose this morning  --strict I&O, daily weight  --restart entresto, continue to hold spironolactone   --PO bumex     CORNELIUS on CKD 3  --baseline creatinine about 0.9-1.2; on admit was 1.64  --improving with diuresis, continue to monitor  --recheck labs in am     Shoulder surgery with Dr. Yo  Recent right scapula fracture  --follows with Dr. Yo, in ing      T2DM--low dose SSI for now.  Takes lantus 15units daily at home.     Latest Reference Range & Units 06/04/24 05:56 06/04/24 07:34 06/04/24 11:41 06/04/24 16:17   Glucose 70 - 130 mg/dL 77 95 121 200 (H)     Hypothyroid--continue levothyroxine  Afib--continue home eliquis, tikosyn recently stopped by Dr. Teague     Anemia of chronic disease d/t CKD  --seen by Dr. Linton  --b12/folate reviewed     MILLI--bipap qhs at home      Expected Discharge Location and Transportation: home  Expected Discharge   Expected Discharge Date: 6/4/2024; Expected Discharge Time:      DVT prophylaxis:  Medical and mechanical DVT prophylaxis orders are present.         AM-PAC 6 Clicks Score (PT): 16 (06/04/24 0810)    CODE STATUS:   Code Status and Medical Interventions:   Ordered at: 06/02/24 1127     Level Of Support Discussed With:    Patient     Code Status (Patient has no pulse and is not breathing):    CPR (Attempt to Resuscitate)     Medical Interventions (Patient has pulse or is breathing):    Full Support       DEJA Bob  06/04/24

## 2024-06-04 NOTE — CASE MANAGEMENT/SOCIAL WORK
Continued Stay Note   Page     Patient Name: Joanna Cross  MRN: [id number]  Today's Date: 6/4/2024    Admit Date: 6/2/2024    Plan: discharge plan   Discharge Plan       Row Name 06/04/24 1253       Plan    Plan discharge plan    Plan Comments I met with pt and pt's daughterSilvana in room with permission as they were inquiring about HH.  Pt/daughter are interested in HH and I provided them a list of HH agencies from [facility] list.  DaughterSilvana will review and let CM know which agency pt wants. CM will cont to follow    Final Discharge Disposition Code 06 - home with home health care                   Discharge Codes    No documentation.                 Expected Discharge Date and Time       Expected Discharge Date Expected Discharge Time    Jun 4, 2024               Evelyne Falcon RN

## 2024-06-04 NOTE — PLAN OF CARE
Problem: Adult Inpatient Plan of Care  Goal: Absence of Hospital-Acquired Illness or Injury  Outcome: Ongoing, Progressing  Intervention: Identify and Manage Fall Risk  Description: Perform standard risk assessment on admission using a validated tool or comprehensive approach appropriate to the patient; reassess fall risk frequently, with change in status or transfer to another level of care.  Communicate fall injury risk to interprofessional healthcare team.  Determine need for increased observation, equipment and environmental modification, such as low bed, signage and supportive, nonskid footwear.  Adjust safety measures to individual developmental age, stage and identified risk factors.  Reinforce the importance of safety and physical activity with patient and family.  Perform regular intentional rounding to assess need for position change, pain assessment and personal needs, including assistance with toileting.  Recent Flowsheet Documentation  Taken 6/4/2024 1830 by Zunilda Abdullahi RN  Safety Promotion/Fall Prevention:   nonskid shoes/slippers when out of bed   safety round/check completed  Taken 6/4/2024 1610 by Zunilda Abdullahi RN  Safety Promotion/Fall Prevention:   nonskid shoes/slippers when out of bed   safety round/check completed  Taken 6/4/2024 1410 by Zunilda Abdullahi RN  Safety Promotion/Fall Prevention:   nonskid shoes/slippers when out of bed   safety round/check completed  Taken 6/4/2024 1210 by Zunilda Abdullahi RN  Safety Promotion/Fall Prevention:   nonskid shoes/slippers when out of bed   safety round/check completed  Taken 6/4/2024 1010 by Zunilda Abdullahi RN  Safety Promotion/Fall Prevention:   nonskid shoes/slippers when out of bed   safety round/check completed  Taken 6/4/2024 0810 by Zunilda Abdullahi RN  Safety Promotion/Fall Prevention:   activity supervised   assistive device/personal items within reach   clutter free environment maintained   fall prevention program maintained   nonskid  shoes/slippers when out of bed   room organization consistent   safety round/check completed   toileting scheduled  Intervention: Prevent Skin Injury  Description: Perform a screening for skin injury risk, such as pressure or moisture associated skin damage on admission and at regular intervals throughout hospital stay.  Keep all areas of skin (especially folds) clean and dry.  Maintain adequate skin hydration.  Relieve and redistribute pressure and protect bony prominences; implement measures based on patient-specific risk factors.  Match turning and repositioning schedule to clinical condition.  Encourage weight shift frequently; assist with reposition if unable to complete independently.  Float heels off bed; avoid pressure on the Achilles tendon.  Keep skin free from extended contact with medical devices.  Encourage functional activity and mobility, as early as tolerated.  Use aids (e.g., slide boards, mechanical lift) during transfer.  Recent Flowsheet Documentation  Taken 6/4/2024 1830 by Zunilda Abdullahi RN  Body Position: position changed independently  Skin Protection:   adhesive use limited   skin-to-device areas padded   skin sealant/moisture barrier applied   skin-to-skin areas padded   transparent dressing maintained   tubing/devices free from skin contact  Taken 6/4/2024 1610 by Zunilda Abdullahi RN  Body Position: position changed independently  Skin Protection:   adhesive use limited   skin-to-device areas padded   skin sealant/moisture barrier applied   skin-to-skin areas padded   transparent dressing maintained   tubing/devices free from skin contact  Taken 6/4/2024 1410 by Zunilda Abdullahi RN  Body Position: position changed independently  Skin Protection:   adhesive use limited   skin-to-device areas padded   skin sealant/moisture barrier applied   skin-to-skin areas padded   transparent dressing maintained   tubing/devices free from skin contact  Taken 6/4/2024 1210 by Zunilda Abdullahi RN  Body  Position: position changed independently  Skin Protection:   adhesive use limited   skin-to-device areas padded   skin sealant/moisture barrier applied   skin-to-skin areas padded   transparent dressing maintained   tubing/devices free from skin contact  Taken 6/4/2024 1010 by Zunilda Abdullahi RN  Body Position: position changed independently  Skin Protection:   adhesive use limited   skin-to-device areas padded   skin sealant/moisture barrier applied   skin-to-skin areas padded   transparent dressing maintained   tubing/devices free from skin contact  Taken 6/4/2024 0810 by Zunilda Abdullahi RN  Body Position: position changed independently  Skin Protection:   adhesive use limited   skin-to-device areas padded   skin sealant/moisture barrier applied   skin-to-skin areas padded   transparent dressing maintained   tubing/devices free from skin contact  Intervention: Prevent and Manage VTE (Venous Thromboembolism) Risk  Description: Assess for VTE (venous thromboembolism) risk.  Encourage and assist with early ambulation.  Initiate and maintain compression or other therapy, as indicated, based on identified risk in accordance with organizational protocol and provider order.  Encourage both active and passive leg exercises while in bed, if unable to ambulate.  Recent Flowsheet Documentation  Taken 6/4/2024 1610 by Zunilda Abdullahi RN  Activity Management: activity encouraged  Taken 6/4/2024 1410 by Zunilda Abdullahi RN  Activity Management: activity encouraged  Taken 6/4/2024 1210 by Zunilda Abdullahi RN  Activity Management: activity encouraged  Taken 6/4/2024 1010 by Zunilda Abdullahi RN  Activity Management: activity encouraged  Taken 6/4/2024 0810 by Zunilda Abudllahi RN  Activity Management: activity minimized  VTE Prevention/Management: (see MAR) other (see comments)  Range of Motion: ROM (range of motion) performed  Intervention: Prevent Infection  Description: Maintain skin and mucous membrane integrity; promote hand, oral  and pulmonary hygiene.  Optimize fluid balance, nutrition, sleep and glycemic control to maximize infection resistance.  Identify potential sources of infection early to prevent or mitigate progression of infection (e.g., wound, lines, devices).  Evaluate ongoing need for invasive devices; remove promptly when no longer indicated.  Recent Flowsheet Documentation  Taken 6/4/2024 1830 by Zunilda Abdullahi RN  Infection Prevention:   environmental surveillance performed   rest/sleep promoted   single patient room provided  Taken 6/4/2024 1610 by Zuinlda Abdullahi RN  Infection Prevention:   environmental surveillance performed   rest/sleep promoted   single patient room provided  Taken 6/4/2024 1410 by Zunilda Abdullahi RN  Infection Prevention:   environmental surveillance performed   rest/sleep promoted   single patient room provided  Taken 6/4/2024 1210 by Zunilda Abdullahi RN  Infection Prevention:   environmental surveillance performed   rest/sleep promoted   single patient room provided  Taken 6/4/2024 1010 by Zunilda Abdullahi RN  Infection Prevention:   environmental surveillance performed   rest/sleep promoted   single patient room provided  Taken 6/4/2024 0810 by Zunilda Abdullahi RN  Infection Prevention:   rest/sleep promoted   single patient room provided     Problem: Adult Inpatient Plan of Care  Goal: Optimal Comfort and Wellbeing  Outcome: Ongoing, Progressing  Intervention: Provide Person-Centered Care  Description: Use a family-focused approach to care.  Develop trust and rapport by proactively providing information, encouraging questions, addressing concerns and offering reassurance.  Acknowledge emotional response to hospitalization.  Recognize and utilize personal coping strategies.  Honor spiritual and cultural preferences.  Recent Flowsheet Documentation  Taken 6/4/2024 1830 by Zunilda Abdullahi RN  Trust Relationship/Rapport:   choices provided   empathic listening provided   questions encouraged    reassurance provided   thoughts/feelings acknowledged  Taken 6/4/2024 1610 by Zunilda Abdullahi RN  Trust Relationship/Rapport:   choices provided   empathic listening provided   questions encouraged   reassurance provided   thoughts/feelings acknowledged  Taken 6/4/2024 1410 by Zunilda Abdullahi RN  Trust Relationship/Rapport:   choices provided   empathic listening provided   questions encouraged   reassurance provided   thoughts/feelings acknowledged  Taken 6/4/2024 1210 by Zunilda Abdullahi RN  Trust Relationship/Rapport:   choices provided   empathic listening provided   questions encouraged   reassurance provided   thoughts/feelings acknowledged  Taken 6/4/2024 1010 by Zunilda Abdullahi RN  Trust Relationship/Rapport:   choices provided   empathic listening provided   questions encouraged   reassurance provided   thoughts/feelings acknowledged  Taken 6/4/2024 0810 by Zunilda Abdullahi RN  Trust Relationship/Rapport:   choices provided   care explained   emotional support provided   empathic listening provided   questions answered   questions encouraged   reassurance provided   thoughts/feelings acknowledged     Problem: Skin Injury Risk Increased  Goal: Skin Health and Integrity  Outcome: Ongoing, Progressing  Intervention: Optimize Skin Protection  Description: Perform a full pressure injury risk assessment, as indicated by screening, upon admission to care unit.  Reassess skin (injury risk, full inspection) frequently (e.g., scheduled interval, with change in condition) to provide optimal early detection and prevention.  Maintain adequate tissue perfusion (e.g., encourage fluid balance; avoid crossing legs, constrictive clothing or devices) to promote tissue oxygenation.  Maintain head of bed at lowest degree of elevation tolerated, considering medical condition and other restrictions.  Avoid positioning onto an area that remains reddened.  Minimize incontinence and moisture (e.g., toileting schedule; moisture-wicking  pad, diaper or incontinence collection device; skin moisture barrier).  Cleanse skin promptly and gently when soiled utilizing a pH-balanced cleanser.  Relieve and redistribute pressure (e.g., scheduled position changes, weight shifts, use of support surface, medical device repositioning, protective dressing application, use of positioning device, microclimate control, use of pressure-injury-monitor  Encourage increased activity, such as sitting in a chair at the bedside or early mobilization, when able to tolerate.  Recent Flowsheet Documentation  Taken 6/4/2024 1830 by Zunilda Abdullahi RN  Pressure Reduction Techniques: frequent weight shift encouraged  Head of Bed (HOB) Positioning: [facility] elevated  Pressure Reduction Devices:   heel offloading device utilized   positioning supports utilized   pressure-redistributing mattress utilized  Skin Protection:   adhesive use limited   skin-to-device areas padded   skin sealant/moisture barrier applied   skin-to-skin areas padded   transparent dressing maintained   tubing/devices free from skin contact  Taken 6/4/2024 1610 by Zunilda Abdullahi RN  Pressure Reduction Techniques: frequent weight shift encouraged  Head of Bed ([facility]) Positioning: [facility] elevated  Pressure Reduction Devices:   heel offloading device utilized   positioning supports utilized   pressure-redistributing mattress utilized  Skin Protection:   adhesive use limited   skin-to-device areas padded   skin sealant/moisture barrier applied   skin-to-skin areas padded   transparent dressing maintained   tubing/devices free from skin contact  Taken 6/4/2024 1410 by Zunilda Abdullahi RN  Pressure Reduction Techniques: frequent weight shift encouraged  Head of Bed ([facility]) Positioning: [facility] elevated  Pressure Reduction Devices:   heel offloading device utilized   positioning supports utilized   pressure-redistributing mattress utilized  Skin Protection:   adhesive use limited   skin-to-device areas padded   skin  sealant/moisture barrier applied   skin-to-skin areas padded   transparent dressing maintained   tubing/devices free from skin contact  Taken 6/4/2024 1210 by Zunilda Abdullahi RN  Pressure Reduction Techniques: frequent weight shift encouraged  Head of Bed (HOB) Positioning: HOB elevated  Pressure Reduction Devices:   heel offloading device utilized   positioning supports utilized   pressure-redistributing mattress utilized  Skin Protection:   adhesive use limited   skin-to-device areas padded   skin sealant/moisture barrier applied   skin-to-skin areas padded   transparent dressing maintained   tubing/devices free from skin contact  Taken 6/4/2024 1010 by Zunilda Abdullahi RN  Pressure Reduction Techniques: frequent weight shift encouraged  Head of Bed (HOB) Positioning: HOB elevated  Pressure Reduction Devices:   heel offloading device utilized   positioning supports utilized   pressure-redistributing mattress utilized  Problem: Adjustment to Illness (Heart Failure)  Goal: Optimal Coping  Outcome: Ongoing, Progressing  Intervention: Support Psychosocial Response  Description: Acknowledge, normalize and validate the emotional response to current condition and unpredictable nature of disease progression.  Assess and monitor patient and caregiver perspective on quality of life and personal wellbeing; consider palliative care consult to enhance symptom relief and quality of life.  Engage patient in early and ongoing discussion about goals of care. Facilitate shared decision-making regarding goals and advanced care planning.  Assist patient and family with life transitions associated with heart failure (e.g., adherence to medical regimens, impact on family dynamics/roles, end-of-life care); acknowledge caregiver stress.  Recognize current coping strategies and assist in developing new strategies (problem-solving, mind-body techniques).  Assess and monitor for signs and symptoms of anxiety and depression.  Recent Flowsheet  Documentation  Taken 6/4/2024 1830 by Zunilda Abdullahi RN  Supportive Measures:   active listening utilized   self-care encouraged   self-reflection promoted   self-responsibility promoted   verbalization of feelings encouraged   relaxation techniques promoted  Family/Support System Care:   self-care encouraged   support provided  Taken 6/4/2024 1610 by Zunilda Abdullahi RN  Supportive Measures:   active listening utilized   self-care encouraged   self-reflection promoted   self-responsibility promoted   verbalization of feelings encouraged   relaxation techniques promoted  Family/Support System Care:   self-care encouraged   support provided  Taken 6/4/2024 1410 by Zunidla Abdullahi RN  Supportive Measures:   active listening utilized   self-care encouraged   self-reflection promoted   self-responsibility promoted   verbalization of feelings encouraged   relaxation techniques promoted  Family/Support System Care:   self-care encouraged   support provided  Taken 6/4/2024 1210 by Zunilda Abdullahi RN  Supportive Measures:   active listening utilized   self-care encouraged   self-reflection promoted   self-responsibility promoted   verbalization of feelings encouraged   relaxation techniques promoted  Family/Support System Care:   self-care encouraged   support provided  Taken 6/4/2024 1010 by Zunilda Abdullahi RN  Supportive Measures:   active listening utilized   self-care encouraged   self-reflection promoted   self-responsibility promoted   verbalization of feelings encouraged   relaxation techniques promoted  Family/Support System Care:   self-care encouraged   support provided  Taken 6/4/2024 0810 by Zunilda Abdullahi RN  Supportive Measures:   active listening utilized   self-care encouraged   self-reflection promoted   verbalization of feelings encouraged   self-responsibility promoted  Family/Support System Care:   support provided   self-care encouraged     Problem: Functional Ability Impaired (Heart Failure)  Goal:  Optimal Functional Ability  Outcome: Ongoing, Progressing  Intervention: Optimize Functional Ability  Description: Assess functional ability and cognition; consider social history, including living environment, roles and social engagement, to identify risk or presence of functional decline; routinely reassess during hospitalization to identify any change.  Facilitate physical activity and exercise to improve functional ability, cognition and quality of life, as well as minimize functional decline associated with inactivity; encourage optimal functional mobility.  Consider NMES (neuromuscular electrical stimulation) of the lower extremities for patients with limitations in ability to participate in active exercise.  Monitor physiologic response to activity or exercise and adjust accordingly; provide a warm-up and cool-down with activity.  Cluster, coordinate and organize care schedule per patient preference, priorities and tolerance.  Preplan and pace activity; balance activity with periods of rest; incorporate energy-conservation techniques.  Maintain an accessible environment to facilitate safe activity; position for optimal comfort and activity tolerance (e.g., sitting for self-care).  Encourage self-care performance to promote maximum independence in daily activity; provide adaptive equipment and rest periods if needed.  Offer personal aids, such as glasses, hearing aids and dentures; encourage familiar home items and neurosensory stimulation activities to prevent isolation and sensory deprivation.  Recent Flowsheet Documentation  Taken 6/4/2024 1830 by Zunilda Abdullahi RN  Self-Care Promotion: independence encouraged  Taken 6/4/2024 1610 by Zunilda Abdullahi RN  Activity Management: activity encouraged  Self-Care Promotion: independence encouraged  Taken 6/4/2024 1410 by Zunilda Abdullahi RN  Activity Management: activity encouraged  Self-Care Promotion: independence encouraged  Taken 6/4/2024 1210 by Zunilda Abdullahi  RN  Activity Management: activity encouraged  Self-Care Promotion: independence encouraged  Taken 6/4/2024 1010 by Zunilda Abdullahi RN  Activity Management: activity encouraged  Self-Care Promotion: independence encouraged  Taken 6/4/2024 0810 by Zunilda Abdullahi RN  Activity Management: activity minimized  Self-Care Promotion: independence encouraged     Problem: Fall Injury Risk  Goal: Absence of Fall and Fall-Related Injury  Outcome: Ongoing, Progressing  Intervention: Identify and Manage Contributors  Description: Develop a fall prevention plan with the patient and caregiver/family.  Provide reorientation, appropriate sensory stimulation and routines with changes in mental status to decrease risk of fall.  Promote use of personal vision and auditory aids.  Assess assistance level required for safe and effective self-care; provide support as needed, such as toileting, mobilization. For age 65 and older, implement timed toileting with assistance.  Encourage physical activity, such as performance of mobility and self-care at highest level of patient ability, multicomponent exercise program and provision of appropriate assistive devices.  If fall occurs, assess the severity of injury; implement fall injury protocol. Determine the cause and revise fall injury prevention plan.  Regularly review medication contribution to fall risk; adjust medication administration times to minimize risk of falling.  Consider risk related to polypharmacy and age.  Balance adequate pain management with potential for oversedation.  Recent Flowsheet Documentation  Taken 6/4/2024 1830 by Zunilda Abdullahi RN  Self-Care Promotion: independence encouraged  Taken 6/4/2024 1610 by Zunilda Abdullahi RN  Medication Review/Management: medications reviewed  Self-Care Promotion: independence encouraged  Taken 6/4/2024 1410 by Zunilda Abdullahi RN  Self-Care Promotion: independence encouraged  Taken 6/4/2024 1210 by Zunilda Abdullahi RN  Self-Care Promotion:  independence encouraged  Taken 6/4/2024 1010 by Zunilda Abdullahi RN  Self-Care Promotion: independence encouraged  Taken 6/4/2024 0810 by Zunilda Abdullahi RN  Medication Review/Management: medications reviewed  Self-Care Promotion: independence encouraged  Intervention: Promote Injury-Free Environment  Description: Provide a safe, barrier-free environment that encourages independent activity.  Keep care area uncluttered and well-lighted.  Determine need for increased observation or monitoring.  Avoid use of devices that minimize mobility, such as restraints or indwelling urinary catheter.  Recent Flowsheet Documentation  Taken 6/4/2024 1830 by Zunilda Abdullahi RN  Safety Promotion/Fall Prevention:   nonskid shoes/slippers when out of bed   safety round/check completed  Taken 6/4/2024 1610 by Zunilda Abdullahi RN  Safety Promotion/Fall Prevention:   nonskid shoes/slippers when out of bed   safety round/check completed  Taken 6/4/2024 1410 by Zunilda Abdullahi RN  Safety Promotion/Fall Prevention:   nonskid shoes/slippers when out of bed   safety round/check completed  Taken 6/4/2024 1210 by Zunilda Abdullahi RN  Safety Promotion/Fall Prevention:   nonskid shoes/slippers when out of bed   safety round/check completed  Taken 6/4/2024 1010 by Zunilda Abdullahi RN  Safety Promotion/Fall Prevention:   nonskid shoes/slippers when out of bed   safety round/check completed  Taken 6/4/2024 0810 by Zunilda Abdullahi RN  Safety Promotion/Fall Prevention:   activity supervised   assistive device/personal items within reach   clutter free environment maintained   fall prevention program maintained   nonskid shoes/slippers when out of bed   room organization consistent   safety round/check completed   toileting scheduled     Skin Protection:   adhesive use limited   skin-to-device areas padded   skin sealant/moisture barrier applied   skin-to-skin areas padded   transparent dressing maintained   tubing/devices free from skin contact  Taken  6/4/2024 0810 by Zunilda Abdullahi RN  Pressure Reduction Techniques: frequent weight shift encouraged  Head of Bed (HOB) Positioning: HOB elevated  Pressure Reduction Devices:   heel offloading device utilized   positioning supports utilized   pressure-redistributing mattress utilized  Skin Protection:   adhesive use limited   skin-to-device areas padded   skin sealant/moisture barrier applied   skin-to-skin areas padded   transparent dressing maintained   tubing/devices free from skin contact   Goal Outcome Evaluation:  Plan of Care Reviewed With: patient        Progress: improving  Outcome Evaluation: Pt AO x4/VSS/95% on RA/aFib on monitor. Pt here with CHF, getting diuertics as ordered. Cr elevated, will be re-assessed tomorrow morning. Pt and family working with Case management on getting home health. Continue monitoring.

## 2024-06-05 ENCOUNTER — READMISSION MANAGEMENT (OUTPATIENT)
Dept: CALL CENTER | Facility: HOSPITAL | Age: 74
End: 2024-06-05
Payer: MEDICARE

## 2024-06-05 VITALS
DIASTOLIC BLOOD PRESSURE: 57 MMHG | SYSTOLIC BLOOD PRESSURE: 127 MMHG | HEIGHT: 62 IN | OXYGEN SATURATION: 99 % | HEART RATE: 69 BPM | TEMPERATURE: 98.2 F | RESPIRATION RATE: 16 BRPM | BODY MASS INDEX: 41.14 KG/M2 | WEIGHT: 223.55 LBS

## 2024-06-05 LAB
ANION GAP SERPL CALCULATED.3IONS-SCNC: 8 MMOL/L (ref 5–15)
BASOPHILS # BLD AUTO: 0.05 10*3/MM3 (ref 0–0.2)
BASOPHILS NFR BLD AUTO: 0.7 % (ref 0–1.5)
BUN SERPL-MCNC: 41 MG/DL (ref 8–23)
BUN/CREAT SERPL: 24.3 (ref 7–25)
CALCIUM SPEC-SCNC: 9.2 MG/DL (ref 8.6–10.5)
CHLORIDE SERPL-SCNC: 103 MMOL/L (ref 98–107)
CO2 SERPL-SCNC: 29 MMOL/L (ref 22–29)
CREAT SERPL-MCNC: 1.69 MG/DL (ref 0.57–1)
DEPRECATED RDW RBC AUTO: 47.6 FL (ref 37–54)
EGFRCR SERPLBLD CKD-EPI 2021: 31.4 ML/MIN/1.73
EOSINOPHIL # BLD AUTO: 0.69 10*3/MM3 (ref 0–0.4)
EOSINOPHIL NFR BLD AUTO: 10.1 % (ref 0.3–6.2)
ERYTHROCYTE [DISTWIDTH] IN BLOOD BY AUTOMATED COUNT: 13.2 % (ref 12.3–15.4)
GLUCOSE BLDC GLUCOMTR-MCNC: 113 MG/DL (ref 70–130)
GLUCOSE BLDC GLUCOMTR-MCNC: 115 MG/DL (ref 70–130)
GLUCOSE SERPL-MCNC: 109 MG/DL (ref 65–99)
HCT VFR BLD AUTO: 31.9 % (ref 34–46.6)
HGB BLD-MCNC: 10.4 G/DL (ref 12–15.9)
IMM GRANULOCYTES # BLD AUTO: 0.12 10*3/MM3 (ref 0–0.05)
IMM GRANULOCYTES NFR BLD AUTO: 1.7 % (ref 0–0.5)
LYMPHOCYTES # BLD AUTO: 0.94 10*3/MM3 (ref 0.7–3.1)
LYMPHOCYTES NFR BLD AUTO: 13.7 % (ref 19.6–45.3)
MCH RBC QN AUTO: 32.5 PG (ref 26.6–33)
MCHC RBC AUTO-ENTMCNC: 32.6 G/DL (ref 31.5–35.7)
MCV RBC AUTO: 99.7 FL (ref 79–97)
MONOCYTES # BLD AUTO: 0.81 10*3/MM3 (ref 0.1–0.9)
MONOCYTES NFR BLD AUTO: 11.8 % (ref 5–12)
NEUTROPHILS NFR BLD AUTO: 4.25 10*3/MM3 (ref 1.7–7)
NEUTROPHILS NFR BLD AUTO: 62 % (ref 42.7–76)
NRBC BLD AUTO-RTO: 0 /100 WBC (ref 0–0.2)
PLATELET # BLD AUTO: 128 10*3/MM3 (ref 140–450)
PMV BLD AUTO: 10 FL (ref 6–12)
POTASSIUM SERPL-SCNC: 3.8 MMOL/L (ref 3.5–5.2)
RBC # BLD AUTO: 3.2 10*6/MM3 (ref 3.77–5.28)
SODIUM SERPL-SCNC: 140 MMOL/L (ref 136–145)
WBC NRBC COR # BLD AUTO: 6.86 10*3/MM3 (ref 3.4–10.8)

## 2024-06-05 PROCEDURE — 85025 COMPLETE CBC W/AUTO DIFF WBC: CPT | Performed by: NURSE PRACTITIONER

## 2024-06-05 PROCEDURE — 82948 REAGENT STRIP/BLOOD GLUCOSE: CPT

## 2024-06-05 PROCEDURE — 80048 BASIC METABOLIC PNL TOTAL CA: CPT | Performed by: NURSE PRACTITIONER

## 2024-06-05 PROCEDURE — 99239 HOSP IP/OBS DSCHRG MGMT >30: CPT | Performed by: NURSE PRACTITIONER

## 2024-06-05 RX ORDER — FLUTICASONE PROPIONATE 50 MCG
2 SPRAY, SUSPENSION (ML) NASAL DAILY PRN
COMMUNITY

## 2024-06-05 RX ORDER — INSULIN GLARGINE 100 [IU]/ML
15 INJECTION, SOLUTION SUBCUTANEOUS DAILY
COMMUNITY
End: 2024-06-05 | Stop reason: HOSPADM

## 2024-06-05 RX ORDER — GINSENG 100 MG
1 CAPSULE ORAL DAILY
COMMUNITY

## 2024-06-05 RX ORDER — OMEPRAZOLE 40 MG/1
40 CAPSULE, DELAYED RELEASE ORAL 2 TIMES DAILY
COMMUNITY

## 2024-06-05 RX ORDER — CLOBETASOL PROPIONATE 0.5 MG/G
1 CREAM TOPICAL 2 TIMES WEEKLY
COMMUNITY

## 2024-06-05 RX ORDER — LEVOTHYROXINE SODIUM 175 UG/1
175 TABLET ORAL DAILY
COMMUNITY

## 2024-06-05 RX ORDER — SPIRONOLACTONE 25 MG/1
25 TABLET ORAL 2 TIMES DAILY
COMMUNITY
End: 2024-06-05 | Stop reason: HOSPADM

## 2024-06-05 RX ORDER — PRAMIPEXOLE DIHYDROCHLORIDE 1.5 MG/1
1.5 TABLET ORAL NIGHTLY
COMMUNITY

## 2024-06-05 RX ORDER — CITALOPRAM 20 MG/1
20 TABLET ORAL DAILY
COMMUNITY

## 2024-06-05 RX ORDER — ALBUTEROL SULFATE 90 UG/1
2 AEROSOL, METERED RESPIRATORY (INHALATION) EVERY 6 HOURS PRN
COMMUNITY

## 2024-06-05 RX ORDER — BUMETANIDE 1 MG/1
1 TABLET ORAL 2 TIMES DAILY
COMMUNITY

## 2024-06-05 RX ADMIN — PANTOPRAZOLE SODIUM 40 MG: 40 TABLET, DELAYED RELEASE ORAL at 09:38

## 2024-06-05 RX ADMIN — HYDROXYCHLOROQUINE SULFATE 200 MG: 200 TABLET ORAL at 09:38

## 2024-06-05 RX ADMIN — APIXABAN 5 MG: 5 TABLET, FILM COATED ORAL at 09:38

## 2024-06-05 RX ADMIN — RANOLAZINE 1000 MG: 500 TABLET, FILM COATED, EXTENDED RELEASE ORAL at 09:38

## 2024-06-05 RX ADMIN — LEVOTHYROXINE SODIUM 175 MCG: 0.17 TABLET ORAL at 05:54

## 2024-06-05 RX ADMIN — SACUBITRIL AND VALSARTAN 1 TABLET: 24; 26 TABLET, FILM COATED ORAL at 09:38

## 2024-06-05 RX ADMIN — CARBIDOPA AND LEVODOPA 1 TABLET: 25; 100 TABLET ORAL at 15:33

## 2024-06-05 RX ADMIN — CARBIDOPA AND LEVODOPA 2 TABLET: 25; 100 TABLET ORAL at 05:54

## 2024-06-05 RX ADMIN — SENNOSIDES AND DOCUSATE SODIUM 2 TABLET: 8.6; 5 TABLET ORAL at 09:38

## 2024-06-05 RX ADMIN — AMANTADINE HYDROCHLORIDE 100 MG: 100 CAPSULE ORAL at 09:38

## 2024-06-05 RX ADMIN — CARBIDOPA AND LEVODOPA 1 TABLET: 25; 100 TABLET ORAL at 12:17

## 2024-06-05 RX ADMIN — BUMETANIDE 1 MG: 1 TABLET ORAL at 09:38

## 2024-06-05 RX ADMIN — CARBIDOPA AND LEVODOPA 1 TABLET: 25; 100 TABLET ORAL at 09:38

## 2024-06-05 RX ADMIN — Medication 10 ML: at 09:39

## 2024-06-05 NOTE — CASE MANAGEMENT/SOCIAL WORK
Continued Stay Note   [place]     Patient Name: Joanna Cross  MRN: [id number]  Today's Date: 6/5/2024    Admit Date: 6/2/2024    Plan: discharge plan   Discharge Plan       Row Name 06/05/24 1312       Plan    Plan discharge plan    Plan Comments I spoke with pt in room regarding discharge plan and following up with  the HH chosen from [facility] Patient Choice List given.  Pt states she does not think she needs HH at this time. She wants to follow up with her orthopedic surgeon and if outpatient PT needed, plans to go to outpatient PT. I explained to her if she gets home and decides on HH, she can  call per PCP. Pt verbalied understanding and denies further discharge needs at this time. Family will provide transportation home.    Final Discharge Disposition Code 01 - home or self-care                   Discharge Codes    No documentation.                 Expected Discharge Date and Time       Expected Discharge Date Expected Discharge Time    Jun 6, 2024               Evelyne Falcon RN

## 2024-06-05 NOTE — OUTREACH NOTE
Prep Survey      Flowsheet Row Responses   [facility] patient discharged from? [place]   Is LACE score < 7 ? No   Eligibility [facility]   Date of Admission 06/02/24   Date of Discharge 06/05/24   Discharge Disposition Home or Self Care   Discharge diagnosis A/C CHF, CORNELIUS on CKD   Does the patient have one of the following disease processes/diagnoses(primary or secondary)? CHF   Does the patient have Home health ordered? No   Is there a DME ordered? No   Prep survey completed? Yes            Meme DOVER - Registered Nurse

## 2024-06-05 NOTE — DISCHARGE SUMMARY
[facility]  DISCHARGE SUMMARY    Patient Name: Joanna Cross  : 1950  MRN: [id number]    Date of Admission: 2024  6:56 AM  Date of Discharge:  24  Primary Care Physician: Reynaldo Fritz MD    Consults       No orders found from 2024 to 6/3/2024.            Hospital Course     Presenting Problem: SOA, leg swelling    Active Hospital Problems    Diagnosis  POA   • Chronic respiratory failure with hypoxia [J96.11]  Yes   • Chronic anticoagulation [Z79.01]  Not Applicable   • Acute diastolic CHF (congestive heart failure) [I50.31]  Yes   • Chronic kidney disease, stage 3b [N18.32]  Yes   • Type 2 diabetes mellitus with hyperglycemia, without long-term current use of insulin [E11.65]  Yes   • Anemia associated with stage 3 chronic renal failure [N18.30, D63.1]  Yes   • Long term current use of antiarrhythmic drug [Z79.899]  Not Applicable   • 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          Hospital Course:  Joanna Cross is a 73 y.o. female  h/o with HFpEF, CKD 3, MILLI on bipap qhs, T2DM, afib on eliquis with increased SOA and increased LE edema.     This patient's problems and plans were partially entered by my partner and updated as appropriate by me 24.     A/C DHF  Chronic Respiratory Failure, home O2 dependent 2LNC  --s/p bumex 2mg IV given in ER with good response, additional dose 6/3/24  --strict I&O, daily weight  --restart entresto, continue to hold spironolactone   --PO bumex  --follow up with [facility]  --keep follow up with Dr. Ho SHIELDS on CKD 3  --baseline  creatinine about 0.9-1.2; on admit was 1.64  --improving with diuresis, continue to monitor  --daughter reports she follows closely with Dr. Boothe at  and creatinine has been 1.4-1.6 recently     Shoulder surgery with Dr. Yo  Recent right scapula fracture  --follows with Dr. Yo, continue sling     T2DM  --low dose SSI for now.  Takes lantus 15units daily at home, will hold off on restarting at [place] due to not needing insulin while inpatient  --A1C 5.9  --continue farxiga   Latest Reference Range & Units 06/04/24 20:11 06/05/24 04:38 06/05/24 08:16 06/05/24 12:02   Glucose 70 - 130 mg/dL 118 109 (H) 113 115     Hypothyroid--continue levothyroxine  Afib--continue home eliquis, tikosyn recently stopped by Dr. Teague     Anemia of chronic disease d/t CKD  --seen by Dr. Linton  --b12/folate reviewed     MILLI--bipap qhs at home  Discharge Follow Up Recommendations for outpatient labs/diagnostics:  PCP 1 week  [facility] 3-5d  Dr. Teague as scheduled  Dr. Boothe this week for repeat labs to recheck creatinine     Day of Discharge     HPI:   No new issues   Breathing continues to improve  Remains on RA    Review of Systems  Gen- No fevers, chills  CV- No chest pain, palpitations  Resp- No cough, dyspnea  GI- No N/V/D, abd pain      Vital Signs:   Temp:  [97.2 °F (36.2 °C)-98.2 °F (36.8 °C)] 98.2 °F (36.8 °C)  Heart Rate:  [63-80] 69  Resp:  [16] 16  BP: (115-138)/(51-90) 127/57      Physical Exam:  Constitutional: No acute distress, awake, alert  HENT: NCAT, mucous membranes moist  Respiratory: Clear to auscultation bilaterally, respiratory effort normal   Cardiovascular: RRR, no murmurs, rubs, or gallops  Gastrointestinal: Positive bowel sounds, soft, nontender, nondistended  Musculoskeletal: No bilateral ankle edema. RUE in sling  Psychiatric: Appropriate affect, cooperative, pleasant  Neurologic: Oriented x 3, SIMON, speech clear  Skin: No rashes noted      Pertinent  and/or Most Recent Results     LAB  RESULTS:      Lab 06/05/24 0438 06/04/24  0556 06/03/24  0436 06/02/24  0729   WBC 6.86 5.86 5.06 5.60   HEMOGLOBIN 10.4* 10.7* 10.3* 10.5*   HEMATOCRIT 31.9* 33.2* 31.2* 32.6*   PLATELETS 128* 130* 133* 121*   NEUTROS ABS 4.25  --   --  3.77   IMMATURE GRANS (ABS) 0.12*  --   --  0.03   LYMPHS ABS 0.94  --   --  0.64*   MONOS ABS 0.81  --   --  0.54   EOS ABS 0.69*  --   --  0.57*   MCV 99.7* 98.8* 96.6 101.2*         Lab 06/05/24 0438 06/04/24  0556 06/03/24  0436 06/02/24  0729   SODIUM 140 139 142 139   POTASSIUM 3.8 4.2 3.8 4.2   CHLORIDE 103 102 105 103   CO2 29.0 27.0 29.0 24.0   ANION GAP 8.0 10.0 8.0 12.0   BUN 41* 36* 32* 43*   CREATININE 1.69* 1.53* 1.47* 1.64*   EGFR 31.4* 35.3* 37.1* 32.5*   GLUCOSE 109* 77 76 149*   CALCIUM 9.2 8.9 9.2 9.1   MAGNESIUM  --  2.1  --  2.1   PHOSPHORUS  --   --  4.1  --    HEMOGLOBIN A1C  --   --   --  5.90*   TSH  --   --   --  4.050         Lab 06/03/24  0436 06/02/24  0729   TOTAL PROTEIN  --  6.2   ALBUMIN 3.7 4.1   GLOBULIN  --  2.1   ALT (SGPT)  --  8   AST (SGOT)  --  21   BILIRUBIN  --  0.3   ALK PHOS  --  78         Lab 06/02/24  1524 06/02/24  1306 06/02/24  0729   PROBNP  --   --  2,206.0*   HSTROP T 41* 42* 49*             Lab 06/03/24 0436   IRON 83   IRON SATURATION (TSAT) 30   TIBC 279*   TRANSFERRIN 187*   FERRITIN 597.80*   FOLATE >20.00   VITAMIN B 12 765         Brief Urine Lab Results       None          Microbiology Results (last 10 days)       ** No results found for the last 240 hours. **            Adult Transthoracic Echo Complete W/ Cont if Necessary Per Protocol    Result Date: 6/2/2024  •  Left ventricular systolic function is normal. Calculated left ventricular EF = 54.6% •  The left atrial cavity is dilated. •  Estimated right ventricular systolic pressure from tricuspid regurgitation is normal (<35 mmHg). Calculated right ventricular systolic pressure from tricuspid regurgitation is 21 mmHg. •  The aortic root measures 3 cm.     XR Chest 1  View    Result Date: 6/2/2024  XR CHEST 1 VW Date of Exam: 6/2/2024 7:20 AM EDT Indication: SOA triage protocol Comparison: 1/26/2024. Findings: There are no airspace consolidations. No pleural fluid. No pneumothorax. The pulmonary vasculature appears within normal limits. The cardiac and mediastinal silhouette appear unremarkable. No acute osseous abnormality identified.     Impression: No acute cardiopulmonary process. Electronically Signed: Lelia Barrientos MD  6/2/2024 7:42 AM EDT  Workstation ID: [id number]     Results for orders placed during the hospital encounter of 08/29/19    Doppler Arterial Multi Level Lower Extremity - Bilateral CAR    Interpretation Summary  · Triphasic waveforms are seen in the right common, femoral and popliteal arteries with biphasic waveforms in the posterior tibial and dorsalis pedis arteries. The right GLYNN is normal at 1.03.  · Triphasic waveforms are seen in the left common, femoral and popliteal as well as left posterior tibial arteries. Biphasic waveform is seen in the dorsalis pedis on the left. The left GLYNN is normal at 1.1.  · No evidence of significant lower extremity arterial occlusive disease.      Results for orders placed during the hospital encounter of 08/29/19    Doppler Arterial Multi Level Lower Extremity - Bilateral CAR    Interpretation Summary  · Triphasic waveforms are seen in the right common, femoral and popliteal arteries with biphasic waveforms in the posterior tibial and dorsalis pedis arteries. The right GLYNN is normal at 1.03.  · Triphasic waveforms are seen in the left common, femoral and popliteal as well as left posterior tibial arteries. Biphasic waveform is seen in the dorsalis pedis on the left. The left GLYNN is normal at 1.1.  · No evidence of significant lower extremity arterial occlusive disease.      Results for orders placed during the hospital encounter of 06/02/24    Adult Transthoracic Echo Complete W/ Cont if Necessary Per  Protocol    Interpretation Summary  •  Left ventricular systolic function is normal. Calculated left ventricular EF = 54.6%  •  The left atrial cavity is dilated.  •  Estimated right ventricular systolic pressure from tricuspid regurgitation is normal (<35 mmHg). Calculated right ventricular systolic pressure from tricuspid regurgitation is 21 mmHg.  •  The aortic root measures 3 cm.      Discharge Details        Discharge Medications        Continue These Medications        Instructions Start Date   albuterol sulfate  (90 Base) MCG/ACT inhaler  Commonly known as: PROVENTIL HFA;VENTOLIN HFA;PROAIR HFA   2 puffs, Inhalation, Every 6 Hours PRN      amantadine 100 MG capsule  Commonly known as: SYMMETREL   100 mg, Oral, 2 Times Daily      apixaban 5 MG tablet tablet  Commonly known as: Eliquis   5 mg, Oral, Every 12 Hours Scheduled, Restart 1/29/24      aspirin 81 MG EC tablet   81 mg, Oral, Daily      bumetanide 1 MG tablet  Commonly known as: BUMEX   1 mg, Oral, 2 Times Daily      CALTRATE 600 PO   1 tablet, Oral, Daily      carbidopa-levodopa  MG per tablet  Commonly known as: SINEMET   Take 2 tablets by mouth at 6am, then 1 tablet at 9am, 12pm, 3pm, 6pm and 9pm.      cholecalciferol 25 MCG (1000 UT) tablet  Commonly known as: VITAMIN D3   1,000 Units, Oral, 2 Times Daily      citalopram 20 MG tablet  Commonly known as: CeleXA   20 mg, Oral, Daily      clobetasol propionate 0.05 % cream  Commonly known as: TEMOVATE   1 Application, Topical, 2 Times Weekly      FARXIGA PO   1 tablet, Oral, Daily      fluticasone 50 MCG/ACT nasal spray  Commonly known as: FLONASE   2 sprays, Nasal, Daily PRN      hydroxychloroquine 200 MG tablet  Commonly known as: PLAQUENIL   200 mg, Oral, 2 Times Daily      levothyroxine 175 MCG tablet  Commonly known as: SYNTHROID, LEVOTHROID   175 mcg, Oral, Daily      loratadine 10 MG tablet  Commonly known as: CLARITIN   10 mg, Oral, Daily      multivitamin with minerals tablet  "tablet   1 tablet, Oral, Daily      O2  Commonly known as: OXYGEN   2 L/min, Inhalation, Continuous      omeprazole 40 MG capsule  Commonly known as: priLOSEC   40 mg, Oral, 2 Times Daily      pramipexole 1.5 MG tablet  Commonly known as: MIRAPEX   1.5 mg, Oral, Nightly      ranolazine 500 MG 12 hr tablet  Commonly known as: RANEXA   1,000 mg, Oral, Every 12 Hours      sacubitril-valsartan 49-51 MG tablet  Commonly known as: ENTRESTO   1 tablet, Oral, Every 12 Hours Scheduled      senna 8.6 MG tablet  Commonly known as: SENOKOT   1 tablet, Oral, Daily      SUPER B COMPLEX PO   2 tablets, Oral, Daily      triamcinolone 0.1 % cream  Commonly known as: KENALOG   1 application , Topical, As Needed      Triamcinolone Acetonide 55 MCG/ACT nasal inhaler  Commonly known as: NASACORT   2 sprays, Nasal, Daily PRN      TRIPLE FLEX BONE & JOINT PO   1 tablet, Oral, Daily      Zinc 50 MG tablet   1 tablet, Oral, Daily             Stop These Medications      Lantus SoloStar 100 UNIT/ML injection pen  Generic drug: Insulin Glargine     spironolactone 25 MG tablet  Commonly known as: ALDACTONE              Allergies   Allergen Reactions   • Amlodipine Besylate Swelling     Lower extremity (ankles, feet) swelling   • Entacapone Other (See Comments)     \"extreme weakness in legs - caused several falls, which stopped after discontinuing this medication\"   • Epinephrine Other (See Comments)     6/4/16- had 3 shots to numb mouth to prepare teeth for crowns, the shots contained epi-  Caused pt to have chest discomfort- went to hospital in ambulance, discovered had a fib while there    • Levemir [Insulin Detemir] Hives     Hives / rash around injection site   • Penicillins Hives     Jitteriness    • Xarelto [Rivaroxaban] GI Bleeding     hgb dropped to 5.2   • Hydrocodone Unknown - High Severity     Headache, nausea, dizziness, & vomiting   • Aricept [Donepezil Hcl] Nausea Only     Vivid dreams   • Benztropine Mesylate Other (See Comments) " "    Uncontrollable body movements   • Cogentin [Benztropine] Other (See Comments)     \"uncontrollable body movements\"   • Compazine [Prochlorperazine Edisylate] Other (See Comments)     Dystonic reaction   • Duraprep [Antiseptic Products, Misc.] Itching and Rash     RASH AND ITCHING   • Haldol [Haloperidol Lactate] Other (See Comments)     Dystonic reaction   • Hydralazine Other (See Comments)     Headache    • Lisinopril Cough   • Statins Myalgia     Leg pain- all statins    • Sulfamethoxazole Nausea Only and Other (See Comments)     Nausea & headaches   • Tarka [Trandolapril-Verapamil Hcl Er] Other (See Comments)     Constipation    • Toprol Xl [Metoprolol Tartrate] Other (See Comments)     Extreme fatigue, decreased exercise tolerance   • Trimethoprim Other (See Comments)     Other reaction(s): Nausea         Discharge Disposition:  Home or Self Care    Diet:  Hospital:  Diet Order   Procedures   • Diet: Cardiac, Diabetic; Healthy Heart (2-3 Na+); Consistent Carbohydrate; Fluid Consistency: Thin (IDDSI 0)       Diet Instructions       Diet: Cardiac Diets, Diabetic Diets; Healthy Heart (2-3 Na+); Thin (IDDSI 0); Consistent Carbohydrate      Discharge Diet:  Cardiac Diets  Diabetic Diets       Cardiac Diet: Healthy Heart (2-3 Na+)    Fluid Consistency: Thin (IDDSI 0)    Diabetic Diet: Consistent Carbohydrate             Activity:  Activity Instructions       Activity as Tolerated                 CODE STATUS:    Code Status and Medical Interventions:   Ordered at: 06/02/24 1127     Level Of Support Discussed With:    Patient     Code Status (Patient has no pulse and is not breathing):    CPR (Attempt to Resuscitate)     Medical Interventions (Patient has pulse or is breathing):    Full Support       Future Appointments   Date Time Provider Department Center   6/10/2024  2:15 PM Reynaldo Fritz MD [facility] [place]   6/19/2024 11:30 AM  [facility] 6  [place] [facility] [place]   7/3/2024 10:40 AM LAB [facility] " [facility] [place]   7/3/2024 11:00 AM Denise Martin, APRN [facility] [place]   7/3/2024 11:30 AM  [place] [place] [facility] [place]   7/12/2024 11:30 AM Reynaldo Fritz MD [facility] [place]   10/17/2024  1:30 PM Liv Marshall PA [facility] [place]   10/23/2024  1:00 PM Myrna Mckeon APRN [facility] [place]       Additional Instructions for the Follow-ups that You Need to Schedule       Discharge Follow-up with PCP   As directed       Currently Documented PCP:    Reynaldo Fritz MD    PCP Phone Number:    [phone]     Follow Up Details: 1 week        Discharge Follow-up with Specified Provider: Dr. Teague as scheduled   As directed      To: Dr. Teague as scheduled        Discharge Follow-up with Specified Provider: [facility]   As directed      To: [facility]   Follow Up Details: 3-5d                  DEJA Bob  06/05/24      Time Spent on Discharge:  I spent  35  minutes on this discharge activity which included: face-to-face encounter with the patient, reviewing the data in the system, coordination of the care with the nursing staff as well as consultants, documentation, and entering orders.

## 2024-06-06 ENCOUNTER — HOSPITAL ENCOUNTER (EMERGENCY)
Facility: HOSPITAL | Age: 74
Discharge: HOME | End: 2024-06-06
Attending: EMERGENCY MEDICINE
Payer: MEDICARE

## 2024-06-06 ENCOUNTER — APPOINTMENT (OUTPATIENT)
Dept: GENERAL RADIOLOGY | Facility: HOSPITAL | Age: 74
End: 2024-06-06
Payer: MEDICARE

## 2024-06-06 ENCOUNTER — TRANSITIONAL CARE MANAGEMENT TELEPHONE ENCOUNTER (OUTPATIENT)
Dept: CALL CENTER | Facility: HOSPITAL | Age: 74
End: 2024-06-06
Payer: MEDICARE

## 2024-06-06 VITALS
TEMPERATURE: 97.7 F | OXYGEN SATURATION: 98 % | DIASTOLIC BLOOD PRESSURE: 78 MMHG | HEART RATE: 88 BPM | WEIGHT: 225 LBS | SYSTOLIC BLOOD PRESSURE: 157 MMHG | RESPIRATION RATE: 22 BRPM | HEIGHT: 62 IN | BODY MASS INDEX: 41.41 KG/M2

## 2024-06-06 DIAGNOSIS — M76.891 HIP FLEXOR TENDINITIS, RIGHT: Primary | ICD-10-CM

## 2024-06-06 DIAGNOSIS — M65.20 CALCIFIC TENDONITIS: ICD-10-CM

## 2024-06-06 PROCEDURE — 73552 X-RAY EXAM OF FEMUR 2/>: CPT

## 2024-06-06 PROCEDURE — 73502 X-RAY EXAM HIP UNI 2-3 VIEWS: CPT

## 2024-06-06 PROCEDURE — 99283 EMERGENCY DEPT VISIT LOW MDM: CPT

## 2024-06-06 RX ORDER — ACETAMINOPHEN 500 MG
1000 TABLET ORAL ONCE
Status: COMPLETED | OUTPATIENT
Start: 2024-06-06 | End: 2024-06-06

## 2024-06-06 RX ADMIN — ACETAMINOPHEN 1000 MG: 500 TABLET ORAL at 23:53

## 2024-06-06 NOTE — OUTREACH NOTE
Call Center TCM Note      Flowsheet Row Responses   [facility] patient discharged from? [place]   Does the patient have one of the following disease processes/diagnoses(primary or secondary)? CHF   TCM attempt successful? Yes   Call start time 1146   Call end time 1152   Discharge diagnosis A/C CHF, CORNELIUS on CKD   Person spoke with today (if not patient) and relationship pt   Comments Cardiology 6/13/24, Oncology 6/19/24   Does the patient have an appointment with their PCP within 7-14 days of discharge? Yes  [6/10/2024 at 2:15 PM]   Notified Case Management Education issues   Is the patient able to teach back Heart Failure Zones? Yes   CHF Zone this Call Green Zone   Green Zone Patient reports doing well, No new or worsening shortness of breath, No new swelling -  feet, ankles and legs look normal for you, Weight check stable, No chest pain, Physical activity level is normal for you   Green Zone Interventions Daily weight check   TCM call completed? Yes   Wrap up additional comments Pt states she is doing better, and denies any SOB, chest pain, weight gain. Reviewed AVS/meds with pt,  pt continues to take Lantus Solostart injection pen and will discuss with PCP about stopping. Last BG 90. Pt verified PCP, Oncology, Cardiology, Endocrinology fu appts.   Call end time 1152   Is the patient interested in additional calls from an ambulatory ? No             Laurie Boucher RN    6/6/2024, 12:01 EDT

## 2024-06-07 NOTE — ED PROVIDER NOTES
Subjective   History of Present Illness  Patient presents for evaluation of right anterior thigh/hip pain that started earlier today.  Patient states that it feels pain when she tries to move her leg.  She states that her leg feels heavy.  She does not feel weak or numb in the right leg.  She has not had any falls or injuries over the timeframe of her pain.  Patient was just discharged from the hospital yesterday where she was diagnosed and treated for volume overload secondary to congestive heart failure.  Patient states that her legs as a whole are significantly improved compared with when she came into the hospital.    History provided by:  Patient and relative      Review of Systems    Past Medical History:   Diagnosis Date    Allergic rhinitis     Anemia     Ankle problem     thinks back related causing pain     Atrial fibrillation     AVM (arteriovenous malformation)     Back pain     CHF (congestive heart failure) 6/2/2024    Chronic kidney disease     stage 3 per pt    Chronic lung disease 04/13/2022    CKD (chronic kidney disease)     Clotting disorder 2016    AVM - small intestine    Coronary artery disease involving native coronary artery without angina pectoris 03/01/2017    COVID-19 vaccine series completed     Cystic fibrosis     Diastolic dysfunction     Gastrocnemius muscle tear     left medial 91    Generalized osteoarthritis     GERD (gastroesophageal reflux disease)     Gestational diabetes     GIB (gastrointestinal bleeding) 2016    d/t xarelto     Headache     Hearing decreased, bilateral     HAS HEARING AID, NOT WEARING IT CURRENTLY    Hiatal hernia     History of shingles     History of transfusion 2016    no reaction recalled     Hyperlipidemia LDL goal <70 03/01/2017    Hypertension     Hypothyroidism     IBS (irritable bowel syndrome)     Klebsiella pneumonia     Lichen sclerosus     Lupus     subQ    Mouth problem     mouth guard used since pt bites tongue and lips excessively at night if  "not- with bipap     MRSA infection 2018    Myocardial infarction     Obesity     On home oxygen therapy     2L of oxygen all the time due to current congestion     Osteoporosis     Parkinson's disease     Peripheral vascular disease     Pleurisy     Pneumonia     Puerperal sepsis with acute hypoxic respiratory failure     emergent intubation- 2016    Pulmonary embolism     Right leg pain     from back issues     Salivary gland stone     Sciatic nerve pain     Seborrheic dermatitis     Skin cancer     on back     Sleep apnea     CPAP AND HOME O2 2L/M    Sleep apnea, obstructive 04/15/01    TIA (transient ischemic attack) 2014    no residual effects    Type 2 diabetes mellitus     UTI (urinary tract infection)     Vitamin D deficiency 09/08/2022    Wears glasses        Allergies   Allergen Reactions    Amlodipine Besylate Swelling     Lower extremity (ankles, feet) swelling    Entacapone Other (See Comments)     \"extreme weakness in legs - caused several falls, which stopped after discontinuing this medication\"    Epinephrine Other (See Comments)     6/4/16- had 3 shots to numb mouth to prepare teeth for crowns, the shots contained epi-  Caused pt to have chest discomfort- went to hospital in ambulance, discovered had a fib while there     Levemir [Insulin Detemir] Hives     Hives / rash around injection site    Penicillins Hives     Jitteriness     Xarelto [Rivaroxaban] GI Bleeding     hgb dropped to 5.2    Hydrocodone Unknown - High Severity     Headache, nausea, dizziness, & vomiting    Aricept [Donepezil Hcl] Nausea Only     Vivid dreams    Benztropine Mesylate Other (See Comments)     Uncontrollable body movements    Cogentin [Benztropine] Other (See Comments)     \"uncontrollable body movements\"    Compazine [Prochlorperazine Edisylate] Other (See Comments)     Dystonic reaction    Duraprep [Antiseptic Products, Misc.] Itching and Rash     RASH AND ITCHING    Haldol [Haloperidol Lactate] Other (See Comments)     " Dystonic reaction    Hydralazine Other (See Comments)     Headache     Lisinopril Cough    Statins Myalgia     Leg pain- all statins     Sulfamethoxazole Nausea Only and Other (See Comments)     Nausea & headaches    Tarka [Trandolapril-Verapamil Hcl Er] Other (See Comments)     Constipation     Toprol Xl [Metoprolol Tartrate] Other (See Comments)     Extreme fatigue, decreased exercise tolerance    Trimethoprim Other (See Comments)     Other reaction(s): Nausea       Past Surgical History:   Procedure Laterality Date    ABLATION OF DYSRHYTHMIC FOCUS  05/16/13    Laser Ablation - Rt Leg    BACK SURGERY      l4-l5 laminectomy     BICEPS TENDON REPAIR Right     shoulder    BREAST BIOPSY Left 05/2004    excisional, benign    BRONCHOSCOPY RIGID / FLEXIBLE      2016    BUNIONECTOMY Right     CARDIAC CATHETERIZATION      CARDIAC CATHETERIZATION N/A 02/01/2019    Procedure: Left Heart Cath;  Surgeon: Albertina Corona MD;  Location: [facility];  Service: Cardiology    CARDIAC ELECTROPHYSIOLOGY PROCEDURE N/A 01/26/2024    Procedure: Lead Revision RA or RV, PPM or ICD;  Surgeon: Lauro Francois MD;  Location: [facility] EP INVASIVE LOCATION;  Service: Cardiovascular;  Laterality: N/A;    CHOLECYSTECTOMY      COLONOSCOPY  2016    COLONOSCOPY N/A 06/17/2021    Procedure: COLONOSCOPY WITH POLYPECTOMY;  Surgeon: Trenton Sosa MD;  Location: [facility] ENDOSCOPY;  Service: Gastroenterology;  Laterality: N/A;    CORONARY STENT PLACEMENT      x1 stent    CYST REMOVAL      left ear, upper left back 2001    CYSTOSCOPY      x2  18 and 20 -   in 20 urethra dilation     DIAGNOSTIC LAPAROSCOPY  1981    ENDOSCOPIC FUNCTIONAL SINUS SURGERY (FESS)  2011    ENDOSCOPY  2016    ENTEROSCOPY VIA STOMA      with single ballon with fluoro     HAMMER TOE REPAIR Left     INCISION AND DRAINAGE OF WOUND  2018    back with wound infection     INSERT / REPLACE / REMOVE PACEMAKER  11/10/16    [facility]     JOINT REPLACEMENT      KNEE ARTHROSCOPY      LASER ABLATION      right leg 13    LIPOMA EXCISION  1999    left leg     LUMBAR LAMINECTOMY DISCECTOMY DECOMPRESSION N/A 07/06/2018    Procedure: LUMBAR LAMINECTOMY L4-5, HEMILAMIINECTOMY RIGHT L5-S1, FORAMINOTOMY L5-S1;  Surgeon: Lencho Gamino MD;  Location:  [facility] OR;  Service: Neurosurgery    LUMBAR LAMINECTOMY DISCECTOMY DECOMPRESSION N/A 09/19/2018    Procedure: INCISION AND DRAINAGE BACK WITH WOUND EXPLORATION;  Surgeon: Ritchie García MD;  Location:  [facility];  Service: Neurosurgery    LUNG BIOPSY Left 2016    OTHER SURGICAL HISTORY      ct scan of chest and sinuses and lower back     OTHER SURGICAL HISTORY      various echos     OTHER SURGICAL HISTORY      electroencephalogram 16,   emg  ncv tests 2007    OTHER SURGICAL HISTORY      mra 2007  and various mri with xrays, nuclear medicine lung ventilation with perfusion test 2016 with pft     OTHER SURGICAL HISTORY      barium swallow testing 15, 12, 12    OTHER SURGICAL HISTORY      vaginal ultrasound- 2012,   vas clementina lower extrem 2016, vas venous duplex lower extrem bilat 2019    OTHER SURGICAL HISTORY      wdge biopsy spring of lung     OTHER SURGICAL HISTORY      emergent intubation- hypoxic resp failure     OTHER SURGICAL HISTORY      01/26/2024 ppm generator change and lead revision per Dr. Francois    PACEMAKER IMPLANTATION  11/2016    sss    REPLACEMENT TOTAL KNEE Bilateral     left knee 2011, right 2012 per dr acuna     REPLACEMENT TOTAL KNEE Bilateral     SHOULDER ARTHROSCOPY Bilateral     2003-left, 2004- right     SKIN BIOPSY      skin cancer back 2016    SKIN CANCER EXCISION      upper righ tback     MADHAV      TEETH EXTRACTION      x2    TOTAL SHOULDER ARTHROPLASTY W/ DISTAL CLAVICLE EXCISION Left 10/24/2022    Procedure: REVERSE TOTAL SHOULDER ARTHROPLASTY, BICEPS TENODESIS - LEFT;  Surgeon: Sammy Yo MD;  Location:  [facility];  Service: Orthopedics;  Laterality: Left;    TOTAL SHOULDER  ARTHROPLASTY W/ DISTAL CLAVICLE EXCISION Right 2023    Procedure: REVERSE TOTAL SHOULDER  ARTHROPLASTY WITH BICEPS TENODESIS - RIGHT;  Surgeon: Sammy Yo MD;  Location: [facility];  Service: Orthopedics;  Laterality: Right;    TUBAL ABDOMINAL LIGATION Bilateral     WISDOM TOOTH EXTRACTION         Family History   Problem Relation Age of Onset    Kidney disease Mother     Coronary artery disease Mother     Hypertension Mother             Heart disease Mother             Hyperlipidemia Mother             Coronary artery disease Father     Hypertension Father             Hypothyroidism Father     Cancer Father         Oral cancer    Heart disease Father             Coronary artery disease Brother     Hypertension Brother     Heart disease Brother         Multiple stents, by-pass surgery    Testicular cancer Brother     Kidney cancer Maternal Uncle     Testicular cancer Maternal Uncle     Colon polyps Neg Hx     Colon cancer Neg Hx        Social History     Socioeconomic History    Marital status:      Spouse name: N/A    Number of children: 4    Years of education: College    Highest education level: Master's degree (e.g., MA, MS, Randi, MEd, MSW, NELLA)   Tobacco Use    Smoking status: Never     Passive exposure: Past    Smokeless tobacco: Never    Tobacco comments:     Father and mother smoked for several years.   Vaping Use    Vaping status: Never Used    Passive vaping exposure: Yes   Substance and Sexual Activity    Alcohol use: Never    Drug use: No    Sexual activity: Not Currently     Partners: Male     Birth control/protection: Post-menopausal, Tubal ligation, Vaginal insert contraception           Objective   Physical Exam  Constitutional:       General: She is not in acute distress.  HENT:      Head: Normocephalic and atraumatic.   Eyes:      Conjunctiva/sclera: Conjunctivae normal.      Pupils: Pupils are equal, round, and reactive to light.    Cardiovascular:      Rate and Rhythm: Normal rate and regular rhythm.      Pulses: Normal pulses.      Heart sounds: No murmur heard.     No gallop.   Pulmonary:      Effort: Pulmonary effort is normal. No respiratory distress.   Abdominal:      General: Abdomen is flat. There is no distension.      Tenderness: There is no abdominal tenderness.   Musculoskeletal:         General: No swelling or deformity. Normal range of motion.      Comments: There is normal passive range of motion of the right hip and knee without pain.  With active range of motion there is significant pain especially with flexion of the hip.  There is no pain with range of motion of the knee or ankle.  Patient has 5 out of 5 strength in the left lower extremity and right lower extremity.  The bilateral lower extremities are warm dry and well-perfused and capillary refill is less than 2 seconds   Skin:     General: Skin is warm and dry.      Capillary Refill: Capillary refill takes less than 2 seconds.   Neurological:      General: No focal deficit present.      Mental Status: She is alert and oriented to person, place, and time.      Comments: GCS 15.  Cranial Nerves II-XII intact without deficit.  Strength 5/5 in the left upper extremity.  Strength testing in the right upper extremity deferred due to patient being an orthopedic brace.  Strength 5/5 in the bilateral lower extremities.  Sensation to light touch intact throughout.  Cerebellar function intact via finger-nose-finger.     Psychiatric:         Mood and Affect: Mood normal.         Behavior: Behavior normal.         Procedures           ED Course  ED Course as of 06/07/24 0357   Thu Jun 06, 2024   3176 Radiographs of the right hip and femur independently interpreted by myself demonstrate no acute bony fracture or dislocation.  Radiologist notes some calcification along the lateral margin of the proximal right femoral diaphysis. [KB]      ED Course User Index  [KB] Miguel Ángel Shah MD                                              Medical Decision Making  Differential diagnosis includes muscular strain, ligamentous sprain, fracture.  Emergent pathology such as acute ischemic stroke, DVT, cellulitis, acute arterial occlusion or considered but felt to be less likely given patient's symptoms and exam findings.  Will obtain radiographs and Tylenol was given and patient was reassessed.    Discussed imaging findings with the patient.  At this time she is appropriate for outpatient management, was counseled on over-the-counter medication use, return precautions to the ER, primary care follow-up.    Problems Addressed:  Calcific tendonitis: complicated acute illness or injury  Hip flexor tendinitis, right: complicated acute illness or injury    Amount and/or Complexity of Data Reviewed  Radiology: ordered and independent interpretation performed. Decision-making details documented in ED Course.    Risk  OTC drugs.  Prescription drug management.  Decision regarding hospitalization.        Final diagnoses:   Hip flexor tendinitis, right   Calcific tendonitis       ED Disposition  ED Disposition       ED Disposition   Discharge    Condition   Stable    Comment   --           No results found for this or any previous visit (from the past 24 hour(s)).  Note: In addition to lab results from this visit, the labs listed above may include labs taken at another facility or during a different encounter within the last 24 hours. Please correlate lab times with ED admission and discharge times for further clarification of the services performed during this visit.    XR Femur 2 View Right   Final Result   Impression:   No acute osseous abnormality of the right hip or femur. Calcification is seen along the lateral margin of the proximal right femoral diaphysis likely related to calcific tendinitis of the linea aspera. Remote tendinous trauma is also in the differential.    No focal lesions or evidence of periostitis.        "  Electronically Signed: Lelia Barrientso MD     6/6/2024 11:39 PM EDT     Workstation ID: [id number]      XR Hip With or Without Pelvis 2 - 3 View Right   Final Result   Impression:   No acute osseous abnormality of the right hip or femur. Calcification is seen along the lateral margin of the proximal right femoral diaphysis likely related to calcific tendinitis of the linea aspera. Remote tendinous trauma is also in the differential.    No focal lesions or evidence of periostitis.         Electronically Signed: Lelia Barrientos MD     6/6/2024 11:39 PM EDT     Workstation ID: [id number]        Vitals:    06/06/24 2226   BP: 157/78   BP Location: Right arm   Patient Position: Lying   Pulse: 88   Resp: 22   Temp: 97.7 °F (36.5 °C)   TempSrc: Oral   SpO2: 98%   Weight: 102 kg (225 lb)   Height: 157.5 cm (62\")     Medications   acetaminophen (TYLENOL) tablet 1,000 mg (1,000 mg Oral Given 6/6/24 3833)     ECG/EMG Results (last 24 hours)       ** No results found for the last 24 hours. **          No orders to display           No follow-up provider specified.       Medication List      No changes were made to your prescriptions during this visit.            Miguel Ángel Shah MD  06/07/24 0357    "

## 2024-06-07 NOTE — DISCHARGE INSTRUCTIONS
Take Tylenol 500 mg every 6 hours to help with pain.  Close follow-up with your primary care doctor.  Return to the ER as needed for new or worsening symptoms

## 2024-06-10 ENCOUNTER — REFERRAL TRIAGE (OUTPATIENT)
Dept: CASE MANAGEMENT | Facility: OTHER | Age: 74
End: 2024-06-10
Payer: MEDICARE

## 2024-06-10 ENCOUNTER — OFFICE VISIT (OUTPATIENT)
Dept: FAMILY MEDICINE CLINIC | Facility: CLINIC | Age: 74
End: 2024-06-10
Payer: MEDICARE

## 2024-06-10 VITALS
SYSTOLIC BLOOD PRESSURE: 136 MMHG | HEART RATE: 79 BPM | OXYGEN SATURATION: 96 % | DIASTOLIC BLOOD PRESSURE: 70 MMHG | WEIGHT: 221 LBS | HEIGHT: 62 IN | BODY MASS INDEX: 40.67 KG/M2

## 2024-06-10 DIAGNOSIS — S42.91XG CLOSED FRACTURE OF RIGHT SHOULDER WITH DELAYED HEALING, SUBSEQUENT ENCOUNTER: ICD-10-CM

## 2024-06-10 DIAGNOSIS — I50.31 ACUTE DIASTOLIC CHF (CONGESTIVE HEART FAILURE): Primary | ICD-10-CM

## 2024-06-10 DIAGNOSIS — I25.10 CORONARY ARTERY DISEASE INVOLVING NATIVE CORONARY ARTERY OF NATIVE HEART WITHOUT ANGINA PECTORIS: ICD-10-CM

## 2024-06-10 DIAGNOSIS — N18.32 CHRONIC KIDNEY DISEASE, STAGE 3B: ICD-10-CM

## 2024-06-10 DIAGNOSIS — E11.65 TYPE 2 DIABETES MELLITUS WITH HYPERGLYCEMIA, WITHOUT LONG-TERM CURRENT USE OF INSULIN: ICD-10-CM

## 2024-06-10 DIAGNOSIS — T14.8XXA MUSCLE STRAIN: ICD-10-CM

## 2024-06-10 PROCEDURE — 99214 OFFICE O/P EST MOD 30 MIN: CPT | Performed by: FAMILY MEDICINE

## 2024-06-10 PROCEDURE — 3044F HG A1C LEVEL LT 7.0%: CPT | Performed by: FAMILY MEDICINE

## 2024-06-10 PROCEDURE — 3078F DIAST BP <80 MM HG: CPT | Performed by: FAMILY MEDICINE

## 2024-06-10 PROCEDURE — 1111F DSCHRG MED/CURRENT MED MERGE: CPT | Performed by: FAMILY MEDICINE

## 2024-06-10 PROCEDURE — 3075F SYST BP GE 130 - 139MM HG: CPT | Performed by: FAMILY MEDICINE

## 2024-06-10 PROCEDURE — 1125F AMNT PAIN NOTED PAIN PRSNT: CPT | Performed by: FAMILY MEDICINE

## 2024-06-10 RX ORDER — IPRATROPIUM BROMIDE 42 UG/1
2 SPRAY, METERED NASAL AS NEEDED
COMMUNITY
Start: 2024-03-01

## 2024-06-10 NOTE — ASSESSMENT & PLAN NOTE
Reviewed hospital stay with patient.  She has follow-up with Dr. Teague.  She is doing better and her breathing is much improved.  We will recheck in 5 weeks for Blood work in 4 weeks.  Told her if she starts getting worse to go to emergency room.

## 2024-06-10 NOTE — ASSESSMENT & PLAN NOTE
Patient was seen by orthopedics.  She has not injured hip flexor it.  We are going to set up home health physical therapy.  Will also refer her to [facility] for chronic care management.

## 2024-06-10 NOTE — PROGRESS NOTES
Patient Name: Joanna Cross  : 1950   MRN: [id number]     Chief Complaint:    Chief Complaint   Patient presents with    Hospital Follow Up Visit       History of Present Illness: Joanna Cross is a 73 y.o. female who is here today for follow up on hospital f/u   HPI        Review of Systems:   Review of Systems   Constitutional: Negative.    HENT: Negative.     Eyes: Negative.    Respiratory: Negative.     Cardiovascular: Negative.    Gastrointestinal: Negative.    Neurological: Negative.         Past Medical History:   Past Medical History:   Diagnosis Date    Allergic rhinitis     Anemia     Ankle problem     thinks back related causing pain     Atrial fibrillation     AVM (arteriovenous malformation)     Back pain     CHF (congestive heart failure) 2024    Chronic kidney disease     stage 3 per pt    Chronic lung disease 2022    CKD (chronic kidney disease)     Clotting disorder     AVM - small intestine    Coronary artery disease involving native coronary artery without angina pectoris 2017    COVID-19 vaccine series completed     Cystic fibrosis     Diastolic dysfunction     Gastrocnemius muscle tear     left medial 91    Generalized osteoarthritis     GERD (gastroesophageal reflux disease)     Gestational diabetes     GIB (gastrointestinal bleeding) 2016    d/t xarelto     Headache     Hearing decreased, bilateral     HAS HEARING AID, NOT WEARING IT CURRENTLY    Hiatal hernia     History of shingles     History of transfusion 2016    no reaction recalled     Hyperlipidemia LDL goal <70 2017    Hypertension     Hypothyroidism     IBS (irritable bowel syndrome)     Klebsiella pneumonia     Lichen sclerosus     Lupus     subQ    Mouth problem     mouth guard used since pt bites tongue and lips excessively at night if not- with bipap     MRSA infection 2018    Myocardial infarction     Obesity     On home oxygen therapy     2L of oxygen all the time due to current  congestion     Osteoporosis     Parkinson's disease     Peripheral vascular disease     Pleurisy     Pneumonia     Puerperal sepsis with acute hypoxic respiratory failure     emergent intubation- 2016    Pulmonary embolism     Right leg pain     from back issues     Salivary gland stone     Sciatic nerve pain     Seborrheic dermatitis     Skin cancer     on back     Sleep apnea     CPAP AND HOME O2 2L/M    Sleep apnea, obstructive 04/15/01    TIA (transient ischemic attack) 2014    no residual effects    Type 2 diabetes mellitus     UTI (urinary tract infection)     Vitamin D deficiency 09/08/2022    Wears glasses        Past Surgical History:   Past Surgical History:   Procedure Laterality Date    ABLATION OF DYSRHYTHMIC FOCUS  05/16/13    Laser Ablation - Rt Leg    BACK SURGERY      l4-l5 laminectomy     BICEPS TENDON REPAIR Right     shoulder    BREAST BIOPSY Left 05/2004    excisional, benign    BRONCHOSCOPY RIGID / FLEXIBLE      2016    BUNIONECTOMY Right     CARDIAC CATHETERIZATION      CARDIAC CATHETERIZATION N/A 02/01/2019    Procedure: Left Heart Cath;  Surgeon: Albertina Corona MD;  Location:  [facility];  Service: Cardiology    CARDIAC ELECTROPHYSIOLOGY PROCEDURE N/A 01/26/2024    Procedure: Lead Revision RA or RV, PPM or ICD;  Surgeon: Lauor Francois MD;  Location:  [facility] EP INVASIVE LOCATION;  Service: Cardiovascular;  Laterality: N/A;    CHOLECYSTECTOMY      COLONOSCOPY  2016    COLONOSCOPY N/A 06/17/2021    Procedure: COLONOSCOPY WITH POLYPECTOMY;  Surgeon: Trenton Sosa MD;  Location:  [facility];  Service: Gastroenterology;  Laterality: N/A;    CORONARY STENT PLACEMENT      x1 stent    CYST REMOVAL      left ear, upper left back 2001    CYSTOSCOPY      x2  18 and 20 -   in 20 urethra dilation     DIAGNOSTIC LAPAROSCOPY  1981    ENDOSCOPIC FUNCTIONAL SINUS SURGERY (FESS)  2011    ENDOSCOPY  2016    ENTEROSCOPY VIA STOMA      with single ballon with fluoro      HAMMER TOE REPAIR Left     INCISION AND DRAINAGE OF WOUND  2018    back with wound infection     INSERT / REPLACE / REMOVE PACEMAKER  11/10/16    [facility]    JOINT REPLACEMENT      KNEE ARTHROSCOPY      LASER ABLATION      right leg 13    LIPOMA EXCISION  1999    left leg     LUMBAR LAMINECTOMY DISCECTOMY DECOMPRESSION N/A 07/06/2018    Procedure: LUMBAR LAMINECTOMY L4-5, HEMILAMIINECTOMY RIGHT L5-S1, FORAMINOTOMY L5-S1;  Surgeon: Lencho Gamino MD;  Location:  [facility];  Service: Neurosurgery    LUMBAR LAMINECTOMY DISCECTOMY DECOMPRESSION N/A 09/19/2018    Procedure: INCISION AND DRAINAGE BACK WITH WOUND EXPLORATION;  Surgeon: Ritchie García MD;  Location:  [facility];  Service: Neurosurgery    LUNG BIOPSY Left 2016    OTHER SURGICAL HISTORY      ct scan of chest and sinuses and lower back     OTHER SURGICAL HISTORY      various echos     OTHER SURGICAL HISTORY      electroencephalogram 16,   emg  ncv tests 2007    OTHER SURGICAL HISTORY      mra 2007  and various mri with xrays, nuclear medicine lung ventilation with perfusion test 2016 with pft     OTHER SURGICAL HISTORY      barium swallow testing 15, 12, 12    OTHER SURGICAL HISTORY      vaginal ultrasound- 2012,   vas clementina lower extrem 2016, vas venous duplex lower extrem bilat 2019    OTHER SURGICAL HISTORY      wdge biopsy spring of lung     OTHER SURGICAL HISTORY      emergent intubation- hypoxic resp failure     OTHER SURGICAL HISTORY      01/26/2024 ppm generator change and lead revision per Dr. Francois    PACEMAKER IMPLANTATION  11/2016    sss    REPLACEMENT TOTAL KNEE Bilateral     left knee 2011, right 2012 per dr acuna     REPLACEMENT TOTAL KNEE Bilateral     SHOULDER ARTHROSCOPY Bilateral     2003-left, 2004- right     SKIN BIOPSY      skin cancer back 2016    SKIN CANCER EXCISION      upper righ tback     MADHAV      TEETH EXTRACTION      x2    TOTAL SHOULDER ARTHROPLASTY W/ DISTAL CLAVICLE EXCISION Left 10/24/2022    Procedure: REVERSE TOTAL  SHOULDER ARTHROPLASTY, BICEPS TENODESIS - LEFT;  Surgeon: Sammy Yo MD;  Location:  [facility];  Service: Orthopedics;  Laterality: Left;    TOTAL SHOULDER ARTHROPLASTY W/ DISTAL CLAVICLE EXCISION Right 2023    Procedure: REVERSE TOTAL SHOULDER  ARTHROPLASTY WITH BICEPS TENODESIS - RIGHT;  Surgeon: Sammy Yo MD;  Location:  [facility];  Service: Orthopedics;  Laterality: Right;    TUBAL ABDOMINAL LIGATION Bilateral     WISDOM TOOTH EXTRACTION         Family History:   Family History   Problem Relation Age of Onset    Kidney disease Mother     Coronary artery disease Mother     Hypertension Mother             Heart disease Mother             Hyperlipidemia Mother             Coronary artery disease Father     Hypertension Father             Hypothyroidism Father     Cancer Father         Oral cancer    Heart disease Father             Coronary artery disease Brother     Hypertension Brother     Heart disease Brother         Multiple stents, by-pass surgery    Testicular cancer Brother     Kidney cancer Maternal Uncle     Testicular cancer Maternal Uncle     Colon polyps Neg Hx     Colon cancer Neg Hx        Social History:   Social History     Socioeconomic History    Marital status:      Spouse name: N/A    Number of children: 4    Years of education: College    Highest education level: Master's degree (e.g., MA, MS, Randi, MEd, MSW, NELLA)   Tobacco Use    Smoking status: Never     Passive exposure: Past    Smokeless tobacco: Never    Tobacco comments:     Father and mother smoked for several years.   Vaping Use    Vaping status: Never Used    Passive vaping exposure: Yes   Substance and Sexual Activity    Alcohol use: Never    Drug use: Never    Sexual activity: Not Currently     Partners: Male     Birth control/protection: Post-menopausal, Tubal ligation, Vaginal insert contraception       Medications:     Current Outpatient Medications:      albuterol sulfate  (90 Base) MCG/ACT inhaler, Inhale 2 puffs Every 6 (Six) Hours As Needed for Wheezing or Shortness of Air., Disp: , Rfl:     amantadine (SYMMETREL) 100 MG capsule, Take 1 capsule by mouth 2 (Two) Times a Day., Disp: , Rfl:     apixaban (Eliquis) 5 MG tablet tablet, Take 1 tablet by mouth Every 12 (Twelve) Hours. Restart 1/29/24, Disp: 180 tablet, Rfl: 2    aspirin 81 MG EC tablet, Take 1 tablet by mouth Daily., Disp: , Rfl:     B Complex-C (SUPER B COMPLEX PO), Take 2 tablets by mouth Daily., Disp: , Rfl:     bumetanide (BUMEX) 1 MG tablet, Take 1 tablet by mouth 2 (Two) Times a Day., Disp: , Rfl:     Calcium Carbonate (CALTRATE 600 PO), Take 1 tablet by mouth Daily., Disp: , Rfl:     carbidopa-levodopa (SINEMET)  MG per tablet, Take 2 tablets by mouth at 6am, then 1 tablet at 9am, 12pm, 3pm, 6pm and 9pm., Disp: , Rfl:     Cholecalciferol 25 MCG (1000 UT) tablet, Take 1 tablet by mouth 2 (Two) Times a Day., Disp: , Rfl:     citalopram (CeleXA) 20 MG tablet, Take 1 tablet by mouth Daily., Disp: , Rfl:     clobetasol propionate (TEMOVATE) 0.05 % cream, Apply 1 Application topically to the appropriate area as directed 2 (Two) Times a Week., Disp: , Rfl:     Dapagliflozin Propanediol (FARXIGA PO), Take 1 tablet by mouth Daily., Disp: , Rfl:     fluticasone (FLONASE) 50 MCG/ACT nasal spray, 2 sprays into the nostril(s) as directed by provider Daily As Needed for Rhinitis., Disp: , Rfl:     Glucosamine-Chondroit-Calcium (TRIPLE FLEX BONE & JOINT PO), Take 1 tablet by mouth Daily., Disp: , Rfl:     hydroxychloroquine (PLAQUENIL) 200 MG tablet, Take 1 tablet by mouth 2 (Two) Times a Day., Disp: , Rfl:     ipratropium (ATROVENT) 0.06 % nasal spray, 2 sprays into the nostril(s) as directed by provider As Needed., Disp: , Rfl:     levothyroxine (SYNTHROID, LEVOTHROID) 175 MCG tablet, Take 1 tablet by mouth Daily., Disp: , Rfl:     loratadine (CLARITIN) 10 MG tablet, Take 1 tablet by mouth  "Daily., Disp: , Rfl:     Multiple Vitamins-Minerals (CENTRUM ULTRA WOMENS PO), Take 1 tablet by mouth Daily., Disp: , Rfl:     O2 (OXYGEN), Inhale 2 L/min Continuous., Disp: , Rfl:     omeprazole (priLOSEC) 40 MG capsule, Take 1 capsule by mouth 2 (Two) Times a Day., Disp: , Rfl:     pramipexole (MIRAPEX) 1.5 MG tablet, Take 1 tablet by mouth Every Night., Disp: , Rfl:     ranolazine (RANEXA) 500 MG 12 hr tablet, Take 2 tablets by mouth Every 12 (Twelve) Hours., Disp: 360 tablet, Rfl: 3    sacubitril-valsartan (ENTRESTO) 49-51 MG tablet, Take 1 tablet by mouth Every 12 (Twelve) Hours., Disp: , Rfl:     senna (SENOKOT) 8.6 MG tablet, Take 1 tablet by mouth Daily., Disp: , Rfl:     triamcinolone (KENALOG) 0.1 % cream, Apply 1 Application topically to the appropriate area as directed As Needed for Irritation., Disp: , Rfl:     Triamcinolone Acetonide (NASACORT) 55 MCG/ACT nasal inhaler, 2 sprays into the nostril(s) as directed by provider Daily As Needed for Rhinitis., Disp: , Rfl:     Zinc 50 MG tablet, Take 1 tablet by mouth Daily., Disp: , Rfl:     Allergies:   Allergies   Allergen Reactions    Amlodipine Besylate Swelling     Lower extremity (ankles, feet) swelling    Entacapone Other (See Comments)     \"extreme weakness in legs - caused several falls, which stopped after discontinuing this medication\"    Epinephrine Other (See Comments)     6/4/16- had 3 shots to numb mouth to prepare teeth for crowns, the shots contained epi-  Caused pt to have chest discomfort- went to hospital in ambulance, discovered had a fib while there     Levemir [Insulin Detemir] Hives     Hives / rash around injection site    Penicillins Hives     Jitteriness     Xarelto [Rivaroxaban] GI Bleeding     hgb dropped to 5.2    Hydrocodone Unknown - High Severity     Headache, nausea, dizziness, & vomiting    Aricept [Donepezil Hcl] Nausea Only     Vivid dreams    Benztropine Mesylate Other (See Comments)     Uncontrollable body movements    " "Cogentin [Benztropine] Other (See Comments)     \"uncontrollable body movements\"    Compazine [Prochlorperazine Edisylate] Other (See Comments)     Dystonic reaction    Duraprep [Antiseptic Products, Misc.] Itching and Rash     RASH AND ITCHING    Haldol [Haloperidol Lactate] Other (See Comments)     Dystonic reaction    Hydralazine Other (See Comments)     Headache     Lisinopril Cough    Statins Myalgia     Leg pain- all statins     Sulfamethoxazole Nausea Only and Other (See Comments)     Nausea & headaches    Tarka [Trandolapril-Verapamil Hcl Er] Other (See Comments)     Constipation     Toprol Xl [Metoprolol Tartrate] Other (See Comments)     Extreme fatigue, decreased exercise tolerance    Trimethoprim Other (See Comments)     Other reaction(s): Nausea         Physical Exam:  Vital Signs:   Vitals:    06/10/24 1412   BP: 136/70   BP Location: Left arm   Patient Position: Sitting   Cuff Size: Large Adult   Pulse: 79   SpO2: 96%   Weight: 100 kg (221 lb)   Height: 157.5 cm (62\")   PainSc:   2   PainLoc: Hip     Body mass index is 40.42 kg/m².     Physical Exam  Vitals and nursing note reviewed.   Constitutional:       Appearance: Normal appearance. She is obese.   HENT:      Head: Normocephalic and atraumatic.      Right Ear: Tympanic membrane, ear canal and external ear normal.      Left Ear: Tympanic membrane, ear canal and external ear normal.      Nose: Nose normal.      Mouth/Throat:      Mouth: Mucous membranes are dry.      Pharynx: Oropharynx is clear.   Eyes:      Extraocular Movements: Extraocular movements intact.      Conjunctiva/sclera: Conjunctivae normal.      Pupils: Pupils are equal, round, and reactive to light.   Cardiovascular:      Rate and Rhythm: Normal rate and regular rhythm.      Pulses: Normal pulses.      Heart sounds: Normal heart sounds.   Pulmonary:      Effort: Pulmonary effort is normal.      Breath sounds: Normal breath sounds.   Musculoskeletal:      Cervical back: Normal range " of motion and neck supple.   Feet:      Comments:      Neurological:      Mental Status: She is alert.         Procedures      Assessment/Plan:   Diagnoses and all orders for this visit:    1. Acute diastolic CHF (congestive heart failure) (Primary)  Assessment & Plan:  Reviewed hospital stay with patient.  She has follow-up with Dr. Teague.  She is doing better and her breathing is much improved.  We will recheck in 5 weeks for Blood work in 4 weeks.  Told her if she starts getting worse to go to emergency room.    Orders:  -     Ambulatory Referral to Chronic Care Management Disease Education, Medication Adherence, Care Coordination, Preventative Care, High Risk for ED/Readmission    2. Coronary artery disease involving native coronary artery of native heart without angina pectoris  Assessment & Plan:  Aggressive risk factor modification.  Will follow.    Orders:  -     Ambulatory Referral to Chronic Care Management Disease Education, Medication Adherence, Care Coordination, Preventative Care, High Risk for ED/Readmission    3. Type 2 diabetes mellitus with hyperglycemia, without long-term current use of insulin  -     Ambulatory Referral to Chronic Care Management Disease Education, Medication Adherence, Care Coordination, Preventative Care, High Risk for ED/Readmission    4. Chronic kidney disease, stage 3b    5. Muscle strain  Assessment & Plan:  Patient was seen by orthopedics.  She has not injured hip flexor it.  We are going to set up home health physical therapy.  Will also refer her to [facility] for chronic care management.      6. Closed fracture of right shoulder with delayed healing, subsequent encounter  Assessment & Plan:  She will follow-up with orthopedic surgeon.  She saw . She saw Srinath    Orders:  -     Ambulatory Referral to Home Health  -     Ambulatory Referral to Chronic Care Management Disease Education, Medication Adherence, Care Coordination, Preventative Care, High Risk for  ED/Readmission             Follow Up:   No follow-ups on file.      Reynaldo Fritz MD  [facility]

## 2024-06-11 ENCOUNTER — HOME HEALTH ADMISSION (OUTPATIENT)
Dept: HOME HEALTH SERVICES | Facility: HOME HEALTHCARE | Age: 74
End: 2024-06-11
Payer: MEDICARE

## 2024-06-12 ENCOUNTER — TELEPHONE (OUTPATIENT)
Dept: ONCOLOGY | Facility: CLINIC | Age: 74
End: 2024-06-12
Payer: MEDICARE

## 2024-06-12 DIAGNOSIS — D63.1 ANEMIA ASSOCIATED WITH STAGE 3 CHRONIC RENAL FAILURE: ICD-10-CM

## 2024-06-12 DIAGNOSIS — N18.30 ANEMIA ASSOCIATED WITH STAGE 3 CHRONIC RENAL FAILURE: ICD-10-CM

## 2024-06-12 NOTE — TELEPHONE ENCOUNTER
"  Caller: Joanna Cross \"SIXTO\"    Relationship: Self    Best call back number: [phone]      What was the call regarding: PT CALLED SHE NEEDS LAB ORDER SENT TO [facility] IN [place]    "

## 2024-06-13 ENCOUNTER — TELEPHONE (OUTPATIENT)
Dept: FAMILY MEDICINE CLINIC | Facility: CLINIC | Age: 74
End: 2024-06-13

## 2024-06-13 NOTE — TELEPHONE ENCOUNTER
"Caller: Misha Cross(POA)    Relationship to patient: Emergency Contact    Best call back number: [phone]     Patient is needing: MISHA IS REQUESTING AN UPDATE FROM ANUJA ON A \"COORDINATION OF SERVICES\".     PLEASE CALL BACK, IF NO ANSWER LEAVE A DETAILED MESSAGE.  "

## 2024-06-14 ENCOUNTER — HOME CARE VISIT (OUTPATIENT)
Dept: HOME HEALTH SERVICES | Facility: HOME HEALTHCARE | Age: 74
End: 2024-06-14
Payer: MEDICARE

## 2024-06-14 ENCOUNTER — TELEPHONE (OUTPATIENT)
Dept: FAMILY MEDICINE CLINIC | Facility: CLINIC | Age: 74
End: 2024-06-14

## 2024-06-14 ENCOUNTER — PATIENT OUTREACH (OUTPATIENT)
Dept: CASE MANAGEMENT | Facility: OTHER | Age: 74
End: 2024-06-14
Payer: MEDICARE

## 2024-06-14 VITALS
TEMPERATURE: 97.8 F | HEART RATE: 72 BPM | DIASTOLIC BLOOD PRESSURE: 64 MMHG | SYSTOLIC BLOOD PRESSURE: 120 MMHG | RESPIRATION RATE: 16 BRPM | OXYGEN SATURATION: 96 %

## 2024-06-14 DIAGNOSIS — M48.062 SPINAL STENOSIS, LUMBAR REGION, WITH NEUROGENIC CLAUDICATION: ICD-10-CM

## 2024-06-14 DIAGNOSIS — G20.B2 PARKINSON'S DISEASE WITH DYSKINESIA AND FLUCTUATING MANIFESTATIONS: ICD-10-CM

## 2024-06-14 DIAGNOSIS — I25.10 CORONARY ARTERY DISEASE INVOLVING NATIVE CORONARY ARTERY OF NATIVE HEART WITHOUT ANGINA PECTORIS: ICD-10-CM

## 2024-06-14 DIAGNOSIS — M25.551 RIGHT HIP PAIN: Primary | ICD-10-CM

## 2024-06-14 PROCEDURE — G0151 HHCP-SERV OF PT,EA 15 MIN: HCPCS

## 2024-06-14 RX ORDER — TRAMADOL HYDROCHLORIDE 50 MG/1
50 TABLET ORAL NIGHTLY PRN
Qty: 30 TABLET | Refills: 1 | Status: SHIPPED | OUTPATIENT
Start: 2024-06-14

## 2024-06-14 NOTE — HOME HEALTH
"SOC Note: Pt admitted to [facility] on 6/14/24 Pt is a 74 yo female hospitalized at [facility] from 6/2/24-6/5/24 for acute on chronic diastolic congestive heart failure, diagnoses of Acute renal failure superimposed on stage 3b chronic kidney disease, unspecified acute renal failure type, Macrocytic anemia, Elevated glucose, and Decreased ambulation status were also pertinent to hospitalization.  She then returned to [facility] ED on 6/6/24 due to R LE pain and dx with calcific tendonitis and hip flexor tendinitis.  She followed up with PCP on 6/10/24 and was referred to Home Health due to continued pain and difficulty with mobility in home. Pt also had a fall in May 2024 sustaining a R shoulder fx and is currently in a R shoulder sling for immobilization, will f/u with ortho on 6/21/24    Home Health ordered for: disciplines PT/OT    Reason for Hosp/Primary Dx/Co-morbidities: R shoulder Fx/ PMH: allergic rhinitis, anemia, atrial fibrillation, AVM, CHF, CKD stage 3, clotting disorder, CAD, cystic fibrosis, diastolic dysfunction, OA, GERD, GIB, hiatal hernia, HDL, HTN, hypothryoidism, IBS, PNA, Lupus, MRSA, MI, obesity, O2 dependent, Parkinson's Disease, PVD, PE, seborrheic dermatitis, MILLI, TIA, DM II    Focus of Care: HHPT 1wk6 for endurance training, balance training, therapeutic exercise, gait training, pt education and HEP instruction related to R shoulder fx    Patient's goal(s): \"to be able to get around better\"    Current Functional status/mobility/DME: pt requires min/mod A for transfers and CGA/min A for gait using tod-walker on L; premorbidly used a 4WW but unable to currently due to R shoulder restrictions    HB status/Living Arrangements: pt lives in her own home, daughter lives with pt    Skin Integrity/wound status: no deficits noted    Code Status: full code    Fall Risk/Safety concerns: high risk of falls per Tinetti Balance Scale    Educated on Emergency Plan, steps to take prior to going to the ER " and when to Call Home Health First:  YES     Medication issues/Concerns:   Apixaban and aspirin: Use of apixaban with aspirin may increase the risk of bleeding.  Celexa and Tramadol: Serotonergic effects may be additive when tramadol and selective serotonin reuptake inhibitors (SSRIs) are coadministered. The risk of serotonin syndrome/toxicity may be increased.    Additional Problems/Concerns: none    SDOH Barriers (i.e. caregiver concerns, social isolation, transportation, food insecurity, environment, income etc.)/Need for MSW: n/a

## 2024-06-14 NOTE — TELEPHONE ENCOUNTER
I do not see the medication in her chart and do not see anything in her las office visit note. Please advise.

## 2024-06-14 NOTE — TELEPHONE ENCOUNTER
"    Caller: Joanna Cross \"SIXTO\"    Relationship: Self    Best call back number: [phone]     What medication are you requesting: TRAMADOL    What are your current symptoms: PAIN MED, ON BLOOD THINNER AND HAS PROBLEMS WITH KIDNEYS    How long have you been experiencing symptoms: 1 WEEK    Have you had these symptoms before:    [x] Yes  [] No    Have you been treated for these symptoms before:   [x] Yes  [] No    If a prescription is needed, what is your preferred pharmacy and phone number: [id number] - [place], [place] - [address]  AT [facility] [address] - [phone] [facility] [phone]      Additional notes:SIXTO SAYS SHE SIGNED A FORM FOR THIS [id number], IT HASN'T BEEN CALLED IN YET.         "

## 2024-06-14 NOTE — OUTREACH NOTE
"AMBULATORY CASE MANAGEMENT NOTE    Names and Relationships of Patient/Support Persons: Caller: Joanna Cross \"SIXTO\"; Relationship: Self -     Adult Patient Profile  Questions/Answers      Flowsheet Row Most Recent Value   Symptoms/Conditions Managed at Home musculoskeletal, neurological, cardiovascular   Barriers to Managing Health stress of chronic illness   Cardiovascular Symptoms/Conditions coronary artery disease, heart failure   Cardiovascular Management Strategies activity, coping strategies, diet modification, exercise, fluid modification, medication therapy, routine screening, weight management   Musculoskeletal Symptoms/Conditions back pain, unsteady gait, other (see comments)  [falls with injury]   Musculoskeletal Management Strategies activity, coping strategies, diet modification, exercise, fluid modification, medication therapy, routine screening, weight management   Neurological Symptoms/Conditions Parkinson's disease   Neurological Management Strategies activity, coping strategies, diet modification, exercise, medication therapy, routine screening, weight management   Wear Glasses or Blind yes   Vision Management glasses   Concentrating, Remembering or Making Decisions Difficulty no   Difficulty Communicating no   Difficulty Eating/Swallowing no   Walking or Climbing Stairs Difficulty yes   Walking or Climbing Stairs ambulation difficulty, requires equipment   Mobility Management tod walker at this time   Dressing/Bathing Difficulty yes   Dressing/Bathing bathing difficulty, assistance 1 person, dressing difficulty, assistance 1 person   Doing Errands Independently Difficulty (such as shopping) yes   Errands Management daughter assists   Equipment Currently Used at Home bipap, glucometer, bp cuff, walker, tod, rollator, cane, quad tip, oxygen  [life alert device, her rollator is a  walker]   Pre-existing AND/MOST/POLST Order No   Advance Directive Status Patient has advance directive, " copy in chart   Type of Advance Directive Durable power of  for health care   Have you reviewed your Advance Directive and is it valid for this stay? Yes   Fall Risk Category High        Social Work Assessment  Questions/Answers      Flowsheet Row Most Recent Value   In the past 12 months has the electric, gas, oil, or water company threatened to shut off services in your home? No   Equipment Currently Used at Home bipap, glucometer, bp cuff, walker, tod, rollator, cane, quad tip, oxygen  [life alert device, her rollator is a  walker]        SDOH updated and reviewed with the patient during this program:  Financial Resource Strain: Low Risk  (6/17/2024)    Overall Financial Resource Strain (CARDIA)     Difficulty of Paying Living Expenses: Not hard at all      --     Food Insecurity: No Food Insecurity (6/17/2024)    Hunger Vital Sign     Worried About Running Out of Food in the Last Year: Never true     Ran Out of Food in the Last Year: Never true      --     Housing Stability: Low Risk  (6/17/2024)    Housing Stability Vital Sign     Unable to Pay for Housing in the Last Year: No     Number of Times Moved in the Last Year: 1     Homeless in the Last Year: No      --     Physical Activity: Inactive (6/17/2024)    Exercise Vital Sign     Days of Exercise per Week: 0 days     Minutes of Exercise per Session: 0 min      --     Transportation Needs: No Transportation Needs (6/17/2024)    PRAPARE - Transportation     Lack of Transportation (Medical): No     Lack of Transportation (Non-Medical): No      --     Utilities: Not At Risk (6/17/2024)    [facility] Utilities     Threatened with loss of utilities: No     [facility] Interim Update    Spoke with Ms. Cross and her daughter. [facility] services explained including billing process. Verbal consent given. Ms. Cross does have POA on file. Her daughter Silvana is POA. She does have a daily caregiver that comes to home from 8-6.     She has needed equipment at home including  o2. Her o2 and bipap are provided by [facility]. She uses her o2 mostly at night at 2L. She also uses during the day and has portable concentrator.     Patient had a fall and injured her shoulder. She currently can not use that arm and is using a tod walker. She also has had hip pain. She was diagnosed with pulled right hip flexor tendon. Her pain is not improving. Since the original fall when she went to ER she has had another fall and hip the same hip. Her PT suggested a MRI. Patient does have history of PPM. For safety sake Dr. Fritz would prefer to get another xray to see if there are signs of hairline fracture.       Education Documentation  No documentation found.        Faye JAUREGUI  Ambulatory Case Management    6/14/2024, 15:44 EDT

## 2024-06-15 NOTE — CASE COMMUNICATION
"SOC Note: Pt admitted to [facility] on 6/14/24 Pt is a 72 yo female hospitalized at [facility] from 6/2/24-6/5/24 for acute on chronic diastolic congestive heart failure, diagnoses of Acute renal failure superimposed on stage 3b chronic kidney disease, unspecified acute renal failure type, Macrocytic anemia, Elevated glucose, and Decreased ambulation status were also pertinent to hospitalization.  She then returned to [facility] ED on 6 /6/24 due to R LE pain and dx with calcific tendonitis and hip flexor tendinitis.  She followed up with PCP on 6/10/24 and was referred to Home Health due to continued pain and difficulty with mobility in home. Pt also had a fall in May 2024 sustaining a R shoulder fx and is currently in a R shoulder sling for immobilization, will f/u with ortho on 6/21/24    Home Health ordered for: disciplines PT/OT    Reason for Hosp/Primary Dx/Co-mo rbidities: R shoulder Fx/ PMH: allergic rhinitis, anemia, atrial fibrillation, AVM, CHF, CKD stage 3, clotting disorder, CAD, cystic fibrosis, diastolic dysfunction, OA, GERD, GIB, hiatal hernia, HDL, HTN, hypothryoidism, IBS, PNA, Lupus, MRSA, MI, obesity, O2 dependent, Parkinson's Disease, PVD, PE, seborrheic dermatitis, MILLI, TIA, DM II    Focus of Care: HHPT 1wk6 for endurance training, balance training, therapeutic exercise, gait tr anna, pt education and HEP instruction related to R shoulder fx    Patient's goal(s): \"to be able to get around better\"    Current Functional status/mobility/DME: pt requires min/mod A for transfers and CGA/min A for gait using tod-walker on L; premorbidly used a 4WW but unable to currently due to R shoulder restrictions    HB status/Living Arrangements: pt lives in her own home, daughter lives with pt    Skin Integrity/wound status:  no deficits noted    Code Status: full code    Fall Risk/Safety concerns: high risk of falls per Tinetti Balance Scale    Educated on Emergency Plan, steps to take prior to going to " the ER and when to Call Home Health First:  YES     Medication issues/Concerns:   Apixaban and aspirin: Use of apixaban with aspirin may increase the risk of bleeding.  Celexa and Tramadol: Serotonergic effects may be additive when tramadol and selective se rotonin reuptake inhibitors (SSRIs) are coadministered. The risk of serotonin syndrome/toxicity may be increased.    Additional Problems/Concerns: none    SDOH Barriers (i.e. caregiver concerns, social isolation, transportation, food insecurity, environment, income etc.)/Need for MSW: n/a

## 2024-06-15 NOTE — CASE COMMUNICATION
The following potential major drug interaction with medication reconciliation:   Apixaban and aspirin: Use of apixaban with aspirin may increase the risk of bleeding.  Celexa and Tramadol: Serotonergic effects may be additive when tramadol and selective serotonin reuptake inhibitors (SSRIs) are coadministered. The risk of serotonin syndrome/toxicity may be increased.

## 2024-06-17 ENCOUNTER — PATIENT OUTREACH (OUTPATIENT)
Dept: CASE MANAGEMENT | Facility: OTHER | Age: 74
End: 2024-06-17
Payer: MEDICARE

## 2024-06-17 ENCOUNTER — TELEPHONE (OUTPATIENT)
Dept: ONCOLOGY | Facility: CLINIC | Age: 74
End: 2024-06-17
Payer: MEDICARE

## 2024-06-17 DIAGNOSIS — I25.10 CORONARY ARTERY DISEASE INVOLVING NATIVE CORONARY ARTERY OF NATIVE HEART WITHOUT ANGINA PECTORIS: ICD-10-CM

## 2024-06-17 DIAGNOSIS — I10 HYPERTENSION, ESSENTIAL: ICD-10-CM

## 2024-06-17 DIAGNOSIS — M25.551 RIGHT HIP PAIN: Primary | ICD-10-CM

## 2024-06-17 DIAGNOSIS — M48.062 SPINAL STENOSIS, LUMBAR REGION, WITH NEUROGENIC CLAUDICATION: ICD-10-CM

## 2024-06-17 NOTE — TELEPHONE ENCOUNTER
"  Caller: Joanna Cross \"SIXTO\"    Relationship: Self    Best call back number: [phone]     What is the best time to reach you: ASAP IF NEEDED    Who are you requesting to speak with (clinical staff, provider,  specific staff member): CLINICAL        What was the call regarding: PT IS IN HOSPITAL, NOT SURE WHEN SHE WILL BE DISCHARGED.  SHE HAS NOT HAD LABS DONE AT [facility] YET FOR THE RETACRIT SHOT SCHEDULED FOR THIS WEDNESDAY, PLEASE CANCEL 6/19 INJECTION APPT WITH OUR OFFICE.  BUT SHE STATED THEY DID SOME LABS IN HOSPITAL IF THEY SHOULD BE REVIEWED BY OUR CLINICAL TEAM TO DETERMINE IF THAT WOULD BE NEEDED.      "

## 2024-06-17 NOTE — OUTREACH NOTE
AMBULATORY CASE MANAGEMENT NOTE    Names and Relationships of Patient/Support Persons: Caller: Silvana Cross(POA); Relationship: Emergency Contact -     [facility] Interim Update    Spoke with Ms. Cross's daughter, Silvana. Ms. Cross began to have uncontrollable pain over the weekend. To the point that she was not able to be moved without considerable pain. EMS was called and had to lift Ms. Cross to transfer her.  She has been admitted to the hospital and as of now plan is short term rehab. Silvana's top choices would be the [facility] swing unit or [facility].     A CT has been performed at the hospital. No fracture was seen. Silvana would like a MRI performed. Ms. Cross has had MRI's since her PPM. Discussed her having that conversation with the ortho or the attending. She has history with her father that he was having severe pain and that he had cancer. Discussed that it is acceptable to advocate for her mother and express her concerns.     Education Documentation  No documentation found.        Faye JAUREGUI  Ambulatory Case Management    6/17/2024, 11:40 EDT

## 2024-06-17 NOTE — TELEPHONE ENCOUNTER
Called patient to discuss, she is inpatient at [facility], will obtain records for review. Called and notified patient and she verbalized understanding.

## 2024-06-18 ENCOUNTER — READMISSION MANAGEMENT (OUTPATIENT)
Dept: CALL CENTER | Facility: HOSPITAL | Age: 74
End: 2024-06-18
Payer: MEDICARE

## 2024-06-18 NOTE — TELEPHONE ENCOUNTER
Received records from [facility] and [facility] is 10.0 so she does not need her procrit, injection for tomorrow cancelled. Patient states she is in the hospital with leg issues and she may end up going to rehab when she is discharged but she will keep our office updated.

## 2024-06-18 NOTE — OUTREACH NOTE
CHF Week 2 Survey      Flowsheet Row Responses   [facility] facility patient discharged from? [facility]   Does the patient have one of the following disease processes/diagnoses(primary or secondary)? CHF   Week 2 attempt successful? No   Unsuccessful attempts Attempt 1   Revoke Other  [CCM Program]            Lily JACKSON - Registered Nurse

## 2024-06-21 ENCOUNTER — HOME CARE VISIT (OUTPATIENT)
Dept: HOME HEALTH SERVICES | Facility: HOME HEALTHCARE | Age: 74
End: 2024-06-21
Payer: MEDICARE

## 2024-06-21 NOTE — CASE COMMUNICATION
Patient missed home care PT visit scheduled for 6/21/24, therefore will not meet visit frequency this week.    Reason: patient is currently at [facility] in observation status    Thank you,  Tamica Castellano, PTA

## 2024-06-24 ENCOUNTER — PATIENT OUTREACH (OUTPATIENT)
Dept: CASE MANAGEMENT | Facility: OTHER | Age: 74
End: 2024-06-24
Payer: MEDICARE

## 2024-06-24 DIAGNOSIS — I25.10 CORONARY ARTERY DISEASE INVOLVING NATIVE CORONARY ARTERY OF NATIVE HEART WITHOUT ANGINA PECTORIS: Primary | ICD-10-CM

## 2024-06-24 DIAGNOSIS — M48.062 SPINAL STENOSIS, LUMBAR REGION, WITH NEUROGENIC CLAUDICATION: ICD-10-CM

## 2024-06-24 DIAGNOSIS — G20.B2 PARKINSON'S DISEASE WITH DYSKINESIA AND FLUCTUATING MANIFESTATIONS: ICD-10-CM

## 2024-06-24 NOTE — OUTREACH NOTE
AMBULATORY CASE MANAGEMENT NOTE    Names and Relationships of Patient/Support Persons: Caller: Silvana Cross(MEHNAZ); Relationship: Emergency Contact -     [facility] Interim Update    Received call from Ms. Cross's daughter, Silvana DARBY. She states that she is ready for discharge and she will be going to [facility]. Her insurance will only pay for her to be there for 10 days. Silvana is very concerned about what will happen after that. She is still not able to use her arm due to fracture. She is also still having hip pain. If she does not improve significantly Silvana will not be able to take Ms. Cross home. Ms. Cross is expecting to go home upon discharge from SNF. Currently Silvana goes to work and cares for Ms. Cross when she comes home. She has a caregiver during the day. Neither one will be able to care for her if she is bed bound or can not do more for herself.   Silvana is very concerned about having this conversation with her mother.    Encouraged Silvana to speak with the  and RN at the facility that will be doing discharge planning. She needs to clearly let them know what the patient will need to be doing to be able to go home safely. Encouraged her to utilize their experience to help have this conversation with Ms. Cross.     She has had discussion with Ms. Cross about going to [facility]. But Ms. Cross want Silvana to move there with her and that costs more. She has approximately $6000 in monthly income. However, she still owes on her home and also owes for a new air conditioning unit. She would be able to afford assisted living if she sells her home. Encouraged Silvana to speak with the financial people at the nursing home to see what options she would have. She doesn't have enough in her savings to pay up front.       Education Documentation  No documentation found.        Faye JAUREGUI  Ambulatory Case Management    6/24/2024, 10:20 EDT

## 2024-06-27 ENCOUNTER — PATIENT OUTREACH (OUTPATIENT)
Dept: CASE MANAGEMENT | Facility: OTHER | Age: 74
End: 2024-06-27
Payer: MEDICARE

## 2024-06-27 ENCOUNTER — TELEPHONE (OUTPATIENT)
Dept: ONCOLOGY | Facility: CLINIC | Age: 74
End: 2024-06-27
Payer: MEDICARE

## 2024-06-27 DIAGNOSIS — G20.B2 PARKINSON'S DISEASE WITH DYSKINESIA AND FLUCTUATING MANIFESTATIONS: ICD-10-CM

## 2024-06-27 DIAGNOSIS — I25.10 CORONARY ARTERY DISEASE INVOLVING NATIVE CORONARY ARTERY OF NATIVE HEART WITHOUT ANGINA PECTORIS: Primary | ICD-10-CM

## 2024-06-27 DIAGNOSIS — M48.062 SPINAL STENOSIS, LUMBAR REGION, WITH NEUROGENIC CLAUDICATION: ICD-10-CM

## 2024-06-27 NOTE — TELEPHONE ENCOUNTER
"  Caller: Joanna Cross \"SIXTO\"    Relationship: Self    Best call back number: [phone]    What is the best time to reach you: ANYTIME    CAN LEAVE VM IF UNABLE TO REACH     Who are you requesting to speak with (clinical staff, provider,  specific staff member): CLINICAL         What was the call regarding:     NOT ABLE TO MAKE APPOINTMENT FOR 07/03 [facility] [place] , MAY BE THERE A FEW WEEKS     WANTED TO LET KNOW AND ALSO DISCUSS PLAN OF CARE MOVING FORWARD .     Is it okay if the provider responds through MyChart: NO     "

## 2024-06-27 NOTE — TELEPHONE ENCOUNTER
Discussed with Dr. Linton, we will cancel 7/3 appt. He states patient should call once she is discharged and we can get her rescheduled. Patient verbalized understanding.

## 2024-06-27 NOTE — OUTREACH NOTE
[facility] End of Month Documentation    This Chronic Medical Management Care Plan for Joanna Cross, 73 y.o. female, has been a new plan of care implemented; established and a new plan of care implemented for the month of June.  A cumulative time of 72  minutes was spent on this patient record this month, including chart review; phone call with patient; phone call with relative.    Regarding the patient's problems: has Coronary artery disease involving native coronary artery without angina pectoris; Hypertension, essential; Peripheral vascular disease; Hyperlipidemia LDL goal <70; Spinal stenosis, lumbar region, with neurogenic claudication; Overactive bladder; GERD (gastroesophageal reflux disease); Hypothyroidism; Lichen sclerosus; Parkinson's disease; MILLI treated with BiPAP; History of respiratory failure - prior respiratory failure requiring mechanical ventilation, open lung biopsy non-specific. ; Atrial fibrillation; Tachy-thai syndrome; Long term current use of antiarrhythmic drug; History of adenomatous polyp of colon; Anemia associated with stage 3 chronic renal failure; Angiodysplasia; Hx of colonic polyps; Body mass index 40.0-44.9, adult; Cardiac pacemaker in situ; Chronic low back pain; Chronic lung disease; Degeneration of lumbar intervertebral disc; Edema of lower extremity; History of malignant neoplasm of skin; History of TIA (transient ischemic attack); Hypotonic bladder; Incomplete tear of rotator cuff; Major depression in remission; Tinnitus of both ears; Primary osteoarthritis involving multiple joints; Restless legs; Seborrheic dermatitis; Transient cerebral ischemia; Muscle strain; Type 2 diabetes mellitus with hyperglycemia, without long-term current use of insulin; Vitamin B12 deficiency; Vitamin D deficiency; Chronic kidney disease, stage 3b; Osteoporosis, senile; Acute diastolic CHF (congestive heart failure); Chronic respiratory failure with hypoxia; Chronic anticoagulation; and Fracture of  right shoulder with delayed healing on their problem list., the following items were addressed: medical records; medications and any changes can be found within the plan section of the note.  A detailed listing of time spent for chronic care management is tracked within each outreach encounter.  Current medications include:  has a current medication list which includes the following prescription(s): albuterol sulfate hfa, amantadine, apixaban, aspirin, b complex-c, bumetanide, calcium carbonate, carbidopa-levodopa, cholecalciferol, citalopram, clobetasol propionate, dapagliflozin propanediol, fluticasone, glucosamine-chondroit-calcium, hydroxychloroquine, lantus solostar, ipratropium, levothyroxine, loratadine, multiple vitamins-minerals, o2, omeprazole, pramipexole, ranolazine, sacubitril-valsartan, senna, tramadol, triamcinolone, triamcinolone acetonide, and zinc. and the patient is reported to be patient is compliant with medication protocol,  Medications are reported to be non-effective in controlling symptoms and changes have been made to the medication protocol, pain.  Regarding these diagnoses, referrals were made to the following provider(s):  n/a.  All notes on chart for PCP to review.    The patient was monitored remotely for blood pressure; weight; activity level; pain; medications.    The patient's physical needs include:  needs assistance with ADLs; physical healthcare.     The patient's mental support needs include:  continued support    The patient's cognitive support needs include:  needs met    The patient's psychosocial support needs include:  need for increased support    The patient's functional needs include: physical healthcare; needs assistance for ADLs    The patient's environmental needs include:  not applicable    Care Plan overall comments:  established    Refer to previous outreach notes for more information on the areas listed above.    Monthly Billing Diagnoses  (I25.10) Coronary artery  disease involving native coronary artery of native heart without angina pectoris    (M48.062) Spinal stenosis, lumbar region, with neurogenic claudication    (G20.B2) Parkinson's disease with dyskinesia and fluctuating manifestations    Medications   Medications have been reconciled    Care Plan progress this month:      Recently Modified Care Plans Updates made since 5/27/2024 12:00 AM       Coronary Artery Disease (Adult)           Problem Priority Last Modified     Disease Progression (Coronary Artery Disease) --  6/17/2024  9:59 AM by Faye Beasley RN              Goal Recent Progress Last Modified     Disease Progression Prevented or Minimized --  6/17/2024  9:59 AM by Faye Beasley RN     Evidence-based guidance:   Help patients to develop an awareness of worsening cardiac ischemia, including atypical symptoms that may occur in women, diabetics and the elderly.   Correlate presence and severity of symptoms to response, adherence to therapy and changes in new or existing comorbidity.   Establish a [id number] early intervention process of communication with primary care provider for worsening signs/symptoms.   Address risk factors for disease progression; promote lifestyle changes based on risk.   Acknowledge difficulty in making significant life-long changes, especially considering asymptomatic disease and perception of disease severity.   Individualize approach to change; consider barriers, such as poverty, lack of education, language or health literacy, stress, negotiating with family and the social significance of changing behavior.   Perform nutrition assessment and develop plan to focus on health and weight management; consider personal and cultural preferences.   Promote a healthy diet that includes primarily plant-based foods, such as fruits, vegetables, whole grains, beans and legumes, low-fat dairy and lean meats.   Consider use of alternative therapy, such as mindfulness-based  "stress reduction, transcendental meditation, progressive muscle relaxation, stress management and yoga.   Anticipate use of pharmacologic therapy, such as aspirin, antiplatelet, P2Y12 receptor, nitroglycerine, statin, beta-blocker, angiotensin-converting enzyme inhibitor, calcium channel blocker, flu and pneumococcal vaccine.   Monitor efficacy, address side effects, expect periodic adjustments to pharmacologic treatment plan.   Assess and manage barriers to pharmacologic therapy; consider individual habits, preferences and lifestyle; promote the role of the community pharmacist; seek agreement rather than dictate drug regimen; simplify regimen.   Encourage participation in cardiac rehabilitation (home or hospital-based), even when asymptomatic or symptoms are stable.   Engage patient in shared decision-making for additional testing and treatment when symptoms worsen; may include coronary angiography, percutaneous coronary revascularization or coronary artery bypass graft.    Notes:            Task Due Date Last Modified     Alleviate Barriers to Coronary Artery Disease Therapy --  6/17/2024  9:59 AM by Faye Beasley RN     Care Management Activities:      - not discussed during this outreach      Notes:              Problem Priority Last Modified     Adjustment to Life Changing Event (Coronary Artery Disease) --  6/17/2024  9:59 AM by Faye Beasley RN              Goal Recent Progress Last Modified     Coping with Life-Changing Event Optimized --  6/17/2024  9:59 AM by Faye Beasley RN     Evidence-based guidance:   Acknowledge, normalize and validate emotional response to situation; encourage verbalization of feelings.   Identify beliefs about cardiac illness; assist with expectations and maintaining realistic hope.   Support adjustment to  €œnew normal\" with focus on maintaining daily life as closely as possible to life before cardiac illness.   Monitor perspective regarding quality of life. "   Provide support around life transitions and loss, such as adherence to new medical regimen, change in family dynamics or roles.   Recognize current coping strategies and assist in developing new strategies, including family support, music, relaxation techniques, optimism, mindfulness and stress management techniques.   Assess for signs and symptoms of depression, anxiety disorders and posttraumatic stress; if present refer for counseling that offers cognitive behavior therapy.   Anticipate use of antidepressant pharmacologic therapy; monitor efficacy and side effects.    Assess and address sleep apnea and/or postacute coronary syndrome insomnia; promote sleep-hygiene practices and mindfulness, relaxation practices and adherence to noninvasive ventilation.    Prepare for reentry into work and social activities; acknowledge fear regarding losing ability to do what patient was used to doing; encourage participation in cardiac rehabilitation program.   Consider services in the home or community to encourage long-term adherence.   Explore changes in sexual activity and intimacy, including concerns about another cardiac event, physical ability, fear of rejection and change in libido; provide support and encouragement; refer as needed.   Assess access to health insurance coverage, pharmacy benefits and healthcare providers (including behavioral health); consider telemedicine options for those living in underserved areas.    Notes:            Task Due Date Last Modified     Support Psychosocial Response to Life-Changing Event --  6/17/2024  9:59 AM by Faye Beasley RN     Care Management Activities:      - caregiver stress acknowledged  - caregiver support provided      Notes:                   Heart Failure (Adult)           Problem Priority Last Modified     Symptom Exacerbation (Heart Failure) --  6/17/2024 10:00 AM by Faye Beasley RN              Goal Recent Progress Last Modified     Symptom Exacerbation  Prevented or Minimized --  6/17/2024 10:00 AM by Faye Beasley RN     Evidence-based guidance:   Perform or review cognitive and/or health literacy screening.   Assess understanding of adherence and barriers to treatment plan, as well as lifestyle changes; develop strategies to address barriers.   Establish a yirlzzyv-zruwhu-ffkd early intervention process to communicate with primary care provider when signs/symptoms worsen.   Facilitate timely posthospital discharge or emergency department treatment that includes intensive follow-up via telephone calls, home visit, telehealth monitoring and care at multidisciplinary heart failure clinic.   Adjust frequency and intensity of follow-up based on presentation, number of emergency department visits, hospital admissions and frequency and severity of symptom exacerbation.   Facilitate timely visit, usually within 1 week, with primary care provider following hospital discharge.   Collaborate with clinical pharmacist to address adverse drug reactions, drug interactions, subtherapeutic dosage, patient and family education.   Regularly screen for presence of depressive symptoms using a validated tool; consider pharmacologic therapy and/or referral for cognitive behavioral therapy when present.   Refer to community-based services, such as a heart failure support group, community health worker or peer support program.   Review immunization status; arrange receipt of needed vaccinations.   Prepare patient for home oxygen use based on signs/symptoms.    Notes:            Task Due Date Last Modified     Identify and Minimize Risk of Heart Failure Exacerbation --  6/17/2024 10:00 AM by Faye Beasley, RN     Care Management Activities:      - not discussed during this outreach      Notes:              Problem Priority Last Modified     Disease Progression (Heart Failure) --  6/17/2024 10:00 AM by Faye Beasley, RN              Goal Recent Progress Last Modified      Comorbidities Identified and Managed --  6/17/2024 10:00 AM by Faye Beasley RN     Evidence-based guidance:   Assess and address signs/symptoms of comorbidity, including dyslipidemia, diabetes, iron deficiency, gout, arthritis, dysrhythmia, hypertension, cachexia, coronary artery disease, kidney dysfunction and lung disease.   Prepare patient for laboratory and diagnostic exams based on risk and presentation.   Prepare for use of pharmacologic therapy that may include statin, angiotensin converting enzyme (ACE) inhibitor, angiotensin receptor blocker (ARB), beta-blocker, digoxin, antidysrhythmic, diuretic or omega-3 fatty acid.   Monitor side effects and anticipate need for periodic adjustments.   Prepare patient for potential invasive treatment, such as implantable cardioverter-defibrillator, cardiac resynchronization therapy or heart transplant as disease progresses.    Notes:            Task Due Date Last Modified     Identify and Manage Comorbidities --  6/17/2024 10:00 AM by Faye Beasley RN     Care Management Activities:      - not discussed during this outreach      Notes:            Goal Recent Progress Last Modified     Health Optimized --  6/17/2024 10:00 AM by Faye Beasley RN     Evidence-based guidance:   Use brief intervention, such as 5 A's (Ask, Advise, Assess, Assist, Arrange) to encourage smoking cessation; refer to smoking cessation program, if ready for more intensive intervention.   Perform or refer to a registered dietitian for a nutrition assessment and nutrition-focused physical exam.    Identify potential micronutrient deficiencies, such as iron, vitamin D and thiamin.   Assess need for potential diet and fluid modification, such as reduced sodium or fluid intake.   Minimize unnecessary dietary restrictions to increase oral intake. Note: Sodium restriction should be individualized to the patient and clinical status.   Facilitate home monitoring of weight.    Notes:             Task Due Date Last Modified     Optimize Health --  6/17/2024 10:00 AM by Faye Beasley RN     Care Management Activities:      - not discussed during this outreach      Notes:              Problem Priority Last Modified     Activity Tolerance (Heart Failure) --  6/17/2024 10:00 AM by Faye Beasley RN              Goal Recent Progress Last Modified     Activity Tolerance Optimized --  6/17/2024 10:00 AM by Faye Beasley RN     Evidence-based guidance:   Promote daily physical activity that improves functional ability, cognition and quality of life.   Encourage reduction in sedentary time.    Encourage optimal, safe functional mobility and self-care performance based on ability and tolerance.    Promote breathing and energy conservation techniques, such as pursed-lip breathing, preplanning and pacing of activity, balancing activity and rest.   Encourage participation in cardiac rehabilitation services.    Notes:            Task Due Date Last Modified     Maintain Strength and Functional Ability --  6/17/2024 10:00 AM by Faye Beasley RN     Care Management Activities:      - not discussed during this outreach      Notes:              Problem Priority Last Modified     Obstructive Sleep Apnea (Heart Failure) --  6/17/2024 10:00 AM by Faye Beasley RN              Goal Recent Progress Last Modified     Effective Breathing Pattern During Sleep --  6/17/2024 10:00 AM by Faye Beasley RN     Evidence-based guidance:   Use a validated screening tool to identify risk for obstructive sleep apnea (MILLI).   Encourage a nonsupine sleep position, such as side-lying, head of bed elevated, multiple pillows, to prevent airway collapse.   Encourage the use of sleep hygiene techniques.   Anticipate a referral for sleep study (polysomnography).   If MILLI is identified, prepare patient for treatment options, such as nighttime oxygen therapy and CPAP (continuous positive airway pressure) or BiPAP (bilevel  positive airway pressure).    Notes:            Task Due Date Last Modified     Monitor and Manage Obstructive Sleep Apnea (MILLI) --  6/17/2024 10:00 AM by Faye Beasley RN     Care Management Activities:      - not discussed during this outreach      Notes:                   Fall Risk (Adult)           Problem Priority Last Modified     Fall Risk --  6/17/2024 10:00 AM by Faye Beasley RN              Goal Recent Progress Last Modified     Absence of Fall and Fall-Related Injury --  6/17/2024 10:00 AM by Faye Beasley RN     Evidence-based guidance:   Assess fall risk using a validated tool when available. Consider balance and gait impairment, muscle weakness, diminished vision or hearing, environmental hazards, presence of urinary or bowel urgency and/or incontinence.   Communicate fall injury risk to interprofessional healthcare team.   Develop a fall prevention plan with the patient and family.   Promote use of personal vision and auditory aids.   Promote reorientation, appropriate sensory stimulation, and routines to decrease risk of fall when changes in mental status are present.   Assess assistance level required for safe and effective self-care; consider referral for home care.   Encourage physical activity, such as performance of self-care at highest level of ability, strength and balance exercise program, and provision of appropriate assistive devices; refer to rehabilitation therapy.   Refer to community-based fall prevention program where available.   If fall occurs, determine the cause and revise fall injury prevention plan.   Regularly review medication contribution to fall risk; consider risk related to polypharmacy and age.   Refer to pharmacist for consultation when concerns about medications are revealed.   Balance adequate pain management with potential for oversedation.   Provide guidance related to environmental modifications.   Consider supplementation with Vitamin D.    Notes:             Task Due Date Last Modified     Identify and Manage Contributors to Fall Risk --  6/17/2024 10:00 AM by Faye Beasley, RN     Care Management Activities:      - activities of daily living skills assessed  - participation in rehabilitation therapy encouraged      Notes:                        Current Specialty Plan of Care Status signed by both patient and provider    Instructions   Patient was provided an electronic copy of care plan  CCM services were explained and offered and patient has accepted these services.  Patient has given their written consent to receive CCM services and understands that this includes the authorization of electronic communication of medical information with the other treating providers.  Patient understands that they may stop CCM services at any time and these changes will be effective at the end of the calendar month and will effectively revocate the agreement of CCM services.  Patient understands that only one practitioner can furnish and be paid for CCM services during one calendar month.  Patient also understands that there may be co-payment or deductible fees in association with CCM services.  Patient will continue with at least monthly follow-up calls with the Ambulatory .    Faye JAUREGUI  Ambulatory Case Management    6/27/2024, 10:38 EDT

## 2024-07-10 ENCOUNTER — TRANSCRIBE ORDERS (OUTPATIENT)
Dept: HOME HEALTH SERVICES | Facility: HOME HEALTHCARE | Age: 74
End: 2024-07-10
Payer: MEDICARE

## 2024-07-10 ENCOUNTER — PATIENT OUTREACH (OUTPATIENT)
Dept: CASE MANAGEMENT | Facility: OTHER | Age: 74
End: 2024-07-10
Payer: MEDICARE

## 2024-07-10 DIAGNOSIS — S42.91XA CLOSED FRACTURE OF RIGHT SHOULDER, INITIAL ENCOUNTER: Primary | ICD-10-CM

## 2024-07-10 DIAGNOSIS — M48.062 SPINAL STENOSIS, LUMBAR REGION, WITH NEUROGENIC CLAUDICATION: ICD-10-CM

## 2024-07-10 DIAGNOSIS — G20.B2 PARKINSON'S DISEASE WITH DYSKINESIA AND FLUCTUATING MANIFESTATIONS: ICD-10-CM

## 2024-07-10 DIAGNOSIS — I25.10 CORONARY ARTERY DISEASE INVOLVING NATIVE CORONARY ARTERY OF NATIVE HEART WITHOUT ANGINA PECTORIS: Primary | ICD-10-CM

## 2024-07-10 NOTE — OUTREACH NOTE
AMBULATORY CASE MANAGEMENT NOTE    Names and Relationships of Patient/Support Persons: Contact: Silvana Cross(POA); Relationship: Emergency Contact -     [facility] Interim Update    Spoke with patient's daughter Silvana. Ms. Cross is supposed to be discharged to home 7/12/24. Message left for TRICE BelcherAmenarichie Mcclelland at [facility]. [phone]. PT at the facility has recommended that Ms. Cross have a hospital bed. Called to facility and coordinate discharge planning. She will also need HH.     Silvana states that Ms. Cross's shoulder has not healed and she has been told it may never heal. They did take her out of the sling. She is still using the tod walker. She has been told she can not push, pull, twist or lift. Silvana does not feel that it is best for Ms. Cross to come home. But she refuses to be long term. She becomes very angry even discussing it. Silvana feels that this will be more than she can handle. She plans to have a discussion with her mother that if she has to return to the hospital especially for a fracture that she won't be able to come back home. Ms. Cross is still using the tod walker. She still has difficulty picking up her right foot and it drags. She can not transfer or dress herself. She has to have meals, etc prepared for her. They have hired a new caregiver for during the day. Silvana will be her caretaker in the evenings.       Education Documentation  No documentation found.        Faye JAUREGUI  Ambulatory Case Management    7/10/2024, 11:44 EDT

## 2024-07-12 ENCOUNTER — TELEPHONE (OUTPATIENT)
Dept: CASE MANAGEMENT | Facility: OTHER | Age: 74
End: 2024-07-12
Payer: MEDICARE

## 2024-07-12 ENCOUNTER — PATIENT OUTREACH (OUTPATIENT)
Dept: CASE MANAGEMENT | Facility: OTHER | Age: 74
End: 2024-07-12
Payer: MEDICARE

## 2024-07-12 DIAGNOSIS — M48.062 SPINAL STENOSIS, LUMBAR REGION, WITH NEUROGENIC CLAUDICATION: ICD-10-CM

## 2024-07-12 DIAGNOSIS — I25.10 CORONARY ARTERY DISEASE INVOLVING NATIVE CORONARY ARTERY OF NATIVE HEART WITHOUT ANGINA PECTORIS: Primary | ICD-10-CM

## 2024-07-12 DIAGNOSIS — G20.B2 PARKINSON'S DISEASE WITH DYSKINESIA AND FLUCTUATING MANIFESTATIONS: ICD-10-CM

## 2024-07-12 NOTE — TELEPHONE ENCOUNTER
Attempted to reach Amena with [facility] for DC planning. Also left a message for patient's daughter Silvana.

## 2024-07-12 NOTE — OUTREACH NOTE
AMBULATORY CASE MANAGEMENT NOTE    Names and Relationships of Patient/Support Persons:  -     CCM Interim Update    Spoke with Silvana, Ms. Cross's daughter. She does believe that Ms. Cross's pain is much better. She has not been complaining of as much pain.    Care Coordination    Attempted to reach Amena at [facility]. Left message for return call.  Received return call from Silvana. The hospital bed is arranged and is at home. Silvana is concerned that Ms. Cross will not be happy with it. It is not like the bed she has at the facility. Silvana understands that this is what medicare will pay for. Silvana has it set up and is going to get sheets, etc for it so it will look nicer for Ms. Cross when she gets home. The DME  did say that they can possibly order a different mattress for her if this one does not work. Home health has been arranged through [facility] and Silvana has already spoken to them.    Ms. Cross did win her insurance appeal. She now gets to stay a few days longer. Silvana thinks Ms. Cross will come home either Monday or Tuesday of next week. The caregivers are lined up for during the day and have assured they will make sure she continues with her therapy.     Silvana is very appreciative of [facility] assistance.       Education Documentation  No documentation found.        Faye JAUREGUI  Ambulatory Case Management    7/12/2024, 11:11 EDT

## 2024-07-17 ENCOUNTER — TELEPHONE (OUTPATIENT)
Dept: FAMILY MEDICINE CLINIC | Facility: CLINIC | Age: 74
End: 2024-07-17
Payer: MEDICARE

## 2024-07-17 ENCOUNTER — HOME CARE VISIT (OUTPATIENT)
Dept: HOME HEALTH SERVICES | Facility: HOME HEALTHCARE | Age: 74
End: 2024-07-17
Payer: MEDICARE

## 2024-07-17 DIAGNOSIS — I25.10 CORONARY ARTERY DISEASE INVOLVING NATIVE CORONARY ARTERY OF NATIVE HEART WITHOUT ANGINA PECTORIS: Primary | ICD-10-CM

## 2024-07-17 DIAGNOSIS — N18.32 CHRONIC KIDNEY DISEASE, STAGE 3B: ICD-10-CM

## 2024-07-17 DIAGNOSIS — I50.31 ACUTE DIASTOLIC CHF (CONGESTIVE HEART FAILURE): ICD-10-CM

## 2024-07-17 PROCEDURE — G0151 HHCP-SERV OF PT,EA 15 MIN: HCPCS

## 2024-07-18 ENCOUNTER — TELEPHONE (OUTPATIENT)
Dept: ONCOLOGY | Facility: CLINIC | Age: 74
End: 2024-07-18
Payer: MEDICARE

## 2024-07-18 VITALS
TEMPERATURE: 97.7 F | SYSTOLIC BLOOD PRESSURE: 142 MMHG | DIASTOLIC BLOOD PRESSURE: 68 MMHG | OXYGEN SATURATION: 97 % | HEART RATE: 73 BPM | RESPIRATION RATE: 16 BRPM

## 2024-07-18 NOTE — TELEPHONE ENCOUNTER
Called Karina back and she states she is having blood work Monday with Dr. Fritz office. She would like to arrange procrit injection and follow up appts.

## 2024-07-18 NOTE — TELEPHONE ENCOUNTER
"  Caller: Joanna Cross \"SIXTO\"    Relationship: Self    Best call back number: [phone]    What is the best time to reach you: ANY    Who are you requesting to speak with (clinical staff, provider,  specific staff member): CLINICAL     What was the call regarding: SIXTO IS CALLING STATES SHE IS GETTING LABS WITH DR TAVAREZ OFFICE, THE LAB SHE WILL BE GETTING WILL SHOW WHAT HER HEMOGLOBIN IS  SHE IS WANTING TO TALK TO SCHEDULING OR CLINICAL  REGARDING A PROCRIT INJECTION     PLEASE ADVISE   "

## 2024-07-18 NOTE — HOME HEALTH
Resumption of Care Note:     Patient was initially seen for SOC for home care, subsequently had a fall in her home, and was hospitalized and then spent a one month stay at [facility] from 6/25/2024 to 7/16/2024. .  She has had a history of bilateral shoulder pain and is currently under restrictions for R shoulder mobility until her f/u with her surgeon on 8/13/2024. Her fall was the result of significant RLE/hip pain resulting in weakness.  She is using a tod walker versus rollator for mobility in her home.  She has a signficant history of Parkinson's Disease, pacemaker and a hx of freqeunt falls.  She wears glasses, continues to sleep with oxygen at night only at 2 LPM.  She does live with her daughter who is able to physically assist patient prn.  She has received a hospital bed in the last couple days and is able to use it depending on her pain level and overall breathing capabilities. She does have a car, but has not been able to drive for the past year secondary to bilateral shoulder surgeries and RLE pain/weakness.     Reason for hospitalization/new problems: RLE weakness and pain resulting in a fall, ongoing RUE shoulder restrictions until surgical f/u.    JOSE [place] Findings:    New/changed medications: none.     New/changed orders: Patient continues under the restrictions for her RUE: Active and passive ROM, no resistance, no lifting, twisting, push or pull until f/u appointment on 8/13/2024.    Educated on Emergency Plan, steps to take prior to going to the ER and when to Call [facility]: patient aware.     Plan/Focus of Care and Skilled need: PT to focus on bilateral LE strengthening, ambulation, standing balance, and HEP progression.

## 2024-07-18 NOTE — CASE COMMUNICATION
Resumption of Care Note:     Patient was initially seen for SOC for home care, subsequently had a fall in her home, and was hospitalized and then spent a one month stay at [facility] from 6/25/2024 to 7/16/2024. .  She has had a history of bilateral shoulder pain and is currently under restrictions for R shoulder mobility until her f/u with her surgeon on 8/13/2024. Her fall was the result of significant RLE/hip pain result ing in weakness.  She is using a tod walker versus rollator for mobility in her home.  She has a signficant history of Parkinson's Disease, pacemaker and a hx of freqeunt falls.  She wears glasses, continues to sleep with oxygen at night only at 2 LPM.  She does live with her daughter who is able to physically assist patient prn.  She has received a hospital bed in the last couple days and is able to use it depending on her pain level  and overall breathing capabilities. She does have a car, but has not been able to drive for the past year secondary to bilateral shoulder surgeries and RLE pain/weakness.     Reason for hospitalization/new problems: RLE weakness and pain resulting in a fall, ongoing RUE shoulder restrictions until surgical f/u.    JOSE [place] Findings:    New/changed medications: none.     New/changed orders: Patient continues under the restrictions for h er RUE: Active and passive ROM, no resistance, no lifting, twisting, push or pull until f/u appointment on 8/13/2024.    Educated on Emergency Plan, steps to take prior to going to the ER and when to Call [facility]: patient aware.     Plan/Focus of Care and Skilled need: PT to focus on bilateral LE strengthening, ambulation, standing balance, and HEP progression.

## 2024-07-19 ENCOUNTER — HOME CARE VISIT (OUTPATIENT)
Dept: HOME HEALTH SERVICES | Facility: HOME HEALTHCARE | Age: 74
End: 2024-07-19
Payer: MEDICARE

## 2024-07-19 PROCEDURE — G0152 HHCP-SERV OF OT,EA 15 MIN: HCPCS

## 2024-07-22 ENCOUNTER — LAB (OUTPATIENT)
Dept: FAMILY MEDICINE CLINIC | Facility: CLINIC | Age: 74
End: 2024-07-22
Payer: MEDICARE

## 2024-07-22 DIAGNOSIS — N18.32 CHRONIC KIDNEY DISEASE, STAGE 3B: Primary | ICD-10-CM

## 2024-07-22 DIAGNOSIS — E11.65 TYPE 2 DIABETES MELLITUS WITH HYPERGLYCEMIA, WITHOUT LONG-TERM CURRENT USE OF INSULIN: ICD-10-CM

## 2024-07-22 DIAGNOSIS — I25.10 CORONARY ARTERY DISEASE INVOLVING NATIVE CORONARY ARTERY OF NATIVE HEART WITHOUT ANGINA PECTORIS: ICD-10-CM

## 2024-07-22 DIAGNOSIS — S42.91XG CLOSED FRACTURE OF RIGHT SHOULDER WITH DELAYED HEALING, SUBSEQUENT ENCOUNTER: ICD-10-CM

## 2024-07-22 DIAGNOSIS — T14.8XXA MUSCLE STRAIN: ICD-10-CM

## 2024-07-22 DIAGNOSIS — I50.31 ACUTE DIASTOLIC CHF (CONGESTIVE HEART FAILURE): ICD-10-CM

## 2024-07-22 PROCEDURE — 36415 COLL VENOUS BLD VENIPUNCTURE: CPT | Performed by: FAMILY MEDICINE

## 2024-07-23 ENCOUNTER — HOME CARE VISIT (OUTPATIENT)
Dept: HOME HEALTH SERVICES | Facility: HOME HEALTHCARE | Age: 74
End: 2024-07-23
Payer: MEDICARE

## 2024-07-23 VITALS
DIASTOLIC BLOOD PRESSURE: 70 MMHG | SYSTOLIC BLOOD PRESSURE: 134 MMHG | RESPIRATION RATE: 16 BRPM | OXYGEN SATURATION: 97 % | TEMPERATURE: 97.4 F | HEART RATE: 68 BPM

## 2024-07-23 VITALS
DIASTOLIC BLOOD PRESSURE: 62 MMHG | SYSTOLIC BLOOD PRESSURE: 120 MMHG | TEMPERATURE: 97.6 F | HEART RATE: 71 BPM | RESPIRATION RATE: 16 BRPM | OXYGEN SATURATION: 97 %

## 2024-07-23 LAB
25(OH)D3+25(OH)D2 SERPL-MCNC: 54.9 NG/ML (ref 30–100)
ALBUMIN SERPL-MCNC: 4.3 G/DL (ref 3.8–4.8)
ALP SERPL-CCNC: 82 IU/L (ref 44–121)
ALT SERPL-CCNC: 11 IU/L (ref 0–32)
AST SERPL-CCNC: 18 IU/L (ref 0–40)
BASOPHILS # BLD AUTO: 0.1 X10E3/UL (ref 0–0.2)
BASOPHILS NFR BLD AUTO: 1 %
BILIRUB SERPL-MCNC: 0.4 MG/DL (ref 0–1.2)
BUN SERPL-MCNC: 31 MG/DL (ref 8–27)
BUN/CREAT SERPL: 22 (ref 12–28)
CALCIUM SERPL-MCNC: 9.1 MG/DL (ref 8.7–10.3)
CHLORIDE SERPL-SCNC: 101 MMOL/L (ref 96–106)
CHOLEST SERPL-MCNC: 168 MG/DL (ref 100–199)
CO2 SERPL-SCNC: 22 MMOL/L (ref 20–29)
CREAT SERPL-MCNC: 1.4 MG/DL (ref 0.57–1)
EGFRCR SERPLBLD CKD-EPI 2021: 39 ML/MIN/1.73
EOSINOPHIL # BLD AUTO: 0.4 X10E3/UL (ref 0–0.4)
EOSINOPHIL NFR BLD AUTO: 9 %
ERYTHROCYTE [DISTWIDTH] IN BLOOD BY AUTOMATED COUNT: 12.4 % (ref 11.7–15.4)
GLOBULIN SER CALC-MCNC: 2.4 G/DL (ref 1.5–4.5)
GLUCOSE SERPL-MCNC: 153 MG/DL (ref 70–99)
HBA1C MFR BLD: 5.7 % (ref 4.8–5.6)
HCT VFR BLD AUTO: 30.4 % (ref 34–46.6)
HDLC SERPL-MCNC: 43 MG/DL
HGB BLD-MCNC: 9.8 G/DL (ref 11.1–15.9)
IMM GRANULOCYTES # BLD AUTO: 0 X10E3/UL (ref 0–0.1)
IMM GRANULOCYTES NFR BLD AUTO: 1 %
LDLC SERPL CALC-MCNC: 108 MG/DL (ref 0–99)
LYMPHOCYTES # BLD AUTO: 0.6 X10E3/UL (ref 0.7–3.1)
LYMPHOCYTES NFR BLD AUTO: 12 %
MCH RBC QN AUTO: 31.6 PG (ref 26.6–33)
MCHC RBC AUTO-ENTMCNC: 32.2 G/DL (ref 31.5–35.7)
MCV RBC AUTO: 98 FL (ref 79–97)
MONOCYTES # BLD AUTO: 0.4 X10E3/UL (ref 0.1–0.9)
MONOCYTES NFR BLD AUTO: 9 %
NEUTROPHILS # BLD AUTO: 3.3 X10E3/UL (ref 1.4–7)
NEUTROPHILS NFR BLD AUTO: 68 %
PHOSPHATE SERPL-MCNC: 4.1 MG/DL (ref 3–4.3)
PLATELET # BLD AUTO: 169 X10E3/UL (ref 150–450)
POTASSIUM SERPL-SCNC: 3.7 MMOL/L (ref 3.5–5.2)
PROT SERPL-MCNC: 6.7 G/DL (ref 6–8.5)
RBC # BLD AUTO: 3.1 X10E6/UL (ref 3.77–5.28)
SODIUM SERPL-SCNC: 140 MMOL/L (ref 134–144)
TRIGL SERPL-MCNC: 90 MG/DL (ref 0–149)
TSH SERPL DL<=0.005 MIU/L-ACNC: 3.47 UIU/ML (ref 0.45–4.5)
VIT B12 SERPL-MCNC: 929 PG/ML (ref 232–1245)
VLDLC SERPL CALC-MCNC: 17 MG/DL (ref 5–40)
WBC # BLD AUTO: 4.8 X10E3/UL (ref 3.4–10.8)

## 2024-07-23 PROCEDURE — G0157 HHC PT ASSISTANT EA 15: HCPCS

## 2024-07-24 ENCOUNTER — OFFICE VISIT (OUTPATIENT)
Dept: ONCOLOGY | Facility: CLINIC | Age: 74
End: 2024-07-24
Payer: MEDICARE

## 2024-07-24 ENCOUNTER — INFUSION (OUTPATIENT)
Dept: ONCOLOGY | Facility: HOSPITAL | Age: 74
End: 2024-07-24
Payer: MEDICARE

## 2024-07-24 ENCOUNTER — TELEPHONE (OUTPATIENT)
Dept: ONCOLOGY | Facility: CLINIC | Age: 74
End: 2024-07-24
Payer: MEDICARE

## 2024-07-24 VITALS
SYSTOLIC BLOOD PRESSURE: 121 MMHG | RESPIRATION RATE: 20 BRPM | HEART RATE: 79 BPM | HEIGHT: 62 IN | TEMPERATURE: 98.6 F | DIASTOLIC BLOOD PRESSURE: 65 MMHG | WEIGHT: 222 LBS | OXYGEN SATURATION: 97 % | BODY MASS INDEX: 40.85 KG/M2

## 2024-07-24 DIAGNOSIS — D63.1 ANEMIA ASSOCIATED WITH STAGE 3 CHRONIC RENAL FAILURE: Primary | ICD-10-CM

## 2024-07-24 DIAGNOSIS — N18.30 ANEMIA ASSOCIATED WITH STAGE 3 CHRONIC RENAL FAILURE: Primary | ICD-10-CM

## 2024-07-24 LAB — SPECIMEN STATUS: NORMAL

## 2024-07-24 PROCEDURE — 3078F DIAST BP <80 MM HG: CPT | Performed by: NURSE PRACTITIONER

## 2024-07-24 PROCEDURE — 99214 OFFICE O/P EST MOD 30 MIN: CPT | Performed by: NURSE PRACTITIONER

## 2024-07-24 PROCEDURE — 25010000002 EPOETIN ALFA-EPBX 40000 UNIT/ML SOLUTION: Performed by: NURSE PRACTITIONER

## 2024-07-24 PROCEDURE — 96372 THER/PROPH/DIAG INJ SC/IM: CPT

## 2024-07-24 PROCEDURE — 1126F AMNT PAIN NOTED NONE PRSNT: CPT | Performed by: NURSE PRACTITIONER

## 2024-07-24 PROCEDURE — 3074F SYST BP LT 130 MM HG: CPT | Performed by: NURSE PRACTITIONER

## 2024-07-24 RX ADMIN — EPOETIN ALFA-EPBX 40000 UNITS: 40000 INJECTION, SOLUTION INTRAVENOUS; SUBCUTANEOUS at 10:08

## 2024-07-24 NOTE — TELEPHONE ENCOUNTER
Spoke with Shireen with [facility] and they will draw patients CBC every other week starting 8/5/24

## 2024-07-24 NOTE — PROGRESS NOTES
CHIEF COMPLAINT: Weakness of her right leg    Problem List:  Oncology/Hematology History Overview Note   1.  Anemia since at least 2016 managed with erythropoietin injections by [facility].  With polypectomies from cecum June 2021 Dr. Marino's and capsule endoscopy at [facility] November 2016 showing small bowel AVMs cecal polyps Bj Rivera September 2016 and Dr. Reynaldo Fritz polypectomy 2007.  EGD Dr. Rivera 2012, 2015, 2016 with dilation of esophagus.  [facility] enteroscopy single balloon with fluoroscopy [facility] with 1 small nonbleeding AVM ablated.  2.  Cardiovascular disease with MI 2015 stent RCA [facility]  3.  Atrial fib managed by [facility] in [place] 2016 with pacemaker   4.  Repetitive falls with possible concussion April 2023 and January 2024  5.  Wedge biopsy left upper lobe Dr. Faisal Lundy October 2016  6.  Eczema  7.  Reflux  8.  Hypertension  9.  Hypothyroidism  10.  Lichen sclerosis on vulvar biopsy 1982  11.  Parkinson's diagnosed 2007 [facility]  12.  Sleep apnea managed by [facility] pulmonary medicine  13.  Subcutaneous lupus diagnosed [facility] Carole Roy February 2023  14.  2019 back surgeries Dr. Lencho Gamino and wound infection drained by Dr. Alonso in 2018  15.  Urethral dilations with Dr. Terrence Mckenzie  16.  Bilateral total knee replacements Dr. Springer.  17.  CHF    Hematology history timeline:  -10/26/2023 hematology note Dr. Nunez.  Received parenteral iron September 2023 with hemoglobin stable 9.8.  He states this is multifactorial anemia due to iron deficiency suspecting GI blood loss due to history of AVMs and chronic kidney disease with erythropoietin deficiency.  White count and platelets normal.  Plan for continued Procrit per protocol with monitoring of iron indices periodically.  Numerous prior B12 levels normal during 2023.  Patient considering colonoscopy  -12/28/2023 40,000 units Procrit last dose given at  [facility] being held for hemoglobin over 10.  -1/22/2024 hemoglobin 11 received IV iron 510 mg 1/22/2024 and 1/31/2024.  -3/6/2024 ferritin 263 with iron normal 115 and iron saturation 40% with normal total iron binding capacity 291.  Creatinine 1.24 GFR 45.  Hemoglobin 8.7 with platelets 132,000.  MCV 96 with normal RDW.    -3/12/2024 [facility] hematology consult: I reviewed her extensive records she brought me that she collated herself and the note from [facility] I summarized above.  She has anemia of renal disease and iron deficiency due to periodic GI losses intolerant of oral iron because she cannot take it with the Sinemet for her Parkinson's that she takes 6 times a day so she gets periodic IV iron.  For now, with her hemoglobin of 8.7 and GFR 45 with normal iron indices we will hop back on the Procrit has per protocol 40,000 units subcu weekly holding for hemoglobin over 10 and she will see my nurse practitioner back in May.  Following that my nurse practitioner will watch her and periodically we will need to check her iron indices as she does have a history of AVMs and may need periodic IV iron as well.  I discussed with her that I would not put her through a bone marrow biopsy despite the mild thrombocytopenia given normochromic normocytic indices and normal Red cell distribution with an unremarkable differential and the fact that, even if I found myelodysplasia, at most what I would do is just Procrit which we are already doing.  She has not had jaundice or icterus or other evidence is to suggest hemolysis and I will not work that up.  In essence we will just continue the care that she was already receiving at [facility].  She has had thorough GI evaluations as outlined above.    -5/8/2024 [facility] hemat[facility] clinic follow-up: We are administering Procrit for her anemia of renal disease if her hemoglobin is less than 10.  She has not required Procrit since we  saw her initially on 3/12/2024.  In order to try and simplify things as she is unable to get out without assistance I will change her monitoring to every 2 weeks.  We will check her CBC every 2 weeks and we will get Procrit if her hemoglobin is less than 10.  She had been going to [place] for her Procrit since there is a lab there and they can do it on Monday however she lives here in [place].  She does have a caregiver or her daughter who can get her to the lab therefore we will have her get her labs here locally and we will administer the Procrit in [place] if parameters are met.  If this arrangement does not work out for her then we will get her back to [place] and I discussed with her and her daughter that we also have a clinic in [place] who can see her on the days of her Procrit.  She is following now with Dr. Teague for management of her CHF.  I did ask her daughter to speak with either her PCP or cardiologist about home health for additional management of her CHF.  Her iron studies were normal when checked in March.  Those are built in to assess periodically while on Procrit, next due in June.  Her daughter did state that she thinks her mother received iron while recent inpatient.     Anemia associated with stage 3 chronic renal failure   3/12/2024 -  Chemotherapy    OP SUPPORTIVE Epoetin  Parker / Epoetin Parker-epbx         HISTORY OF PRESENT ILLNESS:  The patient is a 73 y.o. female, here for follow up on management of anemia.  Since we saw Karina last she was once again admitted to the hospital for management of CHF, she also had a visit to the emergency room with weakness and pain in her right hip and leg.  She states that this is due to an issue with one of her tendons.  She was admitted to rehab facility from 6/25/2024 through 7/16/2024 which was very helpful.  She is now back home and continues to have home health with PT and work on strengthening her lower extremity.  She is slowly starting to  be able to transfer a little better and lift her right leg.  She was evaluated for blood clot but this was negative.  She is on blood thinner, she takes Eliquis 5 mg twice daily.  She has not received Procrit since March.  She has not required any blood transfusions.    Past Medical History:   Diagnosis Date    Allergic rhinitis     Anemia     Ankle problem     thinks back related causing pain     Atrial fibrillation     AVM (arteriovenous malformation)     Back pain     CHF (congestive heart failure) 6/2/2024    Chronic kidney disease     stage 3 per pt    Chronic lung disease 04/13/2022    CKD (chronic kidney disease)     Clotting disorder 2016    AVM - small intestine    Coronary artery disease involving native coronary artery without angina pectoris 03/01/2017    COVID-19 vaccine series completed     Cystic fibrosis     Diastolic dysfunction     Gastrocnemius muscle tear     left medial 91    Generalized osteoarthritis     GERD (gastroesophageal reflux disease)     Gestational diabetes     GIB (gastrointestinal bleeding) 2016    d/t xarelto     Headache     Hearing decreased, bilateral     HAS HEARING AID, NOT WEARING IT CURRENTLY    Hiatal hernia     History of shingles     History of transfusion 2016    no reaction recalled     Hyperlipidemia LDL goal <70 03/01/2017    Hypertension     Hypothyroidism     IBS (irritable bowel syndrome)     Klebsiella pneumonia     Lichen sclerosus     Lupus     subQ    Mouth problem     mouth guard used since pt bites tongue and lips excessively at night if not- with bipap     MRSA infection 2018    Myocardial infarction     Obesity     On home oxygen therapy     2L of oxygen all the time due to current congestion     Osteoporosis     Parkinson's disease     Peripheral vascular disease     Pleurisy     Pneumonia     Puerperal sepsis with acute hypoxic respiratory failure     emergent intubation- 2016    Pulmonary embolism     Right leg pain     from back issues     Salivary  gland stone     Sciatic nerve pain     Seborrheic dermatitis     Skin cancer     on back     Sleep apnea     CPAP AND HOME O2 2L/M    Sleep apnea, obstructive 04/15/01    TIA (transient ischemic attack) 2014    no residual effects    Type 2 diabetes mellitus     UTI (urinary tract infection)     Vitamin D deficiency 09/08/2022    Wears glasses      Past Surgical History:   Procedure Laterality Date    ABLATION OF DYSRHYTHMIC FOCUS  05/16/13    Laser Ablation - Rt Leg    BACK SURGERY      l4-l5 laminectomy     BICEPS TENDON REPAIR Right     shoulder    BREAST BIOPSY Left 05/2004    excisional, benign    BRONCHOSCOPY RIGID / FLEXIBLE      2016    BUNIONECTOMY Right     CARDIAC CATHETERIZATION      CARDIAC CATHETERIZATION N/A 02/01/2019    Procedure: Left Heart Cath;  Surgeon: Albertina Corona MD;  Location:  [facility];  Service: Cardiology    CARDIAC ELECTROPHYSIOLOGY PROCEDURE N/A 01/26/2024    Procedure: Lead Revision RA or RV, PPM or ICD;  Surgeon: Lauro Francois MD;  Location:  [facility] EP INVASIVE LOCATION;  Service: Cardiovascular;  Laterality: N/A;    CHOLECYSTECTOMY      COLONOSCOPY  2016    COLONOSCOPY N/A 06/17/2021    Procedure: COLONOSCOPY WITH POLYPECTOMY;  Surgeon: Trenton Sosa MD;  Location:  [facility];  Service: Gastroenterology;  Laterality: N/A;    CORONARY STENT PLACEMENT      x1 stent    CYST REMOVAL      left ear, upper left back 2001    CYSTOSCOPY      x2  18 and 20 -   in 20 urethra dilation     DIAGNOSTIC LAPAROSCOPY  1981    ENDOSCOPIC FUNCTIONAL SINUS SURGERY (FESS)  2011    ENDOSCOPY  2016    ENTEROSCOPY VIA STOMA      with single ballon with fluoro     HAMMER TOE REPAIR Left     INCISION AND DRAINAGE OF WOUND  2018    back with wound infection     INSERT / REPLACE / REMOVE PACEMAKER  11/10/16    [facility]    JOINT REPLACEMENT      KNEE ARTHROSCOPY      LASER ABLATION      right leg 13    LIPOMA EXCISION  1999    left leg     LUMBAR  LAMINECTOMY DISCECTOMY DECOMPRESSION N/A 07/06/2018    Procedure: LUMBAR LAMINECTOMY L4-5, HEMILAMIINECTOMY RIGHT L5-S1, FORAMINOTOMY L5-S1;  Surgeon: Lencho Gamino MD;  Location:  [facility] OR;  Service: Neurosurgery    LUMBAR LAMINECTOMY DISCECTOMY DECOMPRESSION N/A 09/19/2018    Procedure: INCISION AND DRAINAGE BACK WITH WOUND EXPLORATION;  Surgeon: Ritchie García MD;  Location:  [facility] OR;  Service: Neurosurgery    LUNG BIOPSY Left 2016    OTHER SURGICAL HISTORY      ct scan of chest and sinuses and lower back     OTHER SURGICAL HISTORY      various echos     OTHER SURGICAL HISTORY      electroencephalogram 16,   emg  ncv tests 2007    OTHER SURGICAL HISTORY      mra 2007  and various mri with xrays, nuclear medicine lung ventilation with perfusion test 2016 with pft     OTHER SURGICAL HISTORY      barium swallow testing 15, 12, 12    OTHER SURGICAL HISTORY      vaginal ultrasound- 2012,   vas clementina lower extrem 2016, vas venous duplex lower extrem bilat 2019    OTHER SURGICAL HISTORY      wdge biopsy spring of lung     OTHER SURGICAL HISTORY      emergent intubation- hypoxic resp failure     OTHER SURGICAL HISTORY      01/26/2024 ppm generator change and lead revision per Dr. Francois    PACEMAKER IMPLANTATION  11/2016    sss    REPLACEMENT TOTAL KNEE Bilateral     left knee 2011, right 2012 per dr acuna     REPLACEMENT TOTAL KNEE Bilateral     SHOULDER ARTHROSCOPY Bilateral     2003-left, 2004- right     SKIN BIOPSY      skin cancer back 2016    SKIN CANCER EXCISION      upper righ tback     MADHAV      TEETH EXTRACTION      x2    TOTAL SHOULDER ARTHROPLASTY W/ DISTAL CLAVICLE EXCISION Left 10/24/2022    Procedure: REVERSE TOTAL SHOULDER ARTHROPLASTY, BICEPS TENODESIS - LEFT;  Surgeon: Sammy Yo MD;  Location:  [facility] OR;  Service: Orthopedics;  Laterality: Left;    TOTAL SHOULDER ARTHROPLASTY W/ DISTAL CLAVICLE EXCISION Right 09/18/2023    Procedure: REVERSE TOTAL SHOULDER  ARTHROPLASTY WITH BICEPS TENODESIS -  "RIGHT;  Surgeon: Sammy Yo MD;  Location: [facility];  Service: Orthopedics;  Laterality: Right;    TUBAL ABDOMINAL LIGATION Bilateral 1980    WISDOM TOOTH EXTRACTION         Allergies   Allergen Reactions    Amlodipine Besylate Swelling     Lower extremity (ankles, feet) swelling    Entacapone Other (See Comments)     \"extreme weakness in legs - caused several falls, which stopped after discontinuing this medication\"    Epinephrine Other (See Comments)     6/4/16- had 3 shots to numb mouth to prepare teeth for crowns, the shots contained epi-  Caused pt to have chest discomfort- went to hospital in ambulance, discovered had a fib while there     Hydrocodone Unknown - High Severity     Headache, nausea, dizziness, & vomiting    Levemir [Insulin Detemir] Hives     Hives / rash around injection site    Penicillins Hives     Jitteriness     Xarelto [Rivaroxaban] GI Bleeding     hgb dropped to 5.2    Aricept [Donepezil Hcl] Nausea Only     Vivid dreams    Benztropine Mesylate Other (See Comments)     Uncontrollable body movements    Cogentin [Benztropine] Other (See Comments)     \"uncontrollable body movements\"    Compazine [Prochlorperazine Edisylate] Other (See Comments)     Dystonic reaction    Duraprep [Antiseptic Products, Misc.] Itching and Rash     RASH AND ITCHING    Haldol [Haloperidol Lactate] Other (See Comments)     Dystonic reaction    Hydralazine Other (See Comments)     Headache     Lisinopril Cough    Statins Myalgia     Leg pain- all statins     Sulfamethoxazole Nausea Only and Other (See Comments)     Nausea & headaches    Tarka [Trandolapril-Verapamil Hcl Er] Other (See Comments)     Constipation     Toprol Xl [Metoprolol Tartrate] Other (See Comments)     Extreme fatigue, decreased exercise tolerance    Trimethoprim Other (See Comments)     Other reaction(s): Nausea       Family History and Social History reviewed and changed as necessary    REVIEW OF SYSTEM:   Fatigue  Weakness in her " "right leg    PHYSICAL EXAM:  Well-developed, well-nourished appearing female in no distress  Respirations regular and unlabored, lungs clear to auscultation bilaterally.  Oxygen saturation 97% on O2 3 L  She arrives today via wheelchair with a friend    Vitals:    07/24/24 0908   BP: 121/65   Pulse: 79   Resp: 20   Temp: 98.6 °F (37 °C)   SpO2: 97%   Weight: 101 kg (222 lb)   Height: 157.5 cm (62\")     Vitals:    07/24/24 0908   PainSc: 0-No pain          ECOG score: 3           Vitals reviewed.  Labs reviewed    ECOG: (3) Capable of Limited Self Care, Confined to Bed or Chair Greater Than 50% of Waking Hours    Lab Results   Component Value Date    HGB 9.8 (L) 07/22/2024    HCT 30.4 (L) 07/22/2024    MCV 98 (H) 07/22/2024     07/22/2024    WBC 4.8 07/22/2024    NEUTROABS 3.3 07/22/2024    LYMPHSABS 0.6 (L) 07/22/2024    MONOSABS 0.4 07/22/2024    EOSABS 0.4 07/22/2024    BASOSABS 0.1 07/22/2024       Lab Results   Component Value Date    GLUCOSE 153 (H) 07/22/2024    BUN 31 (H) 07/22/2024    CREATININE 1.40 (H) 07/22/2024     07/22/2024    K 3.7 07/22/2024     07/22/2024    CO2 22 07/22/2024    CALCIUM 9.1 07/22/2024    PROTEINTOT 6.2 06/02/2024    ALBUMIN 4.3 07/22/2024    BILITOT 0.4 07/22/2024    ALKPHOS 82 07/22/2024    AST 18 07/22/2024    ALT 11 07/22/2024             ASSESSMENT & PLAN:    1.  Anemia since at least 2016 managed with erythropoietin injections by [facility].  With polypectomies from cecum June 2021 Dr. Marino's and capsule endoscopy at [facility] November 2016 showing small bowel AVMs cecal polyps Bj Rivera September 2016 and Dr. Reynaldo Fritz polypectomy 2007.  EGD Dr. Rivera 2012, 2015, 2016 with dilation of esophagus.  [facility] enteroscopy single balloon with fluoroscopy [facility] with 1 small nonbleeding AVM ablated.  2.  Cardiovascular disease with MI 2015 stent RCA [facility]  3.  Atrial fib managed by [facility] in " [place] 2016 with pacemaker   4.  Repetitive falls with possible concussion April 2023 and January 2024  5.  Wedge biopsy left upper lobe Dr. Faisal Lundy October 2016  6.  Eczema  7.  Reflux  8.  Hypertension  9.  Hypothyroidism  10.  Lichen sclerosis on vulvar biopsy 1982  11.  Parkinson's diagnosed 2007 [facility]  12.  Sleep apnea managed by [facility]  13.  Subcutaneous lupus diagnosed [facility] Carole Roy February 2023  14.  2019 back surgeries Dr. Lencho Gamino and wound infection drained by Dr. Alonso in 2018  15.  Urethral dilations with Dr. Terrence Mckenzie  16.  Bilateral total knee replacements Dr. Case  17.  CHF    Hematology history timeline:  -10/26/2023 hematology note Dr. Nunez.  Received parenteral iron September 2023 with hemoglobin stable 9.8.  He states this is multifactorial anemia due to iron deficiency suspecting GI blood loss due to history of AVMs and chronic kidney disease with erythropoietin deficiency.  White count and platelets normal.  Plan for continued Procrit per protocol with monitoring of iron indices periodically.  Numerous prior B12 levels normal during 2023.  Patient considering colonoscopy  -12/28/2023 40,000 units Procrit last dose given at [facility] being held for hemoglobin over 10.  -1/22/2024 hemoglobin 11 received IV iron 510 mg 1/22/2024 and 1/31/2024.  -3/6/2024 ferritin 263 with iron normal 115 and iron saturation 40% with normal total iron binding capacity 291.  Creatinine 1.24 GFR 45.  Hemoglobin 8.7 with platelets 132,000.  MCV 96 with normal RDW.    -3/12/2024 [facility] consult: I reviewed her extensive records she brought me that she collated herself and the note from [facility] I summarized above.  She has anemia of renal disease and iron deficiency due to periodic GI losses intolerant of oral iron because she cannot take it with the Sinemet for her Parkinson's that she takes 6 times a day so  she gets periodic IV iron.  For now, with her hemoglobin of 8.7 and GFR 45 with normal iron indices we will hop back on the Procrit has per protocol 40,000 units subcu weekly holding for hemoglobin over 10 and she will see my nurse practitioner back in May.  Following that my nurse practitioner will watch her and periodically we will need to check her iron indices as she does have a history of AVMs and may need periodic IV iron as well.  I discussed with her that I would not put her through a bone marrow biopsy despite the mild thrombocytopenia given normochromic normocytic indices and normal Red cell distribution with an unremarkable differential and the fact that, even if I found myelodysplasia, at most what I would do is just Procrit which we are already doing.  She has not had jaundice or icterus or other evidence is to suggest hemolysis and I will not work that up.  In essence we will just continue the care that she was already receiving at [facility].  She has had thorough GI evaluations as outlined above.    -5/8/2024 [facility] hematology clinic follow-up: We are administering Procrit for her anemia of renal disease if her hemoglobin is less than 10.  She has not required Procrit since we saw her initially on 3/12/2024.  In order to try and simplify things as she is unable to get out without assistance I will change her monitoring to every 2 weeks.  We will check her CBC every 2 weeks and we will get Procrit if her hemoglobin is less than 10.  She had been going to [place] for her Procrit since there is a lab there and they can do it on Monday however she lives here in [place].  She does have a caregiver or her daughter who can get her to the lab therefore we will have her get her labs here locally and we will administer the Procrit in [place] if parameters are met.  If this arrangement does not work out for her then we will get her back to [place] and I discussed with her and her  daughter that we also have a clinic in [place] who can see her on the days of her Procrit.  She is following now with Dr. Teague for management of her CHF.  I did ask her daughter to speak with either her PCP or cardiologist about home health for additional management of her CHF.  Her iron studies were normal when checked in March.  Those are built in to assess periodically while on Procrit, next due in June.  Her daughter did state that she thinks her mother received iron while recent inpatient.    -7/24/2024 [facility] hematology clinic follow-up: Karina has been in rehab since we saw her last for weakness and pain in her right hip and lower extremity.  She is now back home and continues to work with home PT.  She has not required Procrit since March.  Currently her hemoglobin is 9.8 therefore we will administer Procrit today. Her creatinine is stable at 1.40, CBC and CMP otherwise unremarkable.  It is difficult for her to come and go with mobility issues.  We will continue to monitor her CBC every 2 weeks and will administer Procrit if her hemoglobin is less than 10.  Since she currently has home health we have arranged to have her CBC checked at home, when she is discharged from home health we will resume checking her CBC here in our office.  She has not required any parenteral iron for some time, current MCV is 98 with MCHC of 32.2.  We will repeat her iron studies again in October as these are built into her Procrit plan, I will check them sooner if her MCV starts to decrease or she has worsening anemia.    I spent 31 minutes caring for Joanna on this date of service. This time includes time spent by me in the following activities: preparing for the visit, reviewing tests, obtaining and/or reviewing a separately obtained history, performing a medically appropriate examination and/or evaluation, ordering medications, tests, or procedures, referring and communicating with other health care professionals,  documenting information in the medical record, and care coordination.     Denise Martin, APRN    07/24/2024

## 2024-07-25 ENCOUNTER — PATIENT OUTREACH (OUTPATIENT)
Dept: CASE MANAGEMENT | Facility: OTHER | Age: 74
End: 2024-07-25
Payer: MEDICARE

## 2024-07-25 ENCOUNTER — OFFICE VISIT (OUTPATIENT)
Dept: FAMILY MEDICINE CLINIC | Facility: CLINIC | Age: 74
End: 2024-07-25
Payer: MEDICARE

## 2024-07-25 ENCOUNTER — HOME CARE VISIT (OUTPATIENT)
Dept: HOME HEALTH SERVICES | Facility: HOME HEALTHCARE | Age: 74
End: 2024-07-25
Payer: MEDICARE

## 2024-07-25 VITALS
HEIGHT: 62 IN | OXYGEN SATURATION: 100 % | DIASTOLIC BLOOD PRESSURE: 84 MMHG | WEIGHT: 220 LBS | RESPIRATION RATE: 16 BRPM | HEART RATE: 72 BPM | SYSTOLIC BLOOD PRESSURE: 128 MMHG | BODY MASS INDEX: 40.48 KG/M2

## 2024-07-25 DIAGNOSIS — E03.9 ACQUIRED HYPOTHYROIDISM: ICD-10-CM

## 2024-07-25 DIAGNOSIS — E53.8 VITAMIN B12 DEFICIENCY: ICD-10-CM

## 2024-07-25 DIAGNOSIS — D63.1 ANEMIA ASSOCIATED WITH STAGE 3 CHRONIC RENAL FAILURE: ICD-10-CM

## 2024-07-25 DIAGNOSIS — G20.B2 PARKINSON'S DISEASE WITH DYSKINESIA AND FLUCTUATING MANIFESTATIONS: ICD-10-CM

## 2024-07-25 DIAGNOSIS — I25.10 CORONARY ARTERY DISEASE INVOLVING NATIVE CORONARY ARTERY OF NATIVE HEART WITHOUT ANGINA PECTORIS: Primary | ICD-10-CM

## 2024-07-25 DIAGNOSIS — N18.32 CHRONIC KIDNEY DISEASE, STAGE 3B: ICD-10-CM

## 2024-07-25 DIAGNOSIS — M48.062 SPINAL STENOSIS, LUMBAR REGION, WITH NEUROGENIC CLAUDICATION: ICD-10-CM

## 2024-07-25 DIAGNOSIS — N18.30 ANEMIA ASSOCIATED WITH STAGE 3 CHRONIC RENAL FAILURE: ICD-10-CM

## 2024-07-25 DIAGNOSIS — E11.65 TYPE 2 DIABETES MELLITUS WITH HYPERGLYCEMIA, WITHOUT LONG-TERM CURRENT USE OF INSULIN: ICD-10-CM

## 2024-07-25 DIAGNOSIS — I10 HYPERTENSION, ESSENTIAL: ICD-10-CM

## 2024-07-25 DIAGNOSIS — E55.9 VITAMIN D DEFICIENCY: ICD-10-CM

## 2024-07-25 LAB
FERRITIN SERPL-MCNC: 581 NG/ML (ref 15–150)
IRON SATN MFR SERPL: 32 % (ref 15–55)
IRON SERPL-MCNC: 79 UG/DL (ref 27–139)
TIBC SERPL-MCNC: 245 UG/DL (ref 250–450)
UIBC SERPL-MCNC: 166 UG/DL (ref 118–369)

## 2024-07-25 PROCEDURE — G0152 HHCP-SERV OF OT,EA 15 MIN: HCPCS

## 2024-07-25 PROCEDURE — 1126F AMNT PAIN NOTED NONE PRSNT: CPT | Performed by: FAMILY MEDICINE

## 2024-07-25 PROCEDURE — G2211 COMPLEX E/M VISIT ADD ON: HCPCS | Performed by: FAMILY MEDICINE

## 2024-07-25 PROCEDURE — 99214 OFFICE O/P EST MOD 30 MIN: CPT | Performed by: FAMILY MEDICINE

## 2024-07-25 PROCEDURE — 3079F DIAST BP 80-89 MM HG: CPT | Performed by: FAMILY MEDICINE

## 2024-07-25 PROCEDURE — 3074F SYST BP LT 130 MM HG: CPT | Performed by: FAMILY MEDICINE

## 2024-07-25 PROCEDURE — 3044F HG A1C LEVEL LT 7.0%: CPT | Performed by: FAMILY MEDICINE

## 2024-07-25 RX ORDER — SPIRONOLACTONE 25 MG/1
25 TABLET ORAL DAILY
COMMUNITY

## 2024-07-25 NOTE — PROGRESS NOTES
Patient Name: Joanna Cross  : 1950   MRN: [id number]     Chief Complaint:    Chief Complaint   Patient presents with    RT Upper Thigh Pain       History of Present Illness: Joanna Cross is a 73 y.o. female who is here today for follow up on being in rehab facility.  HPI        Review of Systems:   Review of Systems   Constitutional: Negative.    HENT: Negative.     Eyes: Negative.    Respiratory: Negative.     Cardiovascular: Negative.    Gastrointestinal: Negative.    Neurological: Negative.         Past Medical History:   Past Medical History:   Diagnosis Date    Allergic rhinitis     Anemia     Ankle problem     thinks back related causing pain     Atrial fibrillation     AVM (arteriovenous malformation)     Back pain     CHF (congestive heart failure) 2024    Chronic kidney disease     stage 3 per pt    Chronic lung disease 2022    CKD (chronic kidney disease)     Clotting disorder     AVM - small intestine    Coronary artery disease involving native coronary artery without angina pectoris 2017    COVID-19 vaccine series completed     Cystic fibrosis     Diastolic dysfunction     Gastrocnemius muscle tear     left medial 91    Generalized osteoarthritis     GERD (gastroesophageal reflux disease)     Gestational diabetes     GIB (gastrointestinal bleeding) 2016    d/t xarelto     Headache     Hearing decreased, bilateral     HAS HEARING AID, NOT WEARING IT CURRENTLY    Hiatal hernia     History of shingles     History of transfusion 2016    no reaction recalled     Hyperlipidemia LDL goal <70 2017    Hypertension     Hypothyroidism     IBS (irritable bowel syndrome)     Klebsiella pneumonia     Lichen sclerosus     Lupus     subQ    Mouth problem     mouth guard used since pt bites tongue and lips excessively at night if not- with bipap     MRSA infection 2018    Myocardial infarction     Obesity     On home oxygen therapy     2L of oxygen all the time due to  current congestion     Osteoporosis     Parkinson's disease     Peripheral vascular disease     Pleurisy     Pneumonia     Puerperal sepsis with acute hypoxic respiratory failure     emergent intubation- 2016    Pulmonary embolism     Right leg pain     from back issues     Salivary gland stone     Sciatic nerve pain     Seborrheic dermatitis     Skin cancer     on back     Sleep apnea     CPAP AND HOME O2 2L/M    Sleep apnea, obstructive 04/15/01    TIA (transient ischemic attack) 2014    no residual effects    Type 2 diabetes mellitus     UTI (urinary tract infection)     Vitamin D deficiency 09/08/2022    Wears glasses        Past Surgical History:   Past Surgical History:   Procedure Laterality Date    ABLATION OF DYSRHYTHMIC FOCUS  05/16/13    Laser Ablation - Rt Leg    BACK SURGERY      l4-l5 laminectomy     BICEPS TENDON REPAIR Right     shoulder    BREAST BIOPSY Left 05/2004    excisional, benign    BRONCHOSCOPY RIGID / FLEXIBLE      2016    BUNIONECTOMY Right     CARDIAC CATHETERIZATION      CARDIAC CATHETERIZATION N/A 02/01/2019    Procedure: Left Heart Cath;  Surgeon: Albertina Corona MD;  Location:  [facility];  Service: Cardiology    CARDIAC ELECTROPHYSIOLOGY PROCEDURE N/A 01/26/2024    Procedure: Lead Revision RA or RV, PPM or ICD;  Surgeon: Lauro Francois MD;  Location:  [facility]VAS[facility] LOCATION;  Service: Cardiovascular;  Laterality: N/A;    CHOLECYSTECTOMY      COLONOSCOPY  2016    COLONOSCOPY N/A 06/17/2021    Procedure: COLONOSCOPY WITH POLYPECTOMY;  Surgeon: Trenton Sosa MD;  Location:  [facility];  Service: Gastroenterology;  Laterality: N/A;    CORONARY STENT PLACEMENT      x1 stent    CYST REMOVAL      left ear, upper left back 2001    CYSTOSCOPY      x2  18 and 20 -   in 20 urethra dilation     DIAGNOSTIC LAPAROSCOPY  1981    ENDOSCOPIC FUNCTIONAL SINUS SURGERY (FESS)  2011    ENDOSCOPY  2016    ENTEROSCOPY VIA STOMA      with single ballon  with fluoro     HAMMER TOE REPAIR Left     INCISION AND DRAINAGE OF WOUND  2018    back with wound infection     INSERT / REPLACE / REMOVE PACEMAKER  11/10/16    [facility]    JOINT REPLACEMENT      KNEE ARTHROSCOPY      LASER ABLATION      right leg 13    LIPOMA EXCISION  1999    left leg     LUMBAR LAMINECTOMY DISCECTOMY DECOMPRESSION N/A 07/06/2018    Procedure: LUMBAR LAMINECTOMY L4-5, HEMILAMIINECTOMY RIGHT L5-S1, FORAMINOTOMY L5-S1;  Surgeon: Lencho Gamino MD;  Location:  [facility];  Service: Neurosurgery    LUMBAR LAMINECTOMY DISCECTOMY DECOMPRESSION N/A 09/19/2018    Procedure: INCISION AND DRAINAGE BACK WITH WOUND EXPLORATION;  Surgeon: Ritchie García MD;  Location:  [facility];  Service: Neurosurgery    LUNG BIOPSY Left 2016    OTHER SURGICAL HISTORY      ct scan of chest and sinuses and lower back     OTHER SURGICAL HISTORY      various echos     OTHER SURGICAL HISTORY      electroencephalogram 16,   emg  ncv tests 2007    OTHER SURGICAL HISTORY      mra 2007  and various mri with xrays, nuclear medicine lung ventilation with perfusion test 2016 with pft     OTHER SURGICAL HISTORY      barium swallow testing 15, 12, 12    OTHER SURGICAL HISTORY      vaginal ultrasound- 2012,   vas clementina lower extrem 2016, vas venous duplex lower extrem bilat 2019    OTHER SURGICAL HISTORY      wdge biopsy spring of lung     OTHER SURGICAL HISTORY      emergent intubation- hypoxic resp failure     OTHER SURGICAL HISTORY      01/26/2024 ppm generator change and lead revision per Dr. Francois    PACEMAKER IMPLANTATION  11/2016    [facility]    REPLACEMENT TOTAL KNEE Bilateral     left knee 2011, right 2012 per dr acuna     REPLACEMENT TOTAL KNEE Bilateral     SHOULDER ARTHROSCOPY Bilateral     2003-left, 2004- right     SKIN BIOPSY      skin cancer back 2016    SKIN CANCER EXCISION      upper righ tback     MADHAV      TEETH EXTRACTION      x2    TOTAL SHOULDER ARTHROPLASTY W/ DISTAL CLAVICLE EXCISION Left 10/24/2022    Procedure:  REVERSE TOTAL SHOULDER ARTHROPLASTY, BICEPS TENODESIS - LEFT;  Surgeon: Sammy oY MD;  Location:  [facility];  Service: Orthopedics;  Laterality: Left;    TOTAL SHOULDER ARTHROPLASTY W/ DISTAL CLAVICLE EXCISION Right 2023    Procedure: REVERSE TOTAL SHOULDER  ARTHROPLASTY WITH BICEPS TENODESIS - RIGHT;  Surgeon: Sammy Yo MD;  Location:  [facility];  Service: Orthopedics;  Laterality: Right;    TUBAL ABDOMINAL LIGATION Bilateral     WISDOM TOOTH EXTRACTION         Family History:   Family History   Problem Relation Age of Onset    Kidney disease Mother     Coronary artery disease Mother     Hypertension Mother             Heart disease Mother             Hyperlipidemia Mother             Coronary artery disease Father     Hypertension Father             Hypothyroidism Father     Cancer Father         Oral cancer    Heart disease Father             Coronary artery disease Brother     Hypertension Brother     Heart disease Brother         Multiple stents, by-pass surgery    Testicular cancer Brother     Kidney cancer Maternal Uncle     Testicular cancer Maternal Uncle     Colon polyps Neg Hx     Colon cancer Neg Hx        Social History:   Social History     Socioeconomic History    Marital status:      Spouse name: N/A    Number of children: 4    Years of education: College    Highest education level: Master's degree (e.g., MA, MS, Randi, MEd, MSW, NELLA)   Tobacco Use    Smoking status: Never     Passive exposure: Past    Smokeless tobacco: Never    Tobacco comments:     Father and mother smoked for several years.   Vaping Use    Vaping status: Never Used    Passive vaping exposure: Yes   Substance and Sexual Activity    Alcohol use: Never    Drug use: Never    Sexual activity: Not Currently     Partners: Male     Birth control/protection: Post-menopausal, Tubal ligation, Vaginal insert contraception       Medications:     Current Outpatient  Medications:     albuterol sulfate  (90 Base) MCG/ACT inhaler, Inhale 2 puffs Every 6 (Six) Hours As Needed for Wheezing or Shortness of Air. Indications: Chronic Obstructive Lung Disease, Disp: , Rfl:     amantadine (SYMMETREL) 100 MG capsule, Take 1 capsule by mouth 2 (Two) Times a Day. Indications: Parkinson's Disease with Unknown Cause, Disp: , Rfl:     apixaban (Eliquis) 5 MG tablet tablet, Take 1 tablet by mouth Every 12 (Twelve) Hours. Restart 1/29/24, Disp: 180 tablet, Rfl: 2    aspirin 81 MG chewable tablet, Chew 1 tablet Daily., Disp: , Rfl:     B Complex-C (SUPER B COMPLEX PO), Take 1 tablet by mouth Daily. Indications: Nutritional Support, Disp: , Rfl:     bumetanide (BUMEX) 1 MG tablet, Take 1 tablet by mouth 2 (Two) Times a Day. Indications: Edema, Disp: , Rfl:     Calcium Carbonate (CALTRATE 600 PO), Take 1 tablet by mouth Daily. Indications: Nutritional Support, Disp: , Rfl:     carbidopa-levodopa (SINEMET)  MG per tablet, Take 2 tablets by mouth at 6am, then 1 tablet at 9am, 12pm, 3pm, 6pm and 9pm.  Indications: Parkinson's Disease, Disp: , Rfl:     Cholecalciferol 25 MCG (1000 UT) tablet, Take 1 tablet by mouth 2 (Two) Times a Day. Indications: Vitamin D Deficiency, Disp: , Rfl:     citalopram (CeleXA) 20 MG tablet, Take 1 tablet by mouth Daily. Indications: Major Depressive Disorder, Disp: , Rfl:     clobetasol propionate (TEMOVATE) 0.05 % cream, Apply 1 Application topically to the appropriate area as directed 2 (Two) Times a Week. Indications: Skin Inflammation, Disp: , Rfl:     Dapagliflozin Propanediol (FARXIGA PO), Take 1 tablet by mouth Daily., Disp: , Rfl:     fluticasone (FLONASE) 50 MCG/ACT nasal spray, 2 sprays into the nostril(s) as directed by provider Daily As Needed for Rhinitis. Indications: Allergic Rhinitis, Disp: , Rfl:     Glucosamine-Chondroit-Calcium (TRIPLE FLEX BONE & JOINT PO), Take 1 tablet by mouth Daily. Indications: Nutritional Support, Disp: , Rfl:      hydroxychloroquine (PLAQUENIL) 200 MG tablet, Take 1 tablet by mouth 2 (Two) Times a Day., Disp: , Rfl:     Insulin Glargine (Lantus SoloStar) 100 UNIT/ML injection pen, Inject 12 Units under the skin into the appropriate area as directed Daily. Indications: Type 2 Diabetes, Disp: , Rfl:     ipratropium (ATROVENT) 0.06 % nasal spray, 2 sprays into the nostril(s) as directed by provider As Needed. Indications: Hayfever, Disp: , Rfl:     levothyroxine (SYNTHROID, LEVOTHROID) 175 MCG tablet, Take 1 tablet by mouth Daily. Indications: Underactive Thyroid, Disp: , Rfl:     loratadine (CLARITIN) 10 MG tablet, Take 1 tablet by mouth Daily. Indications: Hayfever, Disp: , Rfl:     Multiple Vitamins-Minerals (CENTRUM ULTRA WOMENS PO), Take 1 tablet by mouth Daily. Indications: Nutritional Support, Disp: , Rfl:     O2 (OXYGEN), Inhale 2 L/min Continuous. Indications: soa, Disp: , Rfl:     omeprazole (priLOSEC) 40 MG capsule, Take 1 capsule by mouth 2 (Two) Times a Day. Indications: Gastroesophageal Reflux Disease, Disp: , Rfl:     pramipexole (MIRAPEX) 1.5 MG tablet, Take 1 tablet by mouth Every Night. Indications: Parkinson's Disease, Disp: , Rfl:     ranolazine (RANEXA) 500 MG 12 hr tablet, Take 2 tablets by mouth Every 12 (Twelve) Hours., Disp: 360 tablet, Rfl: 3    sacubitril-valsartan (ENTRESTO) 49-51 MG tablet, Take 1 tablet by mouth Every 12 (Twelve) Hours. Indications: Cardiac Failure, Disp: , Rfl:     senna (SENOKOT) 8.6 MG tablet, Take 1 tablet by mouth Daily. Indications: Constipation, Disp: , Rfl:     spironolactone (ALDACTONE) 25 MG tablet, Take 1 tablet by mouth Daily., Disp: , Rfl:     traMADol (ULTRAM) 50 MG tablet, Take 1 tablet by mouth At Night As Needed for Moderate Pain., Disp: 30 tablet, Rfl: 1    triamcinolone (KENALOG) 0.1 % cream, Apply 1 Application topically to the appropriate area as directed As Needed for Irritation. Indications: Atopic Dermatitis, Disp: , Rfl:     Triamcinolone Acetonide  "(NASACORT) 55 MCG/ACT nasal inhaler, 2 sprays into the nostril(s) as directed by provider Daily As Needed for Rhinitis. Indications: Nose Inflammation, Disp: , Rfl:     Zinc 50 MG tablet, Take 1 tablet by mouth Daily. Indications: Zinc Deficiency, Disp: , Rfl:     Allergies:   Allergies   Allergen Reactions    Amlodipine Besylate Swelling     Lower extremity (ankles, feet) swelling    Entacapone Other (See Comments)     \"extreme weakness in legs - caused several falls, which stopped after discontinuing this medication\"    Epinephrine Other (See Comments)     6/4/16- had 3 shots to numb mouth to prepare teeth for crowns, the shots contained epi-  Caused pt to have chest discomfort- went to hospital in ambulance, discovered had a fib while there     Hydrocodone Unknown - High Severity     Headache, nausea, dizziness, & vomiting    Levemir [Insulin Detemir] Hives     Hives / rash around injection site    Penicillins Hives     Jitteriness     Xarelto [Rivaroxaban] GI Bleeding     hgb dropped to 5.2    Aricept [Donepezil Hcl] Nausea Only     Vivid dreams    Benztropine Mesylate Other (See Comments)     Uncontrollable body movements    Cogentin [Benztropine] Other (See Comments)     \"uncontrollable body movements\"    Compazine [Prochlorperazine Edisylate] Other (See Comments)     Dystonic reaction    Duraprep [Antiseptic Products, Misc.] Itching and Rash     RASH AND ITCHING    Haldol [Haloperidol Lactate] Other (See Comments)     Dystonic reaction    Hydralazine Other (See Comments)     Headache     Lisinopril Cough    Statins Myalgia     Leg pain- all statins     Sulfamethoxazole Nausea Only and Other (See Comments)     Nausea & headaches    Tarka [Trandolapril-Verapamil Hcl Er] Other (See Comments)     Constipation     Toprol Xl [Metoprolol Tartrate] Other (See Comments)     Extreme fatigue, decreased exercise tolerance    Trimethoprim Other (See Comments)     Other reaction(s): Nausea         Physical Exam:  Vital " "Signs:   Vitals:    07/25/24 1024   BP: 128/84   BP Location: Left arm   Patient Position: Sitting   Cuff Size: Large Adult   Pulse: 72   Resp: 16   SpO2: 100%   Weight: 99.8 kg (220 lb)   Height: 157.5 cm (62.01\")     Body mass index is 40.23 kg/m².     Physical Exam  Vitals and nursing note reviewed.   Constitutional:       Appearance: Normal appearance. She is obese.   HENT:      Head: Normocephalic and atraumatic.      Right Ear: Tympanic membrane, ear canal and external ear normal.      Left Ear: Tympanic membrane, ear canal and external ear normal.      Nose: Nose normal.      Mouth/Throat:      Mouth: Mucous membranes are dry.      Pharynx: Oropharynx is clear.   Eyes:      Extraocular Movements: Extraocular movements intact.      Conjunctiva/sclera: Conjunctivae normal.      Pupils: Pupils are equal, round, and reactive to light.   Cardiovascular:      Rate and Rhythm: Normal rate and regular rhythm.      Pulses: Normal pulses.      Heart sounds: Normal heart sounds.   Pulmonary:      Effort: Pulmonary effort is normal.      Breath sounds: Normal breath sounds.   Musculoskeletal:      Cervical back: Normal range of motion and neck supple.   Feet:      Comments:      Neurological:      Mental Status: She is alert.         Procedures      Assessment/Plan:   Diagnoses and all orders for this visit:    1. Coronary artery disease involving native coronary artery of native heart without angina pectoris (Primary)  Assessment & Plan:  Aggressive risk factor modification.    Orders:  -     Hemoglobin A1c; Future  -     Lipid Panel; Future  -     Comprehensive Metabolic Panel; Future  -     Vitamin B12; Future  -     Vitamin D,25-Hydroxy; Future  -     TSH Rfx On Abnormal To Free T4; Future  -     CBC & Differential; Future    2. Hypertension, essential  Assessment & Plan:  Discussed with patient to monitor their blood pressure and if systolic blood pressure goes above 140 or diastolic is above 90 to return to clinic. "  Take medicines as directed, call for any problems, patient not having or any worrisome symptoms.        Orders:  -     Hemoglobin A1c; Future  -     Lipid Panel; Future  -     Comprehensive Metabolic Panel; Future  -     Vitamin B12; Future  -     Vitamin D,25-Hydroxy; Future  -     TSH Rfx On Abnormal To Free T4; Future  -     CBC & Differential; Future    3. Acquired hypothyroidism  Assessment & Plan:  TSH is 3.470.  We will follow.    Orders:  -     Hemoglobin A1c; Future  -     Lipid Panel; Future  -     Comprehensive Metabolic Panel; Future  -     Vitamin B12; Future  -     Vitamin D,25-Hydroxy; Future  -     TSH Rfx On Abnormal To Free T4; Future  -     CBC & Differential; Future    4. Type 2 diabetes mellitus with hyperglycemia, without long-term current use of insulin  Assessment & Plan:  A1c is 5.7.  We will follow.    Orders:  -     Hemoglobin A1c; Future  -     Lipid Panel; Future  -     Comprehensive Metabolic Panel; Future  -     Vitamin B12; Future  -     Vitamin D,25-Hydroxy; Future  -     TSH Rfx On Abnormal To Free T4; Future  -     CBC & Differential; Future    5. Vitamin B12 deficiency  Assessment & Plan:  We will follow-up.    Orders:  -     Hemoglobin A1c; Future  -     Lipid Panel; Future  -     Comprehensive Metabolic Panel; Future  -     Vitamin B12; Future  -     Vitamin D,25-Hydroxy; Future  -     TSH Rfx On Abnormal To Free T4; Future  -     CBC & Differential; Future    6. Vitamin D deficiency  Assessment & Plan:  Vitamin D is 54.9.  Recheck in 3 months.    Orders:  -     Hemoglobin A1c; Future  -     Lipid Panel; Future  -     Comprehensive Metabolic Panel; Future  -     Vitamin B12; Future  -     Vitamin D,25-Hydroxy; Future  -     TSH Rfx On Abnormal To Free T4; Future  -     CBC & Differential; Future    7. Chronic kidney disease, stage 3b  Assessment & Plan:  GFR is 39.Patient is instructed to not take any NSAIDs.  Medicines as directed.  Stay well-hydrated.      Orders:  -      Hemoglobin A1c; Future  -     Lipid Panel; Future  -     Comprehensive Metabolic Panel; Future  -     Vitamin B12; Future  -     Vitamin D,25-Hydroxy; Future  -     TSH Rfx On Abnormal To Free T4; Future  -     CBC & Differential; Future    8. Anemia associated with stage 3 chronic renal failure  Assessment & Plan:  Patient sees Dr. Linton.  Have her follow-up with them.    Orders:  -     Hemoglobin A1c; Future  -     Lipid Panel; Future  -     Comprehensive Metabolic Panel; Future  -     Vitamin B12; Future  -     Vitamin D,25-Hydroxy; Future  -     TSH Rfx On Abnormal To Free T4; Future  -     CBC & Differential; Future    9. Parkinson's disease with dyskinesia and fluctuating manifestations  Assessment & Plan:  I had a long discussion with patient.  She has not come back in 3 months and she is to let me know where she is going to go when her daughter cannot take care of her anymore.  Assisted living, nursing home, or get 24-hour care.               Follow Up:   Return in about 3 months (around 10/25/2024) for Annual physical, Bloodwork 1 week prior to next appointment.      Reynaldo Fritz MD  [facility]

## 2024-07-25 NOTE — ASSESSMENT & PLAN NOTE
GFR is 39.Patient is instructed to not take any NSAIDs.  Medicines as directed.  Stay well-hydrated.

## 2024-07-25 NOTE — ASSESSMENT & PLAN NOTE
I had a long discussion with patient.  She has not come back in 3 months and she is to let me know where she is going to go when her daughter cannot take care of her anymore.  Assisted living, nursing home, or get 24-hour care.

## 2024-07-25 NOTE — OUTREACH NOTE
"AMBULATORY CASE MANAGEMENT NOTE    Names and Relationships of Patient/Support Persons: Contact: Joanna Cross \"SIXTO\"; Relationship: Self -     CCM Interim Update    Patient seen today in office. She is accompanied by Slivana. She is getting HH currently. She has been doing well since going home. Both Joanna and Silvana feel that things are working out well with the new caregiver. Ms. Cross has not had any falls. She can walk with the use of her tod walker.     She has edema in her lower extremities and redness. They report this is her baseline. She does elevate her feet during the day. She has a pillow to help with propping her feet up in the bed. Suggested she try this in the recliner. She also has a hospital bed now.    Care Coordination    Both patient and Silvana were very tearful today. Tough discussion was held that patient needs to plan for the future and that the current situation can not last long term. Silvana works a full time job. It is a split 12 hour shift. She also needs to have surgery on her foot and during that time she will not be able to assist her mother. Silvana believes that the one in [place] may be the best fit. Ms. Cross wants to stay in [place] and wants to be able to go to [facility] again. [facility] contacted [facility] with [facility]. The cost for assisted living there is $4995. Ms. Cross is also concerned about her little dog. Some of the facilities will let her have her dog with her but she would have to be able to care for it.     Call placed to Silvana to follow up with information from [facility]. She states that since they have gotten home Ms. Cross acts like the conversation never took place. Silvana feels that the conversation will not happen again until the next time they see Dr. Fritz again. Silvana feels she will not be able to have her surgery.       Education Documentation  No documentation found.        Faye JAUREGUI  Ambulatory Case Management    7/25/2024, 14:45 EDT  "

## 2024-07-26 VITALS
RESPIRATION RATE: 16 BRPM | SYSTOLIC BLOOD PRESSURE: 139 MMHG | TEMPERATURE: 97.5 F | DIASTOLIC BLOOD PRESSURE: 83 MMHG | HEART RATE: 102 BPM | OXYGEN SATURATION: 98 %

## 2024-07-29 ENCOUNTER — HOME CARE VISIT (OUTPATIENT)
Dept: HOME HEALTH SERVICES | Facility: HOME HEALTHCARE | Age: 74
End: 2024-07-29
Payer: MEDICARE

## 2024-07-29 ENCOUNTER — PATIENT OUTREACH (OUTPATIENT)
Dept: CASE MANAGEMENT | Facility: OTHER | Age: 74
End: 2024-07-29
Payer: MEDICARE

## 2024-07-29 VITALS
RESPIRATION RATE: 18 BRPM | SYSTOLIC BLOOD PRESSURE: 145 MMHG | HEART RATE: 93 BPM | TEMPERATURE: 94.3 F | OXYGEN SATURATION: 99 % | DIASTOLIC BLOOD PRESSURE: 83 MMHG

## 2024-07-29 DIAGNOSIS — I25.10 CORONARY ARTERY DISEASE INVOLVING NATIVE CORONARY ARTERY OF NATIVE HEART WITHOUT ANGINA PECTORIS: Primary | ICD-10-CM

## 2024-07-29 DIAGNOSIS — M48.062 SPINAL STENOSIS, LUMBAR REGION, WITH NEUROGENIC CLAUDICATION: ICD-10-CM

## 2024-07-29 DIAGNOSIS — G20.B2 PARKINSON'S DISEASE WITH DYSKINESIA AND FLUCTUATING MANIFESTATIONS: ICD-10-CM

## 2024-07-29 PROCEDURE — G0152 HHCP-SERV OF OT,EA 15 MIN: HCPCS

## 2024-07-29 NOTE — OUTREACH NOTE
CCM End of Month Documentation    This Chronic Medical Management Care Plan for Joanna Cross, 73 y.o. female, has been monitored and managed; reviewed and a new plan of care implemented for the month of July.  A cumulative time of 63  minutes was spent on this patient record this month, including chart review; phone call with relative; face to face visit with provider and patient; phone call with other providers, SW at [facility]..    Regarding the patient's problems: has Coronary artery disease involving native coronary artery without angina pectoris; Hypertension, essential; Peripheral vascular disease; Hyperlipidemia LDL goal <70; Spinal stenosis, lumbar region, with neurogenic claudication; Overactive bladder; GERD (gastroesophageal reflux disease); Hypothyroidism; Lichen sclerosus; Parkinson's disease; MILLI treated with BiPAP; History of respiratory failure - prior respiratory failure requiring mechanical ventilation, open lung biopsy non-specific. ; Atrial fibrillation; Tachy-thai syndrome; Long term current use of antiarrhythmic drug; History of adenomatous polyp of colon; Anemia associated with stage 3 chronic renal failure; Angiodysplasia; Hx of colonic polyps; Body mass index 40.0-44.9, adult; Cardiac pacemaker in situ; Chronic low back pain; Chronic lung disease; Degeneration of lumbar intervertebral disc; Edema of lower extremity; History of malignant neoplasm of skin; History of TIA (transient ischemic attack); Hypotonic bladder; Incomplete tear of rotator cuff; Major depression in remission; Tinnitus of both ears; Primary osteoarthritis involving multiple joints; Restless legs; Seborrheic dermatitis; Transient cerebral ischemia; Muscle strain; Type 2 diabetes mellitus with hyperglycemia, without long-term current use of insulin; Vitamin B12 deficiency; Vitamin D deficiency; Chronic kidney disease, stage 3b; Osteoporosis, senile; Acute diastolic CHF (congestive heart failure); Chronic  respiratory failure with hypoxia; Chronic anticoagulation; and Fracture of right shoulder with delayed healing on their problem list., the following items were addressed: medical records; medications and any changes can be found within the plan section of the note.  A detailed listing of time spent for chronic care management is tracked within each outreach encounter.  Current medications include:  has a current medication list which includes the following prescription(s): albuterol sulfate hfa, amantadine, apixaban, aspirin, b complex-c, bumetanide, calcium carbonate, carbidopa-levodopa, cholecalciferol, citalopram, clobetasol propionate, dapagliflozin propanediol, fluticasone, glucosamine-chondroit-calcium, hydroxychloroquine, lantus solostar, ipratropium, levothyroxine, loratadine, multiple vitamins-minerals, o2, omeprazole, pramipexole, ranolazine, sacubitril-valsartan, senna, spironolactone, tramadol, triamcinolone, triamcinolone acetonide, and zinc. and the patient is reported to be patient is compliant with medication protocol,  Medications are reported to be effective.  Regarding these diagnoses, referrals were made to the following provider(s):  n/a.  All notes on chart for PCP to review.    The patient was monitored remotely for blood pressure; weight; activity level; pain; medications.    The patient's physical needs include:  needs assistance with ADLs; physical healthcare.     The patient's mental support needs include:  increased support    The patient's cognitive support needs include:  needs met    The patient's psychosocial support needs include:  need for increased support    The patient's functional needs include: physical healthcare; needs assistance for ADLs    The patient's environmental needs include:  not applicable    Care Plan overall comments:  reviewed    Refer to previous outreach notes for more information on the areas listed above.    Monthly Billing Diagnoses  (I25.10) Coronary artery  disease involving native coronary artery of native heart without angina pectoris    (M48.062) Spinal stenosis, lumbar region, with neurogenic claudication    (G20.B2) Parkinson's disease with dyskinesia and fluctuating manifestations    Medications   Medications have been reconciled    Care Plan progress this month:      Recently Modified Care Plans Updates made since 6/28/2024 12:00 AM      No recently modified care plans.            Instructions   Patient was provided an electronic copy of care plan  CCM services were explained and offered and patient has accepted these services.  Patient has given their written consent to receive CCM services and understands that this includes the authorization of electronic communication of medical information with the other treating providers.  Patient understands that they may stop CCM services at any time and these changes will be effective at the end of the calendar month and will effectively revocate the agreement of CCM services.  Patient understands that only one practitioner can furnish and be paid for CCM services during one calendar month.  Patient also understands that there may be co-payment or deductible fees in association with CCM services.  Patient will continue with at least monthly follow-up calls with the Ambulatory .    Faye JAUREGUI  Ambulatory Case Management    7/29/2024, 08:52 EDT

## 2024-07-30 ENCOUNTER — HOME CARE VISIT (OUTPATIENT)
Dept: HOME HEALTH SERVICES | Facility: HOME HEALTHCARE | Age: 74
End: 2024-07-30
Payer: MEDICARE

## 2024-07-30 VITALS
DIASTOLIC BLOOD PRESSURE: 71 MMHG | TEMPERATURE: 97.5 F | SYSTOLIC BLOOD PRESSURE: 131 MMHG | HEART RATE: 81 BPM | OXYGEN SATURATION: 96 % | RESPIRATION RATE: 16 BRPM

## 2024-07-30 PROCEDURE — G0157 HHC PT ASSISTANT EA 15: HCPCS

## 2024-08-01 ENCOUNTER — HOME CARE VISIT (OUTPATIENT)
Dept: HOME HEALTH SERVICES | Facility: HOME HEALTHCARE | Age: 74
End: 2024-08-01
Payer: MEDICARE

## 2024-08-01 VITALS
TEMPERATURE: 97.6 F | SYSTOLIC BLOOD PRESSURE: 110 MMHG | OXYGEN SATURATION: 99 % | HEART RATE: 69 BPM | RESPIRATION RATE: 16 BRPM | DIASTOLIC BLOOD PRESSURE: 67 MMHG

## 2024-08-01 PROCEDURE — G0152 HHCP-SERV OF OT,EA 15 MIN: HCPCS

## 2024-08-05 ENCOUNTER — LAB (OUTPATIENT)
Facility: HOSPITAL | Age: 74
End: 2024-08-05
Payer: MEDICARE

## 2024-08-05 ENCOUNTER — OFFICE VISIT (OUTPATIENT)
Age: 74
End: 2024-08-05
Payer: MEDICARE

## 2024-08-05 VITALS
SYSTOLIC BLOOD PRESSURE: 130 MMHG | OXYGEN SATURATION: 99 % | DIASTOLIC BLOOD PRESSURE: 74 MMHG | BODY MASS INDEX: 41.26 KG/M2 | TEMPERATURE: 97.8 F | HEIGHT: 62 IN | WEIGHT: 224.2 LBS | HEART RATE: 70 BPM

## 2024-08-05 DIAGNOSIS — K21.9 GASTROESOPHAGEAL REFLUX DISEASE WITHOUT ESOPHAGITIS: ICD-10-CM

## 2024-08-05 DIAGNOSIS — G47.33 OSA TREATED WITH BIPAP: ICD-10-CM

## 2024-08-05 DIAGNOSIS — D63.1 ANEMIA ASSOCIATED WITH STAGE 3 CHRONIC RENAL FAILURE: ICD-10-CM

## 2024-08-05 DIAGNOSIS — N18.30 ANEMIA ASSOCIATED WITH STAGE 3 CHRONIC RENAL FAILURE: ICD-10-CM

## 2024-08-05 DIAGNOSIS — I50.31 ACUTE DIASTOLIC CHF (CONGESTIVE HEART FAILURE): Primary | ICD-10-CM

## 2024-08-05 DIAGNOSIS — Z87.09 HISTORY OF RESPIRATORY FAILURE: ICD-10-CM

## 2024-08-05 DIAGNOSIS — J96.11 CHRONIC RESPIRATORY FAILURE WITH HYPOXIA: ICD-10-CM

## 2024-08-05 LAB
BASOPHILS # BLD AUTO: 0.03 10*3/MM3 (ref 0–0.2)
BASOPHILS NFR BLD AUTO: 0.5 % (ref 0–1.5)
DEPRECATED RDW RBC AUTO: 53.5 FL (ref 37–54)
EOSINOPHIL # BLD AUTO: 0.4 10*3/MM3 (ref 0–0.4)
EOSINOPHIL NFR BLD AUTO: 7.3 % (ref 0.3–6.2)
ERYTHROCYTE [DISTWIDTH] IN BLOOD BY AUTOMATED COUNT: 14.6 % (ref 12.3–15.4)
HCT VFR BLD AUTO: 33.3 % (ref 34–46.6)
HGB BLD-MCNC: 10.8 G/DL (ref 12–15.9)
IMM GRANULOCYTES # BLD AUTO: 0.01 10*3/MM3 (ref 0–0.05)
IMM GRANULOCYTES NFR BLD AUTO: 0.2 % (ref 0–0.5)
LYMPHOCYTES # BLD AUTO: 0.48 10*3/MM3 (ref 0.7–3.1)
LYMPHOCYTES NFR BLD AUTO: 8.7 % (ref 19.6–45.3)
MCH RBC QN AUTO: 32.7 PG (ref 26.6–33)
MCHC RBC AUTO-ENTMCNC: 32.4 G/DL (ref 31.5–35.7)
MCV RBC AUTO: 100.9 FL (ref 79–97)
MONOCYTES # BLD AUTO: 0.46 10*3/MM3 (ref 0.1–0.9)
MONOCYTES NFR BLD AUTO: 8.3 % (ref 5–12)
NEUTROPHILS NFR BLD AUTO: 4.13 10*3/MM3 (ref 1.7–7)
NEUTROPHILS NFR BLD AUTO: 75 % (ref 42.7–76)
PLATELET # BLD AUTO: 139 10*3/MM3 (ref 140–450)
PMV BLD AUTO: 9.3 FL (ref 6–12)
RBC # BLD AUTO: 3.3 10*6/MM3 (ref 3.77–5.28)
WBC NRBC COR # BLD AUTO: 5.51 10*3/MM3 (ref 3.4–10.8)

## 2024-08-05 PROCEDURE — 3078F DIAST BP <80 MM HG: CPT | Performed by: NURSE PRACTITIONER

## 2024-08-05 PROCEDURE — 99214 OFFICE O/P EST MOD 30 MIN: CPT | Performed by: NURSE PRACTITIONER

## 2024-08-05 PROCEDURE — 1159F MED LIST DOCD IN RCRD: CPT | Performed by: NURSE PRACTITIONER

## 2024-08-05 PROCEDURE — 85025 COMPLETE CBC W/AUTO DIFF WBC: CPT

## 2024-08-05 PROCEDURE — 3075F SYST BP GE 130 - 139MM HG: CPT | Performed by: NURSE PRACTITIONER

## 2024-08-05 PROCEDURE — 1160F RVW MEDS BY RX/DR IN RCRD: CPT | Performed by: NURSE PRACTITIONER

## 2024-08-05 PROCEDURE — 36415 COLL VENOUS BLD VENIPUNCTURE: CPT

## 2024-08-05 NOTE — PROGRESS NOTES
[facility] Pulmonary Follow up    CHIEF COMPLAINT    fatigue    HISTORY OF PRESENT ILLNESS    oJanna Cross is a 73 y.o.female here today for follow-up.  She was last seen in the office by me in April.  She states she had noticed that her BIPAP was not working properly and took it to the [facility] and it is old and she needs a new replacement.      In early June she presented to the ED and was found to have a hip flexor tendinitis.  She had been working with PT.  [facility] and multiple scans were completed with no cause found.  She then went to a rehabilitation center and has been home for about 3 weeks.  She is currently working with home health.    She is currently wearing her BiPAP nightly.  She has had some issues with leaks in her mask and received a new mask a couple days ago and is using her new mask.  She does feel like the new mask is working well for her.  She is using a Aron Full x-small mask.  Her machine is over 5 years old and she does need a new replacement machine.    She denies any sputum production or hemoptysis.  She denies any chest pain or palpitations.  She does weigh herself daily and has diuretics to take daily or extra if her weight increases.  She does follow closely with cardiology.  She has had some mild lower extremity edema and denies any calf tenderness.    She has a sore on her left middle toe but does not recall hitting anything with it.  She states it does not cause any pain.  She wants me to look at it today.    She denies any reflux symptoms.  She denies any swallowing difficulties.    She is a lifetime non-smoker.    She is accompanied today by her daughter.    Patient Active Problem List   Diagnosis    Coronary artery disease involving native coronary artery without angina pectoris    Hypertension, essential    Peripheral vascular disease    Hyperlipidemia LDL goal <70    Spinal stenosis, lumbar region,  "with neurogenic claudication    Overactive bladder    GERD (gastroesophageal reflux disease)    Hypothyroidism    Lichen sclerosus    Parkinson's disease    MILLI treated with BiPAP    History of respiratory failure - prior respiratory failure requiring mechanical ventilation, open lung biopsy non-specific.     Atrial fibrillation    Tachy-thai syndrome    Long term current use of antiarrhythmic drug    History of adenomatous polyp of colon    Anemia associated with stage 3 chronic renal failure    Angiodysplasia    Hx of colonic polyps    Body mass index 40.0-44.9, adult    Cardiac pacemaker in situ    Chronic low back pain    Chronic lung disease    Degeneration of lumbar intervertebral disc    Edema of lower extremity    History of malignant neoplasm of skin    History of TIA (transient ischemic attack)    Hypotonic bladder    Incomplete tear of rotator cuff    Major depression in remission    Tinnitus of both ears    Primary osteoarthritis involving multiple joints    Restless legs    Seborrheic dermatitis    Transient cerebral ischemia    Muscle strain    Type 2 diabetes mellitus with hyperglycemia, without long-term current use of insulin    Vitamin B12 deficiency    Vitamin D deficiency    Chronic kidney disease, stage 3b    Osteoporosis, senile    Acute diastolic CHF (congestive heart failure)    Chronic respiratory failure with hypoxia    Chronic anticoagulation    Fracture of right shoulder with delayed healing       Allergies   Allergen Reactions    Amlodipine Besylate Swelling     Lower extremity (ankles, feet) swelling    Entacapone Other (See Comments)     \"extreme weakness in legs - caused several falls, which stopped after discontinuing this medication\"    Epinephrine Other (See Comments)     6/4/16- had 3 shots to numb mouth to prepare teeth for crowns, the shots contained epi-  Caused pt to have chest discomfort- went to hospital in ambulance, discovered had a fib while there     Hydrocodone Unknown " "- High Severity     Headache, nausea, dizziness, & vomiting    Levemir [Insulin Detemir] Hives     Hives / rash around injection site    Penicillins Hives     Jitteriness     Xarelto [Rivaroxaban] GI Bleeding     hgb dropped to 5.2    Aricept [Donepezil Hcl] Nausea Only     Vivid dreams    Benztropine Mesylate Other (See Comments)     Uncontrollable body movements    Cogentin [Benztropine] Other (See Comments)     \"uncontrollable body movements\"    Compazine [Prochlorperazine Edisylate] Other (See Comments)     Dystonic reaction    Duraprep [Antiseptic Products, Misc.] Itching and Rash     RASH AND ITCHING    Haldol [Haloperidol Lactate] Other (See Comments)     Dystonic reaction    Hydralazine Other (See Comments)     Headache     Lisinopril Cough    Statins Myalgia     Leg pain- all statins     Sulfamethoxazole Nausea Only and Other (See Comments)     Nausea & headaches    Tarka [Trandolapril-Verapamil Hcl Er] Other (See Comments)     Constipation     Toprol Xl [Metoprolol Tartrate] Other (See Comments)     Extreme fatigue, decreased exercise tolerance    Trimethoprim Other (See Comments)     Other reaction(s): Nausea       Current Outpatient Medications:     albuterol sulfate  (90 Base) MCG/ACT inhaler, Inhale 2 puffs Every 6 (Six) Hours As Needed for Wheezing or Shortness of Air. Indications: Chronic Obstructive Lung Disease, Disp: , Rfl:     amantadine (SYMMETREL) 100 MG capsule, Take 1 capsule by mouth 2 (Two) Times a Day. Indications: Parkinson's Disease with Unknown Cause, Disp: , Rfl:     apixaban (Eliquis) 5 MG tablet tablet, Take 1 tablet by mouth Every 12 (Twelve) Hours. Restart 1/29/24, Disp: 180 tablet, Rfl: 2    aspirin 81 MG chewable tablet, Chew 1 tablet Daily., Disp: , Rfl:     B Complex-C (SUPER B COMPLEX PO), Take 1 tablet by mouth Daily. Indications: Nutritional Support, Disp: , Rfl:     bumetanide (BUMEX) 1 MG tablet, Take 1 tablet by mouth 2 (Two) Times a Day. Indications: Edema, Disp: " , Rfl:     Calcium Carbonate (CALTRATE 600 PO), Take 1 tablet by mouth Daily. Indications: Nutritional Support, Disp: , Rfl:     carbidopa-levodopa (SINEMET)  MG per tablet, Take 2 tablets by mouth at 6am, then 1 tablet at 9am, 12pm, 3pm, 6pm and 9pm.  Indications: Parkinson's Disease, Disp: , Rfl:     Cholecalciferol 25 MCG (1000 UT) tablet, Take 1 tablet by mouth 2 (Two) Times a Day. Indications: Vitamin D Deficiency, Disp: , Rfl:     citalopram (CeleXA) 20 MG tablet, Take 1 tablet by mouth Daily. Indications: Major Depressive Disorder, Disp: , Rfl:     clobetasol propionate (TEMOVATE) 0.05 % cream, Apply 1 Application topically to the appropriate area as directed 2 (Two) Times a Week. Indications: Skin Inflammation, Disp: , Rfl:     Dapagliflozin Propanediol (FARXIGA PO), Take 10 mg by mouth 2 (Two) Times a Day., Disp: , Rfl:     fluticasone (FLONASE) 50 MCG/ACT nasal spray, 2 sprays into the nostril(s) as directed by provider Daily As Needed for Rhinitis. Indications: Allergic Rhinitis, Disp: , Rfl:     Glucosamine-Chondroit-Calcium (TRIPLE FLEX BONE & JOINT PO), Take 1 tablet by mouth Daily. Indications: Nutritional Support, Disp: , Rfl:     hydroxychloroquine (PLAQUENIL) 200 MG tablet, Take 1 tablet by mouth 2 (Two) Times a Day., Disp: , Rfl:     Insulin Glargine (Lantus SoloStar) 100 UNIT/ML injection pen, Inject 12 Units under the skin into the appropriate area as directed Daily. Indications: Type 2 Diabetes, Disp: , Rfl:     ipratropium (ATROVENT) 0.06 % nasal spray, 2 sprays into the nostril(s) as directed by provider As Needed. Indications: Hayfever, Disp: , Rfl:     levothyroxine (SYNTHROID, LEVOTHROID) 175 MCG tablet, Take 1 tablet by mouth Daily. Indications: Underactive Thyroid, Disp: , Rfl:     loratadine (CLARITIN) 10 MG tablet, Take 1 tablet by mouth Daily. Indications: Hayfever, Disp: , Rfl:     Multiple Vitamins-Minerals (CENTRUM ULTRA WOMENS PO), Take 1 tablet by mouth Daily. Indications:  Nutritional Support, Disp: , Rfl:     O2 (OXYGEN), Inhale 2 L/min Continuous. Indications: soa, Disp: , Rfl:     omeprazole (priLOSEC) 40 MG capsule, Take 1 capsule by mouth 2 (Two) Times a Day. Indications: Gastroesophageal Reflux Disease, Disp: , Rfl:     pramipexole (MIRAPEX) 1.5 MG tablet, Take 1 tablet by mouth Every Night. Indications: Parkinson's Disease, Disp: , Rfl:     ranolazine (RANEXA) 500 MG 12 hr tablet, Take 2 tablets by mouth Every 12 (Twelve) Hours., Disp: 360 tablet, Rfl: 3    sacubitril-valsartan (ENTRESTO) 49-51 MG tablet, Take 1 tablet by mouth Every 12 (Twelve) Hours. Indications: Cardiac Failure, Disp: , Rfl:     senna (SENOKOT) 8.6 MG tablet, Take 1 tablet by mouth Daily. Indications: Constipation, Disp: , Rfl:     spironolactone (ALDACTONE) 25 MG tablet, Take 1 tablet by mouth Daily., Disp: , Rfl:     traMADol (ULTRAM) 50 MG tablet, Take 1 tablet by mouth At Night As Needed for Moderate Pain., Disp: 30 tablet, Rfl: 1    triamcinolone (KENALOG) 0.1 % cream, Apply 1 Application topically to the appropriate area as directed As Needed for Irritation. Indications: Atopic Dermatitis, Disp: , Rfl:     Triamcinolone Acetonide (NASACORT) 55 MCG/ACT nasal inhaler, 2 sprays into the nostril(s) as directed by provider Daily As Needed for Rhinitis. Indications: Nose Inflammation, Disp: , Rfl:     Zinc 50 MG tablet, Take 1 tablet by mouth Daily. Indications: Zinc Deficiency, Disp: , Rfl:   MEDICATION LIST AND ALLERGIES REVIEWED.    Social History     Tobacco Use    Smoking status: Never     Passive exposure: Past    Smokeless tobacco: Never    Tobacco comments:     Father and mother smoked for several years.   Vaping Use    Vaping status: Never Used    Passive vaping exposure: Yes   Substance Use Topics    Alcohol use: Never    Drug use: Never       FAMILY AND SOCIAL HISTORY REVIEWED.    Review of Systems   Constitutional:  Positive for fatigue. Negative for activity change, appetite change, fever and  "unexpected weight change.   HENT:  Negative for congestion, postnasal drip, rhinorrhea, sinus pressure, sore throat and voice change.    Eyes:  Negative for visual disturbance.   Respiratory:  Positive for shortness of breath. Negative for cough, chest tightness and wheezing.    Cardiovascular:  Negative for chest pain, palpitations and leg swelling.   Gastrointestinal:  Negative for abdominal distention, abdominal pain, nausea and vomiting.   Endocrine: Negative for cold intolerance and heat intolerance.   Genitourinary:  Negative for difficulty urinating and urgency.   Musculoskeletal:  Negative for arthralgias, back pain and neck pain.   Skin:  Negative for color change and pallor.   Allergic/Immunologic: Negative for environmental allergies and food allergies.   Neurological:  Negative for dizziness, syncope, weakness and light-headedness.   Hematological:  Negative for adenopathy. Does not bruise/bleed easily.   Psychiatric/Behavioral:  Positive for sleep disturbance. Negative for agitation and behavioral problems.    .    /74   Pulse 70   Temp 97.8 °F (36.6 °C)   Ht 157.5 cm (62.01\")   Wt 102 kg (224 lb 3.2 oz)   LMP  (LMP Unknown) Comment: Mammogram- 1/28/20  SpO2 99% Comment: 2 L intermittent  BMI 40.99 kg/m²     Immunization History   Administered Date(s) Administered    COVID-19 (MODERNA) 1st,2nd,3rd Dose Monovalent 02/12/2021, 03/12/2021, 11/09/2021    Covid-19 (Pfizer) Gray Cap Monovalent 08/03/2022    Flu Vaccine Quad PF >36MO 10/02/2017, 09/11/2018, 09/21/2019    Fluad Quad 65+ 11/23/2022    Fluzone High Dose =>65 Years ([employer] ONLY) 09/13/2017, 11/23/2022    Fluzone High-Dose 65+yrs 09/16/2021, 10/19/2023    Hepatitis A 05/03/1999, 10/05/1999    INFLUENZA SPLIT TRI 09/15/2010, 10/02/2012, 10/02/2013    Influenza, Unspecified 01/13/2004, 10/28/2004, 12/14/2005, 01/11/2007, 11/30/2007, 11/20/2008, 02/17/2010, 01/31/2011, 09/06/2011, 09/09/2013, 09/13/2017, 09/11/2018    PPD Test " 10/28/2022, 11/07/2022    Pneumococcal Conjugate 13-Valent (PCV13) 09/04/2015, 12/04/2016    Pneumococcal Conjugate 20-Valent (PCV20) 06/08/2022    Pneumococcal Conjugate Unspecified 12/20/2013, 12/04/2016    Pneumococcal Polysaccharide (PPSV23) 01/13/2004, 11/20/2008, 12/20/2013    Pneumococcal, Unspecified 12/20/2013, 12/04/2016    Shingrix 09/27/2019    TD Preservative Free (Tenivac) 02/01/2012, 05/04/2017    Tdap 05/04/2017    Zostavax 02/05/2013       Physical Exam  Vitals and nursing note reviewed.   Constitutional:       Appearance: She is well-developed. She is not diaphoretic.   HENT:      Head: Normocephalic and atraumatic.   Eyes:      Pupils: Pupils are equal, round, and reactive to light.   Neck:      Thyroid: No thyromegaly.   Cardiovascular:      Rate and Rhythm: Normal rate and regular rhythm.      Heart sounds: Normal heart sounds. No murmur heard.     No friction rub. No gallop.   Pulmonary:      Effort: Pulmonary effort is normal. No respiratory distress.      Breath sounds: Normal breath sounds. No wheezing or rales.   Chest:      Chest wall: No tenderness.   Abdominal:      General: Bowel sounds are normal.      Palpations: Abdomen is soft.      Tenderness: There is no abdominal tenderness.   Musculoskeletal:         General: No swelling. Normal range of motion.      Cervical back: Normal range of motion and neck supple.   Lymphadenopathy:      Cervical: No cervical adenopathy.   Skin:     General: Skin is warm and dry.      Capillary Refill: Capillary refill takes less than 2 seconds.   Neurological:      Mental Status: She is alert and oriented to person, place, and time.   Psychiatric:         Mood and Affect: Mood normal.         Behavior: Behavior normal.           RESULTS    BiPAP download: Patient is 100% compliant, average use is 5 hours and 28 minutes, her average AHI is 6.9, she is on EPAP 10, IPAP 16.    PROBLEM LIST    Problem List Items Addressed This Visit          Cardiac and  Vasculature    Acute diastolic CHF (congestive heart failure) - Primary    Overview     Echo, 1/26/2024: EF 55%.  Grade 1 diastolic dysfunction            Gastrointestinal Abdominal     GERD (gastroesophageal reflux disease)       Pulmonary and Pneumonias    History of respiratory failure - prior respiratory failure requiring mechanical ventilation, open lung biopsy non-specific.     Overview     - prior respiratory failure requiring mechanical ventilation, open lung biopsy non-specific.  - 2016         Chronic respiratory failure with hypoxia       Sleep    MILLI treated with BiPAP         DISCUSSION    Mrs. Cross was here for follow-up.  She is to be doing okay from a pulmonary standpoint.     Her BiPAP is 5 years old and she does need a replacement machine.  I will go ahead and send in a new order for supplies as well.  I did review her download and it looks like it is working properly.  Her AHI is 6.9 but I think with the change in the mask hopefully this will improve by her next download.    I did encourage her to try wearing her BiPAP a minimum of 6 hours every night.    She will continue to wear her oxygen at 2 L continuously for her chronic respiratory failure.  She will continue omeprazole daily for GERD.    She will continue close follow-up with cardiology.  She does have a plan for increased weight,  she does weigh herself daily.    We will have her follow-up in December.  I did advise her to call me when she receives her new BiPAP machine and we will go over the download to make sure that it is appropriate for her.    I personally spent a total of 33 minutes on patient visit today including chart review, face to face with the patient obtaining the history and physical exam, review of pertinent images and tests, counseling and discussion and/or coordination of care as described above, and documentation.  Total time excludes time spent on other separate services such as performing procedures or test  interpretation, if applicable.        Myrna SEBASTIÁN Mckeon, APRN  08/05/202411:21 EDT  Electronically signed     Please note that portions of this note were completed with a voice recognition program.        CC: Reynaldo Fritz MD

## 2024-08-06 ENCOUNTER — HOME CARE VISIT (OUTPATIENT)
Dept: HOME HEALTH SERVICES | Facility: HOME HEALTHCARE | Age: 74
End: 2024-08-06
Payer: MEDICARE

## 2024-08-06 VITALS
WEIGHT: 224.4 LBS | BODY MASS INDEX: 41.03 KG/M2 | DIASTOLIC BLOOD PRESSURE: 60 MMHG | RESPIRATION RATE: 18 BRPM | OXYGEN SATURATION: 98 % | SYSTOLIC BLOOD PRESSURE: 107 MMHG | HEART RATE: 82 BPM

## 2024-08-06 VITALS
HEART RATE: 86 BPM | TEMPERATURE: 97.4 F | OXYGEN SATURATION: 99 % | RESPIRATION RATE: 16 BRPM | SYSTOLIC BLOOD PRESSURE: 108 MMHG | DIASTOLIC BLOOD PRESSURE: 58 MMHG

## 2024-08-06 PROCEDURE — G0152 HHCP-SERV OF OT,EA 15 MIN: HCPCS

## 2024-08-06 PROCEDURE — G0299 HHS/HOSPICE OF RN EA 15 MIN: HCPCS

## 2024-08-07 ENCOUNTER — HOME CARE VISIT (OUTPATIENT)
Dept: HOME HEALTH SERVICES | Facility: HOME HEALTHCARE | Age: 74
End: 2024-08-07
Payer: MEDICARE

## 2024-08-07 VITALS
DIASTOLIC BLOOD PRESSURE: 55 MMHG | HEART RATE: 86 BPM | SYSTOLIC BLOOD PRESSURE: 102 MMHG | OXYGEN SATURATION: 97 % | RESPIRATION RATE: 18 BRPM | TEMPERATURE: 98 F

## 2024-08-07 PROCEDURE — G0151 HHCP-SERV OF PT,EA 15 MIN: HCPCS

## 2024-08-09 ENCOUNTER — HOME CARE VISIT (OUTPATIENT)
Dept: HOME HEALTH SERVICES | Facility: HOME HEALTHCARE | Age: 74
End: 2024-08-09
Payer: MEDICARE

## 2024-08-09 VITALS
DIASTOLIC BLOOD PRESSURE: 76 MMHG | HEART RATE: 78 BPM | OXYGEN SATURATION: 98 % | TEMPERATURE: 97.6 F | SYSTOLIC BLOOD PRESSURE: 124 MMHG | RESPIRATION RATE: 16 BRPM

## 2024-08-09 PROCEDURE — G0152 HHCP-SERV OF OT,EA 15 MIN: HCPCS

## 2024-08-12 ENCOUNTER — HOME CARE VISIT (OUTPATIENT)
Dept: HOME HEALTH SERVICES | Facility: HOME HEALTHCARE | Age: 74
End: 2024-08-12
Payer: MEDICARE

## 2024-08-12 VITALS
DIASTOLIC BLOOD PRESSURE: 67 MMHG | OXYGEN SATURATION: 100 % | HEART RATE: 97 BPM | RESPIRATION RATE: 16 BRPM | TEMPERATURE: 97.1 F | SYSTOLIC BLOOD PRESSURE: 102 MMHG

## 2024-08-12 PROCEDURE — G0152 HHCP-SERV OF OT,EA 15 MIN: HCPCS

## 2024-08-13 ENCOUNTER — TELEPHONE (OUTPATIENT)
Dept: FAMILY MEDICINE CLINIC | Facility: CLINIC | Age: 74
End: 2024-08-13
Payer: MEDICARE

## 2024-08-15 ENCOUNTER — HOME CARE VISIT (OUTPATIENT)
Dept: HOME HEALTH SERVICES | Facility: HOME HEALTHCARE | Age: 74
End: 2024-08-15
Payer: MEDICARE

## 2024-08-15 PROCEDURE — G0152 HHCP-SERV OF OT,EA 15 MIN: HCPCS

## 2024-08-16 ENCOUNTER — HOME CARE VISIT (OUTPATIENT)
Dept: HOME HEALTH SERVICES | Facility: HOME HEALTHCARE | Age: 74
End: 2024-08-16
Payer: MEDICARE

## 2024-08-16 VITALS
HEART RATE: 85 BPM | SYSTOLIC BLOOD PRESSURE: 121 MMHG | OXYGEN SATURATION: 98 % | DIASTOLIC BLOOD PRESSURE: 67 MMHG | TEMPERATURE: 97.4 F

## 2024-08-17 NOTE — CASE COMMUNICATION
Patient missed a HHPT visit from [facility] on 8/16/24.     Reason: pt having cataract surgery this date.       For your records only.   Per CMS Guidance, MD must be notified of missed/cancelled visits; therefore the prescribed frequency was not met.

## 2024-08-19 ENCOUNTER — HOME CARE VISIT (OUTPATIENT)
Dept: HOME HEALTH SERVICES | Facility: HOME HEALTHCARE | Age: 74
End: 2024-08-19
Payer: MEDICARE

## 2024-08-19 VITALS
RESPIRATION RATE: 16 BRPM | HEART RATE: 76 BPM | TEMPERATURE: 97.4 F | OXYGEN SATURATION: 100 % | DIASTOLIC BLOOD PRESSURE: 71 MMHG | SYSTOLIC BLOOD PRESSURE: 140 MMHG

## 2024-08-19 PROCEDURE — G0152 HHCP-SERV OF OT,EA 15 MIN: HCPCS

## 2024-08-20 ENCOUNTER — HOME CARE VISIT (OUTPATIENT)
Dept: HOME HEALTH SERVICES | Facility: HOME HEALTHCARE | Age: 74
End: 2024-08-20
Payer: MEDICARE

## 2024-08-20 ENCOUNTER — LAB REQUISITION (OUTPATIENT)
Dept: LAB | Facility: HOSPITAL | Age: 74
End: 2024-08-20
Payer: MEDICARE

## 2024-08-20 ENCOUNTER — TELEPHONE (OUTPATIENT)
Dept: ONCOLOGY | Facility: CLINIC | Age: 74
End: 2024-08-20
Payer: MEDICARE

## 2024-08-20 VITALS
RESPIRATION RATE: 17 BRPM | DIASTOLIC BLOOD PRESSURE: 77 MMHG | OXYGEN SATURATION: 96 % | HEART RATE: 102 BPM | SYSTOLIC BLOOD PRESSURE: 130 MMHG | TEMPERATURE: 97.4 F

## 2024-08-20 VITALS
HEART RATE: 72 BPM | OXYGEN SATURATION: 96 % | SYSTOLIC BLOOD PRESSURE: 122 MMHG | RESPIRATION RATE: 22 BRPM | DIASTOLIC BLOOD PRESSURE: 64 MMHG | TEMPERATURE: 97.3 F

## 2024-08-20 DIAGNOSIS — S42.91XG FRACTURE OF RIGHT SHOULDER GIRDLE, PART UNSPECIFIED, SUBSEQUENT ENCOUNTER FOR FRACTURE WITH DELAYED HEALING: ICD-10-CM

## 2024-08-20 LAB
BASOPHILS # BLD AUTO: 0.06 10*3/MM3 (ref 0–0.2)
BASOPHILS NFR BLD AUTO: 1.1 % (ref 0–1.5)
DEPRECATED RDW RBC AUTO: 50.3 FL (ref 37–54)
EOSINOPHIL # BLD AUTO: 0.35 10*3/MM3 (ref 0–0.4)
EOSINOPHIL NFR BLD AUTO: 6.3 % (ref 0.3–6.2)
ERYTHROCYTE [DISTWIDTH] IN BLOOD BY AUTOMATED COUNT: 13.5 % (ref 12.3–15.4)
HCT VFR BLD AUTO: 35.1 % (ref 34–46.6)
HGB BLD-MCNC: 11.3 G/DL (ref 12–15.9)
IMM GRANULOCYTES # BLD AUTO: 0.03 10*3/MM3 (ref 0–0.05)
IMM GRANULOCYTES NFR BLD AUTO: 0.5 % (ref 0–0.5)
LYMPHOCYTES # BLD AUTO: 0.67 10*3/MM3 (ref 0.7–3.1)
LYMPHOCYTES NFR BLD AUTO: 12.1 % (ref 19.6–45.3)
MCH RBC QN AUTO: 32.5 PG (ref 26.6–33)
MCHC RBC AUTO-ENTMCNC: 32.2 G/DL (ref 31.5–35.7)
MCV RBC AUTO: 100.9 FL (ref 79–97)
MONOCYTES # BLD AUTO: 0.54 10*3/MM3 (ref 0.1–0.9)
MONOCYTES NFR BLD AUTO: 9.7 % (ref 5–12)
NEUTROPHILS NFR BLD AUTO: 3.91 10*3/MM3 (ref 1.7–7)
NEUTROPHILS NFR BLD AUTO: 70.3 % (ref 42.7–76)
NRBC BLD AUTO-RTO: 0 /100 WBC (ref 0–0.2)
PLATELET # BLD AUTO: 165 10*3/MM3 (ref 140–450)
PMV BLD AUTO: 10 FL (ref 6–12)
RBC # BLD AUTO: 3.48 10*6/MM3 (ref 3.77–5.28)
WBC NRBC COR # BLD AUTO: 5.56 10*3/MM3 (ref 3.4–10.8)

## 2024-08-20 PROCEDURE — 85025 COMPLETE CBC W/AUTO DIFF WBC: CPT | Performed by: INTERNAL MEDICINE

## 2024-08-20 PROCEDURE — G0157 HHC PT ASSISTANT EA 15: HCPCS

## 2024-08-20 PROCEDURE — G0299 HHS/HOSPICE OF RN EA 15 MIN: HCPCS

## 2024-08-20 NOTE — TELEPHONE ENCOUNTER
Called Baylee with [facility] back and informed her that BMP would need to be ordered by patients nephrologist or PCP. She verbalized understanding.

## 2024-08-21 ENCOUNTER — HOME CARE VISIT (OUTPATIENT)
Dept: HOME HEALTH SERVICES | Facility: HOME HEALTHCARE | Age: 74
End: 2024-08-21
Payer: MEDICARE

## 2024-08-21 ENCOUNTER — LAB REQUISITION (OUTPATIENT)
Dept: LAB | Facility: HOSPITAL | Age: 74
End: 2024-08-21
Payer: MEDICARE

## 2024-08-21 DIAGNOSIS — S42.91XG FRACTURE OF RIGHT SHOULDER GIRDLE, PART UNSPECIFIED, SUBSEQUENT ENCOUNTER FOR FRACTURE WITH DELAYED HEALING: ICD-10-CM

## 2024-08-21 LAB — NT-PROBNP SERPL-MCNC: 2903 PG/ML (ref 0–1800)

## 2024-08-21 PROCEDURE — G0300 HHS/HOSPICE OF LPN EA 15 MIN: HCPCS

## 2024-08-21 PROCEDURE — G0152 HHCP-SERV OF OT,EA 15 MIN: HCPCS

## 2024-08-21 PROCEDURE — 83880 ASSAY OF NATRIURETIC PEPTIDE: CPT | Performed by: FAMILY MEDICINE

## 2024-08-22 VITALS
DIASTOLIC BLOOD PRESSURE: 76 MMHG | OXYGEN SATURATION: 99 % | SYSTOLIC BLOOD PRESSURE: 136 MMHG | RESPIRATION RATE: 16 BRPM | HEART RATE: 85 BPM

## 2024-08-23 ENCOUNTER — TELEPHONE (OUTPATIENT)
Dept: FAMILY MEDICINE CLINIC | Facility: CLINIC | Age: 74
End: 2024-08-23
Payer: MEDICARE

## 2024-08-23 NOTE — TELEPHONE ENCOUNTER
Pt is taking 3 mil of bumex and is monitoring her weight pt was told to go to ER if she gets SOB. Pt states she has a cough.. I told her she will need to be seen since its new and that we are open tomorrow for things just like that she was happy and if she gets any worse she will come by.

## 2024-08-23 NOTE — TELEPHONE ENCOUNTER
"    Caller: Joanna Cross \"SIXTO\"    Relationship: Self    Best call back number: [phone]    Caller requesting test results: YES    What test was performed: BLOOD WORK    When was the test performed: 8/20 AND 8/21    Where was the test performed: PT HOME    Additional notes: REQUEST TEST RESULTS        "

## 2024-08-28 ENCOUNTER — HOME CARE VISIT (OUTPATIENT)
Dept: HOME HEALTH SERVICES | Facility: HOME HEALTHCARE | Age: 74
End: 2024-08-28
Payer: MEDICARE

## 2024-08-28 PROCEDURE — G0152 HHCP-SERV OF OT,EA 15 MIN: HCPCS

## 2024-08-29 VITALS
RESPIRATION RATE: 16 BRPM | SYSTOLIC BLOOD PRESSURE: 112 MMHG | OXYGEN SATURATION: 98 % | DIASTOLIC BLOOD PRESSURE: 63 MMHG | TEMPERATURE: 97.4 F | HEART RATE: 88 BPM

## 2024-08-30 ENCOUNTER — HOME CARE VISIT (OUTPATIENT)
Dept: HOME HEALTH SERVICES | Facility: HOME HEALTHCARE | Age: 74
End: 2024-08-30
Payer: MEDICARE

## 2024-08-30 VITALS
DIASTOLIC BLOOD PRESSURE: 81 MMHG | OXYGEN SATURATION: 91 % | TEMPERATURE: 97.5 F | RESPIRATION RATE: 18 BRPM | SYSTOLIC BLOOD PRESSURE: 138 MMHG | HEART RATE: 90 BPM

## 2024-08-30 VITALS
SYSTOLIC BLOOD PRESSURE: 105 MMHG | OXYGEN SATURATION: 100 % | HEART RATE: 65 BPM | TEMPERATURE: 97.6 F | DIASTOLIC BLOOD PRESSURE: 65 MMHG | RESPIRATION RATE: 16 BRPM

## 2024-08-30 PROCEDURE — G0152 HHCP-SERV OF OT,EA 15 MIN: HCPCS

## 2024-08-30 PROCEDURE — G0157 HHC PT ASSISTANT EA 15: HCPCS

## 2024-09-03 ENCOUNTER — HOME CARE VISIT (OUTPATIENT)
Dept: HOME HEALTH SERVICES | Facility: HOME HEALTHCARE | Age: 74
End: 2024-09-03
Payer: MEDICARE

## 2024-09-03 VITALS
OXYGEN SATURATION: 100 % | HEART RATE: 76 BPM | DIASTOLIC BLOOD PRESSURE: 59 MMHG | TEMPERATURE: 98.4 F | RESPIRATION RATE: 18 BRPM | SYSTOLIC BLOOD PRESSURE: 109 MMHG

## 2024-09-03 PROCEDURE — G0299 HHS/HOSPICE OF RN EA 15 MIN: HCPCS

## 2024-09-04 ENCOUNTER — HOME CARE VISIT (OUTPATIENT)
Dept: HOME HEALTH SERVICES | Facility: HOME HEALTHCARE | Age: 74
End: 2024-09-04
Payer: MEDICARE

## 2024-09-04 VITALS
RESPIRATION RATE: 16 BRPM | OXYGEN SATURATION: 93 % | HEART RATE: 72 BPM | DIASTOLIC BLOOD PRESSURE: 72 MMHG | TEMPERATURE: 97.6 F | SYSTOLIC BLOOD PRESSURE: 118 MMHG

## 2024-09-04 PROCEDURE — G0152 HHCP-SERV OF OT,EA 15 MIN: HCPCS

## 2024-09-06 ENCOUNTER — HOME CARE VISIT (OUTPATIENT)
Dept: HOME HEALTH SERVICES | Facility: HOME HEALTHCARE | Age: 74
End: 2024-09-06
Payer: MEDICARE

## 2024-09-06 VITALS
HEART RATE: 70 BPM | SYSTOLIC BLOOD PRESSURE: 117 MMHG | RESPIRATION RATE: 16 BRPM | OXYGEN SATURATION: 95 % | TEMPERATURE: 98 F | DIASTOLIC BLOOD PRESSURE: 61 MMHG

## 2024-09-06 PROCEDURE — G0151 HHCP-SERV OF PT,EA 15 MIN: HCPCS

## 2024-09-09 ENCOUNTER — HOME CARE VISIT (OUTPATIENT)
Dept: HOME HEALTH SERVICES | Facility: HOME HEALTHCARE | Age: 74
End: 2024-09-09
Payer: MEDICARE

## 2024-09-09 VITALS
OXYGEN SATURATION: 99 % | DIASTOLIC BLOOD PRESSURE: 61 MMHG | TEMPERATURE: 97.5 F | HEART RATE: 77 BPM | SYSTOLIC BLOOD PRESSURE: 111 MMHG

## 2024-09-09 PROCEDURE — G0152 HHCP-SERV OF OT,EA 15 MIN: HCPCS

## 2024-09-09 RX ORDER — OMEPRAZOLE 40 MG/1
40 CAPSULE, DELAYED RELEASE ORAL 2 TIMES DAILY
Qty: 180 CAPSULE | Refills: 1 | Status: SHIPPED | OUTPATIENT
Start: 2024-09-09

## 2024-09-10 ENCOUNTER — PATIENT OUTREACH (OUTPATIENT)
Dept: CASE MANAGEMENT | Facility: OTHER | Age: 74
End: 2024-09-10
Payer: MEDICARE

## 2024-09-10 DIAGNOSIS — M48.062 SPINAL STENOSIS, LUMBAR REGION, WITH NEUROGENIC CLAUDICATION: ICD-10-CM

## 2024-09-10 DIAGNOSIS — I25.10 CORONARY ARTERY DISEASE INVOLVING NATIVE CORONARY ARTERY OF NATIVE HEART WITHOUT ANGINA PECTORIS: Primary | ICD-10-CM

## 2024-09-10 DIAGNOSIS — G20.B2 PARKINSON'S DISEASE WITH DYSKINESIA AND FLUCTUATING MANIFESTATIONS: ICD-10-CM

## 2024-09-10 NOTE — OUTREACH NOTE
AMBULATORY CASE MANAGEMENT NOTE    Names and Relationships of Patient/Support Persons: Contact: Silvana Cross(POA); Relationship: Emergency Contact -     CCM Interim Update    Spoke with Ms. Cross's daughter, Silvana, for CCM update. She states that Ms. Cross is doing a little better and is stronger. She is now able to use her rollator and is off the tod walker. At her last visit with ortho he was able to see signs of healing. They are hopeful that at next visit her restrictions will be lifted. She sees ortho again 10/22/24. She has not had any further falls. She is allowed to do passive exercise now. She is also using her Nustep every day. She does still have weakness in her right leg and can not lift her leg into bed.     Her weight has gone up a little to 230#. The gain has been gradual. Silvana does not see any increase in her edema. Her breathing remains at baseline. She is compliant with using her bipap and did get new bipap machine. She does have upcoming appointment with Dr. Teague. [facility] that [facility] will no longer carry Entresto. They would get it at local pharmacy but would cost more. Silvana plans to speak with Dr. Teague to advise.     It was noted when she saw pulmonary that Ms. Cross had a spot on her toe. They took Ms. Cross to get new shoes and the spot has healed up.     Ms. Cross is now willing to go tour [facility]. They are going tomorrow. She is agreeing to have a plan for her future care. Silvana still needs to have surgery and her physician will not do the surgery as long as she continues to be a caregiver.     Education Documentation  No documentation found.        Faye JAUREGUI  Ambulatory Case Management    9/10/2024, 15:24 EDT

## 2024-09-11 ENCOUNTER — HOME CARE VISIT (OUTPATIENT)
Dept: HOME HEALTH SERVICES | Facility: HOME HEALTHCARE | Age: 74
End: 2024-09-11
Payer: MEDICARE

## 2024-09-12 ENCOUNTER — HOME CARE VISIT (OUTPATIENT)
Dept: HOME HEALTH SERVICES | Facility: HOME HEALTHCARE | Age: 74
End: 2024-09-12
Payer: MEDICARE

## 2024-09-12 PROCEDURE — G0152 HHCP-SERV OF OT,EA 15 MIN: HCPCS

## 2024-09-12 NOTE — Clinical Note
Discharge Summary/Summary of Care Provided: Pt participated in 9 HHOT visits [facility] recertification  Patient received home health for diagnosis: R vee caba  Current level of functional ability: Pt is walking in home with 4WW in and out of home, mod ind-min A with ADLskills, mod ind- min A with lt IADL skills for cold meal prep, folding of clothes, medication routine, management of appointments, computer skill/leisure tasks. R AAROM to 110 degrees, AROM to 55 degrees for forward reach.  Living arrangements: Pt lives in her home w/ dtr and has hired cg'ers 6 hrs / day M-F. Pt has ramp to enter/exist home from garage  Progress towards goals and/or Were all goals met? met  If not all goals met, barriers that prevented patient from meeting goals: Pt is ready for out pt therapy and PT of her choice has an opening next week.  SDOH concerns (i.e. Caregiver availability, social isolation, environment, income, transportation access, food insecurity etc.) n/a  Follow-up appointment plans and community resources provided:Denise Martin APRN on 9-18-  Other imporant information. d/c from  on 9-12-24

## 2024-09-13 ENCOUNTER — TELEPHONE (OUTPATIENT)
Dept: FAMILY MEDICINE CLINIC | Facility: CLINIC | Age: 74
End: 2024-09-13
Payer: MEDICARE

## 2024-09-13 VITALS
TEMPERATURE: 97.5 F | RESPIRATION RATE: 18 BRPM | OXYGEN SATURATION: 98 % | HEART RATE: 90 BPM | SYSTOLIC BLOOD PRESSURE: 111 MMHG | DIASTOLIC BLOOD PRESSURE: 64 MMHG

## 2024-09-13 DIAGNOSIS — R60.0 EDEMA OF LOWER EXTREMITY: Primary | ICD-10-CM

## 2024-09-13 NOTE — HOME HEALTH
Discharge Summary/Summary of Care Provided: Pt participated in 9 HHOT visits [facility] recertification  Patient received home health for diagnosis: R scapula fx  Current level of functional ability: Pt is walking in home with 4WW in and out of home, mod ind-min A with ADLskills, mod ind- min A with lt IADL skills for cold meal prep, folding of clothes, medication routine, management of appointments, computer skill/leisure tasks. AAROM to 110, AROM to 55 degrees forward flex on R shoulder.  Living arrangements: Pt lives in her home w/ dtr and has hired cg'ers 6 hrs / day M-F. Pt has ramp to enter/exist home from garage  Progress towards goals and/or Were all goals met? met  If not all goals met, barriers that prevented patient from meeting goals: Pt is ready for out pt therapy and PT of her choice has an opening next week.  SDOH concerns (i.e. Caregiver availability, social isolation, environment, income, transportation access, food insecurity etc.) n/a  Follow-up appointment plans and community resources provided:Denise Martin APRN on 9-18-  Other imporant information. d/c from  on 9-12-24

## 2024-09-16 ENCOUNTER — HOSPITAL ENCOUNTER (EMERGENCY)
Facility: HOSPITAL | Age: 74
Discharge: HOME | End: 2024-09-16
Attending: STUDENT IN AN ORGANIZED HEALTH CARE EDUCATION/TRAINING PROGRAM | Admitting: STUDENT IN AN ORGANIZED HEALTH CARE EDUCATION/TRAINING PROGRAM
Payer: MEDICARE

## 2024-09-16 ENCOUNTER — APPOINTMENT (OUTPATIENT)
Dept: GENERAL RADIOLOGY | Facility: HOSPITAL | Age: 74
End: 2024-09-16
Payer: MEDICARE

## 2024-09-16 VITALS
HEART RATE: 72 BPM | RESPIRATION RATE: 18 BRPM | BODY MASS INDEX: 42.76 KG/M2 | DIASTOLIC BLOOD PRESSURE: 72 MMHG | WEIGHT: 232.37 LBS | OXYGEN SATURATION: 100 % | HEIGHT: 62 IN | TEMPERATURE: 97.4 F | SYSTOLIC BLOOD PRESSURE: 147 MMHG

## 2024-09-16 DIAGNOSIS — I25.10 CORONARY ARTERY DISEASE INVOLVING NATIVE CORONARY ARTERY OF NATIVE HEART WITHOUT ANGINA PECTORIS: ICD-10-CM

## 2024-09-16 DIAGNOSIS — I50.9 ACUTE ON CHRONIC CONGESTIVE HEART FAILURE, UNSPECIFIED HEART FAILURE TYPE: ICD-10-CM

## 2024-09-16 DIAGNOSIS — M79.89 LEG SWELLING: Primary | ICD-10-CM

## 2024-09-16 DIAGNOSIS — G20.B2 PARKINSON'S DISEASE WITH DYSKINESIA AND FLUCTUATING MANIFESTATIONS: ICD-10-CM

## 2024-09-16 LAB
ALBUMIN SERPL-MCNC: 4.3 G/DL (ref 3.5–5.2)
ALBUMIN/GLOB SERPL: 1.8 G/DL
ALP SERPL-CCNC: 101 U/L (ref 39–117)
ALT SERPL W P-5'-P-CCNC: 5 U/L (ref 1–33)
ANION GAP SERPL CALCULATED.3IONS-SCNC: 11 MMOL/L (ref 5–15)
AST SERPL-CCNC: 21 U/L (ref 1–32)
ATMOSPHERIC PRESS: ABNORMAL MM[HG]
BASE EXCESS BLDV CALC-SCNC: 3.6 MMOL/L (ref -2–2)
BASOPHILS # BLD AUTO: 0.04 10*3/MM3 (ref 0–0.2)
BASOPHILS NFR BLD AUTO: 0.8 % (ref 0–1.5)
BDY SITE: ABNORMAL
BILIRUB SERPL-MCNC: 0.3 MG/DL (ref 0–1.2)
BODY TEMPERATURE: 37
BUN SERPL-MCNC: 37 MG/DL (ref 8–23)
BUN/CREAT SERPL: 25.5 (ref 7–25)
CALCIUM SPEC-SCNC: 9.2 MG/DL (ref 8.6–10.5)
CHLORIDE SERPL-SCNC: 103 MMOL/L (ref 98–107)
CO2 BLDA-SCNC: 31.9 MMOL/L (ref 22–33)
CO2 SERPL-SCNC: 28 MMOL/L (ref 22–29)
COHGB MFR BLD: 1.1 %
CREAT SERPL-MCNC: 1.45 MG/DL (ref 0.57–1)
DEPRECATED RDW RBC AUTO: 49.8 FL (ref 37–54)
EGFRCR SERPLBLD CKD-EPI 2021: 37.7 ML/MIN/1.73
EOSINOPHIL # BLD AUTO: 0.39 10*3/MM3 (ref 0–0.4)
EOSINOPHIL NFR BLD AUTO: 7.9 % (ref 0.3–6.2)
EPAP: 0
ERYTHROCYTE [DISTWIDTH] IN BLOOD BY AUTOMATED COUNT: 13.6 % (ref 12.3–15.4)
GLOBULIN UR ELPH-MCNC: 2.4 GM/DL
GLUCOSE SERPL-MCNC: 117 MG/DL (ref 65–99)
HCO3 BLDV-SCNC: 30.3 MMOL/L (ref 22–28)
HCT VFR BLD AUTO: 32.8 % (ref 34–46.6)
HGB BLD-MCNC: 10.5 G/DL (ref 12–15.9)
HGB BLDA-MCNC: 14.4 G/DL (ref 14–18)
HOLD SPECIMEN: NORMAL
IMM GRANULOCYTES # BLD AUTO: 0.04 10*3/MM3 (ref 0–0.05)
IMM GRANULOCYTES NFR BLD AUTO: 0.8 % (ref 0–0.5)
INHALED O2 CONCENTRATION: 28 %
IPAP: 0
LYMPHOCYTES # BLD AUTO: 0.53 10*3/MM3 (ref 0.7–3.1)
LYMPHOCYTES NFR BLD AUTO: 10.7 % (ref 19.6–45.3)
MAGNESIUM SERPL-MCNC: 2.3 MG/DL (ref 1.6–2.4)
MCH RBC QN AUTO: 32.2 PG (ref 26.6–33)
MCHC RBC AUTO-ENTMCNC: 32 G/DL (ref 31.5–35.7)
MCV RBC AUTO: 100.6 FL (ref 79–97)
METHGB BLD QL: 0.5 %
MODALITY: ABNORMAL
MONOCYTES # BLD AUTO: 0.56 10*3/MM3 (ref 0.1–0.9)
MONOCYTES NFR BLD AUTO: 11.3 % (ref 5–12)
NEUTROPHILS NFR BLD AUTO: 3.39 10*3/MM3 (ref 1.7–7)
NEUTROPHILS NFR BLD AUTO: 68.5 % (ref 42.7–76)
NRBC BLD AUTO-RTO: 0 /100 WBC (ref 0–0.2)
NT-PROBNP SERPL-MCNC: 2621 PG/ML (ref 0–1800)
OXYHGB MFR BLDV: 46.1 %
PAW @ PEAK INSP FLOW SETTING VENT: 0 CMH2O
PCO2 BLDV: 53 MM HG (ref 41–51)
PH BLDV: 7.37 PH UNITS (ref 7.31–7.41)
PLATELET # BLD AUTO: 145 10*3/MM3 (ref 140–450)
PMV BLD AUTO: 9.7 FL (ref 6–12)
PO2 BLDV: 27.9 MM HG (ref 27–53)
POTASSIUM SERPL-SCNC: 3.7 MMOL/L (ref 3.5–5.2)
PROT SERPL-MCNC: 6.7 G/DL (ref 6–8.5)
RBC # BLD AUTO: 3.26 10*6/MM3 (ref 3.77–5.28)
SODIUM SERPL-SCNC: 142 MMOL/L (ref 136–145)
T4 FREE SERPL-MCNC: 1.38 NG/DL (ref 0.92–1.68)
TOTAL RATE: 0 BREATHS/MINUTE
TROPONIN T SERPL HS-MCNC: 51 NG/L
TSH SERPL DL<=0.05 MIU/L-ACNC: 6.72 UIU/ML (ref 0.27–4.2)
WBC NRBC COR # BLD AUTO: 4.95 10*3/MM3 (ref 3.4–10.8)
WHOLE BLOOD HOLD COAG: NORMAL
WHOLE BLOOD HOLD SPECIMEN: NORMAL

## 2024-09-16 PROCEDURE — 25010000002 BUMETANIDE PER 0.5 MG: Performed by: STUDENT IN AN ORGANIZED HEALTH CARE EDUCATION/TRAINING PROGRAM

## 2024-09-16 PROCEDURE — 83735 ASSAY OF MAGNESIUM: CPT | Performed by: STUDENT IN AN ORGANIZED HEALTH CARE EDUCATION/TRAINING PROGRAM

## 2024-09-16 PROCEDURE — 80053 COMPREHEN METABOLIC PANEL: CPT | Performed by: STUDENT IN AN ORGANIZED HEALTH CARE EDUCATION/TRAINING PROGRAM

## 2024-09-16 PROCEDURE — 99284 EMERGENCY DEPT VISIT MOD MDM: CPT

## 2024-09-16 PROCEDURE — 93005 ELECTROCARDIOGRAM TRACING: CPT | Performed by: STUDENT IN AN ORGANIZED HEALTH CARE EDUCATION/TRAINING PROGRAM

## 2024-09-16 PROCEDURE — 85025 COMPLETE CBC W/AUTO DIFF WBC: CPT | Performed by: STUDENT IN AN ORGANIZED HEALTH CARE EDUCATION/TRAINING PROGRAM

## 2024-09-16 PROCEDURE — 71045 X-RAY EXAM CHEST 1 VIEW: CPT

## 2024-09-16 PROCEDURE — 83880 ASSAY OF NATRIURETIC PEPTIDE: CPT | Performed by: STUDENT IN AN ORGANIZED HEALTH CARE EDUCATION/TRAINING PROGRAM

## 2024-09-16 PROCEDURE — 84443 ASSAY THYROID STIM HORMONE: CPT | Performed by: STUDENT IN AN ORGANIZED HEALTH CARE EDUCATION/TRAINING PROGRAM

## 2024-09-16 PROCEDURE — 96374 THER/PROPH/DIAG INJ IV PUSH: CPT

## 2024-09-16 PROCEDURE — 82805 BLOOD GASES W/O2 SATURATION: CPT

## 2024-09-16 PROCEDURE — 84484 ASSAY OF TROPONIN QUANT: CPT | Performed by: STUDENT IN AN ORGANIZED HEALTH CARE EDUCATION/TRAINING PROGRAM

## 2024-09-16 PROCEDURE — 84439 ASSAY OF FREE THYROXINE: CPT | Performed by: STUDENT IN AN ORGANIZED HEALTH CARE EDUCATION/TRAINING PROGRAM

## 2024-09-16 RX ORDER — SODIUM CHLORIDE 0.9 % (FLUSH) 0.9 %
10 SYRINGE (ML) INJECTION AS NEEDED
Status: DISCONTINUED | OUTPATIENT
Start: 2024-09-16 | End: 2024-09-16 | Stop reason: HOSPADM

## 2024-09-16 RX ORDER — BUMETANIDE 0.25 MG/ML
2 INJECTION INTRAMUSCULAR; INTRAVENOUS ONCE
Status: COMPLETED | OUTPATIENT
Start: 2024-09-16 | End: 2024-09-16

## 2024-09-16 RX ADMIN — BUMETANIDE 2 MG: 0.25 INJECTION INTRAMUSCULAR; INTRAVENOUS at 11:15

## 2024-09-18 ENCOUNTER — OFFICE VISIT (OUTPATIENT)
Dept: ONCOLOGY | Facility: CLINIC | Age: 74
End: 2024-09-18
Payer: MEDICARE

## 2024-09-18 VITALS
TEMPERATURE: 97.4 F | SYSTOLIC BLOOD PRESSURE: 117 MMHG | DIASTOLIC BLOOD PRESSURE: 63 MMHG | RESPIRATION RATE: 20 BRPM | HEART RATE: 83 BPM | HEIGHT: 62 IN | OXYGEN SATURATION: 93 % | WEIGHT: 234 LBS | BODY MASS INDEX: 43.06 KG/M2

## 2024-09-18 DIAGNOSIS — D63.1 ANEMIA ASSOCIATED WITH STAGE 3 CHRONIC RENAL FAILURE: Primary | ICD-10-CM

## 2024-09-18 DIAGNOSIS — D63.1 ANEMIA ASSOCIATED WITH STAGE 3 CHRONIC RENAL FAILURE: ICD-10-CM

## 2024-09-18 DIAGNOSIS — N18.30 ANEMIA ASSOCIATED WITH STAGE 3 CHRONIC RENAL FAILURE: ICD-10-CM

## 2024-09-18 DIAGNOSIS — N18.30 ANEMIA ASSOCIATED WITH STAGE 3 CHRONIC RENAL FAILURE: Primary | ICD-10-CM

## 2024-09-18 LAB
QT INTERVAL: 392 MS
QTC INTERVAL: 423 MS

## 2024-09-18 PROCEDURE — 1160F RVW MEDS BY RX/DR IN RCRD: CPT | Performed by: NURSE PRACTITIONER

## 2024-09-18 PROCEDURE — 3078F DIAST BP <80 MM HG: CPT | Performed by: NURSE PRACTITIONER

## 2024-09-18 PROCEDURE — 99214 OFFICE O/P EST MOD 30 MIN: CPT | Performed by: NURSE PRACTITIONER

## 2024-09-18 PROCEDURE — 1159F MED LIST DOCD IN RCRD: CPT | Performed by: NURSE PRACTITIONER

## 2024-09-18 PROCEDURE — 3074F SYST BP LT 130 MM HG: CPT | Performed by: NURSE PRACTITIONER

## 2024-09-18 PROCEDURE — 1126F AMNT PAIN NOTED NONE PRSNT: CPT | Performed by: NURSE PRACTITIONER

## 2024-09-19 ENCOUNTER — TELEPHONE (OUTPATIENT)
Dept: ONCOLOGY | Facility: CLINIC | Age: 74
End: 2024-09-19
Payer: MEDICARE

## 2024-09-19 DIAGNOSIS — N18.30 ANEMIA ASSOCIATED WITH STAGE 3 CHRONIC RENAL FAILURE: Primary | ICD-10-CM

## 2024-09-19 DIAGNOSIS — D63.1 ANEMIA ASSOCIATED WITH STAGE 3 CHRONIC RENAL FAILURE: Primary | ICD-10-CM

## 2024-09-19 LAB
BASOPHILS # BLD AUTO: NORMAL 10*3/UL
EOSINOPHIL # BLD AUTO: NORMAL 10*3/UL
EOSINOPHIL NFR BLD AUTO: NORMAL %
FERRITIN SERPL-MCNC: 414 NG/ML (ref 15–150)
HCT VFR BLD AUTO: NORMAL %
HGB BLD-MCNC: NORMAL G/DL
IRON SATN MFR SERPL: 29 % (ref 15–55)
IRON SERPL-MCNC: 70 UG/DL (ref 27–139)
LYMPHOCYTES # BLD AUTO: NORMAL 10*3/UL
LYMPHOCYTES NFR BLD AUTO: NORMAL %
MONOCYTES NFR BLD AUTO: NORMAL %
NEUTROPHILS NFR BLD AUTO: NORMAL %
PLATELET # BLD AUTO: NORMAL 10*3/UL
RBC # BLD AUTO: NORMAL 10*6/UL
TIBC SERPL-MCNC: 238 UG/DL (ref 250–450)
UIBC SERPL-MCNC: 168 UG/DL (ref 118–369)
WBC # BLD AUTO: NORMAL X10E3/UL

## 2024-09-27 ENCOUNTER — PATIENT OUTREACH (OUTPATIENT)
Dept: CASE MANAGEMENT | Facility: OTHER | Age: 74
End: 2024-09-27
Payer: MEDICARE

## 2024-09-27 DIAGNOSIS — I25.10 CORONARY ARTERY DISEASE INVOLVING NATIVE CORONARY ARTERY OF NATIVE HEART WITHOUT ANGINA PECTORIS: Primary | ICD-10-CM

## 2024-09-27 DIAGNOSIS — M48.062 SPINAL STENOSIS, LUMBAR REGION, WITH NEUROGENIC CLAUDICATION: ICD-10-CM

## 2024-09-27 DIAGNOSIS — G20.B2 PARKINSON'S DISEASE WITH DYSKINESIA AND FLUCTUATING MANIFESTATIONS: ICD-10-CM

## 2024-09-30 ENCOUNTER — TELEPHONE (OUTPATIENT)
Dept: FAMILY MEDICINE CLINIC | Facility: CLINIC | Age: 74
End: 2024-09-30
Payer: MEDICARE

## 2024-09-30 ENCOUNTER — LAB (OUTPATIENT)
Dept: FAMILY MEDICINE CLINIC | Facility: CLINIC | Age: 74
End: 2024-09-30
Payer: MEDICARE

## 2024-09-30 DIAGNOSIS — I50.31 ACUTE DIASTOLIC CHF (CONGESTIVE HEART FAILURE): Primary | ICD-10-CM

## 2024-09-30 DIAGNOSIS — D63.1 ANEMIA ASSOCIATED WITH STAGE 3 CHRONIC RENAL FAILURE: ICD-10-CM

## 2024-09-30 DIAGNOSIS — I50.31 ACUTE DIASTOLIC CHF (CONGESTIVE HEART FAILURE): ICD-10-CM

## 2024-09-30 DIAGNOSIS — Z79.899 ENCOUNTER FOR LONG-TERM (CURRENT) USE OF OTHER MEDICATIONS: Primary | ICD-10-CM

## 2024-09-30 DIAGNOSIS — N18.30 ANEMIA ASSOCIATED WITH STAGE 3 CHRONIC RENAL FAILURE: ICD-10-CM

## 2024-09-30 PROCEDURE — 36415 COLL VENOUS BLD VENIPUNCTURE: CPT | Performed by: NURSE PRACTITIONER

## 2024-09-30 NOTE — TELEPHONE ENCOUNTER
PT CALLED REQUESTING DR. TAVAREZ PUT IN A BNT, TO CHECK HER FLUID LEVELS AROUND HER HEART. SHE IS COMING IN TODAY TO GET LABS DONE.

## 2024-10-01 LAB
BASOPHILS # BLD AUTO: 0.1 X10E3/UL (ref 0–0.2)
BASOPHILS NFR BLD AUTO: 1 %
EOSINOPHIL # BLD AUTO: 0.4 X10E3/UL (ref 0–0.4)
EOSINOPHIL NFR BLD AUTO: 6 %
ERYTHROCYTE [DISTWIDTH] IN BLOOD BY AUTOMATED COUNT: 12.5 % (ref 11.7–15.4)
FERRITIN SERPL-MCNC: 460 NG/ML (ref 15–150)
HCT VFR BLD AUTO: 32.9 % (ref 34–46.6)
HGB BLD-MCNC: 10.8 G/DL (ref 11.1–15.9)
IMM GRANULOCYTES # BLD AUTO: 0 X10E3/UL (ref 0–0.1)
IMM GRANULOCYTES NFR BLD AUTO: 1 %
IRON SATN MFR SERPL: 28 % (ref 15–55)
IRON SERPL-MCNC: 74 UG/DL (ref 27–139)
LYMPHOCYTES # BLD AUTO: 0.6 X10E3/UL (ref 0.7–3.1)
LYMPHOCYTES NFR BLD AUTO: 11 %
MCH RBC QN AUTO: 32.2 PG (ref 26.6–33)
MCHC RBC AUTO-ENTMCNC: 32.8 G/DL (ref 31.5–35.7)
MCV RBC AUTO: 98 FL (ref 79–97)
MONOCYTES # BLD AUTO: 0.6 X10E3/UL (ref 0.1–0.9)
MONOCYTES NFR BLD AUTO: 10 %
NEUTROPHILS # BLD AUTO: 4.2 X10E3/UL (ref 1.4–7)
NEUTROPHILS NFR BLD AUTO: 71 %
NT-PROBNP SERPL-MCNC: 2851 PG/ML (ref 0–738)
PLATELET # BLD AUTO: 162 X10E3/UL (ref 150–450)
RBC # BLD AUTO: 3.35 X10E6/UL (ref 3.77–5.28)
TIBC SERPL-MCNC: 268 UG/DL (ref 250–450)
UIBC SERPL-MCNC: 194 UG/DL (ref 118–369)
WBC # BLD AUTO: 5.9 X10E3/UL (ref 3.4–10.8)

## 2024-10-02 ENCOUNTER — TELEPHONE (OUTPATIENT)
Dept: ONCOLOGY | Facility: CLINIC | Age: 74
End: 2024-10-02
Payer: MEDICARE

## 2024-10-02 NOTE — TELEPHONE ENCOUNTER
Called patient and let her know that she did not need to come today according to labs. YOCASTA Camacho RN and she confirmed. Patient voiced understanding and will keep scheduled appointments.

## 2024-10-02 NOTE — TELEPHONE ENCOUNTER
"  Caller: Joanna Cross \"SIXTO\"    Relationship: Self    Best call back number: [phone]      What was the call regarding: PATIENT WANTED TO KNOW IF SHE NEEDS TO COME TODAY FOR HER PROCRIT INJECTION     "

## 2024-10-07 RX ORDER — CITALOPRAM HYDROBROMIDE 20 MG/1
20 TABLET ORAL DAILY
Qty: 90 TABLET | Refills: 0 | Status: SHIPPED | OUTPATIENT
Start: 2024-10-07

## 2024-10-07 NOTE — TELEPHONE ENCOUNTER
Rx Refill Note    Requested Prescriptions     Pending Prescriptions Disp Refills    citalopram (CeleXA) 20 MG tablet 90 tablet 0     Sig: Take 1 tablet by mouth Daily. Indications: Major Depressive Disorder        Last office visit with prescribing clinician: 7/25/2024      Next office visit with prescribing clinician: 10/28/2024   Last labs:   Last refill: n/a   Pharmacy (be sure to add in Epic). correct

## 2024-10-10 ENCOUNTER — PATIENT OUTREACH (OUTPATIENT)
Dept: CASE MANAGEMENT | Facility: OTHER | Age: 74
End: 2024-10-10
Payer: MEDICARE

## 2024-10-10 DIAGNOSIS — I25.10 CORONARY ARTERY DISEASE INVOLVING NATIVE CORONARY ARTERY OF NATIVE HEART WITHOUT ANGINA PECTORIS: Primary | ICD-10-CM

## 2024-10-10 DIAGNOSIS — M48.062 SPINAL STENOSIS, LUMBAR REGION, WITH NEUROGENIC CLAUDICATION: ICD-10-CM

## 2024-10-10 DIAGNOSIS — G20.B2 PARKINSON'S DISEASE WITH DYSKINESIA AND FLUCTUATING MANIFESTATIONS: ICD-10-CM

## 2024-10-10 NOTE — OUTREACH NOTE
"AMBULATORY CASE MANAGEMENT NOTE    Names and Relationships of Patient/Support Persons: Contact: Joanna Cross \"SIXTO\"; Relationship: Self -     [facility] Interim Update    Received call from Ms. Cross. Silvana will be having her surgery 10/29/24. Ms. Cross is concerned about getting in bed at night. She has ladies that are coming in during the day to help. On the days she has PT they come in a little later. She has difficulty getting her legs in bed. This is getting better. She takes her time. Her concern is being able to pull her covers up at night. She has tried a reacher but it is not strong enough to pull up her covers. We problem solved different things such as having the covers more vertical so that she can grasp them. [facility] located a heavy duty reach for patient. Gave Ms. Cross the information and she plans to order.     They went to visit [facility] and [facility]. She said [facility]. Sharon Cohen is older but she felt it was more homey. They also have a lot of retired teachers there and she liked that. She will be able to keep her dog. She has a lot of things that she will have to downsize. She is starting to go through her things and see which items the kids would like to keep.     She goes to see her ortho 10/22/24. She still can't do much with her right arm. She is hoping for good report at that appointment. She denies any recent falls.     Cardio has increased her diuretic. She has had some gradual increase in weight. She is up 1.4 pounds from yesterday. She does have shortness of breath but does not feel like it is enough to call cardio. Her last BNP was elevated 2851. She reports some edema in her legs and that they feel weaker. Patient is aware of protocol of when to call cardio.       Education Documentation  No documentation found.        Faye JAUREGUI  Ambulatory Case Management    10/10/2024, 11:47 EDT  "

## 2024-10-11 ENCOUNTER — TELEPHONE (OUTPATIENT)
Dept: ENDOCRINOLOGY | Facility: CLINIC | Age: 74
End: 2024-10-11

## 2024-10-11 DIAGNOSIS — E03.9 ACQUIRED HYPOTHYROIDISM: ICD-10-CM

## 2024-10-11 DIAGNOSIS — E11.65 TYPE 2 DIABETES MELLITUS WITH HYPERGLYCEMIA, WITH LONG-TERM CURRENT USE OF INSULIN: Primary | ICD-10-CM

## 2024-10-11 DIAGNOSIS — Z79.4 TYPE 2 DIABETES MELLITUS WITH HYPERGLYCEMIA, WITH LONG-TERM CURRENT USE OF INSULIN: Primary | ICD-10-CM

## 2024-10-11 NOTE — TELEPHONE ENCOUNTER
"    Caller: Joanna Cross \"SIXTO\"    Relationship to patient: Self    Best call back number: [phone]    Chief complaint:     Type of visit: FOLLOW UP    Requested date: MYCHART VISIT 10/17    If rescheduling, when is the original appointment: 10/17     Additional notes: PT NEEDS TO CHANGE HER IN OFFICE APPT ON 10/17 TO A MYCHART VISIT. PT ALSO NEEDS LAB ORDERS PUT FOR HER TO GO TO DR. TAVAREZ OFFICE IN [place]. PT STATED IT HAS BEEN AWHILE SINCE SHE HAS HAD HER A1C CHECKED          "

## 2024-10-14 ENCOUNTER — TELEPHONE (OUTPATIENT)
Dept: ENDOCRINOLOGY | Facility: CLINIC | Age: 74
End: 2024-10-14

## 2024-10-14 ENCOUNTER — LAB (OUTPATIENT)
Dept: FAMILY MEDICINE CLINIC | Facility: CLINIC | Age: 74
End: 2024-10-14
Payer: MEDICARE

## 2024-10-14 DIAGNOSIS — E03.9 ACQUIRED HYPOTHYROIDISM: ICD-10-CM

## 2024-10-14 DIAGNOSIS — N18.30 ANEMIA ASSOCIATED WITH STAGE 3 CHRONIC RENAL FAILURE: ICD-10-CM

## 2024-10-14 DIAGNOSIS — E53.8 VITAMIN B12 DEFICIENCY: ICD-10-CM

## 2024-10-14 DIAGNOSIS — I25.10 CORONARY ARTERY DISEASE INVOLVING NATIVE CORONARY ARTERY OF NATIVE HEART WITHOUT ANGINA PECTORIS: ICD-10-CM

## 2024-10-14 DIAGNOSIS — E11.65 TYPE 2 DIABETES MELLITUS WITH HYPERGLYCEMIA, WITHOUT LONG-TERM CURRENT USE OF INSULIN: ICD-10-CM

## 2024-10-14 DIAGNOSIS — D63.1 ANEMIA ASSOCIATED WITH STAGE 3 CHRONIC RENAL FAILURE: ICD-10-CM

## 2024-10-14 DIAGNOSIS — I50.31 ACUTE DIASTOLIC CHF (CONGESTIVE HEART FAILURE): ICD-10-CM

## 2024-10-14 DIAGNOSIS — Z79.4 TYPE 2 DIABETES MELLITUS WITH HYPERGLYCEMIA, WITH LONG-TERM CURRENT USE OF INSULIN: ICD-10-CM

## 2024-10-14 DIAGNOSIS — N18.32 CHRONIC KIDNEY DISEASE, STAGE 3B: ICD-10-CM

## 2024-10-14 DIAGNOSIS — E11.65 TYPE 2 DIABETES MELLITUS WITH HYPERGLYCEMIA, WITH LONG-TERM CURRENT USE OF INSULIN: ICD-10-CM

## 2024-10-14 DIAGNOSIS — I10 HYPERTENSION, ESSENTIAL: ICD-10-CM

## 2024-10-14 DIAGNOSIS — E55.9 VITAMIN D DEFICIENCY: ICD-10-CM

## 2024-10-14 PROCEDURE — 36415 COLL VENOUS BLD VENIPUNCTURE: CPT | Performed by: PHYSICIAN ASSISTANT

## 2024-10-14 NOTE — TELEPHONE ENCOUNTER
Hub staff attempted to follow warm transfer process and was unsuccessful     Caller: MISHA TREJO    Relationship to patient: MEHNAZ    Best call back number: [phone]    Patient is needing: PATIENT HAS FLUID BUILT UP, HER PCP SAID ABOUT 10 POUNDS AND THEY GAVE HER MEDICINE BUT NOT WORKING. SHE HAS A VIDEO VISIT THURSDAY BUT IS WANTING TO KNOW IF SHE COMES IN TO SEE THE DR WILL THE DOCTOR HAVE HER ADMITTED FOR HER FLUID OR SHOULD SHE GO TO THE ER? SHE WOULD LIKE A CALL BACK FROM THE DOCTOR OR NURSE REGARDING THIS

## 2024-10-14 NOTE — TELEPHONE ENCOUNTER
I would suggest reaching out to cardiology regarding the swelling since they have been managing her diuretics according to the last note from her PCP-- if she has significant swelling and his having trouble breathing she should go to the ED for evaluation.    I entered thyroid labs to be checked- this could contribute to swelling if her levels are off-- but I would definitely recommend reaching out to cardiology.

## 2024-10-14 NOTE — TELEPHONE ENCOUNTER
Okay to switch to [facility] visit Thursday.  I entered lab orders for A1c, Thyroid labs, CMP and urine microalbumin / creatinine ratio to be completed at a [facility] location. Thanks, RT

## 2024-10-15 LAB
25(OH)D3+25(OH)D2 SERPL-MCNC: 57.4 NG/ML (ref 30–100)
ALBUMIN SERPL-MCNC: 4.5 G/DL (ref 3.8–4.8)
ALP SERPL-CCNC: 106 IU/L (ref 44–121)
ALT SERPL-CCNC: 11 IU/L (ref 0–32)
AST SERPL-CCNC: 23 IU/L (ref 0–40)
BILIRUB SERPL-MCNC: 0.5 MG/DL (ref 0–1.2)
BUN SERPL-MCNC: 33 MG/DL (ref 8–27)
BUN/CREAT SERPL: 24 (ref 12–28)
CALCIUM SERPL-MCNC: 9.1 MG/DL (ref 8.7–10.3)
CHLORIDE SERPL-SCNC: 102 MMOL/L (ref 96–106)
CO2 SERPL-SCNC: 24 MMOL/L (ref 20–29)
CREAT SERPL-MCNC: 1.37 MG/DL (ref 0.57–1)
EGFRCR SERPLBLD CKD-EPI 2021: 40 ML/MIN/1.73
GLOBULIN SER CALC-MCNC: 2.4 G/DL (ref 1.5–4.5)
GLUCOSE SERPL-MCNC: 85 MG/DL (ref 70–99)
HBA1C MFR BLD: 6.1 % (ref 4.8–5.6)
POTASSIUM SERPL-SCNC: 4 MMOL/L (ref 3.5–5.2)
PROT SERPL-MCNC: 6.9 G/DL (ref 6–8.5)
SODIUM SERPL-SCNC: 141 MMOL/L (ref 134–144)
T4 FREE SERPL-MCNC: 1.37 NG/DL (ref 0.82–1.77)
TSH SERPL DL<=0.005 MIU/L-ACNC: 8.52 UIU/ML (ref 0.45–4.5)
VIT B12 SERPL-MCNC: 1064 PG/ML (ref 232–1245)

## 2024-10-16 ENCOUNTER — LAB (OUTPATIENT)
Dept: ONCOLOGY | Facility: HOSPITAL | Age: 74
End: 2024-10-16
Payer: MEDICARE

## 2024-10-16 VITALS — SYSTOLIC BLOOD PRESSURE: 134 MMHG | HEART RATE: 75 BPM | DIASTOLIC BLOOD PRESSURE: 59 MMHG | TEMPERATURE: 97.1 F

## 2024-10-16 DIAGNOSIS — N18.30 ANEMIA ASSOCIATED WITH STAGE 3 CHRONIC RENAL FAILURE: ICD-10-CM

## 2024-10-16 DIAGNOSIS — D63.1 ANEMIA ASSOCIATED WITH STAGE 3 CHRONIC RENAL FAILURE: ICD-10-CM

## 2024-10-16 DIAGNOSIS — D63.1 ANEMIA ASSOCIATED WITH STAGE 3 CHRONIC RENAL FAILURE: Primary | ICD-10-CM

## 2024-10-16 DIAGNOSIS — N18.30 ANEMIA ASSOCIATED WITH STAGE 3 CHRONIC RENAL FAILURE: Primary | ICD-10-CM

## 2024-10-16 LAB
BNP SERPL-MCNC: 186.9 PG/ML (ref 0–100)
WRITTEN AUTHORIZATION: NORMAL

## 2024-10-16 PROCEDURE — 36415 COLL VENOUS BLD VENIPUNCTURE: CPT

## 2024-10-17 ENCOUNTER — TELEPHONE (OUTPATIENT)
Dept: ONCOLOGY | Facility: CLINIC | Age: 74
End: 2024-10-17
Payer: MEDICARE

## 2024-10-17 ENCOUNTER — TELEMEDICINE (OUTPATIENT)
Dept: ENDOCRINOLOGY | Facility: CLINIC | Age: 74
End: 2024-10-17
Payer: MEDICARE

## 2024-10-17 VITALS — BODY MASS INDEX: 42.25 KG/M2 | SYSTOLIC BLOOD PRESSURE: 121 MMHG | DIASTOLIC BLOOD PRESSURE: 72 MMHG | WEIGHT: 231 LBS

## 2024-10-17 DIAGNOSIS — Z79.4 TYPE 2 DIABETES MELLITUS WITH HYPERGLYCEMIA, WITH LONG-TERM CURRENT USE OF INSULIN: Primary | ICD-10-CM

## 2024-10-17 DIAGNOSIS — E11.65 TYPE 2 DIABETES MELLITUS WITH HYPERGLYCEMIA, WITH LONG-TERM CURRENT USE OF INSULIN: Primary | ICD-10-CM

## 2024-10-17 DIAGNOSIS — E03.9 ACQUIRED HYPOTHYROIDISM: ICD-10-CM

## 2024-10-17 LAB
BASOPHILS # BLD AUTO: 0.05 10*3/MM3 (ref 0–0.2)
BASOPHILS NFR BLD AUTO: 1 % (ref 0–1.5)
EOSINOPHIL # BLD AUTO: 0.27 10*3/MM3 (ref 0–0.4)
EOSINOPHIL NFR BLD AUTO: 5.3 % (ref 0.3–6.2)
ERYTHROCYTE [DISTWIDTH] IN BLOOD BY AUTOMATED COUNT: 12.6 % (ref 12.3–15.4)
FERRITIN SERPL-MCNC: 430 NG/ML (ref 13–150)
HCT VFR BLD AUTO: 31.4 % (ref 34–46.6)
HGB BLD-MCNC: 10.1 G/DL (ref 12–15.9)
IMM GRANULOCYTES # BLD AUTO: 0.02 10*3/MM3 (ref 0–0.05)
IMM GRANULOCYTES NFR BLD AUTO: 0.4 % (ref 0–0.5)
IRON SATN MFR SERPL: 25 % (ref 20–50)
IRON SERPL-MCNC: 77 MCG/DL (ref 37–145)
LYMPHOCYTES # BLD AUTO: 0.52 10*3/MM3 (ref 0.7–3.1)
LYMPHOCYTES NFR BLD AUTO: 10.1 % (ref 19.6–45.3)
MCH RBC QN AUTO: 31.9 PG (ref 26.6–33)
MCHC RBC AUTO-ENTMCNC: 32.2 G/DL (ref 31.5–35.7)
MCV RBC AUTO: 99.1 FL (ref 79–97)
MONOCYTES # BLD AUTO: 0.43 10*3/MM3 (ref 0.1–0.9)
MONOCYTES NFR BLD AUTO: 8.4 % (ref 5–12)
NEUTROPHILS # BLD AUTO: 3.84 10*3/MM3 (ref 1.7–7)
NEUTROPHILS NFR BLD AUTO: 74.8 % (ref 42.7–76)
NRBC BLD AUTO-RTO: 0 /100 WBC (ref 0–0.2)
PLATELET # BLD AUTO: 156 10*3/MM3 (ref 140–450)
RBC # BLD AUTO: 3.17 10*6/MM3 (ref 3.77–5.28)
TIBC SERPL-MCNC: 310 MCG/DL
UIBC SERPL-MCNC: 233 MCG/DL (ref 112–346)
WBC # BLD AUTO: 5.13 10*3/MM3 (ref 3.4–10.8)

## 2024-10-17 RX ORDER — LEVOTHYROXINE SODIUM 200 UG/1
200 TABLET ORAL DAILY
Qty: 90 TABLET | Refills: 1 | Status: SHIPPED | OUTPATIENT
Start: 2024-10-17

## 2024-10-17 RX ORDER — INSULIN GLARGINE 100 [IU]/ML
15 INJECTION, SOLUTION SUBCUTANEOUS DAILY
Qty: 15 ML | Refills: 2 | Status: SHIPPED | OUTPATIENT
Start: 2024-10-17

## 2024-10-17 NOTE — TELEPHONE ENCOUNTER
"  Caller: Joanna Cross \"SIXTO\"    Relationship: Self    Best call back number: [phone]    What is the best time to reach you: ANYTIME    Who are you requesting to speak with (clinical staff, provider,  specific staff member): CLINICAL    What was the call regarding: PT RECEIVED HER LAB RESULTS FROM YESTERDAY AND WANTS TO KNOW IF SHE NEEDS TO COME IN TODAY FOR HER INJECTION    PLEASE ADVISE        "

## 2024-10-17 NOTE — TELEPHONE ENCOUNTER
Called and notified patient that she does not need to come for her injection today, her Hgb is 10.1. She verbalized understanding.

## 2024-10-17 NOTE — PROGRESS NOTES
Office Note      Date: 10/17/2024  Patient Name: Joanna Cross  MRN: [id number]  : 1950    Chief Complaint   Patient presents with    Hypothyroidism    Diabetes       History of Present Illness:   Joanna Cross is a 73 y.o. female who presents for follow-up for type 2 diabetes diagnosed in .  Patient consented for the visit to be conducted via [employer] video visit today.  Both parties were in the [facility].  She reports overall her blood sugars have been good.  She is taking the Lantus 11 units daily and the lowest glucose she has had a 79 mg/dL.  She reports typically in the mornings her blood glucose readings are in the low 100s.  They tend to be a little higher at night because she has eaten but nothing bad.  She continues to take Farxiga per her cardiologist for heart failure.  She has had a lot of trouble with swelling recently but was advised there is not much else they can do for her medication.  She is taking Unithroid 175 mcg daily.  We had labs completed prior to her appointment today.  She reports she is taking this regularly but occasionally takes it with food.  She is up-to-date on her eye exam she had cataract surgery in July and August and all went well.  She denies any issues with her feet today.      Subjective     Review of Systems:   Review of Systems   Constitutional:  Positive for fatigue.   Cardiovascular:  Positive for leg swelling.   Gastrointestinal: Negative.    Endocrine: Negative.    Neurological:  Positive for weakness.       The following portions of the patient's history were reviewed and updated as appropriate: allergies, current medications, past family history, past medical history, past social history, past surgical history, and problem list.    Objective     Vitals:    10/17/24 1350   BP: 121/72   Weight: 105 kg (231 lb)     Body mass index is 42.25 kg/m².    Physical Exam  Vitals reviewed.   Constitutional:       General: She is not in acute  distress.     Appearance: Normal appearance.      Comments: Via [employer] video visit   Neurological:      Mental Status: She is alert.         HEMOGLOBIN A1C  Lab Results   Component Value Date    HGBA1C 6.1 (H) 10/14/2024       GLUCOSE  Glucose   Date Value Ref Range Status   06/05/2024 115 70 - 130 mg/dL Final       CMP  Lab Results   Component Value Date    GLUCOSE 85 10/14/2024    BUN 33 (H) 10/14/2024    CREATININE 1.37 (H) 10/14/2024    EGFRIFNONA 66 06/12/2019    BCR 24 10/14/2024    K 4.0 10/14/2024    CO2 24 10/14/2024    CALCIUM 9.1 10/14/2024    PROTENTOTREF 6.9 10/14/2024    LABIL2 2.1 04/05/2024    AST 23 10/14/2024    ALT 11 10/14/2024       LIPID PANEL  Lab Results   Component Value Date    CHOL 185 02/01/2019    CHLPL 168 07/22/2024    TRIG 90 07/22/2024    HDL 43 07/22/2024     (H) 07/22/2024         TSH  Lab Results   Component Value Date    TSH 8.520 (H) 10/14/2024       Assessment / Plan      Assessment & Plan:  1. Type 2 diabetes mellitus with hyperglycemia, with long-term current use of insulin  Her hemoglobin A1c recently was 6.1%.  Her recent hemoglobin was low so this is likely not very accurate.  Her reported blood glucose readings have been good.  For now we will continue Lantus 11 units daily and the Farxiga 5 mg daily which she takes for heart failure.  Her reported blood pressure today was okay.  Her metabolic panel showed stable kidney function.  She reports she did not give a urine sample at her recent lab draw as this was not requested from her.  She is seeing the nephrologist in the next several weeks.    2. Acquired hypothyroidism  Her TSH was elevated.  We will increase her Unithroid dose to 200 mcg daily.  I sent a new prescription for this to her pharmacy.  We will plan to recheck labs mid January for monitoring.  These labs were entered today.  She will check back in for follow-up in 6 months.  She will reach out with any questions or concerns in the interim.    Return in  about 6 months (around 4/17/2025) for Recheck.     This note was dictated using Dragon voice recognition.    Electronically signed by: BRIGITTE Parikh  10/17/2024

## 2024-10-21 RX ORDER — CITALOPRAM HYDROBROMIDE 20 MG/1
20 TABLET ORAL DAILY
Qty: 90 TABLET | Refills: 0 | OUTPATIENT
Start: 2024-10-21

## 2024-10-24 ENCOUNTER — PATIENT OUTREACH (OUTPATIENT)
Dept: CASE MANAGEMENT | Facility: OTHER | Age: 74
End: 2024-10-24
Payer: MEDICARE

## 2024-10-24 DIAGNOSIS — I25.10 CORONARY ARTERY DISEASE INVOLVING NATIVE CORONARY ARTERY OF NATIVE HEART WITHOUT ANGINA PECTORIS: Primary | ICD-10-CM

## 2024-10-24 DIAGNOSIS — M48.062 SPINAL STENOSIS, LUMBAR REGION, WITH NEUROGENIC CLAUDICATION: ICD-10-CM

## 2024-10-24 DIAGNOSIS — G20.B2 PARKINSON'S DISEASE WITH DYSKINESIA AND FLUCTUATING MANIFESTATIONS: ICD-10-CM

## 2024-10-24 NOTE — OUTREACH NOTE
"AMBULATORY CASE MANAGEMENT NOTE    Names and Relationships of Patient/Support Persons: Contact: Joanna Cross \"SIXTO\"; Relationship: Self -     CCM Interim Update    Spoke with Ms. Cross for CCM update. She is concerned about edema in her feet. She saw Dr. Teague and he increased her to 2 Bumex daily. She actually had already been doing that. She is concerned that the Bumex is not pulling off as much fluid. Her weight has been as high as 232 in past 7 days. Her lowest 227.4 yesterday. Today she is up 2 pounds to 229.6. At her last hospitalization her legs felt very heavy and she felt like it was hard to get up and down. She is feeling this way now and having some increased SOB.     CCM discussed with Dr. Fritz and he recommended she be checked out. She was already considering going to the ED. Called Ms. Cross back and let her  know Dr. Fritz recommendation. He wants her to have labs checked and believes she may need a different diuretic.    Her daughter, Silvana, surgery is next week. Ms. Cross still has the ladies coming to help her. She did order the heavy duty reachers and they do work but are a little long. She has found that folding the sheets on the diagonal is helping the most. She is also using a leg  to help get her legs in the bed.     Her ortho appointment went well. Her shoulder has healed. The doctor did recommend no heavy lifting but she is able to use her arm now. She is still going to outpatient PT.  She is back to using her regular walker. She is concerned that her insurance only approved 4 more PT visits and whether they will approve more. She states she still needs more therapy in that arm. She has not had any more falls.     She had recent visit with endo. They were pleased with her blood sugars. A1C was 6.1! She did increase Ms. Cross's thyroid medication to 200 mcg. It is being sent from [facility] and she hasn't received it yet. She had also requested a refill of  " citalopram. It was sent in on 10/7. [facility] and [facility] this medication also.     She was unaware of her last appointment with pulmonary and canceled this appointment. She plans to reschedule.    Education Documentation  No documentation found.        Faye JAUREGUI  Ambulatory Case Management    10/24/2024, 11:08 EDT

## 2024-10-29 ENCOUNTER — TELEPHONE (OUTPATIENT)
Dept: ONCOLOGY | Facility: CLINIC | Age: 74
End: 2024-10-29
Payer: MEDICARE

## 2024-10-29 NOTE — TELEPHONE ENCOUNTER
"  Caller: Joanna Cross \"SIXTO\"    Relationship: Self    Best call back number: [phone]      What was the call regarding: PATIENT CALLED WANTED TO SPEAK TO SOMEONE IN [place] OFFICE REGARDING HER INJECTION       "

## 2024-10-30 ENCOUNTER — PATIENT OUTREACH (OUTPATIENT)
Dept: CASE MANAGEMENT | Facility: OTHER | Age: 74
End: 2024-10-30
Payer: MEDICARE

## 2024-10-30 DIAGNOSIS — M48.062 SPINAL STENOSIS, LUMBAR REGION, WITH NEUROGENIC CLAUDICATION: ICD-10-CM

## 2024-10-30 DIAGNOSIS — G20.B2 PARKINSON'S DISEASE WITH DYSKINESIA AND FLUCTUATING MANIFESTATIONS: ICD-10-CM

## 2024-10-30 DIAGNOSIS — I25.10 CORONARY ARTERY DISEASE INVOLVING NATIVE CORONARY ARTERY OF NATIVE HEART WITHOUT ANGINA PECTORIS: Primary | ICD-10-CM

## 2024-10-30 NOTE — OUTREACH NOTE
CCM End of Month Documentation    This Chronic Medical Management Care Plan for Joanna Cross, 73 y.o. female, has been monitored and managed; reviewed and a new plan of care implemented for the month of October.  A cumulative time of 45  minutes was spent on this patient record this month, including chart review; phone call with patient.    Regarding the patient's problems: has Coronary artery disease involving native coronary artery without angina pectoris; Hypertension, essential; Peripheral vascular disease; Hyperlipidemia LDL goal <70; Spinal stenosis, lumbar region, with neurogenic claudication; Overactive bladder; GERD (gastroesophageal reflux disease); Hypothyroidism; Lichen sclerosus; Parkinson's disease; MILLI treated with BiPAP; History of respiratory failure - prior respiratory failure requiring mechanical ventilation, open lung biopsy non-specific. ; Atrial fibrillation; Tachy-thai syndrome; Long term current use of antiarrhythmic drug; History of adenomatous polyp of colon; Anemia associated with stage 3 chronic renal failure; Angiodysplasia; Hx of colonic polyps; Body mass index 40.0-44.9, adult; Cardiac pacemaker in situ; Chronic low back pain; Chronic lung disease; Degeneration of lumbar intervertebral disc; Edema of lower extremity; History of malignant neoplasm of skin; History of TIA (transient ischemic attack); Hypotonic bladder; Incomplete tear of rotator cuff; Major depression in remission; Tinnitus of both ears; Primary osteoarthritis involving multiple joints; Restless legs; Seborrheic dermatitis; Transient cerebral ischemia; Muscle strain; Type 2 diabetes mellitus with hyperglycemia, without long-term current use of insulin; Vitamin B12 deficiency; Vitamin D deficiency; Chronic kidney disease, stage 3b; Osteoporosis, senile; Acute diastolic CHF (congestive heart failure); Chronic respiratory failure with hypoxia; Chronic anticoagulation; and Fracture of right shoulder with delayed  healing on their problem list., the following items were addressed: medical records; medications and any changes can be found within the plan section of the note.  A detailed listing of time spent for chronic care management is tracked within each outreach encounter.  Current medications include:  has a current medication list which includes the following prescription(s): albuterol sulfate hfa, amantadine, apixaban, aspirin, b complex-c, bumetanide, calcium carbonate, carbidopa-levodopa, cholecalciferol, citalopram, clobetasol propionate, dapagliflozin propanediol, fluticasone, glucosamine-chondroit-calcium, hydroxychloroquine, lantus solostar, ipratropium, levothyroxine, loratadine, multiple vitamins-minerals, o2, omeprazole, pramipexole, ranolazine, sacubitril-valsartan, senna, spironolactone, tramadol, triamcinolone, triamcinolone acetonide, and zinc. and the patient is reported to be patient is compliant with medication protocol,  Medications are reported to be non-effective in controlling symptoms and changes have been made to the medication protocol, increased edema and SOB.  Regarding these diagnoses, referrals were made to the following provider(s):  n/a.  All notes on chart for PCP to review.    The patient was monitored remotely for blood pressure; weight; activity level; pain; medications.    The patient's physical needs include:  needs assistance with ADLs; physical healthcare.     The patient's mental support needs include:  increased support    The patient's cognitive support needs include:  needs met    The patient's psychosocial support needs include:  continued support    The patient's functional needs include: physical healthcare; needs assistance for ADLs    The patient's environmental needs include:  not applicable    Care Plan overall comments:  reviewed    Refer to previous outreach notes for more information on the areas listed above.    Monthly Billing Diagnoses  (I25.10) Coronary artery  disease involving native coronary artery of native heart without angina pectoris    (G20.B2) Parkinson's disease with dyskinesia and fluctuating manifestations    (M48.062) Spinal stenosis, lumbar region, with neurogenic claudication    Medications   Medications have been reconciled    Care Plan progress this month:      Recently Modified Care Plans Updates made since 9/29/2024 12:00 AM      No recently modified care plans.            Instructions   Patient was provided an electronic copy of care plan  CCM services were explained and offered and patient has accepted these services.  Patient has given their written consent to receive CCM services and understands that this includes the authorization of electronic communication of medical information with the other treating providers.  Patient understands that they may stop CCM services at any time and these changes will be effective at the end of the calendar month and will effectively revocate the agreement of CCM services.  Patient understands that only one practitioner can furnish and be paid for CCM services during one calendar month.  Patient also understands that there may be co-payment or deductible fees in association with CCM services.  Patient will continue with at least monthly follow-up calls with the Ambulatory .    Faye JAUREGUI  Ambulatory Case Management    10/30/2024, 09:39 EDT

## 2024-10-31 ENCOUNTER — READMISSION MANAGEMENT (OUTPATIENT)
Dept: CALL CENTER | Facility: HOSPITAL | Age: 74
End: 2024-10-31
Payer: MEDICARE

## 2024-10-31 ENCOUNTER — TELEPHONE (OUTPATIENT)
Dept: FAMILY MEDICINE CLINIC | Facility: CLINIC | Age: 74
End: 2024-10-31
Payer: MEDICARE

## 2024-10-31 ENCOUNTER — TRANSITIONAL CARE MANAGEMENT TELEPHONE ENCOUNTER (OUTPATIENT)
Dept: CALL CENTER | Facility: HOSPITAL | Age: 74
End: 2024-10-31
Payer: MEDICARE

## 2024-10-31 DIAGNOSIS — I50.31 ACUTE DIASTOLIC CHF (CONGESTIVE HEART FAILURE): Primary | ICD-10-CM

## 2024-10-31 NOTE — TELEPHONE ENCOUNTER
PT WAS IN HOSPITAL FOR CONGESTIVE HEART FAILURE, SHE IS STILL PRETTY WEEK AND THE HOSPITAL RECOMMENDED PT AND SKILLED NURSING. [facility] IS REQUESTING ORDERS FOR HOME HEALTH PT AND SKILLED NURSING . PLEASE ADVISE IF DR TAVAREZ CAN PUT IN HOME HEALTH PT ORDERS.       CHERYL SHAH ([phone])

## 2024-10-31 NOTE — OUTREACH NOTE
Call Center TCM Note      Flowsheet Row Responses   [facility] patient discharged from? Non-  [[facility]]   Does the patient have one of the following disease processes/diagnoses(primary or secondary)? CHF   TCM attempt successful? Yes   Call start time 1347   Call end time 1358   Discharge diagnosis CHF   Meds reviewed with patient/caregiver? Yes   Is the patient having any side effects they believe may be caused by any medication additions or changes? No   Does the patient have all medications ordered at discharge? Yes   Is the patient taking all medications as directed (includes completed medication regime)? Yes   Comments Scheduled hospital follow up appt with BRIGITTE Richards at PCP office for 11/11   Does the patient have an appointment with their PCP within 7-14 days of discharge? Yes   What is the Home health agency?  [facility]   Has home health visited the patient within 72 hours of discharge? Call prior to 72 hours   Psychosocial issues? No   What is the patient's perception of their health status since discharge? Improving   Is the patient/caregiver able to teach back signs and symptoms related to disease process for when to call PCP? Yes   Is the patient/caregiver able to teach back signs and symptoms related to disease process for when to call 911? Yes   Is the patient/caregiver able to teach back the hierarchy of who to call/visit for symptoms/problems? PCP, Specialist, Home health nurse, Urgent Care, ED, 911 Yes   If the patient is a current smoker, are they able to teach back resources for cessation? Not a smoker   Additional teach back comments States she is checking her weight daily and uses an anne-marie on her phone to keep track of her weight, encouraged her to monitor for signs/symptoms of fluid gain, follow a low sodium diet, patient verbalized understanding.   TCM call completed? Yes   Wrap up additional comments Doing well, all concerns addressed,  scheduled hospital follow up appt with PCP office for 11/11.   Call end time 0228   Would this patient benefit from a Referral to [facility] Social Work? No   Is the patient interested in additional calls from an ambulatory ? No            Darlene Garcia RN    10/31/2024, 13:58 EDT

## 2024-10-31 NOTE — OUTREACH NOTE
Prep Survey      Flowsheet Row Responses   [facility] facility patient discharged from? Non-BH   Is LACE score < 7 ? Non-BH Discharge   Eligibility [facility]   Date of Discharge 10/30/24   Discharge Disposition Home-Health Care Svc   Discharge diagnosis CHF   Does the patient have one of the following disease processes/diagnoses(primary or secondary)? CHF   Does the patient have Home health ordered? Yes   [facility] the [facility]   Prep survey completed? Yes            Dee DOVER - Registered Nurse

## 2024-11-01 ENCOUNTER — TRANSCRIBE ORDERS (OUTPATIENT)
Dept: HOME HEALTH SERVICES | Facility: HOME HEALTHCARE | Age: 74
End: 2024-11-01
Payer: MEDICARE

## 2024-11-01 ENCOUNTER — HOME HEALTH ADMISSION (OUTPATIENT)
Dept: HOME HEALTH SERVICES | Facility: HOME HEALTHCARE | Age: 74
End: 2024-11-01
Payer: MEDICARE

## 2024-11-01 DIAGNOSIS — I50.9 ACUTE ON CHRONIC HEART FAILURE, UNSPECIFIED HEART FAILURE TYPE: Primary | ICD-10-CM

## 2024-11-02 ENCOUNTER — HOME CARE VISIT (OUTPATIENT)
Dept: HOME HEALTH SERVICES | Facility: HOME HEALTHCARE | Age: 74
End: 2024-11-02
Payer: MEDICARE

## 2024-11-02 VITALS
OXYGEN SATURATION: 98 % | HEART RATE: 88 BPM | SYSTOLIC BLOOD PRESSURE: 134 MMHG | DIASTOLIC BLOOD PRESSURE: 68 MMHG | RESPIRATION RATE: 16 BRPM

## 2024-11-02 PROCEDURE — G0299 HHS/HOSPICE OF RN EA 15 MIN: HCPCS

## 2024-11-02 NOTE — Clinical Note
"SOC Note: Patient's daughter answered the door upon SN arrival.  Patient ambulating from the bathroom using walker to assist with ambulation. Alert and oriented x 4.  SOA w/ rest noted.  Lungs clear.  Daughter lives w/ patient and assists; she had recent foot surgery so is unable to drive patient at this time.  Patient reports that she was going to outpatient PT, but would like to proceed with HH at this time d/t difficulty getting out of the home.  Recent fall w/ right shoulder injury.  Wears oxygen, but not wearing during SOC visit.  Checks blood sugar two times per day.  Takes insulin as directed.  Patient is very organized and has a binder with all of her healthcare information.  Reports that appetite is good.  Doing daily weights and follows w/ Dr. Teague.  +1 edema to BLE's.  Patient with tubigrip and requesting more.  SN will order more tubigrip in different sizes d/t some discomfort from tightness.  No skin breakdown noted.  Patient is agreeable to HH services at this time.     Home Health ordered for: disciplines SN/PT/OT    Reason for Hosp/Primary Dx/Co-morbidities:  73-year-old female w/ PMH of CHF, CKD, HTN, Parkinson's Disease, DM, and GERD.  Presented to ED w/ increased SOA, edema, and generalized weakness. Treated for CHF exacerbation.      Focus of Care: CHF education, edema management, fall prevention, medication education, oxygen safety, skin breakdown prevention    Patient's goal(s):  \"To get this fluid managed so that I feel better.\"    Current Functional status/mobility/DME:  Moderate assist.  Use of walker.     HB status/Living Arrangements: Homebound.  Lives w/ daughter.      Skin Integrity/wound status: CDI    Code Status: CPR    Fall Risk/Safety concerns: High fall risk    Educated on Emergency Plan, steps to take prior to going to the ER and when to Call Home Health First:  Yes     Medication issues/Concerns: N/A    Additional Problems/Concerns: N/A    SDOH Barriers (i.e. caregiver " concerns, social isolation, transportation, food insecurity, environment, income etc.)/Need for MSW: N/A

## 2024-11-04 ENCOUNTER — HOME CARE VISIT (OUTPATIENT)
Dept: HOME HEALTH SERVICES | Facility: HOME HEALTHCARE | Age: 74
End: 2024-11-04
Payer: MEDICARE

## 2024-11-04 PROCEDURE — G0152 HHCP-SERV OF OT,EA 15 MIN: HCPCS

## 2024-11-05 ENCOUNTER — HOME CARE VISIT (OUTPATIENT)
Dept: HOME HEALTH SERVICES | Facility: HOME HEALTHCARE | Age: 74
End: 2024-11-05
Payer: MEDICARE

## 2024-11-05 VITALS
HEART RATE: 82 BPM | RESPIRATION RATE: 17 BRPM | SYSTOLIC BLOOD PRESSURE: 120 MMHG | DIASTOLIC BLOOD PRESSURE: 64 MMHG | TEMPERATURE: 97.9 F | OXYGEN SATURATION: 95 %

## 2024-11-05 VITALS
TEMPERATURE: 98 F | RESPIRATION RATE: 18 BRPM | DIASTOLIC BLOOD PRESSURE: 54 MMHG | HEART RATE: 72 BPM | OXYGEN SATURATION: 96 % | SYSTOLIC BLOOD PRESSURE: 112 MMHG

## 2024-11-05 VITALS — DIASTOLIC BLOOD PRESSURE: 54 MMHG | OXYGEN SATURATION: 98 % | HEART RATE: 75 BPM | SYSTOLIC BLOOD PRESSURE: 107 MMHG

## 2024-11-05 PROCEDURE — G0299 HHS/HOSPICE OF RN EA 15 MIN: HCPCS

## 2024-11-05 PROCEDURE — G0151 HHCP-SERV OF PT,EA 15 MIN: HCPCS

## 2024-11-07 ENCOUNTER — HOME CARE VISIT (OUTPATIENT)
Dept: HOME HEALTH SERVICES | Facility: HOME HEALTHCARE | Age: 74
End: 2024-11-07
Payer: MEDICARE

## 2024-11-07 VITALS
TEMPERATURE: 97.9 F | DIASTOLIC BLOOD PRESSURE: 68 MMHG | BODY MASS INDEX: 41.88 KG/M2 | RESPIRATION RATE: 18 BRPM | SYSTOLIC BLOOD PRESSURE: 126 MMHG | HEART RATE: 86 BPM | WEIGHT: 229 LBS

## 2024-11-07 PROCEDURE — G0299 HHS/HOSPICE OF RN EA 15 MIN: HCPCS

## 2024-11-07 NOTE — PROGRESS NOTES
"Chief Complaint  LT Under arm Itching    Subjective          Joanna Cross presents to [facility] PRIMARY [facility]ARE  History of Present Illness  Patient is a 72 year old female with recent left shoulder surgery  Has since then developed icthing and rash under left axilla  Surgical site healing well  Has been on recent abx  No drainage or fevers      Objective   Vital Signs:   /82   Pulse 78   Ht 157.5 cm (62.01\")   Wt 106 kg (234 lb)   SpO2 98%   BMI 42.79 kg/m²     Body mass index is 42.79 kg/m².    Review of Systems   Constitutional: Negative.    HENT: Negative.    Eyes: Negative.    Respiratory: Negative.    Cardiovascular: Negative.    Gastrointestinal: Negative.    Endocrine: Negative.    Genitourinary: Negative.    Musculoskeletal: Negative.    Skin: Positive for rash.   Allergic/Immunologic: Negative.    Neurological: Negative.    Hematological: Negative.    Psychiatric/Behavioral: Negative.        Past History:  Medical History: has a past medical history of Anemia, Ankle problem, Atrial fibrillation ([facility]), AVM (arteriovenous malformation), Back pain, CKD (chronic kidney disease), Clotting disorder ([facility]) (2016), COVID-19 vaccine series completed, Gastrocnemius muscle tear, Generalized osteoarthritis, GERD (gastroesophageal reflux disease), Gestational diabetes, GIB (gastrointestinal bleeding) (2016), Headache, Hearing decreased, bilateral, Hiatal hernia, History of shingles, History of transfusion (2016), Hypertension, Hypothyroidism, IBS (irritable bowel syndrome), Klebsiella pneumonia ([facility]), Lichen sclerosus, Mouth problem, MRSA infection (2018), Obesity, On home oxygen therapy, Osteoporosis, Parkinson's disease ([facility]), Peripheral vascular disease ([facility]), Pleurisy, Pneumonia, Puerperal sepsis with acute hypoxic respiratory failure ([facility]), Right leg pain, Salivary gland stone, Sciatic nerve pain, Seborrheic dermatitis, Skin cancer, Sleep apnea, Type 2 diabetes mellitus ([facility]), UTI " (urinary tract infection), Vitamin D deficiency (09/08/2022), and Wears glasses.   Surgical History: has a past surgical history that includes Pacemaker Implantation (11/2016); Cholecystectomy; Replacement total knee (Bilateral); [place] tooth extraction; Cyst Removal; Diagnostic laparoscopy (1981); Shoulder arthroscopy (Bilateral); Bunionectomy (Right); Hammer Toe Repair (Left); Functional Endoscopic Sinus Surgery (FESS) (2011); Biceps tendon repair (Right); Lung biopsy (Left, 2016); Esophagogastroduodenoscopy (2016); Cardiac catheterization; lumbar laminectomy discectomy decompression (N/A, 07/06/2018); lumbar laminectomy discectomy decompression (N/A, 09/19/2018); Coronary stent placement; Lipoma Excision (1999); Colonoscopy (2016); Replacement total knee (Bilateral); Incision and drainage of wound (2018); Cardiac catheterization (N/A, 02/01/2019); Skin biopsy; Tubal ligation (Bilateral, 1980); Knee Arthroscopy; MADHAV; Other surgical history; Other surgical history; Other surgical history; Other surgical history; Other surgical history; Other surgical history; Other surgical history; Teeth Extraction; Breast biopsy (Left, 05/2004); Joint replacement; Laser ablation; Skin cancer excision; Other surgical history; Rigid / flex bronch w/ lavage / Bx / Fb removal; enteroscopy small bowel; Cystoscopy; Back surgery; Colonoscopy (N/A, 06/17/2021); Ablation of dysrhythmic focus (05/16/13); Insert / replace / remove pacemaker (11/10/16); and Total shoulder arthroplasty w/ distal clavicle excision (Left, 10/24/2022).   Family History: family history includes Cancer in her father; Coronary artery disease in her brother, father, and mother; Heart disease in her brother, father, and mother; Hyperlipidemia in her mother; Hypertension in her brother, father, and mother; Hypothyroidism in her father; Kidney cancer in her maternal uncle; Kidney disease in her mother; Testicular cancer in her brother and maternal uncle.   Social  History: reports that she has never smoked. She has been exposed to tobacco smoke. She has never used smokeless tobacco. She reports that she does not drink alcohol and does not use drugs.      Current Outpatient Medications:   •  Accu-Chek Guide test strip, USE TO TEST 3 TIMES DAILY, Disp: 200 each, Rfl: 3  •  Accu-Chek Softclix Lancets lancets, USE ONE LANCET TO TEST THREE TIMES A DAY, Disp: 300 each, Rfl: 1  •  albuterol (PROVENTIL) (2.5 MG/3ML) 0.083% nebulizer solution, Take 2.5 mg by nebulization Every 4 (Four) Hours As Needed for Wheezing or Shortness of Air., Disp: , Rfl:   •  albuterol sulfate HFA (Ventolin HFA) 108 (90 Base) MCG/ACT inhaler, Inhale 1 puff Every 4 (Four) Hours As Needed for Wheezing or Shortness of Air., Disp: 8 g, Rfl: 5  •  amantadine (SYMMETREL) 100 MG capsule, Take 100 mg by mouth., Disp: , Rfl:   •  apixaban (Eliquis) 5 MG tablet tablet, Take 1 tablet by mouth Every 12 (Twelve) Hours., Disp: 180 tablet, Rfl: 2  •  aspirin 81 MG EC tablet, Take 81 mg by mouth Daily., Disp: , Rfl:   •  B Complex-C (SUPER B COMPLEX PO), Take 1 tablet by mouth Daily., Disp: , Rfl:   •  bumetanide (BUMEX) 1 MG tablet, 1 tab po daily as directed (Patient taking differently: Take 1 mg by mouth Daily. 1 tab po daily as directed), Disp: 90 tablet, Rfl: 0  •  Calcium Carbonate (CALTRATE 600 PO), Take 600 mg by mouth Daily., Disp: , Rfl:   •  Cholecalciferol 2000 units tablet, Take 2,000 Units by mouth 2 (Two) Times a Day., Disp: , Rfl:   •  citalopram (CeleXA) 20 MG tablet, TAKE 1 TABLET DAILY (Patient taking differently: Take 20 mg by mouth Daily.), Disp: 90 tablet, Rfl: 3  •  clobetasol (TEMOVATE) 0.05 % cream, Apply 1 application topically 2 (Two) Times a Day As Needed (Lichens Sclerosis)., Disp: , Rfl:   •  dofetilide (TIKOSYN) 500 MCG capsule, TAKE 1 CAPSULE EVERY 12 HOURS (Patient taking differently: Take 500 mcg by mouth 2 (Two) Times a Day.), Disp: 180 capsule, Rfl: 3  •  donepezil (ARICEPT) 5 MG tablet,  Detail Level: Detailed Take 5 mg by mouth Every Night., Disp: , Rfl:   •  doxycycline (VIBRAMYCIN) 100 MG capsule, Take 1 capsule by mouth 2 (Two) Times a Day., Disp: 20 capsule, Rfl: 0  •  Emollient (AQUAPHOR ADVANCED THERAPY EX), Apply  topically., Disp: , Rfl:   •  Glucosamine-Chondroit-Calcium (TRIPLE FLEX BONE & JOINT PO), Take 1 tablet by mouth Daily. Move free, Disp: , Rfl:   •  Insulin Glargine (Lantus SoloStar) 100 UNIT/ML injection pen, Inject 12-14 Units under the skin into the appropriate area as directed Daily., Disp: , Rfl:   •  Insulin Pen Needle (B-D UF III MINI PEN NEEDLES) 31G X 5 MM misc, USE TO INJECT INSULIN DAILY, Disp: 100 each, Rfl: 3  •  IPRATROPIUM BROMIDE NA, 1 spray into the nostril(s) as directed by provider 2 (Two) Times a Day As Needed (CONGESTION SINUS)., Disp: , Rfl:   •  Loratadine 10 MG capsule, Take 10 mg by mouth Daily., Disp: , Rfl:   •  metFORMIN (GLUCOPHAGE) 1000 MG tablet, TAKE 1 TABLET TWICE A DAY WITH MEALS (Patient taking differently: Take 1,000 mg by mouth 2 (Two) Times a Day With Meals.), Disp: 180 tablet, Rfl: 3  •  metOLazone (ZAROXOLYN) 2.5 MG tablet, Take 1 tablet by mouth Daily., Disp: 90 tablet, Rfl: 1  •  Multiple Vitamins-Minerals (CENTRUM ULTRA WOMENS PO), Take 1 tablet by mouth Daily., Disp: , Rfl:   •  nitroglycerin (NITROLINGUAL) 0.4 MG/SPRAY spray, Place 1 spray under the tongue Every 5 (Five) Minutes As Needed for Chest Pain., Disp: 1 each, Rfl: 6  •  O2 (OXYGEN), Inhale 2 L/min 1 (One) Time. 2L all the time now, Disp: , Rfl:   •  pramipexole (MIRAPEX) 1.5 MG tablet, Take 1.5 mg by mouth Every Night., Disp: , Rfl:   •  ranolazine (RANEXA) 500 MG 12 hr tablet, TAKE 1 TABLET EVERY 12 HOURS (Patient taking differently: 500 mg 2 (Two) Times a Day.), Disp: 180 tablet, Rfl: 3  •  senna (SENOKOT) 8.6 MG tablet, Take 1 tablet by mouth Daily., Disp: , Rfl:   •  spironolactone (ALDACTONE) 25 MG tablet, Take 2 tablets by mouth Daily., Disp: 180 tablet, Rfl: 2  •  traMADol (ULTRAM) 50 MG  Was A Bandage Applied: Yes tablet, Take 1 tablet by mouth Every 6 (Six) Hours As Needed for Moderate Pain ., Disp: 30 tablet, Rfl: 2  •  triamcinolone (KENALOG) 0.1 % cream, 1 application As Needed for Irritation or Rash., Disp: , Rfl:   •  Unithroid 150 MCG tablet, Take 1 tablet by mouth Daily., Disp: 90 tablet, Rfl: 1  •  valsartan (DIOVAN) 160 MG tablet, TAKE 1 TABLET TWICE A DAY (Patient taking differently: Take 160 mg by mouth 2 (Two) Times a Day.), Disp: 180 tablet, Rfl: 0  •  vitamin C (ASCORBIC ACID) 500 MG tablet, Take 500 mg by mouth Daily. Chewable tablet, Disp: , Rfl:   •  Zinc 50 MG capsule, Take 1 capsule by mouth Daily., Disp: , Rfl:   •  amantadine (SYMMETREL) 100 MG capsule, Take 100 mg by mouth 2 (Two) Times a Day., Disp: , Rfl:   •  carbidopa-levodopa (SINEMET)  MG per tablet, Take  by mouth. 2 tablets at 0600, 1 tablet at 0900, 1200, 1500, 1800, 2100, Disp: , Rfl:   •  clindamycin (CLEOCIN) 150 MG capsule, Take 150 mg by mouth Daily. 4 capsules one hour before dental appointments., Disp: , Rfl:   •  nystatin (MYCOSTATIN) 997600 UNIT/GM ointment, Apply 1 application topically to the appropriate area as directed 2 (Two) Times a Day., Disp: 3 g, Rfl: 3  •  pantoprazole (Protonix) 40 MG EC tablet, Take 1 tablet by mouth Daily., Disp: 30 tablet, Rfl: 2    Allergies: Toprol xl [metoprolol tartrate]; Amlodipine besylate; Codeine; Entacapone; Epinephrine; Levemir [insulin detemir]; Penicillins; Xarelto [rivaroxaban]; Bactrim [sulfamethoxazole-trimethoprim]; Benztropine mesylate; Cimetidine; Ciprofloxacin; Cogentin [benztropine]; Compazine [prochlorperazine edisylate]; Cortisone; Duraprep [antiseptic products, misc.]; Erythromycin base; Flurandrenolone; Haldol [haloperidol lactate]; Hydralazine; Hydrochlorothiazide; Hydrocodone-acetaminophen; Levaquin [levofloxacin]; Lisinopril; Macrolides and ketolides; Statins; Tarka [trandolapril-verapamil hcl er]; and Verapamil    Physical Exam  Vitals reviewed: pt in a wheelchair.    Constitutional:       Appearance: Normal appearance. She is normal weight.   HENT:      Head: Normocephalic and atraumatic.   Eyes:      Conjunctiva/sclera: Conjunctivae normal.   Cardiovascular:      Rate and Rhythm: Normal rate and regular rhythm.   Pulmonary:      Effort: Pulmonary effort is normal.      Breath sounds: Normal breath sounds.   Musculoskeletal:         General: Normal range of motion.      Cervical back: Normal range of motion and neck supple.   Skin:     Comments: Surgical site C/D/I    Small erythematous rash under left axilla   Neurological:      General: No focal deficit present.      Mental Status: She is alert and oriented to person, place, and time. Mental status is at baseline.   Psychiatric:         Mood and Affect: Mood normal.         Behavior: Behavior normal.         Thought Content: Thought content normal.         Judgment: Judgment normal.          Result Review :                   Assessment and Plan    Diagnoses and all orders for this visit:    1. Yeast dermatitis (Primary)  Suspect yeast dermatitis  Topical ointment provided  Monitor and follow up    2. Gastroesophageal reflux disease without esophagitis  Switch to different PPI and follow up    3. Acquired hypothyroidism  -     Lipid Panel  -     Comprehensive Metabolic Panel  -     Vitamin B12  -     Vitamin D 25 Hydroxy  -     TSH  -     T4, Free  BW and follow up    4. Hypertension, essential  -     Lipid Panel  -     Comprehensive Metabolic Panel  -     Vitamin B12  -     Vitamin D 25 Hydroxy  Stable continue meds  Monitor BP    5. Chronic lung disease  -     Lipid Panel  -     Comprehensive Metabolic Panel  -     Vitamin B12  -     Vitamin D 25 Hydroxy  Chronic stable  6. Coronary artery disease involving native coronary artery of native heart without angina pectoris  -     Lipid Panel  -     Comprehensive Metabolic Panel  -     Vitamin B12  -     Vitamin D 25 Hydroxy  Chronic stable    7. Type 2 diabetes mellitus with  Punch Size In Mm: 3 Size Of Lesion In Cm (Optional): 0.1 hyperglycemia, with long-term current use of insulin (HCC)  -     Lipid Panel  -     Comprehensive Metabolic Panel  -     Vitamin B12  -     Vitamin D 25 Hydroxy  Chronic stable    8. Vitamin B12 deficiency  -     Lipid Panel  -     Comprehensive Metabolic Panel  -     Vitamin B12  -     Vitamin D 25 Hydroxy    9. Vitamin D deficiency  -     Lipid Panel  -     Comprehensive Metabolic Panel  -     Vitamin B12  -     Vitamin D 25 Hydroxy    10. Chronic kidney disease, stage 3b (HCC)  -     Lipid Panel  -     Comprehensive Metabolic Panel  -     Vitamin B12  -     Vitamin D 25 Hydroxy    11. Anemia, unspecified type  -     Lipid Panel  -     Comprehensive Metabolic Panel  -     Vitamin B12  -     Vitamin D 25 Hydroxy  Chronic stable    12. Type 2 diabetes mellitus with stage 3b chronic kidney disease, with long-term current use of insulin (HCC)  -     Hemoglobin A1c  Chronic stable    Other orders  -     pantoprazole (Protonix) 40 MG EC tablet; Take 1 tablet by mouth Daily.  Dispense: 30 tablet; Refill: 2  -     nystatin (MYCOSTATIN) 218526 UNIT/GM ointment; Apply 1 application topically to the appropriate area as directed 2 (Two) Times a Day.  Dispense: 3 g; Refill: 3    MEDICATION SIDE EFFECTS, RISKS AND SIDE EFFECTS HAVE BEEN DISCUSSED WITH PATIENT. PATIENT NOTES UNDERSTANDING. IF ANY CONCERN OR QUESTION REGARDING MEDICATION PLEASE CONTACT.       Follow Up   No follow-ups on file.  Patient was given instructions and counseling regarding her condition or for health maintenance advice. Please see specific information pulled into the AVS if appropriate.     Renae Patten DO   X Size Of Lesion In Cm (Optional): 0 Depth Of Punch Biopsy: dermis Biopsy Type: H and E Anesthesia Type: 1% lidocaine without epinephrine Anesthesia Volume In Cc: 0.6 Hemostasis: None Epidermal Sutures: 6-0 Nylon Wound Care: Petrolatum Dressing: bandage Suture Removal: 14 days Patient Will Remove Sutures At Home?: No Lab: [id number] Lab Facility:  Consent: Written consent was obtained and risks were reviewed including but not limited to scarring, infection, bleeding, scabbing, incomplete removal, nerve damage and allergy to anesthesia. Post-Care Instructions: I reviewed with the patient in detail post-care instructions. Patient is to keep the biopsy site dry overnight, and then change the bandage and apply petrolatum once daily until sutures are removed.  Use a clean q-tip to apply the petrolatum and avoid touching the biopsy site with your hands.  Avoid immersing in water such as bathtub or swimming pools. Any concerns about a wound infection come into the office for a walk in visit Monday through Friday 8:30 am to 12 pm or 1 pm to 4:30 pm Signs of infection include increasing pain, redness, and drainage from biopsy site.  If we are not in the office, please call through the answering service. Home Suture Removal Text: Patient will remove their sutures at home.  If they have any questions or difficulties they will call the office. Notification Instructions: Patient will be notified of biopsy results. However, patient instructed to call the office if not contacted within 2 weeks. Billing Type: Third-Party Bill Information: Selecting Yes will display possible errors in your note based on the variables you have selected. This validation is only offered as a suggestion for you. PLEASE NOTE THAT THE VALIDATION TEXT WILL BE REMOVED WHEN YOU FINALIZE YOUR NOTE. IF YOU WANT TO FAX A PRELIMINARY NOTE YOU WILL NEED TO TOGGLE THIS TO 'NO' IF YOU DO NOT WANT IT IN YOUR FAXED NOTE. Size Of Lesion In Cm (Optional): 0.4

## 2024-11-08 ENCOUNTER — TELEPHONE (OUTPATIENT)
Dept: ONCOLOGY | Facility: CLINIC | Age: 74
End: 2024-11-08
Payer: MEDICARE

## 2024-11-08 NOTE — TELEPHONE ENCOUNTER
"    Caller: Tay Joanna Kay \"[email or web address]\"    Relationship: Self    Best call back number: [phone]    What orders are you requesting (i.e. lab or imaging): LABS THAT HEMATOLOGY NEEDS TO SEE IF WILL NEED PROCRIT SHOT OR NOT     In what timeframe would the patient need to come in: 11/11    Where will you receive your lab/imaging services: [facility] P[facility]    Additional notes: REQUESTING TO HAVE ORDERS PUT IN SO CAN BE DRAWN ON MONDAY 11/11 HAS APPOINTMENT WITH PCP IBETH TAVAREZ MD AT 11AM  WITH [facility] AND WILL GET DRAWN THERE         "

## 2024-11-11 ENCOUNTER — OFFICE VISIT (OUTPATIENT)
Dept: FAMILY MEDICINE CLINIC | Facility: CLINIC | Age: 74
End: 2024-11-11
Payer: MEDICARE

## 2024-11-11 VITALS
DIASTOLIC BLOOD PRESSURE: 80 MMHG | OXYGEN SATURATION: 100 % | RESPIRATION RATE: 16 BRPM | WEIGHT: 234 LBS | BODY MASS INDEX: 43.06 KG/M2 | SYSTOLIC BLOOD PRESSURE: 120 MMHG | HEART RATE: 76 BPM | HEIGHT: 62 IN

## 2024-11-11 DIAGNOSIS — I50.32 CHRONIC DIASTOLIC CONGESTIVE HEART FAILURE: Primary | ICD-10-CM

## 2024-11-11 DIAGNOSIS — N18.30 ANEMIA ASSOCIATED WITH STAGE 3 CHRONIC RENAL FAILURE: ICD-10-CM

## 2024-11-11 DIAGNOSIS — D63.1 ANEMIA ASSOCIATED WITH STAGE 3 CHRONIC RENAL FAILURE: ICD-10-CM

## 2024-11-11 PROBLEM — I50.9 CHRONIC CONGESTIVE HEART FAILURE: Status: ACTIVE | Noted: 2024-11-11

## 2024-11-11 PROCEDURE — 99495 TRANSJ CARE MGMT MOD F2F 14D: CPT | Performed by: PHYSICIAN ASSISTANT

## 2024-11-11 PROCEDURE — 3044F HG A1C LEVEL LT 7.0%: CPT | Performed by: PHYSICIAN ASSISTANT

## 2024-11-11 PROCEDURE — 3074F SYST BP LT 130 MM HG: CPT | Performed by: PHYSICIAN ASSISTANT

## 2024-11-11 PROCEDURE — 1126F AMNT PAIN NOTED NONE PRSNT: CPT | Performed by: PHYSICIAN ASSISTANT

## 2024-11-11 PROCEDURE — 1111F DSCHRG MED/CURRENT MED MERGE: CPT | Performed by: PHYSICIAN ASSISTANT

## 2024-11-11 PROCEDURE — 3079F DIAST BP 80-89 MM HG: CPT | Performed by: PHYSICIAN ASSISTANT

## 2024-11-11 NOTE — ASSESSMENT & PLAN NOTE
Patient sees hematology, needs a CBC today to evaluate whether or not she needs a Procrit shot.  Her chronic kidney disease followed by nephrology at [facility].  Her nephrologist is communicating with cardiology regarding adjustments to her diuretics.

## 2024-11-11 NOTE — PROGRESS NOTES
Transitional Care Follow Up Visit  Subjective     Joanna Cross is a 73 y.o. female who presents for a transitional care management visit.    Within 48 business hours after discharge our office contacted her via telephone to coordinate her care and needs.      I reviewed and discussed the details of that call along with the discharge summary, hospital problems, inpatient lab results, inpatient diagnostic studies, and consultation reports with Joanna.     Current outpatient and discharge medications have been reconciled for the patient.  Reviewed by: BRIGITTE Richards          10/31/2024     4:30 AM   Date of TCM Phone Call   [facility]   Date of Discharge 10/30/2024   Discharge Disposition Home-Health Care Sv     Risk for Readmission (LACE) No data recorded    History of Present Illness   Course During Hospital Stay: Patient presented to the emergency department on 10/26 2024 with increased shortness of breath and worsening lower extremity edema.  She was diagnosed with acute on chronic CHF, shortness of breath, lower extremity edema and history of pulmonary hypertension.  She was treated with IV Bumex switched to p.o. on the day of discharge.  Patient says her fluid has gone right back to where it was since she was discharged.  She says it always does.  She contacted her cardiologist 4 days ago and he had furosemide 40 to her Bumex.  She contacted her nephrologist today and he suggested a different diuretic, I think she said acetazolamide.  She is waiting for a phone call back from her cardiologist later today.  Nephrology manages her kidney function.     The following portions of the patient's history were reviewed and updated as appropriate: allergies, current medications, past family history, past medical history, past social history, past surgical history, and problem list.    Review of Systems    Objective   /80 (BP Location: Left arm, Patient Position: Sitting, Cuff  "Size: Large Adult)   Pulse 76   Resp 16   Ht 157.5 cm (62.01\")   Wt 106 kg (234 lb)   SpO2 100% Comment: 2lit o2  BMI 42.79 kg/m²   Physical Exam  Constitutional:       Appearance: She is obese. Ill appearance: chronically ill appearing, wearing oxygen.   Cardiovascular:      Rate and Rhythm: Normal rate and regular rhythm.   Pulmonary:      Effort: Pulmonary effort is normal.      Breath sounds: Normal breath sounds.         Assessment & Plan   Problems Addressed this Visit          Cardiac and Vasculature    Chronic congestive heart failure - Primary     Patient's water retention has increased again, she is communicating with cardiology and nephrology regarding adjustments to her diuretics.  She is supposed hear back from her cardiologist again this afternoon.  I discussed with patient that the fluid in her legs is not causing the heart failure yet is a result of the heart failure and probably years of dependent edema.  Home health is coming but they do not want to wrap her legs as they do not want to cause increased fluid in her lungs.                Hematology and Neoplasia    Anemia associated with stage 3 chronic renal failure     Patient sees hematology, needs a CBC today to evaluate whether or not she needs a Procrit shot.  Her chronic kidney disease followed by nephrology at [facility].  Her nephrologist is communicating with cardiology regarding adjustments to her diuretics.          Diagnoses         Codes Comments    Chronic diastolic congestive heart failure    -  Primary ICD-10-CM: I50.32  ICD-9-CM: 428.32, 428.0     Anemia associated with stage 3 chronic renal failure     ICD-10-CM: N18.30, D63.1  ICD-9-CM: 285.21, 585.3                        "

## 2024-11-12 ENCOUNTER — HOME CARE VISIT (OUTPATIENT)
Dept: HOME HEALTH SERVICES | Facility: HOME HEALTHCARE | Age: 74
End: 2024-11-12
Payer: MEDICARE

## 2024-11-12 VITALS
TEMPERATURE: 97.7 F | HEART RATE: 60 BPM | SYSTOLIC BLOOD PRESSURE: 110 MMHG | OXYGEN SATURATION: 98 % | DIASTOLIC BLOOD PRESSURE: 58 MMHG | RESPIRATION RATE: 16 BRPM

## 2024-11-12 VITALS
HEART RATE: 87 BPM | OXYGEN SATURATION: 97 % | SYSTOLIC BLOOD PRESSURE: 117 MMHG | DIASTOLIC BLOOD PRESSURE: 63 MMHG | WEIGHT: 229.25 LBS | BODY MASS INDEX: 41.92 KG/M2 | TEMPERATURE: 97.8 F | RESPIRATION RATE: 18 BRPM

## 2024-11-12 LAB
BASOPHILS # BLD AUTO: 0 X10E3/UL (ref 0–0.2)
BASOPHILS NFR BLD AUTO: 1 %
EOSINOPHIL # BLD AUTO: 0.4 X10E3/UL (ref 0–0.4)
EOSINOPHIL NFR BLD AUTO: 8 %
ERYTHROCYTE [DISTWIDTH] IN BLOOD BY AUTOMATED COUNT: 12.6 % (ref 11.7–15.4)
HCT VFR BLD AUTO: 30.5 % (ref 34–46.6)
HGB BLD-MCNC: 9.8 G/DL (ref 11.1–15.9)
IMM GRANULOCYTES # BLD AUTO: 0 X10E3/UL (ref 0–0.1)
IMM GRANULOCYTES NFR BLD AUTO: 0 %
LYMPHOCYTES # BLD AUTO: 0.7 X10E3/UL (ref 0.7–3.1)
LYMPHOCYTES NFR BLD AUTO: 13 %
MCH RBC QN AUTO: 32.1 PG (ref 26.6–33)
MCHC RBC AUTO-ENTMCNC: 32.1 G/DL (ref 31.5–35.7)
MCV RBC AUTO: 100 FL (ref 79–97)
MONOCYTES # BLD AUTO: 0.5 X10E3/UL (ref 0.1–0.9)
MONOCYTES NFR BLD AUTO: 9 %
NEUTROPHILS # BLD AUTO: 3.7 X10E3/UL (ref 1.4–7)
NEUTROPHILS NFR BLD AUTO: 69 %
PLATELET # BLD AUTO: 148 X10E3/UL (ref 150–450)
RBC # BLD AUTO: 3.05 X10E6/UL (ref 3.77–5.28)
WBC # BLD AUTO: 5.4 X10E3/UL (ref 3.4–10.8)

## 2024-11-12 PROCEDURE — G0299 HHS/HOSPICE OF RN EA 15 MIN: HCPCS

## 2024-11-12 PROCEDURE — G0151 HHCP-SERV OF PT,EA 15 MIN: HCPCS

## 2024-11-13 ENCOUNTER — HOME CARE VISIT (OUTPATIENT)
Dept: HOME HEALTH SERVICES | Facility: HOME HEALTHCARE | Age: 74
End: 2024-11-13
Payer: MEDICARE

## 2024-11-13 ENCOUNTER — INFUSION (OUTPATIENT)
Dept: ONCOLOGY | Facility: HOSPITAL | Age: 74
End: 2024-11-13
Payer: MEDICARE

## 2024-11-13 VITALS
RESPIRATION RATE: 18 BRPM | DIASTOLIC BLOOD PRESSURE: 72 MMHG | TEMPERATURE: 97.6 F | SYSTOLIC BLOOD PRESSURE: 138 MMHG | BODY MASS INDEX: 41.58 KG/M2 | HEART RATE: 87 BPM | WEIGHT: 227.4 LBS

## 2024-11-13 VITALS
HEART RATE: 63 BPM | OXYGEN SATURATION: 99 % | SYSTOLIC BLOOD PRESSURE: 140 MMHG | TEMPERATURE: 97.6 F | DIASTOLIC BLOOD PRESSURE: 80 MMHG

## 2024-11-13 DIAGNOSIS — D63.1 ANEMIA ASSOCIATED WITH STAGE 3 CHRONIC RENAL FAILURE: Primary | ICD-10-CM

## 2024-11-13 DIAGNOSIS — N18.30 ANEMIA ASSOCIATED WITH STAGE 3 CHRONIC RENAL FAILURE: Primary | ICD-10-CM

## 2024-11-13 PROCEDURE — 96372 THER/PROPH/DIAG INJ SC/IM: CPT

## 2024-11-13 PROCEDURE — 25010000002 EPOETIN ALFA-EPBX 40000 UNIT/ML SOLUTION: Performed by: NURSE PRACTITIONER

## 2024-11-13 PROCEDURE — 36415 COLL VENOUS BLD VENIPUNCTURE: CPT

## 2024-11-13 PROCEDURE — G0152 HHCP-SERV OF OT,EA 15 MIN: HCPCS

## 2024-11-13 RX ADMIN — EPOETIN ALFA-EPBX 40000 UNITS: 40000 INJECTION, SOLUTION INTRAVENOUS; SUBCUTANEOUS at 14:20

## 2024-11-14 LAB
FERRITIN SERPL-MCNC: 415 NG/ML (ref 15–150)
IRON SATN MFR SERPL: 22 % (ref 15–55)
IRON SERPL-MCNC: 56 UG/DL (ref 27–139)
TIBC SERPL-MCNC: 260 UG/DL (ref 250–450)
UIBC SERPL-MCNC: 204 UG/DL (ref 118–369)

## 2024-11-19 ENCOUNTER — HOME CARE VISIT (OUTPATIENT)
Dept: HOME HEALTH SERVICES | Facility: HOME HEALTHCARE | Age: 74
End: 2024-11-19
Payer: MEDICARE

## 2024-11-19 ENCOUNTER — PATIENT OUTREACH (OUTPATIENT)
Dept: CASE MANAGEMENT | Facility: OTHER | Age: 74
End: 2024-11-19
Payer: MEDICARE

## 2024-11-19 VITALS
DIASTOLIC BLOOD PRESSURE: 54 MMHG | BODY MASS INDEX: 41.33 KG/M2 | HEART RATE: 89 BPM | RESPIRATION RATE: 16 BRPM | WEIGHT: 226 LBS | SYSTOLIC BLOOD PRESSURE: 111 MMHG | TEMPERATURE: 97.9 F | OXYGEN SATURATION: 96 %

## 2024-11-19 VITALS
RESPIRATION RATE: 16 BRPM | HEART RATE: 82 BPM | SYSTOLIC BLOOD PRESSURE: 104 MMHG | TEMPERATURE: 97.4 F | DIASTOLIC BLOOD PRESSURE: 59 MMHG | OXYGEN SATURATION: 96 %

## 2024-11-19 DIAGNOSIS — M48.062 SPINAL STENOSIS, LUMBAR REGION, WITH NEUROGENIC CLAUDICATION: ICD-10-CM

## 2024-11-19 DIAGNOSIS — I25.10 CORONARY ARTERY DISEASE INVOLVING NATIVE CORONARY ARTERY OF NATIVE HEART WITHOUT ANGINA PECTORIS: Primary | ICD-10-CM

## 2024-11-19 DIAGNOSIS — G20.B2 PARKINSON'S DISEASE WITH DYSKINESIA AND FLUCTUATING MANIFESTATIONS: ICD-10-CM

## 2024-11-19 PROCEDURE — G0151 HHCP-SERV OF PT,EA 15 MIN: HCPCS

## 2024-11-19 PROCEDURE — G0299 HHS/HOSPICE OF RN EA 15 MIN: HCPCS

## 2024-11-19 NOTE — OUTREACH NOTE
"AMBULATORY CASE MANAGEMENT NOTE    Names and Relationships of Patient/Support Persons: Contact: Joanna Cross \"SIXTO\"; Relationship: Self -     CCM Interim Update    Spoke with patient for CCM update. She is doing much better. After her hospitalization she was given lasix in addition to her other diuretics. She found that she was actually having less urine output after she started the lasix.  contacted Dr. Teague's office and they took her off lasix and started on metolazone on T, Th, Sat and Sun. She is doing much better. She states that her legs look normal and even have wrinkles! She states that her breathing is much better. Her weight is currently 222.2#. She is getting HH from [facility].     She was concerned about her Entresto. She was notified that [facility] was no longer carrying it. [employer] negotiated and she will be able to get the medication from [facility] $20 for a 90 day supply. She is very happy with this. BP today was 104/59.     Her last A1C was 6.1.    Education Documentation  No documentation found.        Faey JAUREGUI  Ambulatory Case Management    11/19/2024, 15:58 EST  "

## 2024-11-21 ENCOUNTER — HOME CARE VISIT (OUTPATIENT)
Dept: HOME HEALTH SERVICES | Facility: HOME HEALTHCARE | Age: 74
End: 2024-11-21
Payer: MEDICARE

## 2024-11-21 VITALS
RESPIRATION RATE: 18 BRPM | HEART RATE: 84 BPM | DIASTOLIC BLOOD PRESSURE: 67 MMHG | SYSTOLIC BLOOD PRESSURE: 134 MMHG | TEMPERATURE: 97.2 F | OXYGEN SATURATION: 97 %

## 2024-11-21 PROCEDURE — G0152 HHCP-SERV OF OT,EA 15 MIN: HCPCS

## 2024-11-25 ENCOUNTER — HOME CARE VISIT (OUTPATIENT)
Dept: HOME HEALTH SERVICES | Facility: HOME HEALTHCARE | Age: 74
End: 2024-11-25
Payer: MEDICARE

## 2024-11-25 ENCOUNTER — TRANSCRIBE ORDERS (OUTPATIENT)
Dept: LAB | Facility: HOSPITAL | Age: 74
End: 2024-11-25
Payer: MEDICARE

## 2024-11-25 ENCOUNTER — TELEPHONE (OUTPATIENT)
Dept: ONCOLOGY | Facility: CLINIC | Age: 74
End: 2024-11-25

## 2024-11-25 ENCOUNTER — LAB (OUTPATIENT)
Dept: LAB | Facility: HOSPITAL | Age: 74
End: 2024-11-25
Payer: MEDICARE

## 2024-11-25 VITALS
SYSTOLIC BLOOD PRESSURE: 126 MMHG | WEIGHT: 221.38 LBS | HEART RATE: 82 BPM | BODY MASS INDEX: 40.48 KG/M2 | DIASTOLIC BLOOD PRESSURE: 52 MMHG | RESPIRATION RATE: 17 BRPM | OXYGEN SATURATION: 98 % | TEMPERATURE: 97.7 F

## 2024-11-25 DIAGNOSIS — I50.32 CHRONIC DIASTOLIC HEART FAILURE: Primary | ICD-10-CM

## 2024-11-25 DIAGNOSIS — I50.32 CHRONIC DIASTOLIC HEART FAILURE: ICD-10-CM

## 2024-11-25 LAB
ANION GAP SERPL CALCULATED.3IONS-SCNC: 17.2 MMOL/L (ref 5–15)
BUN SERPL-MCNC: 61 MG/DL (ref 8–23)
BUN/CREAT SERPL: 34.3 (ref 7–25)
CALCIUM SPEC-SCNC: 9.5 MG/DL (ref 8.6–10.5)
CHLORIDE SERPL-SCNC: 97 MMOL/L (ref 98–107)
CO2 SERPL-SCNC: 23.8 MMOL/L (ref 22–29)
CREAT SERPL-MCNC: 1.78 MG/DL (ref 0.57–1)
EGFRCR SERPLBLD CKD-EPI 2021: 29.5 ML/MIN/1.73
GLUCOSE SERPL-MCNC: 113 MG/DL (ref 65–99)
POTASSIUM SERPL-SCNC: 3.7 MMOL/L (ref 3.5–5.2)
SODIUM SERPL-SCNC: 138 MMOL/L (ref 136–145)

## 2024-11-25 PROCEDURE — 36415 COLL VENOUS BLD VENIPUNCTURE: CPT

## 2024-11-25 PROCEDURE — G0152 HHCP-SERV OF OT,EA 15 MIN: HCPCS

## 2024-11-25 PROCEDURE — G0299 HHS/HOSPICE OF RN EA 15 MIN: HCPCS

## 2024-11-25 PROCEDURE — 80048 BASIC METABOLIC PNL TOTAL CA: CPT

## 2024-11-26 ENCOUNTER — TELEPHONE (OUTPATIENT)
Dept: ONCOLOGY | Facility: CLINIC | Age: 74
End: 2024-11-26
Payer: MEDICARE

## 2024-11-26 ENCOUNTER — LAB (OUTPATIENT)
Dept: ONCOLOGY | Facility: HOSPITAL | Age: 74
End: 2024-11-26
Payer: MEDICARE

## 2024-11-26 VITALS
OXYGEN SATURATION: 99 % | DIASTOLIC BLOOD PRESSURE: 79 MMHG | SYSTOLIC BLOOD PRESSURE: 126 MMHG | RESPIRATION RATE: 16 BRPM | HEART RATE: 78 BPM | TEMPERATURE: 97.4 F

## 2024-11-26 VITALS
DIASTOLIC BLOOD PRESSURE: 73 MMHG | RESPIRATION RATE: 18 BRPM | WEIGHT: 220.8 LBS | BODY MASS INDEX: 40.37 KG/M2 | TEMPERATURE: 98.1 F | SYSTOLIC BLOOD PRESSURE: 124 MMHG | HEART RATE: 87 BPM

## 2024-11-26 DIAGNOSIS — D63.1 ANEMIA ASSOCIATED WITH STAGE 3 CHRONIC RENAL FAILURE: ICD-10-CM

## 2024-11-26 DIAGNOSIS — N18.30 ANEMIA ASSOCIATED WITH STAGE 3 CHRONIC RENAL FAILURE: ICD-10-CM

## 2024-11-26 PROCEDURE — 36415 COLL VENOUS BLD VENIPUNCTURE: CPT

## 2024-11-26 NOTE — TELEPHONE ENCOUNTER
"  Caller: Joanna Cross \"SIXTO\"    Relationship: Self    Best call back number: [phone]     What is the best time to reach you: ASAP    Who are you requesting to speak with (clinical staff, provider,  specific staff member): CLINICAL        What was the call regarding: PLEASE CALL PT TO LET HER KNOW IF LABS THAT WERE DRAWN BY HOME HEALTH ON MONDAY ARE SUFFICIENT FOR HER APPT WITH [facility] IN [place] TOMORROW, OR IF SHE NEEDS TO GET MORE DONE TODAY.  SHE NOTICED IT DOESN'T LOOK LIKE HEMOGLOBIN WAS DONE.  HOWEVER, SHE CAN'T DRIVE AND WILL NEED TO WORK OUT TRANSPORTATION IF SHE NEEDS TO GET MORE LABS TODAY.  PLEASE ADVISE.      "

## 2024-11-26 NOTE — TELEPHONE ENCOUNTER
Called and notified patient that she did not have a CBC drawn Monday so she will need that collected to get her injection tomorrow as scheduled. She verbalized understanding, she will be here by 2:30.

## 2024-11-27 ENCOUNTER — PATIENT OUTREACH (OUTPATIENT)
Dept: CASE MANAGEMENT | Facility: OTHER | Age: 74
End: 2024-11-27
Payer: MEDICARE

## 2024-11-27 ENCOUNTER — TELEMEDICINE (OUTPATIENT)
Dept: ONCOLOGY | Facility: CLINIC | Age: 74
End: 2024-11-27
Payer: MEDICARE

## 2024-11-27 VITALS — HEIGHT: 62 IN | WEIGHT: 220.8 LBS | BODY MASS INDEX: 40.63 KG/M2

## 2024-11-27 DIAGNOSIS — D63.1 ANEMIA ASSOCIATED WITH STAGE 3 CHRONIC RENAL FAILURE: Primary | ICD-10-CM

## 2024-11-27 DIAGNOSIS — N18.30 ANEMIA ASSOCIATED WITH STAGE 3 CHRONIC RENAL FAILURE: Primary | ICD-10-CM

## 2024-11-27 DIAGNOSIS — M48.062 SPINAL STENOSIS, LUMBAR REGION, WITH NEUROGENIC CLAUDICATION: ICD-10-CM

## 2024-11-27 DIAGNOSIS — I25.10 CORONARY ARTERY DISEASE INVOLVING NATIVE CORONARY ARTERY OF NATIVE HEART WITHOUT ANGINA PECTORIS: Primary | ICD-10-CM

## 2024-11-27 DIAGNOSIS — G20.B2 PARKINSON'S DISEASE WITH DYSKINESIA AND FLUCTUATING MANIFESTATIONS: ICD-10-CM

## 2024-11-27 LAB
BASOPHILS # BLD AUTO: 0.05 10*3/MM3 (ref 0–0.2)
BASOPHILS NFR BLD AUTO: 0.8 % (ref 0–1.5)
EOSINOPHIL # BLD AUTO: 0.32 10*3/MM3 (ref 0–0.4)
EOSINOPHIL NFR BLD AUTO: 5.3 % (ref 0.3–6.2)
ERYTHROCYTE [DISTWIDTH] IN BLOOD BY AUTOMATED COUNT: 13.2 % (ref 12.3–15.4)
HCT VFR BLD AUTO: 36 % (ref 34–46.6)
HGB BLD-MCNC: 12 G/DL (ref 12–15.9)
IMM GRANULOCYTES # BLD AUTO: 0.04 10*3/MM3 (ref 0–0.05)
IMM GRANULOCYTES NFR BLD AUTO: 0.7 % (ref 0–0.5)
LYMPHOCYTES # BLD AUTO: 0.66 10*3/MM3 (ref 0.7–3.1)
LYMPHOCYTES NFR BLD AUTO: 10.9 % (ref 19.6–45.3)
MCH RBC QN AUTO: 32.7 PG (ref 26.6–33)
MCHC RBC AUTO-ENTMCNC: 33.3 G/DL (ref 31.5–35.7)
MCV RBC AUTO: 98.1 FL (ref 79–97)
MONOCYTES # BLD AUTO: 0.63 10*3/MM3 (ref 0.1–0.9)
MONOCYTES NFR BLD AUTO: 10.4 % (ref 5–12)
NEUTROPHILS # BLD AUTO: 4.34 10*3/MM3 (ref 1.7–7)
NEUTROPHILS NFR BLD AUTO: 71.9 % (ref 42.7–76)
NRBC BLD AUTO-RTO: 0 /100 WBC (ref 0–0.2)
PLATELET # BLD AUTO: 171 10*3/MM3 (ref 140–450)
RBC # BLD AUTO: 3.67 10*6/MM3 (ref 3.77–5.28)
WBC # BLD AUTO: 6.04 10*3/MM3 (ref 3.4–10.8)

## 2024-11-27 PROCEDURE — 1126F AMNT PAIN NOTED NONE PRSNT: CPT | Performed by: NURSE PRACTITIONER

## 2024-11-27 PROCEDURE — 99213 OFFICE O/P EST LOW 20 MIN: CPT | Performed by: NURSE PRACTITIONER

## 2024-11-27 PROCEDURE — 1159F MED LIST DOCD IN RCRD: CPT | Performed by: NURSE PRACTITIONER

## 2024-11-27 PROCEDURE — 1160F RVW MEDS BY RX/DR IN RCRD: CPT | Performed by: NURSE PRACTITIONER

## 2024-11-27 NOTE — OUTREACH NOTE
CCM End of Month Documentation    This Chronic Medical Management Care Plan for Joanna Cross, 73 y.o. female, has been monitored and managed; reviewed and a new plan of care implemented for the month of November.  A cumulative time of 26  minutes was spent on this patient record this month, including chart review; phone call with patient.    Regarding the patient's problems: has Coronary artery disease involving native coronary artery without angina pectoris; Hypertension, essential; Peripheral vascular disease; Hyperlipidemia LDL goal <70; Spinal stenosis, lumbar region, with neurogenic claudication; Overactive bladder; GERD (gastroesophageal reflux disease); Hypothyroidism; Lichen sclerosus; Parkinson's disease; MILLI treated with BiPAP; History of respiratory failure - prior respiratory failure requiring mechanical ventilation, open lung biopsy non-specific. ; Atrial fibrillation; Tachy-thai syndrome; Long term current use of antiarrhythmic drug; History of adenomatous polyp of colon; Anemia associated with stage 3 chronic renal failure; Angiodysplasia; Hx of colonic polyps; Body mass index 40.0-44.9, adult; Cardiac pacemaker in situ; Chronic low back pain; Chronic lung disease; Degeneration of lumbar intervertebral disc; Edema of lower extremity; History of malignant neoplasm of skin; History of TIA (transient ischemic attack); Hypotonic bladder; Incomplete tear of rotator cuff; Major depression in remission; Tinnitus of both ears; Primary osteoarthritis involving multiple joints; Restless legs; Seborrheic dermatitis; Transient cerebral ischemia; Muscle strain; Type 2 diabetes mellitus with hyperglycemia, without long-term current use of insulin; Vitamin B12 deficiency; Vitamin D deficiency; Chronic kidney disease, stage 3b; Osteoporosis, senile; Acute diastolic CHF (congestive heart failure); Chronic respiratory failure with hypoxia; Chronic anticoagulation; Fracture of right shoulder with delayed healing;  and Chronic congestive heart failure on their problem list., the following items were addressed: medical records; medications and any changes can be found within the plan section of the note.  A detailed listing of time spent for chronic care management is tracked within each outreach encounter.  Current medications include:  has a current medication list which includes the following prescription(s): albuterol sulfate hfa, amantadine, apixaban, aspirin, b complex-c, bumetanide, calcium carbonate, carbidopa-levodopa, cholecalciferol, citalopram, clobetasol propionate, dapagliflozin propanediol, fluticasone, furosemide, glucosamine-chondroit-calcium, hydroxychloroquine, lantus solostar, ipratropium, levothyroxine, loratadine, metolazone, multiple vitamins-minerals, o2, omeprazole, pramipexole, ranolazine, sacubitril-valsartan, senna, spironolactone, tramadol, triamcinolone, and triamcinolone acetonide. and the patient is reported to be patient is compliant with medication protocol,  Medications are reported to be effective.  Regarding these diagnoses, referrals were made to the following provider(s):  n/a.  All notes on chart for PCP to review.    The patient was monitored remotely for blood pressure; weight; activity level; pain; medications.    The patient's physical needs include:  needs assistance with ADLs; physical healthcare.     The patient's mental support needs include:  increased support    The patient's cognitive support needs include:  needs met    The patient's psychosocial support needs include:  continued support    The patient's functional needs include: No data recorded    The patient's environmental needs include:  not applicable    Care Plan overall comments:  reviewed    Refer to previous outreach notes for more information on the areas listed above.    Monthly Billing Diagnoses  (I25.10) Coronary artery disease involving native coronary artery of native heart without angina pectoris    (G20.B2)  Parkinson's disease with dyskinesia and fluctuating manifestations    (M48.062) Spinal stenosis, lumbar region, with neurogenic claudication    Medications   Medications have been reconciled    Care Plan progress this month:      Recently Modified Care Plans Updates made since 10/27/2024 12:00 AM      No recently modified care plans.            Instructions   Patient was provided an electronic copy of care plan  CCM services were explained and offered and patient has accepted these services.  Patient has given their written consent to receive CCM services and understands that this includes the authorization of electronic communication of medical information with the other treating providers.  Patient understands that they may stop CCM services at any time and these changes will be effective at the end of the calendar month and will effectively revocate the agreement of CCM services.  Patient understands that only one practitioner can furnish and be paid for CCM services during one calendar month.  Patient also understands that there may be co-payment or deductible fees in association with CCM services.  Patient will continue with at least monthly follow-up calls with the Ambulatory .    Faye JAUREGUI  Ambulatory Case Management    11/27/2024, 09:29 EST

## 2024-11-27 NOTE — PROGRESS NOTES
Mode of Visit: Video  Location of patient: -HOME-  Location of provider: [facility]+  You have chosen to receive care through a telehealth visit.  The patient has signed the video visit consent form.  The visit included audio and video interaction. No technical issues occurred during this visit.       CHIEF COMPLAINT: Anemia    Problem List:  Oncology/Hematology History Overview Note   1.  Anemia since at least 2016 managed with erythropoietin injections by [facility].  With polypectomies from cecum June 2021 Dr. Marino's and capsule endoscopy at [facility] November 2016 showing small bowel AVMs cecal polyps Bj Rivera September 2016 and Dr. Reynaldo Fritz polypectomy 2007.  EGD Dr. Rivera 2012, 2015, 2016 with dilation of esophagus.  [facility] enteroscopy single balloon with fluoroscopy [facility] with 1 small nonbleeding AVM ablated.  2.  Cardiovascular disease with MI 2015 stent RCA [facility]  3.  Atrial fib managed by [facility] in [place] 2016 with pacemaker   4.  Repetitive falls with possible concussion April 2023 and January 2024  5.  Wedge biopsy left upper lobe Dr. Faisal Lundy October 2016  6.  Eczema  7.  Reflux  8.  Hypertension  9.  Hypothyroidism  10.  Lichen sclerosis on vulvar biopsy 1982  11.  Parkinson's diagnosed 2007 [facility]  12.  Sleep apnea managed by [facility] pulmonary medicine  13.  Subcutaneous lupus diagnosed [facility] Carole Roy February 2023  14.  2019 back surgeries Dr. Lencho Gamino and wound infection drained by Dr. Alonso in 2018  15.  Urethral dilations with Dr. Terrence Mckenzie  16.  Bilateral total knee replacements Dr. Case  17.  CHF    Hematology history timeline:  -10/26/2023 hematology note Dr. Nunez.  Received parenteral iron September 2023 with hemoglobin stable 9.8.  He states this is multifactorial anemia due to iron deficiency suspecting GI blood loss due to history of AVMs and chronic kidney disease with  erythropoietin deficiency.  White count and platelets normal.  Plan for continued Procrit per protocol with monitoring of iron indices periodically.  Numerous prior B12 levels normal during 2023.  Patient considering colonoscopy  -12/28/2023 40,000 units Procrit last dose given at [facility] being held for hemoglobin over 10.  -1/22/2024 hemoglobin 11 received IV iron 510 mg 1/22/2024 and 1/31/2024.  -3/6/2024 ferritin 263 with iron normal 115 and iron saturation 40% with normal total iron binding capacity 291.  Creatinine 1.24 GFR 45.  Hemoglobin 8.7 with platelets 132,000.  MCV 96 with normal RDW.    -3/12/2024 [facility] consult: I reviewed her extensive records she brought me that she collated herself and the note from [facility] I summarized above.  She has anemia of renal disease and iron deficiency due to periodic GI losses intolerant of oral iron because she cannot take it with the Sinemet for her Parkinson's that she takes 6 times a day so she gets periodic IV iron.  For now, with her hemoglobin of 8.7 and GFR 45 with normal iron indices we will hop back on the Procrit has per protocol 40,000 units subcu weekly holding for hemoglobin over 10 and she will see my nurse practitioner back in May.  Following that my nurse practitioner will watch her and periodically we will need to check her iron indices as she does have a history of AVMs and may need periodic IV iron as well.  I discussed with her that I would not put her through a bone marrow biopsy despite the mild thrombocytopenia given normochromic normocytic indices and normal Red cell distribution with an unremarkable differential and the fact that, even if I found myelodysplasia, at most what I would do is just Procrit which we are already doing.  She has not had jaundice or icterus or other evidence is to suggest hemolysis and I will not work that up.  In essence we will just continue the care that she was already  receiving at [facility].  She has had thorough GI evaluations as outlined above.    -5/8/2024 [facility] hematology clinic follow-up: We are administering Procrit for her anemia of renal disease if her hemoglobin is less than 10.  She has not required Procrit since we saw her initially on 3/12/2024.  In order to try and simplify things as she is unable to get out without assistance I will change her monitoring to every 2 weeks.  We will check her CBC every 2 weeks and we will get Procrit if her hemoglobin is less than 10.  She had been going to [place] for her Procrit since there is a lab there and they can do it on Monday however she lives here in [place].  She does have a caregiver or her daughter who can get her to the lab therefore we will have her get her labs here locally and we will administer the Procrit in [place] if parameters are met.  If this arrangement does not work out for her then we will get her back to [place] and I discussed with her and her daughter that we also have a clinic in [place] who can see her on the days of her Procrit.  She is following now with Dr. Teague for management of her CHF.  I did ask her daughter to speak with either her PCP or cardiologist about home health for additional management of her CHF.  Her iron studies were normal when checked in March.  Those are built in to assess periodically while on Procrit, next due in June.  Her daughter did state that she thinks her mother received iron while recent inpatient.    -7/24/2024 [facility] hematology clinic follow-up: Karina has been in rehab since we saw her last for weakness and pain in her right hip and lower extremity.  She is now back home and continues to work with home PT.  She has not required Procrit since March.  Currently her hemoglobin is 9.8 therefore we will administer Procrit today. Her creatinine is stable at 1.40, CBC and CMP otherwise unremarkable.  It is difficult for her to come and go  with mobility issues.  We will continue to monitor her CBC every 2 weeks and will administer Procrit if her hemoglobin is less than 10.  Since she currently has home health we have arranged to have her CBC checked at home, when she is discharged from home health we will resume checking her CBC here in our office.  She has not required any parenteral iron for some time, current MCV is 98 with MCHC of 32.2.  We will repeat her iron studies again in October as these are built into her Procrit plan, I will check them sooner if her MCV starts to decrease or she has worsening anemia.    -9/18/2024 [facility] hematology clinic follow-up: Overall her hemoglobin remained stable, on Tuesday it was 10.5. She has not required Procrit since 7/24/2024, we are checking her CBC every 2 weeks as it is inconvenient for her to get out weekly and she has not been requiring Procrit weekly, her hemoglobin in August was 10.8 and 11.3.  I will repeat her CBC today just to recheck and we are drawing her ferritin and iron profile.  If for some reason her hemoglobin is below 10 we will bring her back for Procrit.  If her iron levels are decreasing then we will set her up for parenteral iron.  She has follow-up scheduled with nephrology ongoing.  She is going to see Dr. Teague next week regarding her CHF.  She is feeling better since diuresis in the ED earlier this week.     Anemia associated with stage 3 chronic renal failure   3/12/2024 -  Chemotherapy    OP SUPPORTIVE Epoetin  Parker / Epoetin Parker-epbx         HISTORY OF PRESENT ILLNESS:  The patient is a 73 y.o. female, here for follow up on management of anemia for chronic renal disease, patient with also history of AVM.  Karina reports that she is feeling better after some adjustment in her diuretics.  She has not had any unusual bleeding.  She last received Procrit injection 11/13/2024, prior to that she had not received Procrit since July and prior to that it was March 2024.    Past  Medical History:   Diagnosis Date    Allergic     Allergic rhinitis     Anemia     Ankle problem     thinks back related causing pain     Anxiety     Asthma     Atrial fibrillation     AVM (arteriovenous malformation)     Back pain     Cataract 2015    CHF (congestive heart failure) 06/02/2024    Probably had before then but no one actually told me I had it.    Cholelithiasis 09/07/01    Gallbladder Removed    Chronic kidney disease     stage 3 per pt    Chronic lung disease 04/13/2022    CKD (chronic kidney disease)     Clotting disorder 2016    AVM - small intestine    Colon polyp 09/15/16    Coronary artery disease involving native coronary artery without angina pectoris 03/01/2017    COVID-19 vaccine series completed     Cystic fibrosis     Diastolic dysfunction     Gastrocnemius muscle tear     left medial 91    Generalized osteoarthritis     GERD (gastroesophageal reflux disease)     Gestational diabetes     GIB (gastrointestinal bleeding) 2016    d/t xarelto     Headache     Hearing decreased, bilateral     HAS HEARING AID, NOT WEARING IT CURRENTLY    Hiatal hernia     History of medical problems 04/23/82    Lichens Sclerosis    History of shingles     History of transfusion 2016    no reaction recalled     HL (hearing loss) 2019    Got hearing aids    Hyperlipidemia LDL goal <70 03/01/2017    Hypertension     Hyperthyroidism     Hypothyroidism     IBS (irritable bowel syndrome)     Inflammatory bowel disease     Klebsiella pneumonia     Lichen sclerosus     Lupus     subQ    Mouth problem     mouth guard used since pt bites tongue and lips excessively at night if not- with bipap     MRSA infection 2018    Myocardial infarction     Obesity     On home oxygen therapy     2L of oxygen all the time due to current congestion     Osteopenia 2016    Osteoporosis     Parkinson's disease     Peripheral vascular disease     Pleurisy     Pneumonia     Puerperal sepsis with acute hypoxic respiratory failure     emergent  intubation- 2016    Pulmonary embolism     Renal insufficiency     Right leg pain     from back issues     Salivary gland stone     Sciatic nerve pain     Seborrheic dermatitis     Skin cancer     on back     Sleep apnea     CPAP AND HOME O2 2L/M    Sleep apnea, obstructive 04/15/01    TIA (transient ischemic attack) 2014    no residual effects    Type 2 diabetes mellitus     UTI (urinary tract infection)     Visual impairment     Vitamin D deficiency 09/08/2022    Wears glasses      Past Surgical History:   Procedure Laterality Date    ABLATION OF DYSRHYTHMIC FOCUS  05/16/13    Laser Ablation - Rt Leg    BACK SURGERY      l4-l5 laminectomy     BICEPS TENDON REPAIR Right     shoulder    BREAST BIOPSY Left 05/2004    excisional, benign    BRONCHOSCOPY RIGID / FLEXIBLE      2016    BUNIONECTOMY Right     CARDIAC CATHETERIZATION      CARDIAC CATHETERIZATION N/A 02/01/2019    Procedure: Left Heart Cath;  Surgeon: Albertina Corona MD;  Location:  [facility] CATH INVASIVE LOCATION;  Service: Cardiology    CARDIAC ELECTROPHYSIOLOGY PROCEDURE N/A 01/26/2024    Procedure: Lead Revision RA or RV, PPM or ICD;  Surgeon: Lauro Francois MD;  Location: [facility] EP INVASIVE LOCATION;  Service: Cardiovascular;  Laterality: N/A;    CATARACT EXTRACTION, BILATERAL  06/26/2024    (R) eye   08/16/2024 (L) eye    CHOLECYSTECTOMY      COLON SURGERY      COLONOSCOPY  2016    COLONOSCOPY N/A 06/17/2021    Procedure: COLONOSCOPY WITH POLYPECTOMY;  Surgeon: Trenton Sosa MD;  Location: [facility] ENDOSCOPY;  Service: Gastroenterology;  Laterality: N/A;    CORONARY STENT PLACEMENT      x1 stent    CYST REMOVAL      left ear, upper left back 2001    CYSTOSCOPY      x2  18 and 20 -   in 20 urethra dilation     DIAGNOSTIC LAPAROSCOPY  1981    ENDOSCOPIC FUNCTIONAL SINUS SURGERY (FESS)  2011    ENDOSCOPY  2016    ENTEROSCOPY VIA STOMA      with single ballon with fluoro     EYE SURGERY  7/26 & 8/16/24    Cataracts removed from each eye.     HAMMER TOE REPAIR Left     INCISION AND DRAINAGE OF WOUND  2018    back with wound infection     INSERT / REPLACE / REMOVE PACEMAKER  11/10/16    [facility]    JOINT REPLACEMENT      KNEE ARTHROSCOPY      LASER ABLATION      right leg 13    LIPOMA EXCISION  1999    left leg     LUMBAR LAMINECTOMY DISCECTOMY DECOMPRESSION N/A 07/06/2018    Procedure: LUMBAR LAMINECTOMY L4-5, HEMILAMIINECTOMY RIGHT L5-S1, FORAMINOTOMY L5-S1;  Surgeon: Lencho Gamino MD;  Location:  [facility] OR;  Service: Neurosurgery    LUMBAR LAMINECTOMY DISCECTOMY DECOMPRESSION N/A 09/19/2018    Procedure: INCISION AND DRAINAGE BACK WITH WOUND EXPLORATION;  Surgeon: Ritchie García MD;  Location:  [facility] OR;  Service: Neurosurgery    LUNG BIOPSY Left 2016    OTHER SURGICAL HISTORY      ct scan of chest and sinuses and lower back     OTHER SURGICAL HISTORY      various echos     OTHER SURGICAL HISTORY      electroencephalogram 16,   emg  ncv tests 2007    OTHER SURGICAL HISTORY      mra 2007  and various mri with xrays, nuclear medicine lung ventilation with perfusion test 2016 with pft     OTHER SURGICAL HISTORY      barium swallow testing 15, 12, 12    OTHER SURGICAL HISTORY      vaginal ultrasound- 2012,   vas clementina lower extrem 2016, vas venous duplex lower extrem bilat 2019    OTHER SURGICAL HISTORY      wdge biopsy spring of lung     OTHER SURGICAL HISTORY      emergent intubation- hypoxic resp failure     OTHER SURGICAL HISTORY      01/26/2024 ppm generator change and lead revision per Dr. Francois    PACEMAKER IMPLANTATION  11/2016    [facility]    REPLACEMENT TOTAL KNEE Bilateral     left knee 2011, right 2012 per dr acuna     REPLACEMENT TOTAL KNEE Bilateral     SHOULDER ARTHROSCOPY Bilateral     2003-left, 2004- right     SKIN BIOPSY      skin cancer back 2016    [facility]     SPINE SURGERY  07/06/18    SUBTOTAL HYSTERECTOMY      MADAHV      TEETH EXTRACTION      x2    TOTAL SHOULDER ARTHROPLASTY W/ DISTAL CLAVICLE  "EXCISION Left 10/24/2022    Procedure: REVERSE TOTAL SHOULDER ARTHROPLASTY, BICEPS TENODESIS - LEFT;  Surgeon: Sammy Yo MD;  Location:  [facility];  Service: Orthopedics;  Laterality: Left;    TOTAL SHOULDER ARTHROPLASTY W/ DISTAL CLAVICLE EXCISION Right 09/18/2023    Procedure: REVERSE TOTAL SHOULDER  ARTHROPLASTY WITH BICEPS TENODESIS - RIGHT;  Surgeon: Sammy Yo MD;  Location:  [facility];  Service: Orthopedics;  Laterality: Right;    TUBAL ABDOMINAL LIGATION Bilateral 1980    WISDOM TOOTH EXTRACTION         Allergies   Allergen Reactions    Amlodipine Besylate Swelling     Lower extremity (ankles, feet) swelling    Entacapone Other (See Comments)     \"extreme weakness in legs - caused several falls, which stopped after discontinuing this medication\"    Epinephrine Other (See Comments)     6/4/16- had 3 shots to numb mouth to prepare teeth for crowns, the shots contained epi-  Caused pt to have chest discomfort- went to hospital in ambulance, discovered had a fib while there     Hydrocodone Unknown - High Severity     Headache, nausea, dizziness, & vomiting    Levemir [Insulin Detemir] Hives     Hives / rash around injection site    Penicillins Hives     Jitteriness     Xarelto [Rivaroxaban] GI Bleeding     hgb dropped to 5.2    Aricept [Donepezil Hcl] Nausea Only     Vivid dreams    Benztropine Mesylate Other (See Comments)     Uncontrollable body movements    Cogentin [Benztropine] Other (See Comments)     \"uncontrollable body movements\"    Compazine [Prochlorperazine Edisylate] Other (See Comments)     Dystonic reaction    Duraprep [Antiseptic Products, Misc.] Itching and Rash     RASH AND ITCHING    Haldol [Haloperidol Lactate] Other (See Comments)     Dystonic reaction    Hydralazine Other (See Comments)     Headache     Lisinopril Cough    Statins Myalgia     Leg pain- all statins     Sulfamethoxazole Nausea Only and Other (See Comments)     Nausea & headaches    Tarka " "[Trandolapril-Verapamil Hcl Er] Other (See Comments)     Constipation     Toprol Xl [Metoprolol Tartrate] Other (See Comments)     Extreme fatigue, decreased exercise tolerance    Trimethoprim Other (See Comments)     Other reaction(s): Nausea       Family History and Social History reviewed and changed as necessary    REVIEW OF SYSTEM:   No new somatic concerns    PHYSICAL EXAM:  On video Karina looks well, she is in no distress.  Conversation is appropriate with no respiratory difficulty during normal conversation.    Vitals:    11/27/24 1042   Weight: 100 kg (220 lb 12.8 oz)   Height: 157.5 cm (62\")     There were no vitals filed for this visit.       ECOG score: 2           Vitals reviewed.  Labs reviewed    ECOG: (2) Ambulatory & Capable of Self Care, Unable to Carry Out Work Activity, Up & About Greater Than 50% of Waking Hours    Lab Results   Component Value Date    HGB 12.0 11/26/2024    HCT 36.0 11/26/2024    MCV 98.1 (H) 11/26/2024     11/26/2024    WBC 6.04 11/26/2024    NEUTROABS 4.34 11/26/2024    LYMPHSABS 0.66 (L) 11/26/2024    MONOSABS 0.63 11/26/2024    EOSABS 0.32 11/26/2024    BASOSABS 0.05 11/26/2024       Lab Results   Component Value Date    GLUCOSE 113 (H) 11/25/2024    BUN 61 (H) 11/25/2024    CREATININE 1.78 (H) 11/25/2024     11/25/2024    K 3.7 11/25/2024    CL 97 (L) 11/25/2024    CO2 23.8 11/25/2024    CALCIUM 9.5 11/25/2024    PROTEINTOT 6.7 09/16/2024    ALBUMIN 4.5 10/14/2024    BILITOT 0.5 10/14/2024    ALKPHOS 106 10/14/2024    AST 23 10/14/2024    ALT 11 10/14/2024             ASSESSMENT & PLAN:    1.  Anemia since at least 2016 managed with erythropoietin injections by [facility].  With polypectomies from [facility] June 2021 Dr. Marino's and capsule endoscopy at [facility] November 2016 showing small bowel AVMs cecal polyps Bj Rivera September 2016 and Dr. Reynaldo Fritz polypectomy 2007.  EGD Dr. Rivera 2012, 2015, 2016 with dilation of " esophagus.  [facility] enteroscopy single balloon with fluoroscopy [facility] with 1 small nonbleeding AVM ablated.  2.  Cardiovascular disease with MI 2015 stent RCA [facility]  3.  Atrial fib managed by [facility] in [place] 2016 with pacemaker   4.  Repetitive falls with possible concussion April 2023 and January 2024  5.  Wedge biopsy left upper lobe Dr. Faisal Lundy October 2016  6.  Eczema  7.  Reflux  8.  Hypertension  9.  Hypothyroidism  10.  Lichen sclerosis on vulvar biopsy 1982  11.  Parkinson's diagnosed 2007 [facility]  12.  Sleep apnea managed by [facility] pulmonary medicine  13.  Subcutaneous lupus diagnosed [facility] Carole Roy February 2023  14.  2019 back surgeries Dr. Lencho Gamino and wound infection drained by Dr. Alonso in 2018  15.  Urethral dilations with Dr. Terrence Mckenzie  16.  Bilateral total knee replacements Dr. Springer.  17.  CHF    Discussion: Karina has been receiving Procrit on average this year about every 4 months, she was given a Procrit shot in March 2024 again in July and most recently on 11/13/2024. Her current hemoglobin from 11/26/2024 is normal at 12 with hematocrit 36%, WBC is normal at 6.04, platelet count normal at 171,000.  Her iron studies are normal, ferritin is 415.  CMP with BUN of 61 creatinine 1.78 otherwise normal.  She does follow with nephrology and also closely with cardiology who is managing her diuretics.  She feels much better after some changes in her diuretics with now decreased fluids on board.  At this time we will check her CBC monthly rather than every 2 weeks due to the infrequent need of her Procrit and also with current hemoglobin normal.  We will see how she does over the next 3 months.    Return to clinic in 3 months for follow-up    I spent 23 minutes caring for Joanna on this date of service. This time includes time spent by me in the following activities: preparing for the visit, reviewing tests, performing a medically  appropriate examination and/or evaluation, ordering medications, tests, or procedures, documenting information in the medical record, independently interpreting results and communicating that information with the patient/family/caregiver, and care coordination.     Denise Martin, APRN    11/27/2024

## 2024-11-29 ENCOUNTER — HOME CARE VISIT (OUTPATIENT)
Dept: HOME HEALTH SERVICES | Facility: HOME HEALTHCARE | Age: 74
End: 2024-11-29
Payer: MEDICARE

## 2024-11-29 VITALS — TEMPERATURE: 98.6 F | DIASTOLIC BLOOD PRESSURE: 58 MMHG | RESPIRATION RATE: 16 BRPM | SYSTOLIC BLOOD PRESSURE: 110 MMHG

## 2024-11-29 PROCEDURE — G0151 HHCP-SERV OF PT,EA 15 MIN: HCPCS

## 2024-12-03 ENCOUNTER — HOME CARE VISIT (OUTPATIENT)
Dept: HOME HEALTH SERVICES | Facility: HOME HEALTHCARE | Age: 74
End: 2024-12-03
Payer: MEDICARE

## 2024-12-03 VITALS
HEART RATE: 79 BPM | TEMPERATURE: 98.8 F | OXYGEN SATURATION: 96 % | RESPIRATION RATE: 18 BRPM | WEIGHT: 224 LBS | BODY MASS INDEX: 40.97 KG/M2 | DIASTOLIC BLOOD PRESSURE: 73 MMHG | SYSTOLIC BLOOD PRESSURE: 136 MMHG

## 2024-12-03 PROCEDURE — G0299 HHS/HOSPICE OF RN EA 15 MIN: HCPCS

## 2024-12-03 RX ORDER — CITALOPRAM HYDROBROMIDE 20 MG/1
20 TABLET ORAL DAILY
Qty: 90 TABLET | Refills: 0 | OUTPATIENT
Start: 2024-12-03

## 2024-12-04 ENCOUNTER — HOME CARE VISIT (OUTPATIENT)
Dept: HOME HEALTH SERVICES | Facility: HOME HEALTHCARE | Age: 74
End: 2024-12-04
Payer: MEDICARE

## 2024-12-04 VITALS
TEMPERATURE: 98.2 F | RESPIRATION RATE: 18 BRPM | OXYGEN SATURATION: 97 % | DIASTOLIC BLOOD PRESSURE: 57 MMHG | HEART RATE: 90 BPM | SYSTOLIC BLOOD PRESSURE: 112 MMHG

## 2024-12-04 PROCEDURE — G0151 HHCP-SERV OF PT,EA 15 MIN: HCPCS

## 2024-12-04 PROCEDURE — G0152 HHCP-SERV OF OT,EA 15 MIN: HCPCS

## 2024-12-05 VITALS — SYSTOLIC BLOOD PRESSURE: 99 MMHG | DIASTOLIC BLOOD PRESSURE: 60 MMHG | RESPIRATION RATE: 18 BRPM

## 2024-12-06 ENCOUNTER — PATIENT OUTREACH (OUTPATIENT)
Dept: CASE MANAGEMENT | Facility: OTHER | Age: 74
End: 2024-12-06
Payer: MEDICARE

## 2024-12-06 ENCOUNTER — TELEPHONE (OUTPATIENT)
Dept: CASE MANAGEMENT | Facility: OTHER | Age: 74
End: 2024-12-06
Payer: MEDICARE

## 2024-12-06 DIAGNOSIS — I25.10 CORONARY ARTERY DISEASE INVOLVING NATIVE CORONARY ARTERY OF NATIVE HEART WITHOUT ANGINA PECTORIS: Primary | ICD-10-CM

## 2024-12-06 DIAGNOSIS — I10 HYPERTENSION, ESSENTIAL: ICD-10-CM

## 2024-12-06 DIAGNOSIS — G20.B2 PARKINSON'S DISEASE WITH DYSKINESIA AND FLUCTUATING MANIFESTATIONS: ICD-10-CM

## 2024-12-06 NOTE — OUTREACH NOTE
"AMBULATORY CASE MANAGEMENT NOTE    Names and Relationships of Patient/Support Persons: Contact: Joanna Cross \"KARINA\"; Relationship: Self -     CCM Interim Update    Received return call from Karina. She has been having some chest discomfort for the past week. Home Health called to Dr. Teague's office to report her symptoms. She states that she can feel her heart beat in her throat. When this happens she feels weak and has had some nausea. It's occurred every day except for today. She will be having a nuclear stress, ECHO and PPM check 12/10 and then will see Dr. Teague. She is aware that if she has worsening of symptoms to go to the hospital.     She has been monitoring her BS. Most mornings it is close to 100 in the mornings. It is higher later in the day due to snacking after dinner. Last night it was 204. She had chili with crackers for supper and a banana. She adjusts her insulin as needed and her blood sugar comes down.     She reports that her Parkinson's symptoms are stable. She has not had any falls.     She got a good report from hematology. Her lab work will now be drawn once a month instead of every 2 weeks. Her last h/h was 12.0/36.0.  She has only been getting a procrit injection every 4 months on average.     She reports her breathing has been stable. She has not had to use her o2 during the day. Her weight has remained stable between 220-224. She says her legs look good.       Education Documentation  No documentation found.        Faye JAUREGUI  Ambulatory Case Management    12/6/2024, 15:46 EST  "

## 2024-12-11 ENCOUNTER — TELEPHONE (OUTPATIENT)
Dept: FAMILY MEDICINE CLINIC | Facility: CLINIC | Age: 74
End: 2024-12-11
Payer: MEDICARE

## 2024-12-11 ENCOUNTER — HOME CARE VISIT (OUTPATIENT)
Dept: HOME HEALTH SERVICES | Facility: HOME HEALTHCARE | Age: 74
End: 2024-12-11
Payer: MEDICARE

## 2024-12-11 VITALS
OXYGEN SATURATION: 96 % | RESPIRATION RATE: 18 BRPM | SYSTOLIC BLOOD PRESSURE: 120 MMHG | HEART RATE: 108 BPM | TEMPERATURE: 97.9 F | DIASTOLIC BLOOD PRESSURE: 62 MMHG

## 2024-12-11 PROCEDURE — G0299 HHS/HOSPICE OF RN EA 15 MIN: HCPCS

## 2024-12-11 RX ORDER — ALBUTEROL SULFATE 1.25 MG/3ML
1 SOLUTION RESPIRATORY (INHALATION) EVERY 6 HOURS PRN
Qty: 120 EACH | Refills: 5 | Status: SHIPPED | OUTPATIENT
Start: 2024-12-11

## 2024-12-11 NOTE — TELEPHONE ENCOUNTER
Called and talked to patient.  She states that her shortness of breath is not any worse.  I am going to call her in her albuterol nebulizer solution.  Return to clinic if worse.

## 2024-12-12 ENCOUNTER — OFFICE VISIT (OUTPATIENT)
Dept: FAMILY MEDICINE CLINIC | Facility: CLINIC | Age: 74
End: 2024-12-12
Payer: MEDICARE

## 2024-12-12 ENCOUNTER — HOME CARE VISIT (OUTPATIENT)
Dept: HOME HEALTH SERVICES | Facility: HOME HEALTHCARE | Age: 74
End: 2024-12-12
Payer: MEDICARE

## 2024-12-12 VITALS
RESPIRATION RATE: 15 BRPM | HEART RATE: 71 BPM | DIASTOLIC BLOOD PRESSURE: 84 MMHG | SYSTOLIC BLOOD PRESSURE: 136 MMHG | WEIGHT: 223.4 LBS | HEIGHT: 62 IN | OXYGEN SATURATION: 94 % | BODY MASS INDEX: 41.11 KG/M2

## 2024-12-12 DIAGNOSIS — J06.9 URI WITH COUGH AND CONGESTION: ICD-10-CM

## 2024-12-12 DIAGNOSIS — H65.111 NON-RECURRENT ACUTE ALLERGIC OTITIS MEDIA OF RIGHT EAR: Primary | ICD-10-CM

## 2024-12-12 PROCEDURE — 99213 OFFICE O/P EST LOW 20 MIN: CPT | Performed by: FAMILY MEDICINE

## 2024-12-12 PROCEDURE — 87428 SARSCOV & INF VIR A&B AG IA: CPT | Performed by: FAMILY MEDICINE

## 2024-12-12 PROCEDURE — 1126F AMNT PAIN NOTED NONE PRSNT: CPT | Performed by: FAMILY MEDICINE

## 2024-12-12 PROCEDURE — 1159F MED LIST DOCD IN RCRD: CPT | Performed by: FAMILY MEDICINE

## 2024-12-12 PROCEDURE — 1160F RVW MEDS BY RX/DR IN RCRD: CPT | Performed by: FAMILY MEDICINE

## 2024-12-12 PROCEDURE — 3075F SYST BP GE 130 - 139MM HG: CPT | Performed by: FAMILY MEDICINE

## 2024-12-12 PROCEDURE — 3079F DIAST BP 80-89 MM HG: CPT | Performed by: FAMILY MEDICINE

## 2024-12-12 RX ORDER — DOXYCYCLINE 100 MG/1
100 CAPSULE ORAL 2 TIMES DAILY
Qty: 14 CAPSULE | Refills: 0 | Status: SHIPPED | OUTPATIENT
Start: 2024-12-12 | End: 2024-12-19

## 2024-12-12 RX ORDER — DOXYCYCLINE 100 MG/1
100 CAPSULE ORAL 2 TIMES DAILY
Qty: 14 CAPSULE | Refills: 0 | Status: SHIPPED | OUTPATIENT
Start: 2024-12-12 | End: 2024-12-12

## 2024-12-12 NOTE — PROGRESS NOTES
Follow Up Office Visit      Date: 2024   Patient Name: Joanna Cross  : 1950   MRN: [id number]   PCP: Patient Care Team:  Reynaldo Fritz MD as PCP - General (Family Medicine)  Faisal Ramirez MD as Obstetrician (Obstetrics and Gynecology)  Timothy Dan DC as Referring Physician (Chiropractic Medicine)  Dory Gamboa PA (Physician Assistant)  Rene Pringle MD (Otolaryngology)  Baylee Elliott APRN (Nurse Practitioner)  Terrence Mckenzie MD (Urology)  Myrna Mckeon APRN as Nurse Practitioner (Pulmonary Disease)  Lauro Francois MD as Consulting Physician (Cardiology)  Sammy Yo MD as Consulting Physician (Orthopedic Surgery)  Trenton Sosa MD as Consulting Physician (Gastroenterology)  Armando Shen MD as Consulting Physician (Allergy)  Dilshad Wood MD as Consulting Physician (Endocrinology)  Myrna Mckeon APRN as Nurse Practitioner (Pulmonary Disease)  Amena Grover PA-C as Physician Assistant (Physician Assistant)  Raciel Valadez MD as Consulting Physician (Pulmonary Disease)  Joe Ceron PA as Physician Assistant (Cardiology)  Joe Teague MD as Consulting Physician (Cardiology)  Faye Beasley, RN as Ambulatory  ([facility])  Myrna Mckeon APRN as Nurse Practitioner (Pulmonary Disease)     Chief Complaint:    Chief Complaint   Patient presents with    URI       History of Present Illness: Joanna Cross is a 74 y.o. female who is here today for cough, congestion, right ear pain and sore throat.  Patient reports symptoms started 4 days ago.  Patient denies taking any over-the-counter medications at this time.  Patient is currently on 2 L nasal cannula.  Patient is not in distress at this time.  Patient does report everyone in the household is currently sick.    Subjective      Review of Systems:   Review of Systems   Constitutional:  Negative for activity change and  diaphoresis.   HENT:  Positive for ear pain, postnasal drip, rhinorrhea and sore throat. Negative for sinus pressure.    Eyes:  Negative for redness.   Respiratory:  Positive for cough. Negative for shortness of breath.    Cardiovascular:  Negative for chest pain.   Gastrointestinal:  Negative for diarrhea and nausea.   Genitourinary:  Negative for difficulty urinating.   Neurological:  Negative for tremors.   Psychiatric/Behavioral:  Negative for agitation.         I have reviewed the patients family history, social history, past medical history, past surgical history and have updated it as appropriate.     Medications:     Current Outpatient Medications:     albuterol (ACCUNEB) 1.25 MG/3ML nebulizer solution, Take 3 mL by nebulization Every 6 (Six) Hours As Needed for Wheezing. 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 Indications: Chronic Obstructive Lung Disease, Disp: , Rfl:     amantadine (SYMMETREL) 100 MG capsule, Take 1 capsule by mouth 2 (Two) Times a Day. Indications: Parkinson's Disease with Unknown Cause, Disp: , Rfl:     apixaban (Eliquis) 5 MG tablet tablet, Take 1 tablet by mouth Every 12 (Twelve) Hours. Restart 1/29/24, Disp: 180 tablet, Rfl: 2    aspirin 81 MG chewable tablet, Chew 1 tablet Daily. 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 Indications: Nutritional Support, Disp: , Rfl:     bumetanide (BUMEX) 1 MG tablet, Take 1 tablet by mouth 2 (Two) Times a Day. Indications: Edema, Disp: , Rfl:     Calcium Carbonate (CALTRATE 600 PO), Take 1 tablet by mouth Daily. Indications: Nutritional Support, Disp: , Rfl:     carbidopa-levodopa (SINEMET)  MG per tablet, Take 2 tablets by mouth at 6am, then  1 tablet at 9am, 12pm, 3pm, 6pm and 9pm.  Indications: Parkinson's Disease, Disp: , Rfl:     Cholecalciferol 25 MCG (1000 UT) tablet, Take 1 tablet by mouth 2 (Two) Times a Day. Indications: Vitamin D Deficiency, Disp: , Rfl:     citalopram (CeleXA) 20 MG tablet, Take 1 tablet by mouth Daily. Indications: Major Depressive Disorder, Disp: 90 tablet, Rfl: 0    clobetasol propionate (TEMOVATE) 0.05 % cream, Apply 1 Application topically to the appropriate area as directed 2 (Two) Times a Week. Indications: Skin Inflammation, Disp: , Rfl:     Dapagliflozin Propanediol (FARXIGA PO), Take 10 mg by mouth Daily. Indications: heart failure, Disp: , Rfl:     doxycycline (VIBRAMYCIN) 100 MG capsule, Take 1 capsule by mouth 2 (Two) Times a Day for 7 days. Indications: Upper Respiratory Tract Infection, Disp: 14 capsule, Rfl: 0    fluticasone (FLONASE) 50 MCG/ACT nasal spray, Administer 2 sprays into the nostril(s) as directed by provider Daily As Needed for Rhinitis. Indications: Allergic Rhinitis, Disp: , Rfl:     furosemide (LASIX) 40 MG tablet, Take 1 tablet by mouth Daily. Indications: Edema, Disp: , Rfl:     Glucosamine-Chondroit-Calcium (TRIPLE FLEX BONE & JOINT PO), Take 1 tablet by mouth Daily. Indications: Nutritional Support, Disp: , Rfl:     hydroxychloroquine (PLAQUENIL) 200 MG tablet, Take 1 tablet by mouth 2 (Two) Times a Day. Indications: Discoid Lupus Erythematosus, Systemic Lupus Erythematosus, Disp: , Rfl:     Insulin Glargine (Lantus SoloStar) 100 UNIT/ML injection pen, Inject 15 Units under the skin into the appropriate area as directed Daily. Indications: Type 2 Diabetes (Patient taking differently: Inject 12 Units under the skin into the appropriate area as directed Daily. Indications: Type 2 Diabetes), Disp: 15 mL, Rfl: 2    ipratropium (ATROVENT) 0.06 % nasal spray, Administer 2 sprays into the nostril(s) as directed by provider As Needed for Rhinitis. Indications: Hayfever, Disp: , Rfl:      levothyroxine (Unithroid) 200 MCG tablet, Take 1 tablet by mouth Daily. NEW DOSE, Disp: 90 tablet, Rfl: 1    loratadine (CLARITIN) 10 MG tablet, Take 1 tablet by mouth Daily. Indications: Hayfever, Disp: , Rfl:     metOLazone (ZAROXOLYN) 2.5 MG tablet, Take 1 tablet by mouth 4 (Four) Times a Week. Indications: Edema, Disp: , Rfl:     Multiple Vitamins-Minerals (CENTRUM ULTRA WOMENS PO), Take 1 tablet by mouth Daily. Indications: Nutritional Support, Disp: , Rfl:     O2 (OXYGEN), Inhale 2 L/min Continuous. Indications: soa, Disp: , Rfl:     omeprazole (priLOSEC) 40 MG capsule, Take 1 capsule by mouth 2 (Two) Times a Day. Indications: Gastroesophageal Reflux Disease, Disp: 180 capsule, Rfl: 1    pramipexole (MIRAPEX) 1.5 MG tablet, Take 1 tablet by mouth Every Night. Indications: Parkinson's Disease, Disp: , Rfl:     ranolazine (RANEXA) 500 MG 12 hr tablet, Take 2 tablets by mouth Every 12 (Twelve) Hours., Disp: 360 tablet, Rfl: 3    sacubitril-valsartan (ENTRESTO) 49-51 MG tablet, Take 1 tablet by mouth Every 12 (Twelve) Hours. Indications: Cardiac Failure, Disp: , Rfl:     senna (SENOKOT) 8.6 MG tablet, Take 1 tablet by mouth Daily. Indications: Constipation, Disp: , Rfl:     spironolactone (ALDACTONE) 25 MG tablet, Take 1 tablet by mouth Daily. Indications: Heart failure, Disp: , Rfl:     traMADol (ULTRAM) 50 MG tablet, Take 1 tablet by mouth At Night As Needed for Moderate Pain., Disp: 30 tablet, Rfl: 1    triamcinolone (KENALOG) 0.1 % cream, Apply 1 Application topically to the appropriate area as directed As Needed for Irritation. Indications: Atopic Dermatitis, Disp: , Rfl:     Triamcinolone Acetonide (NASACORT) 55 MCG/ACT nasal inhaler, Administer 2 sprays into the nostril(s) as directed by provider Daily As Needed for Rhinitis. Indications: Nose Inflammation, Disp: , Rfl:     Allergies:   Allergies   Allergen Reactions    Amlodipine Besylate Swelling     Lower extremity (ankles, feet) swelling    Entacapone  "Other (See Comments)     \"extreme weakness in legs - caused several falls, which stopped after discontinuing this medication\"    Epinephrine Other (See Comments)     6/4/16- had 3 shots to numb mouth to prepare teeth for crowns, the shots contained epi-  Caused pt to have chest discomfort- went to hospital in ambulance, discovered had a fib while there     Hydrocodone Unknown - High Severity     Headache, nausea, dizziness, & vomiting    Levemir [Insulin Detemir] Hives     Hives / rash around injection site    Penicillins Hives     Jitteriness     Xarelto [Rivaroxaban] GI Bleeding     hgb dropped to 5.2    Aricept [Donepezil Hcl] Nausea Only     Vivid dreams    Benztropine Mesylate Other (See Comments)     Uncontrollable body movements    Cogentin [Benztropine] Other (See Comments)     \"uncontrollable body movements\"    Compazine [Prochlorperazine Edisylate] Other (See Comments)     Dystonic reaction    Duraprep [Antiseptic Products, Misc.] Itching and Rash     RASH AND ITCHING    Haldol [Haloperidol Lactate] Other (See Comments)     Dystonic reaction    Hydralazine Other (See Comments)     Headache     Lisinopril Cough    Statins Myalgia     Leg pain- all statins     Sulfamethoxazole Nausea Only and Other (See Comments)     Nausea & headaches    Tarka [Trandolapril-Verapamil Hcl Er] Other (See Comments)     Constipation     Toprol Xl [Metoprolol Tartrate] Other (See Comments)     Extreme fatigue, decreased exercise tolerance    Trimethoprim Other (See Comments)     Other reaction(s): Nausea       Objective     Physical Exam: Please see above  Vital Signs:   Vitals:    12/12/24 0925   BP: 136/84   BP Location: Right arm   Patient Position: Sitting   Cuff Size: Adult   Pulse: 71   Resp: 15   SpO2: 94%   Weight: 101 kg (223 lb 6.4 oz)   Height: 157.5 cm (62\")     Body mass index is 40.86 kg/m².          Physical Exam  Vitals and nursing note reviewed.   HENT:      Head: Normocephalic.      Right Ear: Swelling and " tenderness present. Tympanic membrane is injected and erythematous.      Nose: Congestion present.      Mouth/Throat:      Pharynx: Posterior oropharyngeal erythema present.   Eyes:      Pupils: Pupils are equal, round, and reactive to light.   Cardiovascular:      Rate and Rhythm: Normal rate and regular rhythm.   Pulmonary:      Effort: Pulmonary effort is normal.      Breath sounds: Normal breath sounds.   Abdominal:      General: Bowel sounds are normal.   Musculoskeletal:         General: Normal range of motion.      Cervical back: Normal range of motion.   Skin:     General: Skin is warm and dry.   Neurological:      General: No focal deficit present.      Mental Status: She is alert and oriented to person, place, and time.   Psychiatric:         Mood and Affect: Mood normal.         Procedures    Results:   Labs:   Hemoglobin A1C   Date Value Ref Range Status   10/14/2024 6.1 (H) 4.8 - 5.6 % Final     Comment:              Prediabetes: 5.7 - 6.4           Diabetes: >6.4           Glycemic control for adults with diabetes: <7.0     06/02/2024 5.90 (H) 4.80 - 5.60 % Final   12/08/2021 6.1  Final     TSH   Date Value Ref Range Status   10/14/2024 8.520 (H) 0.450 - 4.500 uIU/mL Final   09/16/2024 6.720 (H) 0.270 - 4.200 uIU/mL Final        POCT Results (if applicable):   Results for orders placed or performed in visit on 12/12/24   POCT SARS-CoV-2 Antigen VIKTORIA + Flu    Collection Time: 12/12/24  9:36 AM    Specimen: Swab   Result Value Ref Range    SARS Antigen Not Detected Not Detected, Presumptive Negative    Influenza A Antigen VIKTORIA Not Detected Not Detected    Influenza B Antigen VIKTORIA Not Detected Not Detected    Internal Control Passed Passed    Lot Number [id number]     Expiration Date 2/25/25      *Note: Due to a large number of results and/or encounters for the requested time period, some results have not been displayed. A complete set of results can be found in Results Review.           Assessment/Plan:    Diagnoses and all orders for this visit:    1. Non-recurrent acute allergic otitis media of right ear (Primary)  -     doxycycline (VIBRAMYCIN) 100 MG capsule; Take 1 capsule by mouth 2 (Two) Times a Day for 7 days. Indications: Upper Respiratory Tract Infection  Dispense: 14 capsule; Refill: 0    2. URI with cough and congestion  -     POCT SARS-CoV-2 Antigen VIKTORIA + Flu  -     XR Chest PA & Lateral; Future    Other orders  -     Discontinue: doxycycline (VIBRAMYCIN) 100 MG capsule; Take 1 capsule by mouth 2 (Two) Times a Day for 7 days. Indications: Upper Respiratory Tract Infection  Dispense: 14 capsule; Refill: 0      Patient will continue taking all other medications at this time.  Patient will continue to monitor oxygen levels.    Follow Up:   Return Return if 5 to 7 days with continued symptoms, for Next scheduled follow up.      DEJA Levin  [facility]   Electronically signed by DEJA Levin, 12/12/24, 9:44 AM EST.

## 2024-12-12 NOTE — PROGRESS NOTES
Follow Up Office Visit      Date: 2024   Patient Name: Joanna Cross  : 1950   MRN: [id number]   PCP: Patient Care Team:  Reynaldo Fritz MD as PCP - General (Family Medicine)  Faisal Ramirez MD as Obstetrician (Obstetrics and Gynecology)  Timothy Dan DC as Referring Physician (Chiropractic Medicine)  Dory Gamboa PA (Physician Assistant)  Rene Pringle MD (Otolaryngology)  Baylee Elliott APRN (Nurse Practitioner)  Terrence Mckenzie MD (Urology)  Myrna Mckeon APRN as Nurse Practitioner (Pulmonary Disease)  Lauro Francois MD as Consulting Physician (Cardiology)  Sammy Yo MD as Consulting Physician (Orthopedic Surgery)  Trenton Sosa MD as Consulting Physician (Gastroenterology)  Armando Shen MD as Consulting Physician (Allergy)  Dilshad Wood MD as Consulting Physician (Endocrinology)  Myrna Mckeon APRN as Nurse Practitioner (Pulmonary Disease)  Amena Grover PA-C as Physician Assistant (Physician Assistant)  Raciel Valadez MD as Consulting Physician (Pulmonary Disease)  Joe Ceron PA as Physician Assistant (Cardiology)  Joe Teague MD as Consulting Physician (Cardiology)  Faye Beasley, RN as Ambulatory  ([facility])  Myrna Mckeon APRN as Nurse Practitioner (Pulmonary Disease)     Chief Complaint:    Chief Complaint   Patient presents with    URI       History of Present Illness: Joanna Cross is a 74 y.o. female who is here today for cough, congestion, right ear pain and sore throat.  Patient reports symptoms started 4 days ago.  Patient denies taking any over-the-counter medications at this time.  Patient is currently on 2 L nasal cannula.  Patient is not in distress at this time.  Patient does report everyone in the household is currently sick.    Subjective      Review of Systems:   Review of Systems   Constitutional:  Negative for activity change and  diaphoresis.   HENT:  Positive for congestion, postnasal drip, rhinorrhea, sinus pressure, sinus pain, sneezing and sore throat.    Eyes:  Negative for redness.   Respiratory:  Positive for cough. Negative for shortness of breath.    Cardiovascular:  Negative for chest pain.   Gastrointestinal:  Negative for diarrhea and nausea.   Genitourinary:  Negative for difficulty urinating.   Neurological:  Negative for tremors.   Psychiatric/Behavioral:  Negative for agitation.         I have reviewed the patients family history, social history, past medical history, past surgical history and have updated it as appropriate.     Medications:     Current Outpatient Medications:     albuterol (ACCUNEB) 1.25 MG/3ML nebulizer solution, Take 3 mL by nebulization Every 6 (Six) Hours As Needed for Wheezing. 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 Indications: Chronic Obstructive Lung Disease, Disp: , Rfl:     amantadine (SYMMETREL) 100 MG capsule, Take 1 capsule by mouth 2 (Two) Times a Day. Indications: Parkinson's Disease with Unknown Cause, Disp: , Rfl:     apixaban (Eliquis) 5 MG tablet tablet, Take 1 tablet by mouth Every 12 (Twelve) Hours. Restart 1/29/24, Disp: 180 tablet, Rfl: 2    aspirin 81 MG chewable tablet, Chew 1 tablet Daily. 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 Indications: Nutritional Support, Disp: , Rfl:     bumetanide (BUMEX) 1 MG tablet, Take 1 tablet by mouth 2 (Two) Times a Day. Indications: Edema, Disp: , Rfl:     Calcium Carbonate (CALTRATE 600 PO), Take 1 tablet by mouth Daily. Indications: Nutritional Support, Disp: , Rfl:     carbidopa-levodopa (SINEMET)  MG per tablet, Take 2 tablets by mouth at  6am, then 1 tablet at 9am, 12pm, 3pm, 6pm and 9pm.  Indications: Parkinson's Disease, Disp: , Rfl:     Cholecalciferol 25 MCG (1000 UT) tablet, Take 1 tablet by mouth 2 (Two) Times a Day. Indications: Vitamin D Deficiency, Disp: , Rfl:     citalopram (CeleXA) 20 MG tablet, Take 1 tablet by mouth Daily. Indications: Major Depressive Disorder, Disp: 90 tablet, Rfl: 0    clobetasol propionate (TEMOVATE) 0.05 % cream, Apply 1 Application topically to the appropriate area as directed 2 (Two) Times a Week. Indications: Skin Inflammation, Disp: , Rfl:     Dapagliflozin Propanediol (FARXIGA PO), Take 10 mg by mouth Daily. Indications: heart failure, Disp: , Rfl:     doxycycline (VIBRAMYCIN) 100 MG capsule, Take 1 capsule by mouth 2 (Two) Times a Day for 7 days. Indications: Upper Respiratory Tract Infection, Disp: 14 capsule, Rfl: 0    fluticasone (FLONASE) 50 MCG/ACT nasal spray, Administer 2 sprays into the nostril(s) as directed by provider Daily As Needed for Rhinitis. Indications: Allergic Rhinitis, Disp: , Rfl:     furosemide (LASIX) 40 MG tablet, Take 1 tablet by mouth Daily. Indications: Edema, Disp: , Rfl:     Glucosamine-Chondroit-Calcium (TRIPLE FLEX BONE & JOINT PO), Take 1 tablet by mouth Daily. Indications: Nutritional Support, Disp: , Rfl:     hydroxychloroquine (PLAQUENIL) 200 MG tablet, Take 1 tablet by mouth 2 (Two) Times a Day. Indications: Discoid Lupus Erythematosus, Systemic Lupus Erythematosus, Disp: , Rfl:     Insulin Glargine (Lantus SoloStar) 100 UNIT/ML injection pen, Inject 15 Units under the skin into the appropriate area as directed Daily. Indications: Type 2 Diabetes (Patient taking differently: Inject 12 Units under the skin into the appropriate area as directed Daily. Indications: Type 2 Diabetes), Disp: 15 mL, Rfl: 2    ipratropium (ATROVENT) 0.06 % nasal spray, Administer 2 sprays into the nostril(s) as directed by provider As Needed for Rhinitis. Indications: Hayfever, Disp: , Rfl:      levothyroxine (Unithroid) 200 MCG tablet, Take 1 tablet by mouth Daily. NEW DOSE, Disp: 90 tablet, Rfl: 1    loratadine (CLARITIN) 10 MG tablet, Take 1 tablet by mouth Daily. Indications: Hayfever, Disp: , Rfl:     metOLazone (ZAROXOLYN) 2.5 MG tablet, Take 1 tablet by mouth 4 (Four) Times a Week. Indications: Edema, Disp: , Rfl:     Multiple Vitamins-Minerals (CENTRUM ULTRA WOMENS PO), Take 1 tablet by mouth Daily. Indications: Nutritional Support, Disp: , Rfl:     O2 (OXYGEN), Inhale 2 L/min Continuous. Indications: soa, Disp: , Rfl:     omeprazole (priLOSEC) 40 MG capsule, Take 1 capsule by mouth 2 (Two) Times a Day. Indications: Gastroesophageal Reflux Disease, Disp: 180 capsule, Rfl: 1    pramipexole (MIRAPEX) 1.5 MG tablet, Take 1 tablet by mouth Every Night. Indications: Parkinson's Disease, Disp: , Rfl:     ranolazine (RANEXA) 500 MG 12 hr tablet, Take 2 tablets by mouth Every 12 (Twelve) Hours., Disp: 360 tablet, Rfl: 3    sacubitril-valsartan (ENTRESTO) 49-51 MG tablet, Take 1 tablet by mouth Every 12 (Twelve) Hours. Indications: Cardiac Failure, Disp: , Rfl:     senna (SENOKOT) 8.6 MG tablet, Take 1 tablet by mouth Daily. Indications: Constipation, Disp: , Rfl:     spironolactone (ALDACTONE) 25 MG tablet, Take 1 tablet by mouth Daily. Indications: Heart failure, Disp: , Rfl:     traMADol (ULTRAM) 50 MG tablet, Take 1 tablet by mouth At Night As Needed for Moderate Pain., Disp: 30 tablet, Rfl: 1    triamcinolone (KENALOG) 0.1 % cream, Apply 1 Application topically to the appropriate area as directed As Needed for Irritation. Indications: Atopic Dermatitis, Disp: , Rfl:     Triamcinolone Acetonide (NASACORT) 55 MCG/ACT nasal inhaler, Administer 2 sprays into the nostril(s) as directed by provider Daily As Needed for Rhinitis. Indications: Nose Inflammation, Disp: , Rfl:     Allergies:   Allergies   Allergen Reactions    Amlodipine Besylate Swelling     Lower extremity (ankles, feet) swelling    Entacapone  "Other (See Comments)     \"extreme weakness in legs - caused several falls, which stopped after discontinuing this medication\"    Epinephrine Other (See Comments)     6/4/16- had 3 shots to numb mouth to prepare teeth for crowns, the shots contained epi-  Caused pt to have chest discomfort- went to hospital in ambulance, discovered had a fib while there     Hydrocodone Unknown - High Severity     Headache, nausea, dizziness, & vomiting    Levemir [Insulin Detemir] Hives     Hives / rash around injection site    Penicillins Hives     Jitteriness     Xarelto [Rivaroxaban] GI Bleeding     hgb dropped to 5.2    Aricept [Donepezil Hcl] Nausea Only     Vivid dreams    Benztropine Mesylate Other (See Comments)     Uncontrollable body movements    Cogentin [Benztropine] Other (See Comments)     \"uncontrollable body movements\"    Compazine [Prochlorperazine Edisylate] Other (See Comments)     Dystonic reaction    Duraprep [Antiseptic Products, Misc.] Itching and Rash     RASH AND ITCHING    Haldol [Haloperidol Lactate] Other (See Comments)     Dystonic reaction    Hydralazine Other (See Comments)     Headache     Lisinopril Cough    Statins Myalgia     Leg pain- all statins     Sulfamethoxazole Nausea Only and Other (See Comments)     Nausea & headaches    Tarka [Trandolapril-Verapamil Hcl Er] Other (See Comments)     Constipation     Toprol Xl [Metoprolol Tartrate] Other (See Comments)     Extreme fatigue, decreased exercise tolerance    Trimethoprim Other (See Comments)     Other reaction(s): Nausea       Objective     Physical Exam: Please see above  Vital Signs:   Vitals:    12/12/24 0925   BP: 136/84   BP Location: Right arm   Patient Position: Sitting   Cuff Size: Adult   Pulse: 71   Resp: 15   SpO2: 94%   Weight: 101 kg (223 lb 6.4 oz)   Height: 157.5 cm (62\")     Body mass index is 40.86 kg/m².          Physical Exam  Vitals and nursing note reviewed.   HENT:      Head: Normocephalic.      Right Ear: Drainage present. " Tympanic membrane is injected and erythematous.      Nose: Congestion present.      Mouth/Throat:      Pharynx: Pharyngeal swelling, posterior oropharyngeal erythema and postnasal drip present.   Eyes:      Pupils: Pupils are equal, round, and reactive to light.   Cardiovascular:      Rate and Rhythm: Normal rate and regular rhythm.   Pulmonary:      Effort: Pulmonary effort is normal.      Breath sounds: Normal breath sounds.   Abdominal:      General: Bowel sounds are normal.   Musculoskeletal:         General: Normal range of motion.      Cervical back: Normal range of motion.   Skin:     General: Skin is warm and dry.   Neurological:      General: No focal deficit present.      Mental Status: She is alert and oriented to person, place, and time.   Psychiatric:         Mood and Affect: Mood normal.         Procedures    Results:   Labs:   Hemoglobin A1C   Date Value Ref Range Status   10/14/2024 6.1 (H) 4.8 - 5.6 % Final     Comment:              Prediabetes: 5.7 - 6.4           Diabetes: >6.4           Glycemic control for adults with diabetes: <7.0     06/02/2024 5.90 (H) 4.80 - 5.60 % Final   12/08/2021 6.1  Final     TSH   Date Value Ref Range Status   10/14/2024 8.520 (H) 0.450 - 4.500 uIU/mL Final   09/16/2024 6.720 (H) 0.270 - 4.200 uIU/mL Final        POCT Results (if applicable):   Results for orders placed or performed in visit on 12/12/24   POCT SARS-CoV-2 Antigen VIKTORIA + Flu    Collection Time: 12/12/24  9:36 AM    Specimen: Swab   Result Value Ref Range    SARS Antigen Not Detected Not Detected, Presumptive Negative    Influenza A Antigen VIKTORIA Not Detected Not Detected    Influenza B Antigen VIKTORIA Not Detected Not Detected    Internal Control Passed Passed    Lot Number [id number]     Expiration Date 2/25/25      *Note: Due to a large number of results and/or encounters for the requested time period, some results have not been displayed. A complete set of results can be found in Results Review.          Assessment / Plan      Assessment/Plan:       Follow Up:   Return Return if 5 to 7 days with continued symptoms, for Next scheduled follow up.      DEJA Levin  [facility]   Electronically signed by DEJA Levin, 12/12/24, 9:27 AM EST.

## 2024-12-12 NOTE — CASE COMMUNICATION
Patient missed a HHPT visit from [facility] on 12/12/24.     Reason: pt cancelled scheduled PT visits this date due to not feeling well.       For your records only.   Per CMS Guidance, MD must be notified of missed/cancelled visits; therefore the prescribed frequency was not met.

## 2024-12-13 ENCOUNTER — TELEPHONE (OUTPATIENT)
Dept: FAMILY MEDICINE CLINIC | Facility: CLINIC | Age: 74
End: 2024-12-13
Payer: MEDICARE

## 2024-12-13 RX ORDER — DEXTROMETHORPHAN HYDROBROMIDE AND PROMETHAZINE HYDROCHLORIDE 15; 6.25 MG/5ML; MG/5ML
5 SYRUP ORAL 4 TIMES DAILY PRN
Qty: 180 ML | Refills: 0 | Status: SHIPPED | OUTPATIENT
Start: 2024-12-13

## 2024-12-13 NOTE — TELEPHONE ENCOUNTER
"Caller: Joanna Cross \"SIXTO\"    Relationship: Self    Best call back number: [phone]     Caller requesting test results: XRAYS    What test was performed: XRAYS    When was the test performed: 12/12/24    Where was the test performed: PCP OFFICE  "

## 2024-12-16 ENCOUNTER — HOME CARE VISIT (OUTPATIENT)
Dept: HOME HEALTH SERVICES | Facility: HOME HEALTHCARE | Age: 74
End: 2024-12-16
Payer: MEDICARE

## 2024-12-16 RX ORDER — LEVOFLOXACIN 250 MG/1
250 TABLET, FILM COATED ORAL DAILY
Status: CANCELLED | OUTPATIENT
Start: 2024-12-16

## 2024-12-16 NOTE — TELEPHONE ENCOUNTER
----- Message from Amena Nath sent at 12/16/2024  8:50 AM EST -----  Call and check on patient to see how medications are working.  Patient does have viral pneumonia according to x-ray.  Antibiotic given note was appropriate antibiotic

## 2024-12-17 ENCOUNTER — OFFICE VISIT (OUTPATIENT)
Dept: FAMILY MEDICINE CLINIC | Facility: CLINIC | Age: 74
End: 2024-12-17
Payer: MEDICARE

## 2024-12-17 VITALS
DIASTOLIC BLOOD PRESSURE: 82 MMHG | BODY MASS INDEX: 43.78 KG/M2 | WEIGHT: 223 LBS | SYSTOLIC BLOOD PRESSURE: 136 MMHG | HEIGHT: 60 IN

## 2024-12-17 DIAGNOSIS — R06.2 WHEEZING: ICD-10-CM

## 2024-12-17 DIAGNOSIS — J18.9 PNEUMONIA OF BOTH LUNGS DUE TO INFECTIOUS ORGANISM, UNSPECIFIED PART OF LUNG: ICD-10-CM

## 2024-12-17 DIAGNOSIS — R05.2 SUBACUTE COUGH: Primary | ICD-10-CM

## 2024-12-17 PROCEDURE — 3075F SYST BP GE 130 - 139MM HG: CPT | Performed by: FAMILY MEDICINE

## 2024-12-17 PROCEDURE — 1126F AMNT PAIN NOTED NONE PRSNT: CPT | Performed by: FAMILY MEDICINE

## 2024-12-17 PROCEDURE — 99213 OFFICE O/P EST LOW 20 MIN: CPT | Performed by: FAMILY MEDICINE

## 2024-12-17 PROCEDURE — 3079F DIAST BP 80-89 MM HG: CPT | Performed by: FAMILY MEDICINE

## 2024-12-17 RX ORDER — TRIAMCINOLONE ACETONIDE 40 MG/ML
40 INJECTION, SUSPENSION INTRA-ARTICULAR; INTRAMUSCULAR ONCE
Status: DISCONTINUED | OUTPATIENT
Start: 2024-12-17 | End: 2024-12-17

## 2024-12-17 NOTE — PROGRESS NOTES
Answers submitted by the patient for this visit:  Primary Reason for Visit (Submitted on 2024)  What is the primary reason for your visit?: Cough  Cough Questionnaire (Submitted on 2024)  Chief Complaint: Cough  Onset: in the past 7 days  Progression since onset: worsening  Frequency: every few minutes  Cough characteristics: rattling  chest pain: No  chills: No  ear congestion: Yes  ear pain: Yes  fever: No  headaches: No  heartburn: No  hemoptysis: No  myalgias: No  nasal congestion: No  postnasal drip: No  rash: No  rhinorrhea: No  shortness of breath: Yes  sore throat: Yes  sweats: No  weight loss: No  wheezing: Yes  Aggravated by: nothing      Follow Up Office Visit      Date: 2024   Patient Name: Joanna Cross  : 1950   MRN: [id number]   PCP: Patient Care Team:  Reynaldo Fritz MD as PCP - General (Family Medicine)  Faisal Ramirez MD as Obstetrician (Obstetrics and Gynecology)  Timothy Dan DC as Referring Physician (Chiropractic Medicine)  Dory Gamboa PA (Physician Assistant)  Rene Pringle MD (Otolaryngology)  Baylee Elliott APRN (Nurse Practitioner)  Terrence Mckenzie MD (Urology)  Myrna Mckeon APRN as Nurse Practitioner (Pulmonary Disease)  Lauro Francois MD as Consulting Physician (Cardiology)  Sammy Yo MD as Consulting Physician (Orthopedic Surgery)  Trenton Sosa MD as Consulting Physician (Gastroenterology)  Armando Shen MD as Consulting Physician (Allergy)  Dilshad Wood MD as Consulting Physician (Endocrinology)  Myrna Mckeon APRN as Nurse Practitioner (Pulmonary Disease)  Amena Grover PA-C as Physician Assistant (Physician Assistant)  Raciel Valadez MD as Consulting Physician (Pulmonary Disease)  Joe Ceron PA as Physician Assistant (Cardiology)  Joe Teague MD as Consulting Physician (Cardiology)  Faye Beasley, RN as Ambulatory  (Population  [facility])  Myrna Mckeon APRN as Nurse Practitioner (Pulmonary Disease)     Chief Complaint:    Chief Complaint   Patient presents with    Follow-up     Uri        History of Present Illness: Joanna Cross is a 74 y.o. female who is here today for continuing cough, congestion, fatigue.  Patient has been taking doxycycline as directed.  With no relief patient reports she feels that she is respiratory status is getting worse versus improving.  Patient does appear to be in some respiratory distress    Subjective      Review of Systems:   Review of Systems   Constitutional:  Negative for chills and fever.   HENT:  Positive for ear pain and sore throat. Negative for postnasal drip and rhinorrhea.    Respiratory:  Positive for cough, shortness of breath and wheezing.    Cardiovascular:  Negative for chest pain.   Musculoskeletal:  Negative for myalgias.   Skin:  Negative for rash.   Neurological:  Negative for headaches.        I have reviewed the patients family history, social history, past medical history, past surgical history and have updated it as appropriate.     Medications:     Current Outpatient Medications:     albuterol (ACCUNEB) 1.25 MG/3ML nebulizer solution, Take 3 mL by nebulization Every 6 (Six) Hours As Needed for Wheezing. 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 Indications: Chronic Obstructive Lung Disease, Disp: , Rfl:     amantadine (SYMMETREL) 100 MG capsule, Take 1 capsule by mouth 2 (Two) Times a Day. Indications: Parkinson's Disease with Unknown Cause, Disp: , Rfl:     apixaban (Eliquis) 5 MG tablet tablet, Take 1 tablet by mouth Every 12 (Twelve) Hours. Restart 1/29/24, Disp: 180 tablet, Rfl: 2    aspirin 81 MG chewable tablet, Chew 1 tablet Daily. Indications: Disease involving Lipid  Deposits in the Arteries, Disp: , Rfl:     B Complex-C (SUPER B COMPLEX PO), Take 1 tablet by mouth Daily. Indications: Nutritional Support, Disp: , Rfl:     bumetanide (BUMEX) 1 MG tablet, Take 1 tablet by mouth 2 (Two) Times a Day. Indications: Edema, Disp: , Rfl:     Calcium Carbonate (CALTRATE 600 PO), Take 1 tablet by mouth Daily. Indications: Nutritional Support, Disp: , Rfl:     carbidopa-levodopa (SINEMET)  MG per tablet, Take 2 tablets by mouth at 6am, then 1 tablet at 9am, 12pm, 3pm, 6pm and 9pm.  Indications: Parkinson's Disease, Disp: , Rfl:     Cholecalciferol 25 MCG (1000 UT) tablet, Take 1 tablet by mouth 2 (Two) Times a Day. Indications: Vitamin D Deficiency, Disp: , Rfl:     citalopram (CeleXA) 20 MG tablet, Take 1 tablet by mouth Daily. Indications: Major Depressive Disorder, Disp: 90 tablet, Rfl: 0    clobetasol propionate (TEMOVATE) 0.05 % cream, Apply 1 Application topically to the appropriate area as directed 2 (Two) Times a Week. Indications: Skin Inflammation, Disp: , Rfl:     Dapagliflozin Propanediol (FARXIGA PO), Take 10 mg by mouth Daily. Indications: heart failure, Disp: , Rfl:     doxycycline (VIBRAMYCIN) 100 MG capsule, Take 1 capsule by mouth 2 (Two) Times a Day for 7 days. Indications: Upper Respiratory Tract Infection, Disp: 14 capsule, Rfl: 0    fluticasone (FLONASE) 50 MCG/ACT nasal spray, Administer 2 sprays into the nostril(s) as directed by provider Daily As Needed for Rhinitis. Indications: Allergic Rhinitis, Disp: , Rfl:     furosemide (LASIX) 40 MG tablet, Take 1 tablet by mouth Daily. Indications: Edema, Disp: , Rfl:     Glucosamine-Chondroit-Calcium (TRIPLE FLEX BONE & JOINT PO), Take 1 tablet by mouth Daily. Indications: Nutritional Support, Disp: , Rfl:     hydroxychloroquine (PLAQUENIL) 200 MG tablet, Take 1 tablet by mouth 2 (Two) Times a Day. Indications: Discoid Lupus Erythematosus, Systemic Lupus Erythematosus, Disp: , Rfl:     Insulin Glargine (Lantus  SoloStar) 100 UNIT/ML injection pen, Inject 15 Units under the skin into the appropriate area as directed Daily. Indications: Type 2 Diabetes (Patient taking differently: Inject 12 Units under the skin into the appropriate area as directed Daily. Indications: Type 2 Diabetes), Disp: 15 mL, Rfl: 2    ipratropium (ATROVENT) 0.06 % nasal spray, Administer 2 sprays into the nostril(s) as directed by provider As Needed for Rhinitis. Indications: Hayfever, Disp: , Rfl:     levothyroxine (Unithroid) 200 MCG tablet, Take 1 tablet by mouth Daily. NEW DOSE, Disp: 90 tablet, Rfl: 1    loratadine (CLARITIN) 10 MG tablet, Take 1 tablet by mouth Daily. Indications: Hayfever, Disp: , Rfl:     metOLazone (ZAROXOLYN) 2.5 MG tablet, Take 1 tablet by mouth 4 (Four) Times a Week. Indications: Edema, Disp: , Rfl:     Multiple Vitamins-Minerals (CENTRUM ULTRA WOMENS PO), Take 1 tablet by mouth Daily. Indications: Nutritional Support, Disp: , Rfl:     O2 (OXYGEN), Inhale 2 L/min Continuous. Indications: soa, Disp: , Rfl:     omeprazole (priLOSEC) 40 MG capsule, Take 1 capsule by mouth 2 (Two) Times a Day. Indications: Gastroesophageal Reflux Disease, Disp: 180 capsule, Rfl: 1    pramipexole (MIRAPEX) 1.5 MG tablet, Take 1 tablet by mouth Every Night. Indications: Parkinson's Disease, Disp: , Rfl:     promethazine-dextromethorphan (PROMETHAZINE-DM) 6.25-15 MG/5ML syrup, Take 5 mL by mouth 4 (Four) Times a Day As Needed for Cough. Indications: Cough, Disp: 180 mL, Rfl: 0    ranolazine (RANEXA) 500 MG 12 hr tablet, Take 2 tablets by mouth Every 12 (Twelve) Hours., Disp: 360 tablet, Rfl: 3    sacubitril-valsartan (ENTRESTO) 49-51 MG tablet, Take 1 tablet by mouth Every 12 (Twelve) Hours. Indications: Cardiac Failure, Disp: , Rfl:     senna (SENOKOT) 8.6 MG tablet, Take 1 tablet by mouth Daily. Indications: Constipation, Disp: , Rfl:     spironolactone (ALDACTONE) 25 MG tablet, Take 1 tablet by mouth Daily. Indications: Heart failure, Disp: ,  "Rfl:     traMADol (ULTRAM) 50 MG tablet, Take 1 tablet by mouth At Night As Needed for Moderate Pain., Disp: 30 tablet, Rfl: 1    triamcinolone (KENALOG) 0.1 % cream, Apply 1 Application topically to the appropriate area as directed As Needed for Irritation. Indications: Atopic Dermatitis, Disp: , Rfl:     Triamcinolone Acetonide (NASACORT) 55 MCG/ACT nasal inhaler, Administer 2 sprays into the nostril(s) as directed by provider Daily As Needed for Rhinitis. Indications: Nose Inflammation, Disp: , Rfl:   No current facility-administered medications for this visit.    Allergies:   Allergies   Allergen Reactions    Amlodipine Besylate Swelling     Lower extremity (ankles, feet) swelling    Entacapone Other (See Comments)     \"extreme weakness in legs - caused several falls, which stopped after discontinuing this medication\"    Epinephrine Other (See Comments)     6/4/16- had 3 shots to numb mouth to prepare teeth for crowns, the shots contained epi-  Caused pt to have chest discomfort- went to hospital in ambulance, discovered had a fib while there     Hydrocodone Unknown - High Severity     Headache, nausea, dizziness, & vomiting    Levemir [Insulin Detemir] Hives     Hives / rash around injection site    Penicillins Hives     Jitteriness     Xarelto [Rivaroxaban] GI Bleeding     hgb dropped to 5.2    Aricept [Donepezil Hcl] Nausea Only     Vivid dreams    Benztropine Mesylate Other (See Comments)     Uncontrollable body movements    Cogentin [Benztropine] Other (See Comments)     \"uncontrollable body movements\"    Compazine [Prochlorperazine Edisylate] Other (See Comments)     Dystonic reaction    Duraprep [Antiseptic Products, Misc.] Itching and Rash     RASH AND ITCHING    Haldol [Haloperidol Lactate] Other (See Comments)     Dystonic reaction    Hydralazine Other (See Comments)     Headache     Lisinopril Cough    Statins Myalgia     Leg pain- all statins     Sulfamethoxazole Nausea Only and Other (See Comments) " "    Nausea & headaches    Tarka [Trandolapril-Verapamil Hcl Er] Other (See Comments)     Constipation     Toprol Xl [Metoprolol Tartrate] Other (See Comments)     Extreme fatigue, decreased exercise tolerance    Trimethoprim Other (See Comments)     Other reaction(s): Nausea       Objective     Physical Exam: Please see above  Vital Signs:   Vitals:    12/17/24 1303   BP: 136/82   BP Location: Right arm   Patient Position: Sitting   Cuff Size: Adult   Weight: 101 kg (223 lb)   Height: 152.4 cm (60\")     Body mass index is 43.55 kg/m².          Physical Exam  Vitals and nursing note reviewed.   HENT:      Head: Normocephalic.      Nose: Congestion present.      Mouth/Throat:      Pharynx: Posterior oropharyngeal erythema present.   Eyes:      Pupils: Pupils are equal, round, and reactive to light.   Cardiovascular:      Rate and Rhythm: Normal rate and regular rhythm.   Pulmonary:      Effort: Respiratory distress present.      Breath sounds: Examination of the right-upper field reveals wheezing and rhonchi. Examination of the left-upper field reveals wheezing and rhonchi. Examination of the right-middle field reveals wheezing and rhonchi. Examination of the left-middle field reveals wheezing and rhonchi. Examination of the right-lower field reveals wheezing and rhonchi. Examination of the left-lower field reveals wheezing and rhonchi. Wheezing and rhonchi present.   Abdominal:      General: Bowel sounds are normal.   Musculoskeletal:         General: Normal range of motion.      Cervical back: Normal range of motion.   Skin:     General: Skin is warm and dry.   Neurological:      General: No focal deficit present.      Mental Status: She is alert and oriented to person, place, and time.   Psychiatric:         Mood and Affect: Mood normal.         Procedures    Results:   Labs:   Hemoglobin A1C   Date Value Ref Range Status   10/14/2024 6.1 (H) 4.8 - 5.6 % Final     Comment:              Prediabetes: 5.7 - 6.4        "    Diabetes: >6.4           Glycemic control for adults with diabetes: <7.0     06/02/2024 5.90 (H) 4.80 - 5.60 % Final   12/08/2021 6.1  Final     TSH   Date Value Ref Range Status   10/14/2024 8.520 (H) 0.450 - 4.500 uIU/mL Final   09/16/2024 6.720 (H) 0.270 - 4.200 uIU/mL Final        POCT Results (if applicable):   Results for orders placed or performed in visit on 12/12/24   POCT SARS-CoV-2 Antigen VIKTORIA + Flu    Collection Time: 12/12/24  9:36 AM    Specimen: Swab   Result Value Ref Range    SARS Antigen Not Detected Not Detected, Presumptive Negative    Influenza A Antigen VIKTORIA Not Detected Not Detected    Influenza B Antigen VIKTORIA Not Detected Not Detected    Internal Control Passed Passed    Lot Number [id number]     Expiration Date 2/25/25      *Note: Due to a large number of results and/or encounters for the requested time period, some results have not been displayed. A complete set of results can be found in Results Review.       Assessment / Plan      Assessment/Plan:   Diagnoses and all orders for this visit:    1. Subacute cough (Primary)    2. Wheezing  -     Discontinue: triamcinolone acetonide (KENALOG-40) injection 40 mg    3. Pneumonia of both lungs due to infectious organism, unspecified part of lung      Patient is leaving and going directly to emergency room.  Patient did deny ambulance.  Patient's daughter is taking her directly to [facility].  Report called to [facility].    Follow Up:   No follow-ups on file.      DEJA Levin  [facility]   Electronically signed by DEJA Levin, 12/17/24, 1:10 PM EST.

## 2024-12-18 ENCOUNTER — HOME CARE VISIT (OUTPATIENT)
Dept: HOME HEALTH SERVICES | Facility: HOME HEALTHCARE | Age: 74
End: 2024-12-18
Payer: MEDICARE

## 2024-12-20 ENCOUNTER — READMISSION MANAGEMENT (OUTPATIENT)
Dept: CALL CENTER | Facility: HOSPITAL | Age: 74
End: 2024-12-20
Payer: MEDICARE

## 2024-12-20 ENCOUNTER — TRANSCRIBE ORDERS (OUTPATIENT)
Dept: HOME HEALTH SERVICES | Facility: HOME HEALTHCARE | Age: 74
End: 2024-12-20
Payer: MEDICARE

## 2024-12-20 DIAGNOSIS — J96.21 ACUTE ON CHRONIC RESPIRATORY FAILURE WITH HYPOXIA: Primary | ICD-10-CM

## 2024-12-20 NOTE — OUTREACH NOTE
Prep Survey      Flowsheet Row Responses   [facility] facility patient discharged from? Non-BH   Is LACE score < 7 ? Non-BH Discharge   Eligibility [facility]   Date of Discharge 12/20/24   Discharge Disposition Home or Self Care   Discharge diagnosis Acute on Chronic Respiratory Failure   Does the patient have one of the following disease processes/diagnoses(primary or secondary)? Other   Prep survey completed? Yes            Dee DOVER - Registered Nurse

## 2024-12-21 ENCOUNTER — HOME CARE VISIT (OUTPATIENT)
Dept: HOME HEALTH SERVICES | Facility: HOME HEALTHCARE | Age: 74
End: 2024-12-21
Payer: MEDICARE

## 2024-12-21 VITALS
DIASTOLIC BLOOD PRESSURE: 68 MMHG | RESPIRATION RATE: 18 BRPM | HEART RATE: 100 BPM | SYSTOLIC BLOOD PRESSURE: 112 MMHG | TEMPERATURE: 97.6 F | OXYGEN SATURATION: 96 %

## 2024-12-21 PROCEDURE — G0495 RN CARE TRAIN/EDU IN HH: HCPCS

## 2024-12-21 NOTE — Clinical Note
Resumption of Care Note: Patient resumed to [facility] on 12.21.24.     Reason for hospitalization/new problems:  Admitted to [facility] with acute chronic hypoxic respiratory failure, heart failure exacerbation, and CKD after presenting to the ED w/  increased weakness and SOA.      JOSE [place] Findings:    New/changed medications: Pantoprazole, Mucinex, Miralax, Chloraseptic, Medrol, Ceftin, and abx ear drops    New/changed orders: None    Educated on Emergency Plan, steps to take prior to going to the ER and when to Call [facility]:  Yes    Plan/Focus of Care and Skilled need: Education r/t chronic respiratory failure and heart failure, medication education, cardiopulmonary assessments

## 2024-12-21 NOTE — HOME HEALTH
Resumption of Care Note: Patient resumed to [facility] on 12.21.24.     Reason for hospitalization/new problems:  Admitted to [facility] with acute chronic hypoxia respiratory failure, heart failure exacerbation, and CKD after presenting to the ED w/  increased weakness and SOA.      JOSE [place] Findings:    New/changed medications: Pantoprazole, Mucinex, Miralax, Chloraseptic, Medrol, Ceftin, and abx ear drops    New/changed orders: None    Educated on Emergency Plan, steps to take prior to going to the ER and when to Call [facility]:  Yes    Plan/Focus of Care and Skilled need: Education r/t chronic respiratory failure and heart failure, medication education, cardiopulmonary assessments

## 2024-12-23 ENCOUNTER — TRANSITIONAL CARE MANAGEMENT TELEPHONE ENCOUNTER (OUTPATIENT)
Dept: CALL CENTER | Facility: HOSPITAL | Age: 74
End: 2024-12-23
Payer: MEDICARE

## 2024-12-23 NOTE — OUTREACH NOTE
Call Center TCM Note      Flowsheet Row Responses   [facility]olic facility patient discharged from? Non-  [[facility]]   Does the patient have one of the following disease processes/diagnoses(primary or secondary)? Other   TCM attempt successful? No   Unsuccessful attempts Attempt 1            Elizabeth Montes LPN    12/23/2024, 08:12 EST

## 2024-12-23 NOTE — OUTREACH NOTE
Call Center TCM Note      Flowsheet Row Responses   [facility] patient discharged from? Non-  [[facility]]   Does the patient have one of the following disease processes/diagnoses(primary or secondary)? Other   TCM attempt successful? Yes   Call start time 1524   Call end time 1533   Discharge diagnosis Acute on Chronic Respiratory Failure   Person spoke with today (if not patient) and relationship daughter   Is the patient taking all medications as directed (includes completed medication regime)? Yes   Medication comments States she was started on a Medrol pack and antibiotics.   Comments Appt made for 12/26 @ 2:45 with BRIGITTE Richards   Does the patient have an appointment with their PCP within 7-14 days of discharge? No   Nursing Interventions Assisted patient with making appointment per protocol   Psychosocial issues? No   What is the patient's perception of their health status since discharge? Same   Is the patient/caregiver able to teach back signs and symptoms related to disease process for when to call PCP? Yes   Is the patient/caregiver able to teach back signs and symptoms related to disease process for when to call 911? Yes   Is the patient/caregiver able to teach back the hierarchy of who to call/visit for symptoms/problems? PCP, Specialist, Home health nurse, Urgent Care, ED, 911 Yes   Additional teach back comments Daughter states they have both been sick.  She is still has complaints of being light headed and feeling hot with no fever.  Still has a cough and has loss voice again due to coughing.   TCM call completed? Yes   Wrap up additional comments PCP appt made for 12/27 @ 2:45.   Call end time 1533            Elizabeth Montes LPN    12/23/2024, 15:37 EST

## 2024-12-26 ENCOUNTER — HOME CARE VISIT (OUTPATIENT)
Dept: HOME HEALTH SERVICES | Facility: HOME HEALTHCARE | Age: 74
End: 2024-12-26
Payer: MEDICARE

## 2024-12-26 PROCEDURE — G0151 HHCP-SERV OF PT,EA 15 MIN: HCPCS

## 2024-12-27 ENCOUNTER — OFFICE VISIT (OUTPATIENT)
Dept: FAMILY MEDICINE CLINIC | Facility: CLINIC | Age: 74
End: 2024-12-27
Payer: MEDICARE

## 2024-12-27 ENCOUNTER — HOME CARE VISIT (OUTPATIENT)
Dept: HOME HEALTH SERVICES | Facility: HOME HEALTHCARE | Age: 74
End: 2024-12-27
Payer: MEDICARE

## 2024-12-27 ENCOUNTER — PRIOR AUTHORIZATION (OUTPATIENT)
Dept: FAMILY MEDICINE CLINIC | Facility: CLINIC | Age: 74
End: 2024-12-27
Payer: MEDICARE

## 2024-12-27 VITALS
RESPIRATION RATE: 17 BRPM | HEART RATE: 67 BPM | DIASTOLIC BLOOD PRESSURE: 74 MMHG | TEMPERATURE: 97.8 F | SYSTOLIC BLOOD PRESSURE: 128 MMHG | OXYGEN SATURATION: 95 %

## 2024-12-27 VITALS
DIASTOLIC BLOOD PRESSURE: 80 MMHG | SYSTOLIC BLOOD PRESSURE: 130 MMHG | BODY MASS INDEX: 39.2 KG/M2 | HEIGHT: 62 IN | WEIGHT: 213 LBS

## 2024-12-27 DIAGNOSIS — I50.32 CHRONIC DIASTOLIC CONGESTIVE HEART FAILURE: ICD-10-CM

## 2024-12-27 DIAGNOSIS — J96.21 ACUTE ON CHRONIC HYPOXIC RESPIRATORY FAILURE: Primary | ICD-10-CM

## 2024-12-27 RX ORDER — METHYLPREDNISOLONE 4 MG/1
TABLET ORAL
Qty: 40 TABLET | Refills: 0 | Status: SHIPPED | OUTPATIENT
Start: 2024-12-27 | End: 2024-12-27

## 2024-12-27 RX ORDER — DEXTROMETHORPHAN HYDROBROMIDE AND PROMETHAZINE HYDROCHLORIDE 15; 6.25 MG/5ML; MG/5ML
5 SYRUP ORAL 4 TIMES DAILY PRN
Qty: 120 ML | Refills: 1 | Status: SHIPPED | OUTPATIENT
Start: 2024-12-27 | End: 2024-12-27

## 2024-12-27 RX ORDER — DOXYCYCLINE 100 MG/1
100 CAPSULE ORAL 2 TIMES DAILY
Qty: 32 CAPSULE | Refills: 0 | Status: SHIPPED | OUTPATIENT
Start: 2024-12-27

## 2024-12-27 RX ORDER — DOXYCYCLINE 100 MG/1
100 CAPSULE ORAL 2 TIMES DAILY
Qty: 32 CAPSULE | Refills: 0 | Status: SHIPPED | OUTPATIENT
Start: 2024-12-27 | End: 2024-12-27

## 2024-12-27 RX ORDER — METHYLPREDNISOLONE 4 MG/1
TABLET ORAL
Qty: 40 TABLET | Refills: 0 | Status: SHIPPED | OUTPATIENT
Start: 2024-12-27 | End: 2025-01-12

## 2024-12-27 RX ORDER — DEXTROMETHORPHAN HYDROBROMIDE AND PROMETHAZINE HYDROCHLORIDE 15; 6.25 MG/5ML; MG/5ML
5 SYRUP ORAL 4 TIMES DAILY PRN
Qty: 120 ML | Refills: 1 | Status: SHIPPED | OUTPATIENT
Start: 2024-12-27

## 2024-12-27 NOTE — PROGRESS NOTES
Transitional Care Follow Up Visit  Subjective     Joanna Cross is a 74 y.o. female who presents for a transitional care management visit.    Within 48 business hours after discharge our office contacted her via telephone to coordinate her care and needs.      I reviewed and discussed the details of that call along with the discharge summary, hospital problems, inpatient lab results, inpatient diagnostic studies, and consultation reports with Joanna.     Current outpatient and discharge medications have been reconciled for the patient.  Reviewed by: BRIGITTE Richards          12/20/2024     1:01 PM   Date of TCM Phone Call   [facility]   Date of Discharge 12/20/2024   Discharge Disposition Home or Self Care     Risk for Readmission (LACE) No data recorded    History of Present Illness   Course During Hospital Stay: Patient was hospitalized [facility] 12/17/2024, discharged 12/20/2024.  Resented on 12/12/2024 with upper respiratory symptoms that started 4 days prior.  The entire household had been sick.  74-year-old female with history of heart failure and COPD dependent on 3 L of oxygen presented with worsening shortness of air.  She had been on antibiotics for 5 days without improvement when she presented to the hospital.  She was diagnosed with acute on chronic hypoxic respiratory failure, upper respiratory tract infection with laryngitis, pharyngitis and tracheobronchitis and morbid obesity.  She was treated with IV prednisolone, discharged on oral just finishing Medrol Dosepak yesterday.  She completed a course of antibiotics yesterday and was to continue DuoNebs at home.  Still has a lot of right ear pain, head congestion, cough.  Her daughter thinks she needs another round of antibiotics.  She is no worse than when she left the hospital but really has not seen much improvement.  She was also discharged with acute exacerbation of chronic diastolic heart failure and  "was to continue home regimen of Bumex, Aldactone, Entresto and Farxiga.  CKD stage IIIa to follow with nephrology.  Parkinson's disease, continue Sinemet.  Morbid obesity complicates all aspects of care.     The following portions of the patient's history were reviewed and updated as appropriate: allergies, current medications, past family history, past medical history, past social history, past surgical history, and problem list.    Review of Systems    Objective   /80 (BP Location: Right arm, Patient Position: Sitting, Cuff Size: Adult)   Ht 157.5 cm (62\")   Wt 96.6 kg (213 lb)   BMI 38.96 kg/m²   Physical Exam  Constitutional:       General: She is not in acute distress.     Appearance: She is obese.   Cardiovascular:      Rate and Rhythm: Normal rate and regular rhythm.   Pulmonary:      Breath sounds: No decreased air movement. Decreased breath sounds and rales (bilateral base) present. No wheezing or rhonchi.      Comments: Barking cough.        Assessment & Plan   Problems Addressed this Visit          Cardiac and Vasculature    Chronic congestive heart failure       Other    Acute on chronic hypoxic respiratory failure - Primary    Relevant Medications    doxycycline (VIBRAMYCIN) 100 MG capsule    methylPREDNISolone (MEDROL) 4 MG tablet    promethazine-dextromethorphan (PROMETHAZINE-DM) 6.25-15 MG/5ML syrup     Diagnoses         Codes Comments    Acute on chronic hypoxic respiratory failure    -  Primary ICD-10-CM: J96.21  ICD-9-CM: 518.84, 799.02     Chronic diastolic congestive heart failure     ICD-10-CM: I50.32  ICD-9-CM: 428.32, 428.0           Patient with advanced COPD on chronic oxygen with recent hospital stay still quite symptomatic.  Will to add 16-day taper of Medrol Dosepak and 16 days of doxycycline.  She is to continue inhalers and albuterol and nebulizers.  Monitor for worsening leg edema or weight gain as this could indicate CHF exacerbation.  Report back to the hospital for any " shortness of breath.  She is very high risk of rehospitalization.

## 2024-12-27 NOTE — HOME HEALTH
Patient missed an sn visit from [facility] on 12/27/24    Reason: declined visit      For your records only.   Per CMS Guidance, MD must be notified of missed/cancelled visits; therefore the prescribed frequency was not met.

## 2024-12-30 ENCOUNTER — HOME CARE VISIT (OUTPATIENT)
Dept: HOME HEALTH SERVICES | Facility: HOME HEALTHCARE | Age: 74
End: 2024-12-30
Payer: MEDICARE

## 2024-12-30 ENCOUNTER — PATIENT OUTREACH (OUTPATIENT)
Dept: CASE MANAGEMENT | Facility: OTHER | Age: 74
End: 2024-12-30
Payer: MEDICARE

## 2024-12-30 VITALS
WEIGHT: 212.13 LBS | BODY MASS INDEX: 38.8 KG/M2 | RESPIRATION RATE: 20 BRPM | HEART RATE: 89 BPM | TEMPERATURE: 98.1 F | DIASTOLIC BLOOD PRESSURE: 73 MMHG | SYSTOLIC BLOOD PRESSURE: 145 MMHG | OXYGEN SATURATION: 98 %

## 2024-12-30 DIAGNOSIS — G20.B2 PARKINSON'S DISEASE WITH DYSKINESIA AND FLUCTUATING MANIFESTATIONS: ICD-10-CM

## 2024-12-30 DIAGNOSIS — I25.10 CORONARY ARTERY DISEASE INVOLVING NATIVE CORONARY ARTERY OF NATIVE HEART WITHOUT ANGINA PECTORIS: Primary | ICD-10-CM

## 2024-12-30 DIAGNOSIS — I10 HYPERTENSION, ESSENTIAL: ICD-10-CM

## 2024-12-30 PROCEDURE — G0299 HHS/HOSPICE OF RN EA 15 MIN: HCPCS

## 2024-12-30 PROCEDURE — G0152 HHCP-SERV OF OT,EA 15 MIN: HCPCS

## 2024-12-30 NOTE — OUTREACH NOTE
CCM End of Month Documentation    This Chronic Medical Management Care Plan for Joanna Cross, 74 y.o. female, has been monitored and managed; reviewed and a new plan of care implemented for the month of December.  A cumulative time of 28  minutes was spent on this patient record this month, including chart review; phone call with patient.    Regarding the patient's problems: has Coronary artery disease involving native coronary artery without angina pectoris; Hypertension, essential; Peripheral vascular disease; Hyperlipidemia LDL goal <70; Spinal stenosis, lumbar region, with neurogenic claudication; Overactive bladder; GERD (gastroesophageal reflux disease); Hypothyroidism; Lichen sclerosus; Parkinson's disease; MILLI treated with BiPAP; History of respiratory failure - prior respiratory failure requiring mechanical ventilation, open lung biopsy non-specific. ; Atrial fibrillation; Tachy-thai syndrome; Long term current use of antiarrhythmic drug; History of adenomatous polyp of colon; Anemia associated with stage 3 chronic renal failure; Angiodysplasia; Hx of colonic polyps; Body mass index 40.0-44.9, adult; Cardiac pacemaker in situ; Chronic low back pain; Chronic lung disease; Degeneration of lumbar intervertebral disc; Edema of lower extremity; History of malignant neoplasm of skin; History of TIA (transient ischemic attack); Hypotonic bladder; Incomplete tear of rotator cuff; Major depression in remission; Tinnitus of both ears; Primary osteoarthritis involving multiple joints; Restless legs; Seborrheic dermatitis; Transient cerebral ischemia; Muscle strain; Type 2 diabetes mellitus with hyperglycemia, without long-term current use of insulin; Vitamin B12 deficiency; Vitamin D deficiency; Chronic kidney disease, stage 3b; Osteoporosis, senile; Acute diastolic CHF (congestive heart failure); Chronic respiratory failure with hypoxia; Chronic anticoagulation; Fracture of right shoulder with delayed healing;  Chronic congestive heart failure; and Acute on chronic hypoxic respiratory failure on their problem list., the following items were addressed: medical records; medications and any changes can be found within the plan section of the note.  A detailed listing of time spent for chronic care management is tracked within each outreach encounter.  Current medications include:  has a current medication list which includes the following prescription(s): albuterol, albuterol sulfate hfa, amantadine, apixaban, aspirin, b complex-c, bumetanide, calcium carbonate, carbidopa-levodopa, cholecalciferol, citalopram, clobetasol propionate, dapagliflozin propanediol, doxycycline, fluticasone, glucosamine-chondroit-calcium, guaifenesin, hydroxychloroquine, lantus solostar, ipratropium, levothyroxine, loratadine, methylprednisolone, metolazone, multiple vitamins-minerals, o2, omeprazole, pantoprazole, phenol, polyethylene glycol, pramipexole, promethazine-dextromethorphan, ranolazine, sacubitril-valsartan, senna, spironolactone, tramadol, triamcinolone, and triamcinolone acetonide. and the patient is reported to be patient is compliant with medication protocol,  Medications are reported to be effective.  Regarding these diagnoses, referrals were made to the following provider(s):  n/a.  All notes on chart for PCP to review.    The patient was monitored remotely for blood pressure; weight; activity level; pain; medications.    The patient's physical needs include:  needs assistance with ADLs; physical healthcare.     The patient's mental support needs include:  increased support    The patient's cognitive support needs include:  needs met    The patient's psychosocial support needs include:  continued support    The patient's functional needs include: physical healthcare    The patient's environmental needs include:  not applicable    Care Plan overall comments:  reviewed    Refer to previous outreach notes for more information on the  areas listed above.    Monthly Billing Diagnoses  (I25.10) Coronary artery disease involving native coronary artery of native heart without angina pectoris    (G20.B2) Parkinson's disease with dyskinesia and fluctuating manifestations    (I10) Hypertension, essential    Medications   Medications have been reconciled    Care Plan progress this month:      Recently Modified Care Plans Updates made since 11/29/2024 12:00 AM      No recently modified care plans.            Instructions   Patient was provided an electronic copy of care plan  CCM services were explained and offered and patient has accepted these services.  Patient has given their written consent to receive CCM services and understands that this includes the authorization of electronic communication of medical information with the other treating providers.  Patient understands that they may stop CCM services at any time and these changes will be effective at the end of the calendar month and will effectively revocate the agreement of CCM services.  Patient understands that only one practitioner can furnish and be paid for CCM services during one calendar month.  Patient also understands that there may be co-payment or deductible fees in association with CCM services.  Patient will continue with at least monthly follow-up calls with the Ambulatory .    Faye JAUREGUI  Ambulatory Case Management    12/30/2024, 09:56 EST

## 2024-12-31 VITALS
DIASTOLIC BLOOD PRESSURE: 68 MMHG | SYSTOLIC BLOOD PRESSURE: 113 MMHG | HEART RATE: 83 BPM | TEMPERATURE: 97.1 F | OXYGEN SATURATION: 95 %

## 2024-12-31 NOTE — HOME HEALTH
60 Day Summary    Home Health need continues for: SN, PT,OT    FOC: Edcuation related to  chronic hypoxia respiratory failure and congestive heart failure    Primary diagnoses/co-morbidities/recent procedures in past 60 days that impact current episode: Hypertensive heart and chronic kidney disease with heart failure and stage 1 through stage 4 chronic kidney disease, or unspecified chronic kidney disease, DM type 2, CKD, Parkinson's disease, and afib    Current level of functional ability: Patient ambulates with a walker with standby assist    Homebound status and living arrangements: Patient lives with her daughter in a single story home. Patient is dyspneic with minimal exertion, weak, and requires assistance to leave the home    Goals accomplished and/or measurable progress toward unmet goals in past 60 days: Patient is weighing self daily and reporting weight gain, patient monitors BS, Continue to work on lowering sodium in diet and energy conservation    Focus of care for next 60 days for each discipline ordered: Sn- edcuation on managment of CHF, chronic hypoxia, PT- strengthening and endurance, OT- home safety, energy conservation    Skin integrity/wound status: Skin C/D/I    Estimated date when home care services will end 60 days    SDOH changes/barriers (i.e. Caregiver availability, social isolation, environment, income, transportation access, food insecurity etc.) N/A  Need for MSW referral? N

## 2024-12-31 NOTE — CASE COMMUNICATION
60 Day Summary    Home Health need continues for: SN, PT,OT    FOC: Edcuation related to  chronic hypoxia respiratory failure and congestive heart failure    Primary diagnoses/co-morbidities/recent procedures in past 60 days that impact current episode: Hypertensive heart and chronic kidney disease with heart failure and stage 1 through stage 4 chronic kidney disease, or unspecified chronic kidney disease, DM type 2, CKD, Parkinson's di sease, and afib    Current level of functional ability: Patient ambulates with a walker with standby assist    Homebound status and living arrangements: Patient lives with her daughter in a single story home. Patient is dyspneic with minimal exertion, weak, and requires assistance to leave the home    Goals accomplished and/or measurable progress toward unmet goals in past 60 days: Patient is weighing self daily and reporting weight gai n, patient monitors BS, Continue to work on lowering sodium in diet and energy conservation    Focus of care for next 60 days for each discipline ordered: Sn- edcuation on managment of CHF, chronic hypoxia, PT- strengthening and endurance, OT- home safety, energy conservation    Skin integrity/wound status: Skin C/D/I    Estimated date when home care services will end 60 days    SDOH changes/barriers (i.e. Caregiver availability, social  isolation, environment, income, transportation access, food insecurity etc.) N/A  Need for MSW referral? N

## 2025-01-03 ENCOUNTER — TELEPHONE (OUTPATIENT)
Dept: FAMILY MEDICINE CLINIC | Facility: CLINIC | Age: 75
End: 2025-01-03
Payer: MEDICARE

## 2025-01-05 ENCOUNTER — HOME CARE VISIT (OUTPATIENT)
Dept: HOME HEALTH SERVICES | Facility: HOME HEALTHCARE | Age: 75
End: 2025-01-05
Payer: MEDICARE

## 2025-01-05 NOTE — CASE COMMUNICATION
Patient states they are doing ok. Patient has her O2 concentrator and her small battery operated portable O2 tank. Patient only uses O2 at night. Daughter stated she did forget to check the big portable O2 tank and it needs refilled. She is going to call the O2 company tomorrow to see if she can get another one.

## 2025-01-08 ENCOUNTER — PATIENT OUTREACH (OUTPATIENT)
Dept: CASE MANAGEMENT | Facility: OTHER | Age: 75
End: 2025-01-08
Payer: MEDICARE

## 2025-01-08 DIAGNOSIS — I10 HYPERTENSION, ESSENTIAL: ICD-10-CM

## 2025-01-08 DIAGNOSIS — G20.B2 PARKINSON'S DISEASE WITH DYSKINESIA AND FLUCTUATING MANIFESTATIONS: ICD-10-CM

## 2025-01-08 DIAGNOSIS — I25.10 CORONARY ARTERY DISEASE INVOLVING NATIVE CORONARY ARTERY OF NATIVE HEART WITHOUT ANGINA PECTORIS: Primary | ICD-10-CM

## 2025-01-08 NOTE — OUTREACH NOTE
"AMBULATORY CASE MANAGEMENT NOTE    Names and Relationships of Patient/Support Persons: Contact: Joanna Cross \"SIXTO\"; Relationship: Self -     CCM Interim Update    Spoke with Ms. Cross for CCM update. She states that she is starting to feel much better. Her cough has improved. She is still on antibiotics and steroids. She is back to her baseline level of oxygen.     She recently saw Dr. Teague and states that he was pleased. Her weight is stable at 214.4. She denies edema. No report of chest pain.     She is going to see Dr. Pringle Friday. She is having issue with her hearing and at last visit her ear looked \"angry.\" She also has a place inside of her lip that she bites frequently. It did have a bump on it but this has gone down some. She does plan to have Dr. Pringle look at it.     Her blood sugar has been up some since being sick and on steroids. She states fasting she is running 120-130. She has increased her long acting insulin to 17U at night and this is helping. She is aware to adjust this down when she comes off of steroids.     She is due to see Dr. Fritz in March. She is planning to call to schedule visit and labwork.     Education Documentation  No documentation found.        Faye JAUREGUI  Ambulatory Case Management    1/8/2025, 14:41 EST  "

## 2025-01-09 ENCOUNTER — HOME CARE VISIT (OUTPATIENT)
Dept: HOME HEALTH SERVICES | Facility: HOME HEALTHCARE | Age: 75
End: 2025-01-09
Payer: MEDICARE

## 2025-01-09 VITALS
DIASTOLIC BLOOD PRESSURE: 80 MMHG | RESPIRATION RATE: 16 BRPM | HEART RATE: 77 BPM | OXYGEN SATURATION: 99 % | SYSTOLIC BLOOD PRESSURE: 132 MMHG

## 2025-01-09 PROCEDURE — G0152 HHCP-SERV OF OT,EA 15 MIN: HCPCS

## 2025-01-10 ENCOUNTER — TELEPHONE (OUTPATIENT)
Dept: FAMILY MEDICINE CLINIC | Facility: CLINIC | Age: 75
End: 2025-01-10
Payer: MEDICARE

## 2025-01-10 ENCOUNTER — HOME CARE VISIT (OUTPATIENT)
Dept: HOME HEALTH SERVICES | Facility: HOME HEALTHCARE | Age: 75
End: 2025-01-10
Payer: MEDICARE

## 2025-01-10 VITALS
HEART RATE: 80 BPM | BODY MASS INDEX: 38.96 KG/M2 | SYSTOLIC BLOOD PRESSURE: 142 MMHG | WEIGHT: 213 LBS | TEMPERATURE: 97.8 F | DIASTOLIC BLOOD PRESSURE: 74 MMHG | OXYGEN SATURATION: 98 % | RESPIRATION RATE: 16 BRPM

## 2025-01-10 DIAGNOSIS — Z79.4 TYPE 2 DIABETES MELLITUS WITH HYPERGLYCEMIA, WITH LONG-TERM CURRENT USE OF INSULIN: ICD-10-CM

## 2025-01-10 DIAGNOSIS — E11.65 TYPE 2 DIABETES MELLITUS WITH HYPERGLYCEMIA, WITH LONG-TERM CURRENT USE OF INSULIN: ICD-10-CM

## 2025-01-10 DIAGNOSIS — E03.9 ACQUIRED HYPOTHYROIDISM: ICD-10-CM

## 2025-01-10 DIAGNOSIS — I50.32 CHRONIC DIASTOLIC CONGESTIVE HEART FAILURE: Primary | ICD-10-CM

## 2025-01-10 DIAGNOSIS — E11.65 TYPE 2 DIABETES MELLITUS WITH HYPERGLYCEMIA, WITHOUT LONG-TERM CURRENT USE OF INSULIN: ICD-10-CM

## 2025-01-10 DIAGNOSIS — E53.8 VITAMIN B12 DEFICIENCY: ICD-10-CM

## 2025-01-10 DIAGNOSIS — E55.9 VITAMIN D DEFICIENCY: ICD-10-CM

## 2025-01-10 PROCEDURE — G0299 HHS/HOSPICE OF RN EA 15 MIN: HCPCS

## 2025-01-10 NOTE — TELEPHONE ENCOUNTER
"  Caller: Joanna Cross \"SIXTO\"    Relationship: Self    Best call back number: [phone]     What orders are you requesting (i.e. lab or imaging): BLOOD WORK    In what timeframe would the patient need to come in: A WEEK BEFORE 3.11.25    Where will you receive your lab/imaging services: [facility]    Additional notes: PATIENT CALLED TO SCHEDULE AN APPOINTMENT FOR A FOLLOW-UP, AS WELL AS TO SET UP BLOOD WORK FOR THE WEEK BEFORE    INFORMED PATIENT THAT WE WILL NEED ORDERS FIRST    PLEASE ADVISE WHEN/IF ORDERS ARE PLACED IN CHART        "

## 2025-01-13 ENCOUNTER — TELEPHONE (OUTPATIENT)
Dept: ONCOLOGY | Facility: CLINIC | Age: 75
End: 2025-01-13
Payer: MEDICARE

## 2025-01-13 NOTE — TELEPHONE ENCOUNTER
"  Caller: Joanna Cross \"SIXTO\"    Relationship: Self    Best call back number: [phone]    What is the best time to reach you: ANY    Who are you requesting to speak with (clinical staff, provider,  specific staff member): CLINICAL       What was the call regarding: SIXTO IS CALLING WANTING TO KNOW WHEN SHE NEEDS TO COME IN TO DO LABS SO SHE CAN GET HER PROCRIT SHOT        PLEASE ADVISE     "

## 2025-01-15 ENCOUNTER — LAB (OUTPATIENT)
Dept: ONCOLOGY | Facility: HOSPITAL | Age: 75
End: 2025-01-15
Payer: MEDICARE

## 2025-01-15 DIAGNOSIS — D63.1 ANEMIA ASSOCIATED WITH STAGE 3 CHRONIC RENAL FAILURE: ICD-10-CM

## 2025-01-15 DIAGNOSIS — N18.30 ANEMIA ASSOCIATED WITH STAGE 3 CHRONIC RENAL FAILURE: ICD-10-CM

## 2025-01-16 ENCOUNTER — HOME CARE VISIT (OUTPATIENT)
Dept: HOME HEALTH SERVICES | Facility: HOME HEALTHCARE | Age: 75
End: 2025-01-16
Payer: MEDICARE

## 2025-01-16 VITALS
HEART RATE: 81 BPM | TEMPERATURE: 98.7 F | WEIGHT: 217.25 LBS | BODY MASS INDEX: 39.74 KG/M2 | DIASTOLIC BLOOD PRESSURE: 74 MMHG | OXYGEN SATURATION: 98 % | RESPIRATION RATE: 18 BRPM | SYSTOLIC BLOOD PRESSURE: 137 MMHG

## 2025-01-16 PROCEDURE — G0152 HHCP-SERV OF OT,EA 15 MIN: HCPCS

## 2025-01-16 PROCEDURE — G0299 HHS/HOSPICE OF RN EA 15 MIN: HCPCS

## 2025-01-17 VITALS
HEART RATE: 95 BPM | RESPIRATION RATE: 16 BRPM | SYSTOLIC BLOOD PRESSURE: 120 MMHG | DIASTOLIC BLOOD PRESSURE: 73 MMHG | OXYGEN SATURATION: 99 % | TEMPERATURE: 97.7 F

## 2025-01-20 ENCOUNTER — HOSPITAL ENCOUNTER (OUTPATIENT)
Dept: ONCOLOGY | Facility: HOSPITAL | Age: 75
Discharge: HOME | End: 2025-01-20
Admitting: NURSE PRACTITIONER
Payer: MEDICARE

## 2025-01-20 VITALS
SYSTOLIC BLOOD PRESSURE: 191 MMHG | TEMPERATURE: 98.1 F | HEIGHT: 62 IN | BODY MASS INDEX: 39.56 KG/M2 | HEART RATE: 93 BPM | DIASTOLIC BLOOD PRESSURE: 81 MMHG | WEIGHT: 215 LBS | RESPIRATION RATE: 16 BRPM

## 2025-01-20 DIAGNOSIS — N18.30 ANEMIA ASSOCIATED WITH STAGE 3 CHRONIC RENAL FAILURE: Primary | ICD-10-CM

## 2025-01-20 DIAGNOSIS — D63.1 ANEMIA ASSOCIATED WITH STAGE 3 CHRONIC RENAL FAILURE: Primary | ICD-10-CM

## 2025-01-20 LAB
BASOPHILS # BLD AUTO: 0.03 10*3/MM3 (ref 0–0.2)
BASOPHILS NFR BLD AUTO: 0.5 % (ref 0–1.5)
DEPRECATED RDW RBC AUTO: 50.1 FL (ref 37–54)
EOSINOPHIL # BLD AUTO: 0.09 10*3/MM3 (ref 0–0.4)
EOSINOPHIL NFR BLD AUTO: 1.4 % (ref 0.3–6.2)
ERYTHROCYTE [DISTWIDTH] IN BLOOD BY AUTOMATED COUNT: 14.4 % (ref 12.3–15.4)
HCT VFR BLD AUTO: 31.8 % (ref 34–46.6)
HGB BLD-MCNC: 10.7 G/DL (ref 12–15.9)
IMM GRANULOCYTES # BLD AUTO: 0.1 10*3/MM3 (ref 0–0.05)
IMM GRANULOCYTES NFR BLD AUTO: 1.5 % (ref 0–0.5)
LYMPHOCYTES # BLD AUTO: 0.83 10*3/MM3 (ref 0.7–3.1)
LYMPHOCYTES NFR BLD AUTO: 12.8 % (ref 19.6–45.3)
MCH RBC QN AUTO: 32.2 PG (ref 26.6–33)
MCHC RBC AUTO-ENTMCNC: 33.6 G/DL (ref 31.5–35.7)
MCV RBC AUTO: 95.8 FL (ref 79–97)
MONOCYTES # BLD AUTO: 0.69 10*3/MM3 (ref 0.1–0.9)
MONOCYTES NFR BLD AUTO: 10.6 % (ref 5–12)
NEUTROPHILS NFR BLD AUTO: 4.76 10*3/MM3 (ref 1.7–7)
NEUTROPHILS NFR BLD AUTO: 73.2 % (ref 42.7–76)
PLATELET # BLD AUTO: 143 10*3/MM3 (ref 140–450)
PMV BLD AUTO: 9.7 FL (ref 6–12)
RBC # BLD AUTO: 3.32 10*6/MM3 (ref 3.77–5.28)
WBC NRBC COR # BLD AUTO: 6.5 10*3/MM3 (ref 3.4–10.8)

## 2025-01-20 PROCEDURE — 36415 COLL VENOUS BLD VENIPUNCTURE: CPT

## 2025-01-20 PROCEDURE — 85025 COMPLETE CBC W/AUTO DIFF WBC: CPT | Performed by: NURSE PRACTITIONER

## 2025-01-21 RX ORDER — CITALOPRAM HYDROBROMIDE 20 MG/1
20 TABLET ORAL DAILY
Qty: 90 TABLET | Refills: 0 | Status: SHIPPED | OUTPATIENT
Start: 2025-01-21

## 2025-01-22 ENCOUNTER — HOME CARE VISIT (OUTPATIENT)
Dept: HOME HEALTH SERVICES | Facility: HOME HEALTHCARE | Age: 75
End: 2025-01-22
Payer: MEDICARE

## 2025-01-22 VITALS
BODY MASS INDEX: 39.76 KG/M2 | TEMPERATURE: 98.9 F | HEART RATE: 79 BPM | SYSTOLIC BLOOD PRESSURE: 115 MMHG | WEIGHT: 217.38 LBS | RESPIRATION RATE: 16 BRPM | OXYGEN SATURATION: 96 % | DIASTOLIC BLOOD PRESSURE: 63 MMHG

## 2025-01-22 PROCEDURE — G0299 HHS/HOSPICE OF RN EA 15 MIN: HCPCS

## 2025-01-24 ENCOUNTER — HOME CARE VISIT (OUTPATIENT)
Dept: HOME HEALTH SERVICES | Facility: HOME HEALTHCARE | Age: 75
End: 2025-01-24
Payer: MEDICARE

## 2025-01-24 PROCEDURE — G0151 HHCP-SERV OF PT,EA 15 MIN: HCPCS

## 2025-01-25 VITALS
DIASTOLIC BLOOD PRESSURE: 66 MMHG | TEMPERATURE: 97.7 F | HEART RATE: 72 BPM | SYSTOLIC BLOOD PRESSURE: 132 MMHG | OXYGEN SATURATION: 94 % | RESPIRATION RATE: 16 BRPM

## 2025-01-28 ENCOUNTER — HOME CARE VISIT (OUTPATIENT)
Dept: HOME HEALTH SERVICES | Facility: HOME HEALTHCARE | Age: 75
End: 2025-01-28
Payer: MEDICARE

## 2025-01-29 ENCOUNTER — HOME CARE VISIT (OUTPATIENT)
Dept: HOME HEALTH SERVICES | Facility: HOME HEALTHCARE | Age: 75
End: 2025-01-29
Payer: MEDICARE

## 2025-01-29 VITALS
SYSTOLIC BLOOD PRESSURE: 130 MMHG | RESPIRATION RATE: 17 BRPM | DIASTOLIC BLOOD PRESSURE: 91 MMHG | TEMPERATURE: 97.5 F | OXYGEN SATURATION: 98 % | HEART RATE: 68 BPM

## 2025-01-29 PROCEDURE — G0157 HHC PT ASSISTANT EA 15: HCPCS

## 2025-01-30 ENCOUNTER — HOME CARE VISIT (OUTPATIENT)
Dept: HOME HEALTH SERVICES | Facility: HOME HEALTHCARE | Age: 75
End: 2025-01-30
Payer: MEDICARE

## 2025-01-30 ENCOUNTER — PATIENT OUTREACH (OUTPATIENT)
Dept: CASE MANAGEMENT | Facility: OTHER | Age: 75
End: 2025-01-30
Payer: MEDICARE

## 2025-01-30 VITALS
OXYGEN SATURATION: 97 % | TEMPERATURE: 96 F | HEART RATE: 82 BPM | RESPIRATION RATE: 16 BRPM | SYSTOLIC BLOOD PRESSURE: 137 MMHG | DIASTOLIC BLOOD PRESSURE: 78 MMHG

## 2025-01-30 DIAGNOSIS — I10 HYPERTENSION, ESSENTIAL: ICD-10-CM

## 2025-01-30 DIAGNOSIS — G20.B2 PARKINSON'S DISEASE WITH DYSKINESIA AND FLUCTUATING MANIFESTATIONS: ICD-10-CM

## 2025-01-30 DIAGNOSIS — I25.10 CORONARY ARTERY DISEASE INVOLVING NATIVE CORONARY ARTERY OF NATIVE HEART WITHOUT ANGINA PECTORIS: Primary | ICD-10-CM

## 2025-01-30 PROCEDURE — G0152 HHCP-SERV OF OT,EA 15 MIN: HCPCS

## 2025-01-30 NOTE — HOME HEALTH
change of medication decrease miralax and started. telehealth w/ neurology r: droziness w/ diazapam  mouth guard

## 2025-01-30 NOTE — OUTREACH NOTE
CCM End of Month Documentation    This Chronic Medical Management Care Plan for Joanna Cross, 74 y.o. female, has been monitored and managed; reviewed and a new plan of care implemented for the month of January.  A cumulative time of 22  minutes was spent on this patient record this month, including chart review; phone call with patient.    Regarding the patient's problems: has Coronary artery disease involving native coronary artery without angina pectoris; Hypertension, essential; Peripheral vascular disease; Hyperlipidemia LDL goal <70; Spinal stenosis, lumbar region, with neurogenic claudication; Overactive bladder; GERD (gastroesophageal reflux disease); Hypothyroidism; Lichen sclerosus; Parkinson's disease; MILLI treated with BiPAP; History of respiratory failure - prior respiratory failure requiring mechanical ventilation, open lung biopsy non-specific. ; Atrial fibrillation; Tachy-thai syndrome; Long term current use of antiarrhythmic drug; History of adenomatous polyp of colon; Anemia associated with stage 3 chronic renal failure; Angiodysplasia; Hx of colonic polyps; Body mass index 40.0-44.9, adult; Cardiac pacemaker in situ; Chronic low back pain; Chronic lung disease; Degeneration of lumbar intervertebral disc; Edema of lower extremity; History of malignant neoplasm of skin; History of TIA (transient ischemic attack); Hypotonic bladder; Incomplete tear of rotator cuff; Major depression in remission; Tinnitus of both ears; Primary osteoarthritis involving multiple joints; Restless legs; Seborrheic dermatitis; Transient cerebral ischemia; Muscle strain; Type 2 diabetes mellitus with hyperglycemia, without long-term current use of insulin; Vitamin B12 deficiency; Vitamin D deficiency; Chronic kidney disease, stage 3b; Osteoporosis, senile; Acute diastolic CHF (congestive heart failure); Chronic respiratory failure with hypoxia; Chronic anticoagulation; Fracture of right shoulder with delayed healing;  Chronic congestive heart failure; and Acute on chronic hypoxic respiratory failure on their problem list., the following items were addressed: medical records; medications and any changes can be found within the plan section of the note.  A detailed listing of time spent for chronic care management is tracked within each outreach encounter.  Current medications include:  has a current medication list which includes the following prescription(s): albuterol, albuterol sulfate hfa, amantadine, apixaban, aspirin, b complex-c, bumetanide, calcium carbonate, carbidopa-levodopa, cholecalciferol, citalopram, clobetasol propionate, clonazepam, dapagliflozin propanediol, doxycycline, fluticasone, glucosamine-chondroit-calcium, guaifenesin, hydroxychloroquine, lantus solostar, ipratropium, levothyroxine, loratadine, metolazone, multiple vitamins-minerals, o2, omeprazole, pantoprazole, phenol, polyethylene glycol, pramipexole, promethazine-dextromethorphan, ranolazine, sacubitril-valsartan, senna, spironolactone, tramadol, triamcinolone, and triamcinolone acetonide. and the patient is reported to be patient is compliant with medication protocol,  Medications are reported to be effective.  Regarding these diagnoses, referrals were made to the following provider(s):  n/a.  All notes on chart for PCP to review.    The patient was monitored remotely for blood pressure; weight; activity level; pain; medications.    The patient's physical needs include:  needs assistance with ADLs; physical healthcare.     The patient's mental support needs include:  increased support    The patient's cognitive support needs include:  needs met    The patient's psychosocial support needs include:  continued support    The patient's functional needs include: physical healthcare    The patient's environmental needs include:  not applicable    Care Plan overall comments:  reviewed    Refer to previous outreach notes for more information on the areas  listed above.    Monthly Billing Diagnoses  (I25.10) Coronary artery disease involving native coronary artery of native heart without angina pectoris    (G20.B2) Parkinson's disease with dyskinesia and fluctuating manifestations    (I10) Hypertension, essential    Medications   Medications have been reconciled    Care Plan progress this month:      Recently Modified Care Plans Updates made since 12/30/2024 12:00 AM      No recently modified care plans.               Instructions   Patient was provided an electronic copy of care plan  CCM services were explained and offered and patient has accepted these services.  Patient has given their written consent to receive CCM services and understands that this includes the authorization of electronic communication of medical information with the other treating providers.  Patient understands that they may stop CCM services at any time and these changes will be effective at the end of the calendar month and will effectively revocate the agreement of CCM services.  Patient understands that only one practitioner can furnish and be paid for CCM services during one calendar month.  Patient also understands that there may be co-payment or deductible fees in association with CCM services.  Patient will continue with at least monthly follow-up calls with the Ambulatory .    Faye JAUREGUI  Ambulatory Case Management    1/30/2025, 13:53 EST

## 2025-01-31 ENCOUNTER — TELEPHONE (OUTPATIENT)
Dept: FAMILY MEDICINE CLINIC | Facility: CLINIC | Age: 75
End: 2025-01-31
Payer: MEDICARE

## 2025-01-31 ENCOUNTER — HOME CARE VISIT (OUTPATIENT)
Dept: HOME HEALTH SERVICES | Facility: HOME HEALTHCARE | Age: 75
End: 2025-01-31
Payer: MEDICARE

## 2025-01-31 VITALS — HEART RATE: 74 BPM | DIASTOLIC BLOOD PRESSURE: 64 MMHG | OXYGEN SATURATION: 98 % | SYSTOLIC BLOOD PRESSURE: 132 MMHG

## 2025-01-31 PROCEDURE — G0299 HHS/HOSPICE OF RN EA 15 MIN: HCPCS

## 2025-01-31 NOTE — TELEPHONE ENCOUNTER
[facility] CALLED REQUESTING VERBAL OKAY TO CONTINUE FOR 1-2 MORE WEEKS     CALLBACK:   [phone]

## 2025-02-04 ENCOUNTER — HOME CARE VISIT (OUTPATIENT)
Dept: HOME HEALTH SERVICES | Facility: HOME HEALTHCARE | Age: 75
End: 2025-02-04
Payer: MEDICARE

## 2025-02-04 VITALS
OXYGEN SATURATION: 95 % | DIASTOLIC BLOOD PRESSURE: 63 MMHG | RESPIRATION RATE: 17 BRPM | SYSTOLIC BLOOD PRESSURE: 128 MMHG | TEMPERATURE: 97.8 F | HEART RATE: 68 BPM

## 2025-02-04 PROCEDURE — G0157 HHC PT ASSISTANT EA 15: HCPCS

## 2025-02-05 ENCOUNTER — HOME CARE VISIT (OUTPATIENT)
Dept: HOME HEALTH SERVICES | Facility: HOME HEALTHCARE | Age: 75
End: 2025-02-05
Payer: MEDICARE

## 2025-02-05 VITALS
TEMPERATURE: 98 F | DIASTOLIC BLOOD PRESSURE: 72 MMHG | SYSTOLIC BLOOD PRESSURE: 157 MMHG | HEART RATE: 78 BPM | BODY MASS INDEX: 39.87 KG/M2 | WEIGHT: 218 LBS | RESPIRATION RATE: 18 BRPM

## 2025-02-05 PROCEDURE — G0299 HHS/HOSPICE OF RN EA 15 MIN: HCPCS

## 2025-02-06 ENCOUNTER — HOME CARE VISIT (OUTPATIENT)
Dept: HOME HEALTH SERVICES | Facility: HOME HEALTHCARE | Age: 75
End: 2025-02-06
Payer: MEDICARE

## 2025-02-06 VITALS
DIASTOLIC BLOOD PRESSURE: 70 MMHG | RESPIRATION RATE: 18 BRPM | SYSTOLIC BLOOD PRESSURE: 134 MMHG | OXYGEN SATURATION: 92 % | HEART RATE: 77 BPM

## 2025-02-06 PROCEDURE — G0152 HHCP-SERV OF OT,EA 15 MIN: HCPCS

## 2025-02-11 ENCOUNTER — HOME CARE VISIT (OUTPATIENT)
Dept: HOME HEALTH SERVICES | Facility: HOME HEALTHCARE | Age: 75
End: 2025-02-11
Payer: MEDICARE

## 2025-02-11 ENCOUNTER — TELEPHONE (OUTPATIENT)
Dept: FAMILY MEDICINE CLINIC | Facility: CLINIC | Age: 75
End: 2025-02-11
Payer: MEDICARE

## 2025-02-11 VITALS
TEMPERATURE: 97.5 F | BODY MASS INDEX: 39.55 KG/M2 | HEART RATE: 79 BPM | SYSTOLIC BLOOD PRESSURE: 143 MMHG | DIASTOLIC BLOOD PRESSURE: 62 MMHG | OXYGEN SATURATION: 96 % | RESPIRATION RATE: 17 BRPM | WEIGHT: 216.25 LBS

## 2025-02-11 VITALS
DIASTOLIC BLOOD PRESSURE: 66 MMHG | HEART RATE: 80 BPM | SYSTOLIC BLOOD PRESSURE: 128 MMHG | RESPIRATION RATE: 18 BRPM | OXYGEN SATURATION: 95 % | TEMPERATURE: 97.4 F

## 2025-02-11 DIAGNOSIS — R30.0 DYSURIA: Primary | ICD-10-CM

## 2025-02-11 PROCEDURE — G0151 HHCP-SERV OF PT,EA 15 MIN: HCPCS

## 2025-02-11 PROCEDURE — G0299 HHS/HOSPICE OF RN EA 15 MIN: HCPCS

## 2025-02-11 NOTE — TELEPHONE ENCOUNTER
Caller: WAYNE[facility]    Relationship: [facility]    Best call back number: [phone]    What orders are you requesting (i.e. lab or imaging): AN ORDER  TO TEST PATIENT FOR AN UTI    In what timeframe would the patient need to come in: ASAP    Additional notes: WAYNE STATES THAT PATIENT IS COMPLAINING OF FREQUENT AND PAINFUL URINATION

## 2025-02-12 ENCOUNTER — HOME CARE VISIT (OUTPATIENT)
Dept: HOME HEALTH SERVICES | Facility: HOME HEALTHCARE | Age: 75
End: 2025-02-12
Payer: MEDICARE

## 2025-02-12 PROCEDURE — G0152 HHCP-SERV OF OT,EA 15 MIN: HCPCS

## 2025-02-12 NOTE — Clinical Note
Discharge Summary/Summary of Care Provided: Pt participated in 5 HHOT visits   Patient received home health for diagnosis: CHF  Current level of functional ability: Pt ambulates in home w/ RW, mod ind to min A with ADL skills, s/u - mod ind w/ lt IADL skills  Living arrangements: Pt lives with dtr in a single story home w/ ramp to enter in garage  Progress towards goals and/or Were all goals met? yes  If not all goals met, barriers that prevented patient from meeting goals: n/a  SDOH concerns (i.e. Caregiver availability, social isolation, environment, income, transportation access, food insecurity etc.) n/a  Follow-up appointment plans and community resources provided: Denise Martin APRN on 2-17-25  Other imporant information. PT/RN remain in the home

## 2025-02-13 ENCOUNTER — HOME CARE VISIT (OUTPATIENT)
Dept: HOME HEALTH SERVICES | Facility: HOME HEALTHCARE | Age: 75
End: 2025-02-13
Payer: MEDICARE

## 2025-02-13 VITALS
SYSTOLIC BLOOD PRESSURE: 167 MMHG | TEMPERATURE: 97.8 F | RESPIRATION RATE: 16 BRPM | HEART RATE: 76 BPM | OXYGEN SATURATION: 96 % | DIASTOLIC BLOOD PRESSURE: 85 MMHG

## 2025-02-13 PROCEDURE — G0299 HHS/HOSPICE OF RN EA 15 MIN: HCPCS

## 2025-02-14 VITALS
DIASTOLIC BLOOD PRESSURE: 70 MMHG | RESPIRATION RATE: 17 BRPM | TEMPERATURE: 97.6 F | OXYGEN SATURATION: 95 % | SYSTOLIC BLOOD PRESSURE: 122 MMHG | HEART RATE: 78 BPM

## 2025-02-17 ENCOUNTER — HOSPITAL ENCOUNTER (OUTPATIENT)
Dept: ONCOLOGY | Facility: HOSPITAL | Age: 75
Discharge: HOME | End: 2025-02-17
Admitting: NURSE PRACTITIONER
Payer: MEDICARE

## 2025-02-17 ENCOUNTER — OFFICE VISIT (OUTPATIENT)
Dept: ONCOLOGY | Facility: CLINIC | Age: 75
End: 2025-02-17
Payer: MEDICARE

## 2025-02-17 VITALS
RESPIRATION RATE: 18 BRPM | HEART RATE: 75 BPM | DIASTOLIC BLOOD PRESSURE: 61 MMHG | OXYGEN SATURATION: 99 % | TEMPERATURE: 98.4 F | SYSTOLIC BLOOD PRESSURE: 125 MMHG

## 2025-02-17 VITALS
HEIGHT: 62 IN | WEIGHT: 217 LBS | DIASTOLIC BLOOD PRESSURE: 67 MMHG | TEMPERATURE: 98 F | OXYGEN SATURATION: 100 % | RESPIRATION RATE: 18 BRPM | SYSTOLIC BLOOD PRESSURE: 151 MMHG | BODY MASS INDEX: 39.93 KG/M2 | HEART RATE: 72 BPM

## 2025-02-17 DIAGNOSIS — N18.30 ANEMIA ASSOCIATED WITH STAGE 3 CHRONIC RENAL FAILURE: Primary | ICD-10-CM

## 2025-02-17 DIAGNOSIS — D63.1 ANEMIA ASSOCIATED WITH STAGE 3 CHRONIC RENAL FAILURE: ICD-10-CM

## 2025-02-17 DIAGNOSIS — N18.30 ANEMIA ASSOCIATED WITH STAGE 3 CHRONIC RENAL FAILURE: ICD-10-CM

## 2025-02-17 DIAGNOSIS — D63.1 ANEMIA ASSOCIATED WITH STAGE 3 CHRONIC RENAL FAILURE: Primary | ICD-10-CM

## 2025-02-17 DIAGNOSIS — N18.32 CHRONIC KIDNEY DISEASE, STAGE 3B: ICD-10-CM

## 2025-02-17 LAB
BASOPHILS # BLD AUTO: 0.07 10*3/MM3 (ref 0–0.2)
BASOPHILS NFR BLD AUTO: 0.8 % (ref 0–1.5)
DEPRECATED RDW RBC AUTO: 50.6 FL (ref 37–54)
EOSINOPHIL # BLD AUTO: 0.58 10*3/MM3 (ref 0–0.4)
EOSINOPHIL NFR BLD AUTO: 6.9 % (ref 0.3–6.2)
ERYTHROCYTE [DISTWIDTH] IN BLOOD BY AUTOMATED COUNT: 14.2 % (ref 12.3–15.4)
FERRITIN SERPL-MCNC: 588.8 NG/ML (ref 13–150)
HCT VFR BLD AUTO: 32.2 % (ref 34–46.6)
HGB BLD-MCNC: 10.8 G/DL (ref 12–15.9)
IMM GRANULOCYTES # BLD AUTO: 0.05 10*3/MM3 (ref 0–0.05)
IMM GRANULOCYTES NFR BLD AUTO: 0.6 % (ref 0–0.5)
IRON 24H UR-MRATE: 71 MCG/DL (ref 37–145)
IRON SATN MFR SERPL: 21 % (ref 20–50)
LYMPHOCYTES # BLD AUTO: 0.94 10*3/MM3 (ref 0.7–3.1)
LYMPHOCYTES NFR BLD AUTO: 11.2 % (ref 19.6–45.3)
MCH RBC QN AUTO: 32.5 PG (ref 26.6–33)
MCHC RBC AUTO-ENTMCNC: 33.5 G/DL (ref 31.5–35.7)
MCV RBC AUTO: 97 FL (ref 79–97)
MONOCYTES # BLD AUTO: 0.93 10*3/MM3 (ref 0.1–0.9)
MONOCYTES NFR BLD AUTO: 11.1 % (ref 5–12)
NEUTROPHILS NFR BLD AUTO: 5.79 10*3/MM3 (ref 1.7–7)
NEUTROPHILS NFR BLD AUTO: 69.4 % (ref 42.7–76)
PLATELET # BLD AUTO: 189 10*3/MM3 (ref 140–450)
PMV BLD AUTO: 9.3 FL (ref 6–12)
RBC # BLD AUTO: 3.32 10*6/MM3 (ref 3.77–5.28)
TIBC SERPL-MCNC: 337 MCG/DL (ref 298–536)
TRANSFERRIN SERPL-MCNC: 226 MG/DL (ref 200–360)
WBC NRBC COR # BLD AUTO: 8.36 10*3/MM3 (ref 3.4–10.8)

## 2025-02-17 PROCEDURE — 3077F SYST BP >= 140 MM HG: CPT | Performed by: NURSE PRACTITIONER

## 2025-02-17 PROCEDURE — 1126F AMNT PAIN NOTED NONE PRSNT: CPT | Performed by: NURSE PRACTITIONER

## 2025-02-17 PROCEDURE — 99213 OFFICE O/P EST LOW 20 MIN: CPT | Performed by: NURSE PRACTITIONER

## 2025-02-17 PROCEDURE — 85025 COMPLETE CBC W/AUTO DIFF WBC: CPT | Performed by: NURSE PRACTITIONER

## 2025-02-17 PROCEDURE — 84466 ASSAY OF TRANSFERRIN: CPT | Performed by: NURSE PRACTITIONER

## 2025-02-17 PROCEDURE — 82728 ASSAY OF FERRITIN: CPT | Performed by: NURSE PRACTITIONER

## 2025-02-17 PROCEDURE — G0463 HOSPITAL OUTPT CLINIC VISIT: HCPCS

## 2025-02-17 PROCEDURE — 36415 COLL VENOUS BLD VENIPUNCTURE: CPT

## 2025-02-17 PROCEDURE — 83540 ASSAY OF IRON: CPT | Performed by: NURSE PRACTITIONER

## 2025-02-17 PROCEDURE — 3078F DIAST BP <80 MM HG: CPT | Performed by: NURSE PRACTITIONER

## 2025-02-17 NOTE — PROGRESS NOTES
CHIEF COMPLAINT: Anemia    Problem List:  Oncology/Hematology History Overview Note   1.  Anemia since at least 2016 managed with erythropoietin injections by [facility].  With polypectomies from cecum June 2021 Dr. Marino's and capsule endoscopy at [facility] November 2016 showing small bowel AVMs cecal polyps Bj Rivera September 2016 and Dr. Reynaldo Fritz polypectomy 2007.  EGD Dr. Rivera 2012, 2015, 2016 with dilation of esophagus.  [facility] enteroscopy single balloon with fluoroscopy [facility] with 1 small nonbleeding AVM ablated.  2.  Cardiovascular disease with MI 2015 stent RCA [facility]  3.  Atrial fib managed by [facility] [place] 2016 with pacemaker   4.  Repetitive falls with possible concussion April 2023 and January 2024  5.  Wedge biopsy left upper lobe Dr. Faisal Lundy October 2016  6.  Eczema  7.  Reflux  8.  Hypertension  9.  Hypothyroidism  10.  Lichen sclerosis on vulvar biopsy 1982  11.  Parkinson's diagnosed 2007   12.  Sleep apnea managed by [facility] pulmonary medicine  13.  Subcutaneous lupus diagnosed [facility] Carole Roy February 2023  14.  2019 back surgeries Dr. Lencho Gamino and wound infection drained by Dr. Alonso in 2018  15.  Urethral dilations with Dr. Terrence Mckenzie  16.  Bilateral total knee replacements Dr. Springer.  17.  CHF    Hematology history timeline:  -10/26/2023 hematology note Dr. Nunez.  Received parenteral iron September 2023 with hemoglobin stable 9.8.  He states this is multifactorial anemia due to iron deficiency suspecting GI blood loss due to history of AVMs and chronic kidney disease with erythropoietin deficiency.  White count and platelets normal.  Plan for continued Procrit per protocol with monitoring of iron indices periodically.  Numerous prior B12 levels normal during 2023.  Patient considering colonoscopy  -12/28/2023 40,000 units Procrit last dose given at [facility] being held for hemoglobin over 10.  -1/22/2024 hemoglobin 11 received IV iron 510 mg 1/22/2024 and 1/31/2024.  -3/6/2024 ferritin 263 with iron normal 115 and iron saturation 40% with normal total iron binding capacity 291.  Creatinine 1.24 GFR 45.  Hemoglobin 8.7 with platelets 132,000.  MCV 96 with normal RDW.    -3/12/2024 [facility] consult: I reviewed her extensive records she brought me that she collated herself and the note from [facility] I summarized above.  She has anemia of renal disease and iron deficiency due to periodic GI losses intolerant of oral iron because she cannot take it with the Sinemet for her Parkinson's that she takes 6 times a day so she gets periodic IV iron.  For now, with her hemoglobin of 8.7 and GFR 45 with normal iron indices we will hop back on the Procrit has per protocol 40,000 units subcu weekly holding for hemoglobin over 10 and she will see my nurse practitioner back in May.  Following that my nurse practitioner will watch her and periodically we will need to check her iron indices as she does have a history of AVMs and may need periodic IV iron as well.  I discussed with her that I would not put her through a bone marrow biopsy despite the mild thrombocytopenia given normochromic normocytic indices and normal Red cell distribution with an unremarkable differential and the fact that, even if I found myelodysplasia, at most what I would do is just Procrit which we are already doing.  She has not had jaundice or icterus or other evidence is to suggest hemolysis and I will not work that up.  In essence we will just continue the care that she was already receiving at [facility].  She has had thorough GI evaluations as outlined above.    -5/8/2024 [facility] clinic follow-up: We are administering Procrit for her anemia of renal disease if her hemoglobin is less than 10.  She has not required Procrit since we saw her initially  on 3/12/2024.  In order to try and simplify things as she is unable to get out without assistance I will change her monitoring to every 2 weeks.  We will check her CBC every 2 weeks and we will get Procrit if her hemoglobin is less than 10.  She had been going to [place] for her Procrit since there is a lab there and they can do it on Monday however she lives here in [place].  She does have a caregiver or her daughter who can get her to the lab therefore we will have her get her labs here locally and we will administer the Procrit in [place] if parameters are met.  If this arrangement does not work out for her then we will get her back to [place] and I discussed with her and her daughter that we also have a clinic in [place] who can see her on the days of her Procrit.  She is following now with Dr. Teague for management of her CHF.  I did ask her daughter to speak with either her PCP or cardiologist about home health for additional management of her CHF.  Her iron studies were normal when checked in March.  Those are built in to assess periodically while on Procrit, next due in June.  Her daughter did state that she thinks her mother received iron while recent inpatient.    -7/24/2024 [facility] hematology clinic follow-up: Karina has been in rehab since we saw her last for weakness and pain in her right hip and lower extremity.  She is now back home and continues to work with home PT.  She has not required Procrit since March.  Currently her hemoglobin is 9.8 therefore we will administer Procrit today. Her creatinine is stable at 1.40, CBC and CMP otherwise unremarkable.  It is difficult for her to come and go with mobility issues.  We will continue to monitor her CBC every 2 weeks and will administer Procrit if her hemoglobin is less than 10.  Since she currently has home health we have arranged to have her CBC checked at home, when she is discharged from home health we will resume checking her CBC here in  our office.  She has not required any parenteral iron for some time, current MCV is 98 with MCHC of 32.2.  We will repeat her iron studies again in October as these are built into her Procrit plan, I will check them sooner if her MCV starts to decrease or she has worsening anemia.    -9/18/2024 [facility] hematology clinic follow-up: Overall her hemoglobin remained stable, on Tuesday it was 10.5. She has not required Procrit since 7/24/2024, we are checking her CBC every 2 weeks as it is inconvenient for her to get out weekly and she has not been requiring Procrit weekly, her hemoglobin in August was 10.8 and 11.3.  I will repeat her CBC today just to recheck and we are drawing her ferritin and iron profile.  If for some reason her hemoglobin is below 10 we will bring her back for Procrit.  If her iron levels are decreasing then we will set her up for parenteral iron.  She has follow-up scheduled with nephrology ongoing.  She is going to see Dr. Teague next week regarding her CHF.  She is feeling better since diuresis in the ED earlier this week.    -11/27/2024 [facility] hematology telemedicine follow-up: Karina has been receiving Procrit on average this year about every 4 months, she was given a Procrit shot in March 2024 again in July and most recently on 11/13/2024. Her current hemoglobin from 11/26/2024 is normal at 12 with hematocrit 36%, WBC is normal at 6.04, platelet count normal at 171,000.  Her iron studies are normal, ferritin is 415.  CMP with BUN of 61 creatinine 1.78 otherwise normal.  She does follow with nephrology and also closely with cardiology who is managing her diuretics.  She feels much better after some changes in her diuretics with now decreased fluids on board.  At this time we will check her CBC monthly rather than every 2 weeks due to the infrequent need of her Procrit and also with current hemoglobin normal.  We will see how she does over the next 3 months.     Anemia associated with stage 3  chronic renal failure   3/12/2024 -  Chemotherapy    OP SUPPORTIVE Epoetin  Parker / Epoetin Parker-epbx         HISTORY OF PRESENT ILLNESS:  The patient is a 74 y.o. female, here for follow up on management of anemia of chronic renal disease.  Karina reports that she was hospitalized in December with bronchitis but otherwise has been doing well since we last saw her in November with telemedicine visit.  She last received Procrit in November when hemoglobin was 9.8. Iron studies were normal.  Hemoglobin in January was 10.7, she states that she saw her kidney doctor last week and her hemoglobin was 10.1.    Past Medical History:   Diagnosis Date    Allergic     Allergic rhinitis     Anemia     Ankle problem     thinks back related causing pain     Anxiety     Asthma     Atrial fibrillation     AVM (arteriovenous malformation)     Back pain     Cataract 2015    CHF (congestive heart failure) 06/02/2024    Probably had before then but no one actually told me I had it.    Cholelithiasis 09/07/01    Gallbladder Removed    Chronic kidney disease     stage 3 per pt    Chronic lung disease 04/13/2022    CKD (chronic kidney disease)     Clotting disorder 2016    AVM - small intestine    Colon polyp 09/15/16    Coronary artery disease involving native coronary artery without angina pectoris 03/01/2017    COVID-19 vaccine series completed     Cystic fibrosis     Diastolic dysfunction     Gastrocnemius muscle tear     left medial 91    Generalized osteoarthritis     GERD (gastroesophageal reflux disease)     Gestational diabetes     GIB (gastrointestinal bleeding) 2016    d/t xarelto     Headache     Hearing decreased, bilateral     HAS HEARING AID, NOT WEARING IT CURRENTLY    Hiatal hernia     History of medical problems 04/23/82    Lichens Sclerosis    History of shingles     History of transfusion 2016    no reaction recalled     HL (hearing loss) 2019    Got hearing aids    Hyperlipidemia LDL goal <70 03/01/2017    Hypertension      Hyperthyroidism     Hypothyroidism     IBS (irritable bowel syndrome)     Inflammatory bowel disease     Klebsiella pneumonia     Lichen sclerosus     Lupus     subQ    Mouth problem     mouth guard used since pt bites tongue and lips excessively at night if not- with bipap     MRSA infection 2018    Myocardial infarction     Obesity     On home oxygen therapy     2L of oxygen all the time due to current congestion     Osteopenia 2016    Osteoporosis     Parkinson's disease     Peripheral vascular disease     Pleurisy     Pneumonia     Puerperal sepsis with acute hypoxic respiratory failure     emergent intubation- 2016    Pulmonary embolism     Renal insufficiency     Right leg pain     from back issues     Salivary gland stone     Sciatic nerve pain     Seborrheic dermatitis     Skin cancer     on back     Sleep apnea     CPAP AND HOME O2 2L/M    Sleep apnea, obstructive 04/15/01    TIA (transient ischemic attack) 2014    no residual effects    Tremor     Type 2 diabetes mellitus     UTI (urinary tract infection)     Visual impairment     Vitamin D deficiency 09/08/2022    Wears glasses      Past Surgical History:   Procedure Laterality Date    ABLATION OF DYSRHYTHMIC FOCUS  05/16/13    Laser Ablation - Rt Leg    BACK SURGERY      l4-l5 laminectomy     BICEPS TENDON REPAIR Right     shoulder    BREAST BIOPSY Left 05/2004    excisional, benign    BRONCHOSCOPY RIGID / FLEXIBLE      2016    BUNIONECTOMY Right     CARDIAC CATHETERIZATION      CARDIAC CATHETERIZATION N/A 02/01/2019    Procedure: Left Heart Cath;  Surgeon: Albertina Corona MD;  Location:  [facility] CATH INVASIVE LOC[facility];  Service: Cardiology    CARDIAC ELECTROPHYSIOLOGY PROCEDURE N/A 01/26/2024    Procedure: Lead Revision RA or RV, PPM or ICD;  Surgeon: Lauro Francois MD;  Location:  [facility] EP INVASIVE LOCATION;  Service: Cardiovascular;  Laterality: N/A;    CARDIAC SURGERY      CATARACT EXTRACTION, BILATERAL  06/26/2024    (R) eye   08/16/2024  (L) eye    CHOLECYSTECTOMY      COLON SURGERY      COLONOSCOPY  2016    COLONOSCOPY N/A 06/17/2021    Procedure: COLONOSCOPY WITH POLYPECTOMY;  Surgeon: Trenton Sosa MD;  Location: [facility] ENDOSCOPY;  Service: Gastroenterology;  Laterality: N/A;    CORONARY STENT PLACEMENT      x1 stent    CYST REMOVAL      left ear, upper left back 2001    CYSTOSCOPY      x2  18 and 20 -   in 20 urethra dilation     DIAGNOSTIC LAPAROSCOPY  1981    ENDOSCOPIC FUNCTIONAL SINUS SURGERY (FESS)  2011    ENDOSCOPY  2016    ENTEROSCOPY VIA STOMA      with single ballon with fluoro     EYE SURGERY  7/26 & 8/16/24    Cataracts removed from each eye.    HAMMER TOE REPAIR Left     INCISION AND DRAINAGE OF WOUND  2018    back with wound infection     INSERT / REPLACE / REMOVE PACEMAKER  11/10/16    [facility]    JOINT REPLACEMENT      KNEE ARTHROSCOPY      LASER ABLATION      right leg 13    LIPOMA EXCISION  1999    left leg     LUMBAR LAMINECTOMY DISCECTOMY DECOMPRESSION N/A 07/06/2018    Procedure: LUMBAR LAMINECTOMY L4-5, HEMILAMIINECTOMY RIGHT L5-S1, FORAMINOTOMY L5-S1;  Surgeon: Lencho Gamino MD;  Location:  [facility] OR;  Service: Neurosurgery    LUMBAR LAMINECTOMY DISCECTOMY DECOMPRESSION N/A 09/19/2018    Procedure: INCISION AND DRAINAGE BACK WITH WOUND EXPLORATION;  Surgeon: Ritchie García MD;  Location:  [facility] OR;  Service: Neurosurgery    LUNG BIOPSY Left 2016    OTHER SURGICAL HISTORY      ct scan of chest and sinuses and lower back     OTHER SURGICAL HISTORY      various echos     OTHER SURGICAL HISTORY      electroencephalogram 16,   emg  ncv tests 2007    OTHER SURGICAL HISTORY      mra 2007  and various mri with xrays, nuclear medicine lung ventilation with perfusion test 2016 with pft     OTHER SURGICAL HISTORY      barium swallow testing 15, 12, 12    OTHER SURGICAL HISTORY      vaginal ultrasound- 2012,   vas clementina lower extrem 2016, vas venous duplex lower extrem bilat 2019    OTHER SURGICAL HISTORY    "   wdge biopsy spring of lung     OTHER SURGICAL HISTORY      emergent intubation- hypoxic resp failure     OTHER SURGICAL HISTORY      01/26/2024 ppm generator change and lead revision per Dr. Francois    PACEMAKER IMPLANTATION  11/2016    sss    REPLACEMENT TOTAL KNEE Bilateral     left knee 2011, right 2012 per dr acuna     REPLACEMENT TOTAL KNEE Bilateral     SHOULDER ARTHROSCOPY Bilateral     2003-left, 2004- right     SKIN BIOPSY      skin cancer back 2016    SKIN CANCER EXCISION      upper righ tback     SPINE SURGERY  07/06/18    SUBTOTAL HYSTERECTOMY      MADHAV      TEETH EXTRACTION      x2    TOTAL SHOULDER ARTHROPLASTY W/ DISTAL CLAVICLE EXCISION Left 10/24/2022    Procedure: REVERSE TOTAL SHOULDER ARTHROPLASTY, BICEPS TENODESIS - LEFT;  Surgeon: Sammy Yo MD;  Location:  [facility] OR;  Service: Orthopedics;  Laterality: Left;    TOTAL SHOULDER ARTHROPLASTY W/ DISTAL CLAVICLE EXCISION Right 09/18/2023    Procedure: REVERSE TOTAL SHOULDER  ARTHROPLASTY WITH BICEPS TENODESIS - RIGHT;  Surgeon: Sammy Yo MD;  Location:  [facility] OR;  Service: Orthopedics;  Laterality: Right;    TUBAL ABDOMINAL LIGATION Bilateral 1980    WISDOM TOOTH EXTRACTION         Allergies   Allergen Reactions    Amlodipine Besylate Swelling     Lower extremity (ankles, feet) swelling    Entacapone Other (See Comments)     \"extreme weakness in legs - caused several falls, which stopped after discontinuing this medication\"    Epinephrine Other (See Comments)     6/4/16- had 3 shots to numb mouth to prepare teeth for crowns, the shots contained epi-  Caused pt to have chest discomfort- went to hospital in ambulance, discovered had a fib while there     Hydrocodone Unknown - High Severity     Headache, nausea, dizziness, & vomiting    Levemir [Insulin Detemir] Hives     Hives / rash around injection site    Penicillins Hives     Jitteriness     Xarelto [Rivaroxaban] GI Bleeding     hgb dropped to 5.2    Aricept [Donepezil Hcl] " "Nausea Only     Vivid dreams    Benztropine Mesylate Other (See Comments)     Uncontrollable body movements    Cogentin [Benztropine] Other (See Comments)     \"uncontrollable body movements\"    Compazine [Prochlorperazine Edisylate] Other (See Comments)     Dystonic reaction    Duraprep [Antiseptic Products, Misc.] Itching and Rash     RASH AND ITCHING    Haldol [Haloperidol Lactate] Other (See Comments)     Dystonic reaction    Hydralazine Other (See Comments)     Headache     Lisinopril Cough    Statins Myalgia     Leg pain- all statins     Sulfamethoxazole Nausea Only and Other (See Comments)     Nausea & headaches    Tarka [Trandolapril-Verapamil Hcl Er] Other (See Comments)     Constipation     Toprol Xl [Metoprolol Tartrate] Other (See Comments)     Extreme fatigue, decreased exercise tolerance    Trimethoprim Other (See Comments)     Other reaction(s): Nausea       Family History and Social History reviewed and changed as necessary    REVIEW OF SYSTEM:   No new somatic concerns    PHYSICAL EXAM:  Well-developed, well-nourished appearing female in no distress, here with her daughter today.  Ambulates with a rolling walker  Lungs clear to auscultation bilaterally, respirations regular and unlabored.  Oxygen saturation 100% on 2 L O2    Vitals:    02/17/25 1440   BP: 151/67   Pulse: 72   Resp: 18   Temp: 98 °F (36.7 °C)   SpO2: 100%   Weight: 98.4 kg (217 lb)   Height: 157.5 cm (62\")     Vitals:    02/17/25 1440   PainSc: 0-No pain          ECOG score: 2           Vitals reviewed.  Labs reviewed    ECOG: (2) Ambulatory & Capable of Self Care, Unable to Carry Out Work Activity, Up & About Greater Than 50% of Waking Hours    Lab Results   Component Value Date    HGB 10.8 (L) 02/17/2025    HCT 32.2 (L) 02/17/2025    MCV 97.0 02/17/2025     02/17/2025    WBC 8.36 02/17/2025    NEUTROABS 5.79 02/17/2025    LYMPHSABS 0.94 02/17/2025    MONOSABS 0.93 (H) 02/17/2025    EOSABS 0.58 (H) 02/17/2025    BASOSABS 0.07 " 02/17/2025       Lab Results   Component Value Date    GLUCOSE 113 (H) 11/25/2024    BUN 61 (H) 11/25/2024    CREATININE 1.78 (H) 11/25/2024     11/25/2024    K 3.7 11/25/2024    CL 97 (L) 11/25/2024    CO2 23.8 11/25/2024    CALCIUM 9.5 11/25/2024    PROTEINTOT 6.9 10/14/2024    ALBUMIN 4.5 10/14/2024    BILITOT 0.5 10/14/2024    ALKPHOS 106 10/14/2024    AST 23 10/14/2024    ALT 11 10/14/2024             ASSESSMENT & PLAN:    1.  Anemia secondary to chronic kidney disease  2.  CHF    Discussion: Karina overall continues to do well on current therapy with Retacrit given when hemoglobin is less than 10.  CBC from today shows hemoglobin of 10.8. She last received Retacrit in November, prior to that it was July, she typically has been receiving Retacrit about every 3-4 months.  We did discuss checking her CBC every 2 weeks rather then monthly but she is pleased with current schedule and it works out also well for her daughter who works and is bringing her to her appointments.  Currently she feels good and has no new symptoms.  We will continue Retacrit every 4 weeks if hemoglobin is less than 10.  Iron studies were checked today and are pending, iron studies in November were normal.    This was a level 3, limited MDM visit with stable chronic illness and prescription drug management along with review of labs  Denise Martin, APRN    02/17/2025

## 2025-02-18 ENCOUNTER — HOME CARE VISIT (OUTPATIENT)
Dept: HOME HEALTH SERVICES | Facility: HOME HEALTHCARE | Age: 75
End: 2025-02-18
Payer: MEDICARE

## 2025-02-18 VITALS
OXYGEN SATURATION: 100 % | SYSTOLIC BLOOD PRESSURE: 110 MMHG | TEMPERATURE: 97.5 F | DIASTOLIC BLOOD PRESSURE: 54 MMHG | RESPIRATION RATE: 18 BRPM | HEART RATE: 74 BPM

## 2025-02-18 PROCEDURE — G0151 HHCP-SERV OF PT,EA 15 MIN: HCPCS

## 2025-02-18 NOTE — CASE COMMUNICATION
Discharge Summary/Summary of Care Provided: pt seen for 11 skilled HHPT visits   Patient received home health for diagnosis: CRF with hypoxia  Current level of functional ability: pt is mod I for gait using 4WW  Living arrangements: lives in a one story home with her daughter  Progress towards goals and/or Were all goals met? goals met  If not all goals met, barriers that prevented patient from meeting goals: n/a  SDOH concerns (i.e. Ca regiver availability, social isolation, environment, income, transportation access, food insecurity etc.)n/a  Follow-up appointment plans and community resources provided: Dr Fritz on 3/11/25  Other imporant information. SN remains in home

## 2025-02-20 ENCOUNTER — HOME CARE VISIT (OUTPATIENT)
Dept: HOME HEALTH SERVICES | Facility: HOME HEALTHCARE | Age: 75
End: 2025-02-20
Payer: MEDICARE

## 2025-02-20 VITALS
RESPIRATION RATE: 17 BRPM | DIASTOLIC BLOOD PRESSURE: 62 MMHG | TEMPERATURE: 97.9 F | SYSTOLIC BLOOD PRESSURE: 128 MMHG | HEART RATE: 81 BPM | OXYGEN SATURATION: 99 %

## 2025-02-20 PROCEDURE — G0299 HHS/HOSPICE OF RN EA 15 MIN: HCPCS

## 2025-02-26 ENCOUNTER — HOME CARE VISIT (OUTPATIENT)
Dept: HOME HEALTH SERVICES | Facility: HOME HEALTHCARE | Age: 75
End: 2025-02-26
Payer: MEDICARE

## 2025-02-26 VITALS
DIASTOLIC BLOOD PRESSURE: 76 MMHG | OXYGEN SATURATION: 95 % | SYSTOLIC BLOOD PRESSURE: 135 MMHG | TEMPERATURE: 97.3 F | HEART RATE: 79 BPM | RESPIRATION RATE: 17 BRPM

## 2025-02-26 PROCEDURE — G0299 HHS/HOSPICE OF RN EA 15 MIN: HCPCS

## 2025-02-28 NOTE — CASE COMMUNICATION
Discharge Summary/Summary of Care Provided: Education on chf, hypoxia, diabetic education, fall prevention, o2 safety, medications, PT/OT for strengthening and home safety  Patient received home health for diagnosis: Chronic respiratory failure with hypoxia  Current level of functional ability: Patient ambulates with a walker  Living arrangements: Daughter lives with patient and is her primary caregiver  Progress towards goals and/or We re all goals met? yes  If not all goals met, barriers that prevented patient from meeting goals: n/a  SDOH concerns (i.e. Caregiver availability, social isolation, environment, income, transportation access, food insecurity etc.)none  Follow-up appointment plans and community resources provided: yes  Other imporant information.

## 2025-03-03 ENCOUNTER — PATIENT OUTREACH (OUTPATIENT)
Dept: CASE MANAGEMENT | Facility: OTHER | Age: 75
End: 2025-03-03
Payer: MEDICARE

## 2025-03-03 ENCOUNTER — LAB (OUTPATIENT)
Dept: FAMILY MEDICINE CLINIC | Facility: CLINIC | Age: 75
End: 2025-03-03
Payer: MEDICARE

## 2025-03-03 DIAGNOSIS — Z79.4 TYPE 2 DIABETES MELLITUS WITH HYPERGLYCEMIA, WITH LONG-TERM CURRENT USE OF INSULIN: ICD-10-CM

## 2025-03-03 DIAGNOSIS — E55.9 VITAMIN D DEFICIENCY: ICD-10-CM

## 2025-03-03 DIAGNOSIS — E53.8 VITAMIN B12 DEFICIENCY: ICD-10-CM

## 2025-03-03 DIAGNOSIS — I25.10 CORONARY ARTERY DISEASE INVOLVING NATIVE CORONARY ARTERY OF NATIVE HEART WITHOUT ANGINA PECTORIS: Primary | ICD-10-CM

## 2025-03-03 DIAGNOSIS — I50.32 CHRONIC DIASTOLIC CONGESTIVE HEART FAILURE: ICD-10-CM

## 2025-03-03 DIAGNOSIS — E11.65 TYPE 2 DIABETES MELLITUS WITH HYPERGLYCEMIA, WITHOUT LONG-TERM CURRENT USE OF INSULIN: ICD-10-CM

## 2025-03-03 DIAGNOSIS — E11.65 TYPE 2 DIABETES MELLITUS WITH HYPERGLYCEMIA, WITH LONG-TERM CURRENT USE OF INSULIN: ICD-10-CM

## 2025-03-03 DIAGNOSIS — G20.B2 PARKINSON'S DISEASE WITH DYSKINESIA AND FLUCTUATING MANIFESTATIONS: ICD-10-CM

## 2025-03-03 DIAGNOSIS — E03.9 ACQUIRED HYPOTHYROIDISM: ICD-10-CM

## 2025-03-03 DIAGNOSIS — I10 HYPERTENSION, ESSENTIAL: ICD-10-CM

## 2025-03-03 NOTE — OUTREACH NOTE
"AMBULATORY CASE MANAGEMENT NOTE    Names and Relationships of Patient/Support Persons: Contact: Jaonna Cross \"KARINA\"; Relationship: Self -     CCM Interim Update    Spoke with Karina for CCM update. She states that she has been doing well. She has recovered from her respiratory illness and is now off of steroids. She states that her breathing has been good. She denies any edema. Her weight is remaining stable at 218.4 this morning. She was put back on entresto once a day    She had her appointment with Dr. Pringle. She did not have an ear infection. She was having some pain. Dr. Pringle found that her saliva glands were backing up. He instructed her to suck on hard sour candy to flush the saliva and this has been helping. Her sinuses were clear. She is wearing her hearing aids everyday.     She has been discharged from home health. She has upcoming appointment with Dr. Fritz and plans to talk with Dr. Fritz about further therapy for her right shoulder. She continues to have shoulder pain.     Due to her having issues with her sleep she was put on clonazepam. Since starting this medication she has been sleeping much better. She also got a new mask for her bipap and has been doing much better sleeping with it.     Her blood sugars have been better since she has been off of the steroids. The highest she has had was 200. This morning her blood sugar was 77. She is still taking Farxiga. She is adjusting her daily insulin. Lately she has been taking 15-16U of insulin at night.     One of her other children was in town recently and they went together to go look at [facility]. She feels that [facility] has a more homelike feel. She is thinking more about moving in assisted living.    She has a hospital bed that was setup for her when she was in rehab. She does not like the bed and has trouble sleeping in it. She has gone back to using her sleep number bed. The bed was obtained from [facility]. She is going to " call and make arrangements to have the bed picked up.       Education Documentation  No documentation found.        Faye JAUREGUI  Ambulatory Case Management    3/3/2025, 10:51 EST

## 2025-03-04 LAB
25(OH)D3+25(OH)D2 SERPL-MCNC: 56.4 NG/ML (ref 30–100)
ALBUMIN SERPL-MCNC: 4.2 G/DL (ref 3.8–4.8)
ALP SERPL-CCNC: 57 IU/L (ref 44–121)
ALT SERPL-CCNC: 15 IU/L (ref 0–32)
AST SERPL-CCNC: 25 IU/L (ref 0–40)
BILIRUB SERPL-MCNC: 0.5 MG/DL (ref 0–1.2)
BUN SERPL-MCNC: 40 MG/DL (ref 8–27)
BUN/CREAT SERPL: 25 (ref 12–28)
CALCIUM SERPL-MCNC: 9.2 MG/DL (ref 8.7–10.3)
CHLORIDE SERPL-SCNC: 96 MMOL/L (ref 96–106)
CHOLEST SERPL-MCNC: 188 MG/DL (ref 100–199)
CO2 SERPL-SCNC: 27 MMOL/L (ref 20–29)
CREAT SERPL-MCNC: 1.57 MG/DL (ref 0.57–1)
EGFRCR SERPLBLD CKD-EPI 2021: 34 ML/MIN/1.73
GLOBULIN SER CALC-MCNC: 1.8 G/DL (ref 1.5–4.5)
GLUCOSE SERPL-MCNC: 81 MG/DL (ref 70–99)
HBA1C MFR BLD: 6.1 % (ref 4.8–5.6)
HDLC SERPL-MCNC: 50 MG/DL
LDLC SERPL CALC-MCNC: 125 MG/DL (ref 0–99)
POTASSIUM SERPL-SCNC: 2.9 MMOL/L (ref 3.5–5.2)
PROT SERPL-MCNC: 6 G/DL (ref 6–8.5)
SODIUM SERPL-SCNC: 139 MMOL/L (ref 134–144)
TRIGL SERPL-MCNC: 69 MG/DL (ref 0–149)
TSH SERPL DL<=0.005 MIU/L-ACNC: 3.48 UIU/ML (ref 0.45–4.5)
VIT B12 SERPL-MCNC: >2000 PG/ML (ref 232–1245)
VLDLC SERPL CALC-MCNC: 13 MG/DL (ref 5–40)

## 2025-03-10 PROBLEM — Z00.00 MEDICARE ANNUAL WELLNESS VISIT, SUBSEQUENT: Status: ACTIVE | Noted: 2025-03-10

## 2025-03-10 NOTE — ASSESSMENT & PLAN NOTE
A1c is 6.1.  Recheck in 6 months.    Orders:    Hemoglobin A1c; Future    Lipid Panel; Future    Comprehensive Metabolic Panel; Future    Vitamin B12; Future    Vitamin D,25-Hydroxy; Future    TSH Rfx On Abnormal To Free T4; Future    CBC & Differential; Future

## 2025-03-10 NOTE — ASSESSMENT & PLAN NOTE
Vitamin D is 54.6.  Recheck in 6 months    Orders:    Hemoglobin A1c; Future    Lipid Panel; Future    Comprehensive Metabolic Panel; Future    Vitamin B12; Future    Vitamin D,25-Hydroxy; Future    TSH Rfx On Abnormal To Free T4; Future    CBC & Differential; Future

## 2025-03-10 NOTE — ASSESSMENT & PLAN NOTE
B12 is over 2000.    Orders:    Hemoglobin A1c; Future    Lipid Panel; Future    Comprehensive Metabolic Panel; Future    Vitamin B12; Future    Vitamin D,25-Hydroxy; Future    TSH Rfx On Abnormal To Free T4; Future    CBC & Differential; Future

## 2025-03-10 NOTE — ASSESSMENT & PLAN NOTE
Discussed health maintenance, diet, and exercise.    Orders:    DEXA Bone Density Axial    Hemoglobin A1c; Future    Lipid Panel; Future    Comprehensive Metabolic Panel; Future    Vitamin B12; Future    Vitamin D,25-Hydroxy; Future    TSH Rfx On Abnormal To Free T4; Future    CBC & Differential; Future

## 2025-03-10 NOTE — ASSESSMENT & PLAN NOTE
TSH is 3.480.  Recheck in 6 months.    Orders:    Hemoglobin A1c; Future    Lipid Panel; Future    Comprehensive Metabolic Panel; Future    Vitamin B12; Future    Vitamin D,25-Hydroxy; Future    TSH Rfx On Abnormal To Free T4; Future    CBC & Differential; Future

## 2025-03-10 NOTE — ASSESSMENT & PLAN NOTE
Patient has seen hematology.  Blood work stable    Orders:    Hemoglobin A1c; Future    Lipid Panel; Future    Comprehensive Metabolic Panel; Future    Vitamin B12; Future    Vitamin D,25-Hydroxy; Future    TSH Rfx On Abnormal To Free T4; Future    CBC & Differential; Future

## 2025-03-11 ENCOUNTER — OFFICE VISIT (OUTPATIENT)
Dept: FAMILY MEDICINE CLINIC | Facility: CLINIC | Age: 75
End: 2025-03-11
Payer: MEDICARE

## 2025-03-11 VITALS
HEART RATE: 97 BPM | OXYGEN SATURATION: 97 % | HEIGHT: 62 IN | SYSTOLIC BLOOD PRESSURE: 124 MMHG | DIASTOLIC BLOOD PRESSURE: 72 MMHG | RESPIRATION RATE: 16 BRPM | BODY MASS INDEX: 40.04 KG/M2 | WEIGHT: 217.6 LBS

## 2025-03-11 DIAGNOSIS — Z00.00 PHYSICAL EXAM: ICD-10-CM

## 2025-03-11 DIAGNOSIS — G89.29 CHRONIC RIGHT SHOULDER PAIN: ICD-10-CM

## 2025-03-11 DIAGNOSIS — E53.8 VITAMIN B12 DEFICIENCY: ICD-10-CM

## 2025-03-11 DIAGNOSIS — E55.9 VITAMIN D DEFICIENCY: ICD-10-CM

## 2025-03-11 DIAGNOSIS — M25.511 CHRONIC RIGHT SHOULDER PAIN: ICD-10-CM

## 2025-03-11 DIAGNOSIS — D63.1 ANEMIA ASSOCIATED WITH STAGE 3 CHRONIC RENAL FAILURE: ICD-10-CM

## 2025-03-11 DIAGNOSIS — E11.65 TYPE 2 DIABETES MELLITUS WITH HYPERGLYCEMIA, WITH LONG-TERM CURRENT USE OF INSULIN: ICD-10-CM

## 2025-03-11 DIAGNOSIS — E11.65 TYPE 2 DIABETES MELLITUS WITH HYPERGLYCEMIA, WITHOUT LONG-TERM CURRENT USE OF INSULIN: ICD-10-CM

## 2025-03-11 DIAGNOSIS — N18.30 ANEMIA ASSOCIATED WITH STAGE 3 CHRONIC RENAL FAILURE: ICD-10-CM

## 2025-03-11 DIAGNOSIS — N18.32 CHRONIC KIDNEY DISEASE, STAGE 3B: ICD-10-CM

## 2025-03-11 DIAGNOSIS — E03.9 ACQUIRED HYPOTHYROIDISM: ICD-10-CM

## 2025-03-11 DIAGNOSIS — Z79.4 TYPE 2 DIABETES MELLITUS WITH HYPERGLYCEMIA, WITH LONG-TERM CURRENT USE OF INSULIN: ICD-10-CM

## 2025-03-11 DIAGNOSIS — Z78.0 POST-MENOPAUSAL: ICD-10-CM

## 2025-03-11 DIAGNOSIS — Z00.00 MEDICARE ANNUAL WELLNESS VISIT, SUBSEQUENT: Primary | ICD-10-CM

## 2025-03-11 RX ORDER — POTASSIUM CHLORIDE 750 MG/1
10 CAPSULE, EXTENDED RELEASE ORAL 2 TIMES DAILY
COMMUNITY

## 2025-03-11 RX ORDER — SACUBITRIL AND VALSARTAN 24; 26 MG/1; MG/1
1 TABLET, FILM COATED ORAL 2 TIMES DAILY
COMMUNITY

## 2025-03-11 RX ORDER — DAPAGLIFLOZIN 10 MG/1
1 TABLET, FILM COATED ORAL DAILY
COMMUNITY
Start: 2025-02-22

## 2025-03-11 RX ORDER — CITALOPRAM HYDROBROMIDE 20 MG/1
20 TABLET ORAL DAILY
Qty: 90 TABLET | Refills: 0 | Status: SHIPPED | OUTPATIENT
Start: 2025-03-11

## 2025-03-11 NOTE — ASSESSMENT & PLAN NOTE
Discussed health maintenance, diet, and exercise.    Orders:    DEXA Bone Density Axial    Hemoglobin A1c; Future    Lipid Panel; Future    Comprehensive Metabolic Panel; Future    Vitamin B12; Future    Vitamin D,25-Hydroxy; Future    TSH Rfx On Abnormal To Free T4; Future    CBC & Differential; Future     Mother is RH Positive

## 2025-03-11 NOTE — ASSESSMENT & PLAN NOTE
Orders:    Hemoglobin A1c; Future    Lipid Panel; Future    Comprehensive Metabolic Panel; Future    Vitamin B12; Future    Vitamin D,25-Hydroxy; Future    TSH Rfx On Abnormal To Free T4; Future    CBC & Differential; Future

## 2025-03-11 NOTE — PROGRESS NOTES
Subjective   The ABCs of the Annual Wellness Visit  Medicare Wellness Visit      Joanna Cross is a 74 y.o. patient who presents for a Medicare Wellness Visit.  And to go over blood sugar, swelling in legs, right shoulder pain, cholesterol and thyroid.    The following portions of the patient's history were reviewed and   updated as appropriate: allergies, current medications, past family history, past medical history, past social history, past surgical history, and problem list.    Compared to one year ago, the patient's physical   health is the same.  Compared to one year ago, the patient's mental   health is better.    Recent Hospitalizations:  This patient has had a [facility] admission record on file within the last 365 days.  Current Medical Providers:  Patient Care Team:  Reynaldo Fritz MD as PCP - General (Family Medicine)  Faisal Ramirez MD as Obstetrician (Obstetrics and Gynecology)  Timothy Dan DC as Referring Physician (Chiropractic Medicine)  Dory Gamboa PA (Physician Assistant)  Rene Pringle MD (Otolaryngology)  Baylee Elliott APRN (Nurse Practitioner)  Terrence Mckenzie MD (Urology)  Myrna Mckeon APRN as Nurse Practitioner (Pulmonary Disease)  Lauro Francois MD as Consulting Physician (Cardiology)  Sammy Yo MD as Consulting Physician (Orthopedic Surgery)  Trenton Sosa MD as Consulting Physician (Gastroenterology)  Armando Shen MD as Consulting Physician (Allergy)  Dilshad Wood MD as Consulting Physician (Endocrinology)  Myrna Mckeon APRN as Nurse Practitioner (Pulmonary Disease)  Amean Grover PA-C as Physician Assistant (Physician Assistant)  Raciel Valadez MD as Consulting Physician (Pulmonary Disease)  Joe Ceron PA as Physician Assistant (Cardiology)  Joe Teague MD as Consulting Physician (Cardiology)  Faye Beasley, RN as Ambulatory  (Population  [facility]  Myrna Mckeon APRN as Nurse Practitioner (Pulmonary Disease)    Outpatient Medications Prior to Visit   Medication Sig Dispense Refill    albuterol (ACCUNEB) 1.25 MG/3ML nebulizer solution Take 3 mL by nebulization Every 6 (Six) Hours As Needed for Wheezing. Use as directed  Indications: Reversible Disease of Blockage in Breathing Passages 120 each 5    albuterol sulfate  (90 Base) MCG/ACT inhaler Inhale 2 puffs Every 6 (Six) Hours As Needed for Wheezing or Shortness of Air. Indications: Chronic Obstructive Lung Disease      amantadine (SYMMETREL) 100 MG capsule Take 1 capsule by mouth 2 (Two) Times a Day. Indications: Parkinson's Disease with Unknown Cause      apixaban (Eliquis) 5 MG tablet tablet Take 1 tablet by mouth Every 12 (Twelve) Hours. Restart 1/29/24 180 tablet 2    aspirin 81 MG chewable tablet Chew 1 tablet Daily. Indications: Disease involving Lipid Deposits in the Arteries      B Complex-C (SUPER B COMPLEX PO) Take 1 tablet by mouth Daily. Indications: Nutritional Support      bumetanide (BUMEX) 1 MG tablet Take 1 tablet by mouth 2 (Two) Times a Day. Indications: Edema      Calcium Carbonate (CALTRATE 600 PO) Take 1 tablet by mouth Daily. Indications: Nutritional Support      carbidopa-levodopa (SINEMET)  MG per tablet Take 2 tablets by mouth at 6am, then 1 tablet at 9am, 12pm, 3pm, 6pm and 9pm.  Indications: Parkinson's Disease      Cholecalciferol 25 MCG (1000 UT) tablet Take 1 tablet by mouth 2 (Two) Times a Day. Indications: Vitamin D Deficiency      clobetasol propionate (TEMOVATE) 0.05 % cream Apply 1 Application topically to the appropriate area as directed 2 (Two) Times a Week. Indications: Skin Inflammation      clonazePAM (KlonoPIN) 0.5 MG tablet Take 1 tablet by mouth Every Night. Indications: sleep      Farxiga 10 MG tablet Take 10 mg by mouth Daily.      fluticasone (FLONASE) 50 MCG/ACT nasal spray Administer 2 sprays into the nostril(s) as directed by provider  Daily As Needed for Rhinitis. Indications: Allergic Rhinitis      Glucosamine-Chondroit-Calcium (TRIPLE FLEX BONE & JOINT PO) Take 1 tablet by mouth Daily. Indications: Nutritional Support      guaiFENesin (Mucinex) 600 MG 12 hr tablet Take 1 tablet by mouth 2 (Two) Times a Day. Indications: Cough      hydroxychloroquine (PLAQUENIL) 200 MG tablet Take 1 tablet by mouth 2 (Two) Times a Day. Indications: Discoid Lupus Erythematosus, Systemic Lupus Erythematosus      Insulin Glargine (Lantus SoloStar) 100 UNIT/ML injection pen Inject 15 Units under the skin into the appropriate area as directed Daily. Indications: Type 2 Diabetes 15 mL 2    ipratropium (ATROVENT) 0.06 % nasal spray Administer 2 sprays into the nostril(s) as directed by provider As Needed for Rhinitis. Indications: Hayfever      levothyroxine (Unithroid) 200 MCG tablet Take 1 tablet by mouth Daily. NEW DOSE 90 tablet 1    loratadine (CLARITIN) 10 MG tablet Take 1 tablet by mouth Daily. Indications: Hayfever      metOLazone (ZAROXOLYN) 2.5 MG tablet Take 1 tablet by mouth 4 (Four) Times a Week. Indications: Edema      Multiple Vitamins-Minerals (CENTRUM ULTRA WOMENS PO) Take 1 tablet by mouth Daily. Indications: Nutritional Support      O2 (OXYGEN) Inhale 2 L/min Continuous. Indications: soa      omeprazole (priLOSEC) 40 MG capsule Take 1 capsule by mouth 2 (Two) Times a Day. Indications: Gastroesophageal Reflux Disease 180 capsule 1    phenol (CHLORASEPTIC) 1.4 % liquid liquid Apply 1 spray to the mouth or throat Every 6 (Six) Hours As Needed (sore throat). Indications: sore throat      polyethylene glycol (MIRALAX) 17 g packet Take 17 g by mouth Daily. Indications: Constipation      potassium chloride (MICRO-K) 10 MEQ CR capsule Take 1 capsule by mouth 2 (Two) Times a Day.      promethazine-dextromethorphan (PROMETHAZINE-DM) 6.25-15 MG/5ML syrup Take 5 mL by mouth 4 (Four) Times a Day As Needed for Cough. Indications: Cough 120 mL 1    ranolazine  (RANEXA) 500 MG 12 hr tablet Take 2 tablets by mouth Every 12 (Twelve) Hours. 360 tablet 3    sacubitril-valsartan (Entresto) 24-26 MG tablet Take 1 tablet by mouth 2 (Two) Times a Day.      senna (SENOKOT) 8.6 MG tablet Take 1 tablet by mouth Daily. Indications: Constipation      spironolactone (ALDACTONE) 25 MG tablet Take 1 tablet by mouth Daily. Indications: Heart failure      traMADol (ULTRAM) 50 MG tablet Take 1 tablet by mouth At Night As Needed for Moderate Pain. 30 tablet 1    triamcinolone (KENALOG) 0.1 % cream Apply 1 Application topically to the appropriate area as directed As Needed for Irritation. Indications: Atopic Dermatitis      Triamcinolone Acetonide (NASACORT) 55 MCG/ACT nasal inhaler Administer 2 sprays into the nostril(s) as directed by provider Daily As Needed for Rhinitis. Indications: Nose Inflammation      citalopram (CeleXA) 20 MG tablet Take 1 tablet by mouth Daily. Indications: Major Depressive Disorder 90 tablet 0    Dapagliflozin Propanediol (FARXIGA PO) Take 10 mg by mouth Daily. Indications: heart failure (Patient not taking: Reported on 3/11/2025)      doxycycline (VIBRAMYCIN) 100 MG capsule Take 1 capsule by mouth 2 (Two) Times a Day. Indications: Upper Respiratory Tract Infection 32 capsule 0    pantoprazole (Protonix) 40 MG EC tablet Take 1 tablet by mouth 2 (Two) Times a Day. Indications: Heartburn      pramipexole (MIRAPEX) 1.5 MG tablet Take 1 tablet by mouth Every Night. Indications: Parkinson's Disease      sacubitril-valsartan (ENTRESTO) 49-51 MG tablet Take 0.5 tablets by mouth Daily. Indications: Cardiac Failure       No facility-administered medications prior to visit.     Opioid medication/s are on active medication list.  and I have evaluated her active treatment plan and pain score trends (see table).  There were no vitals filed for this visit.  I have reviewed the chart for potential of high risk medication and harmful drug interactions in the  elderly.        Aspirin is on active medication list. Aspirin use is indicated based on review of current medical condition/s. Pros and cons of this therapy have been discussed today. Benefits of this medication outweigh potential harm.  Patient has been encouraged to continue taking this medication.  .      Patient Active Problem List   Diagnosis    Coronary artery disease involving native coronary artery without angina pectoris    Hypertension, essential    Peripheral vascular disease    Hyperlipidemia LDL goal <70    Spinal stenosis, lumbar region, with neurogenic claudication    Overactive bladder    GERD (gastroesophageal reflux disease)    Hypothyroidism    Lichen sclerosus    Parkinson's disease    MILLI treated with BiPAP    History of respiratory failure - prior respiratory failure requiring mechanical ventilation, open lung biopsy non-specific.     Atrial fibrillation    Tachy-thai syndrome    Long term current use of antiarrhythmic drug    History of adenomatous polyp of colon    Anemia associated with stage 3 chronic renal failure    Angiodysplasia    Hx of colonic polyps    Body mass index 40.0-44.9, adult    Cardiac pacemaker in situ    Chronic low back pain    Chronic lung disease    Degeneration of lumbar intervertebral disc    Edema of lower extremity    History of malignant neoplasm of skin    History of TIA (transient ischemic attack)    Hypotonic bladder    Incomplete tear of rotator cuff    Major depression in remission    Tinnitus of both ears    Primary osteoarthritis involving multiple joints    Restless legs    Seborrheic dermatitis    Transient cerebral ischemia    Muscle strain    Type 2 diabetes mellitus with hyperglycemia, without long-term current use of insulin    Vitamin B12 deficiency    Vitamin D deficiency    Chronic kidney disease, stage 3b    Osteoporosis, senile    Acute diastolic CHF (congestive heart failure)    Chronic respiratory failure with hypoxia    Chronic  "anticoagulation    Fracture of right shoulder with delayed healing    Chronic congestive heart failure    Acute on chronic hypoxic respiratory failure    Physical exam     Advance Care Planning Advance Directive is on file.  ACP discussion was held with the patient during this visit. Patient has an advance directive in EMR which is still valid.             Objective   Vitals:    03/11/25 1139   BP: 124/72   BP Location: Left arm   Patient Position: Sitting   Cuff Size: Large Adult   Pulse: 97   Resp: 16   SpO2: 97%   Weight: 98.7 kg (217 lb 9.6 oz)   Height: 157.5 cm (62.01\")       Estimated body mass index is 39.79 kg/m² as calculated from the following:    Height as of this encounter: 157.5 cm (62.01\").    Weight as of this encounter: 98.7 kg (217 lb 9.6 oz).                Does the patient have evidence of cognitive impairment? No  Lab Results   Component Value Date    CHLPL 188 03/03/2025    TRIG 69 03/03/2025    HDL 50 03/03/2025     (H) 03/03/2025    VLDL 13 03/03/2025    HGBA1C 6.1 (H) 03/03/2025                                                                                                Health  Risk Assessment    Smoking Status:  Social History     Tobacco Use   Smoking Status Never    Passive exposure: Past   Smokeless Tobacco Never   Tobacco Comments    Father and mother smoked for several years.     Alcohol Consumption:  Social History     Substance and Sexual Activity   Alcohol Use Never       Fall Risk Screen  STEADI Fall Risk Assessment was completed, and patient is at HIGH risk for falls. Assessment completed on:3/11/2025    Depression Screening   Little interest or pleasure in doing things? Not at all   Feeling down, depressed, or hopeless? Not at all   PHQ-2 Total Score 0      Health Habits and Functional and Cognitive Screening:      3/11/2025    11:32 AM   Functional & Cognitive Status   Do you have difficulty preparing food and eating? No   Do you have difficulty bathing yourself, " getting dressed or grooming yourself? No   Do you have difficulty using the toilet? Yes   Do you have difficulty moving around from place to place? No   Do you have trouble with steps or getting out of a bed or a chair? No   Current Diet Well Balanced Diet   Dental Exam Up to date   Eye Exam Up to date   Exercise (times per week) 0 times per week   Current Exercises Include Walking   Do you need help using the phone?  No   Are you deaf or do you have serious difficulty hearing?  Yes   Do you need help to go to places out of walking distance? Yes   Do you need help shopping? Yes   Do you need help preparing meals?  No   Do you need help with housework?  Yes   Do you need help with laundry? Yes   Do you need help taking your medications? No   Do you need help managing money? No   Do you ever drive or ride in a car without wearing a seat belt? No   Have you felt unusual stress, anger or loneliness in the last month? No   Who do you live with? Child   If you need help, do you have trouble finding someone available to you? No   Have you been bothered in the last four weeks by sexual problems? No   Do you have difficulty concentrating, remembering or making decisions? No           Age-appropriate Screening Schedule:  Refer to the list below for future screening recommendations based on patient's age, sex and/or medical conditions. Orders for these recommended tests are listed in the plan section. The patient has been provided with a written plan.    Health Maintenance List  Health Maintenance   Topic Date Due    URINE MICROALBUMIN-CREATININE RATIO (uACR)  Never done    ZOSTER VACCINE (3 of 3) 11/22/2019    RSV Vaccine - Adults (1 - 1-dose 75+ series) Never done    COVID-19 Vaccine (5 - 2024-25 season) 09/01/2024    DXA SCAN  10/10/2024    BMI FOLLOWUP  04/05/2025    HEMOGLOBIN A1C  09/03/2025    DIABETIC EYE EXAM  09/05/2025    LIPID PANEL  03/03/2026    ANNUAL WELLNESS VISIT  03/11/2026    COLORECTAL CANCER SCREENING   "06/17/2026    TDAP/TD VACCINES (4 - Td or Tdap) 05/04/2027    HEPATITIS C SCREENING  Completed    INFLUENZA VACCINE  Completed    Pneumococcal Vaccine 50+  Completed    Hepatitis B  Aged Out    MAMMOGRAM  Discontinued                                                                                                                                                CMS Preventative Services Quick Reference  Risk Factors Identified During Encounter  None Identified    The above risks/problems have been discussed with the patient.  Pertinent information has been shared with the patient in the After Visit Summary.  An After Visit Summary and PPPS were made available to the patient.    Follow Up:   Next Medicare Wellness visit to be scheduled in 1 year.         Additional E&M Note during same encounter follows:  Patient has additional, significant, and separately identifiable condition(s)/problem(s) that require work above and beyond the Medicare Wellness Visit     Chief Complaint  Medicare Wellness-subsequent, Hypertension, Hyperlipidemia, and Diabetes    Subjective   HPI      Review of Systems   Constitutional: Negative.  Negative for chills, diaphoresis, fatigue and fever.   HENT:  Positive for congestion. Negative for sore throat.    Eyes: Negative.    Respiratory: Negative.  Negative for cough.    Cardiovascular: Negative.  Negative for chest pain.   Gastrointestinal: Negative.  Negative for abdominal pain, nausea and vomiting.   Genitourinary:  Negative for dysuria.   Musculoskeletal:  Negative for myalgias and neck pain.   Skin:  Negative for rash.   Neurological: Negative.  Negative for weakness and numbness.              Objective   Vital Signs:  /72 (BP Location: Left arm, Patient Position: Sitting, Cuff Size: Large Adult)   Pulse 97   Resp 16   Ht 157.5 cm (62.01\")   Wt 98.7 kg (217 lb 9.6 oz)   SpO2 97%   BMI 39.79 kg/m²   Physical Exam  Vitals and nursing note reviewed.   Constitutional:       " Appearance: Normal appearance. She is obese.   HENT:      Head: Normocephalic and atraumatic.      Right Ear: Tympanic membrane, ear canal and external ear normal.      Left Ear: Tympanic membrane, ear canal and external ear normal.      Nose: Nose normal.      Mouth/Throat:      Mouth: Mucous membranes are moist.      Pharynx: Oropharynx is clear.   Eyes:      Extraocular Movements: Extraocular movements intact.      Conjunctiva/sclera: Conjunctivae normal.      Pupils: Pupils are equal, round, and reactive to light.   Cardiovascular:      Rate and Rhythm: Normal rate and regular rhythm.      Pulses: Normal pulses.      Heart sounds: Normal heart sounds.   Pulmonary:      Effort: Pulmonary effort is normal.      Breath sounds: Normal breath sounds.   Abdominal:      General: Abdomen is flat. Bowel sounds are normal.      Palpations: Abdomen is soft.   Musculoskeletal:         General: Normal range of motion.      Cervical back: Normal range of motion and neck supple.   Feet:      Comments:      Skin:     General: Skin is warm and dry.   Neurological:      General: No focal deficit present.      Mental Status: She is alert and oriented to person, place, and time.   Psychiatric:         Mood and Affect: Mood normal.         Behavior: Behavior normal.         Thought Content: Thought content normal.         Judgment: Judgment normal.             CMP          10/14/2024    13:12 11/25/2024    14:56 3/3/2025    09:29   CMP   Glucose 85  113  81    BUN 33  61  40    Creatinine 1.37  1.78  1.57    EGFR 40  29.5  34    Sodium 141  138  139    Potassium 4.0  3.7  2.9    Chloride 102  97  96    Calcium 9.1  9.5  9.2    Total Protein 6.9   6.0    Albumin 4.5   4.2    Globulin 2.4   1.8    Total Bilirubin 0.5   0.5    Alkaline Phosphatase 106   57    AST (SGOT) 23   25    ALT (SGPT) 11   15    BUN/Creatinine Ratio 24  34.3  25    Anion Gap  17.2       CBC w/diff          11/26/2024    13:30 1/20/2025    14:16 2/17/2025     15:37   CBC w/Diff   WBC 6.04  6.50  8.36    RBC 3.67  3.32  3.32    Hemoglobin 12.0  10.7  10.8    Hematocrit 36.0  31.8  32.2    MCV 98.1  95.8  97.0    MCH 32.7  32.2  32.5    MCHC 33.3  33.6  33.5    RDW 13.2  14.4  14.2    Platelets 171  143  189    Neutrophil Rel % 71.9  73.2  69.4    Immature Granulocyte Rel %  1.5  0.6    Lymphocyte Rel % 10.9  12.8  11.2    Monocyte Rel % 10.4  10.6  11.1    Eosinophil Rel % 5.3  1.4  6.9    Basophil Rel % 0.8  0.5  0.8      Lipid Panel          7/22/2024    10:59 3/3/2025    09:29   Lipid Panel   Total Cholesterol 168  188    Triglycerides 90  69    HDL Cholesterol 43  50    VLDL Cholesterol 17  13    LDL Cholesterol  108  125      TSH          9/16/2024    10:40 10/14/2024    13:12 3/3/2025    09:29   TSH   TSH 6.720  8.520  3.480      Most Recent A1C          3/3/2025    09:29   HGBA1C Most Recent   Hemoglobin A1C 6.1           Assessment and Plan Additional age appropriate preventative wellness advice topics were discussed during today's preventative wellness exam(some topics already addressed during AWV portion of the note above):    Physical Activity: Advised cardiovascular activity 150 minutes per week as tolerated. (example brisk walk for 30 minutes, 5 days a week).     Nutrition: Discussed nutrition plan with patient. Information shared in after visit summary. Goal is for a well balanced diet to enhance overall health.     Healthy Weight: Discussed current and goal BMI with patient. Steps to attain this goal discussed. Information shared in after visit summary.     Medicare annual wellness visit, subsequent  Discussed health maintenance, diet, and exercise.    Orders:    DEXA Bone Density Axial    Hemoglobin A1c; Future    Lipid Panel; Future    Comprehensive Metabolic Panel; Future    Vitamin B12; Future    Vitamin D,25-Hydroxy; Future    TSH Rfx On Abnormal To Free T4; Future    CBC & Differential; Future    Physical exam  Discussed health maintenance, diet, and  exercise.    Orders:    DEXA Bone Density Axial    Hemoglobin A1c; Future    Lipid Panel; Future    Comprehensive Metabolic Panel; Future    Vitamin B12; Future    Vitamin D,25-Hydroxy; Future    TSH Rfx On Abnormal To Free T4; Future    CBC & Differential; Future    Chronic kidney disease, stage 3b      Orders:    Hemoglobin A1c; Future    Lipid Panel; Future    Comprehensive Metabolic Panel; Future    Vitamin B12; Future    Vitamin D,25-Hydroxy; Future    TSH Rfx On Abnormal To Free T4; Future    CBC & Differential; Future    Acquired hypothyroidism  TSH is 3.480.  Recheck in 6 months.    Orders:    Hemoglobin A1c; Future    Lipid Panel; Future    Comprehensive Metabolic Panel; Future    Vitamin B12; Future    Vitamin D,25-Hydroxy; Future    TSH Rfx On Abnormal To Free T4; Future    CBC & Differential; Future    Type 2 diabetes mellitus with hyperglycemia, without long-term current use of insulin  A1c is 6.1.  Recheck in 6 months.    Orders:    Hemoglobin A1c; Future    Lipid Panel; Future    Comprehensive Metabolic Panel; Future    Vitamin B12; Future    Vitamin D,25-Hydroxy; Future    TSH Rfx On Abnormal To Free T4; Future    CBC & Differential; Future    Vitamin B12 deficiency  B12 is over 2000.    Orders:    Hemoglobin A1c; Future    Lipid Panel; Future    Comprehensive Metabolic Panel; Future    Vitamin B12; Future    Vitamin D,25-Hydroxy; Future    TSH Rfx On Abnormal To Free T4; Future    CBC & Differential; Future    Vitamin D deficiency  Vitamin D is 54.6.  Recheck in 6 months    Orders:    Hemoglobin A1c; Future    Lipid Panel; Future    Comprehensive Metabolic Panel; Future    Vitamin B12; Future    Vitamin D,25-Hydroxy; Future    TSH Rfx On Abnormal To Free T4; Future    CBC & Differential; Future    Anemia associated with stage 3 chronic renal failure  Patient has seen hematology.  Blood work stable    Orders:    Hemoglobin A1c; Future    Lipid Panel; Future    Comprehensive Metabolic Panel; Future     Vitamin B12; Future    Vitamin D,25-Hydroxy; Future    TSH Rfx On Abnormal To Free T4; Future    CBC & Differential; Future    Type 2 diabetes mellitus with hyperglycemia, with long-term current use of insulin      Orders:    Microalbumin / Creatinine Urine Ratio - Urine, Clean Catch    Hemoglobin A1c; Future    Lipid Panel; Future    Comprehensive Metabolic Panel; Future    Vitamin B12; Future    Vitamin D,25-Hydroxy; Future    TSH Rfx On Abnormal To Free T4; Future    CBC & Differential; Future    Chronic right shoulder pain    Orders:    Ambulatory Referral to Physical Therapy for Evaluation & Treatment    Hemoglobin A1c; Future    Lipid Panel; Future    Comprehensive Metabolic Panel; Future    Vitamin B12; Future    Vitamin D,25-Hydroxy; Future    TSH Rfx On Abnormal To Free T4; Future    CBC & Differential; Future    Post-menopausal    Orders:    DEXA Bone Density Axial    Hemoglobin A1c; Future    Lipid Panel; Future    Comprehensive Metabolic Panel; Future    Vitamin B12; Future    Vitamin D,25-Hydroxy; Future    TSH Rfx On Abnormal To Free T4; Future    CBC & Differential; Future            Follow Up   Return in about 3 months (around 6/11/2025) for Annual physical, Bloodwork 1 week prior to next appointment.  Patient was given instructions and counseling regarding her condition or for health maintenance advice. Please see specific information pulled into the AVS if appropriate.

## 2025-03-14 LAB
ALBUMIN/CREAT UR: 48 MG/G CREAT (ref 0–29)
CREAT UR-MCNC: 49.1 MG/DL
MICROALBUMIN UR-MCNC: 23.4 UG/ML

## 2025-03-17 ENCOUNTER — RESULTS FOLLOW-UP (OUTPATIENT)
Dept: ENDOCRINOLOGY | Facility: CLINIC | Age: 75
End: 2025-03-17
Payer: MEDICARE

## 2025-03-17 NOTE — LETTER
Joanna Cross  [address] [place] [address]    March 17, 2025     Dear MsMelinda Tay:    Below are the results from your recent visit:    Resulted Orders   Microalbumin / Creatinine Urine Ratio - Urine, Clean Catch   Result Value Ref Range    Creatinine, Urine 49.1 Not Estab. mg/dL    Microalbumin, Urine 23.4 Not Estab. ug/mL    Microalbumin/Creatinine Ratio 48 (H) 0 - 29 mg/g creat      Comment:                             Normal:                0 -  29                         Moderately increased: 30 - 300                         Severely increased:       >300       Your urine microalbumin / creatinine ratio is elevated.  This may indicate damage to the kidneys from diabetes.  We will plan to recheck this in about 6 months for monitoring.  Continue the Farxiga and Entresto as these medications may help protect the kidneys.  I hope this finds you well.      If you have any questions or concerns, please don't hesitate to call.         Sincerely,        Liv Marshall PA-C

## 2025-03-20 ENCOUNTER — HOSPITAL ENCOUNTER (OUTPATIENT)
Dept: ONCOLOGY | Facility: HOSPITAL | Age: 75
Discharge: HOME | End: 2025-03-20
Admitting: NURSE PRACTITIONER
Payer: MEDICARE

## 2025-03-20 VITALS
WEIGHT: 214 LBS | BODY MASS INDEX: 39.38 KG/M2 | SYSTOLIC BLOOD PRESSURE: 136 MMHG | HEIGHT: 62 IN | TEMPERATURE: 97.3 F | DIASTOLIC BLOOD PRESSURE: 64 MMHG | HEART RATE: 99 BPM | RESPIRATION RATE: 16 BRPM

## 2025-03-20 DIAGNOSIS — D63.1 ANEMIA ASSOCIATED WITH STAGE 3 CHRONIC RENAL FAILURE: ICD-10-CM

## 2025-03-20 DIAGNOSIS — N18.30 ANEMIA ASSOCIATED WITH STAGE 3 CHRONIC RENAL FAILURE: ICD-10-CM

## 2025-03-20 LAB
BASOPHILS # BLD AUTO: 0.02 10*3/MM3 (ref 0–0.2)
BASOPHILS NFR BLD AUTO: 0.3 % (ref 0–1.5)
DEPRECATED RDW RBC AUTO: 47.1 FL (ref 37–54)
EOSINOPHIL # BLD AUTO: 0.2 10*3/MM3 (ref 0–0.4)
EOSINOPHIL NFR BLD AUTO: 2.5 % (ref 0.3–6.2)
ERYTHROCYTE [DISTWIDTH] IN BLOOD BY AUTOMATED COUNT: 13.1 % (ref 12.3–15.4)
HCT VFR BLD AUTO: 32.3 % (ref 34–46.6)
HGB BLD-MCNC: 10.8 G/DL (ref 12–15.9)
IMM GRANULOCYTES # BLD AUTO: 0.05 10*3/MM3 (ref 0–0.05)
IMM GRANULOCYTES NFR BLD AUTO: 0.6 % (ref 0–0.5)
LYMPHOCYTES # BLD AUTO: 1.03 10*3/MM3 (ref 0.7–3.1)
LYMPHOCYTES NFR BLD AUTO: 13 % (ref 19.6–45.3)
MCH RBC QN AUTO: 32.8 PG (ref 26.6–33)
MCHC RBC AUTO-ENTMCNC: 33.4 G/DL (ref 31.5–35.7)
MCV RBC AUTO: 98.2 FL (ref 79–97)
MONOCYTES # BLD AUTO: 0.9 10*3/MM3 (ref 0.1–0.9)
MONOCYTES NFR BLD AUTO: 11.3 % (ref 5–12)
NEUTROPHILS NFR BLD AUTO: 5.74 10*3/MM3 (ref 1.7–7)
NEUTROPHILS NFR BLD AUTO: 72.3 % (ref 42.7–76)
PLATELET # BLD AUTO: 159 10*3/MM3 (ref 140–450)
PMV BLD AUTO: 9.2 FL (ref 6–12)
RBC # BLD AUTO: 3.29 10*6/MM3 (ref 3.77–5.28)
WBC NRBC COR # BLD AUTO: 7.94 10*3/MM3 (ref 3.4–10.8)

## 2025-03-20 PROCEDURE — 36415 COLL VENOUS BLD VENIPUNCTURE: CPT

## 2025-03-20 PROCEDURE — 85025 COMPLETE CBC W/AUTO DIFF WBC: CPT | Performed by: NURSE PRACTITIONER

## 2025-03-25 ENCOUNTER — TELEPHONE (OUTPATIENT)
Dept: FAMILY MEDICINE CLINIC | Facility: CLINIC | Age: 75
End: 2025-03-25

## 2025-03-25 NOTE — TELEPHONE ENCOUNTER
"  Caller: Joanna Cross \"SIXTO\"    Relationship: Self    Best call back number: [phone]     What was the call regarding: PATIENT WOULD LIKE A CALL BACK REGARDING A SORE ON HER BODY   "

## 2025-03-26 ENCOUNTER — TELEPHONE (OUTPATIENT)
Dept: FAMILY MEDICINE CLINIC | Facility: CLINIC | Age: 75
End: 2025-03-26

## 2025-03-26 ENCOUNTER — OFFICE VISIT (OUTPATIENT)
Dept: FAMILY MEDICINE CLINIC | Facility: CLINIC | Age: 75
End: 2025-03-26
Payer: MEDICARE

## 2025-03-26 VITALS
DIASTOLIC BLOOD PRESSURE: 84 MMHG | OXYGEN SATURATION: 98 % | SYSTOLIC BLOOD PRESSURE: 128 MMHG | WEIGHT: 213 LBS | BODY MASS INDEX: 39.2 KG/M2 | RESPIRATION RATE: 16 BRPM | HEART RATE: 68 BPM | HEIGHT: 62 IN

## 2025-03-26 DIAGNOSIS — L89.321 PRESSURE INJURY OF LEFT BUTTOCK, STAGE 1: Primary | ICD-10-CM

## 2025-03-26 PROCEDURE — 3079F DIAST BP 80-89 MM HG: CPT | Performed by: FAMILY MEDICINE

## 2025-03-26 PROCEDURE — 3074F SYST BP LT 130 MM HG: CPT | Performed by: FAMILY MEDICINE

## 2025-03-26 PROCEDURE — 1126F AMNT PAIN NOTED NONE PRSNT: CPT | Performed by: FAMILY MEDICINE

## 2025-03-26 PROCEDURE — 99213 OFFICE O/P EST LOW 20 MIN: CPT | Performed by: FAMILY MEDICINE

## 2025-03-26 NOTE — ASSESSMENT & PLAN NOTE
Patient has a pressure sore on her left buttock cheek.  I am going to put some barrier cream.  Put a nonadhesive bandage on it send her to wound clinic.

## 2025-03-26 NOTE — TELEPHONE ENCOUNTER
"  Name: Tay Joanna Celis \"SIXTO\"    Relationship: Self    Best Callback Number: RASH, TINSILS SWOLLEN, BLISTERES, COUGH, STREP LAST WEEK  WILL NEED SCHOOL EXCUSE      HUB PROVIDED THE RELAY MESSAGE FROM THE OFFICE   PATIENT SCHEDULED AS REQUESTED      "

## 2025-03-26 NOTE — PROGRESS NOTES
Patient Name: Joanna Cross  : 1950   MRN: [id number]     Chief Complaint:  No chief complaint on file.      History of Present Illness: Joanna Cross is a 74 y.o. female who is here today for sore on her buttocks.  HPI        Review of Systems:   Review of Systems   Constitutional: Negative.    HENT: Negative.     Eyes: Negative.    Respiratory: Negative.     Cardiovascular: Negative.    Gastrointestinal: Negative.    Skin:  Positive for wound.   Neurological: Negative.         Past Medical History:   Past Medical History:   Diagnosis Date    Allergic     Allergic rhinitis     Anemia     Ankle problem     thinks back related causing pain     Anxiety     Asthma     Atrial fibrillation     AVM (arteriovenous malformation)     Back pain     Cataract     CHF (congestive heart failure) 2024    Probably had before then but no one actually told me I had it.    Cholelithiasis 01    Gallbladder Removed    Chronic kidney disease     stage 3 per pt    Chronic lung disease 2022    CKD (chronic kidney disease)     Clotting disorder     AVM - small intestine    Colon polyp 09/15/16    Coronary artery disease involving native coronary artery without angina pectoris 2017    COVID-19 vaccine series completed     Cystic fibrosis     Diastolic dysfunction     Gastrocnemius muscle tear     left medial 91    Generalized osteoarthritis     GERD (gastroesophageal reflux disease)     Gestational diabetes     GIB (gastrointestinal bleeding) 2016    d/t xarelto     Headache     Hearing decreased, bilateral     HAS HEARING AID, NOT WEARING IT CURRENTLY    Hiatal hernia     History of medical problems 80    Lichens Sclerosis    History of shingles     History of transfusion 2016    no reaction recalled     HL (hearing loss) 2019    Got hearing aids    Hyperlipidemia LDL goal <70 2017    Hypertension     Hyperthyroidism     Hypothyroidism     IBS (irritable bowel syndrome)      Inflammatory bowel disease     Klebsiella pneumonia     Lichen sclerosus     Lupus     subQ    Mouth problem     mouth guard used since pt bites tongue and lips excessively at night if not- with bipap     MRSA infection 2018    Myocardial infarction     Obesity     On home oxygen therapy     2L of oxygen all the time due to current congestion     Osteopenia 2016    Osteoporosis     Parkinson's disease     Peripheral vascular disease     Pleurisy     Pneumonia     Puerperal sepsis with acute hypoxic respiratory failure     emergent intubation- 2016    Pulmonary embolism     Renal insufficiency     Right leg pain     from back issues     Salivary gland stone     Sciatic nerve pain     Seborrheic dermatitis     Skin cancer     on back     Sleep apnea     CPAP AND HOME O2 2L/M    Sleep apnea, obstructive 04/15/01    TIA (transient ischemic attack) 2014    no residual effects    Tremor     Type 2 diabetes mellitus     UTI (urinary tract infection)     Visual impairment     Vitamin D deficiency 09/08/2022    Wears glasses        Past Surgical History:   Past Surgical History:   Procedure Laterality Date    ABLATION OF DYSRHYTHMIC FOCUS  05/16/13    Laser Ablation - Rt Leg    BACK SURGERY      l4-l5 laminectomy     BICEPS TENDON REPAIR Right     shoulder    BREAST BIOPSY Left 05/2004    excisional, benign    BRONCHOSCOPY RIGID / FLEXIBLE      2016    BUNIONECTOMY Right     CARDIAC CATHETERIZATION      CARDIAC CATHETERIZATION N/A 02/01/2019    Procedure: Left Heart Cath;  Surgeon: Albertina Corona MD;  Location:  [facility];  Service: Cardiology    CARDIAC ELECTROPHYSIOLOGY PROCEDURE N/A 01/26/2024    Procedure: Lead Revision RA or RV, PPM or ICD;  Surgeon: Lauro Francois MD;  Location:  [facility];  Service: Cardiovascular;  Laterality: N/A;    CARDIAC SURGERY      CATARACT EXTRACTION, BILATERAL  06/26/2024    (R) eye   08/16/2024 (L) eye    CHOLECYSTECTOMY      COLON SURGERY       COLONOSCOPY  2016    COLONOSCOPY N/A 06/17/2021    Procedure: COLONOSCOPY WITH POLYPECTOMY;  Surgeon: Trenton Sosa MD;  Location:  [facility];  Service: Gastroenterology;  Laterality: N/A;    CORONARY STENT PLACEMENT      x1 stent    CYST REMOVAL      left ear, upper left back 2001    CYSTOSCOPY      x2  18 and 20 -   in 20 urethra dilation     DIAGNOSTIC LAPAROSCOPY  1981    ENDOSCOPIC FUNCTIONAL SINUS SURGERY (FESS)  2011    ENDOSCOPY  2016    ENTEROSCOPY VIA STOMA      with single ballon with fluoro     EYE SURGERY  7/26 & 8/16/24    Cataracts removed from each eye.    HAMMER TOE REPAIR Left     INCISION AND DRAINAGE OF WOUND  2018    back with wound infection     INSERT / REPLACE / REMOVE PACEMAKER  11/10/16    [facility]    JOINT REPLACEMENT      KNEE ARTHROSCOPY      LASER ABLATION      right leg 13    LIPOMA EXCISION  1999    left leg     LUMBAR LAMINECTOMY DISCECTOMY DECOMPRESSION N/A 07/06/2018    Procedure: LUMBAR LAMINECTOMY L4-5, HEMILAMIINECTOMY RIGHT L5-S1, FORAMINOTOMY L5-S1;  Surgeon: Lencho Gamino MD;  Location:  [facility];  Service: Neurosurgery    LUMBAR LAMINECTOMY DISCECTOMY DECOMPRESSION N/A 09/19/2018    Procedure: INCISION AND DRAINAGE BACK WITH WOUND EXPLORATION;  Surgeon: Ritchie García MD;  Location:  [facility];  Service: Neurosurgery    LUNG BIOPSY Left 2016    OTHER SURGICAL HISTORY      ct scan of chest and sinuses and lower back     OTHER SURGICAL HISTORY      various echos     OTHER SURGICAL HISTORY      electroencephalogram 16,   emg  ncv tests 2007    OTHER SURGICAL HISTORY      mra 2007  and various mri with xrays, nuclear medicine lung ventilation with perfusion test 2016 with pft     OTHER SURGICAL HISTORY      barium swallow testing 15, 12, 12    OTHER SURGICAL HISTORY      vaginal ultrasound- 2012,   vas clementina lower extrem 2016, vas venous duplex lower extrem bilat 2019    OTHER SURGICAL HISTORY      wdge biopsy spring of lung     OTHER SURGICAL  HISTORY      emergent intubation- hypoxic resp failure     OTHER SURGICAL HISTORY      2024 ppm generator change and lead revision per Dr. Francois    PACEMAKER IMPLANTATION  2016    sss    REPLACEMENT TOTAL KNEE Bilateral     left knee , right 2012 per dr acuna     REPLACEMENT TOTAL KNEE Bilateral     SHOULDER ARTHROSCOPY Bilateral     -left, - right     SKIN BIOPSY      skin cancer back 2016    SKIN CANCER EXCISION      upper righ tback     SPINE SURGERY  18    SUBTOTAL HYSTERECTOMY      MADHAV      TEETH EXTRACTION      x2    TOTAL SHOULDER ARTHROPLASTY W/ DISTAL CLAVICLE EXCISION Left 10/24/2022    Procedure: REVERSE TOTAL SHOULDER ARTHROPLASTY, BICEPS TENODESIS - LEFT;  Surgeon: Sammy Yo MD;  Location:  [facility];  Service: Orthopedics;  Laterality: Left;    TOTAL SHOULDER ARTHROPLASTY W/ DISTAL CLAVICLE EXCISION Right 2023    Procedure: REVERSE TOTAL SHOULDER  ARTHROPLASTY WITH BICEPS TENODESIS - RIGHT;  Surgeon: Sammy Yo MD;  Location:  [facility];  Service: Orthopedics;  Laterality: Right;    TUBAL ABDOMINAL LIGATION Bilateral     WISDOM TOOTH EXTRACTION         Family History:   Family History   Problem Relation Age of Onset    Kidney disease Mother     Coronary artery disease Mother     Hypertension Mother             Heart disease Mother             Hyperlipidemia Mother             Arthritis Mother     Depression Mother     Anxiety disorder Mother     Coronary artery disease Father     Hypertension Father             Hypothyroidism Father     Cancer Father         Oral cancer    Heart disease Father             Thyroid disease Father     Vision loss Father         Macular Degeneration    Coronary artery disease Brother     Hypertension Brother     Heart disease Brother         Multiple stents, by-pass surgery    Testicular cancer Brother     Kidney cancer Maternal Uncle     Testicular cancer Maternal Uncle      Anxiety disorder Son     Colon polyps Neg Hx     Colon cancer Neg Hx        Social History:   Social History     Socioeconomic History    Marital status:      Spouse name: N/A    Number of children: 4    Years of education: College    Highest education level: Master's degree (e.g., MA, MS, Randi, MEd, MSW, NELLA)   Tobacco Use    Smoking status: Never     Passive exposure: Past    Smokeless tobacco: Never    Tobacco comments:     Father and mother smoked for several years.   Vaping Use    Vaping status: Never Used    Passive vaping exposure: Yes   Substance and Sexual Activity    Alcohol use: Never    Drug use: Never    Sexual activity: Not Currently     Partners: Male     Birth control/protection: Post-menopausal, Tubal ligation, Vaginal insert contraception       Medications:     Current Outpatient Medications:     albuterol (ACCUNEB) 1.25 MG/3ML nebulizer solution, Take 3 mL by nebulization Every 6 (Six) Hours As Needed for Wheezing. 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 Indications: Chronic Obstructive Lung Disease, Disp: , Rfl:     amantadine (SYMMETREL) 100 MG capsule, Take 1 capsule by mouth 2 (Two) Times a Day. Indications: Parkinson's Disease with Unknown Cause, Disp: , Rfl:     apixaban (Eliquis) 5 MG tablet tablet, Take 1 tablet by mouth Every 12 (Twelve) Hours. Restart 1/29/24, Disp: 180 tablet, Rfl: 2    aspirin 81 MG chewable tablet, Chew 1 tablet Daily. 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 Indications: Nutritional Support, Disp: , Rfl:     bumetanide (BUMEX) 1 MG tablet, Take 1 tablet by mouth 2 (Two) Times a Day. Indications: Edema, Disp: , Rfl:     Calcium Carbonate (CALTRATE 600 PO), Take  1 tablet by mouth Daily. Indications: Nutritional Support, Disp: , Rfl:     carbidopa-levodopa (SINEMET)  MG per tablet, Take 2 tablets by mouth at 6am, then 1 tablet at 9am, 12pm, 3pm, 6pm and 9pm.  Indications: Parkinson's Disease, Disp: , Rfl:     Cholecalciferol 25 MCG (1000 UT) tablet, Take 1 tablet by mouth 2 (Two) Times a Day. Indications: Vitamin D Deficiency, Disp: , Rfl:     citalopram (CeleXA) 20 MG tablet, Take 1 tablet by mouth Daily. Indications: Major Depressive Disorder, Disp: 90 tablet, Rfl: 0    clobetasol propionate (TEMOVATE) 0.05 % cream, Apply 1 Application topically to the appropriate area as directed 2 (Two) Times a Week. Indications: Skin Inflammation, Disp: , Rfl:     clonazePAM (KlonoPIN) 0.5 MG tablet, Take 1 tablet by mouth Every Night. Indications: sleep, Disp: , Rfl:     Farxiga 10 MG tablet, Take 10 mg by mouth Daily., Disp: , Rfl:     fluticasone (FLONASE) 50 MCG/ACT nasal spray, Administer 2 sprays into the nostril(s) as directed by provider Daily As Needed for Rhinitis. Indications: Allergic Rhinitis, Disp: , Rfl:     Glucosamine-Chondroit-Calcium (TRIPLE FLEX BONE & JOINT PO), Take 1 tablet by mouth Daily. Indications: Nutritional Support, Disp: , Rfl:     hydroxychloroquine (PLAQUENIL) 200 MG tablet, Take 1 tablet by mouth 2 (Two) Times a Day. Indications: Discoid Lupus Erythematosus, Systemic Lupus Erythematosus, Disp: , Rfl:     Insulin Glargine (Lantus SoloStar) 100 UNIT/ML injection pen, Inject 15 Units under the skin into the appropriate area as directed Daily. Indications: Type 2 Diabetes, Disp: 15 mL, Rfl: 2    ipratropium (ATROVENT) 0.06 % nasal spray, Administer 2 sprays into the nostril(s) as directed by provider As Needed for Rhinitis. Indications: Hayfever, Disp: , Rfl:     levothyroxine (Unithroid) 200 MCG tablet, Take 1 tablet by mouth Daily. NEW DOSE, Disp: 90 tablet, Rfl: 1    loratadine (CLARITIN) 10 MG tablet, Take 1 tablet by mouth Daily. Indications:  Hayfever, Disp: , Rfl:     metOLazone (ZAROXOLYN) 2.5 MG tablet, Take 1 tablet by mouth 4 (Four) Times a Week. Indications: Edema, Disp: , Rfl:     Multiple Vitamins-Minerals (CENTRUM ULTRA WOMENS PO), Take 1 tablet by mouth Daily. Indications: Nutritional Support, Disp: , Rfl:     O2 (OXYGEN), Inhale 2 L/min Continuous. Indications: soa, Disp: , Rfl:     omeprazole (priLOSEC) 40 MG capsule, Take 1 capsule by mouth 2 (Two) Times a Day. Indications: Gastroesophageal Reflux Disease, Disp: 180 capsule, Rfl: 1    polyethylene glycol (MIRALAX) 17 g packet, Take 17 g by mouth Daily. Indications: Constipation, Disp: , Rfl:     potassium chloride (MICRO-K) 10 MEQ CR capsule, Take 1 capsule by mouth 2 (Two) Times a Day., Disp: , Rfl:     promethazine-dextromethorphan (PROMETHAZINE-DM) 6.25-15 MG/5ML syrup, Take 5 mL by mouth 4 (Four) Times a Day As Needed for Cough. Indications: Cough, Disp: 120 mL, Rfl: 1    ranolazine (RANEXA) 500 MG 12 hr tablet, Take 2 tablets by mouth Every 12 (Twelve) Hours., Disp: 360 tablet, Rfl: 3    sacubitril-valsartan (Entresto) 24-26 MG tablet, Take 1 tablet by mouth 2 (Two) Times a Day., Disp: , Rfl:     senna (SENOKOT) 8.6 MG tablet, Take 1 tablet by mouth Daily. Indications: Constipation, Disp: , Rfl:     spironolactone (ALDACTONE) 25 MG tablet, Take 1 tablet by mouth Daily. Indications: Heart failure, Disp: , Rfl:     traMADol (ULTRAM) 50 MG tablet, Take 1 tablet by mouth At Night As Needed for Moderate Pain., Disp: 30 tablet, Rfl: 1    triamcinolone (KENALOG) 0.1 % cream, Apply 1 Application topically to the appropriate area as directed As Needed for Irritation. Indications: Atopic Dermatitis, Disp: , Rfl:     Triamcinolone Acetonide (NASACORT) 55 MCG/ACT nasal inhaler, Administer 2 sprays into the nostril(s) as directed by provider Daily As Needed for Rhinitis. Indications: Nose Inflammation, Disp: , Rfl:     Allergies:   Allergies   Allergen Reactions    Amlodipine Besylate Swelling      "Lower extremity (ankles, feet) swelling    Entacapone Other (See Comments)     \"extreme weakness in legs - caused several falls, which stopped after discontinuing this medication\"    Epinephrine Other (See Comments)     6/4/16- had 3 shots to numb mouth to prepare teeth for crowns, the shots contained epi-  Caused pt to have chest discomfort- went to hospital in ambulance, discovered had a fib while there     Hydrocodone Unknown - High Severity     Headache, nausea, dizziness, & vomiting    Levemir [Insulin Detemir] Hives     Hives / rash around injection site    Penicillins Hives     Jitteriness     Xarelto [Rivaroxaban] GI Bleeding     hgb dropped to 5.2    Aricept [Donepezil Hcl] Nausea Only     Vivid dreams    Benztropine Mesylate Other (See Comments)     Uncontrollable body movements    Cogentin [Benztropine] Other (See Comments)     \"uncontrollable body movements\"    Compazine [Prochlorperazine Edisylate] Other (See Comments)     Dystonic reaction    Duraprep [Antiseptic Products, Misc.] Itching and Rash     RASH AND ITCHING    Haldol [Haloperidol Lactate] Other (See Comments)     Dystonic reaction    Hydralazine Other (See Comments)     Headache     Lisinopril Cough    Statins Myalgia     Leg pain- all statins     Sulfamethoxazole Nausea Only and Other (See Comments)     Nausea & headaches    Tarka [Trandolapril-Verapamil Hcl Er] Other (See Comments)     Constipation     Toprol Xl [Metoprolol Tartrate] Other (See Comments)     Extreme fatigue, decreased exercise tolerance    Trimethoprim Other (See Comments)     Other reaction(s): Nausea         Physical Exam:  Vital Signs:   Vitals:    03/26/25 0932   BP: 128/84   BP Location: Left arm   Patient Position: Sitting   Cuff Size: Large Adult   Pulse: 68   Resp: 16   SpO2: 98%   Weight: 96.6 kg (213 lb)   Height: 157.5 cm (62.01\")   PainSc: 0-No pain     Body mass index is 38.95 kg/m².     Physical Exam  Vitals and nursing note reviewed.   Constitutional:       " Appearance: Normal appearance. She is normal weight.   HENT:      Head: Normocephalic and atraumatic.      Right Ear: External ear normal.      Left Ear: External ear normal.      Nose: Nose normal.   Eyes:      Extraocular Movements: Extraocular movements intact.      Conjunctiva/sclera: Conjunctivae normal.   Pulmonary:      Effort: Pulmonary effort is normal.   Genitourinary:     Comments: Pressure sore on left buttocks  Musculoskeletal:      Cervical back: Normal range of motion.   Feet:      Comments:      Neurological:      General: No focal deficit present.      Mental Status: She is alert and oriented to person, place, and time. Mental status is at baseline.   Psychiatric:         Mood and Affect: Mood normal.         Behavior: Behavior normal.         Thought Content: Thought content normal.         Judgment: Judgment normal.         Procedures      Assessment/Plan:   Diagnoses and all orders for this visit:    1. Pressure injury of left buttock, stage 1 (Primary)  Assessment & Plan:  Patient has a pressure sore on her left buttock cheek.  I am going to put some barrier cream.  Put a nonadhesive bandage on it send her to wound clinic.    Orders:  -     Ambulatory Referral to Wound Clinic             Follow Up:   No follow-ups on file.      Reynaldo Fritz MD  [facility]

## 2025-03-26 NOTE — TELEPHONE ENCOUNTER
HUB TO RELAY    PLEASE LET PATIENT KNOW DR. TAVAREZ HAS TO SEE HER IN ORDER TO ADDRESS HER WOUND. I LEFT PATIENT A VOICEMAIL. PLEASE SCHEDULE PATIENT WITH DR. TAVAREZ

## 2025-03-27 ENCOUNTER — PATIENT OUTREACH (OUTPATIENT)
Dept: CASE MANAGEMENT | Facility: OTHER | Age: 75
End: 2025-03-27
Payer: MEDICARE

## 2025-03-27 DIAGNOSIS — I10 HYPERTENSION, ESSENTIAL: ICD-10-CM

## 2025-03-27 DIAGNOSIS — G20.B2 PARKINSON'S DISEASE WITH DYSKINESIA AND FLUCTUATING MANIFESTATIONS: ICD-10-CM

## 2025-03-27 DIAGNOSIS — I25.10 CORONARY ARTERY DISEASE INVOLVING NATIVE CORONARY ARTERY OF NATIVE HEART WITHOUT ANGINA PECTORIS: Primary | ICD-10-CM

## 2025-03-27 NOTE — OUTREACH NOTE
CCM End of Month Documentation    This Chronic Medical Management Care Plan for Joanna Cross, 74 y.o. female, has been monitored and managed; reviewed and a new plan of care implemented for the month of March.  A cumulative time of 27  minutes was spent on this patient record this month, including chart review; phone call with patient.    Regarding the patient's problems: has Coronary artery disease involving native coronary artery without angina pectoris; Hypertension, essential; Peripheral vascular disease; Hyperlipidemia LDL goal <70; Spinal stenosis, lumbar region, with neurogenic claudication; Overactive bladder; GERD (gastroesophageal reflux disease); Hypothyroidism; Lichen sclerosus; Parkinson's disease; MILLI treated with BiPAP; History of respiratory failure - prior respiratory failure requiring mechanical ventilation, open lung biopsy non-specific. ; Atrial fibrillation; Tachy-thai syndrome; Long term current use of antiarrhythmic drug; History of adenomatous polyp of colon; Anemia associated with stage 3 chronic renal failure; Angiodysplasia; Hx of colonic polyps; Body mass index 40.0-44.9, adult; Cardiac pacemaker in situ; Chronic low back pain; Chronic lung disease; Degeneration of lumbar intervertebral disc; Edema of lower extremity; History of malignant neoplasm of skin; History of TIA (transient ischemic attack); Hypotonic bladder; Incomplete tear of rotator cuff; Major depression in remission; Tinnitus of both ears; Primary osteoarthritis involving multiple joints; Restless legs; Seborrheic dermatitis; Transient cerebral ischemia; Muscle strain; Type 2 diabetes mellitus with hyperglycemia, without long-term current use of insulin; Vitamin B12 deficiency; Vitamin D deficiency; Chronic kidney disease, stage 3b; Osteoporosis, senile; Acute diastolic CHF (congestive heart failure); Chronic respiratory failure with hypoxia; Chronic anticoagulation; Fracture of right shoulder with delayed healing;  Chronic congestive heart failure; Acute on chronic hypoxic respiratory failure; Physical exam; and Pressure injury of left buttock, stage 1 on their problem list., the following items were addressed: medical records; medications and any changes can be found within the plan section of the note.  A detailed listing of time spent for chronic care management is tracked within each outreach encounter.  Current medications include:  has a current medication list which includes the following prescription(s): albuterol, albuterol sulfate hfa, amantadine, apixaban, aspirin, b complex-c, bumetanide, calcium carbonate, carbidopa-levodopa, cholecalciferol, citalopram, clobetasol propionate, clonazepam, farxiga, fluticasone, glucosamine-chondroit-calcium, hydroxychloroquine, lantus solostar, ipratropium, levothyroxine, loratadine, metolazone, multiple vitamins-minerals, o2, omeprazole, polyethylene glycol, potassium chloride, promethazine-dextromethorphan, ranolazine, sacubitril-valsartan, senna, spironolactone, tramadol, triamcinolone, and triamcinolone acetonide. and the patient is reported to be patient is compliant with medication protocol,  Medications are reported to be effective.  Regarding these diagnoses, referrals were made to the following provider(s):  n/a.  All notes on chart for PCP to review.    The patient was monitored remotely for blood pressure; weight; activity level; pain; medications.    The patient's physical needs include:  needs assistance with ADLs; physical healthcare.     The patient's mental support needs include:  increased support    The patient's cognitive support needs include:  needs met    The patient's psychosocial support needs include:  continued support    The patient's functional needs include: physical healthcare    The patient's environmental needs include:  not applicable    Care Plan overall comments:  reviewed    Refer to previous outreach notes for more information on the areas  listed above.    Monthly Billing Diagnoses  (I25.10) Coronary artery disease involving native coronary artery of native heart without angina pectoris    (G20.B2) Parkinson's disease with dyskinesia and fluctuating manifestations    (I10) Hypertension, essential    Medications   Medications have been reconciled    Care Plan progress this month:      Recently Modified Care Plans Updates made since 2/24/2025 12:00 AM      No recently modified care plans.            Instructions   Patient was provided an electronic copy of care plan  CCM services were explained and offered and patient has accepted these services.  Patient has given their written consent to receive CCM services and understands that this includes the authorization of electronic communication of medical information with the other treating providers.  Patient understands that they may stop CCM services at any time and these changes will be effective at the end of the calendar month and will effectively revocate the agreement of CCM services.  Patient understands that only one practitioner can furnish and be paid for CCM services during one calendar month.  Patient also understands that there may be co-payment or deductible fees in association with CCM services.  Patient will continue with at least monthly follow-up calls with the Ambulatory .    Faye JAURGEUI  Ambulatory Case Management    3/27/2025, 11:22 EDT

## 2025-04-01 RX ORDER — LEVOTHYROXINE SODIUM 200 UG/1
TABLET ORAL
Qty: 90 TABLET | Refills: 1 | Status: SHIPPED | OUTPATIENT
Start: 2025-04-01

## 2025-04-01 NOTE — TELEPHONE ENCOUNTER
Rx Refill Note  Requested Prescriptions     Pending Prescriptions Disp Refills    levothyroxine (SYNTHROID, LEVOTHROID) 200 MCG tablet [Pharmacy Med Name: L-THYROXINE (SYNTHROID) TABS 200MCG] 90 tablet 3     Sig: TAKE 1 TABLET DAILY (NEW DOSE)      Last office visit with prescribing clinician: 4/15/2024     Next office visit with prescribing clinician: 7/15/2025     Olga Montenegro MA  04/01/25, 08:47 EDT

## 2025-04-07 DIAGNOSIS — Z79.4 TYPE 2 DIABETES MELLITUS WITH HYPERGLYCEMIA, WITH LONG-TERM CURRENT USE OF INSULIN: Chronic | ICD-10-CM

## 2025-04-07 DIAGNOSIS — E11.65 TYPE 2 DIABETES MELLITUS WITH HYPERGLYCEMIA, WITH LONG-TERM CURRENT USE OF INSULIN: Chronic | ICD-10-CM

## 2025-04-08 RX ORDER — BLOOD SUGAR DIAGNOSTIC
STRIP MISCELLANEOUS
OUTPATIENT
Start: 2025-04-08

## 2025-04-09 ENCOUNTER — TELEPHONE (OUTPATIENT)
Dept: FAMILY MEDICINE CLINIC | Facility: CLINIC | Age: 75
End: 2025-04-09
Payer: MEDICARE

## 2025-04-09 NOTE — TELEPHONE ENCOUNTER
"    Caller: Tay Joanna Lalita \"SIXTO\"    Relationship: Self    Best call back number: [phone]     What is the medical concern/diagnosis: FALL INTO BATHTUB    What specialty or service is being requested: XRAY    What is the provider, practice or medical service name: [facility]    What is the office location: [facility]    What is the office phone number: [phone]    Any additional details: PATIENT FELL INTO THE BATHTUB, AND THE EMERGENCY ROOM DID XRAYS AND CT SCANS AND TOLD HER EVERYTHING WAS FINE. WHEN PATIENT RETURNED HOME SHE HAD SWELLING IN LEFT WRIST AND FEELS THAT HER UPPER RIBS MAY BE BRUISED AND IS REQUESTING TO GET ANOTHER XRAY DONE OVER AT THE [facility]    "

## 2025-04-09 NOTE — TELEPHONE ENCOUNTER
CALLED PT TO GET THEM SCHEDULED FOR AN APPT WITH DR. TAVAREZ PT DIDN'T LIKE HIS  AVAILABILITY I ADVISED PT THEY COULD SEE URGENT CARE AND THEY SAID THEY DIDN'T WANT TOO. PT ASKED IF ANYONE ELSE HAD ANY OPENING I INFORMED THEM WE HAVE 3 PROVIDERS OUT SO WE DID NOT HAVE ANYTHING THIS WEEK, BUT THEY COULD WALK IN TO URGENT CARE OR SCHEDULE AT PCP NEXT AVAILABLE AND PT HUNG UP ON ME

## 2025-04-10 ENCOUNTER — PATIENT OUTREACH (OUTPATIENT)
Dept: CASE MANAGEMENT | Facility: OTHER | Age: 75
End: 2025-04-10
Payer: MEDICARE

## 2025-04-10 DIAGNOSIS — I10 HYPERTENSION, ESSENTIAL: ICD-10-CM

## 2025-04-10 DIAGNOSIS — G20.B2 PARKINSON'S DISEASE WITH DYSKINESIA AND FLUCTUATING MANIFESTATIONS: ICD-10-CM

## 2025-04-10 DIAGNOSIS — I25.10 CORONARY ARTERY DISEASE INVOLVING NATIVE CORONARY ARTERY OF NATIVE HEART WITHOUT ANGINA PECTORIS: Primary | ICD-10-CM

## 2025-04-10 NOTE — OUTREACH NOTE
"AMBULATORY CASE MANAGEMENT NOTE    Names and Relationships of Patient/Support Persons: Contact: Joanna Cross \"SIXTO\"; Relationship: Self -     CCM Interim Update      Spoke with patient for CCM update. She had a fall this week in the bathroom. She was getting ready to go out with Silvana and went to the rest room. She finished using the bathroom and went to grab her walker to get up. She had not set the brakes and then fell into the tub. They call EMS to get her up. She was taken to the hospital and had CT and xrays. They did not show any fractures. She is having rib pain and left wrist pain. Her rib hurts when she takes a deep breath or moves. She was unable to get in to see Dr. Fritz so she went to [facility]. They also could not find a fracture. Xray showed degeneration in her wrist. They gave her a wrist brace and she believes this is helping. [facility] scheduled appt 4/17 with Dr. Fritz to follow up.     She states that her breathing has been ok. She is unable to take a deep breath due to the pain. Silvana has encouraged her to take deep breaths. [facility] educated about risk of developing pna. She reports that her weight has been stable. She has ranged 210-213.8. She reports not edema in her legs and ankles.    Her blood sugars have improved since she is off of steroids. She reports ranging .     She is going to wound care for the ulcer on her buttocks. She has follow up on 4/14/25. She is currently using silvadene with dressing over and water proof dressing over that. Wound care has been pleased with her progress.    She completed her cystoscopy. She does not have a stricture and did not need dilatation. They found that her bladder is \"full\" of diverticuli. This is the cause of her pain and difficulty with urination. She will have follow up to see what the treatment plan will be.       Education Documentation  No documentation found.        Faye JAUREGUI  Ambulatory Case Management    4/10/2025, 14:54 EDT  "

## 2025-04-16 NOTE — PROGRESS NOTES
Patient Name: Joanna Cross  : 1950   MRN: [id number]     Chief Complaint:    Chief Complaint   Patient presents with    Fall     Onset 2025 pt fell into the tub pt seen at [facility]     LT Rib and wrist pain       History of Present Illness: Joanna Cross is a 74 y.o. female who is here today for follow up.  1 left wrist injury and chest injury.  HPI        Review of Systems:   Review of Systems   Constitutional: Negative.  Negative for chills, diaphoresis, fatigue and fever.   HENT: Negative.  Negative for congestion and sore throat.    Eyes: Negative.    Respiratory: Negative.  Negative for cough.    Cardiovascular:  Positive for chest pain.   Gastrointestinal: Negative.  Negative for abdominal pain and nausea.   Genitourinary:  Negative for dysuria.   Musculoskeletal:  Negative for myalgias and neck pain.   Skin:  Negative for rash.   Neurological: Negative.  Negative for weakness, numbness and headaches.        Past Medical History:   Past Medical History:   Diagnosis Date    Allergic     Allergic rhinitis     Anemia     Ankle problem     thinks back related causing pain     Anxiety     Asthma     Atrial fibrillation     AVM (arteriovenous malformation)     Back pain     Cataract     CHF (congestive heart failure) 2024    Probably had before then but no one actually told me I had it.    Cholelithiasis 01    Gallbladder Removed    Chronic kidney disease     stage 3 per pt    Chronic lung disease 2022    CKD (chronic kidney disease)     Clotting disorder 2016    AVM - small intestine    Colon polyp 09/15/16    Coronary artery disease involving native coronary artery without angina pectoris 2017    COVID-19 vaccine series completed     Cystic fibrosis     Diastolic dysfunction     Gastrocnemius muscle tear     left medial 91    Generalized osteoarthritis     GERD (gastroesophageal reflux disease)     Gestational diabetes     GIB (gastrointestinal bleeding) 2016     d/t xarelto     Headache     Hearing decreased, bilateral     HAS HEARING AID, NOT WEARING IT CURRENTLY    Hiatal hernia     History of medical problems 04/23/82    Lichens Sclerosis    History of shingles     History of transfusion 2016    no reaction recalled     HL (hearing loss) 2019    Got hearing aids    Hyperlipidemia LDL goal <70 03/01/2017    Hypertension     Hyperthyroidism     Hypothyroidism     IBS (irritable bowel syndrome)     Inflammatory bowel disease     Klebsiella pneumonia     Lichen sclerosus     Lupus     subQ    Mouth problem     mouth guard used since pt bites tongue and lips excessively at night if not- with bipap     MRSA infection 2018    Myocardial infarction     Obesity     On home oxygen therapy     2L of oxygen all the time due to current congestion     Osteopenia 2016    Osteoporosis     Parkinson's disease     Peripheral vascular disease     Pleurisy     Pneumonia     Puerperal sepsis with acute hypoxic respiratory failure     emergent intubation- 2016    Pulmonary embolism     Renal insufficiency     Right leg pain     from back issues     Salivary gland stone     Sciatic nerve pain     Seborrheic dermatitis     Skin cancer     on back     Sleep apnea     CPAP AND HOME O2 2L/M    Sleep apnea, obstructive 04/15/01    TIA (transient ischemic attack) 2014    no residual effects    Tremor     Type 2 diabetes mellitus     UTI (urinary tract infection)     Visual impairment     Vitamin D deficiency 09/08/2022    Wears glasses        Past Surgical History:   Past Surgical History:   Procedure Laterality Date    ABLATION OF DYSRHYTHMIC FOCUS  05/16/13    Laser Ablation - Rt Leg    BACK SURGERY      l4-l5 laminectomy     BICEPS TENDON REPAIR Right     shoulder    BREAST BIOPSY Left 05/2004    excisional, benign    BRONCHOSCOPY RIGID / FLEXIBLE      2016    BUNIONECTOMY Right     CARDIAC CATHETERIZATION      CARDIAC CATHETERIZATION N/A 02/01/2019    Procedure: Left Heart Cath;  Surgeon:  Albertina Corona MD;  Location:  [facility];  Service: Cardiology    CARDIAC ELECTROPHYSIOLOGY PROCEDURE N/A 01/26/2024    Procedure: Lead Revision RA or RV, PPM or ICD;  Surgeon: Lauro Francois MD;  Location:  [facility] EP INVASIVE LOCATION;  Service: Cardiovascular;  Laterality: N/A;    CARDIAC SURGERY      CATARACT EXTRACTION, BILATERAL  06/26/2024    (R) eye   08/16/2024 (L) eye    CHOLECYSTECTOMY      COLON SURGERY      COLONOSCOPY  2016    COLONOSCOPY N/A 06/17/2021    Procedure: COLONOSCOPY WITH POLYPECTOMY;  Surgeon: Trenton Sosa MD;  Location:  [facility];  Service: Gastroenterology;  Laterality: N/A;    CORONARY STENT PLACEMENT      x1 stent    CYST REMOVAL      left ear, upper left back 2001    CYSTOSCOPY      x2  18 and 20 -   in 20 urethra dilation     DIAGNOSTIC LAPAROSCOPY  1981    ENDOSCOPIC FUNCTIONAL SINUS SURGERY (FESS)  2011    ENDOSCOPY  2016    ENTEROSCOPY VIA STOMA      with single ballon with fluoro     EYE SURGERY  7/26 & 8/16/24    Cataracts removed from each eye.    HAMMER TOE REPAIR Left     INCISION AND DRAINAGE OF WOUND  2018    back with wound infection     INSERT / REPLACE / REMOVE PACEMAKER  11/10/16    [facility]    JOINT REPLACEMENT      KNEE ARTHROSCOPY      LASER ABLATION      right leg 13    LIPOMA EXCISION  1999    left leg     LUMBAR LAMINECTOMY DISCECTOMY DECOMPRESSION N/A 07/06/2018    Procedure: LUMBAR LAMINECTOMY L4-5, HEMILAMIINECTOMY RIGHT L5-S1, FORAMINOTOMY L5-S1;  Surgeon: Lencho Gamino MD;  Location:  [facility];  Service: Neurosurgery    LUMBAR LAMINECTOMY DISCECTOMY DECOMPRESSION N/A 09/19/2018    Procedure: INCISION AND DRAINAGE BACK WITH WOUND EXPLORATION;  Surgeon: Ritchie García MD;  Location:  [facility];  Service: Neurosurgery    LUNG BIOPSY Left 2016    OTHER SURGICAL HISTORY      ct scan of chest and sinuses and lower back     OTHER SURGICAL HISTORY      various echos     OTHER SURGICAL HISTORY       electroencephalogram 16,   emg  ncv tests 2007    OTHER SURGICAL HISTORY      mra 2007  and various mri with xrays, nuclear medicine lung ventilation with perfusion test 2016 with pft     OTHER SURGICAL HISTORY      barium swallow testing 15, 12, 12    OTHER SURGICAL HISTORY      vaginal ultrasound- ,   vas clementina lower extrem , vas venous duplex lower extrem bilat     OTHER SURGICAL HISTORY      wdge biopsy spring of lung     OTHER SURGICAL HISTORY      emergent intubation- hypoxic resp failure     OTHER SURGICAL HISTORY      2024 ppm generator change and lead revision per Dr. Francois    PACEMAKER IMPLANTATION  2016    sss    REPLACEMENT TOTAL KNEE Bilateral     left knee , right 2012 per dr acuna     REPLACEMENT TOTAL KNEE Bilateral     SHOULDER ARTHROSCOPY Bilateral     -left, - right     SKIN BIOPSY      skin cancer back 2016    SKIN CANCER EXCISION      upper righ tback     SPINE SURGERY  18    SUBTOTAL HYSTERECTOMY      MADHAV      TEETH EXTRACTION      x2    TOTAL SHOULDER ARTHROPLASTY W/ DISTAL CLAVICLE EXCISION Left 10/24/2022    Procedure: REVERSE TOTAL SHOULDER ARTHROPLASTY, BICEPS TENODESIS - LEFT;  Surgeon: Sammy Yo MD;  Location:  [facility];  Service: Orthopedics;  Laterality: Left;    TOTAL SHOULDER ARTHROPLASTY W/ DISTAL CLAVICLE EXCISION Right 2023    Procedure: REVERSE TOTAL SHOULDER  ARTHROPLASTY WITH BICEPS TENODESIS - RIGHT;  Surgeon: Sammy Yo MD;  Location:  [facility];  Service: Orthopedics;  Laterality: Right;    TUBAL ABDOMINAL LIGATION Bilateral     WISDOM TOOTH EXTRACTION         Family History:   Family History   Problem Relation Age of Onset    Kidney disease Mother     Coronary artery disease Mother     Hypertension Mother             Heart disease Mother             Hyperlipidemia Mother             Arthritis Mother     Depression Mother     Anxiety disorder Mother     Coronary artery disease Father      Hypertension Father             Hypothyroidism Father     Cancer Father         Oral cancer    Heart disease Father             Thyroid disease Father     Vision loss Father         Macular Degeneration    Coronary artery disease Brother     Hypertension Brother     Heart disease Brother         Multiple stents, by-pass surgery    Testicular cancer Brother     Kidney cancer Maternal Uncle     Testicular cancer Maternal Uncle     Anxiety disorder Son     Colon polyps Neg Hx     Colon cancer Neg Hx        Social History:   Social History     Socioeconomic History    Marital status:      Spouse name: N/A    Number of children: 4    Years of education: College    Highest education level: Master's degree (e.g., MA, MS, Randi, MEd, MSW, NELLA)   Tobacco Use    Smoking status: Never     Passive exposure: Past    Smokeless tobacco: Never    Tobacco comments:     Father and mother smoked for several years.   Vaping Use    Vaping status: Never Used    Passive vaping exposure: Yes   Substance and Sexual Activity    Alcohol use: Never    Drug use: Never    Sexual activity: Not Currently     Partners: Male     Birth control/protection: Post-menopausal, Tubal ligation, Vaginal insert contraception       Medications:     Current Outpatient Medications:     albuterol (ACCUNEB) 1.25 MG/3ML nebulizer solution, Take 3 mL by nebulization Every 6 (Six) Hours As Needed for Wheezing. 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 Indications: Chronic Obstructive Lung Disease, Disp: , Rfl:     amantadine (SYMMETREL) 100 MG capsule, Take 1 capsule by mouth 2 (Two) Times a Day. Indications: Parkinson's Disease with Unknown Cause, Disp: , Rfl:     apixaban (Eliquis) 5 MG tablet tablet, Take 1 tablet by mouth Every 12 (Twelve)  Hours. Restart 1/29/24, Disp: 180 tablet, Rfl: 2    aspirin 81 MG chewable tablet, Chew 1 tablet Daily. 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 Indications: Nutritional Support, Disp: , Rfl:     bumetanide (BUMEX) 1 MG tablet, Take 1 tablet by mouth 2 (Two) Times a Day. Indications: Edema, Disp: , Rfl:     Calcium Carbonate (CALTRATE 600 PO), Take 1 tablet by mouth Daily. Indications: Nutritional Support, Disp: , Rfl:     carbidopa-levodopa (SINEMET)  MG per tablet, Take 2 tablets by mouth at 6am, then 1 tablet at 9am, 12pm, 3pm, 6pm and 9pm.  Indications: Parkinson's Disease, Disp: , Rfl:     Cholecalciferol 25 MCG (1000 UT) tablet, Take 1 tablet by mouth 2 (Two) Times a Day. Indications: Vitamin D Deficiency, Disp: , Rfl:     citalopram (CeleXA) 20 MG tablet, Take 1 tablet by mouth Daily. Indications: Major Depressive Disorder, Disp: 90 tablet, Rfl: 0    clobetasol propionate (TEMOVATE) 0.05 % cream, Apply 1 Application topically to the appropriate area as directed 2 (Two) Times a Week. Indications: Skin Inflammation, Disp: , Rfl:     clonazePAM (KlonoPIN) 0.5 MG tablet, Take 1 tablet by mouth Every Night. Indications: sleep, Disp: , Rfl:     Farxiga 10 MG tablet, Take 10 mg by mouth Daily., Disp: , Rfl:     fluticasone (FLONASE) 50 MCG/ACT nasal spray, Administer 2 sprays into the nostril(s) as directed by provider Daily As Needed for Rhinitis. Indications: Allergic Rhinitis, Disp: , Rfl:     Glucosamine-Chondroit-Calcium (TRIPLE FLEX BONE & JOINT PO), Take 1 tablet by mouth Daily. Indications: Nutritional Support, Disp: , Rfl:     hydroxychloroquine (PLAQUENIL) 200 MG tablet, Take 1 tablet by mouth 2 (Two) Times a Day. Indications: Discoid Lupus Erythematosus, Systemic Lupus Erythematosus, Disp: , Rfl:     Insulin Glargine (Lantus SoloStar) 100 UNIT/ML injection pen, Inject 15 Units under the skin into  the appropriate area as directed Daily. Indications: Type 2 Diabetes, Disp: 15 mL, Rfl: 1    ipratropium (ATROVENT) 0.06 % nasal spray, Administer 2 sprays into the nostril(s) as directed by provider As Needed for Rhinitis. Indications: Hayfever, Disp: , Rfl:     levothyroxine (SYNTHROID, LEVOTHROID) 200 MCG tablet, TAKE 1 TABLET DAILY (NEW DOSE), Disp: 90 tablet, Rfl: 1    loratadine (CLARITIN) 10 MG tablet, Take 1 tablet by mouth Daily. Indications: Hayfever, Disp: , Rfl:     metOLazone (ZAROXOLYN) 2.5 MG tablet, Take 1 tablet by mouth 4 (Four) Times a Week. Indications: Edema, Disp: , Rfl:     Multiple Vitamins-Minerals (CENTRUM ULTRA WOMENS PO), Take 1 tablet by mouth Daily. Indications: Nutritional Support, Disp: , Rfl:     O2 (OXYGEN), Inhale 2 L/min Continuous. Indications: soa, Disp: , Rfl:     omeprazole (priLOSEC) 40 MG capsule, Take 1 capsule by mouth 2 (Two) Times a Day. Indications: Gastroesophageal Reflux Disease, Disp: 180 capsule, Rfl: 1    polyethylene glycol (MIRALAX) 17 g packet, Take 17 g by mouth Daily. Indications: Constipation, Disp: , Rfl:     potassium chloride (MICRO-K) 10 MEQ CR capsule, Take 1 capsule by mouth 2 (Two) Times a Day., Disp: , Rfl:     promethazine-dextromethorphan (PROMETHAZINE-DM) 6.25-15 MG/5ML syrup, Take 5 mL by mouth 4 (Four) Times a Day As Needed for Cough. Indications: Cough, Disp: 120 mL, Rfl: 1    ranolazine (RANEXA) 500 MG 12 hr tablet, Take 2 tablets by mouth Every 12 (Twelve) Hours., Disp: 360 tablet, Rfl: 3    sacubitril-valsartan (Entresto) 24-26 MG tablet, Take 1 tablet by mouth 2 (Two) Times a Day., Disp: , Rfl:     senna (SENOKOT) 8.6 MG tablet, Take 1 tablet by mouth Daily. Indications: Constipation, Disp: , Rfl:     spironolactone (ALDACTONE) 25 MG tablet, Take 1 tablet by mouth Daily. Indications: Heart failure, Disp: , Rfl:     traMADol (ULTRAM) 50 MG tablet, Take 1 tablet by mouth At Night As Needed for Moderate Pain. Indications: Pain, Disp: 30 tablet,  "Rfl: 1    triamcinolone (KENALOG) 0.1 % cream, Apply 1 Application topically to the appropriate area as directed As Needed for Irritation. Indications: Atopic Dermatitis, Disp: , Rfl:     Triamcinolone Acetonide (NASACORT) 55 MCG/ACT nasal inhaler, Administer 2 sprays into the nostril(s) as directed by provider Daily As Needed for Rhinitis. Indications: Nose Inflammation, Disp: , Rfl:     hydrOXYzine (ATARAX) 25 MG tablet, Take 1 tablet by mouth Every 6 (Six) Hours As Needed for Itching., Disp: 90 tablet, Rfl: 1    Allergies:   Allergies   Allergen Reactions    Amlodipine Besylate Swelling     Lower extremity (ankles, feet) swelling    Entacapone Other (See Comments)     \"extreme weakness in legs - caused several falls, which stopped after discontinuing this medication\"    Epinephrine Other (See Comments)     6/4/16- had 3 shots to numb mouth to prepare teeth for crowns, the shots contained epi-  Caused pt to have chest discomfort- went to hospital in ambulance, discovered had a fib while there     Hydrocodone Unknown - High Severity     Headache, nausea, dizziness, & vomiting    Levemir [Insulin Detemir] Hives     Hives / rash around injection site    Penicillins Hives     Jitteriness     Xarelto [Rivaroxaban] GI Bleeding     hgb dropped to 5.2    Aricept [Donepezil Hcl] Nausea Only     Vivid dreams    Benztropine Mesylate Other (See Comments)     Uncontrollable body movements    Cogentin [Benztropine] Other (See Comments)     \"uncontrollable body movements\"    Compazine [Prochlorperazine Edisylate] Other (See Comments)     Dystonic reaction    Duraprep [Antiseptic Products, Misc.] Itching and Rash     RASH AND ITCHING    Haldol [Haloperidol Lactate] Other (See Comments)     Dystonic reaction    Hydralazine Other (See Comments)     Headache     Lisinopril Cough    Statins Myalgia     Leg pain- all statins     Sulfamethoxazole Nausea Only and Other (See Comments)     Nausea & headaches    Tarka " "[Trandolapril-Verapamil Hcl Er] Other (See Comments)     Constipation     Toprol Xl [Metoprolol Tartrate] Other (See Comments)     Extreme fatigue, decreased exercise tolerance    Trimethoprim Other (See Comments)     Other reaction(s): Nausea         Physical Exam:  Vital Signs:   Vitals:    04/17/25 1111   BP: 124/84   BP Location: Left arm   Patient Position: Sitting   Cuff Size: Large Adult   Pulse: 90   Resp: 18   SpO2: 98%   Weight: 96.6 kg (213 lb)   Height: 157.5 cm (62.01\")   PainSc: 4    PainLoc: Rib Cage     Body mass index is 38.95 kg/m².     Physical Exam  Vitals and nursing note reviewed.   Constitutional:       Appearance: Normal appearance. She is obese.   HENT:      Head: Normocephalic and atraumatic.      Right Ear: Tympanic membrane, ear canal and external ear normal.      Left Ear: Tympanic membrane, ear canal and external ear normal.      Nose: Nose normal.      Mouth/Throat:      Mouth: Mucous membranes are dry.      Pharynx: Oropharynx is clear.   Eyes:      Extraocular Movements: Extraocular movements intact.      Conjunctiva/sclera: Conjunctivae normal.      Pupils: Pupils are equal, round, and reactive to light.   Cardiovascular:      Rate and Rhythm: Normal rate and regular rhythm.      Pulses: Normal pulses.      Heart sounds: Normal heart sounds.   Pulmonary:      Effort: Pulmonary effort is normal.      Breath sounds: Normal breath sounds.   Musculoskeletal:         General: Normal range of motion.      Cervical back: Normal range of motion and neck supple.   Feet:      Comments:      Neurological:      Mental Status: She is alert.         Procedures      Assessment/Plan:   Diagnoses and all orders for this visit:    1. Intercostal pain (Primary)  Assessment & Plan:  Patient was evaluated in the ER.  X-rays did not show any fractures.  I bet she tore intercostal muscle.  I will give her some tramadol to take for the next 6 weeks.  She is only supposed to take this at night.  She is " going to use her albuterol inhaler at home.  I told her she Sertra any fever coughing come back it.      2. Injury of left wrist, subsequent encounter  Assessment & Plan:  X-ray rays are negative.  I am going to have her wear a wrist splint.  I am also give her some hydroxyzine for itching.      3. Frequent falls  Assessment & Plan:  Patient has had multiple falls.  She is due to go back to physical therapy.  She is also probably going to start her move and transition to [facility].      4. Right hip pain  -     traMADol (ULTRAM) 50 MG tablet; Take 1 tablet by mouth At Night As Needed for Moderate Pain. Indications: Pain  Dispense: 30 tablet; Refill: 1    Other orders  -     hydrOXYzine (ATARAX) 25 MG tablet; Take 1 tablet by mouth Every 6 (Six) Hours As Needed for Itching.  Dispense: 90 tablet; Refill: 1             Follow Up:   No follow-ups on file.      Reyanldo Fritz MD  [facility]

## 2025-04-17 ENCOUNTER — HOSPITAL ENCOUNTER (OUTPATIENT)
Dept: ONCOLOGY | Facility: HOSPITAL | Age: 75
Discharge: HOME | End: 2025-04-17
Admitting: NURSE PRACTITIONER
Payer: MEDICARE

## 2025-04-17 ENCOUNTER — OFFICE VISIT (OUTPATIENT)
Dept: FAMILY MEDICINE CLINIC | Facility: CLINIC | Age: 75
End: 2025-04-17
Payer: MEDICARE

## 2025-04-17 VITALS
OXYGEN SATURATION: 98 % | SYSTOLIC BLOOD PRESSURE: 124 MMHG | HEART RATE: 90 BPM | RESPIRATION RATE: 18 BRPM | BODY MASS INDEX: 39.2 KG/M2 | HEIGHT: 62 IN | WEIGHT: 213 LBS | DIASTOLIC BLOOD PRESSURE: 84 MMHG

## 2025-04-17 VITALS
HEART RATE: 97 BPM | RESPIRATION RATE: 16 BRPM | DIASTOLIC BLOOD PRESSURE: 77 MMHG | HEIGHT: 62 IN | WEIGHT: 213 LBS | BODY MASS INDEX: 39.2 KG/M2 | TEMPERATURE: 96.8 F | SYSTOLIC BLOOD PRESSURE: 126 MMHG

## 2025-04-17 DIAGNOSIS — R30.0 DYSURIA: ICD-10-CM

## 2025-04-17 DIAGNOSIS — N18.30 ANEMIA ASSOCIATED WITH STAGE 3 CHRONIC RENAL FAILURE: Primary | ICD-10-CM

## 2025-04-17 DIAGNOSIS — M25.551 RIGHT HIP PAIN: ICD-10-CM

## 2025-04-17 DIAGNOSIS — R29.6 FREQUENT FALLS: ICD-10-CM

## 2025-04-17 DIAGNOSIS — R07.82 INTERCOSTAL PAIN: Primary | ICD-10-CM

## 2025-04-17 DIAGNOSIS — S69.92XD INJURY OF LEFT WRIST, SUBSEQUENT ENCOUNTER: ICD-10-CM

## 2025-04-17 DIAGNOSIS — D63.1 ANEMIA ASSOCIATED WITH STAGE 3 CHRONIC RENAL FAILURE: Primary | ICD-10-CM

## 2025-04-17 PROBLEM — S69.92XA INJURY OF LEFT WRIST: Status: ACTIVE | Noted: 2025-04-17

## 2025-04-17 LAB
BASOPHILS # BLD AUTO: 0.03 10*3/MM3 (ref 0–0.2)
BASOPHILS NFR BLD AUTO: 0.4 % (ref 0–1.5)
BILIRUB BLD-MCNC: NEGATIVE MG/DL
CLARITY, POC: ABNORMAL
COLOR UR: YELLOW
DEPRECATED RDW RBC AUTO: 45.6 FL (ref 37–54)
EOSINOPHIL # BLD AUTO: 0.54 10*3/MM3 (ref 0–0.4)
EOSINOPHIL NFR BLD AUTO: 8 % (ref 0.3–6.2)
ERYTHROCYTE [DISTWIDTH] IN BLOOD BY AUTOMATED COUNT: 13 % (ref 12.3–15.4)
EXPIRATION DATE: ABNORMAL
GLUCOSE UR STRIP-MCNC: NEGATIVE MG/DL
HCT VFR BLD AUTO: 31.7 % (ref 34–46.6)
HGB BLD-MCNC: 10.7 G/DL (ref 12–15.9)
IMM GRANULOCYTES # BLD AUTO: 0.07 10*3/MM3 (ref 0–0.05)
IMM GRANULOCYTES NFR BLD AUTO: 1 % (ref 0–0.5)
KETONES UR QL: NEGATIVE
LEUKOCYTE EST, POC: ABNORMAL
LYMPHOCYTES # BLD AUTO: 0.71 10*3/MM3 (ref 0.7–3.1)
LYMPHOCYTES NFR BLD AUTO: 10.5 % (ref 19.6–45.3)
Lab: ABNORMAL
MCH RBC QN AUTO: 31.8 PG (ref 26.6–33)
MCHC RBC AUTO-ENTMCNC: 33.8 G/DL (ref 31.5–35.7)
MCV RBC AUTO: 94.3 FL (ref 79–97)
MONOCYTES # BLD AUTO: 0.7 10*3/MM3 (ref 0.1–0.9)
MONOCYTES NFR BLD AUTO: 10.3 % (ref 5–12)
NEUTROPHILS NFR BLD AUTO: 4.74 10*3/MM3 (ref 1.7–7)
NEUTROPHILS NFR BLD AUTO: 69.8 % (ref 42.7–76)
NITRITE UR-MCNC: NEGATIVE MG/ML
PH UR: 5.5 [PH] (ref 5–8)
PLATELET # BLD AUTO: 157 10*3/MM3 (ref 140–450)
PMV BLD AUTO: 9 FL (ref 6–12)
PROT UR STRIP-MCNC: ABNORMAL MG/DL
RBC # BLD AUTO: 3.36 10*6/MM3 (ref 3.77–5.28)
RBC # UR STRIP: ABNORMAL /UL
SP GR UR: 1.03 (ref 1–1.03)
UROBILINOGEN UR QL: NORMAL
WBC NRBC COR # BLD AUTO: 6.79 10*3/MM3 (ref 3.4–10.8)

## 2025-04-17 PROCEDURE — 36415 COLL VENOUS BLD VENIPUNCTURE: CPT

## 2025-04-17 PROCEDURE — 85025 COMPLETE CBC W/AUTO DIFF WBC: CPT | Performed by: NURSE PRACTITIONER

## 2025-04-17 RX ORDER — NITROFURANTOIN 25; 75 MG/1; MG/1
100 CAPSULE ORAL 2 TIMES DAILY
Qty: 20 CAPSULE | Refills: 0 | Status: SHIPPED | OUTPATIENT
Start: 2025-04-17

## 2025-04-17 RX ORDER — TRAMADOL HYDROCHLORIDE 50 MG/1
50 TABLET ORAL NIGHTLY PRN
Qty: 30 TABLET | Refills: 1 | Status: SHIPPED | OUTPATIENT
Start: 2025-04-17 | End: 2025-04-17

## 2025-04-17 RX ORDER — INSULIN GLARGINE 100 [IU]/ML
15 INJECTION, SOLUTION SUBCUTANEOUS DAILY
Qty: 15 ML | Refills: 1 | Status: SHIPPED | OUTPATIENT
Start: 2025-04-17

## 2025-04-17 RX ORDER — HYDROXYZINE HYDROCHLORIDE 25 MG/1
25 TABLET, FILM COATED ORAL EVERY 6 HOURS PRN
Qty: 90 TABLET | Refills: 1 | Status: SHIPPED | OUTPATIENT
Start: 2025-04-17

## 2025-04-17 RX ORDER — TRAMADOL HYDROCHLORIDE 50 MG/1
50 TABLET ORAL NIGHTLY PRN
Qty: 30 TABLET | Refills: 1 | Status: SHIPPED | OUTPATIENT
Start: 2025-04-17

## 2025-04-17 RX ORDER — LANCETS
EACH MISCELLANEOUS
Qty: 250 EACH | Refills: 3 | Status: SHIPPED | OUTPATIENT
Start: 2025-04-17 | End: 2026-04-17

## 2025-04-17 RX ORDER — BLOOD SUGAR DIAGNOSTIC
STRIP MISCELLANEOUS
Qty: 250 EACH | Refills: 3 | Status: SHIPPED | OUTPATIENT
Start: 2025-04-17

## 2025-04-17 RX ORDER — NITROFURANTOIN 25; 75 MG/1; MG/1
100 CAPSULE ORAL 2 TIMES DAILY
Qty: 20 CAPSULE | Refills: 0 | Status: SHIPPED | OUTPATIENT
Start: 2025-04-17 | End: 2025-04-17 | Stop reason: SDUPTHER

## 2025-04-17 RX ORDER — LANCETS
EACH MISCELLANEOUS
Qty: 250 EACH | Refills: 3 | Status: CANCELLED | OUTPATIENT
Start: 2025-04-17

## 2025-04-17 NOTE — ASSESSMENT & PLAN NOTE
Patient was evaluated in the ER.  X-rays did not show any fractures.  I bet she tore intercostal muscle.  I will give her some tramadol to take for the next 6 weeks.  She is only supposed to take this at night.  She is going to use her albuterol inhaler at home.  I told her she Sertra any fever coughing come back it.

## 2025-04-17 NOTE — TELEPHONE ENCOUNTER
Rx Refill Note  Requested Prescriptions      No prescriptions requested or ordered in this encounter        Last office visit with prescribing clinician: 4/15/2024      Next office visit with prescribing clinician: 7/15/2025       Jessica Partida (Jodi)  04/17/25, 07:45 EDT

## 2025-04-17 NOTE — ASSESSMENT & PLAN NOTE
Patient has had multiple falls.  She is due to go back to physical therapy.  She is also probably going to start her move and transition to [facility].

## 2025-04-17 NOTE — ASSESSMENT & PLAN NOTE
X-ray rays are negative.  I am going to have her wear a wrist splint.  I am also give her some hydroxyzine for itching.

## 2025-04-22 LAB
BACTERIA UR CULT: ABNORMAL
BACTERIA UR CULT: ABNORMAL
Lab: NORMAL
OTHER ANTIBIOTIC SUSC ISLT: ABNORMAL

## 2025-04-23 ENCOUNTER — PATIENT OUTREACH (OUTPATIENT)
Dept: CASE MANAGEMENT | Facility: OTHER | Age: 75
End: 2025-04-23
Payer: MEDICARE

## 2025-04-23 ENCOUNTER — TELEPHONE (OUTPATIENT)
Dept: FAMILY MEDICINE CLINIC | Facility: CLINIC | Age: 75
End: 2025-04-23

## 2025-04-23 RX ORDER — VALACYCLOVIR HYDROCHLORIDE 1 G/1
1000 TABLET, FILM COATED ORAL 3 TIMES DAILY
Qty: 21 TABLET | Refills: 0 | OUTPATIENT
Start: 2025-04-23

## 2025-04-23 RX ORDER — VALACYCLOVIR HYDROCHLORIDE 1 G/1
1000 TABLET, FILM COATED ORAL 3 TIMES DAILY
Qty: 21 TABLET | Refills: 0 | Status: SHIPPED | OUTPATIENT
Start: 2025-04-23 | End: 2025-04-24 | Stop reason: SDUPTHER

## 2025-04-23 NOTE — OUTREACH NOTE
AMBULATORY CASE MANAGEMENT NOTE    Names and Relationships of Patient/Support Persons:  -     [facility] Interim Update    Spoke with patient's daughter Silvana. Karina is complaining of itching to her back and a burning feeling. It started around 2 days ago. Silvana noticed that her mom was using a back scratcher and reporting symptoms. Silvana looked at Karina's back and noticed blisters on her back on right side in shoulder blade area. Will send message to provider. She did have zostavax and 1 shingrix but did not complete series.     Silvana states that Karina is slowly getting better from previous fall. She had a fall this week when she was leaning on the fridge door to put something in the fridge. The door moved and she lost her balance. She did go to the ED and did not have any fractures or bleeds.        Education Documentation  No documentation found.        Faye JAUREGUI  Ambulatory Case Management    4/23/2025, 08:52 EDT

## 2025-04-23 NOTE — TELEPHONE ENCOUNTER
"    Caller: Silvana Cross(POA)    Relationship: Emergency Contact    Best call back number: [phone]     Requested Prescriptions:   Requested Prescriptions     Pending Prescriptions Disp Refills    valACYclovir (Valtrex) 1000 MG tablet 21 tablet 0     Sig: Take 1 tablet by mouth 3 (Three) Times a Day.        Pharmacy where request should be sent: [id number] - [place], [place] - [address] - [phone]  - [phone] FX     Last office visit with prescribing clinician: 4/17/2025   Last telemedicine visit with prescribing clinician: Visit date not found   Next office visit with prescribing clinician: 6/23/2025     Additional details provided by patient: SPOKE TO \"ANUJA\" AND WAS TOLD THAT DR TAVAREZ WOULD BE CALLING THIS IN TODAY FOR SHINGLES     Does the patient have less than a 3 day supply:  [x] Yes  [] No    Would you like a call back once the refill request has been completed: [] Yes [x] No    If the office needs to give you a call back, can they leave a voicemail: [] Yes [x] No    Martin Shanks Rep   04/23/25 11:18 EDT     "

## 2025-04-23 NOTE — TELEPHONE ENCOUNTER
Talked to Silvana, Patients daughter. Patient states that patient had another fall 2 days ago. States that patient has been sleeping a lot. Is unsure if the fall, shingles, or UTI is making her tired. Advised to finish antibiotic.

## 2025-04-24 DIAGNOSIS — B02.8 HERPES ZOSTER WITH OTHER COMPLICATION: Primary | ICD-10-CM

## 2025-04-24 RX ORDER — VALACYCLOVIR HYDROCHLORIDE 1 G/1
1000 TABLET, FILM COATED ORAL 3 TIMES DAILY
Qty: 21 TABLET | Refills: 0 | Status: SHIPPED | OUTPATIENT
Start: 2025-04-24 | End: 2025-04-24

## 2025-04-24 RX ORDER — VALACYCLOVIR HYDROCHLORIDE 1 G/1
1000 TABLET, FILM COATED ORAL 3 TIMES DAILY
Qty: 21 TABLET | Refills: 0 | Status: SHIPPED | OUTPATIENT
Start: 2025-04-24

## 2025-04-24 NOTE — TELEPHONE ENCOUNTER
Patient states that Rachele has not received her script for Acyclovir yet. It appears that script did not go through to pharmacy. Will pend order and resend.

## 2025-04-29 ENCOUNTER — PATIENT OUTREACH (OUTPATIENT)
Dept: CASE MANAGEMENT | Facility: OTHER | Age: 75
End: 2025-04-29
Payer: MEDICARE

## 2025-04-29 DIAGNOSIS — I25.10 CORONARY ARTERY DISEASE INVOLVING NATIVE CORONARY ARTERY OF NATIVE HEART WITHOUT ANGINA PECTORIS: Primary | ICD-10-CM

## 2025-04-29 DIAGNOSIS — G20.B2 PARKINSON'S DISEASE WITH DYSKINESIA AND FLUCTUATING MANIFESTATIONS: ICD-10-CM

## 2025-04-29 DIAGNOSIS — I10 HYPERTENSION, ESSENTIAL: ICD-10-CM

## 2025-04-29 NOTE — OUTREACH NOTE
CCM End of Month Documentation    This Chronic Medical Management Care Plan for Joanna Cross, 74 y.o. female, has been monitored and managed; reviewed and a new plan of care implemented for the month of April.  A cumulative time of 61  minutes was spent on this patient record this month, including chart review; phone call with patient.    Regarding the patient's problems: has Coronary artery disease involving native coronary artery without angina pectoris; Hypertension, essential; Peripheral vascular disease; Hyperlipidemia LDL goal <70; Spinal stenosis, lumbar region, with neurogenic claudication; Overactive bladder; GERD (gastroesophageal reflux disease); Hypothyroidism; Lichen sclerosus; Parkinson's disease; MILLI treated with BiPAP; History of respiratory failure - prior respiratory failure requiring mechanical ventilation, open lung biopsy non-specific. ; Atrial fibrillation; Tachy-thai syndrome; Long term current use of antiarrhythmic drug; History of adenomatous polyp of colon; Anemia associated with stage 3 chronic renal failure; Angiodysplasia; Hx of colonic polyps; Body mass index 40.0-44.9, adult; Cardiac pacemaker in situ; Chronic low back pain; Chronic lung disease; Degeneration of lumbar intervertebral disc; Edema of lower extremity; History of malignant neoplasm of skin; History of TIA (transient ischemic attack); Hypotonic bladder; Incomplete tear of rotator cuff; Major depression in remission; Tinnitus of both ears; Primary osteoarthritis involving multiple joints; Restless legs; Seborrheic dermatitis; Transient cerebral ischemia; Muscle strain; Type 2 diabetes mellitus with hyperglycemia, without long-term current use of insulin; Vitamin B12 deficiency; Vitamin D deficiency; Chronic kidney disease, stage 3b; Osteoporosis, senile; Acute diastolic CHF (congestive heart failure); Chronic respiratory failure with hypoxia; Chronic anticoagulation; Fracture of right shoulder with delayed healing;  Chronic congestive heart failure; Acute on chronic hypoxic respiratory failure; Physical exam; Pressure injury of left buttock, stage 1; Intercostal pain; Injury of left wrist; and Frequent falls on their problem list., the following items were addressed: medical records; medications and any changes can be found within the plan section of the note.  A detailed listing of time spent for chronic care management is tracked within each outreach encounter.  Current medications include:  has a current medication list which includes the following prescription(s): accu-chek softclix lancets, albuterol, albuterol sulfate hfa, amantadine, apixaban, aspirin, b complex-c, bumetanide, calcium carbonate, carbidopa-levodopa, cholecalciferol, citalopram, clobetasol propionate, clonazepam, farxiga, fluticasone, glucosamine-chondroit-calcium, accu-chek guide test, hydroxychloroquine, hydroxyzine, lantus solostar, ipratropium, levothyroxine, loratadine, metolazone, multiple vitamins-minerals, nitrofurantoin (macrocrystal-monohydrate), o2, omeprazole, polyethylene glycol, potassium chloride, promethazine-dextromethorphan, ranolazine, sacubitril-valsartan, senna, spironolactone, tramadol, triamcinolone, triamcinolone acetonide, and valacyclovir. and the patient is reported to be patient is compliant with medication protocol,  Medications are reported to be effective.  Regarding these diagnoses, referrals were made to the following provider(s):  n/a.  All notes on chart for PCP to review.    The patient was monitored remotely for blood pressure; weight; activity level; pain; medications.    The patient's physical needs include:  needs assistance with ADLs; physical healthcare.     The patient's mental support needs include:  increased support    The patient's cognitive support needs include:  needs met    The patient's psychosocial support needs include:  continued support    The patient's functional needs include: physical  healthcare    The patient's environmental needs include:  not applicable    Care Plan overall comments:  reviewed    Refer to previous outreach notes for more information on the areas listed above.    Monthly Billing Diagnoses  (I25.10) Coronary artery disease involving native coronary artery of native heart without angina pectoris    (G20.B2) Parkinson's disease with dyskinesia and fluctuating manifestations    (I10) Hypertension, essential    Medications   Medications have been reconciled    Care Plan progress this month:      Recently Modified Care Plans Updates made since 3/29/2025 12:00 AM      No recently modified care plans.            Instructions   Patient was provided an electronic copy of care plan  CCM services were explained and offered and patient has accepted these services.  Patient has given their written consent to receive CCM services and understands that this includes the authorization of electronic communication of medical information with the other treating providers.  Patient understands that they may stop CCM services at any time and these changes will be effective at the end of the calendar month and will effectively revocate the agreement of CCM services.  Patient understands that only one practitioner can furnish and be paid for CCM services during one calendar month.  Patient also understands that there may be co-payment or deductible fees in association with CCM services.  Patient will continue with at least monthly follow-up calls with the Ambulatory .    Faye JAUREGUI  Ambulatory Case Management    4/29/2025, 09:50 EDT

## 2025-04-30 ENCOUNTER — PRIOR AUTHORIZATION (OUTPATIENT)
Dept: FAMILY MEDICINE CLINIC | Facility: CLINIC | Age: 75
End: 2025-04-30
Payer: MEDICARE

## 2025-05-05 ENCOUNTER — PATIENT OUTREACH (OUTPATIENT)
Dept: CASE MANAGEMENT | Facility: OTHER | Age: 75
End: 2025-05-05
Payer: MEDICARE

## 2025-05-05 DIAGNOSIS — I25.10 CORONARY ARTERY DISEASE INVOLVING NATIVE CORONARY ARTERY OF NATIVE HEART WITHOUT ANGINA PECTORIS: Primary | ICD-10-CM

## 2025-05-05 DIAGNOSIS — G20.B2 PARKINSON'S DISEASE WITH DYSKINESIA AND FLUCTUATING MANIFESTATIONS: ICD-10-CM

## 2025-05-05 DIAGNOSIS — I10 HYPERTENSION, ESSENTIAL: ICD-10-CM

## 2025-05-05 NOTE — OUTREACH NOTE
"AMBULATORY CASE MANAGEMENT NOTE    Names and Relationships of Patient/Support Persons: Contact: Joanna Cross \"KARINA\"; Relationship: Self -     CCM Interim Update    Spoke with Karina for CCM update. She states that she is feeling better. She was able to get her medication for shingles. They also found an otc cream that she states helped. The shingles have now scabbed over.     She has not had any further falls. She reports her left wrist is feeling better but continues to still have some rib pain.     She has finished her antibiotics for UTI.    She states that recently she has been having chin/jaw pain that comes and goes. She has put voltaren on the area and states it helps. She has looked on line and states that people with parkinsons can have jaw pain related to TMJ. She states that it does occur daily. Informed patient that cardiac issue can also appear as jaw pain and she states that she is aware of this. Offered to her that she may need to come in sooner to see Dr. Fritz and she verbalized understanding.     Education Documentation  No documentation found.        Faye JAUREGUI  Ambulatory Case Management    5/5/2025, 09:25 EDT  "

## 2025-05-14 ENCOUNTER — OUTSIDE FACILITY SERVICE (OUTPATIENT)
Dept: CARDIOLOGY | Facility: CLINIC | Age: 75
End: 2025-05-14
Payer: MEDICARE

## 2025-05-14 ENCOUNTER — PATIENT OUTREACH (OUTPATIENT)
Dept: CASE MANAGEMENT | Facility: OTHER | Age: 75
End: 2025-05-14
Payer: MEDICARE

## 2025-05-14 ENCOUNTER — TELEPHONE (OUTPATIENT)
Dept: ONCOLOGY | Facility: CLINIC | Age: 75
End: 2025-05-14
Payer: MEDICARE

## 2025-05-14 DIAGNOSIS — I25.10 CORONARY ARTERY DISEASE INVOLVING NATIVE CORONARY ARTERY OF NATIVE HEART WITHOUT ANGINA PECTORIS: Primary | ICD-10-CM

## 2025-05-14 DIAGNOSIS — G20.B2 PARKINSON'S DISEASE WITH DYSKINESIA AND FLUCTUATING MANIFESTATIONS: ICD-10-CM

## 2025-05-14 DIAGNOSIS — I10 HYPERTENSION, ESSENTIAL: ICD-10-CM

## 2025-05-14 PROCEDURE — 99232 SBSQ HOSP IP/OBS MODERATE 35: CPT | Performed by: INTERNAL MEDICINE

## 2025-05-14 NOTE — TELEPHONE ENCOUNTER
"  Caller: Joanna Cross \"SIXTO\"    Relationship: Self    Best call back number: [phone]     What is the best time to reach you: ANYTIME    Who are you requesting to speak with (clinical staff, provider,  specific staff member): CLINICAL    What was the call regarding: PATIENT IS INPATIENT AT [facility] RIGHT NOW. SHE IS NOT SURE SHE WILL BE OUT ON FRIDAY FOR HER APPT.    PLEASE CALL TO DISCUSS FURTHER.     "

## 2025-05-14 NOTE — TELEPHONE ENCOUNTER
Called patient and left message informing her that we will keep as scheduled for now but if she does not get discharged by late Thursday she can let us know and appt can be rescheduled.

## 2025-05-14 NOTE — OUTREACH NOTE
"AMBULATORY CASE MANAGEMENT NOTE    Names and Relationships of Patient/Support Persons: Contact: Joanna Cross \"KARINA\"; Relationship: Self -     CCM Interim Update    Received call from Karina. She is still admitted to [facility]. She was scheduled for dexa at [facility] and is wanting to have it done while she is in the hospital. She told her nurse at [facility] this and they said they didn't know if she could. Karina wanted help facilitating this. Explained to Karina that dexa is considered to be an outpatient service. Due to this it would be highly unusual for them to let her get that test while she is in the hospital. Karina states she understands that and that she will call to reschedule.     Karina is unsure when she will get to go home. She has had 4000 ml of fluid taken off. She is still getting iv bumex. She is thinking she may possibly go home on 5/16.        Education Documentation  No documentation found.        Faye JAUREGUI  Ambulatory Case Management    5/14/2025, 15:39 EDT  "

## 2025-05-16 ENCOUNTER — READMISSION MANAGEMENT (OUTPATIENT)
Dept: CALL CENTER | Facility: HOSPITAL | Age: 75
End: 2025-05-16
Payer: MEDICARE

## 2025-05-16 NOTE — OUTREACH NOTE
Prep Survey      Flowsheet Row Responses   [facility] patient discharged from? Non-[facility]   Is LACE score < 7 ? Non-BH Discharge   Eligibility [facility]   Date of Discharge 05/16/25   Discharge Disposition Home or Self Care   Discharge diagnosis CHF EXACERBATION   Does the patient have one of the following disease processes/diagnoses(primary or secondary)? CHF   Prep survey completed? Yes            Lily JACKSON - Registered Nurse

## 2025-05-19 ENCOUNTER — TRANSITIONAL CARE MANAGEMENT TELEPHONE ENCOUNTER (OUTPATIENT)
Dept: CALL CENTER | Facility: HOSPITAL | Age: 75
End: 2025-05-19
Payer: MEDICARE

## 2025-05-19 ENCOUNTER — PATIENT OUTREACH (OUTPATIENT)
Dept: CASE MANAGEMENT | Facility: OTHER | Age: 75
End: 2025-05-19
Payer: MEDICARE

## 2025-05-19 DIAGNOSIS — I50.31 ACUTE DIASTOLIC CHF (CONGESTIVE HEART FAILURE): ICD-10-CM

## 2025-05-19 DIAGNOSIS — G20.B2 PARKINSON'S DISEASE WITH DYSKINESIA AND FLUCTUATING MANIFESTATIONS: ICD-10-CM

## 2025-05-19 DIAGNOSIS — I10 HYPERTENSION, ESSENTIAL: ICD-10-CM

## 2025-05-19 DIAGNOSIS — I25.10 CORONARY ARTERY DISEASE INVOLVING NATIVE CORONARY ARTERY OF NATIVE HEART WITHOUT ANGINA PECTORIS: Primary | ICD-10-CM

## 2025-05-19 DIAGNOSIS — L90.0 LICHEN SCLEROSUS: Primary | ICD-10-CM

## 2025-05-19 RX ORDER — HYDROXYZINE HYDROCHLORIDE 25 MG/1
25 TABLET, FILM COATED ORAL EVERY 6 HOURS PRN
Qty: 30 TABLET | Refills: 5 | Status: SHIPPED | OUTPATIENT
Start: 2025-05-19

## 2025-05-19 NOTE — OUTREACH NOTE
"AMBULATORY CASE MANAGEMENT NOTE    Names and Relationships of Patient/Support Persons: Contact: Joanna Cross \"KARINA\"; Relationship: Self -     CCM Interim Update    Received call from Karina. She was discharged home on 5/16. She was offered HH prior to discharge but did not think she needed. She got home and realized that maybe she should have accepted HH. She is requesting orders. She does not have a preference of which agency. Will pend orders for Dr. Fritz.         Education Documentation  No documentation found.        Faye JAUREGUI  Ambulatory Case Management    5/19/2025, 14:11 EDT  "

## 2025-05-19 NOTE — OUTREACH NOTE
Call Center TCM Note      Flowsheet Row Responses   [facility] patient discharged from? Non-  [[facility]]   Does the patient have one of the following disease processes/diagnoses(primary or secondary)? CHF   TCM attempt successful? Yes   Call start time 1149   Call end time 1153   Discharge diagnosis CHF EXACERBATION   Is the patient taking all medications as directed (includes completed medication regime)? Yes   Medication comments Medication   Comments Pt wants to keep appt on 6/17.   Does the patient have an appointment with their PCP within 7-14 days of discharge? No   Psychosocial issues? No   What is the patient's perception of their health status since discharge? Improving   Is the patient/caregiver able to teach back signs and symptoms related to disease process for when to call PCP? Yes   Is the patient/caregiver able to teach back signs and symptoms related to disease process for when to call 911? Yes   Is the patient/caregiver able to teach back the hierarchy of who to call/visit for symptoms/problems? PCP, Specialist, Home health nurse, Urgent Care, ED, 911 Yes   If the patient is a current smoker, are they able to teach back resources for cessation? Not a smoker   Additional teach back comments States she is doing well.  No soa and she does weigh daily.   TCM call completed? Yes   Wrap up additional comments Pt states she is needing the prescription sent in that they prescribed for itching.  She was unsure of the name but states the PA was approved.  Will message office regarding this.   Call end time 1153            Elizabeth MEJIA - Licensed Nurse    5/19/2025, 11:55 EDT

## 2025-05-19 NOTE — TELEPHONE ENCOUNTER
Received call from Karina. She states that [facility] will not allow her hydroxyzine to be filled due to the quantity being for a 90 day supply. They require that this medication be for 30 days. Will resend it to her pharmacy.

## 2025-05-23 ENCOUNTER — TELEPHONE (OUTPATIENT)
Age: 75
End: 2025-05-23
Payer: MEDICARE

## 2025-05-23 NOTE — TELEPHONE ENCOUNTER
Patient called, physical therapy tool her that she needs to have a walk test to see how her oxygent was

## 2025-05-23 NOTE — TELEPHONE ENCOUNTER
Called pt for clarification. Pt's Home Health nurse told pt to reach out and be scheduled for a 6mwt because she's been experiencing SOB, even on Oxygen. Pt states that her O2 sats have not been abnormal at any of her doctor appts. Should we also schedule her for a PFT and CXR? Please advise.

## 2025-05-30 ENCOUNTER — PATIENT OUTREACH (OUTPATIENT)
Dept: CASE MANAGEMENT | Facility: OTHER | Age: 75
End: 2025-05-30
Payer: MEDICARE

## 2025-05-30 DIAGNOSIS — I25.10 CORONARY ARTERY DISEASE INVOLVING NATIVE CORONARY ARTERY OF NATIVE HEART WITHOUT ANGINA PECTORIS: Primary | ICD-10-CM

## 2025-05-30 DIAGNOSIS — I10 HYPERTENSION, ESSENTIAL: ICD-10-CM

## 2025-05-30 DIAGNOSIS — G20.B2 PARKINSON'S DISEASE WITH DYSKINESIA AND FLUCTUATING MANIFESTATIONS: ICD-10-CM

## 2025-05-30 NOTE — OUTREACH NOTE
CCM End of Month Documentation    This Chronic Medical Management Care Plan for Joanna Cross, 74 y.o. female, has been monitored and managed; reviewed and a new plan of care implemented for the month of May.  A cumulative time of 30  minutes was spent on this patient record this month, including chart review; phone call with patient.    Regarding the patient's problems: has Coronary artery disease involving native coronary artery without angina pectoris; Hypertension, essential; Peripheral vascular disease; Hyperlipidemia LDL goal <70; Spinal stenosis, lumbar region, with neurogenic claudication; Overactive bladder; GERD (gastroesophageal reflux disease); Hypothyroidism; Lichen sclerosus; Parkinson's disease; MILLI treated with BiPAP; History of respiratory failure - prior respiratory failure requiring mechanical ventilation, open lung biopsy non-specific. ; Atrial fibrillation; Tachy-thai syndrome; Long term current use of antiarrhythmic drug; History of adenomatous polyp of colon; Anemia associated with stage 3 chronic renal failure; Angiodysplasia; Hx of colonic polyps; Body mass index 40.0-44.9, adult; Cardiac pacemaker in situ; Chronic low back pain; Chronic lung disease; Degeneration of lumbar intervertebral disc; Edema of lower extremity; History of malignant neoplasm of skin; History of TIA (transient ischemic attack); Hypotonic bladder; Incomplete tear of rotator cuff; Major depression in remission; Tinnitus of both ears; Primary osteoarthritis involving multiple joints; Restless legs; Seborrheic dermatitis; Transient cerebral ischemia; Muscle strain; Type 2 diabetes mellitus with hyperglycemia, without long-term current use of insulin; Vitamin B12 deficiency; Vitamin D deficiency; Chronic kidney disease, stage 3b; Osteoporosis, senile; Acute diastolic CHF (congestive heart failure); Chronic respiratory failure with hypoxia; Chronic anticoagulation; Fracture of right shoulder with delayed healing;  Chronic congestive heart failure; Acute on chronic hypoxic respiratory failure; Physical exam; Pressure injury of left buttock, stage 1; Intercostal pain; Injury of left wrist; and Frequent falls on their problem list., the following items were addressed: medical records; medications and any changes can be found within the plan section of the note.  A detailed listing of time spent for chronic care management is tracked within each outreach encounter.  Current medications include:  has a current medication list which includes the following prescription(s): accu-chek softclix lancets, albuterol, albuterol sulfate hfa, amantadine, apixaban, aspirin, b complex-c, bumetanide, calcium carbonate, carbidopa-levodopa, cholecalciferol, citalopram, clobetasol propionate, clonazepam, farxiga, fluticasone, glucosamine-chondroit-calcium, accu-chek guide test, hydroxychloroquine, hydroxyzine, lantus solostar, ipratropium, levothyroxine, loratadine, metolazone, multiple vitamins-minerals, nitrofurantoin (macrocrystal-monohydrate), o2, omeprazole, polyethylene glycol, potassium chloride, promethazine-dextromethorphan, ranolazine, sacubitril-valsartan, senna, spironolactone, tramadol, triamcinolone, triamcinolone acetonide, and valacyclovir. and the patient is reported to be patient is compliant with medication protocol,  Medications are reported to be effective.  Regarding these diagnoses, referrals were made to the following provider(s):  n/a.  All notes on chart for PCP to review.    The patient was monitored remotely for blood pressure; weight; activity level; pain; medications.    The patient's physical needs include:  needs assistance with ADLs; physical healthcare.     The patient's mental support needs include:  increased support    The patient's cognitive support needs include:  needs met    The patient's psychosocial support needs include:  continued support    The patient's functional needs include: physical  healthcare    The patient's environmental needs include:  not applicable    Care Plan overall comments:  reviewed    Refer to previous outreach notes for more information on the areas listed above.    Monthly Billing Diagnoses  (I25.10) Coronary artery disease involving native coronary artery of native heart without angina pectoris    (G20.B2) Parkinson's disease with dyskinesia and fluctuating manifestations    (I10) Hypertension, essential    Medications   Medications have been reconciled    Care Plan progress this month:      Recently Modified Care Plans Updates made since 4/29/2025 12:00 AM      No recently modified care plans.            Instructions   Patient was provided an electronic copy of care plan  CCM services were explained and offered and patient has accepted these services.  Patient has given their written consent to receive CCM services and understands that this includes the authorization of electronic communication of medical information with the other treating providers.  Patient understands that they may stop CCM services at any time and these changes will be effective at the end of the calendar month and will effectively revocate the agreement of CCM services.  Patient understands that only one practitioner can furnish and be paid for CCM services during one calendar month.  Patient also understands that there may be co-payment or deductible fees in association with CCM services.  Patient will continue with at least monthly follow-up calls with the Ambulatory .    Faye JAUREGUI  Ambulatory Case Management    5/30/2025, 09:56 EDT

## 2025-06-02 RX ORDER — OMEPRAZOLE 40 MG/1
40 CAPSULE, DELAYED RELEASE ORAL 2 TIMES DAILY
Qty: 180 CAPSULE | Refills: 1 | Status: SHIPPED | OUTPATIENT
Start: 2025-06-02

## 2025-06-02 RX ORDER — CITALOPRAM HYDROBROMIDE 20 MG/1
20 TABLET ORAL DAILY
Qty: 90 TABLET | Refills: 0 | Status: SHIPPED | OUTPATIENT
Start: 2025-06-02

## 2025-06-03 NOTE — ASSESSMENT & PLAN NOTE
-- DO NOT REPLY / DO NOT REPLY ALL --  -- Message is from the [facility]--    COVID-19 Universal Screening: Negative    General Patient Message      Reason for Call: Occupational Therapist, Lilly Wang called to verify if a new order can be sent to a different medical supplier, [facility] who accepts Medicare Part B coverage.  Currently, [facility] does not accept Medicare Part B. Please give her a call back today with any questions, thank you.     Caller Information       Type Contact Phone    05/09/2020 02:11 PM Phone (Incoming) Lilly Wang  (Other) [phone]     Behavioral Therapist           Alternative phone number: [phone]    Turnaround time given to caller:   \"This message will be sent to [state Provider's name]. The clinical team will fulfill your request as soon as they review your message.\"     I had already sent this order to [facility], patient received the commode. Nothing else is needed at this time.    Patient's water retention has increased again, she is communicating with cardiology and nephrology regarding adjustments to her diuretics.  She is supposed hear back from her cardiologist again this afternoon.  I discussed with patient that the fluid in her legs is not causing the heart failure yet is a result of the heart failure and probably years of dependent edema.  Home health is coming but they do not want to wrap her legs as they do not want to cause increased fluid in her lungs.       - - -

## 2025-06-09 ENCOUNTER — TELEPHONE (OUTPATIENT)
Dept: ONCOLOGY | Facility: CLINIC | Age: 75
End: 2025-06-09
Payer: MEDICARE

## 2025-06-09 NOTE — TELEPHONE ENCOUNTER
"  Caller: Joanna Cross \"SIXTO\"    Relationship: Self    Best call back number: [phone]    What is the best time to reach you: ANY    Who are you requesting to speak with (clinical staff, provider,  specific staff member): CLINICAL     What was the call regarding: SIXTO IS CALLING TO SPEAK TO THE NURSE REGARDING THE INJECTION SHE RECEIVED TODAY AND SOME APPOINTMENTS       "

## 2025-06-09 NOTE — TELEPHONE ENCOUNTER
Called patient back, she states that she was inpatient at [facility] last week with chest pain, had a stress test and a heart cath and had to have a stent replaced. She was discharged to [facility] in [place] for Rehab. She was given retacrit this morning based on her Hgb of 8.8 at [facility] on Friday (6/6/25) and it is ordered once weekly by nephrology while she is there. She is scheduled to see Denise next week and is going to keep that appt as long as she gets discharged, if she does not get discharged she will call back to reschedule.

## 2025-06-17 ENCOUNTER — TELEPHONE (OUTPATIENT)
Dept: ONCOLOGY | Facility: CLINIC | Age: 75
End: 2025-06-17

## 2025-06-17 NOTE — TELEPHONE ENCOUNTER
"  Caller: Joanna Cross \"SIXTO\"    Relationship: Self    Best call back number: [phone]    What is the best time to reach you: ANYTIME    Who are you requesting to speak with (clinical staff, provider,  specific staff member): NURSE        What was the call regarding:     NEEDING TO SPEAK WITH ABOUT HER HEMOGLOBIN     Is it okay if the provider responds through MyChart: NO    "

## 2025-06-17 NOTE — TELEPHONE ENCOUNTER
Called patient back, she states she is still in rehab at [facility] in [place]. She reports that the last time they checked her Hgb it was down to 8.2 and she has had one retacrit shot since she has been there. She will not be at her appt today and she requests it be cancelled and she will call back to reschedule once she is discharged from rehab. RN instructed her to discuss the retacrit order with her nurse at rehab to see if they still have that ordered for her. She verbalized understanding.

## 2025-06-17 NOTE — TELEPHONE ENCOUNTER
"Caller: Joanna Cross \"SIXTO\"    Relationship:  Self    Best call back number:   Telephone Information:   Mobile [phone]         PATIENT CALLED REQUESTING TO CANCEL SAME DAY APPT. AND WILL CALL BACK LATER TO RESCHEDULE  IS CURRENTLY IN A REHAB     Did the patient call AFTER the start time of their scheduled appointment?  []YES  [x]NO    Was the patient's appointment rescheduled? []YES  [x]NO      "

## 2025-06-18 ENCOUNTER — PATIENT OUTREACH (OUTPATIENT)
Dept: CASE MANAGEMENT | Facility: OTHER | Age: 75
End: 2025-06-18
Payer: MEDICARE

## 2025-06-18 ENCOUNTER — TELEPHONE (OUTPATIENT)
Dept: ONCOLOGY | Facility: CLINIC | Age: 75
End: 2025-06-18

## 2025-06-18 DIAGNOSIS — G20.B2 PARKINSON'S DISEASE WITH DYSKINESIA AND FLUCTUATING MANIFESTATIONS: ICD-10-CM

## 2025-06-18 DIAGNOSIS — I25.10 CORONARY ARTERY DISEASE INVOLVING NATIVE CORONARY ARTERY OF NATIVE HEART WITHOUT ANGINA PECTORIS: Primary | ICD-10-CM

## 2025-06-18 DIAGNOSIS — I10 HYPERTENSION, ESSENTIAL: ICD-10-CM

## 2025-06-18 NOTE — TELEPHONE ENCOUNTER
Called and notified Silvana that we would not be able to order the procrit to be given in rehab since Dr. Linton does not have privileges there. She verbalized understanding and asked if patient could be scheduled for a follow up with Dr. Linton next Monday Thursday or Friday. Informed her this message would be forwarded to scheduling to see what could be coordinated.

## 2025-06-18 NOTE — TELEPHONE ENCOUNTER
Caller: Misha Cross(POA)    Relationship: Emergency Contact    Best call back number: [phone]    What is the best time to reach you: ANYTIME    Who are you requesting to speak with (clinical staff, provider,  specific staff member): CLINICAL    What was the call regarding: MISHA WANTED TO KNOW IF DR GALICIA COULD PROVIDE AN ORDER FOR PATIENT'S NURSING FACILITY REGARDING HER PROCRIT. THEY WILL NOT GIVE HER THE SHOT UNTIL SHE IS BELOW 7 AND SHE STATED OUR OFFICE WILL GIVE HER THE PROCRIT EVEN AS HIGH AS 10.    PLEASE CALL TO DISCUSS FURTHER.

## 2025-06-19 NOTE — OUTREACH NOTE
"AMBULATORY CASE MANAGEMENT NOTE    Names and Relationships of Patient/Support Persons: Contact: Joanna Cross \"SIXTO\"; Relationship: Self -     CCM Interim Update    Spoke with Ms. Cross for CCM update. She was admitted to [facility] 5/30. While at the hospital she had heart cath and found to have blockage in her previous stent. They placed a new larger stent. Since then she states she has not been having further chest pain. She was then admitted to [facility] 6/15-6/16. She was discharged to [facility] which is where she is now. She does hope to discharge to home but is unsure when this will be.         Education Documentation  No documentation found.        Faye JAUREGUI  Ambulatory Case Management    6/19/2025, 09:28 EDT  "

## 2025-06-20 ENCOUNTER — APPOINTMENT (OUTPATIENT)
Dept: GENERAL RADIOLOGY | Facility: HOSPITAL | Age: 75
End: 2025-06-20
Payer: MEDICARE

## 2025-06-20 ENCOUNTER — APPOINTMENT (OUTPATIENT)
Dept: ULTRASOUND IMAGING | Facility: HOSPITAL | Age: 75
End: 2025-06-20
Payer: MEDICARE

## 2025-06-20 ENCOUNTER — HOSPITAL ENCOUNTER (EMERGENCY)
Facility: HOSPITAL | Age: 75
Discharge: HOME | End: 2025-06-20
Attending: EMERGENCY MEDICINE
Payer: MEDICARE

## 2025-06-20 ENCOUNTER — APPOINTMENT (OUTPATIENT)
Dept: CT IMAGING | Facility: HOSPITAL | Age: 75
End: 2025-06-20
Payer: MEDICARE

## 2025-06-20 VITALS
SYSTOLIC BLOOD PRESSURE: 127 MMHG | TEMPERATURE: 98 F | HEIGHT: 62 IN | HEART RATE: 71 BPM | DIASTOLIC BLOOD PRESSURE: 69 MMHG | RESPIRATION RATE: 18 BRPM | WEIGHT: 213 LBS | BODY MASS INDEX: 39.2 KG/M2 | OXYGEN SATURATION: 98 %

## 2025-06-20 DIAGNOSIS — E87.6 HYPOKALEMIA: ICD-10-CM

## 2025-06-20 DIAGNOSIS — N93.9 VAGINAL BLEEDING: Primary | ICD-10-CM

## 2025-06-20 DIAGNOSIS — D64.9 ANEMIA, UNSPECIFIED TYPE: ICD-10-CM

## 2025-06-20 LAB
ABO GROUP BLD: NORMAL
ALBUMIN SERPL-MCNC: 4.3 G/DL (ref 3.5–5.2)
ALBUMIN/GLOB SERPL: 1.8 G/DL
ALP SERPL-CCNC: 63 U/L (ref 39–117)
ALT SERPL W P-5'-P-CCNC: 9 U/L (ref 1–33)
ANION GAP SERPL CALCULATED.3IONS-SCNC: 11.4 MMOL/L (ref 5–15)
AST SERPL-CCNC: 33 U/L (ref 1–32)
BASOPHILS # BLD AUTO: 0.06 10*3/MM3 (ref 0–0.2)
BASOPHILS NFR BLD AUTO: 0.9 % (ref 0–1.5)
BILIRUB SERPL-MCNC: 0.4 MG/DL (ref 0–1.2)
BILIRUB UR QL STRIP: NEGATIVE
BLD GP AB SCN SERPL QL: NEGATIVE
BUN SERPL-MCNC: 20.1 MG/DL (ref 8–23)
BUN/CREAT SERPL: 14 (ref 7–25)
CALCIUM SPEC-SCNC: 9.2 MG/DL (ref 8.6–10.5)
CHLORIDE SERPL-SCNC: 97 MMOL/L (ref 98–107)
CLARITY UR: CLEAR
CO2 SERPL-SCNC: 27.6 MMOL/L (ref 22–29)
COLOR UR: YELLOW
CREAT BLDA-MCNC: 1.5 MG/DL (ref 0.6–1.3)
CREAT SERPL-MCNC: 1.44 MG/DL (ref 0.57–1)
DEPRECATED RDW RBC AUTO: 56.5 FL (ref 37–54)
EGFRCR SERPLBLD CKD-EPI 2021: 37.8 ML/MIN/1.73
EOSINOPHIL # BLD AUTO: 0.9 10*3/MM3 (ref 0–0.4)
EOSINOPHIL NFR BLD AUTO: 13.5 % (ref 0.3–6.2)
ERYTHROCYTE [DISTWIDTH] IN BLOOD BY AUTOMATED COUNT: 15.4 % (ref 12.3–15.4)
GLOBULIN UR ELPH-MCNC: 2.4 GM/DL
GLUCOSE SERPL-MCNC: 102 MG/DL (ref 65–99)
GLUCOSE UR STRIP-MCNC: ABNORMAL MG/DL
HCT VFR BLD AUTO: 31 % (ref 34–46.6)
HGB BLD-MCNC: 9.9 G/DL (ref 12–15.9)
HGB UR QL STRIP.AUTO: NEGATIVE
IMM GRANULOCYTES # BLD AUTO: 0.05 10*3/MM3 (ref 0–0.05)
IMM GRANULOCYTES NFR BLD AUTO: 0.7 % (ref 0–0.5)
KETONES UR QL STRIP: NEGATIVE
LEUKOCYTE ESTERASE UR QL STRIP.AUTO: NEGATIVE
LYMPHOCYTES # BLD AUTO: 0.69 10*3/MM3 (ref 0.7–3.1)
LYMPHOCYTES NFR BLD AUTO: 10.3 % (ref 19.6–45.3)
MAGNESIUM SERPL-MCNC: 2 MG/DL (ref 1.6–2.4)
MCH RBC QN AUTO: 32 PG (ref 26.6–33)
MCHC RBC AUTO-ENTMCNC: 31.9 G/DL (ref 31.5–35.7)
MCV RBC AUTO: 100.3 FL (ref 79–97)
MONOCYTES # BLD AUTO: 0.64 10*3/MM3 (ref 0.1–0.9)
MONOCYTES NFR BLD AUTO: 9.6 % (ref 5–12)
NEUTROPHILS NFR BLD AUTO: 4.35 10*3/MM3 (ref 1.7–7)
NEUTROPHILS NFR BLD AUTO: 65 % (ref 42.7–76)
NITRITE UR QL STRIP: NEGATIVE
NRBC BLD AUTO-RTO: 0 /100 WBC (ref 0–0.2)
PH UR STRIP.AUTO: 7.5 [PH] (ref 5–8)
PLATELET # BLD AUTO: 181 10*3/MM3 (ref 140–450)
PMV BLD AUTO: 9.4 FL (ref 6–12)
POTASSIUM SERPL-SCNC: 3.2 MMOL/L (ref 3.5–5.2)
PROT SERPL-MCNC: 6.7 G/DL (ref 6–8.5)
PROT UR QL STRIP: NEGATIVE
QT INTERVAL: 484 MS
QTC INTERVAL: 514 MS
RBC # BLD AUTO: 3.09 10*6/MM3 (ref 3.77–5.28)
RH BLD: POSITIVE
SODIUM SERPL-SCNC: 136 MMOL/L (ref 136–145)
SP GR UR STRIP: 1.01 (ref 1–1.03)
T&S EXPIRATION DATE: NORMAL
UROBILINOGEN UR QL STRIP: ABNORMAL
WBC NRBC COR # BLD AUTO: 6.69 10*3/MM3 (ref 3.4–10.8)

## 2025-06-20 PROCEDURE — P9612 CATHETERIZE FOR URINE SPEC: HCPCS

## 2025-06-20 PROCEDURE — 74177 CT ABD & PELVIS W/CONTRAST: CPT

## 2025-06-20 PROCEDURE — 85025 COMPLETE CBC W/AUTO DIFF WBC: CPT | Performed by: EMERGENCY MEDICINE

## 2025-06-20 PROCEDURE — 82565 ASSAY OF CREATININE: CPT

## 2025-06-20 PROCEDURE — 83735 ASSAY OF MAGNESIUM: CPT | Performed by: EMERGENCY MEDICINE

## 2025-06-20 PROCEDURE — 25510000001 IOPAMIDOL 61 % SOLUTION: Performed by: EMERGENCY MEDICINE

## 2025-06-20 PROCEDURE — 93005 ELECTROCARDIOGRAM TRACING: CPT | Performed by: EMERGENCY MEDICINE

## 2025-06-20 PROCEDURE — 25810000003 SODIUM CHLORIDE 0.9 % SOLUTION: Performed by: EMERGENCY MEDICINE

## 2025-06-20 PROCEDURE — 86850 RBC ANTIBODY SCREEN: CPT | Performed by: EMERGENCY MEDICINE

## 2025-06-20 PROCEDURE — 80053 COMPREHEN METABOLIC PANEL: CPT | Performed by: EMERGENCY MEDICINE

## 2025-06-20 PROCEDURE — 99285 EMERGENCY DEPT VISIT HI MDM: CPT

## 2025-06-20 PROCEDURE — 71045 X-RAY EXAM CHEST 1 VIEW: CPT

## 2025-06-20 PROCEDURE — 81003 URINALYSIS AUTO W/O SCOPE: CPT | Performed by: EMERGENCY MEDICINE

## 2025-06-20 PROCEDURE — 86900 BLOOD TYPING SEROLOGIC ABO: CPT | Performed by: EMERGENCY MEDICINE

## 2025-06-20 PROCEDURE — 36415 COLL VENOUS BLD VENIPUNCTURE: CPT

## 2025-06-20 PROCEDURE — 86901 BLOOD TYPING SEROLOGIC RH(D): CPT | Performed by: EMERGENCY MEDICINE

## 2025-06-20 PROCEDURE — 76830 TRANSVAGINAL US NON-OB: CPT

## 2025-06-20 RX ORDER — SODIUM CHLORIDE 0.9 % (FLUSH) 0.9 %
10 SYRINGE (ML) INJECTION AS NEEDED
Status: DISCONTINUED | OUTPATIENT
Start: 2025-06-20 | End: 2025-06-20 | Stop reason: HOSPADM

## 2025-06-20 RX ORDER — POTASSIUM CHLORIDE 750 MG/1
10 TABLET, EXTENDED RELEASE ORAL 2 TIMES DAILY
Qty: 6 TABLET | Refills: 0 | Status: SHIPPED | OUTPATIENT
Start: 2025-06-20 | End: 2025-06-23

## 2025-06-20 RX ORDER — POTASSIUM CHLORIDE 1.5 G/1.58G
40 POWDER, FOR SOLUTION ORAL ONCE
Status: COMPLETED | OUTPATIENT
Start: 2025-06-20 | End: 2025-06-20

## 2025-06-20 RX ORDER — IOPAMIDOL 612 MG/ML
85 INJECTION, SOLUTION INTRAVASCULAR
Status: COMPLETED | OUTPATIENT
Start: 2025-06-20 | End: 2025-06-20

## 2025-06-20 RX ADMIN — IOPAMIDOL 85 ML: 612 INJECTION, SOLUTION INTRAVENOUS at 13:54

## 2025-06-20 RX ADMIN — SODIUM CHLORIDE 500 ML: 9 INJECTION, SOLUTION INTRAVENOUS at 13:07

## 2025-06-20 RX ADMIN — POTASSIUM CHLORIDE 40 MEQ: 1.5 FOR SOLUTION ORAL at 13:05

## 2025-06-20 NOTE — ED PROVIDER NOTES
Subjective   History of Present Illness  74-year-old female presents for evaluation of vaginal bleeding.  The patient presents via EMS from her nursing home facility.  For the past 2 to 3 days the patient notes that she has had persistent vaginal bleeding.  The bleeding is painless in nature.  She denies any accompanying abdominal pain.  She is anticoagulated with Eliquis.  Given her ongoing bleeding, EMS was called today and she was brought here to our facility to be evaluated.  Additionally, per EMS personnel she has a pacemaker and they noted that it did not seem to be capturing on the way here and that her heart rate dropped as low as 40.      Review of Systems   Genitourinary:  Positive for vaginal bleeding.   All other systems reviewed and are negative.      Past Medical History:   Diagnosis Date    Allergic     Allergic rhinitis     Anemia     Ankle problem     thinks back related causing pain     Anxiety     Asthma     Atrial fibrillation     AVM (arteriovenous malformation)     Back pain     Cataract 2015    CHF (congestive heart failure) 06/02/2024    Probably had before then but no one actually told me I had it.    Cholelithiasis 09/07/01    Gallbladder Removed    Chronic kidney disease     stage 3 per pt    Chronic lung disease 04/13/2022    CKD (chronic kidney disease)     Clotting disorder 2016    AVM - small intestine    Colon polyp 09/15/16    Coronary artery disease involving native coronary artery without angina pectoris 03/01/2017    COVID-19 vaccine series completed     Cystic fibrosis     Diastolic dysfunction     Gastrocnemius muscle tear     left medial 91    Generalized osteoarthritis     GERD (gastroesophageal reflux disease)     Gestational diabetes     GIB (gastrointestinal bleeding) 2016    d/t xarelto     Headache     Hearing decreased, bilateral     HAS HEARING AID, NOT WEARING IT CURRENTLY    Hiatal hernia     History of medical problems 04/23/82    Lichens Sclerosis    History of  "shingles     History of transfusion 2016    no reaction recalled     HL (hearing loss) 2019    Got hearing aids    Hyperlipidemia LDL goal <70 03/01/2017    Hypertension     Hyperthyroidism     Hypothyroidism     IBS (irritable bowel syndrome)     Inflammatory bowel disease     Klebsiella pneumonia     Lichen sclerosus     Lupus     subQ    Mouth problem     mouth guard used since pt bites tongue and lips excessively at night if not- with bipap     MRSA infection 2018    Myocardial infarction     Obesity     On home oxygen therapy     2L of oxygen all the time due to current congestion     Osteopenia 2016    Osteoporosis     Parkinson's disease     Peripheral vascular disease     Pleurisy     Pneumonia     Puerperal sepsis with acute hypoxic respiratory failure     emergent intubation- 2016    Pulmonary embolism     Renal insufficiency     Right leg pain     from back issues     Salivary gland stone     Sciatic nerve pain     Seborrheic dermatitis     Skin cancer     on back     Sleep apnea     CPAP AND HOME O2 2L/M    Sleep apnea, obstructive 04/15/01    TIA (transient ischemic attack) 2014    no residual effects    Tremor     Type 2 diabetes mellitus     UTI (urinary tract infection)     Visual impairment     Vitamin D deficiency 09/08/2022    Wears glasses        Allergies   Allergen Reactions    Amlodipine Besylate Swelling     Lower extremity (ankles, feet) swelling    Entacapone Other (See Comments)     \"extreme weakness in legs - caused several falls, which stopped after discontinuing this medication\"    Epinephrine Other (See Comments)     6/4/16- had 3 shots to numb mouth to prepare teeth for crowns, the shots contained epi-  Caused pt to have chest discomfort- went to hospital in ambulance, discovered had a fib while there     Hydrocodone Unknown - High Severity     Headache, nausea, dizziness, & vomiting    Levemir [Insulin Detemir] Hives     Hives / rash around injection site    Penicillins Hives     " "Jitteriness     Xarelto [Rivaroxaban] GI Bleeding     hgb dropped to 5.2    Aricept [Donepezil Hcl] Nausea Only     Vivid dreams    Benztropine Mesylate Other (See Comments)     Uncontrollable body movements    Cogentin [Benztropine] Other (See Comments)     \"uncontrollable body movements\"    Compazine [Prochlorperazine Edisylate] Other (See Comments)     Dystonic reaction    Duraprep [Antiseptic Products, Misc.] Itching and Rash     RASH AND ITCHING    Haldol [Haloperidol Lactate] Other (See Comments)     Dystonic reaction    Hydralazine Other (See Comments)     Headache     Lisinopril Cough    Statins Myalgia     Leg pain- all statins     Sulfamethoxazole Nausea Only and Other (See Comments)     Nausea & headaches    Tarka [Trandolapril-Verapamil Hcl Er] Other (See Comments)     Constipation     Toprol Xl [Metoprolol Tartrate] Other (See Comments)     Extreme fatigue, decreased exercise tolerance    Trimethoprim Other (See Comments)     Other reaction(s): Nausea       Past Surgical History:   Procedure Laterality Date    ABLATION OF DYSRHYTHMIC FOCUS  05/16/13    Laser Ablation - Rt Leg    BACK SURGERY      l4-l5 laminectomy     BICEPS TENDON REPAIR Right     shoulder    BREAST BIOPSY Left 05/2004    excisional, benign    BRONCHOSCOPY RIGID / FLEXIBLE      2016    BUNIONECTOMY Right     CARDIAC CATHETERIZATION      CARDIAC CATHETERIZATION N/A 02/01/2019    Procedure: Left Heart Cath;  Surgeon: Albertina Corona MD;  Location:  [facility];  Service: Cardiology    CARDIAC ELECTROPHYSIOLOGY PROCEDURE N/A 01/26/2024    Procedure: Lead Revision RA or RV, PPM or ICD;  Surgeon: Lauro Francois MD;  Location:  [facility];  Service: Cardiovascular;  Laterality: N/A;    CARDIAC SURGERY      CATARACT EXTRACTION, BILATERAL  06/26/2024    (R) eye   08/16/2024 (L) eye    CHOLECYSTECTOMY      COLON SURGERY      COLONOSCOPY  2016    COLONOSCOPY N/A 06/17/2021    Procedure: COLONOSCOPY WITH " POLYPECTOMY;  Surgeon: Trenton Sosa MD;  Location:  [facility];  Service: Gastroenterology;  Laterality: N/A;    CORONARY STENT PLACEMENT      x1 stent    CYST REMOVAL      left ear, upper left back 2001    CYSTOSCOPY      x2  18 and 20 -   in 20 urethra dilation     DIAGNOSTIC LAPAROSCOPY  1981    ENDOSCOPIC FUNCTIONAL SINUS SURGERY (FESS)  2011    ENDOSCOPY  2016    ENTEROSCOPY VIA STOMA      with single ballon with fluoro     EYE SURGERY  7/26 & 8/16/24    Cataracts removed from each eye.    HAMMER TOE REPAIR Left     INCISION AND DRAINAGE OF WOUND  2018    back with wound infection     INSERT / REPLACE / REMOVE PACEMAKER  11/10/16    [facility]    JOINT REPLACEMENT      KNEE ARTHROSCOPY      LASER ABLATION      right leg 13    LIPOMA EXCISION  1999    left leg     LUMBAR LAMINECTOMY DISCECTOMY DECOMPRESSION N/A 07/06/2018    Procedure: LUMBAR LAMINECTOMY L4-5, HEMILAMIINECTOMY RIGHT L5-S1, FORAMINOTOMY L5-S1;  Surgeon: Lencho Gamino MD;  Location:  [facility];  Service: Neurosurgery    LUMBAR LAMINECTOMY DISCECTOMY DECOMPRESSION N/A 09/19/2018    Procedure: INCISION AND DRAINAGE BACK WITH WOUND EXPLORATION;  Surgeon: Ritchie García MD;  Location:  [facility];  Service: Neurosurgery    LUNG BIOPSY Left 2016    OTHER SURGICAL HISTORY      ct scan of chest and sinuses and lower back     OTHER SURGICAL HISTORY      various echos     OTHER SURGICAL HISTORY      electroencephalogram 16,   emg  ncv tests 2007    OTHER SURGICAL HISTORY      mra 2007  and various mri with xrays, nuclear medicine lung ventilation with perfusion test 2016 with pft     OTHER SURGICAL HISTORY      barium swallow testing 15, 12, 12    OTHER SURGICAL HISTORY      vaginal ultrasound- 2012,   vas clementina lower extrem 2016, vas venous duplex lower extrem bilat 2019    OTHER SURGICAL HISTORY      wdge biopsy spring of lung     OTHER SURGICAL HISTORY      emergent intubation- hypoxic resp failure     OTHER SURGICAL HISTORY       2024 ppm generator change and lead revision per Dr. Francois    PACEMAKER IMPLANTATION  2016    sss    REPLACEMENT TOTAL KNEE Bilateral     left knee , right 2012 per dr acuna     REPLACEMENT TOTAL KNEE Bilateral     SHOULDER ARTHROSCOPY Bilateral     -left, - right     SKIN BIOPSY      skin cancer back 2016    SKIN CANCER EXCISION      upper rig tback     SPINE SURGERY  18    SUBTOTAL HYSTERECTOMY      MADHAV      TEETH EXTRACTION      x2    TOTAL SHOULDER ARTHROPLASTY W/ DISTAL CLAVICLE EXCISION Left 10/24/2022    Procedure: REVERSE TOTAL SHOULDER ARTHROPLASTY, BICEPS TENODESIS - LEFT;  Surgeon: Sammy Yo MD;  Location:  [facility];  Service: Orthopedics;  Laterality: Left;    TOTAL SHOULDER ARTHROPLASTY W/ DISTAL CLAVICLE EXCISION Right 2023    Procedure: REVERSE TOTAL SHOULDER  ARTHROPLASTY WITH BICEPS TENODESIS - RIGHT;  Surgeon: Sammy Yo MD;  Location:  [facility];  Service: Orthopedics;  Laterality: Right;    TUBAL ABDOMINAL LIGATION Bilateral     WISDOM TOOTH EXTRACTION         Family History   Problem Relation Age of Onset    Kidney disease Mother     Coronary artery disease Mother     Hypertension Mother             Heart disease Mother             Hyperlipidemia Mother             Arthritis Mother     Depression Mother     Anxiety disorder Mother     Coronary artery disease Father     Hypertension Father             Hypothyroidism Father     Cancer Father         Oral cancer    Heart disease Father             Thyroid disease Father     Vision loss Father         Macular Degeneration    Coronary artery disease Brother     Hypertension Brother     Heart disease Brother         Multiple stents, by-pass surgery    Testicular cancer Brother     Kidney cancer Maternal Uncle     Testicular cancer Maternal Uncle     Anxiety disorder Son     Colon polyps Neg Hx     Colon cancer Neg Hx        Social History      Socioeconomic History    Marital status:      Spouse name: N/A    Number of children: 4    Years of education: College    Highest education level: Master's degree (e.g., MA, MS, Randi, MEd, MSW, NELLA)   Tobacco Use    Smoking status: Never     Passive exposure: Past    Smokeless tobacco: Never    Tobacco comments:     Father and mother smoked for several years.   Vaping Use    Vaping status: Never Used    Passive vaping exposure: Yes   Substance and Sexual Activity    Alcohol use: Never    Drug use: Never    Sexual activity: Not Currently     Partners: Male     Birth control/protection: Post-menopausal, Tubal ligation, Vaginal insert contraception           Objective   Physical Exam  Vitals and nursing note reviewed.   Constitutional:       General: She is not in acute distress.     Appearance: Normal appearance. She is well-developed. She is not diaphoretic.      Comments: Nontoxic-appearing elderly female   HENT:      Head: Normocephalic and atraumatic.      Comments: No mucous membrane lesions  Eyes:      Pupils: Pupils are equal, round, and reactive to light.   Cardiovascular:      Rate and Rhythm: Normal rate and regular rhythm.      Heart sounds: Normal heart sounds. No murmur heard.     No friction rub. No gallop.   Pulmonary:      Effort: Pulmonary effort is normal. No respiratory distress.      Breath sounds: Normal breath sounds. No wheezing or rales.   Abdominal:      General: Bowel sounds are normal. There is no distension.      Palpations: Abdomen is soft. There is no mass.      Tenderness: There is no abdominal tenderness. There is no guarding or rebound.      Comments: No focal abdominal tenderness, no peritoneal signs, no pain out of proportion to exam   Genitourinary:     Comments: On visual inspection, there is mild oozing from vagina of blood on visual inspection externally, no active arterial bleeding, no rash  Musculoskeletal:         General: No signs of injury.      Cervical back:  Neck supple.   Skin:     General: Skin is warm and dry.      Findings: No erythema or rash.   Neurological:      Mental Status: She is alert and oriented to person, place, and time.   Psychiatric:         Mood and Affect: Mood normal.         Thought Content: Thought content normal.         Judgment: Judgment normal.         Procedures           ED Course  ED Course as of 06/20/25 1857 Fri Jun 20, 2025   1230 74-year-old female presents for evaluation of vaginal bleeding.  She presents via EMS from her nursing home facility.  For the past 2 to 3 days the patient notes that she has had persistent vaginal bleeding.  It is painless.  No abdominal pain.  She is anticoagulated with Eliquis.  Given her ongoing bleeding, EMS was called today and she was brought to our facility to be evaluated.  Per EMS, her pacemaker did not seem to be capturing on the way here and they noted that she was bradycardic with a heart rate of 40.  On arrival to the ED, the patient is nontoxic-appearing.  Nonsurgical abdomen.  On exam, the patient has blood oozing from vagina on external inspection but no active arterial bleeding noted.  Differential diagnosis is quite broad.  We will obtain labs and imaging and will reassess following initial interventions. [DD]   1231 Labs interpreted independently by me remarkable for hemoglobin of 9.9 and potassium of 3.2.  Oral potassium replacement initiated. [DD]   1231 Review of prior records reveals that the patient's hemoglobin was 10.7 2 months ago. [DD]   1234 EKG interpreted independently by me revealed atrial fibrillation with frequent paced complexes and a heart rate of 68.  Vital signs here in the emergency department are reassuring. [DD]   1433 I personally and independently reviewed the patient's CT images and findings, and I am in agreement with the radiologist regarding CT interpretation--particularly there is no emergent/surgical intra-abdominal process noted. [DD]   1726 I personally and  independently reviewed the patient's US images and findings, and I am in agreement with the radiologist regarding US interpretation--particularly regarding thickened endometrium. [DD]   1727 After reviewing the patient's labs and imaging, discussed the patient's case with Dr. Schreiber of OB/GYN.  Given the patient's reassuring workup and overall clinical picture she feels that the patient be safely discharged and managed on a prompt outpatient basis.  She will see the patient in the office next week for further evaluation and treatment.  The patient is hemodynamically stable at this time and is aware/agreeable with the plan.  Prescription for oral potassium replacement over the next 3 days before following up with Dr. Schreiber as directed [DD]   1728 She will continue her Eliquis for the time being and will continue to closely monitor her bleeding and count pads. [DD]   1728 . [DD]      ED Course User Index  [DD] Bong Srinivasan MD                                             Recent Results (from the past 24 hours)   Comprehensive Metabolic Panel    Collection Time: 06/20/25 11:57 AM    Specimen: Blood   Result Value Ref Range    Glucose 102 (H) 65 - 99 mg/dL    BUN 20.1 8.0 - 23.0 mg/dL    Creatinine 1.44 (H) 0.57 - 1.00 mg/dL    Sodium 136 136 - 145 mmol/L    Potassium 3.2 (L) 3.5 - 5.2 mmol/L    Chloride 97 (L) 98 - 107 mmol/L    CO2 27.6 22.0 - 29.0 mmol/L    Calcium 9.2 8.6 - 10.5 mg/dL    Total Protein 6.7 6.0 - 8.5 g/dL    Albumin 4.3 3.5 - 5.2 g/dL    ALT (SGPT) 9 1 - 33 U/L    AST (SGOT) 33 (H) 1 - 32 U/L    Alkaline Phosphatase 63 39 - 117 U/L    Total Bilirubin 0.4 0.0 - 1.2 mg/dL    Globulin 2.4 gm/dL    A/G Ratio 1.8 g/dL    BUN/Creatinine Ratio 14.0 7.0 - 25.0    Anion Gap 11.4 5.0 - 15.0 mmol/L    eGFR 37.8 (L) >60.0 mL/min/1.73   CBC Auto Differential    Collection Time: 06/20/25 11:57 AM    Specimen: Blood   Result Value Ref Range    WBC 6.69 3.40 - 10.80 10*3/mm3    RBC 3.09 (L) 3.77 - 5.28  10*6/mm3    Hemoglobin 9.9 (L) 12.0 - 15.9 g/dL    Hematocrit 31.0 (L) 34.0 - 46.6 %    .3 (H) 79.0 - 97.0 fL    MCH 32.0 26.6 - 33.0 pg    MCHC 31.9 31.5 - 35.7 g/dL    RDW 15.4 12.3 - 15.4 %    RDW-SD 56.5 (H) 37.0 - 54.0 fl    MPV 9.4 6.0 - 12.0 fL    Platelets 181 140 - 450 10*3/mm3    Neutrophil % 65.0 42.7 - 76.0 %    Lymphocyte % 10.3 (L) 19.6 - 45.3 %    Monocyte % 9.6 5.0 - 12.0 %    Eosinophil % 13.5 (H) 0.3 - 6.2 %    Basophil % 0.9 0.0 - 1.5 %    Immature Grans % 0.7 (H) 0.0 - 0.5 %    Neutrophils, Absolute 4.35 1.70 - 7.00 10*3/mm3    Lymphocytes, Absolute 0.69 (L) 0.70 - 3.10 10*3/mm3    Monocytes, Absolute 0.64 0.10 - 0.90 10*3/mm3    Eosinophils, Absolute 0.90 (H) 0.00 - 0.40 10*3/mm3    Basophils, Absolute 0.06 0.00 - 0.20 10*3/mm3    Immature Grans, Absolute 0.05 0.00 - 0.05 10*3/mm3    nRBC 0.0 0.0 - 0.2 /100 WBC   Magnesium    Collection Time: 06/20/25 11:57 AM    Specimen: Blood   Result Value Ref Range    Magnesium 2.0 1.6 - 2.4 mg/dL   Type & Screen    Collection Time: 06/20/25 12:17 PM    Specimen: Blood   Result Value Ref Range    ABO Type A     RH type Positive     Antibody Screen Negative     T&S Expiration Date 6/23/2025 11:59:59 PM    POC Creatinine    Collection Time: 06/20/25 12:28 PM    Specimen: Blood   Result Value Ref Range    Creatinine 1.50 (H) 0.60 - 1.30 mg/dL   ECG 12 Lead Rhythm Change    Collection Time: 06/20/25 12:31 PM   Result Value Ref Range    QT Interval 484 ms    QTC Interval 514 ms   Urinalysis With Culture If Indicated - Urine, Catheter    Collection Time: 06/20/25  2:25 PM    Specimen: Urine, Catheter   Result Value Ref Range    Color, UA Yellow Yellow, Straw    Appearance, UA Clear Clear    pH, UA 7.5 5.0 - 8.0    Specific Gravity, UA 1.014 1.001 - 1.030    Glucose,  mg/dL (2+) (A) Negative    Ketones, UA Negative Negative    Bilirubin, UA Negative Negative    Blood, UA Negative Negative    Protein, UA Negative Negative    Leuk Esterase, UA Negative  "Negative    Nitrite, UA Negative Negative    Urobilinogen, UA 0.2 E.U./dL 0.2 - 1.0 E.U./dL     Note: In addition to lab results from this visit, the labs listed above may include labs taken at another facility or during a different encounter within the last 24 hours. Please correlate lab times with ED admission and discharge times for further clarification of the services performed during this visit.    US Non-ob Transvaginal   Final Result   Impression:   1.Uterine endometrium is abnormally thickened for a postmenopausal patient with vaginal bleeding. Further evaluation is warranted to exclude neoplasm.   2.Ovaries not well visualized, not unexpected in a postmenopausal patient.            Electronically Signed: Bong Baker MD     6/20/2025 3:35 PM EDT     Workstation ID: [id number]      CT Abdomen Pelvis With Contrast   Final Result   Impression:   There are no ER notes available at time of this dictation.      Clinical indication states \"vaginal bleeding.\" Per ACR appropriate criteria, the most appropriate first imaging study for this clinical indication is pelvic ultrasound, not CT.      The endometrial stripe is poorly assessed by CT, measuring approximately 6 mm, borderline abnormal in a postmenopausal patient with reported vaginal bleeding. Pelvic ultrasound is recommended.      Bibasilar reticular opacities with some superimposed vague and wispy groundglass densities. This could reflect postinfectious scarring with a component of infectious/inflammatory process difficult to entirely exclude. Clinical correlation is required.      Chronic and incidental ancillary findings detailed above.            Electronically Signed: Kyle Velazquez MD     6/20/2025 2:22 PM EDT     Workstation ID: [id number]      XR Chest 1 View   Final Result   Impression:    No evidence of acute cardiopulmonary abnormality or significant change.               Electronically Signed: Baylee Rodríguez MD     6/20/2025 12:18 PM EDT     " Workstation ID: [id number]        Vitals:    06/20/25 1700 06/20/25 1730 06/20/25 1800 06/20/25 1830   BP: 110/96 112/71 98/81 106/59   BP Location:       Patient Position:       Pulse: 67 71 70 68   Resp:       Temp:       SpO2: 92% 94% (!) 88% 100%   Weight:       Height:         Medications   sodium chloride 0.9 % flush 10 mL (has no administration in time range)   potassium chloride (KLOR-CON) packet 40 mEq (40 mEq Oral Given 6/20/25 1305)   sodium chloride 0.9 % bolus 500 mL (0 mL Intravenous Stopped 6/20/25 1337)   iopamidol (ISOVUE-300) 61 % injection 85 mL (85 mL Intravenous Given 6/20/25 1354)     ECG/EMG Results (last 24 hours)       Procedure Component Value Units Date/Time    Telemetry Scan [[id number]] Resulted: 06/20/25 1242     Updated: 06/20/25 1308    ECG 12 Lead Rhythm Change [[id number]] Collected: 06/20/25 1231     Updated: 06/20/25 1656     QT Interval 484 ms      QTC Interval 514 ms     Narrative:      Test Reason : Rhythm Change  Blood Pressure :   */*   mmHG  Vent. Rate :  68 BPM     Atrial Rate :  67 BPM     P-R Int :   * ms          QRS Dur :  92 ms      QT Int : 484 ms       P-R-T Axes :   *  43 120 degrees    QTcB Int : 514 ms    Atrial fibrillation with frequent ventricular-paced complexes  Low voltage QRS  T wave abnormality, consider lateral ischemia  Prolonged QT  Abnormal ECG  When compared with ECG of 16-Sep-2024 10:14,  Vent. rate has decreased by   2 bpm  Confirmed by MD Zarina, Ismael ([id number]) on 6/20/2025 4:56:38 PM    Referred By: EDMD           Confirmed By: Ismael Srinivasan MD          Telemetry Scan   Final Result      ECG 12 Lead Rhythm Change   Final Result   Test Reason : Rhythm Change   Blood Pressure :   */*   mmHG   Vent. Rate :  68 BPM     Atrial Rate :  67 BPM      P-R Int :   * ms          QRS Dur :  92 ms       QT Int : 484 ms       P-R-T Axes :   *  43 120 degrees     QTcB Int : 514 ms      Atrial fibrillation with frequent ventricular-paced complexes   Low voltage QRS   T  wave abnormality, consider lateral ischemia   Prolonged QT   Abnormal ECG   When compared with ECG of 16-Sep-2024 10:14,   Vent. rate has decreased by   2 bpm   Confirmed by MD Zarina, Ismael ([id number]) on 6/20/2025 4:56:38 PM      Referred By: EDMD           Confirmed By: Ismael Srinivasan MD                    Medical Decision Making  Problems Addressed:  Anemia, unspecified type: complicated acute illness or injury  Hypokalemia: complicated acute illness or injury  Vaginal bleeding: complicated acute illness or injury    Amount and/or Complexity of Data Reviewed  Labs: ordered.  Radiology: ordered.  ECG/medicine tests: ordered.    Risk  Prescription drug management.        Final diagnoses:   Vaginal bleeding   Anemia, unspecified type   Hypokalemia       ED Disposition  ED Disposition       ED Disposition   Discharge    Condition   Stable    Comment   --               Elizabeth Schreiber MD  [address] [place] [address]  [phone]    In 1 week           Medication List        Changed      * potassium chloride 10 MEQ CR tablet  Take 1 tablet by mouth 2 (Two) Times a Day for 3 days.  What changed: You were already taking a medication with the same name, and this prescription was added. Make sure you understand how and when to take each.     * potassium chloride 10 MEQ CR capsule  Commonly known as: MICRO-K  What changed: Another medication with the same name was added. Make sure you understand how and when to take each.           * This list has 2 medication(s) that are the same as other medications prescribed for you. Read the directions carefully, and ask your doctor or other care provider to review them with you.                   Where to Get Your Medications        These medications were sent to [facility] - [place], [place] - [address] [place] [address] - [phone] [facility] [phone] [address] [address]      Phone: [phone]   potassium chloride 10 MEQ CR  erlinda Srinivasan, Bong Guevara MD  06/20/25 0077

## 2025-06-25 ENCOUNTER — PATIENT OUTREACH (OUTPATIENT)
Dept: CASE MANAGEMENT | Facility: OTHER | Age: 75
End: 2025-06-25
Payer: MEDICARE

## 2025-06-25 DIAGNOSIS — G20.B2 PARKINSON'S DISEASE WITH DYSKINESIA AND FLUCTUATING MANIFESTATIONS: ICD-10-CM

## 2025-06-25 DIAGNOSIS — I25.10 CORONARY ARTERY DISEASE INVOLVING NATIVE CORONARY ARTERY OF NATIVE HEART WITHOUT ANGINA PECTORIS: Primary | ICD-10-CM

## 2025-06-25 DIAGNOSIS — I10 HYPERTENSION, ESSENTIAL: ICD-10-CM

## 2025-06-25 NOTE — OUTREACH NOTE
AMBULATORY CASE MANAGEMENT NOTE    Names and Relationships of Patient/Support Persons: Contact: Silvana Cross(POA); Relationship: Emergency Contact -     [facility] Interim Update    Spoke with Karina's daughter Silvana. Insurance has denied Karina staying further at [facility]. She will be discharged home tomorrow. Silvana is very concerned about bringing Karina home. Karina requires more care than she is willing to acknowledge. She is supposed to have an assessment at [facility] on Friday and another tour.     Silvana's health is beginning to be affected by caring for her mother. She has diarrhea and feels herself declining. She is very close to losing her job.    In order for her to go to [facility] she needs to be able to sale her home. She would only be able to afford a couple of months if she doesn't sale her home. Silvana is also fearful that Karina will refuse to go. Discussed having an honest conversation and setting limits with her mother.         Education Documentation  No documentation found.        Faye JAUREGUI  Ambulatory Case Management    6/25/2025, 15:52 EDT

## 2025-06-26 ENCOUNTER — HOSPITAL ENCOUNTER (OUTPATIENT)
Dept: ONCOLOGY | Facility: HOSPITAL | Age: 75
Discharge: HOME | End: 2025-06-26
Payer: MEDICARE

## 2025-06-26 ENCOUNTER — OFFICE VISIT (OUTPATIENT)
Age: 75
End: 2025-06-26
Payer: MEDICARE

## 2025-06-26 ENCOUNTER — OFFICE VISIT (OUTPATIENT)
Dept: ONCOLOGY | Facility: CLINIC | Age: 75
End: 2025-06-26
Payer: MEDICARE

## 2025-06-26 VITALS
WEIGHT: 216.6 LBS | BODY MASS INDEX: 39.86 KG/M2 | HEIGHT: 62 IN | HEART RATE: 83 BPM | TEMPERATURE: 98 F | DIASTOLIC BLOOD PRESSURE: 68 MMHG | SYSTOLIC BLOOD PRESSURE: 120 MMHG | OXYGEN SATURATION: 100 %

## 2025-06-26 VITALS
BODY MASS INDEX: 39.75 KG/M2 | OXYGEN SATURATION: 100 % | DIASTOLIC BLOOD PRESSURE: 68 MMHG | RESPIRATION RATE: 20 BRPM | WEIGHT: 216 LBS | HEIGHT: 62 IN | TEMPERATURE: 98 F | HEART RATE: 83 BPM | SYSTOLIC BLOOD PRESSURE: 120 MMHG

## 2025-06-26 DIAGNOSIS — Z00.00 PHYSICAL EXAM: ICD-10-CM

## 2025-06-26 DIAGNOSIS — M25.511 CHRONIC RIGHT SHOULDER PAIN: ICD-10-CM

## 2025-06-26 DIAGNOSIS — Z78.0 POST-MENOPAUSAL: ICD-10-CM

## 2025-06-26 DIAGNOSIS — E11.65 TYPE 2 DIABETES MELLITUS WITH HYPERGLYCEMIA, WITHOUT LONG-TERM CURRENT USE OF INSULIN: ICD-10-CM

## 2025-06-26 DIAGNOSIS — N18.30 ANEMIA ASSOCIATED WITH STAGE 3 CHRONIC RENAL FAILURE: Primary | ICD-10-CM

## 2025-06-26 DIAGNOSIS — J96.11 CHRONIC RESPIRATORY FAILURE WITH HYPOXIA: ICD-10-CM

## 2025-06-26 DIAGNOSIS — D63.1 ANEMIA ASSOCIATED WITH STAGE 3 CHRONIC RENAL FAILURE: Primary | ICD-10-CM

## 2025-06-26 DIAGNOSIS — Z00.00 MEDICARE ANNUAL WELLNESS VISIT, SUBSEQUENT: ICD-10-CM

## 2025-06-26 DIAGNOSIS — I50.31 ACUTE DIASTOLIC CHF (CONGESTIVE HEART FAILURE): ICD-10-CM

## 2025-06-26 DIAGNOSIS — N18.32 CHRONIC KIDNEY DISEASE, STAGE 3B: ICD-10-CM

## 2025-06-26 DIAGNOSIS — Z87.09 HISTORY OF RESPIRATORY FAILURE: ICD-10-CM

## 2025-06-26 DIAGNOSIS — R06.02 SOB (SHORTNESS OF BREATH): Primary | ICD-10-CM

## 2025-06-26 DIAGNOSIS — J96.21 ACUTE ON CHRONIC RESPIRATORY FAILURE WITH HYPOXIA: ICD-10-CM

## 2025-06-26 DIAGNOSIS — E55.9 VITAMIN D DEFICIENCY: ICD-10-CM

## 2025-06-26 DIAGNOSIS — G47.33 OSA TREATED WITH BIPAP: ICD-10-CM

## 2025-06-26 DIAGNOSIS — K21.9 GASTROESOPHAGEAL REFLUX DISEASE WITHOUT ESOPHAGITIS: ICD-10-CM

## 2025-06-26 DIAGNOSIS — Z79.4 TYPE 2 DIABETES MELLITUS WITH HYPERGLYCEMIA, WITH LONG-TERM CURRENT USE OF INSULIN: ICD-10-CM

## 2025-06-26 DIAGNOSIS — E11.65 TYPE 2 DIABETES MELLITUS WITH HYPERGLYCEMIA, WITH LONG-TERM CURRENT USE OF INSULIN: ICD-10-CM

## 2025-06-26 DIAGNOSIS — G89.29 CHRONIC RIGHT SHOULDER PAIN: ICD-10-CM

## 2025-06-26 DIAGNOSIS — E03.9 ACQUIRED HYPOTHYROIDISM: ICD-10-CM

## 2025-06-26 DIAGNOSIS — E53.8 VITAMIN B12 DEFICIENCY: ICD-10-CM

## 2025-06-26 LAB
BASOPHILS # BLD AUTO: 0.01 10*3/MM3 (ref 0–0.2)
BASOPHILS NFR BLD AUTO: 0.2 % (ref 0–1.5)
DEPRECATED RDW RBC AUTO: 54.7 FL (ref 37–54)
EOSINOPHIL # BLD AUTO: 0.74 10*3/MM3 (ref 0–0.4)
EOSINOPHIL NFR BLD AUTO: 12.6 % (ref 0.3–6.2)
ERYTHROCYTE [DISTWIDTH] IN BLOOD BY AUTOMATED COUNT: 14.4 % (ref 12.3–15.4)
HCT VFR BLD AUTO: 27.8 % (ref 34–46.6)
HGB BLD-MCNC: 8.7 G/DL (ref 12–15.9)
IMM GRANULOCYTES # BLD AUTO: 0.02 10*3/MM3 (ref 0–0.05)
IMM GRANULOCYTES NFR BLD AUTO: 0.3 % (ref 0–0.5)
LYMPHOCYTES # BLD AUTO: 0.51 10*3/MM3 (ref 0.7–3.1)
LYMPHOCYTES NFR BLD AUTO: 8.7 % (ref 19.6–45.3)
MCH RBC QN AUTO: 32.3 PG (ref 26.6–33)
MCHC RBC AUTO-ENTMCNC: 31.3 G/DL (ref 31.5–35.7)
MCV RBC AUTO: 103.3 FL (ref 79–97)
MONOCYTES # BLD AUTO: 0.57 10*3/MM3 (ref 0.1–0.9)
MONOCYTES NFR BLD AUTO: 9.7 % (ref 5–12)
NEUTROPHILS NFR BLD AUTO: 4.03 10*3/MM3 (ref 1.7–7)
NEUTROPHILS NFR BLD AUTO: 68.5 % (ref 42.7–76)
PLATELET # BLD AUTO: 153 10*3/MM3 (ref 140–450)
PMV BLD AUTO: 9.3 FL (ref 6–12)
RBC # BLD AUTO: 2.69 10*6/MM3 (ref 3.77–5.28)
WBC NRBC COR # BLD AUTO: 5.88 10*3/MM3 (ref 3.4–10.8)

## 2025-06-26 PROCEDURE — 96372 THER/PROPH/DIAG INJ SC/IM: CPT

## 2025-06-26 PROCEDURE — 85025 COMPLETE CBC W/AUTO DIFF WBC: CPT | Performed by: NURSE PRACTITIONER

## 2025-06-26 PROCEDURE — 25010000002 EPOETIN ALFA-EPBX 40000 UNIT/ML SOLUTION: Performed by: INTERNAL MEDICINE

## 2025-06-26 PROCEDURE — 80053 COMPREHEN METABOLIC PANEL: CPT | Performed by: FAMILY MEDICINE

## 2025-06-26 PROCEDURE — 82306 VITAMIN D 25 HYDROXY: CPT | Performed by: FAMILY MEDICINE

## 2025-06-26 PROCEDURE — 36415 COLL VENOUS BLD VENIPUNCTURE: CPT

## 2025-06-26 PROCEDURE — 84443 ASSAY THYROID STIM HORMONE: CPT | Performed by: FAMILY MEDICINE

## 2025-06-26 PROCEDURE — 80061 LIPID PANEL: CPT | Performed by: FAMILY MEDICINE

## 2025-06-26 PROCEDURE — 83036 HEMOGLOBIN GLYCOSYLATED A1C: CPT | Performed by: FAMILY MEDICINE

## 2025-06-26 PROCEDURE — 82607 VITAMIN B-12: CPT | Performed by: FAMILY MEDICINE

## 2025-06-26 RX ORDER — CLOPIDOGREL BISULFATE 75 MG/1
75 TABLET ORAL
COMMUNITY
Start: 2025-06-06

## 2025-06-26 RX ADMIN — EPOETIN ALFA-EPBX 40000 UNITS: 40000 INJECTION, SOLUTION INTRAVENOUS; SUBCUTANEOUS at 15:54

## 2025-06-26 NOTE — PROGRESS NOTES
CHIEF COMPLAINT: Progressive fatigue    Problem List:  Oncology/Hematology History Overview Note   1.  Anemia since at least 2016 managed with erythropoietin injections by [facility].  With polypectomies from cecum June 2021 Dr. Marino's and capsule endoscopy at [facility] November 2016 showing small bowel AVMs cecal polyps Bj Rivera September 2016 and Dr. Reynaldo Fritz polypectomy 2007.  EGD Dr. Rivera 2012, 2015, 2016 with dilation of esophagus.  [facility] enteroscopy single balloon with fluoroscopy [facility] with 1 small nonbleeding AVM ablated.  2.  Cardiovascular disease with MI 2015 stent RCA [facility]  3.  Atrial fib managed by [facility] in [place] 2016 with pacemaker   4.  Repetitive falls with possible concussion April 2023 and January 2024  5.  Wedge biopsy left upper lobe Dr. Faisal Lundy October 2016  6.  Eczema  7.  Reflux  8.  Hypertension  9.  Hypothyroidism  10.  Lichen sclerosis on vulvar biopsy 1982  11.  Parkinson's diagnosed 2007   12.  Sleep apnea managed by [facility] pulmonary medicine  13.  Subcutaneous lupus diagnosed [facility] Carole Roy February 2023  14.  2019 back surgeries Dr. Lencho Gamino and wound infection drained by Dr. Alonso in 2018  15.  Urethral dilations with Dr. Terrence Mckenzie  16.  Bilateral total knee replacements Dr. Springer.  17.  CHF    Hematology history timeline:  -10/26/2023 hematology note Dr. Nunez.  Received parenteral iron September 2023 with hemoglobin stable 9.8.  He states this is multifactorial anemia due to iron deficiency suspecting GI blood loss due to history of AVMs and chronic kidney disease with erythropoietin deficiency.  White count and platelets normal.  Plan for continued Procrit per protocol with monitoring of iron indices periodically.  Numerous prior B12 levels normal during 2023.  Patient considering colonoscopy  -12/28/2023 40,000 units Procrit last dose given at [facility] being held for hemoglobin over 10.  -1/22/2024 hemoglobin 11 received IV iron 510 mg 1/22/2024 and 1/31/2024.  -3/6/2024 ferritin 263 with iron normal 115 and iron saturation 40% with normal total iron binding capacity 291.  Creatinine 1.24 GFR 45.  Hemoglobin 8.7 with platelets 132,000.  MCV 96 with normal RDW.    -3/12/2024 [facility] consult: I reviewed her extensive records she brought me that she collated herself and the note from [facility] I summarized above.  She has anemia of renal disease and iron deficiency due to periodic GI losses intolerant of oral iron because she cannot take it with the Sinemet for her Parkinson's that she takes 6 times a day so she gets periodic IV iron.  For now, with her hemoglobin of 8.7 and GFR 45 with normal iron indices we will hop back on the Procrit has per protocol 40,000 units subcu weekly holding for hemoglobin over 10 and she will see my nurse practitioner back in May.  Following that my nurse practitioner will watch her and periodically we will need to check her iron indices as she does have a history of AVMs and may need periodic IV iron as well.  I discussed with her that I would not put her through a bone marrow biopsy despite the mild thrombocytopenia given normochromic normocytic indices and normal Red cell distribution with an unremarkable differential and the fact that, even if I found myelodysplasia, at most what I would do is just Procrit which we are already doing.  She has not had jaundice or icterus or other evidence is to suggest hemolysis and I will not work that up.  In essence we will just continue the care that she was already receiving at [facility].  She has had thorough GI evaluations as outlined above.    -5/8/2024 [facility] hematology clinic follow-up: We are administering Procrit for her anemia of renal disease if her hemoglobin is less than 10.  She has not required Procrit since we saw her  initially on 3/12/2024.  In order to try and simplify things as she is unable to get out without assistance I will change her monitoring to every 2 weeks.  We will check her CBC every 2 weeks and we will get Procrit if her hemoglobin is less than 10.  She had been going to [place] for her Procrit since there is a lab there and they can do it on Monday however she lives here in [place].  She does have a caregiver or her daughter who can get her to the lab therefore we will have her get her labs here locally and we will administer the Procrit in [place] if parameters are met.  If this arrangement does not work out for her then we will get her back to [place] and I discussed with her and her daughter that we also have a clinic in [place] who can see her on the days of her Procrit.  She is following now with Dr. Teague for management of her CHF.  I did ask her daughter to speak with either her PCP or cardiologist about home health for additional management of her CHF.  Her iron studies were normal when checked in March.  Those are built in to assess periodically while on Procrit, next due in June.  Her daughter did state that she thinks her mother received iron while recent inpatient.    -7/24/2024 [facility] hematology clinic follow-up: Karina has been in rehab since we saw her last for weakness and pain in her right hip and lower extremity.  She is now back home and continues to work with home PT.  She has not required Procrit since March.  Currently her hemoglobin is 9.8 therefore we will administer Procrit today. Her creatinine is stable at 1.40, CBC and CMP otherwise unremarkable.  It is difficult for her to come and go with mobility issues.  We will continue to monitor her CBC every 2 weeks and will administer Procrit if her hemoglobin is less than 10.  Since she currently has home health we have arranged to have her CBC checked at home, when she is discharged from home health we will resume checking her  CBC here in our office.  She has not required any parenteral iron for some time, current MCV is 98 with MCHC of 32.2.  We will repeat her iron studies again in October as these are built into her Procrit plan, I will check them sooner if her MCV starts to decrease or she has worsening anemia.    -9/18/2024 [facility] hematology clinic follow-up: Overall her hemoglobin remained stable, on Tuesday it was 10.5. She has not required Procrit since 7/24/2024, we are checking her CBC every 2 weeks as it is inconvenient for her to get out weekly and she has not been requiring Procrit weekly, her hemoglobin in August was 10.8 and 11.3.  I will repeat her CBC today just to recheck and we are drawing her ferritin and iron profile.  If for some reason her hemoglobin is below 10 we will bring her back for Procrit.  If her iron levels are decreasing then we will set her up for parenteral iron.  She has follow-up scheduled with nephrology ongoing.  She is going to see Dr. Teague next week regarding her CHF.  She is feeling better since diuresis in the ED earlier this week.    -11/27/2024 [facility] hematology telemedicine follow-up: Karina has been receiving Procrit on average this year about every 4 months, she was given a Procrit shot in March 2024 again in July and most recently on 11/13/2024. Her current hemoglobin from 11/26/2024 is normal at 12 with hematocrit 36%, WBC is normal at 6.04, platelet count normal at 171,000.  Her iron studies are normal, ferritin is 415.  CMP with BUN of 61 creatinine 1.78 otherwise normal.  She does follow with nephrology and also closely with cardiology who is managing her diuretics.  She feels much better after some changes in her diuretics with now decreased fluids on board.  At this time we will check her CBC monthly rather than every 2 weeks due to the infrequent need of her Procrit and also with current hemoglobin normal.  We will see how she does over the next 3 months.    -2/17/2025 [facility]  hematology clinic follow-up: Karina overall continues to do well on current therapy with Retacrit given when hemoglobin is less than 10.  CBC from today shows hemoglobin of 10.8. She last received Retacrit in November, prior to that it was July, she typically has been receiving Retacrit about every 3-4 months.  We did discuss checking her CBC every 2 weeks rather then monthly but she is pleased with current schedule and it works out also well for her daughter who works and is bringing her to her appointments.  Currently she feels good and has no new symptoms.  We will continue Retacrit every 4 weeks if hemoglobin is less than 10.  Iron studies were checked today and are pending, iron studies in November were normal.  Addendum: Ferritin elevated 588.  Iron normal 71 with saturation normal 21% with normal transferrin and total iron binding capacity.    - 6/15/2025 through 6/16/2025 [facility] admission for dyspnea.  Lower extremity edema.  Was in [place] 1 week prior with stress test showing no need for cardiac cath which subsequently was done where stent was placed for prior clogged stent.  Received diuresis for elevated BNP and troponin.  Ejection fraction 60-65%.  Follow-up with cardiology.  On Eliquis and Plavix for paroxysmal atrial fibrillation and coronary disease.  On Sinemet and pramipexole for Parkinson's.    - 6/20/2025 hemoglobin 9.9 .3 otherwise unremarkable CBC.  Creatinine 1.44 potassium 3.2 chloride 97 AST 33 otherwise unremarkable CMP.    -6/26/2025 [facility] follow-up: She is having vaginal bleeding due to see gynecologist tomorrow.  That may impart explain the dip in her hemoglobin now 8.7 but still with an MCV of 103.  She is only been getting the Procrit once a month and we will try it every other week.  Her daughter has work difficulties that make it hard for her to come more frequently than once a month but she thinks she might be able to do it every 2 weeks.   Will see her back in 8 weeks to see what the trend is and if that is insufficient then we may need to go to weekly and try to figure out transportation for her.     Anemia associated with stage 3 chronic renal failure   3/12/2024 -  Chemotherapy    OP SUPPORTIVE Epoetin  Parker / Epoetin Parker-epbx         HISTORY OF PRESENT ILLNESS:  The patient is a 74 y.o. female, here for follow up on management of multifactorial anemia with progressive fatigue and now vaginal blood loss for the last week    Past Medical History:   Diagnosis Date    Allergic     Allergic rhinitis     Anemia     Ankle problem     thinks back related causing pain     Anxiety     Asthma     Atrial fibrillation     AVM (arteriovenous malformation)     Back pain     Cataract 2015    CHF (congestive heart failure) 06/02/2024    Probably had before then but no one actually told me I had it.    Cholelithiasis 09/07/01    Gallbladder Removed    Chronic kidney disease     stage 3 per pt    Chronic lung disease 04/13/2022    CKD (chronic kidney disease)     Clotting disorder 2016    AVM - small intestine    Colon polyp 09/15/16    Coronary artery disease involving native coronary artery without angina pectoris 03/01/2017    COVID-19 vaccine series completed     Cystic fibrosis     Diastolic dysfunction     Gastrocnemius muscle tear     left medial 91    Generalized osteoarthritis     GERD (gastroesophageal reflux disease)     Gestational diabetes     GIB (gastrointestinal bleeding) 2016    d/t xarelto     Headache     Hearing decreased, bilateral     HAS HEARING AID, NOT WEARING IT CURRENTLY    Hiatal hernia     History of medical problems 04/23/82    Lichens Sclerosis    History of shingles     History of transfusion 2016    no reaction recalled     HL (hearing loss) 2019    Got hearing aids    Hyperlipidemia LDL goal <70 03/01/2017    Hypertension     Hyperthyroidism     Hypothyroidism     IBS (irritable bowel syndrome)     Inflammatory bowel disease      Klebsiella pneumonia     Lichen sclerosus     Lupus     subQ    Mouth problem     mouth guard used since pt bites tongue and lips excessively at night if not- with bipap     MRSA infection 2018    Myocardial infarction     Obesity     On home oxygen therapy     2L of oxygen all the time due to current congestion     Osteopenia 2016    Osteoporosis     Parkinson's disease     Peripheral vascular disease     Pleurisy     Pneumonia     Puerperal sepsis with acute hypoxic respiratory failure     emergent intubation- 2016    Pulmonary embolism     Renal insufficiency     Right leg pain     from back issues     Salivary gland stone     Sciatic nerve pain     Seborrheic dermatitis     Skin cancer     on back     Sleep apnea     CPAP AND HOME O2 2L/M    Sleep apnea, obstructive 04/15/01    TIA (transient ischemic attack) 2014    no residual effects    Tremor     Type 2 diabetes mellitus     UTI (urinary tract infection)     Visual impairment     Vitamin D deficiency 09/08/2022    Wears glasses      Past Surgical History:   Procedure Laterality Date    ABLATION OF DYSRHYTHMIC FOCUS  05/16/13    Laser Ablation - Rt Leg    BACK SURGERY      l4-l5 laminectomy     BICEPS TENDON REPAIR Right     shoulder    BREAST BIOPSY Left 05/2004    excisional, benign    BRONCHOSCOPY RIGID / FLEXIBLE      2016    BUNIONECTOMY Right     CARDIAC CATHETERIZATION      CARDIAC CATHETERIZATION N/A 02/01/2019    Procedure: Left Heart Cath;  Surgeon: Albertina Corona MD;  Location:  [facility];  Service: Cardiology    CARDIAC ELECTROPHYSIOLOGY PROCEDURE N/A 01/26/2024    Procedure: Lead Revision RA or RV, PPM or ICD;  Surgeon: Lauro Francois MD;  Location:  [facility] EP INVASIVE LOCATION;  Service: Cardiovascular;  Laterality: N/A;    CARDIAC SURGERY      CATARACT EXTRACTION, BILATERAL  06/26/2024    (R) eye   08/16/2024 (L) eye    CHOLECYSTECTOMY      COLON SURGERY      COLONOSCOPY  2016    COLONOSCOPY N/A 06/17/2021     Procedure: COLONOSCOPY WITH POLYPECTOMY;  Surgeon: Trenton Sosa MD;  Location:  [facility];  Service: Gastroenterology;  Laterality: N/A;    CORONARY STENT PLACEMENT      x1 stent    CYST REMOVAL      left ear, upper left back 2001    CYSTOSCOPY      x2  18 and 20 -   in 20 urethra dilation     DIAGNOSTIC LAPAROSCOPY  1981    ENDOSCOPIC FUNCTIONAL SINUS SURGERY (FESS)  2011    ENDOSCOPY  2016    ENTEROSCOPY VIA STOMA      with single ballon with fluoro     EYE SURGERY  7/26 & 8/16/24    Cataracts removed from each eye.    HAMMER TOE REPAIR Left     INCISION AND DRAINAGE OF WOUND  2018    back with wound infection     INSERT / REPLACE / REMOVE PACEMAKER  11/10/16    [facility]    JOINT REPLACEMENT      KNEE ARTHROSCOPY      LASER ABLATION      right leg 13    LIPOMA EXCISION  1999    left leg     LUMBAR LAMINECTOMY DISCECTOMY DECOMPRESSION N/A 07/06/2018    Procedure: LUMBAR LAMINECTOMY L4-5, HEMILAMIINECTOMY RIGHT L5-S1, FORAMINOTOMY L5-S1;  Surgeon: Lencho Gamino MD;  Location:  [facility] OR;  Service: Neurosurgery    LUMBAR LAMINECTOMY DISCECTOMY DECOMPRESSION N/A 09/19/2018    Procedure: INCISION AND DRAINAGE BACK WITH WOUND EXPLORATION;  Surgeon: Ritchie García MD;  Location:  [facility] OR;  Service: Neurosurgery    LUNG BIOPSY Left 2016    OTHER SURGICAL HISTORY      ct scan of chest and sinuses and lower back     OTHER SURGICAL HISTORY      various echos     OTHER SURGICAL HISTORY      electroencephalogram 16,   emg  ncv tests 2007    OTHER SURGICAL HISTORY      mra 2007  and various mri with xrays, nuclear medicine lung ventilation with perfusion test 2016 with pft     OTHER SURGICAL HISTORY      barium swallow testing 15, 12, 12    OTHER SURGICAL HISTORY      vaginal ultrasound- 2012,   vas clementina lower extrem 2016, vas venous duplex lower extrem bilat 2019    OTHER SURGICAL HISTORY      wdge biopsy spring of lung     OTHER SURGICAL HISTORY      emergent intubation- hypoxic resp failure   "   OTHER SURGICAL HISTORY      01/26/2024 ppm generator change and lead revision per Dr. Francois    PACEMAKER IMPLANTATION  11/2016    [facility]    REPLACEMENT TOTAL KNEE Bilateral     left knee 2011, right 2012 per dr acuna     REPLACEMENT TOTAL KNEE Bilateral     SHOULDER ARTHROSCOPY Bilateral     2003-left, 2004- right     SKIN BIOPSY      skin cancer back 2016    SKIN CANCER EXCISION      upper righ tback     SPINE SURGERY  07/06/18    SUBTOTAL HYSTERECTOMY      MADHAV      TEETH EXTRACTION      x2    TOTAL SHOULDER ARTHROPLASTY W/ DISTAL CLAVICLE EXCISION Left 10/24/2022    Procedure: REVERSE TOTAL SHOULDER ARTHROPLASTY, BICEPS TENODESIS - LEFT;  Surgeon: Sammy Yo MD;  Location: [facility] OR;  Service: Orthopedics;  Laterality: Left;    TOTAL SHOULDER ARTHROPLASTY W/ DISTAL CLAVICLE EXCISION Right 09/18/2023    Procedure: REVERSE TOTAL SHOULDER  ARTHROPLASTY WITH BICEPS TENODESIS - RIGHT;  Surgeon: Sammy Yo MD;  Location:  [facility] OR;  Service: Orthopedics;  Laterality: Right;    TUBAL ABDOMINAL LIGATION Bilateral 1980    WISDOM TOOTH EXTRACTION         Allergies   Allergen Reactions    Amlodipine Besylate Swelling     Lower extremity (ankles, feet) swelling    Entacapone Other (See Comments)     \"extreme weakness in legs - caused several falls, which stopped after discontinuing this medication\"    Epinephrine Other (See Comments)     6/4/16- had 3 shots to numb mouth to prepare teeth for crowns, the shots contained epi-  Caused pt to have chest discomfort- went to hospital in ambulance, discovered had a fib while there     Hydrocodone Unknown - High Severity     Headache, nausea, dizziness, & vomiting    Levemir [Insulin Detemir] Hives     Hives / rash around injection site    Penicillins Hives     Jitteriness     Xarelto [Rivaroxaban] GI Bleeding     hgb dropped to 5.2    Aricept [Donepezil Hcl] Nausea Only     Vivid dreams    Benztropine Mesylate Other (See Comments)     Uncontrollable body " "movements    Cogentin [Benztropine] Other (See Comments)     \"uncontrollable body movements\"    Compazine [Prochlorperazine Edisylate] Other (See Comments)     Dystonic reaction    Duraprep [Antiseptic Products, Misc.] Itching and Rash     RASH AND ITCHING    Haldol [Haloperidol Lactate] Other (See Comments)     Dystonic reaction    Hydralazine Other (See Comments)     Headache     Lisinopril Cough    Statins Myalgia     Leg pain- all statins     Sulfamethoxazole Nausea Only and Other (See Comments)     Nausea & headaches    Tarka [Trandolapril-Verapamil Hcl Er] Other (See Comments)     Constipation     Toprol Xl [Metoprolol Tartrate] Other (See Comments)     Extreme fatigue, decreased exercise tolerance    Trimethoprim Other (See Comments)     Other reaction(s): Nausea       Family History and Social History reviewed and changed as necessary    REVIEW OF SYSTEM:   Cold intolerance    PHYSICAL EXAM:  Slightly pale but not jaundiced or icteric.  No respiratory distress.  No tachycardia.    Vitals:    06/26/25 1432   BP: 120/68   Pulse: 83   Resp: 20   Temp: 98 °F (36.7 °C)   SpO2: 100%   Weight: 98 kg (216 lb)   Height: 157.5 cm (62\")     Vitals:    06/26/25 1432   PainSc: 0-No pain          ECOG score: 1           Vitals reviewed.    ECOG: (1) Restricted in Physically Strenuous Activity, Ambulatory & Able to Do Work of Light Nature    Lab Results   Component Value Date    HGB 8.7 (L) 06/26/2025    HCT 27.8 (L) 06/26/2025    .3 (H) 06/26/2025     06/26/2025    WBC 5.88 06/26/2025    NEUTROABS 4.03 06/26/2025    LYMPHSABS 0.51 (L) 06/26/2025    MONOSABS 0.57 06/26/2025    EOSABS 0.74 (H) 06/26/2025    BASOSABS 0.01 06/26/2025       Lab Results   Component Value Date    GLUCOSE 102 (H) 06/20/2025    BUN 20.1 06/20/2025    CREATININE 1.50 (H) 06/20/2025     06/20/2025    K 3.2 (L) 06/20/2025    CL 97 (L) 06/20/2025    CO2 27.6 06/20/2025    CALCIUM 9.2 06/20/2025    PROTEINTOT 6.7 06/20/2025    " ALBUMIN 4.3 06/20/2025    BILITOT 0.4 06/20/2025    ALKPHOS 63 06/20/2025    AST 33 (H) 06/20/2025    ALT 9 06/20/2025             ASSESSMENT & PLAN:  1.  Anemia since at least 2016 managed with erythropoietin injections by [facility].  With polypectomies from cecum June 2021 Dr. Marino's and capsule endoscopy at [facility] November 2016 showing small bowel AVMs cecal polyps Bj Rivera September 2016 and Dr. Reynaldo Fritz polypectomy 2007.  EGD Dr. Rivera 2012, 2015, 2016 with dilation of esophagus.  [facility] enteroscopy single balloon with fluoroscopy [facility] with 1 small nonbleeding AVM ablated.  2.  Cardiovascular disease with MI 2015 stent RCA [facility] [place] [facility]  3.  Atrial fib managed by [facility] in [place] 2016 with pacemaker   4.  Repetitive falls with possible concussion April 2023 and January 2024  5.  Wedge biopsy left upper lobe Dr. Faisal Lundy October 2016  6.  Eczema  7.  Reflux  8.  Hypertension  9.  Hypothyroidism  10.  Lichen sclerosis on vulvar biopsy 1982  11.  Parkinson's diagnosed 2007 [facility]  12.  Sleep apnea managed by [facility] pulmonary medicine  13.  Subcutaneous lupus diagnosed [facility] Caroel Roy February 2023  14.  2019 back surgeries Dr. Lencho Gamino and wound infection drained by Dr. Alonso in 2018  15.  Urethral dilations with Dr. Terrence Mckenzie  16.  Bilateral total knee replacements Dr. Springer.  17.  CHF    Hematology history timeline:  -10/26/2023 hematology note Dr. Nunez.  Received parenteral iron September 2023 with hemoglobin stable 9.8.  He states this is multifactorial anemia due to iron deficiency suspecting GI blood loss due to history of AVMs and chronic kidney disease with erythropoietin deficiency.  White count and platelets normal.  Plan for continued Procrit per protocol with monitoring of iron indices periodically.  Numerous prior B12 levels normal during 2023.  Patient considering colonoscopy  -12/28/2023  40,000 units Procrit last dose given at [facility] being held for hemoglobin over 10.  -1/22/2024 hemoglobin 11 received IV iron 510 mg 1/22/2024 and 1/31/2024.  -3/6/2024 ferritin 263 with iron normal 115 and iron saturation 40% with normal total iron binding capacity 291.  Creatinine 1.24 GFR 45.  Hemoglobin 8.7 with platelets 132,000.  MCV 96 with normal RDW.    -3/12/2024 [facility] hematology consult: I reviewed her extensive records she brought me that she collated herself and the note from [facility] I summarized above.  She has anemia of renal disease and iron deficiency due to periodic GI losses intolerant of oral iron because she cannot take it with the Sinemet for her Parkinson's that she takes 6 times a day so she gets periodic IV iron.  For now, with her hemoglobin of 8.7 and GFR 45 with normal iron indices we will hop back on the Procrit has per protocol 40,000 units subcu weekly holding for hemoglobin over 10 and she will see my nurse practitioner back in May.  Following that my nurse practitioner will watch her and periodically we will need to check her iron indices as she does have a history of AVMs and may need periodic IV iron as well.  I discussed with her that I would not put her through a bone marrow biopsy despite the mild thrombocytopenia given normochromic normocytic indices and normal Red cell distribution with an unremarkable differential and the fact that, even if I found myelodysplasia, at most what I would do is just Procrit which we are already doing.  She has not had jaundice or icterus or other evidence is to suggest hemolysis and I will not work that up.  In essence we will just continue the care that she was already receiving at [facility].  She has had thorough GI evaluations as outlined above.    -5/8/2024 [facility] hematology clinic follow-up: We are administering Procrit for her anemia of renal disease if her hemoglobin is less than  10.  She has not required Procrit since we saw her initially on 3/12/2024.  In order to try and simplify things as she is unable to get out without assistance I will change her monitoring to every 2 weeks.  We will check her CBC every 2 weeks and we will get Procrit if her hemoglobin is less than 10.  She had been going to [place] for her Procrit since there is a lab there and they can do it on Monday however she lives here in [place].  She does have a caregiver or her daughter who can get her to the lab therefore we will have her get her labs here locally and we will administer the Procrit in [place] if parameters are met.  If this arrangement does not work out for her then we will get her back to [place] and I discussed with her and her daughter that we also have a clinic in [place] who can see her on the days of her Procrit.  She is following now with Dr. Teague for management of her CHF.  I did ask her daughter to speak with either her PCP or cardiologist about home health for additional management of her CHF.  Her iron studies were normal when checked in March.  Those are built in to assess periodically while on Procrit, next due in June.  Her daughter did state that she thinks her mother received iron while recent inpatient.    -7/24/2024 [facility] hematology clinic follow-up: Karina has been in rehab since we saw her last for weakness and pain in her right hip and lower extremity.  She is now back home and continues to work with home PT.  She has not required Procrit since March.  Currently her hemoglobin is 9.8 therefore we will administer Procrit today. Her creatinine is stable at 1.40, CBC and CMP otherwise unremarkable.  It is difficult for her to come and go with mobility issues.  We will continue to monitor her CBC every 2 weeks and will administer Procrit if her hemoglobin is less than 10.  Since she currently has home health we have arranged to have her CBC checked at home, when she is  discharged from home health we will resume checking her CBC here in our office.  She has not required any parenteral iron for some time, current MCV is 98 with MCHC of 32.2.  We will repeat her iron studies again in October as these are built into her Procrit plan, I will check them sooner if her MCV starts to decrease or she has worsening anemia.    -9/18/2024 [facility] hematology clinic follow-up: Overall her hemoglobin remained stable, on Tuesday it was 10.5. She has not required Procrit since 7/24/2024, we are checking her CBC every 2 weeks as it is inconvenient for her to get out weekly and she has not been requiring Procrit weekly, her hemoglobin in August was 10.8 and 11.3.  I will repeat her CBC today just to recheck and we are drawing her ferritin and iron profile.  If for some reason her hemoglobin is below 10 we will bring her back for Procrit.  If her iron levels are decreasing then we will set her up for parenteral iron.  She has follow-up scheduled with nephrology ongoing.  She is going to see Dr. Teague next week regarding her CHF.  She is feeling better since diuresis in the ED earlier this week.    -11/27/2024 [facility] hematology telemedicine follow-up: Karina has been receiving Procrit on average this year about every 4 months, she was given a Procrit shot in March 2024 again in July and most recently on 11/13/2024. Her current hemoglobin from 11/26/2024 is normal at 12 with hematocrit 36%, WBC is normal at 6.04, platelet count normal at 171,000.  Her iron studies are normal, ferritin is 415.  CMP with BUN of 61 creatinine 1.78 otherwise normal.  She does follow with nephrology and also closely with cardiology who is managing her diuretics.  She feels much better after some changes in her diuretics with now decreased fluids on board.  At this time we will check her CBC monthly rather than every 2 weeks due to the infrequent need of her Procrit and also with current hemoglobin normal.  We will see how  she does over the next 3 months.    -2/17/2025 [facility] hematology clinic follow-up: Karina overall continues to do well on current therapy with Retacrit given when hemoglobin is less than 10.  CBC from today shows hemoglobin of 10.8. She last received Retacrit in November, prior to that it was July, she typically has been receiving Retacrit about every 3-4 months.  We did discuss checking her CBC every 2 weeks rather then monthly but she is pleased with current schedule and it works out also well for her daughter who works and is bringing her to her appointments.  Currently she feels good and has no new symptoms.  We will continue Retacrit every 4 weeks if hemoglobin is less than 10.  Iron studies were checked today and are pending, iron studies in November were normal.  Addendum: Ferritin elevated 588.  Iron normal 71 with saturation normal 21% with normal transferrin and total iron binding capacity.    - 6/15/2025 through 6/16/2025 [facility] admission for dyspnea.  Lower extremity edema.  Was in [place] 1 week prior with stress test showing no need for cardiac cath which subsequently was done where stent was placed for prior clogged stent.  Received diuresis for elevated BNP and troponin.  Ejection fraction 60-65%.  Follow-up with cardiology.  On Eliquis and Plavix for paroxysmal atrial fibrillation and coronary disease.  On Sinemet and pramipexole for Parkinson's.  - 6/20/2025 hemoglobin 9.9 .3 otherwise unremarkable CBC.  Creatinine 1.44 potassium 3.2 chloride 97 AST 33 otherwise unremarkable CMP.    -6/26/2025 [facility] hematology follow-up: She is having vaginal bleeding due to see gynecologist tomorrow.  That may impart explain the dip in her hemoglobin now 8.7 but still with an MCV of 103.  She is only been getting the Procrit once a month and we will try it every other week.  Her daughter has work difficulties that make it hard for her to come more frequently than once a month but  she thinks she might be able to do it every 2 weeks.  Will see her back in 8 weeks to see what the trend is and if that is insufficient then we may need to go to weekly and try to figure out transportation for her.    Total time of care today inclusive of time spent today prior to patient's arrival reviewing interval data and during visit translating patient putting forth plan as outlined after visit instituting this plan took 1 hour patient care time throughout the day today.  Gregory Linton MD    06/26/2025

## 2025-06-26 NOTE — PROGRESS NOTES
[facility] Pulmonary Follow up    CHIEF COMPLAINT    Dyspnea with exertion    HISTORY OF PRESENT ILLNESS    Joanna Cross is a 74 y.o.female here today for follow-up.  She was last seen in the office by me in August 2024.  She has had multiple hospitalizations due to her CHF.  Her most recent event was 2 weeks ago at [facility].  She was diuresed and her symptoms improved.  She has been at a rehabilitation center and was discharged today.  She has not been home yet.    She has a history of interstitial lung disease that was diagnosed back in 2017 and has followed in our office since this time. She has had normal PFTs in the office.     She currently not using any inhalers.  She has albuterol to use occasionally.  She does not use that often.    She continues to be weak and is not doing much activity.    She continues to follow with Dr. Teague.  She has a follow-up with him at the end of July.  She required a new stent placed in early June at [facility].  She has not noticed much improvement since the stent has been placed.    She has been wearing her BiPAP for history of MILLI.  She feels like it abrahan her sometimes and she takes it off.  She is trying to wear it every night.  She does not know if she gets at least 4 hours on it or not.  She uses 2 L of oxygen bled through the machine.    She uses oxygen at 2 L continuously at all times.    She denies any sputum production or hemoptysis.  Denies any fever, chills or night sweats.    She denies any chest pain or palpitations.  She has chronic lower extremity edema and takes diuretics daily.    She continues to take omeprazole daily.  She denies any reflux symptoms.    She is a lifetime non-smoker.    She is companied today by her daughter.    Patient Active Problem List   Diagnosis    Coronary artery disease involving native coronary artery without angina pectoris    Hypertension, essential    Peripheral vascular disease     "Hyperlipidemia LDL goal <70    Spinal stenosis, lumbar region, with neurogenic claudication    Overactive bladder    GERD (gastroesophageal reflux disease)    Hypothyroidism    Lichen sclerosus    Parkinson's disease    MILLI treated with BiPAP    History of respiratory failure - prior respiratory failure requiring mechanical ventilation, open lung biopsy non-specific.     Atrial fibrillation    Tachy-thai syndrome    Long term current use of antiarrhythmic drug    History of adenomatous polyp of colon    Anemia associated with stage 3 chronic renal failure    Angiodysplasia    Hx of colonic polyps    Body mass index 40.0-44.9, adult    Cardiac pacemaker in situ    Chronic low back pain    Chronic lung disease    Degeneration of lumbar intervertebral disc    Edema of lower extremity    History of malignant neoplasm of skin    History of TIA (transient ischemic attack)    Hypotonic bladder    Incomplete tear of rotator cuff    Major depression in remission    Tinnitus of both ears    Primary osteoarthritis involving multiple joints    Restless legs    Seborrheic dermatitis    Transient cerebral ischemia    Muscle strain    Type 2 diabetes mellitus with hyperglycemia, without long-term current use of insulin    Vitamin B12 deficiency    Vitamin D deficiency    Chronic kidney disease, stage 3b    Osteoporosis, senile    Acute diastolic CHF (congestive heart failure)    Chronic respiratory failure with hypoxia    Chronic anticoagulation    Fracture of right shoulder with delayed healing    Chronic congestive heart failure    Acute on chronic hypoxic respiratory failure    Physical exam    Pressure injury of left buttock, stage 1    Intercostal pain    Injury of left wrist    Frequent falls       Allergies   Allergen Reactions    Amlodipine Besylate Swelling     Lower extremity (ankles, feet) swelling    Entacapone Other (See Comments)     \"extreme weakness in legs - caused several falls, which stopped after discontinuing " "this medication\"    Epinephrine Other (See Comments)     6/4/16- had 3 shots to numb mouth to prepare teeth for crowns, the shots contained epi-  Caused pt to have chest discomfort- went to hospital in ambulance, discovered had a fib while there     Hydrocodone Unknown - High Severity     Headache, nausea, dizziness, & vomiting    Levemir [Insulin Detemir] Hives     Hives / rash around injection site    Penicillins Hives     Jitteriness     Xarelto [Rivaroxaban] GI Bleeding     hgb dropped to 5.2    Aricept [Donepezil Hcl] Nausea Only     Vivid dreams    Benztropine Mesylate Other (See Comments)     Uncontrollable body movements    Cogentin [Benztropine] Other (See Comments)     \"uncontrollable body movements\"    Compazine [Prochlorperazine Edisylate] Other (See Comments)     Dystonic reaction    Duraprep [Antiseptic Products, Misc.] Itching and Rash     RASH AND ITCHING    Haldol [Haloperidol Lactate] Other (See Comments)     Dystonic reaction    Hydralazine Other (See Comments)     Headache     Lisinopril Cough    Statins Myalgia     Leg pain- all statins     Sulfamethoxazole Nausea Only and Other (See Comments)     Nausea & headaches    Tarka [Trandolapril-Verapamil Hcl Er] Other (See Comments)     Constipation     Toprol Xl [Metoprolol Tartrate] Other (See Comments)     Extreme fatigue, decreased exercise tolerance    Trimethoprim Other (See Comments)     Other reaction(s): Nausea       Current Outpatient Medications:     Accu-Chek Softclix Lancets lancets, CHECK BLOOD SUGAR 2-3 TIMES A DAY DX E11.65 ON INSULIN, Disp: 250 each, Rfl: 3    albuterol (ACCUNEB) 1.25 MG/3ML nebulizer solution, Take 3 mL by nebulization Every 6 (Six) Hours As Needed for Wheezing. 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 Indications: " Chronic Obstructive Lung Disease, Disp: , Rfl:     amantadine (SYMMETREL) 100 MG capsule, Take 1 capsule by mouth 2 (Two) Times a Day. Indications: Parkinson's Disease with Unknown Cause, Disp: , Rfl:     apixaban (Eliquis) 5 MG tablet tablet, Take 1 tablet by mouth Every 12 (Twelve) Hours. Restart 1/29/24, Disp: 180 tablet, Rfl: 2    B Complex-C (SUPER B COMPLEX PO), Take 1 tablet by mouth Daily. Indications: Nutritional Support, Disp: , Rfl:     bumetanide (BUMEX) 1 MG tablet, Take 1 tablet by mouth 2 (Two) Times a Day. Indications: Edema, Disp: , Rfl:     Calcium Carbonate (CALTRATE 600 PO), Take 1 tablet by mouth Daily. Indications: Nutritional Support, Disp: , Rfl:     carbidopa-levodopa (SINEMET)  MG per tablet, Take 2 tablets by mouth at 6am, then 1 tablet at 9am, 12pm, 3pm, 6pm and 9pm.  Indications: Parkinson's Disease, Disp: , Rfl:     Cholecalciferol 25 MCG (1000 UT) tablet, Take 1 tablet by mouth 2 (Two) Times a Day. Indications: Vitamin D Deficiency, Disp: , Rfl:     citalopram (CeleXA) 20 MG tablet, Take 1 tablet by mouth Daily. Indications: Major Depressive Disorder, Disp: 90 tablet, Rfl: 0    clobetasol propionate (TEMOVATE) 0.05 % cream, Apply 1 Application topically to the appropriate area as directed 2 (Two) Times a Week. Indications: Skin Inflammation, Disp: , Rfl:     clonazePAM (KlonoPIN) 0.5 MG tablet, Take 1 tablet by mouth Every Night. Indications: sleep, Disp: , Rfl:     clopidogrel (Plavix) 75 MG tablet, Take 1 tablet by mouth., Disp: , Rfl:     Farxiga 10 MG tablet, Take 10 mg by mouth Daily., Disp: , Rfl:     fluticasone (FLONASE) 50 MCG/ACT nasal spray, Administer 2 sprays into the nostril(s) as directed by provider Daily As Needed for Rhinitis. Indications: Allergic Rhinitis, Disp: , Rfl:     Glucosamine-Chondroit-Calcium (TRIPLE FLEX BONE & JOINT PO), Take 1 tablet by mouth Daily. Indications: Nutritional Support, Disp: , Rfl:     glucose blood (Accu-Chek Guide Test) test strip,  CHECK BLOOD SUGAR 2-3 TIMES A DAY DX E11.65 ON INSULIN, Disp: 250 each, Rfl: 3    hydroxychloroquine (PLAQUENIL) 200 MG tablet, Take 1 tablet by mouth 2 (Two) Times a Day. Indications: Discoid Lupus Erythematosus, Systemic Lupus Erythematosus, Disp: , Rfl:     hydrOXYzine (ATARAX) 25 MG tablet, Take 1 tablet by mouth Every 6 (Six) Hours As Needed for Itching., Disp: 30 tablet, Rfl: 5    Insulin Glargine (Lantus SoloStar) 100 UNIT/ML injection pen, Inject 15 Units under the skin into the appropriate area as directed Daily. Indications: Type 2 Diabetes, Disp: 15 mL, Rfl: 1    ipratropium (ATROVENT) 0.06 % nasal spray, Administer 2 sprays into the nostril(s) as directed by provider As Needed for Rhinitis. Indications: Hayfever, Disp: , Rfl:     levothyroxine (SYNTHROID, LEVOTHROID) 200 MCG tablet, TAKE 1 TABLET DAILY (NEW DOSE), Disp: 90 tablet, Rfl: 1    loratadine (CLARITIN) 10 MG tablet, Take 1 tablet by mouth Daily. Indications: Hayfever, Disp: , Rfl:     metOLazone (ZAROXOLYN) 2.5 MG tablet, Take 1 tablet by mouth 4 (Four) Times a Week. Indications: Edema, Disp: , Rfl:     Multiple Vitamins-Minerals (CENTRUM ULTRA WOMENS PO), Take 1 tablet by mouth Daily. Indications: Nutritional Support, Disp: , Rfl:     nitrofurantoin, macrocrystal-monohydrate, (Macrobid) 100 MG capsule, Take 1 capsule by mouth 2 (Two) Times a Day., Disp: 20 capsule, Rfl: 0    O2 (OXYGEN), Inhale 2 L/min Continuous. Indications: soa, Disp: , Rfl:     omeprazole (priLOSEC) 40 MG capsule, Take 1 capsule by mouth 2 (Two) Times a Day. Indications: Gastroesophageal Reflux Disease, Disp: 180 capsule, Rfl: 1    polyethylene glycol (MIRALAX) 17 g packet, Take 17 g by mouth Daily. Indications: Constipation, Disp: , Rfl:     potassium chloride (MICRO-K) 10 MEQ CR capsule, Take 1 capsule by mouth 2 (Two) Times a Day., Disp: , Rfl:     ranolazine (RANEXA) 500 MG 12 hr tablet, Take 2 tablets by mouth Every 12 (Twelve) Hours., Disp: 360 tablet, Rfl: 3     sacubitril-valsartan (Entresto) 24-26 MG tablet, Take 1 tablet by mouth 2 (Two) Times a Day., Disp: , Rfl:     senna (SENOKOT) 8.6 MG tablet, Take 1 tablet by mouth Daily. Indications: Constipation, Disp: , Rfl:     spironolactone (ALDACTONE) 25 MG tablet, Take 1 tablet by mouth Daily. Indications: Heart failure, Disp: , Rfl:     traMADol (ULTRAM) 50 MG tablet, Take 1 tablet by mouth At Night As Needed for Moderate Pain. Indications: Pain, Disp: 30 tablet, Rfl: 1    triamcinolone (KENALOG) 0.1 % cream, Apply 1 Application topically to the appropriate area as directed As Needed for Irritation. Indications: Atopic Dermatitis, Disp: , Rfl:     Triamcinolone Acetonide (NASACORT) 55 MCG/ACT nasal inhaler, Administer 2 sprays into the nostril(s) as directed by provider Daily As Needed for Rhinitis. Indications: Nose Inflammation, Disp: , Rfl:     aspirin 81 MG chewable tablet, Chew 1 tablet Daily. Indications: Disease involving Lipid Deposits in the Arteries, Disp: , Rfl:     promethazine-dextromethorphan (PROMETHAZINE-DM) 6.25-15 MG/5ML syrup, Take 5 mL by mouth 4 (Four) Times a Day As Needed for Cough. Indications: Cough, Disp: 120 mL, Rfl: 1    valACYclovir (Valtrex) 1000 MG tablet, Take 1 tablet by mouth 3 (Three) Times a Day., Disp: 21 tablet, Rfl: 0  MEDICATION LIST AND ALLERGIES REVIEWED.    Social History     Tobacco Use    Smoking status: Never     Passive exposure: Past    Smokeless tobacco: Never    Tobacco comments:     Father and mother smoked for several years.   Vaping Use    Vaping status: Never Used    Passive vaping exposure: Yes   Substance Use Topics    Alcohol use: Never    Drug use: Never       FAMILY AND SOCIAL HISTORY REVIEWED.    Review of Systems   Constitutional:  Negative for activity change, appetite change, fatigue, fever and unexpected weight change.   HENT:  Negative for congestion, postnasal drip, rhinorrhea, sinus pressure, sore throat and voice change.    Eyes:  Negative for visual  "disturbance.   Respiratory:  Positive for shortness of breath. Negative for cough, chest tightness and wheezing.    Cardiovascular:  Negative for chest pain, palpitations and leg swelling.   Gastrointestinal:  Negative for abdominal distention, abdominal pain, nausea and vomiting.   Endocrine: Negative for cold intolerance and heat intolerance.   Genitourinary:  Negative for difficulty urinating and urgency.   Musculoskeletal:  Negative for arthralgias, back pain and neck pain.   Skin:  Negative for color change and pallor.   Allergic/Immunologic: Negative for environmental allergies and food allergies.   Neurological:  Negative for dizziness, syncope, weakness and light-headedness.   Hematological:  Negative for adenopathy. Does not bruise/bleed easily.   Psychiatric/Behavioral:  Negative for agitation and behavioral problems.    .    /68   Pulse 83   Temp 98 °F (36.7 °C)   Ht 157.5 cm (62\")   Wt 98.2 kg (216 lb 9.6 oz)   LMP  (LMP Unknown) Comment: Mammogram- 1/28/20  SpO2 100% Comment: 2 L continuous  BMI 39.62 kg/m²     Immunization History   Administered Date(s) Administered    COVID-19 (MODERNA) 1st,2nd,3rd Dose Monovalent 02/12/2021, 03/12/2021, 11/09/2021    Covid-19 (Pfizer) Gray Cap Monovalent 08/03/2022    Flu Vaccine Quad PF >36MO 10/02/2017, 09/11/2018, 09/21/2019    Fluad Quad 65+ 11/23/2022    Fluzone High-Dose 65+YRS 09/13/2017, 11/23/2022, 01/23/2025    Fluzone High-Dose 65+yrs 09/16/2021, 10/19/2023    Hepatitis A 05/03/1999, 10/05/1999    INFLUENZA SPLIT TRI 09/15/2010, 10/02/2012, 10/02/2013    Influenza, Unspecified 01/13/2004, 10/28/2004, 12/14/2005, 01/11/2007, 11/30/2007, 11/20/2008, 02/17/2010, 01/31/2011, 09/06/2011, 09/09/2013, 09/13/2017, 09/11/2018    PPD Test 10/28/2022, 11/07/2022    Pneumococcal Conjugate 13-Valent (PCV13) 09/04/2015, 12/04/2016    Pneumococcal Conjugate 20-Valent (PCV20) 06/08/2022    Pneumococcal Conjugate Unspecified 12/20/2013, 12/04/2016    " Pneumococcal Polysaccharide (PPSV23) 01/13/2004, 11/20/2008, 12/20/2013    Pneumococcal, Unspecified 12/20/2013, 12/04/2016    Shingrix 09/27/2019    TD Preservative Free (Tenivac) 02/01/2012, 05/04/2017    Tdap 05/04/2017    Zostavax 02/05/2013       Physical Exam  Vitals and nursing note reviewed.   Constitutional:       Appearance: She is well-developed. She is not diaphoretic.   HENT:      Head: Normocephalic and atraumatic.   Eyes:      Pupils: Pupils are equal, round, and reactive to light.   Neck:      Thyroid: No thyromegaly.   Cardiovascular:      Rate and Rhythm: Normal rate and regular rhythm.      Heart sounds: Murmur heard.      No friction rub. No gallop.   Pulmonary:      Effort: Pulmonary effort is normal. No respiratory distress.      Breath sounds: Normal breath sounds. No wheezing or rales.   Chest:      Chest wall: No tenderness.   Abdominal:      General: Bowel sounds are normal.      Palpations: Abdomen is soft.      Tenderness: There is no abdominal tenderness.   Musculoskeletal:         General: Normal range of motion.      Cervical back: Normal range of motion and neck supple.      Right lower leg: Edema present.      Left lower leg: Edema present.   Lymphadenopathy:      Cervical: No cervical adenopathy.   Skin:     General: Skin is warm and dry.      Capillary Refill: Capillary refill takes less than 2 seconds.   Neurological:      Mental Status: She is alert and oriented to person, place, and time.   Psychiatric:         Behavior: Behavior normal.           RESULTS    Spirometry Interpretation: FVC 1.94 79% predicted, FEV1 1.36 72% predicted, FEV1/FVC 70% predicted, TLC 4.11 90% predicted, DLCO 65% predicted, no obstruction, no restriction and reduced DLCO.    6-minute walk test: Patient ambulated 300 feet and desaturated to 84% was placed on 2 L pulsed dose, she was increased to 3 L pulsed dose and maintained above 100% throughout the remainder of testing, she does meet qualifications  for oxygen with activity, her percent of predicted was 27%.    Chest PA/lateral: Official report pending    PROBLEM LIST    Problem List Items Addressed This Visit          Cardiac and Vasculature    Acute diastolic CHF (congestive heart failure)    Overview   Echo, 1/26/2024: EF 55%.  Grade 1 diastolic dysfunction         Relevant Medications    clopidogrel (Plavix) 75 MG tablet       Gastrointestinal Abdominal     GERD (gastroesophageal reflux disease)       Pulmonary and Pneumonias    History of respiratory failure - prior respiratory failure requiring mechanical ventilation, open lung biopsy non-specific.     Overview   - prior respiratory failure requiring mechanical ventilation, open lung biopsy non-specific.  - 2016         Chronic respiratory failure with hypoxia       Sleep    MILLI treated with BiPAP     Other Visit Diagnoses         SOB (shortness of breath)    -  Primary    Relevant Orders    6 Minute Walk Test (Completed)      Acute on chronic respiratory failure with hypoxia        Relevant Orders    XR Chest PA & Lateral    6 Minute Walk Test (Completed)    Overnight Sleep Oximetry Study    Spirometry with Diffusion Capacity & Lung Volumes (Completed)              DISCUSSION    Ms. Cross was here for follow-up.  She seems to be doing okay from a pulmonary standpoint.  She has had difficulty with her CHF and has had multiple hospitalizations recently at [facility] in [place] and [facility].  She has a follow-up with cardiology at the end of July.  I did advise her to keep this appointment.    We reviewed her PFTs in the office today and she has had a decline in her FEV1 and FVC however they are still showing no obstruction or restriction.  Will continue to follow.    We reviewed her 6-minute walk test and she still meets requirements to use oxygen with ambulation.  She needs 3 L pulsed dose with any ambulation.    She will continue to wear her BiPAP nightly.  I did review her  download and she is not compliant.  She is about 54% compliant, I did encourage her to increase her use to at least 6 hours nightly and that most of her symptoms are due to not wearing her BiPAP.    We will perform an overnight oximetry on 2 L on her BiPAP to make sure that she is getting enough oxygen through the BiPAP.  I will contact her with the report.    She will continue omeprazole daily for GERD.  We also discussed reflux precautions in the office today.    We will schedule her follow-up in 3 months.    I personally spent a total of 35 minutes on patient visit today including chart review, face to face with the patient obtaining the history and physical exam, review of pertinent images and tests, counseling and discussion and/or coordination of care as described above, and documentation.  Total time excludes time spent on other separate services such as performing procedures or test interpretation, if applicable.        Myrna Mckeon, DEJA  06/26/202511:41 EDT  Electronically signed     Please note that portions of this note were completed with a voice recognition program.        CC: Reynaldo Fritz MD

## 2025-06-26 NOTE — LETTER
June 26, 2025     Reynaldo Fritz MD  [address] [facility] [place] [address]    Patient: Joanna Cross   YOB: 1950   Date of Visit: 6/26/2025     Dear Reynaldo Fritz MD:       Thank you for referring Joanna Cross to me for evaluation. Below are the relevant portions of my assessment and plan of care.    If you have questions, please do not hesitate to call me. I look forward to following Joanna along with you.         Sincerely,        Gregory Linton MD        CC: No Recipients    Gregory Linton MD  06/26/25 1518  Sign when Signing Visit  CHIEF COMPLAINT: Progressive fatigue    Problem List:  Oncology/Hematology History Overview Note   1.  Anemia since at least 2016 managed with erythropoietin injections by [facility].  With polypectomies from cecum June 2021 Dr. Marino's and capsule endoscopy at [facility] November 2016 showing small bowel AVMs cecal polyps Bj Rivera September 2016 and Dr. Reynaldo Fritz polypectomy 2007.  EGD Dr. Rivera 2012, 2015, 2016 with dilation of esophagus.  [facility] enteroscopy single balloon with fluoroscopy [facility] with 1 small nonbleeding AVM ablated.  2.  Cardiovascular disease with MI 2015 stent RCA [facility]  3.  Atrial fib managed by [facility] [place] 2016 with pacemaker   4.  Repetitive falls with possible concussion April 2023 and January 2024  5.  Wedge biopsy left upper lobe Dr. Faisal Lundy October 2016  6.  Eczema  7.  Reflux  8.  Hypertension  9.  Hypothyroidism  10.  Lichen sclerosis on vulvar biopsy 1982  11.  Parkinson's diagnosed 2007 [facility]  12.  Sleep apnea managed by [facility] pulmonary medicine  13.  Subcutaneous lupus diagnosed [facility] Carole Roy February 2023  14.  2019 back surgeries Dr. Lencho Gamino and wound infection drained by Dr. Alonso in 2018  15.  Urethral dilations with Dr. Terrence Mckenzie  16.  Bilateral total knee replacements Dr. Springer.  17.  CHF    Hematology history  timeline:  -10/26/2023 hematology note Dr. Nunez.  Received parenteral iron September 2023 with hemoglobin stable 9.8.  He states this is multifactorial anemia due to iron deficiency suspecting GI blood loss due to history of AVMs and chronic kidney disease with erythropoietin deficiency.  White count and platelets normal.  Plan for continued Procrit per protocol with monitoring of iron indices periodically.  Numerous prior B12 levels normal during 2023.  Patient considering colonoscopy  -12/28/2023 40,000 units Procrit last dose given at [facility] being held for hemoglobin over 10.  -1/22/2024 hemoglobin 11 received IV iron 510 mg 1/22/2024 and 1/31/2024.  -3/6/2024 ferritin 263 with iron normal 115 and iron saturation 40% with normal total iron binding capacity 291.  Creatinine 1.24 GFR 45.  Hemoglobin 8.7 with platelets 132,000.  MCV 96 with normal RDW.    -3/12/2024 [facility] hematology consult: I reviewed her extensive records she brought me that she collated herself and the note from [facility] I summarized above.  She has anemia of renal disease and iron deficiency due to periodic GI losses intolerant of oral iron because she cannot take it with the Sinemet for her Parkinson's that she takes 6 times a day so she gets periodic IV iron.  For now, with her hemoglobin of 8.7 and GFR 45 with normal iron indices we will hop back on the Procrit has per protocol 40,000 units subcu weekly holding for hemoglobin over 10 and she will see my nurse practitioner back in May.  Following that my nurse practitioner will watch her and periodically we will need to check her iron indices as she does have a history of AVMs and may need periodic IV iron as well.  I discussed with her that I would not put her through a bone marrow biopsy despite the mild thrombocytopenia given normochromic normocytic indices and normal Red cell distribution with an unremarkable differential and the fact that, even  if I found myelodysplasia, at most what I would do is just Procrit which we are already doing.  She has not had jaundice or icterus or other evidence is to suggest hemolysis and I will not work that up.  In essence we will just continue the care that she was already receiving at [facility].  She has had thorough GI evaluations as outlined above.    -5/8/2024 [facility] hematology clinic follow-up: We are administering Procrit for her anemia of renal disease if her hemoglobin is less than 10.  She has not required Procrit since we saw her initially on 3/12/2024.  In order to try and simplify things as she is unable to get out without assistance I will change her monitoring to every 2 weeks.  We will check her CBC every 2 weeks and we will get Procrit if her hemoglobin is less than 10.  She had been going to [place] for her Procrit since there is a lab there and they can do it on Monday however she lives here in [place].  She does have a caregiver or her daughter who can get her to the lab therefore we will have her get her labs here locally and we will administer the Procrit in [place] if parameters are met.  If this arrangement does not work out for her then we will get her back to [place] and I discussed with her and her daughter that we also have a clinic in [place] who can see her on the days of her Procrit.  She is following now with Dr. Teague for management of her CHF.  I did ask her daughter to speak with either her PCP or cardiologist about home health for additional management of her CHF.  Her iron studies were normal when checked in March.  Those are built in to assess periodically while on Procrit, next due in June.  Her daughter did state that she thinks her mother received iron while recent inpatient.    -7/24/2024 [facility] hematology clinic follow-up: Karina has been in rehab since we saw her last for weakness and pain in her right hip and lower extremity.  She is now back home  and continues to work with home PT.  She has not required Procrit since March.  Currently her hemoglobin is 9.8 therefore we will administer Procrit today. Her creatinine is stable at 1.40, CBC and CMP otherwise unremarkable.  It is difficult for her to come and go with mobility issues.  We will continue to monitor her CBC every 2 weeks and will administer Procrit if her hemoglobin is less than 10.  Since she currently has home health we have arranged to have her CBC checked at home, when she is discharged from home health we will resume checking her CBC here in our office.  She has not required any parenteral iron for some time, current MCV is 98 with MCHC of 32.2.  We will repeat her iron studies again in October as these are built into her Procrit plan, I will check them sooner if her MCV starts to decrease or she has worsening anemia.    -9/18/2024 [facility] hematology clinic follow-up: Overall her hemoglobin remained stable, on Tuesday it was 10.5. She has not required Procrit since 7/24/2024, we are checking her CBC every 2 weeks as it is inconvenient for her to get out weekly and she has not been requiring Procrit weekly, her hemoglobin in August was 10.8 and 11.3.  I will repeat her CBC today just to recheck and we are drawing her ferritin and iron profile.  If for some reason her hemoglobin is below 10 we will bring her back for Procrit.  If her iron levels are decreasing then we will set her up for parenteral iron.  She has follow-up scheduled with nephrology ongoing.  She is going to see Dr. Teague next week regarding her CHF.  She is feeling better since diuresis in the ED earlier this week.    -11/27/2024 [facility] hematology telemedicine follow-up: Karina has been receiving Procrit on average this year about every 4 months, she was given a Procrit shot in March 2024 again in July and most recently on 11/13/2024. Her current hemoglobin from 11/26/2024 is normal at 12 with hematocrit 36%, WBC is normal at  6.04, platelet count normal at 171,000.  Her iron studies are normal, ferritin is 415.  CMP with BUN of 61 creatinine 1.78 otherwise normal.  She does follow with nephrology and also closely with cardiology who is managing her diuretics.  She feels much better after some changes in her diuretics with now decreased fluids on board.  At this time we will check her CBC monthly rather than every 2 weeks due to the infrequent need of her Procrit and also with current hemoglobin normal.  We will see how she does over the next 3 months.    -2/17/2025 [facility] hematology clinic follow-up: Karina overall continues to do well on current therapy with Retacrit given when hemoglobin is less than 10.  CBC from today shows hemoglobin of 10.8. She last received Retacrit in November, prior to that it was July, she typically has been receiving Retacrit about every 3-4 months.  We did discuss checking her CBC every 2 weeks rather then monthly but she is pleased with current schedule and it works out also well for her daughter who works and is bringing her to her appointments.  Currently she feels good and has no new symptoms.  We will continue Retacrit every 4 weeks if hemoglobin is less than 10.  Iron studies were checked today and are pending, iron studies in November were normal.  Addendum: Ferritin elevated 588.  Iron normal 71 with saturation normal 21% with normal transferrin and total iron binding capacity.    - 6/15/2025 through 6/16/2025 [facility] admission for dyspnea.  Lower extremity edema.  Was in [place] 1 week prior with stress test showing no need for cardiac cath which subsequently was done where stent was placed for prior clogged stent.  Received diuresis for elevated BNP and troponin.  Ejection fraction 60-65%.  Follow-up with cardiology.  On Eliquis and Plavix for paroxysmal atrial fibrillation and coronary disease.  On Sinemet and pramipexole for Parkinson's.    - 6/20/2025 hemoglobin 9.9 MCV  100.3 otherwise unremarkable CBC.  Creatinine 1.44 potassium 3.2 chloride 97 AST 33 otherwise unremarkable CMP.    -6/26/2025 [facility]ology follow-up: She is having vaginal bleeding due to see gynecologist tomorrow.  That may impart explain the dip in her hemoglobin now 8.7 but still with an MCV of 103.  She is only been getting the Procrit once a month and we will try it every other week.  Her daughter has work difficulties that make it hard for her to come more frequently than once a month but she thinks she might be able to do it every 2 weeks.  Will see her back in 8 weeks to see what the trend is and if that is insufficient then we may need to go to weekly and try to figure out transportation for her.     Anemia associated with stage 3 chronic renal failure   3/12/2024 -  Chemotherapy    OP SUPPORTIVE Epoetin  Parker / Epoetin Parker-epbx         HISTORY OF PRESENT ILLNESS:  The patient is a 74 y.o. female, here for follow up on management of multifactorial anemia with progressive fatigue and now vaginal blood loss for the last week    Past Medical History:   Diagnosis Date   • Allergic    • Allergic rhinitis    • Anemia    • Ankle problem     thinks back related causing pain    • Anxiety    • Asthma    • Atrial fibrillation    • AVM (arteriovenous malformation)    • Back pain    • Cataract 2015   • CHF (congestive heart failure) 06/02/2024    Probably had before then but no one actually told me I had it.   • Cholelithiasis 09/07/01    Gallbladder Removed   • Chronic kidney disease     stage 3 per pt   • Chronic lung disease 04/13/2022   • CKD (chronic kidney disease)    • Clotting disorder 2016    AVM - small intestine   • Colon polyp 09/15/16   • Coronary artery disease involving native coronary artery without angina pectoris 03/01/2017   • COVID-19 vaccine series completed    • Cystic fibrosis    • Diastolic dysfunction    • Gastrocnemius muscle tear     left medial 91   • Generalized osteoarthritis    •  GERD (gastroesophageal reflux disease)    • Gestational diabetes    • GIB (gastrointestinal bleeding) 2016    d/t xarelto    • Headache    • Hearing decreased, bilateral     HAS HEARING AID, NOT WEARING IT CURRENTLY   • Hiatal hernia    • History of medical problems 04/23/82    Lichens Sclerosis   • History of shingles    • History of transfusion 2016    no reaction recalled    • HL (hearing loss) 2019    Got hearing aids   • Hyperlipidemia LDL goal <70 03/01/2017   • Hypertension    • Hyperthyroidism    • Hypothyroidism    • IBS (irritable bowel syndrome)    • Inflammatory bowel disease    • Klebsiella pneumonia    • Lichen sclerosus    • Lupus     subQ   • Mouth problem     mouth guard used since pt bites tongue and lips excessively at night if not- with bipap    • MRSA infection 2018   • Myocardial infarction    • Obesity    • On home oxygen therapy     2L of oxygen all the time due to current congestion    • Osteopenia 2016   • Osteoporosis    • Parkinson's disease    • Peripheral vascular disease    • Pleurisy    • Pneumonia    • Puerperal sepsis with acute hypoxic respiratory failure     emergent intubation- 2016   • Pulmonary embolism    • Renal insufficiency    • Right leg pain     from back issues    • Salivary gland stone    • Sciatic nerve pain    • Seborrheic dermatitis    • Skin cancer     on back    • Sleep apnea     CPAP AND HOME O2 2L/M   • Sleep apnea, obstructive 04/15/01   • TIA (transient ischemic attack) 2014    no residual effects   • Tremor    • Type 2 diabetes mellitus    • UTI (urinary tract infection)    • Visual impairment    • Vitamin D deficiency 09/08/2022   • Wears glasses      Past Surgical History:   Procedure Laterality Date   • ABLATION OF DYSRHYTHMIC FOCUS  05/16/13    Laser Ablation - Rt Leg   • BACK SURGERY      l4-l5 laminectomy    • BICEPS TENDON REPAIR Right     shoulder   • BREAST BIOPSY Left 05/2004    excisional, benign   • BRONCHOSCOPY RIGID / FLEXIBLE      2016   •  BUNIONECTOMY Right    • CARDIAC CATHETERIZATION     • CARDIAC CATHETERIZATION N/A 02/01/2019    Procedure: Left Heart Cath;  Surgeon: Albertina Corona MD;  Location:  [facility] CATH INVASIVE LOCATION;  Service: Cardiology   • CARDIAC ELECTROPHYSIOLOGY PROCEDURE N/A 01/26/2024    Procedure: Lead Revision RA or RV, PPM or ICD;  Surgeon: Lauro Francois MD;  Location:  [facility] EP INVASIVE LOCATION;  Service: Cardiovascular;  Laterality: N/A;   • CARDIAC SURGERY     • CATARACT EXTRACTION, BILATERAL  06/26/2024    (R) eye   08/16/2024 (L) eye   • CHOLECYSTECTOMY     • COLON SURGERY     • COLONOSCOPY  2016   • COLONOSCOPY N/A 06/17/2021    Procedure: COLONOSCOPY WITH POLYPECTOMY;  Surgeon: Trenton Sosa MD;  Location: [facility] ENDOSCOPY;  Service: Gastroenterology;  Laterality: N/A;   • CORONARY STENT PLACEMENT      x1 stent   • CYST REMOVAL      left ear, upper left back 2001   • CYSTOSCOPY      x2  18 and 20 -   in 20 urethra dilation    • DIAGNOSTIC LAPAROSCOPY  1981   • ENDOSCOPIC FUNCTIONAL SINUS SURGERY (FESS)  2011   • ENDOSCOPY  2016   • ENTEROSCOPY VIA STOMA      with single ballon with fluoro    • EYE SURGERY  7/26 & 8/16/24    Cataracts removed from each eye.   • HAMMER TOE REPAIR Left    • INCISION AND DRAINAGE OF WOUND  2018    back with wound infection    • INSERT / REPLACE / REMOVE PACEMAKER  11/10/16    [facility]   • JOINT REPLACEMENT     • KNEE ARTHROSCOPY     • LASER ABLATION      right leg 13   • LIPOMA EXCISION  1999    left leg    • LUMBAR LAMINECTOMY DISCECTOMY DECOMPRESSION N/A 07/06/2018    Procedure: LUMBAR LAMINECTOMY L4-5, HEMILAMIINECTOMY RIGHT L5-S1, FORAMINOTOMY L5-S1;  Surgeon: Lencho Gamino MD;  Location:  [facility] OR;  Service: Neurosurgery   • LUMBAR LAMINECTOMY DISCECTOMY DECOMPRESSION N/A 09/19/2018    Procedure: INCISION AND DRAINAGE BACK WITH WOUND EXPLORATION;  Surgeon: Ritchie García MD;  Location:  [facility] OR;  Service: Neurosurgery   • LUNG BIOPSY Left 2016  "  • OTHER SURGICAL HISTORY      ct scan of chest and sinuses and lower back    • OTHER SURGICAL HISTORY      various echos    • OTHER SURGICAL HISTORY      electroencephalogram 16,   emg  ncv tests 2007   • OTHER SURGICAL HISTORY      mra 2007  and various mri with xrays, nuclear medicine lung ventilation with perfusion test 2016 with pft    • OTHER SURGICAL HISTORY      barium swallow testing 15, 12, 12   • OTHER SURGICAL HISTORY      vaginal ultrasound- 2012,   vas clementina lower extrem 2016, vas venous duplex lower extrem bilat 2019   • OTHER SURGICAL HISTORY      wdge biopsy spring of lung    • OTHER SURGICAL HISTORY      emergent intubation- hypoxic resp failure    • OTHER SURGICAL HISTORY      01/26/2024 ppm generator change and lead revision per Dr. Francois   • PACEMAKER IMPLANTATION  11/2016    sss   • REPLACEMENT TOTAL KNEE Bilateral     left knee 2011, right 2012 per dr acuna    • REPLACEMENT TOTAL KNEE Bilateral    • SHOULDER ARTHROSCOPY Bilateral     2003-left, 2004- right    • SKIN BIOPSY      skin cancer back 2016   • SKIN CANCER EXCISION      upper righ tback    • SPINE SURGERY  07/06/18   • SUBTOTAL HYSTERECTOMY     • MADHAV     • TEETH EXTRACTION      x2   • TOTAL SHOULDER ARTHROPLASTY W/ DISTAL CLAVICLE EXCISION Left 10/24/2022    Procedure: REVERSE TOTAL SHOULDER ARTHROPLASTY, BICEPS TENODESIS - LEFT;  Surgeon: Sammy Yo MD;  Location:  [facility];  Service: Orthopedics;  Laterality: Left;   • TOTAL SHOULDER ARTHROPLASTY W/ DISTAL CLAVICLE EXCISION Right 09/18/2023    Procedure: REVERSE TOTAL SHOULDER  ARTHROPLASTY WITH BICEPS TENODESIS - RIGHT;  Surgeon: Sammy Yo MD;  Location:  [facility];  Service: Orthopedics;  Laterality: Right;   • TUBAL ABDOMINAL LIGATION Bilateral 1980   • WISDOM TOOTH EXTRACTION         Allergies   Allergen Reactions   • Amlodipine Besylate Swelling     Lower extremity (ankles, feet) swelling   • Entacapone Other (See Comments)     \"extreme weakness in legs - " "caused several falls, which stopped after discontinuing this medication\"   • Epinephrine Other (See Comments)     6/4/16- had 3 shots to numb mouth to prepare teeth for crowns, the shots contained epi-  Caused pt to have chest discomfort- went to hospital in ambulance, discovered had a fib while there    • Hydrocodone Unknown - High Severity     Headache, nausea, dizziness, & vomiting   • Levemir [Insulin Detemir] Hives     Hives / rash around injection site   • Penicillins Hives     Jitteriness    • Xarelto [Rivaroxaban] GI Bleeding     hgb dropped to 5.2   • Aricept [Donepezil Hcl] Nausea Only     Vivid dreams   • Benztropine Mesylate Other (See Comments)     Uncontrollable body movements   • Cogentin [Benztropine] Other (See Comments)     \"uncontrollable body movements\"   • Compazine [Prochlorperazine Edisylate] Other (See Comments)     Dystonic reaction   • Duraprep [Antiseptic Products, Misc.] Itching and Rash     RASH AND ITCHING   • Haldol [Haloperidol Lactate] Other (See Comments)     Dystonic reaction   • Hydralazine Other (See Comments)     Headache    • Lisinopril Cough   • Statins Myalgia     Leg pain- all statins    • Sulfamethoxazole Nausea Only and Other (See Comments)     Nausea & headaches   • Tarka [Trandolapril-Verapamil Hcl Er] Other (See Comments)     Constipation    • Toprol Xl [Metoprolol Tartrate] Other (See Comments)     Extreme fatigue, decreased exercise tolerance   • Trimethoprim Other (See Comments)     Other reaction(s): Nausea       Family History and Social History reviewed and changed as necessary    REVIEW OF SYSTEM:   Cold intolerance    PHYSICAL EXAM:  Slightly pale but not jaundiced or icteric.  No respiratory distress.  No tachycardia.    Vitals:    06/26/25 1432   BP: 120/68   Pulse: 83   Resp: 20   Temp: 98 °F (36.7 °C)   SpO2: 100%   Weight: 98 kg (216 lb)   Height: 157.5 cm (62\")     Vitals:    06/26/25 1432   PainSc: 0-No pain          ECOG score: 1           Vitals " reviewed.    ECOG: (1) Restricted in Physically Strenuous Activity, Ambulatory & Able to Do Work of Light Nature    Lab Results   Component Value Date    HGB 8.7 (L) 06/26/2025    HCT 27.8 (L) 06/26/2025    .3 (H) 06/26/2025     06/26/2025    WBC 5.88 06/26/2025    NEUTROABS 4.03 06/26/2025    LYMPHSABS 0.51 (L) 06/26/2025    MONOSABS 0.57 06/26/2025    EOSABS 0.74 (H) 06/26/2025    BASOSABS 0.01 06/26/2025       Lab Results   Component Value Date    GLUCOSE 102 (H) 06/20/2025    BUN 20.1 06/20/2025    CREATININE 1.50 (H) 06/20/2025     06/20/2025    K 3.2 (L) 06/20/2025    CL 97 (L) 06/20/2025    CO2 27.6 06/20/2025    CALCIUM 9.2 06/20/2025    PROTEINTOT 6.7 06/20/2025    ALBUMIN 4.3 06/20/2025    BILITOT 0.4 06/20/2025    ALKPHOS 63 06/20/2025    AST 33 (H) 06/20/2025    ALT 9 06/20/2025             ASSESSMENT & PLAN:  1.  Anemia since at least 2016 managed with erythropoietin injections by [facility].  With polypectomies from cecum June 2021 Dr. Marino's and capsule endoscopy at [facility] November 2016 showing small bowel AVMs cecal polyps Bj Rivera September 2016 and Dr. Reynaldo Fritz polypectomy 2007.  EGD Dr. Rivera 2012, 2015, 2016 with dilation of esophagus.  [facility] enteroscopy single balloon with fluoroscopy [facility] with 1 small nonbleeding AVM ablated.  2.  Cardiovascular disease with MI 2015 stent RCA [facility]  3.  Atrial fib managed by [facility] [place] 2016 with pacemaker   4.  Repetitive falls with possible concussion April 2023 and January 2024  5.  Wedge biopsy left upper lobe Dr. Faisal Lundy October 2016  6.  Eczema  7.  Reflux  8.  Hypertension  9.  Hypothyroidism  10.  Lichen sclerosis on vulvar biopsy 1982  11.  Parkinson's diagnosed 2007 [facility]  12.  Sleep apnea managed by [facility] pulmonary medicine  13.  Subcutaneous lupus diagnosed [facility] Carole Roy February 2023  14.  2019 back surgeries   Lencho Gamino and wound infection drained by Dr. Alonso in 2018  15.  Urethral dilations with Dr. Terrence Mckenzie  16.  Bilateral total knee replacements Dr. Springer.  17.  CHF    Hematology history timeline:  -10/26/2023 hematology note Dr. Nunez.  Received parenteral iron September 2023 with hemoglobin stable 9.8.  He states this is multifactorial anemia due to iron deficiency suspecting GI blood loss due to history of AVMs and chronic kidney disease with erythropoietin deficiency.  White count and platelets normal.  Plan for continued Procrit per protocol with monitoring of iron indices periodically.  Numerous prior B12 levels normal during 2023.  Patient considering colonoscopy  -12/28/2023 40,000 units Procrit last dose given at [facility] being held for hemoglobin over 10.  -1/22/2024 hemoglobin 11 received IV iron 510 mg 1/22/2024 and 1/31/2024.  -3/6/2024 ferritin 263 with iron normal 115 and iron saturation 40% with normal total iron binding capacity 291.  Creatinine 1.24 GFR 45.  Hemoglobin 8.7 with platelets 132,000.  MCV 96 with normal RDW.    -3/12/2024 [facility]ology consult: I reviewed her extensive records she brought me that she collated herself and the note from [facility] I summarized above.  She has anemia of renal disease and iron deficiency due to periodic GI losses intolerant of oral iron because she cannot take it with the Sinemet for her Parkinson's that she takes 6 times a day so she gets periodic IV iron.  For now, with her hemoglobin of 8.7 and GFR 45 with normal iron indices we will hop back on the Procrit has per protocol 40,000 units subcu weekly holding for hemoglobin over 10 and she will see my nurse practitioner back in May.  Following that my nurse practitioner will watch her and periodically we will need to check her iron indices as she does have a history of AVMs and may need periodic IV iron as well.  I discussed with her that I would not put her  through a bone marrow biopsy despite the mild thrombocytopenia given normochromic normocytic indices and normal Red cell distribution with an unremarkable differential and the fact that, even if I found myelodysplasia, at most what I would do is just Procrit which we are already doing.  She has not had jaundice or icterus or other evidence is to suggest hemolysis and I will not work that up.  In essence we will just continue the care that she was already receiving at [facility].  She has had thorough GI evaluations as outlined above.    -5/8/2024 [facility] hematology clinic follow-up: We are administering Procrit for her anemia of renal disease if her hemoglobin is less than 10.  She has not required Procrit since we saw her initially on 3/12/2024.  In order to try and simplify things as she is unable to get out without assistance I will change her monitoring to every 2 weeks.  We will check her CBC every 2 weeks and we will get Procrit if her hemoglobin is less than 10.  She had been going to [place] for her Procrit since there is a lab there and they can do it on Monday however she lives here in [place].  She does have a caregiver or her daughter who can get her to the lab therefore we will have her get her labs here locally and we will administer the Procrit in [place] if parameters are met.  If this arrangement does not work out for her then we will get her back to [place] and I discussed with her and her daughter that we also have a clinic in [place] who can see her on the days of her Procrit.  She is following now with Dr. Teague for management of her CHF.  I did ask her daughter to speak with either her PCP or cardiologist about home health for additional management of her CHF.  Her iron studies were normal when checked in March.  Those are built in to assess periodically while on Procrit, next due in June.  Her daughter did state that she thinks her mother received iron while recent  inpatient.    -7/24/2024 [facility] hematology clinic follow-up: Karina has been in rehab since we saw her last for weakness and pain in her right hip and lower extremity.  She is now back home and continues to work with home PT.  She has not required Procrit since March.  Currently her hemoglobin is 9.8 therefore we will administer Procrit today. Her creatinine is stable at 1.40, CBC and CMP otherwise unremarkable.  It is difficult for her to come and go with mobility issues.  We will continue to monitor her CBC every 2 weeks and will administer Procrit if her hemoglobin is less than 10.  Since she currently has home health we have arranged to have her CBC checked at home, when she is discharged from home health we will resume checking her CBC here in our office.  She has not required any parenteral iron for some time, current MCV is 98 with MCHC of 32.2.  We will repeat her iron studies again in October as these are built into her Procrit plan, I will check them sooner if her MCV starts to decrease or she has worsening anemia.    -9/18/2024 [facility] hematology clinic follow-up: Overall her hemoglobin remained stable, on Tuesday it was 10.5. She has not required Procrit since 7/24/2024, we are checking her CBC every 2 weeks as it is inconvenient for her to get out weekly and she has not been requiring Procrit weekly, her hemoglobin in August was 10.8 and 11.3.  I will repeat her CBC today just to recheck and we are drawing her ferritin and iron profile.  If for some reason her hemoglobin is below 10 we will bring her back for Procrit.  If her iron levels are decreasing then we will set her up for parenteral iron.  She has follow-up scheduled with nephrology ongoing.  She is going to see Dr. Teague next week regarding her CHF.  She is feeling better since diuresis in the ED earlier this week.    -11/27/2024 [facility] hematology telemedicine follow-up: Karina has been receiving Procrit on average this year about every 4  months, she was given a Procrit shot in March 2024 again in July and most recently on 11/13/2024. Her current hemoglobin from 11/26/2024 is normal at 12 with hematocrit 36%, WBC is normal at 6.04, platelet count normal at 171,000.  Her iron studies are normal, ferritin is 415.  CMP with BUN of 61 creatinine 1.78 otherwise normal.  She does follow with nephrology and also closely with cardiology who is managing her diuretics.  She feels much better after some changes in her diuretics with now decreased fluids on board.  At this time we will check her CBC monthly rather than every 2 weeks due to the infrequent need of her Procrit and also with current hemoglobin normal.  We will see how she does over the next 3 months.    -2/17/2025 [facility] hematology clinic follow-up: Karina overall continues to do well on current therapy with Retacrit given when hemoglobin is less than 10.  CBC from today shows hemoglobin of 10.8. She last received Retacrit in November, prior to that it was July, she typically has been receiving Retacrit about every 3-4 months.  We did discuss checking her CBC every 2 weeks rather then monthly but she is pleased with current schedule and it works out also well for her daughter who works and is bringing her to her appointments.  Currently she feels good and has no new symptoms.  We will continue Retacrit every 4 weeks if hemoglobin is less than 10.  Iron studies were checked today and are pending, iron studies in November were normal.  Addendum: Ferritin elevated 588.  Iron normal 71 with saturation normal 21% with normal transferrin and total iron binding capacity.    - 6/15/2025 through 6/16/2025 [facility] admission for dyspnea.  Lower extremity edema.  Was in [place] 1 week prior with stress test showing no need for cardiac cath which subsequently was done where stent was placed for prior clogged stent.  Received diuresis for elevated BNP and troponin.  Ejection fraction  60-65%.  Follow-up with cardiology.  On Eliquis and Plavix for paroxysmal atrial fibrillation and coronary disease.  On Sinemet and pramipexole for Parkinson's.  - 6/20/2025 hemoglobin 9.9 .3 otherwise unremarkable CBC.  Creatinine 1.44 potassium 3.2 chloride 97 AST 33 otherwise unremarkable CMP.    -6/26/2025 [facility] hematology follow-up: She is having vaginal bleeding due to see gynecologist tomorrow.  That may impart explain the dip in her hemoglobin now 8.7 but still with an MCV of 103.  She is only been getting the Procrit once a month and we will try it every other week.  Her daughter has work difficulties that make it hard for her to come more frequently than once a month but she thinks she might be able to do it every 2 weeks.  Will see her back in 8 weeks to see what the trend is and if that is insufficient then we may need to go to weekly and try to figure out transportation for her.    Total time of care today inclusive of time spent today prior to patient's arrival reviewing interval data and during visit translating patient putting forth plan as outlined after visit instituting this plan took 1 hour patient care time throughout the day today.  Gregory Linton MD    06/26/2025

## 2025-06-27 ENCOUNTER — HOSPITAL ENCOUNTER (EMERGENCY)
Facility: HOSPITAL | Age: 75
Discharge: HOME | End: 2025-06-27
Attending: STUDENT IN AN ORGANIZED HEALTH CARE EDUCATION/TRAINING PROGRAM
Payer: MEDICARE

## 2025-06-27 ENCOUNTER — RESULTS FOLLOW-UP (OUTPATIENT)
Dept: FAMILY MEDICINE CLINIC | Facility: CLINIC | Age: 75
End: 2025-06-27

## 2025-06-27 VITALS
RESPIRATION RATE: 16 BRPM | OXYGEN SATURATION: 100 % | DIASTOLIC BLOOD PRESSURE: 58 MMHG | WEIGHT: 214 LBS | HEART RATE: 84 BPM | BODY MASS INDEX: 39.38 KG/M2 | TEMPERATURE: 98.5 F | SYSTOLIC BLOOD PRESSURE: 110 MMHG | HEIGHT: 62 IN

## 2025-06-27 DIAGNOSIS — R93.89 ENDOMETRIAL THICKENING ON ULTRASOUND: ICD-10-CM

## 2025-06-27 DIAGNOSIS — N39.0 ACUTE UTI (URINARY TRACT INFECTION): ICD-10-CM

## 2025-06-27 DIAGNOSIS — D64.9 CHRONIC ANEMIA: Primary | ICD-10-CM

## 2025-06-27 DIAGNOSIS — N93.9 VAGINAL BLEEDING: ICD-10-CM

## 2025-06-27 LAB
25(OH)D3+25(OH)D2 SERPL-MCNC: 50.3 NG/ML (ref 30–100)
ABO GROUP BLD: NORMAL
ALBUMIN SERPL-MCNC: 3.8 G/DL (ref 3.5–5.2)
ALBUMIN SERPL-MCNC: 4 G/DL (ref 3.8–4.8)
ALBUMIN/GLOB SERPL: 1.8 G/DL
ALP SERPL-CCNC: 62 U/L (ref 39–117)
ALP SERPL-CCNC: 67 IU/L (ref 44–121)
ALT SERPL W P-5'-P-CCNC: 5 U/L (ref 1–33)
ALT SERPL-CCNC: 18 IU/L (ref 0–32)
ANION GAP SERPL CALCULATED.3IONS-SCNC: 9.8 MMOL/L (ref 5–15)
APTT PPP: 31 SECONDS (ref 60–90)
AST SERPL-CCNC: 16 IU/L (ref 0–40)
AST SERPL-CCNC: 22 U/L (ref 1–32)
BACTERIA UR QL AUTO: ABNORMAL /HPF
BASOPHILS # BLD AUTO: 0.05 10*3/MM3 (ref 0–0.2)
BASOPHILS NFR BLD AUTO: 1 % (ref 0–1.5)
BILIRUB SERPL-MCNC: 0.3 MG/DL (ref 0–1.2)
BILIRUB SERPL-MCNC: 0.3 MG/DL (ref 0–1.2)
BILIRUB UR QL STRIP: NEGATIVE
BLD GP AB SCN SERPL QL: NEGATIVE
BUN SERPL-MCNC: 20.4 MG/DL (ref 8–23)
BUN SERPL-MCNC: 22 MG/DL (ref 8–27)
BUN/CREAT SERPL: 16 (ref 12–28)
BUN/CREAT SERPL: 16.5 (ref 7–25)
CALCIUM SERPL-MCNC: 9.1 MG/DL (ref 8.7–10.3)
CALCIUM SPEC-SCNC: 8.9 MG/DL (ref 8.6–10.5)
CHLORIDE SERPL-SCNC: 105 MMOL/L (ref 96–106)
CHLORIDE SERPL-SCNC: 106 MMOL/L (ref 98–107)
CHOLEST SERPL-MCNC: 153 MG/DL (ref 100–199)
CLARITY UR: ABNORMAL
CO2 SERPL-SCNC: 20 MMOL/L (ref 20–29)
CO2 SERPL-SCNC: 25.2 MMOL/L (ref 22–29)
COLOR UR: YELLOW
CREAT SERPL-MCNC: 1.24 MG/DL (ref 0.57–1)
CREAT SERPL-MCNC: 1.34 MG/DL (ref 0.57–1)
DEPRECATED RDW RBC AUTO: 55.3 FL (ref 37–54)
EGFRCR SERPLBLD CKD-EPI 2021: 41 ML/MIN/1.73
EGFRCR SERPLBLD CKD-EPI 2021: 45.2 ML/MIN/1.73
EOSINOPHIL # BLD AUTO: 0.72 10*3/MM3 (ref 0–0.4)
EOSINOPHIL NFR BLD AUTO: 14.2 % (ref 0.3–6.2)
ERYTHROCYTE [DISTWIDTH] IN BLOOD BY AUTOMATED COUNT: 14.6 % (ref 12.3–15.4)
GLOBULIN SER CALC-MCNC: 1.9 G/DL (ref 1.5–4.5)
GLOBULIN UR ELPH-MCNC: 2.1 GM/DL
GLUCOSE SERPL-MCNC: 101 MG/DL (ref 65–99)
GLUCOSE SERPL-MCNC: 102 MG/DL (ref 70–99)
GLUCOSE UR STRIP-MCNC: ABNORMAL MG/DL
HBA1C MFR BLD: 5.4 % (ref 4.8–5.6)
HCT VFR BLD AUTO: 28.4 % (ref 34–46.6)
HDLC SERPL-MCNC: 50 MG/DL
HGB BLD-MCNC: 8.9 G/DL (ref 12–15.9)
HGB UR QL STRIP.AUTO: ABNORMAL
HYALINE CASTS UR QL AUTO: ABNORMAL /LPF
IMM GRANULOCYTES # BLD AUTO: 0.04 10*3/MM3 (ref 0–0.05)
IMM GRANULOCYTES NFR BLD AUTO: 0.8 % (ref 0–0.5)
INR PPP: 1.35 (ref 0.89–1.12)
KETONES UR QL STRIP: NEGATIVE
LDLC SERPL CALC-MCNC: 88 MG/DL (ref 0–99)
LEUKOCYTE ESTERASE UR QL STRIP.AUTO: ABNORMAL
LYMPHOCYTES # BLD AUTO: 0.5 10*3/MM3 (ref 0.7–3.1)
LYMPHOCYTES NFR BLD AUTO: 9.8 % (ref 19.6–45.3)
MCH RBC QN AUTO: 32.4 PG (ref 26.6–33)
MCHC RBC AUTO-ENTMCNC: 31.3 G/DL (ref 31.5–35.7)
MCV RBC AUTO: 103.3 FL (ref 79–97)
MONOCYTES # BLD AUTO: 0.61 10*3/MM3 (ref 0.1–0.9)
MONOCYTES NFR BLD AUTO: 12 % (ref 5–12)
NEUTROPHILS NFR BLD AUTO: 3.16 10*3/MM3 (ref 1.7–7)
NEUTROPHILS NFR BLD AUTO: 62.2 % (ref 42.7–76)
NITRITE UR QL STRIP: POSITIVE
NRBC BLD AUTO-RTO: 0 /100 WBC (ref 0–0.2)
PH UR STRIP.AUTO: 6.5 [PH] (ref 5–8)
PLATELET # BLD AUTO: 161 10*3/MM3 (ref 140–450)
PMV BLD AUTO: 9.8 FL (ref 6–12)
POTASSIUM SERPL-SCNC: 3.8 MMOL/L (ref 3.5–5.2)
POTASSIUM SERPL-SCNC: 4.4 MMOL/L (ref 3.5–5.2)
PROT SERPL-MCNC: 5.9 G/DL (ref 6–8.5)
PROT SERPL-MCNC: 5.9 G/DL (ref 6–8.5)
PROT UR QL STRIP: ABNORMAL
PROTHROMBIN TIME: 17.6 SECONDS (ref 12.2–15.3)
RBC # BLD AUTO: 2.75 10*6/MM3 (ref 3.77–5.28)
RBC # UR STRIP: ABNORMAL /HPF
REF LAB TEST METHOD: ABNORMAL
RH BLD: POSITIVE
SODIUM SERPL-SCNC: 140 MMOL/L (ref 134–144)
SODIUM SERPL-SCNC: 141 MMOL/L (ref 136–145)
SP GR UR STRIP: 1.01 (ref 1–1.03)
SQUAMOUS #/AREA URNS HPF: ABNORMAL /HPF
T&S EXPIRATION DATE: NORMAL
TRIGL SERPL-MCNC: 81 MG/DL (ref 0–149)
TSH SERPL DL<=0.005 MIU/L-ACNC: 3.06 UIU/ML (ref 0.45–4.5)
UROBILINOGEN UR QL STRIP: ABNORMAL
VIT B12 SERPL-MCNC: 964 PG/ML (ref 232–1245)
VLDLC SERPL CALC-MCNC: 15 MG/DL (ref 5–40)
WBC # UR STRIP: ABNORMAL /HPF
WBC NRBC COR # BLD AUTO: 5.08 10*3/MM3 (ref 3.4–10.8)

## 2025-06-27 PROCEDURE — 81001 URINALYSIS AUTO W/SCOPE: CPT | Performed by: STUDENT IN AN ORGANIZED HEALTH CARE EDUCATION/TRAINING PROGRAM

## 2025-06-27 PROCEDURE — 87147 CULTURE TYPE IMMUNOLOGIC: CPT | Performed by: STUDENT IN AN ORGANIZED HEALTH CARE EDUCATION/TRAINING PROGRAM

## 2025-06-27 PROCEDURE — 86901 BLOOD TYPING SEROLOGIC RH(D): CPT | Performed by: STUDENT IN AN ORGANIZED HEALTH CARE EDUCATION/TRAINING PROGRAM

## 2025-06-27 PROCEDURE — 80053 COMPREHEN METABOLIC PANEL: CPT | Performed by: STUDENT IN AN ORGANIZED HEALTH CARE EDUCATION/TRAINING PROGRAM

## 2025-06-27 PROCEDURE — 99283 EMERGENCY DEPT VISIT LOW MDM: CPT

## 2025-06-27 PROCEDURE — 36415 COLL VENOUS BLD VENIPUNCTURE: CPT

## 2025-06-27 PROCEDURE — 85730 THROMBOPLASTIN TIME PARTIAL: CPT | Performed by: STUDENT IN AN ORGANIZED HEALTH CARE EDUCATION/TRAINING PROGRAM

## 2025-06-27 PROCEDURE — 86900 BLOOD TYPING SEROLOGIC ABO: CPT | Performed by: STUDENT IN AN ORGANIZED HEALTH CARE EDUCATION/TRAINING PROGRAM

## 2025-06-27 PROCEDURE — 87040 BLOOD CULTURE FOR BACTERIA: CPT | Performed by: STUDENT IN AN ORGANIZED HEALTH CARE EDUCATION/TRAINING PROGRAM

## 2025-06-27 PROCEDURE — 87186 SC STD MICRODIL/AGAR DIL: CPT | Performed by: STUDENT IN AN ORGANIZED HEALTH CARE EDUCATION/TRAINING PROGRAM

## 2025-06-27 PROCEDURE — 85610 PROTHROMBIN TIME: CPT | Performed by: STUDENT IN AN ORGANIZED HEALTH CARE EDUCATION/TRAINING PROGRAM

## 2025-06-27 PROCEDURE — 87086 URINE CULTURE/COLONY COUNT: CPT | Performed by: STUDENT IN AN ORGANIZED HEALTH CARE EDUCATION/TRAINING PROGRAM

## 2025-06-27 PROCEDURE — 85025 COMPLETE CBC W/AUTO DIFF WBC: CPT | Performed by: STUDENT IN AN ORGANIZED HEALTH CARE EDUCATION/TRAINING PROGRAM

## 2025-06-27 PROCEDURE — 87088 URINE BACTERIA CULTURE: CPT | Performed by: STUDENT IN AN ORGANIZED HEALTH CARE EDUCATION/TRAINING PROGRAM

## 2025-06-27 PROCEDURE — 86850 RBC ANTIBODY SCREEN: CPT | Performed by: STUDENT IN AN ORGANIZED HEALTH CARE EDUCATION/TRAINING PROGRAM

## 2025-06-27 RX ORDER — MEDROXYPROGESTERONE ACETATE 10 MG
10 TABLET ORAL DAILY
Qty: 10 TABLET | Refills: 0 | Status: SHIPPED | OUTPATIENT
Start: 2025-06-27 | End: 2025-07-07

## 2025-06-27 RX ORDER — MEDROXYPROGESTERONE ACETATE 10 MG
10 TABLET ORAL ONCE
Status: COMPLETED | OUTPATIENT
Start: 2025-06-27 | End: 2025-06-27

## 2025-06-27 RX ORDER — CEFUROXIME AXETIL 500 MG/1
500 TABLET ORAL 2 TIMES DAILY
Qty: 14 TABLET | Refills: 0 | Status: SHIPPED | OUTPATIENT
Start: 2025-06-27 | End: 2025-07-04

## 2025-06-27 RX ORDER — CEFUROXIME AXETIL 250 MG/1
500 TABLET ORAL ONCE
Status: COMPLETED | OUTPATIENT
Start: 2025-06-27 | End: 2025-06-27

## 2025-06-27 RX ADMIN — MEDROXYPROGESTERONE ACETATE 10 MG: 10 TABLET ORAL at 16:04

## 2025-06-27 RX ADMIN — CEFUROXIME AXETIL 500 MG: 250 TABLET, FILM COATED ORAL at 14:18

## 2025-06-27 NOTE — DISCHARGE INSTRUCTIONS
Gynecology has been messaged and they are trying to push up your appointment.  In the meantime take the provided medicine which should help prevent any recurrence of the bleeding and the provided antibiotic to treat urinary tract infection.  Return to the ED or seek other medical care for any concerning symptoms.

## 2025-06-27 NOTE — ED PROVIDER NOTES
EMERGENCY DEPARTMENT ENCOUNTER    Pt Name: Joanna Cross  MRN: [id number]  Pt :   1950  Room Number:    Date of encounter:  2025  PCP: Reynaldo Fritz MD  ED Provider: Adelfo Moon MD    Historian: Patient      HPI:  Chief Complaint: Vaginal bleeding        Context: Joanna Cross is a 74-year-old woman who presents because of recurrence of vaginal bleeding she had this occur 1 week ago and was seen in this emergency department when ultrasound showed second endometrium bleeding stopped and she is scheduled to follow-up with gynecology for biopsy but not until the .  Then last night she must of had recurrence of the bleeding because noticed her pure wick was saturated this morning she is not sure if the bleeding is ongoing.  She denies any new symptoms.  No other complaints at this time.       PAST MEDICAL HISTORY  Past Medical History:   Diagnosis Date    Allergic     Allergic rhinitis     Anemia     Ankle problem     thinks back related causing pain     Anxiety     Asthma     Atrial fibrillation     AVM (arteriovenous malformation)     Back pain     Cataract     CHF (congestive heart failure) 2024    Probably had before then but no one actually told me I had it.    Cholelithiasis 01    Gallbladder Removed    Chronic kidney disease     stage 3 per pt    Chronic lung disease 2022    CKD (chronic kidney disease)     Clotting disorder     AVM - small intestine    Colon polyp 09/15/16    Coronary artery disease involving native coronary artery without angina pectoris 2017    COVID-19 vaccine series completed     Cystic fibrosis     Diastolic dysfunction     Gastrocnemius muscle tear     left medial 91    Generalized osteoarthritis     GERD (gastroesophageal reflux disease)     Gestational diabetes     GIB (gastrointestinal bleeding) 2016    d/t xarelto     Headache     Hearing decreased, bilateral     HAS HEARING AID, NOT WEARING IT CURRENTLY     Hiatal hernia     History of medical problems 04/23/82    Lichens Sclerosis    History of shingles     History of transfusion 2016    no reaction recalled     HL (hearing loss) 2019    Got hearing aids    Hyperlipidemia LDL goal <70 03/01/2017    Hypertension     Hyperthyroidism     Hypothyroidism     IBS (irritable bowel syndrome)     Inflammatory bowel disease     Klebsiella pneumonia     Lichen sclerosus     Lupus     subQ    Mouth problem     mouth guard used since pt bites tongue and lips excessively at night if not- with bipap     MRSA infection 2018    Myocardial infarction     Obesity     On home oxygen therapy     2L of oxygen all the time due to current congestion     Osteopenia 2016    Osteoporosis     Parkinson's disease     Peripheral vascular disease     Pleurisy     Pneumonia     Puerperal sepsis with acute hypoxic respiratory failure     emergent intubation- 2016    Pulmonary embolism     Renal insufficiency     Right leg pain     from back issues     Salivary gland stone     Sciatic nerve pain     Seborrheic dermatitis     Skin cancer     on back     Sleep apnea     CPAP AND HOME O2 2L/M    Sleep apnea, obstructive 04/15/01    TIA (transient ischemic attack) 2014    no residual effects    Tremor     Type 2 diabetes mellitus     UTI (urinary tract infection)     Visual impairment     Vitamin D deficiency 09/08/2022    Wears glasses          PAST SURGICAL HISTORY  Past Surgical History:   Procedure Laterality Date    ABLATION OF DYSRHYTHMIC FOCUS  05/16/13    Laser Ablation - Rt Leg    BACK SURGERY      l4-l5 laminectomy     BICEPS TENDON REPAIR Right     shoulder    BREAST BIOPSY Left 05/2004    excisional, benign    BRONCHOSCOPY RIGID / FLEXIBLE      2016    BUNIONECTOMY Right     CARDIAC CATHETERIZATION      CARDIAC CATHETERIZATION N/A 02/01/2019    Procedure: Left Heart Cath;  Surgeon: Albertina Corona MD;  Location: [facility] CATH INVASIVE LOCATION;  Service: Cardiology    CARDIAC  ELECTROPHYSIOLOGY PROCEDURE N/A 01/26/2024    Procedure: Lead Revision RA or RV, PPM or ICD;  Surgeon: Lauro Francois MD;  Location:  [facility] INVASIVE LOCATION;  Service: Cardiovascular;  Laterality: N/A;    CARDIAC SURGERY      CATARACT EXTRACTION, BILATERAL  06/26/2024    (R) eye   08/16/2024 (L) eye    CHOLECYSTECTOMY      COLON SURGERY      COLONOSCOPY  2016    COLONOSCOPY N/A 06/17/2021    Procedure: COLONOSCOPY WITH POLYPECTOMY;  Surgeon: Trenton Sosa MD;  Location: [facility] ENDOSCOPY;  Service: Gastroenterology;  Laterality: N/A;    CORONARY STENT PLACEMENT      x1 stent    CYST REMOVAL      left ear, upper left back 2001    CYSTOSCOPY      x2  18 and 20 -   in 20 urethra dilation     DIAGNOSTIC LAPAROSCOPY  1981    ENDOSCOPIC FUNCTIONAL SINUS SURGERY (FESS)  2011    ENDOSCOPY  2016    ENTEROSCOPY VIA STOMA      with single ballon with fluoro     EYE SURGERY  7/26 & 8/16/24    Cataracts removed from each eye.    HAMMER TOE REPAIR Left     INCISION AND DRAINAGE OF WOUND  2018    back with wound infection     INSERT / REPLACE / REMOVE PACEMAKER  11/10/16    [facility]    JOINT REPLACEMENT      KNEE ARTHROSCOPY      LASER ABLATION      right leg 13    LIPOMA EXCISION  1999    left leg     LUMBAR LAMINECTOMY DISCECTOMY DECOMPRESSION N/A 07/06/2018    Procedure: LUMBAR LAMINECTOMY L4-5, HEMILAMIINECTOMY RIGHT L5-S1, FORAMINOTOMY L5-S1;  Surgeon: Lencho Gamino MD;  Location:  [facility] OR;  Service: Neurosurgery    LUMBAR LAMINECTOMY DISCECTOMY DECOMPRESSION N/A 09/19/2018    Procedure: INCISION AND DRAINAGE BACK WITH WOUND EXPLORATION;  Surgeon: Ritchie García MD;  Location:  [facility];  Service: Neurosurgery    LUNG BIOPSY Left 2016    OTHER SURGICAL HISTORY      ct scan of chest and sinuses and lower back     OTHER SURGICAL HISTORY      various echos     OTHER SURGICAL HISTORY      electroencephalogram 16,   emg  ncv tests 2007    OTHER SURGICAL HISTORY      mra 2007  and various mri with  xrays, nuclear medicine lung ventilation with perfusion test 2016 with pft     OTHER SURGICAL HISTORY      barium swallow testing 15, 12, 12    OTHER SURGICAL HISTORY      vaginal ultrasound- ,   vas clementina lower extrem , vas venous duplex lower extrem bilat     OTHER SURGICAL HISTORY      wdge biopsy spring of lung     OTHER SURGICAL HISTORY      emergent intubation- hypoxic resp failure     OTHER SURGICAL HISTORY      2024 ppm generator change and lead revision per Dr. Francois    PACEMAKER IMPLANTATION  2016    sss    REPLACEMENT TOTAL KNEE Bilateral     left knee , right 2012 per dr acuna     REPLACEMENT TOTAL KNEE Bilateral     SHOULDER ARTHROSCOPY Bilateral     -left, - right     SKIN BIOPSY      skin cancer back 2016    [facility]     SPINE SURGERY  18    SUBTOTAL HYSTERECTOMY      MADHAV      TEETH EXTRACTION      x2    TOTAL SHOULDER ARTHROPLASTY W/ DISTAL CLAVICLE EXCISION Left 10/24/2022    Procedure: REVERSE TOTAL SHOULDER ARTHROPLASTY, BICEPS TENODESIS - LEFT;  Surgeon: Sammy Yo MD;  Location:  [facility];  Service: Orthopedics;  Laterality: Left;    TOTAL SHOULDER ARTHROPLASTY W/ DISTAL CLAVICLE EXCISION Right 2023    Procedure: REVERSE TOTAL SHOULDER  ARTHROPLASTY WITH BICEPS TENODESIS - RIGHT;  Surgeon: Sammy Yo MD;  Location:  [facility];  Service: Orthopedics;  Laterality: Right;    TUBAL ABDOMINAL LIGATION Bilateral     WISDOM TOOTH EXTRACTION           FAMILY HISTORY  Family History   Problem Relation Age of Onset    Kidney disease Mother     Coronary artery disease Mother     Hypertension Mother             Heart disease Mother             Hyperlipidemia Mother             Arthritis Mother     Depression Mother     Anxiety disorder Mother     Coronary artery disease Father     Hypertension Father             Hypothyroidism Father     Cancer Father         Oral cancer    Heart  disease Father             Thyroid disease Father     Vision loss Father         Macular Degeneration    Coronary artery disease Brother     Hypertension Brother     Heart disease Brother         Multiple stents, by-pass surgery    Testicular cancer Brother     Kidney cancer Maternal Uncle     Testicular cancer Maternal Uncle     Anxiety disorder Son     Colon polyps Neg Hx     Colon cancer Neg Hx          SOCIAL HISTORY  Social History     Socioeconomic History    Marital status:      Spouse name: N/A    Number of children: 4    Years of education: College    Highest education level: Master's degree (e.g., MA, MS, Randi, MEd, MSW, NELLA)   Tobacco Use    Smoking status: Never     Passive exposure: Past    Smokeless tobacco: Never    Tobacco comments:     Father and mother smoked for several years.   Vaping Use    Vaping status: Never Used    Passive vaping exposure: Yes   Substance and Sexual Activity    Alcohol use: Never    Drug use: Never    Sexual activity: Not Currently     Partners: Male     Birth control/protection: Post-menopausal, Tubal ligation, Vaginal insert contraception         ALLERGIES  Amlodipine besylate; Entacapone; Epinephrine; Hydrocodone; Levemir [insulin detemir]; Penicillins; Xarelto [rivaroxaban]; Aricept [donepezil hcl]; Benztropine mesylate; Cogentin [benztropine]; Compazine [prochlorperazine edisylate]; Duraprep [antiseptic products, misc.]; Haldol [haloperidol lactate]; Lisinopril; Statins; Sulfamethoxazole; Tarka [trandolapril-verapamil hcl er]; Toprol xl [metoprolol tartrate]; and Trimethoprim        REVIEW OF SYSTEMS  Review of Systems       All systems reviewed and negative except for those discussed in HPI.       PHYSICAL EXAM    I have reviewed the triage vital signs and nursing notes.    ED Triage Vitals [25 1136]   Temp Heart Rate Resp BP SpO2   98.5 °F (36.9 °C) 80 16 138/67 98 %      Temp src Heart Rate Source Patient Position BP Location FiO2 (%)   Oral  Monitor Sitting Left arm --       Physical Exam  GENERAL:   Appears chronically ill  HENT: Nares patent.  Nasal cannula in place  EYES: No scleral icterus.  CV: Regular rhythm, regular rate.  RESPIRATORY: Normal effort.  No audible wheezes, rales or rhonchi.  ABDOMEN: Soft, nontender  Vaginal: Speculum exam showing dark blood in the vaginal vault without active or pulsatile bleeding  MUSCULOSKELETAL: No deformities.   NEURO: Alert, moves all extremities, follows commands.  SKIN: Warm, dry, no rash visualized.      LAB RESULTS  Recent Results (from the past 24 hours)   Comprehensive Metabolic Panel    Collection Time: 06/27/25  1:10 PM    Specimen: Blood   Result Value Ref Range    Glucose 101 (H) 65 - 99 mg/dL    BUN 20.4 8.0 - 23.0 mg/dL    Creatinine 1.24 (H) 0.57 - 1.00 mg/dL    Sodium 141 136 - 145 mmol/L    Potassium 3.8 3.5 - 5.2 mmol/L    Chloride 106 98 - 107 mmol/L    CO2 25.2 22.0 - 29.0 mmol/L    Calcium 8.9 8.6 - 10.5 mg/dL    Total Protein 5.9 (L) 6.0 - 8.5 g/dL    Albumin 3.8 3.5 - 5.2 g/dL    ALT (SGPT) 5 1 - 33 U/L    AST (SGOT) 22 1 - 32 U/L    Alkaline Phosphatase 62 39 - 117 U/L    Total Bilirubin 0.3 0.0 - 1.2 mg/dL    Globulin 2.1 gm/dL    A/G Ratio 1.8 g/dL    BUN/Creatinine Ratio 16.5 7.0 - 25.0    Anion Gap 9.8 5.0 - 15.0 mmol/L    eGFR 45.2 (L) >60.0 mL/min/1.73   Protime-INR    Collection Time: 06/27/25  1:10 PM    Specimen: Blood   Result Value Ref Range    Protime 17.6 (H) 12.2 - 15.3 Seconds    INR 1.35 (H) 0.89 - 1.12   aPTT    Collection Time: 06/27/25  1:10 PM    Specimen: Blood   Result Value Ref Range    PTT 31.0 (L) 60.0 - 90.0 seconds   Type & Screen    Collection Time: 06/27/25  1:10 PM    Specimen: Blood   Result Value Ref Range    ABO Type A     RH type Positive     Antibody Screen Negative     T&S Expiration Date 6/30/2025 11:59:59 PM    CBC Auto Differential    Collection Time: 06/27/25  1:10 PM    Specimen: Blood   Result Value Ref Range    WBC 5.08 3.40 - 10.80 10*3/mm3     RBC 2.75 (L) 3.77 - 5.28 10*6/mm3    Hemoglobin 8.9 (L) 12.0 - 15.9 g/dL    Hematocrit 28.4 (L) 34.0 - 46.6 %    .3 (H) 79.0 - 97.0 fL    MCH 32.4 26.6 - 33.0 pg    MCHC 31.3 (L) 31.5 - 35.7 g/dL    RDW 14.6 12.3 - 15.4 %    RDW-SD 55.3 (H) 37.0 - 54.0 fl    MPV 9.8 6.0 - 12.0 fL    Platelets 161 140 - 450 10*3/mm3    Neutrophil % 62.2 42.7 - 76.0 %    Lymphocyte % 9.8 (L) 19.6 - 45.3 %    Monocyte % 12.0 5.0 - 12.0 %    Eosinophil % 14.2 (H) 0.3 - 6.2 %    Basophil % 1.0 0.0 - 1.5 %    Immature Grans % 0.8 (H) 0.0 - 0.5 %    Neutrophils, Absolute 3.16 1.70 - 7.00 10*3/mm3    Lymphocytes, Absolute 0.50 (L) 0.70 - 3.10 10*3/mm3    Monocytes, Absolute 0.61 0.10 - 0.90 10*3/mm3    Eosinophils, Absolute 0.72 (H) 0.00 - 0.40 10*3/mm3    Basophils, Absolute 0.05 0.00 - 0.20 10*3/mm3    Immature Grans, Absolute 0.04 0.00 - 0.05 10*3/mm3    nRBC 0.0 0.0 - 0.2 /100 WBC   Urinalysis With Microscopic If Indicated (No Culture) - Urine, Clean Catch    Collection Time: 06/27/25  1:13 PM    Specimen: Urine, Clean Catch   Result Value Ref Range    Color, UA Yellow Yellow, Straw    Appearance, UA Cloudy (A) Clear    pH, UA 6.5 5.0 - 8.0    Specific Gravity, UA 1.010 1.001 - 1.030    Glucose,  mg/dL (2+) (A) Negative    Ketones, UA Negative Negative    Bilirubin, UA Negative Negative    Blood, UA Moderate (2+) (A) Negative    Protein, UA Trace (A) Negative    Leuk Esterase, UA Large (3+) (A) Negative    Nitrite, UA Positive (A) Negative    Urobilinogen, UA 0.2 E.U./dL 0.2 - 1.0 E.U./dL   Urinalysis, Microscopic Only - Urine, Clean Catch    Collection Time: 06/27/25  1:13 PM    Specimen: Urine, Clean Catch   Result Value Ref Range    RBC, UA 3-5 (A) None Seen, 0-2 /HPF    WBC, UA Too Numerous to Count (A) None Seen, 0-2 /HPF    Bacteria, UA 4+ (A) None Seen, Trace /HPF    Squamous Epithelial Cells, UA None Seen None Seen, 0-2 /HPF    Hyaline Casts, UA None Seen 0 - 6 /LPF    Methodology Automated Microscopy        If labs  were ordered, I independently reviewed the results and considered them in treating the patient.        RADIOLOGY  No Radiology Exams Resulted Within Past 24 Hours    I ordered and independently reviewed the above noted radiographic studies.    .    See radiologist's dictation for official interpretation.        PROCEDURES    Procedures    No orders to display       MEDICATIONS GIVEN IN ER    Medications   medroxyPROGESTERone (PROVERA) tablet 10 mg (has no administration in time range)   cefuroxime (CEFTIN) tablet 500 mg (500 mg Oral Given 6/27/25 1418)         MEDICAL DECISION MAKING, PROGRESS, and CONSULTS    All labs, if obtained, have been independently reviewed by me.  All radiology studies, if obtained, have been reviewed by me and the radiologist dictating the report.  All EKGs, if obtained, have been independently viewed and interpreted by me/my attending physician.      Discussion below represents my analysis of pertinent findings related to patient's condition, differential diagnosis, treatment plan and final disposition.                                          Differential diagnosis:    Hemorrhagic shock, acute blood loss anemia,, need for transfusion, anemia, electrolyte abnormality      Additional sources:    - Discussed/ obtained information from independent historians:      - External (non-ED) record review:  Chart review of oncology note shows history of chronic anemia, CHF, atrial fibrillation, chronic anticoagulation, Parkinson's, diabetes, GI bleeding    - Chronic or social conditions impacting care: Chronic anemia, chronic anticoagulation, atrial fibrillation, Parkinson's, GI bleeding, CHF        Orders placed during this visit:  Orders Placed This Encounter   Procedures    Blood Culture - Blood,    Blood Culture - Blood,    Urine Culture - Urine,    Comprehensive Metabolic Panel    Protime-INR    aPTT    CBC Auto Differential    Urinalysis With Microscopic If Indicated (No Culture) - Urine,  Clean Catch    Urinalysis, Microscopic Only - Urine, Clean Catch    Type & Screen    CBC & Differential         Additional orders considered but not ordered:      ED Course:    Consultants: Gynecology    ED Course as of 06/27/25 1541   Fri Jun 27, 2025   1255 Chart review of oncology note shows history of chronic anemia, CHF, atrial fibrillation, chronic anticoagulation, Parkinson's, diabetes, GI bleeding [CC]   1256 This is a 74-year-old woman who presents because of recurrence of vaginal bleeding she had this occur 1 week ago and was seen in this emergency department when ultrasound showed second endometrium bleeding stopped and she is scheduled to follow-up with gynecology for biopsy but not until the 10th.  Then last night she must of had recurrence of the bleeding because noticed her pure wick was saturated this morning she is not sure if the bleeding is ongoing.  She denies any new symptoms.  No other complaints at this time. [CC]   1256 She arrived awake and alert satting well on her baseline 2 L nasal cannula she is somewhat pale appearing chart review of labs yesterday showed hemoglobin 8.7.  Repeating all of this today setting up for speculum exam to see if there is ongoing bleeding.  Will reevaluate pending initial results. [CC]   1506 Speculum exam does not show active or pulsatile bleeding but there is a fair amount of dark blood in the vaginal vault.  Labs do show worsening anemia this is stable with labs yesterday but from a week ago hemoglobin has dropped a point from 9.9-8.9 [CC]   1507 Metabolic panel showing stable elevated creatinine urinalysis showing urinary tract infection with gross pyuria bacteria and positive nitrite.  I have sent for culture and ordered cefuroxime.  She was scheduled to follow-up with Dr. Schreiber with gynecology, I have consulted to see if she would be willing to see in the hospital and discuss possible mission today because of the worsening anemia.   [CC]   1540 Case  discussed with Dr. Rg who is on-call for their group he has reviewed the chart and the patient's images he does not think she needs hospital admission at this time would recommend starting her on 10 mg medroxyprogesterone orally for the next 10 days she is scheduled to follow-up with Dr. Du with their group and he has messaged Dr. Du to try to push up that follow-up.  Patient and daughter both understandably frustrated they would like answers sooner but are understanding patient discharged in stable condition. [CC]      ED Course User Index  [CC] Adelfo Moon MD              Shared Decision Making:  After my consideration of clinical presentation and any laboratory/radiology studies obtained, I discussed the findings with the patient/patient representative who is in agreement with the treatment plan and the final disposition.   Risks and benefits of discharge and/or observation/admission were discussed.       AS OF 15:41 EDT VITALS:    BP - 115/60  HR - 71  TEMP - 98.5 °F (36.9 °C) (Oral)  O2 SATS - 100%                  DIAGNOSIS  Final diagnoses:   Chronic anemia   Vaginal bleeding   Endometrial thickening on ultrasound   Acute UTI (urinary tract infection)         DISPOSITION  DISCHARGE    Patient discharged in stable condition.    Reviewed implications of results, diagnosis, meds, responsibility to follow up, warning signs and symptoms of possible worsening, potential complications and reasons to return to ER.    Patient/Family voiced understanding of above instructions.    Discussed plan for discharge, as there is no emergent indication for admission.  Pt/family is agreeable and understands need for follow up and possible repeat testing.  Pt/family is aware that discharge does not mean that nothing is wrong but that it indicates no emergency is currently present that requires admission and they must continue care with follow-up as given below or with a physician of their choice.      FOLLOW-UP  Eulogio Du MD  [address]  [facility] [address]  [place] [id number] [phone]    Call            Medication List        New Prescriptions      cefuroxime 500 MG tablet  Commonly known as: CEFTIN  Take 1 tablet by mouth 2 (Two) Times a Day for 7 days.     medroxyPROGESTERone 10 MG tablet  Commonly known as: Provera  Take 1 tablet by mouth Daily for 10 days.               Where to Get Your Medications        These medications were sent to [id number] - [place], [place] - [address]  [address] [facility] [address] - [phone]  - [phone]   [address] , [facility] [address]      Phone: [phone]   cefuroxime 500 MG tablet  medroxyPROGESTERone 10 MG tablet             Please note that portions of this document were completed with voice recognition software.        Adelfo Moon MD  06/27/25 8838

## 2025-06-29 LAB
BACTERIA SPEC AEROBE CULT: ABNORMAL
BACTERIA SPEC AEROBE CULT: ABNORMAL

## 2025-06-30 ENCOUNTER — TELEPHONE (OUTPATIENT)
Dept: FAMILY MEDICINE CLINIC | Facility: CLINIC | Age: 75
End: 2025-06-30

## 2025-06-30 ENCOUNTER — PATIENT OUTREACH (OUTPATIENT)
Dept: CASE MANAGEMENT | Facility: OTHER | Age: 75
End: 2025-06-30
Payer: MEDICARE

## 2025-06-30 ENCOUNTER — TRANSITIONAL CARE MANAGEMENT TELEPHONE ENCOUNTER (OUTPATIENT)
Dept: CALL CENTER | Facility: HOSPITAL | Age: 75
End: 2025-06-30
Payer: MEDICARE

## 2025-06-30 ENCOUNTER — READMISSION MANAGEMENT (OUTPATIENT)
Dept: CALL CENTER | Facility: HOSPITAL | Age: 75
End: 2025-06-30
Payer: MEDICARE

## 2025-06-30 ENCOUNTER — TELEPHONE (OUTPATIENT)
Dept: OBSTETRICS AND GYNECOLOGY | Facility: CLINIC | Age: 75
End: 2025-06-30
Payer: MEDICARE

## 2025-06-30 DIAGNOSIS — I25.10 CORONARY ARTERY DISEASE INVOLVING NATIVE CORONARY ARTERY OF NATIVE HEART WITHOUT ANGINA PECTORIS: Primary | ICD-10-CM

## 2025-06-30 DIAGNOSIS — G20.B2 PARKINSON'S DISEASE WITH DYSKINESIA AND FLUCTUATING MANIFESTATIONS: ICD-10-CM

## 2025-06-30 DIAGNOSIS — I10 HYPERTENSION, ESSENTIAL: ICD-10-CM

## 2025-06-30 NOTE — TELEPHONE ENCOUNTER
Caller: Silvana Cross(POA)    Relationship: Emergency Contact    Best call back number:       [phone] (Mobile)     What is the best time to reach you:     PLEASE LEAVE A VOICEMAIL    Who are you requesting to speak with (clinical staff, provider,  specific staff member):     DIDI    What was the call regarding:     CALLER REQUESTED A CALL BACK WITH UPDATE ON HER CONCERN INCLUDED BELOW    CALLER STATED WHEN SHE SPOKE TO DIDI TO SCHEDULE APPOINTMENT FOR PATIENT, SHE AGREED TO WEDNESDAY, 7/2 AT 7:45 NOT TODAY, 6/30 AT 7:45 FOR FOLLOW UP APPOINTMENT

## 2025-06-30 NOTE — TELEPHONE ENCOUNTER
Caller: Silvana Cross(POA)    Relationship to patient: Emergency Contact    Best call back number: [phone]    Chief complaint: SEEN IN ED 6/27/25    Type of visit: ED FOLLOW UP AND NEEDS BX    Requested date: 7/1/25 OR 7/2/25 OR KEEP THE APPT ON 7/10/25     If rescheduling, when is the original appointment: 7/10/25     Additional notes:PT SEEN IN ED AGAIN ON 6/27/25 FOR BLEEDING AND TOLD TO CALL MONDAY TO SEE IF APPT COULD BE SOONER. DAUGHTER WOULD BE BRINGING HER AND HAS A VERY TOUGH SCHEDULE.

## 2025-06-30 NOTE — OUTREACH NOTE
CCM End of Month Documentation    This Chronic Medical Management Care Plan for Joanna Cross, 74 y.o. female, has been monitored and managed; reviewed and a new plan of care implemented for the month of June.  A cumulative time of 55  minutes was spent on this patient record this month, including chart review; phone call with patient; phone call with relative.    Regarding the patient's problems: has Coronary artery disease involving native coronary artery without angina pectoris; Hypertension, essential; Peripheral vascular disease; Hyperlipidemia LDL goal <70; Spinal stenosis, lumbar region, with neurogenic claudication; Overactive bladder; GERD (gastroesophageal reflux disease); Hypothyroidism; Lichen sclerosus; Parkinson's disease; MILLI treated with BiPAP; History of respiratory failure - prior respiratory failure requiring mechanical ventilation, open lung biopsy non-specific. ; Atrial fibrillation; Tachy-thai syndrome; Long term current use of antiarrhythmic drug; History of adenomatous polyp of colon; Anemia associated with stage 3 chronic renal failure; Angiodysplasia; Hx of colonic polyps; Body mass index 40.0-44.9, adult; Cardiac pacemaker in situ; Chronic low back pain; Chronic lung disease; Degeneration of lumbar intervertebral disc; Edema of lower extremity; History of malignant neoplasm of skin; History of TIA (transient ischemic attack); Hypotonic bladder; Incomplete tear of rotator cuff; Major depression in remission; Tinnitus of both ears; Primary osteoarthritis involving multiple joints; Restless legs; Seborrheic dermatitis; Transient cerebral ischemia; Muscle strain; Type 2 diabetes mellitus with hyperglycemia, without long-term current use of insulin; Vitamin B12 deficiency; Vitamin D deficiency; Chronic kidney disease, stage 3b; Osteoporosis, senile; Acute diastolic CHF (congestive heart failure); Chronic respiratory failure with hypoxia; Chronic anticoagulation; Fracture of right shoulder  with delayed healing; Chronic congestive heart failure; Acute on chronic hypoxic respiratory failure; Physical exam; Pressure injury of left buttock, stage 1; Intercostal pain; Injury of left wrist; and Frequent falls on their problem list., the following items were addressed: medical records; medications and any changes can be found within the plan section of the note.  A detailed listing of time spent for chronic care management is tracked within each outreach encounter.  Current medications include:  has a current medication list which includes the following prescription(s): accu-chek softclix lancets, albuterol, albuterol sulfate hfa, amantadine, apixaban, b complex-c, bumetanide, calcium carbonate, carbidopa-levodopa, cefuroxime, cholecalciferol, citalopram, clobetasol propionate, clonazepam, clopidogrel, farxiga, fluticasone, glucosamine-chondroit-calcium, accu-chek guide test, hydroxychloroquine, hydroxyzine, lantus solostar, ipratropium, levothyroxine, loratadine, medroxyprogesterone, metolazone, multiple vitamins-minerals, nitrofurantoin (macrocrystal-monohydrate), o2, omeprazole, polyethylene glycol, potassium chloride, ranolazine, sacubitril-valsartan, senna, spironolactone, tramadol, triamcinolone, and triamcinolone acetonide. and the patient is reported to be patient is compliant with medication protocol,  Medications are reported to be effective.  Regarding these diagnoses, referrals were made to the following provider(s):  n/a.  All notes on chart for PCP to review.    The patient was monitored remotely for blood pressure; weight; activity level; pain; medications.    The patient's physical needs include:  needs assistance with ADLs; physical healthcare.     The patient's mental support needs include:  increased support    The patient's cognitive support needs include:  needs met    The patient's psychosocial support needs include:  continued support    The patient's functional needs include: physical  healthcare, discharged from [facility]    The patient's environmental needs include:  not applicable, going for tour at [facility] again    Care Plan overall comments:  reviewed    Refer to previous outreach notes for more information on the areas listed above.    Monthly Billing Diagnoses  (I25.10) Coronary artery disease involving native coronary artery of native heart without angina pectoris    (G20.B2) Parkinson's disease with dyskinesia and fluctuating manifestations    (I10) Hypertension, essential    Medications   Medications have been reconciled    Care Plan progress this month:      Recently Modified Care Plans Updates made since 5/30/2025 12:00 AM      No recently modified care plans.            Instructions   Patient was provided an electronic copy of care plan  CCM services were explained and offered and patient has accepted these services.  Patient has given their written consent to receive CCM services and understands that this includes the authorization of electronic communication of medical information with the other treating providers.  Patient understands that they may stop CCM services at any time and these changes will be effective at the end of the calendar month and will effectively revocate the agreement of CCM services.  Patient understands that only one practitioner can furnish and be paid for CCM services during one calendar month.  Patient also understands that there may be co-payment or deductible fees in association with CCM services.  Patient will continue with at least monthly follow-up calls with the Ambulatory .    Faye JAUREGUI  Ambulatory Case Management    6/30/2025, 10:27 EDT

## 2025-06-30 NOTE — OUTREACH NOTE
Prep Survey      Flowsheet Row Responses   [facility]? [facility]   Is LACE score < 7 ? Non- Discharge   Eligibility [facility]   [facility]   Date of Discharge 06/26/25   Discharge Disposition Home or Self Care   Discharge diagnosis Coronary artery disease involving native coronary artery without angina pectoris,  Hypertension, essential,  Peripheral vascular disease   Does the patient have one of the following disease processes/diagnoses(primary or secondary)? Other   Prep survey completed? Yes            Lily JACKSON - Registered Nurse

## 2025-06-30 NOTE — OUTREACH NOTE
Call Center TCM Note      Flowsheet Row Responses   [facility] patient discharged from? Non-  [[facility]]   Does the patient have one of the following disease processes/diagnoses(primary or secondary)? Other   TCM attempt successful? Yes   Call start time 1201   Call end time 1203   Discharge diagnosis Coronary artery disease involving native coronary artery without angina pectoris,  Hypertension, essential,  Peripheral vascular disease   Is patient permission given to speak with other caregiver? Yes   List who call center can speak with daughter- Silvana   Person spoke with today (if not patient) and relationship daughter   Is the patient taking all medications as directed (includes completed medication regime)? Yes   Does the patient have an appointment with their PCP within 7-14 days of discharge? No   Nursing Interventions Routed TCM call to PCP office   Has home health visited the patient within 72 hours of discharge? N/A   Psychosocial issues? No   What is the patient's perception of their health status since discharge? Improving   Is the patient/caregiver able to teach back signs and symptoms related to disease process for when to call PCP? Yes   Is the patient/caregiver able to teach back signs and symptoms related to disease process for when to call 911? Yes   Is the patient/caregiver able to teach back the hierarchy of who to call/visit for symptoms/problems? PCP, Specialist, Home health nurse, Urgent Care, ED, 911 Yes   If the patient is a current smoker, are they able to teach back resources for cessation? Not a smoker   TCM call completed? Yes   Wrap up additional comments Per daughter, patient is doing ok, all concerns addressed, daughter states she has been in contact with PCP office about a follow up appt.   Call end time 1203   Would this patient benefit from a Referral to Amb Social Work? No   Is the patient interested in additional calls from an ambulatory ? No             Darlene ALATORRE - Registered Nurse    6/30/2025, 12:04 EDT

## 2025-06-30 NOTE — TELEPHONE ENCOUNTER
Patient has not been seen in this office before.   She was seen in [facility] ER 06/20/25 and again 06/27/25 with PMB.  She is scheduled to see Dr. Du 07/10/25, with ultrasound and possible EMB.  Returned call to patient's daughter; states she is patient's power of .   She is asking if EMB can be done at New GYN visit; informed her that it can be done if indicated.   She v/u. States patient is taking the Provera that was RX in ER and is not bleeding at this time.

## 2025-07-02 LAB
BACTERIA SPEC AEROBE CULT: NORMAL
BACTERIA SPEC AEROBE CULT: NORMAL

## 2025-07-08 ENCOUNTER — PATIENT OUTREACH (OUTPATIENT)
Dept: CASE MANAGEMENT | Facility: OTHER | Age: 75
End: 2025-07-08
Payer: MEDICARE

## 2025-07-08 DIAGNOSIS — N95.0 PMB (POSTMENOPAUSAL BLEEDING): Primary | ICD-10-CM

## 2025-07-08 DIAGNOSIS — G20.B2 PARKINSON'S DISEASE WITH DYSKINESIA AND FLUCTUATING MANIFESTATIONS: ICD-10-CM

## 2025-07-08 DIAGNOSIS — I10 HYPERTENSION, ESSENTIAL: ICD-10-CM

## 2025-07-08 DIAGNOSIS — I25.10 CORONARY ARTERY DISEASE INVOLVING NATIVE CORONARY ARTERY OF NATIVE HEART WITHOUT ANGINA PECTORIS: Primary | ICD-10-CM

## 2025-07-08 NOTE — OUTREACH NOTE
AMBULATORY CASE MANAGEMENT NOTE    Names and Relationships of Patient/Support Persons: Contact: TaySilvana(POA); Relationship: Emergency Contact -     [facility] Interim Update    Spoke with Karina's daughter, Silvana. Silvana reports that Karina is getting stronger and able to get up more. She does struggle to get up in the morning. She is getting home health for PT, SN.     Karina's breathing is the same. Discussed with Silvana the need to have Karina report symptoms earlier to try and prevent hospitalization.     Karina returned to the ER and was not able to make her tour at [facility]. This has been rescheduled for 7/21. She has follow up appointment with Dr. Fritz on 7/25.     Education Documentation  No documentation found.        Faye JAUREGUI  Ambulatory Case Management    7/8/2025, 11:00 EDT

## 2025-07-10 ENCOUNTER — TELEPHONE (OUTPATIENT)
Dept: ONCOLOGY | Facility: CLINIC | Age: 75
End: 2025-07-10

## 2025-07-10 ENCOUNTER — RESULTS FOLLOW-UP (OUTPATIENT)
Dept: FAMILY MEDICINE CLINIC | Facility: CLINIC | Age: 75
End: 2025-07-10
Payer: MEDICARE

## 2025-07-10 ENCOUNTER — OFFICE VISIT (OUTPATIENT)
Dept: OBSTETRICS AND GYNECOLOGY | Facility: CLINIC | Age: 75
End: 2025-07-10
Payer: MEDICARE

## 2025-07-10 VITALS — DIASTOLIC BLOOD PRESSURE: 62 MMHG | WEIGHT: 209.8 LBS | BODY MASS INDEX: 38.37 KG/M2 | SYSTOLIC BLOOD PRESSURE: 106 MMHG

## 2025-07-10 DIAGNOSIS — N95.0 PMB (POSTMENOPAUSAL BLEEDING): Primary | ICD-10-CM

## 2025-07-10 RX ORDER — MEDROXYPROGESTERONE ACETATE 10 MG
10 TABLET ORAL DAILY
Qty: 30 TABLET | Refills: 3 | Status: SHIPPED | OUTPATIENT
Start: 2025-07-10

## 2025-07-10 RX ORDER — POTASSIUM CHLORIDE 750 MG/1
TABLET, EXTENDED RELEASE ORAL
COMMUNITY
Start: 2025-07-01

## 2025-07-10 NOTE — PROGRESS NOTES
Chief Complaint   Patient presents with    Gynecologic Exam            HPI  Joanna Cross is a 74 y.o. female, , who presents for initial evaluation of postmenopausal bleeding.      Her last LMP was over 30 years ago.    Joanna Cross reports the bleeding began 3 weeks ago. Patient reports the bleeding was like a period. She denies any clots present that she noticed. She states she went to the ED on 25 and an US was performed (report in her chart). Her endometrial thickening was 1.6cm. She states they let her go home and recommended she come back if bleeding returned. She reports bleeding began again on 25 but was more of a spotting. She went back to ED and was prescribed progesterone for a 10 day course. She states she has not had any bleeding since taking the progesterone. She also reports mod- severe pelvic cramping and lower back pain.     Did the patient have u/s today? No    Additional OB/GYN History   Last Pap: pt is unsure.        Last Completed Pap Smear    This patient has no relevant Health Maintenance data.           The additional following portions of the patient's history were reviewed and updated as appropriate: allergies, current medications, past family history, past medical history, past social history, and past surgical history.    OB History          4    Para   4    Term   4            AB        Living             SAB        IAB        Ectopic        Molar        Multiple        Live Births                    Past Medical History:   Diagnosis Date    Allergic     Allergic rhinitis     Anemia     Ankle problem     thinks back related causing pain     Anxiety     Asthma     Atrial fibrillation     AVM (arteriovenous malformation)     Back pain     Cataract     CHF (congestive heart failure) 2024    Probably had before then but no one actually told me I had it.    Cholelithiasis 01    Gallbladder Removed    Chronic kidney disease      stage 3 per pt    Chronic lung disease 04/13/2022    CKD (chronic kidney disease)     Clotting disorder 2016    AVM - small intestine    Colon polyp 09/15/16    Coronary artery disease involving native coronary artery without angina pectoris 03/01/2017    COVID-19 vaccine series completed     Cystic fibrosis     Diastolic dysfunction     Gastrocnemius muscle tear     left medial 91    Generalized osteoarthritis     GERD (gastroesophageal reflux disease)     Gestational diabetes     GIB (gastrointestinal bleeding) 2016    d/t xarelto     Headache     Hearing decreased, bilateral     HAS HEARING AID, NOT WEARING IT CURRENTLY    Hiatal hernia     History of medical problems 04/23/82    Lichens Sclerosis    History of shingles     History of transfusion 2016    no reaction recalled     HL (hearing loss) 2019    Got hearing aids    Hyperlipidemia LDL goal <70 03/01/2017    Hypertension     Hyperthyroidism     Hypothyroidism     IBS (irritable bowel syndrome)     Inflammatory bowel disease     Klebsiella pneumonia     Lichen sclerosus     Lupus     subQ    Mouth problem     mouth guard used since pt bites tongue and lips excessively at night if not- with bipap     MRSA infection 2018    Myocardial infarction     Obesity     On home oxygen therapy     2L of oxygen all the time due to current congestion     Osteopenia 2016    Osteoporosis     Parkinson's disease     Peripheral vascular disease     Pleurisy     Pneumonia     Puerperal sepsis with acute hypoxic respiratory failure     emergent intubation- 2016    Pulmonary embolism     Renal insufficiency     Right leg pain     from back issues     Salivary gland stone     Sciatic nerve pain     Seborrheic dermatitis     Skin cancer     on back     Sleep apnea     CPAP AND HOME O2 2L/M    Sleep apnea, obstructive 04/15/01    TIA (transient ischemic attack) 2014    no residual effects    Tremor     Type 2 diabetes mellitus     UTI (urinary tract infection)     Visual  impairment     Vitamin D deficiency 09/08/2022    Wears glasses         Past Surgical History:   Procedure Laterality Date    ABLATION OF DYSRHYTHMIC FOCUS  05/16/13    Laser Ablation - Rt Leg    BACK SURGERY      l4-l5 laminectomy     BICEPS TENDON REPAIR Right     shoulder    BREAST BIOPSY Left 05/2004    excisional, benign    BRONCHOSCOPY RIGID / FLEXIBLE      2016    BUNIONECTOMY Right     CARDIAC CATHETERIZATION      CARDIAC CATHETERIZATION N/A 02/01/2019    Procedure: Left Heart Cath;  Surgeon: Albertina Corona MD;  Location:  [facility] CATH INVASIVE LOCATION;  Service: Cardiology    CARDIAC ELECTROPHYSIOLOGY PROCEDURE N/A 01/26/2024    Procedure: Lead Revision RA or RV, PPM or ICD;  Surgeon: Lauro Francois MD;  Location:  [facility] IN[facility] LOCATION;  Service: Cardiovascular;  Laterality: N/A;    CARDIAC SURGERY      CATARACT EXTRACTION, BILATERAL  06/26/2024    (R) eye   08/16/2024 (L) eye    CHOLECYSTECTOMY      COLON SURGERY      COLONOSCOPY  2016    COLONOSCOPY N/A 06/17/2021    Procedure: COLONOSCOPY WITH POLYPECTOMY;  Surgeon: Trenton Sosa MD;  Location:  [facility];  Service: Gastroenterology;  Laterality: N/A;    CORONARY STENT PLACEMENT      x1 stent    CYST REMOVAL      left ear, upper left back 2001    CYSTOSCOPY      x2  18 and 20 -   in 20 urethra dilation     DIAGNOSTIC LAPAROSCOPY  1981    ENDOSCOPIC FUNCTIONAL SINUS SURGERY (FESS)  2011    ENDOSCOPY  2016    ENTEROSCOPY VIA STOMA      with single ballon with fluoro     EYE SURGERY  7/26 & 8/16/24    Cataracts removed from each eye.    HAMMER TOE REPAIR Left     INCISION AND DRAINAGE OF WOUND  2018    back with wound infection     INSERT / REPLACE / REMOVE PACEMAKER  11/10/16    [facility]    JOINT REPLACEMENT      KNEE ARTHROSCOPY      LASER ABLATION      right leg 13    LIPOMA EXCISION  1999    left leg     LUMBAR LAMINECTOMY DISCECTOMY DECOMPRESSION N/A 07/06/2018    Procedure: LUMBAR LAMINECTOMY L4-5,  HEMILAMIINECTOMY RIGHT L5-S1, FORAMINOTOMY L5-S1;  Surgeon: Lencho Gamino MD;  Location:  [facility];  Service: Neurosurgery    LUMBAR LAMINECTOMY DISCECTOMY DECOMPRESSION N/A 09/19/2018    Procedure: INCISION AND DRAINAGE BACK WITH WOUND EXPLORATION;  Surgeon: Ritchie García MD;  Location:  [facility];  Service: Neurosurgery    LUNG BIOPSY Left 2016    OTHER SURGICAL HISTORY      ct scan of chest and sinuses and lower back     OTHER SURGICAL HISTORY      various echos     OTHER SURGICAL HISTORY      electroencephalogram 16,   emg  ncv tests 2007    OTHER SURGICAL HISTORY      mra 2007  and various mri with xrays, nuclear medicine lung ventilation with perfusion test 2016 with pft     OTHER SURGICAL HISTORY      barium swallow testing 15, 12, 12    OTHER SURGICAL HISTORY      vaginal ultrasound- 2012,   vas clementina lower extrem 2016, vas venous duplex lower extrem bilat 2019    OTHER SURGICAL HISTORY      wdge biopsy spring of lung     OTHER SURGICAL HISTORY      emergent intubation- hypoxic resp failure     OTHER SURGICAL HISTORY      01/26/2024 ppm generator change and lead revision per Dr. Francois    PACEMAKER IMPLANTATION  11/2016    sss    REPLACEMENT TOTAL KNEE Bilateral     left knee 2011, right 2012 per dr acuna     REPLACEMENT TOTAL KNEE Bilateral     SHOULDER ARTHROSCOPY Bilateral     2003-left, 2004- right     SKIN BIOPSY      skin cancer back 2016    SKIN CANCER EXCISION      upper righ tback     SPINE SURGERY  07/06/18    SUBTOTAL HYSTERECTOMY      MADHAV      TEETH EXTRACTION      x2    TOTAL SHOULDER ARTHROPLASTY W/ DISTAL CLAVICLE EXCISION Left 10/24/2022    Procedure: REVERSE TOTAL SHOULDER ARTHROPLASTY, BICEPS TENODESIS - LEFT;  Surgeon: Sammy Yo MD;  Location:  [facility];  Service: Orthopedics;  Laterality: Left;    TOTAL SHOULDER ARTHROPLASTY W/ DISTAL CLAVICLE EXCISION Right 09/18/2023    Procedure: REVERSE TOTAL SHOULDER  ARTHROPLASTY WITH BICEPS TENODESIS - RIGHT;  Surgeon: Sammy Yo,  MD;  Location: [facility];  Service: Orthopedics;  Laterality: Right;    TUBAL ABDOMINAL LIGATION Bilateral 1980    WISDOM TOOTH EXTRACTION           Current Outpatient Medications:     Accu-Chek Softclix Lancets lancets, CHECK BLOOD SUGAR 2-3 TIMES A DAY DX E11.65 ON INSULIN, Disp: 250 each, Rfl: 3    albuterol (ACCUNEB) 1.25 MG/3ML nebulizer solution, Take 3 mL by nebulization Every 6 (Six) Hours As Needed for Wheezing. 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 Indications: Chronic Obstructive Lung Disease, Disp: , Rfl:     amantadine (SYMMETREL) 100 MG capsule, Take 1 capsule by mouth 2 (Two) Times a Day. Indications: Parkinson's Disease with Unknown Cause, Disp: , Rfl:     apixaban (Eliquis) 5 MG tablet tablet, Take 1 tablet by mouth Every 12 (Twelve) Hours. Restart 1/29/24, Disp: 180 tablet, Rfl: 2    B Complex-C (SUPER B COMPLEX PO), Take 1 tablet by mouth Daily. Indications: Nutritional Support, Disp: , Rfl:     bumetanide (BUMEX) 1 MG tablet, Take 1 tablet by mouth 2 (Two) Times a Day. Indications: Edema, Disp: , Rfl:     Calcium Carbonate (CALTRATE 600 PO), Take 1 tablet by mouth Daily. Indications: Nutritional Support, Disp: , Rfl:     carbidopa-levodopa (SINEMET)  MG per tablet, Take 2 tablets by mouth at 6am, then 1 tablet at 9am, 12pm, 3pm, 6pm and 9pm.  Indications: Parkinson's Disease, Disp: , Rfl:     Cholecalciferol 25 MCG (1000 UT) tablet, Take 1 tablet by mouth 2 (Two) Times a Day. Indications: Vitamin D Deficiency, Disp: , Rfl:     citalopram (CeleXA) 20 MG tablet, Take 1 tablet by mouth Daily. Indications: Major Depressive Disorder, Disp: 90 tablet, Rfl: 0    clobetasol propionate (TEMOVATE) 0.05 % cream, Apply 1 Application topically to the appropriate area as directed 2 (Two) Times a Week.  Indications: Skin Inflammation, Disp: , Rfl:     clonazePAM (KlonoPIN) 0.5 MG tablet, Take 1 tablet by mouth Every Night. Indications: sleep, Disp: , Rfl:     clopidogrel (Plavix) 75 MG tablet, Take 1 tablet by mouth., Disp: , Rfl:     Farxiga 10 MG tablet, Take 10 mg by mouth Daily., Disp: , Rfl:     fluticasone (FLONASE) 50 MCG/ACT nasal spray, Administer 2 sprays into the nostril(s) as directed by provider Daily As Needed for Rhinitis. Indications: Allergic Rhinitis, Disp: , Rfl:     Glucosamine-Chondroit-Calcium (TRIPLE FLEX BONE & JOINT PO), Take 1 tablet by mouth Daily. Indications: Nutritional Support, Disp: , Rfl:     glucose blood (Accu-Chek Guide Test) test strip, CHECK BLOOD SUGAR 2-3 TIMES A DAY DX E11.65 ON INSULIN, Disp: 250 each, Rfl: 3    hydroxychloroquine (PLAQUENIL) 200 MG tablet, Take 1 tablet by mouth 2 (Two) Times a Day. Indications: Discoid Lupus Erythematosus, Systemic Lupus Erythematosus, Disp: , Rfl:     hydrOXYzine (ATARAX) 25 MG tablet, Take 1 tablet by mouth Every 6 (Six) Hours As Needed for Itching., Disp: 30 tablet, Rfl: 5    Insulin Glargine (Lantus SoloStar) 100 UNIT/ML injection pen, Inject 15 Units under the skin into the appropriate area as directed Daily. Indications: Type 2 Diabetes, Disp: 15 mL, Rfl: 1    ipratropium (ATROVENT) 0.06 % nasal spray, Administer 2 sprays into the nostril(s) as directed by provider As Needed for Rhinitis. Indications: Hayfever, Disp: , Rfl:     levothyroxine (SYNTHROID, LEVOTHROID) 200 MCG tablet, TAKE 1 TABLET DAILY (NEW DOSE), Disp: 90 tablet, Rfl: 1    loratadine (CLARITIN) 10 MG tablet, Take 1 tablet by mouth Daily. Indications: Hayfever, Disp: , Rfl:     metOLazone (ZAROXOLYN) 2.5 MG tablet, Take 1 tablet by mouth 4 (Four) Times a Week. Indications: Edema, Disp: , Rfl:     Multiple Vitamins-Minerals (CENTRUM ULTRA WOMENS PO), Take 1 tablet by mouth Daily. Indications: Nutritional Support, Disp: , Rfl:     nitrofurantoin,  macrocrystal-monohydrate, (Macrobid) 100 MG capsule, Take 1 capsule by mouth 2 (Two) Times a Day., Disp: 20 capsule, Rfl: 0    O2 (OXYGEN), Inhale 2 L/min Continuous. Indications: soa, Disp: , Rfl:     omeprazole (priLOSEC) 40 MG capsule, Take 1 capsule by mouth 2 (Two) Times a Day. Indications: Gastroesophageal Reflux Disease, Disp: 180 capsule, Rfl: 1    polyethylene glycol (MIRALAX) 17 g packet, Take 17 g by mouth Daily. Indications: Constipation, Disp: , Rfl:     potassium chloride (KLOR-CON M10) 10 MEQ CR tablet, , Disp: , Rfl:     potassium chloride (MICRO-K) 10 MEQ CR capsule, Take 1 capsule by mouth 2 (Two) Times a Day., Disp: , Rfl:     ranolazine (RANEXA) 500 MG 12 hr tablet, Take 2 tablets by mouth Every 12 (Twelve) Hours., Disp: 360 tablet, Rfl: 3    sacubitril-valsartan (Entresto) 24-26 MG tablet, Take 1 tablet by mouth 2 (Two) Times a Day., Disp: , Rfl:     senna (SENOKOT) 8.6 MG tablet, Take 1 tablet by mouth Daily. Indications: Constipation, Disp: , Rfl:     spironolactone (ALDACTONE) 25 MG tablet, Take 1 tablet by mouth Daily. Indications: Heart failure, Disp: , Rfl:     traMADol (ULTRAM) 50 MG tablet, Take 1 tablet by mouth At Night As Needed for Moderate Pain. Indications: Pain, Disp: 30 tablet, Rfl: 1    triamcinolone (KENALOG) 0.1 % cream, Apply 1 Application topically to the appropriate area as directed As Needed for Irritation. Indications: Atopic Dermatitis, Disp: , Rfl:     Triamcinolone Acetonide (NASACORT) 55 MCG/ACT nasal inhaler, Administer 2 sprays into the nostril(s) as directed by provider Daily As Needed for Rhinitis. Indications: Nose Inflammation, Disp: , Rfl:     medroxyPROGESTERone (Provera) 10 MG tablet, Take 1 tablet by mouth Daily., Disp: 30 tablet, Rfl: 3    Review of Systems  All other systems reviewed and are negative.     I have reviewed and agree with the HPI, ROS, and historical information as entered above. Eulogio Du MD      Objective   /62   Wt 95.2 kg (209  lb 12.8 oz)   LMP  (LMP Unknown) Comment: Mammogram- 1/28/20  BMI 38.37 kg/m²     Physical Exam  Vitals and nursing note reviewed. Exam conducted with a chaperone present.   Genitourinary:     Labia:         Right: No rash, tenderness or lesion.         Left: No rash, tenderness or lesion.       Vagina: Normal. No lesions.      Cervix: No cervical motion tenderness, discharge, lesion or cervical bleeding.      Uterus: Normal. Enlarged. Not fixed and not tender.       Adnexa:         Right: No mass or tenderness.          Left: No mass or tenderness.        Rectum: No external hemorrhoid.      Comments: Chaperone Present           Assessment and Plan    Procedures    After the indications, risks, benefits, and alternatives to performing and endometrial biopsy were explained to the patient, opted to move forward with the endometrial biopsy. A urine pregnancy was not done    Time out: immediate members of the procedure team and patient agree to the following: correct patient, correct site, correct procedure to be performed. Eulogio Du MD      PROCEDURE:  The patient was placed on the table in the supine lithotomy position.  She was draped in the appropriate manner.  A speculum was placed in the vagina.  The cervix was visualized and prepped with Betadine. A tenaculum was placed. A small plastic 5 mm Pipelle syringe curette was inserted into the cervical canal.  The uterus was sounded to 10 cms.  A vigorous four quadrant biopsy was performed, removing a moderate amount of tissue.  This tissue was placed in Formalin and sent to pathology.  The patient tolerated the procedure well and reported No cramping.  She had No cramping at the time of discharge.    Problem List Items Addressed This Visit    None  Visit Diagnoses         PMB (postmenopausal bleeding)    -  Primary    Relevant Medications    medroxyPROGESTERone (Provera) 10 MG tablet        Ultrasound showed a 1.2 cm thick endometrial lining after patient began  bleeding.  She was placed on Provera and the bleeding stopped.  She is here for attempted endometrial biopsy to get a diagnosis.  Patient has multiple medical problems    Call for heavy bleeding  Endometrial biopsy obtained successfully.  Uterus sounded to 10 cm.  Will continue her Provera as this stopped her bleeding.  Pending final path future course will be made  Call the office in 5 business days for biopsy results.  Patient instructed to call the office if develops a fever of 100.4 or greater, vaginal bleeding heavier than a period, foul vaginal discharge or pain.    Eulogio Du MD  07/10/2025

## 2025-07-10 NOTE — LETTER
Joanna Cross  [address] [place] [address]    July 10, 2025     Dear Ms. Cross:    Below are the results from your recent visit:    Echocardiogram is borderline but okay.. Continue routine follow-up.     If you have any questions or concerns, please don't hesitate to call.         Sincerely,        Reynaldo Fritz MD

## 2025-07-11 ENCOUNTER — TELEPHONE (OUTPATIENT)
Dept: ONCOLOGY | Facility: CLINIC | Age: 75
End: 2025-07-11

## 2025-07-14 LAB — REF LAB TEST METHOD: NORMAL

## 2025-07-15 ENCOUNTER — OFFICE VISIT (OUTPATIENT)
Dept: ENDOCRINOLOGY | Facility: CLINIC | Age: 75
End: 2025-07-15
Payer: MEDICARE

## 2025-07-15 VITALS
WEIGHT: 210 LBS | DIASTOLIC BLOOD PRESSURE: 60 MMHG | OXYGEN SATURATION: 98 % | HEIGHT: 62 IN | HEART RATE: 72 BPM | SYSTOLIC BLOOD PRESSURE: 120 MMHG | BODY MASS INDEX: 38.64 KG/M2

## 2025-07-15 DIAGNOSIS — Z79.4 TYPE 2 DIABETES MELLITUS WITH HYPOGLYCEMIA WITHOUT COMA, WITH LONG-TERM CURRENT USE OF INSULIN: Primary | ICD-10-CM

## 2025-07-15 DIAGNOSIS — E03.9 ACQUIRED HYPOTHYROIDISM: ICD-10-CM

## 2025-07-15 DIAGNOSIS — E11.649 TYPE 2 DIABETES MELLITUS WITH HYPOGLYCEMIA WITHOUT COMA, WITH LONG-TERM CURRENT USE OF INSULIN: Primary | ICD-10-CM

## 2025-07-15 LAB
EXPIRATION DATE: NORMAL
GLUCOSE BLDC GLUCOMTR-MCNC: 127 MG/DL (ref 70–130)
Lab: NORMAL

## 2025-07-15 RX ORDER — LEVOTHYROXINE SODIUM 200 UG/1
200 TABLET ORAL
Qty: 90 TABLET | Refills: 2 | Status: SHIPPED | OUTPATIENT
Start: 2025-07-15

## 2025-07-15 RX ORDER — PEN NEEDLE, DIABETIC 32GX 5/32"
1 NEEDLE, DISPOSABLE MISCELLANEOUS DAILY
Qty: 100 EACH | Refills: 3 | Status: SHIPPED | OUTPATIENT
Start: 2025-07-15

## 2025-07-15 NOTE — PROGRESS NOTES
"     Office Note      Date: 07/15/2025  Patient Name: Joanna Cross  MRN: [id number]  : 1950    Chief Complaint   Patient presents with    Diabetes     Type 2 diabetes mellitus with hyperglycemia, with long-term current use of insulin    Hypothyroidism       History of Present Illness:   Joanna Cross is a 74 y.o. female who presents for follow-up for type 2 diabetes diagnosed in .  She remains on Lantus she typically takes 10 to 13 units daily.  She remains on Farxiga per cardiology for heart failure.  She reports she has been in and out of the hospital several times since her visit in October.  She has had trouble with swelling as well as her breathing.  She did have a stent placed .  She recently had trouble with vaginal bleeding and had to have an endometrial biopsy.  She is awaiting these results.  She remains on Unithroid 175 mcg daily.  She had thyroid labs checked last month and these were okay.  She reports she is due for her eye exam later this summer and will work on getting this scheduled.      Subjective     Review of Systems:   Review of Systems   Cardiovascular:  Positive for leg swelling.   Gastrointestinal: Negative.    Endocrine: Negative.    Neurological:  Positive for weakness.       The following portions of the patient's history were reviewed and updated as appropriate: allergies, current medications, past family history, past medical history, past social history, past surgical history, and problem list.    Objective     Vitals:    07/15/25 1414   BP: 120/60   BP Location: Left arm   Patient Position: Sitting   Cuff Size: Adult   Pulse: 72   SpO2: 98%  Comment: 3 Lpm 02   Weight: 95.3 kg (210 lb)   Height: 157.5 cm (62\")     Body mass index is 38.41 kg/m².    Physical Exam  Vitals reviewed.   Constitutional:       General: She is not in acute distress.     Appearance: Normal appearance.      Comments: Uses a walker to ambulate.  On oxygen via nasal cannula. "   Neurological:      Mental Status: She is alert.         HEMOGLOBIN A1C  Lab Results   Component Value Date    HGBA1C 5.4 06/26/2025       GLUCOSE  Glucose   Date Value Ref Range Status   07/15/2025 127 70 - 130 mg/dL Final       CMP  Lab Results   Component Value Date    GLUCOSE 101 (H) 06/27/2025    BUN 20.4 06/27/2025    CREATININE 1.24 (H) 06/27/2025    EGFRIFNONA 66 06/12/2019    BCR 16.5 06/27/2025    K 3.8 06/27/2025    CO2 25.2 06/27/2025    CALCIUM 8.9 06/27/2025    AST 22 06/27/2025    ALT 5 06/27/2025       LIPID PANEL  Lab Results   Component Value Date    CHOL 185 02/01/2019    CHLPL 153 06/26/2025    TRIG 81 06/26/2025    HDL 50 06/26/2025    LDL 88 06/26/2025       URINE MICROALBUMIN/CREATININE RATIO  Microalbumin/Creatinine Ratio   Date Value Ref Range Status   03/11/2025 48 (H) 0 - 29 mg/g creat Final     Comment:                            Normal:                0 -  29                         Moderately increased: 30 - 300                         Severely increased:       >300         TSH  Lab Results   Component Value Date    TSH 3.060 06/26/2025       Assessment / Plan      Assessment & Plan:  1. Type 2 diabetes mellitus with hypoglycemia without coma, with long-term current use of insulin  Her hemoglobin A1c today is 5.4%.  Her hemoglobin has been low and she is on Procrit injections.  I reviewed her blood glucose logs from her phone.  She has had trouble with hypoglycemia in the mornings in the 50s and 70s.  We will reduce her Lantus to 10 units consistently every 24 hours.  I sent in a prescription for pen needles today.  She will reach out with blood glucose readings if she continues to have trouble.  Her weight is down 21 pounds since her reported weight at her appointment in October.  Her blood pressure is okay today.  - POC Glucose, Blood    2. Acquired hypothyroidism  We reviewed her thyroid labs from June 26, 2025.  She will continue the thyroxine 200 mcg daily.  I updated this  prescription today.      Return in about 6 months (around 1/15/2026) for Recheck.     This note was dictated using Dragon voice recognition.    Electronically signed by: BRIGITTE Parikh  07/15/2025

## 2025-07-17 ENCOUNTER — TELEPHONE (OUTPATIENT)
Age: 75
End: 2025-07-17
Payer: MEDICARE

## 2025-07-17 DIAGNOSIS — J96.21 ACUTE ON CHRONIC RESPIRATORY FAILURE WITH HYPOXIA: ICD-10-CM

## 2025-07-17 NOTE — TELEPHONE ENCOUNTER
Called pt and advised her to keep her oxygen at 2 liters at night per Myrna - based on the results of her Overnight Oximetry. This has been scanned in.

## 2025-07-24 ENCOUNTER — HOSPITAL ENCOUNTER (OUTPATIENT)
Dept: ONCOLOGY | Facility: HOSPITAL | Age: 75
Discharge: HOME | End: 2025-07-24
Admitting: INTERNAL MEDICINE
Payer: MEDICARE

## 2025-07-24 VITALS
HEIGHT: 62 IN | SYSTOLIC BLOOD PRESSURE: 137 MMHG | WEIGHT: 209 LBS | HEART RATE: 75 BPM | RESPIRATION RATE: 16 BRPM | DIASTOLIC BLOOD PRESSURE: 77 MMHG | TEMPERATURE: 97.5 F | BODY MASS INDEX: 38.46 KG/M2

## 2025-07-24 DIAGNOSIS — N18.30 ANEMIA ASSOCIATED WITH STAGE 3 CHRONIC RENAL FAILURE: Primary | ICD-10-CM

## 2025-07-24 DIAGNOSIS — D63.1 ANEMIA ASSOCIATED WITH STAGE 3 CHRONIC RENAL FAILURE: Primary | ICD-10-CM

## 2025-07-24 LAB
BASOPHILS # BLD AUTO: 0.03 10*3/MM3 (ref 0–0.2)
BASOPHILS NFR BLD AUTO: 0.3 % (ref 0–1.5)
DEPRECATED RDW RBC AUTO: 53.7 FL (ref 37–54)
EOSINOPHIL # BLD AUTO: 0.4 10*3/MM3 (ref 0–0.4)
EOSINOPHIL NFR BLD AUTO: 4.6 % (ref 0.3–6.2)
ERYTHROCYTE [DISTWIDTH] IN BLOOD BY AUTOMATED COUNT: 14.4 % (ref 12.3–15.4)
HCT VFR BLD AUTO: 30.1 % (ref 34–46.6)
HGB BLD-MCNC: 9.6 G/DL (ref 12–15.9)
IMM GRANULOCYTES # BLD AUTO: 0.04 10*3/MM3 (ref 0–0.05)
IMM GRANULOCYTES NFR BLD AUTO: 0.5 % (ref 0–0.5)
LYMPHOCYTES # BLD AUTO: 0.6 10*3/MM3 (ref 0.7–3.1)
LYMPHOCYTES NFR BLD AUTO: 6.9 % (ref 19.6–45.3)
MCH RBC QN AUTO: 32.1 PG (ref 26.6–33)
MCHC RBC AUTO-ENTMCNC: 31.9 G/DL (ref 31.5–35.7)
MCV RBC AUTO: 100.7 FL (ref 79–97)
MONOCYTES # BLD AUTO: 0.7 10*3/MM3 (ref 0.1–0.9)
MONOCYTES NFR BLD AUTO: 8 % (ref 5–12)
NEUTROPHILS NFR BLD AUTO: 6.97 10*3/MM3 (ref 1.7–7)
NEUTROPHILS NFR BLD AUTO: 79.7 % (ref 42.7–76)
PLATELET # BLD AUTO: 149 10*3/MM3 (ref 140–450)
PMV BLD AUTO: 9.1 FL (ref 6–12)
RBC # BLD AUTO: 2.99 10*6/MM3 (ref 3.77–5.28)
WBC NRBC COR # BLD AUTO: 8.74 10*3/MM3 (ref 3.4–10.8)

## 2025-07-24 PROCEDURE — 85025 COMPLETE CBC W/AUTO DIFF WBC: CPT | Performed by: INTERNAL MEDICINE

## 2025-07-24 PROCEDURE — 96372 THER/PROPH/DIAG INJ SC/IM: CPT

## 2025-07-24 PROCEDURE — 25010000002 EPOETIN ALFA-EPBX 40000 UNIT/ML SOLUTION: Performed by: INTERNAL MEDICINE

## 2025-07-24 PROCEDURE — 36415 COLL VENOUS BLD VENIPUNCTURE: CPT

## 2025-07-24 RX ADMIN — EPOETIN ALFA-EPBX 40000 UNITS: 40000 INJECTION, SOLUTION INTRAVENOUS; SUBCUTANEOUS at 13:38

## 2025-07-24 NOTE — PROGRESS NOTES
Patient Name: Joanna Cross  : 1950   MRN: [id number]     Chief Complaint:    Chief Complaint   Patient presents with    Hypertension    Diabetes    Hypothyroidism       History of Present Illness: Joanna Cross is a 74 y.o. female who is here today for follow up.  On blood sugar, blood count, blood pressure, gait.  HPI        Review of Systems:   Review of Systems   Constitutional: Negative.    HENT: Negative.     Eyes: Negative.    Respiratory: Negative.     Cardiovascular: Negative.    Gastrointestinal: Negative.    Neurological: Negative.         Past Medical History:   Past Medical History:   Diagnosis Date    Allergic     Allergic rhinitis     Anemia     Ankle problem     thinks back related causing pain     Anxiety     Asthma     Atrial fibrillation     AVM (arteriovenous malformation)     Back pain     Cataract     CHF (congestive heart failure) 2024    Probably had before then but no one actually told me I had it.    Cholelithiasis 01    Gallbladder Removed    Chronic kidney disease     stage 3 per pt    Chronic lung disease 2022    CKD (chronic kidney disease)     Clotting disorder     AVM - small intestine    Colon polyp 09/15/16    Coronary artery disease involving native coronary artery without angina pectoris 2017    COVID-19 vaccine series completed     Cystic fibrosis     Diastolic dysfunction     Gastrocnemius muscle tear     left medial 91    Generalized osteoarthritis     GERD (gastroesophageal reflux disease)     Gestational diabetes     GIB (gastrointestinal bleeding) 2016    d/t xarelto     Headache     Hearing decreased, bilateral     HAS HEARING AID, NOT WEARING IT CURRENTLY    Hiatal hernia     History of medical problems 80    Lichens Sclerosis    History of shingles     History of transfusion 2016    no reaction recalled     HL (hearing loss) 2019    Got hearing aids    Hyperlipidemia LDL goal <70 2017    Hypertension      Hyperthyroidism     Hypothyroidism     IBS (irritable bowel syndrome)     Inflammatory bowel disease     Klebsiella pneumonia     Lichen sclerosus     Lupus     subQ    Mouth problem     mouth guard used since pt bites tongue and lips excessively at night if not- with bipap     MRSA infection 2018    Myocardial infarction     Obesity     On home oxygen therapy     2L of oxygen all the time due to current congestion     Osteopenia 2016    Osteoporosis     Parkinson's disease     Peripheral vascular disease     Pleurisy     Pneumonia     Puerperal sepsis with acute hypoxic respiratory failure     emergent intubation- 2016    Pulmonary embolism     Renal insufficiency     Right leg pain     from back issues     Salivary gland stone     Sciatic nerve pain     Seborrheic dermatitis     Skin cancer     on back     Sleep apnea     CPAP AND HOME O2 2L/M    Sleep apnea, obstructive 04/15/01    TIA (transient ischemic attack) 2014    no residual effects    Tremor     Type 2 diabetes mellitus     UTI (urinary tract infection)     Visual impairment     Vitamin D deficiency 09/08/2022    Wears glasses        Past Surgical History:   Past Surgical History:   Procedure Laterality Date    ABLATION OF DYSRHYTHMIC FOCUS  05/16/13    Laser Ablation - Rt Leg    BACK SURGERY      l4-l5 laminectomy     BICEPS TENDON REPAIR Right     shoulder    BREAST BIOPSY Left 05/2004    excisional, benign    BRONCHOSCOPY RIGID / FLEXIBLE      2016    BUNIONECTOMY Right     CARDIAC CATHETERIZATION      CARDIAC CATHETERIZATION N/A 02/01/2019    Procedure: Left Heart Cath;  Surgeon: Albertina Corona MD;  Location:  [facility];  Service: Cardiology    CARDIAC ELECTROPHYSIOLOGY PROCEDURE N/A 01/26/2024    Procedure: Lead Revision RA or RV, PPM or ICD;  Surgeon: Lauro Francois MD;  Location:  [facility] LOCATION;  Service: Cardiovascular;  Laterality: N/A;    CARDIAC SURGERY      CATARACT EXTRACTION, BILATERAL  06/26/2024     (R) eye   08/16/2024 (L) eye    CHOLECYSTECTOMY      COLON SURGERY      COLONOSCOPY  2016    COLONOSCOPY N/A 06/17/2021    Procedure: COLONOSCOPY WITH POLYPECTOMY;  Surgeon: Trenton Sosa MD;  Location: [facility] ENDOSCOPY;  Service: Gastroenterology;  Laterality: N/A;    CORONARY STENT PLACEMENT      x1 stent    CYST REMOVAL      left ear, upper left back 2001    CYSTOSCOPY      x2  18 and 20 -   in 20 urethra dilation     DIAGNOSTIC LAPAROSCOPY  1981    ENDOSCOPIC FUNCTIONAL SINUS SURGERY (FESS)  2011    ENDOSCOPY  2016    ENTEROSCOPY VIA STOMA      with single ballon with fluoro     EYE SURGERY  7/26 & 8/16/24    Cataracts removed from each eye.    HAMMER TOE REPAIR Left     INCISION AND DRAINAGE OF WOUND  2018    back with wound infection     INSERT / REPLACE / REMOVE PACEMAKER  11/10/16    [facility]    JOINT REPLACEMENT      KNEE ARTHROSCOPY      LASER ABLATION      right leg 13    LIPOMA EXCISION  1999    left leg     LUMBAR LAMINECTOMY DISCECTOMY DECOMPRESSION N/A 07/06/2018    Procedure: LUMBAR LAMINECTOMY L4-5, HEMILAMIINECTOMY RIGHT L5-S1, FORAMINOTOMY L5-S1;  Surgeon: Lencho Gamino MD;  Location:  [facility] OR;  Service: Neurosurgery    LUMBAR LAMINECTOMY DISCECTOMY DECOMPRESSION N/A 09/19/2018    Procedure: INCISION AND DRAINAGE BACK WITH WOUND EXPLORATION;  Surgeon: Ritchie García MD;  Location:  [facility] OR;  Service: Neurosurgery    LUNG BIOPSY Left 2016    OTHER SURGICAL HISTORY      ct scan of chest and sinuses and lower back     OTHER SURGICAL HISTORY      various echos     OTHER SURGICAL HISTORY      electroencephalogram 16,   emg  ncv tests 2007    OTHER SURGICAL HISTORY      mra 2007  and various mri with xrays, nuclear medicine lung ventilation with perfusion test 2016 with pft     OTHER SURGICAL HISTORY      barium swallow testing 15, 12, 12    OTHER SURGICAL HISTORY      vaginal ultrasound- 2012,   vas clementina lower extrem 2016, vas venous duplex lower extrem bilat 2019     OTHER SURGICAL HISTORY      wdge biopsy spring of lung     OTHER SURGICAL HISTORY      emergent intubation- hypoxic resp failure     OTHER SURGICAL HISTORY      2024 ppm generator change and lead revision per Dr. Francois    PACEMAKER IMPLANTATION  2016    sss    REPLACEMENT TOTAL KNEE Bilateral     left knee , right 2012 per dr acuna     REPLACEMENT TOTAL KNEE Bilateral     SHOULDER ARTHROSCOPY Bilateral     -left, 2004- right     SKIN BIOPSY      skin cancer back     SKIN CANCER EXCISION      upper rig tback     SPINE SURGERY  18    SUBTOTAL HYSTERECTOMY      MADHAV      TEETH EXTRACTION      x2    TOTAL SHOULDER ARTHROPLASTY W/ DISTAL CLAVICLE EXCISION Left 10/24/2022    Procedure: REVERSE TOTAL SHOULDER ARTHROPLASTY, BICEPS TENODESIS - LEFT;  Surgeon: Sammy Yo MD;  Location:  [facility] OR;  Service: Orthopedics;  Laterality: Left;    TOTAL SHOULDER ARTHROPLASTY W/ DISTAL CLAVICLE EXCISION Right 2023    Procedure: REVERSE TOTAL SHOULDER  ARTHROPLASTY WITH BICEPS TENODESIS - RIGHT;  Surgeon: Sammy Yo MD;  Location:  [facility];  Service: Orthopedics;  Laterality: Right;    TUBAL ABDOMINAL LIGATION Bilateral     WISDOM TOOTH EXTRACTION         Family History:   Family History   Problem Relation Age of Onset    Kidney disease Mother     Coronary artery disease Mother     Hypertension Mother             Heart disease Mother             Hyperlipidemia Mother             Arthritis Mother     Depression Mother     Anxiety disorder Mother     Coronary artery disease Father     Hypertension Father             Hypothyroidism Father     Cancer Father         Oral cancer    Heart disease Father             Thyroid disease Father     Vision loss Father         Macular Degeneration    Coronary artery disease Brother     Hypertension Brother     Heart disease Brother         Multiple stents, by-pass surgery    Testicular cancer Brother      Kidney cancer Maternal Uncle     Testicular cancer Maternal Uncle     Anxiety disorder Son     Colon polyps Neg Hx     Colon cancer Neg Hx        Social History:   Social History     Socioeconomic History    Marital status:      Spouse name: N/A    Number of children: 4    Years of education: College    Highest education level: Master's degree (e.g., MA, MS, Randi, MEd, MSW, NELLA)   Tobacco Use    Smoking status: Never     Passive exposure: Past    Smokeless tobacco: Never    Tobacco comments:     Father and mother smoked for several years.   Vaping Use    Vaping status: Never Used    Passive vaping exposure: Yes   Substance and Sexual Activity    Alcohol use: Never    Drug use: Never    Sexual activity: Not Currently     Partners: Male     Birth control/protection: Post-menopausal, Tubal ligation, Vaginal insert contraception       Medications:     Current Outpatient Medications:     Accu-Chek Softclix Lancets lancets, CHECK BLOOD SUGAR 2-3 TIMES A DAY DX E11.65 ON INSULIN, Disp: 250 each, Rfl: 3    albuterol (ACCUNEB) 1.25 MG/3ML nebulizer solution, Take 3 mL by nebulization Every 6 (Six) Hours As Needed for Wheezing. 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 Indications: Chronic Obstructive Lung Disease, Disp: , Rfl:     amantadine (SYMMETREL) 100 MG capsule, Take 1 capsule by mouth 2 (Two) Times a Day. Indications: Parkinson's Disease with Unknown Cause, Disp: , Rfl:     apixaban (Eliquis) 5 MG tablet tablet, Take 1 tablet by mouth Every 12 (Twelve) Hours. Restart 1/29/24, Disp: 180 tablet, Rfl: 2    B Complex-C (SUPER B COMPLEX PO), Take 1 tablet by mouth Daily. Indications: Nutritional Support, Disp: , Rfl:     bumetanide (BUMEX) 1 MG tablet, Take 1 tablet by mouth 2 (Two) Times a Day. Indications: Edema,  Disp: , Rfl:     Calcium Carbonate (CALTRATE 600 PO), Take 1 tablet by mouth Daily. Indications: Nutritional Support, Disp: , Rfl:     carbidopa-levodopa (SINEMET)  MG per tablet, Take 2 tablets by mouth at 6am, then 1 tablet at 9am, 12pm, 3pm, 6pm and 9pm.  Indications: Parkinson's Disease, Disp: , Rfl:     Cholecalciferol 25 MCG (1000 UT) tablet, Take 1 tablet by mouth 2 (Two) Times a Day. Indications: Vitamin D Deficiency, Disp: , Rfl:     citalopram (CeleXA) 20 MG tablet, Take 1 tablet by mouth Daily. Indications: Major Depressive Disorder, Disp: 90 tablet, Rfl: 0    clobetasol propionate (TEMOVATE) 0.05 % cream, Apply 1 Application topically to the appropriate area as directed 2 (Two) Times a Week. Indications: Skin Inflammation, Disp: , Rfl:     clonazePAM (KlonoPIN) 0.5 MG tablet, Take 1 tablet by mouth Every Night. Indications: sleep, Disp: , Rfl:     clopidogrel (Plavix) 75 MG tablet, Take 1 tablet by mouth., Disp: , Rfl:     Farxiga 10 MG tablet, Take 10 mg by mouth Daily., Disp: , Rfl:     fluticasone (FLONASE) 50 MCG/ACT nasal spray, Administer 2 sprays into the nostril(s) as directed by provider Daily As Needed for Rhinitis. Indications: Allergic Rhinitis, Disp: , Rfl:     Glucosamine-Chondroit-Calcium (TRIPLE FLEX BONE & JOINT PO), Take 1 tablet by mouth Daily. Indications: Nutritional Support, Disp: , Rfl:     glucose blood (Accu-Chek Guide Test) test strip, CHECK BLOOD SUGAR 2-3 TIMES A DAY DX E11.65 ON INSULIN, Disp: 250 each, Rfl: 3    hydroxychloroquine (PLAQUENIL) 200 MG tablet, Take 1 tablet by mouth 2 (Two) Times a Day. Indications: Discoid Lupus Erythematosus, Systemic Lupus Erythematosus, Disp: , Rfl:     hydrOXYzine (ATARAX) 25 MG tablet, Take 1 tablet by mouth Every 6 (Six) Hours As Needed for Itching., Disp: 30 tablet, Rfl: 5    Insulin Glargine (Lantus SoloStar) 100 UNIT/ML injection pen, Inject 15 Units under the skin into the appropriate area as directed Daily. Indications: Type 2  Diabetes (Patient taking differently: Inject 15 Units under the skin into the appropriate area as directed Daily. Pt is on 10 units  Indications: Type 2 Diabetes), Disp: 15 mL, Rfl: 1    Insulin Pen Needle (BD Pen Needle Lizabeth U/F) 32G X 4 MM misc, Use 1 each Daily., Disp: 100 each, Rfl: 3    ipratropium (ATROVENT) 0.06 % nasal spray, Administer 2 sprays into the nostril(s) as directed by provider As Needed for Rhinitis. Indications: Hayfever, Disp: , Rfl:     levothyroxine (SYNTHROID, LEVOTHROID) 200 MCG tablet, Take 1 tablet by mouth Every Morning., Disp: 90 tablet, Rfl: 2    loratadine (CLARITIN) 10 MG tablet, Take 1 tablet by mouth Daily. Indications: Hayfever, Disp: , Rfl:     metOLazone (ZAROXOLYN) 2.5 MG tablet, Take 1 tablet by mouth 4 (Four) Times a Week. Indications: Edema, Disp: , Rfl:     Multiple Vitamins-Minerals (CENTRUM ULTRA WOMENS PO), Take 1 tablet by mouth Daily. Indications: Nutritional Support, Disp: , Rfl:     O2 (OXYGEN), Inhale 2 L/min Continuous. 3 lpm when active  Indications: soa, Disp: , Rfl:     omeprazole (priLOSEC) 40 MG capsule, Take 1 capsule by mouth 2 (Two) Times a Day. Indications: Gastroesophageal Reflux Disease, Disp: 180 capsule, Rfl: 1    polyethylene glycol (MIRALAX) 17 g packet, Take 17 g by mouth Daily. Indications: Constipation, Disp: , Rfl:     potassium chloride (KLOR-CON M10) 10 MEQ CR tablet, , Disp: , Rfl:     ranolazine (RANEXA) 500 MG 12 hr tablet, Take 2 tablets by mouth Every 12 (Twelve) Hours., Disp: 360 tablet, Rfl: 3    sacubitril-valsartan (Entresto) 24-26 MG tablet, Take 1 tablet by mouth 2 (Two) Times a Day., Disp: , Rfl:     senna (SENOKOT) 8.6 MG tablet, Take 1 tablet by mouth Daily. Indications: Constipation, Disp: , Rfl:     spironolactone (ALDACTONE) 25 MG tablet, Take 1 tablet by mouth Daily. Indications: Heart failure, Disp: , Rfl:     traMADol (ULTRAM) 50 MG tablet, Take 1 tablet by mouth At Night As Needed for Moderate Pain. Indications: Pain, Disp:  "30 tablet, Rfl: 1    triamcinolone (KENALOG) 0.1 % cream, Apply 1 Application topically to the appropriate area as directed As Needed for Irritation. Indications: Atopic Dermatitis, Disp: , Rfl:     Triamcinolone Acetonide (NASACORT) 55 MCG/ACT nasal inhaler, Administer 2 sprays into the nostril(s) as directed by provider Daily As Needed for Rhinitis. Indications: Nose Inflammation, Disp: , Rfl:     Allergies:   Allergies   Allergen Reactions    Amlodipine Besylate Swelling     Lower extremity (ankles, feet) swelling    Entacapone Other (See Comments)     \"extreme weakness in legs - caused several falls, which stopped after discontinuing this medication\"    Epinephrine Other (See Comments)     6/4/16- had 3 shots to numb mouth to prepare teeth for crowns, the shots contained epi-  Caused pt to have chest discomfort- went to hospital in ambulance, discovered had a fib while there     Hydrocodone Unknown - High Severity     Headache, nausea, dizziness, & vomiting    Levemir [Insulin Detemir] Hives     Hives / rash around injection site    Penicillins Hives     Jitteriness     Xarelto [Rivaroxaban] GI Bleeding     hgb dropped to 5.2    Aricept [Donepezil Hcl] Nausea Only     Vivid dreams    Benztropine Mesylate Other (See Comments)     Uncontrollable body movements    Cogentin [Benztropine] Other (See Comments)     \"uncontrollable body movements\"    Compazine [Prochlorperazine Edisylate] Other (See Comments)     Dystonic reaction    Duraprep [Antiseptic Products, Misc.] Itching and Rash     RASH AND ITCHING    Haldol [Haloperidol Lactate] Other (See Comments)     Dystonic reaction    Lisinopril Cough    Statins Myalgia     Leg pain- all statins     Sulfamethoxazole Nausea Only and Other (See Comments)     Nausea & headaches    Tarka [Trandolapril-Verapamil Hcl Er] Other (See Comments)     Constipation     Toprol Xl [Metoprolol Tartrate] Other (See Comments)     Extreme fatigue, decreased exercise tolerance    " "Trimethoprim Other (See Comments)     Other reaction(s): Nausea         Physical Exam:  Vital Signs:   Vitals:    07/25/25 1435   BP: 132/82   BP Location: Left arm   Patient Position: Sitting   Cuff Size: Adult   Pulse: 68   Resp: 16   SpO2: 100%   Weight: 94.9 kg (209 lb 3.2 oz)   Height: 157.5 cm (62.01\")   PainSc: 2    PainLoc: Head     Body mass index is 38.25 kg/m².     Physical Exam  Vitals and nursing note reviewed.   Constitutional:       Appearance: Normal appearance. She is obese.   HENT:      Head: Normocephalic and atraumatic.      Right Ear: Tympanic membrane, ear canal and external ear normal.      Left Ear: Tympanic membrane, ear canal and external ear normal.      Nose: Nose normal.      Mouth/Throat:      Mouth: Mucous membranes are dry.      Pharynx: Oropharynx is clear.   Eyes:      Extraocular Movements: Extraocular movements intact.      Conjunctiva/sclera: Conjunctivae normal.      Pupils: Pupils are equal, round, and reactive to light.   Cardiovascular:      Rate and Rhythm: Normal rate and regular rhythm.      Pulses: Normal pulses.      Heart sounds: Normal heart sounds.   Pulmonary:      Effort: Pulmonary effort is normal.      Breath sounds: Normal breath sounds.   Musculoskeletal:      Cervical back: Normal range of motion and neck supple.   Feet:      Comments:      Neurological:      Mental Status: She is alert.         Procedures      Assessment/Plan:   Diagnoses and all orders for this visit:    1. Type 2 diabetes mellitus with hyperglycemia, without long-term current use of insulin (Primary)  Assessment & Plan:  Followed by endocrinology      2. Acquired hypothyroidism  Assessment & Plan:  Blood work was good.  Recheck in 3 months.      3. Vitamin D deficiency  Assessment & Plan:  We will follow.  Recheck in 3 months      4. Vitamin B12 deficiency  Assessment & Plan:  We will follow.  Work in 3 months      5. Chronic kidney disease, stage 3b  Assessment & Plan:  Patient is " instructed to not take any NSAIDs.  Medicines as directed.  Stay well-hydrated.        6. Chronic lung disease  Assessment & Plan:  Patient seen endocrinology      7. Acute diastolic CHF (congestive heart failure)  Assessment & Plan:  Reviewed hospital stay with patient.  She has follow-up with Dr. Teague.  She is doing better and her breathing is much improved.  She is on 3 L of oxygen.  Recheck in 3 months      8. Hypertension, essential  Assessment & Plan:  Discussed with patient to monitor their blood pressure and if systolic blood pressure goes above 140 or diastolic is above 90 to return to clinic.  Take medicines as directed, call for any problems, patient not having or any worrisome symptoms.          9. Hyperlipidemia LDL goal <70  Assessment & Plan:  Blood work 3 months      10. Frequent falls  Assessment & Plan:  Patient eating physical therapy.  Has not had any recent falls.               Follow Up:   No follow-ups on file.      Reynaldo Fritz MD  [facility]

## 2025-07-25 ENCOUNTER — OFFICE VISIT (OUTPATIENT)
Dept: FAMILY MEDICINE CLINIC | Facility: CLINIC | Age: 75
End: 2025-07-25
Payer: MEDICARE

## 2025-07-25 VITALS
BODY MASS INDEX: 38.5 KG/M2 | DIASTOLIC BLOOD PRESSURE: 82 MMHG | WEIGHT: 209.2 LBS | RESPIRATION RATE: 16 BRPM | HEART RATE: 68 BPM | SYSTOLIC BLOOD PRESSURE: 132 MMHG | HEIGHT: 62 IN | OXYGEN SATURATION: 100 %

## 2025-07-25 DIAGNOSIS — J98.4 CHRONIC LUNG DISEASE: ICD-10-CM

## 2025-07-25 DIAGNOSIS — E03.9 ACQUIRED HYPOTHYROIDISM: ICD-10-CM

## 2025-07-25 DIAGNOSIS — R29.6 FREQUENT FALLS: ICD-10-CM

## 2025-07-25 DIAGNOSIS — I50.31 ACUTE DIASTOLIC CHF (CONGESTIVE HEART FAILURE): ICD-10-CM

## 2025-07-25 DIAGNOSIS — E53.8 VITAMIN B12 DEFICIENCY: ICD-10-CM

## 2025-07-25 DIAGNOSIS — E11.65 TYPE 2 DIABETES MELLITUS WITH HYPERGLYCEMIA, WITHOUT LONG-TERM CURRENT USE OF INSULIN: Primary | ICD-10-CM

## 2025-07-25 DIAGNOSIS — N18.32 CHRONIC KIDNEY DISEASE, STAGE 3B: ICD-10-CM

## 2025-07-25 DIAGNOSIS — E78.5 HYPERLIPIDEMIA LDL GOAL <70: ICD-10-CM

## 2025-07-25 DIAGNOSIS — E55.9 VITAMIN D DEFICIENCY: ICD-10-CM

## 2025-07-25 DIAGNOSIS — I10 HYPERTENSION, ESSENTIAL: ICD-10-CM

## 2025-07-25 NOTE — ASSESSMENT & PLAN NOTE
Reviewed hospital stay with patient.  She has follow-up with Dr. Teague.  She is doing better and her breathing is much improved.  She is on 3 L of oxygen.  Recheck in 3 months

## 2025-07-28 ENCOUNTER — PATIENT OUTREACH (OUTPATIENT)
Dept: CASE MANAGEMENT | Facility: OTHER | Age: 75
End: 2025-07-28
Payer: MEDICARE

## 2025-07-28 DIAGNOSIS — I25.10 CORONARY ARTERY DISEASE INVOLVING NATIVE CORONARY ARTERY OF NATIVE HEART WITHOUT ANGINA PECTORIS: Primary | ICD-10-CM

## 2025-07-28 DIAGNOSIS — I10 HYPERTENSION, ESSENTIAL: ICD-10-CM

## 2025-07-28 DIAGNOSIS — G20.B2 PARKINSON'S DISEASE WITH DYSKINESIA AND FLUCTUATING MANIFESTATIONS: ICD-10-CM

## 2025-07-28 NOTE — OUTREACH NOTE
"AMBULATORY CASE MANAGEMENT NOTE    Names and Relationships of Patient/Support Persons: Contact: Joanna Cross \"SIXTO\"; Relationship: Self -     CCM Interim Update    Spoke with Ms. Cross for CCM update. She states that she is doing well. She had a good visit with [facility] and her A1C was 5.4. Encouragement given to her.     She has not had any recent falls. She reports she is getting physical therapy through  once a week. She feels she is getting stronger.  She reports that she is able to take herself to the bathroom. She reports that her swelling has been \"good.\"    She is currently using her o2 3L during the day and 2L at night. She reports that she gets short of breath if up moving and doesn't have her oxygen on. Encouraged her to make sure and wear her oxygen consistently.        Education Documentation  No documentation found.        Faye JAUREGUI  Ambulatory Case Management    7/28/2025, 15:50 EDT  "

## 2025-08-07 ENCOUNTER — PATIENT OUTREACH (OUTPATIENT)
Dept: CASE MANAGEMENT | Facility: OTHER | Age: 75
End: 2025-08-07
Payer: MEDICARE

## 2025-08-07 ENCOUNTER — INFUSION (OUTPATIENT)
Facility: HOSPITAL | Age: 75
End: 2025-08-07
Payer: MEDICARE

## 2025-08-07 ENCOUNTER — HOSPITAL ENCOUNTER (OUTPATIENT)
Dept: ONCOLOGY | Facility: HOSPITAL | Age: 75
Discharge: HOME | End: 2025-08-07
Payer: MEDICARE

## 2025-08-07 VITALS
HEIGHT: 62 IN | HEART RATE: 64 BPM | BODY MASS INDEX: 39.38 KG/M2 | TEMPERATURE: 98.1 F | SYSTOLIC BLOOD PRESSURE: 149 MMHG | RESPIRATION RATE: 16 BRPM | WEIGHT: 214 LBS | DIASTOLIC BLOOD PRESSURE: 72 MMHG

## 2025-08-07 DIAGNOSIS — D63.1 ANEMIA ASSOCIATED WITH STAGE 3 CHRONIC RENAL FAILURE: Primary | ICD-10-CM

## 2025-08-07 DIAGNOSIS — N18.30 ANEMIA ASSOCIATED WITH STAGE 3 CHRONIC RENAL FAILURE: Primary | ICD-10-CM

## 2025-08-07 LAB
BASOPHILS # BLD AUTO: 0.03 10*3/MM3 (ref 0–0.2)
BASOPHILS NFR BLD AUTO: 0.4 % (ref 0–1.5)
DEPRECATED RDW RBC AUTO: 55.1 FL (ref 37–54)
EOSINOPHIL # BLD AUTO: 0.3 10*3/MM3 (ref 0–0.4)
EOSINOPHIL NFR BLD AUTO: 3.7 % (ref 0.3–6.2)
ERYTHROCYTE [DISTWIDTH] IN BLOOD BY AUTOMATED COUNT: 14.6 % (ref 12.3–15.4)
HCT VFR BLD AUTO: 31.6 % (ref 34–46.6)
HGB BLD-MCNC: 10 G/DL (ref 12–15.9)
IMM GRANULOCYTES # BLD AUTO: 0.02 10*3/MM3 (ref 0–0.05)
IMM GRANULOCYTES NFR BLD AUTO: 0.2 % (ref 0–0.5)
LYMPHOCYTES # BLD AUTO: 0.57 10*3/MM3 (ref 0.7–3.1)
LYMPHOCYTES NFR BLD AUTO: 7.1 % (ref 19.6–45.3)
MCH RBC QN AUTO: 32.2 PG (ref 26.6–33)
MCHC RBC AUTO-ENTMCNC: 31.6 G/DL (ref 31.5–35.7)
MCV RBC AUTO: 101.6 FL (ref 79–97)
MONOCYTES # BLD AUTO: 0.62 10*3/MM3 (ref 0.1–0.9)
MONOCYTES NFR BLD AUTO: 7.7 % (ref 5–12)
NEUTROPHILS NFR BLD AUTO: 6.51 10*3/MM3 (ref 1.7–7)
NEUTROPHILS NFR BLD AUTO: 80.9 % (ref 42.7–76)
PLATELET # BLD AUTO: 114 10*3/MM3 (ref 140–450)
PMV BLD AUTO: 9 FL (ref 6–12)
RBC # BLD AUTO: 3.11 10*6/MM3 (ref 3.77–5.28)
WBC NRBC COR # BLD AUTO: 8.05 10*3/MM3 (ref 3.4–10.8)

## 2025-08-07 PROCEDURE — 25010000002 EPOETIN ALFA-EPBX 20000 UNIT/ML SOLUTION: Performed by: INTERNAL MEDICINE

## 2025-08-07 PROCEDURE — 36415 COLL VENOUS BLD VENIPUNCTURE: CPT

## 2025-08-07 PROCEDURE — 96372 THER/PROPH/DIAG INJ SC/IM: CPT

## 2025-08-07 PROCEDURE — 85025 COMPLETE CBC W/AUTO DIFF WBC: CPT

## 2025-08-07 RX ADMIN — EPOETIN ALFA-EPBX 40000 UNITS: 20000 INJECTION, SOLUTION INTRAVENOUS; SUBCUTANEOUS at 15:22

## 2025-08-21 ENCOUNTER — INFUSION (OUTPATIENT)
Facility: HOSPITAL | Age: 75
End: 2025-08-21
Payer: MEDICARE

## 2025-08-21 VITALS
RESPIRATION RATE: 16 BRPM | SYSTOLIC BLOOD PRESSURE: 141 MMHG | HEART RATE: 92 BPM | DIASTOLIC BLOOD PRESSURE: 66 MMHG | TEMPERATURE: 98 F

## 2025-08-21 DIAGNOSIS — N18.30 ANEMIA ASSOCIATED WITH STAGE 3 CHRONIC RENAL FAILURE: ICD-10-CM

## 2025-08-21 DIAGNOSIS — D63.1 ANEMIA ASSOCIATED WITH STAGE 3 CHRONIC RENAL FAILURE: ICD-10-CM

## 2025-08-21 LAB
BASOPHILS # BLD AUTO: 0.03 10*3/MM3 (ref 0–0.2)
BASOPHILS NFR BLD AUTO: 0.4 % (ref 0–1.5)
DEPRECATED RDW RBC AUTO: 51.6 FL (ref 37–54)
EOSINOPHIL # BLD AUTO: 0.31 10*3/MM3 (ref 0–0.4)
EOSINOPHIL NFR BLD AUTO: 4.1 % (ref 0.3–6.2)
ERYTHROCYTE [DISTWIDTH] IN BLOOD BY AUTOMATED COUNT: 14.1 % (ref 12.3–15.4)
HCT VFR BLD AUTO: 34.6 % (ref 34–46.6)
HGB BLD-MCNC: 11.2 G/DL (ref 12–15.9)
IMM GRANULOCYTES # BLD AUTO: 0.04 10*3/MM3 (ref 0–0.05)
IMM GRANULOCYTES NFR BLD AUTO: 0.5 % (ref 0–0.5)
LYMPHOCYTES # BLD AUTO: 0.6 10*3/MM3 (ref 0.7–3.1)
LYMPHOCYTES NFR BLD AUTO: 8 % (ref 19.6–45.3)
MCH RBC QN AUTO: 31.7 PG (ref 26.6–33)
MCHC RBC AUTO-ENTMCNC: 32.4 G/DL (ref 31.5–35.7)
MCV RBC AUTO: 98 FL (ref 79–97)
MONOCYTES # BLD AUTO: 0.41 10*3/MM3 (ref 0.1–0.9)
MONOCYTES NFR BLD AUTO: 5.5 % (ref 5–12)
NEUTROPHILS NFR BLD AUTO: 6.09 10*3/MM3 (ref 1.7–7)
NEUTROPHILS NFR BLD AUTO: 81.5 % (ref 42.7–76)
PLATELET # BLD AUTO: 157 10*3/MM3 (ref 140–450)
PMV BLD AUTO: 8.9 FL (ref 6–12)
RBC # BLD AUTO: 3.53 10*6/MM3 (ref 3.77–5.28)
WBC NRBC COR # BLD AUTO: 7.48 10*3/MM3 (ref 3.4–10.8)

## 2025-08-21 PROCEDURE — 36415 COLL VENOUS BLD VENIPUNCTURE: CPT

## 2025-08-21 PROCEDURE — 85025 COMPLETE CBC W/AUTO DIFF WBC: CPT

## (undated) DEVICE — CATH DIAG EXPO .056 FL3.5 6F 100CM

## (undated) DEVICE — Device: Brand: AIR/WATER CHANNEL CLEANING ADAPTER

## (undated) DEVICE — INTRO TEAR AWAY/LVD W/SD PRT 6F 13CM

## (undated) DEVICE — CANNULA,OXY,ADULT,SUPERSOFT,W/7'TUB,UC: Brand: MEDLINE

## (undated) DEVICE — TP SXN YANKR WO/ VNT CLR LF

## (undated) DEVICE — KT PUMP INFUBLOCK MDL 2100 PMKITSOLIS

## (undated) DEVICE — 3.0MM PRECISION NEURO (MATCH HEAD)

## (undated) DEVICE — KT PIN HEX SHLDR CORACOID EXACTECHGPS DISP

## (undated) DEVICE — SOL NACL 0.9PCT 1000ML

## (undated) DEVICE — SOL LR 1000ML

## (undated) DEVICE — MEDI-VAC NON-CONDUCTIVE SUCTION TUBING: Brand: CARDINAL HEALTH

## (undated) DEVICE — ADHESIVE ISLAND DRESSING: Brand: TELFA

## (undated) DEVICE — PATIENT RETURN ELECTRODE, SINGLE-USE, CONTACT QUALITY MONITORING, ADULT, WITH 9FT CORD, FOR PATIENTS WEIGING OVER 33LBS. (15KG): Brand: MEGADYNE

## (undated) DEVICE — STERILE PVP: Brand: MEDLINE INDUSTRIES, INC.

## (undated) DEVICE — SYR LUERLOK 30CC

## (undated) DEVICE — BLANKT WARM LOWR/BDY 100X120CM

## (undated) DEVICE — PK CATH CARD 10

## (undated) DEVICE — SUT MONOCRYL PLS ANTIB UND 3/0  PS1 27IN

## (undated) DEVICE — Device

## (undated) DEVICE — HANDPIECE SET WITH HIGH FLOW TIP AND SUCTION TUBE: Brand: INTERPULSE

## (undated) DEVICE — NDL PERC 1PART ECHOTIP WO/BASEPLT 18G 7CM

## (undated) DEVICE — CATH HYDRPHLC IMPRESS KA2 4F 1.02MM 65CM

## (undated) DEVICE — DRAPE,SHOULDER,BEACH CHAIR,STERILE: Brand: MEDLINE

## (undated) DEVICE — SLNG ULTRSLING2 11TO13IN MD

## (undated) DEVICE — DECANT BG O JET

## (undated) DEVICE — GLV SURG SENSICARE PI ORTHO SZ7.5 LF STRL

## (undated) DEVICE — ENCORE® LATEX MICRO SIZE 7.5, STERILE LATEX POWDER-FREE SURGICAL GLOVE: Brand: ENCORE

## (undated) DEVICE — ENCORE® LATEX MICRO SIZE 6.5, STERILE LATEX POWDER-FREE SURGICAL GLOVE: Brand: ENCORE

## (undated) DEVICE — C-ARM: Brand: UNBRANDED

## (undated) DEVICE — SINGLE-USE POLYPECTOMY SNARE: Brand: SENSATION SHORT THROW

## (undated) DEVICE — GW PRESS VERRATA STR 185CM 10185P

## (undated) DEVICE — UNDERGLV SURG BIOGEL INDICATOR LF PF 7.5

## (undated) DEVICE — DRSNG SURG AQUACEL AG/ADVNTGE 9X25CM 3.5X10IN

## (undated) DEVICE — DRSNG SURG AQUACEL AG/ADVNTGE 9X15CM 3.5X6IN

## (undated) DEVICE — KT SHLDR EXACTECHGPS DISP

## (undated) DEVICE — ADHS LIQ MASTISOL 2/3ML

## (undated) DEVICE — SYS SKIN CLS DERMABOND PRINEO W/22CM MESH TP

## (undated) DEVICE — FLTR HME STR UNIV W/SMPL PORT

## (undated) DEVICE — GLV SURG SIGNATURE TOUCH PF LTX 8 STRL BX/50

## (undated) DEVICE — GOWN,NON-REINFORCED,SIRUS,SET IN SLV,XL: Brand: MEDLINE

## (undated) DEVICE — CATH DIAG EXPO M/ PK 6FR FL4/FR4 PIG 3PK

## (undated) DEVICE — SLNG ULTRSLING3 13TO15IN LG

## (undated) DEVICE — POSTN HD UNIV

## (undated) DEVICE — ST INF PRI SMRTSTE 20DRP 2VLV 24ML 117

## (undated) DEVICE — ELECTRD DEFIB ZOLL/CONN RADIOPRNT LG/BKPAD A/

## (undated) DEVICE — TB SXN FRAZIER 12F STRL

## (undated) DEVICE — PK EP 10

## (undated) DEVICE — 3M™ STERI-DRAPE™ INSTRUMENT POUCH 1018: Brand: STERI-DRAPE™

## (undated) DEVICE — GLV SURG DERMASSURE GRN LF PF SZ 6.5

## (undated) DEVICE — PK NEURO DISC 10

## (undated) DEVICE — SUT FW #2 W/TPR NDL 1/2 CIR 38IN 97CM 26.5MM BLU: Type: IMPLANTABLE DEVICE | Site: SHOULDER | Status: NON-FUNCTIONAL

## (undated) DEVICE — ANTIBACTERIAL UNDYED BRAIDED (POLYGLACTIN 910), SYNTHETIC ABSORBABLE SUTURE: Brand: COATED VICRYL

## (undated) DEVICE — SKN PREP SPNG STKS PVP PNT STR: Brand: MEDLINE INDUSTRIES, INC.

## (undated) DEVICE — ENDOGATOR HYBRID TUBING KIT FOR USE WITH ENDOGATOR IRRIGATION PUMP, OLYMPUS PUMP, GI4000 ESU, AND TORRENT IRRIGATION PUMP.: Brand: ENDOGATOR KIT

## (undated) DEVICE — THE SINGLE USE ETRAP – POLYP TRAP IS USED FOR SUCTION RETRIEVAL OF ENDOSCOPICALLY REMOVED POLYPS.: Brand: ETRAP

## (undated) DEVICE — "MH-443 SUCTION VALVE F/EVIS140 EVIS160": Brand: SUCTION VALVE

## (undated) DEVICE — OIL CARTRIDGE: Brand: CORE, MAESTRO

## (undated) DEVICE — RADIFOCUS GLIDEWIRE: Brand: GLIDEWIRE

## (undated) DEVICE — 3M™ MEDIPORE™ H SOFT CLOTH SURGICAL TAPE 2862, 2 INCH X 10 YARD (5CM X 9,1M), 12 ROLLS/CASE: Brand: 3M™ MEDIPORE™

## (undated) DEVICE — DISPOSABLE BIPOLAR FORCEPS 7 3/4" (19.7CM) SCOVILLE BAYONET, INSULATED, 1.5MM TIP AND 12 FT. (3.6M) CABLE: Brand: KIRWAN

## (undated) DEVICE — 3M™ STERI-STRIP™ REINFORCED ADHESIVE SKIN CLOSURES, R1547, 1/2 IN X 4 IN (12 MM X 100 MM), 6 STRIPS/ENVELOPE: Brand: 3M™ STERI-STRIP™

## (undated) DEVICE — STRYKER PERFORMANCE SERIES SAGITTAL BLADE: Brand: STRYKER PERFORMANCE SERIES

## (undated) DEVICE — DEV COMP RAD PRELUDESYNC 24CM

## (undated) DEVICE — SUT VIC PLS CTD ANTIB BR 3/0 8/18IN 45CM

## (undated) DEVICE — TBG PENCL TELESCP MEGADYNE SMOKE EVAC 10FT

## (undated) DEVICE — CONTN GRAD MEAS TRIANG 32OZ BLK

## (undated) DEVICE — INTRO SHEATH ART/FEM ENGAGE .038 5F12CM

## (undated) DEVICE — SENSR O2 OXIMAX FNGR A/ 18IN NONSTR

## (undated) DEVICE — GUIDE CATHETER: Brand: MACH1™

## (undated) DEVICE — AIRWY SZ11

## (undated) DEVICE — ENCORE® LATEX MICRO SIZE 8, STERILE LATEX POWDER-FREE SURGICAL GLOVE: Brand: ENCORE

## (undated) DEVICE — ADHS SKIN PREMIERPRO EXOFIN TOPICAL HI/VISC .5ML

## (undated) DEVICE — ELECTRD BLD EZ CLN STD 6.5IN

## (undated) DEVICE — SUT VIC 1 CT 36IN J959H

## (undated) DEVICE — HDRST INTUB GENTLETOUCH SLOT 7IN RT

## (undated) DEVICE — KT MANIFOLD CATHLAB CUST

## (undated) DEVICE — RESTRNT LIMB DBL STRAP BUCKL W/TIE 13.5X3X52IN

## (undated) DEVICE — ELECTRD BLD EXT EDGE 1P COAT 6.5IN STRL

## (undated) DEVICE — PK MAJ SHLDR SPLT 10

## (undated) DEVICE — AMD ANTIMICROBIAL GAUZE SPONGES,12 PLY USP TYPE VII, 0.2% POLYHEXAMETHYLENE BIGUANIDE HCI (PHMB): Brand: CURITY

## (undated) DEVICE — IRRIGATOR BULB ASEPTO 60CC STRL

## (undated) DEVICE — SUT VIC 0 CTD BR 18IN UNDYE VCP724D

## (undated) DEVICE — TIBURON SPLIT SHEET: Brand: CONVERTORS

## (undated) DEVICE — "MH-438 A/W VLVE F/140 EVIS-140": Brand: AIR/WATER VALVE

## (undated) DEVICE — DRSNG SURESITE WNDW 4X4.5

## (undated) DEVICE — ELECTRD BLD PLASMABLD/3S SPATULA/TIP 3MM

## (undated) DEVICE — ST EXT IV SMRTSTE 2VLV FIX M LL 6ML 41

## (undated) DEVICE — TBG SXN CONN/F UNIV 1/4IN 10FT LF STRL

## (undated) DEVICE — ENCORE® LATEX MICRO SIZE 7, STERILE LATEX POWDER-FREE SURGICAL GLOVE: Brand: ENCORE

## (undated) DEVICE — NDL HYPO ECLPS SFTY 18G 1 1/2IN

## (undated) DEVICE — TOOL 14MH30 LEGEND 14CM 3MM: Brand: MIDAS REX ™

## (undated) DEVICE — INSTRUMENT 9560702 RADIANCE ILLUMIN SYS: Brand: METRX® SYSTEM

## (undated) DEVICE — ELECTRD RETRN/GRND MEGADYNE SGL/PLT W/CORD 9FT DISP

## (undated) DEVICE — SET PRIMARY GRVTY 10DP MALE LL 104IN

## (undated) DEVICE — SCRW COMPR EQUINOXE LK 4.5X18MM
Type: IMPLANTABLE DEVICE | Site: SHOULDER | Status: NON-FUNCTIONAL
Removed: 2022-10-24

## (undated) DEVICE — SPNG LAP PREWSH SFTPK 18X18IN STRL PK/5

## (undated) DEVICE — CANN NASL CAPNOFLEX SMPL CO2/O2 W/O2/DEL A/

## (undated) DEVICE — LN SMPL 02 SMRT/CAPNOLINE PLS A/INTERMD

## (undated) DEVICE — INTRO SHEATH PRELUDE IDEAL SPRNG COIL .021 6F 16X80CM

## (undated) DEVICE — GLND KWIRE
Type: IMPLANTABLE DEVICE | Site: SHOULDER | Status: NON-FUNCTIONAL
Brand: EQUINOXE
Removed: 2023-09-18

## (undated) DEVICE — 2963 MEDIPORE SOFT CLOTH TAPE 3 IN X 10 YD 12 RLS/CS: Brand: 3M™ MEDIPORE™

## (undated) DEVICE — APPL CHLORAPREP W/TINT 26ML BLU

## (undated) DEVICE — GW GLIDEWIRE ADVANTAGE ANG .035IN 180X155X25CM

## (undated) DEVICE — KT PK ANGIOPLASTY ACC 9 MERIT

## (undated) DEVICE — CLN CAUTRY TP POLISHER 5X5CM

## (undated) DEVICE — MEDI-VAC YANKAUER SUCTION HANDLE W/BULBOUS TIP: Brand: CARDINAL HEALTH

## (undated) DEVICE — TB SXN FRAZIER 10F STRL

## (undated) DEVICE — SPNG GZ WOVN 4X4IN 12PLY 10/BX STRL

## (undated) DEVICE — DIFFUSER: Brand: CORE, MAESTRO